# Patient Record
Sex: FEMALE | Race: WHITE | Employment: OTHER | URBAN - METROPOLITAN AREA
[De-identification: names, ages, dates, MRNs, and addresses within clinical notes are randomized per-mention and may not be internally consistent; named-entity substitution may affect disease eponyms.]

---

## 2017-01-03 ENCOUNTER — TELEPHONE (OUTPATIENT)
Dept: INTERNAL MEDICINE CLINIC | Facility: CLINIC | Age: 77
End: 2017-01-03

## 2017-01-03 NOTE — TELEPHONE ENCOUNTER
Dr Sofy Etienne, please advise. Pt says her body is cramping. She was calling to get an appt. She says she did not make an appt sooner because she wasn't up to it. Pt asking if she can be added to MMP schedule this week at [facility] only.   Explained to pt, n

## 2017-01-03 NOTE — TELEPHONE ENCOUNTER
Patient just released from the hospital on X mas. She needs a follow up apt. She said that last couple of days her legs were swollen and sore. She said that she does not have much energy. She only wants to see Doctor at the [facility] office.  ( takes 2 buse

## 2017-01-03 NOTE — TELEPHONE ENCOUNTER
Received back my out reach call to [place] at [facility] [place] where pt lives in their [place] [address].  She said right now they do not have a  and with the volume of residence now living in their facility, they can't provide transport to and from [facility]

## 2017-01-04 ENCOUNTER — TELEPHONE (OUTPATIENT)
Dept: INTERNAL MEDICINE CLINIC | Facility: CLINIC | Age: 77
End: 2017-01-04

## 2017-01-04 ENCOUNTER — HOSPITAL ENCOUNTER (INPATIENT)
Facility: HOSPITAL | Age: 77
LOS: 14 days | Discharge: NURSING, REHAB OR LONG-TERM CARE | DRG: 208 | End: 2017-01-18
Attending: EMERGENCY MEDICINE | Admitting: HOSPITALIST
Payer: MEDICARE

## 2017-01-04 ENCOUNTER — APPOINTMENT (OUTPATIENT)
Dept: GENERAL RADIOLOGY | Facility: HOSPITAL | Age: 77
DRG: 208 | End: 2017-01-04
Attending: EMERGENCY MEDICINE
Payer: MEDICARE

## 2017-01-04 ENCOUNTER — APPOINTMENT (OUTPATIENT)
Dept: ULTRASOUND IMAGING | Facility: HOSPITAL | Age: 77
DRG: 208 | End: 2017-01-04
Attending: EMERGENCY MEDICINE
Payer: MEDICARE

## 2017-01-04 DIAGNOSIS — L03.116 CELLULITIS OF LEFT LOWER EXTREMITY: Primary | ICD-10-CM

## 2017-01-04 DIAGNOSIS — J44.1 COPD EXACERBATION (HCC): ICD-10-CM

## 2017-01-04 LAB
ANION GAP SERPL CALC-SCNC: 13 MMOL/L (ref 0–18)
ANION GAP SERPL CALC-SCNC: 15 MMOL/L (ref 0–18)
APTT PPP: 23.6 SECONDS (ref 23.2–35.3)
APTT PPP: 25.3 SECONDS (ref 23.2–35.3)
BASOPHILS # BLD: 0 K/UL (ref 0–0.2)
BASOPHILS NFR BLD: 0 %
BUN SERPL-MCNC: 14 MG/DL (ref 8–20)
BUN SERPL-MCNC: 15 MG/DL (ref 8–20)
BUN/CREAT SERPL: 12.3 (ref 10–20)
BUN/CREAT SERPL: 13.9 (ref 10–20)
CALCIUM SERPL-MCNC: 8.4 MG/DL (ref 8.5–10.5)
CALCIUM SERPL-MCNC: 8.6 MG/DL (ref 8.5–10.5)
CHLORIDE SERPL-SCNC: 100 MMOL/L (ref 95–110)
CHLORIDE SERPL-SCNC: 98 MMOL/L (ref 95–110)
CO2 SERPL-SCNC: 26 MMOL/L (ref 22–32)
CO2 SERPL-SCNC: 28 MMOL/L (ref 22–32)
CREAT SERPL-MCNC: 1.08 MG/DL (ref 0.5–1.5)
CREAT SERPL-MCNC: 1.14 MG/DL (ref 0.5–1.5)
EOSINOPHIL # BLD: 0.3 K/UL (ref 0–0.7)
EOSINOPHIL NFR BLD: 6 %
ERYTHROCYTE [DISTWIDTH] IN BLOOD BY AUTOMATED COUNT: 13.7 % (ref 11–15)
ERYTHROCYTE [DISTWIDTH] IN BLOOD BY AUTOMATED COUNT: 14.2 % (ref 11–15)
GLUCOSE SERPL-MCNC: 209 MG/DL (ref 70–99)
GLUCOSE SERPL-MCNC: 324 MG/DL (ref 70–99)
HCT VFR BLD AUTO: 37.2 % (ref 35–48)
HCT VFR BLD AUTO: 39.2 % (ref 35–48)
HGB BLD-MCNC: 12.4 G/DL (ref 12–16)
HGB BLD-MCNC: 13 G/DL (ref 12–16)
INR BLD: 1 (ref 0.9–1.2)
LYMPHOCYTES # BLD: 1.4 K/UL (ref 1–4)
LYMPHOCYTES NFR BLD: 26 %
MCH RBC QN AUTO: 31.7 PG (ref 27–32)
MCH RBC QN AUTO: 31.8 PG (ref 27–32)
MCHC RBC AUTO-ENTMCNC: 33.3 G/DL (ref 32–37)
MCHC RBC AUTO-ENTMCNC: 33.3 G/DL (ref 32–37)
MCV RBC AUTO: 95.3 FL (ref 80–100)
MCV RBC AUTO: 95.6 FL (ref 80–100)
MONOCYTES # BLD: 0.4 K/UL (ref 0–1)
MONOCYTES NFR BLD: 7 %
MRSA DNA SPEC QL NAA+PROBE: NEGATIVE
NEUTROPHILS # BLD AUTO: 3.3 K/UL (ref 1.8–7.7)
NEUTROPHILS NFR BLD: 61 %
OSMOLALITY UR CALC.SUM OF ELEC: 299 MOSM/KG (ref 275–295)
OSMOLALITY UR CALC.SUM OF ELEC: 301 MOSM/KG (ref 275–295)
PLATELET # BLD AUTO: 81 K/UL (ref 140–400)
PLATELET # BLD AUTO: 99 K/UL (ref 140–400)
PMV BLD AUTO: 10 FL (ref 7.4–10.3)
PMV BLD AUTO: 9.2 FL (ref 7.4–10.3)
POTASSIUM SERPL-SCNC: 3.3 MMOL/L (ref 3.3–5.1)
POTASSIUM SERPL-SCNC: 3.7 MMOL/L (ref 3.3–5.1)
PROTHROMBIN TIME: 12.7 SECONDS (ref 11.8–14.5)
RBC # BLD AUTO: 3.9 M/UL (ref 3.7–5.4)
RBC # BLD AUTO: 4.1 M/UL (ref 3.7–5.4)
SODIUM SERPL-SCNC: 139 MMOL/L (ref 136–144)
SODIUM SERPL-SCNC: 141 MMOL/L (ref 136–144)
WBC # BLD AUTO: 5.4 K/UL (ref 4–11)
WBC # BLD AUTO: 5.5 K/UL (ref 4–11)

## 2017-01-04 PROCEDURE — 99223 1ST HOSP IP/OBS HIGH 75: CPT | Performed by: HOSPITALIST

## 2017-01-04 PROCEDURE — 93971 EXTREMITY STUDY: CPT

## 2017-01-04 PROCEDURE — 71010 XR CHEST AP PORTABLE  (CPT=71010): CPT

## 2017-01-04 RX ORDER — LOPERAMIDE HYDROCHLORIDE 2 MG/1
2 CAPSULE ORAL AS NEEDED
Status: DISCONTINUED | OUTPATIENT
Start: 2017-01-04 | End: 2017-01-18

## 2017-01-04 RX ORDER — AMLODIPINE BESYLATE 10 MG/1
10 TABLET ORAL DAILY
Status: DISCONTINUED | OUTPATIENT
Start: 2017-01-05 | End: 2017-01-18

## 2017-01-04 RX ORDER — HYDROCODONE BITARTRATE AND ACETAMINOPHEN 5; 325 MG/1; MG/1
2 TABLET ORAL EVERY 4 HOURS PRN
Status: DISCONTINUED | OUTPATIENT
Start: 2017-01-04 | End: 2017-01-13

## 2017-01-04 RX ORDER — IPRATROPIUM BROMIDE AND ALBUTEROL SULFATE 2.5; .5 MG/3ML; MG/3ML
3 SOLUTION RESPIRATORY (INHALATION) ONCE
Status: COMPLETED | OUTPATIENT
Start: 2017-01-04 | End: 2017-01-04

## 2017-01-04 RX ORDER — ACETAMINOPHEN 325 MG/1
650 TABLET ORAL EVERY 6 HOURS PRN
Status: DISCONTINUED | OUTPATIENT
Start: 2017-01-04 | End: 2017-01-11

## 2017-01-04 RX ORDER — HYDROCODONE BITARTRATE AND ACETAMINOPHEN 5; 325 MG/1; MG/1
1 TABLET ORAL EVERY 4 HOURS PRN
Status: DISCONTINUED | OUTPATIENT
Start: 2017-01-04 | End: 2017-01-13

## 2017-01-04 RX ORDER — HEPARIN SODIUM 1000 [USP'U]/ML
80 INJECTION, SOLUTION INTRAVENOUS; SUBCUTANEOUS ONCE
Status: COMPLETED | OUTPATIENT
Start: 2017-01-04 | End: 2017-01-04

## 2017-01-04 RX ORDER — ONDANSETRON 2 MG/ML
4 INJECTION INTRAMUSCULAR; INTRAVENOUS EVERY 6 HOURS PRN
Status: DISCONTINUED | OUTPATIENT
Start: 2017-01-04 | End: 2017-01-18

## 2017-01-04 RX ORDER — BISACODYL 10 MG
10 SUPPOSITORY, RECTAL RECTAL
Status: DISCONTINUED | OUTPATIENT
Start: 2017-01-04 | End: 2017-01-18

## 2017-01-04 RX ORDER — VENLAFAXINE HYDROCHLORIDE 150 MG/1
300 CAPSULE, EXTENDED RELEASE ORAL DAILY
Status: DISCONTINUED | OUTPATIENT
Start: 2017-01-05 | End: 2017-01-18

## 2017-01-04 RX ORDER — POLYETHYLENE GLYCOL 3350 17 G/17G
17 POWDER, FOR SOLUTION ORAL DAILY PRN
Status: DISCONTINUED | OUTPATIENT
Start: 2017-01-04 | End: 2017-01-18

## 2017-01-04 RX ORDER — IPRATROPIUM BROMIDE AND ALBUTEROL SULFATE 2.5; .5 MG/3ML; MG/3ML
3 SOLUTION RESPIRATORY (INHALATION) EVERY 4 HOURS PRN
Status: DISCONTINUED | OUTPATIENT
Start: 2017-01-04 | End: 2017-01-11

## 2017-01-04 RX ORDER — DOCUSATE SODIUM 100 MG/1
100 CAPSULE, LIQUID FILLED ORAL 2 TIMES DAILY
Status: DISCONTINUED | OUTPATIENT
Start: 2017-01-04 | End: 2017-01-18

## 2017-01-04 RX ORDER — HYDROCODONE BITARTRATE AND ACETAMINOPHEN 10; 325 MG/1; MG/1
1 TABLET ORAL EVERY 6 HOURS PRN
Status: DISCONTINUED | OUTPATIENT
Start: 2017-01-04 | End: 2017-01-18

## 2017-01-04 RX ORDER — HEPARIN SODIUM AND DEXTROSE 10000; 5 [USP'U]/100ML; G/100ML
18 INJECTION INTRAVENOUS ONCE
Status: COMPLETED | OUTPATIENT
Start: 2017-01-04 | End: 2017-01-04

## 2017-01-04 RX ORDER — ZOLPIDEM TARTRATE 5 MG/1
5 TABLET ORAL NIGHTLY PRN
Status: DISCONTINUED | OUTPATIENT
Start: 2017-01-04 | End: 2017-01-18

## 2017-01-04 RX ORDER — ACETAMINOPHEN 325 MG/1
650 TABLET ORAL EVERY 4 HOURS PRN
Status: DISCONTINUED | OUTPATIENT
Start: 2017-01-04 | End: 2017-01-13

## 2017-01-04 RX ORDER — LORAZEPAM 1 MG/1
1 TABLET ORAL 2 TIMES DAILY PRN
Status: DISCONTINUED | OUTPATIENT
Start: 2017-01-04 | End: 2017-01-18

## 2017-01-04 RX ORDER — SODIUM PHOSPHATE, DIBASIC AND SODIUM PHOSPHATE, MONOBASIC 7; 19 G/133ML; G/133ML
1 ENEMA RECTAL ONCE AS NEEDED
Status: ACTIVE | OUTPATIENT
Start: 2017-01-04 | End: 2017-01-04

## 2017-01-04 RX ORDER — METHYLPREDNISOLONE SODIUM SUCCINATE 40 MG/ML
40 INJECTION, POWDER, LYOPHILIZED, FOR SOLUTION INTRAMUSCULAR; INTRAVENOUS EVERY 8 HOURS
Status: DISCONTINUED | OUTPATIENT
Start: 2017-01-04 | End: 2017-01-11

## 2017-01-04 RX ORDER — IPRATROPIUM BROMIDE AND ALBUTEROL SULFATE 2.5; .5 MG/3ML; MG/3ML
3 SOLUTION RESPIRATORY (INHALATION)
Status: DISCONTINUED | OUTPATIENT
Start: 2017-01-05 | End: 2017-01-18

## 2017-01-04 RX ORDER — METHYLPREDNISOLONE SODIUM SUCCINATE 125 MG/2ML
125 INJECTION, POWDER, LYOPHILIZED, FOR SOLUTION INTRAMUSCULAR; INTRAVENOUS ONCE
Status: COMPLETED | OUTPATIENT
Start: 2017-01-04 | End: 2017-01-04

## 2017-01-04 RX ORDER — HEPARIN SODIUM 5000 [USP'U]/ML
5000 INJECTION, SOLUTION INTRAVENOUS; SUBCUTANEOUS EVERY 12 HOURS
Status: DISCONTINUED | OUTPATIENT
Start: 2017-01-04 | End: 2017-01-04 | Stop reason: ALTCHOICE

## 2017-01-04 RX ORDER — TEMAZEPAM 30 MG/1
30 CAPSULE ORAL NIGHTLY PRN
Status: DISCONTINUED | OUTPATIENT
Start: 2017-01-04 | End: 2017-01-18

## 2017-01-04 RX ORDER — HYDROCODONE POLISTIREX AND CHLORPHENIRAMINE POLISTIREX 10; 8 MG/5ML; MG/5ML
5 SUSPENSION, EXTENDED RELEASE ORAL 2 TIMES DAILY PRN
Status: DISCONTINUED | OUTPATIENT
Start: 2017-01-04 | End: 2017-01-18

## 2017-01-04 RX ORDER — LEVOTHYROXINE SODIUM 0.12 MG/1
250 TABLET ORAL
Status: DISCONTINUED | OUTPATIENT
Start: 2017-01-04 | End: 2017-01-18

## 2017-01-04 RX ORDER — MIRTAZAPINE 30 MG/1
30 TABLET, FILM COATED ORAL NIGHTLY
Status: DISCONTINUED | OUTPATIENT
Start: 2017-01-04 | End: 2017-01-18

## 2017-01-04 RX ORDER — LIDOCAINE 50 MG/G
1 PATCH TOPICAL EVERY 24 HOURS
Status: DISCONTINUED | OUTPATIENT
Start: 2017-01-04 | End: 2017-01-18

## 2017-01-04 RX ORDER — GUAIFENESIN 100 MG/5ML
200 SOLUTION ORAL EVERY 4 HOURS PRN
Status: DISCONTINUED | OUTPATIENT
Start: 2017-01-04 | End: 2017-01-18

## 2017-01-04 RX ORDER — PANTOPRAZOLE SODIUM 40 MG/1
40 TABLET, DELAYED RELEASE ORAL
Status: DISCONTINUED | OUTPATIENT
Start: 2017-01-05 | End: 2017-01-09

## 2017-01-04 RX ORDER — ACETAMINOPHEN 325 MG/1
325 TABLET ORAL EVERY 4 HOURS PRN
Status: DISCONTINUED | OUTPATIENT
Start: 2017-01-04 | End: 2017-01-18

## 2017-01-04 RX ORDER — HEPARIN SODIUM AND DEXTROSE 10000; 5 [USP'U]/100ML; G/100ML
INJECTION INTRAVENOUS CONTINUOUS
Status: DISCONTINUED | OUTPATIENT
Start: 2017-01-05 | End: 2017-01-05

## 2017-01-04 RX ORDER — SODIUM CHLORIDE 0.9 % (FLUSH) 0.9 %
SYRINGE (ML) INJECTION
Status: COMPLETED
Start: 2017-01-04 | End: 2017-01-04

## 2017-01-04 NOTE — TELEPHONE ENCOUNTER
Follow up on pateint if she is able to get transportation  If not and she is ill option of callin 911 to go to the ER if she is acutely ill

## 2017-01-04 NOTE — TELEPHONE ENCOUNTER
Malachi Negron from [facility] home care stts pt woke up with a bad cough. Malachi Negron asking if pt can be seen Friday with Dr. Luisa Casas ? (no open appts)   Malachi Negron asking to speak with a nurse. Please advise.

## 2017-01-04 NOTE — TELEPHONE ENCOUNTER
Hx of respiratory failure  Sounds like should be seen before Friday  reasses her condition and let me know

## 2017-01-04 NOTE — TELEPHONE ENCOUNTER
[facility]    Please transfer x [phone] until 4:45pm or [phone] anytime. Thank you. To follow up on MD message below.

## 2017-01-04 NOTE — ED INITIAL ASSESSMENT (HPI)
Pt brought in by EMS for SOB/COPD exacerbation. Pt d/c'd on 12-25-16 home from [address] [place] [address] for same symptoms. No fevers at home. No home oxygen.

## 2017-01-04 NOTE — ED NOTES
Pt presents to ER via EMS for difficulty breathing. Pt with a hx of COPD. Pt was discharged from [address] [place] [address] 12-25-16 for COPD exacerbation. Pt had 2 nebulizer treatments in route to hospital by EMS. Pt on room air 93-95%. Pt speaking small short sentences.  Bilate

## 2017-01-04 NOTE — H&P
Psychiatric    PATIENT'S NAME: Leonidsebastian Jacobsdevante   ATTENDING PHYSICIAN: Mitch Arana MD   PATIENT ACCOUNT#:   [id number]    LOCATION:  [place] [address]  MEDICAL RECORD #:   [id number]       YOB: 1940  ADMISSION DATE:       01/04/20 HISTORY:  Ex-tobacco user. No current tobacco, alcohol, or drug use. Lives with her family. She is usually independent for basic activities of daily living.     REVIEW OF SYSTEMS:  The patient said for the last few days she has been having progressive sh medications. Obtain pulmonary consult. IV Solu-Medrol and DuoNeb q.4 h. p.r.n. Further recommendations to follow.     Dictated By Ovi Nathan MD  d: 01/04/2017 16:18:31  t: 01/04/2017 16:33:39  [facility] [id number]  FB/

## 2017-01-04 NOTE — TELEPHONE ENCOUNTER
attemped to call patient to follow up, her message machine if full. Pt in ER now. Sent to Dr. Remigio Bedolla, thank you.

## 2017-01-04 NOTE — TELEPHONE ENCOUNTER
Dr Jarret Frias, please advise. Seng Penaloza RN from [facility] home care states pt has been having more coughing than usual.  Yesterday she had shortness of breath. Today RN hears wheezing in pt lungs.   She contacted pulmonologist and will try to restart nebulizer treatment

## 2017-01-04 NOTE — TELEPHONE ENCOUNTER
There is another acute encounter for this pt, per [facility] pt is going to ER today. Closing this encounter. Will f/u in other encounter.

## 2017-01-04 NOTE — ED PROVIDER NOTES
Patient Seen in: [facility] AND [facility] Emergency Department    History   Patient presents with:  Dyspnea BELINDA SOB (respiratory)    Stated Complaint: Cough    HPI    Patient presents to the emergency department with difficulty breathing.   She has history of CO CHOLECYSTECTOMY  1984    Comment Cholecystitis    LAMINECTOMY      OTHER SURGICAL HISTORY  1985,1995,2003,2009    Comment Cervical Discectomy     HYSTERECTOMY  1967    Comment Partial Hysterectomy    TONSILLECTOMY  1950    Comment Tonsillitis     EYE SURGE as needed   aspirin 325 MG Oral Tab,  Take 325 mg by mouth daily. acetaminophen (TYLENOL) 325 MG Oral Tab,  Take 325 mg by mouth every 4 (four) hours as needed.    NYSTOP 834742 UNIT/GM External Powder,  Use as needed       Family History   Problem Relati 142/78 mmHg  Pulse 93  Temp(Src) 98 °F (36.7 °C) (Oral)  Resp 20  Ht 172.7 cm (5' 8\")  Wt 117.935 kg  BMI 39.54 kg/m2  SpO2 93%        Physical Exam  Constitutional:  Alert, well nourished adult lying in bed in no distress. Vital signs noted.   However wh BASIC METABOLIC PANEL (8) - Abnormal; Notable for the following:     Glucose 324 (*)     Calculated Osmolality 301 (*)     GFR, Non- 46 (*)     GFR, -American 56 (*)     All other components within normal limits   CBC, PLATELET; NO Status                     ---------                               -----------         ------                     CBC W/ DIFFERENTIAL[[id number]]          Abnormal            Final result                 Please view results for these tests on the indiv (Principal)DVT (deep venous thrombosis) ([facility] I82.409 1/5/2017 Unknown    Cellulitis of left lower extremity L03. 116 1/4/2017 Unknown    COPD exacerbation ([facility] J44.1 12/9/2016 Unknown

## 2017-01-05 ENCOUNTER — APPOINTMENT (OUTPATIENT)
Dept: NUCLEAR MEDICINE | Facility: HOSPITAL | Age: 77
DRG: 208 | End: 2017-01-05
Attending: HOSPITALIST
Payer: MEDICARE

## 2017-01-05 PROBLEM — I82.409 DVT (DEEP VENOUS THROMBOSIS) (HCC): Status: ACTIVE | Noted: 2017-01-05

## 2017-01-05 LAB
ANION GAP SERPL CALC-SCNC: 12 MMOL/L (ref 0–18)
APTT PPP: 76.4 SECONDS (ref 23.2–35.3)
APTT PPP: >240 SECONDS (ref 23.2–35.3)
BASOPHILS # BLD: 0 K/UL (ref 0–0.2)
BASOPHILS NFR BLD: 0 %
BUN SERPL-MCNC: 12 MG/DL (ref 8–20)
BUN/CREAT SERPL: 11.9 (ref 10–20)
CALCIUM SERPL-MCNC: 8.6 MG/DL (ref 8.5–10.5)
CHLORIDE SERPL-SCNC: 101 MMOL/L (ref 95–110)
CO2 SERPL-SCNC: 26 MMOL/L (ref 22–32)
CREAT SERPL-MCNC: 1.01 MG/DL (ref 0.5–1.5)
EOSINOPHIL # BLD: 0.1 K/UL (ref 0–0.7)
EOSINOPHIL NFR BLD: 1 %
ERYTHROCYTE [DISTWIDTH] IN BLOOD BY AUTOMATED COUNT: 14 % (ref 11–15)
GLUCOSE SERPL-MCNC: 378 MG/DL (ref 70–99)
HCT VFR BLD AUTO: 37.1 % (ref 35–48)
HGB BLD-MCNC: 12.2 G/DL (ref 12–16)
LYMPHOCYTES # BLD: 0.5 K/UL (ref 1–4)
LYMPHOCYTES NFR BLD: 9 %
MCH RBC QN AUTO: 31.6 PG (ref 27–32)
MCHC RBC AUTO-ENTMCNC: 32.9 G/DL (ref 32–37)
MCV RBC AUTO: 96 FL (ref 80–100)
MONOCYTES # BLD: 0.1 K/UL (ref 0–1)
MONOCYTES NFR BLD: 1 %
NEUTROPHILS # BLD AUTO: 4.7 K/UL (ref 1.8–7.7)
NEUTROPHILS NFR BLD: 88 %
NEUTS BAND NFR BLD: 1 %
OSMOLALITY UR CALC.SUM OF ELEC: 303 MOSM/KG (ref 275–295)
PLATELET # BLD AUTO: 86 K/UL (ref 140–400)
PMV BLD AUTO: 9.2 FL (ref 7.4–10.3)
POTASSIUM SERPL-SCNC: 3.8 MMOL/L (ref 3.3–5.1)
POTASSIUM SERPL-SCNC: 3.8 MMOL/L (ref 3.3–5.1)
RBC # BLD AUTO: 3.87 M/UL (ref 3.7–5.4)
SODIUM SERPL-SCNC: 139 MMOL/L (ref 136–144)
WBC # BLD AUTO: 5.2 K/UL (ref 4–11)

## 2017-01-05 PROCEDURE — 99233 SBSQ HOSP IP/OBS HIGH 50: CPT | Performed by: HOSPITALIST

## 2017-01-05 PROCEDURE — 78582 LUNG VENTILAT&PERFUS IMAGING: CPT

## 2017-01-05 RX ORDER — SODIUM CHLORIDE 0.9 % (FLUSH) 0.9 %
SYRINGE (ML) INJECTION
Status: COMPLETED
Start: 2017-01-05 | End: 2017-01-05

## 2017-01-05 RX ORDER — ENOXAPARIN SODIUM 150 MG/ML
1 INJECTION SUBCUTANEOUS EVERY 12 HOURS SCHEDULED
Status: DISCONTINUED | OUTPATIENT
Start: 2017-01-05 | End: 2017-01-16

## 2017-01-05 RX ORDER — POTASSIUM CHLORIDE 20 MEQ/1
40 TABLET, EXTENDED RELEASE ORAL ONCE
Status: COMPLETED | OUTPATIENT
Start: 2017-01-05 | End: 2017-01-05

## 2017-01-05 RX ORDER — WARFARIN SODIUM 5 MG/1
5 TABLET ORAL NIGHTLY
Status: DISCONTINUED | OUTPATIENT
Start: 2017-01-05 | End: 2017-01-06

## 2017-01-05 NOTE — PROGRESS NOTES
[facility]    Progress Note    Ltanya Hum Patient Status:  Inpatient    12/3/1940 MRN [id number]   Location [facility] 5SW/SE Attending Anthony Crow MD   Hosp Day # 1 PCP Rey Jacinto MD       Subjective:   Jacy Ardon Nightly   • Pantoprazole Sodium  40 mg Oral BID AC   • QUEtiapine Fumarate  300 mg Oral Nightly   • Venlafaxine HCl ER  300 mg Oral Daily   • lidocaine  1 patch Transdermal Q24H   • ipratropium-albuterol  3 mL Nebulization QID       Current PRN Inpatient M 2. Extensive subcutaneous edema of the left lower extremity. Xr Chest Ap Portable  (cpt=71010)    1/4/2017  CONCLUSION:  1. Suboptimal inspiration. No active disease in chest. 2. Atherosclerotic calcification aorta.          Nm Lung Vq Vent / César Dagoberto evidence for PE on VQ scan  -currently on heparin drip   -lengthy conversation against pros/cons of pradaxa vs heparin/coumadin and Lovenox/coumadin - pt chose lovenox and coumadin. Will have pharmacy dose lovenox and start coumadin 5 mg tonight.   Check I

## 2017-01-05 NOTE — PROGRESS NOTES
[facility]    Progress Note    Yecenia Norris Patient Status:  Inpatient    12/3/1940 MRN [id number]   Location [facility] 5SW/SE Attending Ileana Fairchild MD   Hosp Day # 1 PCP Marcelo Allen MD       Subjective:   Manuel Torres ([address]  Anticoagulate,v/q-P      COPD exacerbation ([address]  Steroids nebs      Cellulitis of left lower extremity  Abx, consult to follow        Results:     Lab Results  Component Value Date   WBC 5.2 01/05/2017   HGB 12.2 01/05/2017   HCT 37.1 01/05/2017

## 2017-01-05 NOTE — PROGRESS NOTES
[facility] dosing service    Initial Pharmacokinetic Consult for Vancomycin Dosing     Ryan Miguel is a 64year old female who is being treated for cellulitis.   Pharmacy has been asked to dose Vancomycin by Dr. Carol Ann Corley    She is allergic to ciproflo

## 2017-01-05 NOTE — PLAN OF CARE
Patient/Family Goals    • Patient/Family Long Term Goal Progressing    • Patient/Family Short Term Goal Progressing        RESPIRATORY - ADULT    • Achieves optimal ventilation and oxygenation Progressing

## 2017-01-05 NOTE — RESPIRATORY THERAPY NOTE
Pt on SHANT  consult. Per pt she need to have her sleep study read by her pulmonary and will not want to use cpap/bipap while in house. Told pt to call if she changes her mind so we can get an order for cpap/bipap.

## 2017-01-05 NOTE — H&P
[facility]    PATIENT'S NAME: Shahla Mullen   ATTENDING PHYSICIAN: Amirah Shelby MD   PATIENT ACCOUNT#:   [id number]    LOCATION:  [address] [place] [address] RECORD #:   [id number]       YOB: 1940  ADMISSION DATE:       01/04/2017 coronary artery disease. SOCIAL HISTORY:  Ex-tobacco user. No current tobacco, alcohol, or drug use. Lives with her family. She is usually independent for basic activities of daily living.     REVIEW OF SYSTEMS:  The patient said for the last few days medical floor. IV vancomycin. Continue home medications. Obtain pulmonary consult. IV Solu-Medrol and DuoNeb q.4 h. p.r.n. Further recommendations to follow. ADDENDUM TO ASSESSMENT AND PLAN (Misa Carmen [id number]):     The patient had venous Doppler study res

## 2017-01-05 NOTE — CM/SW NOTE
+BPCI. Patient is a [address] readmission previously enrolled in the [address] program on 12/8/16  under  (sepsis) with 78 days left in the BPCI episode which will end on 3/24/17. Patient is current with [facility].   She has been to  and [facility]

## 2017-01-05 NOTE — ED NOTES
Report given to [facility]. RN aware that pt will need heparin drip for LLE DVT. Dr Jarod Cruz to enter orders.

## 2017-01-05 NOTE — PROGRESS NOTES
NYSTOP POWD 1 Application  is Non-Formulary Medication &  Auto-Substituted to miconazole powder Per P&[employer] PROTOCOL

## 2017-01-05 NOTE — PLAN OF CARE
Patient refusing lab draws and new IV start. Insisting on PICC line. Patient has Hx of DVT from PICC line. Dr. Childs Cleaves aware of situation. Orders to hold off on 1600 heparin draw until after MD speaks with patient.

## 2017-01-05 NOTE — CM/SW NOTE
+BPCI. Patient is a [address] readmission previously enrolled in the [address] program on 12/8/16  under  (sepsis) with 78 days left in the BPCI episode which will end on 3/24/17. Patient is current with [facility].   She has been to  and [facility]

## 2017-01-05 NOTE — DISCHARGE PLANNING
MDO received for home health services. Pt is current with [facility]. Update given to their intake. Pt has a hx rehab at [address] [place] [address] and [facility]. SW will assist as indicated by therapy and MD recommendations.     shayne sheth,JM [id number]

## 2017-01-06 ENCOUNTER — TELEPHONE (OUTPATIENT)
Dept: INTERNAL MEDICINE CLINIC | Facility: CLINIC | Age: 77
End: 2017-01-06

## 2017-01-06 LAB
ALBUMIN SERPL BCP-MCNC: 2.8 G/DL (ref 3.5–4.8)
ALBUMIN/GLOB SERPL: 1.2 {RATIO} (ref 1–2)
ALP SERPL-CCNC: 75 U/L (ref 32–100)
ALT SERPL-CCNC: 26 U/L (ref 14–54)
ANION GAP SERPL CALC-SCNC: 6 MMOL/L (ref 0–18)
AST SERPL-CCNC: 13 U/L (ref 15–41)
BASOPHILS # BLD: 0 K/UL (ref 0–0.2)
BASOPHILS NFR BLD: 0 %
BILIRUB SERPL-MCNC: 0.4 MG/DL (ref 0.3–1.2)
BUN SERPL-MCNC: 20 MG/DL (ref 8–20)
BUN/CREAT SERPL: 21.3 (ref 10–20)
CALCIUM SERPL-MCNC: 9.1 MG/DL (ref 8.5–10.5)
CHLORIDE SERPL-SCNC: 105 MMOL/L (ref 95–110)
CO2 SERPL-SCNC: 30 MMOL/L (ref 22–32)
CREAT SERPL-MCNC: 0.94 MG/DL (ref 0.5–1.5)
EOSINOPHIL # BLD: 0 K/UL (ref 0–0.7)
EOSINOPHIL NFR BLD: 0 %
ERYTHROCYTE [DISTWIDTH] IN BLOOD BY AUTOMATED COUNT: 13.7 % (ref 11–15)
GLOBULIN PLAS-MCNC: 2.4 G/DL (ref 2.5–3.7)
GLUCOSE SERPL-MCNC: 238 MG/DL (ref 70–99)
HCT VFR BLD AUTO: 36.1 % (ref 35–48)
HGB BLD-MCNC: 11.9 G/DL (ref 12–16)
INR BLD: 0.9 (ref 0.9–1.2)
LYMPHOCYTES # BLD: 1.4 K/UL (ref 1–4)
LYMPHOCYTES NFR BLD: 15 %
MCH RBC QN AUTO: 31.6 PG (ref 27–32)
MCHC RBC AUTO-ENTMCNC: 32.9 G/DL (ref 32–37)
MCV RBC AUTO: 95.8 FL (ref 80–100)
MONOCYTES # BLD: 0.4 K/UL (ref 0–1)
MONOCYTES NFR BLD: 4 %
NEUTROPHILS # BLD AUTO: 7.9 K/UL (ref 1.8–7.7)
NEUTROPHILS NFR BLD: 81 %
OSMOLALITY UR CALC.SUM OF ELEC: 302 MOSM/KG (ref 275–295)
PLATELET # BLD AUTO: 113 K/UL (ref 140–400)
PMV BLD AUTO: 9 FL (ref 7.4–10.3)
POTASSIUM SERPL-SCNC: 4.1 MMOL/L (ref 3.3–5.1)
PROT SERPL-MCNC: 5.2 G/DL (ref 5.9–8.4)
PROTHROMBIN TIME: 12.4 SECONDS (ref 11.8–14.5)
RBC # BLD AUTO: 3.76 M/UL (ref 3.7–5.4)
SODIUM SERPL-SCNC: 141 MMOL/L (ref 136–144)
WBC # BLD AUTO: 9.8 K/UL (ref 4–11)

## 2017-01-06 PROCEDURE — 99233 SBSQ HOSP IP/OBS HIGH 50: CPT | Performed by: HOSPITALIST

## 2017-01-06 NOTE — PLAN OF CARE
Patient/Family Goals    • Patient/Family Long Term Goal Not Progressing    • Patient/Family Short Term Goal Not Progressing        RESPIRATORY - ADULT    • Achieves optimal ventilation and oxygenation Not Progressing        inr 0.9. On lovenox.  Bronch sche

## 2017-01-06 NOTE — PROGRESS NOTES
Nicholas Newman is a 64year old female for whom pharmacy is dosing enoxaparin for treatment of DVT left lower extremity.      TBW: 117.9 kg    Vitals: /66 mmHg  Pulse 96  Temp(Src) 97.9 °F (36.6 °C) (Oral)  Resp 20  H

## 2017-01-06 NOTE — PROGRESS NOTES
[facility]    Progress Note    Jazmine Arnold Patient Status:  Inpatient    12/3/1940 MRN [id number]   Location [facility] 5SW/SE Attending Sowmya Pickens MD   Hosp Day # 2 PCP Kirstin Govea MD       Subjective:   Vicky Ruiz ([facility]  Steroids, nebs      Cellulitis of left lower extremity  Abx, bronchoscopy Monday        Results:     Lab Results  Component Value Date   WBC 9.8 01/06/2017   HGB 11.9* 01/06/2017   HCT 36.1 01/06/2017   * 01/06/2017   CREATSERUM 0.94 01/06/20 pulmonary embolus. 2. Suggestion of COPD. Dictated by (CST): Ronney Hodgkin MD on 1/05/2017 at 11:21     Approved by (CST): Ronney Hodgkin. MD on 1/05/2017 at 11:22     ADDENDUM:  Low probability study for pulmonary embolus.    Dictated

## 2017-01-07 LAB
ANION GAP SERPL CALC-SCNC: 5 MMOL/L (ref 0–18)
BASOPHILS # BLD: 0 K/UL (ref 0–0.2)
BASOPHILS NFR BLD: 0 %
BUN SERPL-MCNC: 22 MG/DL (ref 8–20)
BUN/CREAT SERPL: 19.5 (ref 10–20)
CALCIUM SERPL-MCNC: 9.5 MG/DL (ref 8.5–10.5)
CHLORIDE SERPL-SCNC: 102 MMOL/L (ref 95–110)
CO2 SERPL-SCNC: 31 MMOL/L (ref 22–32)
CREAT SERPL-MCNC: 1.13 MG/DL (ref 0.5–1.5)
EOSINOPHIL # BLD: 0 K/UL (ref 0–0.7)
EOSINOPHIL NFR BLD: 0 %
ERYTHROCYTE [DISTWIDTH] IN BLOOD BY AUTOMATED COUNT: 13.9 % (ref 11–15)
GLUCOSE SERPL-MCNC: 322 MG/DL (ref 70–99)
HCT VFR BLD AUTO: 36.9 % (ref 35–48)
HGB BLD-MCNC: 12.2 G/DL (ref 12–16)
INR BLD: 1 (ref 0.9–1.2)
LYMPHOCYTES # BLD: 0.5 K/UL (ref 1–4)
LYMPHOCYTES NFR BLD: 5 %
MCH RBC QN AUTO: 32 PG (ref 27–32)
MCHC RBC AUTO-ENTMCNC: 33 G/DL (ref 32–37)
MCV RBC AUTO: 96.8 FL (ref 80–100)
METAMYELOCYTES # BLD MANUAL: 0.19 K/UL
MONOCYTES # BLD: 0.3 K/UL (ref 0–1)
MONOCYTES NFR BLD: 3 %
MYELOCYTES NFR BLD: 2 %
NEUTROPHILS # BLD AUTO: 8.4 K/UL (ref 1.8–7.7)
NEUTROPHILS NFR BLD: 84 %
NEUTS BAND NFR BLD: 6 %
OSMOLALITY UR CALC.SUM OF ELEC: 302 MOSM/KG (ref 275–295)
PLATELET # BLD AUTO: 130 K/UL (ref 140–400)
PMV BLD AUTO: 9.1 FL (ref 7.4–10.3)
POTASSIUM SERPL-SCNC: 4.9 MMOL/L (ref 3.3–5.1)
PROTHROMBIN TIME: 13 SECONDS (ref 11.8–14.5)
RBC # BLD AUTO: 3.82 M/UL (ref 3.7–5.4)
SODIUM SERPL-SCNC: 138 MMOL/L (ref 136–144)
VANCOMYCIN TROUGH SERPL-MCNC: 10 MCG/ML (ref 10–20)
WBC # BLD AUTO: 9.3 K/UL (ref 4–11)

## 2017-01-07 PROCEDURE — 99232 SBSQ HOSP IP/OBS MODERATE 35: CPT | Performed by: HOSPITALIST

## 2017-01-07 NOTE — PROGRESS NOTES
[facility]    Progress Note    Sarah Brar Patient Status:  Inpatient    12/3/1940 MRN [id number]   Location [facility] 5SW/SE Attending Bakari Murdock MD   Hosp Day # 3 PCP Shaylee Carmona MD       Subjective:   Havery Edu mirtazapine  30 mg Oral Nightly   • Pantoprazole Sodium  40 mg Oral BID AC   • QUEtiapine Fumarate  300 mg Oral Nightly   • Venlafaxine HCl ER  300 mg Oral Daily   • lidocaine  1 patch Transdermal Q24H   • ipratropium-albuterol  3 mL Nebulization QID Tariq (cpt=93971)    1/4/2017  CONCLUSION:  1. Deep venous thrombosis involving the left common femoral vein and extending into the junctions with the deep femoral and greater saphenous veins. 2. Extensive subcutaneous edema of the left lower extremity. with ECG of 12/08/2016 23:23:37 No significant changes have occurred Electronically signed on 01/04/2017 at 17:10 by Rick Daniel and Plan:     DVT left lower extremity  H/O IVC filter placement  -no evidence for PE on VQ scan  -currently

## 2017-01-07 NOTE — PROGRESS NOTES
[facility]    Progress Note    Dany Madsen Patient Status:  Inpatient    12/3/1940 MRN [id number]   Location [facility] 5SW/SE Attending Gayle Pineda MD   Hosp Day # 2 PCP Samira Juarez MD       Subjective:   Jason Spangler Levothyroxine Sodium  250 mcg Oral Before breakfast   • mirtazapine  30 mg Oral Nightly   • Pantoprazole Sodium  40 mg Oral BID AC   • QUEtiapine Fumarate  300 mg Oral Nightly   • Venlafaxine HCl ER  300 mg Oral Daily   • lidocaine  1 patch Transdermal Q24 Imaging/EKG:   Us Venous Doppler Leg Left - Diag Img (cpt=93971)    1/4/2017  CONCLUSION:  1. Deep venous thrombosis involving the left common femoral vein and extending into the junctions with the deep femoral and greater saphenous veins.   2. Extensiv leads.  -Old inferior infarct.  ABNORMAL When compared with ECG of 12/08/2016 23:23:37 No significant changes have occurred Electronically signed on 01/04/2017 at 17:10 by Edouard Kowalski and Plan:     DVT left lower extremity  -no evidence f

## 2017-01-07 NOTE — PROGRESS NOTES
[facility]    Progress Note    Aleida Nolen Patient Status:  Inpatient    12/3/1940 MRN [id number]   Location [facility] 5SW/SE Attending Anum Schneider MD   Hosp Day # 3 PCP Dian Yoder MD       Subjective:   Roseline Aleman

## 2017-01-07 NOTE — OCCUPATIONAL THERAPY NOTE
OCCUPATIONAL THERAPY EVALUATION - INPATIENT     Room Number: 562/562-A  Evaluation Date: 1/7/2017  Type of Evaluation: Initial  Presenting Problem: COPD exacerbatyion; SBO, LLE cellulitis    Physician Order: IP Consult to Occupational Therapy  Reason for T of both knees      knee replacement    • Arthritis of right hip 2009   • Other and unspecified hyperlipidemia    • Dehydration 2012     Fluids, Medication adjustment    • Toe pain      Onychomycosis, Debridement , Hammertoe    • Onychomycosis      Debridem is: rest    OBJECTIVE     Fall Risk: Standard fall risk    PAIN ASSESSMENT  Ratin          ACTIVITY TOLERANCE  Rest: RA 92%,   Activity: RA 91%,     COGNITION  Overall Cognitive Status:  WFL - within functional limits  Orientation Level:  o session/findings; All patient questions and concerns addressed      OT Goals     Patient will complete functional transfer with SBA with RW. Comment:     Patient will complete toileting with SBA.    Comment:     Patient will tolerate standing for 5 minutes

## 2017-01-07 NOTE — CONSULTS
[facility]    PATIENT'S NAME: ZEENAT NEWTON   ATTENDING PHYSICIAN: Queenie Sanchez MD   CONSULTING PHYSICIAN: Robi Chicas MD   PATIENT ACCOUNT#:   [id number]    LOCATION:  [address] [place] [address] #:   [id number]       DATE OF BIRTH: GENERAL:  A well-developed, well-nourished lady in no distress at present. VITAL SIGNS:  Her blood pressure is 138/83, respiratory rate 20, heart rate 93, temperature 98, saturation 95% on ________ L of oxygen. HEENT:  Normal.  NECK:  Supple. No JVP.

## 2017-01-07 NOTE — PLAN OF CARE
Patient/Family Goals    • Patient/Family Long Term Goal Not Progressing    • Patient/Family Short Term Goal Not Progressing        RESPIRATORY - ADULT    • Achieves optimal ventilation and oxygenation Not Progressing

## 2017-01-08 LAB
ANION GAP SERPL CALC-SCNC: 8 MMOL/L (ref 0–18)
BASOPHILS # BLD: 0 K/UL (ref 0–0.2)
BASOPHILS NFR BLD: 0 %
BUN SERPL-MCNC: 25 MG/DL (ref 8–20)
BUN/CREAT SERPL: 22.5 (ref 10–20)
CALCIUM SERPL-MCNC: 9.6 MG/DL (ref 8.5–10.5)
CHLORIDE SERPL-SCNC: 98 MMOL/L (ref 95–110)
CO2 SERPL-SCNC: 30 MMOL/L (ref 22–32)
CREAT SERPL-MCNC: 1.11 MG/DL (ref 0.5–1.5)
EOSINOPHIL # BLD: 0 K/UL (ref 0–0.7)
EOSINOPHIL NFR BLD: 0 %
ERYTHROCYTE [DISTWIDTH] IN BLOOD BY AUTOMATED COUNT: 13.7 % (ref 11–15)
GLUCOSE SERPL-MCNC: 336 MG/DL (ref 70–99)
HCT VFR BLD AUTO: 37.4 % (ref 35–48)
HGB BLD-MCNC: 12.3 G/DL (ref 12–16)
LYMPHOCYTES # BLD: 1.7 K/UL (ref 1–4)
LYMPHOCYTES NFR BLD: 19 %
MCH RBC QN AUTO: 32 PG (ref 27–32)
MCHC RBC AUTO-ENTMCNC: 33 G/DL (ref 32–37)
MCV RBC AUTO: 97 FL (ref 80–100)
METAMYELOCYTES # BLD MANUAL: 0.09 K/UL
METAMYELOCYTES NFR BLD: 1 %
MONOCYTES # BLD: 0.1 K/UL (ref 0–1)
MONOCYTES NFR BLD: 1 %
NEUTROPHILS # BLD AUTO: 6.9 K/UL (ref 1.8–7.7)
NEUTROPHILS NFR BLD: 72 %
NEUTS BAND NFR BLD: 7 %
NRBC BLD-RTO: 4 % (ref ?–1)
OSMOLALITY UR CALC.SUM OF ELEC: 300 MOSM/KG (ref 275–295)
PLATELET # BLD AUTO: 162 K/UL (ref 140–400)
PMV BLD AUTO: 8.9 FL (ref 7.4–10.3)
POTASSIUM SERPL-SCNC: 5 MMOL/L (ref 3.3–5.1)
RBC # BLD AUTO: 3.86 M/UL (ref 3.7–5.4)
SODIUM SERPL-SCNC: 136 MMOL/L (ref 136–144)
WBC # BLD AUTO: 8.8 K/UL (ref 4–11)

## 2017-01-08 PROCEDURE — 99233 SBSQ HOSP IP/OBS HIGH 50: CPT | Performed by: HOSPITALIST

## 2017-01-08 RX ORDER — SODIUM CHLORIDE 0.9 % (FLUSH) 0.9 %
SYRINGE (ML) INJECTION
Status: COMPLETED
Start: 2017-01-08 | End: 2017-01-08

## 2017-01-08 NOTE — PHYSICAL THERAPY NOTE
PHYSICAL THERAPY EVALUATION - INPATIENT     Room Number: 562/562-A  Evaluation Date: 1/8/2017  Type of Evaluation: Initial  Physician Order: PT Eval and Treat    Presenting Problem: copd exacerbation,dvt  Reason for Therapy: Mobility Dysfunction and [facility] BRAIN SURGERY      Comment Brain Aneurysm, Stent placed     DEBRIDEMENT OF NAIL(S), 1-5      Comment Toe, Onychomycosis    FOOT SURGERY  02/25/2011    Comment dipak 5 left, pain, Debridement     HIP REPLACEMENT SURGERY Left 2013    COLONOSCOPY  2010 the bed?: None   How much help from another person does the patient currently need. ..   -   Moving to and from a bed to a chair (including a wheelchair)?: A Little   -   Need to walk in hospital room?: A Little   -   Climbing 3-5 steps with a railing?: A L independent     Goal #3 Patient is able to ambulate 50   feet with assist device: walker - rolling at assistance level: modified independent     Goal #4    Goal #5    Goal #6    Goal Comments: Goals established on 1/8/2017

## 2017-01-08 NOTE — PROGRESS NOTES
[facility] dosing service    Follow-up Pharmacokinetic Consult for Vancomycin Dosing     Shira Holguin is a 64year old female who is being treated for cellulitis. Patient is on day 3 of Vancomycin 1.5 gm IV Q 24 hours. Goal trough is 10-15 ug/mL. efficacy. Pharmacy will continue to follow her. We appreciate the opportunity to assist in her care.     Joslyn Bryant, PharmD  1/7/2017  7:44 PM

## 2017-01-08 NOTE — PROGRESS NOTES
[facility]    Progress Note    Carey De Los Santos Patient Status:  Inpatient    12/3/1940 MRN [id number]   Location [facility] 5SW/SE Attending Joann Bey MD   Hosp Day # 4 PCP Gisella Rajan MD     Subjective:  Difficult night.  Roxi Spangler Medications:  • enoxaparin  1 mg/kg Subcutaneous 2 times per day   • MethylPREDNISolone Sodium Succ  40 mg Intravenous Q8H   • docusate sodium  100 mg Oral BID   • vancomycin  1.5 g Intravenous Q24H   • AmLODIPine Besylate  10 mg Oral Daily Dearl MD Danish

## 2017-01-08 NOTE — PROGRESS NOTES
[facility]    Progress Note    Lata Tinajero Patient Status:  Inpatient    12/3/1940 MRN [id number]   Location [facility] 5SW/SE Attending Aryan Vance MD   Hosp Day # 4 PCP Rey Jacinto MD       Subjective:   Lata Tinajero i

## 2017-01-09 ENCOUNTER — ANESTHESIA EVENT (OUTPATIENT)
Dept: ENDOSCOPY | Facility: HOSPITAL | Age: 77
DRG: 208 | End: 2017-01-09
Payer: MEDICARE

## 2017-01-09 ENCOUNTER — APPOINTMENT (OUTPATIENT)
Dept: GENERAL RADIOLOGY | Facility: HOSPITAL | Age: 77
DRG: 208 | End: 2017-01-09
Attending: INTERNAL MEDICINE
Payer: MEDICARE

## 2017-01-09 ENCOUNTER — SURGERY (OUTPATIENT)
Age: 77
End: 2017-01-09

## 2017-01-09 ENCOUNTER — ANESTHESIA (OUTPATIENT)
Dept: ENDOSCOPY | Facility: HOSPITAL | Age: 77
DRG: 208 | End: 2017-01-09
Payer: MEDICARE

## 2017-01-09 ENCOUNTER — TELEPHONE (OUTPATIENT)
Dept: INTERNAL MEDICINE CLINIC | Facility: CLINIC | Age: 77
End: 2017-01-09

## 2017-01-09 LAB
ANION GAP SERPL CALC-SCNC: 11 MMOL/L (ref 0–18)
BASOPHILS # BLD: 0 K/UL (ref 0–0.2)
BASOPHILS NFR BLD: 0 %
BUN SERPL-MCNC: 25 MG/DL (ref 8–20)
BUN/CREAT SERPL: 23.4 (ref 10–20)
CALCIUM SERPL-MCNC: 9.6 MG/DL (ref 8.5–10.5)
CHLORIDE SERPL-SCNC: 98 MMOL/L (ref 95–110)
CO2 SERPL-SCNC: 26 MMOL/L (ref 22–32)
CREAT SERPL-MCNC: 1.07 MG/DL (ref 0.5–1.5)
EOSINOPHIL # BLD: 0 K/UL (ref 0–0.7)
EOSINOPHIL NFR BLD: 0 %
ERYTHROCYTE [DISTWIDTH] IN BLOOD BY AUTOMATED COUNT: 14.5 % (ref 11–15)
GLUCOSE BLDC GLUCOMTR-MCNC: 222 MG/DL (ref 70–99)
GLUCOSE SERPL-MCNC: 337 MG/DL (ref 70–99)
HCT VFR BLD AUTO: 37.3 % (ref 35–48)
HGB BLD-MCNC: 12 G/DL (ref 12–16)
LYMPHOCYTES # BLD: 0.9 K/UL (ref 1–4)
LYMPHOCYTES NFR BLD: 12 %
MCH RBC QN AUTO: 31.6 PG (ref 27–32)
MCHC RBC AUTO-ENTMCNC: 32.1 G/DL (ref 32–37)
MCV RBC AUTO: 98.7 FL (ref 80–100)
MONOCYTES # BLD: 0.4 K/UL (ref 0–1)
MONOCYTES NFR BLD: 5 %
NEUTROPHILS # BLD AUTO: 6.7 K/UL (ref 1.8–7.7)
NEUTROPHILS NFR BLD: 83 %
OSMOLALITY UR CALC.SUM OF ELEC: 298 MOSM/KG (ref 275–295)
PLATELET # BLD AUTO: 162 K/UL (ref 140–400)
PMV BLD AUTO: 8.6 FL (ref 7.4–10.3)
POTASSIUM SERPL-SCNC: 4.6 MMOL/L (ref 3.3–5.1)
RBC # BLD AUTO: 3.78 M/UL (ref 3.7–5.4)
SODIUM SERPL-SCNC: 135 MMOL/L (ref 136–144)
WBC # BLD AUTO: 8.1 K/UL (ref 4–11)

## 2017-01-09 PROCEDURE — 71010 XR CHEST AP PORTABLE  (CPT=71010): CPT

## 2017-01-09 PROCEDURE — 0BH17EZ INSERTION OF ENDOTRACHEAL AIRWAY INTO TRACHEA, VIA NATURAL OR ARTIFICIAL OPENING: ICD-10-PCS | Performed by: ANESTHESIOLOGY

## 2017-01-09 PROCEDURE — 5A1945Z RESPIRATORY VENTILATION, 24-96 CONSECUTIVE HOURS: ICD-10-PCS | Performed by: INTERNAL MEDICINE

## 2017-01-09 PROCEDURE — 0BC78ZZ EXTIRPATION OF MATTER FROM LEFT MAIN BRONCHUS, VIA NATURAL OR ARTIFICIAL OPENING ENDOSCOPIC: ICD-10-PCS | Performed by: INTERNAL MEDICINE

## 2017-01-09 PROCEDURE — 0BC38ZZ EXTIRPATION OF MATTER FROM RIGHT MAIN BRONCHUS, VIA NATURAL OR ARTIFICIAL OPENING ENDOSCOPIC: ICD-10-PCS | Performed by: INTERNAL MEDICINE

## 2017-01-09 PROCEDURE — 0BC28ZZ EXTIRPATION OF MATTER FROM CARINA, VIA NATURAL OR ARTIFICIAL OPENING ENDOSCOPIC: ICD-10-PCS | Performed by: INTERNAL MEDICINE

## 2017-01-09 PROCEDURE — 99233 SBSQ HOSP IP/OBS HIGH 50: CPT | Performed by: HOSPITALIST

## 2017-01-09 RX ORDER — SODIUM CHLORIDE 0.9 % (FLUSH) 0.9 %
SYRINGE (ML) INJECTION
Status: COMPLETED
Start: 2017-01-09 | End: 2017-01-09

## 2017-01-09 RX ORDER — MORPHINE SULFATE 4 MG/ML
5 INJECTION, SOLUTION INTRAMUSCULAR; INTRAVENOUS ONCE
Status: COMPLETED | OUTPATIENT
Start: 2017-01-09 | End: 2017-01-09

## 2017-01-09 RX ORDER — DEXTROSE MONOHYDRATE 25 G/50ML
50 INJECTION, SOLUTION INTRAVENOUS AS NEEDED
Status: DISCONTINUED | OUTPATIENT
Start: 2017-01-09 | End: 2017-01-18

## 2017-01-09 RX ORDER — SODIUM CHLORIDE 9 MG/ML
INJECTION, SOLUTION INTRAVENOUS CONTINUOUS
Status: DISCONTINUED | OUTPATIENT
Start: 2017-01-09 | End: 2017-01-13

## 2017-01-09 RX ORDER — IPRATROPIUM BROMIDE AND ALBUTEROL SULFATE 2.5; .5 MG/3ML; MG/3ML
3 SOLUTION RESPIRATORY (INHALATION) EVERY 6 HOURS PRN
Status: DISCONTINUED | OUTPATIENT
Start: 2017-01-09 | End: 2017-01-18

## 2017-01-09 RX ORDER — METHYLPREDNISOLONE SODIUM SUCCINATE 125 MG/2ML
125 INJECTION, POWDER, LYOPHILIZED, FOR SOLUTION INTRAMUSCULAR; INTRAVENOUS ONCE
Status: COMPLETED | OUTPATIENT
Start: 2017-01-09 | End: 2017-01-09

## 2017-01-09 RX ORDER — WARFARIN SODIUM 5 MG/1
5 TABLET ORAL NIGHTLY
Status: DISCONTINUED | OUTPATIENT
Start: 2017-01-09 | End: 2017-01-13

## 2017-01-09 NOTE — PROGRESS NOTES
[facility]    Progress Note    Carly Pizano Patient Status:  Inpatient    12/3/1940 MRN [id number]   Location [facility] ENDOSCOPY LAB SUITES Attending Prachi Roca MD   Hosp Day # 5 PCP MD Velia Goff ([facility]  Staroids, nebs,abx, bronchoscopy today      Cellulitis of left lower extremity  vanco        Results:     Lab Results  Component Value Date   WBC 8.1 01/09/2017   HGB 12.0 01/09/2017   HCT 37.3 01/09/2017    01/09/2017   CREATSERUM 1.07 01/09

## 2017-01-09 NOTE — ANESTHESIA POSTPROCEDURE EVALUATION
Patient: Pastora Oates    Procedure Summary     Date Anesthesia Start Anesthesia Stop Room / Location    01/09/17 1224 1257 300 [facility] ENDOSCOPY 05 / 300 [facility] ENDOSCOPY       Procedure Diagnosis Surgeon Responsible Provider    BRONCHOSCOPY (N/A ) (BL MUCOSE PLUG; TR

## 2017-01-09 NOTE — PROGRESS NOTES
[facility]    Progress Note    Miah Vera Patient Status:  Inpatient    12/3/1940 MRN [id number]   Location [facility] ENDOSCOPY LAB SUITES Attending Roosvelt Habermann, MD   Hosp Day # 5 PCP MD Maryann Richard Grossly normal    Assessment and Plan:     DVT (deep venous thrombosis) (HCC)        COPD exacerbation (HCC)  Solumedrol, vent sedation      Cellulitis of left lower extremity  vanco    respiraty failure- intubated, sedated- icu      Results:     Lab Resul

## 2017-01-09 NOTE — ANESTHESIA POSTPROCEDURE EVALUATION
Patient: Amaris Alt    Procedure Summary     Date Anesthesia Start Anesthesia Stop Room / Location    01/09/17 1224  300 [facility] ENDOSCOPY 05 / 300 [facility] ENDOSCOPY       Procedure Diagnosis Surgeon Responsible Provider    BRONCHOSCOPY (N/A ) (BL MUCOSE PLUG; Romie Gaytan

## 2017-01-09 NOTE — TELEPHONE ENCOUNTER
Spoke to [facility] at [address], would like  to know that pt has been hospitalized again (from 1/4/17). Would like to make certain that the  would like pt to be seen again upon discharge.  Pt was supposed to be DC'd today but it appears that she has had a b

## 2017-01-09 NOTE — PLAN OF CARE
HEMATOLOGIC - ADULT    • Maintains hematologic stability Progressing    • Free from bleeding injury Progressing        Impaired Activities of Daily Living    • Achieve highest/safest level of independence in self care Progressing        Impaired Functional

## 2017-01-09 NOTE — OPERATIVE REPORT
[facility]    PATIENT'S NAME: ZEENAT NEWTON   ATTENDING PHYSICIAN: Lee Mei MD   OPERATING PHYSICIAN: Robi Nuñez MD   PATIENT ACCOUNT#:   [id number]    LOCATION:  [facility] ROOM 8 [facility]  MEDICAL RECORD #:   [id number]

## 2017-01-09 NOTE — ANESTHESIA PREPROCEDURE EVALUATION
Anesthesia PreOp Note    HPI:     Tejas Gomez is a 64year old female who presents for preoperative consultation requested by: Wilner Bustillo MD    Date of Surgery: 1/4/2017 - 1/9/2017    Procedure(s):  BRONCHOSCOPY  Indication: Non Resolving Bronchitis hypertension    • Hip pain, left    • Anxiety state, unspecified    • Migraines    • Thyroid disease    • Sleep apnea    • Osteoarthrosis, unspecified whether generalized or localized, unspecified site    • Hypothyroidism    • Unspecified sleep apnea    • mouth nightly as needed for Sleep. Disp: 30 capsule Rfl: 0 1/3/2017 at 2100   LORazepam 1 MG Oral Tab Take 1 tablet (1 mg total) by mouth 2 (two) times daily as needed for Anxiety.  Disp: 60 tablet Rfl: 0 Past Month at Unknown time   mirtazapine 30 MG Oral MG/0.8ML injection 120 mg 1 mg/kg Subcutaneous 2 times per day Irish Haddad  mg at 01/08/17 2114    ipratropium-albuterol (DUONEB) nebulizer solution 3 mL 3 mL Nebulization Q4H PRN Antonio Rashid MD     MethylPREDNISolone Sodium Succ (Solu-ME inhaler 1 puff 1 puff Inhalation Daily Ovidio Santiago MD 1 puff at 01/09/17 0906    Cholecalciferol (VITAMIN D) 1000 UNITS tab 2,000 Units 2,000 Units Oral Daily Ovidio Santiago MD 2,000 Units at 01/08/17 5296    Hydrocod Polst-CPM Polst ER ([address]) Rash  Phentermine             Other (See Comments)    Comment:Mini stroke  Ultram [Tramadol]       Other (See Comments)    Comment:Extreme sleepiness    Family History   Problem Relation Age of Onset   • Heart Attack Mother    • Heart Disease Mother      C 8.6 01/09/2017       Lab Results  Component Value Date   * 01/09/2017   K 4.6 01/09/2017   CL 98 01/09/2017   CO2 26 01/09/2017   BUN 25* 01/09/2017   CREATSERUM 1.07 01/09/2017   * 01/09/2017   CA 9.6 01/09/2017       Lab Results  Component V

## 2017-01-09 NOTE — TELEPHONE ENCOUNTER
[address] calling to let Dr. Mercedes Washington know the patient will be released from the hospital today  She wants to also verify her last appt.  With Dr. Mercedes Washington and if they can go ahead and see her once she is released from hospital   Please call and

## 2017-01-09 NOTE — PLAN OF CARE
HEMATOLOGIC - ADULT    • Maintains hematologic stability Progressing    • Free from bleeding injury Progressing    PATIENT RECEIVES LOVENOX AS ORDERED BY THE DOCTOR. NO BLEEDING NOTED.      Impaired Activities of Daily Living    • Achieve highest/safest lev

## 2017-01-09 NOTE — ANESTHESIA POSTPROCEDURE EVALUATION
Patient: Teo Chappell    Procedure Summary     Date Anesthesia Start Anesthesia Stop Room / Location    01/09/17 1224  [facility] ENDOSCOPY 05 / [facility] ENDOSCOPY       Procedure Diagnosis Surgeon Responsible Provider    BRONCHOSCOPY (N/A ) (BL MUCOSE PLUG; Luz Elena Torres

## 2017-01-09 NOTE — PROGRESS NOTES
[facility]    Progress Note    Severa Sable Patient Status:  Inpatient    12/3/1940 MRN [id number]   Location [facility] ENDOSCOPY LAB SUITES Attending Margo Claros MD   Hosp Day # 5 PCP Zee Ogden MD       Vishal breakfast   • mirtazapine  30 mg Oral Nightly   • Pantoprazole Sodium  40 mg Oral BID AC   • QUEtiapine Fumarate  300 mg Oral Nightly   • Venlafaxine HCl ER  300 mg Oral Daily   • lidocaine  1 patch Transdermal Q24H   • ipratropium-albuterol  3 mL Nebuliza lower extremity  H/O IVC filter placement  -no evidence for PE on VQ scan  -currently on Lovenox  -hold coumadin for bronch today, resume tonight  -IVF filter has been in for years, likely fibrosed and not removable.       Cellulitis of the left lower leg

## 2017-01-10 ENCOUNTER — APPOINTMENT (OUTPATIENT)
Dept: GENERAL RADIOLOGY | Facility: HOSPITAL | Age: 77
DRG: 208 | End: 2017-01-10
Attending: INTERNAL MEDICINE
Payer: MEDICARE

## 2017-01-10 PROBLEM — J96.90 RESPIRATORY FAILURE (HCC): Status: ACTIVE | Noted: 2017-01-10

## 2017-01-10 PROBLEM — F41.9 ANXIETY: Chronic | Status: ACTIVE | Noted: 2017-01-10

## 2017-01-10 PROBLEM — J18.9 PNEUMONIA: Status: ACTIVE | Noted: 2017-01-10

## 2017-01-10 LAB
ANION GAP SERPL CALC-SCNC: 7 MMOL/L (ref 0–18)
BASOPHILS # BLD: 0 K/UL (ref 0–0.2)
BASOPHILS NFR BLD: 0 %
BUN SERPL-MCNC: 19 MG/DL (ref 8–20)
BUN/CREAT SERPL: 19.8 (ref 10–20)
CALCIUM SERPL-MCNC: 8.9 MG/DL (ref 8.5–10.5)
CHLORIDE SERPL-SCNC: 99 MMOL/L (ref 95–110)
CO2 SERPL-SCNC: 32 MMOL/L (ref 22–32)
CREAT SERPL-MCNC: 0.96 MG/DL (ref 0.5–1.5)
EOSINOPHIL # BLD: 0 K/UL (ref 0–0.7)
EOSINOPHIL NFR BLD: 0 %
ERYTHROCYTE [DISTWIDTH] IN BLOOD BY AUTOMATED COUNT: 13.5 % (ref 11–15)
GLUCOSE BLDC GLUCOMTR-MCNC: 138 MG/DL (ref 70–99)
GLUCOSE BLDC GLUCOMTR-MCNC: 140 MG/DL (ref 70–99)
GLUCOSE BLDC GLUCOMTR-MCNC: 147 MG/DL (ref 70–99)
GLUCOSE BLDC GLUCOMTR-MCNC: 160 MG/DL (ref 70–99)
GLUCOSE BLDC GLUCOMTR-MCNC: 165 MG/DL (ref 70–99)
GLUCOSE BLDC GLUCOMTR-MCNC: 189 MG/DL (ref 70–99)
GLUCOSE BLDC GLUCOMTR-MCNC: 256 MG/DL (ref 70–99)
GLUCOSE BLDC GLUCOMTR-MCNC: 258 MG/DL (ref 70–99)
GLUCOSE SERPL-MCNC: 260 MG/DL (ref 70–99)
HBA1C MFR BLD: 7.4 % (ref 4–6)
HCT VFR BLD AUTO: 37.4 % (ref 35–48)
HGB BLD-MCNC: 12.2 G/DL (ref 12–16)
INR BLD: 1 (ref 0.9–1.2)
LYMPHOCYTES # BLD: 1.1 K/UL (ref 1–4)
LYMPHOCYTES NFR BLD: 15 %
MCH RBC QN AUTO: 31.6 PG (ref 27–32)
MCHC RBC AUTO-ENTMCNC: 32.6 G/DL (ref 32–37)
MCV RBC AUTO: 97 FL (ref 80–100)
METAMYELOCYTES # BLD MANUAL: 0.23 K/UL
MONOCYTES # BLD: 0.4 K/UL (ref 0–1)
MONOCYTES NFR BLD: 5 %
MYELOCYTES NFR BLD: 3 %
NEUTROPHILS # BLD AUTO: 5.8 K/UL (ref 1.8–7.7)
NEUTROPHILS NFR BLD: 70 %
NEUTS BAND NFR BLD: 7 %
OSMOLALITY UR CALC.SUM OF ELEC: 297 MOSM/KG (ref 275–295)
PLATELET # BLD AUTO: 172 K/UL (ref 140–400)
PMV BLD AUTO: 8.9 FL (ref 7.4–10.3)
POTASSIUM SERPL-SCNC: 4.6 MMOL/L (ref 3.3–5.1)
PROTHROMBIN TIME: 12.8 SECONDS (ref 11.8–14.5)
RBC # BLD AUTO: 3.85 M/UL (ref 3.7–5.4)
SODIUM SERPL-SCNC: 138 MMOL/L (ref 136–144)
WBC # BLD AUTO: 7.6 K/UL (ref 4–11)

## 2017-01-10 PROCEDURE — 71010 XR CHEST AP PORTABLE  (CPT=71010): CPT

## 2017-01-10 RX ORDER — HYDRALAZINE HYDROCHLORIDE 20 MG/ML
10 INJECTION INTRAMUSCULAR; INTRAVENOUS EVERY 6 HOURS PRN
Status: DISCONTINUED | OUTPATIENT
Start: 2017-01-10 | End: 2017-01-13

## 2017-01-10 RX ORDER — LORAZEPAM 2 MG/ML
1 INJECTION INTRAMUSCULAR EVERY 6 HOURS PRN
Status: DISCONTINUED | OUTPATIENT
Start: 2017-01-10 | End: 2017-01-18

## 2017-01-10 RX ORDER — SODIUM CHLORIDE 0.9 % (FLUSH) 0.9 %
SYRINGE (ML) INJECTION
Status: COMPLETED
Start: 2017-01-10 | End: 2017-01-10

## 2017-01-10 RX ORDER — AMLODIPINE BESYLATE 10 MG/1
TABLET ORAL
Qty: 90 TABLET | Refills: 0 | Status: SHIPPED | OUTPATIENT
Start: 2017-01-10 | End: 2017-01-18

## 2017-01-10 RX ORDER — ENALAPRILAT 2.5 MG/2ML
1.25 INJECTION INTRAVENOUS EVERY 6 HOURS PRN
Status: DISCONTINUED | OUTPATIENT
Start: 2017-01-10 | End: 2017-01-18

## 2017-01-10 RX ORDER — AMLODIPINE BESYLATE 10 MG/1
TABLET ORAL
Qty: 90 TABLET | Refills: 0 | Status: SHIPPED | OUTPATIENT
Start: 2017-01-10 | End: 2017-04-08

## 2017-01-10 NOTE — PROGRESS NOTES
[facility]    Progress Note    Pastora Oates Patient Status:  Inpatient    12/3/1940 MRN [id number]   Location [facility] 2W/SW Attending Irish Haddad MD   Hosp Day # 6 PCP Halima Diaz MD       Subjective:   Rayray Barahona Normocephalic, without obvious abnormality   Neck: no JVD and supple, symmetrical, trachea midline  Pulmonary:  diminished breath sounds bilaterallywheeze  Cardiovascular: S1, S2 normal, regular rate and rhythm  Abdominal: soft, non-tender; bowel sounds no

## 2017-01-10 NOTE — OCCUPATIONAL THERAPY NOTE
Patient currently intubated and sedated. Will require new orders when medically appropriate. Thank you.

## 2017-01-10 NOTE — TELEPHONE ENCOUNTER
Hypertensive Medications  Protocol Criteria:  · Appointment scheduled in the past 6 months or in the next 3 months  · BMP or CMP in the past 12 months  · Creatinine result < 2    Future Appointments       Provider Department Appt Notes    In 1 month Ibis

## 2017-01-10 NOTE — PLAN OF CARE
Safety Risk - Non-Violent Restraints    • Patient will remain free from self-harm Not Progressing    Patient intubated on sedation s/p bronch. Patient thrashing in bed unable to redirect/reorient to surroundings. Sedation titrated to comfort.  Patient has b

## 2017-01-10 NOTE — PHYSICAL THERAPY NOTE
Pt intubated and sedated  Change in status   Will need a new PT order when pt is approp to resume    Will hold PT

## 2017-01-10 NOTE — PLAN OF CARE
Problem: COPING  Goal: Pt/Family able to verbalize concerns and demonstrate effective coping strategies  INTERVENTIONS:  - Assist patient/family to identify coping skills, available support systems and cultural and spiritual values  - Provide emotional sup

## 2017-01-10 NOTE — PLAN OF CARE
Spoke with pharmacist regarding effexor order. Ok to open capsuls and flush down NG. But do not crush.

## 2017-01-10 NOTE — DISCHARGE PLANNING
PT/OT to eval pt - pt currently intubated. SW to await for recommendations when appropriate.     Jaciel Garcia, [address]

## 2017-01-10 NOTE — PLAN OF CARE
Problem: Safety Risk - Non-Violent Restraints  Goal: Patient will remain free from self-harm  INTERVENTIONS:  - Apply the least restrictive restraint to prevent harm  - Notify patient and family of reasons restraints applied  - Assess for any contributing

## 2017-01-10 NOTE — PROGRESS NOTES
[facility] Pharmacy Note:  Obesity Adjustment for Maxipime (cefepime)    Yecenia Norris is a 64year old female who has been prescribed Maxipime (cefepime) 1000 mg every 12 hrs. CrCl is estimated creatinine clearance is 48.7 mL/min (based on Cr of 0.96).  and BM

## 2017-01-10 NOTE — RESPIRATORY THERAPY NOTE
RT mechanical ventilation progress note    Mechanical ventilation: Patient does not meet criteria for weaning on this shift. Weaning was not attempted.   Set Vt 500 ml = 8 ml/kg IBW  Current Ppeak: 31 cmH2O  Current shift Pplateau: 23 LJW0H  Current shift R

## 2017-01-10 NOTE — TELEPHONE ENCOUNTER
Hypertensive Medications  Protocol Criteria:  · Appointment scheduled in the past 6 months or in the next 3 months  · BMP or CMP in the past 12 months  · Creatinine result < 2    Future Appointments       Provider Department Appt Notes    Today EM XR PORT

## 2017-01-10 NOTE — PROGRESS NOTES
Notifed Dr Lupe Lopez while attempting to wake up and attempt weaning, the patient was on 30mcs of Propofol and was dangerously agitated with abnormal vital signs.   Patients heart rate became more bradycardic, , pt was thrashing about in the bed with lar

## 2017-01-11 LAB
BASOPHILS # BLD: 0 K/UL (ref 0–0.2)
BASOPHILS NFR BLD: 0 %
EOSINOPHIL # BLD: 0 K/UL (ref 0–0.7)
EOSINOPHIL NFR BLD: 0 %
ERYTHROCYTE [DISTWIDTH] IN BLOOD BY AUTOMATED COUNT: 14.2 % (ref 11–15)
GLUCOSE BLDC GLUCOMTR-MCNC: 103 MG/DL (ref 70–99)
GLUCOSE BLDC GLUCOMTR-MCNC: 106 MG/DL (ref 70–99)
GLUCOSE BLDC GLUCOMTR-MCNC: 114 MG/DL (ref 70–99)
GLUCOSE BLDC GLUCOMTR-MCNC: 124 MG/DL (ref 70–99)
GLUCOSE BLDC GLUCOMTR-MCNC: 126 MG/DL (ref 70–99)
GLUCOSE BLDC GLUCOMTR-MCNC: 128 MG/DL (ref 70–99)
GLUCOSE BLDC GLUCOMTR-MCNC: 128 MG/DL (ref 70–99)
GLUCOSE BLDC GLUCOMTR-MCNC: 155 MG/DL (ref 70–99)
GLUCOSE BLDC GLUCOMTR-MCNC: 158 MG/DL (ref 70–99)
GLUCOSE BLDC GLUCOMTR-MCNC: 164 MG/DL (ref 70–99)
GLUCOSE BLDC GLUCOMTR-MCNC: 165 MG/DL (ref 70–99)
GLUCOSE BLDC GLUCOMTR-MCNC: 167 MG/DL (ref 70–99)
GLUCOSE BLDC GLUCOMTR-MCNC: 197 MG/DL (ref 70–99)
GLUCOSE BLDC GLUCOMTR-MCNC: 197 MG/DL (ref 70–99)
GLUCOSE BLDC GLUCOMTR-MCNC: 229 MG/DL (ref 70–99)
GLUCOSE BLDC GLUCOMTR-MCNC: 309 MG/DL (ref 70–99)
HCT VFR BLD AUTO: 35 % (ref 35–48)
HGB BLD-MCNC: 11.6 G/DL (ref 12–16)
INR BLD: 1.2 (ref 0.9–1.2)
LYMPHOCYTES # BLD: 1.2 K/UL (ref 1–4)
LYMPHOCYTES NFR BLD: 15 %
MCH RBC QN AUTO: 31.7 PG (ref 27–32)
MCHC RBC AUTO-ENTMCNC: 33.2 G/DL (ref 32–37)
MCV RBC AUTO: 95.5 FL (ref 80–100)
METAMYELOCYTES # BLD MANUAL: 0.14 K/UL
METAMYELOCYTES # BLD MANUAL: 0.28 K/UL
METAMYELOCYTES NFR BLD: 4 %
MONOCYTES # BLD: 0.2 K/UL (ref 0–1)
MONOCYTES NFR BLD: 3 %
MYELOCYTES NFR BLD: 2 %
NEUTROPHILS # BLD AUTO: 5.2 K/UL (ref 1.8–7.7)
NEUTROPHILS NFR BLD: 66 %
NEUTS BAND NFR BLD: 9 %
NRBC BLD-RTO: 1 % (ref ?–1)
PLATELET # BLD AUTO: 193 K/UL (ref 140–400)
PMV BLD AUTO: 8.9 FL (ref 7.4–10.3)
PROTHROMBIN TIME: 14.5 SECONDS (ref 11.8–14.5)
RBC # BLD AUTO: 3.66 M/UL (ref 3.7–5.4)
TROPONIN I SERPL-MCNC: 0 NG/ML (ref ?–0.03)
VARIANT LYMPHS NFR BLD MANUAL: 1 %
WBC # BLD AUTO: 7 K/UL (ref 4–11)

## 2017-01-11 RX ORDER — SODIUM CHLORIDE 0.9 % (FLUSH) 0.9 %
SYRINGE (ML) INJECTION
Status: DISPENSED
Start: 2017-01-11 | End: 2017-01-11

## 2017-01-11 RX ORDER — METHYLPREDNISOLONE SODIUM SUCCINATE 125 MG/2ML
INJECTION, POWDER, LYOPHILIZED, FOR SOLUTION INTRAMUSCULAR; INTRAVENOUS
Status: COMPLETED
Start: 2017-01-11 | End: 2017-01-11

## 2017-01-11 RX ORDER — ALBUTEROL SULFATE 2.5 MG/3ML
10 SOLUTION RESPIRATORY (INHALATION) CONTINUOUS
Status: DISCONTINUED | OUTPATIENT
Start: 2017-01-11 | End: 2017-01-11

## 2017-01-11 RX ORDER — ALBUTEROL SULFATE 2.5 MG/3ML
20 SOLUTION RESPIRATORY (INHALATION) CONTINUOUS
Status: DISPENSED | OUTPATIENT
Start: 2017-01-11 | End: 2017-01-11

## 2017-01-11 RX ORDER — PROMETHAZINE HYDROCHLORIDE 25 MG/1
TABLET ORAL
Status: COMPLETED
Start: 2017-01-11 | End: 2017-01-11

## 2017-01-11 RX ORDER — IPRATROPIUM BROMIDE AND ALBUTEROL SULFATE 2.5; .5 MG/3ML; MG/3ML
3 SOLUTION RESPIRATORY (INHALATION) CONTINUOUS
Status: DISCONTINUED | OUTPATIENT
Start: 2017-01-11 | End: 2017-01-11

## 2017-01-11 RX ORDER — ALBUTEROL SULFATE 2.5 MG/3ML
SOLUTION RESPIRATORY (INHALATION)
Status: COMPLETED
Start: 2017-01-11 | End: 2017-01-11

## 2017-01-11 RX ORDER — MORPHINE SULFATE 2 MG/ML
1 INJECTION, SOLUTION INTRAMUSCULAR; INTRAVENOUS
Status: DISCONTINUED | OUTPATIENT
Start: 2017-01-11 | End: 2017-01-18

## 2017-01-11 RX ORDER — METHYLPREDNISOLONE SODIUM SUCCINATE 40 MG/ML
40 INJECTION, POWDER, LYOPHILIZED, FOR SOLUTION INTRAMUSCULAR; INTRAVENOUS EVERY 6 HOURS
Status: DISCONTINUED | OUTPATIENT
Start: 2017-01-11 | End: 2017-01-15

## 2017-01-11 NOTE — PROGRESS NOTES
01/10/17 2030   Provider Notification   Reason for Communication Other (comment); Change in status  ('s)   Provider Name Dr. Emmanuelle Chase   Method of Communication Face to face   Response See orders; Other (Comment)  (Hydralazine 10mg IV Q 6hours prn SBP

## 2017-01-11 NOTE — PLAN OF CARE
Pt was extubated per DR Santana post cpap trial 5 min. Pt is coughing and received orders for solumedrol 125 mg with continuous neb of albuterol.   Pt does not follow instructions well and attempts to pull off mask

## 2017-01-11 NOTE — CONSULTS
[facility]  Report of Consultation    Rexine Agent Patient Status:  Inpatient    12/3/1940 MRN [id number]   Location [facility] 2W/SW Attending Nicky Lee MD   Hosp Day # 7 PCP Isabela Herrera MD     Reason for Consultation:    Hiren Harris [id number]    Comment Cervical Discectomy     HYSTERECTOMY  1967    Comment Partial Hysterectomy    TONSILLECTOMY  1950    Comment Tonsillitis     EYE SURGERY      Comment x2    BRAIN SURGERY      Comment Brain Aneurysm, Stent placed     DEBRIDEME injection 40 mg, 40 mg, Intravenous, Q6H  •  morphINE sulfate (PF) 2 MG/ML injection 1 mg, 1 mg, Intravenous, Q3H PRN  •  albuterol Sulfate (VENTOLIN) (5 MG/ML) 0.5% nebulizer solution, , ,   •  insulin detemir (LEVEMIR) 100 UNIT/ML flextouch 30 Units, 30 Tartrate (AMBIEN) tab 5 mg, 5 mg, Oral, Nightly PRN  •  ondansetron HCl (ZOFRAN) injection 4 mg, 4 mg, Intravenous, Q6H PRN  •  docusate sodium (COLACE) cap 100 mg, 100 mg, Oral, BID  •  PEG 3350 (MIRALAX) powder packet 17 g, 17 g, Oral, Daily PRN  •  magn Appears ill  HEENT: Exam is unremarkable. Neck: Supple. Lungs: b/l rhonchi  Cardiac: Regular rate and rhythm. Abdomen: Bowel sounds present, normoactive. Nontender. Extremities:  Cellulitis of LLE  Skin: Normal texture and turgor.     Impression and P

## 2017-01-11 NOTE — PLAN OF CARE
CARDIOVASCULAR - ADULT    • Maintains optimal cardiac output and hemodynamic stability Progressing    • Absence of cardiac arrhythmias or at baseline Progressing      SBP on 180's. Dr Maureen Grewal (nocturnalist) made aware and notified.  Ordered hydralazine 10mg I

## 2017-01-11 NOTE — PROGRESS NOTES
[facility]    Progress Note    Ivanna Sutherland Patient Status:  Inpatient    12/3/1940 MRN [id number]   Location [facility] 2W/SW Attending Rose Corona MD   [facility] Day # 7 PCP Griffin Bentley MD       Subjective:   Ivanna Sutherland propofol Stopped (01/11/17 0817)   • sodium chloride 60 mL/hr at 01/11/17 0641       General appearance: alert, appears stated age, cooperative and no distress  Head: Normocephalic, without obvious abnormality   Neck: no JVD and supple, symmetrical, trache Ekg 12-lead    1/11/2017  ECG Report  Interpretation  --------------------------         Bill Reeves MD  1/11/2017

## 2017-01-12 LAB
GLUCOSE BLDC GLUCOMTR-MCNC: 123 MG/DL (ref 70–99)
GLUCOSE BLDC GLUCOMTR-MCNC: 141 MG/DL (ref 70–99)
GLUCOSE BLDC GLUCOMTR-MCNC: 170 MG/DL (ref 70–99)
GLUCOSE BLDC GLUCOMTR-MCNC: 175 MG/DL (ref 70–99)
GLUCOSE BLDC GLUCOMTR-MCNC: 177 MG/DL (ref 70–99)
VANCOMYCIN TROUGH SERPL-MCNC: 10 MCG/ML (ref 10–20)

## 2017-01-12 NOTE — PLAN OF CARE
HEMATOLOGIC - ADULT    • Free from bleeding injury Not Progressing        Impaired Activities of Daily Living    • Achieve highest/safest level of independence in self care Not Progressing        Impaired Functional Mobility    • Achieve highest/safest lev

## 2017-01-12 NOTE — PROGRESS NOTES
[facility] - [facility]    Progress Note    Rexine Agent Patient Status:  Inpatient    12/3/1940 MRN [id number]   Location [facility] 2W/SW Attending Nicky Lee MD   [address] Day # 8 PCP Isabela Herrera MD       Subjective:   Rexine Agent breath sounds bilaterally  Cardiovascular: S1, S2 normal, regular rate and rhythm  Abdominal: soft, non-tender; bowel sounds normal  Extremities: no edema, redness or tenderness in the calves or thighs  Neurologic: Grossly normal    Assessment and Plan:

## 2017-01-12 NOTE — PROGRESS NOTES
[facility]    Endocrine Progress Note  [facility]    Tejas Gomez Patient Status:  Inpatient    12/3/1940 MRN [id number]   Location [facility] 2W/SW Attending Wilner Bustillo MD   Hosp Day # 8 PCP Franklin Tracy MD injection 120 mg, 1 mg/kg, Subcutaneous, 2 times per day  •  guaiFENesin (ROBITUSSIN) 100 MG/5ML solution 200 mg, 200 mg, Oral, Q4H PRN  •  acetaminophen (TYLENOL) tab 650 mg, 650 mg, Oral, Q4H PRN **OR** HYDROcodone-acetaminophen (NORCO) 5-325 MG per tab Oral, Daily  •  lidocaine (LIDODERM) 5 % 1 patch, 1 patch, Transdermal, Q24H  •  ipratropium-albuterol (DUONEB) nebulizer solution 3 mL, 3 mL, Nebulization, QID    Objective:   Blood pressure 136/62, pulse 76, temperature 97.3 °F (36.3 °C), temperature jasmyn

## 2017-01-12 NOTE — PROGRESS NOTES
01/11/17 2105 01/11/17 2130   Provider Notification   Reason for Communication Critical value; Other (comment)  (Glucose: 309) Critical value; Other (comment)  (Glucose: 309)   Provider Name Dr. Matt Yan of Communication Page Call   Respon

## 2017-01-13 LAB
ANION GAP SERPL CALC-SCNC: 9 MMOL/L (ref 0–18)
BASOPHILS # BLD: 0 K/UL (ref 0–0.2)
BASOPHILS NFR BLD: 0 %
BUN SERPL-MCNC: 30 MG/DL (ref 8–20)
BUN/CREAT SERPL: 39 (ref 10–20)
CALCIUM SERPL-MCNC: 9 MG/DL (ref 8.5–10.5)
CHLORIDE SERPL-SCNC: 103 MMOL/L (ref 95–110)
CO2 SERPL-SCNC: 26 MMOL/L (ref 22–32)
CREAT SERPL-MCNC: 0.77 MG/DL (ref 0.5–1.5)
EOSINOPHIL # BLD: 0 K/UL (ref 0–0.7)
EOSINOPHIL NFR BLD: 0 %
ERYTHROCYTE [DISTWIDTH] IN BLOOD BY AUTOMATED COUNT: 14.8 % (ref 11–15)
GLUCOSE BLDC GLUCOMTR-MCNC: 109 MG/DL (ref 70–99)
GLUCOSE BLDC GLUCOMTR-MCNC: 199 MG/DL (ref 70–99)
GLUCOSE BLDC GLUCOMTR-MCNC: 199 MG/DL (ref 70–99)
GLUCOSE BLDC GLUCOMTR-MCNC: 208 MG/DL (ref 70–99)
GLUCOSE SERPL-MCNC: 231 MG/DL (ref 70–99)
HCT VFR BLD AUTO: 39.3 % (ref 35–48)
HGB BLD-MCNC: 12.8 G/DL (ref 12–16)
INR BLD: 1.3 (ref 0.9–1.2)
LYMPHOCYTES # BLD: 0.6 K/UL (ref 1–4)
LYMPHOCYTES NFR BLD: 7 %
MCH RBC QN AUTO: 31.6 PG (ref 27–32)
MCHC RBC AUTO-ENTMCNC: 32.5 G/DL (ref 32–37)
MCV RBC AUTO: 97 FL (ref 80–100)
MONOCYTES # BLD: 0.5 K/UL (ref 0–1)
MONOCYTES NFR BLD: 6 %
NEUTROPHILS # BLD AUTO: 7.1 K/UL (ref 1.8–7.7)
NEUTROPHILS NFR BLD: 82 %
NEUTS BAND NFR BLD: 5 %
OSMOLALITY UR CALC.SUM OF ELEC: 300 MOSM/KG (ref 275–295)
PLATELET # BLD AUTO: 168 K/UL (ref 140–400)
PMV BLD AUTO: 9.3 FL (ref 7.4–10.3)
POTASSIUM SERPL-SCNC: 4.5 MMOL/L (ref 3.3–5.1)
PROTHROMBIN TIME: 16.2 SECONDS (ref 11.8–14.5)
RBC # BLD AUTO: 4.05 M/UL (ref 3.7–5.4)
SODIUM SERPL-SCNC: 138 MMOL/L (ref 136–144)
WBC # BLD AUTO: 8.2 K/UL (ref 4–11)

## 2017-01-13 PROCEDURE — 2Y41X5Z PACKING OF NASAL REGION USING PACKING MATERIAL: ICD-10-PCS | Performed by: INTERNAL MEDICINE

## 2017-01-13 PROCEDURE — 99233 SBSQ HOSP IP/OBS HIGH 50: CPT | Performed by: HOSPITALIST

## 2017-01-13 RX ORDER — SODIUM CHLORIDE 0.9 % (FLUSH) 0.9 %
SYRINGE (ML) INJECTION
Status: COMPLETED
Start: 2017-01-13 | End: 2017-01-13

## 2017-01-13 RX ORDER — HYDROCODONE BITARTRATE AND ACETAMINOPHEN 5; 325 MG/1; MG/1
2 TABLET ORAL EVERY 4 HOURS PRN
Status: DISCONTINUED | OUTPATIENT
Start: 2017-01-13 | End: 2017-01-18

## 2017-01-13 RX ORDER — SODIUM CHLORIDE 9 MG/ML
INJECTION, SOLUTION INTRAVENOUS
Status: DISPENSED
Start: 2017-01-13 | End: 2017-01-14

## 2017-01-13 RX ORDER — MORPHINE SULFATE 2 MG/ML
2 INJECTION, SOLUTION INTRAMUSCULAR; INTRAVENOUS EVERY 2 HOUR PRN
Status: DISCONTINUED | OUTPATIENT
Start: 2017-01-13 | End: 2017-01-18

## 2017-01-13 RX ORDER — MORPHINE SULFATE 4 MG/ML
4 INJECTION, SOLUTION INTRAMUSCULAR; INTRAVENOUS EVERY 2 HOUR PRN
Status: DISCONTINUED | OUTPATIENT
Start: 2017-01-13 | End: 2017-01-18

## 2017-01-13 RX ORDER — WARFARIN SODIUM 10 MG/1
10 TABLET ORAL NIGHTLY
Status: DISCONTINUED | OUTPATIENT
Start: 2017-01-13 | End: 2017-01-15

## 2017-01-13 RX ORDER — HYDRALAZINE HYDROCHLORIDE 20 MG/ML
10 INJECTION INTRAMUSCULAR; INTRAVENOUS EVERY 6 HOURS PRN
Status: DISCONTINUED | OUTPATIENT
Start: 2017-01-13 | End: 2017-01-18

## 2017-01-13 NOTE — PROGRESS NOTES
[facility] dosing service    Follow-up Pharmacokinetic Consult for Vancomycin Dosing     Carey De Los Santos is a 64year old female who is being treated for cellulitis. Patient is on day 9 of Vancomycin 1500mg IVPB q24h   Goal trough is 10-15 ug/mL.       V and efficacy. Pharmacy will continue to follow her. We appreciate the opportunity to assist in her care.     Ajit Saenz, [facility]  1/12/2017  10:33 PM

## 2017-01-13 NOTE — PROGRESS NOTES
[facility]    Progress Note    Severa Sable Patient Status:  Inpatient    12/3/1940 MRN [id number]   Location [facility] 2W/SW Attending Ori Mccracken MD   Hosp Day # 9 PCP Zee Ogden MD       Subjective:   Alex Degree bilaterally  Cardiovascular: S1, S2 normal, regular rate and rhythm  Abdominal: soft, non-tender; bowel sounds normal  Extremities: no edema, redness or tenderness in the calves or thighs  Neurologic: Grossly normal    Assessment and Plan:     DVT (deep ve

## 2017-01-13 NOTE — PROGRESS NOTES
[facility]OSP - [facility]    Endocrine Progress Note  [facility]    Tisha Workman Patient Status:  Inpatient    12/3/1940 MRN [id number]   Location [facility] 2W/SW Attending Shiraz Chirinos MD   Hosp Day # 9 PCP [facility] MG per tab 1 tablet, 1 tablet, Oral, Q4H PRN **OR** HYDROcodone-acetaminophen (NORCO) 5-325 MG per tab 2 tablet, 2 tablet, Oral, Q4H PRN  •  Zolpidem Tartrate (AMBIEN) tab 5 mg, 5 mg, Oral, Nightly PRN  •  ondansetron HCl (ZOFRAN) injection 4 mg, 4 mg, Int temperature source Temporal, resp. rate 19, height 5' 8\" (1.727 m), weight 286 lb 3.2 oz (129.819 kg), SpO2 91 %.     Physical Exam:  General appearance: alert, appears stated age and cooperative  Cardiovascular: S1, S2 normal, no murmur, click, rub or gal

## 2017-01-13 NOTE — PLAN OF CARE
CARDIOVASCULAR - ADULT    • Maintains optimal cardiac output and hemodynamic stability Progressing    • Absence of cardiac arrhythmias or at baseline Progressing        COPING    • Pt/Family able to verbalize concerns and demonstrate effective coping strat

## 2017-01-13 NOTE — DISCHARGE PLANNING
SW contacted [facility] for DON screen. SW to await for PT/OT rec when appropriate.     Landy Fuentes, [address]

## 2017-01-13 NOTE — PLAN OF CARE
Impaired Activities of Daily Living    • Achieve highest/safest level of independence in self care Not Progressing    Pt needed two people to assist to rolling chair to make BM during day shift. Turns herself in bed.        CARDIOVASCULAR - ADULT    • Aline Porras

## 2017-01-13 NOTE — PROGRESS NOTES
[facility]    Progress Note    Anastasiia Conrad Patient Status:  Inpatient    12/3/1940 MRN [id number]   Location [facility] 5SW/SE Attending Raymon Lorenzo MD   Hosp Day # 9 PCP Sofia Jimenez MD     ICU TRANSFER ACCEPTANCE lower extremity  V/Q scan neg for pulmonary embolism   -Coumadin/with Lovenox bridge, increased dose of Coumadin  -Daily INR    Anxiety  On the multiple medications, continue    Dispo:  Increase mobility, PT eval      Results:     Lab Results  Component Azalea

## 2017-01-14 LAB
BASOPHILS # BLD: 0 K/UL (ref 0–0.2)
BASOPHILS NFR BLD: 0 %
EOSINOPHIL # BLD: 0.1 K/UL (ref 0–0.7)
EOSINOPHIL NFR BLD: 1 %
ERYTHROCYTE [DISTWIDTH] IN BLOOD BY AUTOMATED COUNT: 14.4 % (ref 11–15)
GLUCOSE BLDC GLUCOMTR-MCNC: 100 MG/DL (ref 70–99)
GLUCOSE BLDC GLUCOMTR-MCNC: 112 MG/DL (ref 70–99)
GLUCOSE BLDC GLUCOMTR-MCNC: 191 MG/DL (ref 70–99)
GLUCOSE BLDC GLUCOMTR-MCNC: 91 MG/DL (ref 70–99)
HCT VFR BLD AUTO: 38.5 % (ref 35–48)
HGB BLD-MCNC: 12.6 G/DL (ref 12–16)
INR BLD: 1.3 (ref 0.9–1.2)
LYMPHOCYTES # BLD: 0.7 K/UL (ref 1–4)
LYMPHOCYTES NFR BLD: 5 %
MCH RBC QN AUTO: 31.1 PG (ref 27–32)
MCHC RBC AUTO-ENTMCNC: 32.6 G/DL (ref 32–37)
MCV RBC AUTO: 95.4 FL (ref 80–100)
METAMYELOCYTES # BLD MANUAL: 0.27 K/UL
METAMYELOCYTES # BLD MANUAL: 0.68 K/UL
METAMYELOCYTES NFR BLD: 5 %
MONOCYTES # BLD: 0.5 K/UL (ref 0–1)
MONOCYTES NFR BLD: 4 %
MYELOCYTES NFR BLD: 2 %
NEUTROPHILS # BLD AUTO: 11.3 K/UL (ref 1.8–7.7)
NEUTROPHILS NFR BLD: 74 %
NEUTS BAND NFR BLD: 9 %
PLATELET # BLD AUTO: 188 K/UL (ref 140–400)
PMV BLD AUTO: 9 FL (ref 7.4–10.3)
PROTHROMBIN TIME: 15.8 SECONDS (ref 11.8–14.5)
RBC # BLD AUTO: 4.04 M/UL (ref 3.7–5.4)
WBC # BLD AUTO: 13.6 K/UL (ref 4–11)

## 2017-01-14 PROCEDURE — 99233 SBSQ HOSP IP/OBS HIGH 50: CPT | Performed by: HOSPITALIST

## 2017-01-14 PROCEDURE — 02HV33Z INSERTION OF INFUSION DEVICE INTO SUPERIOR VENA CAVA, PERCUTANEOUS APPROACH: ICD-10-PCS | Performed by: INTERNAL MEDICINE

## 2017-01-14 RX ORDER — ECHINACEA PURPUREA EXTRACT 125 MG
1 TABLET ORAL
Status: DISCONTINUED | OUTPATIENT
Start: 2017-01-14 | End: 2017-01-18

## 2017-01-14 RX ORDER — CASTOR OIL AND BALSAM, PERU 788; 87 MG/G; MG/G
OINTMENT TOPICAL 2 TIMES DAILY PRN
Status: DISCONTINUED | OUTPATIENT
Start: 2017-01-14 | End: 2017-01-18

## 2017-01-14 RX ORDER — ACETAMINOPHEN, ASPIRIN AND CAFFEINE 250; 250; 65 MG/1; MG/1; MG/1
1 TABLET, FILM COATED ORAL EVERY 4 HOURS PRN
Status: DISCONTINUED | OUTPATIENT
Start: 2017-01-14 | End: 2017-01-18

## 2017-01-14 RX ORDER — PANTOPRAZOLE SODIUM 40 MG/1
40 TABLET, DELAYED RELEASE ORAL
Status: DISCONTINUED | OUTPATIENT
Start: 2017-01-15 | End: 2017-01-18

## 2017-01-14 RX ORDER — SODIUM CHLORIDE 0.9 % (FLUSH) 0.9 %
10 SYRINGE (ML) INJECTION AS NEEDED
Status: DISCONTINUED | OUTPATIENT
Start: 2017-01-14 | End: 2017-01-18

## 2017-01-14 RX ORDER — LIDOCAINE HYDROCHLORIDE 10 MG/ML
0.5 INJECTION, SOLUTION INFILTRATION; PERINEURAL ONCE AS NEEDED
Status: ACTIVE | OUTPATIENT
Start: 2017-01-14 | End: 2017-01-14

## 2017-01-14 RX ORDER — SODIUM CHLORIDE 0.9 % (FLUSH) 0.9 %
SYRINGE (ML) INJECTION
Status: COMPLETED
Start: 2017-01-14 | End: 2017-01-14

## 2017-01-14 NOTE — PROGRESS NOTES
Night MD    Asked by nurse to see patient who was having nosebleed with coughing and gagging. Had stopped with pressure earlier, but recurred recently. Patient with DVT, no PE on CT chest.  Normal renal function.   On Lovenox 120 Q 12 (wt > 120 kg) and

## 2017-01-14 NOTE — PROGRESS NOTES
[facility] Pharmacy Note: Route Optimization for Pantoprazole (PROTONIX)    Patient is currently on Pantoprazole (PROTONIX) 40 mg IV every 24 hours.    The patient meets the criteria to convert to the oral equivalent as established by the IV to Oral conversion pro

## 2017-01-14 NOTE — PROGRESS NOTES
[facility] HOSP - [facility]    Endocrine Progress Note  [facility]    Karmen Sue Patient Status:  Inpatient    12/3/1940 MRN [id number]   Location [facility] 5SW/SE Attending Cherelle Ramos MD   Hosp Day # 10 PCP Anh Martinez solution 0.5 mL, 0.5 mL, Nebulization, PRN  •  LORazepam (ATIVAN) injection 1 mg, 1 mg, Intravenous, Q6H PRN  •  Cefepime HCl (MAXIPIME) 2 g in sodium chloride 0.9% 100 mL IVPB-MBP, 2 g, Intravenous, Q12H  •  enalaprilat (VASOTEC) injection 1.25 mg, 1.25 m Nightly  •  temazepam (RESTORIL) cap, 30 mg, Oral, Nightly PRN  •  Venlafaxine HCl ER (EFFEXOR-XR) 24 hr cap 300 mg, 300 mg, Oral, Daily  •  lidocaine (LIDODERM) 5 % 1 patch, 1 patch, Transdermal, Q24H  •  ipratropium-albuterol (DUONEB) nebulizer solution

## 2017-01-14 NOTE — PROGRESS NOTES
[facility]    Progress Note    Pierre Lance Patient Status:  Inpatient    12/3/1940 MRN [id number]   Location [facility] 5SW/SE Attending Ron Willis MD   Hosp Day # 10 PCP Sunny Carroll MD         Subjective:   Nose the steroids treatment   -Endocrinology is following   -Monitor carefully as dose of steroids taper off    DVT left lower extremity  V/Q scan neg for pulmonary embolism   -Coumadin/with Lovenox bridge  -Daily INR    Anxiety  On the multiple medications, co

## 2017-01-14 NOTE — PROGRESS NOTES
PICC Line Insertion Note    Procedure: Insertion of # 4 FR / Single Power Solo    Indications:  Poor Access    Procedure Details   Patient and/or significant others educated regarding insertion of PICC line, rationale for procedure, and after care.  Informe

## 2017-01-14 NOTE — PROGRESS NOTES
[facility]    Progress Note    Mario Galarza Patient Status:  Inpatient    12/3/1940 MRN [id number]   Location [facility] 5SW/SE Attending Donny Monet MD   Hosp Day # 10 PCP Merilynn Apley, MD       Subjective:   Geraldine Hyatt supple, symmetrical, trachea midline  Pulmonary:  diminished breath sounds bilaterally,wheeaeCardiovascular: S1, S2 normal, regular rate and rhythm  Abdominal: soft, non-tender; bowel sounds normal  Extremities: no edema, redness or tenderness in the calve

## 2017-01-14 NOTE — PLAN OF CARE
HEMATOLOGIC - ADULT    • Maintains hematologic stability Not Progressing    • Free from bleeding injury Not Progressing        Patient/Family Goals    • Patient/Family Short Term Goal Not Progressing        Right at shift change, patient started having a n Patient/Family Goals    • Patient/Family Long Term Goal Progressing        Patient was transferred up here from the CCU during the day shift.      RESPIRATORY - ADULT    • Achieves optimal ventilation and oxygenation Progressing        Patient came up t

## 2017-01-15 LAB
BASOPHILS # BLD: 0 K/UL (ref 0–0.2)
BASOPHILS NFR BLD: 0 %
EOSINOPHIL # BLD: 0 K/UL (ref 0–0.7)
EOSINOPHIL NFR BLD: 0 %
ERYTHROCYTE [DISTWIDTH] IN BLOOD BY AUTOMATED COUNT: 14.3 % (ref 11–15)
GLUCOSE BLDC GLUCOMTR-MCNC: 122 MG/DL (ref 70–99)
GLUCOSE BLDC GLUCOMTR-MCNC: 123 MG/DL (ref 70–99)
GLUCOSE BLDC GLUCOMTR-MCNC: 150 MG/DL (ref 70–99)
GLUCOSE BLDC GLUCOMTR-MCNC: 92 MG/DL (ref 70–99)
HCT VFR BLD AUTO: 35.2 % (ref 35–48)
HGB BLD-MCNC: 11.6 G/DL (ref 12–16)
INR BLD: 1.7 (ref 0.9–1.2)
LYMPHOCYTES # BLD: 0.8 K/UL (ref 1–4)
LYMPHOCYTES NFR BLD: 11 %
MCH RBC QN AUTO: 31.8 PG (ref 27–32)
MCHC RBC AUTO-ENTMCNC: 33 G/DL (ref 32–37)
MCV RBC AUTO: 96.4 FL (ref 80–100)
METAMYELOCYTES # BLD MANUAL: 0.07 K/UL
METAMYELOCYTES # BLD MANUAL: 0.23 K/UL
METAMYELOCYTES NFR BLD: 3 %
MONOCYTES # BLD: 0.2 K/UL (ref 0–1)
MONOCYTES NFR BLD: 2 %
MYELOCYTES NFR BLD: 1 %
NEUTROPHILS # BLD AUTO: 6.3 K/UL (ref 1.8–7.7)
NEUTROPHILS NFR BLD: 70 %
NEUTS BAND NFR BLD: 13 %
PLATELET # BLD AUTO: 134 K/UL (ref 140–400)
PMV BLD AUTO: 9.1 FL (ref 7.4–10.3)
PROTHROMBIN TIME: 19.8 SECONDS (ref 11.8–14.5)
RBC # BLD AUTO: 3.66 M/UL (ref 3.7–5.4)
WBC # BLD AUTO: 7.5 K/UL (ref 4–11)

## 2017-01-15 PROCEDURE — 99233 SBSQ HOSP IP/OBS HIGH 50: CPT | Performed by: HOSPITALIST

## 2017-01-15 RX ORDER — SODIUM CHLORIDE 0.9 % (FLUSH) 0.9 %
SYRINGE (ML) INJECTION
Status: COMPLETED
Start: 2017-01-15 | End: 2017-01-15

## 2017-01-15 RX ORDER — METHYLPREDNISOLONE SODIUM SUCCINATE 40 MG/ML
30 INJECTION, POWDER, LYOPHILIZED, FOR SOLUTION INTRAMUSCULAR; INTRAVENOUS EVERY 8 HOURS
Status: DISCONTINUED | OUTPATIENT
Start: 2017-01-15 | End: 2017-01-16

## 2017-01-15 RX ORDER — WARFARIN SODIUM 7.5 MG/1
7.5 TABLET ORAL NIGHTLY
Status: DISCONTINUED | OUTPATIENT
Start: 2017-01-15 | End: 2017-01-16

## 2017-01-15 NOTE — PROGRESS NOTES
[facility]    Progress Note    Janelle Saha Patient Status:  Inpatient    12/3/1940 MRN [id number]   Location [facility] 5SW/SE Attending Taj Aldana MD   Hosp Day # 11 PCP Nicholas Ahuja MD       Subjective:   Hayden Mohr rate and rhythm  Abdominal: soft, non-tender; bowel sounds normal  Extremities: no edema, redness or tenderness in the calves or thighs  Neurologic: Grossly normal    Assessment and Plan:     DVT (deep venous thrombosis) (HCC)  lovenox coumadin      COPD e

## 2017-01-15 NOTE — PROGRESS NOTES
[facility]    Endocrine Progress Note  [facility]    Miah Vera Patient Status:  Inpatient    12/3/1940 MRN [id number]   Location [facility] 5SW/SE Attending Lesly Jones MD   Hosp Day # 11 PCP Anh Martinez PRN  •  LORazepam (ATIVAN) injection 1 mg, 1 mg, Intravenous, Q6H PRN  •  Cefepime HCl (MAXIPIME) 2 g in sodium chloride 0.9% 100 mL IVPB-MBP, 2 g, Intravenous, Q12H  •  enalaprilat (VASOTEC) injection 1.25 mg, 1.25 mg, Intravenous, Q6H PRN  •  ipratropium Oral, Nightly PRN  •  Venlafaxine HCl ER (EFFEXOR-XR) 24 hr cap 300 mg, 300 mg, Oral, Daily  •  lidocaine (LIDODERM) 5 % 1 patch, 1 patch, Transdermal, Q24H  •  ipratropium-albuterol (DUONEB) nebulizer solution 3 mL, 3 mL, Nebulization, QID    Objective:

## 2017-01-15 NOTE — PHYSICAL THERAPY NOTE
PHYSICAL THERAPY EVALUATION - INPATIENT     Room Number: 543/543-A  Evaluation Date: 1/15/2017  Type of Evaluation:re-eval.   Physician Order: See Comment for Specific Order (Re-eval s/p intubation/ extubation post bronchoscopy)    Presenting Problem: Bilateral arthritis     CHOLECYSTECTOMY  1984    Comment Cholecystitis    LAMINECTOMY      OTHER SURGICAL HISTORY  1985,1995,2003,2009    Comment Cervical Discectomy     HYSTERECTOMY  1967    Comment Partial Hysterectomy    TONSILLECTOMY  1950    Comment T BALANCE  Static Sitting: Good  Dynamic Sitting: Good  Static Standing: Not tested  Dynamic Standing: Not tested    ADDITIONAL TESTS                                    NEUROLOGICAL FINDINGS                      ACTIVITY TOLERANCE  Pt on RA w/ O2 sat => 94 easily SOB requiring frequent breaks between tasks. Pt w/  O2 sat => 94 and HR 88-95 throughout the session; Pt w/ no h/o O2 supplement PTA.   In this PT evaluation, the patient presents with the following impairments: deconditioning, decreased activity jenny

## 2017-01-15 NOTE — PROGRESS NOTES
[facility]    Progress Note    Scottie Sutherland Patient Status:  Inpatient    12/3/1940 MRN [id number]   Location [facility] 5SW/SE Attending Efrain Srinivasan MD   Hosp Day # 11 PCP Cali Pérez MD         Subjective:     co as dose of steroids taper off    DVT left lower extremity  V/Q scan neg for pulmonary embolism   -Coumadin/with Lovenox bridge  -Daily INR    Anxiety  On the multiple medications, continue    Nosebleed, on 1/14, seems minor, no significant drop in hemoglob

## 2017-01-16 LAB
ERYTHROCYTE [DISTWIDTH] IN BLOOD BY AUTOMATED COUNT: 14.6 % (ref 11–15)
GLUCOSE BLDC GLUCOMTR-MCNC: 165 MG/DL (ref 70–99)
GLUCOSE BLDC GLUCOMTR-MCNC: 166 MG/DL (ref 70–99)
GLUCOSE BLDC GLUCOMTR-MCNC: 175 MG/DL (ref 70–99)
GLUCOSE BLDC GLUCOMTR-MCNC: 252 MG/DL (ref 70–99)
HCT VFR BLD AUTO: 34.8 % (ref 35–48)
HGB BLD-MCNC: 11.5 G/DL (ref 12–16)
INR BLD: 2.6 (ref 0.9–1.2)
MCH RBC QN AUTO: 31.8 PG (ref 27–32)
MCHC RBC AUTO-ENTMCNC: 33.1 G/DL (ref 32–37)
MCV RBC AUTO: 96 FL (ref 80–100)
PLATELET # BLD AUTO: 126 K/UL (ref 140–400)
PMV BLD AUTO: 9.6 FL (ref 7.4–10.3)
PROTHROMBIN TIME: 27.7 SECONDS (ref 11.8–14.5)
RBC # BLD AUTO: 3.63 M/UL (ref 3.7–5.4)
WBC # BLD AUTO: 8.5 K/UL (ref 4–11)

## 2017-01-16 PROCEDURE — 99233 SBSQ HOSP IP/OBS HIGH 50: CPT | Performed by: HOSPITALIST

## 2017-01-16 RX ORDER — METHYLPREDNISOLONE SODIUM SUCCINATE 40 MG/ML
20 INJECTION, POWDER, LYOPHILIZED, FOR SOLUTION INTRAMUSCULAR; INTRAVENOUS EVERY 8 HOURS
Status: DISCONTINUED | OUTPATIENT
Start: 2017-01-16 | End: 2017-01-18

## 2017-01-16 NOTE — DIABETES ED
[facility]   Diabetes Education Note      Yecenia Norris Patient Status Inpatient   12/3/1940  MRN [id number]  Location  [facility] 5SW/SE  Attending Rogelio Cerda MD  Hospital Days # 12  PCP  Marcelo Allen MD    Reason

## 2017-01-16 NOTE — CONSULTS
PHYSICAL MEDICINE AND REHABILITATION CONSULTATION     CC: Impaired mobility and ADL dysfunction secondary to COPD, weakness, bronchitis     HPI: This is a 64year old female with a PMH of COPD, SHANT, CKD 2, IBS, OA, DM, admitted to [facility] on 1/4/ HCl (APRESOLINE) injection 10 mg, 10 mg, Intravenous, Q6H PRN  •  morphINE sulfate (PF) 2 MG/ML injection 2 mg, 2 mg, Intravenous, Q2H PRN **OR** morphINE sulfate (PF) 4 MG/ML injection 4 mg, 4 mg, Intravenous, Q2H PRN  •  HYDROcodone-acetaminophen ([facility]) PRN  •  Levothyroxine Sodium (SYNTHROID, LEVOTHROID) tab 250 mcg, 250 mcg, Oral, Before breakfast  •  Loperamide HCl (IMODIUM) cap 2 mg, 2 mg, Oral, PRN  •  LORazepam (ATIVAN) tab 1 mg, 1 mg, Oral, BID PRN  •  mirtazapine (REMERON) tab 30 mg, 30 mg, Oral, Medication adjustment    • Toe pain      Onychomycosis, Debridement , Hammertoe    • Onychomycosis      Debridement    • Hammertoe 02/25/2011     hammertoe 5 left, pain   • Pneumonia 2012       Past Surgical History:      Past Surgical History    ANESTH,NE Glasses    Stairs in Home: elevator accessible  FUNCTIONAL STATUS:  morbid functional status-  Patient used a RW and a scooter   Current functional status-       Distance (ft): 10 (x 2)  Assistive Device: Rolling walker  Pattern: Shuffle  Stoop/Curb Assist on patient's current functional status, pt would benefit from  Acute Inpatient  Rehabilitation, working with PT/OT, provide family training, assess home equipment and assistive device needs.     24 hr rehab nursing also beneficial for medication management,

## 2017-01-16 NOTE — PROGRESS NOTES
[facility]    Progress Note    Joo Vides Patient Status:  Inpatient    12/3/1940 MRN [id number]   Location [facility] 5SW/SE Attending Sadie Kraft MD   Hosp Day # 12 PCP Filipe Aldrich MD         Subjective:     co taper off    DVT left lower extremity  V/Q scan neg for pulmonary embolism   -Coumadin/with Lovenox bridge--> stop Lovenox as INR therapeutic, no Coumadin tonight  -Daily INR    Anxiety  On the multiple medications, continue    Nosebleed, on 1/14, seems mi

## 2017-01-16 NOTE — DISCHARGE PLANNING
SW met w/ pt to discuss plan of care per MDO. PT/OT recommending SNF. Pt agreeable to referral to [facility] as she has a hx at that facility. Referral to [facility], DON screen previously requested.     Andrew Hamilton  [facility]  [id number]

## 2017-01-16 NOTE — PROGRESS NOTES
[facility]    Progress Note    Miah Vera Patient Status:  Inpatient    12/3/1940 MRN [id number]   Location [facility] 5SW/SE Attending Lesly Jones MD   Hosp Day # 12 PCP Angelina Lees MD       Subjective:   Stacy Bangura Grossly normal    Assessment and Plan:     DVT (deep venous thrombosis) (HCC)  inr therapeutic      COPD exacerbation (HCC)  Steroids, nebs      Cellulitis of left lower extremity  resolved      Respiratory failure (HCC)  better      Pneumonia  Abx, cxp a,

## 2017-01-17 ENCOUNTER — APPOINTMENT (OUTPATIENT)
Dept: GENERAL RADIOLOGY | Facility: HOSPITAL | Age: 77
DRG: 208 | End: 2017-01-17
Attending: INTERNAL MEDICINE
Payer: MEDICARE

## 2017-01-17 LAB
GLUCOSE BLDC GLUCOMTR-MCNC: 113 MG/DL (ref 70–99)
GLUCOSE BLDC GLUCOMTR-MCNC: 128 MG/DL (ref 70–99)
GLUCOSE BLDC GLUCOMTR-MCNC: 90 MG/DL (ref 70–99)
GLUCOSE BLDC GLUCOMTR-MCNC: 96 MG/DL (ref 70–99)
INR BLD: 2.3 (ref 0.9–1.2)
PROTHROMBIN TIME: 24.9 SECONDS (ref 11.8–14.5)

## 2017-01-17 PROCEDURE — 71010 XR CHEST AP PORTABLE  (CPT=71010): CPT

## 2017-01-17 PROCEDURE — 99233 SBSQ HOSP IP/OBS HIGH 50: CPT | Performed by: HOSPITALIST

## 2017-01-17 RX ORDER — SODIUM CHLORIDE 0.9 % (FLUSH) 0.9 %
SYRINGE (ML) INJECTION
Status: COMPLETED
Start: 2017-01-17 | End: 2017-01-17

## 2017-01-17 RX ORDER — WARFARIN SODIUM 6 MG/1
6 TABLET ORAL NIGHTLY
Status: DISCONTINUED | OUTPATIENT
Start: 2017-01-17 | End: 2017-01-18

## 2017-01-17 NOTE — DISCHARGE PLANNING
No beds available at [facility] for today. Textpage sent to MD with this update.     JM olvera [id number]

## 2017-01-17 NOTE — DISCHARGE PLANNING
SW spoke with MD who states the pt had a PMR consult. PMR had recommended acute rehab. Pt's preference is for [facility]. Referral sent to [facility], awaiting clinical review and bed availability.     JM olvera [id number]

## 2017-01-17 NOTE — PROGRESS NOTES
[facility]    [facility] Note    Carly Pizano Patient Status:  Inpatient    12/3/1940 MRN [id number]   Location [facility] 5SW/SE Attending Lino Strong MD   Hosp Day # 13 PCP Brooke Anne MD         Subjective:     co steroids taper off    DVT left lower extremity  V/Q scan neg for pulmonary embolism   -Coumadin/with Lovenox bridge--> stop Lovenox as INR therapeutic, Coumadin dose adjusted  -Daily INR    Anxiety  On the multiple medications, continue    Nosebleed, on 1/

## 2017-01-17 NOTE — PROGRESS NOTES
[facility]    Progress Note    Terri Berrios Patient Status:  Inpatient    12/3/1940 MRN [id number]   Location [facility] 5SW/SE Attending Juarez Castillo MD   Hosp Day # 15 PCP Dulce Verde MD       Subjective:   Mandie Morgan normal  Extremities: no edema, redness or tenderness in the calves or thighs  Neurologic: Grossly normal    Assessment and Plan:     DVT (deep venous thrombosis) (HCC)  inr therapeutic      COPD exacerbation (HCC)  Steroids nebs      Cellulitis of left low

## 2017-01-17 NOTE — PROGRESS NOTES
[facility] HOSP - [facility]    Endocrine Progress Note  [facility]    Afla Munoz Patient Status:  Inpatient    12/3/1940 MRN [id number]   Location [facility] 5SW/SE Attending Meryle Guarneri, MD   Hosp Day # 12 PCP Anh Martinez Subcutaneous, TID CC  •  racepinephrine HCl (S-2) nebulizer solution 0.5 mL, 0.5 mL, Nebulization, PRN  •  LORazepam (ATIVAN) injection 1 mg, 1 mg, Intravenous, Q6H PRN  •  Cefepime HCl (MAXIPIME) 2 g in sodium chloride 0.9% 100 mL IVPB-MBP, 2 g, Intraveno Oral, Nightly PRN  •  Venlafaxine HCl ER (EFFEXOR-XR) 24 hr cap 300 mg, 300 mg, Oral, Daily  •  lidocaine (LIDODERM) 5 % 1 patch, 1 patch, Transdermal, Q24H  •  ipratropium-albuterol (DUONEB) nebulizer solution 3 mL, 3 mL, Nebulization, QID    Objective:

## 2017-01-17 NOTE — PROGRESS NOTES
[facility]    Endocrine Progress Note  [facility]    Igor Fisher Patient Status:  Inpatient    12/3/1940 MRN [id number]   Location [facility] 5SW/SE Attending Ventura Greer MD   Hosp Day # 15 PCP Anh Martinez g in sodium chloride 0.9% 100 mL IVPB-MBP, 2 g, Intravenous, Q12H  •  enalaprilat (VASOTEC) injection 1.25 mg, 1.25 mg, Intravenous, Q6H PRN  •  ipratropium-albuterol (DUONEB) nebulizer solution 3 mL, 3 mL, Nebulization, Q6H PRN  •  dextrose injection 50 m solution 3 mL, 3 mL, Nebulization, QID    Objective:   Blood pressure 148/66, pulse 69, temperature 98.3 °F (36.8 °C), temperature source Oral, resp. rate 20, height 5' 8\" (1.727 m), weight 286 lb 3.2 oz (129.819 kg), SpO2 94 %.     Physical Exam:  General

## 2017-01-18 VITALS
RESPIRATION RATE: 20 BRPM | HEART RATE: 73 BPM | SYSTOLIC BLOOD PRESSURE: 135 MMHG | WEIGHT: 286.19 LBS | HEIGHT: 68 IN | TEMPERATURE: 98 F | BODY MASS INDEX: 43.37 KG/M2 | OXYGEN SATURATION: 94 % | DIASTOLIC BLOOD PRESSURE: 59 MMHG

## 2017-01-18 LAB
GLUCOSE BLDC GLUCOMTR-MCNC: 114 MG/DL (ref 70–99)
GLUCOSE BLDC GLUCOMTR-MCNC: 93 MG/DL (ref 70–99)
INR BLD: 1.5 (ref 0.9–1.2)
PROTHROMBIN TIME: 17.7 SECONDS (ref 11.8–14.5)

## 2017-01-18 PROCEDURE — 99239 HOSP IP/OBS DSCHRG MGMT >30: CPT | Performed by: HOSPITALIST

## 2017-01-18 RX ORDER — IPRATROPIUM BROMIDE AND ALBUTEROL SULFATE 2.5; .5 MG/3ML; MG/3ML
3 SOLUTION RESPIRATORY (INHALATION) EVERY 6 HOURS PRN
Qty: 360 ML | Refills: 0 | Status: ON HOLD | OUTPATIENT
Start: 2017-01-18 | End: 2017-02-28

## 2017-01-18 RX ORDER — HYDROCODONE BITARTRATE AND ACETAMINOPHEN 10; 325 MG/1; MG/1
1 TABLET ORAL EVERY 6 HOURS PRN
Qty: 30 TABLET | Refills: 0 | Status: ON HOLD | OUTPATIENT
Start: 2017-01-18 | End: 2017-02-14 | Stop reason: ALTCHOICE

## 2017-01-18 RX ORDER — SODIUM CHLORIDE 0.9 % (FLUSH) 0.9 %
SYRINGE (ML) INJECTION
Status: COMPLETED
Start: 2017-01-18 | End: 2017-01-18

## 2017-01-18 RX ORDER — WARFARIN SODIUM 6 MG/1
6 TABLET ORAL NIGHTLY
Qty: 30 TABLET | Refills: 0 | Status: ON HOLD | OUTPATIENT
Start: 2017-01-18 | End: 2017-04-03 | Stop reason: DRUGHIGH

## 2017-01-18 RX ORDER — LORAZEPAM 1 MG/1
1 TABLET ORAL 2 TIMES DAILY PRN
Qty: 60 TABLET | Refills: 0 | Status: ON HOLD | OUTPATIENT
Start: 2017-01-18 | End: 2017-04-02

## 2017-01-18 RX ORDER — AMOXICILLIN AND CLAVULANATE POTASSIUM 875; 125 MG/1; MG/1
1 TABLET, FILM COATED ORAL 2 TIMES DAILY
Qty: 10 TABLET | Refills: 0 | Status: SHIPPED | OUTPATIENT
Start: 2017-01-18 | End: 2017-02-14

## 2017-01-18 RX ORDER — HYDROCODONE POLISTIREX AND CHLORPHENIRAMINE POLISTIREX 10; 8 MG/5ML; MG/5ML
5 SUSPENSION, EXTENDED RELEASE ORAL 2 TIMES DAILY PRN
Qty: 140 ML | Refills: 0 | Status: SHIPPED | OUTPATIENT
Start: 2017-01-18 | End: 2017-02-01

## 2017-01-18 RX ORDER — ENOXAPARIN SODIUM 100 MG/ML
120 INJECTION SUBCUTANEOUS 2 TIMES DAILY
Qty: 1.2 ML | Refills: 0 | Status: SHIPPED | OUTPATIENT
Start: 2017-01-18 | End: 2017-02-14

## 2017-01-18 RX ORDER — ENOXAPARIN SODIUM 150 MG/ML
120 INJECTION SUBCUTANEOUS ONCE
Status: COMPLETED | OUTPATIENT
Start: 2017-01-18 | End: 2017-01-18

## 2017-01-18 RX ORDER — PREDNISONE 20 MG/1
TABLET ORAL
Qty: 11 TABLET | Refills: 0 | Status: SHIPPED | OUTPATIENT
Start: 2017-01-18 | End: 2017-02-14

## 2017-01-18 NOTE — DISCHARGE SUMMARY
[facility] HOSPITALIST  DISCHARGE SUMMARY     Miah Vera Patient Status:  Inpatient    12/3/1940 MRN [id number]   Location [facility] 5SW/SE Attending No att. providers found   [facility] Day # 14 PCP Angelina Lees MD     DATE OF ADMISSION: 20 On the day of discharge her INR was slightly low. She will restart the Lovenox at [facility] and discontinue when INR is 2 or greater.     PHYSICAL EXAM:    Vital signs:  Temp:  [97.8 °F (36.6 °C)-99 °F (37.2 °C)] 97.8 °F (36.6 °C)  Pulse:  [73-82] 73  Re 1/18/2017 11:01 AM   Commonly known as:  LEVEMIR        Inject 15 Units into the skin nightly.     Quantity:  1 pen   Refills:  0       ipratropium-albuterol 0.5-2.5 (3) MG/3ML Soln   Last time this was given:  3 mL on 1/18/2017  8:25 AM   Commonly known as Quantity:  60 tablet   Refills:  5       Loperamide HCl 2 MG Tabs   Commonly known as:  IMODIUM A-D        Take 1 tablet (2 mg total) by mouth as needed for Diarrhea.     Quantity:  1 Package   Refills:  0       LORazepam 1 MG Tabs   Last time this was give medications were sent to [place] [address] [address] [facility], [address] [place] [address], [phone], [phone]  [address] [address]    Hours:  24-hours Phone:  [phone]    - Enoxaparin Sodium 30 MG/0.3ML

## 2017-01-18 NOTE — DISCHARGE PLANNING
SW received call from [facility] who state they have a bed for today. MD is aware and confirms pt is medically stable for dc to acute rehab today. FLAKITA met with the pt who is aware and agreeable to the transfer.  She does not have family available for transpor

## 2017-01-18 NOTE — PLAN OF CARE
CARDIOVASCULAR - ADULT    • Maintains optimal cardiac output and hemodynamic stability Adequate for Discharge    • Absence of cardiac arrhythmias or at baseline Adequate for Discharge        COPING    • Pt/Family able to verbalize concerns and demonstrate

## 2017-01-18 NOTE — PROGRESS NOTES
[facility]    Progress Note    Carey De Los Santos Patient Status:  Inpatient    12/3/1940 MRN [id number]   Location [facility] 5SW/SE Attending Bre Machuca MD   Hosp Day # 15 PCP Gisella Rajan MD       Subjective:   Edwin Tamayo redness or tenderness in the calves or thighs  Neurologic: Grossly normal    Assessment and Plan:     DVT (deep venous thrombosis) (HCC)  anticoag      COPD exacerbation (HCC)  Prednisone 20      Cellulitis of left lower extremity  resolved      Respirator

## 2017-01-31 ENCOUNTER — TELEPHONE (OUTPATIENT)
Dept: INTERNAL MEDICINE CLINIC | Facility: CLINIC | Age: 77
End: 2017-01-31

## 2017-02-01 RX ORDER — MIRTAZAPINE 30 MG/1
30 TABLET, FILM COATED ORAL NIGHTLY
Qty: 90 TABLET | Refills: 0 | Status: SHIPPED | OUTPATIENT
Start: 2017-02-01 | End: 2017-05-01

## 2017-02-02 ENCOUNTER — TELEPHONE (OUTPATIENT)
Dept: FAMILY MEDICINE CLINIC | Facility: CLINIC | Age: 77
End: 2017-02-02

## 2017-02-02 NOTE — TELEPHONE ENCOUNTER
Hypertensive Medications: Medication refilled for 90 days per protocol.     Protocol Criteria:  · Appointment scheduled in the past 6 months or in the next 3 months  · BMP or CMP in the past 12 months  · Creatinine result < 2  Recent Visits       Provider D

## 2017-02-02 NOTE — TELEPHONE ENCOUNTER
Rosario Mays is calling from [facility] want to know if they can resume [facility] for pt tomorrow  Rosario Mays is requesting a call back

## 2017-02-03 ENCOUNTER — TELEPHONE (OUTPATIENT)
Dept: INTERNAL MEDICINE CLINIC | Facility: CLINIC | Age: 77
End: 2017-02-03

## 2017-02-03 DIAGNOSIS — I82.409 DEEP VEIN THROMBOSIS (DVT) OF LOWER EXTREMITY, UNSPECIFIED CHRONICITY, UNSPECIFIED LATERALITY, UNSPECIFIED VEIN (HCC): Primary | ICD-10-CM

## 2017-02-03 NOTE — TELEPHONE ENCOUNTER
Shauna from [facility] calling regarding needing order for Pt INR levels for tomorrow. .. Pt is taking 5MG of coumadin. .. please advise also prabhu barajas has a wound and nurse would like to have orders on how to treat the wound

## 2017-02-03 NOTE — TELEPHONE ENCOUNTER
Shauna from [facility] calling stating patient is on 5mg coumadin daily     1.) Asking for order to draw for PT/INR tomorrow 2/4    2.) Also states has a 1.5cm x 1 cm black scab on left shin that patient states is nothing new.  Shauna states wound is dry with no oo

## 2017-02-03 NOTE — TELEPHONE ENCOUNTER
Spoke to Aleks with [facility] and relayed doctor ok with [facility] for patient. States will be faxing orders over for MMP to sign. No further questions or concerns at this time.

## 2017-02-06 ENCOUNTER — TELEPHONE (OUTPATIENT)
Dept: CARDIOLOGY CLINIC | Facility: CLINIC | Age: 77
End: 2017-02-06

## 2017-02-06 NOTE — TELEPHONE ENCOUNTER
Dr. Luisa Casas, please see message below. PT/INR was not done on 2/4. Can the nurse draw tomorrow 2/7? Please advise.

## 2017-02-06 NOTE — TELEPHONE ENCOUNTER
Shauna from [facility] states no draw for PT/INR was done on 2/4  Shauna states she got call from nurse to late. Shauna is asking for another order to draw blood tomorrow 2/7.  Please advise,

## 2017-02-06 NOTE — TELEPHONE ENCOUNTER
Spoke to So Nye RN. Informed her that ok for PT/INR draw tomorrow. Per Shauna, pt has a bottle of Coumadin at home and told the RN that she is taking it.  HH RN does not feel that pt will be able to follow up in the coumadin clinic or get to the lab for bl

## 2017-02-06 NOTE — TELEPHONE ENCOUNTER
Pt ENROLLED IN COUMADIN CLINIC ORDERED 2/3/17  PLEASE REDIRECT  REQUEST TO COUMADIN CLINIC  I APPROVE PT/INR TOMORROW FOR NOW      [facility]         Mariam Suazo [[id number]]    Status: Active (2/3/2017)   ANTICOAGULATION MONITORIN

## 2017-02-06 NOTE — TELEPHONE ENCOUNTER
Hi Dr. Ricardo Chavez,    S/w pt- states she hasn't started back on warfarin since she d/c from hospital. She states she doesn't want to continue on warfarin and also unable to come to coumadin clinic or to the lab. Please advise.

## 2017-02-07 NOTE — TELEPHONE ENCOUNTER
Call patient    Ask her why she does not want to be on cuomadin  I believe she was recently diagnosed with DVT   I did not see her She was in the hospital without coumadin she can develop Pulmonary Embolus which can be fatal  Advised coumadin  Will send [facility]

## 2017-02-07 NOTE — TELEPHONE ENCOUNTER
Please transfer to [id number] until 8p or [id number] anytime. LMTCB. Please also see [facility] encounter 2/3/17. Per [facility] RN, pt has been taking Coumadin.  RN, Reyna Parker is aware of PT/INT to be drawn on 2/7/17.

## 2017-02-08 ENCOUNTER — TELEPHONE (OUTPATIENT)
Dept: FAMILY MEDICINE CLINIC | Facility: CLINIC | Age: 77
End: 2017-02-08

## 2017-02-08 ENCOUNTER — ANTI-COAG VISIT (OUTPATIENT)
Dept: INTERNAL MEDICINE CLINIC | Facility: CLINIC | Age: 77
End: 2017-02-08

## 2017-02-08 DIAGNOSIS — I82.409 DEEP VEIN THROMBOSIS (DVT) OF LOWER EXTREMITY, UNSPECIFIED CHRONICITY, UNSPECIFIED LATERALITY, UNSPECIFIED VEIN (HCC): Primary | ICD-10-CM

## 2017-02-08 LAB — INR: 1 (ref 2–3)

## 2017-02-08 NOTE — TELEPHONE ENCOUNTER
Elisha Fernandes is calling from [facility] state that the pt PT 12.5 INR 1.0 pt is currently on 5mg state that pt has not been taking the coumadin   pankaj want to know if the dosage need to be change  Elisha Fernandes is requesting a call back

## 2017-02-14 ENCOUNTER — OFFICE VISIT (OUTPATIENT)
Dept: INTERNAL MEDICINE CLINIC | Facility: CLINIC | Age: 77
End: 2017-02-14

## 2017-02-14 ENCOUNTER — TELEPHONE (OUTPATIENT)
Dept: INTERNAL MEDICINE CLINIC | Facility: CLINIC | Age: 77
End: 2017-02-14

## 2017-02-14 ENCOUNTER — HOSPITAL ENCOUNTER (INPATIENT)
Facility: HOSPITAL | Age: 77
LOS: 14 days | Discharge: NURSING, REHAB OR LONG-TERM CARE | DRG: 190 | End: 2017-02-28
Attending: EMERGENCY MEDICINE | Admitting: HOSPITALIST
Payer: MEDICARE

## 2017-02-14 ENCOUNTER — APPOINTMENT (OUTPATIENT)
Dept: GENERAL RADIOLOGY | Facility: HOSPITAL | Age: 77
DRG: 190 | End: 2017-02-14
Attending: EMERGENCY MEDICINE
Payer: MEDICARE

## 2017-02-14 VITALS
HEART RATE: 96 BPM | SYSTOLIC BLOOD PRESSURE: 135 MMHG | HEIGHT: 68 IN | TEMPERATURE: 98 F | OXYGEN SATURATION: 93 % | DIASTOLIC BLOOD PRESSURE: 88 MMHG

## 2017-02-14 DIAGNOSIS — L03.116 LEFT LEG CELLULITIS: ICD-10-CM

## 2017-02-14 DIAGNOSIS — J44.1 COPD EXACERBATION (HCC): Primary | ICD-10-CM

## 2017-02-14 DIAGNOSIS — T17.500A MUCUS PLUGGING OF BRONCHI: ICD-10-CM

## 2017-02-14 DIAGNOSIS — R06.89 RESPIRATORY INSUFFICIENCY: Primary | ICD-10-CM

## 2017-02-14 DIAGNOSIS — R53.1 WEAKNESS: ICD-10-CM

## 2017-02-14 DIAGNOSIS — J44.1 CHRONIC OBSTRUCTIVE PULMONARY DISEASE WITH ACUTE EXACERBATION (HCC): ICD-10-CM

## 2017-02-14 DIAGNOSIS — I82.402 ACUTE DEEP VEIN THROMBOSIS (DVT) OF LEFT LOWER EXTREMITY, UNSPECIFIED VEIN (HCC): ICD-10-CM

## 2017-02-14 LAB
ANION GAP SERPL CALC-SCNC: 7 MMOL/L (ref 0–18)
BASOPHILS # BLD: 0.1 K/UL (ref 0–0.2)
BASOPHILS NFR BLD: 1 %
BUN SERPL-MCNC: 12 MG/DL (ref 8–20)
BUN/CREAT SERPL: 13.8 (ref 10–20)
CALCIUM SERPL-MCNC: 8.8 MG/DL (ref 8.5–10.5)
CHLORIDE SERPL-SCNC: 105 MMOL/L (ref 95–110)
CO2 SERPL-SCNC: 32 MMOL/L (ref 22–32)
CREAT SERPL-MCNC: 0.87 MG/DL (ref 0.5–1.5)
EOSINOPHIL # BLD: 0.2 K/UL (ref 0–0.7)
EOSINOPHIL NFR BLD: 2 %
ERYTHROCYTE [DISTWIDTH] IN BLOOD BY AUTOMATED COUNT: 16.2 % (ref 11–15)
GLUCOSE SERPL-MCNC: 116 MG/DL (ref 70–99)
HCT VFR BLD AUTO: 34.3 % (ref 35–48)
HGB BLD-MCNC: 11 G/DL (ref 12–16)
INR BLD: 1.4 (ref 0.9–1.2)
LYMPHOCYTES # BLD: 3 K/UL (ref 1–4)
LYMPHOCYTES NFR BLD: 38 %
MCH RBC QN AUTO: 30.5 PG (ref 27–32)
MCHC RBC AUTO-ENTMCNC: 32.1 G/DL (ref 32–37)
MCV RBC AUTO: 95.1 FL (ref 80–100)
MONOCYTES # BLD: 0.9 K/UL (ref 0–1)
MONOCYTES NFR BLD: 11 %
NEUTROPHILS # BLD AUTO: 3.6 K/UL (ref 1.8–7.7)
NEUTROPHILS NFR BLD: 47 %
OSMOLALITY UR CALC.SUM OF ELEC: 299 MOSM/KG (ref 275–295)
PLATELET # BLD AUTO: 231 K/UL (ref 140–400)
PMV BLD AUTO: 8.6 FL (ref 7.4–10.3)
POTASSIUM SERPL-SCNC: 3.9 MMOL/L (ref 3.3–5.1)
PROTHROMBIN TIME: 17 SECONDS (ref 11.8–14.5)
RBC # BLD AUTO: 3.61 M/UL (ref 3.7–5.4)
SODIUM SERPL-SCNC: 144 MMOL/L (ref 136–144)
TROPONIN I SERPL-MCNC: 0 NG/ML (ref ?–0.03)
WBC # BLD AUTO: 7.7 K/UL (ref 4–11)

## 2017-02-14 PROCEDURE — G0463 HOSPITAL OUTPT CLINIC VISIT: HCPCS | Performed by: INTERNAL MEDICINE

## 2017-02-14 PROCEDURE — 99223 1ST HOSP IP/OBS HIGH 75: CPT | Performed by: HOSPITALIST

## 2017-02-14 PROCEDURE — 99214 OFFICE O/P EST MOD 30 MIN: CPT | Performed by: INTERNAL MEDICINE

## 2017-02-14 PROCEDURE — 71010 XR CHEST AP PORTABLE  (CPT=71010): CPT

## 2017-02-14 RX ORDER — WARFARIN SODIUM 3 MG/1
6 TABLET ORAL NIGHTLY
Status: DISCONTINUED | OUTPATIENT
Start: 2017-02-14 | End: 2017-02-15

## 2017-02-14 RX ORDER — ONDANSETRON 4 MG/1
4 TABLET, ORALLY DISINTEGRATING ORAL EVERY 8 HOURS PRN
Status: ON HOLD | COMMUNITY
End: 2017-04-08

## 2017-02-14 RX ORDER — IPRATROPIUM BROMIDE AND ALBUTEROL SULFATE 2.5; .5 MG/3ML; MG/3ML
3 SOLUTION RESPIRATORY (INHALATION) ONCE
Status: COMPLETED | OUTPATIENT
Start: 2017-02-14 | End: 2017-02-14

## 2017-02-14 RX ORDER — 0.9 % SODIUM CHLORIDE 0.9 %
VIAL (ML) INJECTION
Status: COMPLETED
Start: 2017-02-14 | End: 2017-02-14

## 2017-02-14 RX ORDER — SODIUM PHOSPHATE, DIBASIC AND SODIUM PHOSPHATE, MONOBASIC 7; 19 G/133ML; G/133ML
1 ENEMA RECTAL ONCE AS NEEDED
Status: ACTIVE | OUTPATIENT
Start: 2017-02-14 | End: 2017-02-14

## 2017-02-14 RX ORDER — DOCUSATE SODIUM 100 MG/1
100 CAPSULE, LIQUID FILLED ORAL 2 TIMES DAILY
Status: DISCONTINUED | OUTPATIENT
Start: 2017-02-14 | End: 2017-02-28

## 2017-02-14 RX ORDER — LORAZEPAM 1 MG/1
1 TABLET ORAL 2 TIMES DAILY PRN
Status: DISCONTINUED | OUTPATIENT
Start: 2017-02-14 | End: 2017-02-28

## 2017-02-14 RX ORDER — POLYETHYLENE GLYCOL 3350 17 G/17G
17 POWDER, FOR SOLUTION ORAL DAILY PRN
Status: DISCONTINUED | OUTPATIENT
Start: 2017-02-14 | End: 2017-02-19

## 2017-02-14 RX ORDER — TEMAZEPAM 30 MG/1
30 CAPSULE ORAL NIGHTLY PRN
Status: DISCONTINUED | OUTPATIENT
Start: 2017-02-14 | End: 2017-02-28

## 2017-02-14 RX ORDER — AMLODIPINE BESYLATE 10 MG/1
40 TABLET ORAL DAILY
Status: ON HOLD | COMMUNITY
End: 2018-09-13

## 2017-02-14 RX ORDER — IPRATROPIUM BROMIDE AND ALBUTEROL SULFATE 2.5; .5 MG/3ML; MG/3ML
3 SOLUTION RESPIRATORY (INHALATION) EVERY 4 HOURS PRN
Status: DISCONTINUED | OUTPATIENT
Start: 2017-02-14 | End: 2017-02-28

## 2017-02-14 RX ORDER — ONDANSETRON 2 MG/ML
4 INJECTION INTRAMUSCULAR; INTRAVENOUS EVERY 6 HOURS PRN
Status: DISCONTINUED | OUTPATIENT
Start: 2017-02-14 | End: 2017-02-28

## 2017-02-14 RX ORDER — PREDNISONE 20 MG/1
60 TABLET ORAL ONCE
Status: COMPLETED | OUTPATIENT
Start: 2017-02-14 | End: 2017-02-14

## 2017-02-14 RX ORDER — VENLAFAXINE HYDROCHLORIDE 150 MG/1
300 CAPSULE, EXTENDED RELEASE ORAL DAILY
Status: DISCONTINUED | OUTPATIENT
Start: 2017-02-15 | End: 2017-02-28

## 2017-02-14 RX ORDER — METHYLPREDNISOLONE SODIUM SUCCINATE 40 MG/ML
40 INJECTION, POWDER, LYOPHILIZED, FOR SOLUTION INTRAMUSCULAR; INTRAVENOUS EVERY 6 HOURS
Status: DISCONTINUED | OUTPATIENT
Start: 2017-02-14 | End: 2017-02-18

## 2017-02-14 RX ORDER — SODIUM CHLORIDE 9 MG/ML
INJECTION, SOLUTION INTRAVENOUS
Status: COMPLETED
Start: 2017-02-14 | End: 2017-02-14

## 2017-02-14 RX ORDER — AMLODIPINE BESYLATE 10 MG/1
10 TABLET ORAL DAILY
Status: DISCONTINUED | OUTPATIENT
Start: 2017-02-15 | End: 2017-02-28

## 2017-02-14 RX ORDER — FUROSEMIDE 10 MG/ML
40 INJECTION INTRAMUSCULAR; INTRAVENOUS
Status: DISCONTINUED | OUTPATIENT
Start: 2017-02-14 | End: 2017-02-15

## 2017-02-14 RX ORDER — GUAIFENESIN 100 MG/5ML
200 SOLUTION ORAL EVERY 4 HOURS PRN
Status: DISCONTINUED | OUTPATIENT
Start: 2017-02-14 | End: 2017-02-28

## 2017-02-14 RX ORDER — MIRTAZAPINE 30 MG/1
30 TABLET, FILM COATED ORAL NIGHTLY
Status: DISCONTINUED | OUTPATIENT
Start: 2017-02-14 | End: 2017-02-28

## 2017-02-14 RX ORDER — SODIUM CHLORIDE 9 MG/ML
INJECTION, SOLUTION INTRAVENOUS CONTINUOUS
Status: DISCONTINUED | OUTPATIENT
Start: 2017-02-14 | End: 2017-02-18

## 2017-02-14 RX ORDER — ASPIRIN 325 MG
325 TABLET ORAL DAILY
Status: DISCONTINUED | OUTPATIENT
Start: 2017-02-14 | End: 2017-02-28

## 2017-02-14 RX ORDER — PANTOPRAZOLE SODIUM 40 MG/1
40 TABLET, DELAYED RELEASE ORAL
Status: DISCONTINUED | OUTPATIENT
Start: 2017-02-15 | End: 2017-02-28

## 2017-02-14 RX ORDER — LOPERAMIDE HYDROCHLORIDE 2 MG/1
2 CAPSULE ORAL AS NEEDED
Status: DISCONTINUED | OUTPATIENT
Start: 2017-02-14 | End: 2017-02-28

## 2017-02-14 RX ORDER — ACETAMINOPHEN 325 MG/1
650 TABLET ORAL EVERY 6 HOURS PRN
Status: DISCONTINUED | OUTPATIENT
Start: 2017-02-14 | End: 2017-02-28

## 2017-02-14 RX ORDER — LEVOTHYROXINE SODIUM 0.12 MG/1
125 TABLET ORAL
Status: DISCONTINUED | OUTPATIENT
Start: 2017-02-15 | End: 2017-02-15

## 2017-02-14 RX ORDER — BISACODYL 10 MG
10 SUPPOSITORY, RECTAL RECTAL
Status: DISCONTINUED | OUTPATIENT
Start: 2017-02-14 | End: 2017-02-28

## 2017-02-14 RX ORDER — METHYLPREDNISOLONE SODIUM SUCCINATE 125 MG/2ML
125 INJECTION, POWDER, LYOPHILIZED, FOR SOLUTION INTRAMUSCULAR; INTRAVENOUS ONCE
Status: DISCONTINUED | OUTPATIENT
Start: 2017-02-14 | End: 2017-02-14

## 2017-02-14 RX ORDER — ONDANSETRON 4 MG/1
4 TABLET, ORALLY DISINTEGRATING ORAL EVERY 8 HOURS PRN
Status: DISCONTINUED | OUTPATIENT
Start: 2017-02-14 | End: 2017-02-28

## 2017-02-14 NOTE — TELEPHONE ENCOUNTER
Edyta from [address] [place] [address] stts they received script for a commode but the Dr forgot to sign the slip. Please sign and resend fax # [phone].  Please advise

## 2017-02-14 NOTE — ED PROVIDER NOTES
Patient Seen in: [facility] AND [facility] Emergency Department    History   Patient presents with:  Dyspnea BELINDA SOB (respiratory)    Stated Complaint: sob, decreased  02 sats with activity    HPI    Patient presents to the emergency department by ambulance.   Sh KNEE REPLACEMENT SURGERY Bilateral     Comment Bilateral arthritis     CHOLECYSTECTOMY  1984    Comment Cholecystitis    LAMINECTOMY      OTHER SURGICAL HISTORY  1985,1995,2003,2009    Comment Cervical Discectomy     HYSTERECTOMY  1967    Comment Partial H Budesonide-Formoterol Fumarate (SYMBICORT) 160-4.5 MCG/ACT Inhalation Aerosol,  Inhale 1 puff into the lungs 2 (two) times daily. Loperamide HCl (IMODIUM A-D) 2 MG Oral Tab,  Take 1 tablet (2 mg total) by mouth as needed for Diarrhea.    aspirin 325 MG Or 1623 73   Resp 02/14/17 1623 20   Temp 02/14/17 1623 98 °F (36.7 °C)   Temp src 02/14/17 1623 Oral   SpO2 02/14/17 1623 92 %   O2 Device 02/14/17 1623 None (Room air)       Current:/75 mmHg  Pulse 73  Temp(Src) 98 °F (36.7 °C) (Oral)  Resp 20  Ht 172 within normal limits   TROPONIN I, 0 HOUR - Normal   CBC WITH DIFFERENTIAL WITH PLATELET    Narrative: The following orders were created for panel order CBC WITH DIFFERENTIAL WITH PLATELET.   Procedure                               Abnormality         S

## 2017-02-15 LAB
ANION GAP SERPL CALC-SCNC: 7 MMOL/L (ref 0–18)
BASOPHILS # BLD: 0 K/UL (ref 0–0.2)
BASOPHILS NFR BLD: 1 %
BUN SERPL-MCNC: 9 MG/DL (ref 8–20)
BUN/CREAT SERPL: 10.7 (ref 10–20)
CALCIUM SERPL-MCNC: 8.4 MG/DL (ref 8.5–10.5)
CHLORIDE SERPL-SCNC: 106 MMOL/L (ref 95–110)
CO2 SERPL-SCNC: 29 MMOL/L (ref 22–32)
CREAT SERPL-MCNC: 0.84 MG/DL (ref 0.5–1.5)
EOSINOPHIL # BLD: 0 K/UL (ref 0–0.7)
EOSINOPHIL NFR BLD: 0 %
ERYTHROCYTE [DISTWIDTH] IN BLOOD BY AUTOMATED COUNT: 16 % (ref 11–15)
GLUCOSE SERPL-MCNC: 245 MG/DL (ref 70–99)
HCT VFR BLD AUTO: 35 % (ref 35–48)
HGB BLD-MCNC: 11.3 G/DL (ref 12–16)
INR BLD: 1.4 (ref 0.9–1.2)
LYMPHOCYTES # BLD: 1.3 K/UL (ref 1–4)
LYMPHOCYTES NFR BLD: 27 %
MCH RBC QN AUTO: 30.3 PG (ref 27–32)
MCHC RBC AUTO-ENTMCNC: 32.3 G/DL (ref 32–37)
MCV RBC AUTO: 93.6 FL (ref 80–100)
MONOCYTES # BLD: 0.1 K/UL (ref 0–1)
MONOCYTES NFR BLD: 1 %
NEUTROPHILS # BLD AUTO: 3.3 K/UL (ref 1.8–7.7)
NEUTROPHILS NFR BLD: 71 %
OSMOLALITY UR CALC.SUM OF ELEC: 301 MOSM/KG (ref 275–295)
PLATELET # BLD AUTO: 227 K/UL (ref 140–400)
PMV BLD AUTO: 8.5 FL (ref 7.4–10.3)
POTASSIUM SERPL-SCNC: 3.9 MMOL/L (ref 3.3–5.1)
PROTHROMBIN TIME: 16.5 SECONDS (ref 11.8–14.5)
RBC # BLD AUTO: 3.74 M/UL (ref 3.7–5.4)
SODIUM SERPL-SCNC: 142 MMOL/L (ref 136–144)
WBC # BLD AUTO: 4.7 K/UL (ref 4–11)

## 2017-02-15 PROCEDURE — 0T9B70Z DRAINAGE OF BLADDER WITH DRAINAGE DEVICE, VIA NATURAL OR ARTIFICIAL OPENING: ICD-10-PCS | Performed by: HOSPITALIST

## 2017-02-15 PROCEDURE — 99233 SBSQ HOSP IP/OBS HIGH 50: CPT | Performed by: HOSPITALIST

## 2017-02-15 RX ORDER — WARFARIN SODIUM 7.5 MG/1
7.5 TABLET ORAL NIGHTLY
Status: DISCONTINUED | OUTPATIENT
Start: 2017-02-15 | End: 2017-02-18

## 2017-02-15 RX ORDER — IPRATROPIUM BROMIDE AND ALBUTEROL SULFATE 2.5; .5 MG/3ML; MG/3ML
3 SOLUTION RESPIRATORY (INHALATION)
Status: DISCONTINUED | OUTPATIENT
Start: 2017-02-15 | End: 2017-02-16

## 2017-02-15 RX ORDER — FUROSEMIDE 40 MG/1
40 TABLET ORAL DAILY
Status: DISCONTINUED | OUTPATIENT
Start: 2017-02-16 | End: 2017-02-28

## 2017-02-15 RX ORDER — LEVOTHYROXINE SODIUM 0.12 MG/1
250 TABLET ORAL
Status: DISCONTINUED | OUTPATIENT
Start: 2017-02-15 | End: 2017-02-28

## 2017-02-15 RX ORDER — BUDESONIDE 0.5 MG/2ML
0.5 INHALANT ORAL
Status: DISCONTINUED | OUTPATIENT
Start: 2017-02-15 | End: 2017-02-28

## 2017-02-15 NOTE — PROGRESS NOTES
[facility] dosing service    Initial Pharmacokinetic Consult for Vancomycin Dosing     Mariam Suazo is a 64year old female who is being treated for cellulitis. Pharmacy has been asked to dose Vancomycin by Dr. Mar Hartman.       She is allergic to cipro

## 2017-02-15 NOTE — PROGRESS NOTES
HPI:    Patient ID: Jazmine Arnold is a 64year old female.     HPI    hosp with exacerb ation of COPD and DVT left leg  Hx of recurrent DVT left leg many years ago  Decline lifetime coumadin  IVC filter intact  Pt restarted on coumadin have not been thera SURGERY      Comment Brain Aneurysm, Stent placed     DEBRIDEMENT OF NAIL(S), 1-5      Comment Toe, Onychomycosis    FOOT SURGERY  02/25/2011    Comment dipak 5 left, pain, Debridement     HIP REPLACEMENT SURGERY Left 2013    COLONOSCOPY  2010    UPPER joint swelling. Skin: Positive for color change (left leg red tender). Negative for rash. Neurological: Positive for weakness. Negative for syncope, light-headedness and headaches. Hematological: Negative for adenopathy. Does not bruise/bleed easily. CHOLECYSTECTOMY  1984    Comment Cholecystitis    LAMINECTOMY      OTHER SURGICAL HISTORY  1985,1995,2003,2009    Comment Cervical Discectomy     HYSTERECTOMY  1967    Comment Partial Hysterectomy    TONSILLECTOMY  1950    Comment Tonsillitis     EYE GARCIA She exhibits edema (left leg tender reds  superficial wound). Skin: There is erythema (chronic statis dermatitis left leg).             ASSESSMENT/PLAN:   (R06.89) Respiratory insufficiency  (primary encounter diagnosis)  Plan: pt sent to ER via ambulnce

## 2017-02-15 NOTE — OCCUPATIONAL THERAPY NOTE
OCCUPATIONAL THERAPY EVALUATION - INPATIENT     Room Number: CA2/CA2-A  Evaluation Date: 2/15/2017  Type of Evaluation: Initial  Presenting Problem:  (excacerbation COPD)    Physician Order: IP Consult to Occupational Therapy  Reason for Therapy: ADL/IADL Brain Aneurysm, Stent placed    • Arthritis of both knees      knee replacement    • Arthritis of right hip 2009   • Other and unspecified hyperlipidemia    • Dehydration 2012     Fluids, Medication adjustment    • Toe pain      Onychomycosis, Debridement cough    OBJECTIVE  Precautions:  (O2, IV)  Fall Risk: High fall risk    PAIN ASSESSMENT  Rating:  (moderate pain in ribs from coughing and LT lower leg)          ACTIVITY TOLERANCE  Fair- limited by cough, fatigue and generalized weakness  O2 sats remaine Comment:     Comment:         Goals  on: 17  Frequency: 3x/week

## 2017-02-15 NOTE — PHYSICAL THERAPY NOTE
PHYSICAL THERAPY EVALUATION - INPATIENT     Room Number: CA2/CA2-A  Evaluation Date: 2/15/2017  Type of Evaluation: Initial  Physician Order: PT Eval and Treat    Presenting Problem: COPD exacerbation  Reason for Therapy: Mobility Dysfunction and Disch placed     DEBRIDEMENT OF NAIL(S), 1-5      Comment Toe, Onychomycosis    FOOT SURGERY  02/25/2011    Comment hammertoe 5 left, pain, Debridement     HIP REPLACEMENT SURGERY Left 2013    COLONOSCOPY  2010    UPPER GI ENDOSCOPY,BIOPSY  2006       HOME [address] How much help from another person does the patient currently need. ..   -   Moving to and from a bed to a chair (including a wheelchair)?: A Little   -   Need to walk in hospital room?: A Little   -   Climbing 3-5 steps with a railing?: A Lot       AM-PAC therapy in a supervised setting    PLAN  PT Treatment Plan: Bed mobility; Body mechanics; Endurance; Patient education;Gait training;Transfer training;Balance training;Strengthening  Rehab Potential : Good  Frequency (Obs): 5x/week         CURRENT GOALS  Goal

## 2017-02-15 NOTE — PROGRESS NOTES
[facility] Pharmacy Note:  Renal Adjustment for Maxipime (cefepime)    Janelle Saha is a 64year old female who has been prescribed Maxipime (cefepime) 1000 mg every 12 hrs. CrCl is estimated creatinine clearance is 55.7 mL/min (based on Cr of 0.84).  so the d

## 2017-02-15 NOTE — H&P
[facility]    PATIENT'S NAME: Tiff Claire   ATTENDING PHYSICIAN: Francisco Mai MD   PATIENT ACCOUNT#:   [id number]    LOCATION:  [place] [address]  MEDICAL RECORD #:   [id number]       YOB: 1940  ADMISSION DATE:       02/14/2 she quit a long time ago. No current tobacco, alcohol, or drug use. She lives with her family. She is usually independent for basic activities of daily living.     REVIEW OF SYSTEMS:  The patient describes wheezing, shortness of breath, dyspnea on exerti Place her on fall precautions. Monitor her respiratory status closely. Further recommendations to follow.     Dictated By Andrez Sylvester MD  d: 02/14/2017 17:31:42  t: 02/14/2017 18:32:39  Job [id number]  FB/

## 2017-02-15 NOTE — PROGRESS NOTES
[facility]    Progress Note    Sherman Tovar Patient Status:  Inpatient    12/3/1940 MRN [id number]   Location [facility] 1SW Attending Chelsie Zamudio MD   Hosp Day # 1 PCP Aleida Son MD       Subjective:   Isaiah Schulz vancomycin  1.75 g Intravenous Q24H   • furosemide  40 mg Intravenous BID (Diuretic)   • AmLODIPine Besylate  10 mg Oral Daily   • aspirin  325 mg Oral Daily   • Fluticasone Furoate-Vilanterol  1 puff Inhalation Daily   • Cholecalciferol  2,000 Units Oral Interpretation  -------------------------- Sinus Rhythm - Negative precordial T-waves.  v2 only When compared with ECG of 01/11/2017 09:02:43 inverted t wave v2 Electronically signed on 02/14/2017 at 17:17 by Orlando Myers and Plan:   Acute o

## 2017-02-15 NOTE — PROGRESS NOTES
[facility]    Progress Note    Mario Galarza Patient Status:  Inpatient    12/3/1940 MRN [id number]   Location [facility] 1SW Attending Manuel Crowley MD   Hosp Day # 1 PCP Merilynn Apley, MD       Subjective:   Aman Hoang steroid s abx pulmicort nebs  anticoag for dvt      Results:     Lab Results  Component Value Date   WBC 4.7 02/15/2017   HGB 11.3* 02/15/2017   HCT 35.0 02/15/2017    02/15/2017   CREATSERUM 0.84 02/15/2017   BUN 9 02/15/2017    02/15/2017

## 2017-02-16 ENCOUNTER — TELEPHONE (OUTPATIENT)
Dept: NEUROLOGY | Facility: CLINIC | Age: 77
End: 2017-02-16

## 2017-02-16 LAB
ANION GAP SERPL CALC-SCNC: 7 MMOL/L (ref 0–18)
BASOPHILS # BLD: 0 K/UL (ref 0–0.2)
BASOPHILS NFR BLD: 0 %
BNP SERPL-MCNC: 89 PG/ML (ref 0–100)
BUN SERPL-MCNC: 21 MG/DL (ref 8–20)
BUN/CREAT SERPL: 19.6 (ref 10–20)
CALCIUM SERPL-MCNC: 8.4 MG/DL (ref 8.5–10.5)
CHLORIDE SERPL-SCNC: 104 MMOL/L (ref 95–110)
CO2 SERPL-SCNC: 30 MMOL/L (ref 22–32)
CREAT SERPL-MCNC: 1.07 MG/DL (ref 0.5–1.5)
EOSINOPHIL # BLD: 0 K/UL (ref 0–0.7)
EOSINOPHIL NFR BLD: 0 %
ERYTHROCYTE [DISTWIDTH] IN BLOOD BY AUTOMATED COUNT: 15.8 % (ref 11–15)
GLUCOSE BLDC GLUCOMTR-MCNC: 217 MG/DL (ref 70–99)
GLUCOSE BLDC GLUCOMTR-MCNC: 260 MG/DL (ref 70–99)
GLUCOSE BLDC GLUCOMTR-MCNC: 272 MG/DL (ref 70–99)
GLUCOSE BLDC GLUCOMTR-MCNC: 299 MG/DL (ref 70–99)
GLUCOSE SERPL-MCNC: 276 MG/DL (ref 70–99)
HCT VFR BLD AUTO: 30.6 % (ref 35–48)
HGB BLD-MCNC: 9.8 G/DL (ref 12–16)
INR BLD: 1.7 (ref 0.9–1.2)
LYMPHOCYTES # BLD: 1 K/UL (ref 1–4)
LYMPHOCYTES NFR BLD: 7 %
MAGNESIUM SERPL-MCNC: 1.7 MG/DL (ref 1.8–2.5)
MCH RBC QN AUTO: 30.2 PG (ref 27–32)
MCHC RBC AUTO-ENTMCNC: 31.9 G/DL (ref 32–37)
MCV RBC AUTO: 94.7 FL (ref 80–100)
MONOCYTES # BLD: 0.3 K/UL (ref 0–1)
MONOCYTES NFR BLD: 3 %
NEUTROPHILS # BLD AUTO: 11.8 K/UL (ref 1.8–7.7)
NEUTROPHILS NFR BLD: 90 %
OSMOLALITY UR CALC.SUM OF ELEC: 305 MOSM/KG (ref 275–295)
PHOSPHATE SERPL-MCNC: 3.8 MG/DL (ref 2.4–4.7)
PLATELET # BLD AUTO: 244 K/UL (ref 140–400)
PMV BLD AUTO: 9.1 FL (ref 7.4–10.3)
POTASSIUM SERPL-SCNC: 4.5 MMOL/L (ref 3.3–5.1)
PROTHROMBIN TIME: 19.6 SECONDS (ref 11.8–14.5)
RBC # BLD AUTO: 3.23 M/UL (ref 3.7–5.4)
SODIUM SERPL-SCNC: 141 MMOL/L (ref 136–144)
WBC # BLD AUTO: 13.1 K/UL (ref 4–11)

## 2017-02-16 PROCEDURE — 99233 SBSQ HOSP IP/OBS HIGH 50: CPT | Performed by: HOSPITALIST

## 2017-02-16 RX ORDER — IPRATROPIUM BROMIDE AND ALBUTEROL SULFATE 2.5; .5 MG/3ML; MG/3ML
3 SOLUTION RESPIRATORY (INHALATION)
Status: DISCONTINUED | OUTPATIENT
Start: 2017-02-16 | End: 2017-02-25

## 2017-02-16 RX ORDER — DEXTROSE MONOHYDRATE 25 G/50ML
50 INJECTION, SOLUTION INTRAVENOUS AS NEEDED
Status: DISCONTINUED | OUTPATIENT
Start: 2017-02-16 | End: 2017-02-28

## 2017-02-16 RX ORDER — MAGNESIUM OXIDE 400 MG (241.3 MG MAGNESIUM) TABLET
400 TABLET ONCE
Status: COMPLETED | OUTPATIENT
Start: 2017-02-16 | End: 2017-02-16

## 2017-02-16 NOTE — CDS QUERY
Heart Failure  CLINICAL DOCUMENTATION CLARIFICATION FORM  Dear Doctor: Zhanna Parry   (Povided below) includes a diagnosis of Heart Failure.  For accurate ICD-10-CM code assignment to reflect severity of illness and risk of mortality,  PLEASE (X) ALL DIAGNOSES

## 2017-02-16 NOTE — PROGRESS NOTES
[facility]    Progress Note    Juan A Goldsmith Patient Status:  Inpatient    12/3/1940 MRN [id number]   Location [facility] 5SW/SE Attending Skeeter Jeans, MD   Hosp Day # 2 PCP Anastacia Arroyo MD       Subjective:   Moriah Left sounds normal  Extremities: no edema, redness or tenderness in the calves or thighs  Neurologic: Grossly normal    Assessment and Plan:     COPD exacerbation (HCC)  Steroids, nebs,abx      Cellulitis of left lower extremity  vanco,cefepime      DVT (deep v

## 2017-02-16 NOTE — CONSULTS
[facility]    PATIENT'S NAME: LAMAR ZEENAT CALDERON   ATTENDING PHYSICIAN: Laurel Valdez MD   CONSULTING PHYSICIAN: Robi Dorado MD   PATIENT ACCOUNT#:   [id number]    LOCATION:  [address] [place] [address] #:   [id number]       DATE OF BIRTH problems. Admits to wheezing, chest congestion, cough, shortness of breath. There is swelling and erythema on left lower extremity. The patient had a low temperature at home.       PHYSICAL EXAMINATION:    GENERAL:  Well-developed, well-nourished lady in

## 2017-02-16 NOTE — PLAN OF CARE
Patient/Family Goals    • Patient/Family Long Term Goal Progressing    • Patient/Family Short Term Goal Progressing        RESPIRATORY - ADULT    • Achieves optimal ventilation and oxygenation Progressing        SKIN/TISSUE INTEGRITY - ADULT    • Skin inte

## 2017-02-16 NOTE — DISCHARGE PLANNING
SW met w/ pt to discuss discharge planning. Pt reported that she lives at [facility]. Pt stated she is current w/ [facility]. Pt reported having a scooter and walker. Pt reported being independent w/ ADL's.  Pt stated she has been in the process of transiti

## 2017-02-16 NOTE — PROGRESS NOTES
[facility]    Progress Note    Teo Chappell Patient Status:  Inpatient    12/3/1940 MRN [id number]   Location [facility] 1SW Attending Vahid Benavidez MD   Hosp Day # 2 PCP Radha Patino MD       Subjective:   Martita Organ Succ  40 mg Intravenous Q6H   • docusate sodium  100 mg Oral BID   • vancomycin  1.75 g Intravenous Q24H   • AmLODIPine Besylate  10 mg Oral Daily   • aspirin  325 mg Oral Daily   • Fluticasone Furoate-Vilanterol  1 puff Inhalation Daily   • Cholecalcifero Chest Ap Portable  (cpt=71010)    2/14/2017  CONCLUSION:  1. Mild cardiomegaly. Tortuous aorta. 2. Prominent bibasilar markings. No acute pulmonary disease.          Ekg 12-lead    2/14/2017  ECG Report  Interpretation  -------------------------- Sinus Rhyt

## 2017-02-16 NOTE — PHYSICAL THERAPY NOTE
PHYSICAL THERAPY TREATMENT NOTE - INPATIENT    Room Number: 540/540-A       Presenting Problem: COPD exacerbation    Problem List  Principal Problem:    COPD exacerbation ([address]  Active Problems:    Cellulitis of left lower extremity    DVT (deep venous th cannula    AM-PAC '6-Clicks' INPATIENT SHORT FORM - BASIC MOBILITY  How much difficulty does the patient currently have. ..  -   Turning over in bed (including adjusting bedclothes, sheets and blankets)?: None   -   Sitting down on and standing up from a ch

## 2017-02-16 NOTE — PROGRESS NOTES
[facility] Pharmacy Note:  Renal Adjustment for Maxipime (cefepime)    Severa Sable is a 64year old female who has been prescribed Maxipime (cefepime) 2000 mg every 8 hrs. CrCl is estimated creatinine clearance is 43.7 mL/min (based on Cr of 1.07).  so the do

## 2017-02-17 LAB
ANION GAP SERPL CALC-SCNC: 7 MMOL/L (ref 0–18)
BASOPHILS # BLD: 0 K/UL (ref 0–0.2)
BASOPHILS NFR BLD: 0 %
BUN SERPL-MCNC: 28 MG/DL (ref 8–20)
BUN/CREAT SERPL: 25.7 (ref 10–20)
CALCIUM SERPL-MCNC: 8.6 MG/DL (ref 8.5–10.5)
CHLORIDE SERPL-SCNC: 106 MMOL/L (ref 95–110)
CO2 SERPL-SCNC: 29 MMOL/L (ref 22–32)
CREAT SERPL-MCNC: 1.09 MG/DL (ref 0.5–1.5)
EOSINOPHIL # BLD: 0 K/UL (ref 0–0.7)
EOSINOPHIL NFR BLD: 0 %
ERYTHROCYTE [DISTWIDTH] IN BLOOD BY AUTOMATED COUNT: 16 % (ref 11–15)
GLUCOSE BLDC GLUCOMTR-MCNC: 237 MG/DL (ref 70–99)
GLUCOSE BLDC GLUCOMTR-MCNC: 249 MG/DL (ref 70–99)
GLUCOSE BLDC GLUCOMTR-MCNC: 287 MG/DL (ref 70–99)
GLUCOSE BLDC GLUCOMTR-MCNC: 337 MG/DL (ref 70–99)
GLUCOSE SERPL-MCNC: 266 MG/DL (ref 70–99)
HCT VFR BLD AUTO: 32.2 % (ref 35–48)
HGB BLD-MCNC: 10.2 G/DL (ref 12–16)
INR BLD: 2.3 (ref 0.9–1.2)
LYMPHOCYTES # BLD: 1.2 K/UL (ref 1–4)
LYMPHOCYTES NFR BLD: 8 %
MAGNESIUM SERPL-MCNC: 1.7 MG/DL (ref 1.8–2.5)
MCH RBC QN AUTO: 30 PG (ref 27–32)
MCHC RBC AUTO-ENTMCNC: 31.6 G/DL (ref 32–37)
MCV RBC AUTO: 94.9 FL (ref 80–100)
MONOCYTES # BLD: 0.4 K/UL (ref 0–1)
MONOCYTES NFR BLD: 3 %
NEUTROPHILS # BLD AUTO: 13.7 K/UL (ref 1.8–7.7)
NEUTROPHILS NFR BLD: 89 %
OSMOLALITY UR CALC.SUM OF ELEC: 309 MOSM/KG (ref 275–295)
PLATELET # BLD AUTO: 241 K/UL (ref 140–400)
PMV BLD AUTO: 8.7 FL (ref 7.4–10.3)
POTASSIUM SERPL-SCNC: 4.3 MMOL/L (ref 3.3–5.1)
PROTHROMBIN TIME: 24.6 SECONDS (ref 11.8–14.5)
RBC # BLD AUTO: 3.39 M/UL (ref 3.7–5.4)
SODIUM SERPL-SCNC: 142 MMOL/L (ref 136–144)
VANCOMYCIN TROUGH SERPL-MCNC: 12.6 MCG/ML (ref 10–20)
WBC # BLD AUTO: 15.4 K/UL (ref 4–11)

## 2017-02-17 PROCEDURE — 99232 SBSQ HOSP IP/OBS MODERATE 35: CPT | Performed by: HOSPITALIST

## 2017-02-17 RX ORDER — BENZONATATE 100 MG/1
200 CAPSULE ORAL 3 TIMES DAILY
Status: COMPLETED | OUTPATIENT
Start: 2017-02-17 | End: 2017-02-19

## 2017-02-17 RX ORDER — 0.9 % SODIUM CHLORIDE 0.9 %
VIAL (ML) INJECTION
Status: COMPLETED
Start: 2017-02-17 | End: 2017-02-17

## 2017-02-17 RX ORDER — MAGNESIUM OXIDE 400 MG (241.3 MG MAGNESIUM) TABLET
400 TABLET ONCE
Status: COMPLETED | OUTPATIENT
Start: 2017-02-17 | End: 2017-02-17

## 2017-02-17 NOTE — PROGRESS NOTES
[facility]    Progress Note    Sarah Brar Patient Status:  Inpatient    12/3/1940 MRN [id number]   Location [facility] 5SW/SE Attending Bakari Murdock MD   Hosp Day # 3 PCP Shaylee Carmona MD       Subjective:   Havery Edu regular rate and rhythm  Abdominal: soft, non-tender; bowel sounds normal  Extremities: no edema, redness or tenderness in the calves or thighs  Neurologic: Grossly normal    Assessment and Plan:     COPD exacerbation (HCC)  Steroids,nebs, abx      Celluli

## 2017-02-17 NOTE — OCCUPATIONAL THERAPY NOTE
OCCUPATIONAL THERAPY TREATMENT NOTE - INPATIENT     Room Number: 540/540-A    Presenting Problem:  (excacerbation COPD)    Problem List  Principal Problem:    COPD exacerbation ([address]  Active Problems:    Cellulitis of left lower extremity    DVT (deep veno such as brushing teeth?: None  -   Eating meals?: None    AM-PAC Score:  Score: 17  Approx Degree of Impairment: 50.11%  Standardized Score (AM-PAC Scale): 37.26  CMS Modifier (G-Code): CK    FUNCTIONAL TRANSFER ASSESSMENT  Supine to Sit : Supervision  Sit

## 2017-02-17 NOTE — PROGRESS NOTES
[facility]    Progress Note    Ltanya Hum Patient Status:  Inpatient    12/3/1940 MRN [id number]   Location [facility] 1SW Attending Anthony Crow MD   Hosp Day # 3 PCP Rey Jacinto MD       Subjective:   Gregory Stevens MethylPREDNISolone Sodium Succ  40 mg Intravenous Q6H   • docusate sodium  100 mg Oral BID   • vancomycin  1.75 g Intravenous Q24H   • AmLODIPine Besylate  10 mg Oral Daily   • aspirin  325 mg Oral Daily   • Fluticasone Furoate-Vilanterol  1 puff Inhalatio 02/15/2017     Imaging/EKG:           Assessment and Plan:   Acute on chronic respiratory failure with chronic obstructive pulmonary disease exacerbation  -Pulm following  -pulmicort  -breo  -lasix, changed to PO  -nebs  -solu-medrol  -likely will not need

## 2017-02-17 NOTE — PROGRESS NOTES
[facility] dosing service    Follow-up Pharmacokinetic Consult for Vancomycin Dosing     Scottie Sutherland is a 64year old female who is being treated for cellulitis. Patient is on day 4 of Vancomycin 1.75 gm IV Q 24 hours. Goal trough is 10-15 ug/mL. ug/mL.    3.  Pharmacy will need BUN/Scr daily while on Vancomycin to assess renal function. 4.  Pharmacy will follow and monitor renal function changes, toxicity and efficacy. Pharmacy will continue to follow her.   We appreciate the opportunity to a

## 2017-02-17 NOTE — PHYSICAL THERAPY NOTE
PHYSICAL THERAPY TREATMENT NOTE - INPATIENT    Room Number: 540/540-A       Presenting Problem: COPD exacerbation    Problem List  Principal Problem:    COPD exacerbation ([address]  Active Problems:    Cellulitis of left lower extremity    DVT (deep venous th Static Sitting: Good  Dynamic Sitting: Good           Static Standing: Poor  Dynamic Standing: Poor -    ACTIVITY TOLERANCE  O2 Device: Nasal cannula  Liters of O2:  2    AM-PAC '6-Clicks' INPATIENT SHORT FORM - BASIC MOBILITY

## 2017-02-17 NOTE — DISCHARGE PLANNING
Therapy is recommending rehab at [place]. Please note, the pt had been recommended for acute rehab at last month's [facility] visit, but upon being transferred there, she refused to enter the [facility].  The pt only wanted to be at the subacute location, [facility]

## 2017-02-18 ENCOUNTER — APPOINTMENT (OUTPATIENT)
Dept: GENERAL RADIOLOGY | Facility: HOSPITAL | Age: 77
DRG: 190 | End: 2017-02-18
Attending: HOSPITALIST
Payer: MEDICARE

## 2017-02-18 LAB
ANION GAP SERPL CALC-SCNC: 8 MMOL/L (ref 0–18)
BASOPHILS # BLD: 0 K/UL (ref 0–0.2)
BASOPHILS NFR BLD: 0 %
BUN SERPL-MCNC: 34 MG/DL (ref 8–20)
BUN/CREAT SERPL: 32.7 (ref 10–20)
CALCIUM SERPL-MCNC: 8.9 MG/DL (ref 8.5–10.5)
CHLORIDE SERPL-SCNC: 105 MMOL/L (ref 95–110)
CO2 SERPL-SCNC: 29 MMOL/L (ref 22–32)
CREAT SERPL-MCNC: 1.04 MG/DL (ref 0.5–1.5)
EOSINOPHIL # BLD: 0 K/UL (ref 0–0.7)
EOSINOPHIL NFR BLD: 0 %
ERYTHROCYTE [DISTWIDTH] IN BLOOD BY AUTOMATED COUNT: 16 % (ref 11–15)
GLUCOSE BLDC GLUCOMTR-MCNC: 261 MG/DL (ref 70–99)
GLUCOSE BLDC GLUCOMTR-MCNC: 314 MG/DL (ref 70–99)
GLUCOSE BLDC GLUCOMTR-MCNC: 370 MG/DL (ref 70–99)
GLUCOSE BLDC GLUCOMTR-MCNC: 403 MG/DL (ref 70–99)
GLUCOSE SERPL-MCNC: 267 MG/DL (ref 70–99)
HCT VFR BLD AUTO: 32.3 % (ref 35–48)
HGB BLD-MCNC: 10.4 G/DL (ref 12–16)
INR BLD: 2.7 (ref 0.9–1.2)
LYMPHOCYTES # BLD: 1.2 K/UL (ref 1–4)
LYMPHOCYTES NFR BLD: 9 %
MAGNESIUM SERPL-MCNC: 1.7 MG/DL (ref 1.8–2.5)
MCH RBC QN AUTO: 30 PG (ref 27–32)
MCHC RBC AUTO-ENTMCNC: 32.1 G/DL (ref 32–37)
MCV RBC AUTO: 93.5 FL (ref 80–100)
MONOCYTES # BLD: 0.4 K/UL (ref 0–1)
MONOCYTES NFR BLD: 3 %
NEUTROPHILS # BLD AUTO: 11.8 K/UL (ref 1.8–7.7)
NEUTROPHILS NFR BLD: 89 %
OSMOLALITY UR CALC.SUM OF ELEC: 311 MOSM/KG (ref 275–295)
PLATELET # BLD AUTO: 253 K/UL (ref 140–400)
PMV BLD AUTO: 9 FL (ref 7.4–10.3)
POTASSIUM SERPL-SCNC: 4.4 MMOL/L (ref 3.3–5.1)
PROTHROMBIN TIME: 28 SECONDS (ref 11.8–14.5)
RBC # BLD AUTO: 3.46 M/UL (ref 3.7–5.4)
SODIUM SERPL-SCNC: 142 MMOL/L (ref 136–144)
WBC # BLD AUTO: 13.3 K/UL (ref 4–11)

## 2017-02-18 PROCEDURE — 99233 SBSQ HOSP IP/OBS HIGH 50: CPT | Performed by: HOSPITALIST

## 2017-02-18 PROCEDURE — 71020 XR CHEST PA + LAT CHEST (CPT=71020): CPT

## 2017-02-18 RX ORDER — ACETYLCYSTEINE 200 MG/ML
2 SOLUTION ORAL; RESPIRATORY (INHALATION) 2 TIMES DAILY
Status: DISCONTINUED | OUTPATIENT
Start: 2017-02-18 | End: 2017-02-18

## 2017-02-18 RX ORDER — METHYLPREDNISOLONE SODIUM SUCCINATE 40 MG/ML
40 INJECTION, POWDER, LYOPHILIZED, FOR SOLUTION INTRAMUSCULAR; INTRAVENOUS EVERY 12 HOURS
Status: DISCONTINUED | OUTPATIENT
Start: 2017-02-19 | End: 2017-02-28

## 2017-02-18 RX ORDER — ACETYLCYSTEINE 200 MG/ML
2 SOLUTION ORAL; RESPIRATORY (INHALATION) EVERY 4 HOURS
Status: DISCONTINUED | OUTPATIENT
Start: 2017-02-18 | End: 2017-02-26

## 2017-02-18 RX ORDER — MAGNESIUM OXIDE 400 MG (241.3 MG MAGNESIUM) TABLET
400 TABLET ONCE
Status: COMPLETED | OUTPATIENT
Start: 2017-02-18 | End: 2017-02-18

## 2017-02-18 RX ORDER — ACETYLCYSTEINE 200 MG/ML
SOLUTION ORAL; RESPIRATORY (INHALATION)
Status: DISCONTINUED
Start: 2017-02-18 | End: 2017-02-18

## 2017-02-18 NOTE — PROGRESS NOTES
[facility] - [place] campus    Progress Note    Carmen Covarrubias Patient Status:  Inpatient    12/3/1940 MRN [id number]   Location [facility] 5SW/SE Attending Jorgito Leal MD   Hosp Day # 4 PCP Shikha Olmos MD       Subjective:   Libertad Marshall rhythm  Abdominal: soft, non-tender; bowel sounds normal  Extremities: no edema, redness or tenderness in the calves or thighs  Neurologic: Grossly normal    Assessment and Plan:     COPD exacerbation (HCC)  staeroids nebs, abx      Cellulitis of left lowe

## 2017-02-18 NOTE — PROGRESS NOTES
[facility]    Progress Note    Carlyarmando Pizano Patient Status:  Inpatient    12/3/1940 MRN [id number]   Location [facility] 1SW Attending Prachi Roca MD   Hosp Day # 4 PCP Brooke Anne MD       Subjective:   Rosalind Potter furosemide  40 mg Oral Daily   • MethylPREDNISolone Sodium Succ  40 mg Intravenous Q6H   • docusate sodium  100 mg Oral BID   • vancomycin  1.75 g Intravenous Q24H   • AmLODIPine Besylate  10 mg Oral Daily   • aspirin  325 mg Oral Daily   • Fluticasone Fur 02/18/2017   INR 2.3* 02/17/2017   INR 1.7* 02/16/2017     Imaging/EKG:           Assessment and Plan:   Acute on chronic respiratory failure with chronic obstructive pulmonary disease exacerbation  -Pulm following  -pulmicort  -breo  -lasix, changed to PO

## 2017-02-18 NOTE — PLAN OF CARE
Patient/Family Goals    • Patient/Family Long Term Goal Progressing    • Patient/Family Short Term Goal Progressing          RESPIRATORY - ADULT    • Achieves optimal ventilation and oxygenation Progressing          SKIN/TISSUE INTEGRITY - ADULT    • Skin

## 2017-02-19 LAB
ANION GAP SERPL CALC-SCNC: 7 MMOL/L (ref 0–18)
BASOPHILS # BLD: 0 K/UL (ref 0–0.2)
BASOPHILS NFR BLD: 0 %
BUN SERPL-MCNC: 33 MG/DL (ref 8–20)
BUN/CREAT SERPL: 31.7 (ref 10–20)
CALCIUM SERPL-MCNC: 9.1 MG/DL (ref 8.5–10.5)
CHLORIDE SERPL-SCNC: 103 MMOL/L (ref 95–110)
CO2 SERPL-SCNC: 28 MMOL/L (ref 22–32)
CREAT SERPL-MCNC: 1.04 MG/DL (ref 0.5–1.5)
EOSINOPHIL # BLD: 0 K/UL (ref 0–0.7)
EOSINOPHIL NFR BLD: 0 %
ERYTHROCYTE [DISTWIDTH] IN BLOOD BY AUTOMATED COUNT: 15.9 % (ref 11–15)
GLUCOSE BLDC GLUCOMTR-MCNC: 163 MG/DL (ref 70–99)
GLUCOSE BLDC GLUCOMTR-MCNC: 271 MG/DL (ref 70–99)
GLUCOSE BLDC GLUCOMTR-MCNC: 276 MG/DL (ref 70–99)
GLUCOSE BLDC GLUCOMTR-MCNC: 279 MG/DL (ref 70–99)
GLUCOSE SERPL-MCNC: 236 MG/DL (ref 70–99)
HCT VFR BLD AUTO: 33.1 % (ref 35–48)
HGB BLD-MCNC: 10.6 G/DL (ref 12–16)
INR BLD: 3.6 (ref 0.9–1.2)
LYMPHOCYTES # BLD: 2 K/UL (ref 1–4)
LYMPHOCYTES NFR BLD: 16 %
MAGNESIUM SERPL-MCNC: 1.9 MG/DL (ref 1.8–2.5)
MCH RBC QN AUTO: 30 PG (ref 27–32)
MCHC RBC AUTO-ENTMCNC: 31.9 G/DL (ref 32–37)
MCV RBC AUTO: 94 FL (ref 80–100)
METAMYELOCYTES # BLD MANUAL: 0.12 K/UL
MONOCYTES # BLD: 0.7 K/UL (ref 0–1)
MONOCYTES NFR BLD: 6 %
MYELOCYTES NFR BLD: 1 %
NEUTROPHILS # BLD AUTO: 9 K/UL (ref 1.8–7.7)
NEUTROPHILS NFR BLD: 72 %
NEUTS BAND NFR BLD: 4 %
OSMOLALITY UR CALC.SUM OF ELEC: 301 MOSM/KG (ref 275–295)
PLATELET # BLD AUTO: 260 K/UL (ref 140–400)
PMV BLD AUTO: 9 FL (ref 7.4–10.3)
POTASSIUM SERPL-SCNC: 4 MMOL/L (ref 3.3–5.1)
PROTHROMBIN TIME: 35.5 SECONDS (ref 11.8–14.5)
RBC # BLD AUTO: 3.52 M/UL (ref 3.7–5.4)
SODIUM SERPL-SCNC: 138 MMOL/L (ref 136–144)
VARIANT LYMPHS NFR BLD MANUAL: 1 %
WBC # BLD AUTO: 11.8 K/UL (ref 4–11)

## 2017-02-19 PROCEDURE — 99233 SBSQ HOSP IP/OBS HIGH 50: CPT | Performed by: HOSPITALIST

## 2017-02-19 RX ORDER — 0.9 % SODIUM CHLORIDE 0.9 %
VIAL (ML) INJECTION
Status: COMPLETED
Start: 2017-02-19 | End: 2017-02-19

## 2017-02-19 NOTE — PLAN OF CARE
Patient/Family Goals    • Patient/Family Long Term Goal Progressing        RESPIRATORY - ADULT    • Achieves optimal ventilation and oxygenation Progressing        SKIN/TISSUE INTEGRITY - ADULT    • Skin integrity remains intact Progressing

## 2017-02-19 NOTE — PROGRESS NOTES
[facility]    Progress Note    Carlyarmando Pizano Patient Status:  Inpatient    12/3/1940 MRN [id number]   Location [facility] 1SW Attending Prachi Roca MD   Hosp Day # 5 PCP Brooke Anne MD       Subjective:   Rosalind Potter Nebulization 2 times daily   • furosemide  40 mg Oral Daily   • docusate sodium  100 mg Oral BID   • vancomycin  1.75 g Intravenous Q24H   • AmLODIPine Besylate  10 mg Oral Daily   • aspirin  325 mg Oral Daily   • Fluticasone Furoate-Vilanterol  1 puff Inh 02/17/2017     Imaging/EKG:   [facility] Chest Pa + Lat Chest (cpt=71020)    2/18/2017  CONCLUSION:   Pulmonary edema with trace pleural effusions. Patchy multifocal air space opacities could relate to superimposed pneumonia in the appropriate clinical setting.  Pacheco

## 2017-02-19 NOTE — PROGRESS NOTES
[facility]    Progress Note    Sherman Tovar Patient Status:  Inpatient    12/3/1940 MRN [id number]   Location [facility] 5SW/SE Attending Chelsie Zamudio MD   Hosp Day # 5 PCP Aleida Son MD       Subjective:   Marissa Covarrubias normal  Extremities: no edema, redness or tenderness in the calves or thighs  Neurologic: Grossly normal    Assessment and Plan:     COPD exacerbation (HCC)  Steroids,nebs      Cellulitis of left lower extremity  vanco      DVT (deep venous thrombosis) (HC

## 2017-02-20 LAB
ANION GAP SERPL CALC-SCNC: 4 MMOL/L (ref 0–18)
BASOPHILS # BLD: 0 K/UL (ref 0–0.2)
BASOPHILS NFR BLD: 0 %
BUN SERPL-MCNC: 33 MG/DL (ref 8–20)
BUN/CREAT SERPL: 33.7 (ref 10–20)
CALCIUM SERPL-MCNC: 9.2 MG/DL (ref 8.5–10.5)
CHLORIDE SERPL-SCNC: 100 MMOL/L (ref 95–110)
CO2 SERPL-SCNC: 35 MMOL/L (ref 22–32)
CREAT SERPL-MCNC: 0.98 MG/DL (ref 0.5–1.5)
EOSINOPHIL # BLD: 0 K/UL (ref 0–0.7)
EOSINOPHIL NFR BLD: 0 %
ERYTHROCYTE [DISTWIDTH] IN BLOOD BY AUTOMATED COUNT: 15.4 % (ref 11–15)
GLUCOSE BLDC GLUCOMTR-MCNC: 225 MG/DL (ref 70–99)
GLUCOSE BLDC GLUCOMTR-MCNC: 243 MG/DL (ref 70–99)
GLUCOSE BLDC GLUCOMTR-MCNC: 281 MG/DL (ref 70–99)
GLUCOSE BLDC GLUCOMTR-MCNC: 320 MG/DL (ref 70–99)
GLUCOSE SERPL-MCNC: 270 MG/DL (ref 70–99)
HCT VFR BLD AUTO: 35 % (ref 35–48)
HGB BLD-MCNC: 11.1 G/DL (ref 12–16)
INR BLD: 2.7 (ref 0.9–1.2)
INR BLD: 3.2 (ref 0.9–1.2)
LYMPHOCYTES # BLD: 1.2 K/UL (ref 1–4)
LYMPHOCYTES NFR BLD: 11 %
MCH RBC QN AUTO: 29.6 PG (ref 27–32)
MCHC RBC AUTO-ENTMCNC: 31.6 G/DL (ref 32–37)
MCV RBC AUTO: 93.7 FL (ref 80–100)
METAMYELOCYTES # BLD MANUAL: 0.11 K/UL
METAMYELOCYTES # BLD MANUAL: 0.22 K/UL
METAMYELOCYTES NFR BLD: 2 %
MONOCYTES # BLD: 0.3 K/UL (ref 0–1)
MONOCYTES NFR BLD: 3 %
MYELOCYTES NFR BLD: 1 %
NEUTROPHILS # BLD AUTO: 9.1 K/UL (ref 1.8–7.7)
NEUTROPHILS NFR BLD: 81 %
NEUTS BAND NFR BLD: 2 %
OSMOLALITY UR CALC.SUM OF ELEC: 305 MOSM/KG (ref 275–295)
PLATELET # BLD AUTO: 275 K/UL (ref 140–400)
PMV BLD AUTO: 8.8 FL (ref 7.4–10.3)
POTASSIUM SERPL-SCNC: 4.6 MMOL/L (ref 3.3–5.1)
PROTHROMBIN TIME: 28.1 SECONDS (ref 11.8–14.5)
PROTHROMBIN TIME: 32.4 SECONDS (ref 11.8–14.5)
RBC # BLD AUTO: 3.74 M/UL (ref 3.7–5.4)
SODIUM SERPL-SCNC: 139 MMOL/L (ref 136–144)
WBC # BLD AUTO: 11 K/UL (ref 4–11)

## 2017-02-20 PROCEDURE — 99232 SBSQ HOSP IP/OBS MODERATE 35: CPT | Performed by: HOSPITALIST

## 2017-02-20 RX ORDER — 0.9 % SODIUM CHLORIDE 0.9 %
VIAL (ML) INJECTION
Status: COMPLETED
Start: 2017-02-20 | End: 2017-02-20

## 2017-02-20 NOTE — DISCHARGE PLANNING
SW met w/ pt to discuss plan of care per MD order. Pt reported that she plans on returning to [facility] and resuming services w/ [facility]. SW discussed PT recommendations. Pt stated that she will not go back to a rehab facility.  Pt stated that she has family

## 2017-02-20 NOTE — PROGRESS NOTES
[facility]    Progress Note    Mariam Suazo Patient Status:  Inpatient    12/3/1940 MRN [id number]   [address] Bryson Maya MD   Hosp Day # 6 PCP Alcides Valencia MD       Subjective:   Kaiden Concepcion 0.5 mg Nebulization 2 times daily   • furosemide  40 mg Oral Daily   • docusate sodium  100 mg Oral BID   • vancomycin  1.75 g Intravenous Q24H   • AmLODIPine Besylate  10 mg Oral Daily   • aspirin  325 mg Oral Daily   • Fluticasone Furoate-Vilanterol  1 p 2.7* 02/18/2017     Imaging/EKG:           Assessment and Plan:   Acute on chronic respiratory failure with chronic obstructive pulmonary disease exacerbation  -Pulm following  -pulmicort  -breo  -lasix, changed to PO  -nebs  -solu-medrol  -likely will not

## 2017-02-20 NOTE — PHYSICAL THERAPY NOTE
PHYSICAL THERAPY TREATMENT NOTE - INPATIENT    Room Number: 540/540-A     Session:       Presenting Problem: COPD exacerbation    Problem List  Principal Problem:    COPD exacerbation ([address]  Active Problems:    Cellulitis of left lower extremity    DVT (d Debridement     HIP REPLACEMENT SURGERY Left 2013    COLONOSCOPY  2010    UPPER GI ENDOSCOPY,BIOPSY  2006       SUBJECTIVE  I waiting for breakfast.    Patient’s self-stated goal is going back to [place].     OBJECTIVE  Precautions:  (fox IV o2)    WEIGHT marching  Ankle pumps  Glut sets  Hip AB/AD  Heel raises  Heel slides  Quad sets  Toe raises     Upper Extremity none     Position Sitting and Supine     Repetitions   10   Sets   2     Patient End of Session: Up in chair    ASSESSMENT   Pt willing to work

## 2017-02-20 NOTE — PROGRESS NOTES
02/20/17 1542   Clinical Encounter Type   Visited With Patient   Routine Visit Follow-up   Continue Visiting Yes   Surgical Visit Pre-op   Patient Spiritual Encounters   Spiritual Assessment Completed 1   Spiritual Needs Empathic listening and support

## 2017-02-20 NOTE — OCCUPATIONAL THERAPY NOTE
Attempted OT treatment. Patient stated, \"I'm not getting up, I just got back to bed. \" Patient encouraged, however she continued to refuse. Patient encouraged to get up 3x daily with meals and to walk to the bathroom. Spoke with nursing.  Will reattempt as

## 2017-02-20 NOTE — PROGRESS NOTES
[facility]    Progress Note    Arna Alt Patient Status:  Inpatient    12/3/1940 MRN [id number]   Location [facility] 5SW/SE Attending Liss Calvin MD   Hosp Day # 6 PCP Evon Barahona MD       Subjective:   Deirdre Goldman bowel sounds normal  Extremities: no edema, redness or tenderness in the calves or thighs  Neurologic: Grossly normal    Assessment and Plan:     COPD exacerbation (HCC)  Steroids nebs      Cellulitis of left lower extremity  abx      DVT (deep venous thro

## 2017-02-21 ENCOUNTER — ANESTHESIA EVENT (OUTPATIENT)
Dept: ENDOSCOPY | Facility: HOSPITAL | Age: 77
DRG: 190 | End: 2017-02-21
Payer: MEDICARE

## 2017-02-21 ENCOUNTER — SURGERY (OUTPATIENT)
Age: 77
End: 2017-02-21

## 2017-02-21 ENCOUNTER — ANESTHESIA (OUTPATIENT)
Dept: ENDOSCOPY | Facility: HOSPITAL | Age: 77
DRG: 190 | End: 2017-02-21
Payer: MEDICARE

## 2017-02-21 LAB
GLUCOSE BLDC GLUCOMTR-MCNC: 146 MG/DL (ref 70–99)
GLUCOSE BLDC GLUCOMTR-MCNC: 194 MG/DL (ref 70–99)
GLUCOSE BLDC GLUCOMTR-MCNC: 207 MG/DL (ref 70–99)
GLUCOSE BLDC GLUCOMTR-MCNC: 269 MG/DL (ref 70–99)
GLUCOSE BLDC GLUCOMTR-MCNC: 276 MG/DL (ref 70–99)
INR BLD: 2.3 (ref 0.9–1.2)
PROTHROMBIN TIME: 25 SECONDS (ref 11.8–14.5)

## 2017-02-21 PROCEDURE — 99232 SBSQ HOSP IP/OBS MODERATE 35: CPT | Performed by: HOSPITALIST

## 2017-02-21 PROCEDURE — 0BCB8ZZ EXTIRPATION OF MATTER FROM LEFT LOWER LOBE BRONCHUS, VIA NATURAL OR ARTIFICIAL OPENING ENDOSCOPIC: ICD-10-PCS | Performed by: INTERNAL MEDICINE

## 2017-02-21 RX ORDER — MORPHINE SULFATE 10 MG/ML
6 INJECTION, SOLUTION INTRAMUSCULAR; INTRAVENOUS EVERY 10 MIN PRN
Status: DISCONTINUED | OUTPATIENT
Start: 2017-02-21 | End: 2017-02-21 | Stop reason: HOSPADM

## 2017-02-21 RX ORDER — NALOXONE HYDROCHLORIDE 0.4 MG/ML
80 INJECTION, SOLUTION INTRAMUSCULAR; INTRAVENOUS; SUBCUTANEOUS AS NEEDED
Status: DISCONTINUED | OUTPATIENT
Start: 2017-02-21 | End: 2017-02-21 | Stop reason: HOSPADM

## 2017-02-21 RX ORDER — MORPHINE SULFATE 4 MG/ML
4 INJECTION, SOLUTION INTRAMUSCULAR; INTRAVENOUS EVERY 10 MIN PRN
Status: DISCONTINUED | OUTPATIENT
Start: 2017-02-21 | End: 2017-02-21 | Stop reason: HOSPADM

## 2017-02-21 RX ORDER — SODIUM CHLORIDE 9 MG/ML
INJECTION, SOLUTION INTRAVENOUS
Status: COMPLETED
Start: 2017-02-21 | End: 2017-02-21

## 2017-02-21 RX ORDER — HALOPERIDOL 5 MG/ML
0.25 INJECTION INTRAMUSCULAR ONCE AS NEEDED
Status: DISCONTINUED | OUTPATIENT
Start: 2017-02-21 | End: 2017-02-21 | Stop reason: HOSPADM

## 2017-02-21 RX ORDER — 0.9 % SODIUM CHLORIDE 0.9 %
VIAL (ML) INJECTION
Status: COMPLETED
Start: 2017-02-21 | End: 2017-02-21

## 2017-02-21 RX ORDER — SODIUM CHLORIDE, SODIUM LACTATE, POTASSIUM CHLORIDE, CALCIUM CHLORIDE 600; 310; 30; 20 MG/100ML; MG/100ML; MG/100ML; MG/100ML
INJECTION, SOLUTION INTRAVENOUS CONTINUOUS
Status: DISCONTINUED | OUTPATIENT
Start: 2017-02-21 | End: 2017-02-25

## 2017-02-21 RX ORDER — HYDROMORPHONE HYDROCHLORIDE 1 MG/ML
0.4 INJECTION, SOLUTION INTRAMUSCULAR; INTRAVENOUS; SUBCUTANEOUS EVERY 5 MIN PRN
Status: DISCONTINUED | OUTPATIENT
Start: 2017-02-21 | End: 2017-02-21 | Stop reason: HOSPADM

## 2017-02-21 RX ORDER — HYDROMORPHONE HYDROCHLORIDE 1 MG/ML
0.6 INJECTION, SOLUTION INTRAMUSCULAR; INTRAVENOUS; SUBCUTANEOUS EVERY 5 MIN PRN
Status: DISCONTINUED | OUTPATIENT
Start: 2017-02-21 | End: 2017-02-21 | Stop reason: HOSPADM

## 2017-02-21 RX ORDER — HYDROCODONE BITARTRATE AND ACETAMINOPHEN 5; 325 MG/1; MG/1
1 TABLET ORAL AS NEEDED
Status: DISCONTINUED | OUTPATIENT
Start: 2017-02-21 | End: 2017-02-21 | Stop reason: HOSPADM

## 2017-02-21 RX ORDER — ONDANSETRON 2 MG/ML
4 INJECTION INTRAMUSCULAR; INTRAVENOUS ONCE AS NEEDED
Status: DISCONTINUED | OUTPATIENT
Start: 2017-02-21 | End: 2017-02-21 | Stop reason: HOSPADM

## 2017-02-21 RX ORDER — SUCCINYLCHOLINE CHLORIDE 20 MG/ML
INJECTION INTRAMUSCULAR; INTRAVENOUS AS NEEDED
Status: DISCONTINUED | OUTPATIENT
Start: 2017-02-21 | End: 2017-02-21 | Stop reason: SURG

## 2017-02-21 RX ORDER — MORPHINE SULFATE 2 MG/ML
2 INJECTION, SOLUTION INTRAMUSCULAR; INTRAVENOUS EVERY 10 MIN PRN
Status: DISCONTINUED | OUTPATIENT
Start: 2017-02-21 | End: 2017-02-21 | Stop reason: HOSPADM

## 2017-02-21 RX ORDER — HYDROMORPHONE HYDROCHLORIDE 1 MG/ML
0.2 INJECTION, SOLUTION INTRAMUSCULAR; INTRAVENOUS; SUBCUTANEOUS EVERY 5 MIN PRN
Status: DISCONTINUED | OUTPATIENT
Start: 2017-02-21 | End: 2017-02-21 | Stop reason: HOSPADM

## 2017-02-21 RX ORDER — HYDROCODONE BITARTRATE AND ACETAMINOPHEN 5; 325 MG/1; MG/1
2 TABLET ORAL AS NEEDED
Status: DISCONTINUED | OUTPATIENT
Start: 2017-02-21 | End: 2017-02-21 | Stop reason: HOSPADM

## 2017-02-21 RX ORDER — SODIUM CHLORIDE 9 MG/ML
INJECTION, SOLUTION INTRAVENOUS ONCE
Status: COMPLETED | OUTPATIENT
Start: 2017-02-21 | End: 2017-02-21

## 2017-02-21 RX ORDER — SODIUM CHLORIDE 9 MG/ML
INJECTION, SOLUTION INTRAVENOUS CONTINUOUS PRN
Status: DISCONTINUED | OUTPATIENT
Start: 2017-02-21 | End: 2017-02-21 | Stop reason: SURG

## 2017-02-21 RX ADMIN — SODIUM CHLORIDE: 9 INJECTION, SOLUTION INTRAVENOUS at 15:36:00

## 2017-02-21 RX ADMIN — SUCCINYLCHOLINE CHLORIDE 100 MG: 20 INJECTION INTRAMUSCULAR; INTRAVENOUS at 15:40:00

## 2017-02-21 NOTE — OCCUPATIONAL THERAPY NOTE
Pt not seen for OT at this time secondary to being off floor for procedure. Will attempt to see pt next date as schedule permits.

## 2017-02-21 NOTE — ANESTHESIA POSTPROCEDURE EVALUATION
Patient: Sarah Brar    Procedure Summary     Date Anesthesia Start Anesthesia Stop Room / Location    02/21/17 1536 1611 [facility] ENDOSCOPY 05 / [facility] ENDOSCOPY       Procedure Diagnosis Surgeon Responsible Provider    BRONCHOSCOPY (N/A ) (Mucous plugs) Piet

## 2017-02-21 NOTE — PROGRESS NOTES
[facility]    Progress Note    Jazmine Arnold Patient Status:  Inpatient    12/3/1940 MRN [id number]   Location [facility] 1SW Attending Sowmya Pickens MD   Hosp Day # 7 PCP Kirstin Govea MD       Subjective:   Marquise Rangel Oral Before breakfast   • budesonide  0.5 mg Nebulization 2 times daily   • furosemide  40 mg Oral Daily   • docusate sodium  100 mg Oral BID   • vancomycin  1.75 g Intravenous Q24H   • AmLODIPine Besylate  10 mg Oral Daily   • aspirin  325 mg Oral Daily chronic obstructive pulmonary disease exacerbation  -Pulm following  -pulmicort  -breo  -lasix, changed to PO  -nebs  -solu-medrol  -bronch today    Diastolic dysfunction  -compensated  -not acute   -cont lasix 40mg PO  -I's and O's  -weights    Left lower

## 2017-02-21 NOTE — ANESTHESIA PREPROCEDURE EVALUATION
Anesthesia PreOp Note    HPI:     Juan A Goldsmith is a 64year old female who presents for preoperative consultation requested by: Hobart Kussmaul, MD    Date of Surgery: 2/14/2017 - 2/21/2017    Procedure(s):  BRONCHOSCOPY  Indication: NON RESOLVING BRONCHIT Date Noted: 08/28/2014      bilateral L2 and S1 radiculopathies         Date Noted: 08/28/2014      s/p L5-S1 right laminectomy         Date Noted: 08/28/2014        Past Medical History   Diagnosis Date   • COPD (chronic obstructive pulmonary disease) ( 10 mg by mouth daily. Disp:  Rfl:  Taking   ondansetron 4 MG Oral Tablet Dispersible Take 4 mg by mouth every 8 (eight) hours as needed for Nausea. Disp:  Rfl:  Taking   mirtazapine 30 MG Oral Tab Take 1 tablet (30 mg total) by mouth nightly.  Disp: 90 tabl UNIT/ML flextouch 10 Units 10 Units Subcutaneous Daily Roman Phillip MD 10 Units at 02/20/17 0908    acetylcysteine (MUCOMYST) 20 % solution 2 mL 2 mL Nebulization Q4H Jonathan Morris MD 2 mL at 02/21/17 1115    MethylPREDNISolone Sodium Succ (Solu-MEDR 250 mL/hr at 02/21/17 0600 1.75 g at 02/21/17 0600   AmLODIPine Besylate (NORVASC) tab 10 mg 10 mg Oral Daily Kandis Mart MD 10 mg at 02/21/17 8970    aspirin tab 325 mg 325 mg Oral Daily Kandis Mart MD Stopped at 02/21/17 0930    Fluticasone Fur Attack Sister    • Heart Disease Brother    • Heart Attack Brother    • Cancer Brother 54     Liver    • Neurological Disorder Sister      ALzheimer's Disease    • Stroke Sister      Cerebrovascular accident    • Heart Disease Brother      Coronary Artery 42.69 kg/(m^2). height is 1.727 m (5' 8\") and weight is 127.325 kg (280 lb 11.2 oz). Her oral temperature is 98.5 °F (36.9 °C). Her blood pressure is 154/67 and her pulse is 87. Her respiration is 20 and oxygen saturation is 95%.     02/21/17 0912 02/21

## 2017-02-21 NOTE — PROGRESS NOTES
[facility]OSP - [facility]    Progress Note    Pastora Oates Patient Status:  Inpatient    12/3/1940 MRN [id number]   Location [address] UNIT Attending Irish Haddad MD   Hosp Day # 7 PCP MD Anastasia Oviedo non-tender; bowel sounds normal  Extremities: no edema, redness or tenderness in the calves or thighs  Neurologic: Grossly normal    Assessment and Plan:     COPD exacerbation (HCC)  Steroids, nebs      Cellulitis of left lower extremity  vanco      DVT (d

## 2017-02-22 LAB
ANION GAP SERPL CALC-SCNC: 7 MMOL/L (ref 0–18)
BASOPHILS # BLD: 0 K/UL (ref 0–0.2)
BASOPHILS NFR BLD: 0 %
BUN SERPL-MCNC: 29 MG/DL (ref 8–20)
BUN/CREAT SERPL: 34.9 (ref 10–20)
CALCIUM SERPL-MCNC: 9.3 MG/DL (ref 8.5–10.5)
CHLORIDE SERPL-SCNC: 96 MMOL/L (ref 95–110)
CO2 SERPL-SCNC: 38 MMOL/L (ref 22–32)
CREAT SERPL-MCNC: 0.83 MG/DL (ref 0.5–1.5)
EOSINOPHIL # BLD: 0 K/UL (ref 0–0.7)
EOSINOPHIL NFR BLD: 0 %
ERYTHROCYTE [DISTWIDTH] IN BLOOD BY AUTOMATED COUNT: 16.1 % (ref 11–15)
GLUCOSE BLDC GLUCOMTR-MCNC: 224 MG/DL (ref 70–99)
GLUCOSE BLDC GLUCOMTR-MCNC: 269 MG/DL (ref 70–99)
GLUCOSE BLDC GLUCOMTR-MCNC: 295 MG/DL (ref 70–99)
GLUCOSE BLDC GLUCOMTR-MCNC: 351 MG/DL (ref 70–99)
GLUCOSE SERPL-MCNC: 245 MG/DL (ref 70–99)
HCT VFR BLD AUTO: 35.4 % (ref 35–48)
HGB BLD-MCNC: 11.1 G/DL (ref 12–16)
INR BLD: 1.4 (ref 0.9–1.2)
LYMPHOCYTES # BLD: 0.5 K/UL (ref 1–4)
LYMPHOCYTES NFR BLD: 4 %
MCH RBC QN AUTO: 29.4 PG (ref 27–32)
MCHC RBC AUTO-ENTMCNC: 31.4 G/DL (ref 32–37)
MCV RBC AUTO: 93.5 FL (ref 80–100)
MONOCYTES # BLD: 0.2 K/UL (ref 0–1)
MONOCYTES NFR BLD: 2 %
NEUTROPHILS # BLD AUTO: 11.3 K/UL (ref 1.8–7.7)
NEUTROPHILS NFR BLD: 88 %
NEUTS BAND NFR BLD: 6 %
NRBC BLD-RTO: 1 % (ref ?–1)
OSMOLALITY UR CALC.SUM OF ELEC: 306 MOSM/KG (ref 275–295)
PLATELET # BLD AUTO: 260 K/UL (ref 140–400)
PMV BLD AUTO: 9.1 FL (ref 7.4–10.3)
POTASSIUM SERPL-SCNC: 4.4 MMOL/L (ref 3.3–5.1)
PROTHROMBIN TIME: 16.4 SECONDS (ref 11.8–14.5)
RBC # BLD AUTO: 3.79 M/UL (ref 3.7–5.4)
SODIUM SERPL-SCNC: 141 MMOL/L (ref 136–144)
WBC # BLD AUTO: 12 K/UL (ref 4–11)

## 2017-02-22 PROCEDURE — 99233 SBSQ HOSP IP/OBS HIGH 50: CPT | Performed by: HOSPITALIST

## 2017-02-22 RX ORDER — 0.9 % SODIUM CHLORIDE 0.9 %
VIAL (ML) INJECTION
Status: COMPLETED
Start: 2017-02-22 | End: 2017-02-22

## 2017-02-22 NOTE — OCCUPATIONAL THERAPY NOTE
OCCUPATIONAL THERAPY TREATMENT NOTE - INPATIENT     Room Number: 540/540-A          Presenting Problem:  (excacerbation COPD)    Problem List  Principal Problem:    COPD exacerbation ([address]  Active Problems:    Cellulitis of left lower extremity    DVT (lavell CK    FUNCTIONAL TRANSFER ASSESSMENT  Supine to Sit : Supervision  Sit to Stand: Minimum assistance (x2)    Toilet Transfer: min assist with chair follow when [employer] Transfer: Nt  Chair Transfer: min assist x 2 for sit to stand    .[place]

## 2017-02-22 NOTE — PROGRESS NOTES
[facility]    Progress Note    Shira Holguin Patient Status:  Inpatient    12/3/1940 MRN [id number]   Location [facility] 1SW Attending Billie Lundberg MD   Hosp Day # 8 PCP Kami Cedeno MD       Subjective:   Zhen Cifuentes Nebulization 2 times daily   • furosemide  40 mg Oral Daily   • docusate sodium  100 mg Oral BID   • vancomycin  1.75 g Intravenous Q24H   • AmLODIPine Besylate  10 mg Oral Daily   • aspirin  325 mg Oral Daily   • Fluticasone Furoate-Vilanterol  1 puff Inh exacerbation  RESOLVING  Pulm following  pulmicort  breo  lasix, changed to PO  nebs  solu-medrol  Pt underwent bronch yesterday, please refer to op note for more details  Op labs pending  Cough and breathing improved after yesterday    Diastolic dysfuncti

## 2017-02-22 NOTE — PLAN OF CARE
Patient/Family Goals    • Patient/Family Long Term Goal  LONG TERM GOAL THAT PATIENT WILL HAVE CLEAR LUNG SOUNDS AND BREATH ADEQUATELY.  Progressing    • Patient/Family Short Term Goal Progressing    SHORT TERM GOAL THAT PATIENT WILL BE DISCHARGE HOME MEDIC

## 2017-02-22 NOTE — OPERATIVE REPORT
[facility]    PATIENT'S NAME: LAMAR ZEENAT KVNG   ATTENDING PHYSICIAN: Jessie Marcos MD   OPERATING PHYSICIAN: Robi Yu MD   PATIENT ACCOUNT#:   [id number]    LOCATION:  [address] [place] [address] Josh Rodriguez 46 #:   [id number]       DATE OF BIRTH:

## 2017-02-22 NOTE — PROGRESS NOTES
[facility]    Progress Note    Tejas Gomez Patient Status:  Inpatient    12/3/1940 MRN [id number]   Location [facility] 5SW/SE Attending Margarita Turner MD   Hosp Day # 8 PCP Franklin Tracy MD       Subjective:   Bethanie Herring normal, regular rate and rhythm  Abdominal: soft, non-tender; bowel sounds normal  Extremities: no edema, redness or tenderness in the calves or thighs  Neurologic: Grossly normal    Assessment and Plan:     COPD exacerbation (HCC)  better      Cellulitis

## 2017-02-22 NOTE — RESPIRATORY THERAPY NOTE
Patient refused all Neb  tx's. Educated the patient on the purpose of and need for this treatments as well as potential negative outcomes associated with deferring treatment.  Despite education, patient maintains refusal.  Patient refused CPAP also

## 2017-02-23 LAB
ANION GAP SERPL CALC-SCNC: 8 MMOL/L (ref 0–18)
ASPERGILLUS GALACTOMANNAN AG, BAL: NEGATIVE
ASPERGILLUS GALACTOMANNAN INDX: 0.07
BASOPHILS # BLD: 0 K/UL (ref 0–0.2)
BASOPHILS NFR BLD: 0 %
BUN SERPL-MCNC: 31 MG/DL (ref 8–20)
BUN/CREAT SERPL: 34.1 (ref 10–20)
CALCIUM SERPL-MCNC: 9.4 MG/DL (ref 8.5–10.5)
CHLORIDE SERPL-SCNC: 96 MMOL/L (ref 95–110)
CO2 SERPL-SCNC: 35 MMOL/L (ref 22–32)
CREAT SERPL-MCNC: 0.91 MG/DL (ref 0.5–1.5)
EOSINOPHIL # BLD: 0 K/UL (ref 0–0.7)
EOSINOPHIL NFR BLD: 0 %
ERYTHROCYTE [DISTWIDTH] IN BLOOD BY AUTOMATED COUNT: 16.2 % (ref 11–15)
GLUCOSE BLDC GLUCOMTR-MCNC: 229 MG/DL (ref 70–99)
GLUCOSE BLDC GLUCOMTR-MCNC: 242 MG/DL (ref 70–99)
GLUCOSE BLDC GLUCOMTR-MCNC: 250 MG/DL (ref 70–99)
GLUCOSE BLDC GLUCOMTR-MCNC: 281 MG/DL (ref 70–99)
GLUCOSE SERPL-MCNC: 272 MG/DL (ref 70–99)
HCT VFR BLD AUTO: 35.7 % (ref 35–48)
HGB BLD-MCNC: 11.5 G/DL (ref 12–16)
INR BLD: 1.6 (ref 0.9–1.2)
LYMPHOCYTES # BLD: 1 K/UL (ref 1–4)
LYMPHOCYTES NFR BLD: 8 %
MAGNESIUM SERPL-MCNC: 1.9 MG/DL (ref 1.8–2.5)
MCH RBC QN AUTO: 30 PG (ref 27–32)
MCHC RBC AUTO-ENTMCNC: 32.2 G/DL (ref 32–37)
MCV RBC AUTO: 93.1 FL (ref 80–100)
MONOCYTES # BLD: 0.9 K/UL (ref 0–1)
MONOCYTES NFR BLD: 7 %
NEUTROPHILS # BLD AUTO: 10.7 K/UL (ref 1.8–7.7)
NEUTROPHILS NFR BLD: 84 %
NEUTS BAND NFR BLD: 1 %
OSMOLALITY UR CALC.SUM OF ELEC: 304 MOSM/KG (ref 275–295)
PLATELET # BLD AUTO: 246 K/UL (ref 140–400)
PMV BLD AUTO: 9.1 FL (ref 7.4–10.3)
POTASSIUM SERPL-SCNC: 4.6 MMOL/L (ref 3.3–5.1)
PROTHROMBIN TIME: 19 SECONDS (ref 11.8–14.5)
RBC # BLD AUTO: 3.84 M/UL (ref 3.7–5.4)
SODIUM SERPL-SCNC: 139 MMOL/L (ref 136–144)
WBC # BLD AUTO: 12.6 K/UL (ref 4–11)

## 2017-02-23 PROCEDURE — 99232 SBSQ HOSP IP/OBS MODERATE 35: CPT | Performed by: HOSPITALIST

## 2017-02-23 RX ORDER — WARFARIN SODIUM 7.5 MG/1
7.5 TABLET ORAL NIGHTLY
Status: DISCONTINUED | OUTPATIENT
Start: 2017-02-23 | End: 2017-02-24

## 2017-02-23 RX ORDER — 0.9 % SODIUM CHLORIDE 0.9 %
VIAL (ML) INJECTION
Status: COMPLETED
Start: 2017-02-23 | End: 2017-02-23

## 2017-02-23 NOTE — DISCHARGE PLANNING
SW spoke with the RN following the re-eval by therapy. They are now recommending home health at [place]. Pt had been current with [facility], and updates were sent to them. Will need orders to resume services at [place].     [facility] [phone]    JM olvera xt10

## 2017-02-23 NOTE — PROGRESS NOTES
[facility]    Progress Note    Sarah Brar Patient Status:  Inpatient    12/3/1940 MRN [id number]   Location [facility] 5SW/SE Attending Tj Crawford MD   Hosp Day # 9 PCP Shaylee Carmona MD       Subjective:   [facility] Edu rhythm  Abdominal: soft, non-tender; bowel sounds normal  Extremities: no edema, redness or tenderness in the calves or thighs  Neurologic: Grossly normal    Assessment and Plan:     COPD exacerbation (HCC)  steroids      Cellulitis of left lower extremity

## 2017-02-23 NOTE — PHYSICAL THERAPY NOTE
PHYSICAL THERAPY TREATMENT NOTE - INPATIENT    Room Number: 540/540-A       Presenting Problem: COPD exacerbation    Problem List  Principal Problem:    COPD exacerbation ([address]  Active Problems:    Cellulitis of left lower extremity    DVT (deep venous th have...  -   Turning over in bed (including adjusting bedclothes, sheets and blankets)?: None   -   Sitting down on and standing up from a chair with arms (e.g., wheelchair, bedside commode, etc.): None   -   Moving from lying on back to sitting on the lowell

## 2017-02-24 ENCOUNTER — TELEPHONE (OUTPATIENT)
Dept: INTERNAL MEDICINE CLINIC | Facility: CLINIC | Age: 77
End: 2017-02-24

## 2017-02-24 ENCOUNTER — ANTI-COAG VISIT (OUTPATIENT)
Dept: INTERNAL MEDICINE CLINIC | Facility: CLINIC | Age: 77
End: 2017-02-24

## 2017-02-24 DIAGNOSIS — I82.409 DEEP VEIN THROMBOSIS (DVT) OF LOWER EXTREMITY, UNSPECIFIED CHRONICITY, UNSPECIFIED LATERALITY, UNSPECIFIED VEIN (HCC): Primary | ICD-10-CM

## 2017-02-24 LAB
GLUCOSE BLDC GLUCOMTR-MCNC: 193 MG/DL (ref 70–99)
GLUCOSE BLDC GLUCOMTR-MCNC: 226 MG/DL (ref 70–99)
GLUCOSE BLDC GLUCOMTR-MCNC: 270 MG/DL (ref 70–99)
GLUCOSE BLDC GLUCOMTR-MCNC: 326 MG/DL (ref 70–99)
INR BLD: 2 (ref 0.9–1.2)
PROTHROMBIN TIME: 22.1 SECONDS (ref 11.8–14.5)

## 2017-02-24 PROCEDURE — 99233 SBSQ HOSP IP/OBS HIGH 50: CPT | Performed by: HOSPITALIST

## 2017-02-24 RX ORDER — 0.9 % SODIUM CHLORIDE 0.9 %
VIAL (ML) INJECTION
Status: COMPLETED
Start: 2017-02-24 | End: 2017-02-24

## 2017-02-24 RX ORDER — WARFARIN SODIUM 5 MG/1
5 TABLET ORAL NIGHTLY
Status: DISCONTINUED | OUTPATIENT
Start: 2017-02-24 | End: 2017-02-28

## 2017-02-24 RX ORDER — OXYBUTYNIN CHLORIDE 5 MG/1
5 TABLET, EXTENDED RELEASE ORAL DAILY
Status: DISCONTINUED | OUTPATIENT
Start: 2017-02-24 | End: 2017-02-28

## 2017-02-24 RX ORDER — CEPHALEXIN 250 MG/1
500 CAPSULE ORAL EVERY 6 HOURS
Status: DISCONTINUED | OUTPATIENT
Start: 2017-02-24 | End: 2017-02-28

## 2017-02-24 NOTE — PROGRESS NOTES
[facility]    Progress Note    Teo Chappell Patient Status:  Inpatient    12/3/1940 MRN [id number]   Location [facility] 1SW Attending Vahid Benavidez MD   Hosp Day # 10 PCP Radha Patino MD       Subjective:   Donny Pro ipratropium-albuterol  3 mL Nebulization 2 times daily   • Levothyroxine Sodium  250 mcg Oral Before breakfast   • budesonide  0.5 mg Nebulization 2 times daily   • furosemide  40 mg Oral Daily   • docusate sodium  100 mg Oral BID   • AmLODIPine Besylate Acute on chronic respiratory failure with chronic obstructive pulmonary disease exacerbation  RESOLVING  Pulm following  pulmicort  breo  Lasix PO  nebs  solu-medrol, likely switch to PO soon  Pt underwent bronch, please refer to op note for more details

## 2017-02-24 NOTE — PROGRESS NOTES
[facility] Note: Antimicrobial Weight Dose Adjustment for: Cephalexin    Dany Madsen is a 64year old female who has been prescribed Cephalexin 500 mg po bid. CrCl is estimated creatinine clearance is 51.4 mL/min (based on Cr of 0.91).  and pt has a

## 2017-02-24 NOTE — TELEPHONE ENCOUNTER
Per Cady/RN pt was released the the [address] [place] [address] for COPD and would like to know when is the next PT/INR?

## 2017-02-24 NOTE — PLAN OF CARE
Patient/Family Goals    • Patient/Family Long Term Goal Progressing    • Patient/Family Short Term Goal Progressing        RESPIRATORY - ADULT    • Achieves optimal ventilation and oxygenation Progressing        SAFETY ADULT - FALL    • Free from fall inju

## 2017-02-24 NOTE — PROGRESS NOTES
[facility]    Progress Note    Joo Vides Patient Status:  Inpatient    12/3/1940 MRN [id number]   Location [facility] 5SW/SE Attending Simón Martinez MD   Hosp Day # 10 PCP Filipe Aldrich MD       Subjective:   Reggie Franco rhythm  Abdominal: soft, non-tender; bowel sounds normal  Extremities: no edema, redness or tenderness in the calves or thighs  Neurologic: Grossly normal    Assessment and Plan:     COPD exacerbation (HCC)  Steroids, nebs      Cellulitis of left lower ext

## 2017-02-24 NOTE — PLAN OF CARE
Patient/Family Goals    • Patient/Family Long Term Goal  PATIENT WILL HAVE CLEAR LUNG SOUNDS AND BREATH EFFECTIVELY WITHOUT USING OXYGEN PER NASAL CANNULA.   Progressing    • Patient/Family Short Term Goal Progressing    PATIENT WILL DISCHARGE HOME ONCE MED

## 2017-02-24 NOTE — PROGRESS NOTES
[facility]    Progress Note    Mariam Suazo Patient Status:  Inpatient    12/3/1940 MRN [id number]   Location [facility] 1SW Attending Bryson Maya MD   Hosp Day # 9 PCP Alcides Valencia MD       Subjective:   Kaiden Concepcion Before breakfast   • budesonide  0.5 mg Nebulization 2 times daily   • furosemide  40 mg Oral Daily   • docusate sodium  100 mg Oral BID   • vancomycin  1.75 g Intravenous Q24H   • AmLODIPine Besylate  10 mg Oral Daily   • aspirin  325 mg Oral Daily   • Fl obstructive pulmonary disease exacerbation  RESOLVING  Pulm following  pulmicort  breo  Lasix PO  nebs  solu-medrol, likely switch to PO soon  Pt underwent bronch, please refer to op note for more details  Op labs pending  Cough and breathing improved

## 2017-02-25 LAB
ANION GAP SERPL CALC-SCNC: 8 MMOL/L (ref 0–18)
BASOPHILS # BLD: 0.1 K/UL (ref 0–0.2)
BASOPHILS NFR BLD: 1 %
BILIRUB UR QL: NEGATIVE
BUN SERPL-MCNC: 35 MG/DL (ref 8–20)
BUN/CREAT SERPL: 37.6 (ref 10–20)
CALCIUM SERPL-MCNC: 10 MG/DL (ref 8.5–10.5)
CHLORIDE SERPL-SCNC: 96 MMOL/L (ref 95–110)
CLARITY UR: CLEAR
CO2 SERPL-SCNC: 34 MMOL/L (ref 22–32)
COLOR UR: YELLOW
CREAT SERPL-MCNC: 0.93 MG/DL (ref 0.5–1.5)
EOSINOPHIL # BLD: 0 K/UL (ref 0–0.7)
EOSINOPHIL NFR BLD: 0 %
ERYTHROCYTE [DISTWIDTH] IN BLOOD BY AUTOMATED COUNT: 16.4 % (ref 11–15)
GLUCOSE BLDC GLUCOMTR-MCNC: 206 MG/DL (ref 70–99)
GLUCOSE BLDC GLUCOMTR-MCNC: 242 MG/DL (ref 70–99)
GLUCOSE BLDC GLUCOMTR-MCNC: 251 MG/DL (ref 70–99)
GLUCOSE BLDC GLUCOMTR-MCNC: 252 MG/DL (ref 70–99)
GLUCOSE SERPL-MCNC: 283 MG/DL (ref 70–99)
GLUCOSE UR-MCNC: >=500 MG/DL
HCT VFR BLD AUTO: 37.5 % (ref 35–48)
HGB BLD-MCNC: 12.2 G/DL (ref 12–16)
HGB UR QL STRIP.AUTO: NEGATIVE
HYALINE CASTS #/AREA URNS AUTO: 6 /LPF
INR BLD: 2.5 (ref 0.9–1.2)
KETONES UR-MCNC: NEGATIVE MG/DL
LEUKOCYTE ESTERASE UR QL STRIP.AUTO: NEGATIVE
LYMPHOCYTES # BLD: 1.7 K/UL (ref 1–4)
LYMPHOCYTES NFR BLD: 12 %
MCH RBC QN AUTO: 30 PG (ref 27–32)
MCHC RBC AUTO-ENTMCNC: 32.5 G/DL (ref 32–37)
MCV RBC AUTO: 92.2 FL (ref 80–100)
MONOCYTES # BLD: 0.6 K/UL (ref 0–1)
MONOCYTES NFR BLD: 4 %
NEUTROPHILS # BLD AUTO: 11.7 K/UL (ref 1.8–7.7)
NEUTROPHILS NFR BLD: 83 %
NITRITE UR QL STRIP.AUTO: NEGATIVE
OSMOLALITY UR CALC.SUM OF ELEC: 304 MOSM/KG (ref 275–295)
PH UR: 7 [PH] (ref 5–8)
PLATELET # BLD AUTO: 227 K/UL (ref 140–400)
PMV BLD AUTO: 9.6 FL (ref 7.4–10.3)
POTASSIUM SERPL-SCNC: 4.1 MMOL/L (ref 3.3–5.1)
PROT UR-MCNC: NEGATIVE MG/DL
PROTHROMBIN TIME: 26.8 SECONDS (ref 11.8–14.5)
RBC # BLD AUTO: 4.07 M/UL (ref 3.7–5.4)
RBC #/AREA URNS AUTO: 2 /HPF
SODIUM SERPL-SCNC: 138 MMOL/L (ref 136–144)
SP GR UR STRIP: 1.01 (ref 1–1.03)
UROBILINOGEN UR STRIP-ACNC: <2
VIT C UR-MCNC: NEGATIVE MG/DL
WBC # BLD AUTO: 14.1 K/UL (ref 4–11)
WBC #/AREA URNS AUTO: <1 /HPF

## 2017-02-25 PROCEDURE — 99232 SBSQ HOSP IP/OBS MODERATE 35: CPT | Performed by: HOSPITALIST

## 2017-02-25 RX ORDER — 0.9 % SODIUM CHLORIDE 0.9 %
VIAL (ML) INJECTION
Status: COMPLETED
Start: 2017-02-25 | End: 2017-02-25

## 2017-02-25 RX ORDER — PHENAZOPYRIDINE HYDROCHLORIDE 100 MG/1
100 TABLET, FILM COATED ORAL
Status: DISCONTINUED | OUTPATIENT
Start: 2017-02-25 | End: 2017-02-25

## 2017-02-25 RX ORDER — IPRATROPIUM BROMIDE AND ALBUTEROL SULFATE 2.5; .5 MG/3ML; MG/3ML
3 SOLUTION RESPIRATORY (INHALATION)
Status: DISCONTINUED | OUTPATIENT
Start: 2017-02-25 | End: 2017-02-28

## 2017-02-25 NOTE — PROGRESS NOTES
[facility]    Progress Note    Clifford Venegas Patient Status:  Inpatient    12/3/1940 MRN [id number]   Location [facility] 5SW/SE Attending Ron Horowitz MD   Hosp Day # 11 PCP Michael Wallace MD     Subjective:     Lynne Rinne

## 2017-02-26 LAB
GLUCOSE BLDC GLUCOMTR-MCNC: 102 MG/DL (ref 70–99)
GLUCOSE BLDC GLUCOMTR-MCNC: 227 MG/DL (ref 70–99)
GLUCOSE BLDC GLUCOMTR-MCNC: 228 MG/DL (ref 70–99)
GLUCOSE BLDC GLUCOMTR-MCNC: 304 MG/DL (ref 70–99)
INR BLD: 2.4 (ref 0.9–1.2)
PROTHROMBIN TIME: 25.8 SECONDS (ref 11.8–14.5)

## 2017-02-26 PROCEDURE — 99232 SBSQ HOSP IP/OBS MODERATE 35: CPT | Performed by: HOSPITALIST

## 2017-02-26 RX ORDER — 0.9 % SODIUM CHLORIDE 0.9 %
VIAL (ML) INJECTION
Status: COMPLETED
Start: 2017-02-26 | End: 2017-02-26

## 2017-02-26 RX ORDER — ACETYLCYSTEINE 200 MG/ML
2 SOLUTION ORAL; RESPIRATORY (INHALATION)
Status: DISCONTINUED | OUTPATIENT
Start: 2017-02-27 | End: 2017-02-28

## 2017-02-26 NOTE — PROGRESS NOTES
Patient has been bladder training all day with some success to incontinence. PVR is 566. Orders received to insert fox. Urology to see in tomorrow.

## 2017-02-26 NOTE — PROGRESS NOTES
[facility]    Progress Note    Yecenia Norris Patient Status:  Inpatient    12/3/1940 MRN [id number]   Location [facility] 5SW/SE Attending Mariela Rooney MD   Hosp Day # 12 PCP Marcelo Allen MD     Subjective:     Livier Linda

## 2017-02-26 NOTE — PLAN OF CARE
Patient refused bladder scan and bladder training, explained to patient the importance of complying with the treatment and patient refuses and stating to nurse and pct  to go out of the room and to leave her alone. Will notify Dr. Sameer Andrade.

## 2017-02-26 NOTE — PROGRESS NOTES
Patient continues to complain of spasms with incontinence. Patient urinating excessive amounts of urine. This RN started bladder training at 1600 q2hrs. Patient;s bladder scan at 1800 was 699.  Orders requested for fox vs. Straight cath with continued bogdan

## 2017-02-26 NOTE — PLAN OF CARE
METABOLIC/FLUID AND ELECTROLYTES - ADULT    • Glucose maintained within prescribed range Progressing    • Electrolytes maintained within normal limits Progressing    • Hemodynamic stability and optimal renal function maintained Progressing        Patient/F

## 2017-02-26 NOTE — PROGRESS NOTES
[facility]    Progress Note    Sarah Brar Patient Status:  Inpatient    12/3/1940 MRN [id number]   Location [facility] 1SW Attending Bakari Murdock MD   Hosp Day # 12 PCP Shaylee Carmona MD       Subjective:   Odalys Jolley acetylcysteine  2 mL Nebulization Q4H   • MethylPREDNISolone Sodium Succ  40 mg Intravenous Q12H   • insulin aspart  1-11 Units Subcutaneous TID CC and HS   • Levothyroxine Sodium  250 mcg Oral Before breakfast   • budesonide  0.5 mg Nebulization 2 times d with chronic obstructive pulmonary disease exacerbation  RESOLVING  Pulm following  pulmicort  breo  Lasix PO  nebs  solu-medrol, likely switch to PO tomorrow, management per pulm  Pt underwent bronch, please refer to op note for more details  Op cultures

## 2017-02-26 NOTE — PROGRESS NOTES
[facility]    Progress Note    Pierre Lance Patient Status:  Inpatient    12/3/1940 MRN [id number]   Location [facility] 1SW Attending Ifeoma Bar MD   Hosp Day # 11 PCP Sunny Carroll MD       Subjective:   Muna Chaney MethylPREDNISolone Sodium Succ  40 mg Intravenous Q12H   • insulin aspart  1-11 Units Subcutaneous TID CC and HS   • Levothyroxine Sodium  250 mcg Oral Before breakfast   • budesonide  0.5 mg Nebulization 2 times daily   • furosemide  40 mg Oral Daily   • 02/23/2017     Imaging/EKG:           Assessment and Plan:   Acute on chronic respiratory failure with chronic obstructive pulmonary disease exacerbation  RESOLVING  Pulm following  pulmicort  breo  Lasix PO  nebs  solu-medrol, likely switch to PO soon  Pt

## 2017-02-27 LAB
GLUCOSE BLDC GLUCOMTR-MCNC: 243 MG/DL (ref 70–99)
GLUCOSE BLDC GLUCOMTR-MCNC: 261 MG/DL (ref 70–99)
GLUCOSE BLDC GLUCOMTR-MCNC: 282 MG/DL (ref 70–99)
GLUCOSE BLDC GLUCOMTR-MCNC: 286 MG/DL (ref 70–99)
INR BLD: 2.6 (ref 0.9–1.2)
PROTHROMBIN TIME: 27.6 SECONDS (ref 11.8–14.5)

## 2017-02-27 PROCEDURE — 99232 SBSQ HOSP IP/OBS MODERATE 35: CPT | Performed by: HOSPITALIST

## 2017-02-27 RX ORDER — 0.9 % SODIUM CHLORIDE 0.9 %
VIAL (ML) INJECTION
Status: COMPLETED
Start: 2017-02-27 | End: 2017-02-27

## 2017-02-27 NOTE — PROGRESS NOTES
[facility]    Progress Note    Aleida Nolen Patient Status:  Inpatient    12/3/1940 MRN [id number]   Location [facility] 5SW/SE Attending May Rodríguez MD   Hosp Day # 15 PCP Dian Yoder MD       Subjective:   Tomas George bilaterally  Cardiovascular: S1, S2 normal, regular rate and rhythm  Abdominal: soft, non-tender; bowel sounds normal  Extremities: no edema, redness or tenderness in the calves or thighs  Neurologic: Grossly normal    Assessment and Plan:     COPD exacerb

## 2017-02-27 NOTE — PROGRESS NOTES
[facility]    Progress Note    Ryan Miguel Patient Status:  Inpatient    12/3/1940 MRN [id number]   Location [facility] 1SW Attending Carole Mcpherson MD   Hosp Day # 15 PCP Enrique Solomon MD       Subjective:   Jalyn Bryant Nightly   • MethylPREDNISolone Sodium Succ  40 mg Intravenous Q12H   • insulin aspart  1-11 Units Subcutaneous TID CC and HS   • Levothyroxine Sodium  250 mcg Oral Before breakfast   • budesonide  0.5 mg Nebulization 2 times daily   • furosemide  40 mg Ora pulmonary disease exacerbation  RESOLVING  Pulm following  pulmicort  breo  Lasix PO  nebs  solu-medrol, likely switch to PO soon, management per pulm  Pt underwent bronch, please refer to op note for more details  Op cultures neg  Cough and breathing impr

## 2017-02-28 ENCOUNTER — TELEPHONE (OUTPATIENT)
Dept: INTERNAL MEDICINE CLINIC | Facility: CLINIC | Age: 77
End: 2017-02-28

## 2017-02-28 VITALS
DIASTOLIC BLOOD PRESSURE: 67 MMHG | SYSTOLIC BLOOD PRESSURE: 151 MMHG | TEMPERATURE: 98 F | HEIGHT: 68 IN | WEIGHT: 254 LBS | HEART RATE: 77 BPM | OXYGEN SATURATION: 93 % | RESPIRATION RATE: 18 BRPM | BODY MASS INDEX: 38.49 KG/M2

## 2017-02-28 LAB
GLUCOSE BLDC GLUCOMTR-MCNC: 205 MG/DL (ref 70–99)
GLUCOSE BLDC GLUCOMTR-MCNC: 226 MG/DL (ref 70–99)
INR BLD: 2.9 (ref 0.9–1.2)
PROTHROMBIN TIME: 30 SECONDS (ref 11.8–14.5)

## 2017-02-28 PROCEDURE — 99239 HOSP IP/OBS DSCHRG MGMT >30: CPT | Performed by: HOSPITALIST

## 2017-02-28 RX ORDER — CEPHALEXIN 500 MG/1
500 CAPSULE ORAL EVERY 6 HOURS
Qty: 36 CAPSULE | Refills: 0 | Status: ON HOLD | OUTPATIENT
Start: 2017-02-28 | End: 2017-04-08

## 2017-02-28 RX ORDER — PSEUDOEPHEDRINE HCL 30 MG
100 TABLET ORAL 2 TIMES DAILY
Qty: 60 CAPSULE | Refills: 0 | Status: SHIPPED | OUTPATIENT
Start: 2017-02-28 | End: 2018-08-29

## 2017-02-28 RX ORDER — OXYBUTYNIN CHLORIDE 5 MG/1
5 TABLET, EXTENDED RELEASE ORAL DAILY
Qty: 30 TABLET | Refills: 0 | Status: SHIPPED | OUTPATIENT
Start: 2017-02-28 | End: 2017-05-04

## 2017-02-28 RX ORDER — 0.9 % SODIUM CHLORIDE 0.9 %
VIAL (ML) INJECTION
Status: COMPLETED
Start: 2017-02-28 | End: 2017-02-28

## 2017-02-28 RX ORDER — PREDNISONE 20 MG/1
10 TABLET ORAL DAILY
Qty: 30 TABLET | Refills: 0 | Status: ON HOLD | OUTPATIENT
Start: 2017-02-28 | End: 2017-04-03

## 2017-02-28 RX ORDER — IPRATROPIUM BROMIDE AND ALBUTEROL SULFATE 2.5; .5 MG/3ML; MG/3ML
3 SOLUTION RESPIRATORY (INHALATION) EVERY 4 HOURS PRN
Qty: 360 ML | Refills: 0 | Status: SHIPPED | OUTPATIENT
Start: 2017-02-28 | End: 2017-03-30

## 2017-02-28 RX ORDER — LORAZEPAM 1 MG/1
1 TABLET ORAL 2 TIMES DAILY PRN
Qty: 10 TABLET | Refills: 0 | Status: ON HOLD | OUTPATIENT
Start: 2017-02-28 | End: 2017-04-08

## 2017-02-28 RX ORDER — FUROSEMIDE 40 MG/1
40 TABLET ORAL DAILY
Qty: 30 TABLET | Refills: 0 | Status: ON HOLD | OUTPATIENT
Start: 2017-02-28 | End: 2017-04-08

## 2017-02-28 RX ORDER — IPRATROPIUM BROMIDE AND ALBUTEROL SULFATE 2.5; .5 MG/3ML; MG/3ML
3 SOLUTION RESPIRATORY (INHALATION)
Qty: 360 ML | Refills: 0 | Status: SHIPPED | OUTPATIENT
Start: 2017-02-28 | End: 2017-03-07

## 2017-02-28 RX ORDER — GUAIFENESIN 100 MG/5ML
200 SOLUTION ORAL EVERY 4 HOURS PRN
Qty: 840 ML | Refills: 0 | Status: SHIPPED | OUTPATIENT
Start: 2017-02-28 | End: 2017-03-14

## 2017-02-28 RX ORDER — TEMAZEPAM 30 MG/1
30 CAPSULE ORAL NIGHTLY PRN
Qty: 15 CAPSULE | Refills: 0 | Status: SHIPPED | OUTPATIENT
Start: 2017-02-28 | End: 2017-03-15

## 2017-02-28 NOTE — PHYSICAL THERAPY NOTE
PHYSICAL THERAPY TREATMENT NOTE - INPATIENT    Room Number: 540/540-A       Presenting Problem: COPD exacerbation    Problem List  Principal Problem:    COPD exacerbation ([address]  Active Problems:    Cellulitis of left lower extremity    DVT (deep venous th mechanics;Repositioning    BALANCE                                                                                                                     Static Sitting: Good  Dynamic Sitting: Good           Static Standing: Fair +  Dynamic Standing: Fair - assistance level: modified independent with rolling walker.            Goal #3      Patient is able to ambulate 10 feet with assist device: walker - rolling at assistance level: modified independent                          1. SBA  2. Min to mod A x 1  3.

## 2017-02-28 NOTE — PROGRESS NOTES
Patient report called to tricia CHO. Patient iv removed. Med car here and transferred patient to [facility]. Patient belongings sent with patient. Prescriptions sent with transfer papers.

## 2017-02-28 NOTE — TELEPHONE ENCOUNTER
Fabiana Raines [facility] [address] calling to request face to face and progress notes from last office visit with Dr. Joyce Reyna. .. please advise forms will be dropped off by claudia  to be filled out for face to face tomorrow.   Please fax back to [phone]

## 2017-02-28 NOTE — DISCHARGE PLANNING
Update 1:21pm. Pt has been accepted at [facility] and a bed is available for today.  Pt already has her scooter here in the room, so a Niall Pablo has been arranged for today 2/28 next available, eta 3pm.    Report [address]

## 2017-02-28 NOTE — DISCHARGE SUMMARY
[facility] AND [facility]  Discharge Summary    Mario Galarza Patient Status:  Inpatient    12/3/1940 MRN [id number]   Location [facility] 5SW/SE Attending Vero Cope MD   [facility] Day # 15 PCP Merilynn Apley, MD     Date of Admission: 2017    D Refills: 0    cephALEXin 500 MG Oral Cap  Take 1 capsule (500 mg total) by mouth every 6 (six) hours. Qty: 36 capsule Refills: 0    guaiFENesin 100 MG/5ML Oral Solution  Take 10 mL (200 mg total) by mouth every 4 (four) hours as needed.   Qty: 840 mL Refil MORNING  Qty: 60 tablet Refills: 5    !! temazepam (RESTORIL) 30 MG Oral Cap  Take 1 capsule (30 mg total) by mouth nightly as needed for Sleep.   Qty: 30 capsule Refills: 0    Pantoprazole Sodium 40 MG Oral Tab EC  Take 1 tablet (40 mg total) by mouth 2 (t was started on Solu-Medrol, vancomycin, nebulizer treatments, and she will be admitted to the hospital for further management and evaluation.       Hospital Course:   Acute on chronic respiratory failure with chronic obstructive pulmonary disease exacerbati    EXTREMITIES: There was no edema, LLE cellulitis improved    Disposition: Inpt Physical Rehab Facility or Physical Rehab Unit        Christopher Rush  2/28/2017  10:44 AM    Timespent> 30 mins

## 2017-02-28 NOTE — OCCUPATIONAL THERAPY NOTE
OCCUPATIONAL THERAPY TREATMENT NOTE - INPATIENT     Room Number: 540/540-A          Presenting Problem:  (COPD exacerbation)    Problem List  Principal Problem:    COPD exacerbation ([address]  Active Problems:    Cellulitis of left lower extremity    DVT (deep body clothing?: A Lot  -   Bathing (including washing, rinsing, drying)?: A Lot  -   Toileting, which includes using toilet, bedpan or urinal? : A Lot  -   Putting on and taking off regular upper body clothing?: A Little  -   Taking care of personal groomi

## 2017-03-02 ENCOUNTER — TELEPHONE (OUTPATIENT)
Dept: SURGERY | Facility: CLINIC | Age: 77
End: 2017-03-02

## 2017-03-02 NOTE — TELEPHONE ENCOUNTER
Pt is new to urology clinic. Yoel Ortiz is requesting appt for pt to have catheter changed that was placed at [address] [place] [address] ED. Yoel Ortiz unsure of date catheter was placed. SUE offered appt on 3/13 with HOLDEN but requesting sooner appt. Pls advise thank you.

## 2017-03-02 NOTE — TELEPHONE ENCOUNTER
Please call patient she is asking can she come march 6  It was a sick slot time, she states he just need it pulled  It is painful please call and advise

## 2017-03-03 NOTE — TELEPHONE ENCOUNTER
Spoke with pt and told her that [address] out of the office on Monday but that I could offer an appt for tues 3/ 7 at 11:10am bu I told her that I really need to speak with the nurse to arrange this with her also. I told pt that I would call the nurses station.

## 2017-03-07 ENCOUNTER — OFFICE VISIT (OUTPATIENT)
Dept: SURGERY | Facility: CLINIC | Age: 77
End: 2017-03-07

## 2017-03-07 VITALS
BODY MASS INDEX: 40.92 KG/M2 | SYSTOLIC BLOOD PRESSURE: 112 MMHG | DIASTOLIC BLOOD PRESSURE: 70 MMHG | HEIGHT: 68 IN | WEIGHT: 270 LBS | HEART RATE: 96 BPM | TEMPERATURE: 98 F

## 2017-03-07 DIAGNOSIS — N31.9 NEUROGENIC BLADDER: Primary | ICD-10-CM

## 2017-03-07 PROBLEM — R32 URINARY INCONTINENCE: Status: ACTIVE | Noted: 2017-03-07

## 2017-03-07 PROCEDURE — 51701 INSERT BLADDER CATHETER: CPT | Performed by: UROLOGY

## 2017-03-07 PROCEDURE — 99204 OFFICE O/P NEW MOD 45 MIN: CPT | Performed by: UROLOGY

## 2017-03-07 NOTE — PROGRESS NOTES
Selwyn Gallagher Patient Status:  Outpatient    12/3/1940 MRN [id number]   Location [address] Attending Bertha Filipe.  [address] Day #  PCP Angelina Lees MD       UROLOGICAL CONSULTATION dilatation. GYNE HISTORY:   2 pregnancies and 2 deliveries. No C-sections, miscarriages or abortions.   Her uterus, tubes and ovaries are all removed secondary to hysterectomy she had a partial hysterectomy 1967 and then completed her hysterectomy in 19 diagnosed all [place] [facility] [facility] medically controlled reflux, frequent constipation or exposure to hepatitis. 6.   No complaints of flank pain or kidney disease in the form of nephritis or glomerulonephritis.   7.   There is no history of orthopedic comp Previous smoker 2 packs a day for 25 years however she did quit all the way back in 1976 she denies alcohol or drug use she is retired from a   with 2 children    FAMILY HISTORY:   Positive for heart disease in mother and sister cancer incontinence then to keep the Husain catheter indwelling and therefore it is removed today.   We did perform Eleonore Flavors test again no cystocele or urethrocele we did place a small 14 Armenian catheter filled patient 240 cc at which point she said she was full re discussed the benefits, risks, complications, side effects. Reasons for, alternatives to   above workup. I answered all the patient’s questions.   I spent 45 minutes with her and well over half the time in face-to-face discussion of further evaluation and

## 2017-03-27 ENCOUNTER — TELEPHONE (OUTPATIENT)
Dept: INTERNAL MEDICINE CLINIC | Facility: CLINIC | Age: 77
End: 2017-03-27

## 2017-03-27 ENCOUNTER — TELEPHONE (OUTPATIENT)
Dept: SURGERY | Facility: CLINIC | Age: 77
End: 2017-03-27

## 2017-03-27 NOTE — TELEPHONE ENCOUNTER
Pt cx'd procedure for Wed 3/29. Pt is in the hospital. Pt's contact number in Epic is currently not working , please contact pt at :  [phone] to r/s .  Thank you

## 2017-03-27 NOTE — TELEPHONE ENCOUNTER
Patient called and informed. Patient stated understood. Will be in in patient rehab for 1 more week.

## 2017-03-27 NOTE — TELEPHONE ENCOUNTER
Pt is calling to request to have a call back   Pt stts she is currently in Rehab and has a bad case of cellulitis  Pt is requesting to be seen   Please advise

## 2017-03-27 NOTE — TELEPHONE ENCOUNTER
Pt is in inpatient rehab. On 2 different abx for what she thinks is cellulitis. Has been seen and treated by physician in rehab facility. States cellulitis is bilateral to lower extremities, afebrile, denies chest pain or SOB.   Wanted to be added to PCP

## 2017-03-27 NOTE — TELEPHONE ENCOUNTER
There is a rehab doctor taking care of her and she is on 2 antibiotic already  Inpatient rehab  Doctor in rehab should be notified by RN  Taking care of her today   While in rehab she has a different attending     Very difficult to transport ramsey

## 2017-04-02 ENCOUNTER — HOSPITAL ENCOUNTER (INPATIENT)
Facility: HOSPITAL | Age: 77
LOS: 6 days | Discharge: NURSING, REHAB OR LONG-TERM CARE | DRG: 603 | End: 2017-04-08
Attending: EMERGENCY MEDICINE | Admitting: HOSPITALIST
Payer: MEDICARE

## 2017-04-02 ENCOUNTER — APPOINTMENT (OUTPATIENT)
Dept: GENERAL RADIOLOGY | Facility: HOSPITAL | Age: 77
DRG: 603 | End: 2017-04-02
Attending: EMERGENCY MEDICINE
Payer: MEDICARE

## 2017-04-02 DIAGNOSIS — R91.8 LUNG INFILTRATE: ICD-10-CM

## 2017-04-02 DIAGNOSIS — L03.115 CELLULITIS OF BOTH LOWER EXTREMITIES: Primary | ICD-10-CM

## 2017-04-02 DIAGNOSIS — N17.9 ACUTE RENAL INJURY (HCC): ICD-10-CM

## 2017-04-02 DIAGNOSIS — L03.116 CELLULITIS OF BOTH LOWER EXTREMITIES: Primary | ICD-10-CM

## 2017-04-02 PROBLEM — R79.89 AZOTEMIA: Status: ACTIVE | Noted: 2017-04-02

## 2017-04-02 PROCEDURE — 71010 XR CHEST AP PORTABLE  (CPT=71010): CPT

## 2017-04-02 RX ORDER — ACETAMINOPHEN 325 MG/1
650 TABLET ORAL EVERY 6 HOURS PRN
COMMUNITY
End: 2019-04-09

## 2017-04-02 RX ORDER — HEPARIN SODIUM 1000 [USP'U]/ML
80 INJECTION, SOLUTION INTRAVENOUS; SUBCUTANEOUS ONCE
Status: COMPLETED | OUTPATIENT
Start: 2017-04-02 | End: 2017-04-03

## 2017-04-02 RX ORDER — LIDOCAINE HYDROCHLORIDE 40 MG/ML
SOLUTION TOPICAL AS NEEDED
Status: ON HOLD | COMMUNITY
End: 2017-04-02

## 2017-04-02 RX ORDER — OMEPRAZOLE 20 MG/1
20 CAPSULE, DELAYED RELEASE ORAL
Status: ON HOLD | COMMUNITY
End: 2017-04-03

## 2017-04-02 RX ORDER — DEXTROSE MONOHYDRATE 25 G/50ML
50 INJECTION, SOLUTION INTRAVENOUS AS NEEDED
Status: DISCONTINUED | OUTPATIENT
Start: 2017-04-02 | End: 2017-04-08

## 2017-04-02 RX ORDER — HEPARIN SODIUM AND DEXTROSE 10000; 5 [USP'U]/100ML; G/100ML
18 INJECTION INTRAVENOUS ONCE
Status: COMPLETED | OUTPATIENT
Start: 2017-04-02 | End: 2017-04-03

## 2017-04-02 RX ORDER — CLOTRIMAZOLE 1 %
1 CREAM (GRAM) TOPICAL 2 TIMES DAILY PRN
COMMUNITY
End: 2018-11-07 | Stop reason: ALTCHOICE

## 2017-04-02 RX ORDER — ONDANSETRON 2 MG/ML
4 INJECTION INTRAMUSCULAR; INTRAVENOUS EVERY 6 HOURS PRN
Status: DISCONTINUED | OUTPATIENT
Start: 2017-04-02 | End: 2017-04-08

## 2017-04-02 RX ORDER — SODIUM CHLORIDE 9 MG/ML
INJECTION, SOLUTION INTRAVENOUS CONTINUOUS
Status: DISCONTINUED | OUTPATIENT
Start: 2017-04-02 | End: 2017-04-03

## 2017-04-02 RX ORDER — HYDROCODONE BITARTRATE AND ACETAMINOPHEN 10; 325 MG/1; MG/1
1 TABLET ORAL EVERY 6 HOURS PRN
Status: ON HOLD | COMMUNITY
End: 2017-04-08

## 2017-04-02 RX ORDER — MULTIVIT,IRON,MINERALS/LUTEIN
1 TABLET ORAL DAILY
COMMUNITY
End: 2017-10-10

## 2017-04-02 RX ORDER — HEPARIN SODIUM AND DEXTROSE 10000; 5 [USP'U]/100ML; G/100ML
INJECTION INTRAVENOUS CONTINUOUS
Status: DISCONTINUED | OUTPATIENT
Start: 2017-04-03 | End: 2017-04-08

## 2017-04-02 RX ORDER — IPRATROPIUM BROMIDE AND ALBUTEROL SULFATE 2.5; .5 MG/3ML; MG/3ML
3 SOLUTION RESPIRATORY (INHALATION) EVERY 4 HOURS PRN
Status: ON HOLD | COMMUNITY
End: 2017-04-03

## 2017-04-03 ENCOUNTER — APPOINTMENT (OUTPATIENT)
Dept: ULTRASOUND IMAGING | Facility: HOSPITAL | Age: 77
DRG: 603 | End: 2017-04-03
Attending: HOSPITALIST
Payer: MEDICARE

## 2017-04-03 PROCEDURE — 93970 EXTREMITY STUDY: CPT

## 2017-04-03 PROCEDURE — 99223 1ST HOSP IP/OBS HIGH 75: CPT | Performed by: HOSPITALIST

## 2017-04-03 RX ORDER — FUROSEMIDE 10 MG/ML
20 INJECTION INTRAMUSCULAR; INTRAVENOUS ONCE
Status: COMPLETED | OUTPATIENT
Start: 2017-04-03 | End: 2017-04-03

## 2017-04-03 RX ORDER — TEMAZEPAM 30 MG/1
30 CAPSULE ORAL NIGHTLY PRN
Status: DISCONTINUED | OUTPATIENT
Start: 2017-04-03 | End: 2017-04-08

## 2017-04-03 RX ORDER — AMLODIPINE BESYLATE 10 MG/1
10 TABLET ORAL DAILY
Status: DISCONTINUED | OUTPATIENT
Start: 2017-04-03 | End: 2017-04-08

## 2017-04-03 RX ORDER — BUDESONIDE AND FORMOTEROL FUMARATE DIHYDRATE 160; 4.5 UG/1; UG/1
1 AEROSOL RESPIRATORY (INHALATION) DAILY
COMMUNITY
End: 2018-09-29

## 2017-04-03 RX ORDER — IPRATROPIUM BROMIDE AND ALBUTEROL SULFATE 2.5; .5 MG/3ML; MG/3ML
3 SOLUTION RESPIRATORY (INHALATION)
Status: DISCONTINUED | OUTPATIENT
Start: 2017-04-03 | End: 2017-04-04

## 2017-04-03 RX ORDER — CLOTRIMAZOLE 1 %
1 CREAM (GRAM) TOPICAL 2 TIMES DAILY
Status: DISCONTINUED | OUTPATIENT
Start: 2017-04-03 | End: 2017-04-08

## 2017-04-03 RX ORDER — DOCUSATE SODIUM 100 MG/1
100 CAPSULE, LIQUID FILLED ORAL DAILY
Status: DISCONTINUED | OUTPATIENT
Start: 2017-04-03 | End: 2017-04-08

## 2017-04-03 RX ORDER — GUAIFENESIN/DEXTROMETHORPHAN 100-10MG/5
10 SYRUP ORAL EVERY 12 HOURS PRN
Status: DISCONTINUED | OUTPATIENT
Start: 2017-04-03 | End: 2017-04-08

## 2017-04-03 RX ORDER — MULTIPLE VITAMINS W/ MINERALS TAB 9MG-400MCG
1 TAB ORAL DAILY
Status: DISCONTINUED | OUTPATIENT
Start: 2017-04-03 | End: 2017-04-08

## 2017-04-03 RX ORDER — WARFARIN SODIUM 3 MG/1
3 TABLET ORAL NIGHTLY
Status: ON HOLD | COMMUNITY
End: 2017-04-08

## 2017-04-03 RX ORDER — NYSTATIN 100000 [USP'U]/G
1 POWDER TOPICAL AS NEEDED
COMMUNITY
End: 2018-08-29

## 2017-04-03 RX ORDER — LORAZEPAM 1 MG/1
1 TABLET ORAL 2 TIMES DAILY PRN
Status: DISCONTINUED | OUTPATIENT
Start: 2017-04-03 | End: 2017-04-08

## 2017-04-03 RX ORDER — MIRTAZAPINE 30 MG/1
30 TABLET, FILM COATED ORAL NIGHTLY
Status: DISCONTINUED | OUTPATIENT
Start: 2017-04-03 | End: 2017-04-08

## 2017-04-03 RX ORDER — IPRATROPIUM BROMIDE AND ALBUTEROL SULFATE 2.5; .5 MG/3ML; MG/3ML
3 SOLUTION RESPIRATORY (INHALATION) AS NEEDED
Status: DISCONTINUED | OUTPATIENT
Start: 2017-04-03 | End: 2017-04-08

## 2017-04-03 RX ORDER — LEVOTHYROXINE SODIUM 0.12 MG/1
125 TABLET ORAL
Status: DISCONTINUED | OUTPATIENT
Start: 2017-04-03 | End: 2017-04-03

## 2017-04-03 RX ORDER — HYDROCODONE BITARTRATE AND ACETAMINOPHEN 10; 325 MG/1; MG/1
1 TABLET ORAL EVERY 6 HOURS PRN
Status: DISCONTINUED | OUTPATIENT
Start: 2017-04-03 | End: 2017-04-08

## 2017-04-03 RX ORDER — OXYBUTYNIN CHLORIDE 5 MG/1
5 TABLET, EXTENDED RELEASE ORAL DAILY
Status: DISCONTINUED | OUTPATIENT
Start: 2017-04-03 | End: 2017-04-08

## 2017-04-03 RX ORDER — ACETAMINOPHEN 325 MG/1
325 TABLET ORAL EVERY 6 HOURS PRN
Status: DISCONTINUED | OUTPATIENT
Start: 2017-04-03 | End: 2017-04-08

## 2017-04-03 RX ORDER — PANTOPRAZOLE SODIUM 40 MG/1
40 TABLET, DELAYED RELEASE ORAL
Status: DISCONTINUED | OUTPATIENT
Start: 2017-04-03 | End: 2017-04-08

## 2017-04-03 RX ORDER — INSULIN ASPART 100 [IU]/ML
3 INJECTION, SOLUTION INTRAVENOUS; SUBCUTANEOUS
Status: ON HOLD | COMMUNITY
End: 2017-04-08

## 2017-04-03 RX ORDER — WARFARIN SODIUM 4 MG/1
4 TABLET ORAL NIGHTLY
Status: DISCONTINUED | OUTPATIENT
Start: 2017-04-03 | End: 2017-04-04

## 2017-04-03 RX ORDER — 0.9 % SODIUM CHLORIDE 0.9 %
VIAL (ML) INJECTION
Status: COMPLETED
Start: 2017-04-03 | End: 2017-04-03

## 2017-04-03 RX ORDER — ASPIRIN 325 MG
325 TABLET ORAL DAILY
Status: DISCONTINUED | OUTPATIENT
Start: 2017-04-03 | End: 2017-04-08

## 2017-04-03 RX ORDER — VENLAFAXINE HYDROCHLORIDE 150 MG/1
300 CAPSULE, EXTENDED RELEASE ORAL DAILY
Status: DISCONTINUED | OUTPATIENT
Start: 2017-04-03 | End: 2017-04-08

## 2017-04-03 RX ORDER — OYSTER SHELL CALCIUM WITH VITAMIN D 500; 200 MG/1; [IU]/1
1 TABLET, FILM COATED ORAL DAILY
Status: DISCONTINUED | OUTPATIENT
Start: 2017-04-03 | End: 2017-04-08

## 2017-04-03 RX ORDER — POTASSIUM CHLORIDE 20 MEQ/1
20 TABLET, EXTENDED RELEASE ORAL DAILY
Status: ON HOLD | COMMUNITY
End: 2017-04-08

## 2017-04-03 RX ORDER — LEVOTHYROXINE SODIUM 112 UG/1
112 TABLET ORAL
Status: DISCONTINUED | OUTPATIENT
Start: 2017-04-04 | End: 2017-04-08

## 2017-04-03 RX ORDER — GUAIFENESIN/DEXTROMETHORPHAN 100-10MG/5
10 SYRUP ORAL EVERY 12 HOURS
Status: DISCONTINUED | OUTPATIENT
Start: 2017-04-03 | End: 2017-04-03

## 2017-04-03 NOTE — PROGRESS NOTES
[facility] Dosing Service  Antibiotic Dosing    Nereida Leyva is a 64year old female for whom pharmacy is dosing Cefepime for treatment of  cellulitis.  .  Other antibiotics (Not dosed by pharmacy): none    Allergies: is allergic to ciprofloxacin; depa

## 2017-04-03 NOTE — PLAN OF CARE
Problem: Patient/Family Goals  Goal: Patient/Family Long Term Goal  Patient’s Long Term Goal: return home    Interventions:  - take all prescribed medications as ordered  - follow treatment recommendation of MD  - See additional Care Plan goals for specifi wants to be kept up to date on POC  - Provide timely, complete, and accurate information to patient/family  - Incorporate patient and family knowledge, values, beliefs, and cultural backgrounds into the planning and delivery of care  - Encourage patient/fa

## 2017-04-03 NOTE — H&P
[facility]    PATIENT'S NAME: Srinath Landry   ATTENDING PHYSICIAN: Vitaliy Ozuna MD   PATIENT ACCOUNT#:   [id number]    LOCATION:  [address] [place] [address] #:   [id number]       YOB: 1940  ADMISSION DATE:       0 She has chronic COPD and obstructive sleep apnea. She has been apparently noncompliant with CPAP in the past per previous notes. She feels that her shortness of breath with exertion may be a bit more than it has been previously.     PAST MEDICAL HISTORY: throat or cough as needed; dextromethorphan guaifenesin 10 mL q.12 h.; Colace 100 mg twice a day; hydrocodone/acetaminophen 10/325, 1 tablet q.6 h. as needed for pain; levothyroxine 125 mcg 2 tablets in the morning, this is confirmed that the patient was t Twelve systems were reviewed. The patient has shortness of breath with exertion, is relatively comfortable at rest.  Has had no productive cough. Denies any fevers or chills.   There are otherwise no additional pertinent positives or negatives on a 12-po per Infectious Disease recommendations. 2.   Chronic obstructive pulmonary disease with dyspnea on exertion. Patient was not receiving nebulizers at the facility. We will order q.4 h. nebulizers.   Would like to avoid steroids if possible given the infec carefully. 9.   DVT prophylaxis. The patient is on a heparin drip. 10.   GI prophylaxis, Protonix. 11.   Depression. Continue home medications. The patient's current clinical status and proposed treatment plan were discussed with her.   All of her q

## 2017-04-03 NOTE — CONSULTS
INFECTIOUS DISEASE CONSULT NOTE    Aleida Nolen Patient Status:  Inpatient    12/3/1940 MRN [id number]   Location [facility] 5SW/SE Attending Nelsy Hogan MD   Hosp Day # 1 PCP Nazanin LATIF Discectomy    • Cholecystitis 1984     Cholecystectomy    • Tonsillitis 1950     Tonsillitis    • Brain aneurysm      Brain Aneurysm, Stent placed    • Arthritis of both knees      knee replacement    • Arthritis of right hip 2009   • Other and unspecified years ago. Her smoking use included Cigarettes. She has never used smokeless tobacco. She reports that she does not drink alcohol or use illicit drugs.     Allergies:    Ciprofloxacin           Rash  Depakote                Other (See Comments)    Comment:osiris tab 1 tablet, 1 tablet, Oral, Daily  •  Miconazole Nitrate 2 % powder, , Topical, BID PRN  •  Oxybutynin Chloride ER (DITROPAN-XL) 24 hr tab 5 mg, 5 mg, Oral, Daily  •  QUEtiapine Fumarate (SEROQUEL) tab 300 mg, 300 mg, Oral, Nightly  •  temazepam (RESTORI 04/02/17 2009 04/03/17   0948   GLU  145*  102*   BUN  22*  16   CREATSERUM  1.29  0.90   GFRAA  48*  >60   GFRNAA  40*  >60   CA  9.5  8.8   NA  141  139   K  4.1  3.3   CL  100  100   CO2  31  31       Microbiology    Reviewed in EMR    Radiology: Rev

## 2017-04-03 NOTE — ED INITIAL ASSESSMENT (HPI)
Patient has history of dvt on left leg and was hospitalized. Patient complain of pain and swelling on right leg.

## 2017-04-03 NOTE — RESPIRATORY THERAPY NOTE
Patient refused Auto Cpap and Neb tx therapy. [facility] has educated the patient on the purpose of and need for this therapy as well as potential negative outcomes associated with deferring treatment.  Despite education, patient maintains refusal.

## 2017-04-03 NOTE — ED PROVIDER NOTES
Patient Seen in: [facility] AND [facility] Emergency Department    History   Patient presents with:  Swelling Edema (cardiovascular, metabolic)    Stated Complaint: patient complain of right leg swelling.      HPI    The patient is a 20-year-old female who presen Comment x4    KNEE REPLACEMENT SURGERY Bilateral     Comment Bilateral arthritis     CHOLECYSTECTOMY  1984    Comment Cholecystitis    LAMINECTOMY      OTHER SURGICAL HISTORY  1985,1995,2003,2009    Comment Cervical Discectomy     HYSTERECTOMY  1967 total) by mouth 2 (two) times daily as needed for Anxiety. Warfarin Sodium 6 MG Oral Tab,  Take 1 tablet (6 mg total) by mouth nightly.    Levothyroxine Sodium 125 MCG Oral Tab,  TAKE 2 TABLETS BY MOUTH IN THE MORNING   temazepam (RESTORIL) 30 MG Oral Cap Comment: 1977    Alcohol Use: No                Review of Systems    Positive for stated complaint: patient complain of right leg swelling. Other systems are as noted in HPI. Constitutional and vital signs reviewed.       All other systems reviewed and n and affect. Judgment normal.   Nursing note and vitals reviewed.     Differential diagnosis includes cellulitis, chronic venous stasis, DVT      ED Course     Labs Reviewed   PROTHROMBIN TIME (PT) - Abnormal; Notable for the following:     PT 16.0 (*)     I were sent despite oral antibiotics and patient placed on broad-spectrum antibiotics to also cover for possible lung infiltrate although atelectasis more consistent with patient's symptoms.   Patient symptoms also may be more consistent with chronic venous s

## 2017-04-03 NOTE — PROGRESS NOTES
CENTRUM SILVER ULTRA WOMENS TABS 1 tablet  is Non-Formulary Medication &  Auto-Substituted to MVI with Minerals tablet  Per P&T PROTOCOL

## 2017-04-03 NOTE — DISCHARGE PLANNING
Pt lives at [facility]. Pt is current with [facility] who are aware of the pt's hospitalization. Updated clinicals will be sent. Pt admitted from [address] and intends to return there.  SW placed call to the snf admissions to clarify the

## 2017-04-03 NOTE — PROGRESS NOTES
[facility] dosing service    Initial Pharmacokinetic Consult for Vancomycin Dosing     Lata Tinajero is a 64year old female admitted on 4/2 who is being treated for cellulitis.   Pharmacy has been asked to dose Vancomycin by Dr. Louie Service    She is all 2g) x 1 dose, followed by 1.75 gm Q 24 hours  based upon, 88kg adjusted weight, renal function, and pharmacokinetics. 2.  Pharmacy ordered Vancomycin trough level(s) prior to 4rth dose. Goal trough level 10-15 ug/mL.     3.  Pharmacy will need BUN/Scr

## 2017-04-03 NOTE — PROGRESS NOTES
Calcium Carb-Cholecalciferol 500-200 MG-UNIT TABS 1 tablet  is Non-Formulary Medication &  Auto-Substituted to Calcium Carb with Vitamin D 500-200 Per P&T PROTOCOL

## 2017-04-03 NOTE — PROGRESS NOTES
[facility]    Progress Note    Carmen Covarrubias Patient Status:  Inpatient    12/3/1940 MRN [id number]   Location [facility] 5SW/SE Attending Justin Holm MD   Hosp Day # 1 PCP Shikha Olmos MD     Subjective:     Constitutio to cover pneumonia as well  - IS    Acute renal failure. Due to dehydration.  - s/p 1 dose of IV Lasix  - resolved    Diabetes mellitus type 2.  A1C 6.6.  Refusing insulins at nh.  - Accu-Cheks a.c., at bedtime.    - Cover with sliding scale insulin  - educ

## 2017-04-03 NOTE — PROGRESS NOTES
Dextromethorphan-Benzocaine 5-7.5 MG LOZG 1 tablet  is Non-Formulary Medication &  Auto-Substituted to Cepacol Lozenge Per P&T PROTOCOL

## 2017-04-04 PROCEDURE — 99232 SBSQ HOSP IP/OBS MODERATE 35: CPT | Performed by: HOSPITALIST

## 2017-04-04 RX ORDER — WARFARIN SODIUM 6 MG/1
6 TABLET ORAL NIGHTLY
Status: DISCONTINUED | OUTPATIENT
Start: 2017-04-04 | End: 2017-04-05

## 2017-04-04 RX ORDER — IPRATROPIUM BROMIDE AND ALBUTEROL SULFATE 2.5; .5 MG/3ML; MG/3ML
3 SOLUTION RESPIRATORY (INHALATION)
Status: DISCONTINUED | OUTPATIENT
Start: 2017-04-04 | End: 2017-04-07

## 2017-04-04 RX ORDER — POTASSIUM CHLORIDE 20 MEQ/1
40 TABLET, EXTENDED RELEASE ORAL EVERY 4 HOURS
Status: COMPLETED | OUTPATIENT
Start: 2017-04-04 | End: 2017-04-04

## 2017-04-04 RX ORDER — 0.9 % SODIUM CHLORIDE 0.9 %
VIAL (ML) INJECTION
Status: COMPLETED
Start: 2017-04-04 | End: 2017-04-04

## 2017-04-04 NOTE — CM/SW NOTE
Met with patient at bedside to explain the BPCI/Medicare program. Patient agreed with phone follow up for 3 months from [address] [place] [address] after discharge from [address] [place] [address]. Patient was enrolled under DRG  603    . BPCI/Medicare letter and brochure provided.

## 2017-04-04 NOTE — PLAN OF CARE
Problem: Patient/Family Goals  Goal: Patient/Family Long Term Goal  Patient’s Long Term Goal: return home    Interventions:  - take all prescribed medications as ordered  - follow treatment recommendation of MD  - See additional Care Plan goals for specifi symptoms of bleeding or hemorrhage  - Monitor labs and vital signs for trends  - Administer supportive blood products/factors, fluids and medications as ordered and appropriate  - Administer supportive blood products/factors as ordered and appropriate   [employer]

## 2017-04-04 NOTE — RESPIRATORY THERAPY NOTE
Patient refused CPAP  therapy. [facility] has educated the patient on the purpose of and need for this therapy as well as potential negative outcomes associated with deferring treatment.  Despite education, patient maintains refusal.

## 2017-04-04 NOTE — PROGRESS NOTES
[facility]  Hospitalist Progress  Note     Carly Pizano Patient Status:  Inpatient    153/1940  64year old [facility] [id number]   Location 539/539-A Attending Xiomara Ortega MD   Hosp Day # 2 PCP Brooke Anne MD     ASSESSMENT/PLAN    B mild erythema on the right anterior   Psych: Normal mood and affect.  Behavior and judgment normal.     Labs:  Recent Labs   Lab  04/03/17   0011  04/03/17   0948  04/04/17   0643   RBC  3.64*  3.62*  3.41*   HGB  10.7*  10.9*  10.3*   HCT  33.2*  33.6*  31

## 2017-04-04 NOTE — PLAN OF CARE
Problem: Patient/Family Goals  Goal: Patient/Family Long Term Goal  Patient’s Long Term Goal: return home    Interventions:  - take all prescribed medications as ordered  - follow treatment recommendation of MD  - See additional Care Plan goals for specifi Care  Goal: Patient preferences are identified and integrated in the patient’s plan of care  Interventions:  - What would you like us to know as we care for you?  Patient wants to be kept updated  - Provide timely, complete, and accurate information to shanika

## 2017-04-04 NOTE — PROGRESS NOTES
INFECTIOUS DISEASE PROGRESS NOTE    Karmen Sue Patient Status:  Inpatient    12/3/1940 MRN [id number]   Location [facility] 5SW/SE Attending Thierry Hardy MD   Hosp Day # 2 PCP Tai Goff MD     Subjective:  Afebrile.  Diarrhea improved respiratory tract infection, unspecified type     Diarrhea     Cellulitis of left lower extremity     DVT (deep venous thrombosis) (HCC)     Respiratory failure (HCC)     Pneumonia     Anxiety     Deep vein thrombosis (DVT) of lower extremity, unspecified [address] [place] [address] [facility]  [phone]

## 2017-04-05 PROCEDURE — 99233 SBSQ HOSP IP/OBS HIGH 50: CPT | Performed by: HOSPITALIST

## 2017-04-05 RX ORDER — 0.9 % SODIUM CHLORIDE 0.9 %
VIAL (ML) INJECTION
Status: COMPLETED
Start: 2017-04-05 | End: 2017-04-05

## 2017-04-05 RX ORDER — WARFARIN SODIUM 7.5 MG/1
7.5 TABLET ORAL NIGHTLY
Status: DISCONTINUED | OUTPATIENT
Start: 2017-04-05 | End: 2017-04-07

## 2017-04-05 RX ORDER — MAGNESIUM OXIDE 400 MG (241.3 MG MAGNESIUM) TABLET
400 TABLET ONCE
Status: COMPLETED | OUTPATIENT
Start: 2017-04-05 | End: 2017-04-05

## 2017-04-05 NOTE — PLAN OF CARE
Problem: Patient/Family Goals  Goal: Patient/Family Long Term Goal  Patient’s Long Term Goal: return home    Interventions:  - take all prescribed medications as ordered  - follow treatment recommendation of MD  - See additional Care Plan goals for specifi plan of care  Interventions:  - What would you like us to know as we care for you?   - Provide timely, complete, and accurate information to patient/family  - Incorporate patient and family knowledge, values, beliefs, and cultural backgrounds into the plan

## 2017-04-05 NOTE — PROGRESS NOTES
INFECTIOUS DISEASE PROGRESS NOTE    Rexine Agent Patient Status:  Inpatient    12/3/1940 MRN [id number]   Location [facility] 5SW/SE Attending Jory Peoples MD   Hosp Day # 3 PCP Isabela Herrera MD     Subjective:  Afebrile. No acute events. SHANT (obstructive sleep apnea)     Headache disorder     Neck pain, bilateral     COPD exacerbation ([facility]     Upper respiratory tract infection, unspecified type     Diarrhea     Cellulitis of left lower extremity     DVT (deep venous thrombosis) ([facility] inpatient; will likely transition to PO on discharge  - elevate legs  - follow up on BCx - ngtd  - obtain respiratory viral PCR - negative  - follow fever curve and WBC  - reviewed labs, micro, imaging report  - discussed with patient, RN      Johnathan James

## 2017-04-05 NOTE — PROGRESS NOTES
[facility]    Progress Note    Scottie Sutherland Patient Status:  Inpatient    12/3/1940 MRN [id number]   Location [facility] 5SW/SE Attending Mack Caballero MD   Hosp Day # 3 PCP Cali Pérez MD     Subjective:     Gabrielatio atelectasis and/or pneumonia. - antibiotics as above to cover pneumonia as well  - IS    Acute renal failure. Due to dehydration.  - s/p 1 dose of IV Lasix  - resolved    Diabetes mellitus type 2.  A1C 6.6.  Refusing insulins at nh.  - Accu-Cheks a.c., at

## 2017-04-05 NOTE — PROGRESS NOTES
[facility]    Progress Note    Aleida Nolen Patient Status:  Inpatient    12/3/1940 MRN [id number]   Location [facility] 5SW/SE Attending Nelsy Hogan MD   Hosp Day # 3 PCP Dian Yoder MD     Subjective:     Zahirao be atelectasis and/or pneumonia. - antibiotics as above to cover pneumonia as well  - IS    Acute renal failure. Due to dehydration.  - s/p 1 dose of IV Lasix  - resolved    Diabetes mellitus type 2.  A1C 6.6.  Refusing insulins at nh.  - Accu-Cheks a.c.,

## 2017-04-05 NOTE — PLAN OF CARE
Problem: Patient/Family Goals  Goal: Patient/Family Long Term Goal  Patient’s Long Term Goal: return home    Interventions:  - take all prescribed medications as ordered  - follow treatment recommendation of MD  - See additional Care Plan goals for specifi values, beliefs, and cultural backgrounds into the planning and delivery of care  - Encourage patient/family to participate in care and decision-making at the level they choose  - Honor patient and family perspectives and choices   Outcome: Progressing  Ca

## 2017-04-06 PROCEDURE — 99233 SBSQ HOSP IP/OBS HIGH 50: CPT | Performed by: HOSPITALIST

## 2017-04-06 RX ORDER — MAGNESIUM OXIDE 400 MG (241.3 MG MAGNESIUM) TABLET
400 TABLET ONCE
Status: COMPLETED | OUTPATIENT
Start: 2017-04-06 | End: 2017-04-06

## 2017-04-06 NOTE — PLAN OF CARE
Problem: Patient/Family Goals  Goal: Patient/Family Long Term Goal  Patient’s Long Term Goal: return home    Interventions:  - take all prescribed medications as ordered  - follow treatment recommendation of MD  - See additional Care Plan goals for specifi Patient preferences are identified and integrated in the patient’s plan of care  Interventions:  - What would you like us to know as we care for you?   - Provide timely, complete, and accurate information to patient/family  - Incorporate patient and family

## 2017-04-06 NOTE — PHYSICAL THERAPY NOTE
PHYSICAL THERAPY EVALUATION - INPATIENT     Room Number: 539/539-A  Evaluation Date: 4/6/2017  Type of Evaluation: Initial  Physician Order: PT Eval and Treat    Presenting Problem:  B LE cellulitis  Reason for Therapy: Mobility Dysfunction and Discharg increased risk for falls with poor conditioning and is below her baseline level for mobility; therefore she will continue to benefit from skilled therapy during her hospitalization.       DISCHARGE RECOMMENDATIONS  PT Discharge Recommendations: Sub-acute re SURGERY Bilateral     Comment Bilateral arthritis     CHOLECYSTECTOMY  1984    Comment Cholecystitis    LAMINECTOMY      OTHER SURGICAL HISTORY  1985,1995,2003,2009    Comment Cervical Discectomy     HYSTERECTOMY  1967    Comment Partial Hysterectomy    TO Good  Static Standing: Fair  Dynamic Standing: Fair -    ADDITIONAL TESTS  Additional Tests: None                                 NEUROLOGICAL FINDINGS  Neurological Findings: None                   ACTIVITY TOLERANCE  O2 Saturation: 89%  O2 Saturation wit within reach;RN aware of session/findings; All patient questions and concerns addressed; Alarm set    CURRENT GOALS    Goals to be met by: $13/17  Patient Goal Patient's self-stated goal is: to leave the hospital   Goal #1 Patient is able to demonstrate supi

## 2017-04-06 NOTE — RESPIRATORY THERAPY NOTE
RT CPAP PROGRESS NOTE:    Patient is compliant with nightly CPAP: NO  Patient refused: YES  AutoCPAP in use: NO    CPAP interface:NASAL PRONG  Patient tolerating: REFUSED    RCP recommends:  REFUSED THE KIRIT ,RN NOTIFIED, SATURATING GOOD ON RA

## 2017-04-06 NOTE — PROGRESS NOTES
[facility]    Progress Note    Carey De Los Santos Patient Status:  Inpatient    12/3/1940 MRN [id number]   Location [facility] 5SW/SE Attending Bre Machuca MD   Hosp Day # 4 PCP Gisella Rajan MD     Subjective:     Gabrielatio right basilar opacity, which could be atelectasis and/or pneumonia. - antibiotics as above to cover pneumonia as well  - IS    Acute renal failure. Due to dehydration.  - s/p 1 dose of IV Lasix  - resolved    Diabetes mellitus type 2.  A1C 6.6.  Refusing i

## 2017-04-06 NOTE — PROGRESS NOTES
[facility]    Progress Note    Jazmine Arnold Patient Status:  Inpatient    12/3/1940 MRN [id number]   Location [facility] 5SW/SE Attending Joann Landry MD   Hosp Day # 4 PCP Kirstin Govea MD     Subjective:     Gabrielatio evidence of Pneumonia. - nebs q4h  - monitor for signs of exacerbation  - o2 protocol  - Chest x-ray revealed the possibility of a small right basilar opacity, which could be atelectasis and/or pneumonia.   - antibiotics as above to cover pneumonia as well

## 2017-04-06 NOTE — PROGRESS NOTES
INFECTIOUS DISEASE PROGRESS NOTE    Dany Madsen Patient Status:  Inpatient    12/3/1940 MRN [id number]   Location [facility] 5SW/SE Attending David Stokes MD   Hosp Day # 4 PCP Samira Juarez MD     Subjective:  Afebrile. No acute events. unspecified type     Diarrhea     Cellulitis of left lower extremity     DVT (deep venous thrombosis) (HCC)     Respiratory failure (HCC)     Pneumonia     Anxiety     Deep vein thrombosis (DVT) of lower extremity, unspecified chronicity, unspecified later follow fever curve and WBC  - reviewed labs, micro, imaging report  - discussed with patient, RN      [address] [facility]  [phone]

## 2017-04-06 NOTE — PLAN OF CARE
Problem: Patient/Family Goals  Goal: Patient/Family Short Term Goal  Patient’s Short Term Goal: Decrease swelling and pain in Left LE    Interventions:   - Heparin for DVT  - pain medication  - See additional Care Plan goals for specific interventions   Ou attended to.     Problem: HEMATOLOGIC - ADULT  Goal: Maintains hematologic stability  INTERVENTIONS  - Assess for signs and symptoms of bleeding or hemorrhage  - Monitor labs and vital signs for trends  - Administer supportive blood products/factors, fluids

## 2017-04-07 PROCEDURE — 99232 SBSQ HOSP IP/OBS MODERATE 35: CPT | Performed by: HOSPITALIST

## 2017-04-07 RX ORDER — WARFARIN SODIUM 10 MG/1
10 TABLET ORAL NIGHTLY
Status: DISCONTINUED | OUTPATIENT
Start: 2017-04-07 | End: 2017-04-08

## 2017-04-07 RX ORDER — MAGNESIUM OXIDE 400 MG (241.3 MG MAGNESIUM) TABLET
400 TABLET ONCE
Status: COMPLETED | OUTPATIENT
Start: 2017-04-07 | End: 2017-04-07

## 2017-04-07 RX ORDER — 0.9 % SODIUM CHLORIDE 0.9 %
VIAL (ML) INJECTION
Status: COMPLETED
Start: 2017-04-07 | End: 2017-04-08

## 2017-04-07 RX ORDER — IPRATROPIUM BROMIDE AND ALBUTEROL SULFATE 2.5; .5 MG/3ML; MG/3ML
3 SOLUTION RESPIRATORY (INHALATION)
Status: DISCONTINUED | OUTPATIENT
Start: 2017-04-07 | End: 2017-04-08

## 2017-04-07 NOTE — PROGRESS NOTES
[facility] - [facility]    Progress Note    Ryan Miguel Patient Status:  Inpatient    12/3/1940 MRN [id number]   Location [facility] 5SW/SE Attending Felix Mahan MD   Hosp Day # 5 PCP Enrique Solomon MD     Subjective:     Gabrielatio atelectasis and/or pneumonia. - antibiotics as above to cover pneumonia as well  - IS    Acute renal failure. Due to dehydration. - resolved  -monitor    Diabetes mellitus type 2.  A1C 6.6.  Refusing insulins at nh.  - Accu-Cheks a.c., at bedtime.    - Co

## 2017-04-07 NOTE — PHYSICAL THERAPY NOTE
Attempted to see pt this AM but the pt refused stating fatigue. The pt was educated on importance of mobility for strength and endurance but pt again refused and asked therapist to come back later. Will attempt in PM as time permits.

## 2017-04-07 NOTE — PROGRESS NOTES
INFECTIOUS DISEASE PROGRESS NOTE    [facility] Patient Status:  Inpatient    12/3/1940 MRN [id number]   Location [facility] 5SW/SE Attending Joann Landry MD   Hosp Day # 5 PCP Kirstin Govea MD     Subjective:  Afebrile. No acute events. ([facility]     Respiratory failure ([facility]     Pneumonia     Anxiety     Deep vein thrombosis (DVT) of lower extremity, unspecified chronicity, unspecified laterality, unspecified vein (HCC)     Urinary incontinence     Neurogenic bladder     Azotemia     Celluli RN      [address] [facility]  [phone]

## 2017-04-07 NOTE — PROGRESS NOTES
[facility]    Progress Note    Jazmine Arnold Patient Status:  Inpatient    12/3/1940 MRN [id number]   Location [facility] 5SW/SE Attending Joann Landry MD   Hosp Day # 5 PCP Kirstin Govea MD     Subjective:     Gabrielatio be atelectasis and/or pneumonia. - antibiotics as above to cover pneumonia as well  - IS    Acute renal failure. Due to dehydration.  - s/p 1 dose of IV Lasix  - resolved    Diabetes mellitus type 2.  A1C 6.6.  Refusing insulins at nh.  - Accu-Cheks a.c.,

## 2017-04-07 NOTE — PLAN OF CARE
HEMATOLOGIC - ADULT    • Maintains hematologic stability Progressing        Patient Centered Care    • Patient preferences are identified and integrated in the patient's plan of care Progressing        Patient/Family Goals    • Patient/Family [facility]

## 2017-04-07 NOTE — PLAN OF CARE
Problem: Patient/Family Goals  Goal: Patient/Family Long Term Goal  Patient’s Long Term Goal: return home    Interventions:  - take all prescribed medications as ordered  - follow treatment recommendation of MD  - See additional Care Plan goals for specifi planning and delivery of care  - Encourage patient/family to participate in care and decision-making at the level they choose  - Honor patient and family perspectives and choices   Outcome: Progressing    Problem: HEMATOLOGIC - ADULT  Goal: Maintains hemat

## 2017-04-07 NOTE — DISCHARGE PLANNING
Updated clinicals including therapy from 4/6 sent to [facility]. Pt refused therapy today 4/7, but states that she and her daughter agree that the pt needs to return to rehab when stable.     shayne sheth,JM [id number]

## 2017-04-08 VITALS
BODY MASS INDEX: 34.57 KG/M2 | HEART RATE: 80 BPM | HEIGHT: 67.99 IN | OXYGEN SATURATION: 95 % | RESPIRATION RATE: 20 BRPM | TEMPERATURE: 98 F | DIASTOLIC BLOOD PRESSURE: 50 MMHG | WEIGHT: 228.06 LBS | SYSTOLIC BLOOD PRESSURE: 121 MMHG

## 2017-04-08 PROCEDURE — 99239 HOSP IP/OBS DSCHRG MGMT >30: CPT | Performed by: HOSPITALIST

## 2017-04-08 RX ORDER — IPRATROPIUM BROMIDE AND ALBUTEROL SULFATE 2.5; .5 MG/3ML; MG/3ML
3 SOLUTION RESPIRATORY (INHALATION) AS NEEDED
Qty: 100 VIAL | Refills: 0 | Status: ON HOLD
Start: 2017-04-08 | End: 2018-11-13

## 2017-04-08 RX ORDER — LORAZEPAM 1 MG/1
1 TABLET ORAL 2 TIMES DAILY PRN
Qty: 10 TABLET | Refills: 0 | Status: ON HOLD | OUTPATIENT
Start: 2017-04-08 | End: 2018-06-06

## 2017-04-08 RX ORDER — IPRATROPIUM BROMIDE AND ALBUTEROL SULFATE 2.5; .5 MG/3ML; MG/3ML
3 SOLUTION RESPIRATORY (INHALATION) 2 TIMES DAILY
Qty: 180 ML | Refills: 0 | Status: SHIPPED
Start: 2017-04-08 | End: 2017-05-08

## 2017-04-08 RX ORDER — SULFAMETHOXAZOLE AND TRIMETHOPRIM 800; 160 MG/1; MG/1
1 TABLET ORAL 2 TIMES DAILY
Qty: 14 TABLET | Refills: 0 | Status: SHIPPED
Start: 2017-04-08 | End: 2017-04-15

## 2017-04-08 RX ORDER — FUROSEMIDE 40 MG/1
40 TABLET ORAL
Qty: 30 TABLET | Refills: 0 | Status: SHIPPED
Start: 2017-04-10 | End: 2017-05-07

## 2017-04-08 RX ORDER — AMLODIPINE BESYLATE 10 MG/1
TABLET ORAL
Qty: 90 TABLET | Refills: 0 | Status: SHIPPED | OUTPATIENT
Start: 2017-04-08 | End: 2017-04-08

## 2017-04-08 RX ORDER — CEFADROXIL 1000 MG/1
1 TABLET ORAL 2 TIMES DAILY
Qty: 14 TABLET | Refills: 0 | Status: SHIPPED
Start: 2017-04-08 | End: 2017-04-15

## 2017-04-08 RX ORDER — WARFARIN SODIUM 6 MG/1
6 TABLET ORAL NIGHTLY
Qty: 30 TABLET | Refills: 0 | Status: SHIPPED
Start: 2017-04-08 | End: 2017-10-10 | Stop reason: DRUGHIGH

## 2017-04-08 RX ORDER — WARFARIN SODIUM 6 MG/1
6 TABLET ORAL NIGHTLY
Status: DISCONTINUED | OUTPATIENT
Start: 2017-04-08 | End: 2017-04-08

## 2017-04-08 RX ORDER — POTASSIUM CHLORIDE 20 MEQ/1
20 TABLET, EXTENDED RELEASE ORAL
Qty: 10 TABLET | Refills: 0 | Status: SHIPPED
Start: 2017-04-10 | End: 2017-10-30

## 2017-04-08 RX ORDER — LEVOTHYROXINE SODIUM 112 UG/1
112 TABLET ORAL
Qty: 30 TABLET | Refills: 0 | Status: SHIPPED
Start: 2017-04-08 | End: 2017-10-23

## 2017-04-08 RX ORDER — HYDROCODONE BITARTRATE AND ACETAMINOPHEN 10; 325 MG/1; MG/1
1 TABLET ORAL EVERY 6 HOURS PRN
Qty: 20 TABLET | Refills: 0 | Status: SHIPPED | OUTPATIENT
Start: 2017-04-08 | End: 2017-07-21

## 2017-04-08 RX ORDER — GUAIFENESIN/DEXTROMETHORPHAN 100-10MG/5
10 SYRUP ORAL EVERY 12 HOURS PRN
Qty: 1 BOTTLE | Refills: 0 | Status: SHIPPED | OUTPATIENT
Start: 2017-04-08 | End: 2017-10-10 | Stop reason: ALTCHOICE

## 2017-04-08 RX ORDER — PANTOPRAZOLE SODIUM 40 MG/1
40 TABLET, DELAYED RELEASE ORAL
Qty: 30 TABLET | Refills: 0 | Status: SHIPPED
Start: 2017-04-08 | End: 2020-04-08

## 2017-04-08 RX ORDER — TEMAZEPAM 30 MG/1
30 CAPSULE ORAL NIGHTLY PRN
Qty: 30 CAPSULE | Refills: 0 | Status: SHIPPED | OUTPATIENT
Start: 2017-04-08 | End: 2017-06-21

## 2017-04-08 NOTE — DISCHARGE PLANNING
SW informed that the patient is stable for d/c today. SW left a message for [facility] and is awaiting a return call. 4pm: [facility] will accept the patient. RN to call [phone] for report to TAMMIE Escamilla.   [facility] [phone]

## 2017-04-08 NOTE — DISCHARGE SUMMARY
[facility] HOSPITALIST  DISCHARGE SUMMARY     Ramos Win Patient Status:  Inpatient    12/3/1940 MRN [id number]   Location [facility] 5SW/SE Attending Mila Telles MD   Hosp Day # 6 PCP Riki Driver MD     Date of Admission:  Patient believes she had an ultrasound done at the nursing facility, but we are unable to locate the results of this at this point.  The emergency room physician spoke with Infectious Disease, and the patient was started on cefepime and vancomycin with fur dose  - recheck in 4 weeks     Significant degenerative joint disease of the lumbar spine.     - continue PT /OT  - back to rehab when stable    Depression  - continue home meds    DVT prophylaxis.  The patient is on a heparin drip, bridge to coumadin  Dis by mouth twice a week.     Quantity:  30 tablet   Refills:  0       Guaifenesin--10 MG/5ML Syrp   Commonly known as:  ROBITUSSIN DM   What changed:    - medication strength  - when to take this  - reasons to take this        Take 10 mL by mouth every Tabs   Last time this was given:  1 tablet on 4/8/2017  9:10 AM        Take 1 tablet by mouth daily.     Refills:  0       CEPACOL SORE THROAT & COUGH 5-7.5 MG Lozg   Generic drug:  Dextromethorphan-Benzocaine        Use as directed 1 tablet in the mouth or Refills:  0       temazepam 30 MG Caps   Last time this was given:  30 mg on 4/7/2017 10:40 PM   Commonly known as:  RESTORIL        Take 1 capsule (30 mg total) by mouth nightly as needed for Sleep.     Quantity:  30 capsule   Refills:  0       Venlafaxine [97.7 °F (36.5 °C)-98.4 °F (36.9 °C)] 98.4 °F (36.9 °C)  Pulse:  [80] 80  Resp:  [20-22] 20  BP: (120-143)/(31-53) 121/50 mmHg    Physical Exam:    General: No acute distress. Respiratory: Clear to auscultation bilaterally. No wheezes. No rhonchi.   Jody Cartwright

## 2017-04-08 NOTE — TELEPHONE ENCOUNTER
Hypertensive Medications  Protocol Criteria:  · Appointment scheduled in the past 6 months or in the next 3 months  · BMP or CMP in the past 12 months  · Creatinine result < 2    LAST ov 2.14.17      Lab Results  Component Value Date   GLU 92 04/08/2017

## 2017-04-08 NOTE — PLAN OF CARE
Problem: Patient/Family Goals  Goal: Patient/Family Long Term Goal  Patient’s Long Term Goal: return home    Interventions:  - take all prescribed medications as ordered  - follow treatment recommendation of MD  - See additional Care Plan goals for specifi know as we care for you?  - Provide timely, complete, and accurate information to patient/family  - Incorporate patient and family knowledge, values, beliefs, and cultural backgrounds into the planning and delivery of care  - Encourage patient/family to pa

## 2017-04-08 NOTE — OCCUPATIONAL THERAPY NOTE
OCCUPATIONAL THERAPY EVALUATION - INPATIENT     Room Number: 539/539-A  Evaluation Date: 4/8/2017  Type of Evaluation: Initial  Presenting Problem:  (Recurrent Cellulitis LLE)    Physician Order: IP Consult to Occupational Therapy  Reason for Therapy: ADL/ sleep apnea    • Cervical disc disease 1985,1995,2003,2009     Cervical Discectomy    • Cholecystitis 1984     Cholecystectomy    • Tonsillitis 1950     Tonsillitis    • Brain aneurysm      Brain Aneurysm, Stent placed    • Arthritis of both knees      jose eduardo was independent with all adls? When treating PT came in during this session, she then told PT she was fine walking everywhere, sometimes uses her motorized scooter. SUBJECTIVE  \"I don't know we have to do any of these things, I'm fine. \"    Patient latha assistance    Toilet Transfer: Mod - assessed through functional sit<>stand transfer from [facility] Transfer:  Mod - assessed through functional sit<>stand transfer from eob  Chair Transfer:  Mod - assessed through functional sit<>stand transfer from eob

## 2017-04-08 NOTE — PROGRESS NOTES
Report given to [facility] at [facility]. Daughter aware of transfer tonight.

## 2017-04-08 NOTE — PLAN OF CARE
Problem: Patient/Family Goals  Goal: Patient/Family Long Term Goal  Patient’s Long Term Goal: return home    Interventions:  - take all prescribed medications as ordered  - follow treatment recommendation of MD  - See additional Care Plan goals for specifi delivery of care  - Encourage patient/family to participate in care and decision-making at the level they choose  - Honor patient and family perspectives and choices   Outcome: Progressing    Problem: HEMATOLOGIC - ADULT  Goal: Maintains hematologic stabil

## 2017-04-08 NOTE — PHYSICAL THERAPY NOTE
PHYSICAL THERAPY TREATMENT NOTE - INPATIENT    Room Number: 539/539-A       Presenting Problem:  B LE cellulitis    Problem List  Principal Problem:    Cellulitis of both lower extremities  Active Problems:    Azotemia    Lung infiltrate    Acute renal inj rate  Location: B LE  Management Techniques:  Activity promotion;Repositioning    BALANCE                                                                                                                     Static Sitting: Normal  Dynamic Sitting: Good ambulate 25 feet with assist device: walker - rolling at assistance level: supervision    Goal #3    Current Status   NT   Goal #4  The pt will be independent with her B LE HEP to improve strength and mobility.    Goal #4    Current Status   IN PROGRESS

## 2017-04-19 ENCOUNTER — TELEPHONE (OUTPATIENT)
Dept: INTERNAL MEDICINE CLINIC | Facility: CLINIC | Age: 77
End: 2017-04-19

## 2017-04-19 NOTE — TELEPHONE ENCOUNTER
Need order for home health. Being discharged from rehab.  Saturday and they will start care on Sunday 4/29

## 2017-04-21 ENCOUNTER — TELEPHONE (OUTPATIENT)
Dept: INTERNAL MEDICINE CLINIC | Facility: CLINIC | Age: 77
End: 2017-04-21

## 2017-04-21 NOTE — TELEPHONE ENCOUNTER
[facility] calling to verify if Dr BISHOP will follow this pt with [facility] orders or last visit with

## 2017-04-24 ENCOUNTER — TELEPHONE (OUTPATIENT)
Dept: INTERNAL MEDICINE CLINIC | Facility: CLINIC | Age: 77
End: 2017-04-24

## 2017-04-24 NOTE — TELEPHONE ENCOUNTER
Pt was discharged from rehab on 4-22-17 and was told to make an appt with her pcp. Pt would like to see Dr. Tavon Mondragon only this week at the [facility] location only, there are no available appointments. Per pt she is not having any symptoms now.

## 2017-04-24 NOTE — TELEPHONE ENCOUNTER
Nadege/SOTERO stating pt is home from the hospital. Pt INR is low 1.3 and PT is 15.1. She was last checked at the rehab on Friday 04/21/17 and her INR was 1.7. Since then she took 4.5mg on Friday, Saturday and Sunday.   Also would like to know if pt A1C could be

## 2017-04-24 NOTE — TELEPHONE ENCOUNTER
Coumadin clinic closed for the day, last pt 3:45. NORBERTO, RN calling back for coumadin dose for tonight . Message sent to PCP and oncall for assistance in this.   Thank you

## 2017-04-25 ENCOUNTER — ANTI-COAG VISIT (OUTPATIENT)
Dept: INTERNAL MEDICINE CLINIC | Facility: CLINIC | Age: 77
End: 2017-04-25

## 2017-04-25 DIAGNOSIS — I82.409 DEEP VEIN THROMBOSIS (DVT) OF LOWER EXTREMITY, UNSPECIFIED CHRONICITY, UNSPECIFIED LATERALITY, UNSPECIFIED VEIN (HCC): Primary | ICD-10-CM

## 2017-04-25 NOTE — TELEPHONE ENCOUNTER
Patient states she just got out of the hosp.  And no one is calling her or the nurse that called yesterday, that she has to see a doctor attila  She states her coumadin has to be check, she was veryupset please call her, she was saying a lot of bad things

## 2017-04-26 NOTE — TELEPHONE ENCOUNTER
Elliot lester she would like to come in on Friday at the [address] [facility],  [address] location.  Please advise

## 2017-04-26 NOTE — TELEPHONE ENCOUNTER
I am absolutely booked  Tomorrow and Friday  Does she want to come in this Friday at [facility]    i will have to use my lunch our    Let me know  Ask her first then let me know

## 2017-04-28 ENCOUNTER — TELEPHONE (OUTPATIENT)
Dept: INTERNAL MEDICINE CLINIC | Facility: CLINIC | Age: 77
End: 2017-04-28

## 2017-04-28 ENCOUNTER — ANTI-COAG VISIT (OUTPATIENT)
Dept: INTERNAL MEDICINE CLINIC | Facility: CLINIC | Age: 77
End: 2017-04-28

## 2017-04-28 DIAGNOSIS — I82.409 DEEP VEIN THROMBOSIS (DVT) OF LOWER EXTREMITY, UNSPECIFIED CHRONICITY, UNSPECIFIED LATERALITY, UNSPECIFIED VEIN (HCC): Primary | ICD-10-CM

## 2017-04-28 NOTE — TELEPHONE ENCOUNTER
Virginia Christiansen from [facility] calling to give pt INR. Pt INR is 2.6 and PT 31.3,  Gayathri want to know her next dosage and she should draw it. .. please advise

## 2017-05-02 RX ORDER — MIRTAZAPINE 30 MG/1
TABLET, FILM COATED ORAL
Qty: 90 TABLET | Refills: 0 | Status: SHIPPED | OUTPATIENT
Start: 2017-05-02 | End: 2017-10-23

## 2017-05-03 ENCOUNTER — TELEPHONE (OUTPATIENT)
Dept: INTERNAL MEDICINE CLINIC | Facility: CLINIC | Age: 77
End: 2017-05-03

## 2017-05-03 NOTE — TELEPHONE ENCOUNTER
Alen Harman calling requesting refill. Current Outpatient Prescriptions:  Venlafaxine HCl  MG Oral Capsule SR 24 Hr Take 2 capsules (300 mg total) by mouth daily.  Disp: 180 capsule Rfl: 0`

## 2017-05-03 NOTE — TELEPHONE ENCOUNTER
Ruby Haddad states that she will be faxing orders for the doctor to sign. Please, sign and fax back to [phone].

## 2017-05-04 ENCOUNTER — OFFICE VISIT (OUTPATIENT)
Dept: INTERNAL MEDICINE CLINIC | Facility: CLINIC | Age: 77
End: 2017-05-04

## 2017-05-04 ENCOUNTER — TELEPHONE (OUTPATIENT)
Dept: INTERNAL MEDICINE CLINIC | Facility: CLINIC | Age: 77
End: 2017-05-04

## 2017-05-04 VITALS
SYSTOLIC BLOOD PRESSURE: 159 MMHG | RESPIRATION RATE: 20 BRPM | DIASTOLIC BLOOD PRESSURE: 97 MMHG | BODY MASS INDEX: 40 KG/M2 | WEIGHT: 265 LBS | OXYGEN SATURATION: 93 % | HEART RATE: 79 BPM | TEMPERATURE: 98 F

## 2017-05-04 DIAGNOSIS — I82.412 ACUTE DEEP VEIN THROMBOSIS (DVT) OF FEMORAL VEIN OF LEFT LOWER EXTREMITY (HCC): ICD-10-CM

## 2017-05-04 DIAGNOSIS — N39.46 MIXED STRESS AND URGE URINARY INCONTINENCE: Primary | ICD-10-CM

## 2017-05-04 DIAGNOSIS — F33.40 RECURRENT MAJOR DEPRESSIVE DISORDER, IN REMISSION (HCC): ICD-10-CM

## 2017-05-04 DIAGNOSIS — E66.01 MORBID OBESITY DUE TO EXCESS CALORIES (HCC): ICD-10-CM

## 2017-05-04 DIAGNOSIS — L03.119 CELLULITIS OF LOWER EXTREMITY, UNSPECIFIED LATERALITY: ICD-10-CM

## 2017-05-04 DIAGNOSIS — J42 CHRONIC BRONCHITIS, UNSPECIFIED CHRONIC BRONCHITIS TYPE (HCC): ICD-10-CM

## 2017-05-04 DIAGNOSIS — F41.9 ANXIETY: ICD-10-CM

## 2017-05-04 DIAGNOSIS — G47.33 OSA (OBSTRUCTIVE SLEEP APNEA): ICD-10-CM

## 2017-05-04 PROCEDURE — G0463 HOSPITAL OUTPT CLINIC VISIT: HCPCS | Performed by: INTERNAL MEDICINE

## 2017-05-04 PROCEDURE — 99214 OFFICE O/P EST MOD 30 MIN: CPT | Performed by: INTERNAL MEDICINE

## 2017-05-04 RX ORDER — SULFAMETHOXAZOLE AND TRIMETHOPRIM 800; 160 MG/1; MG/1
1 TABLET ORAL 2 TIMES DAILY
Qty: 28 TABLET | Refills: 1 | Status: SHIPPED | OUTPATIENT
Start: 2017-05-04 | End: 2017-10-10

## 2017-05-04 RX ORDER — WARFARIN SODIUM 5 MG/1
5 TABLET ORAL ONCE
COMMUNITY
Start: 2017-04-25 | End: 2017-06-21

## 2017-05-04 RX ORDER — OXYBUTYNIN CHLORIDE 5 MG/1
5 TABLET, EXTENDED RELEASE ORAL DAILY
Qty: 90 TABLET | Refills: 1 | Status: SHIPPED | OUTPATIENT
Start: 2017-05-04 | End: 2017-10-14

## 2017-05-04 RX ORDER — SACCHAROMYCES BOULARDII 250 MG
250 CAPSULE ORAL 2 TIMES DAILY
Qty: 60 CAPSULE | Refills: 0 | Status: SHIPPED | OUTPATIENT
Start: 2017-05-04 | End: 2017-10-10

## 2017-05-04 RX ORDER — WARFARIN SODIUM 2 MG/1
2 TABLET ORAL ONCE
COMMUNITY
Start: 2017-04-25 | End: 2017-06-21

## 2017-05-04 NOTE — TELEPHONE ENCOUNTER
Yulia Galicia from [place] [address] is checking status on refaxed documents and wanting to know if Dr. Umu Osorio has completed the documents. Please advise.

## 2017-05-04 NOTE — TELEPHONE ENCOUNTER
Pt stating that the rx listed below that was prescribed today is too high on cost $64.00. Pt cannot afford that and would like to know if there is another option?     Current Outpatient Prescriptions:  saccharomyces boulardii (FLORASTOR) 250 MG Oral Cap [place]

## 2017-05-05 NOTE — PROGRESS NOTES
HPI:    Patient ID: Igor Fisher is a 64year old female.     HPI    Follow up post hospitalizaiton  She is feeling better  Left lower leg red warm and tender       Presents with electric powered wheelchair awake alert in [facility] Butt Dehydration 2012     Fluids, Medication adjustment    • Toe pain      Onychomycosis, Debridement , Hammertoe    • Onychomycosis      Debridement    • Hammertoe 02/25/2011     hammertoe 5 left, pain   • Pneumonia 2012          Past Surgical History    ANEST 228 lb 1 oz (103.448 kg)  03/07/17 : 270 lb (122.471 kg)  02/27/17 : 254 lb (115.214 kg)  01/11/17 : 286 lb 3.2 oz (129.819 kg)  12/25/16 : 262 lb 14.4 oz (119.251 kg)    Body mass index is 40.3 kg/(m^2). Rodolfo Martinez     Review of Systems   Constitutional: Negative BY MOUTH EVERY NIGHT Disp: 90 tablet Rfl: 0   HYDROcodone-acetaminophen  MG Oral Tab Take 1 tablet by mouth every 6 (six) hours as needed for Pain.  Disp: 20 tablet Rfl: 0   LORazepam 1 MG Oral Tab Take 1 tablet (1 mg total) by mouth 2 (two) times johnson Disp:  Rfl:    acetaminophen 325 MG Oral Tab Take 325 mg by mouth every 6 (six) hours as needed for Pain.  Disp:  Rfl:    Dextromethorphan-Benzocaine (CEPACOL SORE THROAT & COUGH) 5-7.5 MG Mouth/Throat Lozenge Use as directed 1 tablet in the mouth or throat Cervical Discectomy    • Cholecystitis 1984     Cholecystectomy    • Tonsillitis 1950     Tonsillitis    • Brain aneurysm      Brain Aneurysm, Stent placed    • Arthritis of both knees      knee replacement    • Arthritis of right hip 2009   • Other and un Status: Former Smoker                   Packs/Day: 0.00  Years:           Types: Cigarettes      Quit date: 01/01/1977    Smokeless Status: Never Used                        Comment: 1977    Alcohol Use: No                 PHYSICAL EXAM:    Physical Exam Dietary and lifestyle change discussed. Modification of risk for CAD discussed.   Patient voiced understanding and agrees with current plan and management.      (F33.40) Recurrent major depressive disorder, in remission ([facility]  Plan:  Refill med  Pt to foll

## 2017-05-08 ENCOUNTER — TELEPHONE (OUTPATIENT)
Dept: INTERNAL MEDICINE CLINIC | Facility: CLINIC | Age: 77
End: 2017-05-08

## 2017-05-08 ENCOUNTER — ANTI-COAG VISIT (OUTPATIENT)
Dept: INTERNAL MEDICINE CLINIC | Facility: CLINIC | Age: 77
End: 2017-05-08

## 2017-05-08 DIAGNOSIS — I82.409 DEEP VEIN THROMBOSIS (DVT) OF LOWER EXTREMITY, UNSPECIFIED CHRONICITY, UNSPECIFIED LATERALITY, UNSPECIFIED VEIN (HCC): Primary | ICD-10-CM

## 2017-05-08 RX ORDER — FUROSEMIDE 40 MG/1
TABLET ORAL
Qty: 30 TABLET | Refills: 11 | Status: SHIPPED | OUTPATIENT
Start: 2017-05-08 | End: 2018-05-25

## 2017-05-08 NOTE — TELEPHONE ENCOUNTER
Per Rn calling to report PT 17.0 and INR 1.4 - Martin Crespo pt was not home Friday, and so was not monitored Friday, and Martin Crespo pt decided to take 5 mg coumadin  Friday Saturday and Sunday, and she is taking Rx Bactrum antibiotic.

## 2017-05-09 ENCOUNTER — TELEPHONE (OUTPATIENT)
Dept: INTERNAL MEDICINE CLINIC | Facility: CLINIC | Age: 77
End: 2017-05-09

## 2017-05-09 NOTE — TELEPHONE ENCOUNTER
Hold of on antibiotics for next 48 hours. Call back with status of rash. If she begins to have fever, rash, difficulty breathing then she is to go to the ED immediately.

## 2017-05-09 NOTE — TELEPHONE ENCOUNTER
Reason for call changed to acute. Ike Quezada RN states that pt has mostly flat red rash all over her body. Some patches on her thighs are raised, like welts but they are not itchy. Pt did stop the bactrim (no doses today).   The apartment was checked for bed bu dots (no fever and sounds well to triager)  * Joint pain or swelling  * Bloody crusts on lips or in mouth  * Large or small blisters on skin (i.e., fluid filled bubbles or sacs)    Care Advice Discussed:   * Stopping the Medication

## 2017-05-11 RX ORDER — VENLAFAXINE HYDROCHLORIDE 150 MG/1
300 CAPSULE, EXTENDED RELEASE ORAL DAILY
Qty: 180 CAPSULE | Refills: 0 | Status: SHIPPED | OUTPATIENT
Start: 2017-05-11 | End: 2017-09-02

## 2017-05-12 ENCOUNTER — TELEPHONE (OUTPATIENT)
Dept: INTERNAL MEDICINE CLINIC | Facility: CLINIC | Age: 77
End: 2017-05-12

## 2017-05-12 RX ORDER — CEFUROXIME AXETIL 250 MG/1
250 TABLET ORAL 2 TIMES DAILY
Qty: 28 TABLET | Refills: 0 | Status: SHIPPED | OUTPATIENT
Start: 2017-05-12 | End: 2017-05-17

## 2017-05-12 NOTE — TELEPHONE ENCOUNTER
Per Juwan Chaudhry the on call doctor took pt off of bactrim due to a reaction she had to it.  Pt is complaining that the cellulitis is back and would like another antibiotic

## 2017-05-12 NOTE — TELEPHONE ENCOUNTER
i sent ceftin to her pharmacy   Follow up is not improivng within the next week    Add bactrim to her allergy list  What was her reaction?

## 2017-05-17 ENCOUNTER — ANTI-COAG VISIT (OUTPATIENT)
Dept: INTERNAL MEDICINE CLINIC | Facility: CLINIC | Age: 77
End: 2017-05-17

## 2017-05-17 ENCOUNTER — TELEPHONE (OUTPATIENT)
Dept: INTERNAL MEDICINE CLINIC | Facility: CLINIC | Age: 77
End: 2017-05-17

## 2017-05-17 DIAGNOSIS — I82.409 DEEP VEIN THROMBOSIS (DVT) OF LOWER EXTREMITY, UNSPECIFIED CHRONICITY, UNSPECIFIED LATERALITY, UNSPECIFIED VEIN (HCC): Primary | ICD-10-CM

## 2017-05-17 RX ORDER — CEFUROXIME AXETIL 250 MG/1
250 TABLET ORAL 2 TIMES DAILY
Qty: 28 TABLET | Refills: 0 | Status: SHIPPED | OUTPATIENT
Start: 2017-05-17 | End: 2017-10-10 | Stop reason: ALTCHOICE

## 2017-05-17 NOTE — TELEPHONE ENCOUNTER
HH stated she called the pharmacy who stated they did not receive a script as yet. Ceptin was sent on 5/12/17. I resent to the pharmacy. HH will contact the patient and inform.

## 2017-05-17 NOTE — TELEPHONE ENCOUNTER
Pt waiting on antibiotic to still be sent to the pharmacy. Pt was suppose to be on them since Saturday.

## 2017-05-18 ENCOUNTER — TELEPHONE (OUTPATIENT)
Dept: INTERNAL MEDICINE CLINIC | Facility: CLINIC | Age: 77
End: 2017-05-18

## 2017-05-18 NOTE — TELEPHONE ENCOUNTER
Actions Requested: appointment  Situation/Background   Problem: rash on arms and legs   Onset: 1 week   Associated Symptoms: Pt stts rash on arms and legs getting worse. Denies fever,itching ,pain or  trouble breathing. Not taking antibiotic currently.    H Patient sounds very sick or weak to the triager  * Fever  * Face becomes swollen  * Headache  * Purple or blood-colored spots or dots (no fever and sounds well to triager)  * Joint pain or swelling  * Sores in mouth  * Rash looks like large or small bliste

## 2017-05-19 ENCOUNTER — OFFICE VISIT (OUTPATIENT)
Dept: INTERNAL MEDICINE CLINIC | Facility: CLINIC | Age: 77
End: 2017-05-19

## 2017-05-19 VITALS
SYSTOLIC BLOOD PRESSURE: 160 MMHG | HEART RATE: 89 BPM | DIASTOLIC BLOOD PRESSURE: 70 MMHG | HEIGHT: 68 IN | TEMPERATURE: 99 F | RESPIRATION RATE: 18 BRPM

## 2017-05-19 DIAGNOSIS — R06.89 RESPIRATORY INSUFFICIENCY: ICD-10-CM

## 2017-05-19 DIAGNOSIS — I10 ESSENTIAL HYPERTENSION: ICD-10-CM

## 2017-05-19 DIAGNOSIS — I82.412 ACUTE DEEP VEIN THROMBOSIS (DVT) OF FEMORAL VEIN OF LEFT LOWER EXTREMITY (HCC): ICD-10-CM

## 2017-05-19 DIAGNOSIS — L03.116 CELLULITIS OF LEFT LOWER EXTREMITY: ICD-10-CM

## 2017-05-19 DIAGNOSIS — L50.9 HIVES OF UNKNOWN ORIGIN: Primary | ICD-10-CM

## 2017-05-19 DIAGNOSIS — F33.40 RECURRENT MAJOR DEPRESSIVE DISORDER, IN REMISSION (HCC): ICD-10-CM

## 2017-05-19 DIAGNOSIS — F33.9 RECURRENT MAJOR DEPRESSIVE DISORDER, REMISSION STATUS UNSPECIFIED (HCC): ICD-10-CM

## 2017-05-19 PROCEDURE — G0463 HOSPITAL OUTPT CLINIC VISIT: HCPCS | Performed by: INTERNAL MEDICINE

## 2017-05-19 PROCEDURE — 99214 OFFICE O/P EST MOD 30 MIN: CPT | Performed by: INTERNAL MEDICINE

## 2017-05-19 RX ORDER — METHYLPREDNISOLONE 4 MG/1
TABLET ORAL
Qty: 1 TABLET | Refills: 0 | Status: SHIPPED | OUTPATIENT
Start: 2017-05-19 | End: 2017-10-10 | Stop reason: ALTCHOICE

## 2017-05-19 RX ORDER — LEVOCETIRIZINE DIHYDROCHLORIDE 5 MG/1
5 TABLET, FILM COATED ORAL EVERY EVENING
Qty: 90 TABLET | Refills: 0 | Status: SHIPPED | OUTPATIENT
Start: 2017-05-19 | End: 2017-10-10

## 2017-05-23 ENCOUNTER — TELEPHONE (OUTPATIENT)
Dept: INTERNAL MEDICINE CLINIC | Facility: CLINIC | Age: 77
End: 2017-05-23

## 2017-05-24 ENCOUNTER — ANTI-COAG VISIT (OUTPATIENT)
Dept: INTERNAL MEDICINE CLINIC | Facility: CLINIC | Age: 77
End: 2017-05-24

## 2017-05-24 ENCOUNTER — TELEPHONE (OUTPATIENT)
Dept: INTERNAL MEDICINE CLINIC | Facility: CLINIC | Age: 77
End: 2017-05-24

## 2017-05-24 DIAGNOSIS — I82.409 DEEP VEIN THROMBOSIS (DVT) OF LOWER EXTREMITY, UNSPECIFIED CHRONICITY, UNSPECIFIED LATERALITY, UNSPECIFIED VEIN (HCC): Primary | ICD-10-CM

## 2017-05-24 NOTE — TELEPHONE ENCOUNTER
Patient needs an apt in order to get her Power scooter from [employer] renewed. Patient wants apt on June 2 nd  Prefer morning early afternoon.

## 2017-05-26 NOTE — TELEPHONE ENCOUNTER
PA for Venlafaxine HCL  mg cap completed with Zenaida via CMM response time 3-5 business days, KEY [id number].

## 2017-05-26 NOTE — TELEPHONE ENCOUNTER
Patient is calling in stating whether or not she can just drop of the forms to fill out the forms needed? Or does she needed an appointment for Dr. Zoë Hagen to do so - because that is why she is specifically asking for an demarco.  On June 2 - to have Dr. Alisia Ramos

## 2017-05-26 NOTE — TELEPHONE ENCOUNTER
[facility] calling and in order to proceed with the PA, they need some clinical information, such as the pts dx and if the pt can take a generic.   Please advise

## 2017-05-29 ENCOUNTER — PATIENT MESSAGE (OUTPATIENT)
Dept: INTERNAL MEDICINE CLINIC | Facility: CLINIC | Age: 77
End: 2017-05-29

## 2017-05-29 NOTE — TELEPHONE ENCOUNTER
From: Ramos Win  To: Sven Vogel MD  Sent: 5/29/2017 3:25 PM CDT  Subject: Other    Dr. Brandon Massey I need a face to face appointment. I have the paperwork for my qualification for a new Scooter. Medicare is not making it easy.  It can only be done w

## 2017-05-29 NOTE — TELEPHONE ENCOUNTER
Contacted [employer] spoke rep Jasmin Corea and medication has been approved effective 5/26/2017-5/26/2018 REF# [id number]. Patient notified via [address]

## 2017-06-01 ENCOUNTER — OFFICE VISIT (OUTPATIENT)
Dept: INTERNAL MEDICINE CLINIC | Facility: CLINIC | Age: 77
End: 2017-06-01

## 2017-06-01 VITALS
SYSTOLIC BLOOD PRESSURE: 165 MMHG | HEART RATE: 76 BPM | DIASTOLIC BLOOD PRESSURE: 85 MMHG | TEMPERATURE: 98 F | RESPIRATION RATE: 18 BRPM

## 2017-06-01 DIAGNOSIS — M43.16 SPONDYLOLISTHESIS OF LUMBAR REGION: ICD-10-CM

## 2017-06-01 DIAGNOSIS — J44.1 CHRONIC OBSTRUCTIVE PULMONARY DISEASE WITH ACUTE EXACERBATION (HCC): ICD-10-CM

## 2017-06-01 DIAGNOSIS — M96.1 LUMBAR POST-LAMINECTOMY SYNDROME: ICD-10-CM

## 2017-06-01 DIAGNOSIS — J96.01 ACUTE RESPIRATORY FAILURE WITH HYPOXIA (HCC): ICD-10-CM

## 2017-06-01 DIAGNOSIS — M54.42 CHRONIC LEFT-SIDED LOW BACK PAIN WITH LEFT-SIDED SCIATICA: ICD-10-CM

## 2017-06-01 DIAGNOSIS — R10.32 BILATERAL GROIN PAIN: ICD-10-CM

## 2017-06-01 DIAGNOSIS — L03.119 CELLULITIS OF LOWER EXTREMITY, UNSPECIFIED LATERALITY: ICD-10-CM

## 2017-06-01 DIAGNOSIS — M48.061 LUMBAR STENOSIS: ICD-10-CM

## 2017-06-01 DIAGNOSIS — M51.36 LUMBAR DEGENERATIVE DISC DISEASE: ICD-10-CM

## 2017-06-01 DIAGNOSIS — G89.29 CHRONIC LEFT-SIDED LOW BACK PAIN WITH LEFT-SIDED SCIATICA: ICD-10-CM

## 2017-06-01 DIAGNOSIS — N39.46 MIXED STRESS AND URGE URINARY INCONTINENCE: ICD-10-CM

## 2017-06-01 DIAGNOSIS — I82.409 DEEP VEIN THROMBOSIS (DVT) OF LOWER EXTREMITY, UNSPECIFIED CHRONICITY, UNSPECIFIED LATERALITY, UNSPECIFIED VEIN (HCC): ICD-10-CM

## 2017-06-01 DIAGNOSIS — M54.16 LUMBAR RADICULOPATHY: ICD-10-CM

## 2017-06-01 DIAGNOSIS — R06.89 RESPIRATORY INSUFFICIENCY: ICD-10-CM

## 2017-06-01 DIAGNOSIS — R10.31 BILATERAL GROIN PAIN: ICD-10-CM

## 2017-06-01 DIAGNOSIS — M43.10 RETROLISTHESIS OF VERTEBRAE: ICD-10-CM

## 2017-06-01 DIAGNOSIS — Z86.718 HISTORY OF DVT (DEEP VEIN THROMBOSIS): ICD-10-CM

## 2017-06-01 DIAGNOSIS — R26.9 GAIT ABNORMALITY: ICD-10-CM

## 2017-06-01 DIAGNOSIS — R60.0 LOCALIZED EDEMA: ICD-10-CM

## 2017-06-01 DIAGNOSIS — M51.26 LUMBAR HERNIATED DISC: Primary | ICD-10-CM

## 2017-06-01 DIAGNOSIS — J42 CHRONIC BRONCHITIS, UNSPECIFIED CHRONIC BRONCHITIS TYPE (HCC): ICD-10-CM

## 2017-06-01 DIAGNOSIS — E66.01 MORBID OBESITY DUE TO EXCESS CALORIES (HCC): ICD-10-CM

## 2017-06-01 PROBLEM — J18.9 PNEUMONIA: Status: RESOLVED | Noted: 2017-01-10 | Resolved: 2017-06-01

## 2017-06-01 PROCEDURE — G0463 HOSPITAL OUTPT CLINIC VISIT: HCPCS | Performed by: INTERNAL MEDICINE

## 2017-06-01 PROCEDURE — 99215 OFFICE O/P EST HI 40 MIN: CPT | Performed by: INTERNAL MEDICINE

## 2017-06-01 NOTE — PROGRESS NOTES
HPI:    Patient ID: Igor Fisher is a 64year old female. HPI      Face to face Exam  FOR MOTORIZED WHEELCHAIR      Review of Systems   Constitutional: Negative for fever, chills, activity change and fatigue.    HENT: Negative for ear discharge, noseb Sulfamethoxazole-TMP DS (BACTRIM DS) 800-160 MG Oral Tab per tablet Take 1 tablet by mouth 2 (two) times daily.  OK to give with warfarin  Will monitor INR level Disp: 28 tablet Rfl: 1   saccharomyces boulardii (FLORASTOR) 250 MG Oral Cap Take 1 capsule ( 2 (two) times daily. Disp:  Rfl:    Calcium Carb-Cholecalciferol (OS-JESSICA CALCIUM + D3) 500-200 MG-UNIT Oral Tab Take 1 tablet by mouth daily. Disp:  Rfl:    Multiple Vitamins-Minerals (CENTRUM SILVER ULTRA WOMENS) Oral Tab Take 1 tablet by mouth daily.  Dis unspecified whether generalized or localized, unspecified site    • Hypothyroidism    • Unspecified sleep apnea    • Cervical disc disease 1985,1995,2003,2009     Cervical Discectomy    • Cholecystitis 1984     Cholecystectomy    • Tonsillitis 1950     Ton Jesus Santana Maternal Grandfather    • Other[other] [OTHER] Paternal Grandmother    • Other[other] [OTHER] Paternal Grandfather       Social History:   Smoking Status: Former Smoker                   Packs/Day: 0.00  Years:           Types: Cigaret physician had a face-to-face encounter with this patient during which a medical condition was addressed which is the primary reason for MOTORIZED WHEELCHAIR (.jUNE 1 , 2017 of face-to-face encounter).     1.  Please provide a brief narrative describing the using a nebulizer has ahd multiple hospitalizeiton for exACERBATION OF copd  AND RECENTLY DIAGNOSED WITH DVT NOW ON COUMADIN FOR ANTICOAGULATION AND AT HIGH RISK FOR FALLING    (I82.409) Deep vein thrombosis (DVT) of lower extremity, unspecified chronicity FO 5 YEARS NOW   AND SHE IS REQUESTING TO BE ABLE TO CONTINUE USING        Date: 6/1/17  Physician:  David Linares MD    Pt brought in form  And instrucitons   For approval for motorized wheelchair  Form is extensive   Pt with multiple medical problems  I

## 2017-06-02 ENCOUNTER — TELEPHONE (OUTPATIENT)
Dept: INTERNAL MEDICINE CLINIC | Facility: CLINIC | Age: 77
End: 2017-06-02

## 2017-06-02 DIAGNOSIS — Z12.31 SCREENING MAMMOGRAM, ENCOUNTER FOR: Primary | ICD-10-CM

## 2017-06-02 NOTE — TELEPHONE ENCOUNTER
Pt requesting a mammogram and pneumonia vaccine. Her last order for mammogram has . Please review pended order and sign if appropriate. Her last pneumonia vaccine was the pneumovax 23 on 2015. I think she is due for prevnar 13.  I also pended th

## 2017-06-02 NOTE — TELEPHONE ENCOUNTER
Pt sent message via my chart requesting orders for mammogram and pneumonia vaccination. Please call or send my chart message when approved.     Patient Demographics      Address Phone E-mail Address     Ike Ramos [phone]

## 2017-06-02 NOTE — PROGRESS NOTES
HPI:    Patient ID: Teo Chappell is a 64year old female. HPI    RASH  Arms legs  trunkDenies fever,itching ,pain or trouble breathing. Not taking antibiotic currently.   Sudden onset stared yesterday  /70 mmHg  Pulse 89  Temp(Src) 98.9 °F (37.2 (RESTORIL) 30 MG Oral Cap Take 1 capsule (30 mg total) by mouth nightly as needed for Sleep. Disp: 30 capsule Rfl: 0   ipratropium-albuterol 0.5-2.5 (3) MG/3ML Inhalation Solution Take 3 mL by nebulization as needed.  Disp: 100 vial Rfl: 0   Potassium Chlor DAY IN THE MORNING Disp: 30 tablet Rfl: 11   Sulfamethoxazole-TMP DS (BACTRIM DS) 800-160 MG Oral Tab per tablet Take 1 tablet by mouth 2 (two) times daily.  OK to give with warfarin  Will monitor INR level Disp: 28 tablet Rfl: 1   saccharomyces boulardii ( Tonsillitis    • Brain aneurysm      Brain Aneurysm, Stent placed    • Arthritis of both knees      knee replacement    • Arthritis of right hip 2009   • Other and unspecified hyperlipidemia    • Dehydration 2012     Fluids, Medication adjustment    • Toe Quit date: 01/01/1977    Smokeless Status: Never Used                        Comment: 1977    Alcohol Use: No                 PHYSICAL EXAM:    Physical Exam   Constitutional: She is oriented to person, place, and time.  She appears well-developed and we This Visit:  Signed Prescriptions Disp Refills    methylPREDNISolone (MEDROL) 4 MG Oral Tablet Therapy Pack 1 tablet 0      Sig: Taken as directed      Levocetirizine Dihydrochloride (XYZAL) 5 MG Oral Tab 90 tablet 0      Sig: Take 1 tablet (5 mg total) by

## 2017-06-08 ENCOUNTER — ANTI-COAG VISIT (OUTPATIENT)
Dept: INTERNAL MEDICINE CLINIC | Facility: CLINIC | Age: 77
End: 2017-06-08

## 2017-06-08 ENCOUNTER — TELEPHONE (OUTPATIENT)
Dept: INTERNAL MEDICINE CLINIC | Facility: CLINIC | Age: 77
End: 2017-06-08

## 2017-06-08 DIAGNOSIS — I82.409 DEEP VEIN THROMBOSIS (DVT) OF LOWER EXTREMITY, UNSPECIFIED CHRONICITY, UNSPECIFIED LATERALITY, UNSPECIFIED VEIN (HCC): Primary | ICD-10-CM

## 2017-06-08 NOTE — TELEPHONE ENCOUNTER
Chantelle Chun from [address] [place] [address] 41 pt not answering phone or door so there was no blood draw. Chantelle Chun from [facility] wanted to know if she able to get ahold of pt then how much INR should she give?   Please advise

## 2017-06-08 NOTE — TELEPHONE ENCOUNTER
Message left on Gayathri's voice mail: if INR is >3.5 to have pt hold warfarin and call tomorrow for orders. If we do not talk today, call tomorrow with labs and coumadin dosing.  jerod

## 2017-06-08 NOTE — TELEPHONE ENCOUNTER
Per He Bennett she went to patient's house this morning and there was no answer. Per He Bennett this is the 2nd day in a row that the patient does not answer the phone or the door. He Bennett will try to get the blood work done tomorrow.

## 2017-06-20 NOTE — TELEPHONE ENCOUNTER
Alen Harman calling requesting refill, after various failed faxes. Pt is out of meds please rush. Current Outpatient Prescriptions:  Warfarin Sodium 5 MG Oral Tab Take 5 mg by mouth one time.  Disp:  Rfl: `   Warfarin Sodium 2 MG Oral Tab Take 2

## 2017-06-22 RX ORDER — TEMAZEPAM 30 MG/1
30 CAPSULE ORAL NIGHTLY PRN
Qty: 30 CAPSULE | Refills: 0 | OUTPATIENT
Start: 2017-06-22 | End: 2017-07-21

## 2017-06-22 RX ORDER — WARFARIN SODIUM 5 MG/1
5 TABLET ORAL ONCE
Qty: 1 TABLET | Refills: 0 | Status: SHIPPED | OUTPATIENT
Start: 2017-06-22 | End: 2017-06-23

## 2017-06-22 RX ORDER — WARFARIN SODIUM 2 MG/1
2 TABLET ORAL ONCE
Qty: 30 TABLET | Refills: 3 | Status: SHIPPED | OUTPATIENT
Start: 2017-06-22 | End: 2017-06-22

## 2017-06-22 NOTE — TELEPHONE ENCOUNTER
Pt stts pharmacy Rx temazepam 30 MG requires a verbal to be released.  Please advise           Current outpatient prescriptions:     •  temazepam (RESTORIL) 30 MG Oral Cap, Take 1 capsule (30 mg total) by mouth nightly as needed for Sleep., Disp: 30 capsule

## 2017-06-23 ENCOUNTER — ANTI-COAG VISIT (OUTPATIENT)
Dept: INTERNAL MEDICINE CLINIC | Facility: CLINIC | Age: 77
End: 2017-06-23

## 2017-06-23 ENCOUNTER — TELEPHONE (OUTPATIENT)
Dept: INTERNAL MEDICINE CLINIC | Facility: CLINIC | Age: 77
End: 2017-06-23

## 2017-06-23 DIAGNOSIS — I82.409 DEEP VEIN THROMBOSIS (DVT) OF LOWER EXTREMITY, UNSPECIFIED CHRONICITY, UNSPECIFIED LATERALITY, UNSPECIFIED VEIN (HCC): Primary | ICD-10-CM

## 2017-06-23 RX ORDER — WARFARIN SODIUM 5 MG/1
TABLET ORAL
Qty: 1 TABLET | Refills: 0 | Status: SHIPPED | OUTPATIENT
Start: 2017-06-23 | End: 2017-10-10

## 2017-06-23 NOTE — TELEPHONE ENCOUNTER
Johnathon Null RN from [facility] called in with pt's PT & INR results. PT: 27.5 and INR: 2.3  Johnathon Null states pt took 5mg of coumadin last night.

## 2017-06-25 RX ORDER — WARFARIN SODIUM 5 MG/1
5 TABLET ORAL NIGHTLY
Qty: 30 TABLET | Refills: 5 | Status: SHIPPED | OUTPATIENT
Start: 2017-06-25 | End: 2018-11-07

## 2017-06-26 ENCOUNTER — TELEPHONE (OUTPATIENT)
Dept: INTERNAL MEDICINE CLINIC | Facility: CLINIC | Age: 77
End: 2017-06-26

## 2017-06-26 RX ORDER — WARFARIN SODIUM 5 MG/1
5 TABLET ORAL NIGHTLY
Qty: 30 TABLET | Refills: 5 | Status: CANCELLED | OUTPATIENT
Start: 2017-06-26

## 2017-06-26 NOTE — TELEPHONE ENCOUNTER
Patient wants to know why she needs to make apt. She is receiving phone calls. Patient was in the beginning of June. Asking why she is getting call and needs apt.  Wants to know why?

## 2017-06-27 NOTE — TELEPHONE ENCOUNTER
See 6/2/17 telephone encounter. Patient advised and given number to scheduling to schedule mammogram and to call back to schedule pneumonia shot when knows date of mammogram appointment. Also advised patient of Dr. Aneta Elam note.  Patient verbalized unders

## 2017-07-16 RX ORDER — QUETIAPINE 300 MG/1
300 TABLET, FILM COATED ORAL NIGHTLY
Qty: 90 TABLET | Refills: 0 | Status: SHIPPED
Start: 2017-07-16 | End: 2017-10-23 | Stop reason: DRUGHIGH

## 2017-07-17 NOTE — TELEPHONE ENCOUNTER
From: Aleida Nolen  Sent: 7/14/2017 12:38 PM CDT  Subject: Medication Renewal Request    Aleida Nolen would like a refill of the following medications:  QUEtiapine Fumarate 300 MG Oral Tab Dian Yoder MD]    Preferred pharmacy: [facility]

## 2017-07-18 ENCOUNTER — TELEPHONE (OUTPATIENT)
Dept: INTERNAL MEDICINE CLINIC | Facility: CLINIC | Age: 77
End: 2017-07-18

## 2017-07-18 RX ORDER — QUETIAPINE 200 MG/1
TABLET, FILM COATED ORAL
Qty: 60 TABLET | Refills: 0 | OUTPATIENT
Start: 2017-07-18

## 2017-07-18 NOTE — TELEPHONE ENCOUNTER
Dr Gina Curry , Pt is requesting a pneumovax vaccine. After reviewing the chart it seems like Pt has one in 2015 . Does Pt need another pneumovax vaccine ? If so please enter the order .

## 2017-07-18 NOTE — TELEPHONE ENCOUNTER
See other 7/14/17 encounter. Rx was sent for 300mg.    Refill Protocol Appointment Criteria  · Appointment scheduled in the past 6 months or in the next 3 months  Recent Outpatient Visits            1 month ago L5-S1 left mild-mod HNP going into the foramen

## 2017-07-21 NOTE — TELEPHONE ENCOUNTER
Patient is calling to request medication refill listed below. Please advise. Claims she put in the request through my chart - nothing was received. temazepam (RESTORIL) 30 MG Oral Cap Take 1 capsule (30 mg total) by mouth nightly as needed for Sleep.  Dis

## 2017-07-21 NOTE — TELEPHONE ENCOUNTER
Immunizations     Current Immunizations   Reviewed on 2/14/2017   Name Date   Pneumovax 23 1/16/2015     I ORDERED PREVNAR 13  She may come in for PREVNAR 13

## 2017-07-21 NOTE — TELEPHONE ENCOUNTER
Last refilled Restoril on 6/22/17 #30 0RF, rx needs to be phoned in if approved. Last refilled Hydrocodone 4/8/17 #20 0RF Printed script if approved.     ·   Recent Outpatient Visits            1 month ago L5-S1 left mild-mod HNP going into the foramen wit

## 2017-07-25 NOTE — TELEPHONE ENCOUNTER
Pt is calling on the status of this refill request. When pt was informed that she would have to p/u the hydrocodone script from the office if/when it was approved, she was very upset.  She would like to speak with a nurse please

## 2017-07-26 ENCOUNTER — ANTI-COAG VISIT (OUTPATIENT)
Dept: INTERNAL MEDICINE CLINIC | Facility: CLINIC | Age: 77
End: 2017-07-26

## 2017-07-26 ENCOUNTER — TELEPHONE (OUTPATIENT)
Dept: INTERNAL MEDICINE CLINIC | Facility: CLINIC | Age: 77
End: 2017-07-26

## 2017-07-26 DIAGNOSIS — I82.409 DEEP VEIN THROMBOSIS (DVT) OF LOWER EXTREMITY, UNSPECIFIED CHRONICITY, UNSPECIFIED LATERALITY, UNSPECIFIED VEIN (HCC): ICD-10-CM

## 2017-07-26 LAB
INR: 2.7 (ref 2–3)
INR: 2.7 (ref 2–3)

## 2017-07-26 RX ORDER — HYDROCODONE BITARTRATE AND ACETAMINOPHEN 10; 325 MG/1; MG/1
1 TABLET ORAL EVERY 6 HOURS PRN
Qty: 20 TABLET | Refills: 0 | Status: SHIPPED | OUTPATIENT
Start: 2017-07-26 | End: 2017-07-27

## 2017-07-26 RX ORDER — TEMAZEPAM 30 MG/1
30 CAPSULE ORAL NIGHTLY PRN
Qty: 30 CAPSULE | Refills: 0 | OUTPATIENT
Start: 2017-07-26 | End: 2017-09-19

## 2017-07-26 NOTE — TELEPHONE ENCOUNTER
Pt requesting to speak to Dr Vernon Moon nurse , spoke to Pt who is very upset states she needs an rx for Norco and also stating her medication for Temazepam has not been called in .  Informed Pt the rx for Temazepam was called in on 06/22/17 and Pt denied pick

## 2017-07-27 ENCOUNTER — ANTI-COAG VISIT (OUTPATIENT)
Dept: INTERNAL MEDICINE CLINIC | Facility: CLINIC | Age: 77
End: 2017-07-27

## 2017-07-27 ENCOUNTER — TELEPHONE (OUTPATIENT)
Dept: NEUROLOGY | Facility: CLINIC | Age: 77
End: 2017-07-27

## 2017-07-27 ENCOUNTER — TELEPHONE (OUTPATIENT)
Dept: FAMILY MEDICINE CLINIC | Facility: CLINIC | Age: 77
End: 2017-07-27

## 2017-07-27 ENCOUNTER — OFFICE VISIT (OUTPATIENT)
Dept: NEUROLOGY | Facility: CLINIC | Age: 77
End: 2017-07-27

## 2017-07-27 VITALS
HEART RATE: 69 BPM | SYSTOLIC BLOOD PRESSURE: 124 MMHG | BODY MASS INDEX: 40.16 KG/M2 | WEIGHT: 265 LBS | OXYGEN SATURATION: 93 % | DIASTOLIC BLOOD PRESSURE: 64 MMHG | HEIGHT: 68 IN

## 2017-07-27 DIAGNOSIS — M48.061 LUMBAR STENOSIS: ICD-10-CM

## 2017-07-27 DIAGNOSIS — M51.26 LUMBAR HERNIATED DISC: ICD-10-CM

## 2017-07-27 DIAGNOSIS — M43.16 SPONDYLOLISTHESIS OF LUMBAR REGION: ICD-10-CM

## 2017-07-27 DIAGNOSIS — I82.409 DEEP VEIN THROMBOSIS (DVT) OF LOWER EXTREMITY, UNSPECIFIED CHRONICITY, UNSPECIFIED LATERALITY, UNSPECIFIED VEIN (HCC): ICD-10-CM

## 2017-07-27 DIAGNOSIS — M54.50 CHRONIC LEFT-SIDED LOW BACK PAIN WITHOUT SCIATICA: ICD-10-CM

## 2017-07-27 DIAGNOSIS — I82.412 ACUTE DEEP VEIN THROMBOSIS (DVT) OF FEMORAL VEIN OF LEFT LOWER EXTREMITY (HCC): ICD-10-CM

## 2017-07-27 DIAGNOSIS — M51.36 LUMBAR DEGENERATIVE DISC DISEASE: ICD-10-CM

## 2017-07-27 DIAGNOSIS — G89.29 CHRONIC LEFT-SIDED LOW BACK PAIN WITHOUT SCIATICA: ICD-10-CM

## 2017-07-27 DIAGNOSIS — M96.1 LUMBAR POST-LAMINECTOMY SYNDROME: ICD-10-CM

## 2017-07-27 DIAGNOSIS — M54.16 LUMBAR RADICULOPATHY: Primary | ICD-10-CM

## 2017-07-27 DIAGNOSIS — M43.10 RETROLISTHESIS OF VERTEBRAE: ICD-10-CM

## 2017-07-27 PROCEDURE — 99214 OFFICE O/P EST MOD 30 MIN: CPT | Performed by: PHYSICAL MEDICINE & REHABILITATION

## 2017-07-27 RX ORDER — HYDROCODONE BITARTRATE AND ACETAMINOPHEN 10; 325 MG/1; MG/1
1 TABLET ORAL EVERY 6 HOURS PRN
Qty: 20 TABLET | Refills: 0 | Status: SHIPPED | OUTPATIENT
Start: 2017-07-27 | End: 2018-01-15

## 2017-07-27 RX ORDER — WHITE PETROLATUM 41 % TOPICAL OINTMENT
Refills: 0 | COMMUNITY
Start: 2017-05-10 | End: 2018-08-29

## 2017-07-27 NOTE — PROGRESS NOTES
Low Back Pain H & P    Chief Complaint: Patient presents with:  Low Back Pain: LOV 5/15/16 injection 9/16/16, pt states her back is painful, states she has pain when standing for to long, says she has trouble walking, pain is localized, is taking Norco for Fluids, Medication adjustment    • Depression    • Tysonertoe 02/25/2011    hammertoe 5 left, pain   • Hip pain, left    • Hypothyroidism    • Low back pain    • Migraines    • Onychomycosis     Debridement    • Osteoarthrosis, unspecified whether generaliz Stroke Sister      Cerebrovascular accident    • Heart Disease Brother      Coronary Artery Disease        Social History     Social History  Social History   Marital status:   Spouse name: N/A    Years of education: N/A  Number of children: N/A 4+/5   RIGHT plantar reflexes: downward response   LEFT plantar reflexes: downward response   Reflexes: 2+ in bilateral lower extremities     Assessment  1. bilateral S1 radiculopathies    2.  Chronic left-sided low back pain without sciatica    3. s/p L5-S

## 2017-07-27 NOTE — TELEPHONE ENCOUNTER
Ibrahima is calling from [facility] with PT 33.0 INR 2.7 pt is current taking 5mg daily  Ibrahima is requesting a call back

## 2017-07-27 NOTE — TELEPHONE ENCOUNTER
Per Dr. Andre Boy will need to discuss with Dr. Natasha Santos if cleared to perform bilateral L5 TFESIs due to blood clot.     Lacy Jim will contact Dr. Jerod Yan nurse

## 2017-07-27 NOTE — TELEPHONE ENCOUNTER
Medic[employer] for insurance coverage of bilateral L5 TFESIs cpt codes [id number], [id number]. Insurance was verified and procedure is a covered benefit and does not require authorization. Will inform Nursing.

## 2017-07-27 NOTE — PATIENT INSTRUCTIONS
Plan  I will perform bilateral L5 TFESI(s) under MAC once she is cleared by Dr. Josie Yan. The patient will continue with PT. She will use the Norco as needed for the pain since this has helped her for years.     The patient will follow up in 3 months

## 2017-07-28 NOTE — TELEPHONE ENCOUNTER
Temazepam phoned in. Dr. Taj Ozuna script printed the [address] but she is holding back from providing pt with the script as according to the chart she is allergic to [address]. Once this is clarified she will determine wether or not to give Norco to pt.

## 2017-07-28 NOTE — TELEPHONE ENCOUNTER
Pt called in to follow up on request.  Pt states she is not allergic to norco, states she has been taking it for years. Tried to call over to [facility] to confirm this with RN, but clinic is on lunch. Please contact pt when her script is ready.

## 2017-07-28 NOTE — TELEPHONE ENCOUNTER
Dr Ricardo Chavez office called spoke with Coty Brandt . Pt is requesting that Dr Dedrick Odom perform lumbar epidural steroid injection for back issue but the pt is currently being treated for a left proximal femoral vein DVT with coumadin.  Pt questions to Dr Ricardo Chavez  would

## 2017-08-04 ENCOUNTER — TELEPHONE (OUTPATIENT)
Dept: INTERNAL MEDICINE CLINIC | Facility: CLINIC | Age: 77
End: 2017-08-04

## 2017-08-04 NOTE — TELEPHONE ENCOUNTER
Actions Requested: Patient to go to IC/ER today. Please note also , Dr Luisa Casas. Problem: diarrhea  Onset and Timing: yesterday  Associated Symptoms: noticed blood in stool, has mild stomach cramping  Aggravating by:    Alleviated by:    Triage Note: Joshua

## 2017-08-07 ENCOUNTER — TELEPHONE (OUTPATIENT)
Dept: INTERNAL MEDICINE CLINIC | Facility: CLINIC | Age: 77
End: 2017-08-07

## 2017-08-07 NOTE — TELEPHONE ENCOUNTER
Pt sent my chart message requesting orders for mammogram and pneumonia vaccination. Pt has appt for L8 SmartLight px on 08/15. Please call when orders approved.      Patient Demographics     Address  [address] [place] [address] [address]  [facility] [address] Phone  [phone]

## 2017-08-07 NOTE — TELEPHONE ENCOUNTER
Patient states she didn't go to IC. She took imodium and stopped diarrhea. Patient no longer has diarrhea, no noticeable blood in stool. Has no abd pain. Patient feels better. Declined to schedule f/u appt at this time.  [facility]

## 2017-08-08 ENCOUNTER — TELEPHONE (OUTPATIENT)
Dept: FAMILY MEDICINE CLINIC | Facility: CLINIC | Age: 77
End: 2017-08-08

## 2017-08-08 NOTE — TELEPHONE ENCOUNTER
LM to Pt- Pt already has mammo scheduled and has an order. Pt can get pneumo vaccine at upcoming appt.

## 2017-08-08 NOTE — TELEPHONE ENCOUNTER
Pt is calling state that her resident [facility] has fax over a form that need to be completed by Dr DIXON  Pt want to know if form was received    Pt is not sure where the office fax the form  willcall back to confirm correct fax number for Dr Virginia Delatorre

## 2017-08-08 NOTE — TELEPHONE ENCOUNTER
Pt calling back said form was faxed to   in July and again in August  Will have MARIA LUZ Bartholomew 98 resend now  Pt requesting callback to confirm receipt- upset said delaying her medications

## 2017-08-09 NOTE — TELEPHONE ENCOUNTER
I left a detailed message on pt phone #. [address] [facility] form faxed to [phone].

## 2017-08-10 LAB — INR: 2.5 (ref 2–3)

## 2017-08-11 ENCOUNTER — TELEPHONE (OUTPATIENT)
Dept: FAMILY MEDICINE CLINIC | Facility: CLINIC | Age: 77
End: 2017-08-11

## 2017-08-11 NOTE — TELEPHONE ENCOUNTER
Her condition is chronic  I advised  First available NOT MD slot  Patient should see psychiatrist   For insomnia

## 2017-08-11 NOTE — TELEPHONE ENCOUNTER
Actions Requested: appointment next week--can we use MD approval slot 8/15/17 at 4:20 or 4:40 p.m? She states she cancelled 0920 appt 8/15/17 as it was too early for [facility] transport.   Problem: insomnia  Onset and Timing: one week  Associated Symptoms: sl Excessive daytime sleepiness is an ongoing problem  (> 2 weeks)    Care Advice Discussed:  * Reasons To Call Back      - Insomnia symptoms persist over 2 weeks      - You become worse      Patient provided with clinic contact information, hours of operatio

## 2017-08-14 ENCOUNTER — ANTI-COAG VISIT (OUTPATIENT)
Dept: INTERNAL MEDICINE CLINIC | Facility: CLINIC | Age: 77
End: 2017-08-14

## 2017-08-14 ENCOUNTER — TELEPHONE (OUTPATIENT)
Dept: CARDIOLOGY CLINIC | Facility: CLINIC | Age: 77
End: 2017-08-14

## 2017-08-14 DIAGNOSIS — I82.409 DEEP VEIN THROMBOSIS (DVT) OF LOWER EXTREMITY, UNSPECIFIED CHRONICITY, UNSPECIFIED LATERALITY, UNSPECIFIED VEIN (HCC): ICD-10-CM

## 2017-08-14 NOTE — TELEPHONE ENCOUNTER
Received fax from [address] pt's INR on 8/10 was 2.5. LMTCB for pt. Also tried to call dtr- was not at home per .

## 2017-08-16 ENCOUNTER — TELEPHONE (OUTPATIENT)
Dept: INTERNAL MEDICINE CLINIC | Facility: CLINIC | Age: 77
End: 2017-08-16

## 2017-08-16 NOTE — TELEPHONE ENCOUNTER
Nurse Wendy from [facility] contacted and informed of the information noted in the Anti-coagulation clinic visit 8/14/17 and that the pt is noted as taking 5 mg of Coumadin daily. Wendy verbalized understanding.     Anticoagulation Summary   As of 8/14/2017

## 2017-08-21 ENCOUNTER — TELEPHONE (OUTPATIENT)
Dept: INTERNAL MEDICINE CLINIC | Facility: CLINIC | Age: 77
End: 2017-08-21

## 2017-08-21 ENCOUNTER — TELEPHONE (OUTPATIENT)
Dept: NEUROLOGY | Facility: CLINIC | Age: 77
End: 2017-08-21

## 2017-08-21 NOTE — TELEPHONE ENCOUNTER
Need to be cleared for surgery      Dr Edge Nurse office need to know how many days she needs to be off coumdin and does she need to be bridged?

## 2017-08-21 NOTE — TELEPHONE ENCOUNTER
Spoke to pt does not understand what she needs to do via coumadin clinic in order to have injection. Coumadin Clinic [facility] called and information given so that RYAN Amin RN can obtain obtain orders from RICKY Pedersen so that pt can be scheduled.  Message take

## 2017-08-21 NOTE — TELEPHONE ENCOUNTER
Cristiana/Dr. Elvia Hussein office [phone] would like to speak with the coumadin RN so that they know if pt can be off the coumadin for the procedure.

## 2017-08-21 NOTE — TELEPHONE ENCOUNTER
Patient is calling to checking the status of her injection, stated that we were waiting for Dr Judi Toussaint office to c/b, patient stated that she would call the Office and see what needs to be done

## 2017-08-21 NOTE — TELEPHONE ENCOUNTER
Per pt, she needs to be out of Coumadin per Dr Catalino Schrader due to Dr Trell Ramos is going to give pt an injection to her spine. Pt would like to know when she can be off Coumadin? Pls advise.

## 2017-08-23 ENCOUNTER — TELEPHONE (OUTPATIENT)
Dept: NEUROLOGY | Facility: CLINIC | Age: 77
End: 2017-08-23

## 2017-08-23 NOTE — TELEPHONE ENCOUNTER
Per Dr Ruslan Rodriguez pt is to get instruction for weaning for procedure from coumadin Clinic. Message was left for RYAN Amin RN, waiting response.

## 2017-08-25 ENCOUNTER — TELEPHONE (OUTPATIENT)
Dept: INTERNAL MEDICINE CLINIC | Facility: CLINIC | Age: 77
End: 2017-08-25

## 2017-08-25 NOTE — TELEPHONE ENCOUNTER
This message needs to go to Dr. Cortney Mays office.  [facility]

## 2017-08-28 ENCOUNTER — NURSE TRIAGE (OUTPATIENT)
Dept: OTHER | Age: 77
End: 2017-08-28

## 2017-08-28 NOTE — TELEPHONE ENCOUNTER
Action Requested: Summary for Provider     []  Critical Lab, Recommendations Needed  [] Need Additional Advice  []   FYI    []   Need Orders  [x] Need Medications Sent to Pharmacy  []  Other     SUMMARY: Patient asking for abx be sent to pharmacy due to \"

## 2017-08-29 PROBLEM — R13.12 OROPHARYNGEAL DYSPHAGIA: Status: ACTIVE | Noted: 2017-08-29

## 2017-08-29 PROBLEM — K52.9 CHRONIC DIARRHEA: Status: ACTIVE | Noted: 2017-08-29

## 2017-08-29 NOTE — TELEPHONE ENCOUNTER
See Dr Josie Yan note 8/21/17 may hold coumadin 5-6 days, see note 8/25/17 may be off coumadin 6 days. Attempted to call patient to schedule for bilateral L5-S1 TFESI. Left message on voice mail to call office back.     7/27/17 phone encounter for Trent Denny

## 2017-08-29 NOTE — TELEPHONE ENCOUNTER
This encounter closed. See  LOV 7/27/17 with Dr Maci Rascon, see phone encounter 8/21/17 off coumadin 5-6 days  and 8/25/17 Dr Sandra Anna off coumadin 6 days for injection .

## 2017-08-30 RX ORDER — CEFUROXIME AXETIL 250 MG/1
250 TABLET ORAL 2 TIMES DAILY
Qty: 20 TABLET | Refills: 0 | Status: SHIPPED | OUTPATIENT
Start: 2017-08-30 | End: 2017-09-09

## 2017-08-30 NOTE — TELEPHONE ENCOUNTER
ceftin 250 mg po bid x 10 days  If not improivng within the  Next week or if worsenign she should be  Seen  Available MD or ER

## 2017-09-03 RX ORDER — VENLAFAXINE HYDROCHLORIDE 150 MG/1
CAPSULE, EXTENDED RELEASE ORAL
Qty: 180 CAPSULE | Refills: 0 | Status: ON HOLD | OUTPATIENT
Start: 2017-09-03 | End: 2018-06-06

## 2017-09-04 NOTE — TELEPHONE ENCOUNTER
Refill Protocol Appointment Criteria: Refilled per protocol    · Appointment scheduled in the past 6 months or in the next 3 months  Recent Outpatient Visits            5 days ago Oropharyngeal dysphagia    [address] [place] [address]

## 2017-09-06 NOTE — TELEPHONE ENCOUNTER
Let message voice mail to return call.  This encounter closed, see phone encounter 8/21/17  for further information

## 2017-09-06 NOTE — TELEPHONE ENCOUNTER
See phone encounter 8/23/17. Patient called office earlier today. Left message on voice mail to return call.

## 2017-09-11 ENCOUNTER — TELEPHONE (OUTPATIENT)
Dept: INTERNAL MEDICINE CLINIC | Facility: CLINIC | Age: 77
End: 2017-09-11

## 2017-09-11 LAB — INR: 2.2 (ref 2–3)

## 2017-09-11 NOTE — TELEPHONE ENCOUNTER
Pharmacy would like to confirm that  Is ok with patient taking this medication. Current Outpatient Prescriptions:  temazepam (RESTORIL) 30 MG Oral Cap Take 1 capsule (30 mg total) by mouth nightly as needed for Sleep.  Disp: 30 capsule Rfl: 0

## 2017-09-14 ENCOUNTER — TELEPHONE (OUTPATIENT)
Dept: INTERNAL MEDICINE CLINIC | Facility: CLINIC | Age: 77
End: 2017-09-14

## 2017-09-14 ENCOUNTER — ANTI-COAG VISIT (OUTPATIENT)
Dept: INTERNAL MEDICINE CLINIC | Facility: CLINIC | Age: 77
End: 2017-09-14

## 2017-09-14 DIAGNOSIS — I82.409 DEEP VEIN THROMBOSIS (DVT) OF LOWER EXTREMITY, UNSPECIFIED CHRONICITY, UNSPECIFIED LATERALITY, UNSPECIFIED VEIN (HCC): ICD-10-CM

## 2017-09-14 NOTE — TELEPHONE ENCOUNTER
[address] calling with results of PT 26.2 and INR 2.2 done on 09/11. Please call back with orders.

## 2017-09-14 NOTE — TELEPHONE ENCOUNTER
Pharmacist [facility] stated [employer] had not been filled for a while(July) but was still on Pt's profile  Asking if RX should remain on List at Pharmacy as Active

## 2017-09-15 NOTE — TELEPHONE ENCOUNTER
Ingrid Merida from [facility] is calling to request a refill for the medication listed below. Order given states TWO capsule - moved from independent to assisted. Patient is completely out. Please advise. temazepam (RESTORIL) 30 MG Oral Cap Take 1 capsule (30 mg total) by mouth nightly as needed for Sleep.  Disp: 30 capsule Rfl: 0     Going to     [facility]   Address: [address] [place] [address]  [phone]

## 2017-09-18 ENCOUNTER — TELEPHONE (OUTPATIENT)
Dept: NEUROLOGY | Facility: CLINIC | Age: 77
End: 2017-09-18

## 2017-09-18 NOTE — TELEPHONE ENCOUNTER
She was told she cannot have the injection with an unpaid balance for her [facility] account. Patient is looking for a  to set up a payment plan. She disconnected the call before I could get a name for her.     I called Jacy Ardon back and left a margo uy

## 2017-09-18 NOTE — TELEPHONE ENCOUNTER
Pt called stated that she is unable to pay balance at [facility] and she is unable to schedule at [facility]. Pt stated that she could not make partial payment. Pt will be scheduled at [facility] per Dr Aries Elmore.  Patient has been scheduled for a bilateral L5 TFESI on 10/13/17 at

## 2017-09-20 RX ORDER — TEMAZEPAM 30 MG/1
30 CAPSULE ORAL NIGHTLY PRN
Qty: 30 CAPSULE | Refills: 0 | OUTPATIENT
Start: 2017-09-20 | End: 2018-01-11

## 2017-09-22 ENCOUNTER — ANTI-COAG VISIT (OUTPATIENT)
Dept: INTERNAL MEDICINE CLINIC | Facility: CLINIC | Age: 77
End: 2017-09-22

## 2017-09-22 DIAGNOSIS — I82.409 DEEP VEIN THROMBOSIS (DVT) OF LOWER EXTREMITY, UNSPECIFIED CHRONICITY, UNSPECIFIED LATERALITY, UNSPECIFIED VEIN (HCC): ICD-10-CM

## 2017-10-09 ENCOUNTER — TELEPHONE (OUTPATIENT)
Dept: NEUROLOGY | Facility: CLINIC | Age: 77
End: 2017-10-09

## 2017-10-13 ENCOUNTER — ANESTHESIA EVENT (OUTPATIENT)
Dept: SURGERY | Facility: HOSPITAL | Age: 77
End: 2017-10-13
Payer: MEDICARE

## 2017-10-13 ENCOUNTER — ANESTHESIA (OUTPATIENT)
Dept: SURGERY | Facility: HOSPITAL | Age: 77
End: 2017-10-13
Payer: MEDICARE

## 2017-10-13 ENCOUNTER — HOSPITAL ENCOUNTER (OUTPATIENT)
Facility: HOSPITAL | Age: 77
Setting detail: HOSPITAL OUTPATIENT SURGERY
Discharge: NURSING, REHAB OR LONG-TERM CARE | End: 2017-10-13
Attending: PHYSICAL MEDICINE & REHABILITATION | Admitting: PHYSICAL MEDICINE & REHABILITATION
Payer: MEDICARE

## 2017-10-13 ENCOUNTER — SURGERY (OUTPATIENT)
Age: 77
End: 2017-10-13

## 2017-10-13 ENCOUNTER — APPOINTMENT (OUTPATIENT)
Dept: GENERAL RADIOLOGY | Facility: HOSPITAL | Age: 77
End: 2017-10-13
Attending: PHYSICAL MEDICINE & REHABILITATION
Payer: MEDICARE

## 2017-10-13 VITALS
HEIGHT: 68 IN | WEIGHT: 264 LBS | SYSTOLIC BLOOD PRESSURE: 169 MMHG | TEMPERATURE: 98 F | HEART RATE: 69 BPM | DIASTOLIC BLOOD PRESSURE: 68 MMHG | BODY MASS INDEX: 40.01 KG/M2 | OXYGEN SATURATION: 93 % | RESPIRATION RATE: 16 BRPM

## 2017-10-13 DIAGNOSIS — M54.16 LUMBAR RADICULOPATHY: Primary | ICD-10-CM

## 2017-10-13 PROCEDURE — 3E0R3BZ INTRODUCTION OF ANESTHETIC AGENT INTO SPINAL CANAL, PERCUTANEOUS APPROACH: ICD-10-PCS | Performed by: PHYSICAL MEDICINE & REHABILITATION

## 2017-10-13 PROCEDURE — 64483 NJX AA&/STRD TFRM EPI L/S 1: CPT | Performed by: PHYSICAL MEDICINE & REHABILITATION

## 2017-10-13 PROCEDURE — 3E0R33Z INTRODUCTION OF ANTI-INFLAMMATORY INTO SPINAL CANAL, PERCUTANEOUS APPROACH: ICD-10-PCS | Performed by: PHYSICAL MEDICINE & REHABILITATION

## 2017-10-13 PROCEDURE — 76001 XR C-ARM FLUORO >1 HOUR  (CPT=76001): CPT | Performed by: PHYSICAL MEDICINE & REHABILITATION

## 2017-10-13 RX ORDER — MORPHINE SULFATE 2 MG/ML
2 INJECTION, SOLUTION INTRAMUSCULAR; INTRAVENOUS EVERY 10 MIN PRN
Status: DISCONTINUED | OUTPATIENT
Start: 2017-10-13 | End: 2017-10-13

## 2017-10-13 RX ORDER — HYDROCODONE BITARTRATE AND ACETAMINOPHEN 5; 325 MG/1; MG/1
1 TABLET ORAL AS NEEDED
Status: DISCONTINUED | OUTPATIENT
Start: 2017-10-13 | End: 2017-10-13

## 2017-10-13 RX ORDER — HYDROCODONE BITARTRATE AND ACETAMINOPHEN 5; 325 MG/1; MG/1
2 TABLET ORAL AS NEEDED
Status: DISCONTINUED | OUTPATIENT
Start: 2017-10-13 | End: 2017-10-13

## 2017-10-13 RX ORDER — SODIUM CHLORIDE, SODIUM LACTATE, POTASSIUM CHLORIDE, CALCIUM CHLORIDE 600; 310; 30; 20 MG/100ML; MG/100ML; MG/100ML; MG/100ML
INJECTION, SOLUTION INTRAVENOUS CONTINUOUS
Status: DISCONTINUED | OUTPATIENT
Start: 2017-10-13 | End: 2017-10-13

## 2017-10-13 RX ORDER — QUETIAPINE 300 MG/1
TABLET, FILM COATED ORAL
Qty: 90 TABLET | Refills: 0 | OUTPATIENT
Start: 2017-10-13

## 2017-10-13 RX ORDER — ONDANSETRON 2 MG/ML
4 INJECTION INTRAMUSCULAR; INTRAVENOUS ONCE AS NEEDED
Status: DISCONTINUED | OUTPATIENT
Start: 2017-10-13 | End: 2017-10-13

## 2017-10-13 RX ORDER — HYDROMORPHONE HYDROCHLORIDE 1 MG/ML
0.6 INJECTION, SOLUTION INTRAMUSCULAR; INTRAVENOUS; SUBCUTANEOUS EVERY 5 MIN PRN
Status: DISCONTINUED | OUTPATIENT
Start: 2017-10-13 | End: 2017-10-13

## 2017-10-13 RX ORDER — MORPHINE SULFATE 4 MG/ML
4 INJECTION, SOLUTION INTRAMUSCULAR; INTRAVENOUS EVERY 10 MIN PRN
Status: DISCONTINUED | OUTPATIENT
Start: 2017-10-13 | End: 2017-10-13

## 2017-10-13 RX ORDER — METOCLOPRAMIDE 10 MG/1
10 TABLET ORAL ONCE
Status: DISCONTINUED | OUTPATIENT
Start: 2017-10-13 | End: 2017-10-13 | Stop reason: HOSPADM

## 2017-10-13 RX ORDER — LIDOCAINE HYDROCHLORIDE 10 MG/ML
INJECTION, SOLUTION EPIDURAL; INFILTRATION; INTRACAUDAL; PERINEURAL AS NEEDED
Status: DISCONTINUED | OUTPATIENT
Start: 2017-10-13 | End: 2017-10-13 | Stop reason: HOSPADM

## 2017-10-13 RX ORDER — TRIAMCINOLONE ACETONIDE 40 MG/ML
INJECTION, SUSPENSION INTRA-ARTICULAR; INTRAMUSCULAR AS NEEDED
Status: DISCONTINUED | OUTPATIENT
Start: 2017-10-13 | End: 2017-10-13 | Stop reason: HOSPADM

## 2017-10-13 RX ORDER — NALOXONE HYDROCHLORIDE 0.4 MG/ML
80 INJECTION, SOLUTION INTRAMUSCULAR; INTRAVENOUS; SUBCUTANEOUS AS NEEDED
Status: DISCONTINUED | OUTPATIENT
Start: 2017-10-13 | End: 2017-10-13

## 2017-10-13 RX ORDER — FAMOTIDINE 20 MG/1
20 TABLET ORAL ONCE
Status: DISCONTINUED | OUTPATIENT
Start: 2017-10-13 | End: 2017-10-13 | Stop reason: HOSPADM

## 2017-10-13 RX ORDER — HYDROMORPHONE HYDROCHLORIDE 1 MG/ML
0.2 INJECTION, SOLUTION INTRAMUSCULAR; INTRAVENOUS; SUBCUTANEOUS EVERY 5 MIN PRN
Status: DISCONTINUED | OUTPATIENT
Start: 2017-10-13 | End: 2017-10-13

## 2017-10-13 RX ORDER — ACETAMINOPHEN 325 MG/1
650 TABLET ORAL ONCE
Status: COMPLETED | OUTPATIENT
Start: 2017-10-13 | End: 2017-10-13

## 2017-10-13 RX ORDER — MORPHINE SULFATE 10 MG/ML
6 INJECTION, SOLUTION INTRAMUSCULAR; INTRAVENOUS EVERY 10 MIN PRN
Status: DISCONTINUED | OUTPATIENT
Start: 2017-10-13 | End: 2017-10-13

## 2017-10-13 RX ORDER — HYDROMORPHONE HYDROCHLORIDE 1 MG/ML
0.4 INJECTION, SOLUTION INTRAMUSCULAR; INTRAVENOUS; SUBCUTANEOUS EVERY 5 MIN PRN
Status: DISCONTINUED | OUTPATIENT
Start: 2017-10-13 | End: 2017-10-13

## 2017-10-13 RX ADMIN — SODIUM CHLORIDE, SODIUM LACTATE, POTASSIUM CHLORIDE, CALCIUM CHLORIDE: 600; 310; 30; 20 INJECTION, SOLUTION INTRAVENOUS at 16:50:00

## 2017-10-13 RX ADMIN — SODIUM CHLORIDE, SODIUM LACTATE, POTASSIUM CHLORIDE, CALCIUM CHLORIDE: 600; 310; 30; 20 INJECTION, SOLUTION INTRAVENOUS at 17:24:00

## 2017-10-13 NOTE — ANESTHESIA POSTPROCEDURE EVALUATION
Patient: Ivanna Sutherland    Procedure Summary     Date:  10/13/17 Room / Location:  [address] [place] [address] MAIN OR 01 / [address] [place] [address] MAIN OR    Anesthesia Start:  1910 Anesthesia Stop:  8386    Procedure:  TRANSFORAMINAL LUMBAR EPIDURAL STEROID INJECTION SINGLE LEVEL (Bilateral ) Denisse

## 2017-10-13 NOTE — H&P
[facility] Patient Status:  Hospital Outpatient Surgery    12/3/1940 MRN [id number]   Location [facility] Attending Lulú Ocasio MD   Hosp Day # 0 PCP Arnold Navarro Comment: x2 FOR \"CROSSED EYES\"  No date: HIP REPLACEMENT SURGERY Bilateral  1967: HYSTERECTOMY      Comment: Partial Hysterectomy  No date: JAW SURGERY  No date: KNEE REPLACEMENT SURGERY Bilateral      Comment: Bilateral arthritis   No date: LAMINECTO (30 mg total) by mouth nightly as needed for Sleep.  Disp: 30 capsule Rfl: 0 Past Week at Unknown time   VENLAFAXINE HCL  MG Oral Capsule SR 24 Hr TAKE 2 CAPSULES BY MOUTH DAILY Disp: 180 capsule Rfl: 0 10/13/2017 at 0800   Emollient (EUCERIN SKIN JESSICA Application topically as needed. Disp:  Rfl:  Past Week at Unknown time   Budesonide-Formoterol Fumarate (SYMBICORT) 160-4.5 MCG/ACT Inhalation Aerosol Inhale 1 puff into the lungs 2 (two) times daily.  Disp:  Rfl:  10/13/2017 at 0800   acetaminophen 325 bulging disc     L5-S1 left mild-mod HNP going into the foramen with new small extrusion, L1-2 right mod paracentral HNP with moderate caudal extrusion, T12-L1 left small paracentral HNP      I will do bilateral L5 TFESI's. Lianne Baumann  10/13/2017  4

## 2017-10-13 NOTE — OPERATIVE REPORT
[facility]    BILATERAL LUMBAR TRANSFORAMINAL   NAME:  Sarah Brar    MR #:    [id number] :  12/3/1940     PHYSICIAN:  Akin Baumann        Operative Report    DATE OF PROCEDURE: 10/13/2017   PREOPERATIVE DIAGNOSES: Problem   L5-S1 l epinephrine. After this, the needle was removed. Then  fluoroscopy was right anterior obliqued opening up the left L5-S1 intervertebral foramen.   At this point in time, the patient's skin was anesthetized with 2 to 3 cc of 50:50 mixture of sodium bicarbo

## 2017-10-13 NOTE — DISCHARGE SUMMARY
[facility]    Discharge Summary    Mario Galarza Patient Status:  Hospital Outpatient Surgery    12/3/1940 MRN [id number]   Location [facility] Attending Bandar Pino MD   Hosp Day # 0 PCP Merilynn Apley, MD HYDROcodone-acetaminophen  MG Tabs  Commonly known as:  NORCO      Take 1 tablet by mouth every 6 (six) hours as needed for Pain.    Quantity:  20 tablet  Refills:  0     ipratropium-albuterol 0.5-2.5 (3) MG/3ML Soln  Commonly known as:  Curt Oren 2 CAPSULES BY MOUTH DAILY   Quantity:  180 capsule  Refills:  0     Vitamin D 2000 units Tabs      Take 2,000 Units by mouth daily. Refills:  0     Warfarin Sodium 5 MG Tabs  Commonly known as:  COUMADIN      Take 1 tablet (5 mg total) by mouth nightly.

## 2017-10-13 NOTE — ANESTHESIA PREPROCEDURE EVALUATION
Anesthesia PreOp Note    HPI:     Mamadou Arreguin is a 64year old female who presents for preoperative consultation requested by: Barrie Ramirez MD    Date of Surgery: 10/13/2017    Procedure(s):  TRANSFORAMINAL LUMBAR EPIDURAL STEROID INJECTION SINGLE L extrusion, L1-2 right mod paracentral HNP with moderate caudal extrusion, T12-L1 left small paracentral HNP         Date Noted: 10/31/2014      Left leg cellulitis         Date Noted: 08/28/2014      Bilateral low back pain         Date Noted: 08/28/2014 \"CROSSED EYES\"  No date: HIP REPLACEMENT SURGERY Bilateral  1967: HYSTERECTOMY      Comment: Partial Hysterectomy  No date: JAW SURGERY  No date: KNEE REPLACEMENT SURGERY Bilateral      Comment: Bilateral arthritis   No date: LAMINECTOMY      Comment: [place] Disp: 10 tablet Rfl: 0 Past Month at Unknown time   ipratropium-albuterol 0.5-2.5 (3) MG/3ML Inhalation Solution Take 3 mL by nebulization as needed.  Disp: 100 vial Rfl: 0 Past Week at Unknown time   Levothyroxine Sodium 112 MCG Oral Tab Take 1 tablet (112 mg Oral Once Rolando Funez MD      No current [facility]-ordered outpatient prescriptions on file.       Bactrim [Sulfametho*    Rash  Ciprofloxacin           Rash  Depakote                Other (See Comments)    Comment:pancreatitis  Dilaudid  [Hydromor*    Ra device(s):  rolling walker. scooter    The patient does not live in a home with stairs. The patient lives in a long term home. [address]       Available pre-op labs reviewed. Vital Signs: Body mass index is 40.14 kg/m². height is 1.

## 2017-10-17 ENCOUNTER — TELEPHONE (OUTPATIENT)
Dept: INTERNAL MEDICINE CLINIC | Facility: CLINIC | Age: 77
End: 2017-10-17

## 2017-10-17 RX ORDER — OXYBUTYNIN CHLORIDE 5 MG/1
5 TABLET, EXTENDED RELEASE ORAL DAILY
Qty: 90 TABLET | Refills: 0 | Status: SHIPPED | OUTPATIENT
Start: 2017-10-17 | End: 2017-10-23 | Stop reason: DRUGHIGH

## 2017-10-17 NOTE — TELEPHONE ENCOUNTER
From: Pierre Lance  Sent: 10/14/2017 3:57 PM CDT  Subject: Medication Renewal Request    Pierre Lance would like a refill of the following medications:     Oxybutynin Chloride ER 5 MG Oral Tablet 24 Hr Juma Olguin MD]   Patient Comment: Please

## 2017-10-17 NOTE — TELEPHONE ENCOUNTER
Pharmacy in [facility] received script, asking if that was in error ? Oxybutynin Chloride ER 5 MG Oral Tablet 24 Hr 90 tablet 0 10/17/2017     Sig - Route:  Take 1 tablet (5 mg total) by mouth daily. - Oral    E-Prescribing Status: Receipt confirmed by pharmac

## 2017-10-17 NOTE — TELEPHONE ENCOUNTER
Medication refilled for 90 days per protocol.   Refill Protocol Appointment Criteria  · Appointment scheduled in the past 6 months or in the next 3 months  Recent Outpatient Visits            1 month ago Oropharyngeal dysphagia    [facility]

## 2017-10-18 NOTE — TELEPHONE ENCOUNTER
LMTCB, please transfer to [id number].   rx request on mychart 10/14/17 to be sent to [facility] ask if this is correct-Pharmacy now calling to Clarify

## 2017-10-21 ENCOUNTER — APPOINTMENT (OUTPATIENT)
Dept: GENERAL RADIOLOGY | Facility: HOSPITAL | Age: 77
End: 2017-10-21
Attending: PHYSICIAN ASSISTANT
Payer: MEDICARE

## 2017-10-21 ENCOUNTER — HOSPITAL ENCOUNTER (EMERGENCY)
Facility: HOSPITAL | Age: 77
Discharge: HOME | End: 2017-10-21
Attending: PHYSICIAN ASSISTANT
Payer: MEDICARE

## 2017-10-21 VITALS
RESPIRATION RATE: 18 BRPM | TEMPERATURE: 98 F | WEIGHT: 260 LBS | BODY MASS INDEX: 39.4 KG/M2 | SYSTOLIC BLOOD PRESSURE: 159 MMHG | OXYGEN SATURATION: 96 % | HEART RATE: 70 BPM | HEIGHT: 68 IN | DIASTOLIC BLOOD PRESSURE: 71 MMHG

## 2017-10-21 DIAGNOSIS — S62.619A CLOSED AVULSION FRACTURE OF PROXIMAL PHALANX OF FINGER, INITIAL ENCOUNTER: Primary | ICD-10-CM

## 2017-10-21 DIAGNOSIS — R03.0 ELEVATED BLOOD PRESSURE READING: ICD-10-CM

## 2017-10-21 PROCEDURE — 73140 X-RAY EXAM OF FINGER(S): CPT | Performed by: PHYSICIAN ASSISTANT

## 2017-10-21 PROCEDURE — 99285 EMERGENCY DEPT VISIT HI MDM: CPT

## 2017-10-21 PROCEDURE — 26720 TREAT FINGER FRACTURE EACH: CPT

## 2017-10-21 RX ORDER — HYDROCODONE BITARTRATE AND ACETAMINOPHEN 5; 325 MG/1; MG/1
1 TABLET ORAL ONCE
Status: COMPLETED | OUTPATIENT
Start: 2017-10-21 | End: 2017-10-21

## 2017-10-21 NOTE — ED NOTES
[facility] contacted regarding status of medicar- per service they will be an additional 45 min - 1 hour from now due to short staff.  Patient updated- she is requesting something for pain while waiting; PA ordered Norco.

## 2017-10-21 NOTE — ED PROVIDER NOTES
Patient Seen in: [facility] AND [facility] Emergency Department    History   Patient presents with:  Finger Injury    Stated Complaint: right thrid finger injury    HPI    HPI: Igor Fisher is a 64year old female who presents with chief complaint of right mi History:  No date: ANESTH,NECK VESSEL SURGERY NOS      Comment: x4  ~2009: BRAIN SURGERY      Comment: Brain Aneurysm, Stent placed   No date: CAROTID ENDARTERECTOMY Right  1984: CHOLECYSTECTOMY      Comment: Cholecystitis  2010: COLONOSCOPY  No date: DEBR by nebulization as needed. Potassium Chloride ER 20 MEQ Oral Tab CR,  Take 1 tablet (20 mEq total) by mouth twice a week.    Levothyroxine Sodium 112 MCG Oral Tab,  Take 1 tablet (112 mcg total) by mouth before breakfast.   Pantoprazole Sodium 40 MG Oral date: 1/1/1977  Smokeless tobacco: Never Used                      Comment: 1977  Alcohol use: No                Review of Systems    Positive for stated complaint: right thrid finger injury  Other systems are as noted in HPI.   Constitutional and vital sig Distal pulses intact. Capillary refill normal.  Motor intact distally. Sensory intact distally. No ecchymosis. No erythema. No open wounds. No compartment syndrome. Remainder of musculoskeletal system is grossly intact.   There is no obvious deformit [id number]  [phone]    Schedule an appointment as soon as possible for a visit in 2 days  For follow-up    [place], [address] [address] [address]  [phone]    Schedule an appointment as soon as possible for a visit in 2 days

## 2017-10-21 NOTE — ED NOTES
Pt dc via medicar w/ belongings and DC paperwork. Splint in place. STEPHANIE explained. Patient understanding of info given. Dae Meredith here to take patient home. Patient left via wheelchair w/ transport service in good, stable condition.

## 2017-10-21 NOTE — ED INITIAL ASSESSMENT (HPI)
Right third finger pain and swelling after attempting to \"open a cabinet\". CMS intact. No open wounds noted.

## 2017-10-23 ENCOUNTER — OFFICE VISIT (OUTPATIENT)
Dept: SURGERY | Facility: CLINIC | Age: 77
End: 2017-10-23

## 2017-10-23 ENCOUNTER — MED REC SCAN ONLY (OUTPATIENT)
Dept: INTERNAL MEDICINE CLINIC | Facility: CLINIC | Age: 77
End: 2017-10-23

## 2017-10-23 DIAGNOSIS — S63.632A SPRAIN OF INTERPHALANGEAL JOINT OF RIGHT MIDDLE FINGER, INITIAL ENCOUNTER: Primary | ICD-10-CM

## 2017-10-23 PROCEDURE — 99203 OFFICE O/P NEW LOW 30 MIN: CPT | Performed by: PLASTIC SURGERY

## 2017-10-23 PROCEDURE — G0463 HOSPITAL OUTPT CLINIC VISIT: HCPCS | Performed by: PLASTIC SURGERY

## 2017-10-23 RX ORDER — LEVOTHYROXINE SODIUM 0.12 MG/1
250 TABLET ORAL
COMMUNITY
Start: 2017-09-09 | End: 2019-03-07

## 2017-10-23 RX ORDER — QUETIAPINE 400 MG/1
100 TABLET, FILM COATED ORAL NIGHTLY
COMMUNITY
Start: 2017-08-24 | End: 2019-03-06

## 2017-10-23 RX ORDER — MIRTAZAPINE 45 MG/1
TABLET, FILM COATED ORAL
Status: ON HOLD | COMMUNITY
Start: 2017-09-08 | End: 2018-05-02

## 2017-10-23 RX ORDER — OXYBUTYNIN CHLORIDE 5 MG/1
TABLET ORAL
Status: ON HOLD | COMMUNITY
Start: 2017-08-24 | End: 2018-05-10

## 2017-10-23 NOTE — TELEPHONE ENCOUNTER
From: Mario Galarza  Sent: 10/22/2017 2:18 PM CDT  Subject: Medication Renewal Request    Mario Galarza would like a refill of the following medications:     Oxybutynin Chloride ER 5 MG Oral Tablet 24 Hr Merilynn Apley, MD]   Patient Comment: Has [facility]

## 2017-10-23 NOTE — H&P
Injury 1: RMF  - Date: 10/20/17  - Days Since: 3    Carly Pizano is a 64year old female that presents with Patient presents with: Injury: RMF  .     REFERRED BY: Brooke Anne MD      Pacemaker: No  Latex Allergy: no  Coumadin: Yes  Plavix: No  Other (RESTORIL) 30 MG Oral Cap Take 1 capsule (30 mg total) by mouth nightly as needed for Sleep.  Disp: 30 capsule Rfl: 0   VENLAFAXINE HCL  MG Oral Capsule SR 24 Hr TAKE 2 CAPSULES BY MOUTH DAILY Disp: 180 capsule Rfl: 0   Emollient (EUCERIN SKIN CALMING Cholecalciferol (VITAMIN D) 2000 UNITS Oral Tab Take 2,000 Units by mouth daily. Disp:  Rfl:    aspirin 325 MG Oral Tab Take 325 mg by mouth daily.  Disp:  Rfl:        Allergies:     Bactrim [Sulfametho*    Rash  Ciprofloxacin           Rash  Depakote Comment: x4  ~2009: BRAIN SURGERY      Comment: Brain Aneurysm, Stent placed   No date: CAROTID ENDARTERECTOMY Right  ~2008: CARPAL TUNNEL RELEASE Right  1984: CHOLECYSTECTOMY      Comment: Cholecystitis  2010: COLONOSCOPY  No date: DEBRIDEMENT OF NAIL(S), Age of Onset   • Heart Attack Mother    • Heart Disease Mother      Coronary Artery Disease, Premature    • Fibromyalgia Sister    • Heart Disease Sister    • Heart Attack Sister    • Heart Disease Brother    • Heart Attack Brother    • Melvi Jack

## 2017-10-23 NOTE — TELEPHONE ENCOUNTER
Please advise on refill request.    Refill Protocol Appointment Criteria  · Appointment scheduled in the past 6 months or in the next 3 months  Recent Outpatient Visits            1 month ago Oropharyngeal dysphagia    [address]

## 2017-10-24 RX ORDER — OXYBUTYNIN CHLORIDE 5 MG/1
5 TABLET, EXTENDED RELEASE ORAL DAILY
Qty: 90 TABLET | Refills: 0 | Status: SHIPPED | OUTPATIENT
Start: 2017-10-24 | End: 2018-05-25

## 2017-10-30 ENCOUNTER — OFFICE VISIT (OUTPATIENT)
Dept: SURGERY | Facility: CLINIC | Age: 77
End: 2017-10-30

## 2017-10-30 DIAGNOSIS — S63.632A SPRAIN OF INTERPHALANGEAL JOINT OF RIGHT MIDDLE FINGER, INITIAL ENCOUNTER: Primary | ICD-10-CM

## 2017-10-30 PROCEDURE — 99212 OFFICE O/P EST SF 10 MIN: CPT | Performed by: PLASTIC SURGERY

## 2017-10-30 PROCEDURE — G0463 HOSPITAL OUTPT CLINIC VISIT: HCPCS | Performed by: PLASTIC SURGERY

## 2017-10-30 NOTE — PROGRESS NOTES
Injury 1: Pain onset, probable contusion   - Date: 10/20/17  - Days Since: 10    Pt here for f/u of RMF pain. Pt presents w/custom sled splint w/coban to RMF. Pt states that the throbbing pain is gone.   Pt states she has constant \"achy\" pain 4/10 for l

## 2017-11-02 ENCOUNTER — OFFICE VISIT (OUTPATIENT)
Dept: NEUROLOGY | Facility: CLINIC | Age: 77
End: 2017-11-02

## 2017-11-02 VITALS
HEIGHT: 68 IN | BODY MASS INDEX: 39.4 KG/M2 | HEART RATE: 64 BPM | WEIGHT: 260 LBS | RESPIRATION RATE: 16 BRPM | DIASTOLIC BLOOD PRESSURE: 66 MMHG | SYSTOLIC BLOOD PRESSURE: 120 MMHG

## 2017-11-02 DIAGNOSIS — M51.26 LUMBAR HERNIATED DISC: ICD-10-CM

## 2017-11-02 DIAGNOSIS — Z98.1 S/P CERVICAL SPINAL FUSION: ICD-10-CM

## 2017-11-02 DIAGNOSIS — G44.59 OTHER COMPLICATED HEADACHE SYNDROME: ICD-10-CM

## 2017-11-02 DIAGNOSIS — G89.29 CHRONIC LEFT-SIDED LOW BACK PAIN WITHOUT SCIATICA: Primary | ICD-10-CM

## 2017-11-02 DIAGNOSIS — M54.50 CHRONIC LEFT-SIDED LOW BACK PAIN WITHOUT SCIATICA: Primary | ICD-10-CM

## 2017-11-02 DIAGNOSIS — M47.896 OTHER SPONDYLOSIS, LUMBAR REGION: ICD-10-CM

## 2017-11-02 DIAGNOSIS — M96.1 LUMBAR POST-LAMINECTOMY SYNDROME: ICD-10-CM

## 2017-11-02 DIAGNOSIS — M48.061 SPINAL STENOSIS OF LUMBAR REGION WITHOUT NEUROGENIC CLAUDICATION: ICD-10-CM

## 2017-11-02 DIAGNOSIS — M96.1 CERVICAL POST-LAMINECTOMY SYNDROME: ICD-10-CM

## 2017-11-02 DIAGNOSIS — M43.16 SPONDYLOLISTHESIS OF LUMBAR REGION: ICD-10-CM

## 2017-11-02 DIAGNOSIS — M51.36 LUMBAR DEGENERATIVE DISC DISEASE: ICD-10-CM

## 2017-11-02 DIAGNOSIS — M43.10 RETROLISTHESIS OF VERTEBRAE: ICD-10-CM

## 2017-11-02 PROCEDURE — 99214 OFFICE O/P EST MOD 30 MIN: CPT | Performed by: PHYSICAL MEDICINE & REHABILITATION

## 2017-11-02 NOTE — PROGRESS NOTES
Low Back Pain H & P    Chief Complaint: Patient presents with:  Low Back Pain: Patient is here for a post-injection f/u pt had bilateral L5 TFESI's done on 10/13/17. Pt states she did not get much relief from injection.  Pt received a 25% relief from inject disease 1985,1995,2003,2009    Cervical Discectomy    • Cholecystitis 1984    Cholecystectomy    • COPD (chronic obstructive pulmonary disease) ([facility]    • Dehydration 2012    Fluids, Medication adjustment    • Depression    • Disorder of thyroid    • Esoph Attack Mother    • Heart Disease Mother      Coronary Artery Disease, Premature    • Fibromyalgia Sister    • Heart Disease Sister    • Heart Attack Sister    • Heart Disease Brother    • Heart Attack Brother    • Kaden Allen Father    • Eufemia Murray 3.  The patient has a normal affect and mood. Respiratory:  No acute respiratory distress. Patient does not have a cough. HEENT:  Extraocular muscles are intact. There is no kern icterus. Pupils are equal, round, and reactive to light.  No redness o Assessment  1. Chronic left-sided low back pain without sciatica    2. s/p L5-S1 right laminectomy    3. L5-S1 left mod-severe/right mod foraminal, L1-2 mild-mod central stenosis    4.  L5-S1 left mild-mod HNP going into the foramen with new small extru

## 2017-11-02 NOTE — PATIENT INSTRUCTIONS
As of October 6th 2014, the Drug Enforcement Agency [facility]) is reclassifying all hydrocodone combination medications from Schedule III to Schedule II. This includes medications such as Norco, Vicodin, Lortab, Zohydro, and Vicoprofen.     What this means for y will do her home exercise program 2 times a day to see if this will help her more. The patient does not need any injections at this time.     If the home exercise program twice day is not helping her, then I will do bilateral L4-5 and L5-S1 z-joint injec

## 2017-11-13 ENCOUNTER — TELEPHONE (OUTPATIENT)
Dept: OTHER | Age: 77
End: 2017-11-13

## 2017-11-13 ENCOUNTER — OFFICE VISIT (OUTPATIENT)
Dept: SURGERY | Facility: CLINIC | Age: 77
End: 2017-11-13

## 2017-11-13 DIAGNOSIS — S63.632A SPRAIN OF INTERPHALANGEAL JOINT OF RIGHT MIDDLE FINGER, INITIAL ENCOUNTER: Primary | ICD-10-CM

## 2017-11-13 DIAGNOSIS — S60.031A CONTUSION OF RIGHT MIDDLE FINGER WITHOUT DAMAGE TO NAIL, INITIAL ENCOUNTER: ICD-10-CM

## 2017-11-13 PROCEDURE — G0463 HOSPITAL OUTPT CLINIC VISIT: HCPCS | Performed by: PLASTIC SURGERY

## 2017-11-13 PROCEDURE — 99212 OFFICE O/P EST SF 10 MIN: CPT | Performed by: PLASTIC SURGERY

## 2017-11-13 RX ORDER — AMMONIUM LACTATE 12 G/100G
LOTION TOPICAL
COMMUNITY
Start: 2017-11-08 | End: 2018-08-29

## 2017-11-13 NOTE — TELEPHONE ENCOUNTER
[facility]oke with [facility] would like to get an order for colace prn for pt.   pls advise, thanks in advance.

## 2017-11-13 NOTE — PROGRESS NOTES
Injury 1: Pain onset, probable contusion   - Date: 10/20/17  - Days Since: 24    Pt here for f/u of RMF contusion. Pt wearing custom sled splint w/coban. Pt taking if off to wash hand daily and to ice the RMF.   \"Achy\" pain 4/10 and pain if bumps RMF 6/

## 2017-11-20 ENCOUNTER — OFFICE VISIT (OUTPATIENT)
Dept: SURGERY | Facility: CLINIC | Age: 77
End: 2017-11-20

## 2017-11-20 DIAGNOSIS — S60.031A CONTUSION OF RIGHT MIDDLE FINGER WITHOUT DAMAGE TO NAIL, INITIAL ENCOUNTER: Primary | ICD-10-CM

## 2017-11-20 PROCEDURE — G0463 HOSPITAL OUTPT CLINIC VISIT: HCPCS | Performed by: PLASTIC SURGERY

## 2017-11-20 PROCEDURE — 99213 OFFICE O/P EST LOW 20 MIN: CPT | Performed by: PLASTIC SURGERY

## 2017-11-20 NOTE — PROGRESS NOTES
Injury 1: Pain onset, probable contusion   - Date: 10/20/17  - Days Since: 32    States RMF Proximal phalangeal bone still throbs and is sore most of the time  Wearing splint almost all the time as it is painful when she bumps it.   States is frustrated and

## 2017-11-24 ENCOUNTER — ANTI-COAG VISIT (OUTPATIENT)
Dept: INTERNAL MEDICINE CLINIC | Facility: CLINIC | Age: 77
End: 2017-11-24

## 2017-11-24 DIAGNOSIS — I82.409 DEEP VEIN THROMBOSIS (DVT) OF LOWER EXTREMITY, UNSPECIFIED CHRONICITY, UNSPECIFIED LATERALITY, UNSPECIFIED VEIN (HCC): ICD-10-CM

## 2017-12-04 ENCOUNTER — NURSE TRIAGE (OUTPATIENT)
Dept: INTERNAL MEDICINE CLINIC | Facility: CLINIC | Age: 77
End: 2017-12-04

## 2017-12-04 NOTE — TELEPHONE ENCOUNTER
Action Requested: Summary for Provider     []  Critical Lab, Recommendations Needed  [] Need Additional Advice  []   FYI    []   Need Orders  [] Need Medications Sent to Pharmacy  []  Other     SUMMARY: OV CREATED, dry cough, headache, sore throat.     Pt s

## 2017-12-05 ENCOUNTER — OFFICE VISIT (OUTPATIENT)
Dept: INTERNAL MEDICINE CLINIC | Facility: CLINIC | Age: 77
End: 2017-12-05

## 2017-12-05 VITALS
HEART RATE: 76 BPM | HEIGHT: 68 IN | DIASTOLIC BLOOD PRESSURE: 75 MMHG | BODY MASS INDEX: 39.86 KG/M2 | WEIGHT: 263 LBS | SYSTOLIC BLOOD PRESSURE: 143 MMHG | TEMPERATURE: 98 F

## 2017-12-05 DIAGNOSIS — J42 CHRONIC BRONCHITIS, UNSPECIFIED CHRONIC BRONCHITIS TYPE (HCC): Primary | ICD-10-CM

## 2017-12-05 DIAGNOSIS — F33.40 RECURRENT MAJOR DEPRESSIVE DISORDER, IN REMISSION (HCC): ICD-10-CM

## 2017-12-05 DIAGNOSIS — R26.9 GAIT ABNORMALITY: ICD-10-CM

## 2017-12-05 DIAGNOSIS — E66.01 MORBID OBESITY DUE TO EXCESS CALORIES (HCC): ICD-10-CM

## 2017-12-05 DIAGNOSIS — Z86.718 HISTORY OF DVT (DEEP VEIN THROMBOSIS): ICD-10-CM

## 2017-12-05 DIAGNOSIS — G47.33 OSA (OBSTRUCTIVE SLEEP APNEA): ICD-10-CM

## 2017-12-05 DIAGNOSIS — I10 ESSENTIAL HYPERTENSION: ICD-10-CM

## 2017-12-05 PROCEDURE — 99214 OFFICE O/P EST MOD 30 MIN: CPT | Performed by: INTERNAL MEDICINE

## 2017-12-05 PROCEDURE — 90670 PCV13 VACCINE IM: CPT | Performed by: INTERNAL MEDICINE

## 2017-12-05 PROCEDURE — G0463 HOSPITAL OUTPT CLINIC VISIT: HCPCS | Performed by: INTERNAL MEDICINE

## 2017-12-05 PROCEDURE — G0009 ADMIN PNEUMOCOCCAL VACCINE: HCPCS | Performed by: INTERNAL MEDICINE

## 2017-12-05 RX ORDER — CEFUROXIME AXETIL 250 MG/1
250 TABLET ORAL 2 TIMES DAILY
Qty: 28 TABLET | Refills: 0 | Status: SHIPPED | OUTPATIENT
Start: 2017-12-05 | End: 2017-12-06

## 2017-12-06 ENCOUNTER — TELEPHONE (OUTPATIENT)
Dept: INTERNAL MEDICINE CLINIC | Facility: CLINIC | Age: 77
End: 2017-12-06

## 2017-12-06 RX ORDER — CEFUROXIME AXETIL 250 MG/1
250 TABLET ORAL 2 TIMES DAILY
Qty: 28 TABLET | Refills: 0 | Status: SHIPPED | OUTPATIENT
Start: 2017-12-06 | End: 2017-12-16

## 2017-12-06 NOTE — TELEPHONE ENCOUNTER
Per assisted living nurse, pt has not received medications. Meds were sent to wrong pharmacy. Updated default pharmacy and resent script.

## 2017-12-13 NOTE — PROGRESS NOTES
HPI:    Patient ID: Carly Pizano is a 76year old female.     HPI   Followu p    wheelchiar bound   Feeling better in generall  Leg swelling  And redness much better  No cellulitis per pt   Has a cough feeling congested  No fever   No wheeizing  /7 Cream MADDIE TOPICALLY TO BILATERAL LEFT BID Disp:  Rfl: 0   HYDROcodone-acetaminophen  MG Oral Tab Take 1 tablet by mouth every 6 (six) hours as needed for Pain.  Disp: 20 tablet Rfl: 0   Warfarin Sodium 5 MG Oral Tab Take 1 tablet (5 mg total) by mouth (See Comments)    Comment:pancreatitis  Dilaudid  [Hydromor*    Rash  Gabitril [Tiagabine]    Other (See Comments)    Comment:Stroke like symptoms             Unable to move  Metronidazole           Rash  Phentermine             Other (See Comments)    Com Comment: Toe, Onychomycosis  No date: EYE SURGERY      Comment: x2 FOR \"CROSSED EYES\"  No date: HIP REPLACEMENT SURGERY Bilateral  1967: HYSTERECTOMY      Comment: Partial Hysterectomy  No date: JAW SURGERY  No date: KNEE REPLACEMENT SURGERY Bilateral Abdominal: Bowel sounds are normal. She exhibits no distension. There is no tenderness. Musculoskeletal: She exhibits edema. Lymphadenopathy:     She has no cervical adenopathy. Skin: No erythema.             ASSESSMENT/PLAN:   (H24) Chronic bronchi

## 2017-12-28 ENCOUNTER — ANTI-COAG VISIT (OUTPATIENT)
Dept: INTERNAL MEDICINE CLINIC | Facility: CLINIC | Age: 77
End: 2017-12-28

## 2017-12-28 DIAGNOSIS — I82.409 DEEP VEIN THROMBOSIS (DVT) OF LOWER EXTREMITY, UNSPECIFIED CHRONICITY, UNSPECIFIED LATERALITY, UNSPECIFIED VEIN (HCC): ICD-10-CM

## 2018-01-01 NOTE — LETTER
21        To Whom It May Concern:      Jazmine Arnold  :  12/3/1940    []  Patient has been cleared to hold Eliquis 2 days prior to Right L4-5 and L5-S1 z-joint injections.   Holding the medication(s) may put the patient at an increased risk for s The mother chose not to exclusively breastfeed upon admission.

## 2018-01-11 ENCOUNTER — TELEPHONE (OUTPATIENT)
Dept: INTERNAL MEDICINE CLINIC | Facility: CLINIC | Age: 78
End: 2018-01-11

## 2018-01-13 RX ORDER — TEMAZEPAM 30 MG/1
CAPSULE ORAL
Qty: 30 CAPSULE | Refills: 0 | OUTPATIENT
Start: 2018-01-13 | End: 2018-02-21

## 2018-01-17 RX ORDER — TEMAZEPAM 30 MG/1
CAPSULE ORAL
Qty: 30 CAPSULE | Refills: 5 | OUTPATIENT
Start: 2018-01-17

## 2018-01-17 NOTE — TELEPHONE ENCOUNTER
Dr. Zhou Yeung, please advise on refill above.     Please respond to pool: EM [facility] LPN/CMA    [address] [phone], [phone]   Medication Detail    Disp Refills Start End    TEMAZEPAM 30 MG Oral Cap 30 capsule

## 2018-01-19 RX ORDER — HYDROCODONE BITARTRATE AND ACETAMINOPHEN 10; 325 MG/1; MG/1
TABLET ORAL
Qty: 20 TABLET | Refills: 0 | Status: ON HOLD | OUTPATIENT
Start: 2018-01-19 | End: 2018-06-06

## 2018-01-29 ENCOUNTER — NURSE TRIAGE (OUTPATIENT)
Dept: OTHER | Age: 78
End: 2018-01-29

## 2018-01-29 RX ORDER — CEPHALEXIN 500 MG/1
500 CAPSULE ORAL 2 TIMES DAILY
Qty: 10 CAPSULE | Refills: 0 | Status: SHIPPED | OUTPATIENT
Start: 2018-01-29 | End: 2018-02-01

## 2018-01-30 NOTE — TELEPHONE ENCOUNTER
Spoke to patient, she states that she had in the past Keflex to treat bladder infection will send prescription, advised him to see a doctor where she is now she is feeling worse, she is [facility] [place] [facility]

## 2018-01-30 NOTE — TELEPHONE ENCOUNTER
Action Requested: Summary for Provider     []  Critical Lab, Recommendations Needed  [] Need Additional Advice  []   FYI    []   Need Orders  [x] Need Medications Sent to Pharmacy  []  Other     SUMMARY: Urinary frequency/urgency, pain/burning on urination

## 2018-02-01 ENCOUNTER — TELEPHONE (OUTPATIENT)
Dept: INTERNAL MEDICINE CLINIC | Facility: CLINIC | Age: 78
End: 2018-02-01

## 2018-02-01 ENCOUNTER — TELEPHONE (OUTPATIENT)
Dept: OTHER | Age: 78
End: 2018-02-01

## 2018-02-01 RX ORDER — CEPHALEXIN 500 MG/1
500 CAPSULE ORAL 2 TIMES DAILY
Qty: 10 CAPSULE | Refills: 0 | Status: ON HOLD | OUTPATIENT
Start: 2018-02-01 | End: 2018-05-10

## 2018-02-01 NOTE — TELEPHONE ENCOUNTER
Current Outpatient Prescriptions Sent to wrong pharmacy :  cephALEXin 500 MG Oral Cap Take 1 capsule (500 mg total) by mouth 2 (two) times daily.  Disp: 10 capsule Rfl: 0   [facility] - [address], [phone]

## 2018-02-03 ENCOUNTER — HOSPITAL ENCOUNTER (EMERGENCY)
Facility: HOSPITAL | Age: 78
Discharge: NURSING, REHAB OR LONG-TERM CARE | End: 2018-02-03
Attending: EMERGENCY MEDICINE
Payer: MEDICARE

## 2018-02-03 ENCOUNTER — APPOINTMENT (OUTPATIENT)
Dept: CT IMAGING | Facility: HOSPITAL | Age: 78
End: 2018-02-03
Attending: EMERGENCY MEDICINE
Payer: MEDICARE

## 2018-02-03 VITALS
DIASTOLIC BLOOD PRESSURE: 76 MMHG | RESPIRATION RATE: 18 BRPM | OXYGEN SATURATION: 98 % | HEART RATE: 90 BPM | SYSTOLIC BLOOD PRESSURE: 131 MMHG | HEIGHT: 68 IN | WEIGHT: 270 LBS | TEMPERATURE: 98 F | BODY MASS INDEX: 40.92 KG/M2

## 2018-02-03 DIAGNOSIS — S09.90XA INJURY OF HEAD, INITIAL ENCOUNTER: ICD-10-CM
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
## 2018-02-03 DIAGNOSIS — S10.93XA CONTUSION OF NECK, INITIAL ENCOUNTER: ICD-10-CM

## 2018-02-03 PROCEDURE — 72125 CT NECK SPINE W/O DYE: CPT | Performed by: EMERGENCY MEDICINE

## 2018-02-03 PROCEDURE — 99284 EMERGENCY DEPT VISIT MOD MDM: CPT

## 2018-02-03 PROCEDURE — 70450 CT HEAD/BRAIN W/O DYE: CPT | Performed by: EMERGENCY MEDICINE

## 2018-02-03 RX ORDER — PREDNISONE 20 MG/1
20 TABLET ORAL DAILY
Qty: 5 TABLET | Refills: 0 | Status: SHIPPED | OUTPATIENT
Start: 2018-02-03 | End: 2018-02-08

## 2018-02-03 RX ORDER — ACETAMINOPHEN 500 MG
1000 TABLET ORAL ONCE
Status: COMPLETED | OUTPATIENT
Start: 2018-02-03 | End: 2018-02-03

## 2018-02-04 NOTE — ED PROVIDER NOTES
Patient Seen in: [facility] AND [facility] Emergency Department    History   Patient presents with:  Fall  Neck Pain      HPI    The patient presents from her assisted care facility after standing up today losing her balance and falling over backwards hitting he CAROTID ENDARTERECTOMY Right  ~2008: CARPAL TUNNEL RELEASE Right  1984: CHOLECYSTECTOMY      Comment: Cholecystitis  2010: COLONOSCOPY  No date: DEBRIDEMENT OF NAIL(S), 1-5      Comment: Toe, Onychomycosis  No date: EYE SURGERY      Comment: x2 FOR \"CROSS Other Topics            Concern  Caffeine Concern        No    Comment:crystal light  Exercise                Yes    Comment:home P/T 2 times per week  History of tanning      No  Pt has a pacemaker      No  Pt has a defibrillator  No  Reaction to loca display      Imaging Results Available and Reviewed while in ED: Ct Brain Or Head ([id number])    Result Date: 2/3/2018  CONCLUSION:  1. No intracranial hemorrhage, focal infarct or mass.  Of note, there is extensive artifact from embolization material in the ri right mod paracentral HNP with moderate caudal extrusion, T12-L1 left small paracentral HNP; Right calf pain; Hip pain; Bilateral groin pain; SHANT (obstructive sleep apnea);  Headache disorder; Neck pain, bilateral; Cellulitis of left lower extremity; DVT (d Refills: 0

## 2018-02-04 NOTE — ED INITIAL ASSESSMENT (HPI)
Pt fell from a standing position, lost balance. Hi the back of her head and neck on a table. No loc. On coumadin for hx of dvts. From [facility]. Aox4 on arrival, pre-hospital c-collar intact. Wheezing per EMS, hx of copd. Has a chronic cough.

## 2018-02-19 ENCOUNTER — HOSPITAL ENCOUNTER (EMERGENCY)
Facility: HOSPITAL | Age: 78
Discharge: HOME | End: 2018-02-19
Attending: EMERGENCY MEDICINE
Payer: MEDICARE

## 2018-02-19 ENCOUNTER — APPOINTMENT (OUTPATIENT)
Dept: CT IMAGING | Facility: HOSPITAL | Age: 78
End: 2018-02-19
Attending: EMERGENCY MEDICINE
Payer: MEDICARE

## 2018-02-19 VITALS
RESPIRATION RATE: 18 BRPM | OXYGEN SATURATION: 94 % | HEIGHT: 67 IN | BODY MASS INDEX: 42.38 KG/M2 | DIASTOLIC BLOOD PRESSURE: 67 MMHG | TEMPERATURE: 98 F | SYSTOLIC BLOOD PRESSURE: 170 MMHG | HEART RATE: 78 BPM | WEIGHT: 270 LBS

## 2018-02-19 DIAGNOSIS — S09.90XA INJURY OF HEAD, INITIAL ENCOUNTER: ICD-10-CM

## 2018-02-19 DIAGNOSIS — N39.0 URINARY TRACT INFECTION WITHOUT HEMATURIA, SITE UNSPECIFIED: Primary | ICD-10-CM

## 2018-02-19 DIAGNOSIS — R79.1 SUBTHERAPEUTIC INTERNATIONAL NORMALIZED RATIO (INR): ICD-10-CM

## 2018-02-19 LAB
ANION GAP SERPL CALC-SCNC: 6 MMOL/L (ref 0–18)
BASOPHILS # BLD: 0.1 K/UL (ref 0–0.2)
BASOPHILS NFR BLD: 1 %
BILIRUB UR QL: NEGATIVE
BUN SERPL-MCNC: 16 MG/DL (ref 8–20)
BUN/CREAT SERPL: 16.3 (ref 10–20)
CALCIUM SERPL-MCNC: 9.5 MG/DL (ref 8.5–10.5)
CHLORIDE SERPL-SCNC: 105 MMOL/L (ref 95–110)
CO2 SERPL-SCNC: 30 MMOL/L (ref 22–32)
COLOR UR: YELLOW
CREAT SERPL-MCNC: 0.98 MG/DL (ref 0.5–1.5)
EOSINOPHIL # BLD: 0.3 K/UL (ref 0–0.7)
EOSINOPHIL NFR BLD: 4 %
ERYTHROCYTE [DISTWIDTH] IN BLOOD BY AUTOMATED COUNT: 15 % (ref 11–15)
GLUCOSE SERPL-MCNC: 116 MG/DL (ref 70–99)
GLUCOSE UR-MCNC: NEGATIVE MG/DL
HCT VFR BLD AUTO: 40.1 % (ref 35–48)
HGB BLD-MCNC: 13.3 G/DL (ref 12–16)
HGB UR QL STRIP.AUTO: NEGATIVE
INR BLD: 1 (ref 0.9–1.2)
KETONES UR-MCNC: NEGATIVE MG/DL
LYMPHOCYTES # BLD: 1.9 K/UL (ref 1–4)
LYMPHOCYTES NFR BLD: 27 %
MCH RBC QN AUTO: 31.8 PG (ref 27–32)
MCHC RBC AUTO-ENTMCNC: 33.3 G/DL (ref 32–37)
MCV RBC AUTO: 95.6 FL (ref 80–100)
MONOCYTES # BLD: 0.6 K/UL (ref 0–1)
MONOCYTES NFR BLD: 9 %
NEUTROPHILS # BLD AUTO: 4.2 K/UL (ref 1.8–7.7)
NEUTROPHILS NFR BLD: 59 %
NITRITE UR QL STRIP.AUTO: NEGATIVE
OSMOLALITY UR CALC.SUM OF ELEC: 294 MOSM/KG (ref 275–295)
PH UR: 5 [PH] (ref 5–8)
PLATELET # BLD AUTO: 174 K/UL (ref 140–400)
PMV BLD AUTO: 8.9 FL (ref 7.4–10.3)
POTASSIUM SERPL-SCNC: 4.2 MMOL/L (ref 3.3–5.1)
PROT UR-MCNC: 30 MG/DL
PROTHROMBIN TIME: 12.7 SECONDS (ref 11.8–14.5)
RBC # BLD AUTO: 4.19 M/UL (ref 3.7–5.4)
RBC #/AREA URNS AUTO: 3 /HPF
SODIUM SERPL-SCNC: 141 MMOL/L (ref 136–144)
SP GR UR STRIP: 1.03 (ref 1–1.03)
UROBILINOGEN UR STRIP-ACNC: 2
VIT C UR-MCNC: NEGATIVE MG/DL
WBC # BLD AUTO: 7 K/UL (ref 4–11)
WBC #/AREA URNS AUTO: 395 /HPF

## 2018-02-19 PROCEDURE — 99284 EMERGENCY DEPT VISIT MOD MDM: CPT

## 2018-02-19 PROCEDURE — 85610 PROTHROMBIN TIME: CPT | Performed by: EMERGENCY MEDICINE

## 2018-02-19 PROCEDURE — 81001 URINALYSIS AUTO W/SCOPE: CPT | Performed by: EMERGENCY MEDICINE

## 2018-02-19 PROCEDURE — 80048 BASIC METABOLIC PNL TOTAL CA: CPT | Performed by: EMERGENCY MEDICINE

## 2018-02-19 PROCEDURE — 85025 COMPLETE CBC W/AUTO DIFF WBC: CPT | Performed by: EMERGENCY MEDICINE

## 2018-02-19 PROCEDURE — 70450 CT HEAD/BRAIN W/O DYE: CPT | Performed by: EMERGENCY MEDICINE

## 2018-02-19 PROCEDURE — 36415 COLL VENOUS BLD VENIPUNCTURE: CPT

## 2018-02-19 PROCEDURE — 87086 URINE CULTURE/COLONY COUNT: CPT | Performed by: EMERGENCY MEDICINE

## 2018-02-19 PROCEDURE — 70486 CT MAXILLOFACIAL W/O DYE: CPT | Performed by: EMERGENCY MEDICINE

## 2018-02-19 RX ORDER — NITROFURANTOIN 25; 75 MG/1; MG/1
100 CAPSULE ORAL 2 TIMES DAILY
Qty: 14 CAPSULE | Refills: 0 | Status: SHIPPED | OUTPATIENT
Start: 2018-02-19 | End: 2018-02-26

## 2018-02-19 NOTE — ED PROVIDER NOTES
Patient Seen in: [facility] AND [facility] Emergency Department    History   Patient presents with:  Head Neck Injury (neurologic, musculoskeletal)    Stated Complaint:     HPI    61-year-old female with history of COPD on home oxygen, prior DVTs for which she t Other and unspecified hyperlipidemia    • Pneumonia 2012   • Shortness of breath     O2 AT NIGHT   • Sleep apnea    • stent placement     cerebral   • Thyroid disease    • Toe pain     Onychomycosis, Debridement , Ximena    • Tonsillitis 1950    Tonsill Resp 16   Ht 170.2 cm (5' 7\")   Wt 122.5 kg   SpO2 97%   BMI 42.29 kg/m²         Physical Exam    General Appearance: alert, no distress  Eyes: pupils equal and round no injection  Respiratory: chest is non tender to palpation, breath sounds are equal  Ca order CBC WITH DIFFERENTIAL WITH PLATELET.   Procedure                               Abnormality         Status                     ---------                               -----------         ------                     CBC W/ DIFFERENTIAL[[id number]]

## 2018-02-19 NOTE — ED INITIAL ASSESSMENT (HPI)
Pt to ED via EMS from [facility] c/o head injury s/p trip and fall 2 days ago. Pt denies LOC but c/o nausea, headache and decreased appetite since.  +Coumadin

## 2018-02-20 ENCOUNTER — TELEPHONE (OUTPATIENT)
Dept: NEUROLOGY | Facility: CLINIC | Age: 78
End: 2018-02-20

## 2018-02-20 DIAGNOSIS — M47.896 OTHER SPONDYLOSIS, LUMBAR REGION: Primary | ICD-10-CM

## 2018-02-20 DIAGNOSIS — M96.1 LUMBAR POST-LAMINECTOMY SYNDROME: ICD-10-CM

## 2018-02-20 DIAGNOSIS — M51.26 LUMBAR HERNIATED DISC: ICD-10-CM

## 2018-02-20 DIAGNOSIS — M43.10 RETROLISTHESIS OF VERTEBRAE: ICD-10-CM

## 2018-02-20 DIAGNOSIS — M54.16 LUMBAR RADICULOPATHY: ICD-10-CM

## 2018-02-20 DIAGNOSIS — M51.36 LUMBAR DEGENERATIVE DISC DISEASE: ICD-10-CM

## 2018-02-20 DIAGNOSIS — M43.16 SPONDYLOLISTHESIS OF LUMBAR REGION: ICD-10-CM

## 2018-02-21 ENCOUNTER — TELEPHONE (OUTPATIENT)
Dept: INTERNAL MEDICINE CLINIC | Facility: CLINIC | Age: 78
End: 2018-02-21

## 2018-02-21 NOTE — TELEPHONE ENCOUNTER
Pharmacy calling to request refill on controlled substance.  Please advise      Current Outpatient Prescriptions:  TEMAZEPAM 30 MG Oral Cap ONE CAPSULE BY MOUTH AT BEDTIME AS NEEDED FOR SLEEP ***VIAL* Disp: 30 capsule Rfl: 0

## 2018-02-21 NOTE — TELEPHONE ENCOUNTER
Patient states she is having lower left side back pain that is the same as before. Walking/standing increases pain. Pain score of 5-6/10 at worse. Sitting relieves the pain. Taking otc Tylenol prn for pain.  Patient is asking is she can proceed with recomm

## 2018-02-22 ENCOUNTER — NURSE TRIAGE (OUTPATIENT)
Dept: OTHER | Age: 78
End: 2018-02-22

## 2018-02-22 ENCOUNTER — TELEPHONE (OUTPATIENT)
Dept: NEUROLOGY | Facility: CLINIC | Age: 78
End: 2018-02-22

## 2018-02-22 RX ORDER — TEMAZEPAM 30 MG/1
CAPSULE ORAL
Qty: 30 CAPSULE | Refills: 0 | Status: ON HOLD | OUTPATIENT
Start: 2018-02-22 | End: 2018-05-02

## 2018-02-22 NOTE — TELEPHONE ENCOUNTER
Action Requested: Summary for Provider     []  Critical Lab, Recommendations Needed  [x] Need Additional Advice  []   FYI    []   Need Orders  [x] Need Medications Sent to Pharmacy  []  Other     SUMMARY: Pt stts has been unable to sleep for the last 2 nig

## 2018-02-22 NOTE — TELEPHONE ENCOUNTER
Medicare Online for insurance coverage of bilateral L4-5 & L5-S1 Z-joint injections cpt codes 83387, 94245. Insurance was verified and procedure is a covered benefit and does not require authorization. Will inform Nursing.

## 2018-02-23 NOTE — TELEPHONE ENCOUNTER
She is on temazepam   30 mg  And other sedating meds  Psych and norco  She shoud follow up with psych this is a chronic recurrent problem

## 2018-02-23 NOTE — TELEPHONE ENCOUNTER
Patient informed Dr. Andre Boy placed order for bilateral L4-5 and L5-S1 z-joint injections under [facility]. patiet would like to proceed with scheduling    Patient has been scheduled for a bilateral L4-5 and L5-S1 z-joint injections under [facility] on 3/9/18 at the [facility].

## 2018-03-05 ENCOUNTER — OFFICE VISIT (OUTPATIENT)
Dept: SURGERY | Facility: CLINIC | Age: 78
End: 2018-03-05

## 2018-03-05 ENCOUNTER — HOSPITAL ENCOUNTER (OUTPATIENT)
Dept: GENERAL RADIOLOGY | Facility: HOSPITAL | Age: 78
Discharge: HOME | End: 2018-03-05
Attending: PLASTIC SURGERY | Admitting: PLASTIC SURGERY
Payer: MEDICARE

## 2018-03-05 DIAGNOSIS — S60.031A CONTUSION OF RIGHT MIDDLE FINGER WITHOUT DAMAGE TO NAIL, INITIAL ENCOUNTER: Primary | ICD-10-CM

## 2018-03-05 DIAGNOSIS — S63.632A SPRAIN OF INTERPHALANGEAL JOINT OF RIGHT MIDDLE FINGER, INITIAL ENCOUNTER: ICD-10-CM

## 2018-03-05 DIAGNOSIS — S60.031A CONTUSION OF RIGHT MIDDLE FINGER WITHOUT DAMAGE TO NAIL, INITIAL ENCOUNTER: ICD-10-CM

## 2018-03-05 PROCEDURE — G0463 HOSPITAL OUTPT CLINIC VISIT: HCPCS | Performed by: PLASTIC SURGERY

## 2018-03-05 PROCEDURE — 99214 OFFICE O/P EST MOD 30 MIN: CPT | Performed by: PLASTIC SURGERY

## 2018-03-05 PROCEDURE — 73140 X-RAY EXAM OF FINGER(S): CPT | Performed by: PLASTIC SURGERY

## 2018-03-05 RX ORDER — LOPERAMIDE HYDROCHLORIDE 2 MG/1
2 CAPSULE ORAL 4 TIMES DAILY PRN
COMMUNITY
End: 2018-05-25

## 2018-03-05 NOTE — H&P
Injury 1: RMF Pain onset, probable contusion   - Date: 10/20/17  - Days Since: 136    Injury 2: RMF Pain onset  - Date: 02/13/18  - Days Since: 20    Sherman Tovar is a 76year old female that presents with Patient presents with:  Pain: RMF  .     Brandy Roca MOUTH EVERY 6 HOURS AS NEEDED *DO NOT EXCEED 4GM/APAP IN 24 HOURS FROM ALL SOURCES* Disp: 20 tablet Rfl: 0   ammonium lactate 12 % External Lotion  Disp:  Rfl:    Oxybutynin Chloride ER 5 MG Oral Tablet 24 Hr Take 1 tablet (5 mg total) by mouth daily.  Disp 100 mg by mouth daily.  ) Disp: 60 capsule Rfl: 0   AmLODIPine Besylate 10 MG Oral Tab Take 10 mg by mouth daily. Disp:  Rfl:    Cholecalciferol (VITAMIN D) 2000 UNITS Oral Tab Take 2,000 Units by mouth daily.  Disp:  Rfl:    aspirin 325 MG Oral Tab Take 32 Thyroid disease    • Toe pain     Onychomycosis, Debridement , Hammertoe    • Tonsillitis 1950    Tonsillitis    • Unspecified essential hypertension    • Unspecified sleep apnea    • Visual impairment     EYE GLASSES     Past Surgical History:  No date: A Hx of Radiation Treatments No     Social History Narrative    The patient uses the following assistive device(s):  rolling walker. scooter    The patient does not live in a home with stairs. The patient lives in a [facility] home.  [facility] and the patient wishes to proceed with treatment. SPLINT - This can be treated with a splint. If this does not heal satisfactorily, surgery may be required. We discussed splint care and instructions, as well as restrictions.  Even with satisfactory hea

## 2018-03-05 NOTE — PROGRESS NOTES
Per verbal order from Dr Roberto Galeano pt fitted for frog splint. Pt states comfortable. Pt to wean off splint herself at home. Verbalized understanding.

## 2018-03-15 LAB — INR: 2.5 (ref 2–3)

## 2018-03-16 ENCOUNTER — NURSE TRIAGE (OUTPATIENT)
Dept: OTHER | Age: 78
End: 2018-03-16

## 2018-03-16 NOTE — TELEPHONE ENCOUNTER
She has too many allergies and too many medical problems  I dont feel comfortable treating her over the phone  Allergic to bactim   cipro and  interaciton of  depakote and keflex  Should be seen [facility] documented abnormal    High risk for side efect    [facility] a

## 2018-03-16 NOTE — TELEPHONE ENCOUNTER
Action Requested: Summary for Provider     []  Critical Lab, Recommendations Needed  [] Need Additional Advice  []   FYI    []   Need Orders  [] Need Medications Sent to Pharmacy  []  Other     SUMMARY: Dr. Valerie Batres, please advise.  Patient has no transporta

## 2018-03-19 ENCOUNTER — TELEPHONE (OUTPATIENT)
Dept: INTERNAL MEDICINE CLINIC | Facility: CLINIC | Age: 78
End: 2018-03-19

## 2018-03-19 DIAGNOSIS — I82.409 DEEP VEIN THROMBOSIS (DVT) OF LOWER EXTREMITY, UNSPECIFIED CHRONICITY, UNSPECIFIED LATERALITY, UNSPECIFIED VEIN (HCC): ICD-10-CM

## 2018-03-19 NOTE — TELEPHONE ENCOUNTER
Dr. Valerie Batres,    Patient's current anticoagulation monitoring order is  2/3/18. Last anticoag management with [facility] was on 17. Please sign pending order for renewal.     Thank you.

## 2018-03-20 NOTE — TELEPHONE ENCOUNTER
Reviewed notes–okay to continue to go to the Coumadin clinic as she is wheelchair-bound and has history of blood clots

## 2018-03-20 NOTE — TELEPHONE ENCOUNTER
Spoke with patient and relayed MMP message below-she states she will \"look in to going to\" [facility] in [place] today for UTI symptoms as it is close to her house. She states transportation is an on-going problem for her.  She takes two buses and has to

## 2018-03-21 ENCOUNTER — ANTI-COAG VISIT (OUTPATIENT)
Dept: INTERNAL MEDICINE CLINIC | Facility: CLINIC | Age: 78
End: 2018-03-21

## 2018-03-21 DIAGNOSIS — I82.409 DEEP VEIN THROMBOSIS (DVT) OF LOWER EXTREMITY, UNSPECIFIED CHRONICITY, UNSPECIFIED LATERALITY, UNSPECIFIED VEIN (HCC): ICD-10-CM

## 2018-03-21 NOTE — TELEPHONE ENCOUNTER
Pt states she didn't go to [facility] as she had no transportation. States she is no longer having pain on urination.  Pt states she is going to be at the hospital tomorrow to visit her daughter, states if doctor wants to order a urine test for her, she would be a

## 2018-03-21 NOTE — TELEPHONE ENCOUNTER
Called patient at home twice, answered and hung up immediately. Called daughter (on HIPAA), informed her of Dr Gamal Saldaña recommendation. Daughter is in the hospital, just had surgery, but will tell the patient.  Expressed our wishes for a good recovery to the

## 2018-04-03 ENCOUNTER — TELEPHONE (OUTPATIENT)
Dept: OTHER | Age: 78
End: 2018-04-03

## 2018-04-03 NOTE — TELEPHONE ENCOUNTER
appt made for Friday pt stated on/off issues with urine frequency,copd flare up,sciatica of left leg some tingling at times  Call back or go straight to ER if s/sx worsen, questions or concerns. Patient verbalized understanding and agrees with plan.

## 2018-04-19 NOTE — TELEPHONE ENCOUNTER
Christina states pharmacy has not yet received Temazepam prescription, informed her prescription will be called into pharmacy as doctor has approved refill.     Prescription information called into [facility] pharmacist.
Christina the nurse at [facility] following up on refill request. Cynthia Hinojosa state that pharmacy has not received request as of yet. .. please advise
Yes

## 2018-05-02 ENCOUNTER — APPOINTMENT (OUTPATIENT)
Dept: CT IMAGING | Facility: HOSPITAL | Age: 78
DRG: 177 | End: 2018-05-02
Attending: EMERGENCY MEDICINE
Payer: MEDICARE

## 2018-05-02 ENCOUNTER — APPOINTMENT (OUTPATIENT)
Dept: GENERAL RADIOLOGY | Facility: HOSPITAL | Age: 78
DRG: 177 | End: 2018-05-02
Attending: EMERGENCY MEDICINE
Payer: MEDICARE

## 2018-05-02 ENCOUNTER — HOSPITAL ENCOUNTER (INPATIENT)
Facility: HOSPITAL | Age: 78
LOS: 8 days | Discharge: HOME HEALTH | DRG: 177 | End: 2018-05-10
Attending: EMERGENCY MEDICINE | Admitting: HOSPITALIST
Payer: MEDICARE

## 2018-05-02 DIAGNOSIS — R53.83 LETHARGY: ICD-10-CM

## 2018-05-02 DIAGNOSIS — J18.9 COMMUNITY ACQUIRED PNEUMONIA, UNSPECIFIED LATERALITY: Primary | ICD-10-CM

## 2018-05-02 PROCEDURE — 99223 1ST HOSP IP/OBS HIGH 75: CPT | Performed by: HOSPITALIST

## 2018-05-02 PROCEDURE — 71045 X-RAY EXAM CHEST 1 VIEW: CPT | Performed by: EMERGENCY MEDICINE

## 2018-05-02 PROCEDURE — 71260 CT THORAX DX C+: CPT | Performed by: EMERGENCY MEDICINE

## 2018-05-02 PROCEDURE — 70450 CT HEAD/BRAIN W/O DYE: CPT | Performed by: EMERGENCY MEDICINE

## 2018-05-02 RX ORDER — LEVOTHYROXINE SODIUM 0.12 MG/1
250 TABLET ORAL
Status: DISCONTINUED | OUTPATIENT
Start: 2018-05-03 | End: 2018-05-06

## 2018-05-02 RX ORDER — ACETAMINOPHEN 325 MG/1
650 TABLET ORAL EVERY 6 HOURS PRN
Status: DISCONTINUED | OUTPATIENT
Start: 2018-05-02 | End: 2018-05-10

## 2018-05-02 RX ORDER — LORAZEPAM 1 MG/1
1 TABLET ORAL 2 TIMES DAILY PRN
Status: DISCONTINUED | OUTPATIENT
Start: 2018-05-02 | End: 2018-05-10

## 2018-05-02 RX ORDER — QUETIAPINE 200 MG/1
400 TABLET, FILM COATED ORAL NIGHTLY
Status: DISCONTINUED | OUTPATIENT
Start: 2018-05-02 | End: 2018-05-10

## 2018-05-02 RX ORDER — IPRATROPIUM BROMIDE AND ALBUTEROL SULFATE 2.5; .5 MG/3ML; MG/3ML
3 SOLUTION RESPIRATORY (INHALATION) EVERY 6 HOURS PRN
Status: DISCONTINUED | OUTPATIENT
Start: 2018-05-02 | End: 2018-05-10

## 2018-05-02 RX ORDER — SODIUM CHLORIDE 0.9 % (FLUSH) 0.9 %
3 SYRINGE (ML) INJECTION AS NEEDED
Status: DISCONTINUED | OUTPATIENT
Start: 2018-05-02 | End: 2018-05-10

## 2018-05-02 RX ORDER — AMLODIPINE BESYLATE 10 MG/1
10 TABLET ORAL DAILY
Status: DISCONTINUED | OUTPATIENT
Start: 2018-05-03 | End: 2018-05-10

## 2018-05-02 RX ORDER — ONDANSETRON 2 MG/ML
4 INJECTION INTRAMUSCULAR; INTRAVENOUS EVERY 6 HOURS PRN
Status: DISCONTINUED | OUTPATIENT
Start: 2018-05-02 | End: 2018-05-10

## 2018-05-02 RX ORDER — IPRATROPIUM BROMIDE AND ALBUTEROL SULFATE 2.5; .5 MG/3ML; MG/3ML
3 SOLUTION RESPIRATORY (INHALATION) EVERY 4 HOURS PRN
Status: DISCONTINUED | OUTPATIENT
Start: 2018-05-02 | End: 2018-05-10

## 2018-05-02 RX ORDER — DEXTROSE MONOHYDRATE 25 G/50ML
50 INJECTION, SOLUTION INTRAVENOUS AS NEEDED
Status: CANCELLED | OUTPATIENT
Start: 2018-05-02

## 2018-05-02 RX ORDER — ACETAMINOPHEN 325 MG/1
325 TABLET ORAL EVERY 6 HOURS PRN
Status: DISCONTINUED | OUTPATIENT
Start: 2018-05-02 | End: 2018-05-10

## 2018-05-02 RX ORDER — ASPIRIN 325 MG
325 TABLET ORAL DAILY
Status: DISCONTINUED | OUTPATIENT
Start: 2018-05-03 | End: 2018-05-10

## 2018-05-02 RX ORDER — DOCUSATE SODIUM 100 MG/1
100 CAPSULE, LIQUID FILLED ORAL DAILY
Status: DISCONTINUED | OUTPATIENT
Start: 2018-05-03 | End: 2018-05-10

## 2018-05-02 RX ORDER — PANTOPRAZOLE SODIUM 40 MG/1
40 TABLET, DELAYED RELEASE ORAL
Status: DISCONTINUED | OUTPATIENT
Start: 2018-05-03 | End: 2018-05-10

## 2018-05-02 RX ORDER — HYDROCODONE BITARTRATE AND ACETAMINOPHEN 10; 325 MG/1; MG/1
1 TABLET ORAL EVERY 6 HOURS PRN
Status: DISCONTINUED | OUTPATIENT
Start: 2018-05-02 | End: 2018-05-10

## 2018-05-02 RX ORDER — WARFARIN SODIUM 5 MG/1
5 TABLET ORAL NIGHTLY
Status: DISCONTINUED | OUTPATIENT
Start: 2018-05-02 | End: 2018-05-10

## 2018-05-02 RX ORDER — VENLAFAXINE HYDROCHLORIDE 150 MG/1
150 CAPSULE, EXTENDED RELEASE ORAL
Status: DISCONTINUED | OUTPATIENT
Start: 2018-05-03 | End: 2018-05-10

## 2018-05-02 NOTE — ED INITIAL ASSESSMENT (HPI)
PATIENT VIA MEDICS FROM [facility]. WAS FOUND LETHARGIC AND SLEEPY WHILE PLAYING CARDS WITH HER FRIENDS. PATIENT AWAKE UPON ARRIVAL, SLOWLY FOLLOWING COMMANDS, BUT APPEARS TIRED, PALE    LAST SEEN NORMAL LAST NIGHT.  DR. Maggy Mcgregor

## 2018-05-02 NOTE — ED PROVIDER NOTES
Patient Seen in: [facility] AND [facility] Emergency Department    History   Patient presents with:  Fatigue (constitutional, neurologic)    Stated Complaint: LETHARGIC    HPI    Patient is a 27-year-old female from assisted living facility who arrives by EMS fo History:  No date: ANESTH,NECK VESSEL SURGERY NOS      Comment: x4  ~2009: BRAIN SURGERY      Comment: Brain Aneurysm, Stent placed   No date: CAROTID ENDARTERECTOMY Right  ~2008: CARPAL TUNNEL RELEASE Right  1984: CHOLECYSTECTOMY      Comment: Cholecystit nontender, no distension  Extremities: no edema. FROM of extremities  Neuro: CN intact, slurred speech, no pronator drift. Normal finger to nose exam. 3/5 motor strength in b/l lower extremities. 5/5 motor strength in b/l upper extremities.  Normal sensatio individual orders.    TRIIODOTHYRONINE (T3) TOTAL   RAINBOW DRAW BLUE   RAINBOW DRAW LAVENDER   RAINBOW DRAW DARK GREEN   RAINBOW DRAW LIGHT GREEN   RAINBOW DRAW GOLD   RAINBOW DRAW LAVENDER TALL (BNP)   BLOOD CULTURE   BLOOD CULTURE   ED/MRSA SCREEN BY PCR and lateral 2 further assess. Pulmonary nodule/mass must be excluded. Minimal bibasilar scarring/atelectasis. Blunting of the left costophrenic angle.      Dictated by (CST): Maria Del Carmen Young MD on 5/02/2018 at 12:00     Approved by (CST): Maria Del Carmen Young MD o

## 2018-05-02 NOTE — H&P
[facility]    PATIENT'S NAME: Aby Albarran   ATTENDING PHYSICIAN: Andrez Sylvester MD   PATIENT ACCOUNT#:   [id number]    LOCATION:  [place] [address]  MEDICAL RECORD #:   [id number]       YOB: 1940  ADMISSION DATE:       05/02/ metronidazole, phentermine, Ultram, mainly side effects. FAMILY HISTORY:  Mother had coronary artery disease. Sister had coronary artery disease. SOCIAL HISTORY:  Ex-tobacco user, quit tobacco a long time ago.   No current tobacco, alcohol, or drug u dose.  Further recommendations to follow.     Dictated By Anastacia Regalado MD  d: 05/02/2018 14:59:05  t: 05/02/2018 15:16:26  Job [id number]  FB/

## 2018-05-03 PROCEDURE — 99233 SBSQ HOSP IP/OBS HIGH 50: CPT | Performed by: HOSPITALIST

## 2018-05-03 NOTE — CM/SW NOTE
SW self-referred to meet w/ pt due to case finding and diagnosis. SW met w/ pt to discuss eventual discharge needs. Pt is from [facility] in [place]. Pt reports to have a daughter that lives in [place] who is able to assist as needed.  Pt reports to be i

## 2018-05-03 NOTE — CONSULTS
[facility] HOSP - [facility]    Report of Consultation    Dian Phan Patient Status:  Inpatient    12/3/1940 MRN [id number]   Location [facility] Attending Ginger Martin MD   Hosp Day # 1 PCP Jae Cleveland MD     Date of Admission:   Onychomycosis, Debridement , Ximena    • Tonsillitis 1950    Tonsillitis    • Unspecified essential hypertension    • Unspecified sleep apnea    • Visual impairment     EYE GLASSES       Past Surgical History  Past Surgical History:  No date: ANESTH,NEC Quit date: 1/1/1977  Smokeless tobacco: Never Used                      Comment: 1977  Alcohol use:  No                   Current Medications:    Current Facility-Administered Medications:  vancomycin (FIRST-VANCOMYCIN 50) 50 MG/ML oral solution 125 mg 125 differently: TAKE 1 CAPSULES BY MOUTH DAILY)   Emollient (EUCERIN SKIN CALMING) External Cream MADDIE TOPICALLY TO BILATERAL LEFT BID   Warfarin Sodium 5 MG Oral Tab Take 1 tablet (5 mg total) by mouth nightly.    LORazepam 1 MG Oral Tab Take 1 tablet (1 mg to Comment:Unknown  Fluocinolone            OTHER (SEE COMMENTS)    Comment:Unknown  Gabitril [Tiagabine]    OTHER (SEE COMMENTS)    Comment:Stroke like symptoms             Unable to move  Metronidazole           RASH  Phentermine             OTHER (SEE COMM  05/03/2018   CREATSERUM 1.06 05/03/2018   BUN 10 05/03/2018    05/03/2018   K 4.1 05/03/2018    05/03/2018   CO2 33 (H) 05/03/2018   GLU 94 05/03/2018   CA 8.9 05/03/2018   ALB 3.4 (L) 05/02/2018   ALKPHO 90 05/02/2018   TP 6.3 05/02/ costophrenic angle.      Dictated by (CST): Kiara Luque MD on 5/02/2018 at 12:00     Approved by (CST): Kiara Luque MD on 5/02/2018 at 12:03               Impression:     Community acquired pneumonia, unspecified laterality  Continue zosyn      Commun

## 2018-05-03 NOTE — OCCUPATIONAL THERAPY NOTE
OCCUPATIONAL THERAPY EVALUATION - INPATIENT     Room Number: 524/524-A  Evaluation Date: 5/3/2018  Type of Evaluation: Initial  Presenting Problem: slurred speech, community acquired PNA    Physician Order: IP Consult to Occupational Therapy  Reason for Th pneumonia, unspecified laterality  Active Problems:    Community acquired pneumonia    Lethargy      Past Medical History  Past Medical History:   Diagnosis Date   • Anxiety state, unspecified    • Arthritis    • Arthritis of both knees     knee replacemen KNEE REPLACEMENT SURGERY Bilateral      Comment: Bilateral arthritis   No date: LAMINECTOMY      Comment: WITH FUSION PER PATIENT  1985,1995,2003,2009: OTHER SURGICAL HISTORY      Comment: Cervical Discectomy AND FUSION  1950: TONSILLECTOMY      Comment: T ADDITIONAL TESTS                                    NEUROLOGICAL FINDINGS  Neurological Findings: None                ACTIVITIES OF DAILY LIVING ASSESSMENT  AM-PAC ‘6-Clicks’ Inpatient Daily Activity Short Form  How much help from another person does t Comment:          Goals  on: 18  Frequency: 3-5x/week

## 2018-05-03 NOTE — CM/SW NOTE
Explanation of BPCI program provided, Patient was enrolled under   Patient / family agreed to follow up phone call for 3 months after discharge from [address] [place] [address]. BPCI Medicare letter and brochure provided.

## 2018-05-03 NOTE — PLAN OF CARE
Problem: Patient Centered Care  Goal: Patient preferences are identified and integrated in the patient's plan of care  Interventions:  - What would you like us to know as we care for you?   - Provide timely, complete, and accurate information to patient/fa Encourage pt to monitor pain and request assistance  - Assess pain using appropriate pain scale  - Administer analgesics based on type and severity of pain and evaluate response  - Implement non-pharmacological measures as appropriate and evaluate response

## 2018-05-03 NOTE — PROGRESS NOTES
[facility]    Progress Note    Mario Galarza Patient Status:  Inpatient    12/3/1940 MRN [id number]   Location [facility]5W Attending Janes Leach MD   Hosp Day # 1 PCP Merilynn Apley, MD       SUBJECTIVE:  No CP, SOB, or favored to be reactive and can be reassessed at followup. Trace right pleural effusion. Cardiomegaly with coronary artery calcifications.   Dictated by (CST): Brittany May MD on 5/02/2018 at 13:20     Approved by (CST): Brittany May MD on 5/02/2 MCG/INH inhaler 1 puff 1 puff Inhalation Daily   docusate sodium (COLACE) cap 100 mg 100 mg Oral Daily   HYDROcodone-acetaminophen (NORCO)  MG per tab 1 tablet 1 tablet Oral Q6H PRN   ipratropium-albuterol (DUONEB) nebulizer solution 3 mL 3 mL Nebuli fusion: C3-6 and C7-T1     Community acquired pneumonia     Community acquired pneumonia, unspecified laterality     Lethargy      Plan:     Acute metabolic encephalopathy  - 2/2 polypharmacy  - now improved    Aspiration pneumonia  - aspiration precaution

## 2018-05-03 NOTE — PROGRESS NOTES
[facility] Pharmacy Note: Antimicrobial Weight Dose Adjustment for: piperacillin/tazobactam (Solitario Daly)    Rayshawn Ireland is a 76year old female who has been prescribed piperacillin/tazobactam (ZOSYN) 3.375 gm every 8 hours.   CrCl is estimated creatinine clearance

## 2018-05-03 NOTE — PHYSICAL THERAPY NOTE
Chart reviewed, attempted to see patient. Per RN, patient recently received her lunch and is requesting therapy to return. PT to re-attempt at a later time, schedule permitting.

## 2018-05-03 NOTE — PROGRESS NOTES
[facility]    CDI Prediction Tool Protocol (Vancomycin Initiated)    OVP (oral vancomycin prophylaxis) 125 mg PO Daily is being started in this patient based on a score of 17.1.       Score Breakdown:  History of CDI > 1 year ago AND high risk an

## 2018-05-03 NOTE — PLAN OF CARE
Problem: Patient Centered Care  Goal: Patient preferences are identified and integrated in the patient's plan of care  Interventions:  - What would you like us to know as we care for you? Lives at [facility], uses motorized wheelchair to get to dining [place].  U ADULT  Goal: Verbalizes/displays adequate comfort level or patient's stated pain goal  INTERVENTIONS:  - Encourage pt to monitor pain and request assistance  - Assess pain using appropriate pain scale  - Administer analgesics based on type and severity of

## 2018-05-03 NOTE — PHYSICAL THERAPY NOTE
PHYSICAL THERAPY EVALUATION - INPATIENT     Room Number: 524/524-A  Evaluation Date: 5/3/2018  Type of Evaluation: Initial           Reason for Therapy: Mobility Dysfunction and Discharge Planning    PHYSICAL THERAPY ASSESSMENT     Patient is a 76 year ol Problems:    Community acquired pneumonia    Lethargy    Past Medical History  Past Medical History:   Diagnosis Date   • Anxiety state, unspecified    • Arthritis    • Arthritis of both knees     knee replacement    • Arthritis of right hip 2009   • Back Bilateral arthritis   No date: LAMINECTOMY      Comment: WITH FUSION PER PATIENT  [id number]: OTHER SURGICAL HISTORY      Comment: Cervical Discectomy AND FUSION  1950: TONSILLECTOMY      Comment: Tonsillitis   2006: UPPER GI ENDOSCOPY,BIOPSY    H break and cues for slow, controlled breathing with pursed lips    Post-2nd bout ambulation (10'), seated:  O2 saturation 94% on 2L O2 NC  SOB with fatigue, improved with 3-4 min rest break and cues for slow, controlled breathes with pursed lips    AM-PAC ' modified independent     Goal #1   Current Status    Goal #2 Patient is able to demonstrate transfers Sit to/from Stand at assistance level: modified independent with walker - rolling     Goal #2  Current Status    Goal #3 Patient is able to ambulate 20 fe

## 2018-05-04 ENCOUNTER — MED REC SCAN ONLY (OUTPATIENT)
Dept: CARDIOLOGY CLINIC | Facility: CLINIC | Age: 78
End: 2018-05-04

## 2018-05-04 PROCEDURE — 99233 SBSQ HOSP IP/OBS HIGH 50: CPT | Performed by: HOSPITALIST

## 2018-05-04 NOTE — OCCUPATIONAL THERAPY NOTE
Attempted to see patient this PM at 15:00 for OT session, per RN chris. Patient received in elevated supine position in bed. Patient states that she is very tired after washing up in the bathroom and does not want to participate in therapy.  Therapist franchesca

## 2018-05-04 NOTE — PHYSICAL THERAPY NOTE
Chart reviewed, attempted to see patient. Patient reporting she was up to the bathroom with nursing 3x today and was able to bath seated with assistance, refusing PT at this time d/t fatigue.  Educated patient to continue ambulating in room with RN to incre

## 2018-05-04 NOTE — CM/SW NOTE
[facility] orders obtained and provided to [address] via [employer]. Will need to alert the agency of dc date for services to be prepared.     [facility] [phone]    JM olvera [id number]

## 2018-05-04 NOTE — FACE TO FACE NOTE
[facility]  Face to Face Encounter Note    Mario Galarza Patient Status:  Inpatient    12/3/1940 MRN [id number]   Location [facility]5W Attending Janes Leach MD   Hosp Day # 2 PCP Merilynn Apley, MD       I, or a non-physician practit The findings from this face-to-face encounter have been communicated with the patient's community-based physician who will be assuming this patient's home health plan of care.     Date:  5/4/18  Physician:  Dr Kate Hameed

## 2018-05-04 NOTE — PLAN OF CARE
Problem: Patient Centered Care  Goal: Patient preferences are identified and integrated in the patient's plan of care  Interventions:  - What would you like us to know as we care for you? Lives at [facility], uses motorized wheelchair to get to dining [place].  U ADULT  Goal: Verbalizes/displays adequate comfort level or patient's stated pain goal  INTERVENTIONS:  - Encourage pt to monitor pain and request assistance  - Assess pain using appropriate pain scale  - Administer analgesics based on type and severity of

## 2018-05-04 NOTE — PROGRESS NOTES
[facility]    Progress Note    Dian Phan Patient Status:  Inpatient    12/3/1940 MRN [id number]   Location [facility] Attending Ginger Martin MD   Hosp Day # 2 PCP Jae Cleveland MD       Subjective:   Dian Phan antibiotics      Community acquired pneumonia  Continue antibiotics      Lethargy  Significant improvement today        Results:     Lab Results  Component Value Date   WBC 5.4 05/04/2018   HGB 12.3 05/04/2018   HCT 37.4 05/04/2018    05/04/2018   C in the right upper chest versus artifact. See above discussion. Recommend chest PA and lateral 2 further assess. Pulmonary nodule/mass must be excluded. Minimal bibasilar scarring/atelectasis. Blunting of the left costophrenic angle.      Dictated by (CST):

## 2018-05-05 PROCEDURE — 99233 SBSQ HOSP IP/OBS HIGH 50: CPT | Performed by: HOSPITALIST

## 2018-05-05 NOTE — PROGRESS NOTES
[facility]    Progress Note    Clifford Venegas Patient Status:  Inpatient    12/3/1940 MRN [id number]   Location [facility]5W Attending Liliane Jeff MD   Hosp Day # 2 PCP Michael Wallace MD       SUBJECTIVE:    No CP, SOB, o reactive and can be reassessed at followup. Trace right pleural effusion. Cardiomegaly with coronary artery calcifications.   Dictated by (CST): Jameson Puente MD on 5/02/2018 at 13:20     Approved by (CST): Jameson Puente MD on 5/02/2018 at 13:30 puff 1 puff Inhalation Daily   docusate sodium (COLACE) cap 100 mg 100 mg Oral Daily   HYDROcodone-acetaminophen (NORCO)  MG per tab 1 tablet 1 tablet Oral Q6H PRN   ipratropium-albuterol (DUONEB) nebulizer solution 3 mL 3 mL Nebulization Q4H PRN   L C7-T1     Community acquired pneumonia     Community acquired pneumonia, unspecified laterality     Lethargy      Plan:     Acute metabolic encephalopathy  - 2/2 polypharmacy  - now improved    Aspiration pneumonia  - aspiration precautions  - cont zosyn

## 2018-05-05 NOTE — PROGRESS NOTES
[facility]    Progress Note    Joo Vides Patient Status:  Inpatient    12/3/1940 MRN [id number]   Location [facility] Attending Edy Mandel MD   Hosp Day # 3 PCP Filipe Aldrich MD       Subjective:   Joo Vides pneumonia, unspecified laterality  Continue current antibiotics      Community acquired pneumonia  Continue antibiotics      Lethargy  Resolved        Results:     Lab Results  Component Value Date   WBC 5.5 05/05/2018   HGB 12.1 05/05/2018   HCT 36.2 05/0

## 2018-05-05 NOTE — OCCUPATIONAL THERAPY NOTE
OCCUPATIONAL THERAPY TREATMENT NOTE - INPATIENT        Room Number: 524/524-A           Presenting Problem: Slurred speech, community acquired pneumonia    Problem List  Principal Problem:    Community acquired pneumonia, unspecified laterality  Active Pro Putting on and taking off regular lower body clothing?: A Little  -   Bathing (including washing, rinsing, drying)?: A Little  -   Toileting, which includes using toilet, bedpan or urinal? : A Little  -   Putting on and taking off regular upper body cloth

## 2018-05-05 NOTE — PROGRESS NOTES
[facility]    Progress Note    Amaris Alt Patient Status:  Inpatient    12/3/1940 MRN [id number]   Location [facility]5W Attending Minesh Rivera MD   Hosp Day # 3 PCP Evon Barahona MD       SUBJECTIVE:    No CP, SOB, o in size since prior study from 2016. These are favored to be reactive and can be reassessed at followup. Trace right pleural effusion. Cardiomegaly with coronary artery calcifications.   Dictated by (CST): Tiera Roach MD on 5/02/2018 at 13:20     Ap mg 1 mg Oral BID PRN   Pantoprazole Sodium (PROTONIX) EC tab 40 mg 40 mg Oral QAM AC   QUEtiapine Fumarate (SEROQUEL) tab 400 mg 400 mg Oral Nightly   Venlafaxine HCl ER (EFFEXOR-XR) 24 hr cap 150 mg 150 mg Oral Daily with breakfast   Warfarin Sodium (COUM soto protocol with bipap    Morbid obesity  - bmi 39    COPD  - nebs  - pulm following    Hypothyroidism  - cont home meds    DVT  - cont coumadin, monitor INR       Greater than 35 minutes spent, >50% spent counseling re: treatment plan and workup      Janay Caceres

## 2018-05-05 NOTE — PLAN OF CARE
Problem: Patient Centered Care  Goal: Patient preferences are identified and integrated in the patient's plan of care  Interventions:  - What would you like us to know as we care for you? Lives at [facility], uses motorized wheelchair to get to dining [place].  U ADULT  Goal: Verbalizes/displays adequate comfort level or patient's stated pain goal  INTERVENTIONS:  - Encourage pt to monitor pain and request assistance  - Assess pain using appropriate pain scale  - Administer analgesics based on type and severity of

## 2018-05-05 NOTE — PROGRESS NOTES
[facility] dosing service    Initial Pharmacokinetic Consult for Vancomycin Dosing     Tejas Gomez is a 76year old female  who is being treated for cellulitis.   Pharmacy has been asked to dose Vancomycin by Dr. Dimas Estrada     She is allergic to bactrim Encounter on 05/02/18  1. BLOOD CULTURE Status: None (Preliminary result)   Collection Time: 05/02/18 1:51 PM   Result Value Ref Range   Blood Culture Result No Growth 2 Days N/A       Radiology:    Based on the above:    1.  This patient will receive a gwendolyn

## 2018-05-05 NOTE — PLAN OF CARE
Problem: Patient Centered Care  Goal: Patient preferences are identified and integrated in the patient's plan of care  Interventions:  - What would you like us to know as we care for you? Lives at [facility], uses motorized wheelchair to get to dining [place]cgill.  U ADULT  Goal: Verbalizes/displays adequate comfort level or patient's stated pain goal  INTERVENTIONS:  - Encourage pt to monitor pain and request assistance  - Assess pain using appropriate pain scale  - Administer analgesics based on type and severity of

## 2018-05-05 NOTE — PHYSICAL THERAPY NOTE
PHYSICAL THERAPY TREATMENT NOTE - INPATIENT     Room Number: 524/524-A            Problem List  Principal Problem:    Community acquired pneumonia, unspecified laterality  Active Problems:    Community acquired pneumonia    Lethargy      ASSESSMENT   Spoke Fair -    ACTIVITY TOLERANCE  O2 Saturation: 87%  Room air  No shortness of breath   O2 Saturation: second reading after second walk: 55%    AM-PAC '6-Clicks' INPATIENT SHORT FORM - BASIC MOBILITY  How much difficulty does the patient currently have. ..  - - 5/5/18. Goal #4 Patient to demonstrate independence with home activity/exercise instructions including Log Roll  provided to patient in preparation for discharge. Goal #4   Current Status Not tested today - 5/5/18.

## 2018-05-06 PROCEDURE — 99233 SBSQ HOSP IP/OBS HIGH 50: CPT | Performed by: HOSPITALIST

## 2018-05-06 RX ORDER — LEVOTHYROXINE SODIUM 0.12 MG/1
250 TABLET ORAL
Status: DISCONTINUED | OUTPATIENT
Start: 2018-05-07 | End: 2018-05-10

## 2018-05-06 RX ORDER — 0.9 % SODIUM CHLORIDE 0.9 %
VIAL (ML) INJECTION
Status: DISPENSED
Start: 2018-05-06 | End: 2018-05-07

## 2018-05-06 NOTE — PROGRESS NOTES
[facility]    Progress Note    Sarah Brar Patient Status:  Inpatient    12/3/1940 MRN [id number]   Location [facility] Attending Starla Gerard Day # 4 PCP Shaylee Carmona MD       Subjective:   Odalys Jolley current antibiotics chest x-ray tomorrow morning if clear home      Community acquired pneumonia  Chest x-ray tomorrow morning      Lethargy  Resolved        Results:     Lab Results  Component Value Date   WBC 5.9 05/06/2018   HGB 12.5 05/06/2018   HCT 37

## 2018-05-06 NOTE — PROGRESS NOTES
[place] [facility]    Progress Note    Mariam Suazo Patient Status:  Inpatient    12/3/1940 MRN [id number]   Location [facility] Attending Antelmo Gerardissa Day # 4 PCP Alcides Valencia MD     Subjective:     Constitu which 20 min spent coordinating care with dr Azucena Adams and counseling pt about dc  cxr images reviewed       Results:     Lab Results  Component Value Date   WBC 5.9 05/06/2018   HGB 12.5 05/06/2018   HCT 37.7 05/06/2018    05/06/2018   CREATSERUM 1.03

## 2018-05-06 NOTE — PLAN OF CARE
Problem: Patient Centered Care  Goal: Patient preferences are identified and integrated in the patient's plan of care  Interventions:  - What would you like us to know as we care for you? Lives at [facility], uses motorized wheelchair to get to dining mcgill.  U ADULT  Goal: Verbalizes/displays adequate comfort level or patient's stated pain goal  INTERVENTIONS:  - Encourage pt to monitor pain and request assistance  - Assess pain using appropriate pain scale  - Administer analgesics based on type and severity of

## 2018-05-06 NOTE — PLAN OF CARE
Problem: Patient Centered Care  Goal: Patient preferences are identified and integrated in the patient's plan of care  Interventions:  - What would you like us to know as we care for you? Lives at [facility], uses motorized wheelchair to get to dining [place]cgill.  U night, stable O2 saturation, no respiratory distress noted at this time    Problem: PAIN - ADULT  Goal: Verbalizes/displays adequate comfort level or patient's stated pain goal  INTERVENTIONS:  - Encourage pt to monitor pain and request assistance  - Asses

## 2018-05-07 ENCOUNTER — APPOINTMENT (OUTPATIENT)
Dept: GENERAL RADIOLOGY | Facility: HOSPITAL | Age: 78
DRG: 177 | End: 2018-05-07
Attending: INTERNAL MEDICINE
Payer: MEDICARE

## 2018-05-07 PROCEDURE — 99233 SBSQ HOSP IP/OBS HIGH 50: CPT | Performed by: HOSPITALIST

## 2018-05-07 PROCEDURE — 71046 X-RAY EXAM CHEST 2 VIEWS: CPT | Performed by: INTERNAL MEDICINE

## 2018-05-07 RX ORDER — MAGNESIUM OXIDE 400 MG (241.3 MG MAGNESIUM) TABLET
400 TABLET ONCE
Status: COMPLETED | OUTPATIENT
Start: 2018-05-07 | End: 2018-05-07

## 2018-05-07 NOTE — PROGRESS NOTES
[facility]    [facility] Note    Anastasiia Conrad Patient Status:  Inpatient    12/3/1940 MRN [id number]   Location [address] [facility] Attending Antelmo Gerard Day # 5 PCP Sofia Jimenez MD       Subjective:   Nubia Crowley antibiotics      Community acquired pneumonia  Chest x-ray in the morning if persistent right upper lobe infiltration is present we will go ahead with bronchoscopy on Wednesday      Lethargy  Resolved        Results:     Lab Results  Component Value Date

## 2018-05-07 NOTE — PHYSICAL THERAPY NOTE
Attempted treatment pt declined therapy session. Pt reported she just finished AMB with the nursing staff. Will follow up tomorrow.

## 2018-05-07 NOTE — PROGRESS NOTES
[place] [facility]    Progress Note    Miah Vera Patient Status:  Inpatient    12/3/1940 MRN [id number]   Location [facility] Attending Antelmo Gerard Day # 5 PCP Angelina Lees MD     Subjective:     Constitu about bronch  cxr images reviewed       Results:     Lab Results  Component Value Date   WBC 6.5 05/07/2018   HGB 12.5 05/07/2018   HCT 37.1 05/07/2018    05/07/2018   CREATSERUM 1.07 05/07/2018   BUN 14 05/07/2018    05/07/2018   K 4.6 05/07/

## 2018-05-07 NOTE — OCCUPATIONAL THERAPY NOTE
OCCUPATIONAL THERAPY TREATMENT NOTE - INPATIENT        Room Number: 524/524-A           Presenting Problem: Slurred speech, community acquired pneumonia    Problem List  Principal Problem:    Community acquired pneumonia, unspecified laterality  Active Pro WEIGHT BEARING RESTRICTION  Weight Bearing Restriction: None                PAIN ASSESSMENT  Ratin  Location: acute on chronic low back pain, headache  Management Techniques: Activity promotion; Body mechanics     ACTIVITY TOLERANCE  Please refer to Mod I  Comment: N/t             Goals  on: 18  Frequency: 3-5x/week    Christina Gabriel OTR/L

## 2018-05-07 NOTE — PLAN OF CARE
Problem: Patient/Family Goals  Goal: Patient/Family Long Term Goal  Patient's Long Term Goal: to return home    Interventions:  - monitor labs and vitals  - administer medications as ordered  - See additional Care Plan goals for specific interventions    O patient reports new pain  - Anticipate increased pain with activity and pre-medicate as appropriate   Outcome: Progressing  Denies any pain    Problem: SAFETY ADULT - FALL  Goal: Free from fall injury  INTERVENTIONS:  - Assess pt frequently for physical ne

## 2018-05-08 ENCOUNTER — APPOINTMENT (OUTPATIENT)
Dept: GENERAL RADIOLOGY | Facility: HOSPITAL | Age: 78
DRG: 177 | End: 2018-05-08
Attending: HOSPITALIST
Payer: MEDICARE

## 2018-05-08 PROCEDURE — 99233 SBSQ HOSP IP/OBS HIGH 50: CPT | Performed by: HOSPITALIST

## 2018-05-08 PROCEDURE — 71046 X-RAY EXAM CHEST 2 VIEWS: CPT | Performed by: HOSPITALIST

## 2018-05-08 NOTE — PROGRESS NOTES
Ambulated pt on hallway 50 ft,highest hr-100,lowest saturation 91%,pt c/o back pain and sob at the end of ambulation ,had to stop in between

## 2018-05-08 NOTE — PLAN OF CARE
Problem: Patient Centered Care  Goal: Patient preferences are identified and integrated in the patient's plan of care  Interventions:  - What would you like us to know as we care for you? Lives at [facility], uses motorized wheelchair to get to dining [place]cg[place].  U room air,but gets sob with ambulation,plan for bronchoscopy tomorrow,will continue to monitor    Problem: PAIN - ADULT  Goal: Verbalizes/displays adequate comfort level or patient's stated pain goal  INTERVENTIONS:  - Encourage pt to monitor pain and reque

## 2018-05-08 NOTE — PHYSICAL THERAPY NOTE
PHYSICAL THERAPY TREATMENT NOTE - INPATIENT     Room Number: 524/524-A            Problem List  Principal Problem:    Community acquired pneumonia, unspecified laterality  Active Problems:    Community acquired pneumonia    Lethargy      ASSESSMENT   Mollye Static Sitting: Good  Dynamic Sitting: Fair +           Static Standing: Fair  Dynamic Standing: Fair -    ACTIVITY TOLERANCE  Room air  Shortness of breath    AM-PAC ' #2  Current Status SBA   Goal #3 Patient is able to ambulate 20 feet with assist device: walker - rolling at assistance level: modified independent   Goal #3   Current Status 120 ft with RW with CGA   Goal #4 Patient to demonstrate independence with home a

## 2018-05-08 NOTE — PROGRESS NOTES
[facility]    [facility] Note    Pastoralatasha Oates Patient Status:  Inpatient    12/3/1940 MRN [id number]   Location [facility] Attending [facility] Day # 6 PCP Halima Diaz MD       Subjective:   Rayray Barahona Community acquired pneumonia, unspecified laterality  Continue current treatment      Community acquired pneumonia  Bronchoscopy scheduled for tomorrow morning' for pulmonary toilet      Lethargy  Resolved        Results:     Lab Results  Component Value D

## 2018-05-08 NOTE — PROGRESS NOTES
[place] [facility] - [facility]    Progress Note    Pierre Lance Patient Status:  Inpatient    12/3/1940 MRN [id number]   Location [facility] Attending Antelmo Gerard Day # 6 PCP Sunny Carroll MD     Subjective:     Constitu meds     DVT and prophy  - cont coumadin, monitor INR           cxr images reviewed  41 min spent on pt of which 30 min spent coordinating care with nurse and counseling pt about bronch/cxr/abx/sats  Home when amb sats ok perhaps post bronch; able to walk

## 2018-05-09 ENCOUNTER — ANESTHESIA EVENT (OUTPATIENT)
Dept: ENDOSCOPY | Facility: HOSPITAL | Age: 78
DRG: 177 | End: 2018-05-09
Payer: MEDICARE

## 2018-05-09 ENCOUNTER — ANESTHESIA (OUTPATIENT)
Dept: ENDOSCOPY | Facility: HOSPITAL | Age: 78
DRG: 177 | End: 2018-05-09
Payer: MEDICARE

## 2018-05-09 PROCEDURE — 87186 SC STD MICRODIL/AGAR DIL: CPT | Performed by: ANESTHESIOLOGY

## 2018-05-09 PROCEDURE — 0BC68ZZ EXTIRPATION OF MATTER FROM RIGHT LOWER LOBE BRONCHUS, VIA NATURAL OR ARTIFICIAL OPENING ENDOSCOPIC: ICD-10-PCS | Performed by: INTERNAL MEDICINE

## 2018-05-09 PROCEDURE — 0BCB8ZZ EXTIRPATION OF MATTER FROM LEFT LOWER LOBE BRONCHUS, VIA NATURAL OR ARTIFICIAL OPENING ENDOSCOPIC: ICD-10-PCS | Performed by: INTERNAL MEDICINE

## 2018-05-09 PROCEDURE — 99233 SBSQ HOSP IP/OBS HIGH 50: CPT | Performed by: HOSPITALIST

## 2018-05-09 PROCEDURE — 87158 CULTURE TYPING ADDED METHOD: CPT | Performed by: ANESTHESIOLOGY

## 2018-05-09 RX ORDER — SODIUM CHLORIDE, SODIUM LACTATE, POTASSIUM CHLORIDE, CALCIUM CHLORIDE 600; 310; 30; 20 MG/100ML; MG/100ML; MG/100ML; MG/100ML
INJECTION, SOLUTION INTRAVENOUS CONTINUOUS PRN
Status: DISCONTINUED | OUTPATIENT
Start: 2018-05-09 | End: 2018-05-09 | Stop reason: SURG

## 2018-05-09 RX ORDER — NALOXONE HYDROCHLORIDE 0.4 MG/ML
80 INJECTION, SOLUTION INTRAMUSCULAR; INTRAVENOUS; SUBCUTANEOUS AS NEEDED
Status: DISCONTINUED | OUTPATIENT
Start: 2018-05-09 | End: 2018-05-09 | Stop reason: HOSPADM

## 2018-05-09 RX ORDER — SODIUM CHLORIDE 9 MG/ML
INJECTION, SOLUTION INTRAVENOUS
Status: DISPENSED
Start: 2018-05-09 | End: 2018-05-09

## 2018-05-09 RX ORDER — MAGNESIUM SULFATE HEPTAHYDRATE 40 MG/ML
2 INJECTION, SOLUTION INTRAVENOUS ONCE
Status: COMPLETED | OUTPATIENT
Start: 2018-05-09 | End: 2018-05-09

## 2018-05-09 RX ORDER — SODIUM CHLORIDE, SODIUM LACTATE, POTASSIUM CHLORIDE, CALCIUM CHLORIDE 600; 310; 30; 20 MG/100ML; MG/100ML; MG/100ML; MG/100ML
INJECTION, SOLUTION INTRAVENOUS CONTINUOUS
Status: DISCONTINUED | OUTPATIENT
Start: 2018-05-09 | End: 2018-05-09

## 2018-05-09 RX ORDER — LIDOCAINE HYDROCHLORIDE 10 MG/ML
INJECTION, SOLUTION EPIDURAL; INFILTRATION; INTRACAUDAL; PERINEURAL AS NEEDED
Status: DISCONTINUED | OUTPATIENT
Start: 2018-05-09 | End: 2018-05-09 | Stop reason: SURG

## 2018-05-09 RX ADMIN — SODIUM CHLORIDE, SODIUM LACTATE, POTASSIUM CHLORIDE, CALCIUM CHLORIDE: 600; 310; 30; 20 INJECTION, SOLUTION INTRAVENOUS at 10:34:00

## 2018-05-09 RX ADMIN — SODIUM CHLORIDE, SODIUM LACTATE, POTASSIUM CHLORIDE, CALCIUM CHLORIDE: 600; 310; 30; 20 INJECTION, SOLUTION INTRAVENOUS at 10:16:00

## 2018-05-09 RX ADMIN — LIDOCAINE HYDROCHLORIDE 50 MG: 10 INJECTION, SOLUTION EPIDURAL; INFILTRATION; INTRACAUDAL; PERINEURAL at 10:16:00

## 2018-05-09 NOTE — ANESTHESIA PREPROCEDURE EVALUATION
Anesthesia PreOp Note    HPI:     Mariam Suazo is a 76year old female who presents for preoperative consultation requested by: Lucero Kate MD    Date of Surgery: 5/2/2018 - 5/9/2018    Procedure(s):  BRONCHOSCOPY  Indication: PNUEMONIA      History R Date Noted: 06/22/2015      Bilateral groin pain         Date Noted: 06/22/2015      Right calf pain         Date Noted: 04/23/2015      L4-5 slight grade 1 unstable spondylolisthesis         Date Noted: 10/31/2014      L3-4 stable slight grade 1 retrol • Toe pain     Onychomycosis, Debridement , Reginae    • Tonsillitis 1950    Tonsillitis    • Unspecified essential hypertension    • Unspecified sleep apnea    • Visual impairment     EYE GLASSES       Past Surgical History:  No date: ANESTH,NECK VESS Disp: 10 tablet Rfl: 0 Taking   ipratropium-albuterol 0.5-2.5 (3) MG/3ML Inhalation Solution Take 3 mL by nebulization as needed.  Disp: 100 vial Rfl: 0 Taking   Pantoprazole Sodium 40 MG Oral Tab EC Take 1 tablet (40 mg total) by mouth every morning before Levothyroxine Sodium (SYNTHROID, LEVOTHROID) tab 250 mcg 250 mcg Oral Before breakfast Nadia Reyes  mcg at 05/08/18 0616    Vancomycin HCl (VANCOCIN) 1,500 mg in sodium chloride 0.9 % 500 mL IVPB 15 mg/kg (Adjusted) Intravenous Q24H Kiana Peter mg at 05/08/18 0616    QUEtiapine Fumarate (SEROQUEL) tab 400 mg 400 mg Oral Nightly Jacky Barbosa  mg at 05/08/18 2033    Venlafaxine HCl ER (EFFEXOR-XR) 24 hr cap 150 mg 150 mg Oral Daily with breakfast Jacky Barbosa  mg at 05/08/18 082 Cigarettes    Quit date: 1/1/1977    Smokeless tobacco: Never Used    Comment: 1977    Alcohol use No    Drug use: No    Sexual activity: Not on file     Other Topics Concern    Caffeine Concern No    Comment: crystal light    Exercise Yes    Comment: home Weight:       Height:            Anesthesia ROS/Med Hx and Physical Exam     Patient summary reviewed and Nursing notes reviewed    Airway   Mallampati: III  TM distance: >3 FB  Neck ROM: limited  Dental    (+) upper dentures and lower dentures    Pulmon

## 2018-05-09 NOTE — PLAN OF CARE
Problem: Patient Centered Care  Goal: Patient preferences are identified and integrated in the patient's plan of care  Interventions:  - What would you like us to know as we care for you? Lives at [facility], uses motorized wheelchair to get to dining mcgill.  U ADULT  Goal: Verbalizes/displays adequate comfort level or patient's stated pain goal  INTERVENTIONS:  - Encourage pt to monitor pain and request assistance  - Assess pain using appropriate pain scale  - Administer analgesics based on type and severity of

## 2018-05-09 NOTE — PROGRESS NOTES
[place] [facility] - [facility]    Progress Note    Severa Sable Patient Status:  Inpatient    12/3/1940 MRN [id number]   Location [facility] Attending Antelmo Gerardissa Day # 7 PCP Zee Ogdne MD     Subjective:     Constitu coumadin, monitor INR             Home when amb sats ok perhaps post bronch; able to walk to bathroom likely home with hhc/pt/ot  - hopefully tomorrow          Results:       Lab Results  Component Value Date   WBC 6.6 05/09/2018   HGB 12.6 05/09/2018   HC

## 2018-05-09 NOTE — OCCUPATIONAL THERAPY NOTE
OCCUPATIONAL THERAPY TREATMENT NOTE - INPATIENT        Room Number: 524/524-A           Presenting Problem: Slurred speech, community acquired pneumonia    Problem List  Principal Problem:    Community acquired pneumonia, unspecified laterality  Active Pro years.\"    OBJECTIVE  Precautions: Bed/chair alarm    WEIGHT BEARING RESTRICTION  Weight Bearing Restriction: None                PAIN ASSESSMENT  Rating: Other (Comment)  Location: low back pain when standing (chronic per pt)  Management Techniques: Repo  Comment: 1 minute to 1 minute 45 second standing intervals w/ RW and SBA; limited d/t low back pain when standing     Patient will complete lower body dressing with Mod I  Comment: achieved 5/9/18; mod I for LE dressing w/ use of reacher and sock aid

## 2018-05-09 NOTE — PROGRESS NOTES
[facility]    Progress Note    Aleida Nolen Patient Status:  Inpatient    12/3/1940 MRN [id number]   Location [facility] ENDOSCOPY LAB SUITES Attending Anum Schneider MD   Hosp Day # 7 PCP MD Trenton Arana Grossly normal    Assessment and Plan:     Community acquired pneumonia, unspecified laterality  This post bronchoscopy with evacuation of mucous plugs      Community acquired pneumonia  Continue antibiotics      Lethargy  Resolved most likely home tomorro

## 2018-05-09 NOTE — PHYSICAL THERAPY NOTE
Patient off the floor for bronchoscopy in am and had OT pm complains tired.  Will try to see patient tomorrow

## 2018-05-09 NOTE — ANESTHESIA POSTPROCEDURE EVALUATION
Patient: Aleida Nolen    Procedure Summary     Date:  05/09/18 Room / Location:  [address] [place] [address] ENDOSCOPY 05 / [address] [place] [address] ENDOSCOPY    Anesthesia Start:  7240 Anesthesia Stop:      Procedure:  BRONCHOSCOPY (N/A ) Diagnosis:  (Mucus plugs)    Surgeon:  Aneta Richard MD An

## 2018-05-10 VITALS
OXYGEN SATURATION: 97 % | DIASTOLIC BLOOD PRESSURE: 66 MMHG | BODY MASS INDEX: 39.62 KG/M2 | RESPIRATION RATE: 16 BRPM | WEIGHT: 273.63 LBS | HEIGHT: 69.6 IN | SYSTOLIC BLOOD PRESSURE: 138 MMHG | TEMPERATURE: 98 F | HEART RATE: 74 BPM

## 2018-05-10 PROCEDURE — 99238 HOSP IP/OBS DSCHRG MGMT 30/<: CPT | Performed by: HOSPITALIST

## 2018-05-10 RX ORDER — AMOXICILLIN AND CLAVULANATE POTASSIUM 875; 125 MG/1; MG/1
1 TABLET, FILM COATED ORAL 2 TIMES DAILY
Qty: 10 TABLET | Refills: 0 | Status: SHIPPED | OUTPATIENT
Start: 2018-05-10 | End: 2018-05-15

## 2018-05-10 NOTE — CM/SW NOTE
Addend 250p: RN/Anastacia informed SW that pt will need medicar transport.  SW spoke w/ Jackelin from [facility] and arranged medicar to pt's [facility] [facility] [phone]; set up for 430pm.     CTS form completed and faxed to [employer] [phone]

## 2018-05-10 NOTE — PROGRESS NOTES
[facility]    Progress Note    Ramos Win Patient Status:  Inpatient    12/3/1940 MRN [id number]   Location [facility]5W Attending Sonya Arreola MD   Hosp Day # 8 PCP Riki Driver MD       Subjective:   Ramos Win Community acquired pneumonia, unspecified laterality  Patient stable for discharge on Augmentin 875 p.o. twice daily for 5 days      Community acquired pneumonia  Status post bronchoscopy      Lethargy          Results:     Lab Results  Component Value Da

## 2018-05-10 NOTE — PLAN OF CARE
Problem: Patient Centered Care  Goal: Patient preferences are identified and integrated in the patient's plan of care  Interventions:  - What would you like us to know as we care for you? Lives at [facility], uses motorized wheelchair to get to dining [place].  U night only    Problem: PAIN - ADULT  Goal: Verbalizes/displays adequate comfort level or patient's stated pain goal  INTERVENTIONS:  - Encourage pt to monitor pain and request assistance  - Assess pain using appropriate pain scale  - Administer analgesics

## 2018-05-11 ENCOUNTER — ANTI-COAG VISIT (OUTPATIENT)
Dept: INTERNAL MEDICINE CLINIC | Facility: CLINIC | Age: 78
End: 2018-05-11

## 2018-05-11 ENCOUNTER — TELEPHONE (OUTPATIENT)
Dept: MEDSURG UNIT | Facility: HOSPITAL | Age: 78
End: 2018-05-11

## 2018-05-11 ENCOUNTER — PATIENT OUTREACH (OUTPATIENT)
Dept: CASE MANAGEMENT | Age: 78
End: 2018-05-11

## 2018-05-11 ENCOUNTER — TELEPHONE (OUTPATIENT)
Dept: INTERNAL MEDICINE UNIT | Facility: HOSPITAL | Age: 78
End: 2018-05-11

## 2018-05-11 DIAGNOSIS — J18.9 PNA (PNEUMONIA): Primary | ICD-10-CM

## 2018-05-11 DIAGNOSIS — I82.409 DEEP VEIN THROMBOSIS (DVT) OF LOWER EXTREMITY, UNSPECIFIED CHRONICITY, UNSPECIFIED LATERALITY, UNSPECIFIED VEIN (HCC): ICD-10-CM

## 2018-05-11 NOTE — CM/SW NOTE
TONY/Bhavya informed SW that new [facility] order placed for PT/INR to be drawn on 5/12. FLAKITA sent INR order to [facility] via [employer].      [address], [facility] [address]

## 2018-05-11 NOTE — DISCHARGE SUMMARY
[facility]    Discharge Summary    Dian Phan Patient Status:  Inpatient    12/3/1940 MRN [id number]   Location [address] Attending No att. providers found   [address] Day # 8 PCP Jae [place] MD     Date of Admission:  1:51 PM   Result Value Ref Range   Blood Culture Result No Growth 5 Days N/A       IMAGING STUDIES: SOME MAY NEED FOLLOW UP WITH PCP   Xr Chest Pa + Lat Chest (cpt=71046)    Result Date: 5/8/2018  CONCLUSION:  1. Mild cardiomegaly.  Tortuous atherosclerotic Xr Chest Ap Portable  (cpt=71045)    Result Date: 5/2/2018  CONCLUSION:  1. Mild cardiomegaly and normal pulmonary vascularity. Questionable 0.5 cm size density in the right upper chest versus artifact. See above discussion.  Recommend chest PA and daily. Refills:  0     clotrimazole 1 % Crea  Commonly known as:  LOTRIMIN      Apply 1 Application topically 2 (two) times daily as needed.    Refills:  0     EUCERIN SKIN CALMING Crea      MADDIE TOPICALLY TO BILATERAL LEFT BID   Refills:  0     HYDROcodon tablet  Refills:  5        STOP taking these medications    cephALEXin 500 MG Caps  Commonly known as:  Cathy Gilman              Where to Get Your Medications      Please  your prescriptions at the location directed by your doctor or nurse    Bring a pap

## 2018-05-11 NOTE — TELEPHONE ENCOUNTER
HOSPITALIST NURSE:    TELEPHONE NOTE    Discussed w Dr Sameer Glaser, per MD pt to have [facility] draw INR on 5/12/18. Pt follows [facility] for Coumadin management.

## 2018-05-11 NOTE — PROGRESS NOTES
TCM OUTREACH    Call made to attempt to reach patient for TCM follow up. No answer, Voicemail left requesting call back at [phone].     Book by date: 5/24/18    (Triage Team if pt returns call please transfer to ext [phone])

## 2018-05-14 NOTE — PROGRESS NOTES
Initial Post Discharge Follow Up   Discharge Date: 5/10/18  Contact Date: 5/11/2018    Consent Verification:  Assessment Completed With: Patient  HIPAA Verified?   Yes    Discharge Dx:   PNA    General:   • How have you been since your discharge from the BILATERAL LEFT BID Disp:  Rfl: 0   Warfarin Sodium 5 MG Oral Tab Take 1 tablet (5 mg total) by mouth nightly.  Disp: 30 tablet Rfl: 5   FUROSEMIDE 40 MG Oral Tab TAKE 1 TABLET BY MOUTH EVERY DAY IN THE MORNING (Patient taking differently: TAKE 1 TABLET BY M your medication as prescribed? Pt states Furosemide was dc Months ago by her PCP. Are you having any concerns with constipation? No    Referrals/orders at D/C:  Home Health ordered at D/C? Yes   Has HH been set up?   Yes   If Yes: With Whom: BLANCA Hubbard Pt does states she is running low on PRN Loperamide and will need a refill at her next apt. Reviewed w pt s/s of PNA she should monitor for and report to MD. Pt denied any acute needs at this time.      BOOK BY DATE: 5/24/18    [x]  Discharge Summary, Disch

## 2018-05-15 ENCOUNTER — TELEPHONE (OUTPATIENT)
Dept: INTERNAL MEDICINE CLINIC | Facility: CLINIC | Age: 78
End: 2018-05-15

## 2018-05-15 NOTE — TELEPHONE ENCOUNTER
Lupe/ [facility] said [facility] [place] [facility] NAM advised pt's ins will not cover VIT D 2,000 units per day    Asking if can change to 50,000 units to take once weekly if appropriate

## 2018-05-16 NOTE — TELEPHONE ENCOUNTER
I dont see a vit D level   In long time  Vit d 2000 per day is Over the conter and is graham does   50,000 is not appropriate  Pt may buy D3 2000 u per day OTC

## 2018-05-17 ENCOUNTER — TELEPHONE (OUTPATIENT)
Dept: INTERNAL MEDICINE CLINIC | Facility: CLINIC | Age: 78
End: 2018-05-17

## 2018-05-17 NOTE — TELEPHONE ENCOUNTER
Pt. is being seen since Mon. 5/14/18, for home health will  sign off on orders for Nurse PT and OT. RN states that they will follow meds from [address] [place] [address].

## 2018-05-17 NOTE — TELEPHONE ENCOUNTER
Advised patient on Dr. Reece Tate information and recommendations. Patient verbalized understanding. She is willing to pay for the OTC Vitamin D 2000 u daily. Advised to call back if needed.

## 2018-05-25 ENCOUNTER — OFFICE VISIT (OUTPATIENT)
Dept: INTERNAL MEDICINE CLINIC | Facility: CLINIC | Age: 78
End: 2018-05-25

## 2018-05-25 ENCOUNTER — LAB ENCOUNTER (OUTPATIENT)
Dept: LAB | Age: 78
End: 2018-05-25
Attending: INTERNAL MEDICINE
Payer: MEDICARE

## 2018-05-25 VITALS
HEIGHT: 68 IN | TEMPERATURE: 98 F | SYSTOLIC BLOOD PRESSURE: 110 MMHG | BODY MASS INDEX: 41.37 KG/M2 | DIASTOLIC BLOOD PRESSURE: 61 MMHG | WEIGHT: 273 LBS | HEART RATE: 112 BPM

## 2018-05-25 DIAGNOSIS — G47.33 OSA (OBSTRUCTIVE SLEEP APNEA): ICD-10-CM

## 2018-05-25 DIAGNOSIS — F33.40 RECURRENT MAJOR DEPRESSIVE DISORDER, IN REMISSION (HCC): ICD-10-CM

## 2018-05-25 DIAGNOSIS — I82.409 DEEP VEIN THROMBOSIS (DVT) OF LOWER EXTREMITY, UNSPECIFIED CHRONICITY, UNSPECIFIED LATERALITY, UNSPECIFIED VEIN (HCC): ICD-10-CM

## 2018-05-25 DIAGNOSIS — Z86.718 HISTORY OF DVT (DEEP VEIN THROMBOSIS): ICD-10-CM

## 2018-05-25 DIAGNOSIS — J44.1 CHRONIC OBSTRUCTIVE PULMONARY DISEASE WITH ACUTE EXACERBATION (HCC): Primary | ICD-10-CM

## 2018-05-25 DIAGNOSIS — J42 CHRONIC BRONCHITIS, UNSPECIFIED CHRONIC BRONCHITIS TYPE (HCC): ICD-10-CM

## 2018-05-25 DIAGNOSIS — I10 ESSENTIAL HYPERTENSION: ICD-10-CM

## 2018-05-25 DIAGNOSIS — R26.9 GAIT ABNORMALITY: ICD-10-CM

## 2018-05-25 DIAGNOSIS — E66.01 MORBID OBESITY DUE TO EXCESS CALORIES (HCC): ICD-10-CM

## 2018-05-25 PROCEDURE — 1111F DSCHRG MED/CURRENT MED MERGE: CPT | Performed by: INTERNAL MEDICINE

## 2018-05-25 PROCEDURE — 85610 PROTHROMBIN TIME: CPT

## 2018-05-25 PROCEDURE — G0463 HOSPITAL OUTPT CLINIC VISIT: HCPCS | Performed by: INTERNAL MEDICINE

## 2018-05-25 PROCEDURE — 36415 COLL VENOUS BLD VENIPUNCTURE: CPT

## 2018-05-25 PROCEDURE — 99214 OFFICE O/P EST MOD 30 MIN: CPT | Performed by: INTERNAL MEDICINE

## 2018-05-25 NOTE — PROGRESS NOTES
HPI:    Patient ID: Pierre Lance is a 76year old female.     HPI    Dr Kaley Gardner psych  Increased her lorazepam able to sleep at night  But very tired sleep during the lory  SHANT  20 years ago untreated    hosp with pneumonia s/p fall  F Warfarin Sodium 5 MG Oral Tab Take 1 tablet (5 mg total) by mouth nightly. Disp: 30 tablet Rfl: 5   LORazepam 1 MG Oral Tab Take 1 tablet (1 mg total) by mouth 2 (two) times daily as needed for Anxiety.  (Patient taking differently: Take 2 mg by mouth johnson History:   Diagnosis Date   • Anxiety state, unspecified    • Arthritis    • Arthritis of both knees     knee replacement    • Arthritis of right hip 2009   • Back problem    • Brain aneurysm     Brain Aneurysm, Stent placed    • Cervical disc disease 1985 LAMINECTOMY      Comment: WITH FUSION PER PATIENT  [id number]: OTHER SURGICAL HISTORY      Comment: Cervical Discectomy AND FUSION  1950: TONSILLECTOMY      Comment: Tonsillitis   2006: UPPER GI ENDOSCOPY,BIOPSY   Family History   Problem Relation Effort normal and breath sounds normal. No respiratory distress. She has no wheezes. She has no rales. She exhibits no tenderness. Abdominal: Soft. Bowel sounds are normal. She exhibits no distension (obese). Musculoskeletal: She exhibits edema.  She ex

## 2018-06-01 ENCOUNTER — ANTI-COAG VISIT (OUTPATIENT)
Dept: INTERNAL MEDICINE CLINIC | Facility: CLINIC | Age: 78
End: 2018-06-01

## 2018-06-01 DIAGNOSIS — I82.409 DEEP VEIN THROMBOSIS (DVT) OF LOWER EXTREMITY, UNSPECIFIED CHRONICITY, UNSPECIFIED LATERALITY, UNSPECIFIED VEIN (HCC): ICD-10-CM

## 2018-06-02 ENCOUNTER — APPOINTMENT (OUTPATIENT)
Dept: GENERAL RADIOLOGY | Facility: HOSPITAL | Age: 78
DRG: 872 | End: 2018-06-02
Attending: EMERGENCY MEDICINE
Payer: MEDICARE

## 2018-06-02 ENCOUNTER — HOSPITAL ENCOUNTER (INPATIENT)
Facility: HOSPITAL | Age: 78
LOS: 2 days | Discharge: HOME HEALTH | DRG: 872 | End: 2018-06-06
Attending: EMERGENCY MEDICINE | Admitting: HOSPITALIST
Payer: MEDICARE

## 2018-06-02 DIAGNOSIS — J96.01 ACUTE RESPIRATORY FAILURE WITH HYPOXIA (HCC): ICD-10-CM

## 2018-06-02 DIAGNOSIS — R50.9 FEBRILE ILLNESS, ACUTE: Primary | ICD-10-CM

## 2018-06-02 PROCEDURE — 71045 X-RAY EXAM CHEST 1 VIEW: CPT | Performed by: EMERGENCY MEDICINE

## 2018-06-02 PROCEDURE — 99220 INITIAL OBSERVATION CARE,LEVL III: CPT | Performed by: HOSPITALIST

## 2018-06-02 RX ORDER — IPRATROPIUM BROMIDE AND ALBUTEROL SULFATE 2.5; .5 MG/3ML; MG/3ML
3 SOLUTION RESPIRATORY (INHALATION) ONCE
Status: COMPLETED | OUTPATIENT
Start: 2018-06-02 | End: 2018-06-02

## 2018-06-02 RX ORDER — VENLAFAXINE HYDROCHLORIDE 150 MG/1
150 CAPSULE, EXTENDED RELEASE ORAL
Status: DISCONTINUED | OUTPATIENT
Start: 2018-06-03 | End: 2018-06-06

## 2018-06-02 RX ORDER — METHYLPREDNISOLONE SODIUM SUCCINATE 40 MG/ML
40 INJECTION, POWDER, LYOPHILIZED, FOR SOLUTION INTRAMUSCULAR; INTRAVENOUS EVERY 12 HOURS
Status: DISCONTINUED | OUTPATIENT
Start: 2018-06-03 | End: 2018-06-06

## 2018-06-02 RX ORDER — TEMAZEPAM 15 MG/1
30 CAPSULE ORAL NIGHTLY PRN
Status: DISCONTINUED | OUTPATIENT
Start: 2018-06-02 | End: 2018-06-06

## 2018-06-02 RX ORDER — ASPIRIN 325 MG
325 TABLET ORAL DAILY
Status: DISCONTINUED | OUTPATIENT
Start: 2018-06-03 | End: 2018-06-06

## 2018-06-02 RX ORDER — CLOTRIMAZOLE 1 %
1 CREAM (GRAM) TOPICAL 2 TIMES DAILY
Status: DISCONTINUED | OUTPATIENT
Start: 2018-06-03 | End: 2018-06-06

## 2018-06-02 RX ORDER — PANTOPRAZOLE SODIUM 40 MG/1
40 TABLET, DELAYED RELEASE ORAL
Status: DISCONTINUED | OUTPATIENT
Start: 2018-06-03 | End: 2018-06-03

## 2018-06-02 RX ORDER — IPRATROPIUM BROMIDE AND ALBUTEROL SULFATE 2.5; .5 MG/3ML; MG/3ML
3 SOLUTION RESPIRATORY (INHALATION)
Status: DISCONTINUED | OUTPATIENT
Start: 2018-06-03 | End: 2018-06-04

## 2018-06-02 RX ORDER — ONDANSETRON 2 MG/ML
4 INJECTION INTRAMUSCULAR; INTRAVENOUS EVERY 6 HOURS PRN
Status: DISCONTINUED | OUTPATIENT
Start: 2018-06-02 | End: 2018-06-06

## 2018-06-02 RX ORDER — IBUPROFEN 600 MG/1
600 TABLET ORAL ONCE
Status: DISCONTINUED | OUTPATIENT
Start: 2018-06-02 | End: 2018-06-02

## 2018-06-02 RX ORDER — LORAZEPAM 1 MG/1
1 TABLET ORAL 2 TIMES DAILY PRN
Status: DISCONTINUED | OUTPATIENT
Start: 2018-06-02 | End: 2018-06-06

## 2018-06-02 RX ORDER — ONDANSETRON 2 MG/ML
4 INJECTION INTRAMUSCULAR; INTRAVENOUS EVERY 4 HOURS PRN
Status: DISCONTINUED | OUTPATIENT
Start: 2018-06-02 | End: 2018-06-06

## 2018-06-02 RX ORDER — HYDROCODONE BITARTRATE AND ACETAMINOPHEN 10; 325 MG/1; MG/1
1 TABLET ORAL EVERY 6 HOURS PRN
Status: DISCONTINUED | OUTPATIENT
Start: 2018-06-02 | End: 2018-06-06

## 2018-06-02 RX ORDER — ACETAMINOPHEN 325 MG/1
650 TABLET ORAL EVERY 6 HOURS PRN
Status: DISCONTINUED | OUTPATIENT
Start: 2018-06-02 | End: 2018-06-06

## 2018-06-02 RX ORDER — AMLODIPINE BESYLATE 10 MG/1
10 TABLET ORAL DAILY
Status: DISCONTINUED | OUTPATIENT
Start: 2018-06-03 | End: 2018-06-06

## 2018-06-02 RX ORDER — ACETAMINOPHEN 325 MG/1
650 TABLET ORAL ONCE
Status: COMPLETED | OUTPATIENT
Start: 2018-06-02 | End: 2018-06-02

## 2018-06-02 RX ORDER — SODIUM CHLORIDE AND POTASSIUM CHLORIDE .9; .15 G/100ML; G/100ML
SOLUTION INTRAVENOUS CONTINUOUS
Status: DISCONTINUED | OUTPATIENT
Start: 2018-06-03 | End: 2018-06-04

## 2018-06-02 RX ORDER — SODIUM CHLORIDE 9 MG/ML
INJECTION, SOLUTION INTRAVENOUS CONTINUOUS
Status: ACTIVE | OUTPATIENT
Start: 2018-06-02 | End: 2018-06-03

## 2018-06-02 RX ORDER — LEVOTHYROXINE SODIUM 0.12 MG/1
250 TABLET ORAL
Status: DISCONTINUED | OUTPATIENT
Start: 2018-06-03 | End: 2018-06-06

## 2018-06-02 RX ORDER — DOCUSATE SODIUM 100 MG/1
100 CAPSULE, LIQUID FILLED ORAL 2 TIMES DAILY PRN
Status: DISCONTINUED | OUTPATIENT
Start: 2018-06-02 | End: 2018-06-06

## 2018-06-02 RX ORDER — TRAMADOL HYDROCHLORIDE 50 MG/1
50 TABLET ORAL ONCE
Status: COMPLETED | OUTPATIENT
Start: 2018-06-02 | End: 2018-06-02

## 2018-06-02 RX ORDER — QUETIAPINE 400 MG/1
400 TABLET, FILM COATED ORAL NIGHTLY
Status: DISCONTINUED | OUTPATIENT
Start: 2018-06-03 | End: 2018-06-06

## 2018-06-02 RX ORDER — TEMAZEPAM 30 MG/1
30 CAPSULE ORAL NIGHTLY PRN
Status: ON HOLD | COMMUNITY
End: 2018-06-06

## 2018-06-03 PROCEDURE — 99217 OBSERVATION CARE DISCHARGE: CPT | Performed by: HOSPITALIST

## 2018-06-03 RX ORDER — 0.9 % SODIUM CHLORIDE 0.9 %
VIAL (ML) INJECTION
Status: COMPLETED
Start: 2018-06-03 | End: 2018-06-03

## 2018-06-03 RX ORDER — PANTOPRAZOLE SODIUM 40 MG/1
40 TABLET, DELAYED RELEASE ORAL 2 TIMES DAILY
Status: DISCONTINUED | OUTPATIENT
Start: 2018-06-03 | End: 2018-06-06

## 2018-06-03 RX ORDER — 0.9 % SODIUM CHLORIDE 0.9 %
3 VIAL (ML) INJECTION AS NEEDED
Status: DISCONTINUED | OUTPATIENT
Start: 2018-06-03 | End: 2018-06-06

## 2018-06-03 NOTE — PLAN OF CARE
Problem: Patient/Family Goals  Goal: Patient/Family Long Term Goal  Patient's Long Term Goal: return to [facility]    Interventions:  - Follow MD orders  GI stool Panel  Monitor labs  - See additional Care Plan goals for specific interventions   Outcome: Prog appropriate  Outcome: Progressing      Problem: SAFETY ADULT - FALL  Goal: Free from fall injury  INTERVENTIONS:  - Assess pt frequently for physical needs  - Identify cognitive and physical deficits and behaviors that affect risk of falls.   - [place] fa

## 2018-06-03 NOTE — PLAN OF CARE
Problem: Patient/Family Goals  Goal: Patient/Family Long Term Goal  Patient's Long Term Goal: return to [facility]    Interventions:  - Follow MD orders  GI stool Panel  Monitor labs  - See additional Care Plan goals for specific interventions    Outcome: Pro effects  - Notify MD/LIP if interventions unsuccessful or patient reports new pain  - Anticipate increased pain with activity and pre-medicate as appropriate   Outcome: Progressing  No complaints of pain or discomfort noted throughout shift    Problem: SAF

## 2018-06-03 NOTE — PROGRESS NOTES
[facility]    Progress Note    Shirajocelyn Akersmonica Patient Status:  Observation    12/3/1940 MRN [id number]   Location [facility] 5SW/SE Attending Teofilo Owusu MD   Hosp Day # 0 PCP Katlyn Ramsey MD       Subjective:     Feeling stool panel if more diarrhea  - azithromycin for bronchitis  - CXR ok    AECOPD. Acute bronchitis with bronchospasm. - cont solu medrol  - nebs  - pulm to consult  - cont o2    Lactic acidosis. Resolved. Reduce IVF.     Hx of HTN  - cont norvasc     Hyp

## 2018-06-03 NOTE — ED INITIAL ASSESSMENT (HPI)
ACCORDING TO EMS PT WAS DRINKING SODA AND BEGAN TO COUGH. THE NURSING HOME STAFF THOUGHT PT WAS HAVING AN ASTHMA ATTACK . PT DENIES. PT DOES ADMIT TO FEELING SLIGHTLY SOB. PT HAS HX OF COPD.

## 2018-06-03 NOTE — H&P
[facility] Patient Status:  Emergency    12/3/1940 MRN [id number]   Location [address] Attending Precious Ortiz MD   Hosp Day # 0 PCP Griffin Bentley MD     Date: Migraines    • Muscle weakness    • Onychomycosis     Debridement    • Osteoarthrosis, unspecified whether generalized or localized, unspecified site    • Other and unspecified hyperlipidemia    • Pneumonia 2012   • Shortness of breath     O2 AT NIGHT   • ALzheimer's Disease    • Stroke Sister      Cerebrovascular accident    • Heart Disease Brother      Coronary Artery Disease       reports that she quit smoking about 41 years ago. Her smoking use included Cigarettes.  She has a 37.50 pack-year smoking hist Apply 1 Application topically as needed.      Pantoprazole Sodium 40 MG Oral Tab EC Taking  No Yes   Sig: Take 1 tablet (40 mg total) by mouth every morning before breakfast.   QUEtiapine Fumarate 400 MG Oral Tab Taking  Yes Yes   Sig: Take 400 mg by mouth Normocephalic, atraumatic. Neck:  Supple, non-tender, no carotid bruit, no jugular venous distention, no lymphadenopathy, no thyromegaly.   Respiratory: Expiratory phase, mild wheezing, respirations are non-labored, breath sounds are equal, symmetrical ch care physician  Yue Macedo MD    Disposition  Clinical course will dictate outcome      Kimberly Gupta MD  6/2/2018  10:35 PM

## 2018-06-03 NOTE — ED PROVIDER NOTES
Patient Seen in: [facility] AND [facility] Emergency Department    History   Patient presents with:  Dyspnea BELINDA SOB (respiratory)  Cough/URI      HPI    Patient presents from her nursing care facility after choking while drinking soda.  patient became short of b essential hypertension    • Unspecified sleep apnea    • Visual impairment     EYE GLASSES       History reviewed.  Past Surgical History:  No date: ANESTH,NECK VESSEL SURGERY NOS      Comment: x4  ~2009: BRAIN SURGERY      Comment: Brain Aneurysm, Stent pl Packs/day: 1.50      Years: 25.00          Types: Cigarettes       Quit date: 1/1/1977    Smokeless tobacco: Never Used                        Comment: 1977    Alcohol use:  No              Drug distress. She has no wheezes. Bibasilar Rales, increased respiratory effort. Abdominal: Soft. She exhibits no distension. There is no tenderness. Musculoskeletal: She exhibits no tenderness or deformity.    Neurological: She is alert and oriented to p Report.   Rate: Normal  Rhythm: Sinus Rhythm  Reading: Nonspecific T-wave abnormality, poor R progression, abnormal EKG             Imaging Results Available and Reviewed while in ED: Xr Chest Ap Portable  (cpt=71045)    Result Date: 6/2/2018  CONCLUSION: N Factors:  The patient already has has Left leg cellulitis; Bilateral low back pain; bilateral S1 radiculopathies; s/p L5-S1 right laminectomy; L4-5 slight grade 1 unstable spondylolisthesis; L3-4 stable slight grade 1 retrolisthesis; L4-5 mild-mod diffuse b expected results. No leukocytosis, no significant electrolyte abnormalities, normal kidney function, trace lactic acid elevation. Chest x-ray without obvious abnormalities.   Patient feeling better on reexamination, tachycardia resolved, shortness of debbie

## 2018-06-04 PROCEDURE — 99233 SBSQ HOSP IP/OBS HIGH 50: CPT | Performed by: HOSPITALIST

## 2018-06-04 RX ORDER — AZITHROMYCIN 250 MG/1
500 TABLET, FILM COATED ORAL EVERY 24 HOURS
Status: DISCONTINUED | OUTPATIENT
Start: 2018-06-04 | End: 2018-06-06

## 2018-06-04 RX ORDER — IPRATROPIUM BROMIDE AND ALBUTEROL SULFATE 2.5; .5 MG/3ML; MG/3ML
3 SOLUTION RESPIRATORY (INHALATION)
Status: DISCONTINUED | OUTPATIENT
Start: 2018-06-04 | End: 2018-06-06

## 2018-06-04 RX ORDER — SODIUM POLYSTYRENE SULFONATE 15 G/60ML
15 SUSPENSION ORAL; RECTAL ONCE
Status: COMPLETED | OUTPATIENT
Start: 2018-06-04 | End: 2018-06-04

## 2018-06-04 RX ORDER — IPRATROPIUM BROMIDE AND ALBUTEROL SULFATE 2.5; .5 MG/3ML; MG/3ML
3 SOLUTION RESPIRATORY (INHALATION) EVERY 6 HOURS PRN
Status: DISCONTINUED | OUTPATIENT
Start: 2018-06-04 | End: 2018-06-06

## 2018-06-04 RX ORDER — SODIUM CHLORIDE 9 MG/ML
INJECTION, SOLUTION INTRAVENOUS CONTINUOUS
Status: DISCONTINUED | OUTPATIENT
Start: 2018-06-04 | End: 2018-06-06

## 2018-06-04 RX ORDER — DEXTROSE MONOHYDRATE 25 G/50ML
50 INJECTION, SOLUTION INTRAVENOUS AS NEEDED
Status: DISCONTINUED | OUTPATIENT
Start: 2018-06-04 | End: 2018-06-04

## 2018-06-04 RX ORDER — DEXTROSE MONOHYDRATE 25 G/50ML
50 INJECTION, SOLUTION INTRAVENOUS AS NEEDED
Status: DISCONTINUED | OUTPATIENT
Start: 2018-06-04 | End: 2018-06-06

## 2018-06-04 NOTE — PLAN OF CARE
Problem: Patient/Family Goals  Goal: Patient/Family Long Term Goal  Patient's Long Term Goal: return to [facility]    Interventions:  - Follow MD orders  GI stool Panel  Monitor labs  - See additional Care Plan goals for specific interventions    Outcome: Pro appropriate   Outcome: Progressing      Problem: SAFETY ADULT - FALL  Goal: Free from fall injury  INTERVENTIONS:  - Assess pt frequently for physical needs  - Identify cognitive and physical deficits and behaviors that affect risk of falls.   - [place] f

## 2018-06-04 NOTE — CONSULTS
[facility]    Report of Consultation    Anastasiia Conrad Patient Status:  Observation    12/3/1940 MRN [id number]   Location [facility] 5SW/SE Attending Abigail Solis MD   Hosp Day # 0 PCP Sofia Jimenez MD     Date of Admiss • stent placement     cerebral   • Thyroid disease    • Toe pain     Onychomycosis, Debridement , Hammertoe    • Tonsillitis 1950    Tonsillitis    • Unspecified essential hypertension    • Unspecified sleep apnea    • Visual impairment     EYE GLASSES Packs/day: 1.50      Years: 25.00        Types: Cigarettes     Quit date: 1/1/1977  Smokeless tobacco: Never Used                      Comment: 1977  Alcohol use:  No                   Current Medications:    Current Facility-Administered Medications: Admission:  temazepam 30 MG Oral Cap Take 30 mg by mouth nightly as needed for Sleep.    HYDROCODONE-ACETAMINOPHEN  MG Oral Tab ONE TABLET BY MOUTH EVERY 6 HOURS AS NEEDED *DO NOT EXCEED 4GM/APAP IN 24 HOURS FROM ALL SOURCES*   ammonium lactate 12 % E % External Cream Apply 1 Application topically 2 (two) times daily as needed.          Allergies    Bactrim [Sulfametho*    RASH  Ciprofloxacin           RASH  Depakote                OTHER (SEE COMMENTS)    Comment:pancreatitis  Dilaudid  [Hydromor*    CHIDI thighs  Neurologic: Grossly normal    Results:     Laboratory Data:    Lab Results  Component Value Date   WBC 13.8 (H) 06/04/2018   HGB 11.4 (L) 06/04/2018   HCT 34.6 (L) 06/04/2018    06/04/2018   CREATSERUM 1.04 06/04/2018   BUN 18 06/04/2018   N

## 2018-06-04 NOTE — CM/SW NOTE
SW met w/ pt to discuss discharge planning. Pt lives at [facility]. Pt uses a scooter and Home O2 (at night). Pt was recently dc'd w/ [facility] on [facility] per [facility] intake. Pt stated she would like to be set up w/ [facility] again, as PT recommendations are for [facility].

## 2018-06-04 NOTE — PROGRESS NOTES
[facility] - [facility]    Progress Note    Jazmine Arnold Patient Status:  Observation    12/3/1940 MRN [id number]   Location [facility] 5SW/SE Attending Nancy Watson MD   Hosp Day # 0 PCP Kirstin Govea MD     Subjective:   Subject patient re: treatment and plan.       Results:     Lab Results  Component Value Date   WBC 13.8 (H) 06/04/2018   HGB 11.4 (L) 06/04/2018   HCT 34.6 (L) 06/04/2018    06/04/2018   CREATSERUM 1.04 06/04/2018   BUN 18 06/04/2018    06/04/2018   K

## 2018-06-04 NOTE — PHYSICAL THERAPY NOTE
PHYSICAL THERAPY EVALUATION - INPATIENT     Room Number: 550/550-A  Evaluation Date: 6/4/2018  Type of Evaluation: Initial   Physician Order: PT Eval and Treat    Presenting Problem: Chocking with Tevin Hernandez  Reason for Therapy: Mobility Dysfunction and Dischar 76year old female, past medical history significant for COPD, hypertension, hypothyroidism, chronic DVTs and depression presents to the ER after she experienced an episode where she felt extremely weak, associated with chills with difficulty even getting Thyroid disease    • Toe pain     Onychomycosis, Debridement , Ximena    • Tonsillitis 1950    Tonsillitis    • Unspecified essential hypertension    • Unspecified sleep apnea    • Visual impairment     EYE GLASSES       Past Surgical History  Past Surg Lower extremity ROM is within functional limits     Lower extremity strength is within functional limits     BALANCE  Static Sitting: Good  Dynamic Sitting: Good  Static Standing: Fair  Dynamic Standing: Fair -             ACTIVITY TOLERANCE  O2 Satura independent with walker - rolling     Goal #2  Current Status    Goal #3 Patient is able to ambulate 50 feet with assist device: walker - rolling at assistance level: modified independent   Goal #3   Current Status    Goal #4    Goal #4   Current Status

## 2018-06-04 NOTE — PROGRESS NOTES
[facility] Pharmacy Note: Route Optimization for Azithromycin [facility])    Patient is currently on Azithromycin (ZITHROMAX) 500 mg IV every 24 hours.    The patient meets the criteria to convert to the oral equivalent as established by the IV to Oral conversion

## 2018-06-05 PROCEDURE — 99233 SBSQ HOSP IP/OBS HIGH 50: CPT | Performed by: HOSPITALIST

## 2018-06-05 RX ORDER — DEXTROSE MONOHYDRATE 25 G/50ML
50 INJECTION, SOLUTION INTRAVENOUS AS NEEDED
Status: DISCONTINUED | OUTPATIENT
Start: 2018-06-05 | End: 2018-06-06

## 2018-06-05 RX ORDER — SODIUM POLYSTYRENE SULFONATE 15 G/60ML
30 SUSPENSION ORAL; RECTAL ONCE
Status: COMPLETED | OUTPATIENT
Start: 2018-06-05 | End: 2018-06-05

## 2018-06-05 RX ORDER — WARFARIN SODIUM 5 MG/1
5 TABLET ORAL NIGHTLY
Status: DISCONTINUED | OUTPATIENT
Start: 2018-06-05 | End: 2018-06-06

## 2018-06-05 RX ORDER — MAGNESIUM OXIDE 400 MG (241.3 MG MAGNESIUM) TABLET
400 TABLET ONCE
Status: COMPLETED | OUTPATIENT
Start: 2018-06-05 | End: 2018-06-05

## 2018-06-05 RX ORDER — DEXTROSE MONOHYDRATE 25 G/50ML
50 INJECTION, SOLUTION INTRAVENOUS ONCE
Status: COMPLETED | OUTPATIENT
Start: 2018-06-05 | End: 2018-06-05

## 2018-06-05 RX ORDER — SODIUM POLYSTYRENE SULFONATE 15 G/60ML
15 SUSPENSION ORAL; RECTAL ONCE
Status: DISCONTINUED | OUTPATIENT
Start: 2018-06-05 | End: 2018-06-05

## 2018-06-05 NOTE — PROGRESS NOTES
[facility]    Progress Note    Tisha Workman Patient Status:  Observation    12/3/1940 MRN [id number]   Location [facility] 5SW/SE Attending Zak Mckinney MD   Hosp Day # 1 PCP Jesus Fischer MD     Subjective:   Subject than 30 min spent with >50% time spent counseling patient re: treatment and plan.       Results:       Lab Results  Component Value Date   WBC 13.6 (H) 06/05/2018   HGB 11.9 (L) 06/05/2018   HCT 36.7 06/05/2018    06/05/2018   CREATSERUM 0.93 06/05/2

## 2018-06-05 NOTE — RESPIRATORY THERAPY NOTE
Patient refused CPAP therapy. [facility] has educated the patient on the purpose of and need for this therapy as well as potential negative outcomes associated with deferring treatment.  Despite education, patient maintains refusal. RN aware

## 2018-06-05 NOTE — PLAN OF CARE
Problem: Patient/Family Goals  Goal: Patient/Family Long Term Goal  Patient's Long Term Goal[facility]    Interventions:  - Follow MD orders  GI stool Panel  Monitor labs  - See additional Care Plan goals for specific interventions    Outcome: Pro appropriate   Outcome: Progressing      Problem: SAFETY ADULT - FALL  Goal: Free from fall injury  INTERVENTIONS:  - Assess pt frequently for physical needs  - Identify cognitive and physical deficits and behaviors that affect risk of falls.   - [place] f

## 2018-06-05 NOTE — PLAN OF CARE
Problem: Patient/Family Goals  Goal: Patient/Family Long Term Goal  Patient's Long Term Goal: return to [facility]    Interventions:  - Follow MD orders  GI stool Panel  Monitor labs  - See additional Care Plan goals for specific interventions    Outcome: Pro and request assistance  - Assess pain using appropriate pain scale  - Administer analgesics based on type and severity of pain and evaluate response  - Implement non-pharmacological measures as appropriate and evaluate response  - Consider cultural and soc administered anti-anxiety medications as needed per doctor orders.     Problem: Patient Centered Care  Goal: Patient preferences are identified and integrated in the patient's plan of care  Interventions:  - What would you like us to know as we care for you

## 2018-06-05 NOTE — PLAN OF CARE
Problem: Patient/Family Goals  Goal: Patient/Family Long Term Goal  Patient's Long Term Goal: return to [facility]    Interventions:  - Follow MD orders  GI stool Panel  Monitor labs  - See additional Care Plan goals for specific interventions    Outcome: Pro and/or relaxation techniques  - Monitor for opioid side effects  - Notify MD/LIP if interventions unsuccessful or patient reports new pain  - Anticipate increased pain with activity and pre-medicate as appropriate   Outcome: Progressing  Patient c/o no rommel

## 2018-06-05 NOTE — RESTORATIVE THERAPY
RESTORATIVE CARE TREATMENT NOTE    Presenting Problem  Presenting Problem: Chocking with Soda          Precautions          Weight Bearing Restriction                      SUNDAY MONDAY TUESDAY WEDNESDAY THURSDAY FRIDAY SATURDAY   TRANSFERS          Bed <-

## 2018-06-06 VITALS
DIASTOLIC BLOOD PRESSURE: 60 MMHG | BODY MASS INDEX: 41.19 KG/M2 | SYSTOLIC BLOOD PRESSURE: 146 MMHG | TEMPERATURE: 98 F | HEART RATE: 69 BPM | OXYGEN SATURATION: 92 % | RESPIRATION RATE: 18 BRPM | WEIGHT: 271.81 LBS | HEIGHT: 67.99 IN

## 2018-06-06 PROCEDURE — 99239 HOSP IP/OBS DSCHRG MGMT >30: CPT | Performed by: HOSPITALIST

## 2018-06-06 RX ORDER — VENLAFAXINE HYDROCHLORIDE 150 MG/1
150 CAPSULE, EXTENDED RELEASE ORAL
Qty: 5 CAPSULE | Refills: 0 | Status: ON HOLD | OUTPATIENT
Start: 2018-06-07 | End: 2019-02-09

## 2018-06-06 RX ORDER — HYDROCODONE BITARTRATE AND ACETAMINOPHEN 10; 325 MG/1; MG/1
1 TABLET ORAL EVERY 6 HOURS PRN
Qty: 20 TABLET | Refills: 0 | Status: SHIPPED | OUTPATIENT
Start: 2018-06-06 | End: 2018-06-11

## 2018-06-06 RX ORDER — AZITHROMYCIN 250 MG/1
250 TABLET, FILM COATED ORAL DAILY
Qty: 5 TABLET | Refills: 0 | Status: SHIPPED | OUTPATIENT
Start: 2018-06-06 | End: 2018-06-11

## 2018-06-06 RX ORDER — PREDNISONE 20 MG/1
20 TABLET ORAL DAILY
Qty: 12 TABLET | Refills: 0 | Status: SHIPPED | OUTPATIENT
Start: 2018-06-06 | End: 2018-06-11

## 2018-06-06 RX ORDER — LORAZEPAM 1 MG/1
1 TABLET ORAL 2 TIMES DAILY PRN
Qty: 10 TABLET | Refills: 0 | Status: ON HOLD | OUTPATIENT
Start: 2018-06-06 | End: 2018-09-13

## 2018-06-06 RX ORDER — TEMAZEPAM 30 MG/1
30 CAPSULE ORAL NIGHTLY PRN
Qty: 4 CAPSULE | Refills: 0 | Status: SHIPPED | OUTPATIENT
Start: 2018-06-06 | End: 2018-08-29

## 2018-06-06 NOTE — PLAN OF CARE
Problem: Patient/Family Goals  Goal: Patient/Family Long Term Goal  Patient's Long Term Goal: return to [facility]    Interventions:  - Follow MD orders  GI stool Panel  Monitor labs  - See additional Care Plan goals for specific interventions    Outcome: Pro patient reports new pain  - Anticipate increased pain with activity and pre-medicate as appropriate   Outcome: Progressing  Tylenol administered for headache    Problem: SAFETY ADULT - FALL  Goal: Free from fall injury  INTERVENTIONS:  - Assess pt frequent care  - Consider collaborating with pharmacy to review patient's medication profile  - Implement strategies to promote bladder emptying  Outcome: Not Progressing  Patient c/o new onset urinary urgency, frequency, and incontinence.  Patient stated, \"I can't

## 2018-06-06 NOTE — DISCHARGE PLANNING
Discharge instructions and prescriptions given to patient. Patient was instructed to follow up with doctor for appointment. Patient verbalized understanding of discharge instructions. Saline lock removed.  Patient picked up by JAMI Adame and

## 2018-06-06 NOTE — CM/SW NOTE
SW was notified that pt is able to d/c today. SW notified [facility] - HHC order and F2F was sent. FLAKITA contacted [employer] for 6p - Rn aware of time.     Georg Habermann, [address]

## 2018-06-06 NOTE — FACE TO FACE NOTE
[facility]  Face to Face Encounter Note    Severa Sable Patient Status:  Inpatient    12/3/1940 MRN [id number]   Location [facility] 5SW/SE Attending Juju Villarreal MD   Hosp Day # 2 PCP Zee Ogden MD       I, or a non-physician this face-to-face encounter have been communicated with the patient's community-based physician who will be assuming this patient's home health plan of care.     Date:  6/6/18  Physician:  Dr Jessica Gonzalez

## 2018-06-06 NOTE — DISCHARGE SUMMARY
[facility]    PATIENT'S NAME: Wale Lowery   ATTENDING PHYSICIAN: Carito Orona MD   PATIENT ACCOUNT#:   [id number]    LOCATION:  [address] [place] [address] RECORD #:   [id number]       YOB: 1940  ADMISSION DATE:       06/02/ She will follow up with her primary care physician and Pulmonology as outpatient. All the information was given. Discharged in stable condition. Discharged back to the nursing home.   She will follow up with primary care physician, as well as Pulmonology

## 2018-06-06 NOTE — PLAN OF CARE
Problem: Patient/Family Goals  Goal: Patient/Family Long Term Goal  Patient's Long Term Goal: return to [facility]    Interventions:  - Follow MD orders  GI stool Panel  Monitor labs  - See additional Care Plan goals for specific interventions    Outcome: Pro Assess pain using appropriate pain scale  - Administer analgesics based on type and severity of pain and evaluate response  - Implement non-pharmacological measures as appropriate and evaluate response  - Consider cultural and social influences on pain and present time. Administer medication as ordered. Provide emotional support with 1:1 interaction with staff.     Problem: Patient Centered Care  Goal: Patient preferences are identified and integrated in the patient's plan of care  Interventions:  - What woul

## 2018-06-06 NOTE — CM/SW NOTE
Patient is currently a [address] readmission. Pt active 5/15/18 Thru 8/12/18 Under . Pt with 67   Days left.     Neida Blum RN CTL

## 2018-06-06 NOTE — PROGRESS NOTES
[facility]    Progress Note    Igor Fisher Patient Status:  Inpatient    12/3/1940 MRN [id number]   Location [facility] 5SW/SE Attending Evans Bonilla MD   Hosp Day # 2 PCP Yue Macedo MD       Subjective:   Azucena Taylor Grossly normal    Assessment and Plan:     Febrile illness, acute  Resolved      Acute respiratory failure with hypoxia (HCC)  Prednisone 20 mg p.o. daily for 5 days and 10 mg p.o. daily for 5 days Brio once a day  Patient changed her mind and she would li

## 2018-06-07 ENCOUNTER — PATIENT OUTREACH (OUTPATIENT)
Dept: CASE MANAGEMENT | Age: 78
End: 2018-06-07

## 2018-06-07 NOTE — PROGRESS NOTES
S/w patient, HIPAA verified. Patient was very short on the call stating that she was fine and she just saw PCP and that it was not necessary to see her again.  She stated that she already told this to the hospitalist. I attempted to explain the importance o

## 2018-06-08 ENCOUNTER — TELEPHONE (OUTPATIENT)
Dept: CARDIOLOGY UNIT | Facility: HOSPITAL | Age: 78
End: 2018-06-08

## 2018-06-12 ENCOUNTER — TELEPHONE (OUTPATIENT)
Dept: CARDIOLOGY UNIT | Facility: HOSPITAL | Age: 78
End: 2018-06-12

## 2018-06-13 ENCOUNTER — TELEPHONE (OUTPATIENT)
Dept: CARDIOLOGY UNIT | Facility: HOSPITAL | Age: 78
End: 2018-06-13

## 2018-06-15 ENCOUNTER — ANTI-COAG VISIT (OUTPATIENT)
Dept: INTERNAL MEDICINE CLINIC | Facility: CLINIC | Age: 78
End: 2018-06-15

## 2018-06-15 DIAGNOSIS — I82.409 DEEP VEIN THROMBOSIS (DVT) OF LOWER EXTREMITY, UNSPECIFIED CHRONICITY, UNSPECIFIED LATERALITY, UNSPECIFIED VEIN (HCC): ICD-10-CM

## 2018-07-06 ENCOUNTER — OFFICE VISIT (OUTPATIENT)
Dept: DERMATOLOGY CLINIC | Facility: CLINIC | Age: 78
End: 2018-07-06

## 2018-07-06 DIAGNOSIS — D23.60 BENIGN NEOPLASM OF SKIN OF UPPER LIMB, INCLUDING SHOULDER, UNSPECIFIED LATERALITY: ICD-10-CM

## 2018-07-06 DIAGNOSIS — D23.70 BENIGN NEOPLASM OF SKIN OF LOWER LIMB, INCLUDING HIP, UNSPECIFIED LATERALITY: ICD-10-CM

## 2018-07-06 DIAGNOSIS — D23.5 BENIGN NEOPLASM OF SKIN OF TRUNK, EXCEPT SCROTUM: ICD-10-CM

## 2018-07-06 DIAGNOSIS — D23.4 BENIGN NEOPLASM OF SCALP AND SKIN OF NECK: ICD-10-CM

## 2018-07-06 DIAGNOSIS — L81.4 LENTIGO: ICD-10-CM

## 2018-07-06 DIAGNOSIS — L82.0 INFLAMED SEBORRHEIC KERATOSIS: ICD-10-CM

## 2018-07-06 DIAGNOSIS — L82.1 SEBORRHEIC KERATOSES: Primary | ICD-10-CM

## 2018-07-06 DIAGNOSIS — D23.30 BENIGN NEOPLASM OF SKIN OF FACE: ICD-10-CM

## 2018-07-06 PROCEDURE — G0463 HOSPITAL OUTPT CLINIC VISIT: HCPCS | Performed by: DERMATOLOGY

## 2018-07-06 PROCEDURE — 99213 OFFICE O/P EST LOW 20 MIN: CPT | Performed by: DERMATOLOGY

## 2018-07-16 NOTE — PROGRESS NOTES
Anastasiia Conrad is a 76year old female.   HPI:     CC:  Patient presents with:  Lesion: LOV 10/16/15 pt was seen for SK's to the face, neck and lower back that were treated with cryo pt here to have some of her SK's removed on her neck, shoulders and some date:  CAROTID ENDARTERECTOMY Right  ~2008: CARPAL TUNNEL RELEASE Right  1984: CHOLECYSTECTOMY      Comment: Cholecystitis  2010: COLONOSCOPY  No date: DEBRIDEMENT OF NAIL(S), 1-5      Comment: Toe, Onychomycosis  No date: EYE SURGERY      Comment: x2 FOR \ Venlafaxine HCl  MG Oral Capsule SR 24 Hr Take 1 capsule (150 mg total) by mouth daily with breakfast. Disp: 5 capsule Rfl: 0   ammonium lactate 12 % External Lotion Apply 1-2 applications on the skin once daily for 30 days  Disp:  Rfl:    Levothyr COMMENTS)    Comment:pancreatitis  Dilaudid  [Hydromor*    RASH, OTHER (SEE COMMENTS)    Comment:Unknown  Fluocinolone            OTHER (SEE COMMENTS)    Comment:Unknown  Gabitril [Tiagabine]    OTHER (SEE COMMENTS)    Comment:Stroke like symptoms CHOLECYSTECTOMY      Comment: Cholecystitis  2010: COLONOSCOPY  No date: DEBRIDEMENT OF NAIL(S), 1-5      Comment: Toe, Onychomycosis  No date: EYE SURGERY      Comment: x2 FOR \"CROSSED EYES\"  No date: HIP REPLACEMENT SURGERY Bilateral  1967: HYSTERECTOM Heart Attack Sister    • Heart Disease Brother    • Heart Attack Brother    • Other[other] Philip Look Father    • [place] Maternal Grandmother    • Other[other] [OTHER] Maternal Grandfather    • Other[other] [OTHER] Paternal Grandmother    • Othe remarkable for lesions as noted on map. Assessment / plan:    No orders of the defined types were placed in this encounter.       Meds & Refills for this Visit:  No prescriptions requested or ordered in this encounter      Seborrheic keratoses  (primar

## 2018-07-30 ENCOUNTER — OFFICE VISIT (OUTPATIENT)
Dept: OTOLARYNGOLOGY | Facility: CLINIC | Age: 78
End: 2018-07-30
Payer: MEDICARE

## 2018-07-30 VITALS
HEIGHT: 68 IN | WEIGHT: 260 LBS | DIASTOLIC BLOOD PRESSURE: 76 MMHG | SYSTOLIC BLOOD PRESSURE: 131 MMHG | BODY MASS INDEX: 39.4 KG/M2 | TEMPERATURE: 98 F

## 2018-07-30 DIAGNOSIS — H61.23 BILATERAL IMPACTED CERUMEN: Primary | ICD-10-CM

## 2018-07-30 PROCEDURE — 69210 REMOVE IMPACTED EAR WAX UNI: CPT | Performed by: OTOLARYNGOLOGY

## 2018-07-30 NOTE — PROGRESS NOTES
Dian Phan is a 76year old female. Patient presents with:  Ear Wax: both ears    HPI:   She has been experiencing problems with decreased hearing.   She has worn hearing aids in the past but feels like she is having more difficulty than she did before Apply 1 Application topically 2 (two) times daily as needed. Disp:  Rfl:    docusate sodium 100 MG Oral Cap Take 100 mg by mouth 2 (two) times daily.  (Patient taking differently: Take 100 mg by mouth 2 (two) times daily as needed.  ) Disp: 60 capsule Rfl Packs/day: 1.50      Years: 25.00        Types: Cigarettes     Quit date: 1/1/1977  Smokeless tobacco: Never Used                      Comment: 1977  Alcohol use:  No                 REVIEW OF SYSTEMS:   GENERAL HEALTH: feels well otherwise  GENERAL : yari

## 2018-08-02 ENCOUNTER — TELEPHONE (OUTPATIENT)
Dept: INTERNAL MEDICINE CLINIC | Facility: CLINIC | Age: 78
End: 2018-08-02

## 2018-08-02 NOTE — TELEPHONE ENCOUNTER
Ashlee Push called in requesting the last OV notes to attach with their face-to-face notes. Showing LOV as 5/25/18 and she is requesting those faxed to #[phone] ATTN: Bobby Perez. Please advise.

## 2018-08-03 NOTE — TELEPHONE ENCOUNTER
[facility] is requesting the signed orders placed in your office Dr Adin Garcia will fax P.N once signed .

## 2018-08-28 ENCOUNTER — TELEPHONE (OUTPATIENT)
Dept: NEUROLOGY | Facility: CLINIC | Age: 78
End: 2018-08-28

## 2018-08-29 ENCOUNTER — OFFICE VISIT (OUTPATIENT)
Dept: NEUROLOGY | Facility: CLINIC | Age: 78
End: 2018-08-29
Payer: MEDICARE

## 2018-08-29 ENCOUNTER — TELEPHONE (OUTPATIENT)
Dept: NEUROLOGY | Facility: CLINIC | Age: 78
End: 2018-08-29

## 2018-08-29 VITALS
WEIGHT: 265 LBS | RESPIRATION RATE: 16 BRPM | HEART RATE: 80 BPM | BODY MASS INDEX: 40.16 KG/M2 | DIASTOLIC BLOOD PRESSURE: 84 MMHG | SYSTOLIC BLOOD PRESSURE: 130 MMHG | HEIGHT: 68 IN

## 2018-08-29 DIAGNOSIS — M43.16 SPONDYLOLISTHESIS OF LUMBAR REGION: ICD-10-CM

## 2018-08-29 DIAGNOSIS — M51.26 LUMBAR HERNIATED DISC: ICD-10-CM

## 2018-08-29 DIAGNOSIS — M43.10 RETROLISTHESIS OF VERTEBRAE: ICD-10-CM

## 2018-08-29 DIAGNOSIS — M51.36 LUMBAR DEGENERATIVE DISC DISEASE: ICD-10-CM

## 2018-08-29 DIAGNOSIS — M48.061 SPINAL STENOSIS OF LUMBAR REGION WITHOUT NEUROGENIC CLAUDICATION: ICD-10-CM

## 2018-08-29 DIAGNOSIS — M96.1 LUMBAR POST-LAMINECTOMY SYNDROME: ICD-10-CM

## 2018-08-29 DIAGNOSIS — M54.16 LUMBAR RADICULOPATHY: Primary | ICD-10-CM

## 2018-08-29 PROCEDURE — 99214 OFFICE O/P EST MOD 30 MIN: CPT | Performed by: PHYSICAL MEDICINE & REHABILITATION

## 2018-08-29 RX ORDER — HYDROCODONE BITARTRATE AND ACETAMINOPHEN 10; 325 MG/1; MG/1
1 TABLET ORAL EVERY 6 HOURS PRN
Status: ON HOLD | COMMUNITY
End: 2018-09-08

## 2018-08-29 NOTE — TELEPHONE ENCOUNTER
Medicare Online for insurance coverage of injection procedure Lt L5 TFESI with CPT code 76750 / 49530.  Insurance was verified and procedure  is a covered benefit and does not require authorization.     Please call patient to inform of the APPROVED and to s

## 2018-08-29 NOTE — PATIENT INSTRUCTIONS
As of October 6th 2014, the Drug Enforcement Agency [facility]) is reclassifying all hydrocodone combination medications from Schedule III to Schedule II. This includes medications such as Norco, Vicodin, Lortab, Zohydro, and Vicoprofen.     What this means for y will perform left L5 TFESI(s) under MAC. The patient will continue with her home exercise program and seeing the therapist in her building. She will continue with the Tylenol for the pain.     If the injection does not help her, then she will need to

## 2018-08-29 NOTE — PROGRESS NOTES
Low Back Pain H & P    Chief Complaint: Patient presents with:  Low Back Pain: Patient presents for follow up on left side low back pain, LOV: 11/2/17. Pt states she is still having low back pain and aches, pain increases when walking.  Pt would like to dis unspecified whether generalized or localized, unspecified site    • Other and unspecified hyperlipidemia    • Pneumonia 2012   • Shortness of breath     O2 AT NIGHT   • Sleep apnea    • stent placement     cerebral   • Thyroid disease    • Toe pain     Angelica Cerebrovascular accident    • Heart Disease Brother      Coronary Artery Disease        Social History     Social History  Social History   Marital status:   Spouse name: N/A    Years of education: N/A  Number of children: N/A     Occupational Hist Spine Palpation:    Spinous Processes: Non-tender for all Spinous Processes   Z-joints: Non-tender for all Z-joints   SIJ: Tender at left SIJ   Piriformis Muscle: Tender at left Piriformis muscle(s)   Greater Trochanteric Bursa: Non-tender for bilateral Gr

## 2018-08-30 NOTE — TELEPHONE ENCOUNTER
Patient has been scheduled for a left L5 TFESI  on 09/18/18 at the [address] [place] [address]. Medications and allergies reviewed. Patient informed to hold aspirins, nsaids, blood thinners, vitamins and fish oils 5-7 days prior to procedure.  Patient informed we will need verbal

## 2018-09-05 ENCOUNTER — APPOINTMENT (OUTPATIENT)
Dept: GENERAL RADIOLOGY | Facility: HOSPITAL | Age: 78
End: 2018-09-05
Attending: EMERGENCY MEDICINE
Payer: MEDICARE

## 2018-09-05 ENCOUNTER — HOSPITAL ENCOUNTER (EMERGENCY)
Facility: HOSPITAL | Age: 78
Discharge: HOME | End: 2018-09-05
Attending: EMERGENCY MEDICINE
Payer: MEDICARE

## 2018-09-05 ENCOUNTER — APPOINTMENT (OUTPATIENT)
Dept: CT IMAGING | Facility: HOSPITAL | Age: 78
End: 2018-09-05
Attending: EMERGENCY MEDICINE
Payer: MEDICARE

## 2018-09-05 VITALS
DIASTOLIC BLOOD PRESSURE: 61 MMHG | WEIGHT: 260 LBS | HEIGHT: 68 IN | HEART RATE: 67 BPM | TEMPERATURE: 98 F | OXYGEN SATURATION: 91 % | BODY MASS INDEX: 39.4 KG/M2 | SYSTOLIC BLOOD PRESSURE: 127 MMHG | RESPIRATION RATE: 18 BRPM

## 2018-09-05 DIAGNOSIS — S09.8XXA BLUNT HEAD INJURY, INITIAL ENCOUNTER: Primary | ICD-10-CM

## 2018-09-05 DIAGNOSIS — S76.012A HIP STRAIN, LEFT, INITIAL ENCOUNTER: ICD-10-CM

## 2018-09-05 LAB
ANION GAP SERPL CALC-SCNC: 4 MMOL/L (ref 0–18)
BASOPHILS # BLD: 0.1 K/UL (ref 0–0.2)
BASOPHILS NFR BLD: 1 %
BUN SERPL-MCNC: 16 MG/DL (ref 8–20)
BUN/CREAT SERPL: 13.7 (ref 10–20)
CALCIUM SERPL-MCNC: 9 MG/DL (ref 8.5–10.5)
CHLORIDE SERPL-SCNC: 101 MMOL/L (ref 95–110)
CO2 SERPL-SCNC: 31 MMOL/L (ref 22–32)
CREAT SERPL-MCNC: 1.17 MG/DL (ref 0.5–1.5)
EOSINOPHIL # BLD: 0.4 K/UL (ref 0–0.7)
EOSINOPHIL NFR BLD: 4 %
ERYTHROCYTE [DISTWIDTH] IN BLOOD BY AUTOMATED COUNT: 15.4 % (ref 11–15)
GLUCOSE SERPL-MCNC: 77 MG/DL (ref 70–99)
HCT VFR BLD AUTO: 34.3 % (ref 35–48)
HGB BLD-MCNC: 11.3 G/DL (ref 12–16)
INR BLD: 2.8 (ref 0.9–1.2)
LYMPHOCYTES # BLD: 2.5 K/UL (ref 1–4)
LYMPHOCYTES NFR BLD: 29 %
MCH RBC QN AUTO: 30.2 PG (ref 27–32)
MCHC RBC AUTO-ENTMCNC: 33.1 G/DL (ref 32–37)
MCV RBC AUTO: 91.3 FL (ref 80–100)
MONOCYTES # BLD: 0.7 K/UL (ref 0–1)
MONOCYTES NFR BLD: 8 %
NEUTROPHILS # BLD AUTO: 5 K/UL (ref 1.8–7.7)
NEUTROPHILS NFR BLD: 58 %
OSMOLALITY UR CALC.SUM OF ELEC: 282 MOSM/KG (ref 275–295)
PLATELET # BLD AUTO: 182 K/UL (ref 140–400)
PMV BLD AUTO: 9.4 FL (ref 7.4–10.3)
POTASSIUM SERPL-SCNC: 4.5 MMOL/L (ref 3.3–5.1)
PROTHROMBIN TIME: 28.4 SECONDS (ref 11.8–14.5)
RBC # BLD AUTO: 3.75 M/UL (ref 3.7–5.4)
SODIUM SERPL-SCNC: 136 MMOL/L (ref 136–144)
WBC # BLD AUTO: 8.6 K/UL (ref 4–11)

## 2018-09-05 PROCEDURE — 36415 COLL VENOUS BLD VENIPUNCTURE: CPT

## 2018-09-05 PROCEDURE — 85610 PROTHROMBIN TIME: CPT | Performed by: EMERGENCY MEDICINE

## 2018-09-05 PROCEDURE — 94640 AIRWAY INHALATION TREATMENT: CPT

## 2018-09-05 PROCEDURE — 85025 COMPLETE CBC W/AUTO DIFF WBC: CPT | Performed by: EMERGENCY MEDICINE

## 2018-09-05 PROCEDURE — 70450 CT HEAD/BRAIN W/O DYE: CPT | Performed by: EMERGENCY MEDICINE

## 2018-09-05 PROCEDURE — 71046 X-RAY EXAM CHEST 2 VIEWS: CPT | Performed by: EMERGENCY MEDICINE

## 2018-09-05 PROCEDURE — 80048 BASIC METABOLIC PNL TOTAL CA: CPT | Performed by: EMERGENCY MEDICINE

## 2018-09-05 PROCEDURE — 99285 EMERGENCY DEPT VISIT HI MDM: CPT

## 2018-09-05 PROCEDURE — 73502 X-RAY EXAM HIP UNI 2-3 VIEWS: CPT | Performed by: EMERGENCY MEDICINE

## 2018-09-05 RX ORDER — ALBUTEROL SULFATE 2.5 MG/3ML
2.5 SOLUTION RESPIRATORY (INHALATION) ONCE
Status: COMPLETED | OUTPATIENT
Start: 2018-09-05 | End: 2018-09-05

## 2018-09-05 RX ORDER — HYDROCODONE BITARTRATE AND ACETAMINOPHEN 5; 325 MG/1; MG/1
2 TABLET ORAL ONCE
Status: COMPLETED | OUTPATIENT
Start: 2018-09-05 | End: 2018-09-05

## 2018-09-05 NOTE — ED INITIAL ASSESSMENT (HPI)
Racine Plymouth around 4am heading to restroom and hit head and back side. C/o pain to left buttock and radiates down leg. Hit head hard. No LOC. No n/v/ dizziness.  Hypoxia at 85's per EMS on arrival. Placed on 2L per NC.

## 2018-09-05 NOTE — ED NOTES
[place] around 4am heading to restroom and hit head and back side. C/o pain to left buttock and radiates down leg. Hit head hard. No LOC. No n/v/ dizziness.  Hypoxia at 85's per EMS on arrival. Placed on 2L per NC.

## 2018-09-06 NOTE — ED NOTES
Pt requested Maria Teresa Clarity states she has not had a norco since 9am. Dr. Leesa Gomez made aware. Chase Madrigal, RN updated as well. No further concerns. RT to room giving neb treatment as pt was in imaging earlier.

## 2018-09-06 NOTE — ED NOTES
CALLED [facility], SPOKE WITH JEANNE , SHE STATED THAT SHE WILL INFORM THE NURSE TOMORROW.

## 2018-09-06 NOTE — ED NOTES
Pt arrived per EMS with c/o fall and BELINDA on arrival. Pt with a hx of COPD and states she is normally SOB. Clear on initial assessment however after getting pants down and repositioning in bed pt now with audible wheeze and labored breathing.  Placed into a

## 2018-09-06 NOTE — ED PROVIDER NOTES
Patient Seen in: [facility] AND [facility] Emergency Department    History   Patient presents with:  Fall (musculoskeletal, neurologic)  Dyspnea BELINDA SOB (respiratory)    Stated Complaint: Fall and hypoxia    HPI     76year old female with multiple medical probl • High blood pressure    • Hip pain, left    • History of blood clots     Leg   • Hypothyroidism    • Incontinence    • Low back pain    • Migraines    • Muscle weakness    • Onychomycosis     Debridement    • Osteoarthrosis, unspecified whether generalize Constitutional and vital signs reviewed. All other systems reviewed and negative except as noted above.     Physical Exam   ED Triage Vitals [09/05/18 1807]  BP: 144/50  Pulse: 84  Resp: 18  Temp: 98.1 °F (36.7 °C)  Temp src: Oral  SpO2: 95 %  O2 Devic Nursing note and vitals reviewed.       ED Course     Labs Reviewed   PROTHROMBIN TIME (PT) - Abnormal; Notable for the following:        Result Value    PT 28.4 (*)     INR 2.8 (*)     All other components within normal limits   BASIC METABOLIC PANEL (8) - CONCLUSION:  1. No acute intracranial finding. 2. 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 No change. 3. Moderate changes of chronic small vessel disease in cerebral white matter.  4. Right frontal scalp soft tissue Patient had a fall this morning and was only complaining of left hip pain. Because the patient is also on Coumadin and admitted to hitting her head, CT of the head was also performed and was negative for any acute intracranial injury.   The patient did hav

## 2018-09-07 ENCOUNTER — APPOINTMENT (OUTPATIENT)
Dept: CT IMAGING | Facility: HOSPITAL | Age: 78
DRG: 812 | End: 2018-09-07
Attending: EMERGENCY MEDICINE
Payer: MEDICARE

## 2018-09-07 ENCOUNTER — HOSPITAL ENCOUNTER (INPATIENT)
Facility: HOSPITAL | Age: 78
LOS: 3 days | Discharge: NURSING, REHAB OR LONG-TERM CARE | DRG: 812 | End: 2018-09-13
Attending: EMERGENCY MEDICINE | Admitting: HOSPITALIST
Payer: MEDICARE

## 2018-09-07 ENCOUNTER — TELEPHONE (OUTPATIENT)
Dept: INTERNAL MEDICINE CLINIC | Facility: CLINIC | Age: 78
End: 2018-09-07

## 2018-09-07 DIAGNOSIS — R55 SYNCOPE, NEAR: Primary | ICD-10-CM

## 2018-09-07 DIAGNOSIS — T14.8XXA HEMATOMA: ICD-10-CM

## 2018-09-07 LAB
ANION GAP SERPL CALC-SCNC: 8 MMOL/L (ref 0–18)
BASOPHILS # BLD: 0.1 K/UL (ref 0–0.2)
BASOPHILS NFR BLD: 1 %
BUN SERPL-MCNC: 15 MG/DL (ref 8–20)
BUN/CREAT SERPL: 12.7 (ref 10–20)
CALCIUM SERPL-MCNC: 9.1 MG/DL (ref 8.5–10.5)
CHLORIDE SERPL-SCNC: 99 MMOL/L (ref 95–110)
CO2 SERPL-SCNC: 30 MMOL/L (ref 22–32)
CREAT SERPL-MCNC: 1.18 MG/DL (ref 0.5–1.5)
EOSINOPHIL # BLD: 0.3 K/UL (ref 0–0.7)
EOSINOPHIL NFR BLD: 3 %
ERYTHROCYTE [DISTWIDTH] IN BLOOD BY AUTOMATED COUNT: 15.7 % (ref 11–15)
GLUCOSE SERPL-MCNC: 112 MG/DL (ref 70–99)
HCT VFR BLD AUTO: 33.3 % (ref 35–48)
HGB BLD-MCNC: 10.6 G/DL (ref 12–16)
INR BLD: 2.5 (ref 0.9–1.2)
LYMPHOCYTES # BLD: 1.3 K/UL (ref 1–4)
LYMPHOCYTES NFR BLD: 13 %
MCH RBC QN AUTO: 29.8 PG (ref 27–32)
MCHC RBC AUTO-ENTMCNC: 31.9 G/DL (ref 32–37)
MCV RBC AUTO: 93.3 FL (ref 80–100)
MONOCYTES # BLD: 0.5 K/UL (ref 0–1)
MONOCYTES NFR BLD: 5 %
NEUTROPHILS # BLD AUTO: 8 K/UL (ref 1.8–7.7)
NEUTROPHILS NFR BLD: 77 %
NEUTS BAND NFR BLD: 1 %
OSMOLALITY UR CALC.SUM OF ELEC: 286 MOSM/KG (ref 275–295)
PLATELET # BLD AUTO: 192 K/UL (ref 140–400)
PMV BLD AUTO: 9.7 FL (ref 7.4–10.3)
POTASSIUM SERPL-SCNC: 4.2 MMOL/L (ref 3.3–5.1)
PROTHROMBIN TIME: 26.3 SECONDS (ref 11.8–14.5)
RBC # BLD AUTO: 3.57 M/UL (ref 3.7–5.4)
SODIUM SERPL-SCNC: 137 MMOL/L (ref 136–144)
WBC # BLD AUTO: 10.3 K/UL (ref 4–11)

## 2018-09-07 PROCEDURE — 99223 1ST HOSP IP/OBS HIGH 75: CPT | Performed by: HOSPITALIST

## 2018-09-07 PROCEDURE — 73700 CT LOWER EXTREMITY W/O DYE: CPT | Performed by: EMERGENCY MEDICINE

## 2018-09-07 RX ORDER — ONDANSETRON 2 MG/ML
4 INJECTION INTRAMUSCULAR; INTRAVENOUS EVERY 6 HOURS PRN
Status: DISCONTINUED | OUTPATIENT
Start: 2018-09-07 | End: 2018-09-13

## 2018-09-07 RX ORDER — TRAMADOL HYDROCHLORIDE 50 MG/1
50 TABLET ORAL ONCE
Status: COMPLETED | OUTPATIENT
Start: 2018-09-07 | End: 2018-09-07

## 2018-09-07 RX ORDER — ACETAMINOPHEN 325 MG/1
650 TABLET ORAL EVERY 6 HOURS PRN
Status: DISCONTINUED | OUTPATIENT
Start: 2018-09-07 | End: 2018-09-13

## 2018-09-07 RX ORDER — LORAZEPAM 1 MG/1
1 TABLET ORAL 2 TIMES DAILY PRN
Status: DISCONTINUED | OUTPATIENT
Start: 2018-09-07 | End: 2018-09-13

## 2018-09-07 RX ORDER — IPRATROPIUM BROMIDE AND ALBUTEROL SULFATE 2.5; .5 MG/3ML; MG/3ML
3 SOLUTION RESPIRATORY (INHALATION) EVERY 6 HOURS PRN
Status: DISCONTINUED | OUTPATIENT
Start: 2018-09-07 | End: 2018-09-13

## 2018-09-07 RX ORDER — PANTOPRAZOLE SODIUM 40 MG/1
40 TABLET, DELAYED RELEASE ORAL
Status: DISCONTINUED | OUTPATIENT
Start: 2018-09-08 | End: 2018-09-08

## 2018-09-07 RX ORDER — QUETIAPINE 100 MG/1
400 TABLET, FILM COATED ORAL NIGHTLY
Status: DISCONTINUED | OUTPATIENT
Start: 2018-09-08 | End: 2018-09-13

## 2018-09-07 RX ORDER — SODIUM CHLORIDE 9 MG/ML
INJECTION, SOLUTION INTRAVENOUS CONTINUOUS
Status: DISCONTINUED | OUTPATIENT
Start: 2018-09-08 | End: 2018-09-08

## 2018-09-07 RX ORDER — LEVOTHYROXINE SODIUM 0.12 MG/1
250 TABLET ORAL DAILY
Status: DISCONTINUED | OUTPATIENT
Start: 2018-09-08 | End: 2018-09-13

## 2018-09-07 RX ORDER — WARFARIN SODIUM 5 MG/1
5 TABLET ORAL NIGHTLY
Status: DISCONTINUED | OUTPATIENT
Start: 2018-09-08 | End: 2018-09-09

## 2018-09-07 RX ORDER — ASPIRIN 325 MG
325 TABLET ORAL DAILY
Status: DISCONTINUED | OUTPATIENT
Start: 2018-09-08 | End: 2018-09-13

## 2018-09-07 RX ORDER — HYDROCODONE BITARTRATE AND ACETAMINOPHEN 10; 325 MG/1; MG/1
1 TABLET ORAL EVERY 6 HOURS PRN
Status: DISCONTINUED | OUTPATIENT
Start: 2018-09-07 | End: 2018-09-08

## 2018-09-07 RX ORDER — AMLODIPINE BESYLATE 10 MG/1
10 TABLET ORAL DAILY
Status: DISCONTINUED | OUTPATIENT
Start: 2018-09-08 | End: 2018-09-10

## 2018-09-07 RX ORDER — VENLAFAXINE HYDROCHLORIDE 150 MG/1
150 CAPSULE, EXTENDED RELEASE ORAL
Status: DISCONTINUED | OUTPATIENT
Start: 2018-09-08 | End: 2018-09-08

## 2018-09-07 NOTE — TELEPHONE ENCOUNTER
Pt  fell Wednesday morning and was seen at [address] [place] [address] ER; had hip xray; CT of head. States all diagnostics were normal but is having pain on left hip still .   was given Hudson at ER and states she had Hudson at home so they told her to keep taking that

## 2018-09-07 NOTE — TELEPHONE ENCOUNTER
Please call in tramadol 50 mg bid prn 30 tablets to pharmacy. Please inform that medicine can make drowsy and sleepy.  Caution about fall risk

## 2018-09-07 NOTE — ED INITIAL ASSESSMENT (HPI)
Pt arrive in ER per [facility] ambulance from [facility] with c/o left hip+left leg pain r/t fall yesterday morning, pt was seen in this ER last night and was sent home with rx of Norco, pt sts that she would not take norco because it made her it

## 2018-09-08 LAB
ANION GAP SERPL CALC-SCNC: 7 MMOL/L (ref 0–18)
APTT PPP: 50 SECONDS (ref 23.2–35.3)
BASOPHILS # BLD: 0.1 K/UL (ref 0–0.2)
BASOPHILS NFR BLD: 1 %
BILIRUB UR QL: NEGATIVE
BUN SERPL-MCNC: 12 MG/DL (ref 8–20)
BUN/CREAT SERPL: 10.4 (ref 10–20)
CALCIUM SERPL-MCNC: 8.3 MG/DL (ref 8.5–10.5)
CHLORIDE SERPL-SCNC: 103 MMOL/L (ref 95–110)
CO2 SERPL-SCNC: 27 MMOL/L (ref 22–32)
COLOR UR: YELLOW
CREAT SERPL-MCNC: 1.15 MG/DL (ref 0.5–1.5)
EOSINOPHIL # BLD: 0.3 K/UL (ref 0–0.7)
EOSINOPHIL NFR BLD: 5 %
ERYTHROCYTE [DISTWIDTH] IN BLOOD BY AUTOMATED COUNT: 15.6 % (ref 11–15)
GLUCOSE SERPL-MCNC: 85 MG/DL (ref 70–99)
GLUCOSE UR-MCNC: NEGATIVE MG/DL
HCT VFR BLD AUTO: 27.2 % (ref 35–48)
HGB BLD-MCNC: 8.9 G/DL (ref 12–16)
HGB UR QL STRIP.AUTO: NEGATIVE
KETONES UR-MCNC: 20 MG/DL
LYMPHOCYTES # BLD: 2.2 K/UL (ref 1–4)
LYMPHOCYTES NFR BLD: 30 %
MCH RBC QN AUTO: 30.4 PG (ref 27–32)
MCHC RBC AUTO-ENTMCNC: 32.8 G/DL (ref 32–37)
MCV RBC AUTO: 92.6 FL (ref 80–100)
MONOCYTES # BLD: 0.7 K/UL (ref 0–1)
MONOCYTES NFR BLD: 10 %
NEUTROPHILS # BLD AUTO: 4 K/UL (ref 1.8–7.7)
NEUTROPHILS NFR BLD: 55 %
NITRITE UR QL STRIP.AUTO: POSITIVE
OSMOLALITY UR CALC.SUM OF ELEC: 283 MOSM/KG (ref 275–295)
PH UR: 7 [PH] (ref 5–8)
PLATELET # BLD AUTO: 156 K/UL (ref 140–400)
PMV BLD AUTO: 9.2 FL (ref 7.4–10.3)
POTASSIUM SERPL-SCNC: 3.7 MMOL/L (ref 3.3–5.1)
PROT UR-MCNC: 30 MG/DL
RBC # BLD AUTO: 2.93 M/UL (ref 3.7–5.4)
RBC #/AREA URNS AUTO: 4 /HPF
SODIUM SERPL-SCNC: 137 MMOL/L (ref 136–144)
SP GR UR STRIP: 1.02 (ref 1–1.03)
UROBILINOGEN UR STRIP-ACNC: <2
VIT C UR-MCNC: NEGATIVE MG/DL
WBC # BLD AUTO: 7.3 K/UL (ref 4–11)
WBC #/AREA URNS AUTO: 1021 /HPF

## 2018-09-08 PROCEDURE — 99233 SBSQ HOSP IP/OBS HIGH 50: CPT | Performed by: HOSPITALIST

## 2018-09-08 RX ORDER — TRAMADOL HYDROCHLORIDE 50 MG/1
50 TABLET ORAL EVERY 6 HOURS PRN
Status: DISCONTINUED | OUTPATIENT
Start: 2018-09-08 | End: 2018-09-08

## 2018-09-08 RX ORDER — VENLAFAXINE HYDROCHLORIDE 150 MG/1
300 CAPSULE, EXTENDED RELEASE ORAL
Status: DISCONTINUED | OUTPATIENT
Start: 2018-09-08 | End: 2018-09-13

## 2018-09-08 RX ORDER — TRAMADOL HYDROCHLORIDE 50 MG/1
100 TABLET ORAL EVERY 6 HOURS PRN
Status: DISCONTINUED | OUTPATIENT
Start: 2018-09-08 | End: 2018-09-13

## 2018-09-08 RX ORDER — WARFARIN SODIUM 5 MG/1
TABLET ORAL
Status: COMPLETED
Start: 2018-09-08 | End: 2018-09-08

## 2018-09-08 RX ORDER — POTASSIUM CHLORIDE 20 MEQ/1
40 TABLET, EXTENDED RELEASE ORAL ONCE
Status: COMPLETED | OUTPATIENT
Start: 2018-09-08 | End: 2018-09-08

## 2018-09-08 RX ORDER — PANTOPRAZOLE SODIUM 40 MG/1
40 TABLET, DELAYED RELEASE ORAL
Status: DISCONTINUED | OUTPATIENT
Start: 2018-09-08 | End: 2018-09-13

## 2018-09-08 RX ORDER — QUETIAPINE 100 MG/1
TABLET, FILM COATED ORAL
Status: COMPLETED
Start: 2018-09-08 | End: 2018-09-08

## 2018-09-08 RX ORDER — TRAMADOL HYDROCHLORIDE 50 MG/1
TABLET ORAL
Status: COMPLETED
Start: 2018-09-08 | End: 2018-09-08

## 2018-09-08 NOTE — PROGRESS NOTES
[facility]    Progress Note    Dany Madsen Patient Status:  Observation    12/3/1940 MRN [id number]   Location [facility]5W Attending Felix Yoon MD   Hosp Day # 0 PCP Samira Juarez MD       SUBJECTIVE:  No CP, SOB, o (80283)    Result Date: 9/5/2018  CONCLUSION:  1. No acute intracranial finding. 2. 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 No change. 3. Moderate changes of chronic small vessel disease in cerebral white matter. LEVOTHROID) tab 250 mcg 250 mcg Oral Daily   LORazepam (ATIVAN) tab 1 mg 1 mg Oral BID PRN   QUEtiapine Fumarate (SEROQUEL) tab 400 mg 400 mg Oral Nightly   Warfarin Sodium (COUMADIN) tab 5 mg 5 mg Oral Nightly   ondansetron HCl (ZOFRAN) injection 4 mg 4 m anemia  Drop in hemoglobin likely secondary to large hematoma, will continue to monitor CBC, transfuse for hemoglobin below 7.     Near syncope  Likely multifactorial, will check orthostatic vitals PT to evaluate.     Acute UTI  Patient started on Rocephin

## 2018-09-08 NOTE — RESPIRATORY THERAPY NOTE
Patient refused MDI therapy. [facility] has educated the patient on the purpose of and need for this therapy as well as potential negative outcomes associated with deferring treatment.  Despite education, patient maintains refusal.

## 2018-09-08 NOTE — PHYSICAL THERAPY NOTE
PHYSICAL THERAPY EVALUATION - INPATIENT     Room Number: 644/868-F  Evaluation Date: 9/8/2018  Type of Evaluation: Initial   Physician Order: PT Eval and Treat    Presenting Problem: syncope,fall  Reason for Therapy: Mobility Dysfunction and Discharge Anthony conservation;Patient education;Strengthening;Transfer training;Balance training  Rehab Potential : Good  Frequency (Obs): 5x/week       PHYSICAL THERAPY MEDICAL/SOCIAL HISTORY     History related to current admission:   L buttocks  Hematoma in the posterio ENDARTERECTOMY; Right  ~2008: CARPAL TUNNEL RELEASE; Right  1984: CHOLECYSTECTOMY      Comment:  Cholecystitis  2010: COLONOSCOPY  No date: DEBRIDEMENT OF NAIL(S), 1-5      Comment:  Toe, Onychomycosis  No date: EYE SURGERY      Comment:  x2 FOR \"CROSSED '6-Clicks' INPATIENT SHORT FORM - BASIC MOBILITY  How much difficulty does the patient currently have. ..  -   Turning over in bed (including adjusting bedclothes, sheets and blankets)?: None   -   Sitting down on and standing up from a chair with arms (e.g activity/exercise instructions provided to patient in preparation for discharge.    Goal #5   Current Status    Goal #6    Goal #6  Current Status

## 2018-09-08 NOTE — H&P
[facility] Patient Status:  Emergency    12/3/1940 MRN [id number]   Location [facility] Attending Jeny Zamudio MD   Hosp Day # 0 PCP Kami Cedeno MD     Date: Low back pain    • Migraines    • Muscle weakness    • Onychomycosis     Debridement    • Osteoarthrosis, unspecified whether generalized or localized, unspecified site    • Other and unspecified hyperlipidemia    • Pneumonia 2012   • Shortness of breath Sister      ALzheimer's Disease    • Stroke Sister      Cerebrovascular accident    • Heart Disease Brother      Coronary Artery Disease       reports that she quit smoking about 41 years ago. Her smoking use included Cigarettes.  She has a 37.50 pack-year Venlafaxine HCl  MG Oral Capsule SR 24 Hr   No No   Sig: Take 1 capsule (150 mg total) by mouth daily with breakfast.   Warfarin Sodium 5 MG Oral Tab   No No   Sig: Take 1 tablet (5 mg total) by mouth nightly.    acetaminophen 325 MG Oral Tab   Yes Normal range of motion. normal strength. Feet:  Normal pulses. Neurologic:  Alert, oriented, no focal deficits, cranial nerves II-XII are grossly intact. Cognition and Speech:  Oriented, speech clear and coherent.   Psychiatric:  Cooperative, appropriat

## 2018-09-08 NOTE — CM/SW NOTE
Pt admitted from [facility] where she had a fall. Pt uses a scooter throughout the building, also has home O2-uncertain of provider. Pt is current with [facility] for RN and PT. Clinicals sent via allscripts, will need orders to resume services at MD.

## 2018-09-08 NOTE — ED PROVIDER NOTES
Patient Seen in: [facility] AND [facility] Emergency Department     History   Patient presents with:  Pain (neurologic)    Stated Complaint: c/o left leg and left hip r/t fall yesterday morning    HPI    76year old female  with multiple medical problems including High blood pressure    • Hip pain, left    • History of blood clots     Leg   • Hypothyroidism    • Incontinence    • Low back pain    • Migraines    • Muscle weakness    • Onychomycosis     Debridement    • Osteoarthrosis, unspecified whether generalized QUEtiapine Fumarate 400 MG Oral Tab,  Take 400 mg by mouth nightly. Warfarin Sodium 5 MG Oral Tab,  Take 1 tablet (5 mg total) by mouth nightly. ipratropium-albuterol 0.5-2.5 (3) MG/3ML Inhalation Solution,  Take 3 mL by nebulization as needed.    Pan Other[other] [OTHER] Paternal Grandfather    • Cancer Brother 54     Liver    • Neurological Disorder Sister      ALzheimer's Disease    • Stroke Sister      Cerebrovascular accident    • Heart Disease Brother      Coronary Artery Disease        Smoking st sounds normal. No accessory muscle usage or stridor. No apnea and no tachypnea. No respiratory distress. She exhibits no retraction. Abdominal: Soft. There is no tenderness. There is no rebound. Musculoskeletal: Normal range of motion.  She exhibits no 18:47.         INDICATIONS: Left hip pain, post fall. TECHNIQUE: Multi-planar CT images of the left hip were obtained without     intravenous contrast material.  Automated exposure control for dose     reduction was used.  Adjustment of the mA and/o radiculopathies; s/p L5-S1 right laminectomy; L4-5 slight grade 1 unstable spondylolisthesis; L3-4 stable slight grade 1 retrolisthesis; L4-5 mild-mod diffuse bulging disc; L5-S1 left mild-mod HNP going into the foramen with new small extrusion, L1-2 right air, normal    Consults:           Consult to Hospitalist Once    MD Discussions or Sign-Outs:   chantal    Re-Evaluation   11p: Patient given tramadol and observed in the ER for hours, still in pain, and patient reports that when she stands up she feels ligh up care with a primary care provider as soon as possible to obtain basic health screening including reassessment of blood pressure.      Condition upon leaving the department: Stable

## 2018-09-08 NOTE — PLAN OF CARE
Problem: Patient/Family Goals  Goal: Patient/Family Long Term Goal  Patient's Long Term Goal: To return to [facility]    Interventions:  - See additional Care Plan goals for specific interventions   Outcome: Progressing    Goal: Patient/Family

## 2018-09-09 LAB
BASOPHILS # BLD: 0.1 K/UL (ref 0–0.2)
BASOPHILS NFR BLD: 1 %
EOSINOPHIL # BLD: 0.4 K/UL (ref 0–0.7)
EOSINOPHIL NFR BLD: 6 %
ERYTHROCYTE [DISTWIDTH] IN BLOOD BY AUTOMATED COUNT: 16 % (ref 11–15)
HCT VFR BLD AUTO: 29.3 % (ref 35–48)
HGB BLD-MCNC: 9.6 G/DL (ref 12–16)
INR BLD: 3.4 (ref 0.9–1.2)
LYMPHOCYTES # BLD: 1.7 K/UL (ref 1–4)
LYMPHOCYTES NFR BLD: 26 %
MCH RBC QN AUTO: 30.2 PG (ref 27–32)
MCHC RBC AUTO-ENTMCNC: 32.6 G/DL (ref 32–37)
MCV RBC AUTO: 92.7 FL (ref 80–100)
MONOCYTES # BLD: 0.6 K/UL (ref 0–1)
MONOCYTES NFR BLD: 10 %
NEUTROPHILS # BLD AUTO: 3.8 K/UL (ref 1.8–7.7)
NEUTROPHILS NFR BLD: 58 %
PLATELET # BLD AUTO: 175 K/UL (ref 140–400)
PMV BLD AUTO: 9 FL (ref 7.4–10.3)
POTASSIUM SERPL-SCNC: 4.2 MMOL/L (ref 3.3–5.1)
PROTHROMBIN TIME: 33.3 SECONDS (ref 11.8–14.5)
RBC # BLD AUTO: 3.16 M/UL (ref 3.7–5.4)
WBC # BLD AUTO: 6.5 K/UL (ref 4–11)

## 2018-09-09 PROCEDURE — 99233 SBSQ HOSP IP/OBS HIGH 50: CPT | Performed by: HOSPITALIST

## 2018-09-09 RX ORDER — 0.9 % SODIUM CHLORIDE 0.9 %
VIAL (ML) INJECTION
Status: COMPLETED
Start: 2018-09-09 | End: 2018-09-09

## 2018-09-09 NOTE — PLAN OF CARE
PAIN - ADULT    • Verbalizes/displays adequate comfort level or patient's stated pain goal Not Progressing          Integumentary status not within defined limits    • Pt's integumentary status will be adequate for discharge Progressing        Patient Cent

## 2018-09-09 NOTE — PROGRESS NOTES
[facility]    Progress Note    Karmen Sue Patient Status:  Observation    12/3/1940 MRN [id number]   Location [facility]5W Attending Bert Bowen MD   Hosp Day # 0 PCP Tai Goff MD       SUBJECTIVE:    No CP, SOB, Daily with breakfast   Pantoprazole Sodium (PROTONIX) EC tab 40 mg 40 mg Oral BID AC   CefTRIAXone Sodium (ROCEPHIN) 2 g in sodium chloride 0.9 % 100 mL MBP/ADD-vantage 2 g Intravenous Q24H   TraMADol HCl (ULTRAM) tab 100 mg 100 mg Oral Q6H PRN   acetamino diarrhea     Oropharyngeal dysphagia     L5-S1 left mod-severe/right mod foraminal, L1-2 mild-mod central stenosis     Other spondylosis, lumbar region     Other complicated headache syndrome     Cervical post-laminectomy syndrome: C3-7     S/P cervical sp

## 2018-09-10 LAB
ANION GAP SERPL CALC-SCNC: 6 MMOL/L (ref 0–18)
BASOPHILS # BLD: 0.1 K/UL (ref 0–0.2)
BASOPHILS NFR BLD: 1 %
BUN SERPL-MCNC: 15 MG/DL (ref 8–20)
BUN/CREAT SERPL: 15 (ref 10–20)
CALCIUM SERPL-MCNC: 9 MG/DL (ref 8.5–10.5)
CHLORIDE SERPL-SCNC: 102 MMOL/L (ref 95–110)
CO2 SERPL-SCNC: 29 MMOL/L (ref 22–32)
CREAT SERPL-MCNC: 1 MG/DL (ref 0.5–1.5)
EOSINOPHIL # BLD: 0.4 K/UL (ref 0–0.7)
EOSINOPHIL NFR BLD: 6 %
ERYTHROCYTE [DISTWIDTH] IN BLOOD BY AUTOMATED COUNT: 15.9 % (ref 11–15)
GLUCOSE SERPL-MCNC: 100 MG/DL (ref 70–99)
HCT VFR BLD AUTO: 29 % (ref 35–48)
HGB BLD-MCNC: 9.6 G/DL (ref 12–16)
INR BLD: 3.2 (ref 0.9–1.2)
LYMPHOCYTES # BLD: 2 K/UL (ref 1–4)
LYMPHOCYTES NFR BLD: 29 %
MCH RBC QN AUTO: 30.2 PG (ref 27–32)
MCHC RBC AUTO-ENTMCNC: 33 G/DL (ref 32–37)
MCV RBC AUTO: 91.7 FL (ref 80–100)
MONOCYTES # BLD: 0.7 K/UL (ref 0–1)
MONOCYTES NFR BLD: 10 %
NEUTROPHILS # BLD AUTO: 3.9 K/UL (ref 1.8–7.7)
NEUTROPHILS NFR BLD: 55 %
OSMOLALITY UR CALC.SUM OF ELEC: 285 MOSM/KG (ref 275–295)
PLATELET # BLD AUTO: 206 K/UL (ref 140–400)
PMV BLD AUTO: 9 FL (ref 7.4–10.3)
POTASSIUM SERPL-SCNC: 4.2 MMOL/L (ref 3.3–5.1)
PROTHROMBIN TIME: 31.6 SECONDS (ref 11.8–14.5)
RBC # BLD AUTO: 3.17 M/UL (ref 3.7–5.4)
SODIUM SERPL-SCNC: 137 MMOL/L (ref 136–144)
WBC # BLD AUTO: 7.1 K/UL (ref 4–11)

## 2018-09-10 PROCEDURE — 99233 SBSQ HOSP IP/OBS HIGH 50: CPT | Performed by: HOSPITALIST

## 2018-09-10 NOTE — PHYSICAL THERAPY NOTE
PHYSICAL THERAPY TREATMENT NOTE - INPATIENT     Room Number: 545/328-S       Presenting Problem: syncope,fall    Problem List  Principal Problem:    Syncope, near  Active Problems:    Hematoma      ASSESSMENT   C/o pain on the L hip affecting her functiona (e.g., wheelchair, bedside commode, etc.): A Little   -   Moving from lying on back to sitting on the side of the bed?: A Little   How much help from another person does the patient currently need. ..   -   Moving to and from a bed to a chair (including a w

## 2018-09-10 NOTE — PROGRESS NOTES
[facility]    Progress Note    Ryan Miguel Patient Status:  Observation    12/3/1940 MRN [id number]   Location [facility]5W Attending Allison Sandoval MD   Hosp Day # 0 PCP Enrique Solomon MD       SUBJECTIVE:    No CP, SOB, 9/07/2018 at 20:47     Approved by (CST):  Ethel Beatty MD on 9/07/2018 at 20:52            Meds:     Current Facility-Administered Medications:  Venlafaxine HCl ER (EFFEXOR-XR) 24 hr cap 300 mg 300 mg Oral Daily with breakfast   Pantoprazole Sodium (P incontinence     Neurogenic bladder     Azotemia     Cellulitis of both lower extremities     Lung infiltrate     Acute renal injury (HCC)     Chronic diarrhea     Oropharyngeal dysphagia     L5-S1 left mod-severe/right mod foraminal, L1-2 mild-mod central

## 2018-09-10 NOTE — CM/SW NOTE
SW received MD order for discharge planning; needs rehab. SW met w/ pt to discuss eventual discharge needs. Pt lives at [facility]. Pt owns a motorized scooter which is used outside of her apartment and uses a walker in her apartment.  Pt also owns home O2

## 2018-09-11 LAB
BASOPHILS # BLD: 0.1 K/UL (ref 0–0.2)
BASOPHILS NFR BLD: 1 %
EOSINOPHIL # BLD: 0.4 K/UL (ref 0–0.7)
EOSINOPHIL NFR BLD: 5 %
ERYTHROCYTE [DISTWIDTH] IN BLOOD BY AUTOMATED COUNT: 15.7 % (ref 11–15)
HCT VFR BLD AUTO: 27.9 % (ref 35–48)
HGB BLD-MCNC: 9.2 G/DL (ref 12–16)
INR BLD: 2.2 (ref 0.9–1.2)
LYMPHOCYTES # BLD: 2.2 K/UL (ref 1–4)
LYMPHOCYTES NFR BLD: 31 %
MCH RBC QN AUTO: 30.4 PG (ref 27–32)
MCHC RBC AUTO-ENTMCNC: 33 G/DL (ref 32–37)
MCV RBC AUTO: 92 FL (ref 80–100)
MONOCYTES # BLD: 0.7 K/UL (ref 0–1)
MONOCYTES NFR BLD: 10 %
NEUTROPHILS # BLD AUTO: 3.7 K/UL (ref 1.8–7.7)
NEUTROPHILS NFR BLD: 52 %
PLATELET # BLD AUTO: 207 K/UL (ref 140–400)
PMV BLD AUTO: 8.8 FL (ref 7.4–10.3)
PROTHROMBIN TIME: 24.2 SECONDS (ref 11.8–14.5)
RBC # BLD AUTO: 3.03 M/UL (ref 3.7–5.4)
WBC # BLD AUTO: 7 K/UL (ref 4–11)

## 2018-09-11 PROCEDURE — 99233 SBSQ HOSP IP/OBS HIGH 50: CPT | Performed by: HOSPITALIST

## 2018-09-11 RX ORDER — WARFARIN SODIUM 5 MG/1
5 TABLET ORAL NIGHTLY
Status: DISCONTINUED | OUTPATIENT
Start: 2018-09-11 | End: 2018-09-13

## 2018-09-11 RX ORDER — 0.9 % SODIUM CHLORIDE 0.9 %
VIAL (ML) INJECTION
Status: COMPLETED
Start: 2018-09-11 | End: 2018-09-11

## 2018-09-11 RX ORDER — TRAMADOL HYDROCHLORIDE 50 MG/1
100 TABLET ORAL EVERY 6 HOURS PRN
Qty: 30 TABLET | Refills: 0 | Status: SHIPPED | OUTPATIENT
Start: 2018-09-11 | End: 2018-11-07

## 2018-09-11 NOTE — PLAN OF CARE
Problem: Patient Centered Care  Goal: Patient preferences are identified and integrated in the patient's plan of care  Interventions:  - What would you like us to know as we care for you? \"I need the bruising and swelling in my butt to get better. \"  - Pr Integumentary status not within defined limits  Goal: Pt's integumentary status will be adequate for discharge  Outcome: Progressing

## 2018-09-11 NOTE — PROGRESS NOTES
[facility]    Progress Note    Tisha Workman Patient Status:  Observation    12/3/1940 MRN [id number]   Location [facility]5W Attending Josias Hernandez MD   Hosp Day # 1 PCP Patric Rodriguez MD       SUBJECTIVE:    No CP, SOB, tab 325 mg 325 mg Oral Daily   Fluticasone Furoate-Vilanterol (BREO ELLIPTA) 200-25 MCG/INH inhaler 1 puff 1 puff Inhalation Daily   Cholecalciferol (VITAMIN D) 1000 units tab 2,000 Units 2,000 Units Oral Daily   ipratropium-albuterol (DUONEB) nebulizer so Lethargy     Febrile illness, acute     Acute respiratory failure with hypoxia (HCC)     Syncope, near     Hematoma      Plan:     Left hip hematoma  Ice affected area, will use Norco as needed for pain, hold NSAID's.   PT/OT to evaluate and treat.     Acut

## 2018-09-11 NOTE — CM/SW NOTE
SW met w/ pt to follow up on SNF choice. Pt requested referra to 1)[facility] and 2)[facility]. SW asked OLAF/Annetta to send referral to [facility] via [employer].      Ike Payton

## 2018-09-12 LAB
ANION GAP SERPL CALC-SCNC: 2 MMOL/L (ref 0–18)
BASOPHILS # BLD: 0.1 K/UL (ref 0–0.2)
BASOPHILS NFR BLD: 1 %
BUN SERPL-MCNC: 17 MG/DL (ref 8–20)
BUN/CREAT SERPL: 16.5 (ref 10–20)
CALCIUM SERPL-MCNC: 8.9 MG/DL (ref 8.5–10.5)
CHLORIDE SERPL-SCNC: 104 MMOL/L (ref 95–110)
CO2 SERPL-SCNC: 32 MMOL/L (ref 22–32)
CREAT SERPL-MCNC: 1.03 MG/DL (ref 0.5–1.5)
EOSINOPHIL # BLD: 0.4 K/UL (ref 0–0.7)
EOSINOPHIL NFR BLD: 6 %
ERYTHROCYTE [DISTWIDTH] IN BLOOD BY AUTOMATED COUNT: 15.7 % (ref 11–15)
GLUCOSE SERPL-MCNC: 92 MG/DL (ref 70–99)
HCT VFR BLD AUTO: 28.7 % (ref 35–48)
HGB BLD-MCNC: 9.3 G/DL (ref 12–16)
INR BLD: 1.7 (ref 0.9–1.2)
LYMPHOCYTES # BLD: 1.9 K/UL (ref 1–4)
LYMPHOCYTES NFR BLD: 30 %
MCH RBC QN AUTO: 29.8 PG (ref 27–32)
MCHC RBC AUTO-ENTMCNC: 32.4 G/DL (ref 32–37)
MCV RBC AUTO: 92 FL (ref 80–100)
MONOCYTES # BLD: 0.7 K/UL (ref 0–1)
MONOCYTES NFR BLD: 10 %
NEUTROPHILS # BLD AUTO: 3.5 K/UL (ref 1.8–7.7)
NEUTROPHILS NFR BLD: 54 %
OSMOLALITY UR CALC.SUM OF ELEC: 287 MOSM/KG (ref 275–295)
PLATELET # BLD AUTO: 207 K/UL (ref 140–400)
PMV BLD AUTO: 8.3 FL (ref 7.4–10.3)
POTASSIUM SERPL-SCNC: 4.2 MMOL/L (ref 3.3–5.1)
PROTHROMBIN TIME: 19.6 SECONDS (ref 11.8–14.5)
RBC # BLD AUTO: 3.12 M/UL (ref 3.7–5.4)
SODIUM SERPL-SCNC: 138 MMOL/L (ref 136–144)
WBC # BLD AUTO: 6.5 K/UL (ref 4–11)

## 2018-09-12 PROCEDURE — 99233 SBSQ HOSP IP/OBS HIGH 50: CPT | Performed by: HOSPITALIST

## 2018-09-12 RX ORDER — 0.9 % SODIUM CHLORIDE 0.9 %
VIAL (ML) INJECTION
Status: COMPLETED
Start: 2018-09-12 | End: 2018-09-12

## 2018-09-12 NOTE — PLAN OF CARE
Problem: Patient Centered Care  Goal: Patient preferences are identified and integrated in the patient's plan of care  Interventions:  - What would you like us to know as we care for you? \"I need the bruising and swelling in my butt to get better. \"  - Pr adequate for discharge  Outcome: Progressing

## 2018-09-12 NOTE — PROGRESS NOTES
[facility] [place] [facility]    Progress Note    Ryan Miguel Patient Status:  Observation    12/3/1940 MRN [id number]   Location [facility]5W Attending Allison Sandoval MD   Hosp Day # 2 PCP Enrique Solomon MD       SUBJECTIVE:    No CP, SOB, (ROCEPHIN) 2 g in sodium chloride 0.9 % 100 mL MBP/ADD-vantage 2 g Intravenous Q24H   TraMADol HCl (ULTRAM) tab 100 mg 100 mg Oral Q6H PRN   acetaminophen (TYLENOL) tab 650 mg 650 mg Oral Q6H PRN   aspirin tab 325 mg 325 mg Oral Daily   Fluticasone Furoate complicated headache syndrome     Cervical post-laminectomy syndrome: C3-7     S/P cervical spinal fusion: C3-6 and C7-T1     Community acquired pneumonia     Community acquired pneumonia, unspecified laterality     Lethargy     Febrile illness, acute

## 2018-09-12 NOTE — PROGRESS NOTES
There is a bed available for patient at [facility] tomorrow per Dereje Pantoja. Patient is aware that she will most likely be discharged to rehab tomorrow.

## 2018-09-12 NOTE — CM/SW NOTE
SW spoke w/ Perry Wilson from [place] who stated they are able to accept pt into their [facility]. Yaa Salinas from [place] stated they can accept. Plan for discharge to /[facility], due to being pt's first choice.     Ike Flannery

## 2018-09-12 NOTE — PHYSICAL THERAPY NOTE
PHYSICAL THERAPY TREATMENT NOTE - INPATIENT     Room Number: 432/241-O       Presenting Problem: syncope,fall    Problem List  Principal Problem:    Syncope, near  Active Problems:    Hematoma      ASSESSMENT   Plan for KYLE placement.  No significant c/o pa (e.g., wheelchair, bedside commode, etc.): A Little   -   Moving from lying on back to sitting on the side of the bed?: None   How much help from another person does the patient currently need. ..   -   Moving to and from a bed to a chair (including a wheel

## 2018-09-13 VITALS
BODY MASS INDEX: 41.91 KG/M2 | SYSTOLIC BLOOD PRESSURE: 137 MMHG | HEIGHT: 68 IN | OXYGEN SATURATION: 94 % | WEIGHT: 276.5 LBS | TEMPERATURE: 98 F | RESPIRATION RATE: 18 BRPM | DIASTOLIC BLOOD PRESSURE: 58 MMHG | HEART RATE: 72 BPM

## 2018-09-13 LAB
ANION GAP SERPL CALC-SCNC: 5 MMOL/L (ref 0–18)
BASOPHILS # BLD: 0.1 K/UL (ref 0–0.2)
BASOPHILS NFR BLD: 1 %
BUN SERPL-MCNC: 13 MG/DL (ref 8–20)
BUN/CREAT SERPL: 13.8 (ref 10–20)
CALCIUM SERPL-MCNC: 9.4 MG/DL (ref 8.5–10.5)
CHLORIDE SERPL-SCNC: 102 MMOL/L (ref 95–110)
CO2 SERPL-SCNC: 32 MMOL/L (ref 22–32)
CREAT SERPL-MCNC: 0.94 MG/DL (ref 0.5–1.5)
EOSINOPHIL # BLD: 0.4 K/UL (ref 0–0.7)
EOSINOPHIL NFR BLD: 6 %
ERYTHROCYTE [DISTWIDTH] IN BLOOD BY AUTOMATED COUNT: 15.9 % (ref 11–15)
GLUCOSE SERPL-MCNC: 91 MG/DL (ref 70–99)
HCT VFR BLD AUTO: 29.5 % (ref 35–48)
HGB BLD-MCNC: 9.7 G/DL (ref 12–16)
INR BLD: 1.9 (ref 0.9–1.2)
LYMPHOCYTES # BLD: 2.2 K/UL (ref 1–4)
LYMPHOCYTES NFR BLD: 31 %
MAGNESIUM SERPL-MCNC: 1.9 MG/DL (ref 1.8–2.5)
MCH RBC QN AUTO: 30.3 PG (ref 27–32)
MCHC RBC AUTO-ENTMCNC: 33.1 G/DL (ref 32–37)
MCV RBC AUTO: 91.5 FL (ref 80–100)
MONOCYTES # BLD: 0.7 K/UL (ref 0–1)
MONOCYTES NFR BLD: 9 %
NEUTROPHILS # BLD AUTO: 3.7 K/UL (ref 1.8–7.7)
NEUTROPHILS NFR BLD: 52 %
OSMOLALITY UR CALC.SUM OF ELEC: 288 MOSM/KG (ref 275–295)
PLATELET # BLD AUTO: 249 K/UL (ref 140–400)
PMV BLD AUTO: 9.1 FL (ref 7.4–10.3)
POTASSIUM SERPL-SCNC: 4.2 MMOL/L (ref 3.3–5.1)
PROTHROMBIN TIME: 21.4 SECONDS (ref 11.8–14.5)
RBC # BLD AUTO: 3.22 M/UL (ref 3.7–5.4)
SODIUM SERPL-SCNC: 139 MMOL/L (ref 136–144)
WBC # BLD AUTO: 7.2 K/UL (ref 4–11)

## 2018-09-13 PROCEDURE — 99239 HOSP IP/OBS DSCHRG MGMT >30: CPT | Performed by: HOSPITALIST

## 2018-09-13 RX ORDER — CEFADROXIL 500 MG/1
500 CAPSULE ORAL 2 TIMES DAILY
Qty: 8 CAPSULE | Refills: 0 | Status: SHIPPED | OUTPATIENT
Start: 2018-09-13 | End: 2018-09-17

## 2018-09-13 RX ORDER — 0.9 % SODIUM CHLORIDE 0.9 %
VIAL (ML) INJECTION
Status: COMPLETED
Start: 2018-09-13 | End: 2018-09-13

## 2018-09-13 RX ORDER — LORAZEPAM 1 MG/1
1 TABLET ORAL 2 TIMES DAILY PRN
Qty: 10 TABLET | Refills: 0 | Status: ON HOLD | OUTPATIENT
Start: 2018-09-13 | End: 2019-02-09

## 2018-09-13 NOTE — CM/SW NOTE
RN informed SW that pt is medically stable to discharge and can transport via [employer].  SW spoke w/ Tia from [facility] and arranged medicar transport (wide wheelchair) at [place].     SW confirmed w/ pt that she wants to go to [facility], instead

## 2018-09-13 NOTE — TELEPHONE ENCOUNTER
Received a call from [facility] that patient has a balance with [facility]. They have tried to reach out to patient without any call back. They will await call back from patient. Injection scheduled for 9/17.

## 2018-09-13 NOTE — DISCHARGE SUMMARY
[facility]    Discharge Summary    Anastasiia Conrad Patient Status:  Inpatient    12/3/1940 MRN [id number]   Location [address] [facility] Attending Pascale Yuan MD   Hosp Day # 3 PCP Alexandr Whitney MD     Date of Admission:  acquired pneumonia, unspecified laterality     Lethargy     Febrile illness, acute     Acute respiratory failure with hypoxia (HCC)     Syncope, near     Hematoma      Reason for Admission:   Patient Active Problem List:     Left leg cellulitis     Bilater sounds normal; no masses,  no organomegaly  Extremities: extremities,, +bruise over lf. buttock  Psychiatric: calm        History of Present Illness:   Per Dr. Swati Mccord is a(n) 76year old female, with a past medical history significant for hy details   Cefadroxil 500 MG Caps  Commonly known as:  DURICEF      Take 1 capsule (500 mg total) by mouth 2 (two) times daily for 4 days.    Stop taking on:  9/17/2018  Quantity:  8 capsule  Refills:  0     TraMADol HCl 50 MG Tabs  Commonly known as:  ULTRA Fumarate 400 MG Tabs  Commonly known as:  SEROQUEL      Take 400 mg by mouth nightly. Refills:  0     SYMBICORT 160-4.5 MCG/ACT Aero  Generic drug:  Budesonide-Formoterol Fumarate      Inhale 1 puff into the lungs daily.    Refills:  0     Vitamin D 2000

## 2018-09-17 NOTE — IP AVS SNAPSHOT
[facility]            (For Outpatient Use Only) Initial Admit Date: 12/11/2019   Inpt/Obs Admit Date: Inpt: 12/11/19 / Obs: 12/12/19   Discharge Date:    Marily Bynum:  [de-identified]   MRN: [de-identified]   CSN: [id number]   CEID: [id number] Subscriber Name:  Saravanan Botello :    Subscriber ID:  Pt Rel to Subscriber:    Hospital Account Financial Class: Medicare    2019 DISCHARGE

## 2018-09-21 ENCOUNTER — TELEPHONE (OUTPATIENT)
Dept: INTERNAL MEDICINE CLINIC | Facility: CLINIC | Age: 78
End: 2018-09-21

## 2018-09-21 NOTE — TELEPHONE ENCOUNTER
Rory Mae from [address] [place] [address] is calling would like to request pt last office notes to  Be faxed to her at [phone].

## 2018-09-25 ENCOUNTER — TELEPHONE (OUTPATIENT)
Dept: INTERNAL MEDICINE CLINIC | Facility: CLINIC | Age: 78
End: 2018-09-25

## 2018-09-25 NOTE — TELEPHONE ENCOUNTER
RX DRUG: Symvicort 160-4.5 MCG Inhaler - Not Covered by Insurance    ALTERNATIVE DRUG: ADVAIR OR BREO ELLIPTA

## 2018-09-27 ENCOUNTER — TELEPHONE (OUTPATIENT)
Dept: NEUROLOGY | Facility: CLINIC | Age: 78
End: 2018-09-27

## 2018-09-27 RX ORDER — FLUTICASONE PROPIONATE AND SALMETEROL 250; 50 UG/1; UG/1
1 POWDER RESPIRATORY (INHALATION) EVERY 6 HOURS PRN
Qty: 1 EACH | Refills: 6 | Status: SHIPPED | OUTPATIENT
Start: 2018-09-27 | End: 2018-09-29

## 2018-09-27 NOTE — TELEPHONE ENCOUNTER
Blessing Spangler MD   You 11 minutes ago (1:24 PM)      Give advair 250/50 1 puff bid #1 6 refills (Routing comment)      Rx sent as authorized

## 2018-09-27 NOTE — TELEPHONE ENCOUNTER
Pt calling to make appointment to see Dr Elio Motley after fall 09/13/18 for which pt hospitalized [address] [place] [address]. She fell on left hip was hospitalized for one week and spent one week in rehab.  Pt is still having the same pain in same area for which Dr Elio Motley was to prefor

## 2018-09-28 ENCOUNTER — TELEPHONE (OUTPATIENT)
Dept: INTERNAL MEDICINE CLINIC | Facility: CLINIC | Age: 78
End: 2018-09-28

## 2018-09-28 NOTE — TELEPHONE ENCOUNTER
[facility] to clarify directions for meds below. •  fluticasone-salmeterol (ADVAIR DISKUS) 250-50 MCG/DOSE Inhalation Aerosol Powder, Breath Activated, Inhale 1 puff into the lungs every 6 (six) hours as needed. , Disp: 1 each, Rfl:

## 2018-09-28 NOTE — TELEPHONE ENCOUNTER
Per pharmacist Advair Diskus  is usually not prescribed PRN, and its usually every 12 Hrs not every 6 hours. They are wanting to clarify the Sig. Per pharmacist they also send a rx for a Rescue inhaler to use PRN when prescribing Advair Diskus.  Routed to

## 2018-09-29 ENCOUNTER — HOSPITAL ENCOUNTER (EMERGENCY)
Facility: HOSPITAL | Age: 78
Discharge: HOME | End: 2018-09-29
Attending: EMERGENCY MEDICINE
Payer: MEDICARE

## 2018-09-29 ENCOUNTER — NURSE TRIAGE (OUTPATIENT)
Dept: OTHER | Age: 78
End: 2018-09-29

## 2018-09-29 VITALS
HEIGHT: 67 IN | DIASTOLIC BLOOD PRESSURE: 72 MMHG | OXYGEN SATURATION: 95 % | RESPIRATION RATE: 18 BRPM | BODY MASS INDEX: 42.38 KG/M2 | TEMPERATURE: 98 F | WEIGHT: 270 LBS | SYSTOLIC BLOOD PRESSURE: 161 MMHG | HEART RATE: 77 BPM

## 2018-09-29 DIAGNOSIS — M79.89 LEFT LEG SWELLING: Primary | ICD-10-CM

## 2018-09-29 PROCEDURE — 99283 EMERGENCY DEPT VISIT LOW MDM: CPT

## 2018-09-29 PROCEDURE — 85610 PROTHROMBIN TIME: CPT | Performed by: EMERGENCY MEDICINE

## 2018-09-29 PROCEDURE — 36415 COLL VENOUS BLD VENIPUNCTURE: CPT

## 2018-09-29 RX ORDER — WARFARIN SODIUM 4 MG/1
4 TABLET ORAL DAILY
Qty: 6 TABLET | Refills: 0 | Status: SHIPPED | OUTPATIENT
Start: 2018-09-29 | End: 2018-11-07

## 2018-09-29 NOTE — TELEPHONE ENCOUNTER
Called patient, message left. Called daughter Rainer Session (on HIPAA). Edu Briones on Dr. Krystina Bashir information and recommendations. Daughter verbalized understanding. Quincy Session will contact the patient and her brother, stated will get the patient to the ER.

## 2018-09-29 NOTE — TELEPHONE ENCOUNTER
Dr Natasha Santos, did you want patient to have a rescue inhaler for use prn as well? See message from pharmacist below.     LMTCB transfer to triage--to update patient on the Advair sig

## 2018-09-29 NOTE — TELEPHONE ENCOUNTER
Spoke with patient and relayed MA message below--patient states, \"I'm not gonna do it, so thank you. \"--and patient hung up.

## 2018-09-29 NOTE — TELEPHONE ENCOUNTER
Would need pt inr checked as doseage may have been chaged with subtheraputic inr one week ago   Agree with advise for eval --ER --pain and erythema worrisome

## 2018-09-29 NOTE — ED NOTES
Pt here for left lower leg swelling. Pt states she woke up this morning with left leg swelling and redness. Pt states she takes coumadin and history of multiple blood clots in that leg.  Pt states she has no complaints, only came in because family wanted he

## 2018-09-29 NOTE — ED PROVIDER NOTES
Patient Seen in: [facility] AND [facility] Emergency Department    History   Patient presents with:  Swelling Edema (cardiovascular, metabolic)    Stated Complaint: left leg swelling    HPI    68-year-old presents for evaluation of left leg swelling.   Patient wi Onychomycosis, Debridement , Ximena    • Tonsillitis 1952    Tonsillitis    • Unspecified essential hypertension    • Unspecified sleep apnea    • Visual impairment     EYE GLASSES       Past Surgical History:  No date: ANESTH,NECK VESSEL SURGERY N[employer]    Positive for stated complaint: left leg swelling  Other systems are as noted in HPI. Constitutional and vital signs reviewed. All other systems reviewed and negative except as noted above.     Physical Exam     ED Triage Vitals [09/29/18 12 then returned to 4 mg. She will follow-up in the Coumadin clinic. I discussed this with the patient, she verbalizes understanding of and agreement with plan.     Disposition and Plan     Clinical Impression:  Left leg swelling  (primary encounter diagnosi

## 2018-09-29 NOTE — TELEPHONE ENCOUNTER
Action Requested: Summary for Provider     []  Critical Lab, Recommendations Needed  [] Need Additional Advice  []   FYI    []   Need Orders  [] Need Medications Sent to Pharmacy  []  Other     SUMMARY:    Per the patient she has no transportation to the E

## 2018-09-29 NOTE — ED INITIAL ASSESSMENT (HPI)
Pt presents via EMS for left leg swelling and redness since yesterday. Denies pain or discomfort. Hx of multiple DVT's, on coumadin.

## 2018-10-01 NOTE — TELEPHONE ENCOUNTER
I have not seen her   She has no rescue inhaler   In record  She is on duoneb  Daily  This should be OK

## 2018-10-01 NOTE — TELEPHONE ENCOUNTER
Claudio Nevarez Rn Triage 5 minutes ago (3:44 PM)     Pt was contacted to schedule ER f/u appt. Pt denied appt and states she will call back if she feels like she needs to be seen.   (Routing comment)

## 2018-10-01 NOTE — TELEPHONE ENCOUNTER
Spoke with Dutch Aguilar at [address] [place] [address],[facility] [phone] and relayed MMP message below--verbalizes understanding and agreement. No further questions/concerns at this time.

## 2018-10-01 NOTE — TELEPHONE ENCOUNTER
CSS--please call patient and schedule ER f/u with MMP    Disposition and Plan      Clinical Impression:  Left leg swelling  (primary encounter diagnosis)     Disposition:  Discharge  9/29/2018  2:23 pm     Follow-up:  MD Prince Barr

## 2018-10-03 ENCOUNTER — ANTI-COAG VISIT (OUTPATIENT)
Dept: CARDIOLOGY CLINIC | Facility: CLINIC | Age: 78
End: 2018-10-03

## 2018-10-03 ENCOUNTER — TELEPHONE (OUTPATIENT)
Dept: INTERNAL MEDICINE CLINIC | Facility: CLINIC | Age: 78
End: 2018-10-03

## 2018-10-03 DIAGNOSIS — I82.409 DEEP VEIN THROMBOSIS (DVT) OF LOWER EXTREMITY, UNSPECIFIED CHRONICITY, UNSPECIFIED LATERALITY, UNSPECIFIED VEIN (HCC): ICD-10-CM

## 2018-10-03 NOTE — TELEPHONE ENCOUNTER
Christina from [facility] AND [facility] called to report PT/INR. PT: 24.7  INR: 2.5 '    Currently on 4 mg at bedtime.

## 2018-10-10 ENCOUNTER — OFFICE VISIT (OUTPATIENT)
Dept: NEUROLOGY | Facility: CLINIC | Age: 78
End: 2018-10-10
Payer: MEDICARE

## 2018-10-10 ENCOUNTER — TELEPHONE (OUTPATIENT)
Dept: NEUROLOGY | Facility: CLINIC | Age: 78
End: 2018-10-10

## 2018-10-10 VITALS
BODY MASS INDEX: 39.4 KG/M2 | RESPIRATION RATE: 16 BRPM | SYSTOLIC BLOOD PRESSURE: 124 MMHG | WEIGHT: 260 LBS | DIASTOLIC BLOOD PRESSURE: 76 MMHG | HEIGHT: 68 IN | HEART RATE: 66 BPM

## 2018-10-10 DIAGNOSIS — M96.1 LUMBAR POST-LAMINECTOMY SYNDROME: ICD-10-CM

## 2018-10-10 DIAGNOSIS — M43.10 RETROLISTHESIS OF VERTEBRAE: ICD-10-CM

## 2018-10-10 DIAGNOSIS — M51.26 LUMBAR HERNIATED DISC: ICD-10-CM

## 2018-10-10 DIAGNOSIS — M51.36 LUMBAR DEGENERATIVE DISC DISEASE: ICD-10-CM

## 2018-10-10 DIAGNOSIS — M54.16 LUMBAR RADICULOPATHY: Primary | ICD-10-CM

## 2018-10-10 DIAGNOSIS — M43.16 SPONDYLOLISTHESIS OF LUMBAR REGION: ICD-10-CM

## 2018-10-10 DIAGNOSIS — M48.061 SPINAL STENOSIS OF LUMBAR REGION WITHOUT NEUROGENIC CLAUDICATION: ICD-10-CM

## 2018-10-10 PROCEDURE — 99214 OFFICE O/P EST MOD 30 MIN: CPT | Performed by: PHYSICAL MEDICINE & REHABILITATION

## 2018-10-10 NOTE — PATIENT INSTRUCTIONS
Plan  I will perform left L5 TFESI(s) under MAC. The patient will continue with PT. The patient will continue with her home exercise program.    The patient does not need any pain medications at this time. She is allergic to hydrocodone.     The pa

## 2018-10-10 NOTE — PROGRESS NOTES
Low Back Pain H & P    Chief Complaint: Patient presents with:  Low Back Pain: Patient presents for follow up on low back pain, LOV: 8/29/18. States she had a fall 9/6/18 and was in the hosptial, had to cancel her injection.  Has pain in left side low back, Disorder of thyroid    • Esophageal reflux    • Hammertoe 02/25/2011    hammertoe 5 left, pain   • Hearing impairment     Bilateral hearing aids   • High blood pressure    • Hip pain, left    • History of blood clots     Leg   • Hypothyroidism    • Inconti LEVEL Bilateral 10/13/2017    Performed by Malik Demarco MD at [address] [facility] MAIN OR   • UPPER GI ENDOSCOPY,BIOPSY  2006       Family History   Family History   Problem Relation Age of Onset   • Heart Attack Mother    • Heart Disease Mother         Coronary Artery Not Asked        Hobby Hazards: Not Asked        Sleep Concern: Not Asked        Stress Concern: Not Asked        Weight Concern: Not Asked        Special Diet: Not Asked        Back Care: Not Asked        Exercise: Yes          home P/T 2 times per week LE strength measurements 5/5 except:  Gluteus medius LEFT:   4+/5  Hamstring LEFT:   4+/5   RIGHT plantar reflexes: downward response   LEFT plantar reflexes: downward response   Reflexes: absent in bilateral lower extremities     Assessment  1. left L5-S1

## 2018-10-10 NOTE — TELEPHONE ENCOUNTER
Medicare Online for insurance coverage of left L5 TFESI cpt codes 5493, [id number]. Insurance was verified and procedure is a covered benefit and does not require authorization.   Will inform Nursing

## 2018-10-18 NOTE — TELEPHONE ENCOUNTER
Patient has been scheduled for a left L5 TFESI under MAC on 10/26/18 at the [facility]. Medications and allergies reviewed. Patient informed to hold aspirins, nsaids, blood thinners, vitamins and fish oils 3-7 days prior to procedure.  Patient informed we will ne

## 2018-10-24 NOTE — TELEPHONE ENCOUNTER
Called patient and left detailed message that we will cancel her scheduled injection on the 10/26/2018 related to patient stating she did not hold her Coumadin as she thought the injection was scheduled for next week.     Also, left message for patient to c

## 2018-10-24 NOTE — TELEPHONE ENCOUNTER
Pt needs to reschedule injectio because she did not withhold her blood thinner because she thought injection was next week, please advise

## 2018-10-26 ENCOUNTER — TELEPHONE (OUTPATIENT)
Dept: INTERNAL MEDICINE CLINIC | Facility: CLINIC | Age: 78
End: 2018-10-26

## 2018-10-26 NOTE — TELEPHONE ENCOUNTER
Per pt she needs to know how many days prior does she need to stop warfarin before she can get an injection on her spine. pls advise.  Thank you

## 2018-10-29 NOTE — TELEPHONE ENCOUNTER
Patient has been scheduled for aLeft L5 transforaminal epidural steroid injections on 11/9/2018 at the [address] . Medications and allergies reviewed.  Patient informed to hold aspirins, nsaids, blood thinners, multivitamins, vitamin E and fish oils 3-7 days prio

## 2018-10-29 NOTE — TELEPHONE ENCOUNTER
Dr Jarret Frias, please advise. Patient is scheduled 11/9/18 for the spine injection. She called to ask when to stop her aspirin and her warfarin before the injection.

## 2018-10-30 NOTE — TELEPHONE ENCOUNTER
Advise her to have a doctor see her at [facility]  They have a visiting  MD at [facility] and then they can make arrangement for her care   I can only recommend for her to come in for PT INR as directed if she is under my care

## 2018-10-30 NOTE — TELEPHONE ENCOUNTER
Pt understands to stop meds 6 days prior to injection. Her follow up question is what to do about getting her INR checked in th future.  She is a resident at [facility] and while they have checked her INR a couple of times, they no longer will do this test th

## 2018-10-31 NOTE — TELEPHONE ENCOUNTER
Per pt the doctors at [facility] will not see her because she is not their pt. Pt is requesting an alternative blood thinner that doesn't requires blood work. please advise.

## 2018-11-01 NOTE — TELEPHONE ENCOUNTER
Advised patient of Dr. Amy Hollingsworth note. Patient verbalized understanding and will contact her insurance and call us back.

## 2018-11-06 NOTE — TELEPHONE ENCOUNTER
Proceed with holding warfarin prior to her spinal injeciton  Call right after the injection    Make sure it is OK with pain clinic doctor to start xaretlo  Right after the procedure    It is difficult for me to manage patient without seeing her regularly

## 2018-11-07 ENCOUNTER — APPOINTMENT (OUTPATIENT)
Dept: GENERAL RADIOLOGY | Facility: HOSPITAL | Age: 78
DRG: 192 | End: 2018-11-07
Payer: MEDICARE

## 2018-11-07 ENCOUNTER — APPOINTMENT (OUTPATIENT)
Dept: CT IMAGING | Facility: HOSPITAL | Age: 78
DRG: 192 | End: 2018-11-07
Attending: EMERGENCY MEDICINE
Payer: MEDICARE

## 2018-11-07 ENCOUNTER — HOSPITAL ENCOUNTER (INPATIENT)
Facility: HOSPITAL | Age: 78
LOS: 6 days | Discharge: HOME HEALTH | DRG: 192 | End: 2018-11-13
Attending: EMERGENCY MEDICINE | Admitting: HOSPITALIST
Payer: MEDICARE

## 2018-11-07 DIAGNOSIS — R11.10 NON-INTRACTABLE VOMITING, PRESENCE OF NAUSEA NOT SPECIFIED, UNSPECIFIED VOMITING TYPE: ICD-10-CM

## 2018-11-07 DIAGNOSIS — R50.9 FEBRILE ILLNESS, ACUTE: Primary | ICD-10-CM

## 2018-11-07 DIAGNOSIS — J44.1 COPD EXACERBATION (HCC): ICD-10-CM

## 2018-11-07 PROCEDURE — 70450 CT HEAD/BRAIN W/O DYE: CPT | Performed by: EMERGENCY MEDICINE

## 2018-11-07 PROCEDURE — 99223 1ST HOSP IP/OBS HIGH 75: CPT | Performed by: HOSPITALIST

## 2018-11-07 PROCEDURE — 74177 CT ABD & PELVIS W/CONTRAST: CPT | Performed by: EMERGENCY MEDICINE

## 2018-11-07 PROCEDURE — 71045 X-RAY EXAM CHEST 1 VIEW: CPT | Performed by: EMERGENCY MEDICINE

## 2018-11-07 RX ORDER — IPRATROPIUM BROMIDE AND ALBUTEROL SULFATE 2.5; .5 MG/3ML; MG/3ML
3 SOLUTION RESPIRATORY (INHALATION) ONCE
Status: DISCONTINUED | OUTPATIENT
Start: 2018-11-07 | End: 2018-11-11 | Stop reason: ALTCHOICE

## 2018-11-07 RX ORDER — METHYLPREDNISOLONE SODIUM SUCCINATE 40 MG/ML
40 INJECTION, POWDER, LYOPHILIZED, FOR SOLUTION INTRAMUSCULAR; INTRAVENOUS EVERY 8 HOURS
Status: DISCONTINUED | OUTPATIENT
Start: 2018-11-07 | End: 2018-11-07

## 2018-11-07 RX ORDER — ONDANSETRON 2 MG/ML
INJECTION INTRAMUSCULAR; INTRAVENOUS
Status: DISPENSED
Start: 2018-11-07 | End: 2018-11-08

## 2018-11-07 RX ORDER — BUDESONIDE 0.5 MG/2ML
0.5 INHALANT ORAL
Status: DISCONTINUED | OUTPATIENT
Start: 2018-11-07 | End: 2018-11-13

## 2018-11-07 RX ORDER — METHYLPREDNISOLONE SODIUM SUCCINATE 40 MG/ML
20 INJECTION, POWDER, LYOPHILIZED, FOR SOLUTION INTRAMUSCULAR; INTRAVENOUS EVERY 12 HOURS
Status: DISCONTINUED | OUTPATIENT
Start: 2018-11-07 | End: 2018-11-13

## 2018-11-07 RX ORDER — SODIUM CHLORIDE 0.9 % (FLUSH) 0.9 %
3 SYRINGE (ML) INJECTION AS NEEDED
Status: DISCONTINUED | OUTPATIENT
Start: 2018-11-07 | End: 2018-11-13

## 2018-11-07 RX ORDER — ONDANSETRON 2 MG/ML
4 INJECTION INTRAMUSCULAR; INTRAVENOUS EVERY 6 HOURS PRN
Status: DISCONTINUED | OUTPATIENT
Start: 2018-11-07 | End: 2018-11-13

## 2018-11-07 RX ORDER — ACETAMINOPHEN 325 MG/1
650 TABLET ORAL EVERY 6 HOURS PRN
Status: DISCONTINUED | OUTPATIENT
Start: 2018-11-07 | End: 2018-11-07

## 2018-11-07 RX ORDER — ASPIRIN 325 MG
325 TABLET ORAL DAILY
Status: DISCONTINUED | OUTPATIENT
Start: 2018-11-07 | End: 2018-11-13

## 2018-11-07 RX ORDER — PANTOPRAZOLE SODIUM 40 MG/1
40 TABLET, DELAYED RELEASE ORAL 2 TIMES DAILY
Status: DISCONTINUED | OUTPATIENT
Start: 2018-11-07 | End: 2018-11-13

## 2018-11-07 RX ORDER — ACETAMINOPHEN 500 MG
1000 TABLET ORAL ONCE
Status: COMPLETED | OUTPATIENT
Start: 2018-11-07 | End: 2018-11-07

## 2018-11-07 RX ORDER — ALBUTEROL SULFATE 2.5 MG/3ML
2.5 SOLUTION RESPIRATORY (INHALATION)
Status: DISCONTINUED | OUTPATIENT
Start: 2018-11-07 | End: 2018-11-07

## 2018-11-07 RX ORDER — VENLAFAXINE HYDROCHLORIDE 150 MG/1
300 CAPSULE, EXTENDED RELEASE ORAL
Status: DISCONTINUED | OUTPATIENT
Start: 2018-11-08 | End: 2018-11-13

## 2018-11-07 RX ORDER — LEVOTHYROXINE SODIUM 0.12 MG/1
250 TABLET ORAL
Status: DISCONTINUED | OUTPATIENT
Start: 2018-11-07 | End: 2018-11-13

## 2018-11-07 RX ORDER — ONDANSETRON 2 MG/ML
4 INJECTION INTRAMUSCULAR; INTRAVENOUS ONCE
Status: COMPLETED | OUTPATIENT
Start: 2018-11-07 | End: 2018-11-07

## 2018-11-07 RX ORDER — GUAIFENESIN/DEXTROMETHORPHAN 100-10MG/5
100 SYRUP ORAL EVERY 4 HOURS PRN
Status: DISCONTINUED | OUTPATIENT
Start: 2018-11-07 | End: 2018-11-13

## 2018-11-07 RX ORDER — SODIUM CHLORIDE 9 MG/ML
INJECTION, SOLUTION INTRAVENOUS ONCE
Status: COMPLETED | OUTPATIENT
Start: 2018-11-07 | End: 2018-11-08

## 2018-11-07 RX ORDER — ASPIRIN 325 MG
325 TABLET ORAL DAILY
COMMUNITY
End: 2018-11-19

## 2018-11-07 RX ORDER — SODIUM CHLORIDE 9 MG/ML
INJECTION, SOLUTION INTRAVENOUS CONTINUOUS
Status: DISCONTINUED | OUTPATIENT
Start: 2018-11-07 | End: 2018-11-07

## 2018-11-07 RX ORDER — ACETAMINOPHEN 325 MG/1
650 TABLET ORAL EVERY 6 HOURS PRN
Status: DISCONTINUED | OUTPATIENT
Start: 2018-11-07 | End: 2018-11-13

## 2018-11-07 RX ORDER — SODIUM CHLORIDE 9 MG/ML
INJECTION, SOLUTION INTRAVENOUS CONTINUOUS
Status: DISCONTINUED | OUTPATIENT
Start: 2018-11-07 | End: 2018-11-08

## 2018-11-07 RX ORDER — ALBUTEROL SULFATE 2.5 MG/3ML
2.5 SOLUTION RESPIRATORY (INHALATION)
Status: DISCONTINUED | OUTPATIENT
Start: 2018-11-08 | End: 2018-11-13

## 2018-11-07 RX ORDER — LORAZEPAM 1 MG/1
1 TABLET ORAL 2 TIMES DAILY PRN
Status: DISCONTINUED | OUTPATIENT
Start: 2018-11-07 | End: 2018-11-13

## 2018-11-07 RX ORDER — SODIUM CHLORIDE 9 MG/ML
INJECTION, SOLUTION INTRAVENOUS
Status: DISPENSED
Start: 2018-11-07 | End: 2018-11-08

## 2018-11-07 NOTE — ED INITIAL ASSESSMENT (HPI)
Pt arrives via ems from [facility] for fever, headache and vomiting since last night. Pt a/o x 4 now.  Oral temp 102.1

## 2018-11-07 NOTE — ED NOTES
.Orders for admission, patient is aware of plan and ready to go upstairs.  Any questions, please call ED RN Aman Rush  at extension [phone]

## 2018-11-07 NOTE — ED PROVIDER NOTES
Patient Seen in: [facility] AND [facility] Emergency Department    History   Patient presents with:  Fever (infectious)    Stated Complaint: headache fever    HPI    The patient is a 27-year-old female who presents with multiple episodes of vomiting since last n localized, unspecified site    • Other and unspecified hyperlipidemia    • Pneumonia 2012   • Shortness of breath     O2 AT NIGHT   • Sleep apnea    • stent placement     cerebral   • Thyroid disease    • Toe pain     Onychomycosis, Debridement , Ximena Alcohol/week: 0.0 oz    Drug use: No      Review of Systems    Positive for stated complaint: headache fever  Other systems are as noted in HPI. Constitutional and vital signs reviewed. All other systems reviewed and negative except as noted above. pneumonia, UTI, viral syndrome        ED Course     Labs Reviewed   URINALYSIS WITH CULTURE REFLEX - Abnormal; Notable for the following components:       Result Value    Clarity Urine Hazy (*)     Blood Urine Small (*)     Leukocyte Esterase Urine Trace ( tenderness mild wheezing. Patient will be admitted she continues to complain of generalized weakness and just not feeling well.   IV antibiotics for COPD and expanded flu panel pending discussed with Dr. Giovanna Mendez and Dr. Steve Chicas patient will be admitted  O2 schedule follow up care with a primary care provider within the next three months to obtain basic health screening including reassessment of your blood pressure.     Medications Prescribed:  Current Discharge Medication List        Present on Admission  Elder

## 2018-11-07 NOTE — TELEPHONE ENCOUNTER
Advised patient of Dr. Emeli Montaño note and risks of xarelto. Patient verbalized understanding and agreeable with starting the xarelto but will also double check with pain clinic doctor before starting. Patient would like to still continue seeing Dr Vivien Ojeda.

## 2018-11-07 NOTE — PROGRESS NOTES
The patient was seen in the emergency room  Brought to hospital because of increased shortness of breath cough chills fever abdominal pain and nausea  The basic workup is negative now with the exception of elevated white count  The patient seen and examine

## 2018-11-07 NOTE — H&P
[facility]    PATIENT'S NAME: Betito ney   ATTENDING PHYSICIAN: Ovi Nathan MD   PATIENT ACCOUNT#:   [id number]    LOCATION:  [place] [address]  MEDICAL RECORD #:   [id number]       YOB: 1940  ADMISSION DATE:       11/07/2 hysterectomy. MEDICATIONS:  Please see medication reconciliation list.  She is noncompliant with home oxygen. ALLERGIES:  Multiple including Cipro, Depakote, Dilaudid, metronidazole, phentermine, Ultram; mainly side effects.     FAMILY HISTORY:  Tyrone Acute chronic obstructive pulmonary disease exacerbation with bronchitis. Possible community-acquired pneumonia. There is also small possibility of aspiration considering she had an episode of nausea and vomiting yesterday.   3.   Mild renal insufficien

## 2018-11-07 NOTE — PROGRESS NOTES
[facility] Pharmacy Note:  Antibiotic Dose Adjustment    Ceftriaxone (Rocephin) 1g IV once was ordered for Tamannaelena Sea. Body mass index is 39.53 kg/m².   Wt Readings from Last 6 Encounters:  11/07/18 : 117.9 kg (260 lb)  10/10/18 : 117.9 kg (260 lb)  09/29/

## 2018-11-08 PROCEDURE — 99233 SBSQ HOSP IP/OBS HIGH 50: CPT | Performed by: HOSPITALIST

## 2018-11-08 RX ORDER — QUETIAPINE 400 MG/1
400 TABLET, FILM COATED ORAL NIGHTLY
Status: DISCONTINUED | OUTPATIENT
Start: 2018-11-08 | End: 2018-11-13

## 2018-11-08 RX ORDER — WARFARIN SODIUM 5 MG/1
5 TABLET ORAL NIGHTLY
Status: DISCONTINUED | OUTPATIENT
Start: 2018-11-08 | End: 2018-11-11

## 2018-11-08 RX ORDER — HEPARIN SODIUM 5000 [USP'U]/ML
5000 INJECTION, SOLUTION INTRAVENOUS; SUBCUTANEOUS EVERY 12 HOURS SCHEDULED
Status: DISCONTINUED | OUTPATIENT
Start: 2018-11-08 | End: 2018-11-13

## 2018-11-08 NOTE — PROGRESS NOTES
[facility]  Progress Note     Pierre Lance  : 12/3/1940    Status: Inpatient  Day #: 1    Attending: Margarita Estrada MD  PCP: Sunny Carroll MD      Assessment and Plan     Acute on chronic respiratory failure  Acute COPD exacerbation seco 3.2*   --    NA  136  137   K  4.5  4.4   CL  97  103   CO2  30  28   GLU  141*  146*   BILT  0.5   --    AST  22   --    ALT  13*   --    ALKPHO  97   --    TP  6.1   --    INR   --   1.1       Ct Brain Or Head (02634)    Result Date: 11/7/2018  CONCLUSIO MD on 11/07/2018 at 13:57     Approved by (CST): Adria Fabry, MD on 11/07/2018 at 14:07          Ekg 12-lead    Result Date: 11/7/2018  ECG Report  Interpretation  -------------------------- Sinus Tachycardia - occasional PAC - old inferior infarct Poor

## 2018-11-08 NOTE — TELEPHONE ENCOUNTER
Pts daughter calling to cancel injection for 11/9/18, pt is in the hospital and will call back to reschedule when she is able

## 2018-11-08 NOTE — PROGRESS NOTES
[facility]    Progress Note    Clifford Venegas Patient Status:  Inpatient    12/3/1940 MRN [id number]   Location [facility] 5SW/SE Attending Scout Mackenzie MD   Hosp Day # 1 PCP Michael Wallace MD       Subjective:   Kathleen Salmeron unspecified vomiting type  Resolved      COPD exacerbation (HCC)  Solu-Medrol 20 mg IV twice daily nebulizers with duo nebs every 4 hours and Brio        Results:     Lab Results   Component Value Date    WBC 15.1 (H) 11/08/2018    HGB 12.1 11/08/2018    H acute CT abnormality to account for patient's symptoms. No evidence of bowel obstruction. 2.  Post cholecystectomy. No biliary ductal dilatation. 3.  Post IVC filter placement. 4.  Post bilateral hip arthroplasties.   The arthroplasties cause beam hardeni

## 2018-11-08 NOTE — PLAN OF CARE
Problem: Patient/Family Goals  Goal: Patient/Family Long Term Goal  Patient's Long Term Goal: To be discharged  Interventions:  - Administer meds as prescribed  - See additional Care Plan goals for specific interventions   Outcome: Progressing    Goal: Radha Dooley appropriate  Outcome: Progressing      Problem: RISK FOR INFECTION - ADULT  Goal: Absence of fever/infection during anticipated neutropenic period  INTERVENTIONS  - Monitor WBC  - Administer growth factors as ordered  - Implement neutropenic guidelines  Ou

## 2018-11-08 NOTE — CONSULTS
[facility]    Report of Consultation    Jazmine Arnold Patient Status:  Inpatient    12/3/1940 MRN [id number]   Location [facility] 5SW/SE Attending Larry Cook MD   Hosp Day # 1 PCP Kirstin Govea MD     Date of Admission: Tonsillitis 1952    Tonsillitis    • Unspecified essential hypertension    • Unspecified sleep apnea    • Visual impairment     EYE GLASSES       Past Surgical History  Past Surgical History:   Procedure Laterality Date   • ANESTH,NECK VESSEL SURGERY NOS Neurological Disorder Sister         ALzheimer's Disease    • Stroke Sister         Cerebrovascular accident    • Heart Disease Brother         Coronary Artery Disease        Social History  Social History    Tobacco Use      Smoking status: Former Smoker 325 MG Oral Tab Take 325 mg by mouth daily. fluticasone-salmeterol (ADVAIR DISKUS) 250-50 MCG/DOSE Inhalation Aerosol Powder, Breath Activated Inhale 1 puff into the lungs every 12 (twelve) hours.    LORazepam 1 MG Oral Tab Take 1 tablet (1 mg total) by m sputum, stridor and wheezing  Cardiovascular: negative for chest pressure/discomfort, dyspnea, fatigue, irregular heart beat, orthopnea, palpitations, syncope and tachypnea  Gastrointestinal: negative for constipation and diarrhea  Musculoskeletal:negative portion of the right ICA. Clival remodeling is redemonstrated. 2. Hyperdense appearance of the intracranial vasculature. This is nonspecific and may reflect hemoconcentration/dehydration. 3. No acute intracranial process by noncontrast CT technique.   4. ([address]  Nebulizers start low dose of Solu-Medrol and continue Brio    Recommendations:  As above the patient is refusing CPAP treatment for obstructive sleep apnea    Thank you for allowing me to participate in the care of your patient.     Lashon Kennedy  11/8

## 2018-11-08 NOTE — CM/SW NOTE
SW received MD order to assess for HHC. Pt has been screened per chart review and during nursing rounds. Pt lives at [facility]. Pt has home O2 and a motorized scooter which is used outside of her apartment and uses a walker in her apartment.  Pt has a hx

## 2018-11-08 NOTE — PHYSICAL THERAPY NOTE
PHYSICAL THERAPY EVALUATION - INPATIENT     Room Number: 559/559-A  Evaluation Date: 11/8/2018  Type of Evaluation: Initial   Physician Order: PT Eval and Treat    Presenting Problem: p/w fever, HA, vomiting, COPD exacerbation, possible viral PNA  Reason activity tolerance, balance deficits, generalized weakness, which are below the patient's pre-admission status. Pt demonstrates significant decline in function and is currently NOT SAFE for return home alone.  Pt refusing rehab stay which is primary PT teresa weakness    • Onychomycosis     Debridement    • Osteoarthrosis, unspecified whether generalized or localized, unspecified site    • Other and unspecified hyperlipidemia    • Pneumonia 2012   • Shortness of breath     O2 AT NIGHT   • Sleep apnea    • stent Rolling walker; Other (Comment)(rollator, scooter)  Patient Regularly Uses: Reading glasses    Prior Level of [place]: Pt lives alone in [address] [place] [address]. She was independent with ADLs with limited ambulation distance with rolling walker to ~5-10 feet.  She has a sh (including a wheelchair)?: A Lot   -   Need to walk in hospital room?: A Lot   -   Climbing 3-5 steps with a railing?: Total     AM-PAC Score:  Raw Score: 12   PT Approx Degree of Impairment Score: 68.66%   Standardized Score (AM-PAC Scale): 35.33   CMS Mo instructions provided to patient in preparation for discharge.    Goal #4   Current Status    Goal #5    Goal #5   Current Status    Goal #6    Goal #6  Current Status

## 2018-11-08 NOTE — OCCUPATIONAL THERAPY NOTE
OCCUPATIONAL THERAPY EVALUATION - INPATIENT     Room Number: 559/559-A  Evaluation Date: 11/8/2018  Type of Evaluation: Initial  Presenting Problem: (febrile illness)    Physician Order: IP Consult to Occupational Therapy  Reason for Therapy: ADL/IADL Dysf that if needed, she will hire caregiver. Pt will continue to benefit from skilled IP OT services while at [facility] in order to address deficits and prepare for safe d/c home.      DISCHARGE RECOMMENDATIONS  OT Discharge Recommendations: 24 hour care/supervision; Unspecified essential hypertension    • Unspecified sleep apnea    • Visual impairment     EYE GLASSES       Past Surgical History  Past Surgical History:   Procedure Laterality Date   • ANESTH,NECK VESSEL SURGERY NOS      x4   • BRAIN SURGERY  ~2009    Br motorized scooter and RW. Reports need for scooter in home vs only out of house. Reports being able to complete SPT from scooter to surfaces with indep prior to this hospitalization.     SUBJECTIVE  \"I know I will be able to walk by the time I leave here\" fair+  Dynamic Sitting: fair+  Static Standing: fair-  Dynamic Standing: unable to assess at this time    FUNCTIONAL ADL ASSESSMENT  Grooming: s/u seated   Feeding: Indep   Bathing: mod A   Toileting: max A   Upper Extremity Dressing: s/u  Lower Extremity

## 2018-11-08 NOTE — RESPIRATORY THERAPY NOTE
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 Pt does not routinely use a CPAP device at home.      CPAP INITIATION:    Pt to be placed on CPAP: no  Pt refused: yes

## 2018-11-09 PROCEDURE — 99233 SBSQ HOSP IP/OBS HIGH 50: CPT | Performed by: HOSPITALIST

## 2018-11-09 RX ORDER — AZITHROMYCIN 250 MG/1
500 TABLET, FILM COATED ORAL EVERY 24 HOURS
Status: COMPLETED | OUTPATIENT
Start: 2018-11-09 | End: 2018-11-10

## 2018-11-09 NOTE — PLAN OF CARE
Problem: Patient/Family Goals  Goal: Patient/Family Long Term Goal  Patient's Long Term Goal: To be discharged  Interventions:  - Administer meds as prescribed  - See additional Care Plan goals for specific interventions   Outcome: Progressing    Goal: P INFECTION - ADULT  Goal: Absence of fever/infection during anticipated neutropenic period  INTERVENTIONS  - Monitor WBC  - Administer growth factors as ordered  - Implement neutropenic guidelines  Outcome: Progressing      Problem: [address]

## 2018-11-09 NOTE — PLAN OF CARE
Problem: Patient Centered Care  Goal: Patient preferences are identified and integrated in the patient's plan of care  Interventions:  - What would you like us to know as we care for you?   - Provide timely, complete, and accurate information to patient/fa appropriate pain scale  - Administer analgesics based on type and severity of pain and evaluate response  - Implement non-pharmacological measures as appropriate and evaluate response  - Consider cultural and social influences on pain and pain management

## 2018-11-09 NOTE — PROGRESS NOTES
[facility]  Progress Note     Aleida Nolen  : 12/3/1940    Status: Inpatient  Day #: 2    Attending: Virginia Flower MD  PCP: Dian Yoder MD      Assessment and Plan     Acute on chronic respiratory failure  Acute COPD exacerbation seco BUN  16  16  21*   CREATSERUM  1.16  0.99  0.97   GFRAA  52*  >60  >60   GFRNAA  45*  54*  56*   CA  9.7  9.0  9.5   ALB  3.2*   --    --    NA  136  137  138   K  4.5  4.4  4.7   CL  97  103  105   CO2  30  28  29   GLU  141*  146*  147*   BILT  0.5   - 4- and 5-mm diameter pulmonary nodule in the middle lobe. Both nodules are stable since 8/9/16 CT. The stability over 2 years favors a benign etiology.    Dictated by (CST): Александр Feldman MD on 11/07/2018 at 13:57     Approved by (CST): Александр Feldman MD on

## 2018-11-09 NOTE — PROGRESS NOTES
[facility]    CDI Prediction Tool Protocol (Vancomycin Initiated)    OVP (oral vancomycin prophylaxis) 125 mg PO Daily is being started in this patient based on a score of 18.1.       Score Breakdown:  History of CDI > 1 year ago AND high risk an

## 2018-11-09 NOTE — PROGRESS NOTES
[facility] Pharmacy Note: Route Optimization for Azithromycin [facility])    Patient is currently on Azithromycin (ZITHROMAX) 500 mg IV every 24 hours.    The patient meets the criteria to convert to the oral equivalent as established by the IV to Oral conversion

## 2018-11-09 NOTE — PLAN OF CARE
Problem: Patient/Family Goals  Goal: Patient/Family Long Term Goal  Patient's Long Term Goal: To be discharged  Interventions:  - Administer meds as prescribed  - See additional Care Plan goals for specific interventions   Outcome: Progressing    Goal: Haley Spangler Problem: RISK FOR INFECTION - ADULT  Goal: Absence of fever/infection during anticipated neutropenic period  INTERVENTIONS  - Monitor WBC  - Administer growth factors as ordered  - Implement neutropenic guidelines  Outcome: Progressing  Afebrile this s

## 2018-11-09 NOTE — CM/SW NOTE
PT/OT recommending 24hr/HHPT/KYLE. SW met w/ pt to follow up w/ discharge needs. Pt stated that she plans on returning to [facility] and declined rehab. Pt stated she was recently at [facility] and stated she is able to do the same therapy at home.  Pt s

## 2018-11-09 NOTE — PROGRESS NOTES
[facility]    Progress Note    Janelle Saha Patient Status:  Inpatient    12/3/1940 MRN [id number]   Location [facility] 5SW/SE Attending Jose Alberto Cox MD   Hosp Day # 2 PCP Nicholas Ahuja MD       Subjective:   Janelle Saha i in the calves or thighs  Neurologic: Grossly normal    Assessment and Plan:     Febrile illness, acute  Continue Rocephin and Zithromax      Non-intractable vomiting, presence of nausea not specified, unspecified vomiting type  Resolved      COPD exacerbat 11/07/2018 at 13:00     Approved by (CST): Vinod Vela MD on 11/07/2018 at 13:01          Ct Abdomen Pelvis Iv Contrast, No Oral (er)    Result Date: 11/7/2018  CONCLUSION:  1. No acute CT abnormality to account for patient's symptoms.   No evidence of

## 2018-11-10 PROCEDURE — 99233 SBSQ HOSP IP/OBS HIGH 50: CPT | Performed by: HOSPITALIST

## 2018-11-10 NOTE — CM/SW NOTE
Explanation of of BPCI/Medicare program provided. Patient was enrolled under  Patient/family agreed to phone f/u for 3 months from [facility] after discharge from [address] [place] [address].  BPCI/Medicare letter and brochure provided      / t

## 2018-11-10 NOTE — PROGRESS NOTES
[facility]    Progress Note    Igor Fisher Patient Status:  Inpatient    12/3/1940 MRN [id number]   Location [facility] 5SW/SE Attending Meka Santacruz MD   Hosp Day # 3 PCP Yue Macedo MD       Subjective:   Igor Fisher i the calves or thighs  Neurologic: Grossly normal    Assessment and Plan:     Febrile illness, acute  10 years Zithromax and Rocephin      Non-intractable vomiting, presence of nausea not specified, unspecified vomiting type  Resolved      COPD exacerbation

## 2018-11-10 NOTE — PROGRESS NOTES
[facility]  Progress Note     Terri Berrios  : 12/3/1940    Status: Inpatient  Day #: 3    Attending: Arpita Wolfe MD  PCP: Dulce Verde MD      Assessment and Plan     Acute on chronic respiratory failure  Acute COPD exacerbation seco 11/07/18   1227  11/08/18   0607  11/09/18   0708  11/10/18   0642   BUN  16  16  21*   --    CREATSERUM  1.16  0.99  0.97   --    GFRAA  52*  >60  >60   --    GFRNAA  45*  54*  56*   --    CA  9.7  9.0  9.5   --    ALB  3.2*   --    --    --    NA  136  1

## 2018-11-10 NOTE — PLAN OF CARE
Problem: Patient Centered Care  Goal: Patient preferences are identified and integrated in the patient's plan of care  Interventions:  - What would you like us to know as we care for you?  \"i'm from [place]\"   - Provide timely, complete, and accurate info pain using appropriate pain scale  - Administer analgesics based on type and severity of pain and evaluate response  - Implement non-pharmacological measures as appropriate and evaluate response  - Consider cultural and social influences on pain and pain m

## 2018-11-11 PROCEDURE — 99233 SBSQ HOSP IP/OBS HIGH 50: CPT | Performed by: HOSPITALIST

## 2018-11-11 NOTE — PROGRESS NOTES
[facility]  Progress Note     Amaris Alt  : 12/3/1940    Status: Inpatient  Day #: 4    Attending: Juice Riley MD  PCP: Evon Barahona MD      Assessment and Plan     Acute on chronic respiratory failure  Acute COPD exacerbation seco 28.6   MCHC  32.4  32.2  32.4   RDW  15.4*  15.3*  15.7*     Recent Labs   Lab  11/07/18   1227   11/08/18   0607  11/09/18   0708  11/10/18   0642  11/11/18   0620   BUN  16   --   16  21*   --    --    CREATSERUM  1.16   --   0.99  0.97   --    --    GFR

## 2018-11-11 NOTE — PROGRESS NOTES
[facility]    Progress Note    Tisha Workman Patient Status:  Inpatient    12/3/1940 MRN [id number]   Location [facility] 5SW/SE Attending Chris Kebede MD   Hosp Day # 4 PCP Jesus Fischer MD       Subjective:   Tisha Workman i illness, acute  Continue current antibiotics steroids and nebulizers      Non-intractable vomiting, presence of nausea not specified, unspecified vomiting type  resolved      COPD exacerbation ([facility] [address]  Continue steroids nebulizers        Results:     Lab Res

## 2018-11-11 NOTE — PLAN OF CARE
Problem: Patient Centered Care  Goal: Patient preferences are identified and integrated in the patient's plan of care  Interventions:  - What would you like us to know as we care for you?  \"i'm from [place]\"   - Provide timely, complete, and accurate info comfort level or patient's stated pain goal  INTERVENTIONS:  - Encourage pt to monitor pain and request assistance  - Assess pain using appropriate pain scale  - Administer analgesics based on type and severity of pain and evaluate response  - Implement no

## 2018-11-11 NOTE — PLAN OF CARE
Problem: Patient Centered Care  Goal: Patient preferences are identified and integrated in the patient's plan of care  Interventions:  - What would you like us to know as we care for you?  \"i'm from [place]\"   - Provide timely, complete, and accurate info goal  INTERVENTIONS:  - Encourage pt to monitor pain and request assistance  - Assess pain using appropriate pain scale  - Administer analgesics based on type and severity of pain and evaluate response  - Implement non-pharmacological measures as appropria

## 2018-11-11 NOTE — PHYSICAL THERAPY NOTE
Attempted to see pt for PT treatment session, pt refusing, stating she doesn't need therapy anymore, she able to get out of bed and walk to the bathroom.  Attempted to educate pt on role of PT, goals and POC, pt becoming very upset and yelling at this thera

## 2018-11-12 PROCEDURE — 99233 SBSQ HOSP IP/OBS HIGH 50: CPT | Performed by: HOSPITALIST

## 2018-11-12 RX ORDER — WARFARIN SODIUM 5 MG/1
5 TABLET ORAL NIGHTLY
Status: DISCONTINUED | OUTPATIENT
Start: 2018-11-12 | End: 2018-11-13

## 2018-11-12 NOTE — PROGRESS NOTES
[facility]  Progress Note     Alfa Munoz  : 12/3/1940    Status: Inpatient  Day #: 5    Attending: Jai Dia MD  PCP: Ishaan Iqbal MD      Assessment and Plan     Acute on chronic respiratory failure  Acute COPD exacerbation bridger 88.9   MCH  28.7  28.6  28.8   MCHC  32.2  32.4  32.4   RDW  15.3*  15.7*  15.4*     Recent Labs   Lab  11/07/18   1227   11/08/18   0607  11/09/18   0708  11/10/18   0642  11/11/18   0620  11/12/18   0614   BUN  16   --   16  21*   --    --   22*   BERONICA

## 2018-11-12 NOTE — PROGRESS NOTES
[facility]    Progress Note    Juan A Goldsmith Patient Status:  Inpatient    12/3/1940 MRN [id number]   Location [facility] 5SW/SE Attending Gwendolyn Salmon MD   Hosp Day # 5 PCP Anastacia Arroyo MD       Subjective:   Juan A Goldsmith i illness, acute  Read resolved      Non-intractable vomiting, presence of nausea not specified, unspecified vomiting type  Resolved      COPD exacerbation (HCC)  Continue steroids and antibiotics discharge tomorrow discussed with Dr. Rachel Barahona        Results:

## 2018-11-12 NOTE — OCCUPATIONAL THERAPY NOTE
OCCUPATIONAL THERAPY TREATMENT NOTE - INPATIENT        Room Number: 984/967-X           Presenting Problem: (febrile illness)    Problem List  Principal Problem:    Febrile illness, acute  Active Problems:    Non-intractable vomiting, presence of nausea no SPO2 Ambulation on Room Air: 95          ACTIVITIES OF DAILY LIVING ASSESSMENT  AM-PAC ‘6-Clicks’ Inpatient Daily Activity Short Form  How much help from another person does the patient currently need…  -   Putting on and taking off regular lower body cl

## 2018-11-12 NOTE — PHYSICAL THERAPY NOTE
PHYSICAL THERAPY TREATMENT NOTE - INPATIENT     Room Number: 559/559-A       Presenting Problem: p/w fever, HA, vomiting, COPD exacerbation, possible viral PNA    Problem List  Principal Problem:    Febrile illness, acute  Active Problems:    Non-intractab Need to walk in hospital room?: A Little   -   Climbing 3-5 steps with a railing?: A Lot     AM-PAC Score:  Raw Score: 17   PT Approx Degree of Impairment Score: 50.57%   Standardized Score (AM-PAC Scale): 42.13   CMS Modifier (G-Code): CK    FUNCTIONAL

## 2018-11-13 ENCOUNTER — TELEPHONE (OUTPATIENT)
Dept: INTERNAL MEDICINE CLINIC | Facility: CLINIC | Age: 78
End: 2018-11-13

## 2018-11-13 VITALS
OXYGEN SATURATION: 93 % | DIASTOLIC BLOOD PRESSURE: 64 MMHG | WEIGHT: 260 LBS | BODY MASS INDEX: 39.4 KG/M2 | SYSTOLIC BLOOD PRESSURE: 150 MMHG | HEART RATE: 64 BPM | RESPIRATION RATE: 18 BRPM | TEMPERATURE: 98 F | HEIGHT: 68 IN

## 2018-11-13 PROCEDURE — 99239 HOSP IP/OBS DSCHRG MGMT >30: CPT | Performed by: HOSPITALIST

## 2018-11-13 RX ORDER — AZITHROMYCIN 250 MG/1
TABLET, FILM COATED ORAL
Qty: 1 PACKAGE | Refills: 0 | Status: SHIPPED | OUTPATIENT
Start: 2018-11-13 | End: 2018-11-13

## 2018-11-13 RX ORDER — WARFARIN SODIUM 5 MG/1
5 TABLET ORAL NIGHTLY
Qty: 30 TABLET | Refills: 0 | Status: SHIPPED | OUTPATIENT
Start: 2018-11-13 | End: 2018-11-13

## 2018-11-13 RX ORDER — WARFARIN SODIUM 5 MG/1
5 TABLET ORAL NIGHTLY
Refills: 0 | Status: SHIPPED | COMMUNITY
Start: 2018-11-13 | End: 2018-11-13

## 2018-11-13 RX ORDER — AZITHROMYCIN 250 MG/1
TABLET, FILM COATED ORAL
Qty: 1 PACKAGE | Refills: 0 | Status: SHIPPED | OUTPATIENT
Start: 2018-11-13 | End: 2018-12-19 | Stop reason: ALTCHOICE

## 2018-11-13 RX ORDER — GUAIFENESIN/DEXTROMETHORPHAN 100-10MG/5
5 SYRUP ORAL EVERY 4 HOURS PRN
Qty: 1 BOTTLE | Refills: 0 | Status: SHIPPED | OUTPATIENT
Start: 2018-11-13 | End: 2019-07-22 | Stop reason: ALTCHOICE

## 2018-11-13 RX ORDER — GUAIFENESIN/DEXTROMETHORPHAN 100-10MG/5
5 SYRUP ORAL EVERY 4 HOURS PRN
Qty: 1 BOTTLE | Refills: 0 | Status: SHIPPED
Start: 2018-11-13 | End: 2018-11-13

## 2018-11-13 RX ORDER — AZITHROMYCIN 250 MG/1
TABLET, FILM COATED ORAL
Qty: 1 PACKAGE | Refills: 0 | Status: SHIPPED
Start: 2018-11-13 | End: 2018-11-13

## 2018-11-13 RX ORDER — WARFARIN SODIUM 5 MG/1
5 TABLET ORAL NIGHTLY
Qty: 30 TABLET | Refills: 0 | Status: SHIPPED | OUTPATIENT
Start: 2018-11-13 | End: 2018-11-19

## 2018-11-13 RX ORDER — GUAIFENESIN/DEXTROMETHORPHAN 100-10MG/5
5 SYRUP ORAL EVERY 4 HOURS PRN
Qty: 1 BOTTLE | Refills: 0 | Status: SHIPPED | OUTPATIENT
Start: 2018-11-13 | End: 2018-11-13

## 2018-11-13 RX ORDER — PREDNISONE 10 MG/1
TABLET ORAL
Qty: 15 TABLET | Refills: 0 | Status: SHIPPED
Start: 2018-11-13 | End: 2018-12-19 | Stop reason: ALTCHOICE

## 2018-11-13 RX ORDER — 0.9 % SODIUM CHLORIDE 0.9 %
VIAL (ML) INJECTION
Status: COMPLETED
Start: 2018-11-13 | End: 2018-11-13

## 2018-11-13 NOTE — TELEPHONE ENCOUNTER
Darrius Xiao called in stating that she has faxed over requests for signatures on [facility] orders. She was sending faxes to the [facility] fax, so updated the information and she will refax today, 11/13. Please be aware.

## 2018-11-13 NOTE — CM/SW NOTE
[facility] notified of patients discharge today and orders sent to afua Ojeda at [facility] notified  INR on the 11/15    [facility] ordered for 1pm  CTS form filled out and faxed to [phone]    Discharge 1pm today RN aware    Cinthia Leonardo 6

## 2018-11-13 NOTE — PROGRESS NOTES
Pt discharged to [facility]. Discharge Instructions given to patient, all questions answered. Iv was removed  [facility] picked pt up.

## 2018-11-13 NOTE — DISCHARGE SUMMARY
[place] [facility]  Discharge Summary     Ryan Miguel  : 12/3/1940    Status: Inpatient  Day #: 6    Attending: Caroline Berry MD  PCP: Enrique Solomon MD     Date of Admission: 2018  Date of Discharge: 2018     Hospital Discharge Diag joint swelling  Extremities:  No edema, no cyanosis, no clubbing  Neurologic:  nonfocal  Psychiatric:  Normal affect, calm and appropriate  Skin:  No rash, no lesion         Discharge Medications      START taking these medications      Instructions Prescr Tabs  Commonly known as:  SYNTHROID, LEVOTHROID      Take 250 mcg by mouth once daily. Refills:  0     LORazepam 1 MG Tabs  Commonly known as:  ATIVAN      Take 1 tablet (1 mg total) by mouth 2 (two) times daily as needed for Anxiety.    Quantity:  10 tab

## 2018-11-13 NOTE — TELEPHONE ENCOUNTER
[address] states patient returned from Hospital on [address] and they had patient on Asprin 80 so wanted to confirm what dose should be on now. Coumadin dose was also increased from 4 mg to 5 mg.        Please advise

## 2018-11-13 NOTE — PROGRESS NOTES
[facility]    Progress Note    Ryan Miguel Patient Status:  Inpatient    12/3/1940 MRN [id number]   Location [facility] 5SW/SE Attending Deirdre Ferrer MD   Hosp Day # 6 PCP Enrique Solomon MD       Subjective:   Ryan Miguel i illness, acute  Resolved      Non-intractable vomiting, presence of nausea not specified, unspecified vomiting type  Resolved with      COPD exacerbation ([facility] [address]  Okay to discharge today Z-Escobar prednisone 20 for 5 and 10 for 5 days  Continue home inhalers  He

## 2018-11-14 ENCOUNTER — ANTI-COAG VISIT (OUTPATIENT)
Dept: INTERNAL MEDICINE CLINIC | Facility: CLINIC | Age: 78
End: 2018-11-14

## 2018-11-14 ENCOUNTER — PATIENT OUTREACH (OUTPATIENT)
Dept: CASE MANAGEMENT | Age: 78
End: 2018-11-14

## 2018-11-14 DIAGNOSIS — Z02.9 ENCOUNTERS FOR ADMINISTRATIVE PURPOSES: ICD-10-CM

## 2018-11-14 DIAGNOSIS — J44.1 COPD EXACERBATION (HCC): ICD-10-CM

## 2018-11-14 DIAGNOSIS — I82.409 DEEP VEIN THROMBOSIS (DVT) OF LOWER EXTREMITY, UNSPECIFIED CHRONICITY, UNSPECIFIED LATERALITY, UNSPECIFIED VEIN (HCC): ICD-10-CM

## 2018-11-14 PROCEDURE — 1111F DSCHRG MED/CURRENT MED MERGE: CPT

## 2018-11-14 NOTE — PROGRESS NOTES
Attempted to contact the patient for TCM. Phone rang for a few seconds and went to busy signal. Will try again at another time.

## 2018-11-15 ENCOUNTER — TELEPHONE (OUTPATIENT)
Dept: INTERNAL MEDICINE CLINIC | Facility: CLINIC | Age: 78
End: 2018-11-15

## 2018-11-15 NOTE — TELEPHONE ENCOUNTER
Mckayla from [address] [place] [address] is calling to let the dr know that a nurse will not be checking the INR due to the recall on the strips. A lab company will be going to check them instead of the nurse. Thank you.

## 2018-11-15 NOTE — TELEPHONE ENCOUNTER
Patient discharged from [place] 11/13, COPD exacerbation. Patient needs a TCM appt. She can not come on 11/20. Can only go to the [place] location. Please advise.

## 2018-11-15 NOTE — TELEPHONE ENCOUNTER
Patient dc'd from [address] [place] [address] 11/13, COPD. She is currently on Coumadin 5mg. She is asking if she can be taken off of Coumadin and switched to Xarelto. States that PCP recommended this before. She would like to not have any more blood draws if she doesn't have to.

## 2018-11-15 NOTE — TELEPHONE ENCOUNTER
pls advise if switch of rx appropriate? appt first to discuss? Pt aware needs TCM appt (per other encounter from today) and prefers [address] [facility],  [address] location.

## 2018-11-15 NOTE — PROGRESS NOTES
Initial Post Discharge Follow Up   Discharge Date: 11/13/18  Contact Date: 11/14/2018    Consent Verification:  Assessment Completed With: Patient  HIPAA Verified?   Yes    Discharge Dx:   Acute on chronic respiratory failure  Acute COPD exacerbation bridger  MG Oral Capsule SR 24 Hr Take 1 capsule (150 mg total) by mouth daily with breakfast. (Patient taking differently: Take 300 mg by mouth daily with breakfast.  ) Disp: 5 capsule Rfl: 0   Levothyroxine Sodium 125 MCG Oral Tab Take 250 mcg by mouth onc no  Indoor air pollution from heating and cooking with poor ventilation   no    Mold      no    Pets        no    Extreme heat no           Needs post D/C:   Now that you are home, are there any needs or concerns you need addressed before your next visi

## 2018-11-16 ENCOUNTER — TELEPHONE (OUTPATIENT)
Dept: INTERNAL MEDICINE CLINIC | Facility: CLINIC | Age: 78
End: 2018-11-16

## 2018-11-16 ENCOUNTER — ANTI-COAG VISIT (OUTPATIENT)
Dept: INTERNAL MEDICINE CLINIC | Facility: CLINIC | Age: 78
End: 2018-11-16

## 2018-11-16 DIAGNOSIS — I82.409 DEEP VEIN THROMBOSIS (DVT) OF LOWER EXTREMITY, UNSPECIFIED CHRONICITY, UNSPECIFIED LATERALITY, UNSPECIFIED VEIN (HCC): ICD-10-CM

## 2018-11-16 NOTE — TELEPHONE ENCOUNTER
Give  The result to coumadin clinic  Tell [facility] to call Coumadin clinic with result   I have not seen the fax

## 2018-11-16 NOTE — TELEPHONE ENCOUNTER
[address] called in stating that she faxed INR results to [address] office (fax # confirmed) and is asking to confirm we received the fax. Please advise.

## 2018-11-16 NOTE — TELEPHONE ENCOUNTER
Patient called back would like a script for xarelto to [facility]. Does not want to be on coumadin. Patient would like to only speak to Dr May Sanchez. Please advise.

## 2018-11-16 NOTE — TELEPHONE ENCOUNTER
I switch her when I see her   I dont feel comfortable switching pt to areolation  Without seeing her  Will have to review chart indication contraindication  Will give her script if appropriate when I see her   \in the meantime  Cont coumadin

## 2018-11-16 NOTE — TELEPHONE ENCOUNTER
Advised patient of Dr. Natalya Clemons note. Patient verbalized understanding and already has an appointment scheduled for 11/19/18 at 1:20pm with Dr May Sanchez in [address] [place] [address].

## 2018-11-19 ENCOUNTER — OFFICE VISIT (OUTPATIENT)
Dept: INTERNAL MEDICINE CLINIC | Facility: CLINIC | Age: 78
End: 2018-11-19
Payer: MEDICARE

## 2018-11-19 VITALS
TEMPERATURE: 98 F | HEIGHT: 68 IN | BODY MASS INDEX: 38.95 KG/M2 | SYSTOLIC BLOOD PRESSURE: 163 MMHG | WEIGHT: 257 LBS | HEART RATE: 69 BPM | DIASTOLIC BLOOD PRESSURE: 83 MMHG

## 2018-11-19 DIAGNOSIS — M51.26 LUMBAR HERNIATED DISC: ICD-10-CM

## 2018-11-19 DIAGNOSIS — F33.40 RECURRENT MAJOR DEPRESSIVE DISORDER, IN REMISSION (HCC): ICD-10-CM

## 2018-11-19 DIAGNOSIS — R26.9 GAIT ABNORMALITY: ICD-10-CM

## 2018-11-19 DIAGNOSIS — E66.01 MORBID OBESITY DUE TO EXCESS CALORIES (HCC): ICD-10-CM

## 2018-11-19 DIAGNOSIS — I10 ESSENTIAL HYPERTENSION: ICD-10-CM

## 2018-11-19 DIAGNOSIS — G47.33 OSA (OBSTRUCTIVE SLEEP APNEA): ICD-10-CM

## 2018-11-19 DIAGNOSIS — J44.1 CHRONIC OBSTRUCTIVE PULMONARY DISEASE WITH ACUTE EXACERBATION (HCC): Primary | ICD-10-CM

## 2018-11-19 PROCEDURE — 99214 OFFICE O/P EST MOD 30 MIN: CPT | Performed by: INTERNAL MEDICINE

## 2018-11-19 PROCEDURE — G0463 HOSPITAL OUTPT CLINIC VISIT: HCPCS | Performed by: INTERNAL MEDICINE

## 2018-11-19 RX ORDER — AMLODIPINE BESYLATE 5 MG/1
5 TABLET ORAL DAILY
Qty: 90 TABLET | Refills: 3 | Status: ON HOLD | OUTPATIENT
Start: 2018-11-19 | End: 2019-08-23

## 2018-11-19 NOTE — PATIENT INSTRUCTIONS
Component      Latest Ref Rng & Units 11/15/2018 11/13/2018 11/12/2018 11/11/2018   WBC      4.0 - 11.0 K/UL   8.9    RBC      3.70 - 5.40 M/UL   4.58    Hemoglobin      12.0 - 16.0 g/dL   13.2    Hematocrit      35.0 - 48.0 %   40.7    MCV      80.0 - 100 g/dl  32.4   RDW      11.0 - 15.0 %  15.7 (H)   Platelet Count      509 - 400 K/UL  176   MEAN PLATELET VOLUME      7.4 - 10.3 fL  8.9   Neutrophils %      %  89   Lymphocytes %      %  8   Monocytes %      %  3   Eosinophils %      %  0   Basophils %

## 2018-11-19 NOTE — TELEPHONE ENCOUNTER
Spoke to [facility] informed of Dr Elina Coker message above and provided fax and phone number to coumadin clinic.

## 2018-11-20 NOTE — PROGRESS NOTES
HPI:    Patient ID: Ramos Win is a 76year old female.     HPI    Follow up    Feeling stable in general  Hx of recurrent DVT  Chronically on anticoagulation  Pt unable to come in for PT INR  Would like to try xarelto instead  Her pulmonologist Gladys for chest pain, palpitations and leg swelling. Gastrointestinal: Negative for abdominal pain, blood in stool, constipation, diarrhea, nausea and vomiting. Genitourinary: Negative for difficulty urinating, dysuria, frequency, hematuria and urgency.    Mu (40 mg total) by mouth every morning before breakfast. (Patient taking differently: Take 40 mg by mouth 2 (two) times daily.  ) Disp: 30 tablet Rfl: 0   acetaminophen 325 MG Oral Tab Take 650 mg by mouth every 6 (six) hours as needed for Pain.    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

## 2018-11-21 ENCOUNTER — TELEPHONE (OUTPATIENT)
Dept: INTERNAL MEDICINE CLINIC | Facility: CLINIC | Age: 78
End: 2018-11-21

## 2018-11-21 NOTE — TELEPHONE ENCOUNTER
Sanjuanita Bowman of Dr Judi Toussaint note. Mckayla verbalized understanding. Mckayla did also confirm that she contacted [facility] and confirmed with them that coumadin was stopped and patient started on xarelto.

## 2018-11-21 NOTE — TELEPHONE ENCOUNTER
Mckayla would like to know if the doctor would like the patient to be on coumadin along with the xarelto medication.

## 2018-12-19 ENCOUNTER — OFFICE VISIT (OUTPATIENT)
Dept: INTERNAL MEDICINE CLINIC | Facility: CLINIC | Age: 78
End: 2018-12-19
Payer: MEDICARE

## 2018-12-19 VITALS
DIASTOLIC BLOOD PRESSURE: 80 MMHG | HEART RATE: 88 BPM | BODY MASS INDEX: 39 KG/M2 | SYSTOLIC BLOOD PRESSURE: 125 MMHG | HEIGHT: 68 IN | TEMPERATURE: 98 F

## 2018-12-19 DIAGNOSIS — G47.9 SLEEP DISORDER: Primary | ICD-10-CM

## 2018-12-19 DIAGNOSIS — R19.7 DIARRHEA, UNSPECIFIED TYPE: ICD-10-CM

## 2018-12-19 DIAGNOSIS — G47.33 OSA (OBSTRUCTIVE SLEEP APNEA): ICD-10-CM

## 2018-12-19 DIAGNOSIS — F33.40 RECURRENT MAJOR DEPRESSIVE DISORDER, IN REMISSION (HCC): ICD-10-CM

## 2018-12-19 PROCEDURE — 99214 OFFICE O/P EST MOD 30 MIN: CPT | Performed by: INTERNAL MEDICINE

## 2018-12-19 PROCEDURE — G0463 HOSPITAL OUTPT CLINIC VISIT: HCPCS | Performed by: INTERNAL MEDICINE

## 2018-12-19 NOTE — PROGRESS NOTES
HPI:    Patient ID: Dany Madsen is a [de-identified]year old female.     HPI    Diarrhea comes and goes off and on sometikmes explosive  diarrhea today  Cannot  sleep   Denies depression  Insomnia as a teenager  Woke up twice with temp of 102  Appetite is off  Does 2 (two) times daily as needed for Anxiety.  Disp: 10 tablet Rfl: 0   Venlafaxine HCl  MG Oral Capsule SR 24 Hr Take 1 capsule (150 mg total) by mouth daily with breakfast. (Patient taking differently: Take 300 mg by mouth daily with breakfast.  ) Disp Fluids, Medication adjustment    • Depression    • Disorder of thyroid    • Esophageal reflux    • Hammertoe 02/25/2011    hammertoe 5 left, pain   • Hearing impairment     Bilateral hearing aids   • High blood pressure    • Hip pain, left    • History of STEROID INJECTION SINGLE LEVEL Bilateral 10/13/2017    Performed by Kendrick Jonas MD at [facility] MAIN OR   • UPPER GI ENDOSCOPY,BIOPSY  2006      Family History   Problem Relation Age of Onset   • Heart Attack Mother    • Heart Disease Mother         Coronary plan      (R19.7) Diarrhea, unspecified type  Plan: chronic   Follow up with GI      Meds This Visit:  Requested Prescriptions      No prescriptions requested or ordered in this encounter       Imaging & Referrals:  None        MARK#[id number]

## 2018-12-20 ENCOUNTER — TELEPHONE (OUTPATIENT)
Dept: NEUROLOGY | Facility: CLINIC | Age: 78
End: 2018-12-20

## 2018-12-20 NOTE — TELEPHONE ENCOUNTER
Pt wanted Dr Annmarie West to know that she has constant chronic back pain and she feels it is time she sees a neurosurgeon as recommended. Pt was given Dr Edouard Stewart information cause she would like to see someone out of [facility]. Dr Annmarie West updated.

## 2018-12-26 ENCOUNTER — APPOINTMENT (OUTPATIENT)
Dept: CT IMAGING | Facility: HOSPITAL | Age: 78
End: 2018-12-26
Attending: EMERGENCY MEDICINE
Payer: MEDICARE

## 2018-12-26 ENCOUNTER — HOSPITAL ENCOUNTER (EMERGENCY)
Facility: HOSPITAL | Age: 78
Discharge: HOME | End: 2018-12-26
Attending: EMERGENCY MEDICINE
Payer: MEDICARE

## 2018-12-26 VITALS
WEIGHT: 250 LBS | OXYGEN SATURATION: 96 % | DIASTOLIC BLOOD PRESSURE: 65 MMHG | SYSTOLIC BLOOD PRESSURE: 152 MMHG | RESPIRATION RATE: 18 BRPM | BODY MASS INDEX: 37.89 KG/M2 | HEART RATE: 77 BPM | HEIGHT: 68 IN | TEMPERATURE: 98 F

## 2018-12-26 DIAGNOSIS — S09.90XA INJURY OF HEAD, INITIAL ENCOUNTER: ICD-10-CM
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
## 2018-12-26 PROCEDURE — 70450 CT HEAD/BRAIN W/O DYE: CPT | Performed by: EMERGENCY MEDICINE

## 2018-12-26 PROCEDURE — 99284 EMERGENCY DEPT VISIT MOD MDM: CPT

## 2018-12-26 NOTE — ED PROVIDER NOTES
Patient Seen in: [facility] Emergency Department    History   Patient presents with:  Fall (musculoskeletal, neurologic)    Stated Complaint:     HPI    History is provided by EMS and patient.     56-year-old female with history of hypertension, bl O2 AT NIGHT   • Sleep apnea    • stent placement     cerebral   • Thyroid disease    • Toe pain     Onychomycosis, Debridement , Hammertoe    • Tonsillitis 1950    Tonsillitis    • Unspecified essential hypertension    • Unspecified sleep apnea    • Visual HENT: Negative for congestion, ear pain and sore throat. Eyes: Negative for pain, discharge and redness. Respiratory: Negative for cough, shortness of breath and wheezing. Cardiovascular: Negative for chest pain.    Gastrointestinal: Negative for finger to nose b/l,no facial asymmetry, normal speech     Skin: Skin is warm and dry. No rash noted. She is not diaphoretic. Psychiatric: She has a normal mood and affect. Nursing note and vitals reviewed.         ED Course     Pulse Oximeter:  Pulse ox They were informed of the dangers of undiagnosed and untreated hypertension. Education regarding lifestyle modifications and the need for appropriate follow-up with their PCP to have their blood pressure re-checked within 1 week was provided.      Medical problem list. to contribute to the complexity of his ED evaluation.     - pt  comfortable with d/c at this time, will d/c pt home now, pt to f/u with Dr. Jennifer Stoddard in 2 days or return to ED sooner if symptoms worsen including fevers, chills, vomiting, pt expr

## 2018-12-26 NOTE — ED INITIAL ASSESSMENT (HPI)
C/o right sided head and jaw pain following fall PTA. No lacs or bumps observed. Pt states she stood up quickly and fell onto table. Denies LOC.   Pt is alert and verbal.

## 2018-12-31 PROBLEM — N17.9 ACUTE RENAL INJURY: Status: RESOLVED | Noted: 2017-04-02 | Resolved: 2018-12-31

## 2018-12-31 PROBLEM — R91.8 LUNG INFILTRATE: Status: RESOLVED | Noted: 2017-04-02 | Resolved: 2018-12-31

## 2018-12-31 PROBLEM — N17.9 ACUTE RENAL INJURY (HCC): Status: RESOLVED | Noted: 2017-04-02 | Resolved: 2018-12-31

## 2018-12-31 PROBLEM — J18.9 COMMUNITY ACQUIRED PNEUMONIA: Status: RESOLVED | Noted: 2018-05-02 | Resolved: 2018-12-31

## 2018-12-31 PROBLEM — J18.9 COMMUNITY ACQUIRED PNEUMONIA, UNSPECIFIED LATERALITY: Status: RESOLVED | Noted: 2018-05-02 | Resolved: 2018-12-31

## 2018-12-31 PROBLEM — L03.116 CELLULITIS OF BOTH LOWER EXTREMITIES: Status: RESOLVED | Noted: 2017-04-02 | Resolved: 2018-12-31

## 2018-12-31 PROBLEM — R53.83 LETHARGY: Status: RESOLVED | Noted: 2018-05-02 | Resolved: 2018-12-31

## 2018-12-31 PROBLEM — R55 SYNCOPE, NEAR: Status: RESOLVED | Noted: 2018-09-07 | Resolved: 2018-12-31

## 2018-12-31 PROBLEM — T14.8XXA HEMATOMA: Status: RESOLVED | Noted: 2018-09-07 | Resolved: 2018-12-31

## 2018-12-31 PROBLEM — L03.115 CELLULITIS OF BOTH LOWER EXTREMITIES: Status: RESOLVED | Noted: 2017-04-02 | Resolved: 2018-12-31

## 2018-12-31 PROBLEM — J96.90 RESPIRATORY FAILURE (HCC): Status: RESOLVED | Noted: 2017-01-10 | Resolved: 2018-12-31

## 2018-12-31 PROBLEM — R50.9 FEBRILE ILLNESS, ACUTE: Status: RESOLVED | Noted: 2018-06-02 | Resolved: 2018-12-31

## 2018-12-31 PROBLEM — R11.10 NON-INTRACTABLE VOMITING, PRESENCE OF NAUSEA NOT SPECIFIED, UNSPECIFIED VOMITING TYPE: Status: RESOLVED | Noted: 2018-11-07 | Resolved: 2018-12-31

## 2018-12-31 PROBLEM — J96.01 ACUTE RESPIRATORY FAILURE WITH HYPOXIA (HCC): Status: RESOLVED | Noted: 2018-06-02 | Resolved: 2018-12-31

## 2018-12-31 PROBLEM — J44.1 COPD EXACERBATION (HCC): Status: RESOLVED | Noted: 2018-11-07 | Resolved: 2018-12-31

## 2018-12-31 PROBLEM — R79.89 AZOTEMIA: Status: RESOLVED | Noted: 2017-04-02 | Resolved: 2018-12-31

## 2019-01-13 ENCOUNTER — HOSPITAL ENCOUNTER (EMERGENCY)
Facility: HOSPITAL | Age: 79
Discharge: HOME | End: 2019-01-14
Attending: EMERGENCY MEDICINE
Payer: MEDICARE

## 2019-01-13 VITALS
HEART RATE: 88 BPM | WEIGHT: 250 LBS | DIASTOLIC BLOOD PRESSURE: 71 MMHG | SYSTOLIC BLOOD PRESSURE: 160 MMHG | BODY MASS INDEX: 37.89 KG/M2 | HEIGHT: 68 IN | OXYGEN SATURATION: 93 % | RESPIRATION RATE: 18 BRPM | TEMPERATURE: 98 F

## 2019-01-13 DIAGNOSIS — R19.7 DIARRHEA OF PRESUMED INFECTIOUS ORIGIN: Primary | ICD-10-CM

## 2019-01-13 LAB
ANION GAP SERPL CALC-SCNC: 9 MMOL/L (ref 0–18)
BASOPHILS # BLD: 0.1 K/UL (ref 0–0.2)
BASOPHILS NFR BLD: 1 %
BUN SERPL-MCNC: 17 MG/DL (ref 8–20)
BUN/CREAT SERPL: 15.5 (ref 10–20)
CALCIUM SERPL-MCNC: 9.7 MG/DL (ref 8.5–10.5)
CHLORIDE SERPL-SCNC: 103 MMOL/L (ref 95–110)
CO2 SERPL-SCNC: 29 MMOL/L (ref 22–32)
CREAT SERPL-MCNC: 1.1 MG/DL (ref 0.5–1.5)
EOSINOPHIL # BLD: 0.4 K/UL (ref 0–0.7)
EOSINOPHIL NFR BLD: 6 %
ERYTHROCYTE [DISTWIDTH] IN BLOOD BY AUTOMATED COUNT: 16.6 % (ref 11–15)
GLUCOSE SERPL-MCNC: 137 MG/DL (ref 70–99)
HCT VFR BLD AUTO: 42 % (ref 35–48)
HGB BLD-MCNC: 13.7 G/DL (ref 12–16)
LYMPHOCYTES # BLD: 1.9 K/UL (ref 1–4)
LYMPHOCYTES NFR BLD: 28 %
MCH RBC QN AUTO: 29.2 PG (ref 27–32)
MCHC RBC AUTO-ENTMCNC: 32.6 G/DL (ref 32–37)
MCV RBC AUTO: 89.6 FL (ref 80–100)
MONOCYTES # BLD: 0.5 K/UL (ref 0–1)
MONOCYTES NFR BLD: 7 %
NEUTROPHILS # BLD AUTO: 3.9 K/UL (ref 1.8–7.7)
NEUTROPHILS NFR BLD: 58 %
OSMOLALITY UR CALC.SUM OF ELEC: 296 MOSM/KG (ref 275–295)
PLATELET # BLD AUTO: 183 K/UL (ref 140–400)
PMV BLD AUTO: 8.8 FL (ref 7.4–10.3)
POTASSIUM SERPL-SCNC: 4.6 MMOL/L (ref 3.3–5.1)
RBC # BLD AUTO: 4.68 M/UL (ref 3.7–5.4)
SODIUM SERPL-SCNC: 141 MMOL/L (ref 136–144)
WBC # BLD AUTO: 6.6 K/UL (ref 4–11)

## 2019-01-13 PROCEDURE — 99284 EMERGENCY DEPT VISIT MOD MDM: CPT

## 2019-01-13 PROCEDURE — 82272 OCCULT BLD FECES 1-3 TESTS: CPT

## 2019-01-13 PROCEDURE — 87507 IADNA-DNA/RNA PROBE TQ 12-25: CPT | Performed by: EMERGENCY MEDICINE

## 2019-01-13 PROCEDURE — 85025 COMPLETE CBC W/AUTO DIFF WBC: CPT | Performed by: EMERGENCY MEDICINE

## 2019-01-13 PROCEDURE — 80048 BASIC METABOLIC PNL TOTAL CA: CPT | Performed by: EMERGENCY MEDICINE

## 2019-01-13 PROCEDURE — 96360 HYDRATION IV INFUSION INIT: CPT

## 2019-01-13 RX ORDER — LOPERAMIDE HYDROCHLORIDE 2 MG/1
4 CAPSULE ORAL ONCE
Status: COMPLETED | OUTPATIENT
Start: 2019-01-13 | End: 2019-01-13

## 2019-01-13 RX ORDER — LOPERAMIDE HYDROCHLORIDE 2 MG/1
2 TABLET ORAL 4 TIMES DAILY PRN
Qty: 12 TABLET | Refills: 0 | Status: SHIPPED | OUTPATIENT
Start: 2019-01-13 | End: 2019-04-09 | Stop reason: ALTCHOICE

## 2019-01-13 RX ORDER — DICYCLOMINE HCL 20 MG
20 TABLET ORAL 4 TIMES DAILY PRN
Qty: 30 TABLET | Refills: 0 | Status: SHIPPED | OUTPATIENT
Start: 2019-01-13 | End: 2019-03-06

## 2019-01-13 RX ORDER — DICYCLOMINE HCL 20 MG
20 TABLET ORAL ONCE
Status: COMPLETED | OUTPATIENT
Start: 2019-01-13 | End: 2019-01-13

## 2019-01-14 LAB
ADENOVIRUS F 40/41 PCR: NEGATIVE
ASTROVIRUS PCR: NEGATIVE
C CAYETANENSIS DNA SPEC QL NAA+PROBE: NEGATIVE
C. DIFFICILE TOXIN A/B PCR: NEGATIVE
CAMPY SP DNA.DIARRHEA STL QL NAA+PROBE: NEGATIVE
CRYPTOSP DNA SPEC QL NAA+PROBE: NEGATIVE
EAEC PAA PLAS AGGR+AATA ST NAA+NON-PRB: NEGATIVE
EC STX1+STX2 + H7 FLIC SPEC NAA+PROBE: NEGATIVE
ENTAMOEBA HISTOLYTICA PCR: NEGATIVE
EPEC EAE GENE STL QL NAA+NON-PROBE: NEGATIVE
ETEC LTA+ST1A+ST1B TOX ST NAA+NON-PROBE: NEGATIVE
GIARDIA LAMBLIA PCR: NEGATIVE
NOROVIRUS GI/GII PCR: NEGATIVE
P SHIGELLOIDES DNA STL QL NAA+PROBE: NEGATIVE
ROTAVIRUS A PCR: NEGATIVE
SALMONELLA DNA SPEC QL NAA+PROBE: NEGATIVE
SAPOVIRUS PCR: NEGATIVE
SHIGELLA SP+EIEC IPAH ST NAA+NON-PROBE: NEGATIVE
V CHOLERAE DNA SPEC QL NAA+PROBE: NEGATIVE
VIBRIO DNA SPEC NAA+PROBE: NEGATIVE
YERSINIA DNA SPEC NAA+PROBE: NEGATIVE

## 2019-01-14 NOTE — ED NOTES
Patient's daughter called, transferred to [facility] phone and gave to patient to talk to daughter.

## 2019-01-14 NOTE — ED PROVIDER NOTES
Patient Seen in: [facility] AND [facility] Emergency Department    History   Patient presents with:  Nausea/Vomiting/Diarrhea (gastrointestinal)      HPI    Patient presents to the ED from [facility] for complaints of diarrhea for the past 2 weeks.   Much wor 11/2/2017   • s/p L5-S1 right laminectomy 8/28/2014   • Shortness of breath     O2 AT NIGHT   • Sleep apnea    • stent placement     cerebral   • Thyroid disease    • Toe pain     Onychomycosis, Debridement , Hammertoe    • Tonsillitis 1950    Tonsillitis (Other[other]) Father    • Other (Other[other]) Maternal Grandmother    • Other (Other[other]) Maternal Grandfather    • Other (Other[other]) Paternal Grandmother    • Other (Other[other]) Paternal Grandfather    • Cancer Brother 54        Liver    • Neuro Social History and Family History elements reviewed from today, pertinent positives to the presenting problem noted.     Physical Exam     ED Triage Vitals [01/13/19 2156]   /65   Pulse 90   Resp 18   Temp 98.4 °F (36.9 °C)   Temp src Oral   SpO2 DIFFERENTIAL[[id number]]          Abnormal            Final result                 Please view results for these tests on the individual orders.    RAINBOW DRAW BLUE   RAINBOW DRAW LAVENDER   RAINBOW DRAW DARK GREEN   RAINBOW DRAW LIGHT GREEN   RAINBOW DRAW extremity, unspecified chronicity, unspecified laterality, unspecified vein ([facility] [address] Urinary incontinence; Neurogenic bladder; Chronic diarrhea; Oropharyngeal dysphagia; L5-S1 left mod-severe/right mod foraminal, L1-2 mild-mod central stenosis;  Other spondyl

## 2019-01-14 NOTE — ED INITIAL ASSESSMENT (HPI)
Patient arrives via EMS from [facility] with complaints of diarrhea fo the past weeks, stating she had diarrhea 12 times today. Patient also had a fall when EMS arrived. Patient assisted to floor with EMS.

## 2019-01-14 NOTE — ED NOTES
Patient provided discharge instructions. Patient verbalizes understanding. All questions answered. Patient is interacting appropriately. Patient taken out by wheelchair by [facility].

## 2019-01-14 NOTE — ED NOTES
Attempted to collect stool sample, patient states \"it just comes out\" Patient depends changed, no collectible sample seen.

## 2019-01-15 ENCOUNTER — ANTI-COAG VISIT (OUTPATIENT)
Dept: INTERNAL MEDICINE CLINIC | Facility: CLINIC | Age: 79
End: 2019-01-15

## 2019-01-15 DIAGNOSIS — I82.409 DEEP VEIN THROMBOSIS (DVT) OF LOWER EXTREMITY, UNSPECIFIED CHRONICITY, UNSPECIFIED LATERALITY, UNSPECIFIED VEIN (HCC): ICD-10-CM

## 2019-01-16 ENCOUNTER — APPOINTMENT (OUTPATIENT)
Dept: CT IMAGING | Facility: HOSPITAL | Age: 79
End: 2019-01-16
Attending: EMERGENCY MEDICINE
Payer: MEDICARE

## 2019-01-16 ENCOUNTER — TELEPHONE (OUTPATIENT)
Dept: INTERNAL MEDICINE CLINIC | Facility: CLINIC | Age: 79
End: 2019-01-16

## 2019-01-16 ENCOUNTER — APPOINTMENT (OUTPATIENT)
Dept: GENERAL RADIOLOGY | Facility: HOSPITAL | Age: 79
End: 2019-01-16
Attending: EMERGENCY MEDICINE
Payer: MEDICARE

## 2019-01-16 ENCOUNTER — HOSPITAL ENCOUNTER (EMERGENCY)
Facility: HOSPITAL | Age: 79
Discharge: HOME | End: 2019-01-16
Attending: EMERGENCY MEDICINE
Payer: MEDICARE

## 2019-01-16 VITALS
WEIGHT: 249.13 LBS | SYSTOLIC BLOOD PRESSURE: 151 MMHG | OXYGEN SATURATION: 96 % | HEIGHT: 65 IN | BODY MASS INDEX: 41.51 KG/M2 | HEART RATE: 82 BPM | DIASTOLIC BLOOD PRESSURE: 74 MMHG | TEMPERATURE: 99 F | RESPIRATION RATE: 18 BRPM
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
## 2019-01-16 DIAGNOSIS — S50.00XA CONTUSION OF ELBOW, UNSPECIFIED LATERALITY, INITIAL ENCOUNTER: Primary | ICD-10-CM

## 2019-01-16 LAB
ANION GAP SERPL CALC-SCNC: 13 MMOL/L (ref 0–18)
BASOPHILS # BLD: 0.1 K/UL (ref 0–0.2)
BASOPHILS NFR BLD: 1 %
BUN SERPL-MCNC: 13 MG/DL (ref 8–20)
BUN/CREAT SERPL: 13.3 (ref 10–20)
CALCIUM SERPL-MCNC: 9.6 MG/DL (ref 8.5–10.5)
CHLORIDE SERPL-SCNC: 102 MMOL/L (ref 95–110)
CO2 SERPL-SCNC: 25 MMOL/L (ref 22–32)
CREAT SERPL-MCNC: 0.98 MG/DL (ref 0.5–1.5)
EOSINOPHIL # BLD: 0.3 K/UL (ref 0–0.7)
EOSINOPHIL NFR BLD: 4 %
ERYTHROCYTE [DISTWIDTH] IN BLOOD BY AUTOMATED COUNT: 16.6 % (ref 11–15)
GLUCOSE SERPL-MCNC: 125 MG/DL (ref 70–99)
HCT VFR BLD AUTO: 41.2 % (ref 35–48)
HGB BLD-MCNC: 13.4 G/DL (ref 12–16)
LYMPHOCYTES # BLD: 1.5 K/UL (ref 1–4)
LYMPHOCYTES NFR BLD: 22 %
MCH RBC QN AUTO: 29.1 PG (ref 27–32)
MCHC RBC AUTO-ENTMCNC: 32.6 G/DL (ref 32–37)
MCV RBC AUTO: 89.1 FL (ref 80–100)
MONOCYTES # BLD: 0.5 K/UL (ref 0–1)
MONOCYTES NFR BLD: 8 %
NEUTROPHILS # BLD AUTO: 4.5 K/UL (ref 1.8–7.7)
NEUTROPHILS NFR BLD: 65 %
OSMOLALITY UR CALC.SUM OF ELEC: 292 MOSM/KG (ref 275–295)
PLATELET # BLD AUTO: 173 K/UL (ref 140–400)
PMV BLD AUTO: 8.8 FL (ref 7.4–10.3)
POTASSIUM SERPL-SCNC: 4.6 MMOL/L (ref 3.3–5.1)
RBC # BLD AUTO: 4.62 M/UL (ref 3.7–5.4)
SODIUM SERPL-SCNC: 140 MMOL/L (ref 136–144)
WBC # BLD AUTO: 6.9 K/UL (ref 4–11)

## 2019-01-16 PROCEDURE — 99285 EMERGENCY DEPT VISIT HI MDM: CPT

## 2019-01-16 PROCEDURE — 80048 BASIC METABOLIC PNL TOTAL CA: CPT | Performed by: EMERGENCY MEDICINE

## 2019-01-16 PROCEDURE — 93010 ELECTROCARDIOGRAM REPORT: CPT | Performed by: EMERGENCY MEDICINE

## 2019-01-16 PROCEDURE — 96360 HYDRATION IV INFUSION INIT: CPT

## 2019-01-16 PROCEDURE — 72125 CT NECK SPINE W/O DYE: CPT | Performed by: EMERGENCY MEDICINE

## 2019-01-16 PROCEDURE — 85025 COMPLETE CBC W/AUTO DIFF WBC: CPT | Performed by: EMERGENCY MEDICINE

## 2019-01-16 PROCEDURE — 90471 IMMUNIZATION ADMIN: CPT

## 2019-01-16 PROCEDURE — 73080 X-RAY EXAM OF ELBOW: CPT | Performed by: EMERGENCY MEDICINE

## 2019-01-16 PROCEDURE — 93005 ELECTROCARDIOGRAM TRACING: CPT

## 2019-01-16 PROCEDURE — 96361 HYDRATE IV INFUSION ADD-ON: CPT

## 2019-01-16 PROCEDURE — 70450 CT HEAD/BRAIN W/O DYE: CPT | Performed by: EMERGENCY MEDICINE

## 2019-01-16 NOTE — ED NOTES
[facility] scheduled for patient return to [facility] in [facility], [facility] 90 mins.

## 2019-01-16 NOTE — ED PROVIDER NOTES
Patient Seen in: [facility] AND [facility] Emergency Department    History   Patient presents with:  Fall (musculoskeletal, neurologic)    Stated Complaint: falls    HPI    Patient is an 64-year-old female who arrives from her home for fall that occurred immedia hyperlipidemia    • Other complicated headache syndrome 11/2/2017   • Other spondylosis, lumbar region 11/2/2017   • Pneumonia 2012   • S/P cervical spinal fusion: C3-6 and C7-T1 11/2/2017   • s/p L5-S1 right laminectomy 8/28/2014   • Shortness of breath date: 1977        Years since quittin.0      Smokeless tobacco: Never Used      Tobacco comment:     Alcohol use: No      Alcohol/week: 0.0 oz    Drug use: No      Review of Systems    Positive for stated complaint: falls  Other systems are as Abnormal            Final result                 Please view results for these tests on the individual orders.    RAINBOW DRAW BLUE   RAINBOW DRAW LAVENDER   RAINBOW DRAW DARK GREEN   RAINBOW DRAW LIGHT GREEN   RAINBOW DRAW GOLD   RAINBOW DRAW LAVE Yogesh Huang MD on 1/16/2019 at 16:46          PHYSICIAN NOTE:  Elbow irrigated, cleaned and dressed with bacitracin and dressing. Tetanus updated. Discussed with patient her results.   Notified her that her stool culture from a few days ago was negative for

## 2019-01-16 NOTE — ED INITIAL ASSESSMENT (HPI)
Azucena Brandon is here for eval of left elbow pain s/p fall. Old abrasion noted to left arm with new skin tear. States she hit left side of face and c/o neck pain. No c-spine tenderness.

## 2019-01-16 NOTE — TELEPHONE ENCOUNTER
Carri Hodges from [facility] AND [facility] said she is faxing over order for DME supplies today    Requesting that PCP signed and faxed back so she can send to [facility]

## 2019-01-17 NOTE — TELEPHONE ENCOUNTER
Form received placed in Dr Beata Harris office for review and signature . Dr Vernon Moon please sign when you return on 01/18/19 .

## 2019-02-06 ENCOUNTER — APPOINTMENT (OUTPATIENT)
Dept: CT IMAGING | Facility: HOSPITAL | Age: 79
End: 2019-02-06
Attending: EMERGENCY MEDICINE
Payer: MEDICARE

## 2019-02-06 ENCOUNTER — HOSPITAL ENCOUNTER (OUTPATIENT)
Facility: HOSPITAL | Age: 79
Setting detail: OBSERVATION
Discharge: NURSING, REHAB OR LONG-TERM CARE | End: 2019-02-11
Attending: EMERGENCY MEDICINE | Admitting: HOSPITALIST
Payer: MEDICARE

## 2019-02-06 DIAGNOSIS — R26.2 UNABLE TO AMBULATE: Primary | ICD-10-CM

## 2019-02-06 LAB
ANION GAP SERPL CALC-SCNC: 9 MMOL/L (ref 0–18)
BASOPHILS # BLD AUTO: 0.05 X10(3) UL (ref 0–0.2)
BASOPHILS NFR BLD AUTO: 0.7 %
BILIRUB UR QL: NEGATIVE
BUN SERPL-MCNC: 12 MG/DL (ref 8–20)
BUN/CREAT SERPL: 11.5 (ref 10–20)
CALCIUM SERPL-MCNC: 9.2 MG/DL (ref 8.5–10.5)
CHLORIDE SERPL-SCNC: 103 MMOL/L (ref 95–110)
CLARITY UR: CLEAR
CO2 SERPL-SCNC: 27 MMOL/L (ref 22–32)
COLOR UR: YELLOW
CREAT SERPL-MCNC: 1.04 MG/DL (ref 0.5–1.5)
DEPRECATED RDW RBC AUTO: 51.8 FL (ref 35.1–46.3)
EOSINOPHIL # BLD AUTO: 0.39 X10(3) UL (ref 0–0.7)
EOSINOPHIL NFR BLD AUTO: 5.7 %
ERYTHROCYTE [DISTWIDTH] IN BLOOD BY AUTOMATED COUNT: 15.4 % (ref 11–15)
GLUCOSE SERPL-MCNC: 106 MG/DL (ref 70–99)
GLUCOSE UR-MCNC: NEGATIVE MG/DL
HCT VFR BLD AUTO: 39.4 % (ref 35–48)
HGB BLD-MCNC: 12.8 G/DL (ref 12–16)
HGB UR QL STRIP.AUTO: NEGATIVE
IMM GRANULOCYTES # BLD AUTO: 0.03 X10(3) UL (ref 0–1)
IMM GRANULOCYTES NFR BLD: 0.4 %
KETONES UR-MCNC: NEGATIVE MG/DL
LEUKOCYTE ESTERASE UR QL STRIP.AUTO: NEGATIVE
LYMPHOCYTES # BLD AUTO: 2.36 X10(3) UL (ref 1–4)
LYMPHOCYTES NFR BLD AUTO: 34.7 %
MCH RBC QN AUTO: 29.9 PG (ref 26–34)
MCHC RBC AUTO-ENTMCNC: 32.5 G/DL (ref 31–37)
MCV RBC AUTO: 92.1 FL (ref 80–100)
MONOCYTES # BLD AUTO: 0.53 X10(3) UL (ref 0.1–1)
MONOCYTES NFR BLD AUTO: 7.8 %
NEUTROPHILS # BLD AUTO: 3.44 X10 (3) UL (ref 1.5–7.7)
NEUTROPHILS # BLD AUTO: 3.44 X10(3) UL (ref 1.5–7.7)
NEUTROPHILS NFR BLD AUTO: 50.7 %
NITRITE UR QL STRIP.AUTO: NEGATIVE
OSMOLALITY UR CALC.SUM OF ELEC: 288 MOSM/KG (ref 275–295)
PH UR: 5 [PH] (ref 5–8)
PLATELET # BLD AUTO: 181 10(3)UL (ref 150–450)
POTASSIUM SERPL-SCNC: 4.3 MMOL/L (ref 3.3–5.1)
PROT UR-MCNC: NEGATIVE MG/DL
RBC # BLD AUTO: 4.28 X10(6)UL (ref 3.8–5.3)
SODIUM SERPL-SCNC: 139 MMOL/L (ref 136–144)
SP GR UR STRIP: 1.02 (ref 1–1.03)
UROBILINOGEN UR STRIP-ACNC: <2
VIT C UR-MCNC: NEGATIVE MG/DL
WBC # BLD AUTO: 6.8 X10(3) UL (ref 4–11)

## 2019-02-06 PROCEDURE — 72131 CT LUMBAR SPINE W/O DYE: CPT | Performed by: EMERGENCY MEDICINE

## 2019-02-06 PROCEDURE — 99220 INITIAL OBSERVATION CARE,LEVL III: CPT | Performed by: HOSPITALIST

## 2019-02-06 PROCEDURE — 70450 CT HEAD/BRAIN W/O DYE: CPT | Performed by: EMERGENCY MEDICINE

## 2019-02-06 RX ORDER — DOCUSATE SODIUM 100 MG/1
100 CAPSULE, LIQUID FILLED ORAL DAILY PRN
Status: DISCONTINUED | OUTPATIENT
Start: 2019-02-06 | End: 2019-02-11

## 2019-02-06 RX ORDER — DICYCLOMINE HCL 20 MG
20 TABLET ORAL 4 TIMES DAILY PRN
Status: DISCONTINUED | OUTPATIENT
Start: 2019-02-06 | End: 2019-02-11

## 2019-02-06 RX ORDER — IPRATROPIUM BROMIDE AND ALBUTEROL SULFATE 2.5; .5 MG/3ML; MG/3ML
3 SOLUTION RESPIRATORY (INHALATION) EVERY 6 HOURS PRN
Status: DISCONTINUED | OUTPATIENT
Start: 2019-02-06 | End: 2019-02-11

## 2019-02-06 RX ORDER — LEVOTHYROXINE SODIUM 0.12 MG/1
250 TABLET ORAL
Status: DISCONTINUED | OUTPATIENT
Start: 2019-02-07 | End: 2019-02-11

## 2019-02-06 RX ORDER — ONDANSETRON 2 MG/ML
4 INJECTION INTRAMUSCULAR; INTRAVENOUS EVERY 6 HOURS PRN
Status: DISCONTINUED | OUTPATIENT
Start: 2019-02-06 | End: 2019-02-11

## 2019-02-06 RX ORDER — IPRATROPIUM BROMIDE AND ALBUTEROL SULFATE 2.5; .5 MG/3ML; MG/3ML
3 SOLUTION RESPIRATORY (INHALATION) EVERY 4 HOURS PRN
Status: ON HOLD | COMMUNITY
End: 2019-07-29

## 2019-02-06 RX ORDER — QUETIAPINE 100 MG/1
100 TABLET, FILM COATED ORAL NIGHTLY
Status: DISCONTINUED | OUTPATIENT
Start: 2019-02-06 | End: 2019-02-11

## 2019-02-06 RX ORDER — ACETAMINOPHEN 325 MG/1
650 TABLET ORAL EVERY 6 HOURS PRN
Status: DISCONTINUED | OUTPATIENT
Start: 2019-02-06 | End: 2019-02-11

## 2019-02-06 RX ORDER — ASPIRIN 325 MG
325 TABLET ORAL DAILY
Status: ON HOLD | COMMUNITY
End: 2019-07-29

## 2019-02-06 RX ORDER — HEPARIN SODIUM 5000 [USP'U]/ML
5000 INJECTION, SOLUTION INTRAVENOUS; SUBCUTANEOUS EVERY 12 HOURS
Status: DISCONTINUED | OUTPATIENT
Start: 2019-02-06 | End: 2019-02-07

## 2019-02-06 RX ORDER — DOCUSATE SODIUM 100 MG/1
100 CAPSULE, LIQUID FILLED ORAL DAILY PRN
COMMUNITY
End: 2019-03-06

## 2019-02-06 RX ORDER — LORAZEPAM 1 MG/1
1 TABLET ORAL 2 TIMES DAILY PRN
Status: DISCONTINUED | OUTPATIENT
Start: 2019-02-06 | End: 2019-02-11

## 2019-02-06 RX ORDER — DESVENLAFAXINE 50 MG/1
100 TABLET, EXTENDED RELEASE ORAL DAILY
Status: DISCONTINUED | OUTPATIENT
Start: 2019-02-07 | End: 2019-02-11

## 2019-02-06 RX ORDER — ACETAMINOPHEN 160 MG
2000 TABLET,DISINTEGRATING ORAL DAILY
COMMUNITY
End: 2019-04-09 | Stop reason: ALTCHOICE

## 2019-02-06 RX ORDER — BUDESONIDE AND FORMOTEROL FUMARATE DIHYDRATE 160; 4.5 UG/1; UG/1
2 AEROSOL RESPIRATORY (INHALATION) DAILY
Status: ON HOLD | COMMUNITY
End: 2019-07-29

## 2019-02-06 RX ORDER — HYDRALAZINE HYDROCHLORIDE 20 MG/ML
10 INJECTION INTRAMUSCULAR; INTRAVENOUS EVERY 4 HOURS PRN
Status: DISCONTINUED | OUTPATIENT
Start: 2019-02-06 | End: 2019-02-11

## 2019-02-06 RX ORDER — DESVENLAFAXINE 100 MG/1
100 TABLET, EXTENDED RELEASE ORAL DAILY
Status: ON HOLD | COMMUNITY
End: 2019-02-11

## 2019-02-06 RX ORDER — ASPIRIN 325 MG
325 TABLET ORAL DAILY
Status: DISCONTINUED | OUTPATIENT
Start: 2019-02-07 | End: 2019-02-11

## 2019-02-06 RX ORDER — SODIUM CHLORIDE 9 MG/ML
INJECTION, SOLUTION INTRAVENOUS CONTINUOUS
Status: DISCONTINUED | OUTPATIENT
Start: 2019-02-06 | End: 2019-02-07

## 2019-02-06 RX ORDER — PANTOPRAZOLE SODIUM 40 MG/1
40 TABLET, DELAYED RELEASE ORAL
Status: DISCONTINUED | OUTPATIENT
Start: 2019-02-07 | End: 2019-02-11

## 2019-02-06 RX ORDER — AMLODIPINE BESYLATE 5 MG/1
5 TABLET ORAL DAILY
Status: DISCONTINUED | OUTPATIENT
Start: 2019-02-07 | End: 2019-02-11

## 2019-02-06 RX ORDER — LORAZEPAM 1 MG/1
1 TABLET ORAL 2 TIMES DAILY PRN
Status: ON HOLD | COMMUNITY
End: 2019-02-09

## 2019-02-06 NOTE — ED INITIAL ASSESSMENT (HPI)
C/o frequent falls over the past two weeks, denies falling today, denies feeling dizziness nor chest pain prior to falls, denies complaints at this time except for headache, states she started having a headache this antemeridian, denies musculoskeletal com

## 2019-02-07 LAB
ANION GAP SERPL CALC-SCNC: 7 MMOL/L (ref 0–18)
BASOPHILS # BLD AUTO: 0.04 X10(3) UL (ref 0–0.2)
BASOPHILS NFR BLD AUTO: 0.7 %
BUN SERPL-MCNC: 12 MG/DL (ref 8–20)
BUN/CREAT SERPL: 13.8 (ref 10–20)
CALCIUM SERPL-MCNC: 8.8 MG/DL (ref 8.5–10.5)
CHLORIDE SERPL-SCNC: 107 MMOL/L (ref 95–110)
CO2 SERPL-SCNC: 28 MMOL/L (ref 22–32)
CREAT SERPL-MCNC: 0.87 MG/DL (ref 0.5–1.5)
DEPRECATED RDW RBC AUTO: 52.5 FL (ref 35.1–46.3)
EOSINOPHIL # BLD AUTO: 0.41 X10(3) UL (ref 0–0.7)
EOSINOPHIL NFR BLD AUTO: 7.4 %
ERYTHROCYTE [DISTWIDTH] IN BLOOD BY AUTOMATED COUNT: 15.5 % (ref 11–15)
GLUCOSE SERPL-MCNC: 99 MG/DL (ref 70–99)
HCT VFR BLD AUTO: 36.9 % (ref 35–48)
HGB BLD-MCNC: 11.9 G/DL (ref 12–16)
IMM GRANULOCYTES # BLD AUTO: 0.03 X10(3) UL (ref 0–1)
IMM GRANULOCYTES NFR BLD: 0.5 %
LYMPHOCYTES # BLD AUTO: 2.25 X10(3) UL (ref 1–4)
LYMPHOCYTES NFR BLD AUTO: 40.4 %
MCH RBC QN AUTO: 29.9 PG (ref 26–34)
MCHC RBC AUTO-ENTMCNC: 32.2 G/DL (ref 31–37)
MCV RBC AUTO: 92.7 FL (ref 80–100)
MONOCYTES # BLD AUTO: 0.45 X10(3) UL (ref 0.1–1)
MONOCYTES NFR BLD AUTO: 8.1 %
NEUTROPHILS # BLD AUTO: 2.39 X10 (3) UL (ref 1.5–7.7)
NEUTROPHILS # BLD AUTO: 2.39 X10(3) UL (ref 1.5–7.7)
NEUTROPHILS NFR BLD AUTO: 42.9 %
OSMOLALITY UR CALC.SUM OF ELEC: 294 MOSM/KG (ref 275–295)
PLATELET # BLD AUTO: 157 10(3)UL (ref 150–450)
POTASSIUM SERPL-SCNC: 4.1 MMOL/L (ref 3.3–5.1)
RBC # BLD AUTO: 3.98 X10(6)UL (ref 3.8–5.3)
SODIUM SERPL-SCNC: 142 MMOL/L (ref 136–144)
WBC # BLD AUTO: 5.6 X10(3) UL (ref 4–11)

## 2019-02-07 PROCEDURE — 99226 SUBSEQUENT OBSERVATION CARE: CPT | Performed by: HOSPITALIST

## 2019-02-07 RX ORDER — LORAZEPAM 1 MG/1
2 TABLET ORAL NIGHTLY
Status: DISCONTINUED | OUTPATIENT
Start: 2019-02-07 | End: 2019-02-11

## 2019-02-07 NOTE — H&P
[facility] Patient Status:  Emergency    12/3/1940 MRN [id number]   Location [address] Attending Saeid Burgos MD   Hosp Day # 0 PCP Shikha Olmos MD     Date:   dysphagia 8/29/2017   • Osteoarthrosis, unspecified whether generalized or localized, unspecified site    • Other and unspecified hyperlipidemia    • Other complicated headache syndrome 11/2/2017   • Other spondylosis, lumbar region 11/2/2017   • Pneumonia • Heart Attack Mother    • Heart Disease Mother         Coronary Artery Disease, Premature    • Fibromyalgia Sister    • Heart Disease Sister    • Heart Attack Sister    • Heart Disease Brother    • Heart Attack Brother    • Other (Other[other]) Father Oral Tab   No No   Sig: Take 1 tablet (1 mg total) by mouth 2 (two) times daily as needed for Anxiety. Levothyroxine Sodium 125 MCG Oral Tab   Yes No   Sig: Take 250 mcg by mouth once daily.    Loperamide HCl 2 MG Oral Tab   No No   Sig: Take 1 tablet (2 conjunctiva. HENT:  Normocephalic, oral mucosa is moist.  Head:  Normocephalic, atraumatic. Neck:  Supple, non-tender, no carotid bruit, no jugular venous distention, no lymphadenopathy, no thyromegaly.   Respiratory:  Lungs are clear to auscultation, res

## 2019-02-07 NOTE — OCCUPATIONAL THERAPY NOTE
OCCUPATIONAL THERAPY EVALUATION - INPATIENT     Room Number: 526/526-A  Evaluation Date: 2/7/2019  Type of Evaluation: Initial  Presenting Problem: (inablity to ambulate)    Physician Order: IP Consult to Occupational Therapy  Reason for Therapy: ADL/IADL Education Provided: Educated pt on POC, recommendation of KYLE 2/2 many falls in the past month (reported 5-6 falls in home). Pt is resistive to recommendation stating she will continue [facility] PT.      The patient's Approx Degree of Impairment: 53.32% has be L4-5 slight grade 1 unstable spondylolisthesis 10/31/2014   • Low back pain    • Migraines    • Muscle weakness    • Onychomycosis     Debridement    • Oropharyngeal dysphagia 8/29/2017   • Osteoarthrosis, unspecified whether generalized or localized, unsp STEROID INJECTION SINGLE LEVEL Bilateral 10/13/2017    Performed by Marja Blizzard, MD at [facility] MAIN OR   • UPPER GI ENDOSCOPY,BIOPSY  2006       HOME SITUATION  Type of Home: Assisted living facility([facility])  Home Layout: One level  Lives With: Alone agreeable     RANGE OF MOTION   Upper extremity ROM is within functional limits     STRENGTH ASSESSMENT  Upper extremity strength is within functional limits     COORDINATION  Gross Motor: impaired   Fine Motor: WFL     ACTIVITIES OF DAILY LIVING ASSESSMEN independence   Comment:     Patient will tolerate standing for 1-2 minutes in prep for adls with spv   Comment:    Patient will complete LB dressing at mod I level with AD   Comment:          Goals  on:  19  Frequency: 3-5x week    Emerita Yu

## 2019-02-07 NOTE — PHYSICAL THERAPY NOTE
PHYSICAL THERAPY EVALUATION - INPATIENT     Room Number: 526/526-A  Evaluation Date: 2/7/2019  Type of Evaluation: Initial   Physician Order: PT Eval and Treat    Presenting Problem: Frequent Falls  Reason for Therapy: Mobility Dysfunction and Discharge P HHPT. Due to frequent falls and that with initial stand pt was requiring assist of 2 with bilateral knees buckling do not feel returning directly home is safe-RN informed.      DISCHARGE RECOMMENDATIONS  PT Discharge Recommendations: Sub-acute rehabilitatio region 11/2/2017   • Pneumonia 2012   • S/P cervical spinal fusion: C3-6 and C7-T1 11/2/2017   • s/p L5-S1 right laminectomy 8/28/2014   • Shortness of breath     O2 AT NIGHT   • Sleep apnea    • stent placement     cerebral   • Thyroid disease    • Toe pa Uses: None    Prior Level of Arma: independently transfers from bed <->motorized scooter <->toilet without an AD; does not ambulate; goes down to dining [facility] for meals    SUBJECTIVE  \"I don't know if I can get up. \"     PHYSICAL THERAPY EXAMINATIO bilaterally)  Stoop/Curb Assistance: Not tested       Bed Mobility: SBA     Transfers: Initially min/mod A of 2; improved to CGA/Min A of 1    Exercise/Education Provided:  Bed mobility  Functional activity tolerated  Gait training  Strengthening  Transfer

## 2019-02-07 NOTE — ED NOTES
Orders for admission, patient is aware of plan and ready to go upstairs.  Any questions, please call ED RN ronen  at extension [phone]

## 2019-02-07 NOTE — CM/SW NOTE
Spoke to pt at length about her living situation. Pt in Assisted Living at [facility]. States she has been there for approx 6 years. Pt states she has a a Rolator walker and a motorized wheelchair.  Pt states she feels like her L leg \"gives out\" without warn

## 2019-02-07 NOTE — CM/SW NOTE
02/07/19 1100   CM/SW Referral Data   Referral Source Nurse;   Patient Info   Patient's Mental Status Alert;Oriented   Patient's [address] Name Trav Big   Patient lives with Alone   Patient Status P

## 2019-02-07 NOTE — ED PROVIDER NOTES
Patient Seen in: [facility] AND [facility] Emergency Department    History   Patient presents with:  Fall (musculoskeletal, neurologic)    Stated Complaint: frequent falls    HPI    [de-identified]year old female with multiple medical problems including dvt on xarelto, arth Deep vein thrombosis ([facility] [address]    • Dehydration 2012    Fluids, Medication adjustment    • Depression    • Disorder of thyroid    • Esophageal reflux    • Hammertoe 02/25/2011    hammertoe 5 left, pain   • Hearing impairment     Bilateral hearing aids   • High OF NAIL(S), 1-5      Toe, Onychomycosis   • EYE SURGERY      x2 FOR \"CROSSED EYES\"   • HIP REPLACEMENT SURGERY Bilateral    • HYSTERECTOMY  1967    Partial Hysterectomy   • JAW SURGERY     • KNEE REPLACEMENT SURGERY Bilateral     Bilateral arthritis    • Normal range of motion present. Cardiovascular: Normal rate, regular rhythm, normal heart sounds and intact distal pulses. No murmur heard. Pulmonary/Chest: Effort normal and breath sounds normal. No respiratory distress. She has no wheezes.    Abdomin Please view results for these tests on the individual orders.    URINALYSIS WITH CULTURE REFLEX   CBC WITH DIFFERENTIAL WITH PLATELET   BASIC METABOLIC PANEL (8)   RAINBOW DRAW BLUE   RAINBOW DRAW LAVENDER   RAINBOW DRAW DARK GREEN   RAINBOW D [facility]) injection 4 mg (not administered)   hydrALAzine HCl (APRESOLINE) injection 10 mg (not administered)   0.9%  NaCl infusion ( Intravenous New Bag 2/7/19 0040)   LORazepam (ATIVAN) tab 2 mg (2 mg Oral Given 2/7/19 0039)   Rivaroxaban (Leora Reyes) tab 20

## 2019-02-08 PROBLEM — M47.816 LUMBAR FACET ARTHROPATHY: Status: ACTIVE | Noted: 2019-02-08

## 2019-02-08 PROBLEM — R26.9 NEUROLOGIC GAIT DISORDER: Status: ACTIVE | Noted: 2019-02-08

## 2019-02-08 LAB
ANION GAP SERPL CALC-SCNC: 6 MMOL/L (ref 0–18)
BASOPHILS # BLD AUTO: 0.06 X10(3) UL (ref 0–0.2)
BASOPHILS NFR BLD AUTO: 0.9 %
BUN SERPL-MCNC: 9 MG/DL (ref 8–20)
BUN/CREAT SERPL: 11.4 (ref 10–20)
CALCIUM SERPL-MCNC: 9.1 MG/DL (ref 8.5–10.5)
CHLORIDE SERPL-SCNC: 104 MMOL/L (ref 95–110)
CO2 SERPL-SCNC: 29 MMOL/L (ref 22–32)
CREAT SERPL-MCNC: 0.79 MG/DL (ref 0.5–1.5)
DEPRECATED RDW RBC AUTO: 53.1 FL (ref 35.1–46.3)
EOSINOPHIL # BLD AUTO: 0.4 X10(3) UL (ref 0–0.7)
EOSINOPHIL NFR BLD AUTO: 6.1 %
ERYTHROCYTE [DISTWIDTH] IN BLOOD BY AUTOMATED COUNT: 15.4 % (ref 11–15)
GLUCOSE SERPL-MCNC: 103 MG/DL (ref 70–99)
HCT VFR BLD AUTO: 38.6 % (ref 35–48)
HGB BLD-MCNC: 12.3 G/DL (ref 12–16)
IMM GRANULOCYTES # BLD AUTO: 0.03 X10(3) UL (ref 0–1)
IMM GRANULOCYTES NFR BLD: 0.5 %
LYMPHOCYTES # BLD AUTO: 2.24 X10(3) UL (ref 1–4)
LYMPHOCYTES NFR BLD AUTO: 34.1 %
MCH RBC QN AUTO: 29.8 PG (ref 26–34)
MCHC RBC AUTO-ENTMCNC: 31.9 G/DL (ref 31–37)
MCV RBC AUTO: 93.5 FL (ref 80–100)
MONOCYTES # BLD AUTO: 0.48 X10(3) UL (ref 0.1–1)
MONOCYTES NFR BLD AUTO: 7.3 %
NEUTROPHILS # BLD AUTO: 3.36 X10 (3) UL (ref 1.5–7.7)
NEUTROPHILS # BLD AUTO: 3.36 X10(3) UL (ref 1.5–7.7)
NEUTROPHILS NFR BLD AUTO: 51.1 %
OSMOLALITY UR CALC.SUM OF ELEC: 287 MOSM/KG (ref 275–295)
PLATELET # BLD AUTO: 169 10(3)UL (ref 150–450)
POTASSIUM SERPL-SCNC: 4.4 MMOL/L (ref 3.3–5.1)
RBC # BLD AUTO: 4.13 X10(6)UL (ref 3.8–5.3)
SODIUM SERPL-SCNC: 139 MMOL/L (ref 136–144)
WBC # BLD AUTO: 6.6 X10(3) UL (ref 4–11)

## 2019-02-08 PROCEDURE — 99226 SUBSEQUENT OBSERVATION CARE: CPT | Performed by: HOSPITALIST

## 2019-02-08 PROCEDURE — 99214 OFFICE O/P EST MOD 30 MIN: CPT | Performed by: PHYSICAL MEDICINE & REHABILITATION

## 2019-02-08 NOTE — OCCUPATIONAL THERAPY NOTE
OCCUPATIONAL THERAPY TREATMENT NOTE - INPATIENT        Room Number: 526/526-A           Presenting Problem: (inablity to ambulate)    Problem List  Principal Problem:    Unable to ambulate      OCCUPATIONAL THERAPY ASSESSMENT     Chart review complete, RN Toileting, which includes using toilet, bedpan or urinal? : A Lot  -   Putting on and taking off regular upper body clothing?: A Little  -   Taking care of personal grooming such as brushing teeth?: A Little  -   Eating meals?: None    AM-PAC Score:  Score

## 2019-02-08 NOTE — PROGRESS NOTES
[facility]    Progress Note    Anastasiia Conrad Patient Status:  Observation    12/3/1940 MRN [id number]   Location [facility] 5SW/SE Attending Hector Joseph MD   Hosp Day # 0 PCP Sofia Jimenez MD       Subjective:     Pt c/o Interpretation  -------------------------- Sinus Rhythm Low voltage in precordial leads.  -Old inferior infarct.  ABNORMAL When compared with ECG of 01/16/2019 14:58:35 No significant changes have occurred Electronically signed on 02/07/2019 at 10:10 by Antwan

## 2019-02-08 NOTE — PLAN OF CARE
Problem: Patient Centered Care  Goal: Patient preferences are identified and integrated in the patient's plan of care  Interventions:  - What would you like us to know as we care for you?  I live at [place] and I have been falling more in the past two weeks

## 2019-02-08 NOTE — PROGRESS NOTES
[facility]    Progress Note    Carey De Los Santos Patient Status:  Observation    12/3/1940 MRN [id number]   Location [facility] 5SW/SE Attending Marcelle Arguello MD   Hosp Day # 0 PCP Gisella Rajan MD       Subjective:     No new infarct. ABNORMAL When compared with ECG of 01/16/2019 14:58:35 No significant changes have occurred Electronically signed on 02/07/2019 at 10:10 by Leeanne Mendez MD      Assessment and Plan:     Frequent falls  Bilat leg weakness.   Likely multifactorial due

## 2019-02-08 NOTE — CONSULTS
[place] [address] Patient Status:  Observation    12/3/1940 MRN [id number]   Location [facility] 5SW/SE Attending Diana Joe MD   Hosp Day # 0 PCP Yue Macedo MD     Patient Identification  Igor Fisher left, pain   • Hearing impairment     Bilateral hearing aids   • High blood pressure    • Hip pain, left    • History of blood clots     Leg   • Hypothyroidism    • Incontinence    • L3-4 stable slight grade 1 retrolisthesis 10/31/2014   • L4-5 mild-mod di • KNEE REPLACEMENT SURGERY Bilateral     Bilateral arthritis    • LAMINECTOMY      WITH FUSION PER PATIENT   • OTHER SURGICAL HISTORY  1985,1995,2003,2009    Cervical Discectomy AND FUSION   • TONSILLECTOMY  1950    Tonsillitis    • TRANSFORAMINAL LUMBAR Alcohol/week: 0.0 oz      Drug use: No      Sexual activity: Not on file    Other Topics      Concerns:         Service: Not Asked        Blood Transfusions: Not Asked        Caffeine Concern: No          crystal light        Occupational Exposure: No intake/output data recorded.     /56 (BP Location: Right arm)   Pulse 67   Temp 98.3 °F (36.8 °C) (Oral)   Resp 18   Ht 63\"   Wt 269 lb   SpO2 94%   BMI 47.65 kg/m²     General Appearance:    Alert, cooperative, no distress, appears stated age stenosis    Lumbar facet arthropathy: L5-S1 bilateral large    Neurologic gait disorder      Plan:     She will need to continue with PT and OT. She would like to be able to do more than 2 hours of therapy a day.   She knows that she needs to do more than

## 2019-02-09 PROCEDURE — 99225 SUBSEQUENT OBSERVATION CARE: CPT | Performed by: HOSPITALIST

## 2019-02-09 RX ORDER — LORAZEPAM 1 MG/1
2 TABLET ORAL NIGHTLY PRN
Qty: 10 TABLET | Refills: 0 | Status: ON HOLD | OUTPATIENT
Start: 2019-02-09 | End: 2019-08-14 | Stop reason: ALTCHOICE

## 2019-02-09 RX ORDER — LORAZEPAM 1 MG/1
1 TABLET ORAL 2 TIMES DAILY PRN
Qty: 20 TABLET | Refills: 0 | Status: SHIPPED | OUTPATIENT
Start: 2019-02-09 | End: 2019-03-06

## 2019-02-09 NOTE — PROGRESS NOTES
[facility]    Progress Note    Mario Galarza Patient Status:  Observation    12/3/1940 MRN [id number]   Location [facility] 5SW/SE Attending Loreta Carr MD   Hosp Day # 0 PCP Merilynn Apley, MD       Subjective:     No new Isiah Jarrell MD  2/9/2019

## 2019-02-09 NOTE — PLAN OF CARE
Problem: Patient Centered Care  Goal: Patient preferences are identified and integrated in the patient's plan of care  Interventions:  - What would you like us to know as we care for you?  I live at [place] and I have been falling more in the past two weeks

## 2019-02-09 NOTE — PLAN OF CARE
Problem: Patient Centered Care  Goal: Patient preferences are identified and integrated in the patient's plan of care  Interventions:  - What would you like us to know as we care for you?  I live at [place] and I have been falling more in the past two weeks Assess and document risk factors for pressure ulcer development  - Assess and document skin integrity  - Assess and document dressing/incision, wound bed, drain sites and surrounding tissue  - Implement wound care per orders  - Initiate isolation precautio

## 2019-02-09 NOTE — CM/SW NOTE
MDO received indicating pt is stable for dc to acute rehab. Preference for [facility] as she has been there before. Referral sent by [facility] via [facility].     OLEKSANDR Abrams, [address] [place] [address]

## 2019-02-09 NOTE — PLAN OF CARE
Problem: Patient Centered Care  Goal: Patient preferences are identified and integrated in the patient's plan of care  Interventions:  - What would you like us to know as we care for you?  I live at [place] and I have been falling more in the past two weeks drain sites and surrounding tissue  - Implement wound care per orders  - Initiate isolation precautions as appropriate  - Initiate Pressure Ulcer prevention bundle as indicated  Outcome: Progressing      Problem: SAFETY ADULT - FALL  Goal: Free from fall i

## 2019-02-10 LAB
BILIRUB UR QL: NEGATIVE
CLARITY UR: CLEAR
COLOR UR: YELLOW
GLUCOSE UR-MCNC: NEGATIVE MG/DL
KETONES UR-MCNC: NEGATIVE MG/DL
NITRITE UR QL STRIP.AUTO: NEGATIVE
PH UR: 7 [PH] (ref 5–8)
PROT UR-MCNC: NEGATIVE MG/DL
RBC #/AREA URNS AUTO: <1 /HPF
SP GR UR STRIP: 1.01 (ref 1–1.03)
UROBILINOGEN UR STRIP-ACNC: <2
VIT C UR-MCNC: NEGATIVE MG/DL
WBC #/AREA URNS AUTO: 21 /HPF

## 2019-02-10 PROCEDURE — 99225 SUBSEQUENT OBSERVATION CARE: CPT | Performed by: HOSPITALIST

## 2019-02-10 NOTE — CM/SW NOTE
Call placed to [facility] admissions [phone] who stated they have no beds available and recommended to check back tomorrow 2/11.     OLEKSANDR Hernandez, [address] [place] [address]

## 2019-02-10 NOTE — PLAN OF CARE
Problem: Patient Centered Care  Goal: Patient preferences are identified and integrated in the patient's plan of care  Interventions:  - What would you like us to know as we care for you?  I live at [place] and I have been falling more in the past two weeks drain sites and surrounding tissue  - Implement wound care per orders  - Initiate isolation precautions as appropriate  - Initiate Pressure Ulcer prevention bundle as indicated  Outcome: Progressing      Problem: SAFETY ADULT - FALL  Goal: Free from fall i respiratory effort  - Oxygen supplementation based on oxygen saturation or ABGs  - Provide Smoking Cessation handout, if applicable  - Encourage broncho-pulmonary hygiene including cough, deep breathe, Incentive Spirometry  - Assess the need for suctioning

## 2019-02-10 NOTE — PLAN OF CARE
Problem: Patient Centered Care  Goal: Patient preferences are identified and integrated in the patient's plan of care  Interventions:  - What would you like us to know as we care for you?  I live at [place] and I have been falling more in the past two weeks care as needed  Outcome: Progressing    Goal: Incision(s), wounds(s) or drain site(s) healing without S/S of infection  INTERVENTIONS:  - Assess and document risk factors for pressure ulcer development  - Assess and document skin integrity  - Assess and do cognitive ability or social support system  Outcome: Thang Mathur when bed available          Problem: RESPIRATORY - ADULT  Goal: Achieves optimal ventilation and oxygenation  INTERVENTIONS:  - Assess for changes in respiratory status  - Assess f

## 2019-02-10 NOTE — PROGRESS NOTES
[facility]    Progress Note    Mamadou Arreguin Patient Status:  Observation    12/3/1940 MRN [id number]   Location [facility] 5SW/SE Attending David Sanford MD   Hosp Day # 0 PCP Melba Markham MD       Subjective:     Pt c/o MD  2/10/2019

## 2019-02-10 NOTE — PHYSICAL THERAPY NOTE
PHYSICAL THERAPY TREATMENT NOTE - INPATIENT     Room Number: 526/526-A       Presenting Problem: Frequent Falls    Problem List  Principal Problem:    Unable to ambulate  Active Problems:    bilateral L5-S1 radiculopathy    s/p L5-S1 right laminectomy    L rehabilitation     PLAN  PT Treatment Plan: Bed mobility; Endurance; Patient education;Gait training;Strengthening;Stair training;Transfer training    SUBJECTIVE  She expressed apprehension about walking, indicating that her legs are weak and that she has fa session/findings; All patient questions and concerns addressed; Alarm set    CURRENT GOALS   Goals to be met by: 2/14/19  Patient Goal Patient's self-stated goal is: to go home   Goal #1 Patient is able to demonstrate supine - sit EOB @ level: modified indep

## 2019-02-11 VITALS
WEIGHT: 269 LBS | RESPIRATION RATE: 18 BRPM | HEIGHT: 63 IN | TEMPERATURE: 98 F | OXYGEN SATURATION: 92 % | BODY MASS INDEX: 47.66 KG/M2 | SYSTOLIC BLOOD PRESSURE: 126 MMHG | HEART RATE: 67 BPM | DIASTOLIC BLOOD PRESSURE: 46 MMHG

## 2019-02-11 PROCEDURE — 99217 OBSERVATION CARE DISCHARGE: CPT | Performed by: HOSPITALIST

## 2019-02-11 RX ORDER — DESVENLAFAXINE 100 MG/1
100 TABLET, EXTENDED RELEASE ORAL DAILY
Qty: 30 TABLET | Refills: 0 | Status: SHIPPED | OUTPATIENT
Start: 2019-02-11 | End: 2019-03-06

## 2019-02-11 RX ORDER — CEFADROXIL 500 MG/1
500 CAPSULE ORAL 2 TIMES DAILY
Qty: 14 CAPSULE | Refills: 0 | Status: SHIPPED | OUTPATIENT
Start: 2019-02-11 | End: 2019-02-18

## 2019-02-11 RX ORDER — VENLAFAXINE HYDROCHLORIDE 150 MG/1
150 CAPSULE, EXTENDED RELEASE ORAL DAILY
Qty: 30 CAPSULE | Refills: 0 | Status: SHIPPED | OUTPATIENT
Start: 2019-02-11 | End: 2019-02-11

## 2019-02-11 RX ORDER — 0.9 % SODIUM CHLORIDE 0.9 %
VIAL (ML) INJECTION
Status: COMPLETED
Start: 2019-02-11 | End: 2019-02-11

## 2019-02-11 NOTE — PLAN OF CARE
Problem: Patient Centered Care  Goal: Patient preferences are identified and integrated in the patient's plan of care  Interventions:  - What would you like us to know as we care for you?  I live at [place] and I have been falling more in the past two weeks drain sites and surrounding tissue  - Implement wound care per orders  - Initiate isolation precautions as appropriate  - Initiate Pressure Ulcer prevention bundle as indicated  Outcome: Progressing      Problem: SAFETY ADULT - FALL  Goal: Free from fall i respiratory effort  - Oxygen supplementation based on oxygen saturation or ABGs  - Provide Smoking Cessation handout, if applicable  - Encourage broncho-pulmonary hygiene including cough, deep breathe, Incentive Spirometry  - Assess the need for suctioning

## 2019-02-11 NOTE — CM/SW NOTE
Semi private bed open at [facility] for patient at 2pm    Patient agreeable only if put on list for private room.  CTL stated private room may not be available until Friday or Sat- patient agreeable    CTL spoke with Raheem Wetzel at [facility] [phone]    Per

## 2019-02-11 NOTE — CM/SW NOTE
Message left with Marylu Canavan regarding bed availability  Patient is medically stable for [address] [facility], [address] [facility] [address]  Phone # [phone]

## 2019-02-11 NOTE — PROGRESS NOTES
[facility]    Progress Note    Ramos Win Patient Status:  Observation    12/3/1940 MRN [id number]   Location [facility] 5SW/SE Attending Florin Daniels MD   Hosp Day # 0 PCP Riki Driver MD       Subjective:     Pt c/o this.     COPD  No signs of acute exacerbation, cont home inhalers.     DVT  Continue Xarelto.     Depression  Continue Effexor and Seroquel.     Benign hypertension  Controlled at this time, resume home medication.     Morbid obesity  BMI 48.  Patient cou

## 2019-02-11 NOTE — PROGRESS NOTES
Called [facility] attempting to give report.  took callback number and name for return call. Report given to RN at [facility]. Patient belongings and paperwork with patient. [facility] transport picked up patient.      Report given to [facility], she

## 2019-02-11 NOTE — DISCHARGE SUMMARY
[facility] - [facility]    Discharge Summary    Rayshawn Ireland Patient Status:  Observation    12/3/1940 MRN [id number]   Location [facility] 5SW/SE Attending No att. providers found   [facility] Day # 0 PCP Thierry Kingston MD     Date of Admissi RRR.  No m/g/r  Pulm:   CTA bilat  Abd:   +bs, soft, NT, ND  LE:  No c/c/e  Neuro:  nonfocal      Reason for Admission:     Falls    History of Present Illness:     Mariam Suazo is a(n) [de-identified]year old female, with a past medical history significant for  Cefadroxil 500 MG Caps  Commonly known as:  DURICEF      Take 1 capsule (500 mg total) by mouth 2 (two) times daily for 7 days.    Stop taking on:  2/18/2019  Quantity:  14 capsule  Refills:  0     Miconazole Nitrate 2 % Powd      Apply 1 Application topi Refills:  0     Dicyclomine HCl 20 MG Tabs  Commonly known as:  BENTYL      Take 1 tablet (20 mg total) by mouth 4 (four) times daily as needed (Abdominal pain).    Quantity:  30 tablet  Refills:  0     docusate sodium 100 MG Caps  Commonly known as:  Megan Guillen nurse    Bring a paper prescription for each of these medications  · Cefadroxil 500 MG Caps  · LORazepam 1 MG Tabs  · LORazepam 1 MG Tabs         Follow up Visits:     Follow-up Information     Shannon Villeda MD.    Specialties:  Physical Medicine, Rehabil

## 2019-02-11 NOTE — PROGRESS NOTES
[facility] Pharmacy Note: Antimicrobial Weight Dose Adjustment for: ceftriaxone (ROCEPHIN)    Teo Chappell is a [de-identified]year old female who has been prescribed ceftriaxone (ROCEPHIN) 1 gm every 24 hours.   CrCl is estimated creatinine clearance is 47 mL/min (based o

## 2019-02-19 ENCOUNTER — OFFICE VISIT (OUTPATIENT)
Dept: OTOLARYNGOLOGY | Facility: CLINIC | Age: 79
End: 2019-02-19
Payer: MEDICARE

## 2019-02-19 VITALS
BODY MASS INDEX: 41 KG/M2 | TEMPERATURE: 98 F | DIASTOLIC BLOOD PRESSURE: 76 MMHG | SYSTOLIC BLOOD PRESSURE: 140 MMHG | HEIGHT: 68 IN

## 2019-02-19 DIAGNOSIS — H61.23 BILATERAL IMPACTED CERUMEN: Primary | ICD-10-CM

## 2019-02-19 PROCEDURE — 69210 REMOVE IMPACTED EAR WAX UNI: CPT | Performed by: OTOLARYNGOLOGY

## 2019-02-20 NOTE — PROGRESS NOTES
Teo Chappell is a [de-identified]year old female. Patient presents with:  Cerumen Impaction: Bilateral ear cleaning     HPI:   Her ears are blocked with wax and she cannot hear well.   She has been using Brox drops without benefit    Current Outpatient Medications: Disp: 1 Bottle Rfl: 0   Levothyroxine Sodium 125 MCG Oral Tab Take 250 mcg by mouth once daily. Disp:  Rfl:    QUEtiapine Fumarate 400 MG Oral Tab Take 100 mg by mouth nightly.    Disp:  Rfl:    Pantoprazole Sodium 40 MG Oral Tab EC Take 1 tablet (40 mg tot NIGHT   • Sleep apnea    • stent placement     cerebral   • Thyroid disease    • Toe pain     Onychomycosis, Debridement , Hammertoe    • Tonsillitis 1950    Tonsillitis    • Unspecified essential hypertension    • Unspecified sleep apnea    • Visual impai PLAN:   1. Bilateral impacted cerumen  Cerumen cleared bilaterally. Return as needed. - REMOVAL IMPACTED CERUMEN REQUIRING INSTRUMENTATION, UNILATERAL      The patient indicates understanding of these issues and agrees to the plan. Jass Lua

## 2019-02-21 ENCOUNTER — TELEPHONE (OUTPATIENT)
Dept: INTERNAL MEDICINE CLINIC | Facility: CLINIC | Age: 79
End: 2019-02-21

## 2019-02-21 NOTE — TELEPHONE ENCOUNTER
Pharmacy called in stating that medication below is not covered by [employer], because it is over the counter. In order to get coverage Pharmacy states that the script would have to be written for 50,000 units.      Pharmacy asking if Dr. Deepak Hinds would do s

## 2019-02-22 ENCOUNTER — MED REC SCAN ONLY (OUTPATIENT)
Dept: INTERNAL MEDICINE CLINIC | Facility: CLINIC | Age: 79
End: 2019-02-22

## 2019-02-22 NOTE — TELEPHONE ENCOUNTER
Yes I meant for her to buy over the counter supplement  Vit D3 2000 U per day  Buy over the counter  For maintenance  Not covered by insurance

## 2019-02-22 NOTE — TELEPHONE ENCOUNTER
Spoke with Florinda at [facility] and relayed MMP message below--she verbalizes understanding and agreement    LMTCB on patient's home and cell #    Called dtr Grey Duron (DEBRA verified) and relayed MMP message below--she verbalizes understanding and agreeme

## 2019-03-06 ENCOUNTER — OFFICE VISIT (OUTPATIENT)
Dept: NEUROLOGY | Facility: CLINIC | Age: 79
End: 2019-03-06
Payer: MEDICARE

## 2019-03-06 VITALS
HEART RATE: 88 BPM | HEIGHT: 68 IN | RESPIRATION RATE: 16 BRPM | BODY MASS INDEX: 38.65 KG/M2 | SYSTOLIC BLOOD PRESSURE: 128 MMHG | DIASTOLIC BLOOD PRESSURE: 70 MMHG | WEIGHT: 255 LBS

## 2019-03-06 DIAGNOSIS — M54.16 LUMBAR RADICULOPATHY: Primary | ICD-10-CM

## 2019-03-06 DIAGNOSIS — M51.26 LUMBAR HERNIATED DISC: ICD-10-CM

## 2019-03-06 DIAGNOSIS — M54.50 CHRONIC LEFT-SIDED LOW BACK PAIN WITHOUT SCIATICA: ICD-10-CM

## 2019-03-06 DIAGNOSIS — M48.061 SPINAL STENOSIS OF LUMBAR REGION WITHOUT NEUROGENIC CLAUDICATION: ICD-10-CM

## 2019-03-06 DIAGNOSIS — M47.816 LUMBAR FACET ARTHROPATHY: ICD-10-CM

## 2019-03-06 DIAGNOSIS — M51.36 LUMBAR DEGENERATIVE DISC DISEASE: ICD-10-CM

## 2019-03-06 DIAGNOSIS — M96.1 LUMBAR POST-LAMINECTOMY SYNDROME: ICD-10-CM

## 2019-03-06 DIAGNOSIS — R26.9 NEUROLOGIC GAIT DISORDER: ICD-10-CM

## 2019-03-06 DIAGNOSIS — M43.16 SPONDYLOLISTHESIS OF LUMBAR REGION: ICD-10-CM

## 2019-03-06 DIAGNOSIS — G89.29 CHRONIC LEFT-SIDED LOW BACK PAIN WITHOUT SCIATICA: ICD-10-CM

## 2019-03-06 DIAGNOSIS — M43.10 RETROLISTHESIS OF VERTEBRAE: ICD-10-CM

## 2019-03-06 PROCEDURE — 99214 OFFICE O/P EST MOD 30 MIN: CPT | Performed by: PHYSICAL MEDICINE & REHABILITATION

## 2019-03-06 RX ORDER — QUETIAPINE 100 MG/1
100 TABLET, FILM COATED ORAL NIGHTLY
Status: ON HOLD | COMMUNITY
End: 2020-02-19

## 2019-03-06 RX ORDER — VENLAFAXINE HYDROCHLORIDE 150 MG/1
300 CAPSULE, EXTENDED RELEASE ORAL DAILY
COMMUNITY
End: 2019-04-09 | Stop reason: ALTCHOICE

## 2019-03-06 NOTE — PROGRESS NOTES
Low Back Pain H & P    Chief Complaint: Patient presents with:  Low Back Pain: Patient presents for follow up on low back pain, LOV: 10/10/18. Patient was in the hospital 2/6/19, patient had falls and left leg weakness.  States she still has the pain, pain 02/25/2011    dipak 5 left, pain   • Hearing impairment     Bilateral hearing aids   • High blood pressure    • Hip pain, left    • History of blood clots     Leg   • Hypothyroidism    • Incontinence    • L3-4 stable slight grade 1 retrolisthesis 10/31 HYSTERECTOMY  1967    Partial Hysterectomy   • JAW SURGERY     • KNEE REPLACEMENT SURGERY Bilateral     Bilateral arthritis    • LAMINECTOMY      WITH FUSION PER PATIENT   • OTHER SURGICAL HISTORY  1985,1995,2003,2009    Cervical Discectomy AND FUSION   • comment: 1977    Substance and Sexual Activity      Alcohol use: No        Alcohol/week: 0.0 oz      Drug use: No      Sexual activity: Not on file    Lifestyle      Physical activity:        Days per week: Not on file        Minutes per session: Not on fi History Narrative      The patient uses the following assistive device(s):  rolling walker. scooter      The patient does not live in a home with stairs. The patient lives in a correction home. [facility]      Review of Systems      PE:   The patie L2-3 mild-mod diffuse, L1-2 mod diffuse, T12-L1 left mild-mod, T11-12 mild-mod diffuse bulging discs    5. L4-5 slight grade 1 unstable spondylolisthesis    6.  L5-S1 bilateral severe foraminal, L2-3 mild-mod central, L1-2 mod central, T12-L1 mod central, T

## 2019-03-07 ENCOUNTER — MED REC SCAN ONLY (OUTPATIENT)
Dept: INTERNAL MEDICINE CLINIC | Facility: CLINIC | Age: 79
End: 2019-03-07

## 2019-03-08 RX ORDER — LEVOTHYROXINE SODIUM 0.12 MG/1
TABLET ORAL
Qty: 14 TABLET | Refills: 11 | Status: SHIPPED | OUTPATIENT
Start: 2019-03-08 | End: 2019-06-13

## 2019-03-09 NOTE — TELEPHONE ENCOUNTER
Please review; protocol failed.     Hypothyroid Medications  Protocol Criteria:  Appointment scheduled in the past 12 months or the next 3 months  TSH resulted in the past 12 months that is normal  Recent Outpatient Visits            2 days ago bilateral L5

## 2019-03-29 ENCOUNTER — APPOINTMENT (OUTPATIENT)
Dept: GENERAL RADIOLOGY | Facility: HOSPITAL | Age: 79
End: 2019-03-29
Attending: EMERGENCY MEDICINE
Payer: MEDICARE

## 2019-03-29 ENCOUNTER — APPOINTMENT (OUTPATIENT)
Dept: CT IMAGING | Facility: HOSPITAL | Age: 79
End: 2019-03-29
Attending: EMERGENCY MEDICINE
Payer: MEDICARE

## 2019-03-29 ENCOUNTER — HOSPITAL ENCOUNTER (EMERGENCY)
Facility: HOSPITAL | Age: 79
Discharge: HOME | End: 2019-03-29
Attending: EMERGENCY MEDICINE
Payer: MEDICARE

## 2019-03-29 ENCOUNTER — NURSE TRIAGE (OUTPATIENT)
Dept: OTHER | Age: 79
End: 2019-03-29

## 2019-03-29 VITALS
WEIGHT: 265 LBS | HEART RATE: 99 BPM | DIASTOLIC BLOOD PRESSURE: 140 MMHG | TEMPERATURE: 98 F | HEIGHT: 68 IN | SYSTOLIC BLOOD PRESSURE: 154 MMHG | BODY MASS INDEX: 40.16 KG/M2 | OXYGEN SATURATION: 94 % | RESPIRATION RATE: 22 BRPM

## 2019-03-29 DIAGNOSIS — R10.9 ABDOMINAL PAIN, UNSPECIFIED ABDOMINAL LOCATION: Primary | ICD-10-CM

## 2019-03-29 PROCEDURE — 85025 COMPLETE CBC W/AUTO DIFF WBC: CPT | Performed by: EMERGENCY MEDICINE

## 2019-03-29 PROCEDURE — 83690 ASSAY OF LIPASE: CPT | Performed by: EMERGENCY MEDICINE

## 2019-03-29 PROCEDURE — 71260 CT THORAX DX C+: CPT | Performed by: EMERGENCY MEDICINE

## 2019-03-29 PROCEDURE — 99285 EMERGENCY DEPT VISIT HI MDM: CPT

## 2019-03-29 PROCEDURE — 74177 CT ABD & PELVIS W/CONTRAST: CPT | Performed by: EMERGENCY MEDICINE

## 2019-03-29 PROCEDURE — 84484 ASSAY OF TROPONIN QUANT: CPT | Performed by: EMERGENCY MEDICINE

## 2019-03-29 PROCEDURE — 71045 X-RAY EXAM CHEST 1 VIEW: CPT | Performed by: EMERGENCY MEDICINE

## 2019-03-29 PROCEDURE — 80048 BASIC METABOLIC PNL TOTAL CA: CPT | Performed by: EMERGENCY MEDICINE

## 2019-03-29 PROCEDURE — 87088 URINE BACTERIA CULTURE: CPT | Performed by: EMERGENCY MEDICINE

## 2019-03-29 PROCEDURE — 36415 COLL VENOUS BLD VENIPUNCTURE: CPT

## 2019-03-29 PROCEDURE — 80076 HEPATIC FUNCTION PANEL: CPT | Performed by: EMERGENCY MEDICINE

## 2019-03-29 PROCEDURE — 93005 ELECTROCARDIOGRAM TRACING: CPT

## 2019-03-29 PROCEDURE — 93010 ELECTROCARDIOGRAM REPORT: CPT | Performed by: EMERGENCY MEDICINE

## 2019-03-29 PROCEDURE — 81001 URINALYSIS AUTO W/SCOPE: CPT | Performed by: EMERGENCY MEDICINE

## 2019-03-29 PROCEDURE — 87086 URINE CULTURE/COLONY COUNT: CPT | Performed by: EMERGENCY MEDICINE

## 2019-03-29 PROCEDURE — 87186 SC STD MICRODIL/AGAR DIL: CPT | Performed by: EMERGENCY MEDICINE

## 2019-03-29 RX ORDER — MAGNESIUM HYDROXIDE/ALUMINUM HYDROXICE/SIMETHICONE 120; 1200; 1200 MG/30ML; MG/30ML; MG/30ML
30 SUSPENSION ORAL ONCE
Status: DISCONTINUED | OUTPATIENT
Start: 2019-03-29 | End: 2019-03-29

## 2019-03-29 NOTE — ED INITIAL ASSESSMENT (HPI)
Pt Presents via EMS from [facility] AND [facility] for eval of heart burn. Pt states \" Since Monday, I have been having really bad heart burn to the point I'm not able to sleep. I've been nauseated since Wednesday. I've been taking Protonix, but It's not helping.  \"

## 2019-03-29 NOTE — TELEPHONE ENCOUNTER
Action Requested: Summary for Provider     []  Critical Lab, Recommendations Needed  [] Need Additional Advice  []   FYI    []   Need Orders  [] Need Medications Sent to Pharmacy  []  Other     SUMMARY: sent to ER for immediate eval and Tx no access w/ PCP

## 2019-03-29 NOTE — ED PROVIDER NOTES
Patient Seen in: [facility] AND [facility] Emergency Department    History   Patient presents with:  Abdomen/Flank Pain (GI/)    Stated Complaint: GERD    HPI    57-year-old female with history of DVT for which she takes Xarelto, COPD, hypertension, sleep apne unspecified site    • Other and unspecified hyperlipidemia    • Other complicated headache syndrome 11/2/2017   • Other spondylosis, lumbar region 11/2/2017   • Pneumonia 2012   • S/P cervical spinal fusion: C3-6 and C7-T1 11/2/2017   • s/p L5-S1 right khalil 37. 5        Types: Cigarettes        Quit date: 1977        Years since quittin.2      Smokeless tobacco: Never Used      Tobacco comment:     Alcohol use: No      Alcohol/week: 0.0 oz    Drug use: No      Review of Systems    Positive for sta within normal limits   CBC W/ DIFFERENTIAL - Abnormal; Notable for the following components:    RDW-SD 47.6 (*)     All other components within normal limits   TROPONIN I - Normal   HEPATIC FUNCTION PANEL (7) - Normal   LIPASE - Normal   CBC WITH DIFFERENT

## 2019-04-06 NOTE — ED NOTES
Pt called to inquire about certified letter, informed of results, pt lives at [place], spoke to [employer] and she will call in medication for pt

## 2019-04-09 PROBLEM — J84.10 LUNG GRANULOMA (HCC): Status: ACTIVE | Noted: 2019-04-09

## 2019-04-10 ENCOUNTER — TELEPHONE (OUTPATIENT)
Dept: NEUROLOGY | Facility: CLINIC | Age: 79
End: 2019-04-10

## 2019-04-10 DIAGNOSIS — M51.26 LUMBAR HERNIATED DISC: ICD-10-CM

## 2019-04-10 DIAGNOSIS — M96.1 LUMBAR POST-LAMINECTOMY SYNDROME: ICD-10-CM

## 2019-04-10 DIAGNOSIS — M54.16 LUMBAR RADICULOPATHY: Primary | ICD-10-CM

## 2019-04-10 DIAGNOSIS — M51.36 LUMBAR DEGENERATIVE DISC DISEASE: ICD-10-CM

## 2019-04-10 NOTE — TELEPHONE ENCOUNTER
Patient calling stating that localized low back pain described as constant ache. 7/10. She is having increasing pain when she walks and from sitting to standing is more difficult.     Patient would like to schedule an injection which she stated Kyleigh Hamper

## 2019-04-12 NOTE — TELEPHONE ENCOUNTER
Pt was called told that procedure was auth but we need permission from Dr Alexandru Vega to hold both Rula Real and ASA. Dr Alexandru Vega office [phone] called spoke with Fernie Fatima who will speak with Dr Alexandru Vega and will then return call.  Pt will be called to angieu

## 2019-04-15 NOTE — TELEPHONE ENCOUNTER
Pt called told that Dr Regan Lefort has given permission to hold anticoagulates for procedure as long as pt aware of risk for DVT while off medication. She will need to be off ASA 7 days and Xarelto for 48hrs per guidelines provided, pt asked to call back so pr

## 2019-04-15 NOTE — TELEPHONE ENCOUNTER
LMTCB-to schedule patient for bilateral L5 TFESIs under MAC sedation    Please see the below message from Dr. Holland Falling,     Your office reached out to me today regarding holding anticoagulation for this patient so she can get an injection.  She's

## 2019-04-16 PROBLEM — J44.9 COPD (CHRONIC OBSTRUCTIVE PULMONARY DISEASE) (HCC): Status: ACTIVE | Noted: 2018-11-07

## 2019-04-16 NOTE — TELEPHONE ENCOUNTER
VM left. Pt told that permission for holding anticoagulates has been give by DR Mitzy Benedict and she can call to schedule procedure and get instructions. # left.

## 2019-04-17 NOTE — TELEPHONE ENCOUNTER
Patient has been scheduled for a Bilateral L5 TFESI under MAC on 4/26/19 at the [facility]. Medications and allergies reviewed. Patient informed to hold aspirins, nsaids, blood thinners, vitamins and fish oils 3-7 days prior to procedure.  Patient informed we radha

## 2019-04-23 ENCOUNTER — MED REC SCAN ONLY (OUTPATIENT)
Dept: INTERNAL MEDICINE CLINIC | Facility: CLINIC | Age: 79
End: 2019-04-23

## 2019-04-26 ENCOUNTER — TELEPHONE (OUTPATIENT)
Dept: NEUROLOGY | Facility: CLINIC | Age: 79
End: 2019-04-26

## 2019-04-26 ENCOUNTER — OFFICE VISIT (OUTPATIENT)
Dept: SURGERY | Facility: CLINIC | Age: 79
End: 2019-04-26
Payer: MEDICARE

## 2019-04-26 DIAGNOSIS — M54.16 LUMBAR RADICULOPATHY: Primary | ICD-10-CM

## 2019-04-26 DIAGNOSIS — M51.36 LUMBAR DEGENERATIVE DISC DISEASE: ICD-10-CM

## 2019-04-26 DIAGNOSIS — M48.061 SPINAL STENOSIS OF LUMBAR REGION WITHOUT NEUROGENIC CLAUDICATION: ICD-10-CM

## 2019-04-26 DIAGNOSIS — M96.1 LUMBAR POST-LAMINECTOMY SYNDROME: ICD-10-CM

## 2019-05-01 NOTE — TELEPHONE ENCOUNTER
Patient has been scheduled for a bilateral  L5-S1 transforaminal epidural steroid injections    on 05/10/2019 at the [facility]. Medications and allergies reviewed.  Patient informed to hold aspirins, nsaids, blood thinners, multivitamins, vitamin E and fish oils

## 2019-05-10 ENCOUNTER — OFFICE VISIT (OUTPATIENT)
Dept: SURGERY | Facility: CLINIC | Age: 79
End: 2019-05-10
Payer: MEDICARE

## 2019-05-10 DIAGNOSIS — M48.061 SPINAL STENOSIS OF LUMBAR REGION WITHOUT NEUROGENIC CLAUDICATION: ICD-10-CM

## 2019-05-10 DIAGNOSIS — M54.16 LUMBAR RADICULOPATHY: Primary | ICD-10-CM

## 2019-05-10 DIAGNOSIS — M51.36 LUMBAR DEGENERATIVE DISC DISEASE: ICD-10-CM

## 2019-05-10 DIAGNOSIS — M96.1 LUMBAR POST-LAMINECTOMY SYNDROME: ICD-10-CM

## 2019-05-10 PROCEDURE — 64483 NJX AA&/STRD TFRM EPI L/S 1: CPT | Performed by: PHYSICAL MEDICINE & REHABILITATION

## 2019-05-10 NOTE — PROCEDURES
[address]    BILATERAL LUMBAR TRANSFORAMINAL   NAME:  Ramos Win    MR #:    [id number] :  12/3/1940     PHYSICIAN:  Gracie Baumann        Operative Report    DATE OF PROCEDURE: 5/10/2019   PREOPERATIVE DIAGNOSES: 1. bilate the needle to the 6 o'clock position on the right L5 pedicle. At this point in time, Omnipaque-240 contrast was used to obtain a good epidurogram indicating correct needle placement. Then, aspiration was performed. No blood, fluid, or air was aspirated.

## 2019-05-13 ENCOUNTER — MED REC SCAN ONLY (OUTPATIENT)
Dept: INTERNAL MEDICINE CLINIC | Facility: CLINIC | Age: 79
End: 2019-05-13

## 2019-05-17 ENCOUNTER — HOSPITAL ENCOUNTER (OUTPATIENT)
Facility: HOSPITAL | Age: 79
Setting detail: HOSPITAL OUTPATIENT SURGERY
Discharge: HOME | End: 2019-05-17
Attending: INTERNAL MEDICINE | Admitting: INTERNAL MEDICINE
Payer: MEDICARE

## 2019-05-17 DIAGNOSIS — R10.13 EPIGASTRIC PAIN: ICD-10-CM

## 2019-05-17 DIAGNOSIS — R10.13 DYSPEPSIA: ICD-10-CM

## 2019-05-17 PROCEDURE — 88312 SPECIAL STAINS GROUP 1: CPT | Performed by: INTERNAL MEDICINE

## 2019-05-17 PROCEDURE — 99152 MOD SED SAME PHYS/QHP 5/>YRS: CPT | Performed by: INTERNAL MEDICINE

## 2019-05-17 PROCEDURE — 0DB68ZX EXCISION OF STOMACH, VIA NATURAL OR ARTIFICIAL OPENING ENDOSCOPIC, DIAGNOSTIC: ICD-10-PCS | Performed by: INTERNAL MEDICINE

## 2019-05-17 PROCEDURE — 88305 TISSUE EXAM BY PATHOLOGIST: CPT | Performed by: INTERNAL MEDICINE

## 2019-05-17 RX ORDER — SODIUM CHLORIDE, SODIUM LACTATE, POTASSIUM CHLORIDE, CALCIUM CHLORIDE 600; 310; 30; 20 MG/100ML; MG/100ML; MG/100ML; MG/100ML
INJECTION, SOLUTION INTRAVENOUS CONTINUOUS
Status: DISCONTINUED | OUTPATIENT
Start: 2019-05-17 | End: 2019-05-17

## 2019-05-17 RX ORDER — MIDAZOLAM HYDROCHLORIDE 1 MG/ML
1 INJECTION INTRAMUSCULAR; INTRAVENOUS EVERY 5 MIN PRN
Status: DISCONTINUED | OUTPATIENT
Start: 2019-05-17 | End: 2019-05-17

## 2019-05-17 RX ORDER — MIDAZOLAM HYDROCHLORIDE 1 MG/ML
INJECTION INTRAMUSCULAR; INTRAVENOUS
Status: DISCONTINUED | OUTPATIENT
Start: 2019-05-17 | End: 2019-05-17

## 2019-05-17 RX ORDER — SODIUM CHLORIDE 0.9 % (FLUSH) 0.9 %
10 SYRINGE (ML) INJECTION AS NEEDED
Status: DISCONTINUED | OUTPATIENT
Start: 2019-05-17 | End: 2019-05-17

## 2019-05-17 NOTE — OPERATIVE REPORT
ESOPHAGOGASTRODUODENOSCOPY REPORT    Patient Name:  Jennie Marino Record #: [id number]  YOB: 1940  Date of Procedure: 5/17/2019    Referring physician: Aurora Cohen. Kings Guzman MD    Surgeon:  Jose C Mcelroy.  Sherri Mota MD    Pre-op diagnosis:

## 2019-05-18 VITALS
HEART RATE: 64 BPM | OXYGEN SATURATION: 95 % | RESPIRATION RATE: 15 BRPM | WEIGHT: 250 LBS | HEIGHT: 67 IN | BODY MASS INDEX: 39.24 KG/M2 | SYSTOLIC BLOOD PRESSURE: 146 MMHG | DIASTOLIC BLOOD PRESSURE: 78 MMHG

## 2019-05-18 NOTE — H&P
PRE-PROCEDURE UPDATE    HPI: Carey De Los Santos is a [de-identified]year old female. 12/3/1940. Patient presents for an Esophagogastroduodenoscopy.     ALLERGIES:   Bactrim [Sulfametho*    RASH  Ciprofloxacin           RASH  Depakote                OTHER (SEE COMMENTS) Take 5 mL by mouth every 4 (four) hours as needed.  Disp: 1 Bottle Rfl: 0   Pantoprazole Sodium 40 MG Oral Tab EC Take 1 tablet (40 mg total) by mouth every morning before breakfast. (Patient taking differently: Take 40 mg by mouth daily.  ) Disp: 30 tablet sleep apnea    • Visual impairment     EYE GLASSES     Past Surgical History:   Procedure Laterality Date   • ANESTH,NECK VESSEL SURGERY NOS      x4   • BRAIN SURGERY  ~2009    Brain Aneurysm, Stent placed    • BRONCHOSCOPY N/A 5/9/2018    Performed by [facility] [employer] Britt Kwan MD

## 2019-05-21 NOTE — PROGRESS NOTES
5/21/2019    Re:  Joo Vides   12/3/1940   [id number]       Dear Petra Draper,    I had the pleasure of seeing Joo Vides for abdominal pain. CT scans and labs were unremarkable and she did not respond to empiric PPI or Bentyl.   She underwent upper e

## 2019-06-12 NOTE — ED AVS SNAPSHOT
Ms. Igor Fisher   MRN: [id number]    Department:  [facility] Emergency Department   Date of Visit:  10/26/2019           Disclosure     Insurance plans vary and the physician(s) referred by the ER may not be covered by your plan.  Please con within the next three months to obtain basic health screening including reassessment of your blood pressure.     IF THERE IS ANY CHANGE OR WORSENING OF YOUR CONDITION, CALL YOUR PRIMARY CARE PHYSICIAN AT ONCE OR RETURN IMMEDIATELY TO THE EMERGENCY DEPARTMEN never

## 2019-06-23 ENCOUNTER — APPOINTMENT (OUTPATIENT)
Dept: GENERAL RADIOLOGY | Facility: HOSPITAL | Age: 79
End: 2019-06-23
Payer: MEDICARE

## 2019-06-23 ENCOUNTER — APPOINTMENT (OUTPATIENT)
Dept: CT IMAGING | Facility: HOSPITAL | Age: 79
End: 2019-06-23
Payer: MEDICARE

## 2019-06-23 ENCOUNTER — HOSPITAL ENCOUNTER (EMERGENCY)
Facility: HOSPITAL | Age: 79
Discharge: HOME | End: 2019-06-23
Attending: EMERGENCY MEDICINE
Payer: MEDICARE

## 2019-06-23 VITALS
WEIGHT: 250 LBS | RESPIRATION RATE: 18 BRPM | OXYGEN SATURATION: 94 % | HEART RATE: 61 BPM | DIASTOLIC BLOOD PRESSURE: 67 MMHG | BODY MASS INDEX: 37.89 KG/M2 | HEIGHT: 68 IN | TEMPERATURE: 97 F | SYSTOLIC BLOOD PRESSURE: 152 MMHG

## 2019-06-23 DIAGNOSIS — T07.XXXA MULTIPLE CONTUSIONS: ICD-10-CM
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
## 2019-06-23 PROCEDURE — 72125 CT NECK SPINE W/O DYE: CPT | Performed by: EMERGENCY MEDICINE

## 2019-06-23 PROCEDURE — 70450 CT HEAD/BRAIN W/O DYE: CPT | Performed by: EMERGENCY MEDICINE

## 2019-06-23 PROCEDURE — 99284 EMERGENCY DEPT VISIT MOD MDM: CPT

## 2019-06-23 PROCEDURE — 71045 X-RAY EXAM CHEST 1 VIEW: CPT | Performed by: EMERGENCY MEDICINE

## 2019-06-23 NOTE — ED INITIAL ASSESSMENT (HPI)
Flex Chaidez arrives via EMS from [place] [address] living s/p fall in the bathroom. Hit head.      C/o headache, neck pain, back pain, shoulder pain and hip pain    c collared by medics

## 2019-06-23 NOTE — ED PROVIDER NOTES
Patient Seen in: [facility] Emergency Department    History   Patient presents with:  Fall    Stated Complaint: fall    HPI    Patient is an 25-year-old female who apparently fell in the bathroom earlier today.   She complains of pain to the right • s/p L5-S1 right laminectomy 8/28/2014   • Shortness of breath     O2 AT NIGHT   • Sleep apnea    • stent placement     cerebral   • Thyroid disease    • Toe pain     Onychomycosis, Debridement , Hammertoe    • Tonsillitis 1950    Tonsillitis    • Unspeci Smokeless tobacco: Never Used      Tobacco comment: 1977    Alcohol use: No      Alcohol/week: 0.0 oz    Drug use: No      Review of Systems    Positive for stated complaint: fall  Other systems are as noted in HPI.   Constitutional and vital signs revi Follow-up:  Nellie Kim MD  [address] [facility]  [address]  [phone]      As needed    We recommend that you schedule follow up care with a primary care provider within the next three months to obtain basic health screening

## 2019-06-28 ENCOUNTER — OFFICE VISIT (OUTPATIENT)
Dept: DERMATOLOGY CLINIC | Facility: CLINIC | Age: 79
End: 2019-06-28
Payer: MEDICARE

## 2019-06-28 DIAGNOSIS — D23.60 BENIGN NEOPLASM OF SKIN OF UPPER LIMB, INCLUDING SHOULDER, UNSPECIFIED LATERALITY: ICD-10-CM

## 2019-06-28 DIAGNOSIS — D23.30 BENIGN NEOPLASM OF SKIN OF FACE: ICD-10-CM

## 2019-06-28 DIAGNOSIS — D23.4 BENIGN NEOPLASM OF SCALP AND SKIN OF NECK: ICD-10-CM

## 2019-06-28 DIAGNOSIS — L57.0 AK (ACTINIC KERATOSIS): ICD-10-CM

## 2019-06-28 DIAGNOSIS — D23.5 BENIGN NEOPLASM OF SKIN OF TRUNK, EXCEPT SCROTUM: ICD-10-CM

## 2019-06-28 DIAGNOSIS — L82.0 INFLAMED SEBORRHEIC KERATOSIS: ICD-10-CM

## 2019-06-28 DIAGNOSIS — L82.1 SEBORRHEIC KERATOSES: Primary | ICD-10-CM

## 2019-06-28 PROCEDURE — G0463 HOSPITAL OUTPT CLINIC VISIT: HCPCS | Performed by: DERMATOLOGY

## 2019-06-28 PROCEDURE — 99213 OFFICE O/P EST LOW 20 MIN: CPT | Performed by: DERMATOLOGY

## 2019-06-28 RX ORDER — LORAZEPAM 2 MG/1
TABLET ORAL
Refills: 0 | COMMUNITY
Start: 2019-06-18 | End: 2019-07-22 | Stop reason: DRUGHIGH

## 2019-07-03 ENCOUNTER — MED REC SCAN ONLY (OUTPATIENT)
Dept: INTERNAL MEDICINE CLINIC | Facility: CLINIC | Age: 79
End: 2019-07-03

## 2019-07-08 NOTE — PROGRESS NOTES
Dany Madsen is a [de-identified]year old female. HPI:     CC:  Patient presents with:  Lesion: LOV 7/6/18. pt presenting today with lesions to chest and back. pt states lesions on back itch and has changed in size, pt has HX of SK's.         Allergies:  Bactrim [S placement     cerebral   • Thyroid disease    • Toe pain     Onychomycosis, Debridement , Hammertoe    • Tonsillitis 1950    Tonsillitis    • Unspecified essential hypertension    • Unspecified sleep apnea    • Visual impairment     EYE GLASSES      Past S • Other (Other[other]) Maternal Grandfather    • Other (Other[other]) Paternal Grandmother    • Other (Other[other]) Paternal Grandfather    • Cancer Brother 54        Liver    • Neurological Disorder Sister         ALzheimer's Disease    • Stroke Sister 20 MG Oral Tab Take 1 tablet (20 mg total) by mouth daily with food. Disp: 90 tablet Rfl: 3   AmLODIPine Besylate 5 MG Oral Tab Take 1 tablet (5 mg total) by mouth daily.  Disp: 90 tablet Rfl: 3   Guaifenesin--10 MG/5ML Oral Syrup Take 5 mL by mouth e mild-mod diffuse bulging disc 10/31/2014   • L4-5 slight grade 1 unstable spondylolisthesis 10/31/2014   • Low back pain    • Migraines    • Muscle weakness    • Onychomycosis     Debridement    • Oropharyngeal dysphagia 8/29/2017   • Osteoarthrosis, unspe WITH FUSION PER PATIENT   • OTHER SURGICAL HISTORY  1985,1995,2003,2009    Cervical Discectomy AND FUSION   • TONSILLECTOMY  1950    Tonsillitis    • TRANSFORAMINAL LUMBAR EPIDURAL STEROID INJECTION SINGLE LEVEL Bilateral 10/13/2017    Performed by Peter Puente, Concern: No          crystal light        Occupational Exposure: Not Asked        Hobby Hazards: Not Asked        Sleep Concern: Not Asked        Stress Concern: Not Asked        Weight Concern: Not Asked        Special Diet: Not Asked        Back Care: No There were no vitals filed for this visit. HPI:  Patient presents with:  Lesion: LOV 7/6/18. pt presenting today with lesions to chest and back. pt states lesions on back itch and has changed in size, pt has HX of SK's.       Concerned with irritat (primary encounter diagnosis)  Inflamed seborrheic keratosis  Ak (actinic keratosis)  Benign neoplasm of scalp and skin of neck  Benign neoplasm of skin of face  Benign neoplasm of skin of trunk, except scrotum  Benign neoplasm of skin of upper limb, inclu

## 2019-07-22 ENCOUNTER — TELEPHONE (OUTPATIENT)
Dept: NEUROLOGY | Facility: CLINIC | Age: 79
End: 2019-07-22

## 2019-07-22 ENCOUNTER — OFFICE VISIT (OUTPATIENT)
Dept: NEUROLOGY | Facility: CLINIC | Age: 79
End: 2019-07-22
Payer: MEDICARE

## 2019-07-22 VITALS — SYSTOLIC BLOOD PRESSURE: 128 MMHG | DIASTOLIC BLOOD PRESSURE: 76 MMHG | HEART RATE: 76 BPM

## 2019-07-22 DIAGNOSIS — M48.061 SPINAL STENOSIS OF LUMBAR REGION WITHOUT NEUROGENIC CLAUDICATION: ICD-10-CM

## 2019-07-22 DIAGNOSIS — M96.1 CERVICAL POST-LAMINECTOMY SYNDROME: ICD-10-CM

## 2019-07-22 DIAGNOSIS — M96.1 LUMBAR POST-LAMINECTOMY SYNDROME: Primary | ICD-10-CM

## 2019-07-22 DIAGNOSIS — M54.2 NECK PAIN ON RIGHT SIDE: ICD-10-CM

## 2019-07-22 DIAGNOSIS — M47.812 ARTHROPATHY OF CERVICAL FACET JOINT: ICD-10-CM

## 2019-07-22 DIAGNOSIS — M43.16 SPONDYLOLISTHESIS OF LUMBAR REGION: ICD-10-CM

## 2019-07-22 DIAGNOSIS — Z98.1 S/P CERVICAL SPINAL FUSION: ICD-10-CM

## 2019-07-22 DIAGNOSIS — M43.10 RETROLISTHESIS OF VERTEBRAE: ICD-10-CM

## 2019-07-22 DIAGNOSIS — M51.26 LUMBAR HERNIATED DISC: ICD-10-CM

## 2019-07-22 DIAGNOSIS — M51.36 LUMBAR DEGENERATIVE DISC DISEASE: ICD-10-CM

## 2019-07-22 DIAGNOSIS — G89.29 CHRONIC LEFT-SIDED LOW BACK PAIN WITHOUT SCIATICA: ICD-10-CM

## 2019-07-22 DIAGNOSIS — M54.50 CHRONIC LEFT-SIDED LOW BACK PAIN WITHOUT SCIATICA: ICD-10-CM

## 2019-07-22 DIAGNOSIS — M47.816 LUMBAR FACET ARTHROPATHY: ICD-10-CM

## 2019-07-22 PROCEDURE — 99214 OFFICE O/P EST MOD 30 MIN: CPT | Performed by: PHYSICAL MEDICINE & REHABILITATION

## 2019-07-22 RX ORDER — ACETAMINOPHEN 500 MG
500 TABLET ORAL EVERY 6 HOURS PRN
Status: ON HOLD | COMMUNITY
End: 2019-08-14 | Stop reason: ALTCHOICE

## 2019-07-22 NOTE — PROGRESS NOTES
Low Back Pain H & P    Chief Complaint: Patient presents with:  Neck Pain: pt here for follow up after Bilateral L5 transforaminal epidural steroid injections 5/10/19 with 15% relief in symptoms.  pt c/o new right side neck pain that is almost constant with both knees     knee replacement    • Arthritis of right hip 2009   • Back problem    • Brain aneurysm     Brain Aneurysm, Stent placed    • Cervical disc disease 1985,1995,2003,2009    Cervical Discectomy    • Cholecystitis 1984    Cholecystectomy    •  MD at [address] [place] [address] ENDOSCOPY   • BRONCHOSCOPY N/A 1/9/2017    Performed by Lee Massey MD at [address] [place] [address] ENDOSCOPY   • BRONCHOSCOPY N/A 12/20/2016    Performed by Lee Massey MD at [address] [place] [address] ENDOSCOPY   • CAROTID ENDARTERECTOMY Right    • CARPAL TUNNEL RELEASE Right ~2008 education: Not on file      Highest education level: Not on file    Occupational History      Not on file    Social Needs      Financial resource strain: Not on file      Food insecurity:        Worry: Not on file        Inability: Not on file      Transpo farm: Not Asked        History of tanning: No        Outdoor occupation: Not Asked        Pt has a pacemaker: No        Pt has a defibrillator: No        Breast feeding: Not Asked        Reaction to local anesthetic: No        Left Handed: Yes        Right Bilateral upper extremities. Pin Prick: Not tested. UE Muscle Strength: All Upper Extremity strength measurements 5/5 except:  ABP Right: 4+/5   Reflexes: 2+ In the bilateral upper extremities.    Bryan's sign Right: Negative   Bryan's sigh Left: Ne having the injection to let me know how the injection worked. The patient understands and agrees with the stated plan. Wiley Nova MD  7/22/2019

## 2019-07-22 NOTE — TELEPHONE ENCOUNTER
Medicare online for insurance coverage of Bilateral L4-5 and L5-S1 z-joint injections cpt codes [id number], [id number] . Insurance was verified and procedure is a cover benefit and does not require authorization. Will inform Nursing.

## 2019-07-22 NOTE — PROGRESS NOTES
Patient has been scheduled for a  Bilateral L4-5 and L5-S1 z-joint injections under MAC  on 8/2/2019 at the [facility]. Medications and allergies reviewed.  Patient informed to hold aspirins, nsaids, blood thinners, multivitamins, vitamin E and fish oils 3-7 days

## 2019-07-22 NOTE — PATIENT INSTRUCTIONS
As of October 6th 2014, the Drug Enforcement Agency [facility]) is reclassifying all hydrocodone combination medications from Schedule III to Schedule II. This includes medications such as Norco, Vicodin, Lortab, Zohydro, and Vicoprofen.     What this means for y will perform bilateral L4-5 and L5-S1 z-joint injections. She will need to have right C1-2 and C2-3 z-joint injections in the future once her low back is doing better. The patient does not need any pain medications at this time.     She would like to

## 2019-07-29 ENCOUNTER — APPOINTMENT (OUTPATIENT)
Dept: CT IMAGING | Facility: HOSPITAL | Age: 79
End: 2019-07-29
Attending: INTERNAL MEDICINE
Payer: MEDICARE

## 2019-07-29 ENCOUNTER — HOSPITAL ENCOUNTER (OUTPATIENT)
Facility: HOSPITAL | Age: 79
Setting detail: OBSERVATION
Discharge: HOME | End: 2019-07-31
Attending: HOSPITALIST | Admitting: HOSPITALIST
Payer: MEDICARE

## 2019-07-29 PROBLEM — R51.9 HEADACHE, ACUTE: Status: ACTIVE | Noted: 2019-07-29

## 2019-07-29 PROBLEM — I16.1 HYPERTENSIVE EMERGENCY: Status: ACTIVE | Noted: 2019-07-29

## 2019-07-29 LAB
ALBUMIN SERPL-MCNC: 3.3 G/DL (ref 3.4–5)
ALBUMIN/GLOB SERPL: 1.1 {RATIO} (ref 1–2)
ALP LIVER SERPL-CCNC: 89 U/L (ref 55–142)
ALT SERPL-CCNC: 18 U/L (ref 13–56)
ANION GAP SERPL CALC-SCNC: 5 MMOL/L (ref 0–18)
AST SERPL-CCNC: 12 U/L (ref 15–37)
BILIRUB SERPL-MCNC: 0.4 MG/DL (ref 0.1–2)
BUN BLD-MCNC: 16 MG/DL (ref 7–18)
BUN/CREAT SERPL: 18.4 (ref 10–20)
CALCIUM BLD-MCNC: 9.8 MG/DL (ref 8.5–10.1)
CHLORIDE SERPL-SCNC: 111 MMOL/L (ref 98–112)
CO2 SERPL-SCNC: 29 MMOL/L (ref 21–32)
CREAT BLD-MCNC: 0.87 MG/DL (ref 0.55–1.02)
ERYTHROCYTE [SEDIMENTATION RATE] IN BLOOD: 13 MM/HR (ref 0–30)
GLOBULIN PLAS-MCNC: 3.1 G/DL (ref 2.8–4.4)
GLUCOSE BLD-MCNC: 108 MG/DL (ref 70–99)
HAV IGM SER QL: 1.9 MG/DL (ref 1.6–2.6)
M PROTEIN MFR SERPL ELPH: 6.4 G/DL (ref 6.4–8.2)
MRSA DNA SPEC QL NAA+PROBE: NEGATIVE
OSMOLALITY SERPL CALC.SUM OF ELEC: 302 MOSM/KG (ref 275–295)
PATIENT FASTING: YES
POTASSIUM SERPL-SCNC: 4.1 MMOL/L (ref 3.5–5.1)
SODIUM SERPL-SCNC: 145 MMOL/L (ref 136–145)

## 2019-07-29 PROCEDURE — 80053 COMPREHEN METABOLIC PANEL: CPT | Performed by: HOSPITALIST

## 2019-07-29 PROCEDURE — 93010 ELECTROCARDIOGRAM REPORT: CPT | Performed by: INTERNAL MEDICINE

## 2019-07-29 PROCEDURE — 70496 CT ANGIOGRAPHY HEAD: CPT | Performed by: INTERNAL MEDICINE

## 2019-07-29 PROCEDURE — 85652 RBC SED RATE AUTOMATED: CPT | Performed by: HOSPITALIST

## 2019-07-29 PROCEDURE — 96361 HYDRATE IV INFUSION ADD-ON: CPT

## 2019-07-29 PROCEDURE — 93005 ELECTROCARDIOGRAM TRACING: CPT

## 2019-07-29 PROCEDURE — 87641 MR-STAPH DNA AMP PROBE: CPT | Performed by: HOSPITALIST

## 2019-07-29 PROCEDURE — 83735 ASSAY OF MAGNESIUM: CPT | Performed by: HOSPITALIST

## 2019-07-29 PROCEDURE — 96376 TX/PRO/DX INJ SAME DRUG ADON: CPT

## 2019-07-29 PROCEDURE — 96374 THER/PROPH/DIAG INJ IV PUSH: CPT

## 2019-07-29 PROCEDURE — 96375 TX/PRO/DX INJ NEW DRUG ADDON: CPT

## 2019-07-29 RX ORDER — AMLODIPINE BESYLATE 5 MG/1
5 TABLET ORAL DAILY
Status: DISCONTINUED | OUTPATIENT
Start: 2019-07-29 | End: 2019-07-31

## 2019-07-29 RX ORDER — MORPHINE SULFATE 2 MG/ML
2 INJECTION, SOLUTION INTRAMUSCULAR; INTRAVENOUS EVERY 4 HOURS PRN
Status: DISCONTINUED | OUTPATIENT
Start: 2019-07-29 | End: 2019-07-30

## 2019-07-29 RX ORDER — ACETAMINOPHEN 325 MG/1
650 TABLET ORAL EVERY 6 HOURS PRN
Status: DISCONTINUED | OUTPATIENT
Start: 2019-07-29 | End: 2019-07-31

## 2019-07-29 RX ORDER — METOCLOPRAMIDE HYDROCHLORIDE 5 MG/ML
10 INJECTION INTRAMUSCULAR; INTRAVENOUS EVERY 8 HOURS PRN
Status: DISCONTINUED | OUTPATIENT
Start: 2019-07-29 | End: 2019-07-31

## 2019-07-29 RX ORDER — SODIUM CHLORIDE 0.9 % (FLUSH) 0.9 %
3 SYRINGE (ML) INJECTION AS NEEDED
Status: CANCELLED | OUTPATIENT
Start: 2019-07-29

## 2019-07-29 RX ORDER — METOCLOPRAMIDE HYDROCHLORIDE 5 MG/ML
10 INJECTION INTRAMUSCULAR; INTRAVENOUS 3 TIMES DAILY
Status: COMPLETED | OUTPATIENT
Start: 2019-07-29 | End: 2019-07-30

## 2019-07-29 RX ORDER — SODIUM CHLORIDE 9 MG/ML
INJECTION, SOLUTION INTRAVENOUS CONTINUOUS
Status: DISCONTINUED | OUTPATIENT
Start: 2019-07-29 | End: 2019-07-29

## 2019-07-29 RX ORDER — NAPROXEN 500 MG/1
500 TABLET ORAL 2 TIMES DAILY WITH MEALS
Status: DISCONTINUED | OUTPATIENT
Start: 2019-07-29 | End: 2019-07-29

## 2019-07-29 RX ORDER — LORAZEPAM 1 MG/1
2 TABLET ORAL NIGHTLY PRN
Status: DISCONTINUED | OUTPATIENT
Start: 2019-07-29 | End: 2019-07-31

## 2019-07-29 RX ORDER — ONDANSETRON 2 MG/ML
4 INJECTION INTRAMUSCULAR; INTRAVENOUS EVERY 6 HOURS PRN
Status: CANCELLED | OUTPATIENT
Start: 2019-07-29

## 2019-07-29 RX ORDER — LORAZEPAM 1 MG/1
1 TABLET ORAL 2 TIMES DAILY PRN
Status: DISCONTINUED | OUTPATIENT
Start: 2019-07-29 | End: 2019-07-31

## 2019-07-29 RX ORDER — ONDANSETRON 2 MG/ML
4 INJECTION INTRAMUSCULAR; INTRAVENOUS EVERY 6 HOURS PRN
Status: DISCONTINUED | OUTPATIENT
Start: 2019-07-29 | End: 2019-07-31

## 2019-07-29 RX ORDER — PANTOPRAZOLE SODIUM 40 MG/1
40 TABLET, DELAYED RELEASE ORAL
Status: DISCONTINUED | OUTPATIENT
Start: 2019-07-29 | End: 2019-07-31

## 2019-07-29 RX ORDER — LORAZEPAM 2 MG/1
2 TABLET ORAL ONCE
Status: COMPLETED | OUTPATIENT
Start: 2019-07-29 | End: 2019-07-29

## 2019-07-29 RX ORDER — NAPROXEN 500 MG/1
500 TABLET ORAL 2 TIMES DAILY WITH MEALS
Status: COMPLETED | OUTPATIENT
Start: 2019-07-29 | End: 2019-07-30

## 2019-07-29 RX ORDER — SODIUM CHLORIDE 0.9 % (FLUSH) 0.9 %
3 SYRINGE (ML) INJECTION AS NEEDED
Status: DISCONTINUED | OUTPATIENT
Start: 2019-07-29 | End: 2019-07-31

## 2019-07-29 RX ORDER — QUETIAPINE 100 MG/1
100 TABLET, FILM COATED ORAL NIGHTLY
Status: DISCONTINUED | OUTPATIENT
Start: 2019-07-29 | End: 2019-07-31

## 2019-07-29 NOTE — CM/SW NOTE
Per notes, pt is from [address] [place] 1940. Pt has hx of MJ for acute rehab and ALC for [address]. MDO received for POLST - SW placed on chart. SW to remain available if needs arise.     Arben Munguia, [address]

## 2019-07-29 NOTE — PROGRESS NOTES
Met patient upon her arrival to PCU. I explained that I was the hospitalist that works with Dr. Julio César Neal and the patient said \" Oh she's not my doctor anymore.   I just used her name so that I could be sure to get transferred to [facility].  My new Dr. Dr. Meena Ahuja

## 2019-07-29 NOTE — H&P
[facility] Hospitalist H&P     CC: No chief complaint on file. PCP: Karen Trujillo.  Marlene Witt MD    Admission Date: 7/28/2019    ASSESSMENT / PLAN:   Ms. Chip Syed is a [de-identified]year old female with PMH of COPD, depression, DJD, recurrent falls now wheelchair bound, brai as transfer from [facility] for HA x 3 days PTA. Patient states she has not had a migraine for several years and current HA is unlike usual headaches. Described as posterior, throbbing, and constant. +photosensitivity.  Denies new weakness or numbness or a spinal fusion: C3-6 and C7-T1 11/2/2017   • s/p L5-S1 right laminectomy 8/28/2014   • Shortness of breath     O2 AT NIGHT   • Sleep apnea    • stent placement     cerebral   • Thyroid disease    • Toe pain     Onychomycosis, Debridement , Hammertoe    • To Comment:Unknown  Fluocinolone            OTHER (SEE COMMENTS)    Comment:Unknown  Gabitril [Tiagabine]    OTHER (SEE COMMENTS)    Comment:Stroke like symptoms             Unable to move  Metronidazole           RASH  Phentermine             OTHER (SEE COMM Disease, Premature    • Fibromyalgia Sister    • Heart Disease Sister    • Heart Attack Sister    • Heart Disease Brother    • Heart Attack Brother    • Other (Other[other]) Father    • Other (Other[other]) Maternal Grandmother    • Other (Radha Mathur) Doug Mane 168 hours.     Radiology:

## 2019-07-29 NOTE — PLAN OF CARE
Problem: Patient Centered Care  Goal: Patient preferences are identified and integrated in the patient's plan of care  Description  Interventions:  - What would you like us to know as we care for you?  Prefers [facility] AND [facility] for care  - Provide timely, status changed to DNR today by MD, CTA done this afternoon, ativan po given prn for anxiety, on 1L of oxygen via [place]-weaning as tolerated, on remote tele, purewick changed and suctioning well, continues on IV fluids, bed kept low and locked, call light left

## 2019-07-29 NOTE — PLAN OF CARE
Pt calm this morning, sleeping but easily arousable. Still complains of headache but states it is greatly improved and declines tylenol at this time. BP improved and receiving amlodipine. No acute complaints.  Bed in low locked position, side rails up x3, c ADULT  Goal: Verbalizes/displays adequate comfort level or patient's stated pain goal  Description  INTERVENTIONS:  - Encourage pt to monitor pain and request assistance  - Assess pain using appropriate pain scale  - Administer analgesics based on type and

## 2019-07-29 NOTE — CM/SW NOTE
Dr. Raul Tovar notified this RN of the following conversation she had with patient upon patient's arrival: Per Dr. Shine Counter note:   Met patient upon her arrival to PCU.   I explained that I was the hospitalist that works with Dr. Melodie Nichole and the patient s

## 2019-07-29 NOTE — RESPIRATORY THERAPY NOTE
Patient does have a CPAP unit at home however is non compliant due to discomfort from the mask. RN is aware that patient will not be using CPAP therapy while inpatient.

## 2019-07-29 NOTE — PROGRESS NOTES
Pt was a direct admit from [facility] ([facility] for HTN emergency/headache (all work-up there negative). Arrived to  212 at 0248. Pt initially hypertensive d/t anxiety from ambulance ride.  Pt arrived wearing an eye mask over her eyes cupping both her hands

## 2019-07-30 LAB
ANION GAP SERPL CALC-SCNC: 7 MMOL/L (ref 0–18)
BUN BLD-MCNC: 13 MG/DL (ref 7–18)
BUN/CREAT SERPL: 17.1 (ref 10–20)
CALCIUM BLD-MCNC: 9.2 MG/DL (ref 8.5–10.1)
CHLORIDE SERPL-SCNC: 110 MMOL/L (ref 98–112)
CO2 SERPL-SCNC: 27 MMOL/L (ref 21–32)
CREAT BLD-MCNC: 0.76 MG/DL (ref 0.55–1.02)
DEPRECATED RDW RBC AUTO: 48.8 FL (ref 35.1–46.3)
ERYTHROCYTE [DISTWIDTH] IN BLOOD BY AUTOMATED COUNT: 13.8 % (ref 11–15)
GLUCOSE BLD-MCNC: 79 MG/DL (ref 70–99)
HCT VFR BLD AUTO: 40.7 % (ref 35–48)
HGB BLD-MCNC: 13.4 G/DL (ref 12–16)
MCH RBC QN AUTO: 31.5 PG (ref 26–34)
MCHC RBC AUTO-ENTMCNC: 32.9 G/DL (ref 31–37)
MCV RBC AUTO: 95.8 FL (ref 80–100)
OSMOLALITY SERPL CALC.SUM OF ELEC: 297 MOSM/KG (ref 275–295)
PATIENT FASTING: YES
PLATELET # BLD AUTO: 134 10(3)UL (ref 150–450)
POTASSIUM SERPL-SCNC: 4.1 MMOL/L (ref 3.5–5.1)
RBC # BLD AUTO: 4.25 X10(6)UL (ref 3.8–5.3)
SODIUM SERPL-SCNC: 144 MMOL/L (ref 136–145)
WBC # BLD AUTO: 5.5 X10(3) UL (ref 4–11)

## 2019-07-30 PROCEDURE — 97116 GAIT TRAINING THERAPY: CPT

## 2019-07-30 PROCEDURE — 97166 OT EVAL MOD COMPLEX 45 MIN: CPT

## 2019-07-30 PROCEDURE — 97162 PT EVAL MOD COMPLEX 30 MIN: CPT

## 2019-07-30 PROCEDURE — 97530 THERAPEUTIC ACTIVITIES: CPT

## 2019-07-30 PROCEDURE — 96376 TX/PRO/DX INJ SAME DRUG ADON: CPT

## 2019-07-30 PROCEDURE — 80048 BASIC METABOLIC PNL TOTAL CA: CPT | Performed by: INTERNAL MEDICINE

## 2019-07-30 PROCEDURE — 85027 COMPLETE CBC AUTOMATED: CPT | Performed by: INTERNAL MEDICINE

## 2019-07-30 RX ORDER — MORPHINE SULFATE 2 MG/ML
2 INJECTION, SOLUTION INTRAMUSCULAR; INTRAVENOUS
Status: DISCONTINUED | OUTPATIENT
Start: 2019-07-30 | End: 2019-07-31

## 2019-07-30 RX ORDER — BUTALBITAL, ACETAMINOPHEN AND CAFFEINE 50; 325; 40 MG/1; MG/1; MG/1
1 TABLET ORAL EVERY 4 HOURS PRN
Status: DISCONTINUED | OUTPATIENT
Start: 2019-07-30 | End: 2019-07-31

## 2019-07-30 NOTE — PHYSICAL THERAPY NOTE
PHYSICAL THERAPY EVALUATION - INPATIENT     Room Number: 522/103-F  Evaluation Date: 7/30/2019  Type of Evaluation: Initial   Physician Order: PT Eval and Treat    Presenting Problem: p/w HTN emergency/HA, likely migraine  Reason for Therapy: Mobility Dys Research supports that patients with this level of impairment may benefit from rehab stay. Pt was independent with mobility prior to admission, now needs 1-2 MIN A for mobility for safety.  Pt is HIGH FALL RISK with multiple losses of balance and knee avalos Debridement    • Oropharyngeal dysphagia 8/29/2017   • Osteoarthrosis, unspecified whether generalized or localized, unspecified site    • Other and unspecified hyperlipidemia    • Other complicated headache syndrome 11/2/2017   • Other spondylosis, lum TRANSFORAMINAL LUMBAR EPIDURAL STEROID INJECTION SINGLE LEVEL Bilateral 10/13/2017    Performed by Yolis Yoon MD at [address] [place] [address]  Type of Home: Assisted living facility([facility])   Home Layout: One level    Lives With: Staff 24 hour blankets)?: A Little   -   Sitting down on and standing up from a chair with arms (e.g., wheelchair, bedside commode, etc.): A Little   -   Moving from lying on back to sitting on the side of the bed?: A Little   How much help from another person does the modified independent   Goal #3   Current Status    Goal #4    Goal #4   Current Status    Goal #5 Patient to demonstrate independence with home activity/exercise instructions provided to patient in preparation for discharge.    Goal #5   Current Status    G

## 2019-07-30 NOTE — PLAN OF CARE
Problem: Patient Centered Care  Goal: Patient preferences are identified and integrated in the patient's plan of care  Description  Interventions:  - What would you like us to know as we care for you?  Prefers [facility] AND [facility] for care  - Provide timely, Problem: SAFETY ADULT - FALL  Goal: Free from fall injury  Description  INTERVENTIONS:  - Assess pt frequently for physical needs  - Identify cognitive and physical deficits and behaviors that affect risk of falls.   - [place] fall precautions as indica

## 2019-07-30 NOTE — TELEPHONE ENCOUNTER
S/w pt's dtg, Margaret Fernandez (HIPAA verified) who explained that she is unsure of patient's condition or when she will be released. Dtg suggested calling patient - patient currently admitted to [facility] w/ intractable HA for observation d/t PMH of brain aneurysm.      Yesenia Brambila

## 2019-07-30 NOTE — OCCUPATIONAL THERAPY NOTE
OCCUPATIONAL THERAPY EVALUATION - INPATIENT     Room Number: 572/407-Z  Evaluation Date: 7/30/2019  Type of Evaluation: Initial  Presenting Problem: (headache)    Physician Order: IP Consult to Occupational Therapy  Reason for Therapy: ADL/IADL Dysfunction mobility prior to returning to [address] [place] [address]  OT Discharge Recommendations: Sub-acute rehabilitation  OT Device Recommendations: TBD    PLAN  OT Treatment Plan: Patient/Family training;Equipment eval/education; Endurance training;Functio placement     cerebral   • Thyroid disease    • Toe pain     Onychomycosis, Debridement , Hammertoe    • Tonsillitis 1950    Tonsillitis    • Unspecified essential hypertension    • Unspecified sleep apnea    • Visual impairment     EYE GLASSES     Past [place] scooter    Prior Level of [place]: Pta pt was living alone in an assisted living apartment at [facility]. Pt reports being independent w/ adls and ambulation. Pt reports using electric scooter for distance; sc or no ad inside her apartment.  Pt reports fr a    FUNCTIONAL ADL ASSESSMENT  Grooming: set up  Feeding: independent  Bathing: mod a le  Toileting: max a  Upper Extremity Dressing: min a  Lower Extremity Dressing: min a    Patient End of Session: Up in chair;Needs met;Call light within reach;RN aware

## 2019-07-30 NOTE — PLAN OF CARE
Problem: Patient Centered Care  Goal: Patient preferences are identified and integrated in the patient's plan of care  Description  Interventions:  - What would you like us to know as we care for you?  Prefers [facility] AND [facility] for care  - Provide timely,

## 2019-07-30 NOTE — PROGRESS NOTES
[facility] Hospitalist Progress Note     Reason for Admission: migraine   PCP: Aurora Cohen. Kings Guzman MD     Assessment/Plan:     Active Problems:    Headache, acute    Hypertensive emergency    Ms. Anastasiya Bridges is a [de-identified]year old female with PMH of COPD, depression, DJD, re 2205  Gross per 24 hour   Intake 923 ml   Output 650 ml   Net 273 ml       Last 3 Weights  07/29/19 0300 : 261 lb 6.4 oz (118.6 kg)  06/23/19 0648 : 250 lb (113.4 kg)  05/21/19 1107 : 256 lb (116.1 kg)      Exam   GEN: NAD  HEENT: EOMIJEANA  Neck: Suppl with food   • naproxen  500 mg Oral BID with meals   • Metoclopramide HCl  10 mg Intravenous TID       morphINE sulfate (PF), Butalbital-APAP-Caffeine, Normal Saline Flush, acetaminophen, ondansetron HCl, Metoclopramide HCl, LORazepam, LORazepam

## 2019-07-30 NOTE — TELEPHONE ENCOUNTER
Alondra calling from [facility], pt is currently hospitalized and will need to cancel injection, also asking for cancellation form to be faxed to them, pt will call to reschedule when she is ready

## 2019-07-31 VITALS
HEIGHT: 68 IN | TEMPERATURE: 98 F | HEART RATE: 74 BPM | SYSTOLIC BLOOD PRESSURE: 116 MMHG | BODY MASS INDEX: 39.61 KG/M2 | OXYGEN SATURATION: 97 % | DIASTOLIC BLOOD PRESSURE: 85 MMHG | RESPIRATION RATE: 20 BRPM | WEIGHT: 261.38 LBS

## 2019-07-31 PROCEDURE — 97530 THERAPEUTIC ACTIVITIES: CPT

## 2019-07-31 PROCEDURE — 97535 SELF CARE MNGMENT TRAINING: CPT

## 2019-07-31 PROCEDURE — 97116 GAIT TRAINING THERAPY: CPT

## 2019-07-31 RX ORDER — BUTALBITAL, ASPIRIN, AND CAFFEINE 50; 325; 40 MG/1; MG/1; MG/1
1 CAPSULE ORAL EVERY 4 HOURS PRN
Qty: 10 CAPSULE | Refills: 0 | Status: SHIPPED | OUTPATIENT
Start: 2019-07-31 | End: 2019-10-03

## 2019-07-31 NOTE — PLAN OF CARE
Problem: Patient Centered Care  Goal: Patient preferences are identified and integrated in the patient's plan of care  Description  Interventions:  - What would you like us to know as we care for you?  Prefers [facility] AND [facility] for care  - Provide timely, injury  Description  INTERVENTIONS:  - Assess pt frequently for physical needs  - Identify cognitive and physical deficits and behaviors that affect risk of falls.   - [place] fall precautions as indicated by assessment.  - Educate pt/family on patient sa

## 2019-07-31 NOTE — OCCUPATIONAL THERAPY NOTE
OCCUPATIONAL THERAPY TREATMENT NOTE - INPATIENT        Room Number: 770/410-R           Presenting Problem: (headache)    Problem List  Active Problems:    Headache, acute    Hypertensive emergency      OCCUPATIONAL THERAPY ASSESSMENT     Pt seen up in bed rate  Location: (headache, nursing aware)  Management Techniques: Relaxation     ACTIVITY TOLERANCE                         O2 SATURATIONS                ACTIVITIES OF DAILY LIVING ASSESSMENT  AM-PAC ‘6-Clicks’ Inpatient Daily Activity Short Form  How much Comment: CGA    Patient will tolerate standing for 3 minutes w/ supervision to complete self care task   Comment:goal met    Patient will complete le dressing w/ supervision  Comment: CGA            Goals  on: 19  Frequency: 3x/week

## 2019-07-31 NOTE — PROGRESS NOTES
Patient is discharged home. Asked for medicar transport. RN called [employer] to set up medicar. Patient understands of fee of $45.00 agreeable. PCS form in the chart. Radameslay Loreta will be here at [phone].     Any changes call [facility]

## 2019-07-31 NOTE — DISCHARGE SUMMARY
[facility] Hospitalist Discharge Summary   Patient ID:  Mario Galarza [id number]  [de-identified]year old 12/3/1940    Admit date: 7/29/2019  Discharge date: 7/31/2019  Risk of Readmission Lace+ Score: 79  59-90 High Risk  29-58 Medium Risk  0-28   Low Risk    Primary Care osseous remodeling adjacent to the coil mass. Extensive associated streak artifacts limit assessment. Within this limitation, no evidence of significant residual/recurrent aneurysm. 2. No evidence of intracranial flow limiting stenosis.  3. Noncontrast he Status: DNR    Important follow up: Follow-up Information     Janet Arguello MD In 1 week.     Specialty:  Internal Medicine  Contact information:  [address] [place] [address]  [phone]                   Disposition: home  D

## 2019-07-31 NOTE — CM/SW NOTE
7/31: FLAKITA received MDO for home healthcare assessment. Per chart review, PT/OT recommending KYLE; however, patient is here under observation status and would not qualify at this time unless meets inpatient criteria.     FLAKITA discussed with patient she arrives f

## 2019-07-31 NOTE — PHYSICAL THERAPY NOTE
PHYSICAL THERAPY TREATMENT NOTE - INPATIENT     Room Number: 584/640-U       Presenting Problem: p/w HTN emergency/HA, likely migraine    Problem List  Active Problems:    Headache, acute    Hypertensive emergency      PHYSICAL THERAPY ASSESSMENT     Chart PLAN  PT Treatment Plan: Bed mobility; Body mechanics; Endurance; Energy conservation;Patient education;Gait training;Balance training;Transfer training    SUBJECTIVE  \"I don't have 10 cents to my name. I can pay for a caregiver. \"    Orlando Cornell stand transfer with rolling walker at [facility]    Patient End of Session: In bed;Needs met;Call light within reach;RN aware of session/findings; All patient questions and concerns addressed; Alarm set(upright in bed, OT present at exit)    CURRENT GOALS      Goal

## 2019-07-31 NOTE — PLAN OF CARE
Problem: Patient Centered Care  Goal: Patient preferences are identified and integrated in the patient's plan of care  Description  Interventions:  - What would you like us to know as we care for you?  Prefers [facility] AND [facility] for care  - Provide timely, SAFETY ADULT - FALL  Goal: Free from fall injury  Description  INTERVENTIONS:  - Assess pt frequently for physical needs  - Identify cognitive and physical deficits and behaviors that affect risk of falls.   - [place] fall precautions as indicated by asse

## 2019-08-01 ENCOUNTER — APPOINTMENT (OUTPATIENT)
Dept: GENERAL RADIOLOGY | Facility: HOSPITAL | Age: 79
End: 2019-08-01
Attending: EMERGENCY MEDICINE
Payer: MEDICARE

## 2019-08-01 ENCOUNTER — HOSPITAL ENCOUNTER (EMERGENCY)
Facility: HOSPITAL | Age: 79
Discharge: HOME | End: 2019-08-01
Attending: EMERGENCY MEDICINE
Payer: MEDICARE

## 2019-08-01 VITALS
DIASTOLIC BLOOD PRESSURE: 93 MMHG | SYSTOLIC BLOOD PRESSURE: 124 MMHG | TEMPERATURE: 97 F | OXYGEN SATURATION: 93 % | RESPIRATION RATE: 18 BRPM | HEART RATE: 86 BPM

## 2019-08-01 DIAGNOSIS — S81.812A LACERATION OF LEFT LOWER LEG, INITIAL ENCOUNTER: Primary | ICD-10-CM

## 2019-08-01 PROCEDURE — 73590 X-RAY EXAM OF LOWER LEG: CPT | Performed by: EMERGENCY MEDICINE

## 2019-08-01 PROCEDURE — 99283 EMERGENCY DEPT VISIT LOW MDM: CPT

## 2019-08-01 PROCEDURE — 12005 RPR S/N/A/GEN/TRK12.6-20.0CM: CPT

## 2019-08-01 RX ORDER — CEFADROXIL 500 MG/1
500 CAPSULE ORAL 2 TIMES DAILY
Qty: 10 CAPSULE | Refills: 0 | Status: SHIPPED | OUTPATIENT
Start: 2019-08-01 | End: 2019-08-06

## 2019-08-01 RX ORDER — LIDOCAINE HYDROCHLORIDE AND EPINEPHRINE 20; 5 MG/ML; UG/ML
INJECTION, SOLUTION EPIDURAL; INFILTRATION; INTRACAUDAL; PERINEURAL
Status: COMPLETED
Start: 2019-08-01 | End: 2019-08-01

## 2019-08-01 RX ORDER — LIDOCAINE HYDROCHLORIDE AND EPINEPHRINE 20; 5 MG/ML; UG/ML
20 INJECTION, SOLUTION EPIDURAL; INFILTRATION; INTRACAUDAL; PERINEURAL ONCE
Status: COMPLETED | OUTPATIENT
Start: 2019-08-01 | End: 2019-08-01

## 2019-08-01 NOTE — ED NOTES
Pt stating she wants to call daughter prior to discharge  No answer    Pt verbalized understanding of discharge and follow up instructions, along with prescriptions.  Denies additional questioning  Stable upon discharge    [facility] called for medicare, eta

## 2019-08-01 NOTE — ED PROVIDER NOTES
Patient Seen in: [facility] AND [facility] Emergency Department    History   Patient presents with:  Laceration Abrasion (integumentary)    Stated Complaint: leg lac    HPI    She presents the emergency department after sustaining laceration to her left lower le spondylosis, lumbar region 11/2/2017   • Pneumonia 2012   • S/P cervical spinal fusion: C3-6 and C7-T1 11/2/2017   • s/p L5-S1 right laminectomy 8/28/2014   • Shortness of breath     O2 AT NIGHT   • Sleep apnea    • stent placement     cerebral   • Thyroid years: 37.5        Types: Cigarettes        Quit date: 1977        Years since quittin.6      Smokeless tobacco: Never Used      Tobacco comment:     Alcohol use: No      Alcohol/week: 0.0 standard drinks    Drug use: No      Review of Systems radiodense foreign body. No acute osseous abnormality. Left knee arthroplasty is  partially included in the field of imaging.    Dictated by (CST): Jamey Bradley MD on 8/01/2019 at 14:42     Approved by (CST): Jamey Bradley MD on 8/01/2019 at 14:43 pressure. Medications Prescribed:  Current Discharge Medication List    START taking these medications    Cefadroxil 500 MG Oral Cap  Take 1 capsule (500 mg total) by mouth 2 (two) times daily for 5 days.   Qty: 10 capsule Refills: 0

## 2019-08-01 NOTE — ED INITIAL ASSESSMENT (HPI)
L lower leg lac from scrape on bus wheelchair lift. Taking blood thinners.  Bleeding controlled by ems

## 2019-08-07 ENCOUNTER — TELEPHONE (OUTPATIENT)
Dept: SURGERY | Facility: CLINIC | Age: 79
End: 2019-08-07

## 2019-08-07 NOTE — TELEPHONE ENCOUNTER
Spoke w/ pt. Pt stated she wants to cancel MD appointment on 8/8/19 w/ Dr. Sowmya Tavarez, pt states,\"I'm doing fine and I will let you know if I need anything. \"  Pt instructed to call the office for questions and / or concerns.    Pt verbalized understandi

## 2019-08-12 NOTE — TELEPHONE ENCOUNTER
Pt called wanting to schedule injection. Upon questioning pt stated she had deep laceration to leg requiring 35 sutures, they are still in. Pt is not on any antibiotic.  Pt told that she needs to have sutures out before Dr Katie Cook would preform procedure/ Ask

## 2019-08-14 ENCOUNTER — HOSPITAL ENCOUNTER (INPATIENT)
Facility: HOSPITAL | Age: 79
LOS: 3 days | Discharge: NURSING, REHAB OR LONG-TERM CARE | DRG: 264 | End: 2019-08-23
Attending: EMERGENCY MEDICINE | Admitting: HOSPITALIST
Payer: MEDICARE

## 2019-08-14 DIAGNOSIS — L08.9 WOUND INFECTION: ICD-10-CM

## 2019-08-14 DIAGNOSIS — L03.116 CELLULITIS OF LEFT LOWER LEG: Primary | ICD-10-CM

## 2019-08-14 DIAGNOSIS — T14.8XXA WOUND INFECTION: ICD-10-CM

## 2019-08-14 DIAGNOSIS — L03.116 CELLULITIS OF LEFT LOWER EXTREMITY: ICD-10-CM

## 2019-08-14 LAB
ANION GAP SERPL CALC-SCNC: 6 MMOL/L (ref 0–18)
BASOPHILS # BLD AUTO: 0.05 X10(3) UL (ref 0–0.2)
BASOPHILS NFR BLD AUTO: 0.6 %
BUN BLD-MCNC: 18 MG/DL (ref 7–18)
BUN/CREAT SERPL: 18.9 (ref 10–20)
CALCIUM BLD-MCNC: 9.4 MG/DL (ref 8.5–10.1)
CHLORIDE SERPL-SCNC: 105 MMOL/L (ref 98–112)
CO2 SERPL-SCNC: 30 MMOL/L (ref 21–32)
CREAT BLD-MCNC: 0.95 MG/DL (ref 0.55–1.02)
DEPRECATED RDW RBC AUTO: 46.7 FL (ref 35.1–46.3)
EOSINOPHIL # BLD AUTO: 0.26 X10(3) UL (ref 0–0.7)
EOSINOPHIL NFR BLD AUTO: 3.3 %
ERYTHROCYTE [DISTWIDTH] IN BLOOD BY AUTOMATED COUNT: 13.2 % (ref 11–15)
GLUCOSE BLD-MCNC: 94 MG/DL (ref 70–99)
HCT VFR BLD AUTO: 41.2 % (ref 35–48)
HGB BLD-MCNC: 13.2 G/DL (ref 12–16)
IMM GRANULOCYTES # BLD AUTO: 0.04 X10(3) UL (ref 0–1)
IMM GRANULOCYTES NFR BLD: 0.5 %
LYMPHOCYTES # BLD AUTO: 1.79 X10(3) UL (ref 1–4)
LYMPHOCYTES NFR BLD AUTO: 22.7 %
MCH RBC QN AUTO: 31.1 PG (ref 26–34)
MCHC RBC AUTO-ENTMCNC: 32 G/DL (ref 31–37)
MCV RBC AUTO: 96.9 FL (ref 80–100)
MONOCYTES # BLD AUTO: 0.6 X10(3) UL (ref 0.1–1)
MONOCYTES NFR BLD AUTO: 7.6 %
NEUTROPHILS # BLD AUTO: 5.14 X10 (3) UL (ref 1.5–7.7)
NEUTROPHILS # BLD AUTO: 5.14 X10(3) UL (ref 1.5–7.7)
NEUTROPHILS NFR BLD AUTO: 65.3 %
OSMOLALITY SERPL CALC.SUM OF ELEC: 294 MOSM/KG (ref 275–295)
PLATELET # BLD AUTO: 207 10(3)UL (ref 150–450)
POTASSIUM SERPL-SCNC: 4.4 MMOL/L (ref 3.5–5.1)
RBC # BLD AUTO: 4.25 X10(6)UL (ref 3.8–5.3)
SODIUM SERPL-SCNC: 141 MMOL/L (ref 136–145)
WBC # BLD AUTO: 7.9 X10(3) UL (ref 4–11)

## 2019-08-14 RX ORDER — BUDESONIDE AND FORMOTEROL FUMARATE DIHYDRATE 160; 4.5 UG/1; UG/1
1 AEROSOL RESPIRATORY (INHALATION) DAILY
COMMUNITY
End: 2019-11-09 | Stop reason: ALTCHOICE

## 2019-08-14 RX ORDER — BUTALBITAL, ASPIRIN, AND CAFFEINE 50; 325; 40 MG/1; MG/1; MG/1
1 CAPSULE ORAL EVERY 4 HOURS PRN
Status: DISCONTINUED | OUTPATIENT
Start: 2019-08-14 | End: 2019-08-23

## 2019-08-14 RX ORDER — MORPHINE SULFATE 4 MG/ML
4 INJECTION, SOLUTION INTRAMUSCULAR; INTRAVENOUS ONCE
Status: COMPLETED | OUTPATIENT
Start: 2019-08-14 | End: 2019-08-14

## 2019-08-14 RX ORDER — CLOTRIMAZOLE 1 %
1 CREAM (GRAM) TOPICAL 2 TIMES DAILY PRN
COMMUNITY
End: 2019-10-03

## 2019-08-14 RX ORDER — QUETIAPINE 100 MG/1
100 TABLET, FILM COATED ORAL NIGHTLY
Status: DISCONTINUED | OUTPATIENT
Start: 2019-08-14 | End: 2019-08-23

## 2019-08-14 RX ORDER — ACETAMINOPHEN 325 MG/1
650 TABLET ORAL EVERY 6 HOURS PRN
Status: DISCONTINUED | OUTPATIENT
Start: 2019-08-14 | End: 2019-08-23

## 2019-08-14 RX ORDER — ALBUTEROL SULFATE 1.5 MG/3ML
1 SOLUTION RESPIRATORY (INHALATION) EVERY 4 HOURS PRN
COMMUNITY
End: 2019-10-03

## 2019-08-14 RX ORDER — PANTOPRAZOLE SODIUM 40 MG/1
40 TABLET, DELAYED RELEASE ORAL
Status: DISCONTINUED | OUTPATIENT
Start: 2019-08-15 | End: 2019-08-23

## 2019-08-14 RX ORDER — AMLODIPINE BESYLATE 5 MG/1
5 TABLET ORAL DAILY
Status: DISCONTINUED | OUTPATIENT
Start: 2019-08-14 | End: 2019-08-19

## 2019-08-14 RX ORDER — MORPHINE SULFATE 4 MG/ML
INJECTION, SOLUTION INTRAMUSCULAR; INTRAVENOUS
Status: COMPLETED
Start: 2019-08-14 | End: 2019-08-14

## 2019-08-14 RX ORDER — IPRATROPIUM BROMIDE AND ALBUTEROL SULFATE 2.5; .5 MG/3ML; MG/3ML
3 SOLUTION RESPIRATORY (INHALATION) EVERY 6 HOURS PRN
Status: DISCONTINUED | OUTPATIENT
Start: 2019-08-14 | End: 2019-08-17

## 2019-08-14 RX ORDER — ONDANSETRON 2 MG/ML
4 INJECTION INTRAMUSCULAR; INTRAVENOUS EVERY 6 HOURS PRN
Status: DISCONTINUED | OUTPATIENT
Start: 2019-08-14 | End: 2019-08-23

## 2019-08-14 RX ORDER — ASPIRIN 325 MG
325 TABLET ORAL DAILY
Status: DISCONTINUED | OUTPATIENT
Start: 2019-08-14 | End: 2019-08-23

## 2019-08-14 RX ORDER — SODIUM CHLORIDE 0.9 % (FLUSH) 0.9 %
3 SYRINGE (ML) INJECTION AS NEEDED
Status: DISCONTINUED | OUTPATIENT
Start: 2019-08-14 | End: 2019-08-23

## 2019-08-14 RX ORDER — LORAZEPAM 2 MG/1
4 TABLET ORAL NIGHTLY
Status: ON HOLD | COMMUNITY
End: 2019-08-23

## 2019-08-14 RX ORDER — NAPROXEN 500 MG/1
500 TABLET ORAL 2 TIMES DAILY PRN
COMMUNITY
End: 2019-10-03

## 2019-08-14 RX ORDER — DICYCLOMINE HCL 20 MG
20 TABLET ORAL EVERY 6 HOURS PRN
Status: DISCONTINUED | OUTPATIENT
Start: 2019-08-14 | End: 2019-08-23

## 2019-08-14 RX ORDER — GUAIFENESIN AND DEXTROMETHORPHAN HYDROBROMIDE 100; 10 MG/5ML; MG/5ML
5 SOLUTION ORAL EVERY 4 HOURS PRN
COMMUNITY
End: 2019-10-03

## 2019-08-14 RX ORDER — DOCUSATE SODIUM 100 MG/1
100 CAPSULE, LIQUID FILLED ORAL
Status: DISCONTINUED | OUTPATIENT
Start: 2019-08-14 | End: 2019-08-23

## 2019-08-14 RX ORDER — AMMONIUM LACTATE 12 G/100G
1 LOTION TOPICAL
COMMUNITY
End: 2019-10-03

## 2019-08-14 RX ORDER — DOCUSATE SODIUM 100 MG/1
100 CAPSULE, LIQUID FILLED ORAL
COMMUNITY
End: 2019-10-07

## 2019-08-14 RX ORDER — FUROSEMIDE 20 MG/1
20 TABLET ORAL DAILY
COMMUNITY
End: 2019-10-03

## 2019-08-14 RX ORDER — METOCLOPRAMIDE HYDROCHLORIDE 5 MG/ML
10 INJECTION INTRAMUSCULAR; INTRAVENOUS EVERY 8 HOURS PRN
Status: DISCONTINUED | OUTPATIENT
Start: 2019-08-14 | End: 2019-08-23

## 2019-08-14 RX ORDER — LOPERAMIDE HYDROCHLORIDE 2 MG/1
2 CAPSULE ORAL 4 TIMES DAILY PRN
COMMUNITY
End: 2019-10-03

## 2019-08-14 RX ORDER — LORAZEPAM 1 MG/1
4 TABLET ORAL NIGHTLY
Status: DISCONTINUED | OUTPATIENT
Start: 2019-08-14 | End: 2019-08-23

## 2019-08-14 RX ORDER — ACETAMINOPHEN 325 MG/1
650 TABLET ORAL EVERY 6 HOURS PRN
COMMUNITY
End: 2019-11-09

## 2019-08-14 RX ORDER — POLYETHYLENE GLYCOL 3350 17 G/17G
17 POWDER, FOR SOLUTION ORAL DAILY PRN
Status: DISCONTINUED | OUTPATIENT
Start: 2019-08-14 | End: 2019-08-23

## 2019-08-14 RX ORDER — OXYBUTYNIN CHLORIDE 5 MG/1
5 TABLET, EXTENDED RELEASE ORAL DAILY
Status: DISCONTINUED | OUTPATIENT
Start: 2019-08-14 | End: 2019-08-23

## 2019-08-14 RX ORDER — FLUTICASONE PROPIONATE AND SALMETEROL 250; 50 UG/1; UG/1
1 POWDER RESPIRATORY (INHALATION) EVERY 12 HOURS SCHEDULED
COMMUNITY
End: 2019-10-03

## 2019-08-14 RX ORDER — DICYCLOMINE HCL 20 MG
20 TABLET ORAL EVERY 6 HOURS PRN
COMMUNITY
End: 2019-10-03

## 2019-08-14 RX ORDER — ACETAMINOPHEN 160 MG
2000 TABLET,DISINTEGRATING ORAL DAILY
COMMUNITY
End: 2019-11-09

## 2019-08-14 RX ORDER — FUROSEMIDE 20 MG/1
20 TABLET ORAL DAILY
Status: DISCONTINUED | OUTPATIENT
Start: 2019-08-14 | End: 2019-08-23

## 2019-08-14 RX ORDER — BISACODYL 10 MG
10 SUPPOSITORY, RECTAL RECTAL
Status: DISCONTINUED | OUTPATIENT
Start: 2019-08-14 | End: 2019-08-23

## 2019-08-14 RX ORDER — DESVENLAFAXINE 50 MG/1
100 TABLET, EXTENDED RELEASE ORAL DAILY
Status: DISCONTINUED | OUTPATIENT
Start: 2019-08-14 | End: 2019-08-23

## 2019-08-14 RX ORDER — ASPIRIN 325 MG
325 TABLET ORAL DAILY
COMMUNITY
End: 2019-11-09

## 2019-08-14 NOTE — PROGRESS NOTES
[facility] Dosing Service    Initial Pharmacokinetic Consult for Vancomycin Dosing     Juan A Goldsmith is a [de-identified]year old female who is being treated for cellulitis. Pharmacy has been asked to dose Vancomycin by Dr. Brayden Campa.     She is allergic to bactrim [s

## 2019-08-14 NOTE — ED NOTES
Orders for admission, patient is aware of plan and ready to go upstairs. Any questions, please call ED RN Amy Retana  at extension [phone].

## 2019-08-14 NOTE — ED NOTES
Patient arrives with complaints of L leg swelling and pain. Patient states she cut her L leg on a piece of metal this past Thursday, requiring stiches. Patient states she developed more swelling and pain since. Denies numbness or tingling.  Patient was able

## 2019-08-14 NOTE — ED PROVIDER NOTES
Patient Seen in: [facility] AND [facility] Emergency Department    History   Patient presents with:  Swelling Edema (cardiovascular, metabolic)    Stated Complaint: L LEG PAIN WITH SUTURES    HPI    47-year-old female with past medical history significant for an Debridement    • Oropharyngeal dysphagia 8/29/2017   • Osteoarthrosis, unspecified whether generalized or localized, unspecified site    • Other and unspecified hyperlipidemia    • Other complicated headache syndrome 11/2/2017   • Other spondylosis, lumbar EPIDURAL STEROID INJECTION SINGLE LEVEL Bilateral 10/13/2017    Performed by Dolores Chamberlain MD at [facility] MAIN OR                    Social History    Tobacco Use      Smoking status: Former Smoker        Packs/day: 1.50        Years: 25.00        Pack years: area  Lymphadenopathy: No sig cervical LAD   Neurological: Awake, alert. Normal reflexes. No cranial nerve deficit. Skin: Skin is warm and dry. No rash noted. No erythema.    Psychiatric:    ED Course     Labs Reviewed   BASIC METABOLIC PANEL (8) - Abnor diagnosis)  Wound infection    Disposition:  Admit  8/14/2019  4:19 pm    Follow-up:  No follow-up provider specified.   We recommend that you schedule follow up care with a primary care provider within the next three months to obtain basic health screening

## 2019-08-14 NOTE — H&P
[facility] Hospitalist H&P       CC: Patient presents with:  Swelling Edema (cardiovascular, metabolic)       PCP: Becky Berry.  Devante Doyle MD    ASSESSMENT / PLAN:   Patient is a [de-identified]year old female with PMH sig for COPD, GERD, OA, depression/anxiety, recurrent falls hip 2009   • Back problem    • Brain aneurysm     Brain Aneurysm, Stent placed    • Cervical disc disease 1985,1995,2003,2009    Cervical Discectomy    • Cholecystitis 1984    Cholecystectomy    • COPD (chronic obstructive pulmonary disease) ([facility] [place] [address]    • [facility] Janet Sorensen MD at [facility] ENDOSCOPY   • BRONCHOSCOPY N/A 12/20/2016    Performed by Carolynn Draper MD at [facility] ENDOSCOPY   • CAROTID ENDARTERECTOMY Right    • CARPAL TUNNEL RELEASE Right ~2008   • [address]    Cholecystitis   • COLONOSCOPY  2010   • Carolyn Parr Never Used      Tobacco comment: 1977    Alcohol use: No      Alcohol/week: 0.0 standard drinks       Fam Hx  Family History   Problem Relation Age of Onset   • Heart Attack Mother    • Heart Disease Mother         Coronary Artery Disease, Premature    • F deficit appreciated     Diagnostic Data:    CBC/Chem    Recent Labs   Lab 08/14/19  1500   RBC 4.25   HGB 13.2   HCT 41.2   MCV 96.9   MCH 31.1   MCHC 32.0   RDW 13.2   NEPRELIM 5.14   WBC 7.9   .0         Recent Labs   Lab 08/14/19  1500   GLU 94

## 2019-08-15 LAB
ANION GAP SERPL CALC-SCNC: 6 MMOL/L (ref 0–18)
BASOPHILS # BLD AUTO: 0.04 X10(3) UL (ref 0–0.2)
BASOPHILS NFR BLD AUTO: 0.6 %
BUN BLD-MCNC: 19 MG/DL (ref 7–18)
BUN/CREAT SERPL: 21.3 (ref 10–20)
CALCIUM BLD-MCNC: 9 MG/DL (ref 8.5–10.1)
CHLORIDE SERPL-SCNC: 106 MMOL/L (ref 98–112)
CO2 SERPL-SCNC: 30 MMOL/L (ref 21–32)
CREAT BLD-MCNC: 0.89 MG/DL (ref 0.55–1.02)
DEPRECATED RDW RBC AUTO: 46.5 FL (ref 35.1–46.3)
EOSINOPHIL # BLD AUTO: 0.32 X10(3) UL (ref 0–0.7)
EOSINOPHIL NFR BLD AUTO: 4.6 %
ERYTHROCYTE [DISTWIDTH] IN BLOOD BY AUTOMATED COUNT: 13.2 % (ref 11–15)
GLUCOSE BLD-MCNC: 96 MG/DL (ref 70–99)
HAV IGM SER QL: 2.1 MG/DL (ref 1.6–2.6)
HCT VFR BLD AUTO: 35.9 % (ref 35–48)
HGB BLD-MCNC: 11.5 G/DL (ref 12–16)
IMM GRANULOCYTES # BLD AUTO: 0.03 X10(3) UL (ref 0–1)
IMM GRANULOCYTES NFR BLD: 0.4 %
LYMPHOCYTES # BLD AUTO: 1.84 X10(3) UL (ref 1–4)
LYMPHOCYTES NFR BLD AUTO: 26.6 %
MCH RBC QN AUTO: 30.8 PG (ref 26–34)
MCHC RBC AUTO-ENTMCNC: 32 G/DL (ref 31–37)
MCV RBC AUTO: 96.2 FL (ref 80–100)
MONOCYTES # BLD AUTO: 0.54 X10(3) UL (ref 0.1–1)
MONOCYTES NFR BLD AUTO: 7.8 %
NEUTROPHILS # BLD AUTO: 4.15 X10 (3) UL (ref 1.5–7.7)
NEUTROPHILS # BLD AUTO: 4.15 X10(3) UL (ref 1.5–7.7)
NEUTROPHILS NFR BLD AUTO: 60 %
OSMOLALITY SERPL CALC.SUM OF ELEC: 296 MOSM/KG (ref 275–295)
PATIENT FASTING: YES
PLATELET # BLD AUTO: 182 10(3)UL (ref 150–450)
POTASSIUM SERPL-SCNC: 4.2 MMOL/L (ref 3.5–5.1)
RBC # BLD AUTO: 3.73 X10(6)UL (ref 3.8–5.3)
SODIUM SERPL-SCNC: 142 MMOL/L (ref 136–145)
WBC # BLD AUTO: 6.9 X10(3) UL (ref 4–11)

## 2019-08-15 NOTE — OCCUPATIONAL THERAPY NOTE
OCCUPATIONAL THERAPY EVALUATION - INPATIENT     Room Number: 521/521-A  Evaluation Date: 8/15/2019  Type of Evaluation: Initial  Presenting Problem: (lle cellulitis)    Physician Order: IP Consult to Occupational Therapy  Reason for Therapy: ADL/IADL Dys Occupational Therapy services. Please re-order if a new functional limitation presents during this admission.     OCCUPATIONAL THERAPY MEDICAL/SOCIAL HISTORY     Problem List  Principal Problem:    Cellulitis of left lower leg  Active Problems:    Wound inf • Unspecified sleep apnea    • Visual impairment     EYE GLASSES     Past Surgical History  Past Surgical History:   Procedure Laterality Date   • ANESTH,NECK VESSEL SURGERY NOS      x4   • BRAIN SURGERY  ~2009    Brain Aneurysm, Stent placed    • BRONCH 3 [facility]. Pt reports being independent w/ adls and mobility using ad. Pt had been receiving  PT/OT due to recent hospitalization    SUBJECTIVE  \"My leg is feeling better.  I'm just mad it happened\"    OCCUPATIONAL THERAPY EXAMINATION      OBJECTIVE  Fall min a    Patient End of Session: Up in chair;Needs met;Call light within reach;RN aware of session/findings; All patient questions and concerns addressed

## 2019-08-15 NOTE — PROGRESS NOTES
[facility] Hospitalist Progress Note     CC: Hospital Follow up    PCP: Olivier Melgar.  Gurpreet Pedroza MD       Assessment/Plan:   Patient is a [de-identified]year old female with PMH sig for COPD, GERD, OA, depression/anxiety, recurrent falls wheelchair bound, brain aneurysm s/p coil Soft, non-tender, non-distended, no HSM  MSK: strength grossly intact in all extremities  Neuro: Grossly normal, CN intact, sensory intact  Psych: Affect- normal, AAOx3  SKIN: erythema as below, somewhat improved today  EXT:LLE erythema and wound present a

## 2019-08-15 NOTE — WOUND PROGRESS NOTE
WOUND CARE NOTE      PLAN   Recommendations:  Elevate legs in the bed and in the chair  Dietary consult for recommendations for nutrition to optimize wound healing  Turn schedules  Heels elevated using pillows, heel wedge or heel boots to offload heels 9.0 08/15/2019    ALB 3.3 (L) 07/29/2019    ALKPHO 89 07/29/2019    BILT 0.4 07/29/2019    TP 6.4 07/29/2019    AST 12 (L) 07/29/2019    ALT 18 07/29/2019     03/29/2019    MG 2.1 08/15/2019    PHOS 3.8 06/06/2018

## 2019-08-15 NOTE — PLAN OF CARE
Problem: Patient Centered Care  Goal: Patient preferences are identified and integrated in the patient's plan of care  Description  Interventions:  - What would you like us to know as we care for you?   - Provide timely, complete, and accurate informatio Progressing  8/14/2019 2004 by Christine Zaidi, RN  Outcome: Progressing   Pt admitted from ER today, wound traced and covered, wound care on consult, on vanco, PT/OT ordered, bed kept low and locked, call light left within reach.

## 2019-08-15 NOTE — PHYSICAL THERAPY NOTE
PHYSICAL THERAPY EVALUATION - INPATIENT     Room Number: 521/521-A  Evaluation Date: 8/15/2019  Type of Evaluation: Initial   Physician Order: PT Eval and Treat    Presenting Problem: LLE cellulitis, wound infection  Reason for Therapy: Mobility Dysfuncti stitches. Sent home on abx, however pt back with increased swelling and pain from the region x1 day. Took abx she was sammy oglesby on.  +chills, no fevers. No increased sob states her copd in good control recently.  Also + rene has hx migraines better than usual • Thyroid disease    • Toe pain     Onychomycosis, Debridement , Ximena    • Tonsillitis 1950    Tonsillitis    • Unspecified essential hypertension    • Unspecified sleep apnea    • Visual impairment     EYE GLASSES       Past Surgical History  Past motorized scooter to the dining [employer] and in the community. long-term staff do laundry, cleaning.  She is current with home health PT.     SUBJECTIVE  Pt is working w/ home health OT to better organize her home so that things are within reach for her for functiona addressed; Alarm set    CURRENT GOALS    Goals to be met by: 8/29/19  Patient Goal Patient's self-stated goal is: to return home & resume HHPT   Goal #1 Patient is able to demonstrate supine - sit EOB @ level: modified independent     Goal #1   Current Stat

## 2019-08-15 NOTE — PLAN OF CARE
Patient complained of discomfort in LLE, declined PRN pain medications   Wound RN treat and eval, dressing change provided as ordered   IV abx as ordered       Problem: Patient Centered Care  Goal: Patient preferences are identified and integrated in the p

## 2019-08-16 LAB
ANION GAP SERPL CALC-SCNC: 5 MMOL/L (ref 0–18)
BASOPHILS # BLD AUTO: 0.03 X10(3) UL (ref 0–0.2)
BASOPHILS NFR BLD AUTO: 0.5 %
BUN BLD-MCNC: 20 MG/DL (ref 7–18)
BUN/CREAT SERPL: 22.7 (ref 10–20)
CALCIUM BLD-MCNC: 9.2 MG/DL (ref 8.5–10.1)
CHLORIDE SERPL-SCNC: 108 MMOL/L (ref 98–112)
CO2 SERPL-SCNC: 32 MMOL/L (ref 21–32)
CREAT BLD-MCNC: 0.88 MG/DL (ref 0.55–1.02)
DEPRECATED RDW RBC AUTO: 46.7 FL (ref 35.1–46.3)
EOSINOPHIL # BLD AUTO: 0.3 X10(3) UL (ref 0–0.7)
EOSINOPHIL NFR BLD AUTO: 5 %
ERYTHROCYTE [DISTWIDTH] IN BLOOD BY AUTOMATED COUNT: 13.2 % (ref 11–15)
GLUCOSE BLD-MCNC: 106 MG/DL (ref 70–99)
HCT VFR BLD AUTO: 36.4 % (ref 35–48)
HGB BLD-MCNC: 11.9 G/DL (ref 12–16)
IMM GRANULOCYTES # BLD AUTO: 0.03 X10(3) UL (ref 0–1)
IMM GRANULOCYTES NFR BLD: 0.5 %
LYMPHOCYTES # BLD AUTO: 1.96 X10(3) UL (ref 1–4)
LYMPHOCYTES NFR BLD AUTO: 32.8 %
MCH RBC QN AUTO: 31.8 PG (ref 26–34)
MCHC RBC AUTO-ENTMCNC: 32.7 G/DL (ref 31–37)
MCV RBC AUTO: 97.3 FL (ref 80–100)
MONOCYTES # BLD AUTO: 0.53 X10(3) UL (ref 0.1–1)
MONOCYTES NFR BLD AUTO: 8.9 %
NEUTROPHILS # BLD AUTO: 3.13 X10 (3) UL (ref 1.5–7.7)
NEUTROPHILS # BLD AUTO: 3.13 X10(3) UL (ref 1.5–7.7)
NEUTROPHILS NFR BLD AUTO: 52.3 %
OSMOLALITY SERPL CALC.SUM OF ELEC: 303 MOSM/KG (ref 275–295)
PATIENT FASTING: YES
PLATELET # BLD AUTO: 180 10(3)UL (ref 150–450)
POTASSIUM SERPL-SCNC: 4.2 MMOL/L (ref 3.5–5.1)
RBC # BLD AUTO: 3.74 X10(6)UL (ref 3.8–5.3)
SODIUM SERPL-SCNC: 145 MMOL/L (ref 136–145)
WBC # BLD AUTO: 6 X10(3) UL (ref 4–11)

## 2019-08-16 NOTE — PHYSICAL THERAPY NOTE
PHYSICAL THERAPY TREATMENT NOTE - INPATIENT     Room Number: 521/521-A       Presenting Problem: LLE cellulitis, wound infection    Problem List  Principal Problem:    Cellulitis of left lower leg  Active Problems:    Wound infection      PHYSICAL THERAPY -        AM-PAC '6-Clicks' INPATIENT SHORT FORM - BASIC MOBILITY  How much difficulty does the patient currently have. ..  -   Turning over in bed (including adjusting bedclothes, sheets and blankets)?: A Little   -   Sitting down on and standing up from a home activity/exercise instructions provided to patient in preparation for discharge.    Goal #4   Current Status In progress   Goal #5    Goal #5   Current Status    Goal #6    Goal #6  Current Status

## 2019-08-16 NOTE — PROGRESS NOTES
[facility] Hospitalist Progress Note     CC: Hospital Follow up    PCP: Becky Berry.  Devante Doyle MD       Assessment/Plan:   Patient is a [de-identified]year old female with PMH sig for COPD, GERD, OA, depression/anxiety, recurrent falls wheelchair bound, brain aneurysm s/p coil respiratory effort  CV: RRR, no murmurs, peripheral perfusion intact  ABD: Soft, non-tender, non-distended, no HSM  MSK: strength grossly intact in all extremities  Neuro: Grossly normal, CN intact, sensory intact  Psych: Affect- normal, AAOx3  SKIN: eryth sodium

## 2019-08-16 NOTE — CM/SW NOTE
SW attempted to address POLST form order multiple  Times, SW will follow up. Patient is current with [address] ([phone]), resume C orders needed.        Lauren Gutierrez, [address]

## 2019-08-17 ENCOUNTER — APPOINTMENT (OUTPATIENT)
Dept: GENERAL RADIOLOGY | Facility: HOSPITAL | Age: 79
DRG: 264 | End: 2019-08-17
Attending: INTERNAL MEDICINE
Payer: MEDICARE

## 2019-08-17 LAB
ANION GAP SERPL CALC-SCNC: 6 MMOL/L (ref 0–18)
BASOPHILS # BLD AUTO: 0.05 X10(3) UL (ref 0–0.2)
BASOPHILS NFR BLD AUTO: 0.8 %
BUN BLD-MCNC: 20 MG/DL (ref 7–18)
BUN/CREAT SERPL: 23.3 (ref 10–20)
CALCIUM BLD-MCNC: 9.1 MG/DL (ref 8.5–10.1)
CHLORIDE SERPL-SCNC: 108 MMOL/L (ref 98–112)
CO2 SERPL-SCNC: 31 MMOL/L (ref 21–32)
CREAT BLD-MCNC: 0.86 MG/DL (ref 0.55–1.02)
DEPRECATED RDW RBC AUTO: 47.5 FL (ref 35.1–46.3)
EOSINOPHIL # BLD AUTO: 0.26 X10(3) UL (ref 0–0.7)
EOSINOPHIL NFR BLD AUTO: 4.1 %
ERYTHROCYTE [DISTWIDTH] IN BLOOD BY AUTOMATED COUNT: 13.3 % (ref 11–15)
GLUCOSE BLD-MCNC: 88 MG/DL (ref 70–99)
HCT VFR BLD AUTO: 36.8 % (ref 35–48)
HGB BLD-MCNC: 11.7 G/DL (ref 12–16)
IMM GRANULOCYTES # BLD AUTO: 0.03 X10(3) UL (ref 0–1)
IMM GRANULOCYTES NFR BLD: 0.5 %
LYMPHOCYTES # BLD AUTO: 1.94 X10(3) UL (ref 1–4)
LYMPHOCYTES NFR BLD AUTO: 30.6 %
MCH RBC QN AUTO: 31.2 PG (ref 26–34)
MCHC RBC AUTO-ENTMCNC: 31.8 G/DL (ref 31–37)
MCV RBC AUTO: 98.1 FL (ref 80–100)
MONOCYTES # BLD AUTO: 0.62 X10(3) UL (ref 0.1–1)
MONOCYTES NFR BLD AUTO: 9.8 %
NEUTROPHILS # BLD AUTO: 3.43 X10 (3) UL (ref 1.5–7.7)
NEUTROPHILS # BLD AUTO: 3.43 X10(3) UL (ref 1.5–7.7)
NEUTROPHILS NFR BLD AUTO: 54.2 %
OSMOLALITY SERPL CALC.SUM OF ELEC: 302 MOSM/KG (ref 275–295)
PATIENT FASTING: NO
PLATELET # BLD AUTO: 177 10(3)UL (ref 150–450)
POTASSIUM SERPL-SCNC: 4.2 MMOL/L (ref 3.5–5.1)
RBC # BLD AUTO: 3.75 X10(6)UL (ref 3.8–5.3)
SODIUM SERPL-SCNC: 145 MMOL/L (ref 136–145)
VANCOMYCIN TROUGH SERPL-MCNC: 8.9 UG/ML (ref 10–20)
WBC # BLD AUTO: 6.3 X10(3) UL (ref 4–11)

## 2019-08-17 PROCEDURE — 73590 X-RAY EXAM OF LOWER LEG: CPT | Performed by: INTERNAL MEDICINE

## 2019-08-17 RX ORDER — IPRATROPIUM BROMIDE AND ALBUTEROL SULFATE 2.5; .5 MG/3ML; MG/3ML
3 SOLUTION RESPIRATORY (INHALATION)
Status: DISCONTINUED | OUTPATIENT
Start: 2019-08-17 | End: 2019-08-19

## 2019-08-17 RX ORDER — IPRATROPIUM BROMIDE AND ALBUTEROL SULFATE 2.5; .5 MG/3ML; MG/3ML
SOLUTION RESPIRATORY (INHALATION)
Status: DISPENSED
Start: 2019-08-17 | End: 2019-08-18

## 2019-08-17 NOTE — CONSULTS
[facility] AND CLINICS  [facility] Infectious Disease Consult    Rayshawn Ireland Patient Status:  Observation    12/3/1940 MRN [id number]   Location [facility]5W Attending Lm Diaz DO   Hosp Day # 0 PCP Dulce Weaver.  Rosangela Tello MD     Reason for Consultation L3-4 stable slight grade 1 retrolisthesis 10/31/2014   • L4-5 mild-mod diffuse bulging disc 10/31/2014   • L4-5 slight grade 1 unstable spondylolisthesis 10/31/2014   • Low back pain    • Migraines    • Muscle weakness    • Onychomycosis     Debridement SURGERY Bilateral     Bilateral arthritis    • LAMINECTOMY      WITH FUSION PER PATIENT   • OTHER SURGICAL HISTORY  1985,1995,2003,2009    Cervical Discectomy AND FUSION   • TONSILLECTOMY  1950    Tonsillitis    • TRANSFORAMINAL LUMBAR EPIDURAL STEROID INJ ipratropium-albuterol (DUONEB) 0.5-2.5 (3) MG/3ML nebulizer solution, , ,   •  ipratropium-albuterol (DUONEB) nebulizer solution 3 mL, 3 mL, Nebulization, [email or web address]  •  collagenase (SANTYL) 250 UNIT/GM ointment, , Topical, Daily  •  Normal Saline Flush Constitutional: Negative for anorexia, chills, fatigue, fevers, malaise, night sweats and weight loss. Eyes: Negative for visual disturbance, irritation and redness.   Ears, nose, mouth, throat, and face: Negative for hearing loss, tinnitus, nasal conges Soft, supple neck; trachea midline, no adenopathy, no thyromegaly. Lungs: CTAB, normal and equal bilateral chest rise   Chest wall: No tenderness or deformity. Heart: Regular rate and rhythm, normal S1S2, no murmur.    Abdomen: soft, non-tender, non-dis gait disorder     Lung granuloma (HCC)     Arthropathy of cervical facet joint: C1-2 right mod, C2-3 right severe     Headache, acute     Hypertensive emergency     Cellulitis of left lower leg     Wound infection    1.   LLE Cellulitis: with a necrotic wou

## 2019-08-17 NOTE — PROGRESS NOTES
[facility] dosing service    Follow-up Pharmacokinetic Consult for Vancomycin Dosing     Tisha Workman is a [de-identified]year old female who is being treated for cellulitis. Patient is on day 4 of Vancomycin and is currently receiving 1.25 gm IV Q 24 hours.   G Extension: [phone]

## 2019-08-17 NOTE — PROGRESS NOTES
[facility] Hospitalist Progress Note     CC: Hospital Follow up    PCP: Darya Roe.  Regan Lefort, MD       Assessment/Plan:   Patient is a [de-identified]year old female with PMH sig for COPD, GERD, OA, depression/anxiety, recurrent falls wheelchair bound, brain aneurysm s/p coil kg)      Exam   GEN: NAD  HEENT: EOMI  Neck: no JVD  Pulm: CTAB, normal respiratory effort  CV: RRR, no murmurs, peripheral perfusion intact  ABD: Soft, non-tender, non-distended, no HSM  MSK: strength grossly intact in all extremities  Neuro: Grossly norm bisacodyl, acetaminophen, acetaminophen, aspirin-butalbital-caffeine, Dicyclomine HCl, docusate sodium

## 2019-08-17 NOTE — PLAN OF CARE
Wound care provided as ordered/as needed   PRN pain medications given x1 for HA   IV abx as ordered       Problem: Patient Centered Care  Goal: Patient preferences are identified and integrated in the patient's plan of care  Description  Interventions:  -

## 2019-08-18 LAB
ANION GAP SERPL CALC-SCNC: 6 MMOL/L (ref 0–18)
BASOPHILS # BLD AUTO: 0.03 X10(3) UL (ref 0–0.2)
BASOPHILS NFR BLD AUTO: 0.4 %
BUN BLD-MCNC: 18 MG/DL (ref 7–18)
BUN/CREAT SERPL: 20.7 (ref 10–20)
CALCIUM BLD-MCNC: 9.1 MG/DL (ref 8.5–10.1)
CHLORIDE SERPL-SCNC: 107 MMOL/L (ref 98–112)
CO2 SERPL-SCNC: 32 MMOL/L (ref 21–32)
CREAT BLD-MCNC: 0.87 MG/DL (ref 0.55–1.02)
DEPRECATED RDW RBC AUTO: 47.2 FL (ref 35.1–46.3)
EOSINOPHIL # BLD AUTO: 0.32 X10(3) UL (ref 0–0.7)
EOSINOPHIL NFR BLD AUTO: 4.7 %
ERYTHROCYTE [DISTWIDTH] IN BLOOD BY AUTOMATED COUNT: 13.3 % (ref 11–15)
GLUCOSE BLD-MCNC: 93 MG/DL (ref 70–99)
HCT VFR BLD AUTO: 37.3 % (ref 35–48)
HGB BLD-MCNC: 12 G/DL (ref 12–16)
IMM GRANULOCYTES # BLD AUTO: 0.02 X10(3) UL (ref 0–1)
IMM GRANULOCYTES NFR BLD: 0.3 %
LYMPHOCYTES # BLD AUTO: 2.36 X10(3) UL (ref 1–4)
LYMPHOCYTES NFR BLD AUTO: 34.9 %
MCH RBC QN AUTO: 31.7 PG (ref 26–34)
MCHC RBC AUTO-ENTMCNC: 32.2 G/DL (ref 31–37)
MCV RBC AUTO: 98.4 FL (ref 80–100)
MONOCYTES # BLD AUTO: 0.64 X10(3) UL (ref 0.1–1)
MONOCYTES NFR BLD AUTO: 9.5 %
NEUTROPHILS # BLD AUTO: 3.4 X10 (3) UL (ref 1.5–7.7)
NEUTROPHILS # BLD AUTO: 3.4 X10(3) UL (ref 1.5–7.7)
NEUTROPHILS NFR BLD AUTO: 50.2 %
OSMOLALITY SERPL CALC.SUM OF ELEC: 302 MOSM/KG (ref 275–295)
PATIENT FASTING: NO
PLATELET # BLD AUTO: 180 10(3)UL (ref 150–450)
POTASSIUM SERPL-SCNC: 4.3 MMOL/L (ref 3.5–5.1)
RBC # BLD AUTO: 3.79 X10(6)UL (ref 3.8–5.3)
SODIUM SERPL-SCNC: 145 MMOL/L (ref 136–145)
WBC # BLD AUTO: 6.8 X10(3) UL (ref 4–11)

## 2019-08-18 NOTE — PLAN OF CARE
Problem: PAIN - ADULT  Goal: Verbalizes/displays adequate comfort level or patient's stated pain goal  Description  INTERVENTIONS:  - Encourage pt to monitor pain and request assistance  - Assess pain using appropriate pain scale  - Administer analgesics wound culture done.  On cefepime and vanco.

## 2019-08-18 NOTE — PROGRESS NOTES
[facility] AND [facility] Infectious Disease Progress Note    Rexine Agent Patient Status:  Observation    12/3/1940 MRN [id number]   Location [facility]5W Attending Dionne Alegria MD   Hosp Day # 0 PCP Shelley Echeverria.  Debby Yap MD     Subjective: acetaminophen (TYLENOL) tab 650 mg, 650 mg, Oral, Q6H PRN  •  acetaminophen (TYLENOL) tab 650 mg, 650 mg, Oral, Q6H PRN  •  amLODIPine Besylate (NORVASC) tab 5 mg, 5 mg, Oral, Daily  •  aspirin tab 325 mg, 325 mg, Oral, Daily  •  aspirin-butalbital-caffein ascites. Liver is within normal limits. Spleen is not palpable. Extremities:  No lower extremity edema noted. Without clubbing or cyanosis. Skin: Normal texture and turgor. Neurologic: Cranial nerves are grossly intact.   Motor strength and sensor

## 2019-08-18 NOTE — PROGRESS NOTES
[facility]    CDI Prediction Tool Protocol (Vancomycin Initiated)    OVP (oral vancomycin prophylaxis) 125 mg PO Daily is being started in this patient based on a score of 18.1.       Score Breakdown:  History of CDI > 1 year ago AND high risk an

## 2019-08-18 NOTE — PROGRESS NOTES
[facility] Hospitalist Progress Note     CC: Hospital Follow up    PCP: Radha Worrell.  Mitzy Benedict MD       Assessment/Plan:   Patient is an [de-identified]year old female with PMH sig for COPD, GERD, OA, depression/anxiety, recurrent falls wheelchair bound, brain aneurysm s/p coi oriented  HEENT: sclera anicteric, oral mucosa moist  Pulm: CTAB, normal respiratory effort  CV: RRR, no murmurs, peripheral perfusion intact  ABD: Soft, non-tender, non-distended, no HSM  Neuro: Grossly normal, CN intact, sensory intact  Psych: Affect- no Oral Daily   • furosemide  20 mg Oral Daily   • Levothyroxine Sodium  250 mcg Oral Before breakfast   • LORazepam  4 mg Oral Nightly   • Oxybutynin Chloride ER  5 mg Oral Daily   • Pantoprazole Sodium  40 mg Oral QAM AC   • QUEtiapine Fumarate  100 mg Oral

## 2019-08-19 LAB
ANION GAP SERPL CALC-SCNC: 5 MMOL/L (ref 0–18)
BASOPHILS # BLD AUTO: 0.03 X10(3) UL (ref 0–0.2)
BASOPHILS NFR BLD AUTO: 0.5 %
BUN BLD-MCNC: 22 MG/DL (ref 7–18)
BUN/CREAT SERPL: 23.9 (ref 10–20)
CALCIUM BLD-MCNC: 9.1 MG/DL (ref 8.5–10.1)
CHLORIDE SERPL-SCNC: 108 MMOL/L (ref 98–112)
CO2 SERPL-SCNC: 32 MMOL/L (ref 21–32)
CREAT BLD-MCNC: 0.92 MG/DL (ref 0.55–1.02)
DEPRECATED RDW RBC AUTO: 46.6 FL (ref 35.1–46.3)
EOSINOPHIL # BLD AUTO: 0.29 X10(3) UL (ref 0–0.7)
EOSINOPHIL NFR BLD AUTO: 4.5 %
ERYTHROCYTE [DISTWIDTH] IN BLOOD BY AUTOMATED COUNT: 13.2 % (ref 11–15)
GLUCOSE BLD-MCNC: 95 MG/DL (ref 70–99)
HCT VFR BLD AUTO: 37.3 % (ref 35–48)
HGB BLD-MCNC: 11.9 G/DL (ref 12–16)
IMM GRANULOCYTES # BLD AUTO: 0.02 X10(3) UL (ref 0–1)
IMM GRANULOCYTES NFR BLD: 0.3 %
LYMPHOCYTES # BLD AUTO: 1.95 X10(3) UL (ref 1–4)
LYMPHOCYTES NFR BLD AUTO: 30.4 %
MCH RBC QN AUTO: 31 PG (ref 26–34)
MCHC RBC AUTO-ENTMCNC: 31.9 G/DL (ref 31–37)
MCV RBC AUTO: 97.1 FL (ref 80–100)
MONOCYTES # BLD AUTO: 0.56 X10(3) UL (ref 0.1–1)
MONOCYTES NFR BLD AUTO: 8.7 %
NEUTROPHILS # BLD AUTO: 3.56 X10 (3) UL (ref 1.5–7.7)
NEUTROPHILS # BLD AUTO: 3.56 X10(3) UL (ref 1.5–7.7)
NEUTROPHILS NFR BLD AUTO: 55.6 %
OSMOLALITY SERPL CALC.SUM OF ELEC: 303 MOSM/KG (ref 275–295)
PATIENT FASTING: YES
PLATELET # BLD AUTO: 188 10(3)UL (ref 150–450)
POTASSIUM SERPL-SCNC: 4.4 MMOL/L (ref 3.5–5.1)
RBC # BLD AUTO: 3.84 X10(6)UL (ref 3.8–5.3)
SODIUM SERPL-SCNC: 145 MMOL/L (ref 136–145)
WBC # BLD AUTO: 6.4 X10(3) UL (ref 4–11)

## 2019-08-19 RX ORDER — AMLODIPINE BESYLATE 10 MG/1
10 TABLET ORAL DAILY
Status: DISCONTINUED | OUTPATIENT
Start: 2019-08-20 | End: 2019-08-23

## 2019-08-19 RX ORDER — HYDRALAZINE HYDROCHLORIDE 20 MG/ML
10 INJECTION INTRAMUSCULAR; INTRAVENOUS EVERY 6 HOURS PRN
Status: DISCONTINUED | OUTPATIENT
Start: 2019-08-19 | End: 2019-08-23

## 2019-08-19 RX ORDER — IPRATROPIUM BROMIDE AND ALBUTEROL SULFATE 2.5; .5 MG/3ML; MG/3ML
3 SOLUTION RESPIRATORY (INHALATION) EVERY 4 HOURS PRN
Status: DISCONTINUED | OUTPATIENT
Start: 2019-08-19 | End: 2019-08-23

## 2019-08-19 RX ORDER — LORAZEPAM 1 MG/1
2 TABLET ORAL 2 TIMES DAILY PRN
Status: DISCONTINUED | OUTPATIENT
Start: 2019-08-19 | End: 2019-08-23

## 2019-08-19 NOTE — PROGRESS NOTES
[facility] Hospitalist Progress Note     CC: Hospital Follow up    PCP: Curly Leigh.  Dawn Lott MD       Assessment/Plan:   Patient is an [de-identified]year old female with PMH sig for COPD, GERD, OA, depression/anxiety, recurrent falls wheelchair bound, brain aneurysm s/p coi at 8/19/2019 1352  Last data filed at 8/19/2019 0916  Gross per 24 hour   Intake 1660 ml   Output 1225 ml   Net 435 ml       Last 3 Weights  08/14/19 1345 : 260 lb (117.9 kg)  07/29/19 0300 : 261 lb 6.4 oz (118.6 kg)  06/23/19 0648 : 250 lb (113.4 kg)    E • amLODIPine Besylate  5 mg Oral Daily   • aspirin  325 mg Oral Daily   • Fluticasone Furoate-Vilanterol  1 puff Inhalation Daily   • Desvenlafaxine Succinate ER  100 mg Oral Daily   • furosemide  20 mg Oral Daily   • Levothyroxine Sodium  250 mcg Oral B

## 2019-08-19 NOTE — PROGRESS NOTES
[facility] AND [facility] Infectious Disease Progress Note    Miah Vera Patient Status:  Observation    12/3/1940 MRN [id number]   Location [facility]5W Attending Jose Francisco Morris MD   Hosp Day # 0 PCP Radha Cerda.  Unknown MD Esteban     Subjective: acetaminophen (TYLENOL) tab 650 mg, 650 mg, Oral, Q6H PRN  •  acetaminophen (TYLENOL) tab 650 mg, 650 mg, Oral, Q6H PRN  •  amLODIPine Besylate (NORVASC) tab 5 mg, 5 mg, Oral, Daily  •  aspirin tab 325 mg, 325 mg, Oral, Daily  •  aspirin-butalbital-caffein No ascites. Liver is within normal limits. Spleen is not palpable. Extremities:  No lower extremity edema noted. Without clubbing or cyanosis. Skin: Normal texture and turgor. Neurologic: Cranial nerves are grossly intact.   Motor strength and sen

## 2019-08-19 NOTE — PHYSICAL THERAPY NOTE
Pt was to be seen for PT treatment session. Consulted w/ RN prior to seeing pt. Pt was received supine in bed. Pt refused participating with therapy despite education and encouragement. Per pt: \"I'm not getting out of bed now. I'm tired\".  Will re-attempt

## 2019-08-20 ENCOUNTER — APPOINTMENT (OUTPATIENT)
Dept: NUCLEAR MEDICINE | Facility: HOSPITAL | Age: 79
DRG: 264 | End: 2019-08-20
Attending: INTERNAL MEDICINE
Payer: MEDICARE

## 2019-08-20 ENCOUNTER — APPOINTMENT (OUTPATIENT)
Dept: PICC SERVICES | Facility: HOSPITAL | Age: 79
DRG: 264 | End: 2019-08-20
Attending: INTERNAL MEDICINE
Payer: MEDICARE

## 2019-08-20 DIAGNOSIS — S81.802A UNSPECIFIED OPEN WOUND, LEFT LOWER LEG, INITIAL ENCOUNTER: Primary | ICD-10-CM

## 2019-08-20 LAB
ANION GAP SERPL CALC-SCNC: 5 MMOL/L (ref 0–18)
BASOPHILS # BLD AUTO: 0.04 X10(3) UL (ref 0–0.2)
BASOPHILS NFR BLD AUTO: 0.6 %
BUN BLD-MCNC: 19 MG/DL (ref 7–18)
BUN/CREAT SERPL: 22.1 (ref 10–20)
CALCIUM BLD-MCNC: 9.5 MG/DL (ref 8.5–10.1)
CHLORIDE SERPL-SCNC: 108 MMOL/L (ref 98–112)
CO2 SERPL-SCNC: 30 MMOL/L (ref 21–32)
CREAT BLD-MCNC: 0.86 MG/DL (ref 0.55–1.02)
DEPRECATED RDW RBC AUTO: 46.9 FL (ref 35.1–46.3)
EOSINOPHIL # BLD AUTO: 0.32 X10(3) UL (ref 0–0.7)
EOSINOPHIL NFR BLD AUTO: 4.9 %
ERYTHROCYTE [DISTWIDTH] IN BLOOD BY AUTOMATED COUNT: 13.2 % (ref 11–15)
GLUCOSE BLD-MCNC: 89 MG/DL (ref 70–99)
HCT VFR BLD AUTO: 36.7 % (ref 35–48)
HGB BLD-MCNC: 11.8 G/DL (ref 12–16)
IMM GRANULOCYTES # BLD AUTO: 0.02 X10(3) UL (ref 0–1)
IMM GRANULOCYTES NFR BLD: 0.3 %
LYMPHOCYTES # BLD AUTO: 2.09 X10(3) UL (ref 1–4)
LYMPHOCYTES NFR BLD AUTO: 32.2 %
MCH RBC QN AUTO: 31.3 PG (ref 26–34)
MCHC RBC AUTO-ENTMCNC: 32.2 G/DL (ref 31–37)
MCV RBC AUTO: 97.3 FL (ref 80–100)
MONOCYTES # BLD AUTO: 0.55 X10(3) UL (ref 0.1–1)
MONOCYTES NFR BLD AUTO: 8.5 %
NEUTROPHILS # BLD AUTO: 3.48 X10 (3) UL (ref 1.5–7.7)
NEUTROPHILS # BLD AUTO: 3.48 X10(3) UL (ref 1.5–7.7)
NEUTROPHILS NFR BLD AUTO: 53.5 %
OSMOLALITY SERPL CALC.SUM OF ELEC: 298 MOSM/KG (ref 275–295)
PATIENT FASTING: YES
PLATELET # BLD AUTO: 187 10(3)UL (ref 150–450)
POTASSIUM SERPL-SCNC: 4.3 MMOL/L (ref 3.5–5.1)
RBC # BLD AUTO: 3.77 X10(6)UL (ref 3.8–5.3)
SODIUM SERPL-SCNC: 143 MMOL/L (ref 136–145)
WBC # BLD AUTO: 6.5 X10(3) UL (ref 4–11)

## 2019-08-20 PROCEDURE — 02HV33Z INSERTION OF INFUSION DEVICE INTO SUPERIOR VENA CAVA, PERCUTANEOUS APPROACH: ICD-10-PCS | Performed by: INTERNAL MEDICINE

## 2019-08-20 PROCEDURE — 78315 BONE IMAGING 3 PHASE: CPT | Performed by: INTERNAL MEDICINE

## 2019-08-20 PROCEDURE — 99221 1ST HOSP IP/OBS SF/LOW 40: CPT | Performed by: PLASTIC SURGERY

## 2019-08-20 RX ORDER — SODIUM CHLORIDE 0.9 % (FLUSH) 0.9 %
10 SYRINGE (ML) INJECTION AS NEEDED
Status: DISCONTINUED | OUTPATIENT
Start: 2019-08-20 | End: 2019-08-23

## 2019-08-20 RX ORDER — LIDOCAINE HYDROCHLORIDE 10 MG/ML
0.5 INJECTION, SOLUTION INFILTRATION; PERINEURAL ONCE AS NEEDED
Status: ACTIVE | OUTPATIENT
Start: 2019-08-20 | End: 2019-08-20

## 2019-08-20 NOTE — PROGRESS NOTES
[facility] Hospitalist Progress Note     CC: Hospital Follow up    PCP: Rufino Magana.  Lily Howell MD       Assessment/Plan:   Patient is an [de-identified]year old female with PMH sig for COPD, GERD, OA, depression/anxiety, recurrent falls wheelchair bound, brain aneurysm s/p coi 8/20/2019 1603  Last data filed at 8/20/2019 1020  Gross per 24 hour   Intake 1090 ml   Output 700 ml   Net 390 ml       Last 3 Weights  08/14/19 1345 : 260 lb (117.9 kg)  07/29/19 0300 : 261 lb 6.4 oz (118.6 kg)  06/23/19 0648 : 250 lb (113.4 kg)    Exam soft tissues may reflect soft tissue inflammation/cellulitis. 3. Bilateral knee prostheses are noted. 4. Degenerative-pattern activity as detailed above. 5. Lesser incidental findings as above.     Dictated by (CST): Jaxon Malik MD on 8/20/2019 at 11

## 2019-08-20 NOTE — PROGRESS NOTES
[facility] AND [facility]  [address] Infectious Disease Progress Note    Ltanya Hum Patient Status:  Observation    12/3/1940 MRN [id number]   Location [facility]5W Attending Lucero Mccord MD   Hosp Day # 0 PCP Shaquille Vega MD     Subjective: mL, Oral, Daily PRN  •  bisacodyl (DULCOLAX) rectal suppository 10 mg, 10 mg, Rectal, Daily PRN  •  acetaminophen (TYLENOL) tab 650 mg, 650 mg, Oral, Q6H PRN  •  acetaminophen (TYLENOL) tab 650 mg, 650 mg, Oral, Q6H PRN  •  aspirin tab 325 mg, 325 mg, Oral cyanosis. Skin: LLE with necrotic wound, surrounding erythema  Neurologic: Cranial nerves are grossly intact. Motor strength and sensory examination is grossly normal.  No focal neurologic deficit. Labs:  Lab Results   Component Value Date    WBC 6.

## 2019-08-20 NOTE — CONSULTS
[facility]    PATIENT'S NAME: Cody Limon   ATTENDING PHYSICIAN: Jazmin Cotto MD   CONSULTING PHYSICIAN: Yamil Hameed MD   PATIENT ACCOUNT#:   [de-identified]    LOCATION:  [place] [address] RECORD #:   [id number]       DATE OF LESIA a long discussion with her describing the wound, wound management, and expected outcome. I answered her questions. She wishes to proceed. This is a full-thickness wound in a problematic area, pretibial.  This will not heal with local care.   This has n

## 2019-08-20 NOTE — PLAN OF CARE
Problem: Patient Centered Care  Goal: Patient preferences are identified and integrated in the patient's plan of care  Description  Interventions:  - What would you like us to know as we care for you?   - Provide timely, complete, and accurate informatio response  - Implement non-pharmacological measures as appropriate and evaluate response  - Consider cultural and social influences on pain and pain management  - Manage/alleviate anxiety  - Utilize distraction and/or relaxation techniques  - Monitor for op

## 2019-08-20 NOTE — CONSULTS
PLASTIC SURGERY - FULL CONSULTATION DICTATED      IMPRESSION:  Left leg full thickness soft tissue necrosis with cellulitis    DISPOSITION / PLAN: She is 20 days post injury. This will not heal with local care.   This needs full-thickness debridement with

## 2019-08-20 NOTE — PHYSICAL THERAPY NOTE
Pt was to be seen for PT treatment session. She is currently off the floor for nuclear medicine bone scan. Will re-attempt time-permitting when appropriate to see pt.

## 2019-08-20 NOTE — CM/SW NOTE
FLAKITA informed of need for iv abx at dc. A picc line was placed today. FLAKITA placed call to the pt's hhc agency, [facility] to inquire if they may be able to administer this for the pt at her apartment, awaiting response from the liaison Ky Allie [phone]).  Ot

## 2019-08-20 NOTE — H&P (VIEW-ONLY)
[facility]    PATIENT'S NAME: Jason Mcdaniel   ATTENDING PHYSICIAN: Roberto Perez MD   CONSULTING PHYSICIAN: Betito Rhodes MD   PATIENT ACCOUNT#:   [de-identified]    LOCATION:  [address] [place] [address] RECORD #:   [id number]       DATE OF LESIA a long discussion with her describing the wound, wound management, and expected outcome. I answered her questions. She wishes to proceed. This is a full-thickness wound in a problematic area, pretibial.  This will not heal with local care.   This has n

## 2019-08-21 ENCOUNTER — ANESTHESIA EVENT (OUTPATIENT)
Dept: SURGERY | Facility: HOSPITAL | Age: 79
DRG: 264 | End: 2019-08-21
Payer: MEDICARE

## 2019-08-21 ENCOUNTER — ANESTHESIA (OUTPATIENT)
Dept: SURGERY | Facility: HOSPITAL | Age: 79
DRG: 264 | End: 2019-08-21
Payer: MEDICARE

## 2019-08-21 ENCOUNTER — HOSPITAL DOCUMENTATION (OUTPATIENT)
Dept: SURGERY | Facility: CLINIC | Age: 79
End: 2019-08-21

## 2019-08-21 DIAGNOSIS — S81.802A OPEN WOUND OF LEFT LOWER LEG, INITIAL ENCOUNTER: Primary | ICD-10-CM

## 2019-08-21 LAB
ANION GAP SERPL CALC-SCNC: 5 MMOL/L (ref 0–18)
BASOPHILS # BLD AUTO: 0.04 X10(3) UL (ref 0–0.2)
BASOPHILS NFR BLD AUTO: 0.6 %
BUN BLD-MCNC: 24 MG/DL (ref 7–18)
BUN/CREAT SERPL: 27.9 (ref 10–20)
CALCIUM BLD-MCNC: 9 MG/DL (ref 8.5–10.1)
CHLORIDE SERPL-SCNC: 109 MMOL/L (ref 98–112)
CO2 SERPL-SCNC: 30 MMOL/L (ref 21–32)
CREAT BLD-MCNC: 0.86 MG/DL (ref 0.55–1.02)
DEPRECATED RDW RBC AUTO: 46.5 FL (ref 35.1–46.3)
EOSINOPHIL # BLD AUTO: 0.25 X10(3) UL (ref 0–0.7)
EOSINOPHIL NFR BLD AUTO: 4 %
ERYTHROCYTE [DISTWIDTH] IN BLOOD BY AUTOMATED COUNT: 13.1 % (ref 11–15)
GLUCOSE BLD-MCNC: 107 MG/DL (ref 70–99)
HCT VFR BLD AUTO: 36.1 % (ref 35–48)
HGB BLD-MCNC: 11.5 G/DL (ref 12–16)
IMM GRANULOCYTES # BLD AUTO: 0.03 X10(3) UL (ref 0–1)
IMM GRANULOCYTES NFR BLD: 0.5 %
LYMPHOCYTES # BLD AUTO: 2.04 X10(3) UL (ref 1–4)
LYMPHOCYTES NFR BLD AUTO: 32.3 %
MCH RBC QN AUTO: 30.7 PG (ref 26–34)
MCHC RBC AUTO-ENTMCNC: 31.9 G/DL (ref 31–37)
MCV RBC AUTO: 96.3 FL (ref 80–100)
MONOCYTES # BLD AUTO: 0.53 X10(3) UL (ref 0.1–1)
MONOCYTES NFR BLD AUTO: 8.4 %
NEUTROPHILS # BLD AUTO: 3.42 X10 (3) UL (ref 1.5–7.7)
NEUTROPHILS # BLD AUTO: 3.42 X10(3) UL (ref 1.5–7.7)
NEUTROPHILS NFR BLD AUTO: 54.2 %
OSMOLALITY SERPL CALC.SUM OF ELEC: 303 MOSM/KG (ref 275–295)
PLATELET # BLD AUTO: 162 10(3)UL (ref 150–450)
POTASSIUM SERPL-SCNC: 4.3 MMOL/L (ref 3.5–5.1)
RBC # BLD AUTO: 3.75 X10(6)UL (ref 3.8–5.3)
SODIUM SERPL-SCNC: 144 MMOL/L (ref 136–145)
TROPONIN I SERPL-MCNC: <0.045 NG/ML (ref ?–0.04)
VANCOMYCIN TROUGH SERPL-MCNC: 11.9 UG/ML (ref 10–20)
WBC # BLD AUTO: 6.3 X10(3) UL (ref 4–11)

## 2019-08-21 PROCEDURE — 0JBP0ZZ EXCISION OF LEFT LOWER LEG SUBCUTANEOUS TISSUE AND FASCIA, OPEN APPROACH: ICD-10-PCS | Performed by: PLASTIC SURGERY

## 2019-08-21 RX ORDER — EPHEDRINE SULFATE 50 MG/ML
INJECTION, SOLUTION INTRAVENOUS AS NEEDED
Status: DISCONTINUED | OUTPATIENT
Start: 2019-08-21 | End: 2019-08-21 | Stop reason: SURG

## 2019-08-21 RX ORDER — SODIUM CHLORIDE, SODIUM LACTATE, POTASSIUM CHLORIDE, CALCIUM CHLORIDE 600; 310; 30; 20 MG/100ML; MG/100ML; MG/100ML; MG/100ML
INJECTION, SOLUTION INTRAVENOUS CONTINUOUS PRN
Status: DISCONTINUED | OUTPATIENT
Start: 2019-08-21 | End: 2019-08-21 | Stop reason: SURG

## 2019-08-21 RX ORDER — MORPHINE SULFATE 2 MG/ML
2 INJECTION, SOLUTION INTRAMUSCULAR; INTRAVENOUS EVERY 10 MIN PRN
Status: DISCONTINUED | OUTPATIENT
Start: 2019-08-21 | End: 2019-08-22 | Stop reason: HOSPADM

## 2019-08-21 RX ORDER — HALOPERIDOL 5 MG/ML
0.25 INJECTION INTRAMUSCULAR ONCE AS NEEDED
Status: ACTIVE | OUTPATIENT
Start: 2019-08-21 | End: 2019-08-21

## 2019-08-21 RX ORDER — GLYCOPYRROLATE 0.2 MG/ML
INJECTION INTRAMUSCULAR; INTRAVENOUS AS NEEDED
Status: DISCONTINUED | OUTPATIENT
Start: 2019-08-21 | End: 2019-08-21 | Stop reason: SURG

## 2019-08-21 RX ORDER — DIPHENHYDRAMINE HYDROCHLORIDE 50 MG/ML
25 INJECTION INTRAMUSCULAR; INTRAVENOUS ONCE
Status: COMPLETED | OUTPATIENT
Start: 2019-08-21 | End: 2019-08-21

## 2019-08-21 RX ORDER — MORPHINE SULFATE 4 MG/ML
4 INJECTION, SOLUTION INTRAMUSCULAR; INTRAVENOUS EVERY 10 MIN PRN
Status: DISCONTINUED | OUTPATIENT
Start: 2019-08-21 | End: 2019-08-22 | Stop reason: HOSPADM

## 2019-08-21 RX ORDER — LIDOCAINE HYDROCHLORIDE 10 MG/ML
INJECTION, SOLUTION EPIDURAL; INFILTRATION; INTRACAUDAL; PERINEURAL AS NEEDED
Status: DISCONTINUED | OUTPATIENT
Start: 2019-08-21 | End: 2019-08-21 | Stop reason: SURG

## 2019-08-21 RX ORDER — DEXAMETHASONE SODIUM PHOSPHATE 4 MG/ML
4 VIAL (ML) INJECTION EVERY 6 HOURS
Status: DISCONTINUED | OUTPATIENT
Start: 2019-08-21 | End: 2019-08-22

## 2019-08-21 RX ORDER — SODIUM CHLORIDE, SODIUM LACTATE, POTASSIUM CHLORIDE, CALCIUM CHLORIDE 600; 310; 30; 20 MG/100ML; MG/100ML; MG/100ML; MG/100ML
INJECTION, SOLUTION INTRAVENOUS CONTINUOUS
Status: DISCONTINUED | OUTPATIENT
Start: 2019-08-21 | End: 2019-08-22 | Stop reason: HOSPADM

## 2019-08-21 RX ORDER — PROCHLORPERAZINE EDISYLATE 5 MG/ML
5 INJECTION INTRAMUSCULAR; INTRAVENOUS ONCE AS NEEDED
Status: ACTIVE | OUTPATIENT
Start: 2019-08-21 | End: 2019-08-21

## 2019-08-21 RX ORDER — MORPHINE SULFATE 10 MG/ML
6 INJECTION, SOLUTION INTRAMUSCULAR; INTRAVENOUS EVERY 10 MIN PRN
Status: DISCONTINUED | OUTPATIENT
Start: 2019-08-21 | End: 2019-08-22 | Stop reason: HOSPADM

## 2019-08-21 RX ORDER — PHENYLEPHRINE HCL 10 MG/ML
VIAL (ML) INJECTION AS NEEDED
Status: DISCONTINUED | OUTPATIENT
Start: 2019-08-21 | End: 2019-08-21 | Stop reason: SURG

## 2019-08-21 RX ORDER — ONDANSETRON 2 MG/ML
4 INJECTION INTRAMUSCULAR; INTRAVENOUS ONCE AS NEEDED
Status: ACTIVE | OUTPATIENT
Start: 2019-08-21 | End: 2019-08-21

## 2019-08-21 RX ORDER — NALOXONE HYDROCHLORIDE 0.4 MG/ML
80 INJECTION, SOLUTION INTRAMUSCULAR; INTRAVENOUS; SUBCUTANEOUS AS NEEDED
Status: DISCONTINUED | OUTPATIENT
Start: 2019-08-21 | End: 2019-08-22 | Stop reason: HOSPADM

## 2019-08-21 RX ADMIN — PHENYLEPHRINE HCL 50 MCG: 10 MG/ML VIAL (ML) INJECTION at 20:28:00

## 2019-08-21 RX ADMIN — EPHEDRINE SULFATE 10 MG: 50 INJECTION, SOLUTION INTRAVENOUS at 20:26:00

## 2019-08-21 RX ADMIN — SODIUM CHLORIDE, SODIUM LACTATE, POTASSIUM CHLORIDE, CALCIUM CHLORIDE: 600; 310; 30; 20 INJECTION, SOLUTION INTRAVENOUS at 20:42:00

## 2019-08-21 RX ADMIN — LIDOCAINE HYDROCHLORIDE 50 MG: 10 INJECTION, SOLUTION EPIDURAL; INFILTRATION; INTRACAUDAL; PERINEURAL at 19:53:00

## 2019-08-21 RX ADMIN — GLYCOPYRROLATE 0.2 MG: 0.2 INJECTION INTRAMUSCULAR; INTRAVENOUS at 20:31:00

## 2019-08-21 RX ADMIN — EPHEDRINE SULFATE 10 MG: 50 INJECTION, SOLUTION INTRAVENOUS at 20:12:00

## 2019-08-21 RX ADMIN — SODIUM CHLORIDE, SODIUM LACTATE, POTASSIUM CHLORIDE, CALCIUM CHLORIDE: 600; 310; 30; 20 INJECTION, SOLUTION INTRAVENOUS at 19:49:00

## 2019-08-21 NOTE — CM/SW NOTE
FLAKITA confirmed with [facility] that they cannot assist with the daily abx. Pt is not able to do this herself. FLAKITA spoke with the pt's daughter Michael Alvarez regarding the [employer].  Daughter is aware and agreeable for snf placement due to the need for iv abx and dressin

## 2019-08-21 NOTE — PHYSICAL THERAPY NOTE
Pt was to be seen for PT treatment session. Consulted w/ RN prior to seeing pt. Visited pt in room. Pt ADAMANTLY refused participating with therapy despite MAXIMAL education & encouragement.      Pt reports being angry because she wants to go home, but was

## 2019-08-21 NOTE — PHYSICAL THERAPY NOTE
PHYSICAL THERAPY TREATMENT NOTE - INPATIENT     Room Number: 521/521-A       Presenting Problem: LLE cellulitis, wound infection    Problem List  Principal Problem:    Cellulitis of left lower leg  Active Problems:    Wound infection    Unspecified open wo Static Sitting: Good  Dynamic Sitting: Good           Static Standing: Fair -  Dynamic Standing: Fair -    ACTIVITY TOLERANCE                         O2 WALK                  AM-PAC '6-Clicks' INPATIENT supervision   Goal #4 Patient to demonstrate independence with home activity/exercise instructions provided to patient in preparation for discharge.    Goal #4   Current Status IN PROGRESS   Goal #5     Goal #5   Current Status     Goal #6     Goal #6  Curr

## 2019-08-21 NOTE — PROGRESS NOTES
[facility] AND [facility] Infectious Disease  Progress Note    Severa Sable Patient Status:  Inpatient    12/3/1940 MRN [id number]   Location [facility]5W Attending Baldo Aponte MD   Hosp Day # 1 PCP Aurora Cohen.  Kings Guzman MD     Subjective:  Gamal Sandoval vancomycin and cefepime ongoing   - Plans for OR debridement with VAC placement, likely future grafting as well    2. Full LLE soft tissue necrosis due to the above trauma  - OR anticipated    3. Disposition - inpatient.   Anticipate OR with debridement a

## 2019-08-21 NOTE — PROGRESS NOTES
[facility] Hospitalist Progress Note     CC: Hospital Follow up    PCP: Zander White.  Christofer Moore MD       Assessment/Plan:   Patient is an [de-identified]year old female with PMH sig for COPD, GERD, OA, depression/anxiety, recurrent falls wheelchair bound, brain aneurysm s/p coi data filed at 8/20/2019 2108  Gross per 24 hour   Intake 700 ml   Output 400 ml   Net 300 ml       Last 3 Weights  08/14/19 1345 : 260 lb (117.9 kg)  07/29/19 0300 : 261 lb 6.4 oz (118.6 kg)  06/23/19 0648 : 250 lb (113.4 kg)    Exam   GEN: NAD, alert and vancomycin  1,500 mg Intravenous Q24H   • cefepime  1 g Intravenous Q8H   • collagenase   Topical Daily   • aspirin  325 mg Oral Daily   • Fluticasone Furoate-Vilanterol  1 puff Inhalation Daily   • Desvenlafaxine Succinate ER  100 mg Oral Daily   • furose

## 2019-08-21 NOTE — PLAN OF CARE
Problem: Patient Centered Care  Goal: Patient preferences are identified and integrated in the patient's plan of care  Description  Interventions:  - What would you like us to know as we care for you?  I'm from [place] [facility].  - Provide timel non-pharmacological measures as appropriate and evaluate response  - Consider cultural and social influences on pain and pain management  - Manage/alleviate anxiety  - Utilize distraction and/or relaxation techniques  - Monitor for opioid side effects  - N

## 2019-08-22 LAB
ANION GAP SERPL CALC-SCNC: 7 MMOL/L (ref 0–18)
BASOPHILS # BLD AUTO: 0.02 X10(3) UL (ref 0–0.2)
BASOPHILS NFR BLD AUTO: 0.3 %
BUN BLD-MCNC: 22 MG/DL (ref 7–18)
BUN/CREAT SERPL: 22.7 (ref 10–20)
CALCIUM BLD-MCNC: 9.2 MG/DL (ref 8.5–10.1)
CHLORIDE SERPL-SCNC: 107 MMOL/L (ref 98–112)
CO2 SERPL-SCNC: 27 MMOL/L (ref 21–32)
CREAT BLD-MCNC: 0.97 MG/DL (ref 0.55–1.02)
DEPRECATED RDW RBC AUTO: 44.5 FL (ref 35.1–46.3)
EOSINOPHIL # BLD AUTO: 0 X10(3) UL (ref 0–0.7)
EOSINOPHIL NFR BLD AUTO: 0 %
ERYTHROCYTE [DISTWIDTH] IN BLOOD BY AUTOMATED COUNT: 12.8 % (ref 11–15)
GLUCOSE BLD-MCNC: 160 MG/DL (ref 70–99)
HCT VFR BLD AUTO: 36.4 % (ref 35–48)
HGB BLD-MCNC: 12 G/DL (ref 12–16)
IMM GRANULOCYTES # BLD AUTO: 0.01 X10(3) UL (ref 0–1)
IMM GRANULOCYTES NFR BLD: 0.2 %
LYMPHOCYTES # BLD AUTO: 0.98 X10(3) UL (ref 1–4)
LYMPHOCYTES NFR BLD AUTO: 16.6 %
MCH RBC QN AUTO: 31.3 PG (ref 26–34)
MCHC RBC AUTO-ENTMCNC: 33 G/DL (ref 31–37)
MCV RBC AUTO: 95 FL (ref 80–100)
MONOCYTES # BLD AUTO: 0.13 X10(3) UL (ref 0.1–1)
MONOCYTES NFR BLD AUTO: 2.2 %
NEUTROPHILS # BLD AUTO: 4.76 X10 (3) UL (ref 1.5–7.7)
NEUTROPHILS # BLD AUTO: 4.76 X10(3) UL (ref 1.5–7.7)
NEUTROPHILS NFR BLD AUTO: 80.7 %
OSMOLALITY SERPL CALC.SUM OF ELEC: 299 MOSM/KG (ref 275–295)
PLATELET # BLD AUTO: 184 10(3)UL (ref 150–450)
POTASSIUM SERPL-SCNC: 4.7 MMOL/L (ref 3.5–5.1)
RBC # BLD AUTO: 3.83 X10(6)UL (ref 3.8–5.3)
SODIUM SERPL-SCNC: 141 MMOL/L (ref 136–145)
TROPONIN I SERPL-MCNC: <0.045 NG/ML (ref ?–0.04)
TROPONIN I SERPL-MCNC: <0.045 NG/ML (ref ?–0.04)
WBC # BLD AUTO: 5.9 X10(3) UL (ref 4–11)

## 2019-08-22 RX ORDER — KETOROLAC TROMETHAMINE 30 MG/ML
30 INJECTION, SOLUTION INTRAMUSCULAR; INTRAVENOUS EVERY 6 HOURS PRN
Status: DISCONTINUED | OUTPATIENT
Start: 2019-08-22 | End: 2019-08-22

## 2019-08-22 RX ORDER — DIPHENHYDRAMINE HCL 25 MG
25 CAPSULE ORAL EVERY 6 HOURS PRN
Status: DISCONTINUED | OUTPATIENT
Start: 2019-08-22 | End: 2019-08-23

## 2019-08-22 RX ORDER — DIPHENHYDRAMINE HCL 25 MG
25 CAPSULE ORAL EVERY 6 HOURS PRN
Status: DISCONTINUED | OUTPATIENT
Start: 2019-08-22 | End: 2019-08-22

## 2019-08-22 RX ORDER — KETOROLAC TROMETHAMINE 30 MG/ML
30 INJECTION, SOLUTION INTRAMUSCULAR; INTRAVENOUS EVERY 6 HOURS PRN
Status: DISCONTINUED | OUTPATIENT
Start: 2019-08-22 | End: 2019-08-23

## 2019-08-22 RX ORDER — DEXAMETHASONE 4 MG/1
4 TABLET ORAL EVERY 6 HOURS SCHEDULED
Status: COMPLETED | OUTPATIENT
Start: 2019-08-22 | End: 2019-08-22

## 2019-08-22 NOTE — PROGRESS NOTES
[facility] Hospitalist Progress Note     CC: Hospital Follow up    PCP: Radha Worrell.  Mitzy Benedict MD       Assessment/Plan:   Patient is an [de-identified]year old female with PMH sig for COPD, GERD, OA, depression/anxiety, recurrent falls wheelchair bound, brain aneurysm s/p coi 16  BP: (108-160)/(49-89) 129/52      Intake/Output:    Intake/Output Summary (Last 24 hours) at 8/22/2019 1701  Last data filed at 8/22/2019 0550  Gross per 24 hour   Intake 550 ml   Output 300 ml   Net 250 ml       Last 3 Weights  08/14/19 1345 : 260 lb dexamethasone  4 mg Oral 4 times per day   • amLODIPine Besylate  10 mg Oral Daily   • vancomycin HCl  125 mg Oral Daily   • vancomycin  1,500 mg Intravenous Q24H   • cefepime  1 g Intravenous Q8H   • aspirin  325 mg Oral Daily   • Fluticasone Furoate-Lauren

## 2019-08-22 NOTE — ANESTHESIA PROCEDURE NOTES
Airway  Urgency: elective      General Information and Staff    Patient location during procedure: OR  Anesthesiologist: Marbella Ford MD  Performed: anesthesiologist     Indications and Patient Condition  Indications for airway management: anesthesia  Se

## 2019-08-22 NOTE — BRIEF OP NOTE
Pre-Operative Diagnosis: Cellulitis of left lower extremity [L03.116]     Post-Operative Diagnosis: Cellulitis of left lower extremity [L03.116]      Procedure Performed:   Procedure(s):  Debridement left leg, vac placement    Surgeon(s) and Role:     * Ja

## 2019-08-22 NOTE — PLAN OF CARE
Problem: Patient Centered Care  Goal: Patient preferences are identified and integrated in the patient's plan of care  Description  Interventions:  - What would you like us to know as we care for you?  I'm from [place] [facility].  - Provide timel non-pharmacological measures as appropriate and evaluate response  - Consider cultural and social influences on pain and pain management  - Manage/alleviate anxiety  - Utilize distraction and/or relaxation techniques  - Monitor for opioid side effects  - N planning  - Arrange for needed discharge resources and transportation as appropriate  - Identify discharge learning needs (meds, wound care, etc)  - Arrange for interpreters to assist at discharge as needed  - Consider post-discharge preferences of patient

## 2019-08-22 NOTE — INTERVAL H&P NOTE
Pre-op Diagnosis: Cellulitis of left lower extremity [L03.116]    The above referenced H&P was reviewed by Yaneli Ruvalcaba MD on 8/21/2019, the patient was examined and no significant changes have occurred in the patient's condition since the H&P wa

## 2019-08-22 NOTE — WOUND PROGRESS NOTE
WOUND CARE NOTE      PLAN   Recommendations:  Dr. Flakita Robertson and ID are following the pt.     Elevate left leg in the bed and in the chair  VAC standing orders  VAC troubleshooting instructions on the machine  Staff to monitor VAC seal and repair seal if 07/29/2019    TP 6.4 07/29/2019    AST 12 (L) 07/29/2019    ALT 18 07/29/2019     03/29/2019    MG 2.1 08/15/2019    PHOS 3.8 06/06/2018

## 2019-08-22 NOTE — OPERATIVE REPORT
[facility]    PATIENT'S NAME: Aby Albarran   ATTENDING PHYSICIAN: Silvestre David MD   OPERATING PHYSICIAN: Jorge Beasley MD   PATIENT ACCOUNT#:   [de-identified]    LOCATION:  [address] [place] [address] RECORD #:   [id number]       [facility] Xarelto. A VAC sponge was placed, connected to the VAC suction apparatus. There were no leaks. The patient tolerated the procedure well and left the operating suite in satisfactory condition.      Dictated By Viji Rollins MD  d: 08/21/2

## 2019-08-22 NOTE — PROGRESS NOTES
[facility] AND [facility] Infectious Disease  Progress Note    Rayshawn Ireland Patient Status:  Inpatient    12/3/1940 MRN [id number]   Location [facility]5W Attending Erich Jacinto MD   Hosp Day # 2 PCP Dulce Weaver.  Rosangela Tello MD     Subjective:  César Dumont grafting as well  - Patient anxious about VAC change tomorrow - asking if MSO4 can be ordered     2. Full LLE soft tissue necrosis due to the above trauma  - OR anticipated     3. Disposition - inpatient.   Now POD #1 s/p OR debridement with VAC placement

## 2019-08-22 NOTE — PLAN OF CARE
Problem: Patient Centered Care  Goal: Patient preferences are identified and integrated in the patient's plan of care  Description  Interventions:  - What would you like us to know as we care for you?  I'm from [place] [facility].  - Provide timel injury  Description  INTERVENTIONS:  - Assess pt frequently for physical needs  - Identify cognitive and physical deficits and behaviors that affect risk of falls.   - Parker fall precautions as indicated by assessment.  - Educate pt/family on patient sa

## 2019-08-22 NOTE — ANESTHESIA PREPROCEDURE EVALUATION
Anesthesia PreOp Note    HPI:     Mario Galarza is a [de-identified]year old female who presents for preoperative consultation requested by: Alfredo Montenegro MD    Date of Surgery: 8/14/2019 - 8/21/2019    Procedure(s):  EXTREMITY LOWER IRRIGATION & Dorethea Primmer Neurogenic bladder         Date Noted: 03/07/2017      Anxiety         Date Noted: 01/10/2017      DVT (deep venous thrombosis) ([facility] [address]         Date Noted: 01/05/2017      Cellulitis of left lower extremity         Date Noted: 01/04/2017      Neck pain on ri spondylolisthesis 10/31/2014   • Low back pain    • Migraines    • Muscle weakness    • Onychomycosis     Debridement    • Oropharyngeal dysphagia 8/29/2017   • Osteoarthrosis, unspecified whether generalized or localized, unspecified site    • Other and u Cervical Discectomy AND FUSION   • TONSILLECTOMY  1950    Tonsillitis    • TRANSFORAMINAL LUMBAR EPIDURAL STEROID INJECTION SINGLE LEVEL Bilateral 10/13/2017    Performed by Kendrick Jonas MD at [address] [place] [address]         Medications Prior to Admission:  mariana -40 MG Oral Cap Take 1 capsule by mouth every 4 (four) hours as needed for Headaches. Disp: 30 capsule Rfl: 0    butalbital-aspirin-caffeine -40 MG Oral Cap Take 1 capsule by mouth every 4 (four) hours as needed for Migraine.  Disp: 10 capsule R Intravenous Q8H Hugo Velásquez MD Last Rate: 25 mL/hr at 08/21/19 1321 1 g at 08/21/19 1321   [MAR Hold] collagenase (SANTYL) 250 UNIT/GM ointment  Topical Daily Amaris Sevilla DO     [MAR Hold] Normal Saline Flush 0.9 % injection 3 mL 3 mL Intravenous PRN [MAR Hold] LORazepam (ATIVAN) tab 4 mg 4 mg Oral Nightly Chloé Leyva MD 4 mg at 08/20/19 2101    [facility] Hold] Oxybutynin Chloride ER (DITROPAN-XL) 24 hr tab 5 mg 5 mg Oral Daily Chloé Leyva MD 5 mg at 08/15/19 1113    [MAR Hold] Pantoprazole History    Socioeconomic History      Marital status:       Spouse name: Not on file      Number of children: Not on file      Years of education: Not on file      Highest education level: Not on file    Occupational History      Not on file    Soc home P/T 2 times per week        Bike Helmet: Not Asked        Seat Belt: Not Asked        Self-Exams: Not Asked        Grew up on a farm: Not Asked        History of tanning: No        Outdoor occupation: Not Asked        Pt has a pacemaker: No TempSrc: Oral Oral Oral Oral   SpO2: 92% 90% 92% 94%   Weight:       Height:            Anesthesia Evaluation     Patient summary reviewed    Airway   Mallampati: II  TM distance: >3 FB  Neck ROM: full  Dental    (+) upper dentures and lower dentures

## 2019-08-22 NOTE — ANESTHESIA POSTPROCEDURE EVALUATION
Patient: Tisha Workman    Procedure Summary     Date:  08/21/19 Room / Location:  [address] [place] [address] MAIN OR 01 / 300 [facility] MAIN OR    Anesthesia Start:  1951 Anesthesia Stop:  2056    Procedure:  EXTREMITY LOWER IRRIGATION & DEBRIDEMENT (Left ) Diagnosis:       Cellulitis o

## 2019-08-22 NOTE — ANESTHESIA POSTPROCEDURE EVALUATION
Patient: Carmen Covarrubias    Procedure Summary     Date:  08/21/19 Room / Location:  [address] [place] [address] MAIN OR 01 / [address] [facility] MAIN OR    Anesthesia Start:  1951 Anesthesia Stop:      Procedure:  EXTREMITY LOWER IRRIGATION & DEBRIDEMENT (Left ) Diagnosis:       Cellulitis of le

## 2019-08-22 NOTE — WOUND PROGRESS NOTE
Wound Care Services  Called by the pt's nurse as the pt's left leg vac machine, all of a sudden would not keep a seal and the vac is not alarming, the left leg vac dressing is not suctioned down, attempted to fix the vac machine and the vac machine is stil

## 2019-08-23 VITALS
TEMPERATURE: 97 F | OXYGEN SATURATION: 92 % | HEART RATE: 72 BPM | HEIGHT: 68 IN | WEIGHT: 260 LBS | BODY MASS INDEX: 39.4 KG/M2 | SYSTOLIC BLOOD PRESSURE: 151 MMHG | DIASTOLIC BLOOD PRESSURE: 72 MMHG | RESPIRATION RATE: 18 BRPM

## 2019-08-23 LAB
ANION GAP SERPL CALC-SCNC: 5 MMOL/L (ref 0–18)
BASOPHILS # BLD AUTO: 0.01 X10(3) UL (ref 0–0.2)
BASOPHILS NFR BLD AUTO: 0.1 %
BUN BLD-MCNC: 31 MG/DL (ref 7–18)
BUN/CREAT SERPL: 32.6 (ref 10–20)
CALCIUM BLD-MCNC: 9.1 MG/DL (ref 8.5–10.1)
CHLORIDE SERPL-SCNC: 110 MMOL/L (ref 98–112)
CO2 SERPL-SCNC: 27 MMOL/L (ref 21–32)
CREAT BLD-MCNC: 0.95 MG/DL (ref 0.55–1.02)
DEPRECATED RDW RBC AUTO: 45.6 FL (ref 35.1–46.3)
EOSINOPHIL # BLD AUTO: 0 X10(3) UL (ref 0–0.7)
EOSINOPHIL NFR BLD AUTO: 0 %
ERYTHROCYTE [DISTWIDTH] IN BLOOD BY AUTOMATED COUNT: 12.8 % (ref 11–15)
GLUCOSE BLD-MCNC: 186 MG/DL (ref 70–99)
HCT VFR BLD AUTO: 34.7 % (ref 35–48)
HGB BLD-MCNC: 11.2 G/DL (ref 12–16)
IMM GRANULOCYTES # BLD AUTO: 0.08 X10(3) UL (ref 0–1)
IMM GRANULOCYTES NFR BLD: 0.6 %
LYMPHOCYTES # BLD AUTO: 1.24 X10(3) UL (ref 1–4)
LYMPHOCYTES NFR BLD AUTO: 9.1 %
MCH RBC QN AUTO: 31.2 PG (ref 26–34)
MCHC RBC AUTO-ENTMCNC: 32.3 G/DL (ref 31–37)
MCV RBC AUTO: 96.7 FL (ref 80–100)
MONOCYTES # BLD AUTO: 0.31 X10(3) UL (ref 0.1–1)
MONOCYTES NFR BLD AUTO: 2.3 %
NEUTROPHILS # BLD AUTO: 11.96 X10 (3) UL (ref 1.5–7.7)
NEUTROPHILS # BLD AUTO: 11.96 X10(3) UL (ref 1.5–7.7)
NEUTROPHILS NFR BLD AUTO: 87.9 %
OSMOLALITY SERPL CALC.SUM OF ELEC: 305 MOSM/KG (ref 275–295)
PLATELET # BLD AUTO: 173 10(3)UL (ref 150–450)
POTASSIUM SERPL-SCNC: 4.8 MMOL/L (ref 3.5–5.1)
RBC # BLD AUTO: 3.59 X10(6)UL (ref 3.8–5.3)
SODIUM SERPL-SCNC: 142 MMOL/L (ref 136–145)
WBC # BLD AUTO: 13.6 X10(3) UL (ref 4–11)

## 2019-08-23 PROCEDURE — 99232 SBSQ HOSP IP/OBS MODERATE 35: CPT | Performed by: PLASTIC SURGERY

## 2019-08-23 RX ORDER — HYDROCODONE BITARTRATE AND ACETAMINOPHEN 10; 325 MG/1; MG/1
1 TABLET ORAL EVERY 4 HOURS PRN
Status: DISCONTINUED | OUTPATIENT
Start: 2019-08-23 | End: 2019-08-23

## 2019-08-23 RX ORDER — MORPHINE SULFATE 2 MG/ML
3 INJECTION, SOLUTION INTRAMUSCULAR; INTRAVENOUS ONCE
Status: COMPLETED | OUTPATIENT
Start: 2019-08-23 | End: 2019-08-23

## 2019-08-23 RX ORDER — HYDROCODONE BITARTRATE AND ACETAMINOPHEN 10; 325 MG/1; MG/1
1 TABLET ORAL EVERY 4 HOURS PRN
Qty: 30 TABLET | Refills: 0 | Status: SHIPPED | OUTPATIENT
Start: 2019-08-23 | End: 2019-10-28

## 2019-08-23 RX ORDER — LORAZEPAM 2 MG/1
4 TABLET ORAL NIGHTLY PRN
Qty: 14 TABLET | Refills: 0 | Status: ON HOLD | OUTPATIENT
Start: 2019-08-23 | End: 2020-02-19

## 2019-08-23 RX ORDER — AMLODIPINE BESYLATE 10 MG/1
10 TABLET ORAL DAILY
Qty: 90 TABLET | Refills: 3 | Status: SHIPPED | OUTPATIENT
Start: 2019-08-23 | End: 2020-08-17

## 2019-08-23 NOTE — PLAN OF CARE
Problem: Patient Centered Care  Goal: Patient preferences are identified and integrated in the patient's plan of care  Description  Interventions:  - What would you like us to know as we care for you?  I'm from [facility].  - Provide timel of pain and evaluate response  - Implement non-pharmacological measures as appropriate and evaluate response  - Consider cultural and social influences on pain and pain management  - Manage/alleviate anxiety  - Utilize distraction and/or relaxation techniq to discharge w/pt and caregiver  - Include patient/family/discharge partner in discharge planning  - Arrange for needed discharge resources and transportation as appropriate  - Identify discharge learning needs (meds, wound care, etc)  - Arrange for interp

## 2019-08-23 NOTE — DISCHARGE SUMMARY
[facility] Hospitalist Discharge Summary   Patient ID:  Severa Sable [id number]  [de-identified]year old 12/3/1940    Admit date: 8/14/2019  Discharge date: 8/23/2019  Risk of Readmission Lace+ Score: 83  59-90 High Risk  29-58 Medium Risk  0-28   Low Risk    Primary Care -Vanco IV and cefepime added 8/17 plan to continue for 3 week duration EOT 9/11  -plastic surgery c/s s/p OR debridement on 8/21, follow up OP in 2 weeks  -discharged with wound vac     HTN  -continue amlodipine 10 mg daily     Anxiety/Mood Disorder  -ativ Budesonide-Formoterol Fumarate 160-4.5 MCG/ACT Aero  Commonly known as:  SYMBICORT     * butalbital-aspirin-caffeine -40 MG Caps  Commonly known as:  FIORINAL  Take 1 capsule by mouth every 4 (four) hours as needed for Migraine.      * aspirin-butalbi These [facility] [address] [facility] [facility] [place] [address]    Phone:  [phone]   · amLODIPine Besylate 10 MG Tabs     You can get these medications from any p
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
## 2019-08-23 NOTE — CM/SW NOTE
350pm:  Dereje Pantoja from [place]/ stated they will be ready for pt at [address] spoke w/ [facility] and arranged medicar (wide w/c). SW left a voicemail for pt's dtr, Mariaa Overall regarding discharge time.      RN to call report to [place]    ---

## 2019-08-23 NOTE — PLAN OF CARE
Problem: Patient Centered Care  Goal: Patient preferences are identified and integrated in the patient's plan of care  Description  Interventions:  - What would you like us to know as we care for you?  I'm from [facility].  - Provide timel non-pharmacological measures as appropriate and evaluate response  - Consider cultural and social influences on pain and pain management  - Manage/alleviate anxiety  - Utilize distraction and/or relaxation techniques  - Monitor for opioid side effects  - N appropriate  - Assess patient's ability to be responsible for managing their own health  - Refer to Case Management Department for coordinating discharge planning if the patient needs post-hospital services based on physician/LIP order or complex needs rel

## 2019-08-23 NOTE — PROGRESS NOTES
PLASTIC SURGERY - PO 2      Afebrile    Wound clean.   VAC change today      PLAN:    · VAC dressings  · Home today  · Office in 3 weeks  · STSG at that time if wound granulated

## 2019-08-23 NOTE — WOUND PROGRESS NOTE
Wound Care Services  Routine vac dressing change to the pt's left lower leg wound, she is s/p debridement and vac dressing application in the OR by Dr. Cheryl White 8/21/19, Vac dressing was removed, the pt. was premedicated for pain, the Left leg wound was

## 2019-08-24 NOTE — PLAN OF CARE
Patient was discharge to [facility] in [facility] today at 18:45 via [address] [facility] called so they were running late and they moved time of  from 1700 to 1845.  Report was given to [facility], discharge instructions were sent with

## 2019-09-12 ENCOUNTER — OFFICE VISIT (OUTPATIENT)
Dept: SURGERY | Facility: CLINIC | Age: 79
End: 2019-09-12
Payer: MEDICARE

## 2019-09-12 DIAGNOSIS — S81.802A OPEN WOUND OF LEFT LOWER LEG, INITIAL ENCOUNTER: Primary | ICD-10-CM

## 2019-09-12 PROCEDURE — G0463 HOSPITAL OUTPT CLINIC VISIT: HCPCS | Performed by: PLASTIC SURGERY

## 2019-09-12 PROCEDURE — 99212 OFFICE O/P EST SF 10 MIN: CPT | Performed by: PLASTIC SURGERY

## 2019-09-12 RX ORDER — DESVENLAFAXINE 100 MG/1
TABLET, EXTENDED RELEASE ORAL
Refills: 0 | COMMUNITY
Start: 2019-09-07 | End: 2019-10-03

## 2019-09-12 NOTE — PROGRESS NOTES
Per verbal orders   Cleaned Left leg wound w sterile water, applied wet to dry dressing, gauze and spandage. Pt to continue wound vac and follow-up in 3 weeks, pt verbalized understanding.

## 2019-09-12 NOTE — PROGRESS NOTES
Surgery 1: L leg debridement / VAC  - Date: 08/21/19  - Days Since: 22    Injury 1: RMF Pain onset, probable contusion   - Date: 10/20/17  - Days Since: 692    Injury 2: RMF PIP contusion and sprain -stretched hand  - Date: 02/13/18  - Days Since: 186    I

## 2019-09-13 ENCOUNTER — TELEPHONE (OUTPATIENT)
Dept: SURGERY | Facility: CLINIC | Age: 79
End: 2019-09-13

## 2019-09-13 NOTE — TELEPHONE ENCOUNTER
Pt had sx on 08/21/19 and her PO visit was yesterday 09/12. Per pt's daughter Pieter, pt is to keep wound vac for 3 more weeks. Pt was to be discharged from current facility on 09/16.   Pieter states pt will not be accepted at [facility] with the wound vac, and sh

## 2019-09-13 NOTE — TELEPHONE ENCOUNTER
Spoke to Pieter regarding her mother's wound Vac care. Pieter stated her mother does not want to stay at [facility] for wound [facility] and wants to be transferred to a different company that does not provide wound vac care.

## 2019-09-18 ENCOUNTER — MED REC SCAN ONLY (OUTPATIENT)
Dept: INTERNAL MEDICINE CLINIC | Facility: CLINIC | Age: 79
End: 2019-09-18

## 2019-09-23 ENCOUNTER — TELEPHONE (OUTPATIENT)
Dept: SURGERY | Facility: CLINIC | Age: 79
End: 2019-09-23

## 2019-09-23 NOTE — TELEPHONE ENCOUNTER
Spoke to [employer] representative for pt's Wound vac. [employer] representative stated that was mistakenly sent, due to the confusion of pt having two orders, one for a home order for wound vac and one for a facility wound vac order.    The fax that was sent over was

## 2019-09-23 NOTE — TELEPHONE ENCOUNTER
Spoke to pt's RN, Zahraa at [facility]. Dylan Orellana stated pt is still receiving wound vac care three times a week at [facility]. Zahraa reminded that pt has an MD appointment on 10/3/19 with Dr. Edinson Herring.

## 2019-10-03 ENCOUNTER — OFFICE VISIT (OUTPATIENT)
Dept: SURGERY | Facility: CLINIC | Age: 79
End: 2019-10-03
Payer: MEDICARE

## 2019-10-03 DIAGNOSIS — S81.802A OPEN WOUND OF LEFT LOWER LEG, INITIAL ENCOUNTER: Primary | ICD-10-CM

## 2019-10-03 PROCEDURE — 99214 OFFICE O/P EST MOD 30 MIN: CPT | Performed by: PLASTIC SURGERY

## 2019-10-03 PROCEDURE — G0463 HOSPITAL OUTPT CLINIC VISIT: HCPCS | Performed by: PLASTIC SURGERY

## 2019-10-03 RX ORDER — TRAMADOL HYDROCHLORIDE 50 MG/1
50 TABLET ORAL
Qty: 25 TABLET | Refills: 0 | Status: ON HOLD | OUTPATIENT
Start: 2019-10-03 | End: 2019-10-09

## 2019-10-03 NOTE — PROGRESS NOTES
Injury 1: RMF Pain onset, probable contusion   - Date: 10/20/17  - Days Since: 56    Injury 2: RMF PIP contusion and sprain -stretched hand  - Date: 02/13/18  - Days Since: 65    Injury 3: L leg laceration with full thickness soft tissue necrosis  - Date

## 2019-10-03 NOTE — PROGRESS NOTES
W-D dressing applied to L leg and secured with tape. Pt states spandage doesn't hold on her leg. Pt instructed she needs to have VAC reapplied till surgery. Verbalized understanding. Progress note completed by Dr Franco Blood for [place].   Pt request

## 2019-10-03 NOTE — H&P
Pacemaker: No  Latex Allergy: no  Coumadin: no  Plavix: No  Other anticoagulants: YES Xarelto 20mg daily  Cardiac stents: YES    HAND DOMINANCE: Left  Profession: retired    RECONSTRUCTIVE HISTORY    SUN EXPOSURE  Current no  Past no  Sunburns no  Tannin 4 to 6 hours as needed for Pain. Disp: 25 tablet Rfl: 0   HYDROcodone-acetaminophen  MG Oral Tab Take 1 tablet by mouth every 4 (four) hours as needed.  Disp: 30 tablet Rfl: 0   amLODIPine Besylate 10 MG Oral Tab Take 1 tablet (10 mg total) by mouth d move  Metronidazole           RASH  Phentermine             OTHER (SEE COMMENTS)    Comment:Mini stroke  Tramadol                OTHER (SEE COMMENTS)    Comment:Extreme sleepiness  Norco [Hydrocodone-*    ITCHING    Past Medical History:   Diagnosis Date impairment     EYE GLASSES     Past Surgical History:   Procedure Laterality Date   • ANESTH,NECK VESSEL SURGERY NOS      x4   • BRAIN SURGERY  ~2009    Brain Aneurysm, Stent placed    • BRONCHOSCOPY N/A 5/9/2018    Performed by Michelle Erickson MD at [address] [place] [address] file        Inability: Not on file      Transportation needs:        Medical: Not on file        Non-medical: Not on file    Tobacco Use      Smoking status: Former Smoker        Packs/day: 1.50        Years: 25.00        Pack years: 37.5        Types: Cig anesthetic: No        Left Handed: Yes        Right Handed: No        Currently spends a great deal of time in the sun: No        Past Sunlamp Treatments for Acne: Not Asked        Hx of Spending [place] of Time in [place]: No        Bad sunburns in the pas diagnosis)          10/3/2019  Catalina Lora MD

## 2019-10-03 NOTE — H&P (VIEW-ONLY)
Pacemaker: No  Latex Allergy: no  Coumadin: no  Plavix: No  Other anticoagulants: YES Xarelto 20mg daily  Cardiac stents: YES    HAND DOMINANCE: Left  Profession: retired    RECONSTRUCTIVE HISTORY    SUN EXPOSURE  Current no  Past no  Sunburns no  Tannin 4 to 6 hours as needed for Pain. Disp: 25 tablet Rfl: 0   HYDROcodone-acetaminophen  MG Oral Tab Take 1 tablet by mouth every 4 (four) hours as needed.  Disp: 30 tablet Rfl: 0   amLODIPine Besylate 10 MG Oral Tab Take 1 tablet (10 mg total) by mouth d move  Metronidazole           RASH  Phentermine             OTHER (SEE COMMENTS)    Comment:Mini stroke  Tramadol                OTHER (SEE COMMENTS)    Comment:Extreme sleepiness  Norco [Hydrocodone-*    ITCHING    Past Medical History:   Diagnosis Date impairment     EYE GLASSES     Past Surgical History:   Procedure Laterality Date   • ANESTH,NECK VESSEL SURGERY NOS      x4   • BRAIN SURGERY  ~2009    Brain Aneurysm, Stent placed    • BRONCHOSCOPY N/A 5/9/2018    Performed by Ann Rodriguez MD at [facility] file        Inability: Not on file      Transportation needs:        Medical: Not on file        Non-medical: Not on file    Tobacco Use      Smoking status: Former Smoker        Packs/day: 1.50        Years: 25.00        Pack years: 37.5        Types: Cig anesthetic: No        Left Handed: Yes        Right Handed: No        Currently spends a great deal of time in the sun: No        Past Sunlamp Treatments for Acne: Not Asked        Hx of Spending [place] of Time in [place]: No        Bad sunburns in the pas diagnosis)          10/3/2019  Daria Galeana MD

## 2019-10-07 ENCOUNTER — TELEPHONE (OUTPATIENT)
Dept: SURGERY | Facility: CLINIC | Age: 79
End: 2019-10-07

## 2019-10-07 DIAGNOSIS — S81.802A OPEN WOUND OF LEFT LOWER LEG, INITIAL ENCOUNTER: Primary | ICD-10-CM

## 2019-10-08 ENCOUNTER — HOSPITAL ENCOUNTER (OUTPATIENT)
Facility: HOSPITAL | Age: 79
Setting detail: OBSERVATION
Discharge: HOME | End: 2019-10-09
Attending: PLASTIC SURGERY | Admitting: PLASTIC SURGERY
Payer: MEDICARE

## 2019-10-08 ENCOUNTER — ANESTHESIA (OUTPATIENT)
Dept: SURGERY | Facility: HOSPITAL | Age: 79
End: 2019-10-08
Payer: MEDICARE

## 2019-10-08 ENCOUNTER — HOSPITAL DOCUMENTATION (OUTPATIENT)
Dept: SURGERY | Facility: CLINIC | Age: 79
End: 2019-10-08

## 2019-10-08 ENCOUNTER — ANESTHESIA EVENT (OUTPATIENT)
Dept: SURGERY | Facility: HOSPITAL | Age: 79
End: 2019-10-08
Payer: MEDICARE

## 2019-10-08 DIAGNOSIS — S81.802A OPEN WOUND OF LEFT LOWER LEG, INITIAL ENCOUNTER: Primary | ICD-10-CM

## 2019-10-08 PROBLEM — T14.8XXD WOUND HEALING, DELAYED: Status: ACTIVE | Noted: 2019-10-08

## 2019-10-08 PROCEDURE — 88304 TISSUE EXAM BY PATHOLOGIST: CPT | Performed by: PLASTIC SURGERY

## 2019-10-08 PROCEDURE — 0HRLX74 REPLACEMENT OF LEFT LOWER LEG SKIN WITH AUTOLOGOUS TISSUE SUBSTITUTE, PARTIAL THICKNESS, EXTERNAL APPROACH: ICD-10-PCS | Performed by: PLASTIC SURGERY

## 2019-10-08 PROCEDURE — 82962 GLUCOSE BLOOD TEST: CPT

## 2019-10-08 RX ORDER — EPHEDRINE SULFATE 50 MG/ML
INJECTION, SOLUTION INTRAVENOUS AS NEEDED
Status: DISCONTINUED | OUTPATIENT
Start: 2019-10-08 | End: 2019-10-08 | Stop reason: SURG

## 2019-10-08 RX ORDER — PHENYLEPHRINE HCL 10 MG/ML
VIAL (ML) INJECTION AS NEEDED
Status: DISCONTINUED | OUTPATIENT
Start: 2019-10-08 | End: 2019-10-08 | Stop reason: SURG

## 2019-10-08 RX ORDER — HYDROMORPHONE HYDROCHLORIDE 1 MG/ML
0.6 INJECTION, SOLUTION INTRAMUSCULAR; INTRAVENOUS; SUBCUTANEOUS EVERY 5 MIN PRN
Status: DISCONTINUED | OUTPATIENT
Start: 2019-10-08 | End: 2019-10-08

## 2019-10-08 RX ORDER — CEFAZOLIN SODIUM/WATER 2 G/20 ML
2 SYRINGE (ML) INTRAVENOUS ONCE
Status: DISCONTINUED | OUTPATIENT
Start: 2019-10-08 | End: 2019-10-08 | Stop reason: HOSPADM

## 2019-10-08 RX ORDER — SODIUM CHLORIDE, SODIUM LACTATE, POTASSIUM CHLORIDE, CALCIUM CHLORIDE 600; 310; 30; 20 MG/100ML; MG/100ML; MG/100ML; MG/100ML
INJECTION, SOLUTION INTRAVENOUS CONTINUOUS
Status: DISCONTINUED | OUTPATIENT
Start: 2019-10-08 | End: 2019-10-08 | Stop reason: HOSPADM

## 2019-10-08 RX ORDER — HYDROCODONE BITARTRATE AND ACETAMINOPHEN 5; 325 MG/1; MG/1
2 TABLET ORAL AS NEEDED
Status: DISCONTINUED | OUTPATIENT
Start: 2019-10-08 | End: 2019-10-08 | Stop reason: HOSPADM

## 2019-10-08 RX ORDER — NEOSTIGMINE METHYLSULFATE 0.5 MG/ML
INJECTION INTRAVENOUS AS NEEDED
Status: DISCONTINUED | OUTPATIENT
Start: 2019-10-08 | End: 2019-10-08 | Stop reason: SURG

## 2019-10-08 RX ORDER — POLYETHYLENE GLYCOL 3350 17 G/17G
17 POWDER, FOR SOLUTION ORAL DAILY PRN
Status: DISCONTINUED | OUTPATIENT
Start: 2019-10-08 | End: 2019-10-09

## 2019-10-08 RX ORDER — HYDROMORPHONE HYDROCHLORIDE 1 MG/ML
0.2 INJECTION, SOLUTION INTRAMUSCULAR; INTRAVENOUS; SUBCUTANEOUS EVERY 5 MIN PRN
Status: DISCONTINUED | OUTPATIENT
Start: 2019-10-08 | End: 2019-10-08

## 2019-10-08 RX ORDER — METOCLOPRAMIDE 10 MG/1
10 TABLET ORAL ONCE
Status: COMPLETED | OUTPATIENT
Start: 2019-10-08 | End: 2019-10-08

## 2019-10-08 RX ORDER — LEVOTHYROXINE SODIUM 0.12 MG/1
250 TABLET ORAL
Status: DISCONTINUED | OUTPATIENT
Start: 2019-10-09 | End: 2019-10-09

## 2019-10-08 RX ORDER — METOCLOPRAMIDE HYDROCHLORIDE 5 MG/ML
10 INJECTION INTRAMUSCULAR; INTRAVENOUS EVERY 8 HOURS PRN
Status: DISCONTINUED | OUTPATIENT
Start: 2019-10-08 | End: 2019-10-09

## 2019-10-08 RX ORDER — PROCHLORPERAZINE EDISYLATE 5 MG/ML
5 INJECTION INTRAMUSCULAR; INTRAVENOUS ONCE AS NEEDED
Status: DISCONTINUED | OUTPATIENT
Start: 2019-10-08 | End: 2019-10-08 | Stop reason: HOSPADM

## 2019-10-08 RX ORDER — SODIUM CHLORIDE, SODIUM LACTATE, POTASSIUM CHLORIDE, CALCIUM CHLORIDE 600; 310; 30; 20 MG/100ML; MG/100ML; MG/100ML; MG/100ML
INJECTION, SOLUTION INTRAVENOUS CONTINUOUS
Status: DISCONTINUED | OUTPATIENT
Start: 2019-10-08 | End: 2019-10-09

## 2019-10-08 RX ORDER — MORPHINE SULFATE 10 MG/ML
6 INJECTION, SOLUTION INTRAMUSCULAR; INTRAVENOUS EVERY 10 MIN PRN
Status: DISCONTINUED | OUTPATIENT
Start: 2019-10-08 | End: 2019-10-08 | Stop reason: HOSPADM

## 2019-10-08 RX ORDER — HYDROMORPHONE HYDROCHLORIDE 1 MG/ML
0.4 INJECTION, SOLUTION INTRAMUSCULAR; INTRAVENOUS; SUBCUTANEOUS EVERY 5 MIN PRN
Status: DISCONTINUED | OUTPATIENT
Start: 2019-10-08 | End: 2019-10-08

## 2019-10-08 RX ORDER — MORPHINE SULFATE 4 MG/ML
4 INJECTION, SOLUTION INTRAMUSCULAR; INTRAVENOUS EVERY 10 MIN PRN
Status: DISCONTINUED | OUTPATIENT
Start: 2019-10-08 | End: 2019-10-08 | Stop reason: HOSPADM

## 2019-10-08 RX ORDER — DEXAMETHASONE SODIUM PHOSPHATE 4 MG/ML
VIAL (ML) INJECTION AS NEEDED
Status: DISCONTINUED | OUTPATIENT
Start: 2019-10-08 | End: 2019-10-08 | Stop reason: SURG

## 2019-10-08 RX ORDER — GLYCOPYRROLATE 0.2 MG/ML
INJECTION, SOLUTION INTRAMUSCULAR; INTRAVENOUS AS NEEDED
Status: DISCONTINUED | OUTPATIENT
Start: 2019-10-08 | End: 2019-10-08 | Stop reason: SURG

## 2019-10-08 RX ORDER — ACETAMINOPHEN 500 MG
1000 TABLET ORAL ONCE
Status: COMPLETED | OUTPATIENT
Start: 2019-10-08 | End: 2019-10-08

## 2019-10-08 RX ORDER — ROCURONIUM BROMIDE 10 MG/ML
INJECTION, SOLUTION INTRAVENOUS AS NEEDED
Status: DISCONTINUED | OUTPATIENT
Start: 2019-10-08 | End: 2019-10-08 | Stop reason: SURG

## 2019-10-08 RX ORDER — ONDANSETRON 2 MG/ML
4 INJECTION INTRAMUSCULAR; INTRAVENOUS ONCE AS NEEDED
Status: DISCONTINUED | OUTPATIENT
Start: 2019-10-08 | End: 2019-10-08 | Stop reason: HOSPADM

## 2019-10-08 RX ORDER — FAMOTIDINE 20 MG/1
20 TABLET ORAL ONCE
Status: DISCONTINUED | OUTPATIENT
Start: 2019-10-08 | End: 2019-10-08 | Stop reason: HOSPADM

## 2019-10-08 RX ORDER — ONDANSETRON 2 MG/ML
4 INJECTION INTRAMUSCULAR; INTRAVENOUS EVERY 6 HOURS PRN
Status: DISCONTINUED | OUTPATIENT
Start: 2019-10-08 | End: 2019-10-09

## 2019-10-08 RX ORDER — SODIUM CHLORIDE 0.9 % (FLUSH) 0.9 %
3 SYRINGE (ML) INJECTION AS NEEDED
Status: DISCONTINUED | OUTPATIENT
Start: 2019-10-08 | End: 2019-10-09

## 2019-10-08 RX ORDER — MORPHINE SULFATE 2 MG/ML
2 INJECTION, SOLUTION INTRAMUSCULAR; INTRAVENOUS EVERY 10 MIN PRN
Status: DISCONTINUED | OUTPATIENT
Start: 2019-10-08 | End: 2019-10-08 | Stop reason: HOSPADM

## 2019-10-08 RX ORDER — LIDOCAINE HYDROCHLORIDE 40 MG/ML
SOLUTION TOPICAL AS NEEDED
Status: DISCONTINUED | OUTPATIENT
Start: 2019-10-08 | End: 2019-10-08 | Stop reason: SURG

## 2019-10-08 RX ORDER — ACETAMINOPHEN 325 MG/1
650 TABLET ORAL EVERY 6 HOURS PRN
Status: DISCONTINUED | OUTPATIENT
Start: 2019-10-08 | End: 2019-10-09

## 2019-10-08 RX ORDER — NALOXONE HYDROCHLORIDE 0.4 MG/ML
80 INJECTION, SOLUTION INTRAMUSCULAR; INTRAVENOUS; SUBCUTANEOUS AS NEEDED
Status: DISCONTINUED | OUTPATIENT
Start: 2019-10-08 | End: 2019-10-08 | Stop reason: HOSPADM

## 2019-10-08 RX ORDER — DESVENLAFAXINE 50 MG/1
100 TABLET, EXTENDED RELEASE ORAL DAILY
Status: DISCONTINUED | OUTPATIENT
Start: 2019-10-09 | End: 2019-10-09

## 2019-10-08 RX ORDER — TRAMADOL HYDROCHLORIDE 50 MG/1
50 TABLET ORAL EVERY 6 HOURS PRN
Status: DISCONTINUED | OUTPATIENT
Start: 2019-10-08 | End: 2019-10-09

## 2019-10-08 RX ORDER — QUETIAPINE 100 MG/1
100 TABLET, FILM COATED ORAL NIGHTLY
Status: DISCONTINUED | OUTPATIENT
Start: 2019-10-08 | End: 2019-10-09

## 2019-10-08 RX ORDER — ONDANSETRON 2 MG/ML
INJECTION INTRAMUSCULAR; INTRAVENOUS AS NEEDED
Status: DISCONTINUED | OUTPATIENT
Start: 2019-10-08 | End: 2019-10-08 | Stop reason: SURG

## 2019-10-08 RX ORDER — BISACODYL 10 MG
10 SUPPOSITORY, RECTAL RECTAL
Status: DISCONTINUED | OUTPATIENT
Start: 2019-10-08 | End: 2019-10-09

## 2019-10-08 RX ORDER — PANTOPRAZOLE SODIUM 40 MG/1
40 TABLET, DELAYED RELEASE ORAL 2 TIMES DAILY
Status: DISCONTINUED | OUTPATIENT
Start: 2019-10-08 | End: 2019-10-09

## 2019-10-08 RX ORDER — LORAZEPAM 1 MG/1
4 TABLET ORAL NIGHTLY PRN
Status: DISCONTINUED | OUTPATIENT
Start: 2019-10-08 | End: 2019-10-09

## 2019-10-08 RX ORDER — HALOPERIDOL 5 MG/ML
0.25 INJECTION INTRAMUSCULAR ONCE AS NEEDED
Status: DISCONTINUED | OUTPATIENT
Start: 2019-10-08 | End: 2019-10-08 | Stop reason: HOSPADM

## 2019-10-08 RX ORDER — AMLODIPINE BESYLATE 10 MG/1
10 TABLET ORAL DAILY
Status: DISCONTINUED | OUTPATIENT
Start: 2019-10-09 | End: 2019-10-09

## 2019-10-08 RX ORDER — HYDROCODONE BITARTRATE AND ACETAMINOPHEN 5; 325 MG/1; MG/1
1 TABLET ORAL AS NEEDED
Status: DISCONTINUED | OUTPATIENT
Start: 2019-10-08 | End: 2019-10-08 | Stop reason: HOSPADM

## 2019-10-08 RX ADMIN — NEOSTIGMINE METHYLSULFATE 3 MG: 0.5 INJECTION INTRAVENOUS at 15:07:00

## 2019-10-08 RX ADMIN — GLYCOPYRROLATE 0.6 MG: 0.2 INJECTION, SOLUTION INTRAMUSCULAR; INTRAVENOUS at 15:07:00

## 2019-10-08 RX ADMIN — LIDOCAINE HYDROCHLORIDE 4 ML: 40 SOLUTION TOPICAL at 13:44:00

## 2019-10-08 RX ADMIN — DEXAMETHASONE SODIUM PHOSPHATE 4 MG: 4 MG/ML VIAL (ML) INJECTION at 13:44:00

## 2019-10-08 RX ADMIN — SODIUM CHLORIDE, SODIUM LACTATE, POTASSIUM CHLORIDE, CALCIUM CHLORIDE: 600; 310; 30; 20 INJECTION, SOLUTION INTRAVENOUS at 13:44:00

## 2019-10-08 RX ADMIN — PHENYLEPHRINE HCL 100 MCG: 10 MG/ML VIAL (ML) INJECTION at 14:14:00

## 2019-10-08 RX ADMIN — ROCURONIUM BROMIDE 30 MG: 10 INJECTION, SOLUTION INTRAVENOUS at 14:34:00

## 2019-10-08 RX ADMIN — ONDANSETRON 4 MG: 2 INJECTION INTRAMUSCULAR; INTRAVENOUS at 13:44:00

## 2019-10-08 RX ADMIN — EPHEDRINE SULFATE 10 MG: 50 INJECTION, SOLUTION INTRAVENOUS at 14:02:00

## 2019-10-08 NOTE — ANESTHESIA POSTPROCEDURE EVALUATION
Patient: Pastora Oates    Procedure Summary     Date:  10/08/19 Room / Location:  [facility] OR 01 / [facility] OR    Anesthesia Start:  8756 Anesthesia Stop:  7038    Procedures:       Alen (Left )      SKIN GRAFT SPLIT T

## 2019-10-08 NOTE — PLAN OF CARE
Patient has remained free from falls throughout stay. Hourly rounding maintained. Pt's bed in lowest position w/ side rails up. Patient has been educated and is compliant w/ call light system. Patient has not been OOB yet.  Patient has been educated on ha and social influences on pain and pain management  - Manage/alleviate anxiety  - Utilize distraction and/or relaxation techniques  - Monitor for opioid side effects  - Notify MD/LIP if interventions unsuccessful or patient reports new pain  - Anticipate in

## 2019-10-08 NOTE — PROGRESS NOTES
Patient's ride home has become ill and cannot take her home after surgery. Will place patient in observation overnight.

## 2019-10-08 NOTE — INTERVAL H&P NOTE
Pre-op Diagnosis: wound    The above referenced H&P was reviewed by Gm Vee MD on 10/8/2019, the patient was examined and no significant changes have occurred in the patient's condition since the H&P was performed.   I discussed with the shanika

## 2019-10-08 NOTE — ANESTHESIA PREPROCEDURE EVALUATION
Anesthesia PreOp Note    HPI:     Teo Chappell is a [de-identified]year old female who presents for preoperative consultation requested by: Del Quinonez MD    Date of Surgery: 10/8/2019    Procedure(s):  EXTREMITY LOWER IRRIGATION & DEBRIDEMENT  SKIN NADEEM 03/07/2017      Neurogenic bladder         Date Noted: 03/07/2017      Anxiety         Date Noted: 01/10/2017      DVT (deep venous thrombosis) ([facility] [address]         Date Noted: 01/05/2017      Cellulitis of left lower extremity         Date Noted: 01/04/2017 unstable spondylolisthesis 10/31/2014   • Leg wound, left 08/21/2019   • Low back pain    • Migraines    • Muscle weakness    • Onychomycosis     Debridement    • Oropharyngeal dysphagia 8/29/2017   • Osteoarthrosis, unspecified whether generalized or loca • KNEE REPLACEMENT SURGERY Bilateral     Bilateral arthritis    • LAMINECTOMY      WITH FUSION PER PATIENT   • OTHER SURGICAL HISTORY  1985,1995,2003,2009    Cervical Discectomy AND FUSION   • PREP SITE T/A/L 1ST 100 SQ CM/1PCT Left 08/21/2019    Left le month at Unknown time   aspirin 325 MG Oral Tab Take 325 mg by mouth daily. Disp:  Rfl:  More than a month at Unknown time   Budesonide-Formoterol Fumarate 160-4.5 MCG/ACT Inhalation Aerosol Inhale 1 puff into the lungs daily.  Disp:  Rfl:  More than a sarah beth Liver    • Neurological Disorder Sister         ALzheimer's Disease    • Stroke Sister         Cerebrovascular accident    • Heart Disease Brother         Coronary Artery Disease      Social History    Socioeconomic History      Marital status: Miranda Hazards: Not Asked        Sleep Concern: Not Asked        Stress Concern: Not Asked        Weight Concern: Not Asked        Special Diet: Not Asked        Back Care: Not Asked        Exercise: Yes          home P/T 2 times per week        Bike Helmet: Not 10/07/19  1446 10/08/19  1123   BP:  144/70   Pulse:  88   Resp:  21   Temp:  98.3 °F (36.8 °C)   TempSrc:  Oral   SpO2:  92%   Weight: 113.4 kg (250 lb) 113.4 kg (250 lb)   Height: 1.727 m (5' 8\") 1.727 m (5' 8\")        Anesthesia Evaluation     Patient

## 2019-10-08 NOTE — OPERATIVE REPORT
[facility]    PATIENT'S NAME: Srinath Landry   ATTENDING PHYSICIAN: Meliton Lang MD   OPERATING PHYSICIAN: Meliton Lang MD   PATIENT ACCOUNT#:   [id number]    LOCATION:  [facility] [address] PACU 8 [facility] 10  MEDICAL RECORD #:   [id number] the donor site open. We infiltrated the area with 0.5% lidocaine with 1:200,000 epinephrine. The area was excised, leaving a defect of 9 x 5 cm.   To effect closure, we incised proximally and laterally and elevated a 16 x 10 cm distal lateral-based rotati

## 2019-10-09 VITALS
WEIGHT: 250 LBS | HEIGHT: 68 IN | HEART RATE: 62 BPM | TEMPERATURE: 99 F | DIASTOLIC BLOOD PRESSURE: 43 MMHG | RESPIRATION RATE: 18 BRPM | SYSTOLIC BLOOD PRESSURE: 112 MMHG | BODY MASS INDEX: 37.89 KG/M2 | OXYGEN SATURATION: 93 %

## 2019-10-09 PROCEDURE — 80048 BASIC METABOLIC PNL TOTAL CA: CPT | Performed by: HOSPITALIST

## 2019-10-09 PROCEDURE — 85025 COMPLETE CBC W/AUTO DIFF WBC: CPT | Performed by: HOSPITALIST

## 2019-10-09 RX ORDER — TRAMADOL HYDROCHLORIDE 50 MG/1
50 TABLET ORAL
Qty: 30 TABLET | Refills: 0 | Status: SHIPPED | OUTPATIENT
Start: 2019-10-09 | End: 2019-11-09 | Stop reason: ALTCHOICE

## 2019-10-09 NOTE — RESPIRATORY THERAPY NOTE
Patient refused cpap therapy. [facility] has educated the patient on the purpose of and need for this therapy as well as potential negative outcomes associated with deferring treatment.  Despite education, patient maintains refusal.

## 2019-10-09 NOTE — PROGRESS NOTES
[facility] Hospitalist Progress Note     PCP: Greyson Quezada MD    Chief Complaint: follow-up    Overnight/Interim Events:      SUBJECTIVE:  Pt feels LH at times, tired. Using tylenol for pain.      OBJECTIVE:  Temp:  [97.8 °F (36.6 °C)-98.9 °F (37.2 °C)] 9 Saline Flush, acetaminophen, PEG 3350, magnesium hydroxide, bisacodyl, ondansetron HCl, Metoclopramide HCl, LORazepam       Assessment/Plan:     [de-identified]year old female with PMH including but not limited to anxiety, COPD, HT, SHANT, htn, who p/t EH for scheduled

## 2019-10-09 NOTE — RESPIRATORY THERAPY NOTE
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 Pt does not routinely use a CPAP device at home. This pt is suspected to be at high risk for SHANT and sleep lab packet was not provided to patient for outpatient follow-up.     CPAP INITIATION:

## 2019-10-09 NOTE — CONSULTS
General Medicine Consult      Consulted by: Alfredo Montenegro MD    PCP: Don Deleon MD    Reason for Consultation: Medical Management    History of Present Illness: Patient is a [de-identified]year old female with PMH including but not limited to anxiety 11/2/2017   • s/p L5-S1 right laminectomy 8/28/2014   • Shortness of breath     O2 AT NIGHT   • Sleep apnea    • stent placement     cerebral   • Thyroid disease    • Toe pain     Onychomycosis, Debridement , Hammertoe    • Tonsillitis 1950    Tonsillitis MEDS    [START ON 10/9/2019] amLODIPine Besylate 10 mg Oral Daily   [START ON 10/9/2019] Desvenlafaxine Succinate  mg Oral Daily   [START ON 10/9/2019] Levothyroxine Sodium 250 mcg Oral Before breakfast   Pantoprazole Sodium 40 mg Oral BID   QUEtiapi Cerebrovascular accident    • Heart Disease Brother         Coronary Artery Disease        Review of Systems  Comprehensive ROS reviewed and negative except for what's stated above. Including negative for chest pain, shortness of breath, syncope.        O Depression/anxiety/psychiatry  -SSRI, seroquel    # GERD  -PPI, wean as o/p if appropriate    # HTN  -CCB    # DVT proph  -Xarelto      Dispo: cont post-op care; lives by self at home, has scooter    Outpatient records records reviewed including Becky Berry

## 2019-10-09 NOTE — PLAN OF CARE
Please refer to discharge record.        Problem: Patient Centered Care  Goal: Patient preferences are identified and integrated in the patient's plan of care  Description  Interventions:  - What would you like us to know as we care for you?   - Provide kelly RISK FOR INFECTION - ADULT  Goal: Absence of fever/infection during anticipated neutropenic period  Description  INTERVENTIONS  - Monitor WBC  - Administer growth factors as ordered  - Implement neutropenic guidelines  Outcome: Adequate for Discharge     P patient/family  Outcome: Adequate for Discharge  Goal: Maintain proper alignment of affected body part  Description  INTERVENTIONS:  - Support and protect limb and body alignment per provider's orders  - Instruct and reinforce with patient and family use o

## 2019-10-09 NOTE — CM/SW NOTE
FLAKITA received call from Marilyn at [facility] who states that pt has [facility] through them and was asking at discharge plans. FLAKITA informed Marilyn that pt is discharging today at [address] [place] [address] and a medicar was ordered for the pt.      FLAKITA ordered transportation of a [address] th

## 2019-10-09 NOTE — PLAN OF CARE
Problem: Patient Centered Care  Goal: Patient preferences are identified and integrated in the patient's plan of care  Description  Interventions:  - What would you like us to know as we care for you?   - Provide timely, complete, and accurate informatio neutropenic period  Description  INTERVENTIONS  - Monitor WBC  - Administer growth factors as ordered  - Implement neutropenic guidelines  Outcome: Progressing     Problem: SAFETY ADULT - FALL  Goal: Free from fall injury  Description  INTERVENTIONS:  - As Support and protect limb and body alignment per provider's orders  - Instruct and reinforce with patient and family use of appropriate assistive device and precautions (e.g. spinal or hip dislocation precautions)  Outcome: Xi GASTELUM IS A/O X4,

## 2019-10-15 ENCOUNTER — NURSE ONLY (OUTPATIENT)
Dept: SURGERY | Facility: CLINIC | Age: 79
End: 2019-10-15
Payer: MEDICARE

## 2019-10-15 DIAGNOSIS — Z48.01 ENCOUNTER FOR CHANGE OR REMOVAL OF SURGICAL WOUND DRESSING: Primary | ICD-10-CM

## 2019-10-15 DIAGNOSIS — S81.802A OPEN WOUND OF LEFT LOWER LEG, INITIAL ENCOUNTER: ICD-10-CM

## 2019-10-15 NOTE — PROGRESS NOTES
Injury 1: RMF Pain onset, probable contusion   - Date: 10/20/17  - Days Since: 725    Injury 2: RMF PIP contusion and sprain -stretched hand  - Date: 02/13/18  - Days Since: 609    Injury 3: L leg laceration with full thickness soft tissue necrosis  - Date

## 2019-10-16 ENCOUNTER — TELEPHONE (OUTPATIENT)
Dept: SURGERY | Facility: CLINIC | Age: 79
End: 2019-10-16

## 2019-10-16 NOTE — TELEPHONE ENCOUNTER
Ari Clayton from [facility] calling to request pt's LOV notes please be faxed to her at [phone]. Notes faxed per her request. Confirmation received. Ari Clayton also requesting pt's most current wound care orders? Please call back and advise.   [phone]

## 2019-10-16 NOTE — TELEPHONE ENCOUNTER
Spoke to Yan strickland from [facility]. Yan strickland wanted orders to be clarified with dressings changes to pt's Left leg incisional site.    Per Dr. Katherine Wolfe' standing orders, there are no dressings changes to be done for pt or home health nurse and to

## 2019-10-21 ENCOUNTER — TELEPHONE (OUTPATIENT)
Dept: NEUROLOGY | Facility: CLINIC | Age: 79
End: 2019-10-21

## 2019-10-21 NOTE — TELEPHONE ENCOUNTER
Patient has been hospitalized multiple times since last office visit and will need to be re-evaluated prior to having injection as discussed with Dr. Yu Alert    Attempt to contact patient and notify her of the above-no answer.  Will try again

## 2019-10-22 ENCOUNTER — NURSE ONLY (OUTPATIENT)
Dept: SURGERY | Facility: CLINIC | Age: 79
End: 2019-10-22
Payer: MEDICARE

## 2019-10-22 DIAGNOSIS — Z48.02 ENCOUNTER FOR STAPLE REMOVAL: Primary | ICD-10-CM

## 2019-10-22 DIAGNOSIS — S81.802A OPEN WOUND OF LEFT LOWER LEG, INITIAL ENCOUNTER: ICD-10-CM

## 2019-10-22 RX ORDER — CEFADROXIL 500 MG/1
500 CAPSULE ORAL 2 TIMES DAILY
Qty: 14 CAPSULE | Refills: 0 | Status: SHIPPED | OUTPATIENT
Start: 2019-10-22 | End: 2019-10-29

## 2019-10-22 NOTE — TELEPHONE ENCOUNTER
Second attempt to reach patient. Requested return call to schedule pt for evaluation prior to proceeding w/ back injection.

## 2019-10-22 NOTE — PROGRESS NOTES
Injury 1: RMF Pain onset, probable contusion   - Date: 10/20/17  - Days Since: 732    Injury 2: RMF PIP contusion and sprain -stretched hand  - Date: 02/13/18  - Days Since: 616    Injury 3: L leg laceration with full thickness soft tissue necrosis  - Da call with increase s/s of infection, questions and/or concerns. Dr Alexia Cabezas notified.

## 2019-10-23 NOTE — TELEPHONE ENCOUNTER
Pt called requesting sooner appointment than December as she is unable to sit comfortably. Currently there is an opening 11/4/19 at 4:00pm to offer patient. Requested return call to confirm.

## 2019-10-26 ENCOUNTER — APPOINTMENT (OUTPATIENT)
Dept: CT IMAGING | Facility: HOSPITAL | Age: 79
End: 2019-10-26
Attending: EMERGENCY MEDICINE
Payer: MEDICARE

## 2019-10-26 ENCOUNTER — APPOINTMENT (OUTPATIENT)
Dept: CT IMAGING | Facility: HOSPITAL | Age: 79
End: 2019-10-26
Payer: MEDICARE

## 2019-10-26 ENCOUNTER — HOSPITAL ENCOUNTER (EMERGENCY)
Facility: HOSPITAL | Age: 79
Discharge: HOME | End: 2019-10-26
Attending: EMERGENCY MEDICINE
Payer: MEDICARE

## 2019-10-26 VITALS
BODY MASS INDEX: 38.65 KG/M2 | RESPIRATION RATE: 18 BRPM | SYSTOLIC BLOOD PRESSURE: 141 MMHG | TEMPERATURE: 98 F | HEART RATE: 73 BPM | WEIGHT: 255 LBS | DIASTOLIC BLOOD PRESSURE: 51 MMHG | OXYGEN SATURATION: 92 % | HEIGHT: 68 IN

## 2019-10-26 DIAGNOSIS — S09.90XA INJURY OF HEAD, INITIAL ENCOUNTER: Primary | ICD-10-CM

## 2019-10-26 DIAGNOSIS — S50.02XA CONTUSION OF LEFT ELBOW, INITIAL ENCOUNTER: ICD-10-CM

## 2019-10-26 PROCEDURE — 99284 EMERGENCY DEPT VISIT MOD MDM: CPT

## 2019-10-26 PROCEDURE — 72125 CT NECK SPINE W/O DYE: CPT | Performed by: EMERGENCY MEDICINE

## 2019-10-26 PROCEDURE — 70450 CT HEAD/BRAIN W/O DYE: CPT | Performed by: EMERGENCY MEDICINE

## 2019-10-26 NOTE — ED PROVIDER NOTES
Patient Seen in: [facility] Emergency Department      History   Patient presents with:  Head Neck Injury (neurologic, musculoskeletal)    Stated Complaint: head injury s/p fall    HPI    47-year-old female presents for complaint of mechanical fall Right Ear: External ear normal.      Left Ear: External ear normal.      Nose: Nose normal. No nasal deformity. Mouth/Throat:      Pharynx: Uvula midline.    Eyes:      General: Lids are normal.      Conjunctiva/sclera: Conjunctivae normal.      Pup GCS: GCS eye subscore is 4. GCS verbal subscore is 5. GCS motor subscore is 6. Cranial Nerves: No cranial nerve deficit. Sensory: No sensory deficit.       Coordination: Coordination normal.      Gait: Gait normal.   Psychiatric:         Speech PROCEDURE: CT BRAIN OR HEAD (CPT=70450)  COMPARISON: [address] [place] [address] (EMS=[id number]), 7/29/2019, 12:50. INDICATIONS: Head injury s/p fall. Head contusion. Headache.   Prior history of intracranial aneurysm status post aneurysm coiling p CONCLUSION:  1. Cerebral cortical atrophy and mild chronic white matter microvascular ischemia.  2. Postprocedural changes related aneurysm coil embolization right cavernous carotid region resulting in extensive streak artifact somewhat limiting evaluation PROCEDURE: CT SPINE CERVICAL (CPT=72125) COMPARISON: [facility], CT SPINE CERVICAL (CPT=72125), 6/23/2019, 7:23. INDICATIONS: Head injury s/p fall. Evaluate for neck injury.   TECHNIQUE:   Multi-planar CT images were obtained without intrav CONCLUSION:  1. Multilevel cervical spondylosis. No acute fracture or malalignment. 2. Cervical interbody fusion with anterior cervical fusion plate remaining at J5-3. Prior cervical interbody fusion procedure at C4-5, C5-6 .   Chronic ankylosis of the po

## 2019-10-26 NOTE — ED INITIAL ASSESSMENT (HPI)
Patient reports falling while reaching for a chair. No LOC. No nausea/vomiting. No dizziness. +headache.

## 2019-10-26 NOTE — ED NOTES
Patient's daughter called and updated on patient's status. Verbalized understanding. Discharge instructions given. Left via ems with pcs. Patient a/ox4.  No distress

## 2019-10-26 NOTE — ED NOTES
[facility] called for transportation back to [place], eta to [address] [place] [address] is 30 minutes

## 2019-10-28 ENCOUNTER — OFFICE VISIT (OUTPATIENT)
Dept: SURGERY | Facility: CLINIC | Age: 79
End: 2019-10-28
Payer: MEDICARE

## 2019-10-28 ENCOUNTER — HOSPITAL ENCOUNTER (EMERGENCY)
Facility: HOSPITAL | Age: 79
Discharge: HOME | End: 2019-10-28
Attending: EMERGENCY MEDICINE
Payer: MEDICARE

## 2019-10-28 ENCOUNTER — APPOINTMENT (OUTPATIENT)
Dept: CT IMAGING | Facility: HOSPITAL | Age: 79
End: 2019-10-28
Attending: EMERGENCY MEDICINE
Payer: MEDICARE

## 2019-10-28 VITALS
OXYGEN SATURATION: 96 % | BODY MASS INDEX: 38.65 KG/M2 | HEIGHT: 68 IN | WEIGHT: 255 LBS | RESPIRATION RATE: 18 BRPM | SYSTOLIC BLOOD PRESSURE: 152 MMHG | TEMPERATURE: 98 F | DIASTOLIC BLOOD PRESSURE: 72 MMHG | HEART RATE: 99 BPM

## 2019-10-28 DIAGNOSIS — S81.802A OPEN WOUND OF LEFT LOWER LEG, INITIAL ENCOUNTER: Primary | ICD-10-CM

## 2019-10-28 DIAGNOSIS — S06.0X9A CONCUSSION WITH LOSS OF CONSCIOUSNESS, INITIAL ENCOUNTER: Primary | ICD-10-CM

## 2019-10-28 PROCEDURE — 96375 TX/PRO/DX INJ NEW DRUG ADDON: CPT

## 2019-10-28 PROCEDURE — 99284 EMERGENCY DEPT VISIT MOD MDM: CPT

## 2019-10-28 PROCEDURE — 99024 POSTOP FOLLOW-UP VISIT: CPT | Performed by: PLASTIC SURGERY

## 2019-10-28 PROCEDURE — 85025 COMPLETE CBC W/AUTO DIFF WBC: CPT | Performed by: EMERGENCY MEDICINE

## 2019-10-28 PROCEDURE — G0463 HOSPITAL OUTPT CLINIC VISIT: HCPCS | Performed by: PLASTIC SURGERY

## 2019-10-28 PROCEDURE — 80048 BASIC METABOLIC PNL TOTAL CA: CPT | Performed by: EMERGENCY MEDICINE

## 2019-10-28 PROCEDURE — 81003 URINALYSIS AUTO W/O SCOPE: CPT | Performed by: EMERGENCY MEDICINE

## 2019-10-28 PROCEDURE — 70450 CT HEAD/BRAIN W/O DYE: CPT | Performed by: EMERGENCY MEDICINE

## 2019-10-28 PROCEDURE — 96374 THER/PROPH/DIAG INJ IV PUSH: CPT

## 2019-10-28 RX ORDER — MECLIZINE HYDROCHLORIDE 25 MG/1
25 TABLET ORAL 3 TIMES DAILY PRN
Qty: 20 TABLET | Refills: 0 | Status: ON HOLD | OUTPATIENT
Start: 2019-10-28 | End: 2020-02-19

## 2019-10-28 RX ORDER — METOCLOPRAMIDE HYDROCHLORIDE 5 MG/ML
10 INJECTION INTRAMUSCULAR; INTRAVENOUS ONCE
Status: COMPLETED | OUTPATIENT
Start: 2019-10-28 | End: 2019-10-28

## 2019-10-28 RX ORDER — MORPHINE SULFATE 4 MG/ML
2 INJECTION, SOLUTION INTRAMUSCULAR; INTRAVENOUS ONCE
Status: COMPLETED | OUTPATIENT
Start: 2019-10-28 | End: 2019-10-28

## 2019-10-28 RX ORDER — HYDROCODONE BITARTRATE AND ACETAMINOPHEN 5; 325 MG/1; MG/1
1-2 TABLET ORAL EVERY 6 HOURS PRN
Qty: 10 TABLET | Refills: 0 | Status: SHIPPED | OUTPATIENT
Start: 2019-10-28 | End: 2019-11-04

## 2019-10-28 NOTE — ED NOTES
Patient tolerated straight cath, completed by this RN and the assistance of TAMMIE Boogie and [employer].

## 2019-10-28 NOTE — CM/SW NOTE
Tomy Braxton arranged and informed of her scooter that needs to go with her back to [facility]    ETA 60-90 Mins (approx [phone])    PCS form completed and placed in medical records tray for scanning.     RN notified

## 2019-10-28 NOTE — PROGRESS NOTES
Surgery 1: L leg debridement / VAC  - Date: 08/21/19  - Days Since: 68    Surgery 2: L leg STSG  - Date: 10/08/19  - Days Since: 20         Injury 1: RMF Pain onset, probable contusion   - Date: 10/20/17  - Days Since: 738    Injury 2: RMF PIP contusion an

## 2019-10-28 NOTE — ED NOTES
Patient arrives with complaints of continued dizziness and light sensitivity s/p fall two days ago. Patient was seen at [facility] and cleared with imaging. Patient states her symptoms persisted and pain in head worsened. Patient is alert, no distress noted.  Precious Post

## 2019-10-28 NOTE — ED INITIAL ASSESSMENT (HPI)
Pt to the ed with dizziness, nausea since Saturday after pt fell at home. Pt was seen already in [address] [place] [address] for her fall but pt states that she feels worse since her fall pt also reports diarrhea x3 that started today.  Pt was sent by her pmd for further e

## 2019-10-29 NOTE — ED PROVIDER NOTES
Patient Seen in: [facility] [place] AND [facility] Emergency Department      History   Patient presents with:  Nausea/vomiting    Stated Complaint: fall/HA    HPI    33-year-old female presents for evaluation of headache.   Patient had mechanical fall from wheelchair, pr laminectomy 8/28/2014   • Shortness of breath     O2 AT NIGHT   • Sleep apnea    • stent placement     cerebral   • Thyroid disease    • Toe pain     Onychomycosis, Debridement , Hammertoe    • Tonsillitis 1950    Tonsillitis    • Unspecified essential hyp GRAFT SPLIT THICKNESS Left 10/8/2019    Performed by Alfredo Montenegro MD at [address] [facility] MAIN OR   • SPLIT GRFT 100 [facility] <100SQCM Left 10/08/2019    Left leg debridement and Split Thickness Skin Graft to left thigh   • TONSILLECTOMY  1950    Tonsillitis sounds. Abdominal:      General: Bowel sounds are normal. There is no distension. Palpations: Abdomen is soft. Tenderness: There is no tenderness. There is no guarding or rebound. Musculoskeletal: Normal range of motion.    Skin:     General: days ago. Frontal head trauma. Frontal     head contusion and bruising. TECHNIQUE: CT images were obtained without contrast material.  Automated     exposure control for dose reduction was used.   Dose information is     transmitted to the ACR Hector Lindquist cavernous     internal carotid artery aneurysm coiling with similar configuration of     adjacent osseous remodeling. Atherosclerotic vascular calcifications are     perceived in the carotid siphons and     distal vertebral arteries. procedures and reviewed those reports. Complicating Factors: The patient already has does not have any pertinent problems on file. to contribute to the complexity of this ED evaluation.     Oxygen Saturation: 96% on room air, Normal    Consults: No orde medications    Meclizine HCl 25 MG Oral Tab  Take 1 tablet (25 mg total) by mouth 3 (three) times daily as needed for Dizziness., Normal, Disp-20 tablet, R-0    HYDROcodone-acetaminophen 5-325 MG Oral Tab  Take 1-2 tablets by mouth every 6 (six) hours as n

## 2019-11-04 ENCOUNTER — OFFICE VISIT (OUTPATIENT)
Dept: NEUROLOGY | Facility: CLINIC | Age: 79
End: 2019-11-04
Payer: MEDICARE

## 2019-11-04 VITALS — SYSTOLIC BLOOD PRESSURE: 140 MMHG | HEART RATE: 78 BPM | RESPIRATION RATE: 19 BRPM | DIASTOLIC BLOOD PRESSURE: 72 MMHG

## 2019-11-04 DIAGNOSIS — M51.36 LUMBAR DEGENERATIVE DISC DISEASE: ICD-10-CM

## 2019-11-04 DIAGNOSIS — M51.26 LUMBAR HERNIATED DISC: ICD-10-CM

## 2019-11-04 DIAGNOSIS — L03.116 CELLULITIS OF LEFT LOWER EXTREMITY: ICD-10-CM

## 2019-11-04 DIAGNOSIS — R26.9 NEUROLOGIC GAIT DISORDER: ICD-10-CM

## 2019-11-04 DIAGNOSIS — M43.10 RETROLISTHESIS OF VERTEBRAE: ICD-10-CM

## 2019-11-04 DIAGNOSIS — M43.16 SPONDYLOLISTHESIS OF LUMBAR REGION: ICD-10-CM

## 2019-11-04 DIAGNOSIS — M54.16 LUMBAR RADICULOPATHY: Primary | ICD-10-CM

## 2019-11-04 DIAGNOSIS — M47.816 LUMBAR FACET ARTHROPATHY: ICD-10-CM

## 2019-11-04 DIAGNOSIS — M48.061 SPINAL STENOSIS OF LUMBAR REGION WITHOUT NEUROGENIC CLAUDICATION: ICD-10-CM

## 2019-11-04 DIAGNOSIS — M96.1 LUMBAR POST-LAMINECTOMY SYNDROME: ICD-10-CM

## 2019-11-04 PROCEDURE — 99214 OFFICE O/P EST MOD 30 MIN: CPT | Performed by: PHYSICAL MEDICINE & REHABILITATION

## 2019-11-04 NOTE — PATIENT INSTRUCTIONS
As of October 6th 2014, the Drug Enforcement Agency [facility]) is reclassifying all hydrocodone combination medications from Schedule III to Schedule II. This includes medications such as Norco, Vicodin, Lortab, Zohydro, and Vicoprofen.     What this means for y will perform bilateral L5 TFESI(s) under MAC if this is ok with Dr. Fausto Weiner. She will continue with the home health PT and OT for her gait and balance and safety. She will continue with the Norco for the pain as needed.     The patient will follow

## 2019-11-04 NOTE — PROGRESS NOTES
Low Back Pain H & P    Chief Complaint: Patient presents with:  Low Back Pain: pt here for follow up with c/o severe low back pain that is localized. patient was hospitalized with 2 surgeries and had severe fall 10/26/19 and new fall 11/1/19.      Nursing n blood pressure    • Hip pain, left    • History of blood clots     Leg   • Hypothyroidism    • Incontinence    • L3-4 stable slight grade 1 retrolisthesis 10/31/2014   • L4-5 mild-mod diffuse bulging disc 10/31/2014   • L4-5 slight grade 1 unstable spondyl ESOPHAGOGASTRODUODENOSCOPY (EGD) N/A 5/17/2019    Performed by Alba Vogel MD at [address] [place] [address] Left 10/8/2019    Performed by Katie Casillas MD at [place] [address] History      Marital status:       Spouse name: Not on file      Number of children: Not on file      Years of education: Not on file      Highest education level: Not on file    Occupational History      Not on file    Social Needs      Financial per week        Bike Helmet: Not Asked        Seat Belt: Not Asked        Self-Exams: Not Asked        Grew up on a farm: Not Asked        History of tanning: No        Outdoor occupation: Not Asked        Pt has a pacemaker: No        Pt has a defibrillat response   Reflexes: 1+ in bilateral lower extremities except:  Right Achilles tendon which are absent  Left Achilles tendon which are absent     Gait:  She is in her motorized chair and has decreased trunk balance when trying to put her shoes on.     Rony

## 2019-11-06 ENCOUNTER — TELEPHONE (OUTPATIENT)
Dept: NEUROLOGY | Facility: CLINIC | Age: 79
End: 2019-11-06

## 2019-11-06 DIAGNOSIS — M54.16 LUMBAR RADICULOPATHY: Primary | ICD-10-CM

## 2019-11-06 DIAGNOSIS — M48.061 SPINAL STENOSIS OF LUMBAR REGION WITHOUT NEUROGENIC CLAUDICATION: ICD-10-CM

## 2019-11-08 ENCOUNTER — TELEPHONE (OUTPATIENT)
Dept: NEUROLOGY | Facility: CLINIC | Age: 79
End: 2019-11-08

## 2019-11-08 NOTE — TELEPHONE ENCOUNTER
Medic[employer] Online for insurance coverage of Bilateral L5 TFESIs cpt codes [id number], [id number]. Insurance was verified and procedure is a covered benefit and does not require authorization. Will inform Nursing.

## 2019-11-09 ENCOUNTER — HOSPITAL ENCOUNTER (INPATIENT)
Facility: HOSPITAL | Age: 79
LOS: 4 days | Discharge: HOME | DRG: 603 | End: 2019-11-13
Attending: EMERGENCY MEDICINE | Admitting: INTERNAL MEDICINE
Payer: MEDICARE

## 2019-11-09 ENCOUNTER — APPOINTMENT (OUTPATIENT)
Dept: ULTRASOUND IMAGING | Facility: HOSPITAL | Age: 79
DRG: 603 | End: 2019-11-09
Attending: EMERGENCY MEDICINE
Payer: MEDICARE

## 2019-11-09 DIAGNOSIS — L03.116 CELLULITIS OF LEFT LOWER LEG: Primary | ICD-10-CM

## 2019-11-09 PROCEDURE — 99285 EMERGENCY DEPT VISIT HI MDM: CPT

## 2019-11-09 PROCEDURE — 93971 EXTREMITY STUDY: CPT | Performed by: EMERGENCY MEDICINE

## 2019-11-09 PROCEDURE — 85025 COMPLETE CBC W/AUTO DIFF WBC: CPT | Performed by: EMERGENCY MEDICINE

## 2019-11-09 PROCEDURE — 80048 BASIC METABOLIC PNL TOTAL CA: CPT | Performed by: EMERGENCY MEDICINE

## 2019-11-09 PROCEDURE — 96365 THER/PROPH/DIAG IV INF INIT: CPT

## 2019-11-09 PROCEDURE — 84439 ASSAY OF FREE THYROXINE: CPT | Performed by: INTERNAL MEDICINE

## 2019-11-09 PROCEDURE — 84443 ASSAY THYROID STIM HORMONE: CPT | Performed by: INTERNAL MEDICINE

## 2019-11-09 RX ORDER — AMLODIPINE BESYLATE 10 MG/1
10 TABLET ORAL DAILY
Status: DISCONTINUED | OUTPATIENT
Start: 2019-11-09 | End: 2019-11-13

## 2019-11-09 RX ORDER — PANTOPRAZOLE SODIUM 40 MG/1
40 TABLET, DELAYED RELEASE ORAL 2 TIMES DAILY
Status: DISCONTINUED | OUTPATIENT
Start: 2019-11-09 | End: 2019-11-13

## 2019-11-09 RX ORDER — DOCUSATE SODIUM 100 MG/1
100 CAPSULE, LIQUID FILLED ORAL 2 TIMES DAILY
Status: DISCONTINUED | OUTPATIENT
Start: 2019-11-09 | End: 2019-11-13

## 2019-11-09 RX ORDER — ACETAMINOPHEN 325 MG/1
650 TABLET ORAL EVERY 6 HOURS PRN
Status: DISCONTINUED | OUTPATIENT
Start: 2019-11-09 | End: 2019-11-13

## 2019-11-09 RX ORDER — ONDANSETRON 2 MG/ML
4 INJECTION INTRAMUSCULAR; INTRAVENOUS EVERY 6 HOURS PRN
Status: DISCONTINUED | OUTPATIENT
Start: 2019-11-09 | End: 2019-11-13

## 2019-11-09 RX ORDER — SODIUM PHOSPHATE, DIBASIC AND SODIUM PHOSPHATE, MONOBASIC 7; 19 G/133ML; G/133ML
1 ENEMA RECTAL ONCE AS NEEDED
Status: DISCONTINUED | OUTPATIENT
Start: 2019-11-09 | End: 2019-11-13

## 2019-11-09 RX ORDER — DESVENLAFAXINE 50 MG/1
100 TABLET, EXTENDED RELEASE ORAL DAILY
Status: DISCONTINUED | OUTPATIENT
Start: 2019-11-09 | End: 2019-11-13

## 2019-11-09 RX ORDER — BISACODYL 10 MG
10 SUPPOSITORY, RECTAL RECTAL
Status: DISCONTINUED | OUTPATIENT
Start: 2019-11-09 | End: 2019-11-13

## 2019-11-09 RX ORDER — QUETIAPINE 100 MG/1
100 TABLET, FILM COATED ORAL NIGHTLY
Status: DISCONTINUED | OUTPATIENT
Start: 2019-11-09 | End: 2019-11-13

## 2019-11-09 RX ORDER — SODIUM CHLORIDE 0.9 % (FLUSH) 0.9 %
3 SYRINGE (ML) INJECTION AS NEEDED
Status: DISCONTINUED | OUTPATIENT
Start: 2019-11-09 | End: 2019-11-13

## 2019-11-09 RX ORDER — LORAZEPAM 1 MG/1
4 TABLET ORAL NIGHTLY PRN
Status: DISCONTINUED | OUTPATIENT
Start: 2019-11-09 | End: 2019-11-13

## 2019-11-09 RX ORDER — METOCLOPRAMIDE HYDROCHLORIDE 5 MG/ML
10 INJECTION INTRAMUSCULAR; INTRAVENOUS EVERY 8 HOURS PRN
Status: DISCONTINUED | OUTPATIENT
Start: 2019-11-09 | End: 2019-11-13

## 2019-11-09 RX ORDER — LEVOTHYROXINE SODIUM 0.12 MG/1
250 TABLET ORAL
Status: DISCONTINUED | OUTPATIENT
Start: 2019-11-10 | End: 2019-11-09

## 2019-11-09 RX ORDER — POLYETHYLENE GLYCOL 3350 17 G/17G
17 POWDER, FOR SOLUTION ORAL DAILY PRN
Status: DISCONTINUED | OUTPATIENT
Start: 2019-11-09 | End: 2019-11-13

## 2019-11-09 RX ORDER — MECLIZINE HYDROCHLORIDE 25 MG/1
25 TABLET ORAL 3 TIMES DAILY PRN
Status: DISCONTINUED | OUTPATIENT
Start: 2019-11-09 | End: 2019-11-13

## 2019-11-09 NOTE — ED NOTES
Orders for admission, patient is aware of plan and ready to go upstairs.  Any questions, please call ED TAMMIE TONY  at extension [phone]

## 2019-11-09 NOTE — H&P
[facility] Hospitalist H&P     CC: Patient presents with:  Cellulitis (integumentary, infectious)       PCP: Garfield Michelle MD    Admission Date: 11/9/2019    ASSESSMENT / PLAN:   Ms. López Jimenes is a [de-identified]year old female with PMH of  COPD, GERD, OA, depre opportunity to ask questions and note understanding and agreeing with therapeutic plan as outlined    David Hernandez MD  [facility] Hospitalist  Answering Service number: [phone]    HPI       History of Present Illness:   Ms. Sana Xiong is a [de-identified]year old fema • Incontinence    • L3-4 stable slight grade 1 retrolisthesis 10/31/2014   • L4-5 mild-mod diffuse bulging disc 10/31/2014   • L4-5 slight grade 1 unstable spondylolisthesis 10/31/2014   • Leg wound, left 08/21/2019   • Low back pain    • Migraines    • EXTREMITY LOWER IRRIGATION & DEBRIDEMENT Left 10/8/2019    Performed by Megan Dailey MD at [facility] OR   • Alyssabury Left 8/21/2019    Performed by Megan Dailey MD at [facility] OR   • EYE SURGERY      x2 FO nightly as needed for Anxiety. , Disp: 14 tablet, Rfl: 0  LEVOTHYROXINE SODIUM 125 MCG Oral Tab, TWO TABLETS (250 MCG) BY MOUTH EVERY MORNING, Disp: 60 tablet, Rfl: 10  Desvenlafaxine Succinate ER 50 MG Oral Tablet 24 Hr, Take 100 mg by mouth daily.   , Disp Ht 172.7 cm (5' 8\")   Wt 255 lb (115.7 kg)   SpO2 93%   BMI 38.77 kg/m²     GEN: NAD, obese, large frontal hematoma  HEENT: EOMI, PERRLA  Neck: Supple, no JVD  Pulm: CTAB, no crackles or wheezes  CV: RRR, no murmurs, 2+ peripheral pulses  ABD: Soft, non-t

## 2019-11-09 NOTE — PROGRESS NOTES
[facility] Pharmacy Note:  Renal Adjustment for cefepime (MAXIPIME)    Dian Phan is a [de-identified]year old female who has been prescribed cefepime (MAXIPIME) 1000 mg every 8 hrs. CrCl is estimated creatinine clearance is 44.4 mL/min (based on SCr of 1.02 mg/dL).  so

## 2019-11-09 NOTE — PROGRESS NOTES
[facility] Pharmacy Note: Antimicrobial Weight Based Dose Adjustment for: ceftriaxone (ROCEPHIN)    Ramos Win is a [de-identified]year old female who has been prescribed ceftriaxone (ROCEPHIN) 1000 mg once.     CrCl cannot be calculated (Patient's most recent lab result

## 2019-11-09 NOTE — ED INITIAL ASSESSMENT (HPI)
Pt came in for infection to LLE for over a week. Took course of antibiotics for it. Came in today for redness spreading up her leg. Afebrile, RR even and nonlabored, speaking in full sentences.

## 2019-11-09 NOTE — ED PROVIDER NOTES
Patient Seen in: [facility] Emergency Department      History   Patient presents with:  Cellulitis (integumentary, infectious)    Stated Complaint:     HPI    64-year-old female with history of hypertension, thyroid disorder, prior DVT presently t • Pneumonia 2012   • S/P cervical spinal fusion: C3-6 and C7-T1 11/2/2017   • s/p L5-S1 right laminectomy 8/28/2014   • Shortness of breath     O2 AT NIGHT   • Sleep apnea    • stent placement     cerebral   • Thyroid disease    • Toe pain     Onychomyco PREP SITE T/A/L 1ST 100 SQ CM/1PCT Left 08/21/2019    Left leg debridement, VAC placed   • SKIN GRAFT SPLIT THICKNESS Left 10/8/2019    Performed by Christ Curran MD at [facility] OR   • SPLIT GRFT [address] [facility] [address] <100SQCM Left 10/08/2019    Left leg henry tender        Left leg slightly increased as compared to the right. There is an open wound to the anterior aspect of the leg. No drainage noted. Minimal tenderness to palpation. Bilateral dorsalis pedis pulses intact and symmetric.   Capillary refill le

## 2019-11-10 PROCEDURE — 97162 PT EVAL MOD COMPLEX 30 MIN: CPT

## 2019-11-10 PROCEDURE — 97530 THERAPEUTIC ACTIVITIES: CPT

## 2019-11-10 RX ORDER — BACITRACIN ZINC AND POLYMYXIN B SULFATE 500; 10000 [USP'U]/G; [USP'U]/G
OINTMENT TOPICAL 2 TIMES DAILY
Status: DISCONTINUED | OUTPATIENT
Start: 2019-11-10 | End: 2019-11-13

## 2019-11-10 NOTE — PLAN OF CARE
Problem: Patient Centered Care  Goal: Patient preferences are identified and integrated in the patient's plan of care  Description  Interventions:  - What would you like us to know as we care for you?   - Provide timely, complete, and accurate informatio and pre-medicate as appropriate  Outcome: Progressing     Problem: RISK FOR INFECTION - ADULT  Goal: Absence of fever/infection during anticipated neutropenic period  Description  INTERVENTIONS  - Monitor WBC  - Administer growth factors as ordered  - Impl

## 2019-11-10 NOTE — PLAN OF CARE
Problem: Patient Centered Care  Goal: Patient preferences are identified and integrated in the patient's plan of care  Description  Interventions:  - What would you like us to know as we care for you?  \"I fell last week and hit my forehead\"  - Provide t Anticipate increased pain with activity and pre-medicate as appropriate  Outcome: Progressing     Problem: RISK FOR INFECTION - ADULT  Goal: Absence of fever/infection during anticipated neutropenic period  Description  INTERVENTIONS  - Monitor WBC  - Admi fall precautions, bed alarm active/audible; Dr. Josias Bush at bedside.

## 2019-11-10 NOTE — PHYSICAL THERAPY NOTE
PHYSICAL THERAPY EVALUATION - INPATIENT     Room Number: 528/528-A  Evaluation Date: 11/10/2019  Type of Evaluation: Initial   Physician Order: PT Eval and Treat    Presenting Problem: (Cellulits LLE, S/P fall)  Reason for Therapy: Mobility Dysfunction an Research supports that patients with this level of impairment may benefit from skilled PT upon DC at [facility]. Patient will benefit from continued IP PT services to address these deficits in preparation for discharge.   Anticipate pt return back to [facility] when m List  Principal Problem:    Cellulitis of left lower leg      Past Medical History  Past Medical History:   Diagnosis Date   • Anxiety state, unspecified    • Arthritis    • Arthritis of both knees     knee replacement    • Arthritis of right hip 2009   • Past Surgical History  Past Surgical History:   Procedure Laterality Date   • ANESTH,NECK VESSEL SURGERY NOS      x4   • BRAIN SURGERY  ~2009    Brain Aneurysm, Stent placed    • BRONCHOSCOPY N/A 5/9/2018    Performed by Shubham Haney MD at [address] Home: Assisted living facility([facility])   Home Layout: One level                Lives With: Staff 24 hours  Drives: No  Patient Owned Equipment: (RW and powered scooter)  Patient Regularly Uses: (MI with amb with RW in her room,uses scooter to (including a wheelchair)?: A Little   -   Need to walk in hospital room?: A Little   -   Climbing 3-5 steps with a railing?: A Little     AM-PAC Score:  Raw Score: 20   Approx Degree of Impairment: 35.83%   Standardized Score (AM-PAC Scale): 47.67   CMS Mo

## 2019-11-10 NOTE — PROGRESS NOTES
[facility]    CDI Prediction Tool Protocol (Vancomycin Initiated)    OVP (oral vancomycin prophylaxis) 125 mg PO Daily is being started in this patient based on a score of 19.1.       Score Breakdown:  History of CDI > 1 year ago AND high risk an

## 2019-11-10 NOTE — PROGRESS NOTES
[facility] Hospitalist Progress Note     Reason for Admission:   PCP: Nancy Villagomez MD     Assessment/Plan:     Principal Problem:    Cellulitis of left lower leg    Ms. Monica Hunter is a [de-identified]year old female with PMH of  COPD, GERD, OA, depression/anxiety, °F (36.8 °C), temperature source Oral, resp. rate 17, height 172.7 cm (5' 8\"), weight 255 lb (115.7 kg), SpO2 93 %, not currently breastfeeding.     Temp:  [98 °F (36.7 °C)-98.3 °F (36.8 °C)] 98.3 °F (36.8 °C)  Pulse:  [68-96] 71  Resp:  [16-18] 17  BP: (1 mg Oral Daily   • amLODIPine Besylate  10 mg Oral Daily   • Desvenlafaxine Succinate ER  100 mg Oral Daily   • Pantoprazole Sodium  40 mg Oral BID   • QUEtiapine Fumarate  100 mg Oral Nightly   • Rivaroxaban  20 mg Oral Daily with food   • cefepime  1 g In

## 2019-11-11 PROCEDURE — 97165 OT EVAL LOW COMPLEX 30 MIN: CPT

## 2019-11-11 PROCEDURE — 97535 SELF CARE MNGMENT TRAINING: CPT

## 2019-11-11 NOTE — PLAN OF CARE
Problem: Patient Centered Care  Goal: Patient preferences are identified and integrated in the patient's plan of care  Description  Interventions:  - What would you like us to know as we care for you?   - Provide timely, complete, and accurate informatio and pre-medicate as appropriate  Outcome: Progressing     Problem: RISK FOR INFECTION - ADULT  Goal: Absence of fever/infection during anticipated neutropenic period  Description  INTERVENTIONS  - Monitor WBC  - Administer growth factors as ordered  - Impl Slick notified of patient admitted, orders obtained.

## 2019-11-11 NOTE — PLAN OF CARE
Problem: Patient Centered Care  Goal: Patient preferences are identified and integrated in the patient's plan of care  Description  Interventions:  - What would you like us to know as we care for you?   - Provide timely, complete, and accurate informatio and pre-medicate as appropriate  Outcome: Progressing     Problem: RISK FOR INFECTION - ADULT  Goal: Absence of fever/infection during anticipated neutropenic period  Description  INTERVENTIONS  - Monitor WBC  - Administer growth factors as ordered  - Impl ativan this evening. Call light within reach. Frequent rounding done. Ambulates SBA with walker.

## 2019-11-11 NOTE — PROGRESS NOTES
PLASTIC SURGERY - PO 34      Afebrile    Admitted with cellulitis. Improving    No pain    Graft looks excellent. No drainage or infection.   Donor site healing well    WBC 6.7    PLAN:    • Wash daily  • Polysporin twice daily and as needed to keep graft

## 2019-11-11 NOTE — OCCUPATIONAL THERAPY NOTE
OCCUPATIONAL THERAPY EVALUATION - INPATIENT     Room Number: 528/528-A  Evaluation Date: 11/11/2019  Type of Evaluation: Initial  Presenting Problem: (LLE cellulitis )    Physician Order: IP Consult to Occupational Therapy  Reason for Therapy: ADL/IADL Dys MEDICAL/SOCIAL HISTORY     Problem List  Principal Problem:    Cellulitis of left lower leg      Past Medical History  Past Medical History:   Diagnosis Date   • Anxiety state, unspecified    • Arthritis    • Arthritis of both knees     knee replacement Thickness Skin Graft to left thigh       Past Surgical History  Past Surgical History:   Procedure Laterality Date   • ANESTH,NECK VESSEL SURGERY NOS      x4   • BRAIN SURGERY  ~2009    Brain Aneurysm, Stent placed    • BRONCHOSCOPY N/A 5/9/2018    Perform MAIN OR       HOME SITUATION  Type of Home: Assisted living facility([place] )  Home Layout: One level  Lives With: Staff 24 hours     Toilet and Equipment: Grab bar;Comfort height toilet  Shower/Tub and Equipment: Tub-shower combo;Grab bar; Shower chair Putting on and taking off regular upper body clothing?: A Little  -   Taking care of personal grooming such as brushing teeth?: None  -   Eating meals?: None    AM-PAC Score:  Score: 19  Approx Degree of Impairment: 42.8%  Standardized Score (AM-PAC Scale)

## 2019-11-12 PROCEDURE — 97110 THERAPEUTIC EXERCISES: CPT

## 2019-11-12 PROCEDURE — 97116 GAIT TRAINING THERAPY: CPT

## 2019-11-12 PROCEDURE — 97530 THERAPEUTIC ACTIVITIES: CPT

## 2019-11-12 NOTE — PROGRESS NOTES
[facility] Hospitalist Progress Note     Reason for Admission:   PCP: Arminda Chua MD     Assessment/Plan:     Principal Problem:    Cellulitis of left lower leg    Ms. Umberto Hogue is a [de-identified]year old female with PMH of  COPD, GERD, OA, depression/anxiety, temperature 98.5 °F (36.9 °C), temperature source Oral, resp. rate 18, height 172.7 cm (5' 8\"), weight 255 lb (115.7 kg), SpO2 94 %, not currently breastfeeding.     Temp:  [97.6 °F (36.4 °C)-98.5 °F (36.9 °C)] 98.5 °F (36.9 °C)  Pulse:  [70-82] 79  Resp: vancomycin HCl  125 mg Oral Daily   • DOUBLE ANTIBIOTIC   Topical BID   • amLODIPine Besylate  10 mg Oral Daily   • Desvenlafaxine Succinate ER  100 mg Oral Daily   • Pantoprazole Sodium  40 mg Oral BID   • QUEtiapine Fumarate  100 mg Oral Nightly   • Mary David

## 2019-11-12 NOTE — PROGRESS NOTES
[facility] Hospitalist Progress Note     Reason for Admission:   PCP: Don Deleon MD     Assessment/Plan:     Principal Problem:    Cellulitis of left lower leg    Ms. Mark Austin is a [de-identified]year old female with PMH of  COPD, GERD, OA, depression/anxiety, in [facility]. OBJECTIVE:    Blood pressure 118/34, pulse 68, temperature 98.3 °F (36.8 °C), temperature source Oral, resp. rate 16, height 172.7 cm (5' 8\"), weight 255 lb (115.7 kg), SpO2 91 %, not currently breastfeeding.     Temp:  [98 °F (36.7 °C)-98.5 °F Oral Daily with food   • cefepime  1 g Intravenous Q12H   • docusate sodium  100 mg Oral BID   • Levothyroxine Sodium  175 mcg Oral Before breakfast       LORazepam, Normal Saline Flush, acetaminophen, PEG 3350, magnesium hydroxide, bisacodyl, FLEET ENEMA,

## 2019-11-12 NOTE — PLAN OF CARE
Problem: Patient/Family Goals  Goal: Patient/Family Short Term Goal  Description  Patient's Short Term Goal: \"I want the redness to go away\"    Interventions:   - Monitor vitals  - Administer IV Abx as ordered  - Dressing changes to LLE  - See addition Progressing     Problem: DISCHARGE PLANNING  Goal: Discharge to home or other facility with appropriate resources  Description  INTERVENTIONS:  - Identify barriers to discharge w/pt and caregiver  - Include patient/family/discharge partner in discharge rowdy

## 2019-11-12 NOTE — PHYSICAL THERAPY NOTE
PHYSICAL THERAPY TREATMENT NOTE - INPATIENT     Room Number: 528/528-A       Presenting Problem: (Cellulits LLE, S/P fall)    Problem List  Principal Problem:    Cellulitis of left lower leg      PHYSICAL THERAPY ASSESSMENT     Patient in bd agree for ther distances. Per pt, nursing staff will assist as needed since she is at [facility].     DISCHARGE RECOMMENDATIONS  PT Discharge Recommendations: 24 hour care/supervision;Home with home health PT(in [facility] at [facility])     PLAN  PT Treatment Plan: Balance training THERAPEUTIC EXERCISES  Lower Extremity Alternating marching  Ankle pumps  Heel slides  Knee extension     Position Sitting and Supine       Patient End of Session: In bed; All patient questions and concerns addressed;RN aware of session/findings;Call li

## 2019-11-12 NOTE — PLAN OF CARE
Pt is A/O x 4. Vitals WDL. Pt up standby with a walker. Tylenol given for headache. Lorazepam given to sleep.     Problem: Patient Centered Care  Goal: Patient preferences are identified and integrated in the patient's plan of care  Description  Interventio Notify MD/LIP if interventions unsuccessful or patient reports new pain  - Anticipate increased pain with activity and pre-medicate as appropriate  Outcome: Progressing     Problem: RISK FOR INFECTION - ADULT  Goal: Absence of fever/infection during antici

## 2019-11-13 VITALS
RESPIRATION RATE: 16 BRPM | HEART RATE: 85 BPM | SYSTOLIC BLOOD PRESSURE: 148 MMHG | TEMPERATURE: 98 F | BODY MASS INDEX: 38.65 KG/M2 | OXYGEN SATURATION: 91 % | DIASTOLIC BLOOD PRESSURE: 50 MMHG | HEIGHT: 68 IN | WEIGHT: 255 LBS

## 2019-11-13 RX ORDER — CLINDAMYCIN HYDROCHLORIDE 300 MG/1
600 CAPSULE ORAL 3 TIMES DAILY
Qty: 24 CAPSULE | Refills: 0 | Status: SHIPPED | OUTPATIENT
Start: 2019-11-13 | End: 2019-11-17

## 2019-11-13 RX ORDER — BACITRACIN ZINC AND POLYMYXIN B SULFATE 500; 10000 [USP'U]/G; [USP'U]/G
OINTMENT TOPICAL
Qty: 1 TUBE | Refills: 0 | Status: ON HOLD | OUTPATIENT
Start: 2019-11-13 | End: 2019-11-20 | Stop reason: ALTCHOICE

## 2019-11-13 NOTE — PLAN OF CARE
Problem: Patient Centered Care  Goal: Patient preferences are identified and integrated in the patient's plan of care  Description  Interventions:  - What would you like us to know as we care for you?   - Provide timely, complete, and accurate informatio and pre-medicate as appropriate  Outcome: Progressing     Problem: RISK FOR INFECTION - ADULT  Goal: Absence of fever/infection during anticipated neutropenic period  Description  INTERVENTIONS  - Monitor WBC  - Administer growth factors as ordered  - Impl light in reach

## 2019-11-13 NOTE — PROGRESS NOTES
Removed patients IV, patient dressed with PCT's. PCP came to see patient before leaving with [address] .   All questions answered and all belongings sent with patient

## 2019-11-13 NOTE — PROGRESS NOTES
Patient dc home. IV to be removed by TAMMIE Mendoza. Understands to follow up with PCP in 1 week. Patient understands to get scripts from pharmacy- has a set up with AL. All needs met. Medicar set up per pt request. PCS form in the chart.  Per superior will be a

## 2019-11-13 NOTE — DISCHARGE SUMMARY
General Medicine Discharge Summary     Patient ID:  Carly Pizano  [de-identified]year old  12/3/1940    Admit date: 11/9/2019    Discharge date and time: 11/13/19    Attending Physician: Sheron Becerra MD     Consults: IP CONSULT TO HOSPITALIST  IP CONSULT TO [facility] Levothyroxine Sodium 125 MCG Tabs  TWO TABLETS (250 MCG) BY MOUTH EVERY MORNING     LORazepam 2 MG Tabs  Commonly known as:  ATIVAN  Take 2 tablets (4 mg total) by mouth nightly as needed for Anxiety.      Meclizine HCl 25 MG Tabs  Commonly known as:  ANT

## 2019-11-13 NOTE — TELEPHONE ENCOUNTER
S/w patient who wants to be scheduled for bilateral L5 TFESI. Patient being discharged from the hospital today on antibiotics. Informed patient she will need to be off antibiotics for one week prior to us scheduling her injection.  Patient verbalized unders

## 2019-11-18 ENCOUNTER — TELEPHONE (OUTPATIENT)
Dept: SURGERY | Facility: CLINIC | Age: 79
End: 2019-11-18

## 2019-11-18 ENCOUNTER — TELEPHONE (OUTPATIENT)
Dept: NEUROLOGY | Facility: CLINIC | Age: 79
End: 2019-11-18

## 2019-11-18 NOTE — TELEPHONE ENCOUNTER
Pt called the office asking if she needed another follow up appointment with Dr Marychuy Wick. Pt told per Dr Marychuy Wick no follow up appointment needed unless she has concerns or problems and to continue doing Eucerin massage. Verbalized understanding.

## 2019-11-19 PROBLEM — I10 ESSENTIAL HYPERTENSION: Status: ACTIVE | Noted: 2019-11-19

## 2019-11-20 ENCOUNTER — HOSPITAL ENCOUNTER (OUTPATIENT)
Facility: HOSPITAL | Age: 79
Setting detail: OBSERVATION
Discharge: NURSING, REHAB OR LONG-TERM CARE | End: 2019-11-23
Attending: EMERGENCY MEDICINE | Admitting: INTERNAL MEDICINE
Payer: MEDICARE

## 2019-11-20 ENCOUNTER — APPOINTMENT (OUTPATIENT)
Dept: CT IMAGING | Facility: HOSPITAL | Age: 79
End: 2019-11-20
Attending: EMERGENCY MEDICINE
Payer: MEDICARE

## 2019-11-20 DIAGNOSIS — R11.2 NAUSEA VOMITING AND DIARRHEA: ICD-10-CM

## 2019-11-20 DIAGNOSIS — R11.2 INTRACTABLE NAUSEA AND VOMITING: Primary | ICD-10-CM

## 2019-11-20 DIAGNOSIS — R19.7 NAUSEA VOMITING AND DIARRHEA: ICD-10-CM

## 2019-11-20 PROCEDURE — 80076 HEPATIC FUNCTION PANEL: CPT | Performed by: EMERGENCY MEDICINE

## 2019-11-20 PROCEDURE — 74177 CT ABD & PELVIS W/CONTRAST: CPT | Performed by: EMERGENCY MEDICINE

## 2019-11-20 PROCEDURE — 96374 THER/PROPH/DIAG INJ IV PUSH: CPT

## 2019-11-20 PROCEDURE — 99285 EMERGENCY DEPT VISIT HI MDM: CPT

## 2019-11-20 PROCEDURE — 96376 TX/PRO/DX INJ SAME DRUG ADON: CPT

## 2019-11-20 PROCEDURE — 80048 BASIC METABOLIC PNL TOTAL CA: CPT | Performed by: EMERGENCY MEDICINE

## 2019-11-20 PROCEDURE — 87493 C DIFF AMPLIFIED PROBE: CPT | Performed by: EMERGENCY MEDICINE

## 2019-11-20 PROCEDURE — 87507 IADNA-DNA/RNA PROBE TQ 12-25: CPT | Performed by: INTERNAL MEDICINE

## 2019-11-20 PROCEDURE — 85025 COMPLETE CBC W/AUTO DIFF WBC: CPT | Performed by: EMERGENCY MEDICINE

## 2019-11-20 PROCEDURE — 96372 THER/PROPH/DIAG INJ SC/IM: CPT

## 2019-11-20 PROCEDURE — 96361 HYDRATE IV INFUSION ADD-ON: CPT

## 2019-11-20 PROCEDURE — 96375 TX/PRO/DX INJ NEW DRUG ADDON: CPT

## 2019-11-20 RX ORDER — DICYCLOMINE HYDROCHLORIDE 10 MG/ML
20 INJECTION INTRAMUSCULAR ONCE
Status: COMPLETED | OUTPATIENT
Start: 2019-11-20 | End: 2019-11-20

## 2019-11-20 RX ORDER — LEVOTHYROXINE SODIUM 175 UG/1
175 TABLET ORAL
Status: DISCONTINUED | OUTPATIENT
Start: 2019-11-21 | End: 2019-11-23

## 2019-11-20 RX ORDER — HYDROCODONE BITARTRATE AND ACETAMINOPHEN 5; 325 MG/1; MG/1
2 TABLET ORAL EVERY 6 HOURS PRN
Status: ON HOLD | COMMUNITY
End: 2019-11-23

## 2019-11-20 RX ORDER — DIPHENHYDRAMINE HYDROCHLORIDE 50 MG/ML
25 INJECTION INTRAMUSCULAR; INTRAVENOUS ONCE
Status: COMPLETED | OUTPATIENT
Start: 2019-11-20 | End: 2019-11-20

## 2019-11-20 RX ORDER — LORAZEPAM 1 MG/1
2 TABLET ORAL NIGHTLY PRN
Status: DISCONTINUED | OUTPATIENT
Start: 2019-11-20 | End: 2019-11-21

## 2019-11-20 RX ORDER — MAGNESIUM HYDROXIDE/ALUMINUM HYDROXICE/SIMETHICONE 120; 1200; 1200 MG/30ML; MG/30ML; MG/30ML
15 SUSPENSION ORAL EVERY 6 HOURS PRN
COMMUNITY
End: 2020-10-12

## 2019-11-20 RX ORDER — HYDROCODONE BITARTRATE AND ACETAMINOPHEN 5; 325 MG/1; MG/1
1 TABLET ORAL EVERY 6 HOURS PRN
COMMUNITY
End: 2020-01-15 | Stop reason: ALTCHOICE

## 2019-11-20 RX ORDER — POLYETHYLENE GLYCOL 3350 17 G/17G
17 POWDER, FOR SOLUTION ORAL DAILY PRN
Status: DISCONTINUED | OUTPATIENT
Start: 2019-11-20 | End: 2019-11-23

## 2019-11-20 RX ORDER — METOCLOPRAMIDE HYDROCHLORIDE 5 MG/ML
10 INJECTION INTRAMUSCULAR; INTRAVENOUS ONCE
Status: COMPLETED | OUTPATIENT
Start: 2019-11-20 | End: 2019-11-20

## 2019-11-20 RX ORDER — TRAMADOL HYDROCHLORIDE 50 MG/1
50 TABLET ORAL
Status: ON HOLD | COMMUNITY
End: 2019-12-11

## 2019-11-20 RX ORDER — PANTOPRAZOLE SODIUM 40 MG/1
40 TABLET, DELAYED RELEASE ORAL
Status: DISCONTINUED | OUTPATIENT
Start: 2019-11-21 | End: 2019-11-23

## 2019-11-20 RX ORDER — ONDANSETRON 2 MG/ML
4 INJECTION INTRAMUSCULAR; INTRAVENOUS EVERY 4 HOURS PRN
Status: DISCONTINUED | OUTPATIENT
Start: 2019-11-20 | End: 2019-11-20

## 2019-11-20 RX ORDER — SODIUM CHLORIDE 0.9 % (FLUSH) 0.9 %
3 SYRINGE (ML) INJECTION AS NEEDED
Status: DISCONTINUED | OUTPATIENT
Start: 2019-11-20 | End: 2019-11-23

## 2019-11-20 RX ORDER — TRAMADOL HYDROCHLORIDE 50 MG/1
50 TABLET ORAL EVERY 4 HOURS PRN
Status: DISCONTINUED | OUTPATIENT
Start: 2019-11-20 | End: 2019-11-23

## 2019-11-20 RX ORDER — ACETAMINOPHEN 325 MG/1
650 TABLET ORAL EVERY 6 HOURS PRN
Status: DISCONTINUED | OUTPATIENT
Start: 2019-11-20 | End: 2019-11-23

## 2019-11-20 RX ORDER — DICYCLOMINE HCL 20 MG
20 TABLET ORAL 4 TIMES DAILY PRN
Status: ON HOLD | COMMUNITY
End: 2019-11-23

## 2019-11-20 RX ORDER — HYDROCODONE BITARTRATE AND ACETAMINOPHEN 5; 325 MG/1; MG/1
2 TABLET ORAL EVERY 6 HOURS PRN
Status: DISCONTINUED | OUTPATIENT
Start: 2019-11-20 | End: 2019-11-23

## 2019-11-20 RX ORDER — DOCUSATE SODIUM 100 MG/1
100 CAPSULE, LIQUID FILLED ORAL DAILY PRN
Status: ON HOLD | COMMUNITY
End: 2019-12-11

## 2019-11-20 RX ORDER — ACETAMINOPHEN 325 MG/1
650 TABLET ORAL
Status: ON HOLD | COMMUNITY
End: 2020-02-19

## 2019-11-20 RX ORDER — HYDROCODONE BITARTRATE AND ACETAMINOPHEN 5; 325 MG/1; MG/1
1 TABLET ORAL EVERY 6 HOURS PRN
Status: DISCONTINUED | OUTPATIENT
Start: 2019-11-20 | End: 2019-11-23

## 2019-11-20 RX ORDER — LOPERAMIDE HYDROCHLORIDE 2 MG/1
2 CAPSULE ORAL 4 TIMES DAILY PRN
Status: ON HOLD | COMMUNITY
End: 2019-11-23

## 2019-11-20 RX ORDER — SODIUM PHOSPHATE, DIBASIC AND SODIUM PHOSPHATE, MONOBASIC 7; 19 G/133ML; G/133ML
1 ENEMA RECTAL ONCE AS NEEDED
Status: DISCONTINUED | OUTPATIENT
Start: 2019-11-20 | End: 2019-11-23

## 2019-11-20 RX ORDER — BISACODYL 10 MG
10 SUPPOSITORY, RECTAL RECTAL
Status: DISCONTINUED | OUTPATIENT
Start: 2019-11-20 | End: 2019-11-23

## 2019-11-20 RX ORDER — DESVENLAFAXINE 50 MG/1
100 TABLET, EXTENDED RELEASE ORAL DAILY
Status: DISCONTINUED | OUTPATIENT
Start: 2019-11-21 | End: 2019-11-23

## 2019-11-20 RX ORDER — QUETIAPINE 100 MG/1
100 TABLET, FILM COATED ORAL NIGHTLY
Status: DISCONTINUED | OUTPATIENT
Start: 2019-11-20 | End: 2019-11-23

## 2019-11-20 RX ORDER — DIPHENHYDRAMINE HCL 25 MG
25 TABLET ORAL EVERY 6 HOURS PRN
Status: ON HOLD | COMMUNITY
End: 2019-11-23

## 2019-11-20 RX ORDER — BUTALBITAL, ACETAMINOPHEN AND CAFFEINE 50; 325; 40 MG/1; MG/1; MG/1
1 TABLET ORAL EVERY 4 HOURS PRN
Status: ON HOLD | COMMUNITY
End: 2020-10-30

## 2019-11-20 RX ORDER — METOCLOPRAMIDE HYDROCHLORIDE 5 MG/ML
10 INJECTION INTRAMUSCULAR; INTRAVENOUS EVERY 8 HOURS PRN
Status: DISCONTINUED | OUTPATIENT
Start: 2019-11-20 | End: 2019-11-23

## 2019-11-20 RX ORDER — AMLODIPINE BESYLATE 10 MG/1
10 TABLET ORAL DAILY
Status: DISCONTINUED | OUTPATIENT
Start: 2019-11-21 | End: 2019-11-23

## 2019-11-20 RX ORDER — ASPIRIN 325 MG
325 TABLET ORAL DAILY
Status: ON HOLD | COMMUNITY
End: 2019-12-11

## 2019-11-20 RX ORDER — ONDANSETRON 2 MG/ML
4 INJECTION INTRAMUSCULAR; INTRAVENOUS ONCE
Status: COMPLETED | OUTPATIENT
Start: 2019-11-20 | End: 2019-11-20

## 2019-11-20 RX ORDER — NAPROXEN 500 MG/1
500 TABLET ORAL 2 TIMES DAILY PRN
Status: ON HOLD | COMMUNITY
End: 2019-11-23

## 2019-11-20 RX ORDER — DIAPER,BRIEF,INFANT-TODD,DISP
1 EACH MISCELLANEOUS 4 TIMES DAILY PRN
COMMUNITY
End: 2020-01-15 | Stop reason: ALTCHOICE

## 2019-11-20 RX ORDER — DOCUSATE SODIUM 100 MG/1
100 CAPSULE, LIQUID FILLED ORAL 2 TIMES DAILY
Status: DISCONTINUED | OUTPATIENT
Start: 2019-11-20 | End: 2019-11-21

## 2019-11-20 RX ORDER — BUDESONIDE AND FORMOTEROL FUMARATE DIHYDRATE 160; 4.5 UG/1; UG/1
1 AEROSOL RESPIRATORY (INHALATION) DAILY
COMMUNITY
End: 2020-10-12

## 2019-11-20 RX ORDER — METOCLOPRAMIDE HYDROCHLORIDE 5 MG/ML
5 INJECTION INTRAMUSCULAR; INTRAVENOUS ONCE
Status: DISCONTINUED | OUTPATIENT
Start: 2019-11-20 | End: 2019-11-20

## 2019-11-20 RX ORDER — LOPERAMIDE HYDROCHLORIDE 2 MG/1
2 CAPSULE ORAL EVERY 6 HOURS PRN
Status: DISCONTINUED | OUTPATIENT
Start: 2019-11-20 | End: 2019-11-23

## 2019-11-20 RX ORDER — ONDANSETRON 2 MG/ML
4 INJECTION INTRAMUSCULAR; INTRAVENOUS EVERY 6 HOURS PRN
Status: DISCONTINUED | OUTPATIENT
Start: 2019-11-20 | End: 2019-11-23

## 2019-11-20 RX ORDER — SODIUM CHLORIDE 9 MG/ML
INJECTION, SOLUTION INTRAVENOUS CONTINUOUS
Status: ACTIVE | OUTPATIENT
Start: 2019-11-20 | End: 2019-11-20

## 2019-11-20 RX ORDER — ALBUTEROL SULFATE 2.5 MG/3ML
1.25 SOLUTION RESPIRATORY (INHALATION) EVERY 4 HOURS PRN
COMMUNITY
End: 2020-10-12

## 2019-11-20 NOTE — ED PROVIDER NOTES
Patient Seen in: [facility] Emergency Department    History   Patient presents with:  Nausea/Vomiting/Diarrhea (gastrointestinal)    Stated Complaint: N/V/D     HPI    57-year-old female with past medical history of COPD, hypertension, DVT on Xarelt 8/28/2014   • Shortness of breath     O2 AT NIGHT   • Sleep apnea    • stent placement     cerebral   • Thyroid disease    • Toe pain     Onychomycosis, Debridement , Hammertoe    • Tonsillitis 1950    Tonsillitis    • Unspecified essential hypertension THICKNESS Left 10/8/2019    Performed by Kendal Matias MD at [facility] OR   • SPLIT GRFT [address] <100SQCM Left 10/08/2019    Left leg debridement and Split Thickness Skin Graft to left thigh   • TONSILLECTOMY  1950    Tonsillitis    • TRANSFORAM Brother         Coronary Artery Disease        Social History    Tobacco Use      Smoking status: Former Smoker        Packs/day: 2.00        Years: 25.00        Pack years: 50        Types: Cigarettes        Start date: 1/1/1952        Quit date: 1/1/1977 -----------         ------                     CBC W/ DIFFERENTIAL[[id number]]                                                           Please view results for these tests on the individual orders.    BASIC METABOLIC PANEL (8)   HEPATIC attenuation likely reflect sequela of chronic small vessel ischemic disease. No edema, hemorrhage, mass, or acute infarction is seen. There is commensurate atrophy.   CALVARIUM: There is no apparent depressed fracture, mass, or other significant visible les of Radiology) NRDR ([address]) which includes the Dose Index Registry. FINDINGS:  CSF SPACES: No hydrocephalus, subarachnoid hemorrhage, or mass. No midline shift. CEREBRUM: Mild cerebral cortical atrophy.  WHITE MATTER: Mild chron reduction was used. Adjustment of the mA and/or kV was done based on the patient's size. Use of iterative reconstruction technique for dose reduction was used.   Dose information is transmitted to the  [facility] of Radiology) [facility] [address] [place], [place] [address] ([id number] 4/03/2017, 7:07. INDICATIONS: Left calf erythema, swelling  TECHNIQUE: Color duplex Doppler venous ultrasound of the left lower extremity was performed in the usual manner.   FINDINGS: Th dose reduction was employed. Dose information was transmitted to the  [facility] of Radiology) [address] ([address]), which includes the Dose Index Registry. Oral contrast was not ingested.   FINDINGS: COMMENT: Overall examinatio URINARY BLADDER: Assessment is substantially degraded by metallic streak artifact. Incompletely distended without visible calculus. Mild circumferential bladder wall thickening may relate to underdistention.  PELVIC NODES: Artifactual degradation compromise hepatobiliary dilatation. 5. An IVC filter is re-identified. 6. Hepatomegaly with suspected underlying hepatic steatosis. 7. Bilateral hip arthroplasties are demonstrated, with resultant artifactual degradation of the pelvis.   8. Status post hysterectom

## 2019-11-20 NOTE — ED NOTES
Pt BIBEMS from [facility] d/t x4 episodes on nonbloody diarrhea beginning this afternoon. Endorses nausea, dizziness, 6/10 abd pain/cramping. Denies cp, sob, urinary symptoms, fevers/chills.   PMH COPD, depression, HTN, migraines, hypothryroid, HL, stent  AOx3

## 2019-11-20 NOTE — TELEPHONE ENCOUNTER
Patient has been scheduled for a Bilateral L5 TFESIs on 12/13/19 at the [facility]. Medications and allergies reviewed. Patient informed to hold aspirins, nsaids, blood thinners, multivitamins, vitamin E and fish oils 3-7 days prior to procedure.  Patient informe

## 2019-11-20 NOTE — ED INITIAL ASSESSMENT (HPI)
VIA EMS FROM [place]-SUDDEN ONSET OF NAUSEA AND DIARRHEA THIS AFTERNOON.  APPARENTLY A \"GI BUG\" IS GOING AROUND

## 2019-11-21 PROCEDURE — 85027 COMPLETE CBC AUTOMATED: CPT | Performed by: INTERNAL MEDICINE

## 2019-11-21 PROCEDURE — 97165 OT EVAL LOW COMPLEX 30 MIN: CPT

## 2019-11-21 PROCEDURE — 83735 ASSAY OF MAGNESIUM: CPT | Performed by: INTERNAL MEDICINE

## 2019-11-21 PROCEDURE — 96372 THER/PROPH/DIAG INJ SC/IM: CPT

## 2019-11-21 PROCEDURE — 80048 BASIC METABOLIC PNL TOTAL CA: CPT | Performed by: INTERNAL MEDICINE

## 2019-11-21 RX ORDER — SODIUM CHLORIDE 450 MG/100ML
INJECTION, SOLUTION INTRAVENOUS CONTINUOUS
Status: DISCONTINUED | OUTPATIENT
Start: 2019-11-21 | End: 2019-11-22

## 2019-11-21 RX ORDER — LORAZEPAM 1 MG/1
2 TABLET ORAL ONCE
Status: COMPLETED | OUTPATIENT
Start: 2019-11-21 | End: 2019-11-21

## 2019-11-21 RX ORDER — POTASSIUM CHLORIDE 20 MEQ/1
40 TABLET, EXTENDED RELEASE ORAL ONCE
Status: COMPLETED | OUTPATIENT
Start: 2019-11-21 | End: 2019-11-21

## 2019-11-21 RX ORDER — MECLIZINE HYDROCHLORIDE 25 MG/1
12.5 TABLET ORAL 3 TIMES DAILY PRN
Status: DISCONTINUED | OUTPATIENT
Start: 2019-11-21 | End: 2019-11-23

## 2019-11-21 RX ORDER — MAGNESIUM OXIDE 400 MG (241.3 MG MAGNESIUM) TABLET
400 TABLET ONCE
Status: COMPLETED | OUTPATIENT
Start: 2019-11-21 | End: 2019-11-21

## 2019-11-21 RX ORDER — LORAZEPAM 1 MG/1
4 TABLET ORAL NIGHTLY PRN
Status: DISCONTINUED | OUTPATIENT
Start: 2019-11-22 | End: 2019-11-23

## 2019-11-21 NOTE — PLAN OF CARE
Problem: Patient/Family Goals  Goal: Patient/Family Long Term Goal  Description  Patient's Short Term Goal: \" Feel better and go back to my usual health\".     Interventions:  - monitor vs, labs  -give meds  -d/c planning  -inform/update pt about her poc Progressing     Problem: Impaired Functional Mobility  Goal: Achieve highest/safest level of mobility/gait  Description  Interventions:  - Assess patient's functional ability and stability  - Promote increasing activity/tolerance for mobility and gait  - E physical deficits and behaviors that affect risk of falls.   - [place] fall precautions as indicated by assessment.  - Educate pt/family on patient safety including physical limitations  - Instruct pt to call for assistance with activity based on assessme

## 2019-11-21 NOTE — H&P
[facility] Hospitalist H&P     CC: Patient presents with:  Nausea/Vomiting/Diarrhea (gastrointestinal)       PCP: Dolores Leavitt MD    Admission Date: 11/21/2019    ASSESSMENT / PLAN:   Ms. Raulito Canales is a [de-identified]year old female with PMH of  COPD, GERD, OA, hospital records reviewed. Further recommendations pending patient's clinical course.   [facility] hospitalist to continue to follow patient while in house    Patient and/or patient's family given opportunity to ask questions and note understanding and agreein left 08/21/2019   • Low back pain    • Migraines    • Muscle weakness    • Onychomycosis     Debridement    • Oropharyngeal dysphagia 8/29/2017   • Osteoarthrosis, unspecified whether generalized or localized, unspecified site    • Other and unspecified hy Rosalio Dominguez MD at [facility] OR   • EYE SURGERY      x2 FOR \"CROSSED EYES\"   • HIP REPLACEMENT SURGERY Bilateral    • HYSTERECTOMY  1967    Partial Hysterectomy   • JAW SURGERY     • KNEE REPLACEMENT SURGERY Bilateral     Bilateral arthritis (six) hours as needed for Itching., Disp: , Rfl:   docusate sodium 100 MG Oral Cap, Take 100 mg by mouth daily as needed for constipation. , Disp: , Rfl:   HYDROcodone-acetaminophen 5-325 MG Oral Tab, Take 1 tablet by mouth every 6 (six) hours as needed for 14 tablet, Rfl: 0  LEVOTHYROXINE SODIUM 125 MCG Oral Tab, TWO TABLETS (250 MCG) BY MOUTH EVERY MORNING (Patient taking differently: Take 175 mcg by mouth before breakfast.  ), Disp: 60 tablet, Rfl: 10  Desvenlafaxine Succinate ER 50 MG Oral Tablet 24 Hr, T (113.4 kg)   SpO2 93%   BMI 38.01 kg/m²     GEN: NAD, obese  HEENT: EOMI, PERRLA  Neck: Supple, no JVD  Pulm: CTAB, no crackles or wheezes  CV: RRR, no murmurs, 2+ peripheral pulses  ABD: Soft, non-tender, non-distended, +BS  MSK: strength 5/5 in all extre re-identified. 6. Hepatomegaly with suspected underlying hepatic steatosis. 7. Bilateral hip arthroplasties are demonstrated, with resultant artifactual degradation of the pelvis. 8. Status post hysterectomy. 9. Lesser incidental findings as above.

## 2019-11-21 NOTE — ED NOTES
Assumed care to this pt , received report from [facility]. Per report given pt came in from [facility] for complaints of nausea, and diarrhea that started today.   Pt has history of COPD,HTN,DVT on Xarelto, Hypothyroidism, and S/P antibiotics for Cellul

## 2019-11-21 NOTE — PLAN OF CARE
Problem: Patient/Family Goals  Goal: Patient/Family Long Term Goal  Description  Patient's Short Term Goal: \" Feel better and go back to my usual health\".     Interventions:  - monitor vs, labs  -give meds  -d/c planning  -inform/update pt about her poc home; allow for food preferences  - Enhance eating environment  Outcome: Progressing     Problem: SKIN/TISSUE INTEGRITY - ADULT  Goal: Skin integrity remains intact  Description  INTERVENTIONS  - Assess and document risk factors for pressure ulcer developm Manage/alleviate anxiety  - Utilize distraction and/or relaxation techniques  - Monitor for opioid side effects  - Notify MD/LIP if interventions unsuccessful or patient reports new pain  - Anticipate increased pain with activity and pre-medicate as approp

## 2019-11-21 NOTE — ED NOTES
Pt put on bedpan. Able to have BM - Liquid stool noted. Endorsing nausea. Joyce Vicente MD notified.

## 2019-11-21 NOTE — ED NOTES
Orders for admission, patient is aware of plan and ready to go upstairs.  Any questions, please call ED RN Mata Acevedo   at extension [phone]

## 2019-11-21 NOTE — CM/SW NOTE
SW received MDO for POLST. No current DNR in Epic, POLST form placed in chart - will need all signatures. Pt has been screened per chart review and during nursing rounds. Pt is from [facility].  SW confirmed w/ Efraín Cheung from [place] [address] that pt is current w/ th

## 2019-11-21 NOTE — OCCUPATIONAL THERAPY NOTE
OCCUPATIONAL THERAPY EVALUATION - INPATIENT     Room Number: 522/522-A  Evaluation Date: 11/21/2019  Type of Evaluation: Initial  Presenting Problem: (Vomitting, diarrhea)    Physician Order: IP Consult to Occupational Therapy  Reason for Therapy: ADL/IADL Recommendations: Sub-acute rehabilitation  OT Device Recommendations: TBD    PLAN  OT Treatment Plan: Balance activities; Energy conservation/work simplification techniques;ADL training; Endurance training;Continued evaluation       OCCUPATIONAL THERAPY MEDI stent placement     cerebral   • Thyroid disease    • Toe pain     Onychomycosis, Debridement , Hammertoe    • Tonsillitis 1950    Tonsillitis    • Unspecified essential hypertension    • Unspecified sleep apnea    • Visual impairment     EYE GLASSES   • W Maritza Castano MD at [facility] MAIN OR   • SPLIT GRFT [address] [facility] [address] <100SQCM Left 10/08/2019    Left leg debridement and Split Thickness Skin Graft to left thigh   • TONSILLECTOMY  1950    Tonsillitis    • TRANSFORAMINAL LUMBAR EPIDURAL STEROID INJECTION SINGLE LEVEL Tom Toileting, which includes using toilet, bedpan or urinal? : A Lot  -   Putting on and taking off regular upper body clothing?: A Little  -   Taking care of personal grooming such as brushing teeth?: A Lot  -   Eating meals?: A Little    AM-PAC Score:  Scor

## 2019-11-22 PROCEDURE — 97530 THERAPEUTIC ACTIVITIES: CPT

## 2019-11-22 PROCEDURE — 80048 BASIC METABOLIC PNL TOTAL CA: CPT | Performed by: INTERNAL MEDICINE

## 2019-11-22 PROCEDURE — 83735 ASSAY OF MAGNESIUM: CPT | Performed by: INTERNAL MEDICINE

## 2019-11-22 PROCEDURE — 97162 PT EVAL MOD COMPLEX 30 MIN: CPT

## 2019-11-22 PROCEDURE — 97116 GAIT TRAINING THERAPY: CPT

## 2019-11-22 RX ORDER — MAGNESIUM OXIDE 400 MG (241.3 MG MAGNESIUM) TABLET
400 TABLET ONCE
Status: COMPLETED | OUTPATIENT
Start: 2019-11-22 | End: 2019-11-22

## 2019-11-22 NOTE — PHYSICAL THERAPY NOTE
PHYSICAL THERAPY EVALUATION - INPATIENT     Room Number: 522/522-A  Evaluation Date: 11/22/2019  Type of Evaluation: Initial   Physician Order: PT Eval and Treat    Presenting Problem: Intractable nausea and vomiting and diarrhea  Reason for Therapy: [place] admission:   HPI     18-year-old female with past medical history of COPD, hypertension, DVT on Xarelto, hypothyroid presenting for evaluation of one day of nausea/diarrhea. S/p abx for cellulitis; no other recent abx.  No recent travel, new food ingestions L5-S1 right laminectomy 8/28/2014   • Shortness of breath    • Sleep apnea    • stent placement     cerebral   • Thyroid disease    • Toe pain     Onychomycosis, Debridement , Hammertoe    • Tonsillitis 1950    Tonsillitis    • Unspecified essential hypert placed   • SKIN GRAFT SPLIT THICKNESS Left 10/8/2019    Performed by Jovon Osorio MD at [facility] OR   • SPLIT GRFT [address] <100SQCM Left 10/08/2019    Left leg debridement and Split Thickness Skin Graft to left thigh   • TONSILLECTOMY  1950 (including a wheelchair)?: A Little   -   Need to walk in hospital room?: A Little   -   Climbing 3-5 steps with a railing?: A Little     AM-PAC Score:  Raw Score: 21   Approx Degree of Impairment: 28.97%   Standardized Score (AM-PAC Scale): 50.25   CMS Mo

## 2019-11-22 NOTE — PROGRESS NOTES
[facility] Hospitalist Progress Note     CC: Hospital Follow up    PCP: Deana Gomez MD       Assessment/Plan:     Principal Problem:    Intractable nausea and vomiting  Active Problems:    Nausea vomiting and diarrhea    Ms. Anibal Canales is a [de-identified]year old course  Questions/concerns were discussed with patient and/or family by bedside.       Thank Hilary Victor MD    [facility] Hospitalist     Subjective:     Feeling better this morning, no diarrhea, but appetite is poor,     OBJECTIVE:    Blood pressure 132/43, p large bowel, which may reflect underlying enterocolitis in the appropriate clinical setting. 2. Otherwise, no acute intra-abdominal process is identified. No additional etiology of the patient's symptoms is appreciated from this study.   3. Uncomplicated d

## 2019-11-23 VITALS
HEART RATE: 63 BPM | WEIGHT: 250 LBS | DIASTOLIC BLOOD PRESSURE: 61 MMHG | TEMPERATURE: 99 F | OXYGEN SATURATION: 91 % | HEIGHT: 68 IN | BODY MASS INDEX: 37.89 KG/M2 | RESPIRATION RATE: 18 BRPM | SYSTOLIC BLOOD PRESSURE: 137 MMHG

## 2019-11-23 PROCEDURE — 83735 ASSAY OF MAGNESIUM: CPT | Performed by: INTERNAL MEDICINE

## 2019-11-23 NOTE — DISCHARGE SUMMARY
General Medicine Discharge Summary     Patient ID:  Ivanna Sutherland  [de-identified]year old  12/3/1940    Admit date: 11/20/2019    Discharge date and time:11/23/19    Attending Physician: Dominik Reich MD     Consults: IP CONSULT TO SOCIAL WORK  IP CONSULT TO SOCIAL WO weeks     #Norovirus:  -CT abdomen showing enterocolitis, c diff neg  -improved diarrhea resolved, oral intake improved     #LLE Cellulitis (resolved)  -s/p OR debridement on 8/21 then returned to [facility] 10/8 for skin graft from left shin to left thigh  - Status post hysterectomy. 9. Lesser incidental findings as above.     Dictated by (CST): Lai Montelongo MD on 11/20/2019 at 18:15     Approved by (CST): Lai Montelongo MD on 11/20/2019 at 18:22              Disposition: home    Activity: activity as tole HCl 25 MG Tabs  Commonly known as:  ANTIVERT  Take 1 tablet (25 mg total) by mouth 3 (three) times daily as needed for Dizziness.      QUEtiapine Fumarate 100 MG Tabs  Commonly known as:  SEROQUEL     traMADol HCl 50 MG Tabs  Commonly known as:  Candy Escobedo

## 2019-11-23 NOTE — CM/SW NOTE
CM notified by Roger Duron RN that pt is ready for dc. Marino with [facility] notified of dc.     PCS form completed for medicar - copies placed on pt's chart for [facility] and medical records.      [address] [facility] [phone]  on

## 2019-11-23 NOTE — PROGRESS NOTES
IV removed. [facility] here to  patient. POLST form was not signed, this RN spoke with patient about being FULL code during transportation to facility and patient agreeable. Discharge paperwork provided to transport.

## 2019-12-03 ENCOUNTER — TELEPHONE (OUTPATIENT)
Dept: INTERNAL MEDICINE CLINIC | Facility: CLINIC | Age: 79
End: 2019-12-03

## 2019-12-04 NOTE — TELEPHONE ENCOUNTER
PCP has changed to Dr. Andry Mendez. Left message for patient to confirm this. No call back for [facility] entered.

## 2019-12-05 NOTE — TELEPHONE ENCOUNTER
Patient returned call, states she has switched providers to Dr. Adolfo Rosa. Patient has already contact [facility] with new PCP info.

## 2019-12-11 ENCOUNTER — APPOINTMENT (OUTPATIENT)
Dept: GENERAL RADIOLOGY | Facility: HOSPITAL | Age: 79
DRG: 190 | End: 2019-12-11
Attending: EMERGENCY MEDICINE
Payer: MEDICARE

## 2019-12-11 ENCOUNTER — HOSPITAL ENCOUNTER (INPATIENT)
Facility: HOSPITAL | Age: 79
LOS: 7 days | Discharge: HOME HEALTH | DRG: 190 | End: 2019-12-20
Attending: EMERGENCY MEDICINE | Admitting: INTERNAL MEDICINE
Payer: MEDICARE

## 2019-12-11 DIAGNOSIS — J40 BRONCHITIS: Primary | ICD-10-CM

## 2019-12-11 DIAGNOSIS — R09.02 HYPOXIA: ICD-10-CM

## 2019-12-11 DIAGNOSIS — R50.9 ACUTE FEBRILE ILLNESS: ICD-10-CM

## 2019-12-11 PROCEDURE — 83880 ASSAY OF NATRIURETIC PEPTIDE: CPT | Performed by: EMERGENCY MEDICINE

## 2019-12-11 PROCEDURE — 94644 CONT INHLJ TX 1ST HOUR: CPT

## 2019-12-11 PROCEDURE — 81001 URINALYSIS AUTO W/SCOPE: CPT | Performed by: EMERGENCY MEDICINE

## 2019-12-11 PROCEDURE — 87040 BLOOD CULTURE FOR BACTERIA: CPT | Performed by: EMERGENCY MEDICINE

## 2019-12-11 PROCEDURE — 83605 ASSAY OF LACTIC ACID: CPT | Performed by: EMERGENCY MEDICINE

## 2019-12-11 PROCEDURE — 87631 RESP VIRUS 3-5 TARGETS: CPT | Performed by: EMERGENCY MEDICINE

## 2019-12-11 PROCEDURE — 71045 X-RAY EXAM CHEST 1 VIEW: CPT | Performed by: EMERGENCY MEDICINE

## 2019-12-11 PROCEDURE — 94640 AIRWAY INHALATION TREATMENT: CPT

## 2019-12-11 PROCEDURE — 99285 EMERGENCY DEPT VISIT HI MDM: CPT

## 2019-12-11 PROCEDURE — 96374 THER/PROPH/DIAG INJ IV PUSH: CPT

## 2019-12-11 PROCEDURE — 80048 BASIC METABOLIC PNL TOTAL CA: CPT | Performed by: EMERGENCY MEDICINE

## 2019-12-11 PROCEDURE — 84145 PROCALCITONIN (PCT): CPT | Performed by: EMERGENCY MEDICINE

## 2019-12-11 PROCEDURE — 85025 COMPLETE CBC W/AUTO DIFF WBC: CPT | Performed by: EMERGENCY MEDICINE

## 2019-12-11 PROCEDURE — 93005 ELECTROCARDIOGRAM TRACING: CPT

## 2019-12-11 PROCEDURE — 87641 MR-STAPH DNA AMP PROBE: CPT | Performed by: EMERGENCY MEDICINE

## 2019-12-11 PROCEDURE — 93010 ELECTROCARDIOGRAM REPORT: CPT | Performed by: EMERGENCY MEDICINE

## 2019-12-11 RX ORDER — PREDNISONE 20 MG/1
40 TABLET ORAL
Status: DISCONTINUED | OUTPATIENT
Start: 2019-12-12 | End: 2019-12-12

## 2019-12-11 RX ORDER — DESVENLAFAXINE 50 MG/1
100 TABLET, EXTENDED RELEASE ORAL DAILY
Status: DISCONTINUED | OUTPATIENT
Start: 2019-12-12 | End: 2019-12-20

## 2019-12-11 RX ORDER — MECLIZINE HYDROCHLORIDE 25 MG/1
25 TABLET ORAL 3 TIMES DAILY PRN
Status: DISCONTINUED | OUTPATIENT
Start: 2019-12-11 | End: 2019-12-20

## 2019-12-11 RX ORDER — QUETIAPINE 100 MG/1
100 TABLET, FILM COATED ORAL NIGHTLY
Status: DISCONTINUED | OUTPATIENT
Start: 2019-12-12 | End: 2019-12-20

## 2019-12-11 RX ORDER — METHYLPREDNISOLONE SODIUM SUCCINATE 40 MG/ML
40 INJECTION, POWDER, LYOPHILIZED, FOR SOLUTION INTRAMUSCULAR; INTRAVENOUS ONCE
Status: COMPLETED | OUTPATIENT
Start: 2019-12-11 | End: 2019-12-11

## 2019-12-11 RX ORDER — LORAZEPAM 1 MG/1
4 TABLET ORAL NIGHTLY PRN
Status: DISCONTINUED | OUTPATIENT
Start: 2019-12-11 | End: 2019-12-20

## 2019-12-11 RX ORDER — IBUPROFEN 600 MG/1
600 TABLET ORAL ONCE
Status: COMPLETED | OUTPATIENT
Start: 2019-12-11 | End: 2019-12-11

## 2019-12-11 RX ORDER — AMLODIPINE BESYLATE 10 MG/1
10 TABLET ORAL DAILY
Status: DISCONTINUED | OUTPATIENT
Start: 2019-12-12 | End: 2019-12-20

## 2019-12-11 RX ORDER — ALBUTEROL SULFATE 2.5 MG/3ML
5 SOLUTION RESPIRATORY (INHALATION) ONCE
Status: COMPLETED | OUTPATIENT
Start: 2019-12-11 | End: 2019-12-11

## 2019-12-11 RX ORDER — IPRATROPIUM BROMIDE AND ALBUTEROL SULFATE 2.5; .5 MG/3ML; MG/3ML
3 SOLUTION RESPIRATORY (INHALATION) ONCE
Status: COMPLETED | OUTPATIENT
Start: 2019-12-11 | End: 2019-12-11

## 2019-12-11 RX ORDER — IPRATROPIUM BROMIDE AND ALBUTEROL SULFATE 2.5; .5 MG/3ML; MG/3ML
3 SOLUTION RESPIRATORY (INHALATION)
Status: DISCONTINUED | OUTPATIENT
Start: 2019-12-12 | End: 2019-12-12

## 2019-12-11 RX ORDER — ACETAMINOPHEN 325 MG/1
650 TABLET ORAL EVERY 6 HOURS PRN
Status: DISCONTINUED | OUTPATIENT
Start: 2019-12-11 | End: 2019-12-12

## 2019-12-11 RX ORDER — PANTOPRAZOLE SODIUM 40 MG/1
40 TABLET, DELAYED RELEASE ORAL
Status: DISCONTINUED | OUTPATIENT
Start: 2019-12-12 | End: 2019-12-20

## 2019-12-11 RX ORDER — ONDANSETRON 2 MG/ML
4 INJECTION INTRAMUSCULAR; INTRAVENOUS EVERY 6 HOURS PRN
Status: DISCONTINUED | OUTPATIENT
Start: 2019-12-11 | End: 2019-12-12

## 2019-12-11 RX ORDER — LEVOTHYROXINE SODIUM 175 UG/1
175 TABLET ORAL
Status: DISCONTINUED | OUTPATIENT
Start: 2019-12-12 | End: 2019-12-20

## 2019-12-12 PROCEDURE — 94640 AIRWAY INHALATION TREATMENT: CPT

## 2019-12-12 PROCEDURE — 87798 DETECT AGENT NOS DNA AMP: CPT | Performed by: INTERNAL MEDICINE

## 2019-12-12 PROCEDURE — 87486 CHLMYD PNEUM DNA AMP PROBE: CPT | Performed by: INTERNAL MEDICINE

## 2019-12-12 PROCEDURE — 87633 RESP VIRUS 12-25 TARGETS: CPT | Performed by: INTERNAL MEDICINE

## 2019-12-12 PROCEDURE — 87581 M.PNEUMON DNA AMP PROBE: CPT | Performed by: INTERNAL MEDICINE

## 2019-12-12 PROCEDURE — 80048 BASIC METABOLIC PNL TOTAL CA: CPT | Performed by: INTERNAL MEDICINE

## 2019-12-12 PROCEDURE — 85025 COMPLETE CBC W/AUTO DIFF WBC: CPT | Performed by: INTERNAL MEDICINE

## 2019-12-12 RX ORDER — ONDANSETRON 2 MG/ML
4 INJECTION INTRAMUSCULAR; INTRAVENOUS EVERY 6 HOURS PRN
Status: DISCONTINUED | OUTPATIENT
Start: 2019-12-12 | End: 2019-12-20

## 2019-12-12 RX ORDER — METHYLPREDNISOLONE SODIUM SUCCINATE 40 MG/ML
40 INJECTION, POWDER, LYOPHILIZED, FOR SOLUTION INTRAMUSCULAR; INTRAVENOUS EVERY 8 HOURS
Status: DISCONTINUED | OUTPATIENT
Start: 2019-12-12 | End: 2019-12-18

## 2019-12-12 RX ORDER — SODIUM CHLORIDE 0.9 % (FLUSH) 0.9 %
3 SYRINGE (ML) INJECTION AS NEEDED
Status: DISCONTINUED | OUTPATIENT
Start: 2019-12-12 | End: 2019-12-20

## 2019-12-12 RX ORDER — IPRATROPIUM BROMIDE AND ALBUTEROL SULFATE 2.5; .5 MG/3ML; MG/3ML
3 SOLUTION RESPIRATORY (INHALATION) EVERY 6 HOURS PRN
Status: DISCONTINUED | OUTPATIENT
Start: 2019-12-12 | End: 2019-12-20

## 2019-12-12 RX ORDER — POLYETHYLENE GLYCOL 3350 17 G/17G
17 POWDER, FOR SOLUTION ORAL DAILY PRN
Status: DISCONTINUED | OUTPATIENT
Start: 2019-12-12 | End: 2019-12-20

## 2019-12-12 RX ORDER — IPRATROPIUM BROMIDE AND ALBUTEROL SULFATE 2.5; .5 MG/3ML; MG/3ML
3 SOLUTION RESPIRATORY (INHALATION)
Status: DISCONTINUED | OUTPATIENT
Start: 2019-12-12 | End: 2019-12-12

## 2019-12-12 RX ORDER — BISACODYL 10 MG
10 SUPPOSITORY, RECTAL RECTAL
Status: DISCONTINUED | OUTPATIENT
Start: 2019-12-12 | End: 2019-12-20

## 2019-12-12 RX ORDER — IPRATROPIUM BROMIDE AND ALBUTEROL SULFATE 2.5; .5 MG/3ML; MG/3ML
3 SOLUTION RESPIRATORY (INHALATION)
Status: DISCONTINUED | OUTPATIENT
Start: 2019-12-13 | End: 2019-12-20

## 2019-12-12 RX ORDER — SODIUM CHLORIDE 9 MG/ML
INJECTION, SOLUTION INTRAVENOUS CONTINUOUS
Status: DISCONTINUED | OUTPATIENT
Start: 2019-12-12 | End: 2019-12-17

## 2019-12-12 RX ORDER — HEPARIN SODIUM 5000 [USP'U]/ML
5000 INJECTION, SOLUTION INTRAVENOUS; SUBCUTANEOUS EVERY 8 HOURS SCHEDULED
Status: DISCONTINUED | OUTPATIENT
Start: 2019-12-12 | End: 2019-12-12

## 2019-12-12 RX ORDER — ACETAMINOPHEN 325 MG/1
650 TABLET ORAL EVERY 6 HOURS PRN
Status: DISCONTINUED | OUTPATIENT
Start: 2019-12-12 | End: 2019-12-20

## 2019-12-12 NOTE — ED PROVIDER NOTES
Patient Seen in: [facility] AND [facility] Emergency Department    History   Patient presents with:  Cough/URI    Stated Complaint: cough/wheezing     HPI    64-year-old female with past medical history of DVT on Xarelto, COPD, HTN, hypothyroid, chronic LLE cellul Debridement    • Oropharyngeal dysphagia 8/29/2017   • Osteoarthrosis, unspecified whether generalized or localized, unspecified site    • Other and unspecified hyperlipidemia    • Other complicated headache syndrome 11/2/2017   • Other spondylosis, lumbar REPLACEMENT SURGERY Bilateral    • HYSTERECTOMY  1967    Partial Hysterectomy   • JAW SURGERY     • KNEE REPLACEMENT SURGERY Bilateral     Bilateral arthritis    • LAMINECTOMY      WITH FUSION PER PATIENT   • OTHER SURGICAL HISTORY  5611,[id number] Hydroxide-Simeth 013-328-28 MG/5ML Oral Suspension,  Take 15 mL by mouth every 6 (six) hours as needed for Indigestion (nausea).    XARELTO 20 MG Oral Tab,  ONE TABLET BY MOUTH ONCE DAILY WITH FOOD   Meclizine HCl 25 MG Oral Tab,  Take 1 tablet (25 mg total Smokeless tobacco: Never Used      Tobacco comment: max 2.5 pk/dy, aver 2 pk/dy    Alcohol use: No      Alcohol/week: 0.0 standard drinks    Drug use: No      Review of Systems :  Constitutional: As per HPI  Respiratory: (+) cough; negative for shortness components within normal limits    Narrative:     A positive result does not necessarily indicate the presence of viable organisms. For confirmation, epidemiological typing and susceptibility testing, appropriate culture is needed.     PLEASE REFER TO MRSA parenchymal abnormalities. No effusion or pleural thickening. BONES: No fracture or visible bony lesion. OTHER: Negative. CONCLUSION: Normal examination.      Dictated by (CST): Judith Jones MD on 12/11/2019 at 20:08     Approved by (CS Lipomatous hypertrophy of the interatrial septum is suggested. There is dependent subsegmental atelectasis bilaterally. Additional scattered reticular opacities are seen and may be atelectatic in nature. LIVER: The liver is enlarged, measuring 18.7 cm.  Non are observed. There is atherosclerotic involvement of the celiac axis and superior mesenteric artery origins. No aneurysm is detected. A Cassie type IVC filter is present in the infrarenal IVC. RETROPERITONEUM: No mass or lymphadenopathy is apparent. includes but is not limited to PNA, bronchitis, CHF, UTI. Pulse ox: 99%: Abnormal and Improved after treatment on NC, as interpreted by myself    Cardiac Monitor Interpretation: Pulse Readings from Last 1 Encounters:  12/11/19 : 96  , sinus,      Evaluat

## 2019-12-12 NOTE — PROGRESS NOTES
Patient purwick was dry from this am. Patient is A/ox4, and refused to walk to bathroom. Diaper was clean, dry, and intact. Bladder scan performed and noted only 80 cc. 0.9ns at 42 cc/hr per saline lock.  Per patient \" I don't care what you do, just get ou

## 2019-12-12 NOTE — CM/SW NOTE
MDO for  marisela.    Pt has been screened per chart review and during nursing rounds. Pt is from [facility]. CM confirmed with  [facility] that pt is current w/ their services.  Updated clinicals sent and resume [facility] orders entered for RN/PT/OT.      PCS form c

## 2019-12-12 NOTE — ED NOTES
Orders for admission, patient is aware of plan and ready to go upstairs.  Any questions, please call ED RN Eagle Francois  at extension [phone]

## 2019-12-12 NOTE — PLAN OF CARE
Problem: Patient Centered Care  Goal: Patient preferences are identified and integrated in the patient's plan of care  Description  Interventions:  - What would you like us to know as we care for you?  \"I have been here a lot recently\"  - Provide timely Assess pt frequently for physical needs  - Identify cognitive and physical deficits and behaviors that affect risk of falls.   - [place] fall precautions as indicated by assessment.  - Educate pt/family on patient safety including physical limitations  - perfusion - ex.  Angina  - Evaluate fluid balance, assess for edema, trend weights  Outcome: Progressing  Goal: Absence of cardiac arrhythmias or at baseline  Description  INTERVENTIONS:  - Continuous cardiac monitoring, monitor vital signs, obtain 12 lead of electrolyte imbalances  - Administer electrolyte replacement as ordered  - Monitor response to electrolyte replacements, including rhythm and repeat lab results as appropriate  - Fluid restriction as ordered  - Instruct patient on fluid and nutrition re visually impaired, hearing impaired and aphasic patients to use assistive/communication devices  Outcome: Progressing     Problem: Impaired Functional Mobility  Goal: Achieve highest/safest level of mobility/gait  Description  Interventions:  - Assess shanika

## 2019-12-12 NOTE — TELEPHONE ENCOUNTER
Contacted patient and informed her lumbar epidural steroid injection on 12/13/19 must be cancelled due to hospitalization. Patient expressed understanding. Speedy recovery offered to patient. Phuc Borja at [facility] notified of cancellation.   Dr. Jami Pena updated

## 2019-12-12 NOTE — PLAN OF CARE
Problem: Patient Centered Care  Goal: Patient preferences are identified and integrated in the patient's plan of care  Description  Interventions:  - What would you like us to know as we care for you?   - Provide timely, complete, and accurate informatio falls.  - [place] fall precautions as indicated by assessment.  - Educate pt/family on patient safety including physical limitations  - Instruct pt to call for assistance with activity based on assessment  - Modify environment to reduce risk of injury  - of cardiac arrhythmias or at baseline  Description  INTERVENTIONS:  - Continuous cardiac monitoring, monitor vital signs, obtain 12 lead EKG if indicated  - Evaluate effectiveness of antiarrhythmic and heart rate control medications as ordered  - Initiate replacements, including rhythm and repeat lab results as appropriate  - Fluid restriction as ordered  - Instruct patient on fluid and nutrition restrictions as appropriate  Outcome: Progressing  Goal: Hemodynamic stability and optimal renal function mainta Progressing     Problem: Impaired Functional Mobility  Goal: Achieve highest/safest level of mobility/gait  Description  Interventions:  - Assess patient's functional ability and stability  - Promote increasing activity/tolerance for mobility and gait  - E

## 2019-12-12 NOTE — CM/SW NOTE
COND 44: Patient failed Inpatient criteria. Second level of review completed and supports Observation. UR committee in agreement. Discussed with Dr Bailey Ibrahima approves.

## 2019-12-12 NOTE — H&P
[facility] Hospitalist H&P       CC: Patient presents with:  Cough/URI       PCP: Cecil Ford MD    History of Present Illness: Ms. Yariel Canales is a [de-identified]year old female with PMH of  COPD, GERD, OA, depression/anxiety, recurrent falls wheelchair bound, 11/2/2017   • Other spondylosis, lumbar region 11/2/2017   • Pneumonia 2012   • Pneumonia due to organism    • S/P cervical spinal fusion: C3-6 and C7-T1 11/2/2017   • s/p L5-S1 right laminectomy 8/28/2014   • Shortness of breath    • Sleep apnea    • sten SURGICAL HISTORY  1985,1995,2003,2009    Cervical Discectomy AND FUSION   • PREP SITE T/A/L 1ST 100 SQ CM/1PCT Left 08/21/2019    Left leg debridement, VAC placed   • SKIN GRAFT SPLIT THICKNESS Left 10/8/2019    Performed by Marianela Rico MD at  differently: Take 175 mcg by mouth before breakfast.  ), Disp: 60 tablet, Rfl: 10  Desvenlafaxine Succinate ER 50 MG Oral Tablet 24 Hr, Take 100 mg by mouth daily.   , Disp: , Rfl:   QUEtiapine Fumarate 100 MG Oral Tab, Take 100 mg by mouth nightly., Disp: abnormality, atraumatic. Eyes:  Sclera anicteric, No conjunctival pallor   Nose: Nares normal. Septum midline. Mucosa normal. No drainage.    Throat: Lips, mucosa, and tongue normal. Teeth and gums normal.   Neck: Supple,   Lungs:   Wheezing, rales and rh fever  - not tachycrdic, does take xarelto (compliant)  - continue steroids, nebs,   - pulm consulted  - minimal O2 req (1L) wean off  - seen by Dr. Faye Murguia as outpatient     # hs of LLE Cellulitis   -s/p OR debridement on 8/21 then returned to [facility]

## 2019-12-13 PROCEDURE — 94640 AIRWAY INHALATION TREATMENT: CPT

## 2019-12-13 PROCEDURE — 82570 ASSAY OF URINE CREATININE: CPT | Performed by: INTERNAL MEDICINE

## 2019-12-13 PROCEDURE — 83735 ASSAY OF MAGNESIUM: CPT | Performed by: HOSPITALIST

## 2019-12-13 PROCEDURE — 84540 ASSAY OF URINE/UREA-N: CPT | Performed by: INTERNAL MEDICINE

## 2019-12-13 PROCEDURE — 80053 COMPREHEN METABOLIC PANEL: CPT | Performed by: HOSPITALIST

## 2019-12-13 PROCEDURE — 84300 ASSAY OF URINE SODIUM: CPT | Performed by: INTERNAL MEDICINE

## 2019-12-13 PROCEDURE — 85025 COMPLETE CBC W/AUTO DIFF WBC: CPT | Performed by: HOSPITALIST

## 2019-12-13 RX ORDER — BENZONATATE 100 MG/1
200 CAPSULE ORAL EVERY 6 HOURS PRN
Status: DISCONTINUED | OUTPATIENT
Start: 2019-12-13 | End: 2019-12-20

## 2019-12-13 RX ORDER — CODEINE PHOSPHATE AND GUAIFENESIN 10; 100 MG/5ML; MG/5ML
5 SOLUTION ORAL EVERY 4 HOURS PRN
Status: DISCONTINUED | OUTPATIENT
Start: 2019-12-13 | End: 2019-12-20

## 2019-12-13 NOTE — PROGRESS NOTES
[address] [facility]    CDI Prediction Tool Protocol (Vancomycin Initiated)    OVP (oral vancomycin prophylaxis) 125 mg PO Daily is being started in this patient based on a score of 20.1.       Score Breakdown:  History of CDI > 1 year ago AND high risk an

## 2019-12-13 NOTE — CONSULTS
[facility]    Report of Consultation    Pastora Oates Patient Status:  Observation    12/3/1940 MRN [id number]   Location [facility] 5SW/SE Attending Brock Kimball MD   Hosp Day # 1 PCP Arminda Chua MD     Date of Admission: generalized or localized, unspecified site    • Other and unspecified hyperlipidemia    • Other complicated headache syndrome 11/2/2017   • Other spondylosis, lumbar region 11/2/2017   • Pneumonia 2012   • Pneumonia due to organism    • S/P cervical spinal • JAW SURGERY     • KNEE REPLACEMENT SURGERY Bilateral     Bilateral arthritis    • LAMINECTOMY      WITH FUSION PER PATIENT   • OTHER SURGICAL HISTORY  1985,1995,2003,2009    Cervical Discectomy AND FUSION   • PREP SITE T/A/L 1ST 100 SQ CM/1PCT Left 08/ Application, Nasal, B64T  ipratropium-albuterol (DUONEB) nebulizer solution 3 mL, 3 mL, Nebulization, Q4H WA (4 times daily)  Normal Saline Flush 0.9 % injection 3 mL, 3 mL, Intravenous, PRN  acetaminophen (TYLENOL) tab 650 mg, 650 mg, Oral, Q6H PRN  ondan MOUTH EVERY MORNING (Patient taking differently: Take 175 mcg by mouth before breakfast.  )  Desvenlafaxine Succinate ER 50 MG Oral Tablet 24 Hr, Take 100 mg by mouth daily. QUEtiapine Fumarate 100 MG Oral Tab, Take 100 mg by mouth nightly.   Pantoprazol mouth, throat, and face: negative for hearing loss, hoarseness, snoring and tinnitus  Respiratory: negative for cough, dyspnea on exertion, hemoptysis, pleurisy/chest pain, sputum, stridor and wheezing  Cardiovascular: negative for chest pressure/discomfor Imaging:  Xr Chest Ap Portable  (cpt=71045)    Result Date: 12/11/2019  CONCLUSION: Normal examination.      Dictated by (CST): Cassi Hogue MD on 12/11/2019 at 20:08     Approved by (CST): Cassi Hogue MD on 12/11/2019 at 20:09

## 2019-12-13 NOTE — PROGRESS NOTES
[facility] Hospitalist Progress Note     CC: Hospital Follow up    PCP: Nancy Villagomez MD       Assessment/Plan:     Principal Problem:    Bronchitis  Active Problems:    Hypoxia    Acute febrile illness    Ms. Lavonne Canales is a [de-identified]year old female with PMH pressure 114/41, pulse 88, temperature 97.8 °F (36.6 °C), temperature source Oral, resp. rate 18, height 5' 8\" (1.727 m), weight 249 lb 9 oz (113.2 kg), SpO2 94 %, not currently breastfeeding.     Temp:  [97.4 °F (36.3 °C)-98.2 °F (36.8 °C)] 97.8 °F (36.6 Meds:     • vancomycin HCl  125 mg Oral Daily   • azithromycin  500 mg Intravenous Q24H   • mupirocin  1 Application Nasal L43H   • MethylPREDNISolone Sodium Succ  40 mg Intravenous Q8H   • cefTRIAXone  1 g Intravenous Q24H   • ipratropium-albutero

## 2019-12-13 NOTE — CONSULTS
NEPHROLOGY CONSULT NOTE     DATE: 12/13/2019    Requesting Physician:  Dr. Ammon Everett     Reason for Consult: INDERJIT     HISTORY OF PRESENT ILLNESS: Tisha Workman is an 78year old female wit history of HTN, COPD, hypothyroidism.   Presented to the hospital with c S/P cervical spinal fusion: C3-6 and C7-T1 11/2/2017   • s/p L5-S1 right laminectomy 8/28/2014   • Shortness of breath    • Sleep apnea    • stent placement     cerebral   • Thyroid disease    • Toe pain     Onychomycosis, Debridement , Hammertoe    • Tons CM/1PCT Left 08/21/2019    Left leg debridement, VAC placed   • SKIN GRAFT SPLIT THICKNESS Left 10/8/2019    Performed by Marianela Rico MD at [facility] OR   • SPLIT GRFT 100 [facility] <100SQCM Left 10/08/2019    Left leg debridement and Split Thicknes Used      Tobacco comment: max 2.5 pk/dy, aver 2 pk/dy    Substance and Sexual Activity      Alcohol use: No        Alcohol/week: 0.0 standard drinks      Drug use: No      Sexual activity: Not on file    Lifestyle      Physical activity:        Days per w Regular use of sun block: Not Asked    Social History Narrative      The patient uses the following assistive device(s):  rolling walker. scooter      The patient does not live in a home with stairs. The [place] in [place] home.  Cherelle Oral, Daily      HYDROcodone-acetaminophen 5-325 MG Oral Tab, Take 1 tablet by mouth every 6 (six) hours as needed for Pain (moderate pain). acetaminophen 325 MG Oral Tab, Take 650 mg by mouth every 4 to 6 hours as needed for Pain.   XARELTO 20 MG Oral Tab RASH  Depakote                OTHER (SEE COMMENTS)    Comment:pancreatitis  Fluocinolone            OTHER (SEE COMMENTS)    Comment:Unknown  Gabitril [Tiagabine]    OTHER (SEE COMMENTS)    Comment:Stroke like symptoms             Unable to move  Dickey Quant (cpt=71045)    Result Date: 12/11/2019  CONCLUSION: Normal examination.      Dictated by (CST): Dariel Cash MD on 12/11/2019 at 20:08     Approved by (CST): Dariel Cash MD on 12/11/2019 at 20:09               ASSESSMENT AND PLAN:   T

## 2019-12-13 NOTE — PROGRESS NOTES
[facility]    Progress Note    Shira Holguin Patient Status:  Observation    12/3/1940 MRN [id number]   Location [facility] 5SW/SE Attending Karen Chaudhry MD   Hosp Day # 1 PCP Brittany Syed MD       Subjective:   Les Shingles normal    Assessment and Plan:     Bronchitis  Continue Solu-Medrol 40 every 8  Duo nebs q. 8  Zithromax and Rocephin  If not better-bronchoscopy      Hypoxia  Supplemental O2      Acute febrile illness  Viral panel negative  Continue antibiotics        Re

## 2019-12-13 NOTE — PLAN OF CARE
Problem: Patient Centered Care  Goal: Patient preferences are identified and integrated in the patient's plan of care  Description  Interventions:  - What would you like us to know as we care for you?   - Provide timely, complete, and accurate informatio Progressing     Problem: CARDIOVASCULAR - ADULT  Goal: Maintains optimal cardiac output and hemodynamic stability  Description  INTERVENTIONS:  - Monitor vital signs, rhythm, and trends  - Monitor for bleeding, hypotension and signs of decreased cardiac ou rhythm and assess patient for signs and symptoms of electrolyte imbalances  - Administer electrolyte replacement as ordered  - Monitor response to electrolyte replacements, including rhythm and repeat lab results as appropriate  - Fluid restriction as orde

## 2019-12-14 PROCEDURE — 85025 COMPLETE CBC W/AUTO DIFF WBC: CPT | Performed by: HOSPITALIST

## 2019-12-14 PROCEDURE — 96376 TX/PRO/DX INJ SAME DRUG ADON: CPT

## 2019-12-14 PROCEDURE — 94640 AIRWAY INHALATION TREATMENT: CPT

## 2019-12-14 PROCEDURE — 85730 THROMBOPLASTIN TIME PARTIAL: CPT | Performed by: INTERNAL MEDICINE

## 2019-12-14 PROCEDURE — 85610 PROTHROMBIN TIME: CPT | Performed by: INTERNAL MEDICINE

## 2019-12-14 PROCEDURE — 80048 BASIC METABOLIC PNL TOTAL CA: CPT | Performed by: HOSPITALIST

## 2019-12-14 PROCEDURE — 83735 ASSAY OF MAGNESIUM: CPT | Performed by: HOSPITALIST

## 2019-12-14 RX ORDER — ACETYLCYSTEINE 200 MG/ML
2 SOLUTION ORAL; RESPIRATORY (INHALATION) 2 TIMES DAILY
Status: DISCONTINUED | OUTPATIENT
Start: 2019-12-14 | End: 2019-12-20

## 2019-12-14 NOTE — PLAN OF CARE
Problem: Patient Centered Care  Goal: Patient preferences are identified and integrated in the patient's plan of care  Description  Interventions:  - What would you like us to know as we care for you?  I'll need help ordering my meals  - Provide timely, c appropriate  Outcome: Progressing     Problem: SAFETY ADULT - FALL  Goal: Free from fall injury  Description  INTERVENTIONS:  - Assess pt frequently for physical needs  - Identify cognitive and physical deficits and behaviors that affect risk of falls.   - bleeding and/or hematoma  - Assess quality of pulses, skin color and temperature  - Assess for signs of decreased coronary artery perfusion - ex.  Angina  - Evaluate fluid balance, assess for edema, trend weights  Outcome: Progressing  Goal: Absence of card maintained within normal limits  Description  INTERVENTIONS:  - Monitor labs and rhythm and assess patient for signs and symptoms of electrolyte imbalances  - Administer electrolyte replacement as ordered  - Monitor response to electrolyte replacements, in changes in neurological status  - Encourage and assist patient to increase activity and self care with guidance from PT/OT  - Encourage visually impaired, hearing impaired and aphasic patients to use assistive/communication devices  Outcome: Progressing

## 2019-12-14 NOTE — PROGRESS NOTES
[facility] Hospitalist Progress Note     CC: Hospital Follow up    PCP: Garfield Michelle MD       Assessment/Plan:     Principal Problem:    Bronchitis  Active Problems:    Hypoxia    Acute febrile illness    Ms. Che Canales is a [de-identified]year old female with PMH and coughing, no fevers or chills    OBJECTIVE:    Blood pressure 138/61, pulse 79, temperature 98.2 °F (36.8 °C), temperature source Oral, resp. rate 18, height 5' 8\" (1.727 m), weight 249 lb 9 oz (113.2 kg), SpO2 96 %, not currently breastfeeding.     Te Dasha Jones MD on 12/11/2019 at 20:09              Meds:     • acetylcysteine  2 mL Nebulization BID   • vancomycin HCl  125 mg Oral Daily   • azithromycin  500 mg Intravenous Q24H   • mupirocin  1 Application Nasal S44E   • MethylPREDNISolone

## 2019-12-14 NOTE — PROGRESS NOTES
[facility]OSP - [facility]    Progress Note    Ivanna Sutherland Patient Status:  Observation    12/3/1940 MRN [id number]   Location [facility] 5SW/SE Attending Dominik Reich MD   Hosp Day # 1 PCP Nancy Villagomez MD       Subjective:   Gilda Bravo Bronchitis  Continue Rocephin and Zithromax  Continue DuoNeb's  Add Mucomyst nebulizer twice a day  Bronchoscopy 730 Monday      Hypoxia  Persistent      Acute febrile illness  Continue antibiotics        Results:     Lab Results   Component Value Date

## 2019-12-14 NOTE — PROGRESS NOTES
NEPHROLOGY DAILY PROGRESS NOTE       Follow up Reason: INDERJIT       SUBJECTIVE:  Having urinary retention, fox placed   Denies SOB      OBJECTIVE:    Total Intake/Output:    Intake/Output Summary (Last 24 hours) at 12/14/2019 5198  Last data filed at 12/14/ (DUONEB) nebulizer solution 3 mL, 3 mL, Nebulization, Q6H PRN  Rivaroxaban (XARELTO) tab 20 mg, 20 mg, Oral, Daily with food  QUEtiapine Fumarate (SEROQUEL) tab 100 mg, 100 mg, Oral, Nightly  Pantoprazole Sodium (PROTONIX) EC tab 40 mg, 40 mg, Oral, BID AC mouth every 4 (four) hours as needed (cough). Budesonide-Formoterol Fumarate 160-4.5 MCG/ACT Inhalation Aerosol, Inhale 1 puff into the lungs daily. cholecalciferol 1000 UNITS Oral Cap, Take 2,000 Units by mouth daily.   albuterol sulfate (2.5 MG/3ML) 0.0

## 2019-12-14 NOTE — PLAN OF CARE
Problem: Patient Centered Care  Goal: Patient preferences are identified and integrated in the patient's plan of care  Description  Interventions:  - What would you like us to know as we care for you? I live at [facility] and my daughter Teja Olmstead is my POA. strengthening/mobility  - Encourage toileting schedule  Outcome: Progressing     Problem: CARDIOVASCULAR - ADULT  Goal: Maintains optimal cardiac output and hemodynamic stability  Description  INTERVENTIONS:  - Monitor vital signs, rhythm, and trends  - Mo normal limits  Description  INTERVENTIONS:  - Monitor labs and rhythm and assess patient for signs and symptoms of electrolyte imbalances  - Administer electrolyte replacement as ordered  - Monitor response to electrolyte replacements, including rhythm and activity/tolerance for mobility and gait  - Educate and engage patient/family in tolerated activity level and precautions  Outcome: Progressing

## 2019-12-15 PROCEDURE — 94640 AIRWAY INHALATION TREATMENT: CPT

## 2019-12-15 PROCEDURE — 97163 PT EVAL HIGH COMPLEX 45 MIN: CPT

## 2019-12-15 PROCEDURE — 97166 OT EVAL MOD COMPLEX 45 MIN: CPT

## 2019-12-15 PROCEDURE — 83735 ASSAY OF MAGNESIUM: CPT | Performed by: HOSPITALIST

## 2019-12-15 PROCEDURE — 85025 COMPLETE CBC W/AUTO DIFF WBC: CPT | Performed by: HOSPITALIST

## 2019-12-15 PROCEDURE — 80048 BASIC METABOLIC PNL TOTAL CA: CPT | Performed by: HOSPITALIST

## 2019-12-15 PROCEDURE — 97116 GAIT TRAINING THERAPY: CPT

## 2019-12-15 NOTE — PLAN OF CARE
Problem: Patient Centered Care  Goal: Patient preferences are identified and integrated in the patient's plan of care  Description  Interventions:  - What would you like us to know as we care for you?  Lives at [place], has a scooter, won't go to rehab  - assessment.  - Educate pt/family on patient safety including physical limitations  - Instruct pt to call for assistance with activity based on assessment  - Modify environment to reduce risk of injury  - Provide assistive devices as appropriate  - Consider baseline  Description  INTERVENTIONS:  - Continuous cardiac monitoring, monitor vital signs, obtain 12 lead EKG if indicated  - Evaluate effectiveness of antiarrhythmic and heart rate control medications as ordered  - Initiate emergency measures for life t repeat lab results as appropriate  - Fluid restriction as ordered  - Instruct patient on fluid and nutrition restrictions as appropriate  Outcome: Progressing  Goal: Hemodynamic stability and optimal renal function maintained  Description  INTERVENTIONS: Functional Mobility  Goal: Achieve highest/safest level of mobility/gait  Description  Interventions:  - Assess patient's functional ability and stability  - Promote increasing activity/tolerance for mobility and gait  - Educate and engage patient/family i

## 2019-12-15 NOTE — OCCUPATIONAL THERAPY NOTE
OCCUPATIONAL THERAPY EVALUATION - INPATIENT     Room Number: 554/554-A  Evaluation Date: 12/15/2019  Type of Evaluation: Initial  Presenting Problem: bronchitis    Physician Order: IP Consult to Occupational Therapy  Reason for Therapy: ADL/IADL Dysfunctio in conjuction with PT    The patient's Approx Degree of Impairment: 50.11% has been calculated based on documentation in the [facility] '6 clicks' Inpatient Daily Activity Short Form.   Research supports that patients with this level of impairment may benefit fr due to organism    • S/P cervical spinal fusion: C3-6 and C7-T1 11/2/2017   • s/p L5-S1 right laminectomy 8/28/2014   • Shortness of breath    • Sleep apnea    • stent placement     cerebral   • Thyroid disease    • Toe pain     Onychomycosis, Debridement PREP SITE T/A/L 1ST 100 SQ CM/1PCT Left 08/21/2019    Left leg debridement, VAC placed   • SKIN GRAFT SPLIT THICKNESS Left 10/8/2019    Performed by Maile House MD at [facility] OR   • SPLIT GRFT 100 [facility] [address] <100SQCM Left 10/08/2019    Left leg henry clothing?: A Lot  -   Bathing (including washing, rinsing, drying)?: A Lot  -   Toileting, which includes using toilet, bedpan or urinal? : A Little  -   Putting on and taking off regular upper body clothing?: A Little  -   Taking care of personal grooming

## 2019-12-15 NOTE — PROGRESS NOTES
[facility] Pharmacy Note: Antimicrobial Weight Based Dose Adjustment for: ceftriaxone (ROCEPHIN)    Scottie Sutherland is a 78year old female who has been prescribed ceftriaxone (ROCEPHIN) 1g every 24 hours.     Estimated Creatinine Clearance: 35.6 mL/min (A) (based

## 2019-12-15 NOTE — PROGRESS NOTES
[facility] - [facility]    Progress Note    Miah Vera Patient Status:  Inpatient    12/3/1940 MRN [id number]   Location [facility] 5SW/SE Attending Chayo Henson MD   Hosp Day # 2 PCP Ruiz Bergman MD       Subjective:   Max Dotson Bronchitis  Continue antibiotics  Bronchoscopy 7:30 AM  Continue steroids and antibiotics      Hypoxia  Supplemental O2      Acute febrile illness  Resolved        Results:     Lab Results   Component Value Date    WBC 11.5 (H) 12/15/2019    HGB 11.3 (L) 1

## 2019-12-15 NOTE — PLAN OF CARE
Problem: Patient Centered Care  Goal: Patient preferences are identified and integrated in the patient's plan of care  Description  Interventions:  - What would you like us to know as we care for you?  Lives at [place], has a scooter, won't go to rehab  - pre-medicate as appropriate  Outcome: Progressing     Problem: SAFETY ADULT - FALL  Goal: Free from fall injury  Description  INTERVENTIONS:  - Assess pt frequently for physical needs  - Identify cognitive and physical deficits and behaviors that affect ri puncture sites for bleeding and/or hematoma  - Assess quality of pulses, skin color and temperature  - Assess for signs of decreased coronary artery perfusion - ex.  Angina  - Evaluate fluid balance, assess for edema, trend weights  Outcome: Progressing  Go indicated or ordered  - Monitor response to interventions for patient's volume status, including labs, urine output, blood pressure (other measures as available)  - Encourage oral intake as appropriate  - Instruct patient on fluid and nutrition restriction level of independence in self care  Description  Interventions:  - Assess ability and encourage patient to participate in ADLs to maximize function  - Promote sitting position while performing ADLs such as feeding, grooming, and bathing  - Educate and enco

## 2019-12-15 NOTE — PROGRESS NOTES
[facility] Hospitalist Progress Note     CC: Hospital Follow up    PCP: Rafi Ludwig MD       Assessment/Plan:     Principal Problem:    Bronchitis  Active Problems:    Hypoxia    Acute febrile illness    Ms. Ana Canales is a [de-identified]year old female with PMH MD [facility] Hospitalist     Subjective:     No CP, still with wheezing, and coughing, no fevers or chills    OBJECTIVE:    Blood pressure 139/55, pulse 88, temperature 97.6 °F (36.4 °C), temperature source Oral, resp.  rate 18, height 5' 8\" (1.727 m), weight • mupirocin  1 Application Nasal R97Z   • MethylPREDNISolone Sodium Succ  40 mg Intravenous Q8H   • ipratropium-albuterol  3 mL Nebulization Q4H WA (4 times daily)   • Rivaroxaban  20 mg Oral Daily with food   • QUEtiapine Fumarate  100 mg Oral Nightly

## 2019-12-15 NOTE — PLAN OF CARE
Problem: Patient Centered Care  Goal: Patient preferences are identified and integrated in the patient's plan of care  Description  Interventions:  - What would you like us to know as we care for you?  Lives at [place], has a scooter, won't go to rehab  - Consider cultural and social influences on pain and pain management  - Manage/alleviate anxiety  - Utilize distraction and/or relaxation techniques  - Monitor for opioid side effects  - Notify MD/LIP if interventions unsuccessful or patient reports new rommel based on physician/LIP order or complex needs related to functional status, cognitive ability or social support system  12/15/2019 0358 by Gilda Lemos RN  Outcome: Progressing  12/15/2019 0358 by Gilda Lemos RN  Outcome: Progressing     Problem Incentive Spirometry  - Assess the need for suctioning and perform as needed  - Assess and instruct to report SOB or any respiratory difficulty  - Respiratory Therapy support as indicated  - Manage/alleviate anxiety  - Monitor for signs/symptoms of CO2 ret patient's volume status, including labs, urine output, blood pressure (other measures as available)  - Encourage oral intake as appropriate  - Instruct patient on fluid and nutrition restrictions as appropriate  12/15/2019 0358 by Zaheer Dixon RN  Out mobility/gait  Description  Interventions:  - Assess patient's functional ability and stability  - Promote increasing activity/tolerance for mobility and gait  - Educate and engage patient/family in tolerated activity level and precautions    12/15/2019 03

## 2019-12-15 NOTE — PHYSICAL THERAPY NOTE
PHYSICAL THERAPY EVALUATION - INPATIENT     Room Number: 554/554-A  Evaluation Date: 12/15/2019  Type of Evaluation: initial  Physician Order: PT Eval and Treat    Presenting Problem: Pt admitted w/ worsening cough, congestion x3 days  Reason for Therapy: safely using RW pending to her progress during IP stay, rec continued skilled therapy to upgrade balance and prevent future falls. Communicated above w/ RN, will cont to follow w/ goals established to increase independence.     DISCHARGE RECOMMENDATIONS unspecified hyperlipidemia    • Other complicated headache syndrome 11/2/2017   • Other spondylosis, lumbar region 11/2/2017   • Pneumonia 2012   • Pneumonia due to organism    • S/P cervical spinal fusion: C3-6 and C7-T1 11/2/2017   • s/p L5-S1 right lami Bilateral arthritis    • LAMINECTOMY      WITH FUSION PER PATIENT   • OTHER SURGICAL HISTORY  1985,1995,2003,2009    Cervical Discectomy AND FUSION   • PREP SITE T/A/L 1ST 100 SQ CM/1PCT Left 08/21/2019    Left leg debridement, VAC placed   • SKIN GRAFT Moving to and from a bed to a chair (including a wheelchair)?: A Little   -   Need to walk in hospital room?: A Little   -   Climbing 3-5 steps with a railing?: A Lot     AM-PAC Score:  Raw Score: 17   Approx Degree of Impairment: 50.57%   Standardized Sco

## 2019-12-16 ENCOUNTER — ANESTHESIA EVENT (OUTPATIENT)
Dept: ENDOSCOPY | Facility: HOSPITAL | Age: 79
DRG: 190 | End: 2019-12-16
Payer: MEDICARE

## 2019-12-16 ENCOUNTER — ANESTHESIA (OUTPATIENT)
Dept: ENDOSCOPY | Facility: HOSPITAL | Age: 79
DRG: 190 | End: 2019-12-16
Payer: MEDICARE

## 2019-12-16 PROCEDURE — 85007 BL SMEAR W/DIFF WBC COUNT: CPT | Performed by: HOSPITALIST

## 2019-12-16 PROCEDURE — 87077 CULTURE AEROBIC IDENTIFY: CPT | Performed by: INTERNAL MEDICINE

## 2019-12-16 PROCEDURE — 87071 CULTURE AEROBIC QUANT OTHER: CPT | Performed by: INTERNAL MEDICINE

## 2019-12-16 PROCEDURE — 87106 FUNGI IDENTIFICATION YEAST: CPT | Performed by: INTERNAL MEDICINE

## 2019-12-16 PROCEDURE — 85025 COMPLETE CBC W/AUTO DIFF WBC: CPT | Performed by: HOSPITALIST

## 2019-12-16 PROCEDURE — 0BC78ZZ EXTIRPATION OF MATTER FROM LEFT MAIN BRONCHUS, VIA NATURAL OR ARTIFICIAL OPENING ENDOSCOPIC: ICD-10-PCS | Performed by: INTERNAL MEDICINE

## 2019-12-16 PROCEDURE — 94640 AIRWAY INHALATION TREATMENT: CPT

## 2019-12-16 PROCEDURE — 87205 SMEAR GRAM STAIN: CPT | Performed by: INTERNAL MEDICINE

## 2019-12-16 PROCEDURE — 87206 SMEAR FLUORESCENT/ACID STAI: CPT | Performed by: INTERNAL MEDICINE

## 2019-12-16 PROCEDURE — 0BC38ZZ EXTIRPATION OF MATTER FROM RIGHT MAIN BRONCHUS, VIA NATURAL OR ARTIFICIAL OPENING ENDOSCOPIC: ICD-10-PCS | Performed by: INTERNAL MEDICINE

## 2019-12-16 PROCEDURE — 80048 BASIC METABOLIC PNL TOTAL CA: CPT | Performed by: HOSPITALIST

## 2019-12-16 PROCEDURE — 87116 MYCOBACTERIA CULTURE: CPT | Performed by: INTERNAL MEDICINE

## 2019-12-16 PROCEDURE — 87102 FUNGUS ISOLATION CULTURE: CPT | Performed by: INTERNAL MEDICINE

## 2019-12-16 PROCEDURE — 88112 CYTOPATH CELL ENHANCE TECH: CPT | Performed by: INTERNAL MEDICINE

## 2019-12-16 PROCEDURE — 85027 COMPLETE CBC AUTOMATED: CPT | Performed by: HOSPITALIST

## 2019-12-16 RX ORDER — ALBUTEROL SULFATE 2.5 MG/3ML
2.5 SOLUTION RESPIRATORY (INHALATION) EVERY 4 HOURS PRN
Status: DISCONTINUED | OUTPATIENT
Start: 2019-12-16 | End: 2019-12-20

## 2019-12-16 RX ORDER — FLUCONAZOLE 100 MG/1
200 TABLET ORAL DAILY
Status: DISCONTINUED | OUTPATIENT
Start: 2019-12-16 | End: 2019-12-20

## 2019-12-16 RX ORDER — NALOXONE HYDROCHLORIDE 0.4 MG/ML
80 INJECTION, SOLUTION INTRAMUSCULAR; INTRAVENOUS; SUBCUTANEOUS AS NEEDED
Status: DISCONTINUED | OUTPATIENT
Start: 2019-12-16 | End: 2019-12-16 | Stop reason: HOSPADM

## 2019-12-16 RX ORDER — DEXTROSE MONOHYDRATE 25 G/50ML
50 INJECTION, SOLUTION INTRAVENOUS
Status: DISCONTINUED | OUTPATIENT
Start: 2019-12-16 | End: 2019-12-16 | Stop reason: HOSPADM

## 2019-12-16 RX ORDER — LIDOCAINE HYDROCHLORIDE 10 MG/ML
INJECTION, SOLUTION EPIDURAL; INFILTRATION; INTRACAUDAL; PERINEURAL AS NEEDED
Status: DISCONTINUED | OUTPATIENT
Start: 2019-12-16 | End: 2019-12-16 | Stop reason: SURG

## 2019-12-16 RX ORDER — SODIUM CHLORIDE, SODIUM LACTATE, POTASSIUM CHLORIDE, CALCIUM CHLORIDE 600; 310; 30; 20 MG/100ML; MG/100ML; MG/100ML; MG/100ML
INJECTION, SOLUTION INTRAVENOUS CONTINUOUS
Status: DISCONTINUED | OUTPATIENT
Start: 2019-12-16 | End: 2019-12-16

## 2019-12-16 RX ADMIN — SODIUM CHLORIDE: 9 INJECTION, SOLUTION INTRAVENOUS at 08:16:00

## 2019-12-16 RX ADMIN — LIDOCAINE HYDROCHLORIDE 50 MG: 10 INJECTION, SOLUTION EPIDURAL; INFILTRATION; INTRACAUDAL; PERINEURAL at 08:05:00

## 2019-12-16 RX ADMIN — SODIUM CHLORIDE: 9 INJECTION, SOLUTION INTRAVENOUS at 08:03:00

## 2019-12-16 NOTE — SPIRITUAL CARE NOTE
Pt was in bed, alone at the time of visit. Pt has children but none of them appears to visit her. However, her duran in God helped her to cope her medical condition.  provided emotional and spiritual support and a prayer.  ML

## 2019-12-16 NOTE — ANESTHESIA POSTPROCEDURE EVALUATION
Patient: Rexine Agent    Procedure Summary     Date:  12/16/19 Room / Location:  [facility] ENDOSCOPY 05 / [facility] ENDOSCOPY    Anesthesia Start:  3151 Anesthesia Stop:  1803    Procedure:  BRONCHOSCOPY (N/A ) Diagnosis:       Bronchitis      (bilateral  mucous pl

## 2019-12-16 NOTE — PROGRESS NOTES
NEPHROLOGY DAILY PROGRESS NOTE       Follow up Reason: INDERJIT       SUBJECTIVE:    Sleeping  comfortably    OBJECTIVE:    Total Intake/Output:    Intake/Output Summary (Last 24 hours) at 12/16/2019 1040  Last data filed at 12/16/2019 0816  Gross per 24 hour Q4H WA (4 times daily)  ipratropium-albuterol (DUONEB) nebulizer solution 3 mL, 3 mL, Nebulization, Q6H PRN  Rivaroxaban (XARELTO) tab 20 mg, 20 mg, Oral, Daily with food  QUEtiapine Fumarate (SEROQUEL) tab 100 mg, 100 mg, Oral, Nightly  Pantoprazole Sodiu (itchiness). dextromethorphan-guaiFENesin  MG/5ML Oral Liquid, Take 5 mL by mouth every 4 (four) hours as needed (cough). Budesonide-Formoterol Fumarate 160-4.5 MCG/ACT Inhalation Aerosol, Inhale 1 puff into the lungs daily.   cholecalciferol 1000 U

## 2019-12-16 NOTE — PROGRESS NOTES
NEPHROLOGY DAILY PROGRESS NOTE       Follow up Reason: INDERJIT       SUBJECTIVE:  Resting comfortably no complaints     OBJECTIVE:    Total Intake/Output:    Intake/Output Summary (Last 24 hours) at 12/15/2019 1805  Last data filed at 12/15/2019 1500  Gross pe food  QUEtiapine Fumarate (SEROQUEL) tab 100 mg, 100 mg, Oral, Nightly  Pantoprazole Sodium (PROTONIX) EC tab 40 mg, 40 mg, Oral, BID AC  Meclizine HCl (ANTIVERT) tab 25 mg, 25 mg, Oral, TID PRN  LORazepam (ATIVAN) tab 4 mg, 4 mg, Oral, Nightly PRN  levoth 1 puff into the lungs daily. cholecalciferol 1000 UNITS Oral Cap, Take 2,000 Units by mouth daily. albuterol sulfate (2.5 MG/3ML) 0.083% Inhalation Nebu Soln, Take 1.25 mg by nebulization every 4 (four) hours as needed for Wheezing.   Alum & Mag Hydroxide

## 2019-12-16 NOTE — PROGRESS NOTES
[facility] Hospitalist Progress Note     CC: Hospital Follow up    PCP: Sandy Bedoya MD       Assessment/Plan:     Principal Problem:    Bronchitis  Active Problems:    Hypoxia    Acute febrile illness    Ms. Ne Canales is a [de-identified]year old female with PMH discussed with patient and/or family by bedside. Helen Dorado MD    [facility] Hospitalist  Answering Service number: [phone]       Subjective:     Seen after bronch, on RA, no complaints.      OBJECTIVE:    Blood pressure (!) 135/100, pulse 76, temperature mg Oral Daily   • cefTRIAXone  2 g Intravenous Q24H   • acetylcysteine  2 mL Nebulization BID   • vancomycin HCl  125 mg Oral Daily   • mupirocin  1 Application Nasal O22N   • MethylPREDNISolone Sodium Succ  40 mg Intravenous Q8H   • ipratropium-albuterol

## 2019-12-16 NOTE — PROGRESS NOTES
[facility]    Progress Note    Mamadou Arreguin Patient Status:  Inpatient    12/3/1940 MRN [id number]   Location [facility] ENDOSCOPY LAB SUITES Attending Ellis Linda MD   Hosp Day # 3 PCP Sumanth Montilla MD       Subjective: calves or thighs  Neurologic: Grossly normal    Assessment and Plan:     Bronchitis  Status post bronchoscopy earlier today  Continue antibiotics steroids and nebulizers      Hypoxia  Supplemental O2      Acute febrile illness  Resolved        Results:

## 2019-12-16 NOTE — PLAN OF CARE
Problem: Patient Centered Care  Goal: Patient preferences are identified and integrated in the patient's plan of care  Description  Interventions:  - What would you like us to know as we care for you?  Lives at [place], has a scooter, won't go to rehab  - pre-medicate as appropriate  Outcome: Progressing     Problem: SAFETY ADULT - FALL  Goal: Free from fall injury  Description  INTERVENTIONS:  - Assess pt frequently for physical needs  - Identify cognitive and physical deficits and behaviors that affect ri puncture sites for bleeding and/or hematoma  - Assess quality of pulses, skin color and temperature  - Assess for signs of decreased coronary artery perfusion - ex.  Angina  - Evaluate fluid balance, assess for edema, trend weights  Outcome: Progressing  Go ADULT  Goal: Electrolytes maintained within normal limits  Description  INTERVENTIONS:  - Monitor labs and rhythm and assess patient for signs and symptoms of electrolyte imbalances  - Administer electrolyte replacement as ordered  - Monitor response to el patient fatigue and changes in neurological status  - Encourage and assist patient to increase activity and self care with guidance from PT/OT  - Encourage visually impaired, hearing impaired and aphasic patients to use assistive/communication devices  Out

## 2019-12-16 NOTE — ANESTHESIA PREPROCEDURE EVALUATION
Anesthesia PreOp Note    HPI:     Aleida Nolen is a 78year old female who presents for preoperative consultation requested by: Aneta Richard MD    Date of Surgery: 12/11/2019 - 12/16/2019    Procedure(s):  BRONCHOSCOPY  Indication: non resolving bronch headache syndrome         Date Noted: 11/02/2017      Cervical post-laminectomy syndrome: C3-7         Date Noted: 11/02/2017      S/P cervical spinal fusion: C3-6 and C7-T1         Date Noted: 11/02/2017      Chronic diarrhea         Date Noted: 08/29/201 reflux    • Hammertoe 02/25/2011    hammjunee 5 left, pain   • Hearing impairment     Bilateral hearing aids   • High blood pressure    • Hip pain, left    • History of blood clots     Leg   • Hypothyroidism    • Incontinence    • L3-4 stable slight grade DEBRIDEMENT OF NAIL(S), 1-5      Toe, Onychomycosis   • EGD  05/2019    Gastric polyps only   • EGD  2006   • ESOPHAGOGASTRODUODENOSCOPY (EGD) N/A 5/17/2019    Performed by Chantelle Briones MD at [facility] Disp: 90 tablet, Rfl: 3, Past Week at Unknown time  LORazepam 2 MG Oral Tab, Take 2 tablets (4 mg total) by mouth nightly as needed for Anxiety. , Disp: 14 tablet, Rfl: 0, 12/10/2019 at Unknown time  LEVOTHYROXINE SODIUM 125 MCG Oral Tab, TWO TABLETS (250 M Intravenous, PRN, Blanca Shirley CRNA, 50 mg at 12/16/19 0805  propofol (DIPRIVAN) infusion, , Intravenous, Continuous PRN, Blanca Shirley CRNA, Stopped at 12/16/19 0811  glucose (DEX4) oral liquid 15 g, 15 g, Oral, Q15 Min PRN, Blanca Shirley CRNA    Or  Glu MD Sameer, 650 mg at 12/12/19 5592  ondansetron HCl (ZOFRAN) injection 4 mg, 4 mg, Intravenous, Q6H PRN, Juliana Blizzard, Tad, MD  PEG 3350 (MIRALAX) powder packet 17 g, 17 g, Oral, Daily PRN, Rickey Rivas MD  bisacodyl (DULCOLAX) rectal suppository 10 mg, 10 mg, Rectal, Unable to move  Metronidazole           RASH  Phentermine             OTHER (SEE COMMENTS)    Comment:Mini stroke  Tramadol                OTHER (SEE COMMENTS)    Comment:Extreme sleepiness  Dilaudid [[employer]*    RASH, OTHER (SEE COMMENTS)    Comment:Unk Days per week: Not on file        Minutes per session: Not on file      Stress: Not on file    Relationships      Social connections:        Talks on phone: Not on file        Gets together: Not on file        Attends [employer]ae[employer] service: Not on file [facility] home. [address]      Available pre-op labs reviewed.   Lab Results   Component Value Date    WBC 11.5 (H) 12/15/2019    RBC 3.96 12/15/2019    HGB 11.3 (L) 12/15/2019    HCT 36.1 12/15/2019    MCV 91.2 12/15/2019    MCH 28.5 12/15/2019    MCH Patient  Discussed plan with:  CRNA and surgeon      I have informed Teo Chappell and/or legal guardian or family member of the nature of the anesthetic plan, benefits, risks including possible dental damage if relevant, major complications, and any alt

## 2019-12-17 PROCEDURE — 94640 AIRWAY INHALATION TREATMENT: CPT

## 2019-12-17 PROCEDURE — 83735 ASSAY OF MAGNESIUM: CPT | Performed by: INTERNAL MEDICINE

## 2019-12-17 PROCEDURE — 84145 PROCALCITONIN (PCT): CPT | Performed by: INTERNAL MEDICINE

## 2019-12-17 PROCEDURE — 80069 RENAL FUNCTION PANEL: CPT | Performed by: INTERNAL MEDICINE

## 2019-12-17 NOTE — PLAN OF CARE
Problem: Patient Centered Care  Goal: Patient preferences are identified and integrated in the patient's plan of care  Description  Interventions:  - What would you like us to know as we care for you?  Lives at [place], has a scooter, won't go to rehab  - pre-medicate as appropriate  Outcome: Progressing     Problem: SAFETY ADULT - FALL  Goal: Free from fall injury  Description  INTERVENTIONS:  - Assess pt frequently for physical needs  - Identify cognitive and physical deficits and behaviors that affect ri puncture sites for bleeding and/or hematoma  - Assess quality of pulses, skin color and temperature  - Assess for signs of decreased coronary artery perfusion - ex.  Angina  - Evaluate fluid balance, assess for edema, trend weights  Outcome: Progressing  Go ADULT  Goal: Electrolytes maintained within normal limits  Description  INTERVENTIONS:  - Monitor labs and rhythm and assess patient for signs and symptoms of electrolyte imbalances  - Administer electrolyte replacement as ordered  - Monitor response to el patient fatigue and changes in neurological status  - Encourage and assist patient to increase activity and self care with guidance from PT/OT  - Encourage visually impaired, hearing impaired and aphasic patients to use assistive/communication devices  Out

## 2019-12-17 NOTE — PROGRESS NOTES
[facility]    Progress Note    Scottie Sutherland Patient Status:  Inpatient    12/3/1940 MRN [id number]   Location [facility] 5SW/SE Attending Brandi Barrett MD   Hosp Day # 4 PCP Winifred Haley MD       Subjective:   Amarjit Sullivan antibiotics steroids and nebulizers  Await bronchoscopy results      Hypoxia  O2 protocol      Acute febrile illness  Resolved        Results:     Lab Results   Component Value Date    WBC 10.4 12/16/2019    HGB 10.9 (L) 12/16/2019    HCT 34.5 (L) 12/16/20

## 2019-12-17 NOTE — PROGRESS NOTES
[facility] Hospitalist Progress Note     CC: Hospital Follow up    PCP: Feng Barrientos MD       Assessment/Plan:     Principal Problem:    Bronchitis  Active Problems:    Hypoxia    Acute febrile illness    Ms. Johnson Degree Parker is a [de-identified]year old female with PMH tomorrow    Questions/concerns were discussed with patient and/or family by bedside. Yon Pike MD    [facility] Hospitalist  Answering Service number: [phone]       Subjective:     Non productive cough, overall breathing better.      OBJECTIVE:    Blood Imaging:          Meds:     • fluconazole  200 mg Oral Daily   • acetylcysteine  2 mL Nebulization BID   • vancomycin HCl  125 mg Oral Daily   • MethylPREDNISolone Sodium Succ  40 mg Intravenous Q8H   • ipratropium-albuterol  3 mL Nebulization Q4H

## 2019-12-17 NOTE — OPERATIVE REPORT
[facility]    PATIENT'S NAME: Rio Ynes   ATTENDING PHYSICIAN: Hank Barriga MD   OPERATING PHYSICIAN: Robi Al MD   PATIENT ACCOUNT#:   [id number]    LOCATION:  [address] [place] [address] RECORD #:   [id number]       DATE OF BIRTH:  [birth year]

## 2019-12-17 NOTE — PLAN OF CARE
Pt is A/O x 4. Vitals WDL. Room air. Resting throughout shift. Tele. Voiding in bathroom. Up with 1 and a walker.   Problem: Patient Centered Care  Goal: Patient preferences are identified and integrated in the patient's plan of care  Description  Bree Odonnell interventions unsuccessful or patient reports new pain  - Anticipate increased pain with activity and pre-medicate as appropriate  Outcome: Progressing     Problem: SAFETY ADULT - FALL  Goal: Free from fall injury  Description  INTERVENTIONS:  - Assess pt effectiveness of vasoactive medications to optimize hemodynamic stability  - Monitor arterial and/or venous puncture sites for bleeding and/or hematoma  - Assess quality of pulses, skin color and temperature  - Assess for signs of decreased coronary artery review patient's medication profile  Outcome: Progressing     Problem: METABOLIC/FLUID AND ELECTROLYTES - ADULT  Goal: Electrolytes maintained within normal limits  Description  INTERVENTIONS:  - Monitor labs and rhythm and assess patient for signs and sym maximal functionality and self care  Description  INTERVENTIONS  - Monitor swallowing and airway patency with patient fatigue and changes in neurological status  - Encourage and assist patient to increase activity and self care with guidance from PT/OT  -

## 2019-12-17 NOTE — PROGRESS NOTES
NEPHROLOGY DAILY PROGRESS NOTE       Follow up Reason: INDERJIT       SUBJECTIVE:    Laying comfortably in bed not in distress at this time.     OBJECTIVE:    Total Intake/Output:    Intake/Output Summary (Last 24 hours) at 12/17/2019 1415  Last data filed at 15 mg, 100 mg, Oral, Nightly  Pantoprazole Sodium (PROTONIX) EC tab 40 mg, 40 mg, Oral, BID AC  Meclizine HCl (ANTIVERT) tab 25 mg, 25 mg, Oral, TID PRN  LORazepam (ATIVAN) tab 4 mg, 4 mg, Oral, Nightly PRN  levothyroxine (SYNTHROID, LEVOTHROID) tab, 175 mcg, daily.  cholecalciferol 1000 UNITS Oral Cap, Take 2,000 Units by mouth daily. albuterol sulfate (2.5 MG/3ML) 0.083% Inhalation Nebu Soln, Take 1.25 mg by nebulization every 4 (four) hours as needed for Wheezing.   Alum & Mag Hydroxide-Simeth 998-920-21 MG/

## 2019-12-18 PROCEDURE — 94640 AIRWAY INHALATION TREATMENT: CPT

## 2019-12-18 PROCEDURE — 80069 RENAL FUNCTION PANEL: CPT | Performed by: INTERNAL MEDICINE

## 2019-12-18 PROCEDURE — 83735 ASSAY OF MAGNESIUM: CPT | Performed by: INTERNAL MEDICINE

## 2019-12-18 RX ORDER — METHYLPREDNISOLONE SODIUM SUCCINATE 40 MG/ML
40 INJECTION, POWDER, LYOPHILIZED, FOR SOLUTION INTRAMUSCULAR; INTRAVENOUS EVERY 12 HOURS
Status: DISCONTINUED | OUTPATIENT
Start: 2019-12-19 | End: 2019-12-20

## 2019-12-18 NOTE — PROGRESS NOTES
[facility] Pharmacy Note: Antimicrobial Weight Based Dose Adjustment for: ceftriaxone (ROCEPHIN)    Pierre Lance is a 78year old female who has been prescribed ceftriaxone (ROCEPHIN) 1000 mg every 24 hours.     Estimated Creatinine Clearance: 36.2 mL/min (A) (

## 2019-12-18 NOTE — PLAN OF CARE
Problem: Patient Centered Care  Goal: Patient preferences are identified and integrated in the patient's plan of care  Description  Interventions:  - What would you like us to know as we care for you?  Lives at [place], has a scooter, won't go to rehab  - pre-medicate as appropriate  Outcome: Progressing     Problem: SAFETY ADULT - FALL  Goal: Free from fall injury  Description  INTERVENTIONS:  - Assess pt frequently for physical needs  - Identify cognitive and physical deficits and behaviors that affect ri output  - Evaluate effectiveness of vasoactive medications to optimize hemodynamic stability  - Monitor arterial and/or venous puncture sites for bleeding and/or hematoma  - Assess quality of pulses, skin color and temperature  - Assess for signs of decrea

## 2019-12-18 NOTE — PROGRESS NOTES
NEPHROLOGY DAILY PROGRESS NOTE       Follow up Reason: INDERJIT       SUBJECTIVE:    Laying comfortably in bed not in distress at this time.     OBJECTIVE:    Total Intake/Output:    Intake/Output Summary (Last 24 hours) at 12/18/2019 0831  Last data filed at 15 mg, 100 mg, Oral, Nightly  Pantoprazole Sodium (PROTONIX) EC tab 40 mg, 40 mg, Oral, BID AC  Meclizine HCl (ANTIVERT) tab 25 mg, 25 mg, Oral, TID PRN  LORazepam (ATIVAN) tab 4 mg, 4 mg, Oral, Nightly PRN  levothyroxine (SYNTHROID, LEVOTHROID) tab, 175 mcg, daily.  cholecalciferol 1000 UNITS Oral Cap, Take 2,000 Units by mouth daily. albuterol sulfate (2.5 MG/3ML) 0.083% Inhalation Nebu Soln, Take 1.25 mg by nebulization every 4 (four) hours as needed for Wheezing.   Alum & Mag Hydroxide-Simeth 282-738-22 MG/

## 2019-12-18 NOTE — PROGRESS NOTES
[facility] Hospitalist Progress Note     CC: Hospital Follow up    PCP: Nancy Villagomez MD       Assessment/Plan:     Principal Problem:    Bronchitis  Active Problems:    Hypoxia    Acute febrile illness    Ms. Lavonne Canales is a [de-identified]year old female with PMH PT/OT     Dispo: possibly home tomorrow    Questions/concerns were discussed with patient and/or family by bedside.     Kermit Price MD    [facility] Hospitalist  Answering Service number: [phone]       Subjective:     Still coughing, but overall feeling impro 12/17/19  0644 12/18/19  0631   ALT 11*  --   --    AST 9*  --   --    ALB 2.7* 2.6* 2.8*         Imaging:          Meds:     • [START ON 12/19/2019] MethylPREDNISolone Sodium Succ  40 mg Intravenous Q12H   • fluconazole  200 mg Oral Daily   • acetylcystei

## 2019-12-18 NOTE — PROGRESS NOTES
[facility]    Progress Note    Pastoralatasha Oates Patient Status:  Inpatient    12/3/1940 MRN [id number]   Location [facility] 5SW/SE Attending Jp Howell MD   Hosp Day # 5 PCP Arminda Chua MD       Subjective:   Rayray Barahona Solu-Medrol and Tenzin  Consider second bronchoscopy if not better within 1 or 2 days      Hypoxia  Resolving      Acute febrile illness  Resolved        Results:     Lab Results   Component Value Date    WBC 10.4 12/16/2019    HGB 10.9 (L) 12/16/2019

## 2019-12-19 PROCEDURE — 80069 RENAL FUNCTION PANEL: CPT | Performed by: INTERNAL MEDICINE

## 2019-12-19 PROCEDURE — 94640 AIRWAY INHALATION TREATMENT: CPT

## 2019-12-19 PROCEDURE — 83735 ASSAY OF MAGNESIUM: CPT | Performed by: INTERNAL MEDICINE

## 2019-12-19 NOTE — PROGRESS NOTES
NEPHROLOGY DAILY PROGRESS NOTE       Follow up Reason: INDERJIT       SUBJECTIVE:    Laying comfortably in bed not in distress at this time.     OBJECTIVE:    Total Intake/Output:    Intake/Output Summary (Last 24 hours) at 12/19/2019 1434  Last data filed at 12 PRN  Rivaroxaban (XARELTO) tab 20 mg, 20 mg, Oral, Daily with food  QUEtiapine Fumarate (SEROQUEL) tab 100 mg, 100 mg, Oral, Nightly  Pantoprazole Sodium (PROTONIX) EC tab 40 mg, 40 mg, Oral, BID AC  Meclizine HCl (ANTIVERT) tab 25 mg, 25 mg, Oral, TID PRN (cough). Budesonide-Formoterol Fumarate 160-4.5 MCG/ACT Inhalation Aerosol, Inhale 1 puff into the lungs daily. cholecalciferol 1000 UNITS Oral Cap, Take 2,000 Units by mouth daily.   albuterol sulfate (2.5 MG/3ML) 0.083% Inhalation Nebu Soln, Take 1.25 m

## 2019-12-19 NOTE — PLAN OF CARE
Problem: Patient Centered Care  Goal: Patient preferences are identified and integrated in the patient's plan of care  Description  Interventions:  - What would you like us to know as we care for you?  Lives at [place], has a scooter, won't go to rehab  - pre-medicate as appropriate  Outcome: Progressing     Problem: SAFETY ADULT - FALL  Goal: Free from fall injury  Description  INTERVENTIONS:  - Assess pt frequently for physical needs  - Identify cognitive and physical deficits and behaviors that affect ri medications to optimize hemodynamic stability  - Monitor arterial and/or venous puncture sites for bleeding and/or hematoma  - Assess quality of pulses, skin color and temperature  - Assess for signs of decreased coronary artery perfusion - ex.  Angina  - E pharmacy to review patient's medication profile  Outcome: Progressing     Problem: METABOLIC/FLUID AND ELECTROLYTES - ADULT  Goal: Electrolytes maintained within normal limits  Description  INTERVENTIONS:  - Monitor labs and rhythm and assess patient for s Achieves maximal functionality and self care  Description  INTERVENTIONS  - Monitor swallowing and airway patency with patient fatigue and changes in neurological status  - Encourage and assist patient to increase activity and self care with guidance from

## 2019-12-19 NOTE — PROGRESS NOTES
[facility] Hospitalist Progress Note     CC: Hospital Follow up    PCP: Thelma Salas MD       Assessment/Plan:     Principal Problem:    Bronchitis  Active Problems:    Hypoxia    Acute febrile illness    Ms. Marissa Canales is a [de-identified]year old female with PMH today    Questions/concerns were discussed with patient and/or family by bedside. Quniton Pandya MD    [facility] Hospitalist  Answering Service number: [phone]       Subjective:     Still coughing, but overall feeling improved.      OBJECTIVE:    Blood pressur 12/17/19  0644 12/18/19  0631 12/19/19  0643   ALT 11*  --   --   --    AST 9*  --   --   --    ALB 2.7* 2.6* 2.8* 2.7*         Imaging:          Meds:     • Fluticasone Furoate-Vilanterol  1 puff Inhalation Daily   • MethylPREDNISolone Sodium Succ  40 mg

## 2019-12-19 NOTE — PLAN OF CARE
Pulmonary Rehab information sheet left at patient's bedside. Pt was asleep, unable to discuss program with her at this time.   Odalys White RN

## 2019-12-20 VITALS
SYSTOLIC BLOOD PRESSURE: 128 MMHG | HEIGHT: 68 IN | OXYGEN SATURATION: 95 % | RESPIRATION RATE: 22 BRPM | WEIGHT: 272.19 LBS | BODY MASS INDEX: 41.25 KG/M2 | TEMPERATURE: 98 F | DIASTOLIC BLOOD PRESSURE: 57 MMHG | HEART RATE: 73 BPM

## 2019-12-20 PROCEDURE — 83735 ASSAY OF MAGNESIUM: CPT | Performed by: INTERNAL MEDICINE

## 2019-12-20 PROCEDURE — 97116 GAIT TRAINING THERAPY: CPT

## 2019-12-20 PROCEDURE — 80069 RENAL FUNCTION PANEL: CPT | Performed by: INTERNAL MEDICINE

## 2019-12-20 PROCEDURE — 97530 THERAPEUTIC ACTIVITIES: CPT

## 2019-12-20 PROCEDURE — 94640 AIRWAY INHALATION TREATMENT: CPT

## 2019-12-20 RX ORDER — IPRATROPIUM BROMIDE AND ALBUTEROL SULFATE 2.5; .5 MG/3ML; MG/3ML
3 SOLUTION RESPIRATORY (INHALATION)
Qty: 3 ML | Refills: 0 | Status: SHIPPED | OUTPATIENT
Start: 2019-12-20 | End: 2020-10-12

## 2019-12-20 RX ORDER — PREDNISONE 20 MG/1
TABLET ORAL
Qty: 15 TABLET | Refills: 0 | Status: SHIPPED | OUTPATIENT
Start: 2019-12-20 | End: 2019-12-30

## 2019-12-20 RX ORDER — FUROSEMIDE 20 MG/1
20 TABLET ORAL DAILY
Qty: 3 TABLET | Refills: 0 | Status: SHIPPED | OUTPATIENT
Start: 2019-12-20 | End: 2019-12-23

## 2019-12-20 RX ORDER — FLUCONAZOLE 200 MG/1
100 TABLET ORAL DAILY
Qty: 3 TABLET | Refills: 0 | Status: SHIPPED | OUTPATIENT
Start: 2019-12-21 | End: 2019-12-26

## 2019-12-20 NOTE — PROGRESS NOTES
NEPHROLOGY DAILY PROGRESS NOTE       Follow up Reason: INDERJIT       SUBJECTIVE:    Sitting comfortably in chair not in acute distress.     OBJECTIVE:    Total Intake/Output:    Intake/Output Summary (Last 24 hours) at 12/20/2019 1056  Last data filed at 12/20/ Nebulization, Q6H PRN  Rivaroxaban (XARELTO) tab 20 mg, 20 mg, Oral, Daily with food  QUEtiapine Fumarate (SEROQUEL) tab 100 mg, 100 mg, Oral, Nightly  Pantoprazole Sodium (PROTONIX) EC tab 40 mg, 40 mg, Oral, BID AC  Meclizine HCl (ANTIVERT) tab 25 mg, 25 (cough). Budesonide-Formoterol Fumarate 160-4.5 MCG/ACT Inhalation Aerosol, Inhale 1 puff into the lungs daily. cholecalciferol 1000 UNITS Oral Cap, Take 2,000 Units by mouth daily.   albuterol sulfate (2.5 MG/3ML) 0.083% Inhalation Nebu Soln, Take 1.25 m

## 2019-12-20 NOTE — PHYSICAL THERAPY NOTE
PHYSICAL THERAPY TREATMENT NOTE - INPATIENT     Room Number: 554/554-A       Presenting Problem: Pt admitted w/ worsening cough, congestion x3 days    Problem List  Principal Problem:    Bronchitis  Active Problems:    Hypoxia    Acute febrile illness flow (liters per minute): 0      AM-PAC '6-Clicks' INPATIENT SHORT FORM - BASIC MOBILITY  How much difficulty does the patient currently have. ..  -   Turning over in bed (including adjusting bedclothes, sheets and blankets)?: None   -   Sitting down on and

## 2019-12-20 NOTE — CM/SW NOTE
FLAKITA was notified pt is cleared for discharge. Per chart review, [facility] is able to resume services at discharge. FLAKITA called [facility] and left message to confirm pt is discharged to home today.      FLAKITA/NEHEMIAH to remain available for support and/or discharge plannin

## 2019-12-20 NOTE — DISCHARGE SUMMARY
General Medicine Discharge Summary     Patient ID:  Rayshawn Ireland  78year old  12/3/1940    Admit date: 12/11/2019    Discharge date and time: 12/20/19    Attending Physician: Corrie Felder MD     Consults: IP CONSULT TO HOSPITALIST  IP CONSULT TO SOCIAL W lifelong anticoag, hypothyroidism, SHANT, LLE cellulitis s/p debridement and recent skin graft placement who presented with progressive worsening cough, congestion for 3 days, likely COPD exacerbation due to viral bronchitis, Pulm (dr. Trent Walton) consulted, star tablets (100 mg total) by mouth daily for 5 days. Start taking on:  December 21, 2019     Fluticasone Furoate-Vilanterol 100-25 MCG/INH Aepb  Commonly known as:  BREO ELLIPTA  Inhale 1 puff into the lungs daily.   Start taking on:  December 21, 2019     fu mg total) by mouth nightly as needed for Anxiety. Meclizine HCl 25 MG Tabs  Commonly known as:  ANTIVERT  Take 1 tablet (25 mg total) by mouth 3 (three) times daily as needed for Dizziness.      QUEtiapine Fumarate 100 MG Tabs  Commonly known as:  IWZPY

## 2019-12-20 NOTE — PLAN OF CARE
Problem: Patient Centered Care  Goal: Patient preferences are identified and integrated in the patient's plan of care  Description  Interventions:  - What would you like us to know as we care for you?  Lives at [place], has a scooter, won't go to rehab  - pre-medicate as appropriate  Outcome: Progressing     Problem: SAFETY ADULT - FALL  Goal: Free from fall injury  Description  INTERVENTIONS:  - Assess pt frequently for physical needs  - Identify cognitive and physical deficits and behaviors that affect ri vasoactive medications to optimize hemodynamic stability  - Monitor arterial and/or venous puncture sites for bleeding and/or hematoma  - Assess quality of pulses, skin color and temperature  - Assess for signs of decreased coronary artery perfusion - ex. collaborating with pharmacy to review patient's medication profile  Outcome: Progressing     Problem: METABOLIC/FLUID AND ELECTROLYTES - ADULT  Goal: Electrolytes maintained within normal limits  Description  INTERVENTIONS:  - Monitor labs and rhythm and a NEUROLOGICAL - ADULT  Goal: Achieves maximal functionality and self care  Description  INTERVENTIONS  - Monitor swallowing and airway patency with patient fatigue and changes in neurological status  - Encourage and assist patient to increase activity and s

## 2019-12-20 NOTE — PROGRESS NOTES
Patient dc home. IV removed. Understands to follow up with PCP in 1 week. Patient understands to fill scripts. Patient set up with [facility] per request. PCS form in the chart. All needs met. FLAKITA gabriel aware as well of dc.

## 2019-12-20 NOTE — PLAN OF CARE
Pt okay to be discharged per MD order, all due meds given this morning as ordered, pt [facility] by [facility], stable at time of discharge.

## 2019-12-28 ENCOUNTER — HOSPITAL ENCOUNTER (EMERGENCY)
Facility: HOSPITAL | Age: 79
Discharge: NURSING, REHAB OR LONG-TERM CARE | End: 2019-12-28
Attending: EMERGENCY MEDICINE
Payer: MEDICARE

## 2019-12-28 VITALS
WEIGHT: 260 LBS | DIASTOLIC BLOOD PRESSURE: 63 MMHG | SYSTOLIC BLOOD PRESSURE: 162 MMHG | BODY MASS INDEX: 39.4 KG/M2 | OXYGEN SATURATION: 94 % | HEIGHT: 68 IN | HEART RATE: 74 BPM | TEMPERATURE: 98 F | RESPIRATION RATE: 20 BRPM

## 2019-12-28 DIAGNOSIS — F32.A DEPRESSION, UNSPECIFIED DEPRESSION TYPE: Primary | ICD-10-CM

## 2019-12-28 LAB
AMPHET UR QL SCN: NEGATIVE
ANION GAP SERPL CALC-SCNC: 3 MMOL/L (ref 0–18)
APAP SERPL-MCNC: 10.1 UG/ML (ref 10–30)
BARBITURATES UR QL SCN: NEGATIVE
BASOPHILS # BLD AUTO: 0.01 X10(3) UL (ref 0–0.2)
BASOPHILS NFR BLD AUTO: 0.1 %
BENZODIAZ UR QL SCN: NEGATIVE
BILIRUB UR QL: NEGATIVE
BUN BLD-MCNC: 24 MG/DL (ref 7–18)
BUN/CREAT SERPL: 20.9 (ref 10–20)
CALCIUM BLD-MCNC: 9.6 MG/DL (ref 8.5–10.1)
CANNABINOIDS UR QL SCN: NEGATIVE
CHLORIDE SERPL-SCNC: 104 MMOL/L (ref 98–112)
CLARITY UR: CLEAR
CO2 SERPL-SCNC: 31 MMOL/L (ref 21–32)
COCAINE UR QL: NEGATIVE
COLOR UR: YELLOW
CREAT BLD-MCNC: 1.15 MG/DL (ref 0.55–1.02)
DEPRECATED RDW RBC AUTO: 48.2 FL (ref 35.1–46.3)
EOSINOPHIL # BLD AUTO: 0.23 X10(3) UL (ref 0–0.7)
EOSINOPHIL NFR BLD AUTO: 2.2 %
ERYTHROCYTE [DISTWIDTH] IN BLOOD BY AUTOMATED COUNT: 14.6 % (ref 11–15)
ETHANOL SERPL-MCNC: <3 MG/DL (ref ?–3)
GLUCOSE BLD-MCNC: 106 MG/DL (ref 70–99)
GLUCOSE UR-MCNC: NEGATIVE MG/DL
HCT VFR BLD AUTO: 42.9 % (ref 35–48)
HGB BLD-MCNC: 13.9 G/DL (ref 12–16)
HGB UR QL STRIP.AUTO: NEGATIVE
HYALINE CASTS #/AREA URNS AUTO: 1 /LPF
IMM GRANULOCYTES # BLD AUTO: 0.11 X10(3) UL (ref 0–1)
IMM GRANULOCYTES NFR BLD: 1.1 %
KETONES UR-MCNC: NEGATIVE MG/DL
LYMPHOCYTES # BLD AUTO: 2.1 X10(3) UL (ref 1–4)
LYMPHOCYTES NFR BLD AUTO: 20.4 %
MCH RBC QN AUTO: 29.3 PG (ref 26–34)
MCHC RBC AUTO-ENTMCNC: 32.4 G/DL (ref 31–37)
MCV RBC AUTO: 90.5 FL (ref 80–100)
MDMA UR QL SCN: NEGATIVE
METHADONE UR QL SCN: NEGATIVE
MONOCYTES # BLD AUTO: 0.73 X10(3) UL (ref 0.1–1)
MONOCYTES NFR BLD AUTO: 7.1 %
NEUTROPHILS # BLD AUTO: 7.13 X10 (3) UL (ref 1.5–7.7)
NEUTROPHILS # BLD AUTO: 7.13 X10(3) UL (ref 1.5–7.7)
NEUTROPHILS NFR BLD AUTO: 69.1 %
NITRITE UR QL STRIP.AUTO: NEGATIVE
OPIATES UR QL SCN: NEGATIVE
OSMOLALITY SERPL CALC.SUM OF ELEC: 290 MOSM/KG (ref 275–295)
OXYCODONE UR QL SCN: NEGATIVE
PCP UR QL SCN: NEGATIVE
PH UR: 7 [PH] (ref 5–8)
PLATELET # BLD AUTO: 144 10(3)UL (ref 150–450)
POTASSIUM SERPL-SCNC: 3.8 MMOL/L (ref 3.5–5.1)
PROT UR-MCNC: NEGATIVE MG/DL
RBC # BLD AUTO: 4.74 X10(6)UL (ref 3.8–5.3)
RBC #/AREA URNS AUTO: 1 /HPF
SALICYLATES SERPL-MCNC: <1.7 MG/DL (ref 2.8–20)
SODIUM SERPL-SCNC: 138 MMOL/L (ref 136–145)
SP GR UR STRIP: 1.01 (ref 1–1.03)
TSI SER-ACNC: 1.65 MIU/ML (ref 0.36–3.74)
UROBILINOGEN UR STRIP-ACNC: <2
WBC # BLD AUTO: 10.3 X10(3) UL (ref 4–11)
WBC #/AREA URNS AUTO: 4 /HPF

## 2019-12-28 PROCEDURE — 84443 ASSAY THYROID STIM HORMONE: CPT | Performed by: EMERGENCY MEDICINE

## 2019-12-28 PROCEDURE — 80320 DRUG SCREEN QUANTALCOHOLS: CPT | Performed by: EMERGENCY MEDICINE

## 2019-12-28 PROCEDURE — 36415 COLL VENOUS BLD VENIPUNCTURE: CPT

## 2019-12-28 PROCEDURE — 99285 EMERGENCY DEPT VISIT HI MDM: CPT

## 2019-12-28 PROCEDURE — 80048 BASIC METABOLIC PNL TOTAL CA: CPT | Performed by: EMERGENCY MEDICINE

## 2019-12-28 PROCEDURE — 93010 ELECTROCARDIOGRAM REPORT: CPT | Performed by: EMERGENCY MEDICINE

## 2019-12-28 PROCEDURE — 80329 ANALGESICS NON-OPIOID 1 OR 2: CPT | Performed by: EMERGENCY MEDICINE

## 2019-12-28 PROCEDURE — 93005 ELECTROCARDIOGRAM TRACING: CPT

## 2019-12-28 PROCEDURE — 80307 DRUG TEST PRSMV CHEM ANLYZR: CPT | Performed by: EMERGENCY MEDICINE

## 2019-12-28 PROCEDURE — 81001 URINALYSIS AUTO W/SCOPE: CPT | Performed by: EMERGENCY MEDICINE

## 2019-12-28 PROCEDURE — 85025 COMPLETE CBC W/AUTO DIFF WBC: CPT | Performed by: EMERGENCY MEDICINE

## 2019-12-28 RX ORDER — ACETAMINOPHEN 325 MG/1
650 TABLET ORAL ONCE
Status: COMPLETED | OUTPATIENT
Start: 2019-12-28 | End: 2019-12-28

## 2019-12-28 RX ORDER — LORAZEPAM 1 MG/1
1 TABLET ORAL ONCE
Status: COMPLETED | OUTPATIENT
Start: 2019-12-28 | End: 2019-12-28

## 2019-12-28 NOTE — BH LEVEL OF CARE ASSESSMENT
Level of Care Assessment Note    General Questions  Why are you here?: \"For two nights in a row, I can't sleep. \" Reports falling asleep briefly. Insomnia since a child. Chronic depression. Pt reports not sleeping for more than an hour a day for 2 days.  Josiah Betancourt anymore and no one should live this long; passing thoughts of overdose  Is your experience of thoughts of dying by suicide: Frightening;A Solution to a Problem  Protective Factors: doesn't want to hurt family the way it did when her grandson killed himself Difficulty falling asleep; Disturbed/interrupted sleep;Early awakening; Insomnia  Number of Sleep Hours: 1 Hours  Use of Sleep Aids: 4 mgs of lorazapam and seroquel; pristique  Appetite Symptoms: Decreased(nausea)  Unplanned Weight Loss: No  Unplanned Weight Independent  Level of independence in personal grooming tasks (e.g., brushing teeth, brushing hair, applying hearing aids, shaving, etc.): Fully Independent  Toileting  Patient Incontinence: Bladder  Special Considerations  Patient has pressures sores, isis Behaviors  Are you/others concerned about any of the following behaviors over the past 30 days?: Denies                                              Functional Impairment  Currently Attending School: No  Employment Status: Retired  Job Issues: (n/a)  Suki (comment)(preoccupation with what her children think of her)  Level of Consciousness: Alert  Level of Consciousness: Alert  Behavior  Exhibited behavior: Appropriate to situation;Participated    Assessment Summary  Assessment Summary: Pt is an 78 y/oF pres Illness  Protective Factors: doesn't want to hurt family the way it did when her grandson killed himself    Motivational Stage of Change  Motivational Stage of Change: Preparation    Level of Care Recommendations  Consulted with: Dr. Marcus Sharif [facility]

## 2019-12-28 NOTE — ED INITIAL ASSESSMENT (HPI)
Patient arrives via EMS for c/o insomnia x2 days and worsening depression. Patient currently denies SI/HI but per medics patient stated she \"didn't want to live anymore or be a burden to the family\" on the ride here.

## 2019-12-28 NOTE — ED NOTES
Pt stated she needed to have a BM. After checking with RN Nayely Razo I got a wheelchair and escorted pt to closest bathroom. She ambulated with steady gait and little assistance from myself to wheelchair.

## 2019-12-28 NOTE — ED NOTES
Approved for transfer out. Possible beds through [address]. Faxed packet. ADAMA leyva.

## 2019-12-28 NOTE — ED NOTES
We returned to room without incident. Pt remains in direct 1:1 observation in NAD calm and using her cell phone. TAMMIE Amaro aware.

## 2019-12-28 NOTE — ED PROVIDER NOTES
Patient Seen in: [facility] AND [facility] Emergency Department      History   Patient presents with:  Eval-P    Stated Complaint: insomnia    HPI    47-year-old female with COPD, GERD, osteoarthritis, depression, anxiety, presenting for evaluation of insomnia. unspecified whether generalized or localized, unspecified site    • Other and unspecified hyperlipidemia    • Other complicated headache syndrome 11/2/2017   • Other spondylosis, lumbar region 11/2/2017   • Pneumonia 2012   • Pneumonia due to organism    • • HIP REPLACEMENT SURGERY Bilateral    • HYSTERECTOMY  1967    Partial Hysterectomy   • JAW SURGERY     • KNEE REPLACEMENT SURGERY Bilateral     Bilateral arthritis    • LAMINECTOMY      WITH FUSION PER PATIENT   • OTHER SURGICAL HISTORY  7219,[id number],2135, Normal range of motion. Cardiovascular:      Rate and Rhythm: Normal rate and regular rhythm. Heart sounds: Normal heart sounds. Pulmonary:      Effort: Pulmonary effort is normal.      Breath sounds: Normal breath sounds.    Abdominal:      Genera decompensation in her functional ability and she is not able to care for herself. Crisis evaluation completed and patient is agreeable to inpatient psychiatric admission. She is medically cleared.           Disposition and Plan     Clinical Impression:  D

## 2019-12-29 NOTE — ED NOTES
Pt has eaten 0% of her dinner stating, \"I don't want to eat anymore. \" Pt is drinking water/juice at this time. Pt is calm and redirectable. Will continue to monitor.

## 2019-12-29 NOTE — ED NOTES
Pt ambulated to and from bathroom without incident. Escorted back to cart. Resting comfortably and in NAD. TAMMIE Pyle notified.

## 2019-12-29 NOTE — ED NOTES
Direct 1:1 observation taken over by this writer. Pt is quiet and calm, resting on the cart. The pt does not appear to be in any distress. Dinner at bedside, pt is refusing to eat. Fluids are within reach. Will continue to monitor.

## 2019-12-29 NOTE — ED NOTES
Received a call from [address] [place] [address] stating that the pt can be transferred to [facility] now. Pt will be going to the [address] [place] [address] unit in room 5102. ED RN notified.

## 2019-12-29 NOTE — ED NOTES
Pt states she is incontinent and has soiled herself. Patient cleaned, assisted into a clean bed, linen and gown changed, and a depends placed on the patient. Pt voices no other concerns or needs. Patient updated on POC and is agreeable and cooperative.  Massimo

## 2019-12-29 NOTE — ED NOTES
Accepted at [facility] w/ Dr. Genna Bowen. Nurse will call for nurse to nurse and scheduling time for transport.

## 2020-01-13 ENCOUNTER — TELEPHONE (OUTPATIENT)
Dept: NEUROLOGY | Facility: CLINIC | Age: 80
End: 2020-01-13

## 2020-01-13 NOTE — TELEPHONE ENCOUNTER
Received call from Dr. Rosangela Tello approving patient to hold Xarelto for 3 days prior to procedure. Pt informed via  - requested return call to confirm/will call back to confirm receipt of instructions.

## 2020-01-13 NOTE — TELEPHONE ENCOUNTER
Pt called stating she will finish antibiotics for bronchitis on 1/20/20 and would like to schedule injection as she is having increased back pain. Pt recently inpatient at [facility] for depression - has been released.      Patient has been scheduled for a

## 2020-01-14 NOTE — TELEPHONE ENCOUNTER
Patient notified per Dr. Marlene Witt ok to hold Xarelto 3 days prior to procedure on 1/28/20. Patient verbalized understanding without further questions.

## 2020-01-15 ENCOUNTER — OFFICE VISIT (OUTPATIENT)
Dept: INTERNAL MEDICINE CLINIC | Facility: CLINIC | Age: 80
End: 2020-01-15
Payer: MEDICARE

## 2020-01-15 VITALS
DIASTOLIC BLOOD PRESSURE: 81 MMHG | SYSTOLIC BLOOD PRESSURE: 149 MMHG | BODY MASS INDEX: 38.59 KG/M2 | WEIGHT: 254.63 LBS | HEART RATE: 83 BPM | TEMPERATURE: 98 F | HEIGHT: 68 IN

## 2020-01-15 DIAGNOSIS — J06.9 ACUTE URI: Primary | ICD-10-CM

## 2020-01-15 PROCEDURE — G0463 HOSPITAL OUTPT CLINIC VISIT: HCPCS | Performed by: INTERNAL MEDICINE

## 2020-01-15 PROCEDURE — 99212 OFFICE O/P EST SF 10 MIN: CPT | Performed by: INTERNAL MEDICINE

## 2020-01-15 RX ORDER — MIRTAZAPINE 30 MG/1
30 TABLET, FILM COATED ORAL NIGHTLY
COMMUNITY
Start: 2020-01-11 | End: 2020-04-02

## 2020-01-15 RX ORDER — AZITHROMYCIN 250 MG/1
TABLET, FILM COATED ORAL
Qty: 6 TABLET | Refills: 0 | Status: ON HOLD | OUTPATIENT
Start: 2020-01-15 | End: 2020-02-19

## 2020-01-15 RX ORDER — DOXYCYCLINE 100 MG/1
100 CAPSULE ORAL 2 TIMES DAILY
Status: ON HOLD | COMMUNITY
Start: 2020-01-11 | End: 2020-02-19

## 2020-01-15 RX ORDER — QUETIAPINE 50 MG/1
50 TABLET, FILM COATED ORAL NIGHTLY
Status: ON HOLD | COMMUNITY
Start: 2020-01-11 | End: 2020-02-19

## 2020-01-15 RX ORDER — CLONAZEPAM 0.5 MG/1
0.5 TABLET ORAL 2 TIMES DAILY PRN
Status: ON HOLD | COMMUNITY
Start: 2020-01-11 | End: 2020-02-19

## 2020-01-15 RX ORDER — LISINOPRIL 10 MG/1
TABLET ORAL
Status: ON HOLD | COMMUNITY
Start: 2019-10-08 | End: 2020-02-19

## 2020-01-15 RX ORDER — GABAPENTIN 100 MG/1
100 CAPSULE ORAL 3 TIMES DAILY
Status: ON HOLD | COMMUNITY
Start: 2020-01-11 | End: 2020-02-19

## 2020-01-15 RX ORDER — DESVENLAFAXINE 100 MG/1
150 TABLET, EXTENDED RELEASE ORAL DAILY
Status: ON HOLD | COMMUNITY
Start: 2019-12-09 | End: 2020-02-19

## 2020-01-15 NOTE — PROGRESS NOTES
Anastasiia Conrad is a 78year old female. Patient presents with:  Cough    HPI:   She was recently hospitalized at [facility] for depression.   While there, about 2 weeks ago, she developed symptoms of nasal congestion with clear drainage, chest abhinav needed for Indigestion (nausea). • XARELTO 20 MG Oral Tab ONE TABLET BY MOUTH ONCE DAILY WITH FOOD 90 tablet 3   • amLODIPine Besylate 10 MG Oral Tab Take 1 tablet (10 mg total) by mouth daily.  90 tablet 3   • LORazepam 2 MG Oral Tab Take 2 tablets (4 Cervical Discectomy    • Cholecystitis 1984    Cholecystectomy    • COPD (chronic obstructive pulmonary disease) (HCC)     PFTs 2-15 with FEV1 1.11 L and FEV1/FVC ratio 63%   • Deep vein thrombosis (HCC)    • Dehydration 2012    Fluids, Medication adjustme Tobacco comment: max 2.5 pk/dy, aver 2 pk/dy    Alcohol use: No      Alcohol/week: 0.0 standard drinks    Drug use: No       EXAM:   GENERAL: Pleasant female appearing well and breathing comfortably in no distress  /81 (BP Location: Right arm, Patien

## 2020-01-24 NOTE — TELEPHONE ENCOUNTER
Patient states she is trying to find transportation to pick her up from the procedure and asked if she could take a cab.     Patient notified per [facility] policy she will need to have someone with her and is NOT able to take a cab since she is getting MAC sedat

## 2020-01-24 NOTE — TELEPHONE ENCOUNTER
Received voicemail from patient stating she is having difficulty finding transportation to and from injection appt 1/28/2020. Returned patients call.  LMTCB-to discuss further

## 2020-01-27 NOTE — TELEPHONE ENCOUNTER
Patient states she is unable to get transportation and will need to cancel the bilateral L5 TFESI under MAC sedation on tomorrow 1/28/20. Patient will call back to schedule once transportation is confirmed. Procedure cancelled. Yunior Cui at [facility] notified.

## 2020-01-28 NOTE — TELEPHONE ENCOUNTER
Patient called stating she has transportation on 2/7/20 and would like to re-schedule injection   Injection rescheduled. Patient aware of injection instructions.   Form re-faxed to [facility]

## 2020-02-07 ENCOUNTER — HOSPITAL ENCOUNTER (EMERGENCY)
Facility: HOSPITAL | Age: 80
Discharge: HOME | End: 2020-02-07
Attending: EMERGENCY MEDICINE
Payer: MEDICARE

## 2020-02-07 ENCOUNTER — APPOINTMENT (OUTPATIENT)
Dept: CT IMAGING | Facility: HOSPITAL | Age: 80
End: 2020-02-07
Attending: EMERGENCY MEDICINE
Payer: MEDICARE

## 2020-02-07 VITALS
HEART RATE: 67 BPM | OXYGEN SATURATION: 94 % | DIASTOLIC BLOOD PRESSURE: 58 MMHG | HEIGHT: 68 IN | RESPIRATION RATE: 28 BRPM | TEMPERATURE: 100 F | SYSTOLIC BLOOD PRESSURE: 150 MMHG | BODY MASS INDEX: 37.89 KG/M2 | WEIGHT: 250 LBS

## 2020-02-07 DIAGNOSIS — J01.00 ACUTE MAXILLARY SINUSITIS, RECURRENCE NOT SPECIFIED: ICD-10-CM

## 2020-02-07 DIAGNOSIS — G44.209 TENSION HEADACHE: Primary | ICD-10-CM

## 2020-02-07 LAB
ANION GAP SERPL CALC-SCNC: 8 MMOL/L (ref 0–18)
APTT PPP: 26 SECONDS (ref 23.2–35.3)
BASOPHILS # BLD AUTO: 0.05 X10(3) UL (ref 0–0.2)
BASOPHILS NFR BLD AUTO: 0.8 %
BUN BLD-MCNC: 10 MG/DL (ref 7–18)
BUN/CREAT SERPL: 10.9 (ref 10–20)
CALCIUM BLD-MCNC: 9.4 MG/DL (ref 8.5–10.1)
CHLORIDE SERPL-SCNC: 107 MMOL/L (ref 98–112)
CO2 SERPL-SCNC: 24 MMOL/L (ref 21–32)
CREAT BLD-MCNC: 0.92 MG/DL (ref 0.55–1.02)
DEPRECATED RDW RBC AUTO: 47.8 FL (ref 35.1–46.3)
EOSINOPHIL # BLD AUTO: 0.36 X10(3) UL (ref 0–0.7)
EOSINOPHIL NFR BLD AUTO: 5.5 %
ERYTHROCYTE [DISTWIDTH] IN BLOOD BY AUTOMATED COUNT: 14.7 % (ref 11–15)
FLUAV + FLUBV RNA SPEC NAA+PROBE: NEGATIVE
GLUCOSE BLD-MCNC: 107 MG/DL (ref 70–99)
HCT VFR BLD AUTO: 37.4 % (ref 35–48)
HGB BLD-MCNC: 12.5 G/DL (ref 12–16)
IMM GRANULOCYTES # BLD AUTO: 0.02 X10(3) UL (ref 0–1)
IMM GRANULOCYTES NFR BLD: 0.3 %
INR BLD: 1.04 (ref 0.9–1.2)
LYMPHOCYTES # BLD AUTO: 1.48 X10(3) UL (ref 1–4)
LYMPHOCYTES NFR BLD AUTO: 22.8 %
MCH RBC QN AUTO: 29.9 PG (ref 26–34)
MCHC RBC AUTO-ENTMCNC: 33.4 G/DL (ref 31–37)
MCV RBC AUTO: 89.5 FL (ref 80–100)
MONOCYTES # BLD AUTO: 0.74 X10(3) UL (ref 0.1–1)
MONOCYTES NFR BLD AUTO: 11.4 %
NEUTROPHILS # BLD AUTO: 3.84 X10 (3) UL (ref 1.5–7.7)
NEUTROPHILS # BLD AUTO: 3.84 X10(3) UL (ref 1.5–7.7)
NEUTROPHILS NFR BLD AUTO: 59.2 %
OSMOLALITY SERPL CALC.SUM OF ELEC: 288 MOSM/KG (ref 275–295)
PLATELET # BLD AUTO: 181 10(3)UL (ref 150–450)
POTASSIUM SERPL-SCNC: 4.1 MMOL/L (ref 3.5–5.1)
PROTHROMBIN TIME: 13.4 SECONDS (ref 11.8–14.5)
RBC # BLD AUTO: 4.18 X10(6)UL (ref 3.8–5.3)
SODIUM SERPL-SCNC: 139 MMOL/L (ref 136–145)
WBC # BLD AUTO: 6.5 X10(3) UL (ref 4–11)

## 2020-02-07 PROCEDURE — 80048 BASIC METABOLIC PNL TOTAL CA: CPT | Performed by: EMERGENCY MEDICINE

## 2020-02-07 PROCEDURE — 99284 EMERGENCY DEPT VISIT MOD MDM: CPT

## 2020-02-07 PROCEDURE — 85025 COMPLETE CBC W/AUTO DIFF WBC: CPT

## 2020-02-07 PROCEDURE — 85025 COMPLETE CBC W/AUTO DIFF WBC: CPT | Performed by: EMERGENCY MEDICINE

## 2020-02-07 PROCEDURE — 70450 CT HEAD/BRAIN W/O DYE: CPT | Performed by: EMERGENCY MEDICINE

## 2020-02-07 PROCEDURE — 85730 THROMBOPLASTIN TIME PARTIAL: CPT | Performed by: EMERGENCY MEDICINE

## 2020-02-07 PROCEDURE — 96375 TX/PRO/DX INJ NEW DRUG ADDON: CPT

## 2020-02-07 PROCEDURE — 94640 AIRWAY INHALATION TREATMENT: CPT

## 2020-02-07 PROCEDURE — 85610 PROTHROMBIN TIME: CPT | Performed by: EMERGENCY MEDICINE

## 2020-02-07 PROCEDURE — 85730 THROMBOPLASTIN TIME PARTIAL: CPT

## 2020-02-07 PROCEDURE — 87631 RESP VIRUS 3-5 TARGETS: CPT | Performed by: EMERGENCY MEDICINE

## 2020-02-07 PROCEDURE — 80048 BASIC METABOLIC PNL TOTAL CA: CPT

## 2020-02-07 PROCEDURE — 96374 THER/PROPH/DIAG INJ IV PUSH: CPT

## 2020-02-07 PROCEDURE — 85610 PROTHROMBIN TIME: CPT

## 2020-02-07 RX ORDER — GUAIFENESIN 100 MG/5ML
100 SOLUTION ORAL EVERY 4 HOURS PRN
COMMUNITY
End: 2020-03-16

## 2020-02-07 RX ORDER — ACETAMINOPHEN 500 MG
1000 TABLET ORAL ONCE
Status: COMPLETED | OUTPATIENT
Start: 2020-02-07 | End: 2020-02-07

## 2020-02-07 RX ORDER — ONDANSETRON 2 MG/ML
INJECTION INTRAMUSCULAR; INTRAVENOUS
Status: COMPLETED
Start: 2020-02-07 | End: 2020-02-07

## 2020-02-07 RX ORDER — DIPHENHYDRAMINE HYDROCHLORIDE 50 MG/ML
25 INJECTION INTRAMUSCULAR; INTRAVENOUS ONCE
Status: COMPLETED | OUTPATIENT
Start: 2020-02-07 | End: 2020-02-07

## 2020-02-07 RX ORDER — METHYLPREDNISOLONE 4 MG/1
TABLET ORAL
Qty: 1 PACKAGE | Refills: 0 | Status: ON HOLD | OUTPATIENT
Start: 2020-02-07 | End: 2020-02-19

## 2020-02-07 RX ORDER — DIAPER,BRIEF,INFANT-TODD,DISP
1 EACH MISCELLANEOUS 4 TIMES DAILY PRN
Status: ON HOLD | COMMUNITY
End: 2020-02-19

## 2020-02-07 RX ORDER — MORPHINE SULFATE 4 MG/ML
4 INJECTION, SOLUTION INTRAMUSCULAR; INTRAVENOUS ONCE
Status: COMPLETED | OUTPATIENT
Start: 2020-02-07 | End: 2020-02-07

## 2020-02-07 RX ORDER — ONDANSETRON 2 MG/ML
4 INJECTION INTRAMUSCULAR; INTRAVENOUS ONCE
Status: COMPLETED | OUTPATIENT
Start: 2020-02-07 | End: 2020-02-07

## 2020-02-07 RX ORDER — ACETAMINOPHEN 160 MG/5ML
650 SUSPENSION ORAL EVERY 6 HOURS PRN
Status: ON HOLD | COMMUNITY
End: 2020-11-25

## 2020-02-07 RX ORDER — DEXAMETHASONE SODIUM PHOSPHATE 10 MG/ML
10 INJECTION, SOLUTION INTRAMUSCULAR; INTRAVENOUS ONCE
Status: COMPLETED | OUTPATIENT
Start: 2020-02-07 | End: 2020-02-07

## 2020-02-07 RX ORDER — IPRATROPIUM BROMIDE AND ALBUTEROL SULFATE 2.5; .5 MG/3ML; MG/3ML
3 SOLUTION RESPIRATORY (INHALATION) ONCE
Status: COMPLETED | OUTPATIENT
Start: 2020-02-07 | End: 2020-02-07

## 2020-02-07 RX ORDER — METOCLOPRAMIDE HYDROCHLORIDE 5 MG/ML
10 INJECTION INTRAMUSCULAR; INTRAVENOUS ONCE
Status: COMPLETED | OUTPATIENT
Start: 2020-02-07 | End: 2020-02-07

## 2020-02-07 NOTE — ED NOTES
[facility]. O2 sat intermittently drops to mid 60s with good waveform. O2 sat returns to 92-93% when patient talks and takes deep breaths. Patient denies dyspnea or other new complaints.    Dr. Lanelle Saxon called to alireza

## 2020-02-07 NOTE — ED INITIAL ASSESSMENT (HPI)
Ne Roe awoke at approx 1:30 this morning with severe headache, nausea, and light sensitivity. Patient denies head injury. On xarelto. Awake/alert and speaking with clear speech. No facial droop. Moving all extremities with equal strength.

## 2020-02-07 NOTE — ED PROVIDER NOTES
Patient Seen in: [facility] AND [facility] Emergency Department      History   Patient presents with:  Headache    Stated Complaint:     HPI    Patient is an 68-year-old female who presents with throbbing diffuse headache that started at 1:00 in the morning.   Deanna site    • Other and unspecified hyperlipidemia    • Other complicated headache syndrome 11/2/2017   • Other spondylosis, lumbar region 11/2/2017   • Pneumonia 2012   • Pneumonia due to organism    • S/P cervical spinal fusion: C3-6 and C7-T1 11/2/2017   • 1967    Partial Hysterectomy   • JAW SURGERY     • KNEE REPLACEMENT SURGERY Bilateral     Bilateral arthritis    • LAMINECTOMY      WITH FUSION PER PATIENT   • OTHER SURGICAL HISTORY  1985,1995,2003,2009    Cervical Discectomy AND FUSION   • PREP SITE T/A/ FROM of all extremities  Neuro: CN intact, normal speech, 5/5 motor strength in all extremities, no focal deficits  SKIN: warm, dry, no rashes        ED Course     Labs Reviewed   BASIC METABOLIC PANEL (8) - Abnormal; Notable for the following components: chronic migraine. She does not want tramadol as she states it does not work. Dr. Jabari Ravi office notified that pt will miss appointment today. Pt appears in less distress on reexam. Resting comfortably.  Will treat with oral steroids for home but feel she

## 2020-02-09 ENCOUNTER — APPOINTMENT (OUTPATIENT)
Dept: GENERAL RADIOLOGY | Facility: HOSPITAL | Age: 80
DRG: 871 | End: 2020-02-09
Attending: EMERGENCY MEDICINE
Payer: MEDICARE

## 2020-02-09 ENCOUNTER — HOSPITAL ENCOUNTER (INPATIENT)
Facility: HOSPITAL | Age: 80
LOS: 10 days | Discharge: HOME HEALTH | DRG: 871 | End: 2020-02-19
Attending: EMERGENCY MEDICINE | Admitting: HOSPITALIST
Payer: MEDICARE

## 2020-02-09 DIAGNOSIS — J96.01 ACUTE RESPIRATORY FAILURE WITH HYPOXIA (HCC): ICD-10-CM

## 2020-02-09 DIAGNOSIS — J11.1 INFLUENZA: Primary | ICD-10-CM

## 2020-02-09 LAB
ANION GAP SERPL CALC-SCNC: 6 MMOL/L (ref 0–18)
BASOPHILS # BLD AUTO: 0.03 X10(3) UL (ref 0–0.2)
BASOPHILS NFR BLD AUTO: 0.4 %
BILIRUB UR QL: NEGATIVE
BUN BLD-MCNC: 18 MG/DL (ref 7–18)
BUN/CREAT SERPL: 15.4 (ref 10–20)
CALCIUM BLD-MCNC: 8.9 MG/DL (ref 8.5–10.1)
CHLORIDE SERPL-SCNC: 109 MMOL/L (ref 98–112)
CLARITY UR: CLEAR
CO2 SERPL-SCNC: 28 MMOL/L (ref 21–32)
COLOR UR: YELLOW
CREAT BLD-MCNC: 1.17 MG/DL (ref 0.55–1.02)
DEPRECATED RDW RBC AUTO: 51.7 FL (ref 35.1–46.3)
EOSINOPHIL # BLD AUTO: 0.16 X10(3) UL (ref 0–0.7)
EOSINOPHIL NFR BLD AUTO: 2.3 %
ERYTHROCYTE [DISTWIDTH] IN BLOOD BY AUTOMATED COUNT: 15.2 % (ref 11–15)
FLUAV + FLUBV RNA SPEC NAA+PROBE: NEGATIVE
FLUAV + FLUBV RNA SPEC NAA+PROBE: NEGATIVE
FLUAV + FLUBV RNA SPEC NAA+PROBE: POSITIVE
GLUCOSE BLD-MCNC: 89 MG/DL (ref 70–99)
GLUCOSE UR-MCNC: NEGATIVE MG/DL
HCT VFR BLD AUTO: 44.2 % (ref 35–48)
HGB BLD-MCNC: 14.1 G/DL (ref 12–16)
HGB UR QL STRIP.AUTO: NEGATIVE
IMM GRANULOCYTES # BLD AUTO: 0.02 X10(3) UL (ref 0–1)
IMM GRANULOCYTES NFR BLD: 0.3 %
KETONES UR-MCNC: NEGATIVE MG/DL
LACTATE SERPL-SCNC: 1.3 MMOL/L (ref 0.4–2)
LACTATE SERPL-SCNC: 2.3 MMOL/L (ref 0.4–2)
LEUKOCYTE ESTERASE UR QL STRIP.AUTO: NEGATIVE
LYMPHOCYTES # BLD AUTO: 1.81 X10(3) UL (ref 1–4)
LYMPHOCYTES NFR BLD AUTO: 26.4 %
MCH RBC QN AUTO: 29.3 PG (ref 26–34)
MCHC RBC AUTO-ENTMCNC: 31.9 G/DL (ref 31–37)
MCV RBC AUTO: 91.9 FL (ref 80–100)
MONOCYTES # BLD AUTO: 0.33 X10(3) UL (ref 0.1–1)
MONOCYTES NFR BLD AUTO: 4.8 %
NEUTROPHILS # BLD AUTO: 4.5 X10 (3) UL (ref 1.5–7.7)
NEUTROPHILS # BLD AUTO: 4.5 X10(3) UL (ref 1.5–7.7)
NEUTROPHILS NFR BLD AUTO: 65.8 %
NITRITE UR QL STRIP.AUTO: NEGATIVE
OSMOLALITY SERPL CALC.SUM OF ELEC: 297 MOSM/KG (ref 275–295)
PH UR: 7 [PH] (ref 5–8)
PLATELET # BLD AUTO: 196 10(3)UL (ref 150–450)
POTASSIUM SERPL-SCNC: 4.1 MMOL/L (ref 3.5–5.1)
PROT UR-MCNC: NEGATIVE MG/DL
RBC # BLD AUTO: 4.81 X10(6)UL (ref 3.8–5.3)
SODIUM SERPL-SCNC: 143 MMOL/L (ref 136–145)
SP GR UR STRIP: 1.01 (ref 1–1.03)
UROBILINOGEN UR STRIP-ACNC: <2
WBC # BLD AUTO: 6.9 X10(3) UL (ref 4–11)

## 2020-02-09 PROCEDURE — 5A09357 ASSISTANCE WITH RESPIRATORY VENTILATION, LESS THAN 24 CONSECUTIVE HOURS, CONTINUOUS POSITIVE AIRWAY PRESSURE: ICD-10-PCS | Performed by: INTERNAL MEDICINE

## 2020-02-09 PROCEDURE — 71045 X-RAY EXAM CHEST 1 VIEW: CPT | Performed by: EMERGENCY MEDICINE

## 2020-02-09 RX ORDER — IPRATROPIUM BROMIDE AND ALBUTEROL SULFATE 2.5; .5 MG/3ML; MG/3ML
SOLUTION RESPIRATORY (INHALATION)
Status: COMPLETED
Start: 2020-02-09 | End: 2020-02-09

## 2020-02-09 RX ORDER — SODIUM CHLORIDE 9 MG/ML
INJECTION, SOLUTION INTRAVENOUS CONTINUOUS
Status: DISCONTINUED | OUTPATIENT
Start: 2020-02-09 | End: 2020-02-11

## 2020-02-09 RX ORDER — QUETIAPINE 100 MG/1
100 TABLET, FILM COATED ORAL NIGHTLY
Status: DISCONTINUED | OUTPATIENT
Start: 2020-02-09 | End: 2020-02-11

## 2020-02-09 RX ORDER — ACETAMINOPHEN 650 MG/1
975 SUPPOSITORY RECTAL ONCE
Status: COMPLETED | OUTPATIENT
Start: 2020-02-09 | End: 2020-02-09

## 2020-02-09 RX ORDER — GUAIFENESIN 100 MG/5ML
100 SOLUTION ORAL EVERY 4 HOURS PRN
Status: DISCONTINUED | OUTPATIENT
Start: 2020-02-09 | End: 2020-02-19

## 2020-02-09 RX ORDER — ONDANSETRON 2 MG/ML
4 INJECTION INTRAMUSCULAR; INTRAVENOUS EVERY 6 HOURS PRN
Status: DISCONTINUED | OUTPATIENT
Start: 2020-02-09 | End: 2020-02-19

## 2020-02-09 RX ORDER — SODIUM PHOSPHATE, DIBASIC AND SODIUM PHOSPHATE, MONOBASIC 7; 19 G/133ML; G/133ML
1 ENEMA RECTAL ONCE AS NEEDED
Status: DISCONTINUED | OUTPATIENT
Start: 2020-02-09 | End: 2020-02-19

## 2020-02-09 RX ORDER — LEVOTHYROXINE SODIUM 175 UG/1
175 TABLET ORAL
Status: DISCONTINUED | OUTPATIENT
Start: 2020-02-09 | End: 2020-02-19

## 2020-02-09 RX ORDER — ONDANSETRON 2 MG/ML
4 INJECTION INTRAMUSCULAR; INTRAVENOUS EVERY 4 HOURS PRN
Status: DISCONTINUED | OUTPATIENT
Start: 2020-02-09 | End: 2020-02-09

## 2020-02-09 RX ORDER — SODIUM CHLORIDE 0.9 % (FLUSH) 0.9 %
3 SYRINGE (ML) INJECTION AS NEEDED
Status: DISCONTINUED | OUTPATIENT
Start: 2020-02-09 | End: 2020-02-19

## 2020-02-09 RX ORDER — ACETAMINOPHEN 650 MG/1
SUPPOSITORY RECTAL
Status: COMPLETED
Start: 2020-02-09 | End: 2020-02-09

## 2020-02-09 RX ORDER — QUETIAPINE 25 MG/1
50 TABLET, FILM COATED ORAL NIGHTLY
Status: DISCONTINUED | OUTPATIENT
Start: 2020-02-09 | End: 2020-02-11

## 2020-02-09 RX ORDER — POLYETHYLENE GLYCOL 3350 17 G/17G
17 POWDER, FOR SOLUTION ORAL DAILY PRN
Status: DISCONTINUED | OUTPATIENT
Start: 2020-02-09 | End: 2020-02-19

## 2020-02-09 RX ORDER — IBUPROFEN 600 MG/1
600 TABLET ORAL ONCE
Status: COMPLETED | OUTPATIENT
Start: 2020-02-09 | End: 2020-02-09

## 2020-02-09 RX ORDER — BISACODYL 10 MG
10 SUPPOSITORY, RECTAL RECTAL
Status: DISCONTINUED | OUTPATIENT
Start: 2020-02-09 | End: 2020-02-19

## 2020-02-09 RX ORDER — GABAPENTIN 100 MG/1
100 CAPSULE ORAL 3 TIMES DAILY
Status: DISCONTINUED | OUTPATIENT
Start: 2020-02-09 | End: 2020-02-11

## 2020-02-09 RX ORDER — MIRTAZAPINE 30 MG/1
30 TABLET, FILM COATED ORAL NIGHTLY
Status: DISCONTINUED | OUTPATIENT
Start: 2020-02-09 | End: 2020-02-11

## 2020-02-09 RX ORDER — LISINOPRIL 10 MG/1
10 TABLET ORAL DAILY
Status: DISCONTINUED | OUTPATIENT
Start: 2020-02-09 | End: 2020-02-10

## 2020-02-09 RX ORDER — ACETAMINOPHEN 325 MG/1
650 TABLET ORAL EVERY 6 HOURS PRN
Status: DISCONTINUED | OUTPATIENT
Start: 2020-02-09 | End: 2020-02-19

## 2020-02-09 RX ORDER — OSELTAMIVIR PHOSPHATE 30 MG/1
30 CAPSULE ORAL ONCE
Status: COMPLETED | OUTPATIENT
Start: 2020-02-09 | End: 2020-02-09

## 2020-02-09 RX ORDER — AMLODIPINE BESYLATE 10 MG/1
10 TABLET ORAL DAILY
Status: DISCONTINUED | OUTPATIENT
Start: 2020-02-09 | End: 2020-02-19

## 2020-02-09 RX ORDER — PANTOPRAZOLE SODIUM 40 MG/1
40 TABLET, DELAYED RELEASE ORAL
Status: DISCONTINUED | OUTPATIENT
Start: 2020-02-10 | End: 2020-02-19

## 2020-02-09 RX ORDER — METOCLOPRAMIDE HYDROCHLORIDE 5 MG/ML
5 INJECTION INTRAMUSCULAR; INTRAVENOUS EVERY 8 HOURS PRN
Status: DISCONTINUED | OUTPATIENT
Start: 2020-02-09 | End: 2020-02-19

## 2020-02-09 RX ORDER — IPRATROPIUM BROMIDE AND ALBUTEROL SULFATE 2.5; .5 MG/3ML; MG/3ML
3 SOLUTION RESPIRATORY (INHALATION) ONCE
Status: COMPLETED | OUTPATIENT
Start: 2020-02-09 | End: 2020-02-09

## 2020-02-09 RX ORDER — ALBUTEROL SULFATE 2.5 MG/3ML
1.25 SOLUTION RESPIRATORY (INHALATION) EVERY 4 HOURS PRN
Status: DISCONTINUED | OUTPATIENT
Start: 2020-02-09 | End: 2020-02-19

## 2020-02-09 NOTE — ED NOTES
Late entry d/t care provided:    Pt brought via ems from [facility] for fever, ams and hypoxia. unknown time of onset, per pt some time this morning  Upon arrival pt presents with tachypnea and a/o to person only on 4L NC per EMS.    Placed on all contin

## 2020-02-09 NOTE — ED INITIAL ASSESSMENT (HPI)
From [facility] for fever, ams and hypoxia. Sating 80's on RA, not on oxygen.  Wet cough +wheezing +tachypnic

## 2020-02-10 LAB
ANION GAP SERPL CALC-SCNC: 7 MMOL/L (ref 0–18)
BASOPHILS # BLD AUTO: 0.03 X10(3) UL (ref 0–0.2)
BASOPHILS NFR BLD AUTO: 0.5 %
BUN BLD-MCNC: 22 MG/DL (ref 7–18)
BUN/CREAT SERPL: 19.5 (ref 10–20)
CALCIUM BLD-MCNC: 8.2 MG/DL (ref 8.5–10.1)
CHLORIDE SERPL-SCNC: 113 MMOL/L (ref 98–112)
CO2 SERPL-SCNC: 24 MMOL/L (ref 21–32)
CREAT BLD-MCNC: 1.13 MG/DL (ref 0.55–1.02)
DEPRECATED RDW RBC AUTO: 53.2 FL (ref 35.1–46.3)
EOSINOPHIL # BLD AUTO: 0.17 X10(3) UL (ref 0–0.7)
EOSINOPHIL NFR BLD AUTO: 3.1 %
ERYTHROCYTE [DISTWIDTH] IN BLOOD BY AUTOMATED COUNT: 15.7 % (ref 11–15)
GLUCOSE BLD-MCNC: 79 MG/DL (ref 70–99)
HCT VFR BLD AUTO: 38.6 % (ref 35–48)
HGB BLD-MCNC: 12.1 G/DL (ref 12–16)
IMM GRANULOCYTES # BLD AUTO: 0.02 X10(3) UL (ref 0–1)
IMM GRANULOCYTES NFR BLD: 0.4 %
LYMPHOCYTES # BLD AUTO: 2.47 X10(3) UL (ref 1–4)
LYMPHOCYTES NFR BLD AUTO: 44.7 %
MCH RBC QN AUTO: 29.2 PG (ref 26–34)
MCHC RBC AUTO-ENTMCNC: 31.3 G/DL (ref 31–37)
MCV RBC AUTO: 93.2 FL (ref 80–100)
MONOCYTES # BLD AUTO: 0.39 X10(3) UL (ref 0.1–1)
MONOCYTES NFR BLD AUTO: 7.1 %
NEUTROPHILS # BLD AUTO: 2.44 X10 (3) UL (ref 1.5–7.7)
NEUTROPHILS # BLD AUTO: 2.44 X10(3) UL (ref 1.5–7.7)
NEUTROPHILS NFR BLD AUTO: 44.2 %
OSMOLALITY SERPL CALC.SUM OF ELEC: 300 MOSM/KG (ref 275–295)
PLATELET # BLD AUTO: 162 10(3)UL (ref 150–450)
POTASSIUM SERPL-SCNC: 3.8 MMOL/L (ref 3.5–5.1)
RBC # BLD AUTO: 4.14 X10(6)UL (ref 3.8–5.3)
SODIUM SERPL-SCNC: 144 MMOL/L (ref 136–145)
WBC # BLD AUTO: 5.5 X10(3) UL (ref 4–11)

## 2020-02-10 RX ORDER — IPRATROPIUM BROMIDE AND ALBUTEROL SULFATE 2.5; .5 MG/3ML; MG/3ML
3 SOLUTION RESPIRATORY (INHALATION)
Status: DISCONTINUED | OUTPATIENT
Start: 2020-02-10 | End: 2020-02-11

## 2020-02-10 RX ORDER — METHYLPREDNISOLONE SODIUM SUCCINATE 40 MG/ML
40 INJECTION, POWDER, LYOPHILIZED, FOR SOLUTION INTRAMUSCULAR; INTRAVENOUS EVERY 12 HOURS
Status: DISCONTINUED | OUTPATIENT
Start: 2020-02-10 | End: 2020-02-10

## 2020-02-10 RX ORDER — POTASSIUM CHLORIDE 20 MEQ/1
40 TABLET, EXTENDED RELEASE ORAL ONCE
Status: COMPLETED | OUTPATIENT
Start: 2020-02-10 | End: 2020-02-10

## 2020-02-10 RX ORDER — METHYLPREDNISOLONE SODIUM SUCCINATE 40 MG/ML
40 INJECTION, POWDER, LYOPHILIZED, FOR SOLUTION INTRAMUSCULAR; INTRAVENOUS EVERY 6 HOURS
Status: DISCONTINUED | OUTPATIENT
Start: 2020-02-10 | End: 2020-02-11

## 2020-02-10 RX ORDER — OSELTAMIVIR PHOSPHATE 30 MG/1
30 CAPSULE ORAL EVERY 12 HOURS SCHEDULED
Status: COMPLETED | OUTPATIENT
Start: 2020-02-10 | End: 2020-02-14

## 2020-02-10 NOTE — PLAN OF CARE
Problem: Patient Centered Care  Goal: Patient preferences are identified and integrated in the patient's plan of care  Description  Interventions:  - What would you like us to know as we care for you?  Patient here for flu A, low sats in ER, fever on admi assessment  - Modify environment to reduce risk of injury  - Provide assistive devices as appropriate  - Consider OT/PT consult to assist with strengthening/mobility  - Encourage toileting schedule  Outcome: Progressing     Problem: [address] Yam[address] Harris antiarrhythmic and heart rate control medications as ordered  - Initiate emergency measures for life threatening arrhythmias  - Monitor electrolytes and administer replacement therapy as ordered  Outcome: Progressing     Problem: RESPIRATORY - ADULT  Goal:

## 2020-02-10 NOTE — ED NOTES
Pt more stable and now complaining about bipap mask  md notified and dc'd bipap while at bs  Pt labored breathing improved but remains tachypnic in 20's and sating 88% on RA.  Placed on 4L NC  Remains febrile, will give ordered ibuprofen  Will continue to m

## 2020-02-10 NOTE — CONSULTS
[facility]    Report of Consultation    Carly Pizano Patient Status:  Inpatient    12/3/1940 MRN [id number]   Location [facility] 5SW/SE Attending Hellen Titus MD   Hosp Day # 1 PCP Garfield Michelle MD     Date of Admissi Osteoarthrosis, unspecified whether generalized or localized, unspecified site    • Other and unspecified hyperlipidemia    • Other complicated headache syndrome 11/2/2017   • Other spondylosis, lumbar region 11/2/2017   • Pneumonia 2012   • Pneumonia due SURGERY      x2 FOR \"CROSSED EYES\"   • HIP REPLACEMENT SURGERY Bilateral    • HYSTERECTOMY  1967    Partial Hysterectomy   • JAW SURGERY     • KNEE REPLACEMENT SURGERY Bilateral     Bilateral arthritis    • LAMINECTOMY      WITH FUSION PER PATIENT   • OT drinks    Drug use: No         Current Medications:  Oseltamivir Phosphate (TAMIFLU) cap 30 mg, 30 mg, Oral, Q12H  methylPREDNISolone Sodium Succ (Solu-MEDROL) injection 40 mg, 40 mg, Intravenous, Q6H  azithromycin (ZITHROMAX) 500 mg in sodium chloride 0.9 0.5 MG Oral Tab, Take 0.5 mg by mouth 2 (two) times daily as needed for Anxiety. gabapentin 100 MG Oral Cap, Take 100 mg by mouth 3 (three) times daily. Desvenlafaxine Succinate  MG Oral Tablet 24 Hr, Take 150 mg by mouth daily.     Budesonide-F )  ipratropium-albuterol 0.5-2.5 (3) MG/3ML Inhalation Solution, Take 3 mL by nebulization every 6 (six) hours.  (Patient not taking: Reported on 2/7/2020 )  Butalbital-APAP-Caffeine -40 MG Oral Tab, Take 1 tablet by mouth every 4 (four) hours as need palpitations, syncope and tachypnea  Gastrointestinal: negative for constipation and diarrhea  Musculoskeletal:negative for muscle weakness and myalgias  Neurological: No focal neurological deficits  Allergic/Immunologic: negative for angioedema, hay fever Approved by (CST): Elfego Wynn MD on 2/09/2020 at 17:59               Impression:     Influenza  Continue Tamiflu      Acute respiratory failure with hypoxia (HCC)  Decrease Solu-Medrol to 40 every 6 hours  Duo nebs every 4 hours while awake and as need

## 2020-02-10 NOTE — CM/SW NOTE
RUI Proposal: Home Health Agency    [place]    Ambulatory Status: Assistive device needed    Activities of Daily Living: Assistance needed for one or more ADLs    Cognitive Status: Oriented    Capable Caregiver Availability: No capable caregiver av

## 2020-02-10 NOTE — ED PROVIDER NOTES
Patient Seen in: [facility] Emergency Department    History   Patient presents with:  Altered Mental Status  Fever      HPI     Patient presents to the ED from [address] [place] [address] for fever, altered mental status and low O2 saturations starting today.   Joshua laminectomy 8/28/2014   • Shortness of breath    • Sleep apnea    • stent placement     cerebral   • Thyroid disease    • Toe pain     Onychomycosis, Debridement , Hammertoe    • Tonsillitis 1950    Tonsillitis    • Unspecified essential hypertension    • 1ST 100 SQ CM/1PCT Left 08/21/2019    Left leg debridement, VAC placed   • SKIN GRAFT SPLIT THICKNESS Left 10/8/2019    Performed by Kevin Stokes MD at [facility] OR   • SPLIT GRFT [address] <100SQCM Left 10/08/2019    Left leg debridement and Spl tablet daily. (Patient not taking: Reported on 2/7/2020 ), Disp: 6 tablet, Rfl: 0, Not Taking  ipratropium-albuterol 0.5-2.5 (3) MG/3ML Inhalation Solution, Take 3 mL by nebulization every 6 (six) hours.  (Patient not taking: Reported on 2/7/2020 ), Disp: 3 breakfast. (Patient taking differently: Take 40 mg by mouth 2 (two) times daily.  ), Disp: 30 tablet, Rfl: 0, Taking         Family History   Problem Relation Age of Onset   • Heart Attack Mother    • Heart Disease Mother         Coronary Artery Disease, P past: No        Tanning Salons in the Past: No        Hx of Radiation Treatments: No    Social History Narrative      The patient uses the following assistive device(s):  rolling walker. scooter      The patient does not live in a home with stairs.       [place] INFLUENZA A/B(FLU) AND RSV PCR - Abnormal; Notable for the following components:    Influenza A by PCR Positive (*)     All other components within normal limits   CBC W/ DIFFERENTIAL - Abnormal; Notable for the following components:    RDW-SD 51.7 (*) ELLIPTA) 100-25 MCG/INH inhaler 1 puff (has no administration in time range)   gabapentin (NEURONTIN) cap 100 mg (has no administration in time range)   guaiFENesin (ROBITUSSIN) 100 MG/5ML solution 100 mg (has no administration in time range)   levothyroxi Phosphate (TAMIFLU) cap 30 mg (30 mg Oral Given 2/9/20 1858)         MDM      02/09/20  1845 02/09/20 1904 02/09/20 1934 02/09/20 1958   BP: 143/59  121/58 110/49   BP Location:    Left arm   Pulse: 79  80 75   Resp: (!) 32  26 24   Temp:  (!) 101.4 °F diagnosis)  Acute respiratory failure with hypoxia (HCC)    Disposition:  Admit    Follow-up:  No follow-up provider specified.     Medications Prescribed:  Current Discharge Medication List        Present on Admission  Date Reviewed: 1/15/2020          ICD

## 2020-02-10 NOTE — H&P
[facility] HOSPITALIST HISTORY AND PHYSICAL     CC: Patient presents with:  Altered Mental Status  Fever       PCP: Jaime Muhammad MD    History of Present Illness:   Patient is a 78year old female with PMH sig for COPD, SHANT intolerant of CPAP, falls/ wheel headache syndrome 11/2/2017   • Other spondylosis, lumbar region 11/2/2017   • Pneumonia 2012   • Pneumonia due to organism    • S/P cervical spinal fusion: C3-6 and C7-T1 11/2/2017   • s/p L5-S1 right laminectomy 8/28/2014   • Shortness of breath    • Sle Bilateral     Bilateral arthritis    • LAMINECTOMY      WITH FUSION PER PATIENT   • OTHER SURGICAL HISTORY  1985,1995,2003,2009    Cervical Discectomy AND FUSION   • PREP SITE T/A/L 1ST 100 SQ CM/1PCT Left 08/21/2019    Left leg debridement, VAC placed   • Maternal Grandmother    • Other (Other[other]) Maternal Grandfather    • Other (Other[other]) Paternal Grandmother    • Other (Other[other]) Paternal Grandfather    • Cancer Brother 54        Liver    • Neurological Disorder Sister         ALzheimer's Dise PARENCHYMA:  Diffuse low attenuation is seen within the bilateral periventricular white matter compatible with chronic microvascular ischemic disease. No acute hemorrhage or intracranial mass or mass effect. No midline shift.  Streak artifact again is seen CONCLUSION:  1. No evidence of pneumonia. 2. Minimal linear atelectasis or scarring right lung base. 3. Atherosclerosis aorta.     Dictated by (CST): Daniel Long MD on 2/09/2020 at 17:58     Approved by (CST): Daniel Long MD on 2/09/2020 at 17:59

## 2020-02-10 NOTE — PROGRESS NOTES
[facility] Hospitalist Progress Note     PCP: Rafi Ludwig MD    Chief Complaint: follow-up    SUBJECTIVE:  Pt remains on 3L denies SOB reports improved N/V ongoing myalgias     OBJECTIVE:  Temp:  [97.5 °F (36.4 °C)-101.9 °F (38.8 °C)] 98.2 °F (36.8 °C) Furoate-Vilanterol  1 puff Inhalation Daily   • gabapentin  100 mg Oral TID   • Levothyroxine Sodium  175 mcg Oral Before breakfast   • mirtazapine  30 mg Oral Nightly   • Pantoprazole Sodium  40 mg Oral QAM AC   • QUEtiapine Fumarate  100 mg Oral Nightly

## 2020-02-10 NOTE — RESPIRATORY THERAPY NOTE
1. Pt. Does not use CPAP/BIPAP at home, and did not request to use one during her stay.     -RN notified

## 2020-02-11 PROBLEM — F31.4 SEVERE DEPRESSED BIPOLAR I DISORDER WITHOUT PSYCHOTIC FEATURES (HCC): Status: ACTIVE | Noted: 2020-02-11

## 2020-02-11 LAB
ANION GAP SERPL CALC-SCNC: 5 MMOL/L (ref 0–18)
BASOPHILS # BLD AUTO: 0.01 X10(3) UL (ref 0–0.2)
BASOPHILS NFR BLD AUTO: 0.2 %
BUN BLD-MCNC: 20 MG/DL (ref 7–18)
BUN/CREAT SERPL: 18.3 (ref 10–20)
CALCIUM BLD-MCNC: 9.2 MG/DL (ref 8.5–10.1)
CHLORIDE SERPL-SCNC: 111 MMOL/L (ref 98–112)
CO2 SERPL-SCNC: 26 MMOL/L (ref 21–32)
CREAT BLD-MCNC: 1.09 MG/DL (ref 0.55–1.02)
DEPRECATED RDW RBC AUTO: 48.5 FL (ref 35.1–46.3)
EOSINOPHIL # BLD AUTO: 0 X10(3) UL (ref 0–0.7)
EOSINOPHIL NFR BLD AUTO: 0 %
ERYTHROCYTE [DISTWIDTH] IN BLOOD BY AUTOMATED COUNT: 14.5 % (ref 11–15)
GLUCOSE BLD-MCNC: 206 MG/DL (ref 70–99)
HCT VFR BLD AUTO: 39.3 % (ref 35–48)
HGB BLD-MCNC: 12.8 G/DL (ref 12–16)
IMM GRANULOCYTES # BLD AUTO: 0.02 X10(3) UL (ref 0–1)
IMM GRANULOCYTES NFR BLD: 0.3 %
LYMPHOCYTES # BLD AUTO: 1.04 X10(3) UL (ref 1–4)
LYMPHOCYTES NFR BLD AUTO: 16.7 %
MCH RBC QN AUTO: 29.5 PG (ref 26–34)
MCHC RBC AUTO-ENTMCNC: 32.6 G/DL (ref 31–37)
MCV RBC AUTO: 90.6 FL (ref 80–100)
MONOCYTES # BLD AUTO: 0.1 X10(3) UL (ref 0.1–1)
MONOCYTES NFR BLD AUTO: 1.6 %
NEUTROPHILS # BLD AUTO: 5.04 X10 (3) UL (ref 1.5–7.7)
NEUTROPHILS # BLD AUTO: 5.04 X10(3) UL (ref 1.5–7.7)
NEUTROPHILS NFR BLD AUTO: 81.2 %
OSMOLALITY SERPL CALC.SUM OF ELEC: 303 MOSM/KG (ref 275–295)
PLATELET # BLD AUTO: 152 10(3)UL (ref 150–450)
POTASSIUM SERPL-SCNC: 4.8 MMOL/L (ref 3.5–5.1)
RBC # BLD AUTO: 4.34 X10(6)UL (ref 3.8–5.3)
SODIUM SERPL-SCNC: 142 MMOL/L (ref 136–145)
WBC # BLD AUTO: 6.2 X10(3) UL (ref 4–11)

## 2020-02-11 PROCEDURE — 90792 PSYCH DIAG EVAL W/MED SRVCS: CPT | Performed by: OTHER

## 2020-02-11 RX ORDER — DESVENLAFAXINE 50 MG/1
50 TABLET, EXTENDED RELEASE ORAL DAILY
Status: DISCONTINUED | OUTPATIENT
Start: 2020-02-12 | End: 2020-02-19

## 2020-02-11 RX ORDER — METHYLPREDNISOLONE SODIUM SUCCINATE 40 MG/ML
40 INJECTION, POWDER, LYOPHILIZED, FOR SOLUTION INTRAMUSCULAR; INTRAVENOUS EVERY 8 HOURS
Status: DISCONTINUED | OUTPATIENT
Start: 2020-02-11 | End: 2020-02-12

## 2020-02-11 RX ORDER — IPRATROPIUM BROMIDE AND ALBUTEROL SULFATE 2.5; .5 MG/3ML; MG/3ML
3 SOLUTION RESPIRATORY (INHALATION)
Status: DISCONTINUED | OUTPATIENT
Start: 2020-02-11 | End: 2020-02-19

## 2020-02-11 RX ORDER — AZITHROMYCIN 250 MG/1
500 TABLET, FILM COATED ORAL
Status: DISCONTINUED | OUTPATIENT
Start: 2020-02-12 | End: 2020-02-11

## 2020-02-11 RX ORDER — LAMOTRIGINE 25 MG/1
25 TABLET ORAL 2 TIMES DAILY
Status: DISCONTINUED | OUTPATIENT
Start: 2020-02-11 | End: 2020-02-19

## 2020-02-11 RX ORDER — MIRTAZAPINE 15 MG/1
15 TABLET, FILM COATED ORAL NIGHTLY
Status: DISCONTINUED | OUTPATIENT
Start: 2020-02-11 | End: 2020-02-16

## 2020-02-11 RX ORDER — QUETIAPINE 200 MG/1
200 TABLET, FILM COATED ORAL NIGHTLY
Status: DISCONTINUED | OUTPATIENT
Start: 2020-02-11 | End: 2020-02-12

## 2020-02-11 RX ORDER — SENNOSIDES 8.6 MG
8.6 TABLET ORAL 2 TIMES DAILY
Status: DISCONTINUED | OUTPATIENT
Start: 2020-02-11 | End: 2020-02-19

## 2020-02-11 RX ORDER — AZITHROMYCIN 250 MG/1
500 TABLET, FILM COATED ORAL
Status: COMPLETED | OUTPATIENT
Start: 2020-02-11 | End: 2020-02-12

## 2020-02-11 NOTE — BH PROGRESS NOTE
Behavioral Health Note:  CHIEF COMPLAINT:   AMS, flu    REASON FOR ADMISSION:   AMS, flu - labs showed elevated BUN upon admit     SOCIAL HISTORY:  Bobbi Tejeda has lived at [place] [address] for 7.5 years. She is  with two children.  She has been retired sin her friends at [employer] as well, reporting that she doesn't like keeping things from them but feels that it's not their business.  Cecy Sauceda reports that she's been trying to get in to see a counselor but because she's struggling to find a good connection wit

## 2020-02-11 NOTE — RESPIRATORY THERAPY NOTE
Patient seen for COPD education. Very frustrated at the time of education. Encouraged use of incentive spirometer and following a COPD action plan.

## 2020-02-11 NOTE — PROGRESS NOTES
[facility] Pharm[facility] Note: Route Optimization for Azithromycin [facility])    Patient is currently on Azithromycin (ZITHROMAX) 500 mg IV every 24 hours.    The patient meets the criteria to convert to the oral equivalent as established by the IV to Oral conversion

## 2020-02-11 NOTE — PROGRESS NOTES
[facility]    Progress Note    Sarah Brar Patient Status:  Inpatient    12/3/1940 MRN [id number]   Location [facility] 5SW/SE Attending Kvng Mcgill MD   Hosp Day # 2 PCP Thelma Salas MD       Subjective:   Havery Edu calves or thighs  Neurologic: Grossly normal    Assessment and Plan:     Influenza  Continue Tamiflu      Acute respiratory failure with hypoxia (HCC)  Continue high dose of steroids nebulizers and Zithromax        Results:     Lab Results   Component Valu

## 2020-02-11 NOTE — PROGRESS NOTES
[facility] Hospitalist Progress Note     PCP: Thelma Salas MD    Chief Complaint: follow-up    SUBJECTIVE:  Ongoing unchanged O2 requirement on 3L, she has been very belligerent toward all  Denies N/V/D, myalgias    OBJECTIVE:  Temp:  [97.5 °F (36.4 °C)- 500 mg Oral Daily   • Oseltamivir Phosphate  30 mg Oral Q12H   • amLODIPine Besylate  10 mg Oral Daily   • Fluticasone Furoate-Vilanterol  1 puff Inhalation Daily   • gabapentin  100 mg Oral TID   • Levothyroxine Sodium  175 mcg Oral Before breakfast   • m home med     DVTs  - cont AC     Full code confirmed w pt previously DNR     Dispo dc back to SNF pending clinical improvement per d/w pulm at least a couple more days     MD Selwyn Blanco  [phone]  2/10/2020  1:05 PM

## 2020-02-12 LAB
ANION GAP SERPL CALC-SCNC: 6 MMOL/L (ref 0–18)
BASOPHILS # BLD AUTO: 0.01 X10(3) UL (ref 0–0.2)
BASOPHILS NFR BLD AUTO: 0.1 %
BUN BLD-MCNC: 27 MG/DL (ref 7–18)
BUN/CREAT SERPL: 26.7 (ref 10–20)
CALCIUM BLD-MCNC: 9.4 MG/DL (ref 8.5–10.1)
CHLORIDE SERPL-SCNC: 109 MMOL/L (ref 98–112)
CO2 SERPL-SCNC: 26 MMOL/L (ref 21–32)
CREAT BLD-MCNC: 1.01 MG/DL (ref 0.55–1.02)
DEPRECATED RDW RBC AUTO: 48.6 FL (ref 35.1–46.3)
EOSINOPHIL # BLD AUTO: 0 X10(3) UL (ref 0–0.7)
EOSINOPHIL NFR BLD AUTO: 0 %
ERYTHROCYTE [DISTWIDTH] IN BLOOD BY AUTOMATED COUNT: 14.7 % (ref 11–15)
GLUCOSE BLD-MCNC: 231 MG/DL (ref 70–99)
HCT VFR BLD AUTO: 36.9 % (ref 35–48)
HGB BLD-MCNC: 12 G/DL (ref 12–16)
IMM GRANULOCYTES # BLD AUTO: 0.07 X10(3) UL (ref 0–1)
IMM GRANULOCYTES NFR BLD: 0.5 %
LYMPHOCYTES # BLD AUTO: 1.35 X10(3) UL (ref 1–4)
LYMPHOCYTES NFR BLD AUTO: 9.3 %
MCH RBC QN AUTO: 29.4 PG (ref 26–34)
MCHC RBC AUTO-ENTMCNC: 32.5 G/DL (ref 31–37)
MCV RBC AUTO: 90.4 FL (ref 80–100)
MONOCYTES # BLD AUTO: 0.42 X10(3) UL (ref 0.1–1)
MONOCYTES NFR BLD AUTO: 2.9 %
NEUTROPHILS # BLD AUTO: 12.68 X10 (3) UL (ref 1.5–7.7)
NEUTROPHILS # BLD AUTO: 12.68 X10(3) UL (ref 1.5–7.7)
NEUTROPHILS NFR BLD AUTO: 87.2 %
OSMOLALITY SERPL CALC.SUM OF ELEC: 304 MOSM/KG (ref 275–295)
PLATELET # BLD AUTO: 167 10(3)UL (ref 150–450)
POTASSIUM SERPL-SCNC: 4.9 MMOL/L (ref 3.5–5.1)
RBC # BLD AUTO: 4.08 X10(6)UL (ref 3.8–5.3)
SODIUM SERPL-SCNC: 141 MMOL/L (ref 136–145)
WBC # BLD AUTO: 14.5 X10(3) UL (ref 4–11)

## 2020-02-12 PROCEDURE — 99233 SBSQ HOSP IP/OBS HIGH 50: CPT | Performed by: OTHER

## 2020-02-12 RX ORDER — QUETIAPINE FUMARATE 50 MG/1
50 TABLET, EXTENDED RELEASE ORAL EVERY EVENING
Status: DISCONTINUED | OUTPATIENT
Start: 2020-02-12 | End: 2020-02-17

## 2020-02-12 NOTE — PROGRESS NOTES
[facility] Hospitalist Progress Note     PCP: Keisha Santos MD    Chief Complaint: follow-up    SUBJECTIVE:  - feeling better  - cough and wheezing is improving     OBJECTIVE:  Temp:  [97.8 °F (36.6 °C)-99.1 °F (37.3 °C)] 97.9 °F (36.6 °C)  Pulse:  [07-40 • Senna  8.6 mg Oral BID   • QUEtiapine Fumarate  200 mg Oral Nightly   • lamoTRIgine  25 mg Oral BID   • Desvenlafaxine Succinate ER  50 mg Oral Daily   • mirtazapine  15 mg Oral Nightly   • Oseltamivir Phosphate  30 mg Oral Q12H   • amLODIPine Besylate days if able to taper to po steroids      Kyree Aguilar DO  [facility] Hospitalist  Pager: [phone]  Answering Service: [phone]

## 2020-02-12 NOTE — PROGRESS NOTES
Patient is A/O x4, patient is able to move all extremity but refused to reposition herself in the bed or sit in the chair for all meals. Patient teaching was provided at bedside. Will continue to monitor at this time.

## 2020-02-12 NOTE — PLAN OF CARE
Problem: Patient Centered Care  Goal: Patient preferences are identified and integrated in the patient's plan of care  Description  Interventions:  - What would you like us to know as we care for you?  Patient here for flu A, low sats in ER, fever on admi injury  - Provide assistive devices as appropriate  - Consider OT/PT consult to assist with strengthening/mobility  - Encourage toileting schedule  Outcome: Progressing     Problem: DISCHARGE PLANNING  Goal: Discharge to home or other facility with appropr Initiate emergency measures for life threatening arrhythmias  - Monitor electrolytes and administer replacement therapy as ordered  Outcome: Progressing     Problem: RESPIRATORY - ADULT  Goal: Achieves optimal ventilation and oxygenation  Description  INTE as appropriate  - Advance diet as tolerated, if ordered  - Obtain nutritional consult as needed  - Evaluate fluid balance  Outcome: Progressing  Goal: Maintains or returns to baseline bowel function  Description  INTERVENTIONS:  - Assess bowel function  - measures as available)  - Encourage oral intake as appropriate  - Instruct patient on fluid and nutrition restrictions as appropriate  Outcome: Progressing     Problem: SKIN/TISSUE INTEGRITY - ADULT  Goal: Skin integrity remains intact  Description  INTERV activity level and precautions  Outcome: Progressing     Problem: Impaired Activities of Daily Living  Goal: Achieve highest/safest level of independence in self care  Description  Interventions:  - Assess ability and encourage patient to participate in AD

## 2020-02-12 NOTE — PLAN OF CARE

## 2020-02-12 NOTE — PROGRESS NOTES
[facility]    Progress Note    Clifford Venegas Patient Status:  Inpatient    12/3/1940 MRN [id number]   Location [facility] 5SW/SE Attending Anusha Massey, [address] [facility] Day # 3 PCP Cait Johnson MD       Subjective:   Georgia Lorenzo respiratory failure with hypoxia ([facility] [address]  Continue current treatment bronchoscopy for pulmonary toilet on Friday 10 am      Severe depressed bipolar I disorder without psychotic features [facility])  Psychiatry is involved        Results:     Lab Results   Compone

## 2020-02-12 NOTE — CONSULTS
[facility]    Report of Consultation    Pierre Lance Patient Status:  Inpatient    12/3/1940 MRN [id number]   Location [facility] 5SW/SE Attending Kristi Franklin MD   Hosp Day # 2 PCP Xiang Meyer MD     Date of Admis any auditory or visual hallucination. The patient exhibited today some impairment in her cognition function since last time we met about 5 years ago. The patient currently taking Remeron and Seroquel.   At one point patient was taking 600 mg of Seroquel • Incontinence    • L3-4 stable slight grade 1 retrolisthesis 10/31/2014   • L4-5 mild-mod diffuse bulging disc 10/31/2014   • L4-5 slight grade 1 unstable spondylolisthesis 10/31/2014   • Leg wound, left 08/21/2019   • Low back pain    • Migraines    • 2006   • ESOPHAGOGASTRODUODENOSCOPY (EGD) N/A 5/17/2019    Performed by Khadra Lane MD at [facility] [address] Left 10/8/2019    Performed by Ramiro Castañeda MD at [facility] OR   • [address] Smoking status: Former Smoker        Packs/day: 2.00        Years: 25.00        Pack years: 50        Types: Cigarettes        Start date: 1952        Quit date: 1977        Years since quittin.1      Smokeless tobacco: Never Used      "[employer] mg, 4 mg, Intravenous, Q6H PRN  Metoclopramide HCl (REGLAN) injection 5 mg, 5 mg, Intravenous, Q8H PRN      Fluticasone Furoate-Vilanterol 100-25 MCG/INH Inhalation Aerosol Powder, Breath Activated, Inhale 1 puff into the lungs daily.   clonazePAM 0.5 MG Or tablet of seroquel to equal 150mg   mirtazapine 30 MG Oral Tab, Take 30 mg by mouth nightly. azithromycin (ZITHROMAX Z-SAIRA) 250 MG Oral Tab, Take two tablets by mouth today, then one tablet daily.  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 The patient with history of bipolar disorder and multiple previous admission in the past and multiple treatment regiment. Patient presented today with increased irritability, restlessness and difficulty sleeping.   Dis

## 2020-02-12 NOTE — DOWNTIME EVENT NOTE
The EMR was down for approximately 2 hours on 2/12/2020. This writer was responsible for completing the paper charting during this time period.      The following information was re-entered into the system by Henrik LATIF: n/a    The following information w

## 2020-02-13 LAB
ANION GAP SERPL CALC-SCNC: 4 MMOL/L (ref 0–18)
BASOPHILS # BLD AUTO: 0.02 X10(3) UL (ref 0–0.2)
BASOPHILS NFR BLD AUTO: 0.2 %
BUN BLD-MCNC: 32 MG/DL (ref 7–18)
BUN/CREAT SERPL: 31.4 (ref 10–20)
CALCIUM BLD-MCNC: 9.3 MG/DL (ref 8.5–10.1)
CHLORIDE SERPL-SCNC: 110 MMOL/L (ref 98–112)
CO2 SERPL-SCNC: 27 MMOL/L (ref 21–32)
CREAT BLD-MCNC: 1.02 MG/DL (ref 0.55–1.02)
DEPRECATED RDW RBC AUTO: 49.3 FL (ref 35.1–46.3)
EOSINOPHIL # BLD AUTO: 0 X10(3) UL (ref 0–0.7)
EOSINOPHIL NFR BLD AUTO: 0 %
ERYTHROCYTE [DISTWIDTH] IN BLOOD BY AUTOMATED COUNT: 14.6 % (ref 11–15)
GLUCOSE BLD-MCNC: 185 MG/DL (ref 70–99)
HCT VFR BLD AUTO: 37.4 % (ref 35–48)
HGB BLD-MCNC: 11.8 G/DL (ref 12–16)
IMM GRANULOCYTES # BLD AUTO: 0.08 X10(3) UL (ref 0–1)
IMM GRANULOCYTES NFR BLD: 0.6 %
LYMPHOCYTES # BLD AUTO: 1.78 X10(3) UL (ref 1–4)
LYMPHOCYTES NFR BLD AUTO: 13.5 %
MCH RBC QN AUTO: 28.7 PG (ref 26–34)
MCHC RBC AUTO-ENTMCNC: 31.6 G/DL (ref 31–37)
MCV RBC AUTO: 91 FL (ref 80–100)
MONOCYTES # BLD AUTO: 0.54 X10(3) UL (ref 0.1–1)
MONOCYTES NFR BLD AUTO: 4.1 %
NEUTROPHILS # BLD AUTO: 10.76 X10 (3) UL (ref 1.5–7.7)
NEUTROPHILS # BLD AUTO: 10.76 X10(3) UL (ref 1.5–7.7)
NEUTROPHILS NFR BLD AUTO: 81.6 %
OSMOLALITY SERPL CALC.SUM OF ELEC: 304 MOSM/KG (ref 275–295)
PLATELET # BLD AUTO: 161 10(3)UL (ref 150–450)
POTASSIUM SERPL-SCNC: 4.8 MMOL/L (ref 3.5–5.1)
RBC # BLD AUTO: 4.11 X10(6)UL (ref 3.8–5.3)
SODIUM SERPL-SCNC: 141 MMOL/L (ref 136–145)
WBC # BLD AUTO: 13.2 X10(3) UL (ref 4–11)

## 2020-02-13 PROCEDURE — 99233 SBSQ HOSP IP/OBS HIGH 50: CPT | Performed by: OTHER

## 2020-02-13 RX ORDER — FUROSEMIDE 10 MG/ML
40 INJECTION INTRAMUSCULAR; INTRAVENOUS ONCE
Status: COMPLETED | OUTPATIENT
Start: 2020-02-13 | End: 2020-02-13

## 2020-02-13 RX ORDER — METHYLPREDNISOLONE SODIUM SUCCINATE 40 MG/ML
40 INJECTION, POWDER, LYOPHILIZED, FOR SOLUTION INTRAMUSCULAR; INTRAVENOUS EVERY 12 HOURS
Status: DISCONTINUED | OUTPATIENT
Start: 2020-02-13 | End: 2020-02-17

## 2020-02-13 NOTE — PROGRESS NOTES
[facility] Hospitalist Progress Note     PCP: Jaime Muhammad MD    Chief Complaint: follow-up    SUBJECTIVE:  - feeling better  - cough and wheezing is improving, hasn't been out of bed at all    OBJECTIVE:  Temp:  [97.7 °F (36.5 °C)-99.2 °F (37.3 °C)] 97. Nightly   • QUEtiapine Fumarate ER  50 mg Oral QPM   • ipratropium-albuterol  3 mL Nebulization Q4H WA (4 times daily)   • Senna  8.6 mg Oral BID   • lamoTRIgine  25 mg Oral BID   • Desvenlafaxine Succinate ER  50 mg Oral Daily   • mirtazapine  15 mg Oral synthroid     HTN  - home med     DVTs  - cont full AC      Dispo dc back to SNF when stable from a pulmonary standpoint     Pamela Cedeno DO  [facility] Hospitalist  Pager: [phone]  Answering Service: [phone]

## 2020-02-13 NOTE — CM/SW NOTE
CM spoke with pt re dc planning needs. Pt lives at [address] [place] 1940 and has [facility]. She does not report using 02 pta. Pt is adamantly not interested in K[facility] even if recommended. NEHEMIAH  Confirmed with Nereida Nazario with [address] that pt is current.  Un

## 2020-02-13 NOTE — PLAN OF CARE

## 2020-02-13 NOTE — PROGRESS NOTES
[facility]    Progress Note    Alfa Munoz Patient Status:  Inpatient    12/3/1940 MRN [id number]   Location [facility] 5SW/SE Attending Hanna Reed, [address] [facility] Day # 4 PCP Bhavana Gilliam MD       Subjective:   Susan Haque treatment      Acute respiratory failure with hypoxia (HCC)  Slow improving with steroids and nebulizers  Bronchoscopy tomorrow 10:00      Severe depressed bipolar I disorder without psychotic features [facility])  Psychiatry involved        Results:     Lab Res

## 2020-02-13 NOTE — PHYSICAL THERAPY NOTE
PHYSICAL THERAPY EVALUATION - INPATIENT     Room Number: 544/544-A  Evaluation Date: 2/13/2020  Type of Evaluation: Initial   Physician Order: PT Eval and Treat    Presenting Problem: infuenza  Reason for Therapy: Mobility Dysfunction and Discharge Planni Problem:    Influenza  Active Problems:    Acute respiratory failure with hypoxia (HCC)    Severe depressed bipolar I disorder without psychotic features [facility])      Past Medical History  Past Medical History:   Diagnosis Date   • Anxiety state, unspecified Tonsillitis    • Unspecified essential hypertension    • Unspecified sleep apnea    • Visual impairment     EYE GLASSES   • Wound of left leg 10/03/2019    Left leg debridement and Split Thickness Skin Graft to left thigh       Past Surgical History  Past TRUNK <100SQCM Left 10/08/2019    Left leg debridement and Split Thickness Skin Graft to left thigh   • TONSILLECTOMY  1950    Tonsillitis    • TRANSFORAMINAL LUMBAR EPIDURAL STEROID INJECTION SINGLE LEVEL Bilateral 10/13/2017    Performed by Nickolas Champion MOBILITY  How much difficulty does the patient currently have. ..  -   Turning over in bed (including adjusting bedclothes, sheets and blankets)?: None   -   Sitting down on and standing up from a chair with arms (e.g., wheelchair, bedside commode, etc.): N activity/exercise instructions provided to patient in preparation for discharge.    Goal #5   Current Status    Goal #6    Goal #6  Current Status

## 2020-02-13 NOTE — PROGRESS NOTES
Carey De Los Santos is a 78year old   female lives in [address] [place] [address] with history of COPD, hypothyroidism, HTN, depression and borderline personality who presented to the hospital with shortness of breath and found with influenza.   The patient seen Esophageal reflux    • Hammertoe 02/25/2011    mikaelae 5 left, pain   • Hearing impairment     Bilateral hearing aids   • High blood pressure    • Hip pain, left    • History of blood clots     Leg   • Hypothyroidism    • Incontinence    • L3-4 stable sl TUNNEL RELEASE Right ~2008   • CHOLECYSTECTOMY  1984    Cholecystitis   • COLONOSCOPY  2010   • DEBRIDEMENT OF NAIL(S), 1-5      Toe, Onychomycosis   • EGD  05/2019    Gastric polyps only   • EGD  2006   • ESOPHAGOGASTRODUODENOSCOPY (EGD) N/A 5/17/2019 ALzheimer's Disease    • Stroke Sister         Cerebrovascular accident    • Heart Disease Brother         Coronary Artery Disease       Social History: Social History    Tobacco Use      Smoking status: Former Smoker        Packs/day: 2.00        Years: 2 mL, Oral, Daily PRN  bisacodyl (DULCOLAX) rectal suppository 10 mg, 10 mg, Rectal, Daily PRN  Fleet Enema (FLEET) 7-19 GM/118ML enema 133 mL, 1 enema, Rectal, Once PRN  ondansetron HCl (ZOFRAN) injection 4 mg, 4 mg, Intravenous, Q6H PRN  Metoclopramide HCl what we talk about. Patient has been seen by multiple psychiatrists in the past through all her life. The patient exhibited appropriate alertness and orientation. The patient admitted that she has been feeling depressed, overwhelmed and stressed.   The p this encounter         2/12/2020  Brooks Berrios MD

## 2020-02-14 ENCOUNTER — ANESTHESIA (OUTPATIENT)
Dept: ENDOSCOPY | Facility: HOSPITAL | Age: 80
DRG: 871 | End: 2020-02-14
Payer: MEDICARE

## 2020-02-14 ENCOUNTER — ANESTHESIA EVENT (OUTPATIENT)
Dept: ENDOSCOPY | Facility: HOSPITAL | Age: 80
DRG: 871 | End: 2020-02-14
Payer: MEDICARE

## 2020-02-14 LAB
ANION GAP SERPL CALC-SCNC: 5 MMOL/L (ref 0–18)
APTT PPP: 23.5 SECONDS (ref 23.2–35.3)
BUN BLD-MCNC: 43 MG/DL (ref 7–18)
BUN/CREAT SERPL: 35.8 (ref 10–20)
CALCIUM BLD-MCNC: 9.5 MG/DL (ref 8.5–10.1)
CHLORIDE SERPL-SCNC: 106 MMOL/L (ref 98–112)
CO2 SERPL-SCNC: 29 MMOL/L (ref 21–32)
CREAT BLD-MCNC: 1.2 MG/DL (ref 0.55–1.02)
GLUCOSE BLD-MCNC: 230 MG/DL (ref 70–99)
HAV IGM SER QL: 2.2 MG/DL (ref 1.6–2.6)
INR BLD: 1.14 (ref 0.9–1.2)
OSMOLALITY SERPL CALC.SUM OF ELEC: 308 MOSM/KG (ref 275–295)
POTASSIUM SERPL-SCNC: 5 MMOL/L (ref 3.5–5.1)
PROTHROMBIN TIME: 14.5 SECONDS (ref 11.8–14.5)
SODIUM SERPL-SCNC: 140 MMOL/L (ref 136–145)

## 2020-02-14 PROCEDURE — 0B9J8ZX DRAINAGE OF LEFT LOWER LUNG LOBE, VIA NATURAL OR ARTIFICIAL OPENING ENDOSCOPIC, DIAGNOSTIC: ICD-10-PCS | Performed by: INTERNAL MEDICINE

## 2020-02-14 PROCEDURE — 0B9F8ZX DRAINAGE OF RIGHT LOWER LUNG LOBE, VIA NATURAL OR ARTIFICIAL OPENING ENDOSCOPIC, DIAGNOSTIC: ICD-10-PCS | Performed by: INTERNAL MEDICINE

## 2020-02-14 PROCEDURE — 0BCB8ZZ EXTIRPATION OF MATTER FROM LEFT LOWER LOBE BRONCHUS, VIA NATURAL OR ARTIFICIAL OPENING ENDOSCOPIC: ICD-10-PCS | Performed by: INTERNAL MEDICINE

## 2020-02-14 PROCEDURE — 0BC68ZZ EXTIRPATION OF MATTER FROM RIGHT LOWER LOBE BRONCHUS, VIA NATURAL OR ARTIFICIAL OPENING ENDOSCOPIC: ICD-10-PCS | Performed by: INTERNAL MEDICINE

## 2020-02-14 PROCEDURE — 0BC38ZZ EXTIRPATION OF MATTER FROM RIGHT MAIN BRONCHUS, VIA NATURAL OR ARTIFICIAL OPENING ENDOSCOPIC: ICD-10-PCS | Performed by: INTERNAL MEDICINE

## 2020-02-14 PROCEDURE — 0BC78ZZ EXTIRPATION OF MATTER FROM LEFT MAIN BRONCHUS, VIA NATURAL OR ARTIFICIAL OPENING ENDOSCOPIC: ICD-10-PCS | Performed by: INTERNAL MEDICINE

## 2020-02-14 PROCEDURE — 99233 SBSQ HOSP IP/OBS HIGH 50: CPT | Performed by: OTHER

## 2020-02-14 PROCEDURE — 0BC18ZZ EXTIRPATION OF MATTER FROM TRACHEA, VIA NATURAL OR ARTIFICIAL OPENING ENDOSCOPIC: ICD-10-PCS | Performed by: INTERNAL MEDICINE

## 2020-02-14 RX ORDER — SODIUM CHLORIDE, SODIUM LACTATE, POTASSIUM CHLORIDE, CALCIUM CHLORIDE 600; 310; 30; 20 MG/100ML; MG/100ML; MG/100ML; MG/100ML
INJECTION, SOLUTION INTRAVENOUS CONTINUOUS
Status: DISCONTINUED | OUTPATIENT
Start: 2020-02-14 | End: 2020-02-15

## 2020-02-14 RX ORDER — NALOXONE HYDROCHLORIDE 0.4 MG/ML
80 INJECTION, SOLUTION INTRAMUSCULAR; INTRAVENOUS; SUBCUTANEOUS AS NEEDED
Status: DISCONTINUED | OUTPATIENT
Start: 2020-02-14 | End: 2020-02-14 | Stop reason: HOSPADM

## 2020-02-14 RX ORDER — LIDOCAINE HYDROCHLORIDE 10 MG/ML
INJECTION, SOLUTION EPIDURAL; INFILTRATION; INTRACAUDAL; PERINEURAL AS NEEDED
Status: DISCONTINUED | OUTPATIENT
Start: 2020-02-14 | End: 2020-02-14 | Stop reason: SURG

## 2020-02-14 RX ORDER — SODIUM CHLORIDE, SODIUM LACTATE, POTASSIUM CHLORIDE, CALCIUM CHLORIDE 600; 310; 30; 20 MG/100ML; MG/100ML; MG/100ML; MG/100ML
INJECTION, SOLUTION INTRAVENOUS CONTINUOUS PRN
Status: DISCONTINUED | OUTPATIENT
Start: 2020-02-14 | End: 2020-02-14 | Stop reason: SURG

## 2020-02-14 RX ADMIN — LIDOCAINE HYDROCHLORIDE 50 MG: 10 INJECTION, SOLUTION EPIDURAL; INFILTRATION; INTRACAUDAL; PERINEURAL at 10:36:00

## 2020-02-14 RX ADMIN — SODIUM CHLORIDE, SODIUM LACTATE, POTASSIUM CHLORIDE, CALCIUM CHLORIDE: 600; 310; 30; 20 INJECTION, SOLUTION INTRAVENOUS at 10:33:00

## 2020-02-14 RX ADMIN — SODIUM CHLORIDE, SODIUM LACTATE, POTASSIUM CHLORIDE, CALCIUM CHLORIDE: 600; 310; 30; 20 INJECTION, SOLUTION INTRAVENOUS at 10:46:00

## 2020-02-14 NOTE — OPERATIVE REPORT
[facility]    PATIENT'S NAME: Telly CALDERON   ATTENDING PHYSICIAN: Josh Hyman DO   OPERATING PHYSICIAN: Robi Cabezas MD   PATIENT ACCOUNT#:   [id number]    LOCATION:  [facility] ROOM 5 [facility] 10  MEDICAL RECORD #:   [id number]

## 2020-02-14 NOTE — ANESTHESIA PREPROCEDURE EVALUATION
Anesthesia PreOp Note    HPI:     Carey De Los Santos is a 78year old female who presents for preoperative consultation requested by: Greyson Soto MD    Date of Surgery: 2/9/2020 - 2/14/2020    Procedure(s):  BRONCHOSCOPY  Indication: Unresolving bronchitis central, T12-L1 mod central, T11-12 mild-mod central stenosis         Date Noted: 11/02/2017      Other spondylosis, lumbar region         Date Noted: 13/89/2409      Other complicated headache syndrome         Date Noted: 11/02/2017      Cervical post-khalil Cholecystitis 1984    Cholecystectomy    • COPD (chronic obstructive pulmonary disease) (HCC)     PFTs 2-15 with FEV1 1.11 L and FEV1/FVC ratio 63%   • Deep vein thrombosis ([facility] [address]    • Dehydration 2012    Fluids, Medication adjustment    • Depression    • D BRONCHOSCOPY N/A 2/21/2017    Performed by Arpita Obando MD at [facility] ENDOSCOPY   • BRONCHOSCOPY N/A 1/9/2017    Performed by Arpita Obando MD at [facility] ENDOSCOPY   • BRONCHOSCOPY N/A 12/20/2016    Performed by Arpita Obando MD at [facility] ENDOSCOPY   • CAROTID Lowell Mac 1000  gabapentin 100 MG Oral Cap, Take 100 mg by mouth 3 (three) times daily. , Disp: , Rfl: , 2/8/2020 at 0900  Desvenlafaxine Succinate  MG Oral Tablet 24 Hr, Take 150 mg by mouth daily.   , Disp: , Rfl: , 2/8/2020 at 0900  Budesonide-Formoterol Fu Taking  Doxycycline Monohydrate 100 MG Oral Cap, Take 100 mg by mouth 2 (two) times daily. NOT TAKING , Disp: , Rfl: , Not Taking  QUEtiapine Fumarate 50 MG Oral Tab, Take 50 mg by mouth nightly.  Take with 100mg tablet of seroquel to equal 150mg , Disp: , Fumarate ER (SEROQUEL XR) 24-hr tab 50 mg, 50 mg, Oral, QPM, Cindi Gerard MD, 50 mg at 02/13/20 1758  ipratropium-albuterol (DUONEB) nebulizer solution 3 mL, 3 mL, Nebulization, Q4H WA (4 times daily), Harshal Park MD, 3 mL at 02/14/20 8371  Senna (S (MILK OF MAGNESIA) 400 MG/5ML suspension 30 mL, 30 mL, Oral, Daily PRN, Huber Novak MD  bisacodyl (DULCOLAX) rectal suppository 10 mg, 10 mg, Rectal, Daily PRN, Huber Novak MD  Fleet Enema (FLEET) 7-19 GM/118ML enema 133 mL, 1 enema, Rectal, Once P file      Highest education level: Not on file    Occupational History      Not on file    Social Needs      Financial resource strain: Not on file      Food insecurity:        Worry: Not on file        Inability: Not on file      Transportation needs: Asked        Grew up on a farm: Not Asked        History of tanning: No        Outdoor occupation: Not Asked        Pt has a pacemaker: No        Pt has a defibrillator: No        Breast feeding: Not Asked        Reaction to local anesthetic: No        Lef reviewed and Nursing notes reviewed    Airway   Dental      Pulmonary    (+) pneumonia, COPD, shortness of breath, sleep apnea,     ROS comment: Recent respiratory failure due to influenza A, former smoker  Cardiovascular   Exercise tolerance: poor  (+) hy

## 2020-02-14 NOTE — OCCUPATIONAL THERAPY NOTE
OCCUPATIONAL THERAPY EVALUATION - INPATIENT     Room Number: 544/544-A  Evaluation Date: 2/14/2020  Type of Evaluation: Initial  Presenting Problem: (Influenza )    Physician Order: IP Consult to Occupational Therapy  Reason for Therapy: ADL/IADL Dysfuncti education;Patient/Family training;Equipment eval/education; Compensatory technique education       OCCUPATIONAL THERAPY MEDICAL/SOCIAL HISTORY     Problem List  Principal Problem:    Influenza  Active Problems:    Acute respiratory failure with hypoxia ([facility] [address] Shortness of breath    • Sleep apnea    • stent placement     cerebral   • Thyroid disease    • Toe pain     Onychomycosis, Debridement , Hammertoe    • Tonsillitis 1950    Tonsillitis    • Unspecified essential hypertension    • Unspecified sleep apnea 08/21/2019    Left leg debridement, VAC placed   • SKIN GRAFT SPLIT THICKNESS Left 10/8/2019    Performed by Rosalio Dominguez MD at [facility] OR   • SPLIT GRFT [address] <100SQCM Left 10/08/2019    Left leg debridement and Split Thickness Skin Graft Motor: WFL   Fine Motor: WFL     ACTIVITIES OF DAILY LIVING ASSESSMENT  AM-PAC ‘6-Clicks’ Inpatient Daily Activity Short Form  How much help from another person does the patient currently need…  -   Putting on and taking off regular lower body clothing?: A

## 2020-02-14 NOTE — PROGRESS NOTES
[facility]    Progress Note    Severa Sable Patient Status:  Inpatient    12/3/1940 MRN [id number]   [facility] END[facility] LAB SUITES Attending Jaquelin Rojas, [address] [facility] Day # 5 PCP MD Mumtaz Jon edema, redness or tenderness in the calves or thighs  Neurologic: Grossly normal    Assessment and Plan:     Influenza  Continue current management      Acute respiratory failure with hypoxia [facility])  Patient underwent bronchoscopy today with evacuation of c

## 2020-02-14 NOTE — PLAN OF CARE
Pt has been hourly monitored and has been sleeping throughout the night without interruptions. Pt states \"I had a very good night. I slept well. \"     During the night, Pt has not been voiding. Bladder scan reads 709cc.  This RN explained to the Pt that sh oxygenation  Description  INTERVENTIONS:  - Assess for changes in respiratory status  - Assess for changes in mentation and behavior  - Position to facilitate oxygenation and minimize respiratory effort  - Oxygen supplementation based on oxygen saturation emptying  Outcome: Progressing     Problem: METABOLIC/FLUID AND ELECTROLYTES - ADULT  Goal: Electrolytes maintained within normal limits  Description  INTERVENTIONS:  - Monitor labs and rhythm and assess patient for signs and symptoms of electrolyte imbala Progressing     Problem: NEUROLOGICAL - ADULT  Goal: Achieves maximal functionality and self care  Description  INTERVENTIONS  - Monitor swallowing and airway patency with patient fatigue and changes in neurological status  - Encourage and assist patient t

## 2020-02-14 NOTE — PLAN OF CARE
Problem: Patient Centered Care  Goal: Patient preferences are identified and integrated in the patient's plan of care  Description  Interventions:  - What would you like us to know as we care for you?  Patient here for flu A, low sats in ER, fever on admi assessment  - Modify environment to reduce risk of injury  - Provide assistive devices as appropriate  - Consider OT/PT consult to assist with strengthening/mobility  - Encourage toileting schedule  Outcome: Progressing     Problem: 98 Yamila Harris antiarrhythmic and heart rate control medications as ordered  - Initiate emergency measures for life threatening arrhythmias  - Monitor electrolytes and administer replacement therapy as ordered  Outcome: Progressing     Problem: RESPIRATORY - ADULT  Goal: antiemetic medications  - Provide nonpharmacologic comfort measures as appropriate  - Advance diet as tolerated, if ordered  - Obtain nutritional consult as needed  - Evaluate fluid balance  Outcome: Progressing  Goal: Maintains or returns to baseline lusi armando volume status, including labs, urine output, blood pressure (other measures as available)  - Encourage oral intake as appropriate  - Instruct patient on fluid and nutrition restrictions as appropriate  Outcome: Progressing     Problem: SKIN/TISSUE INTEGRIT mobility and gait  - Educate and engage patient/family in tolerated activity level and precautions  - Recommend use of  RW for transfers and ambulation  Outcome: Progressing     Problem: Impaired Activities of Daily Living  Goal: Achieve highest/safest lev

## 2020-02-14 NOTE — PROGRESS NOTES
S: Patient expressed that she is grateful to have procedure tomorrow. She then looks forward to returning home. O: Patient was glad for a visit and happy to talk about her life and accomplishments.    A:  provided active listening and spiritual c

## 2020-02-14 NOTE — PROGRESS NOTES
Clifford Venegas is a 78year old   female lives in [address] [place] [address] with history of COPD, hypothyroidism, HTN, depression and borderline personality who presented to the hospital with shortness of breath and found with influenza.   The patient seen • L4-5 mild-mod diffuse bulging disc 10/31/2014   • L4-5 slight grade 1 unstable spondylolisthesis 10/31/2014   • Leg wound, left 08/21/2019   • Low back pain    • Migraines    • Muscle weakness    • Onychomycosis     Debridement    • Oropharyngeal dysph ENDOSCOPY   • EXTREMITY LOWER IRRIGATION & DEBRIDEMENT Left 10/8/2019    Performed by Fiorella No MD at [address] [place] [address] OR   • Alyssabury Left 8/21/2019    Performed by Fiorella No MD at [address] [place] [address] OR   • EYE SURG Cigarettes        Start date: 1952        Quit date: 1977        Years since quittin.1      Smokeless tobacco: Never Used      Tobacco comment: max 2.5 pk/dy, aver 2 pk/dy    Alcohol use: No      Alcohol/week: 0.0 standard drinks    Drug use: mg, 4 mg, Intravenous, Q6H PRN  Metoclopramide HCl (REGLAN) injection 5 mg, 5 mg, Intravenous, Q8H PRN        Allergies:    Bactrim [Sulfametho*    RASH  Ciprofloxacin           RASH  Depakote                OTHER (SEE COMMENTS)    Comment:pancreatitis  Fl feeling depressed, overwhelmed and stressed. The patient denied any auditory or visual hallucination. The patient denied any homicidal or suicidal ideation. Patient exhibited dysphoric affect congruent with the mood. Cognition is impaired.   Judgment an

## 2020-02-14 NOTE — PLAN OF CARE
O2 saturation 94% on room air at rest after bronchoscopy. When ambulated to bathroom lowest o2 sat was 92% and went up quickly to 98% when returned to bed. Heart rate increased to 126 with bathroom but also recovered quickly to 96.   Pt uses a scooter at

## 2020-02-14 NOTE — ANESTHESIA POSTPROCEDURE EVALUATION
Patient: Carly Pizano    Procedure Summary     Date:  02/14/20 Room / Location:  [address] [place] [address] ENDOSCOPY 05 / [address] [place] [address] ENDOSCOPY    Anesthesia Start:  0662 Anesthesia Stop:  8889    Procedure:  BRONCHOSCOPY (N/A ) Diagnosis:  (BRONCHITIS, MUCOSE PLUG)    Surgeon:  Sadie Garcia

## 2020-02-15 LAB
ANION GAP SERPL CALC-SCNC: 4 MMOL/L (ref 0–18)
BASOPHILS # BLD AUTO: 0.04 X10(3) UL (ref 0–0.2)
BASOPHILS NFR BLD AUTO: 0.4 %
BUN BLD-MCNC: 40 MG/DL (ref 7–18)
BUN/CREAT SERPL: 35.4 (ref 10–20)
CALCIUM BLD-MCNC: 9.6 MG/DL (ref 8.5–10.1)
CHLORIDE SERPL-SCNC: 107 MMOL/L (ref 98–112)
CO2 SERPL-SCNC: 29 MMOL/L (ref 21–32)
CREAT BLD-MCNC: 1.13 MG/DL (ref 0.55–1.02)
DEPRECATED RDW RBC AUTO: 48.1 FL (ref 35.1–46.3)
EOSINOPHIL # BLD AUTO: 0 X10(3) UL (ref 0–0.7)
EOSINOPHIL NFR BLD AUTO: 0 %
ERYTHROCYTE [DISTWIDTH] IN BLOOD BY AUTOMATED COUNT: 14.4 % (ref 11–15)
GLUCOSE BLD-MCNC: 247 MG/DL (ref 70–99)
HCT VFR BLD AUTO: 37.6 % (ref 35–48)
HGB BLD-MCNC: 12.1 G/DL (ref 12–16)
IMM GRANULOCYTES # BLD AUTO: 0.37 X10(3) UL (ref 0–1)
IMM GRANULOCYTES NFR BLD: 4 %
LYMPHOCYTES # BLD AUTO: 0.92 X10(3) UL (ref 1–4)
LYMPHOCYTES NFR BLD AUTO: 10 %
MCH RBC QN AUTO: 29.2 PG (ref 26–34)
MCHC RBC AUTO-ENTMCNC: 32.2 G/DL (ref 31–37)
MCV RBC AUTO: 90.8 FL (ref 80–100)
MONOCYTES # BLD AUTO: 0.27 X10(3) UL (ref 0.1–1)
MONOCYTES NFR BLD AUTO: 2.9 %
NEUTROPHILS # BLD AUTO: 7.61 X10 (3) UL (ref 1.5–7.7)
NEUTROPHILS # BLD AUTO: 7.61 X10(3) UL (ref 1.5–7.7)
NEUTROPHILS NFR BLD AUTO: 82.7 %
OSMOLALITY SERPL CALC.SUM OF ELEC: 308 MOSM/KG (ref 275–295)
PLATELET # BLD AUTO: 160 10(3)UL (ref 150–450)
POTASSIUM SERPL-SCNC: 4.9 MMOL/L (ref 3.5–5.1)
PROCALCITONIN SERPL-MCNC: 0.1 NG/ML
RBC # BLD AUTO: 4.14 X10(6)UL (ref 3.8–5.3)
SODIUM SERPL-SCNC: 140 MMOL/L (ref 136–145)
WBC # BLD AUTO: 9.2 X10(3) UL (ref 4–11)

## 2020-02-15 RX ORDER — VANCOMYCIN 2 GRAM/500 ML IN 0.9 % SODIUM CHLORIDE INTRAVENOUS
25 ONCE
Status: COMPLETED | OUTPATIENT
Start: 2020-02-15 | End: 2020-02-15

## 2020-02-15 RX ORDER — VANCOMYCIN HYDROCHLORIDE
15 EVERY 24 HOURS
Status: DISCONTINUED | OUTPATIENT
Start: 2020-02-16 | End: 2020-02-19

## 2020-02-15 NOTE — PLAN OF CARE
Pt states she has not received her nightly Seroquel. When explained to Pt that she has already received it, pt states \"I did not and I always look at my medications before taking them. \"     Pt fell asleep at midnight and slept throughout the night.    Anna Freeman respiratory status  - Assess for changes in mentation and behavior  - Position to facilitate oxygenation and minimize respiratory effort  - Oxygen supplementation based on oxygen saturation or ABGs  - Encourage broncho-pulmonary hygiene including cough, de appropriate  Outcome: Progressing  Goal: Hemodynamic stability and optimal renal function maintained  Description  INTERVENTIONS:  - Monitor labs and assess for signs and symptoms of volume excess or deficit  - Monitor intake, output and patient weight  - self care  Description  Interventions:  - Assess ability and encourage patient to participate in ADLs to maximize function  - Promote sitting position while performing ADLs such as feeding, grooming, and bathing  - Educate and encourage patient/family in t

## 2020-02-15 NOTE — PLAN OF CARE
Problem: Patient Centered Care  Goal: Patient preferences are identified and integrated in the patient's plan of care  Description  Interventions:  - What would you like us to know as we care for you?  Patient here for flu A, low sats in ER, fever on admi assessment  - Modify environment to reduce risk of injury  - Provide assistive devices as appropriate  - Consider OT/PT consult to assist with strengthening/mobility  - Encourage toileting schedule  Outcome: Progressing     Problem: 98 Yamila Harris antiarrhythmic and heart rate control medications as ordered  - Initiate emergency measures for life threatening arrhythmias  - Monitor electrolytes and administer replacement therapy as ordered  Outcome: Progressing     Problem: RESPIRATORY - ADULT  Goal: intermittent suction as ordered  - Evaluate effectiveness of ordered antiemetic medications  - Provide nonpharmacologic comfort measures as appropriate  - Advance diet as tolerated, if ordered  - Obtain nutritional consult as needed  - Evaluate fluid jonas indicated or ordered  - Monitor response to interventions for patient's volume status, including labs, urine output, blood pressure (other measures as available)  - Encourage oral intake as appropriate  - Instruct patient on fluid and nutrition restriction ability and stability  - Promote increasing activity/tolerance for mobility and gait  - Educate and engage patient/family in tolerated activity level and precautions  Ambulating to bathroom with walker and standby assist.  Outcome: Progressing     Problem:

## 2020-02-15 NOTE — PROGRESS NOTES
[place] [facility]    Progress Note    Joo Vides Patient Status:  Inpatient    12/3/1940 MRN [id number]   Location [facility] 5SW/SE Attending Alvaro Mendoza, [address] [facility] Day # 6 PCP Keisha Santos MD       Subjective:   Annalise Jones edema, redness or tenderness in the calves or thighs  Neurologic: Grossly normal    Assessment and Plan:     Influenza  Complete Tamiflu      Acute respiratory failure with hypoxia (HCC)  Discontinue Rocephin and start vancomycin due to new identification

## 2020-02-15 NOTE — PROGRESS NOTES
[facility] Dosing Service    Initial Pharmacokinetic Consult for Vancomycin Dosing     Rayshawn Ireland is a 78year old female who is being treated for pneumonia.   Pharmacy has been asked to dose Vancomycin by Dr. Treadwell Six    She is allergic to bactrim [sul

## 2020-02-15 NOTE — PROGRESS NOTES
Karmen Sue is a 78year old   female lives in [address] [place] [address] with history of COPD, hypothyroidism, HTN, depression and borderline personality who presented to the hospital with shortness of breath and found with influenza.   The patient seen Hearing impairment     Bilateral hearing aids   • High blood pressure    • Hip pain, left    • History of blood clots     Leg   • Hypothyroidism    • Incontinence    • L3-4 stable slight grade 1 retrolisthesis 10/31/2014   • L4-5 mild-mod diffuse bulging d COLONOSCOPY  2010   • DEBRIDEMENT OF NAIL(S), 1-5      Toe, Onychomycosis   • EGD  05/2019    Gastric polyps only   • EGD  2006   • ESOPHAGOGASTRODUODENOSCOPY (EGD) N/A 5/17/2019    Performed by Dorlene Gilford, MD at [place] [address] Heart Disease Brother         Coronary Artery Disease       Social History: Social History    Tobacco Use      Smoking status: Former Smoker        Packs/day: 2.00        Years: 25.00        Pack years: 50        Types: Cigarettes        Start date: 1/1/19 30 mL, Oral, Daily PRN  bisacodyl (DULCOLAX) rectal suppository 10 mg, 10 mg, Rectal, Daily PRN  Fleet Enema (FLEET) 7-19 GM/118ML enema 133 mL, 1 enema, Rectal, Once PRN  ondansetron HCl (ZOFRAN) injection 4 mg, 4 mg, Intravenous, Q6H PRN  Metoclopramide cooperative. Patient exhibited appropriate cognition and orientation. The patient admitted that she has been feeling depressed, overwhelmed and stressed. The patient denied any auditory or visual hallucination.   The patient denied any homicidal or suici Quantitativ      AFB CULTURE AND SMEAR      FUNGUS CULTURE AND SMEAR      AFB CULTURE(P) Once      AFB Smear Once      FUNGUS CULTURE(P) Once      Fungus Stain Once      Meds This Visit:  Requested Prescriptions      No prescriptions requested or ordered i

## 2020-02-16 LAB
ANION GAP SERPL CALC-SCNC: 6 MMOL/L (ref 0–18)
BASOPHILS # BLD: 0 X10(3) UL (ref 0–0.2)
BASOPHILS NFR BLD: 0 %
BUN BLD-MCNC: 37 MG/DL (ref 7–18)
BUN/CREAT SERPL: 37 (ref 10–20)
CALCIUM BLD-MCNC: 9.4 MG/DL (ref 8.5–10.1)
CHLORIDE SERPL-SCNC: 108 MMOL/L (ref 98–112)
CO2 SERPL-SCNC: 26 MMOL/L (ref 21–32)
CREAT BLD-MCNC: 1 MG/DL (ref 0.55–1.02)
DEPRECATED RDW RBC AUTO: 48.2 FL (ref 35.1–46.3)
EOSINOPHIL # BLD: 0 X10(3) UL (ref 0–0.7)
EOSINOPHIL NFR BLD: 0 %
ERYTHROCYTE [DISTWIDTH] IN BLOOD BY AUTOMATED COUNT: 14.5 % (ref 11–15)
GLUCOSE BLD-MCNC: 231 MG/DL (ref 70–99)
HCT VFR BLD AUTO: 38 % (ref 35–48)
HGB BLD-MCNC: 12.2 G/DL (ref 12–16)
LYMPHOCYTES NFR BLD: 1.13 X10(3) UL (ref 1–4)
LYMPHOCYTES NFR BLD: 13 %
MCH RBC QN AUTO: 29.1 PG (ref 26–34)
MCHC RBC AUTO-ENTMCNC: 32.1 G/DL (ref 31–37)
MCV RBC AUTO: 90.7 FL (ref 80–100)
MONOCYTES # BLD: 0.35 X10(3) UL (ref 0.1–1)
MONOCYTES NFR BLD: 4 %
MORPHOLOGY: NORMAL
NEUTROPHILS # BLD AUTO: 6.96 X10 (3) UL (ref 1.5–7.7)
NEUTROPHILS NFR BLD: 78 %
NEUTS BAND NFR BLD: 5 %
NEUTS HYPERSEG # BLD: 7.22 X10(3) UL (ref 1.5–7.7)
NRBC BLD MANUAL-RTO: 1 %
OSMOLALITY SERPL CALC.SUM OF ELEC: 306 MOSM/KG (ref 275–295)
PLATELET # BLD AUTO: 159 10(3)UL (ref 150–450)
PLATELET MORPHOLOGY: NORMAL
POTASSIUM SERPL-SCNC: 5 MMOL/L (ref 3.5–5.1)
POTASSIUM SERPL-SCNC: 5.2 MMOL/L (ref 3.5–5.1)
RBC # BLD AUTO: 4.19 X10(6)UL (ref 3.8–5.3)
SODIUM SERPL-SCNC: 140 MMOL/L (ref 136–145)
TOTAL CELLS COUNTED: 100
WBC # BLD AUTO: 8.7 X10(3) UL (ref 4–11)

## 2020-02-16 RX ORDER — LINEZOLID 600 MG/1
600 TABLET, FILM COATED ORAL 2 TIMES DAILY
Qty: 20 TABLET | Refills: 0 | Status: SHIPPED | OUTPATIENT
Start: 2020-02-17 | End: 2020-10-12

## 2020-02-16 NOTE — CONSULTS
Sherman Tovar is a 78year old female. Patient presents with:  Altered Mental Status  Fever      HPI:    Never gets flu shots  Quit cigs ~20 yrs ago    REVIEW OF SYSTEMS:   A comprehensive 11 point review of systems was completed.   Pertinent positives Incontinence    • L3-4 stable slight grade 1 retrolisthesis 10/31/2014   • L4-5 mild-mod diffuse bulging disc 10/31/2014   • L4-5 slight grade 1 unstable spondylolisthesis 10/31/2014   • Leg wound, left 08/21/2019   • Low back pain    • Migraines    • Musc ESOPHAGOGASTRODUODENOSCOPY (EGD) N/A 5/17/2019    Performed by Corinne Shire, MD at [address] [place] [address] Left 10/8/2019    Performed by Fiorella No MD at [place] [address] deficits. DERM:  Warm, dry, no rashes. IV Site: ok    IMPRESSION/PLAN:     1. Suspect influenza then secondary mrsa pneumonitis rx vanc here  2. Not needing 02 a positive sign so will check on zyvox po x 10-14 days  3. Hold remeron when zyvox started  4. Urobilinogen Urine <2.0 <2.0    Nitrite Urine Negative Negative    Leukocyte Esterase Urine Negative Negative    Microscopic Microscopic not indicated    LACTIC ACID, PLASMA   Result Value Ref Range    Lactic Acid 2.3 (H) 0.4 - 2.0 mmol/L   LACTIC ACID 3 H - 99 mg/dL    Sodium 141 136 - 145 mmol/L    Potassium 4.8 3.5 - 5.1 mmol/L    Chloride 110 98 - 112 mmol/L    CO2 27.0 21.0 - 32.0 mmol/L    Anion Gap 4 0 - 18 mmol/L    BUN 32 (H) 7 - 18 mg/dL    Creatinine 1.02 0.55 - 1.02 mg/dL    BUN/CREA Ratio 31.4 ( Chloride 108 98 - 112 mmol/L    CO2 26.0 21.0 - 32.0 mmol/L    Anion Gap 6 0 - 18 mmol/L    BUN 37 (H) 7 - 18 mg/dL    Creatinine 1.00 0.55 - 1.02 mg/dL    BUN/CREA Ratio 37.0 (H) 10.0 - 20.0    Calcium, Total 9.4 8.5 - 10.1 mg/dL    Calculated Osmolality This is a cytocentrifuged smear.         Susceptibility    Staphylococcus aureus, MRSA -  (no method available)     Cefazolin  Resistant      Clindamycin >=8 Resistant      Erythromycin >=8 Resistant      Gentamicin <=0.5 Sensitive      Levofloxacin >=8 Res x10(3) uL    Monocyte Absolute 0.39 0.10 - 1.00 x10(3) uL    Eosinophil Absolute 0.17 0.00 - 0.70 x10(3) uL    Basophil Absolute 0.03 0.00 - 0.20 x10(3) uL    Immature Granulocyte Absolute 0.02 0.00 - 1.00 x10(3) uL    Neutrophil % 44.2 %    Lymphocyte % 4 87.2 %    Lymphocyte % 9.3 %    Monocyte % 2.9 %    Eosinophil % 0.0 %    Basophil % 0.1 %    Immature Granulocyte % 0.5 %   CBC W/ DIFFERENTIAL   Result Value Ref Range    WBC 13.2 (H) 4.0 - 11.0 x10(3) uL    RBC 4.11 3.80 - 5.30 x10(6)uL    HGB 11.8 (L) 5.30 x10(6)uL    HGB 12.2 12.0 - 16.0 g/dL    HCT 38.0 35.0 - 48.0 %    MCV 90.7 80.0 - 100.0 fL    MCH 29.1 26.0 - 34.0 pg    MCHC 32.1 31.0 - 37.0 g/dL    RDW-SD 48.2 (H) 35.1 - 46.3 fL    RDW 14.5 11.0 - 15.0 %    .0 150.0 - 450.0 10(3)uL    Neut

## 2020-02-16 NOTE — PROGRESS NOTES
[facility] [facility]    Progress Note    Ltanya Hum Patient Status:  Inpatient    12/3/1940 MRN [id number]   Location [facility] 5SW/SE Attending Radha Spaulding, [address] [facility] Day # 7 PCP Greyson Quezada MD       Subjective:   Anastacia Kee normal    Assessment and Plan:     Influenza  Continue current treatment      Acute respiratory failure with hypoxia ([facility] [address]  She was found to have MRSA now on vancomycin IV  Infectious diseases to master antibiotics  Continue steroids and nebulizers      Se

## 2020-02-16 NOTE — PLAN OF CARE

## 2020-02-16 NOTE — PLAN OF CARE

## 2020-02-17 LAB
ANION GAP SERPL CALC-SCNC: 5 MMOL/L (ref 0–18)
BASOPHILS # BLD: 0 X10(3) UL (ref 0–0.2)
BASOPHILS NFR BLD: 0 %
BUN BLD-MCNC: 36 MG/DL (ref 7–18)
BUN/CREAT SERPL: 35 (ref 10–20)
CALCIUM BLD-MCNC: 9.2 MG/DL (ref 8.5–10.1)
CHLORIDE SERPL-SCNC: 106 MMOL/L (ref 98–112)
CO2 SERPL-SCNC: 29 MMOL/L (ref 21–32)
CREAT BLD-MCNC: 1.03 MG/DL (ref 0.55–1.02)
DEPRECATED RDW RBC AUTO: 46.8 FL (ref 35.1–46.3)
EOSINOPHIL # BLD: 0 X10(3) UL (ref 0–0.7)
EOSINOPHIL NFR BLD: 0 %
ERYTHROCYTE [DISTWIDTH] IN BLOOD BY AUTOMATED COUNT: 14.5 % (ref 11–15)
GLUCOSE BLD-MCNC: 253 MG/DL (ref 70–99)
HCT VFR BLD AUTO: 38.2 % (ref 35–48)
HGB BLD-MCNC: 12.2 G/DL (ref 12–16)
LYMPHOCYTES NFR BLD: 1.08 X10(3) UL (ref 1–4)
LYMPHOCYTES NFR BLD: 10 %
MCH RBC QN AUTO: 28.6 PG (ref 26–34)
MCHC RBC AUTO-ENTMCNC: 31.9 G/DL (ref 31–37)
MCV RBC AUTO: 89.7 FL (ref 80–100)
MONOCYTES # BLD: 0.43 X10(3) UL (ref 0.1–1)
MONOCYTES NFR BLD: 4 %
MORPHOLOGY: NORMAL
NEUTROPHILS # BLD AUTO: 8.38 X10 (3) UL (ref 1.5–7.7)
NEUTROPHILS NFR BLD: 86 %
NEUTS HYPERSEG # BLD: 9.29 X10(3) UL (ref 1.5–7.7)
OSMOLALITY SERPL CALC.SUM OF ELEC: 307 MOSM/KG (ref 275–295)
PLATELET # BLD AUTO: 186 10(3)UL (ref 150–450)
PLATELET MORPHOLOGY: NORMAL
POTASSIUM SERPL-SCNC: 5 MMOL/L (ref 3.5–5.1)
PROCALCITONIN SERPL-MCNC: 0.09 NG/ML
RBC # BLD AUTO: 4.26 X10(6)UL (ref 3.8–5.3)
SODIUM SERPL-SCNC: 140 MMOL/L (ref 136–145)
TOTAL CELLS COUNTED: 100
WBC # BLD AUTO: 10.8 X10(3) UL (ref 4–11)

## 2020-02-17 PROCEDURE — 99233 SBSQ HOSP IP/OBS HIGH 50: CPT | Performed by: OTHER

## 2020-02-17 RX ORDER — METHYLPREDNISOLONE SODIUM SUCCINATE 40 MG/ML
30 INJECTION, POWDER, LYOPHILIZED, FOR SOLUTION INTRAMUSCULAR; INTRAVENOUS EVERY 12 HOURS
Status: DISCONTINUED | OUTPATIENT
Start: 2020-02-17 | End: 2020-02-19

## 2020-02-17 NOTE — PROGRESS NOTES
[facility]    Progress Note    Elina Perdue Patient Status:  Inpatient    12/3/1940 MRN [id number]   Location [facility] 5SW/SE Attending Nabeel Manriquez, [address] [facility] Day # 8 PCP Michael Ardon MD       Subjective:   Florence Fung Influenza  Continue current treatment      Acute respiratory failure with hypoxia (HCC)  Supplemental O2      Severe depressed bipolar I disorder without psychotic features [facility])  Per psychiatry  Pneumonia/bronchitis-continue vancomycin    COPD-reduce Solu

## 2020-02-17 NOTE — PROGRESS NOTES
[facility] AND CLINICS  [facility] Infectious Disease  Progress Note    Shira Akersmonica Patient Status:  Inpatient    12/3/1940 MRN [id number]   Location [facility] 5SW/SE Attending Jose Sender, [address] [facility] Day # 8 PCP Brittany Syed MD     [facility] exacerbation  - At 10K today and normalized    4. Disposition - inpatient. Continue IV vancomycin while here and will try for linezolid 600mg p.o. BID x 10 days at discharge. Remeron will need to be held x 10 days.   Will follow with further recommendati

## 2020-02-17 NOTE — PROGRESS NOTES
[facility] Hospitalist Progress Note     PCP: Maryanne Newton MD    Chief Complaint: follow-up    SUBJECTIVE:  - on iv vanco  - pct remains negative  - cont to cough, very immobile, only walking to the bathroom    OBJECTIVE:  Temp:  [97.9 °F (36.6 °C)-98.6 DBIL, TPROT    No results for input(s): PGLU in the last 168 hours. No results for input(s): TROP in the last 168 hours.       Meds:     • MethylPREDNISolone Sodium Succ  32 mg Intravenous Q12H   • vancomycin  15 mg/kg (Adjusted) Intravenous Q24H   • QUE care    Sepsis - resolved    Leukocytosis  - likely 2/2 RSV/PNA/Steroids - normalized       MDD/ anxiety  - per PCP notes, recent inpatient psych admit- f/u Pauline Plane  - pt has been belligerent towards staff earlier in the admission  - psych consulted, s

## 2020-02-17 NOTE — OCCUPATIONAL THERAPY NOTE
OCCUPATIONAL THERAPY TREATMENT NOTE - INPATIENT        Room Number: 102/114-W           Presenting Problem: (Influenza )    Problem List  Principal Problem:    Influenza  Active Problems:    SHANT (obstructive sleep apnea)    COPD (chronic obstructive pulmon taking off regular lower body clothing?: A Little  -   Bathing (including washing, rinsing, drying)?: A Little  -   Toileting, which includes using toilet, bedpan or urinal? : A Little  -   Putting on and taking off regular upper body clothing?: A Little

## 2020-02-17 NOTE — PROGRESS NOTES
Dany Madsen is a 78year old   female lives in [address] [place] [address] with history of COPD, hypothyroidism, HTN, depression and borderline personality who presented to the hospital with shortness of breath and found with influenza.   The patient seen • Low back pain    • Migraines    • Muscle weakness    • Onychomycosis     Debridement    • Oropharyngeal dysphagia 8/29/2017   • Osteoarthrosis, unspecified whether generalized or localized, unspecified site    • Other and unspecified hyperlipidemia 10/8/2019    Performed by Rosalio Dominguez MD at [facility] OR   • Alen Left 8/21/2019    Performed by Rosalio Dominguez MD at [facility] OR   • EYE SURGERY      x2 FOR \"CROSSED EYES\"   • HIP REPLACEMENT SURGERY 1977        Years since quittin.1      Smokeless tobacco: Never Used      Tobacco comment: max 2.5 pk/dy, aver 2 pk/dy    Alcohol use: No      Alcohol/week: 0.0 standard drinks    Drug use: No       Medications (Active prior to today's visit):  me RASH  Ciprofloxacin           RASH  Depakote                OTHER (SEE COMMENTS)    Comment:pancreatitis  Fluocinolone            OTHER (SEE COMMENTS)    Comment:Unknown  Gabitril [Tiagabine]    OTHER (SEE COMMENTS)    Comment:Stroke like symptoms insight are impaired. ASSESSMENT/PLAN:     Bipolar disorder type I recent episode depression, severe without psychotic features. Generalized anxiety disorder.   Borderline personality.     The patient with history of bipolar disorder and multiple pre Visit:  Requested Prescriptions     Signed Prescriptions Disp Refills   • linezolid 600 MG Oral Tab 20 tablet 0     Sig: Take 1 tablet (600 mg total) by mouth 2 (two) times daily.          2/17/2020  Kindra Fernandes MD

## 2020-02-17 NOTE — CONSULTS
[facility]    PATIENT'S NAME: Azeb CALDERON   ATTENDING PHYSICIAN: Lyle Odonnell DO   CONSULTING PHYSICIAN: Erich Barriga.  Jake Mercado MD   PATIENT ACCOUNT#:   [id number]    LOCATION:  [address] [place] [address] #:   [id number]       DATE OF LESIA Diminished breath sounds. A  few bibasilar rhonchi. HEART:  A 1 to 2 over 6 systolic ejection murmur. ABDOMEN:  Overweight, nontender. No masses, rebound, or organomegaly. EXTREMITIES:  Lower extremity edema. SKIN:  No rash is noted.      LABORATORY

## 2020-02-17 NOTE — CM/SW NOTE
Updates sent to [facility] via ISR/ [employer].  F2F in.    OLEKSANDR Fish, [facility]  Social Work   YEO:#[id number]

## 2020-02-18 PROCEDURE — 99233 SBSQ HOSP IP/OBS HIGH 50: CPT | Performed by: OTHER

## 2020-02-18 RX ORDER — FLUCONAZOLE 200 MG/1
100 TABLET ORAL DAILY
Status: DISCONTINUED | OUTPATIENT
Start: 2020-02-18 | End: 2020-02-18

## 2020-02-18 NOTE — PROGRESS NOTES
[facility] Hospitalist Progress Note     PCP: Vitor Beaulieu MD    Chief Complaint: follow-up    SUBJECTIVE:  - feels better, biggest complaint is insomnia which is chronic  - she isn't coughing as much  - no wheezing    OBJECTIVE:  Temp:  [97.6 °F (36.4 ° DBIL, TPROT    No results for input(s): PGLU in the last 168 hours. No results for input(s): TROP in the last 168 hours.       Meds:     • fluconazole  100 mg Oral Daily   • MethylPREDNISolone Sodium Succ  32 mg Intravenous Q12H   • vancomycin  15 mg/kg resolved    Leukocytosis 2/2 RSV/PNA/Steroids - normalized       MDD/ anxiety  - per PCP notes, recent inpatient psych admit- f/u Mahad Clark  - pt has been belligerent towards staff earlier in the admission  - psych consulted, she has improved w/ medicati

## 2020-02-18 NOTE — PLAN OF CARE
Plan of care reviewed with Neisha Nugent. Patient states she is feeling better today. She is up to the chair with each meal. IV steroids decreased per order. IV vanco continued. Patient states cough has improved. Isolation precautions maintained.  Safety measure for physical needs  - Identify cognitive and physical deficits and behaviors that affect risk of falls.   - [place] fall precautions as indicated by assessment.  - Educate pt/family on patient safety including physical limitations  - Instruct pt to call f Angina  - Evaluate fluid balance, assess for edema, trend weights  Outcome: Progressing  Goal: Absence of cardiac arrhythmias or at baseline  Description  INTERVENTIONS:  - Continuous cardiac monitoring, monitor vital signs, obtain 12 lead EKG if indicated Progressing     Problem: GASTROINTESTINAL - ADULT  Goal: Minimal or absence of nausea and vomiting  Description  INTERVENTIONS:  - Maintain adequate hydration with IV or PO as ordered and tolerated  - Nasogastric tube to low intermittent suction as ordered maintained  Description  INTERVENTIONS:  - Monitor labs and assess for signs and symptoms of volume excess or deficit  - Monitor intake, output and patient weight  - Monitor urine specific gravity, serum osmolarity and serum sodium as indicated or ordered aphasic patients to use assistive/communication devices  Outcome: Progressing     Problem: Impaired Functional Mobility  Goal: Achieve highest/safest level of mobility/gait  Description  Interventions:  - Assess patient's functional ability and stability

## 2020-02-18 NOTE — PROGRESS NOTES
[facility]    Progress Note    Jocelineadeola Fisher Patient Status:  Inpatient    12/3/1940 MRN [id number]   Location [facility] 5SW/SE Attending [facility], [address] [facility] [address] Day # 9 PCP René Robbins MD       Subjective:   Duane Presley (obstructive sleep apnea)  Refused CPAP      COPD (chronic obstructive pulmonary disease) ([facility])  Solu-Medrol 30 twice daily and nebulizers      Bronchitis  MRSA bronchitis-on vancomycin  Candida +3 present-we will start Diflucan due to not resolving respir

## 2020-02-18 NOTE — PLAN OF CARE
Problem: Patient Centered Care  Goal: Patient preferences are identified and integrated in the patient's plan of care  Description  Interventions:  - What would you like us to know as we care for you?  Patient here for flu A, low sats in ER, fever on admi Problem: SAFETY ADULT - FALL  Goal: Free from fall injury  Description  INTERVENTIONS:  - Assess pt frequently for physical needs  - Identify cognitive and physical deficits and behaviors that affect risk of falls.   - [place] fall precautions as indica pain scale  - Administer analgesics based on type and severity of pain and evaluate response  - Implement non-pharmacological measures as appropriate and evaluate response  - Consider cultural and social influences on pain and pain management  - Manage/all oxygenation  Description  INTERVENTIONS:  - Assess for changes in respiratory status  - Assess for changes in mentation and behavior  - Position to facilitate oxygenation and minimize respiratory effort  - Oxygen supplementation based on oxygen saturation retention  Description  INTERVENTIONS:  - Assess patient’s ability to void and empty bladder  - Monitor intake/output and perform bladder scan as needed  - Follow urinary retention protocol/standard of care  - Consider collaborating with pharmacy to review document skin integrity  - Monitor for areas of redness and/or skin breakdown  - Initiate interventions, skin care algorithm/standards of care as needed  2/18/2020 1458 by Shahbaz Glez RN  Outcome: Progressing  2/18/2020 1458 by Shahbaz Glez, RN and gait  - Educate and engage patient/family in tolerated activity level and precautions  - Recommend use of chair position in bed 3 times per day  2/18/2020 1458 by Paris Cole RN  Outcome: Progressing  2/18/2020 1458 by Estefania Goldstein

## 2020-02-18 NOTE — PLAN OF CARE
Problem: Patient Centered Care  Goal: Patient preferences are identified and integrated in the patient's plan of care  Description  Interventions:  - What would you like us to know as we care for you?  Patient here for flu A, low sats in ER, fever on admi assessment  - Modify environment to reduce risk of injury  - Provide assistive devices as appropriate  - Consider OT/PT consult to assist with strengthening/mobility  - Encourage toileting schedule  Outcome: Progressing     Problem: [address] antiarrhythmic and heart rate control medications as ordered  - Initiate emergency measures for life threatening arrhythmias  - Monitor electrolytes and administer replacement therapy as ordered  Outcome: Progressing     Problem: RESPIRATORY - ADULT  Goal: antiemetic medications  - Provide nonpharmacologic comfort measures as appropriate  - Advance diet as tolerated, if ordered  - Obtain nutritional consult as needed  - Evaluate fluid balance  Outcome: Progressing  Goal: Maintains or returns to baseline luis armando volume status, including labs, urine output, blood pressure (other measures as available)  - Encourage oral intake as appropriate  - Instruct patient on fluid and nutrition restrictions as appropriate  Outcome: Progressing     Problem: SKIN/TISSUE INTEGRIT mobility and gait  - Educate and engage patient/family in tolerated activity level and precautions}  Outcome: Progressing     Problem: Impaired Activities of Daily Living  Goal: Achieve highest/safest level of independence in self care  Description  Yas Aguillon

## 2020-02-18 NOTE — PHYSICAL THERAPY NOTE
PHYSICAL THERAPY TREATMENT NOTE - INPATIENT     Room Number: 936/625-W       Presenting Problem: infuenza    Problem List  Principal Problem:    Influenza  Active Problems:    SHANT (obstructive sleep apnea)    COPD (chronic obstructive pulmonary disease) ([facility]        O2 WALK; RA        AM-PAC '6-Clicks' INPATIENT SHORT FORM - BASIC MOBILITY  How much difficulty does the patient currently have. ..  -   Turning over in bed (including adjusting bedclothes, sheets and blankets)?: None   -   Sitting down on and s provided to patient in preparation for discharge.    Goal #5   Current Status  in progress   Goal #6     Goal #6  Current Status

## 2020-02-18 NOTE — PROGRESS NOTES
[facility] AND [facility] Infectious Disease  Progress Note    Tejas Gomez Patient Status:  Inpatient    12/3/1940 MRN [id number]   Location [facility] 5SW/SE Attending Pato Reynolds, [address] [facility] Day # 9 PCP MD Alexandra Porter vancomycin while here and will try for linezolid 600mg p.o. BID x 10 days at discharge. Patient tells me remeron \"isn't working\" and this can be d/c by my view as it interacts with linezolid. OK for d/c home tomorrow with this plan.     Misa Jacobsen

## 2020-02-19 VITALS
BODY MASS INDEX: 37.89 KG/M2 | DIASTOLIC BLOOD PRESSURE: 75 MMHG | WEIGHT: 250 LBS | HEART RATE: 83 BPM | TEMPERATURE: 99 F | SYSTOLIC BLOOD PRESSURE: 149 MMHG | OXYGEN SATURATION: 96 % | HEIGHT: 68 IN | RESPIRATION RATE: 20 BRPM

## 2020-02-19 RX ORDER — PREDNISONE 20 MG/1
20 TABLET ORAL DAILY
Qty: 5 TABLET | Refills: 0 | Status: SHIPPED | OUTPATIENT
Start: 2020-02-25 | End: 2020-03-01

## 2020-02-19 RX ORDER — QUETIAPINE 300 MG/1
300 TABLET, FILM COATED ORAL NIGHTLY
Qty: 30 TABLET | Refills: 0 | Status: SHIPPED | OUTPATIENT
Start: 2020-02-19 | End: 2020-10-06

## 2020-02-19 RX ORDER — DESVENLAFAXINE 50 MG/1
50 TABLET, EXTENDED RELEASE ORAL DAILY
Qty: 30 TABLET | Refills: 0 | Status: SHIPPED | OUTPATIENT
Start: 2020-02-20 | End: 2020-10-12

## 2020-02-19 RX ORDER — PREDNISONE 20 MG/1
40 TABLET ORAL
Qty: 8 TABLET | Refills: 0 | Status: SHIPPED | OUTPATIENT
Start: 2020-02-20 | End: 2020-02-24

## 2020-02-19 RX ORDER — LAMOTRIGINE 25 MG/1
25 TABLET ORAL 2 TIMES DAILY
Qty: 60 TABLET | Refills: 0 | Status: SHIPPED | OUTPATIENT
Start: 2020-02-19 | End: 2020-10-07

## 2020-02-19 RX ORDER — PREDNISONE 20 MG/1
40 TABLET ORAL
Status: DISCONTINUED | OUTPATIENT
Start: 2020-02-19 | End: 2020-02-19

## 2020-02-19 NOTE — PROGRESS NOTES
Miah Vera is a 78year old   female lives in [address] [place] [address] with history of COPD, hypothyroidism, HTN, depression and borderline personality who presented to the hospital with shortness of breath and found with influenza.   The patient seen blood clots     Leg   • Hypothyroidism    • Incontinence    • L3-4 stable slight grade 1 retrolisthesis 10/31/2014   • L4-5 mild-mod diffuse bulging disc 10/31/2014   • L4-5 slight grade 1 unstable spondylolisthesis 10/31/2014   • Leg wound, left 08/21/201 DEBRIDEMENT OF NAIL(S), 1-5      Toe, Onychomycosis   • EGD  05/2019    Gastric polyps only   • EGD  2006   • ESOPHAGOGASTRODUODENOSCOPY (EGD) N/A 5/17/2019    Performed by Eloise Crocker MD at [address] [place] [address] Coronary Artery Disease       Social History: Social History    Tobacco Use      Smoking status: Former Smoker        Packs/day: 2.00        Years: 25.00        Pack years: 50        Types: Cigarettes        Start date: 1/1/1952        Quit date: 1/1/1 7-19 GM/118ML enema 133 mL, 1 enema, Rectal, Once PRN  ondansetron HCl (ZOFRAN) injection 4 mg, 4 mg, Intravenous, Q6H PRN  Metoclopramide HCl (REGLAN) injection 5 mg, 5 mg, Intravenous, Q8H PRN        Allergies:    Bactrim [Sulfametho*    RASH  Ciprofloxa auditory or visual hallucination. The patient denied any homicidal or suicidal ideation. Patient exhibited dysphoric affect congruent with the mood.   Cognition is improving  Judgment and insight are appropriate       ASSESSMENT/PLAN:     Bipolar disorder Visit:  Requested Prescriptions     Signed Prescriptions Disp Refills   • linezolid 600 MG Oral Tab 20 tablet 0     Sig: Take 1 tablet (600 mg total) by mouth 2 (two) times daily.          2/18/2020  Saeid Coker MD

## 2020-02-19 NOTE — CM/SW NOTE
02/19/20 1400   Discharge disposition   Expected discharge disposition Home or Self   Name of Facillity/Home Care/Hospice [address]   Discharge transportation [address] [facility] notified of dc

## 2020-02-19 NOTE — PROGRESS NOTES
Patient dc home. IV removed. Understands to follow up with PCP in 1 week. Patient script for linezolid [facility]- hard script given to pt. All needs met.

## 2020-02-19 NOTE — PROGRESS NOTES
[facility]    Progress Note    Yecenia Norris Patient Status:  Inpatient    12/3/1940 MRN [id number]   Location [facility] 5SW/SE Attending Arabella Rayo, DO   Hosp Day # 10 PCP Sarai Estrada MD       Subjective:   Mar (chronic obstructive pulmonary disease) (HCC)  Prednisone 40 mg p.o. daily for 5 days followed by 20 mg p.o. daily for 5 days      Bronchitis  Antibiotics per infectious disease      Acute respiratory failure with hypoxia ([address]  Stable for discharge      S

## 2020-02-19 NOTE — CM/SW NOTE
NEHEMIAH confirmed with [facility] office, [phone]  that pt is accepted for services at [place].  They are no usin allscripts for this pt so they request a call at [place] to office # or Liaison Ej Gong [phone]  CM notified Ej Gong, [address] that p

## 2020-02-19 NOTE — DISCHARGE SUMMARY
General Medicine Discharge Summary     Patient ID:  Anastasiia Conrad  78year old  12/3/1940    Admit date: 2/9/2020    Discharge date and time:  2/19/2020    Attending Physician: Zoey El DO need to hold remeron while on linezolid      Falls/ chronic weakness/ wheelchair bound  - here from [facility] living - will return at d/c      Hypothyroidism  - synthroid     HTN, stable, resume home meds     Hx of DVTs, cont full AC    Stable for D MCG Oral Tab  TWO TABLETS (250 MCG) BY MOUTH EVERY MORNING    Pantoprazole Sodium 40 MG Oral Tab EC  Take 1 tablet (40 mg total) by mouth every morning before breakfast.    guaiFENesin 100 MG/5ML Oral Solution  Take 100 mg by mouth every 4 (four) hours as

## 2020-02-19 NOTE — PROGRESS NOTES
[facility] AND [facility] Infectious Disease  Progress Note    Jazmine Arnold Patient Status:  Inpatient    12/3/1940 MRN [id number]   Location [facility] 5SW/SE Attending Jamil Rashid DO   Hosp Day # 10 PCP MD Gisselle Acosta  Continue IV vancomycin while here and will give linezolid 600mg p.o. BID x 10 days at discharge. Rebecca Brennan tells me remeron \"isn't working\" and this can be d/c by my view as it interacts with linezolid.   She can f/u with our ID APPs in 1-2 weeks, sooner

## 2020-02-19 NOTE — PROGRESS NOTES
Patient requesting medicar for discharge.      [phone]    [address] ON WILL CALL    PCS form in the chart  Randy Flores [phone]

## 2020-02-20 NOTE — PROGRESS NOTES
I spoke with pharmacist from [facility] pharmacy regarding multiple drug interactions with linezolid. The desvenlafaxine, venlafaxine, and mirtazepine all have interactions with linizolid which will be stopped until she is finished with linezolid therapy.

## 2020-02-27 ENCOUNTER — HOSPITAL ENCOUNTER (EMERGENCY)
Facility: HOSPITAL | Age: 80
Discharge: NURSING, REHAB OR LONG-TERM CARE | End: 2020-02-27
Attending: EMERGENCY MEDICINE
Payer: MEDICARE

## 2020-02-27 ENCOUNTER — APPOINTMENT (OUTPATIENT)
Dept: GENERAL RADIOLOGY | Facility: HOSPITAL | Age: 80
End: 2020-02-27
Attending: EMERGENCY MEDICINE
Payer: MEDICARE

## 2020-02-27 VITALS
HEART RATE: 46 BPM | TEMPERATURE: 98 F | DIASTOLIC BLOOD PRESSURE: 84 MMHG | WEIGHT: 250 LBS | BODY MASS INDEX: 37.89 KG/M2 | OXYGEN SATURATION: 95 % | SYSTOLIC BLOOD PRESSURE: 155 MMHG | RESPIRATION RATE: 16 BRPM | HEIGHT: 68 IN

## 2020-02-27 DIAGNOSIS — R53.83 FATIGUE, UNSPECIFIED TYPE: ICD-10-CM

## 2020-02-27 DIAGNOSIS — S92.512A CLOSED DISPLACED FRACTURE OF PROXIMAL PHALANX OF LESSER TOE OF LEFT FOOT, INITIAL ENCOUNTER: Primary | ICD-10-CM

## 2020-02-27 DIAGNOSIS — R29.6 MULTIPLE FALLS: ICD-10-CM

## 2020-02-27 DIAGNOSIS — S50.02XA CONTUSION OF LEFT ELBOW, INITIAL ENCOUNTER: ICD-10-CM

## 2020-02-27 LAB
ANION GAP SERPL CALC-SCNC: 4 MMOL/L (ref 0–18)
BASOPHILS # BLD AUTO: 0.03 X10(3) UL (ref 0–0.2)
BASOPHILS NFR BLD AUTO: 0.4 %
BUN BLD-MCNC: 21 MG/DL (ref 7–18)
BUN/CREAT SERPL: 22.1 (ref 10–20)
CALCIUM BLD-MCNC: 8.9 MG/DL (ref 8.5–10.1)
CHLORIDE SERPL-SCNC: 110 MMOL/L (ref 98–112)
CO2 SERPL-SCNC: 30 MMOL/L (ref 21–32)
CREAT BLD-MCNC: 0.95 MG/DL (ref 0.55–1.02)
DEPRECATED RDW RBC AUTO: 53 FL (ref 35.1–46.3)
EOSINOPHIL # BLD AUTO: 0.24 X10(3) UL (ref 0–0.7)
EOSINOPHIL NFR BLD AUTO: 2.8 %
ERYTHROCYTE [DISTWIDTH] IN BLOOD BY AUTOMATED COUNT: 15.5 % (ref 11–15)
GLUCOSE BLD-MCNC: 80 MG/DL (ref 70–99)
HCT VFR BLD AUTO: 37.5 % (ref 35–48)
HGB BLD-MCNC: 11.8 G/DL (ref 12–16)
IMM GRANULOCYTES # BLD AUTO: 0.21 X10(3) UL (ref 0–1)
IMM GRANULOCYTES NFR BLD: 2.5 %
LYMPHOCYTES # BLD AUTO: 2.1 X10(3) UL (ref 1–4)
LYMPHOCYTES NFR BLD AUTO: 24.5 %
MCH RBC QN AUTO: 29.4 PG (ref 26–34)
MCHC RBC AUTO-ENTMCNC: 31.5 G/DL (ref 31–37)
MCV RBC AUTO: 93.3 FL (ref 80–100)
MONOCYTES # BLD AUTO: 0.6 X10(3) UL (ref 0.1–1)
MONOCYTES NFR BLD AUTO: 7 %
NEUTROPHILS # BLD AUTO: 5.39 X10 (3) UL (ref 1.5–7.7)
NEUTROPHILS # BLD AUTO: 5.39 X10(3) UL (ref 1.5–7.7)
NEUTROPHILS NFR BLD AUTO: 62.8 %
OSMOLALITY SERPL CALC.SUM OF ELEC: 300 MOSM/KG (ref 275–295)
PLATELET # BLD AUTO: 128 10(3)UL (ref 150–450)
POTASSIUM SERPL-SCNC: 3.7 MMOL/L (ref 3.5–5.1)
RBC # BLD AUTO: 4.02 X10(6)UL (ref 3.8–5.3)
SODIUM SERPL-SCNC: 144 MMOL/L (ref 136–145)
WBC # BLD AUTO: 8.6 X10(3) UL (ref 4–11)

## 2020-02-27 PROCEDURE — 73090 X-RAY EXAM OF FOREARM: CPT | Performed by: EMERGENCY MEDICINE

## 2020-02-27 PROCEDURE — 85025 COMPLETE CBC W/AUTO DIFF WBC: CPT | Performed by: EMERGENCY MEDICINE

## 2020-02-27 PROCEDURE — 73630 X-RAY EXAM OF FOOT: CPT | Performed by: EMERGENCY MEDICINE

## 2020-02-27 PROCEDURE — 80048 BASIC METABOLIC PNL TOTAL CA: CPT | Performed by: EMERGENCY MEDICINE

## 2020-02-27 PROCEDURE — 99285 EMERGENCY DEPT VISIT HI MDM: CPT

## 2020-02-27 PROCEDURE — 96374 THER/PROPH/DIAG INJ IV PUSH: CPT

## 2020-02-27 RX ORDER — KETOROLAC TROMETHAMINE 30 MG/ML
15 INJECTION, SOLUTION INTRAMUSCULAR; INTRAVENOUS ONCE
Status: COMPLETED | OUTPATIENT
Start: 2020-02-27 | End: 2020-02-27

## 2020-02-27 NOTE — ED NOTES
Report given to receiving RN at rehab facility in which PT is being transferred. All questions answered. Number called is listed  note. PT left per cart, awake, alert, appropriate, no issues to note.

## 2020-02-27 NOTE — CM/SW NOTE
[facility] can accept however they need a DON screening. Azalea from [facility] called for screening- VM left.

## 2020-02-27 NOTE — ED NOTES
This RN gerardo taped PT's left foot pinky toe; PT tolerated well. Irrigated wound prior to gerardo taping. CMS intact post gerardo tape.

## 2020-02-27 NOTE — CM/SW NOTE
Ammon Win with [facility] Is completed PASS screen and ready for discharge.      [facility] accepts patient will be going to room:  29    RN to call report to: [address] [facility] Address: [address] [place] [address] [facility] [address] [place] [address]

## 2020-02-27 NOTE — ED INITIAL ASSESSMENT (HPI)
Pt arrived per [facility] from [facility]. Frequent falls with refusal to hospital. Today pt with another fall and pain to left pinky toe. No head injury. Multiple bruises noted to body from multiple falls. Alert and oriented X4 to room.

## 2020-02-27 NOTE — CM/SW NOTE
Referral sent to SNF's, [facility], [facility] and [facility]. Patient has qualifying stay 2/9-2/19. Patient agreeable to SNF placement. Patient does not wish for her kids to be called at this time.      Pt. Has been to [facility] in t

## 2020-02-27 NOTE — CM/SW NOTE
[facility] [phone] PASS screen called.

## 2020-02-27 NOTE — CM/SW NOTE
Radha Gomez from [facility] does have a bed- he is coming to the ER now to see patient.

## 2020-02-27 NOTE — ED PROVIDER NOTES
Patient Seen in: [facility] AND [facility] Emergency Department    History   Patient presents with:  Fall    Stated Complaint: Frequent falls. Left pinky toe pain after fall today. No head injury.      HPI    Patient is here with complaint of injury to the left fi pressure    • Hip pain, left    • History of blood clots     Leg   • Hypothyroidism    • Incontinence    • L3-4 stable slight grade 1 retrolisthesis 10/31/2014   • L4-5 mild-mod diffuse bulging disc 10/31/2014   • L4-5 slight grade 1 unstable spondylolisth Cholecystitis   • COLONOSCOPY  2010   • DEBRIDEMENT OF NAIL(S), 1-5      Toe, Onychomycosis   • EGD  05/2019    Gastric polyps only   • EGD  2006   • ESOPHAGOGASTRODUODENOSCOPY (EGD) N/A 5/17/2019    Performed by Antonieta Alvarez MD at [facility] ENDOSCOPY   • E Take 1 tablet (25 mg total) by mouth 2 (two) times daily. Desvenlafaxine Succinate ER 50 MG Oral Tablet 24 Hr,  Take 1 tablet (50 mg total) by mouth daily. linezolid 600 MG Oral Tab,  Take 1 tablet (600 mg total) by mouth 2 (two) times daily.    Geri Deshpande congestion  Cardiovascular: no chest pain  Respiratory: no shortness of breath  Gastrointestinal: no abdominal pain, no nausea, no vomiting  Genitourinary: no dysuria  Musculoskeletal: no back pain she denies neck pain or head injury she states she has rommel Interacting well. Patient with possible left fifth toe fracture we will do x-ray of the foot as well as x-ray of the left forearm though I feel this is less likely to be fractured. We will check basic blood work.   I will have involved  shanika unspecified type  Multiple falls    Disposition:  Transfer to another facility    Follow-up:  No follow-up provider specified.     Medications Prescribed:  Current Discharge Medication List

## 2020-03-03 NOTE — CDS QUERY
CDI UPDATE: Received a fax back from Dr. Jorden Jacobs office with a statement saying \"see dictation\". .. fax is emailed to coding team for their own review.            How to Answer this Query    1.) Click \"Edit Button\" on the toolbar  2.) Type an \"X\" in th

## 2020-03-16 ENCOUNTER — OFFICE VISIT (OUTPATIENT)
Dept: NEUROLOGY | Facility: CLINIC | Age: 80
End: 2020-03-16
Payer: MEDICARE

## 2020-03-16 ENCOUNTER — TELEPHONE (OUTPATIENT)
Dept: NEUROLOGY | Facility: CLINIC | Age: 80
End: 2020-03-16

## 2020-03-16 VITALS
SYSTOLIC BLOOD PRESSURE: 140 MMHG | BODY MASS INDEX: 38.49 KG/M2 | HEIGHT: 68 IN | HEART RATE: 100 BPM | RESPIRATION RATE: 20 BRPM | WEIGHT: 254 LBS | DIASTOLIC BLOOD PRESSURE: 70 MMHG

## 2020-03-16 DIAGNOSIS — M96.1 LUMBAR POST-LAMINECTOMY SYNDROME: ICD-10-CM

## 2020-03-16 DIAGNOSIS — M43.16 SPONDYLOLISTHESIS OF LUMBAR REGION: ICD-10-CM

## 2020-03-16 DIAGNOSIS — M54.16 LUMBAR RADICULOPATHY: Primary | ICD-10-CM

## 2020-03-16 DIAGNOSIS — M47.816 LUMBAR FACET ARTHROPATHY: ICD-10-CM

## 2020-03-16 DIAGNOSIS — M48.061 SPINAL STENOSIS OF LUMBAR REGION WITHOUT NEUROGENIC CLAUDICATION: ICD-10-CM

## 2020-03-16 DIAGNOSIS — M51.26 LUMBAR HERNIATED DISC: ICD-10-CM

## 2020-03-16 DIAGNOSIS — M51.36 LUMBAR DEGENERATIVE DISC DISEASE: ICD-10-CM

## 2020-03-16 DIAGNOSIS — M43.10 RETROLISTHESIS OF VERTEBRAE: ICD-10-CM

## 2020-03-16 PROCEDURE — 99214 OFFICE O/P EST MOD 30 MIN: CPT | Performed by: PHYSICAL MEDICINE & REHABILITATION

## 2020-03-16 PROCEDURE — 1111F DSCHRG MED/CURRENT MED MERGE: CPT | Performed by: PHYSICAL MEDICINE & REHABILITATION

## 2020-03-16 NOTE — PATIENT INSTRUCTIONS
Plan  I will perform bilateral L5 TFESI(s) under MAC.     She will continue with the Tylenol.     She will continue with her current activities.     The patient will continue with her home exercise program.     The patient will follow up in 3 months, bu

## 2020-03-16 NOTE — PROGRESS NOTES
Low Back Pain H & P    Chief Complaint: Patient presents with:  Low Back Pain: Patient presents for follow up on low back pain, LOV:11/4/2019.  Was recently in rehab for fracturing her toe, was discharged 3/12/20, had to cancel L5 TFESI, here to be re-evalu diffuse bulging disc 10/31/2014   • L4-5 slight grade 1 unstable spondylolisthesis 10/31/2014   • Leg wound, left 08/21/2019   • Low back pain    • Migraines    • Muscle weakness    • Onychomycosis     Debridement    • Oropharyngeal dysphagia 8/29/2017   • ESOPHAGOGASTRODUODENOSCOPY (EGD) N/A 5/17/2019    Performed by Harleen Rankin MD at [address] [place] [address] Ne Left 10/8/2019    Performed by Ramon Trotter MD at [place] [address] History      Marital status:       Spouse name: Not on file      Number of children: Not on file      Years of education: Not on file      Highest education level: Not on file    Occupational History      Not on file    Social Needs      Financial Asked        Exercise: Yes          home P/T 2 times per week        Bike Helmet: Not Asked        Seat Belt: Not Asked        Self-Exams: Not Asked        Grew up on a farm: Not Asked        History of tanning: No        Outdoor occupation: Not Asked Muscle Strength:  All LE strength measurements 5/5   RIGHT plantar reflexes: downward response   LEFT plantar reflexes: upward (Babinski) response   Reflexes: 2+ in bilateral lower extremities except:  Right Achilles tendon which are absent  Left Achilles t

## 2020-03-16 NOTE — TELEPHONE ENCOUNTER
Medicare online for insurance coverage of BILATERAL L5 (L5-S1) LUMBAR TRANSFORAMINAL EPIDURAL INJECTION CPT CODE [id number], [id number], [id number], [id number] . Insurance was verified and procedure is a cover benefit and does not require authorization.  Will inform N

## 2020-03-17 NOTE — TELEPHONE ENCOUNTER
Unable to schedule pt d/t Covid-19. Reaching out to PCP for anticoagulation clearance in the meantime.

## 2020-03-17 NOTE — TELEPHONE ENCOUNTER
Received call directly from Dr. Myla Wilson giving approval for patient to hold Xarelto 3 days prior to procedure.

## 2020-04-01 ENCOUNTER — APPOINTMENT (OUTPATIENT)
Dept: GENERAL RADIOLOGY | Facility: HOSPITAL | Age: 80
End: 2020-04-01
Payer: MEDICARE

## 2020-04-01 ENCOUNTER — HOSPITAL ENCOUNTER (EMERGENCY)
Facility: HOSPITAL | Age: 80
Discharge: HOME | End: 2020-04-02
Attending: EMERGENCY MEDICINE
Payer: MEDICARE

## 2020-04-01 ENCOUNTER — APPOINTMENT (OUTPATIENT)
Dept: CT IMAGING | Facility: HOSPITAL | Age: 80
End: 2020-04-01
Attending: EMERGENCY MEDICINE
Payer: MEDICARE

## 2020-04-01 DIAGNOSIS — R53.83 FATIGUE, UNSPECIFIED TYPE: Primary | ICD-10-CM

## 2020-04-01 PROCEDURE — 70450 CT HEAD/BRAIN W/O DYE: CPT | Performed by: EMERGENCY MEDICINE

## 2020-04-01 PROCEDURE — 93005 ELECTROCARDIOGRAM TRACING: CPT

## 2020-04-01 PROCEDURE — 36415 COLL VENOUS BLD VENIPUNCTURE: CPT

## 2020-04-01 PROCEDURE — 93010 ELECTROCARDIOGRAM REPORT: CPT | Performed by: EMERGENCY MEDICINE

## 2020-04-01 PROCEDURE — 85025 COMPLETE CBC W/AUTO DIFF WBC: CPT | Performed by: EMERGENCY MEDICINE

## 2020-04-01 PROCEDURE — 81001 URINALYSIS AUTO W/SCOPE: CPT | Performed by: EMERGENCY MEDICINE

## 2020-04-01 PROCEDURE — 71045 X-RAY EXAM CHEST 1 VIEW: CPT | Performed by: EMERGENCY MEDICINE

## 2020-04-01 PROCEDURE — 99285 EMERGENCY DEPT VISIT HI MDM: CPT

## 2020-04-01 PROCEDURE — 80048 BASIC METABOLIC PNL TOTAL CA: CPT | Performed by: EMERGENCY MEDICINE

## 2020-04-02 VITALS
HEART RATE: 79 BPM | BODY MASS INDEX: 39.4 KG/M2 | RESPIRATION RATE: 22 BRPM | TEMPERATURE: 98 F | OXYGEN SATURATION: 94 % | HEIGHT: 68 IN | SYSTOLIC BLOOD PRESSURE: 156 MMHG | WEIGHT: 260 LBS | DIASTOLIC BLOOD PRESSURE: 87 MMHG

## 2020-04-02 NOTE — ED NOTES
This RN spoke with Aurora Gibson from [facility] to give report and update on patient returning to facility. All questions addressed,     Ashleigh contacted with aid in transportation back to [facility].

## 2020-04-02 NOTE — CM/SW NOTE
ER-T called and stated patient ambulated well with walker on her own and required only minimal assistance getting from sitting position to standing position.   Informed Terrance Colindres to please call [facility] and inform them to please check on patient more o

## 2020-04-02 NOTE — ED PROVIDER NOTES
Patient Seen in: [facility] AND [facility] Emergency Department      History   Patient presents with:  Fatigue    Stated Complaint: lethargy    HPI    22-year-old female presents the ER for complaint of fatigue and weakness today at the nursing home.   Patient ap 11/2/2017   • Other spondylosis, lumbar region 11/2/2017   • Pneumonia 2012   • Pneumonia due to organism    • S/P cervical spinal fusion: C3-6 and C7-T1 11/2/2017   • s/p L5-S1 right laminectomy 8/28/2014   • Shortness of breath    • Sleep apnea    • sten Partial Hysterectomy   • JAW SURGERY     • KNEE REPLACEMENT SURGERY Bilateral     Bilateral arthritis    • LAMINECTOMY      WITH FUSION PER PATIENT   • OTHER SURGICAL HISTORY  1985,1995,2003,2009    Cervical Discectomy AND FUSION   • PREP SITE T/A/L 1ST 10 intact. Conjunctiva/sclera: Conjunctivae normal.      Pupils: Pupils are equal, round, and reactive to light. Neck:      Musculoskeletal: Normal range of motion and neck supple. Cardiovascular:      Rate and Rhythm: Normal rate and regular rhythm. ------                     CBC W/ DIFFERENTIAL[[id number]]          Abnormal            Final result                 Please view results for these tests on the individual orders.    RAINBOW DRAW BLUE   RAINBOW DRAW LAVENDER   RAINBOW DRAW LIGHT GREEN

## 2020-04-02 NOTE — ED NOTES
This RN spoke with staff from [facility] to update on transportation time of 2 hours and to monitor patient overnight due to overall weakness and with ambulation. Staff member confirmed.

## 2020-04-02 NOTE — CM/SW NOTE
Called to arrange discharge transportation for patient back to [facility] - asked patient if she is able to ambulate.   Patient stated \"I wasn't before I came in here and usually I am.\" To ensure a safe discharge for patient will ensure she is able to [facility]

## 2020-04-08 ENCOUNTER — HOSPITAL ENCOUNTER (EMERGENCY)
Facility: HOSPITAL | Age: 80
Discharge: HOME | End: 2020-04-08
Attending: EMERGENCY MEDICINE
Payer: MEDICARE

## 2020-04-08 VITALS
DIASTOLIC BLOOD PRESSURE: 99 MMHG | TEMPERATURE: 99 F | RESPIRATION RATE: 18 BRPM | HEART RATE: 70 BPM | OXYGEN SATURATION: 98 % | SYSTOLIC BLOOD PRESSURE: 119 MMHG

## 2020-04-08 DIAGNOSIS — N30.00 ACUTE CYSTITIS WITHOUT HEMATURIA: Primary | ICD-10-CM

## 2020-04-08 PROCEDURE — 80076 HEPATIC FUNCTION PANEL: CPT | Performed by: EMERGENCY MEDICINE

## 2020-04-08 PROCEDURE — 80048 BASIC METABOLIC PNL TOTAL CA: CPT | Performed by: EMERGENCY MEDICINE

## 2020-04-08 PROCEDURE — 99284 EMERGENCY DEPT VISIT MOD MDM: CPT

## 2020-04-08 PROCEDURE — 81001 URINALYSIS AUTO W/SCOPE: CPT | Performed by: EMERGENCY MEDICINE

## 2020-04-08 PROCEDURE — 96365 THER/PROPH/DIAG IV INF INIT: CPT

## 2020-04-08 PROCEDURE — 87077 CULTURE AEROBIC IDENTIFY: CPT | Performed by: EMERGENCY MEDICINE

## 2020-04-08 PROCEDURE — 87186 SC STD MICRODIL/AGAR DIL: CPT | Performed by: EMERGENCY MEDICINE

## 2020-04-08 PROCEDURE — 87086 URINE CULTURE/COLONY COUNT: CPT | Performed by: EMERGENCY MEDICINE

## 2020-04-08 PROCEDURE — 85025 COMPLETE CBC W/AUTO DIFF WBC: CPT | Performed by: EMERGENCY MEDICINE

## 2020-04-08 RX ORDER — CEPHALEXIN 500 MG/1
500 CAPSULE ORAL 3 TIMES DAILY
Qty: 15 CAPSULE | Refills: 0 | Status: SHIPPED | OUTPATIENT
Start: 2020-04-08 | End: 2020-04-13

## 2020-04-08 NOTE — ED NOTES
Rounding completed    Denies pain at this time  Elimination assistance offered  Awaiting er md reeval  Will continue to monitor  Call light within reach

## 2020-04-08 NOTE — ED INITIAL ASSESSMENT (HPI)
Patient aox3 to ed via private ems patient co of dysuria and urinary inconvenience x 4 days. Denies fever at this time.

## 2020-04-08 NOTE — ED INITIAL ASSESSMENT (HPI)
Pt arrived via medics to rm 21 for complaint of urinary retention states she last urinated 4 days ago also complains of back pain Yes

## 2020-04-08 NOTE — ED NOTES
MEDICARE AT BEDSIDE, REPORT GIVEN. DISCHARGE INSTRUCTIONS PROVIDED    PATIENT APPROVED FOR DISCHARGE PER ER PROVIDER. PATIENT GIVEN VERBAL AND WRITTEN DISCHARGE INSTRUCTIONS. GIVEN INSTRUCTIONS ON RX X 1, FOLLOW UP WITH PCP.  VERBALIZES UNDERSTANDING AND SI

## 2020-04-08 NOTE — ED NOTES
Patient approved for discharged.  Hilary Sheldon requested with lift assist.  Patient verbalizes understanding on POC  ETA 60mins-90min

## 2020-04-08 NOTE — ED PROVIDER NOTES
Patient Seen in: [facility] AND [facility] Emergency Department      History   Patient presents with:  Urinary Symptoms    Stated Complaint: urinary symptoms    HPI    29-year-old female presenting for evaluation of urinary symptoms.   Over the past 4 days she st generalized or localized, unspecified site    • Other and unspecified hyperlipidemia    • Other complicated headache syndrome 11/2/2017   • Other spondylosis, lumbar region 11/2/2017   • Pneumonia 2012   • Pneumonia due to organism    • S/P cervical spinal MD at [facility] MAIN OR   • EYE SURGERY      x2 FOR \"CROSSED EYES\"   • HIP REPLACEMENT SURGERY Bilateral    • HYSTERECTOMY  1967    Partial Hysterectomy   • JAW SURGERY     • KNEE REPLACEMENT SURGERY Bilateral     Bilateral arthritis    • LAMINECTOMY      WITH Musculoskeletal: Normal range of motion. Cardiovascular:      Rate and Rhythm: Normal rate and regular rhythm. Heart sounds: Normal heart sounds. Pulmonary:      Effort: Pulmonary effort is normal.      Breath sounds: Normal breath sounds.    Abdom CBC W/ DIFFERENTIAL[[id number]]          Abnormal            Final result                 Please view results for these tests on the individual orders.    RAINBOW DRAW GOLD   URINE CULTURE, ROUTINE                  MDM   Well-appearing 27-year-old female p

## 2020-04-08 NOTE — ED NOTES
Patient presents with:  Urinary Symptoms    /65   Pulse 73   Temp 98.5 °F (36.9 °C) (Oral)   Resp 18   SpO2 94%     PATIENT AOX3 TO ED VIA PRIVATE EMS FROM [facility] TO ED ROOM #21. PATIENT CO OF X DYSURIA WITH URINARY INCONTINENCE X 4 DAYS.  RAVI

## 2020-05-03 ENCOUNTER — APPOINTMENT (OUTPATIENT)
Dept: GENERAL RADIOLOGY | Facility: HOSPITAL | Age: 80
End: 2020-05-03
Attending: EMERGENCY MEDICINE
Payer: MEDICARE

## 2020-05-03 ENCOUNTER — APPOINTMENT (OUTPATIENT)
Dept: CT IMAGING | Facility: HOSPITAL | Age: 80
End: 2020-05-03
Attending: EMERGENCY MEDICINE
Payer: MEDICARE

## 2020-05-03 ENCOUNTER — HOSPITAL ENCOUNTER (EMERGENCY)
Facility: HOSPITAL | Age: 80
Discharge: HOME | End: 2020-05-03
Attending: EMERGENCY MEDICINE
Payer: MEDICARE

## 2020-05-03 VITALS
OXYGEN SATURATION: 88 % | HEIGHT: 68 IN | SYSTOLIC BLOOD PRESSURE: 135 MMHG | RESPIRATION RATE: 16 BRPM | HEART RATE: 77 BPM | DIASTOLIC BLOOD PRESSURE: 70 MMHG | TEMPERATURE: 98 F | WEIGHT: 260 LBS | BODY MASS INDEX: 39.4 KG/M2

## 2020-05-03 DIAGNOSIS — R19.7 DIARRHEA, UNSPECIFIED TYPE: Primary | ICD-10-CM

## 2020-05-03 DIAGNOSIS — R11.0 NAUSEA: ICD-10-CM

## 2020-05-03 PROCEDURE — 84484 ASSAY OF TROPONIN QUANT: CPT | Performed by: EMERGENCY MEDICINE

## 2020-05-03 PROCEDURE — 81003 URINALYSIS AUTO W/O SCOPE: CPT | Performed by: EMERGENCY MEDICINE

## 2020-05-03 PROCEDURE — 85730 THROMBOPLASTIN TIME PARTIAL: CPT | Performed by: EMERGENCY MEDICINE

## 2020-05-03 PROCEDURE — 85610 PROTHROMBIN TIME: CPT | Performed by: EMERGENCY MEDICINE

## 2020-05-03 PROCEDURE — 99285 EMERGENCY DEPT VISIT HI MDM: CPT

## 2020-05-03 PROCEDURE — 83735 ASSAY OF MAGNESIUM: CPT | Performed by: EMERGENCY MEDICINE

## 2020-05-03 PROCEDURE — 96374 THER/PROPH/DIAG INJ IV PUSH: CPT

## 2020-05-03 PROCEDURE — 85025 COMPLETE CBC W/AUTO DIFF WBC: CPT | Performed by: EMERGENCY MEDICINE

## 2020-05-03 PROCEDURE — 93005 ELECTROCARDIOGRAM TRACING: CPT

## 2020-05-03 PROCEDURE — 80053 COMPREHEN METABOLIC PANEL: CPT | Performed by: EMERGENCY MEDICINE

## 2020-05-03 PROCEDURE — 74177 CT ABD & PELVIS W/CONTRAST: CPT | Performed by: EMERGENCY MEDICINE

## 2020-05-03 PROCEDURE — 96361 HYDRATE IV INFUSION ADD-ON: CPT

## 2020-05-03 PROCEDURE — 83690 ASSAY OF LIPASE: CPT | Performed by: EMERGENCY MEDICINE

## 2020-05-03 PROCEDURE — 93010 ELECTROCARDIOGRAM REPORT: CPT | Performed by: EMERGENCY MEDICINE

## 2020-05-03 PROCEDURE — 71045 X-RAY EXAM CHEST 1 VIEW: CPT | Performed by: EMERGENCY MEDICINE

## 2020-05-03 RX ORDER — ONDANSETRON 4 MG/1
4 TABLET, ORALLY DISINTEGRATING ORAL EVERY 4 HOURS PRN
Qty: 10 TABLET | Refills: 0 | Status: SHIPPED | OUTPATIENT
Start: 2020-05-03 | End: 2020-05-10

## 2020-05-03 RX ORDER — ONDANSETRON 2 MG/ML
4 INJECTION INTRAMUSCULAR; INTRAVENOUS ONCE
Status: COMPLETED | OUTPATIENT
Start: 2020-05-03 | End: 2020-05-03

## 2020-05-03 NOTE — ED PROVIDER NOTES
[facility] Emergency Department      History   Patient presents with:  Cough/URI    Stated Complaint: multiple complaints, hypoxic, r/o covid    HPI    78-year-old female presents for complaint of diarrhea and nausea.   Diarrhea be mild-mod diffuse bulging disc 10/31/2014   • L4-5 slight grade 1 unstable spondylolisthesis 10/31/2014   • Leg wound, left 08/21/2019   • Low back pain    • Migraines    • Muscle weakness    • Onychomycosis     Debridement    • Oropharyngeal dysphagia 8/29 ESOPHAGOGASTRODUODENOSCOPY (EGD) N/A 5/17/2019    Performed by Madeleine Coles MD at [address] [place] [address] Left 10/8/2019    Performed by Maile House MD at [place] [address] complaint: multiple complaints, hypoxic, r/o covid  Other systems are as noted in HPI. Constitutional and vital signs reviewed. All other systems reviewed and negative except as noted above.     Physical Exam     ED Triage Vitals [05/03/20 1743]   BP Labs Reviewed   COMP METABOLIC PANEL (14) - Abnormal; Notable for the following components:       Result Value    Glucose 117 (*)     CO2 33.0 (*)     Creatinine 1.20 (*)     GFR, Non- 42 (*)     GFR, -American 49 (*)     All oth viral syndrome. Rx for zofran. She feels comfortably going home. Return precautions given. Imaging:   Xr Chest Ap Portable  (cpt=71045)    Result Date: 5/3/2020  PROCEDURE: XR CHEST AP PORTABLE  (CPT=71045) TIME: 18:26.    COMPARISON: [facility] reduction was employed. Dose information was transmitted to the [facility]) [address] [facility] [address] which includes the Dose Index Registry. FINDINGS: LUNG BASES: The heart is normal in size.  There is no visible pu arthroplasties are again noted. There are moderate spondylotic changes throughout the visualized thoracolumbar spine. ABDOMINAL WALL: Injection granulomas are again seen throughout the gluteal regions bilaterally.   Diffuse atrophy/thinning of the ventral Medication List    START taking these medications    ondansetron 4 MG Oral Tablet Dispersible  Take 1 tablet (4 mg total) by mouth every 4 (four) hours as needed for Nausea.   Qty: 10 tablet Refills: 0

## 2020-05-03 NOTE — ED NOTES
Received pt a/ox3, clear speech, nad, no resp distress, ambulatory with steady gait  Here with c/o HPI report. Pt also reports \"tiredness\"  Iv attempt x2, unsuccessful. 20 G PIV established to L FA. Flushes well, no s/s of infiltration noted.  Labs sent f

## 2020-05-03 NOTE — ED INITIAL ASSESSMENT (HPI)
Dry heaving and diarrhea onset yesterday. Now with chills, cough, nausea and weakness.   No sob, no fever

## 2020-05-04 ENCOUNTER — HOSPITAL ENCOUNTER (EMERGENCY)
Facility: HOSPITAL | Age: 80
Discharge: HOME | End: 2020-05-04
Attending: EMERGENCY MEDICINE
Payer: MEDICARE

## 2020-05-04 ENCOUNTER — APPOINTMENT (OUTPATIENT)
Dept: CT IMAGING | Facility: HOSPITAL | Age: 80
End: 2020-05-04
Attending: EMERGENCY MEDICINE
Payer: MEDICARE

## 2020-05-04 VITALS
BODY MASS INDEX: 39.4 KG/M2 | RESPIRATION RATE: 18 BRPM | HEART RATE: 91 BPM | TEMPERATURE: 100 F | DIASTOLIC BLOOD PRESSURE: 91 MMHG | WEIGHT: 260 LBS | HEIGHT: 68 IN | OXYGEN SATURATION: 94 % | SYSTOLIC BLOOD PRESSURE: 126 MMHG

## 2020-05-04 DIAGNOSIS — S09.90XA INJURY OF HEAD, INITIAL ENCOUNTER: Primary | ICD-10-CM
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
## 2020-05-04 PROCEDURE — 82962 GLUCOSE BLOOD TEST: CPT

## 2020-05-04 PROCEDURE — 93005 ELECTROCARDIOGRAM TRACING: CPT

## 2020-05-04 PROCEDURE — 93010 ELECTROCARDIOGRAM REPORT: CPT | Performed by: EMERGENCY MEDICINE

## 2020-05-04 PROCEDURE — 36415 COLL VENOUS BLD VENIPUNCTURE: CPT

## 2020-05-04 PROCEDURE — 99284 EMERGENCY DEPT VISIT MOD MDM: CPT

## 2020-05-04 PROCEDURE — 85025 COMPLETE CBC W/AUTO DIFF WBC: CPT | Performed by: EMERGENCY MEDICINE

## 2020-05-04 PROCEDURE — 70450 CT HEAD/BRAIN W/O DYE: CPT | Performed by: EMERGENCY MEDICINE

## 2020-05-04 PROCEDURE — 80048 BASIC METABOLIC PNL TOTAL CA: CPT | Performed by: EMERGENCY MEDICINE

## 2020-05-04 NOTE — ED NOTES
PT safe to DC home per MD. Pt requesting pain medication, MD aware. Pt declined tylenol, given ice pack for forehead. One assist to get dressed. DC teaching done, pt verbalizes understanding. Wheeled to exit on stretcher with [facility].

## 2020-05-04 NOTE — ED NOTES
Pt provided and explained d/c instructions, at-home care, follow-up, and rx. Pt in nad at this time. Iv access d/c. Vss. [place]. Ambulatory. A&ox3. Belongings with pt. All questions and concerns addressed.

## 2020-05-04 NOTE — CM/SW NOTE
Received call from Dr. Maria Eugenia Vazquez to arrange return back to [facility]. Per RN/Adrianne PORRAS ambulance for pt return. COVID neg. [facility]  ETA 30 mins. PCS form completed in [facility].     CM to remain available for support and/or discharge plann

## 2020-05-04 NOTE — ED PROVIDER NOTES
Patient Seen in: [facility] AND [facility] Emergency Department      History   Patient presents with:  Fall    Stated Complaint:     HPI    The patient is an 49-year-old female who was getting out of bed this morning and reached for her walker and lost her jonas • Oropharyngeal dysphagia 8/29/2017   • Osteoarthrosis, unspecified whether generalized or localized, unspecified site    • Other and unspecified hyperlipidemia    • Other complicated headache syndrome 11/2/2017   • Other spondylosis, lumbar region 11/2/20 • EXTREMITY LOWER IRRIGATION & DEBRIDEMENT Left 8/21/2019    Performed by Dana Cruz MD at [facility] OR   • EYE SURGERY      x2 FOR \"CROSSED EYES\"   • HIP REPLACEMENT SURGERY Bilateral    • HYSTERECTOMY  1967    Partial Hysterectomy   • JAW GARCIA Vitals signs and nursing note reviewed. Constitutional:       General: She is not in acute distress. Appearance: She is well-developed. She is obese. She is not ill-appearing. HENT:      Head: Normocephalic. Contusion present.  No raccoon eyes or Ba CO2 33.0 (*)     Calculated Osmolality 296 (*)     GFR, Non- 54 (*)     All other components within normal limits   CBC W/ DIFFERENTIAL - Abnormal; Notable for the following components:    RDW-SD 49.0 (*)     All other components within no Result Date: 5/3/2020  CONCLUSION:  1. Development of mild alveolar opacity at the right lung base, which may relate to atelectasis or early pneumonia. 2. Borderline cardiomegaly.     Dictated by (CST): Timothy Barrios MD on 5/03/2020 at [address] [place] [address]

## 2020-05-04 NOTE — ED INITIAL ASSESSMENT (HPI)
Pt came in for unwitnessed fall this morning. Reports her walker slipped and she fell forward, hitting her head, denies LOC. Does take a blood thinner, large bump to forehead. Had negative COVID test yesterday per EMS.  RR even and nonlabored, speaking in f

## 2020-05-04 NOTE — ED NOTES
Spoke with gladis at [facility], for [employer] request. medicar eta 30 minutes ([phone]). Pcs completed and on chart.

## 2020-05-19 ENCOUNTER — TELEPHONE (OUTPATIENT)
Dept: NEUROLOGY | Facility: CLINIC | Age: 80
End: 2020-05-19

## 2020-05-19 NOTE — TELEPHONE ENCOUNTER
Patient called today and spoke to [facility] asking to be scheduled for injection. Left message for patient to return call to office. Patient is at [facility], will determine isolation/ sheltering in place policy prior to scheduling injection.

## 2020-05-26 PROBLEM — E03.9 HYPOTHYROIDISM, UNSPECIFIED TYPE: Status: ACTIVE | Noted: 2020-05-26

## 2020-06-01 NOTE — TELEPHONE ENCOUNTER
Spoke to patient, states she would like to be scheduled for injection. Patient is living at [facility]. Advised patient [facility] may not be sscheduling injections for patients in assisted living due to Covid 19 pandemic.      Spoke to [address] [place] [address]

## 2020-06-15 ENCOUNTER — APPOINTMENT (OUTPATIENT)
Dept: GENERAL RADIOLOGY | Facility: HOSPITAL | Age: 80
End: 2020-06-15
Attending: EMERGENCY MEDICINE
Payer: MEDICARE

## 2020-06-15 ENCOUNTER — HOSPITAL ENCOUNTER (EMERGENCY)
Facility: HOSPITAL | Age: 80
Discharge: HOME | End: 2020-06-15
Attending: EMERGENCY MEDICINE
Payer: MEDICARE

## 2020-06-15 ENCOUNTER — APPOINTMENT (OUTPATIENT)
Dept: CT IMAGING | Facility: HOSPITAL | Age: 80
End: 2020-06-15
Attending: EMERGENCY MEDICINE
Payer: MEDICARE

## 2020-06-15 VITALS
RESPIRATION RATE: 18 BRPM | TEMPERATURE: 97 F | SYSTOLIC BLOOD PRESSURE: 131 MMHG | DIASTOLIC BLOOD PRESSURE: 64 MMHG | HEART RATE: 85 BPM | OXYGEN SATURATION: 95 %

## 2020-06-15 DIAGNOSIS — R53.83 FATIGUE, UNSPECIFIED TYPE: Primary | ICD-10-CM

## 2020-06-15 PROCEDURE — 85025 COMPLETE CBC W/AUTO DIFF WBC: CPT | Performed by: EMERGENCY MEDICINE

## 2020-06-15 PROCEDURE — 80048 BASIC METABOLIC PNL TOTAL CA: CPT | Performed by: EMERGENCY MEDICINE

## 2020-06-15 PROCEDURE — 96360 HYDRATION IV INFUSION INIT: CPT

## 2020-06-15 PROCEDURE — 99284 EMERGENCY DEPT VISIT MOD MDM: CPT

## 2020-06-15 PROCEDURE — 70450 CT HEAD/BRAIN W/O DYE: CPT | Performed by: EMERGENCY MEDICINE

## 2020-06-15 PROCEDURE — 71045 X-RAY EXAM CHEST 1 VIEW: CPT | Performed by: EMERGENCY MEDICINE

## 2020-06-15 PROCEDURE — 81003 URINALYSIS AUTO W/O SCOPE: CPT | Performed by: EMERGENCY MEDICINE

## 2020-06-15 PROCEDURE — 84484 ASSAY OF TROPONIN QUANT: CPT | Performed by: EMERGENCY MEDICINE

## 2020-06-15 PROCEDURE — 93010 ELECTROCARDIOGRAM REPORT: CPT | Performed by: EMERGENCY MEDICINE

## 2020-06-15 PROCEDURE — 96361 HYDRATE IV INFUSION ADD-ON: CPT

## 2020-06-15 PROCEDURE — 93005 ELECTROCARDIOGRAM TRACING: CPT

## 2020-06-15 NOTE — ED PROVIDER NOTES
Patient Seen in: [facility] AND [facility] Emergency Department      History   Patient presents with:  Altered Mental Status    Stated Complaint: lethargy    HPI  History is provided by EMS and patient.     17-year-old female with history of anxiety, DVT, on [place] unspecified whether generalized or localized, unspecified site    • Other and unspecified hyperlipidemia    • Other complicated headache syndrome 11/2/2017   • Other spondylosis, lumbar region 11/2/2017   • Pneumonia 2012   • Pneumonia due to organism    • Gely Partida MD at [facility] OR   • EYE SURGERY      x2 FOR \"CROSSED EYES\"   • HIP REPLACEMENT SURGERY Bilateral    • HYSTERECTOMY  1967    Partial Hysterectomy   • JAW SURGERY     • KNEE REPLACEMENT SURGERY Bilateral     Bilateral arthritis HPI.  Constitutional and vital signs reviewed. All other systems reviewed and negative except as noted above.     Physical Exam     ED Triage Vitals [06/15/20 1846]   /69   Pulse 90   Resp 22   Temp 97.3 °F (36.3 °C)   Temp src Oral   SpO2 93 % 06/15/20  7:03 PM   Result Value Ref Range    Glucose 94 70 - 99 mg/dL    Sodium 144 136 - 145 mmol/L    Potassium 4.1 3.5 - 5.1 mmol/L    Chloride 109 98 - 112 mmol/L    CO2 32.0 21.0 - 32.0 mmol/L    Anion Gap 3 0 - 18 mmol/L    BUN 17 7 - 18 mg/dL    Cr Blood Urine Negative Negative    pH Urine 6.0 5.0 - 8.0    Protein Urine Negative Negative mg/dL    Urobilinogen Urine <2.0 <2.0    Nitrite Urine Negative Negative    Leukocyte Esterase Urine Negative Negative    Microscopic Microscopic not indicated assistance to the bathroom  - I did offer admission for fatigue - pt declining - she would like to trial going back to [facility]      The patient was informed of their elevated blood pressure reading in the Emergency Department.   They were informed of the da

## 2020-06-23 ENCOUNTER — OFFICE VISIT (OUTPATIENT)
Dept: OTOLARYNGOLOGY | Facility: CLINIC | Age: 80
End: 2020-06-23
Payer: MEDICARE

## 2020-06-23 VITALS — HEIGHT: 68 IN | WEIGHT: 250 LBS | BODY MASS INDEX: 37.89 KG/M2

## 2020-06-23 DIAGNOSIS — H61.23 BILATERAL IMPACTED CERUMEN: Primary | ICD-10-CM

## 2020-06-23 PROCEDURE — 69210 REMOVE IMPACTED EAR WAX UNI: CPT | Performed by: OTOLARYNGOLOGY

## 2020-06-23 NOTE — PROGRESS NOTES
Carey De Los Santos is a 78year old female.  Patient presents with:  Ear Wax    HPI:   Her ears have been very blocked and her hearing is a bit diminished  Current Outpatient Medications   Medication Sig Dispense Refill   • Lidocaine, Anorectal, 5 % External C Suspension Take 15 mL by mouth every 6 (six) hours as needed for Indigestion (nausea). • XARELTO 20 MG Oral Tab ONE TABLET BY MOUTH ONCE DAILY WITH FOOD 90 tablet 3   • amLODIPine Besylate 10 MG Oral Tab Take 1 tablet (10 mg total) by mouth daily.  90 t fusion: C3-6 and C7-T1 11/2/2017   • s/p L5-S1 right laminectomy 8/28/2014   • Shortness of breath    • Sleep apnea    • stent placement     cerebral   • Thyroid disease    • Toe pain     Onychomycosis, Debridement , Hammertoe    • Tonsillitis 1950    Tons was removed from bilateral ears using suction. Tympanic membranes were noted to be normal. Patient tolerated the procedure well. All questions were answered. ASSESSMENT AND PLAN:   1.  Bilateral impacted cerumen  Cerumen cleared bilaterally with immediate

## 2020-07-02 ENCOUNTER — TELEPHONE (OUTPATIENT)
Dept: NEUROLOGY | Facility: CLINIC | Age: 80
End: 2020-07-02

## 2020-07-02 DIAGNOSIS — M96.1 LUMBAR POST-LAMINECTOMY SYNDROME: ICD-10-CM

## 2020-07-02 DIAGNOSIS — M54.50 CHRONIC LEFT-SIDED LOW BACK PAIN WITHOUT SCIATICA: ICD-10-CM

## 2020-07-02 DIAGNOSIS — M47.816 LUMBAR FACET ARTHROPATHY: ICD-10-CM

## 2020-07-02 DIAGNOSIS — M48.061 SPINAL STENOSIS OF LUMBAR REGION WITHOUT NEUROGENIC CLAUDICATION: ICD-10-CM

## 2020-07-02 DIAGNOSIS — M47.812 ARTHROPATHY OF CERVICAL FACET JOINT: ICD-10-CM

## 2020-07-02 DIAGNOSIS — Z98.1 S/P CERVICAL SPINAL FUSION: ICD-10-CM

## 2020-07-02 DIAGNOSIS — M54.16 LUMBAR RADICULOPATHY: Primary | ICD-10-CM

## 2020-07-02 DIAGNOSIS — M43.16 SPONDYLOLISTHESIS OF LUMBAR REGION: ICD-10-CM

## 2020-07-02 DIAGNOSIS — G89.29 CHRONIC LEFT-SIDED LOW BACK PAIN WITHOUT SCIATICA: ICD-10-CM

## 2020-07-02 DIAGNOSIS — M51.26 LUMBAR HERNIATED DISC: ICD-10-CM

## 2020-07-02 DIAGNOSIS — M96.1 CERVICAL POST-LAMINECTOMY SYNDROME: ICD-10-CM

## 2020-07-02 DIAGNOSIS — M51.36 LUMBAR DEGENERATIVE DISC DISEASE: ICD-10-CM

## 2020-07-02 DIAGNOSIS — M43.10 RETROLISTHESIS OF VERTEBRAE: ICD-10-CM

## 2020-07-02 NOTE — TELEPHONE ENCOUNTER
Pt called stating she needs an order for a hospital bed, pt is unable to sleep in her bed, has to sleep in her chair. [facility] to [facility] provide [facility]. Order pended.      Pt also requests if [facility] is scheduling pts for assisted living

## 2020-07-06 NOTE — TELEPHONE ENCOUNTER
Left detailed message informing patient order was placed and to provide fax number to fax order.  Verified in [place]

## 2020-07-06 NOTE — TELEPHONE ENCOUNTER
Patient returned call stating she will be getting her hospital bed from [place] [address] \"something\". She has to find the paperwork but will return call with fax number once it is located.      Per [facility], they do not have a date for which they will be sche

## 2020-07-07 NOTE — TELEPHONE ENCOUNTER
Patient left message with answering service providing fax number  [phone] for hospital bed.  Any questions please call Win Chappell

## 2020-07-13 NOTE — TELEPHONE ENCOUNTER
[facility] request demographic information along with order.   Demographics and order Re-faxed to [phone]

## 2020-07-13 NOTE — TELEPHONE ENCOUNTER
[facility] calling stating they are missing information for the referral/order of the hospital bed.  Phone # [phone] Fax # [phone]

## 2020-07-21 ENCOUNTER — TELEPHONE (OUTPATIENT)
Dept: NEUROLOGY | Facility: CLINIC | Age: 80
End: 2020-07-21

## 2020-07-27 ENCOUNTER — TELEPHONE (OUTPATIENT)
Dept: NEUROLOGY | Facility: CLINIC | Age: 80
End: 2020-07-27

## 2020-07-27 NOTE — TELEPHONE ENCOUNTER
Received order for certificate of medical necessity for hospital bed from [address] [place] [address]    Once completed will fax back to [phone]

## 2020-07-30 PROBLEM — M75.41 IMPINGEMENT SYNDROME OF RIGHT SHOULDER REGION: Status: ACTIVE | Noted: 2020-07-30

## 2020-07-30 PROBLEM — M19.011 ARTHRITIS OF SHOULDER REGION, RIGHT: Status: ACTIVE | Noted: 2020-07-30

## 2020-07-30 PROBLEM — M25.511 ACUTE PAIN OF RIGHT SHOULDER: Status: ACTIVE | Noted: 2020-07-30

## 2020-08-03 ENCOUNTER — TELEPHONE (OUTPATIENT)
Dept: NEUROLOGY | Facility: CLINIC | Age: 80
End: 2020-08-03

## 2020-08-03 NOTE — TELEPHONE ENCOUNTER
Spoke to patient who lives in [facility] and is unable to receive lumbar khurram's due to [facility] protocol during pandemic. Patient states she is experiencing the same lower back pain that is localized. Pain is 7-10/10. Taking otc tylenol with no relief.   Patient w

## 2020-08-04 NOTE — TELEPHONE ENCOUNTER
Patient called again stating she is in severe pain and says nothing can be done about the lower back pain so she is living with that. Patient states pain is located in the right shoulder radiating down the right arm with tingling in the right hand.  [employer]

## 2020-08-06 ENCOUNTER — HOSPITAL ENCOUNTER (OUTPATIENT)
Dept: GENERAL RADIOLOGY | Age: 80
Discharge: HOME | End: 2020-08-06
Attending: PHYSICAL MEDICINE & REHABILITATION
Payer: MEDICARE

## 2020-08-06 ENCOUNTER — HOSPITAL ENCOUNTER (OUTPATIENT)
Dept: GENERAL RADIOLOGY | Facility: HOSPITAL | Age: 80
End: 2020-08-06
Attending: ORTHOPAEDIC SURGERY
Payer: MEDICARE

## 2020-08-06 ENCOUNTER — HOSPITAL ENCOUNTER (OUTPATIENT)
Dept: CT IMAGING | Facility: HOSPITAL | Age: 80
End: 2020-08-06
Attending: ORTHOPAEDIC SURGERY
Payer: MEDICARE

## 2020-08-06 ENCOUNTER — OFFICE VISIT (OUTPATIENT)
Dept: NEUROLOGY | Facility: CLINIC | Age: 80
End: 2020-08-06
Payer: MEDICARE

## 2020-08-06 ENCOUNTER — TELEPHONE (OUTPATIENT)
Dept: NEUROLOGY | Facility: CLINIC | Age: 80
End: 2020-08-06

## 2020-08-06 VITALS
DIASTOLIC BLOOD PRESSURE: 74 MMHG | SYSTOLIC BLOOD PRESSURE: 136 MMHG | WEIGHT: 260 LBS | BODY MASS INDEX: 39.4 KG/M2 | HEIGHT: 68 IN

## 2020-08-06 DIAGNOSIS — M48.061 SPINAL STENOSIS OF LUMBAR REGION WITHOUT NEUROGENIC CLAUDICATION: ICD-10-CM

## 2020-08-06 DIAGNOSIS — M96.1 CERVICAL POST-LAMINECTOMY SYNDROME: ICD-10-CM

## 2020-08-06 DIAGNOSIS — M25.511 ACUTE PAIN OF RIGHT SHOULDER: ICD-10-CM

## 2020-08-06 DIAGNOSIS — M43.10 RETROLISTHESIS OF VERTEBRAE: ICD-10-CM

## 2020-08-06 DIAGNOSIS — M51.26 LUMBAR HERNIATED DISC: ICD-10-CM

## 2020-08-06 DIAGNOSIS — M67.911 DYSFUNCTION OF RIGHT ROTATOR CUFF: ICD-10-CM

## 2020-08-06 DIAGNOSIS — M19.011 PRIMARY OSTEOARTHRITIS OF RIGHT SHOULDER: ICD-10-CM

## 2020-08-06 DIAGNOSIS — M50.90 CERVICAL DISC DISEASE: ICD-10-CM

## 2020-08-06 DIAGNOSIS — M43.16 SPONDYLOLISTHESIS OF LUMBAR REGION: ICD-10-CM

## 2020-08-06 DIAGNOSIS — M54.12 CERVICAL RADICULITIS: ICD-10-CM

## 2020-08-06 DIAGNOSIS — M25.511 ACUTE PAIN OF RIGHT SHOULDER: Primary | ICD-10-CM

## 2020-08-06 DIAGNOSIS — M51.36 LUMBAR DEGENERATIVE DISC DISEASE: ICD-10-CM

## 2020-08-06 DIAGNOSIS — M54.16 LUMBAR RADICULOPATHY: ICD-10-CM

## 2020-08-06 DIAGNOSIS — M47.812 ARTHROPATHY OF CERVICAL FACET JOINT: ICD-10-CM

## 2020-08-06 DIAGNOSIS — M96.1 LUMBAR POST-LAMINECTOMY SYNDROME: ICD-10-CM

## 2020-08-06 PROCEDURE — 72050 X-RAY EXAM NECK SPINE 4/5VWS: CPT | Performed by: PHYSICAL MEDICINE & REHABILITATION

## 2020-08-06 PROCEDURE — 99214 OFFICE O/P EST MOD 30 MIN: CPT | Performed by: PHYSICAL MEDICINE & REHABILITATION

## 2020-08-06 PROCEDURE — 20611 DRAIN/INJ JOINT/BURSA W/US: CPT | Performed by: PHYSICAL MEDICINE & REHABILITATION

## 2020-08-06 PROCEDURE — 73030 X-RAY EXAM OF SHOULDER: CPT | Performed by: PHYSICAL MEDICINE & REHABILITATION

## 2020-08-06 RX ORDER — TRIAMCINOLONE ACETONIDE 40 MG/ML
60 INJECTION, SUSPENSION INTRA-ARTICULAR; INTRAMUSCULAR ONCE
Status: COMPLETED | OUTPATIENT
Start: 2020-08-06 | End: 2020-08-06

## 2020-08-06 RX ORDER — LIDOCAINE HYDROCHLORIDE 10 MG/ML
2.5 INJECTION, SOLUTION INFILTRATION; PERINEURAL ONCE
Status: COMPLETED | OUTPATIENT
Start: 2020-08-06 | End: 2020-08-06

## 2020-08-06 RX ORDER — LIDOCAINE HYDROCHLORIDE 10 MG/ML
2.5 INJECTION, SOLUTION INFILTRATION; PERINEURAL ONCE
Status: DISCONTINUED | OUTPATIENT
Start: 2020-08-06 | End: 2020-11-16

## 2020-08-06 RX ORDER — GABAPENTIN 100 MG/1
100 CAPSULE ORAL 3 TIMES DAILY
COMMUNITY
End: 2020-10-06

## 2020-08-06 NOTE — PATIENT INSTRUCTIONS
Plan  I had her get cervical spine and right shoulder x-rays today as stated above. I did a right shoulder injection under ultrasound guidance in the office today.   (see procedure note)    She will call in 2 weeks to let me know how her shoulder is do

## 2020-08-06 NOTE — TELEPHONE ENCOUNTER
Medicare Online for insurance coverage of ultrasound of the right shoulder cpt code 08352. Insurance was verified and procedure is a covered benefit. Authorization is not required. [facility] RN informed.

## 2020-08-06 NOTE — PROCEDURES
The patient is here for a right shoulder injection done under ultrasound guidance. Under ultrasound guidance the right posterior glenohumeral joint was identified and a shaylee was placed on the patient's skin. The skin was cleaned with alcohol swabs x 3 and

## 2020-08-06 NOTE — PROGRESS NOTES
Cervical Pain H & P    Chief Complaint:  Patient presents with:  Pain: Pt. presents with right shoulder pain approx. 2 weeks ago. No injury. Pain is constant and stabbing with movement. Pt states limited movement due to pain.   Testing to be done this af • Arthritis    • Arthritis of both knees     knee replacement    • Arthritis of right hip 2009   • Back problem    • Brain aneurysm     Brain Aneurysm, Stent placed    • Cervical disc disease 1985,1995,2003,2009    Cervical Discectomy    • Cholecystitis ANESTH,NECK VESSEL SURGERY NOS      x4   • BRAIN SURGERY  ~2009    Brain Aneurysm, Stent placed    • BRONCHOSCOPY N/A 2/14/2020    Performed by Jonas Sue MD at [facility] ENDOSCOPY   • BRONCHOSCOPY N/A 12/16/2019    Performed by Jonas Sue MD at [address] Performed by Kati Ramirez MD at [facility] MAIN OR       Family History   Family History   Problem Relation Age of Onset   • Heart Attack Mother    • Heart Disease Mother         Coronary Artery Disease, Premature    • Fibromyalgia Sister    • Heart Disease file        Gets together: Not on file        Attends L[employer]-day service: Not on file        Active member of club or organization: Not on file        Attends meetings of clubs or organizations: Not on file        Relationship status: Not on file      Intim is well groomed. Psychiatric:  The patient is alert and oriented x 3. The patient has a normal affect and mood. Respiratory:  No acute respiratory distress. Patient does not have a cough. HEENT:  Extraocular muscles are intact.  There is no kern extremities. Pin Prick: Not tested. UE Muscle Strength: All Upper Extremity strength measurements 5/5 except:  ABP Right: 4/5   Reflexes: 2+ In the bilateral upper extremities.    Bryan's sign Right: Negative   Bryan's sigh Left: Negative     C-Spin understands and agrees with the stated plan. Lianne Sandoval MD  8/6/2020

## 2020-08-06 NOTE — TELEPHONE ENCOUNTER
Medicare Online for insurance coverage of Right shoulder injection under ultrasound guidance cpt codes 23823, [id number], . .  Insurance was verified and procedure  is a covered benefit. Authorization is not required. Mary Vincent RN informed.

## 2020-08-07 ENCOUNTER — TELEPHONE (OUTPATIENT)
Dept: NEUROLOGY | Facility: CLINIC | Age: 80
End: 2020-08-07

## 2020-08-13 ENCOUNTER — APPOINTMENT (OUTPATIENT)
Dept: GENERAL RADIOLOGY | Facility: HOSPITAL | Age: 80
End: 2020-08-13
Attending: EMERGENCY MEDICINE
Payer: MEDICARE

## 2020-08-13 ENCOUNTER — APPOINTMENT (OUTPATIENT)
Dept: ULTRASOUND IMAGING | Facility: HOSPITAL | Age: 80
End: 2020-08-13
Attending: EMERGENCY MEDICINE
Payer: MEDICARE

## 2020-08-13 ENCOUNTER — HOSPITAL ENCOUNTER (EMERGENCY)
Facility: HOSPITAL | Age: 80
Discharge: HOME | End: 2020-08-13
Attending: EMERGENCY MEDICINE
Payer: MEDICARE

## 2020-08-13 VITALS
BODY MASS INDEX: 41.45 KG/M2 | DIASTOLIC BLOOD PRESSURE: 68 MMHG | RESPIRATION RATE: 18 BRPM | SYSTOLIC BLOOD PRESSURE: 153 MMHG | OXYGEN SATURATION: 94 % | WEIGHT: 257.94 LBS | HEART RATE: 67 BPM | TEMPERATURE: 98 F | HEIGHT: 66 IN

## 2020-08-13 DIAGNOSIS — M79.661 RIGHT CALF PAIN: ICD-10-CM

## 2020-08-13 DIAGNOSIS — M79.89 LEG SWELLING: Primary | ICD-10-CM

## 2020-08-13 LAB
ANION GAP SERPL CALC-SCNC: 4 MMOL/L (ref 0–18)
BASOPHILS # BLD AUTO: 0.05 X10(3) UL (ref 0–0.2)
BASOPHILS NFR BLD AUTO: 0.7 %
BUN BLD-MCNC: 27 MG/DL (ref 7–18)
BUN/CREAT SERPL: 24.3 (ref 10–20)
CALCIUM BLD-MCNC: 9.3 MG/DL (ref 8.5–10.1)
CHLORIDE SERPL-SCNC: 110 MMOL/L (ref 98–112)
CO2 SERPL-SCNC: 29 MMOL/L (ref 21–32)
CREAT BLD-MCNC: 1.11 MG/DL (ref 0.55–1.02)
DEPRECATED RDW RBC AUTO: 49.3 FL (ref 35.1–46.3)
EOSINOPHIL # BLD AUTO: 0.24 X10(3) UL (ref 0–0.7)
EOSINOPHIL NFR BLD AUTO: 3.5 %
ERYTHROCYTE [DISTWIDTH] IN BLOOD BY AUTOMATED COUNT: 14.9 % (ref 11–15)
GLUCOSE BLD-MCNC: 149 MG/DL (ref 70–99)
HAV IGM SER QL: 2.2 MG/DL (ref 1.6–2.6)
HCT VFR BLD AUTO: 41.1 % (ref 35–48)
HGB BLD-MCNC: 13 G/DL (ref 12–16)
IMM GRANULOCYTES # BLD AUTO: 0.03 X10(3) UL (ref 0–1)
IMM GRANULOCYTES NFR BLD: 0.4 %
LYMPHOCYTES # BLD AUTO: 2.54 X10(3) UL (ref 1–4)
LYMPHOCYTES NFR BLD AUTO: 37 %
MCH RBC QN AUTO: 28.8 PG (ref 26–34)
MCHC RBC AUTO-ENTMCNC: 31.6 G/DL (ref 31–37)
MCV RBC AUTO: 91.1 FL (ref 80–100)
MONOCYTES # BLD AUTO: 0.59 X10(3) UL (ref 0.1–1)
MONOCYTES NFR BLD AUTO: 8.6 %
NEUTROPHILS # BLD AUTO: 3.42 X10 (3) UL (ref 1.5–7.7)
NEUTROPHILS # BLD AUTO: 3.42 X10(3) UL (ref 1.5–7.7)
NEUTROPHILS NFR BLD AUTO: 49.8 %
NT-PROBNP SERPL-MCNC: 99 PG/ML (ref ?–450)
OSMOLALITY SERPL CALC.SUM OF ELEC: 304 MOSM/KG (ref 275–295)
PLATELET # BLD AUTO: 185 10(3)UL (ref 150–450)
POTASSIUM SERPL-SCNC: 4.4 MMOL/L (ref 3.5–5.1)
RBC # BLD AUTO: 4.51 X10(6)UL (ref 3.8–5.3)
SODIUM SERPL-SCNC: 143 MMOL/L (ref 136–145)
WBC # BLD AUTO: 6.9 X10(3) UL (ref 4–11)

## 2020-08-13 PROCEDURE — 83880 ASSAY OF NATRIURETIC PEPTIDE: CPT | Performed by: EMERGENCY MEDICINE

## 2020-08-13 PROCEDURE — 36415 COLL VENOUS BLD VENIPUNCTURE: CPT

## 2020-08-13 PROCEDURE — 80048 BASIC METABOLIC PNL TOTAL CA: CPT | Performed by: EMERGENCY MEDICINE

## 2020-08-13 PROCEDURE — 71045 X-RAY EXAM CHEST 1 VIEW: CPT | Performed by: EMERGENCY MEDICINE

## 2020-08-13 PROCEDURE — 83735 ASSAY OF MAGNESIUM: CPT | Performed by: EMERGENCY MEDICINE

## 2020-08-13 PROCEDURE — 99285 EMERGENCY DEPT VISIT HI MDM: CPT

## 2020-08-13 PROCEDURE — 93010 ELECTROCARDIOGRAM REPORT: CPT | Performed by: EMERGENCY MEDICINE

## 2020-08-13 PROCEDURE — 93005 ELECTROCARDIOGRAM TRACING: CPT

## 2020-08-13 PROCEDURE — 93971 EXTREMITY STUDY: CPT | Performed by: EMERGENCY MEDICINE

## 2020-08-13 PROCEDURE — 85025 COMPLETE CBC W/AUTO DIFF WBC: CPT | Performed by: EMERGENCY MEDICINE

## 2020-08-13 NOTE — ED NOTES
Rounding completed    No complaints at this time  Er md at bedside for dispo  Elimination assistance offered  No additional needs at this time  Call light within reach, will update patient with more information  Will continue to monitor

## 2020-08-13 NOTE — ED NOTES
Patient presents with:  Pain    /63   Pulse 64   Temp 98.3 °F (36.8 °C)   Resp 20   Ht 167.6 cm (5' 6\")   Wt 117 kg   SpO2 94%   BMI 41.63 kg/m²   Patient aox3 to ed via ems per patient reported right leg cramping x 0700 today, denies trauma hxo f d

## 2020-08-13 NOTE — ED INITIAL ASSESSMENT (HPI)
RIGHT LEG CRAMPING AT 7AM, CALF PAIN AND C/O PAIN INTERMITTENT, RUBBING HELPS PER PATIENT, + PEDAL PULSE, + BILATERAL EDEMA 3+, COOL SKIN AND HX OD DVT IN THE LEFT

## 2020-08-13 NOTE — ED NOTES
Attempted to contact [facility], unable to get a hold of nurse x2 voicemail left with kerrie  notified patient will be discharged back to [facility]

## 2020-08-17 NOTE — ED PROVIDER NOTES
Patient Seen in: [facility] Emergency Department      History   Patient presents with:  Pain    Stated Complaint: RIGHT LEG CRAMPING    HPI    78year old female who presents with R calf pain that started this a.m.  Pain described as cramping and i lumbar region 11/2/2017   • Pneumonia 2012   • Pneumonia due to organism    • S/P cervical spinal fusion: C3-6 and C7-T1 11/2/2017   • s/p L5-S1 right laminectomy 8/28/2014   • Shortness of breath    • Sleep apnea    • stent placement     cerebral   • Thyr SURGERY     • KNEE REPLACEMENT SURGERY Bilateral     Bilateral arthritis    • LAMINECTOMY      WITH FUSION PER PATIENT   • OTHER SURGICAL HISTORY  1985,1995,2003,2009    Cervical Discectomy AND FUSION   • PREP SITE T/A/L 1ST 100 SQ CM/1PCT Left 08/21/2019 Normocephalic and atraumatic. Eyes:      Conjunctiva/sclera: Conjunctivae normal.      Pupils: Pupils are equal, round, and reactive to light.    Neck:      Musculoskeletal: Full passive range of motion without pain, normal range of motion and neck supple Status                     ---------                               -----------         ------                     CBC W/ DIFFERENTIAL[[id number]]          Abnormal            Final result                 Please view results for these tests on the individua care provider within the next three months to obtain basic health screening including reassessment of your blood pressure.       Medications Prescribed:  Discharge Medication List as of 8/13/2020 10:43 AM    START taking these medications    lidocaine-menth

## 2020-08-22 ENCOUNTER — APPOINTMENT (OUTPATIENT)
Dept: ULTRASOUND IMAGING | Facility: HOSPITAL | Age: 80
End: 2020-08-22
Attending: EMERGENCY MEDICINE
Payer: MEDICARE

## 2020-08-22 ENCOUNTER — HOSPITAL ENCOUNTER (EMERGENCY)
Facility: HOSPITAL | Age: 80
Discharge: HOME | End: 2020-08-22
Attending: EMERGENCY MEDICINE
Payer: MEDICARE

## 2020-08-22 VITALS
HEART RATE: 72 BPM | RESPIRATION RATE: 16 BRPM | WEIGHT: 260 LBS | BODY MASS INDEX: 39.4 KG/M2 | TEMPERATURE: 98 F | OXYGEN SATURATION: 92 % | SYSTOLIC BLOOD PRESSURE: 164 MMHG | DIASTOLIC BLOOD PRESSURE: 80 MMHG | HEIGHT: 68 IN

## 2020-08-22 DIAGNOSIS — R25.2 LEG CRAMPS: Primary | ICD-10-CM

## 2020-08-22 LAB
ANION GAP SERPL CALC-SCNC: 3 MMOL/L (ref 0–18)
BASOPHILS # BLD AUTO: 0.04 X10(3) UL (ref 0–0.2)
BASOPHILS NFR BLD AUTO: 0.6 %
BUN BLD-MCNC: 25 MG/DL (ref 7–18)
BUN/CREAT SERPL: 23.6 (ref 10–20)
CALCIUM BLD-MCNC: 9.4 MG/DL (ref 8.5–10.1)
CHLORIDE SERPL-SCNC: 109 MMOL/L (ref 98–112)
CO2 SERPL-SCNC: 32 MMOL/L (ref 21–32)
CREAT BLD-MCNC: 1.06 MG/DL (ref 0.55–1.02)
DEPRECATED RDW RBC AUTO: 51.9 FL (ref 35.1–46.3)
EOSINOPHIL # BLD AUTO: 0.26 X10(3) UL (ref 0–0.7)
EOSINOPHIL NFR BLD AUTO: 4.2 %
ERYTHROCYTE [DISTWIDTH] IN BLOOD BY AUTOMATED COUNT: 15.3 % (ref 11–15)
GLUCOSE BLD-MCNC: 90 MG/DL (ref 70–99)
HCT VFR BLD AUTO: 39.9 % (ref 35–48)
HGB BLD-MCNC: 12.6 G/DL (ref 12–16)
IMM GRANULOCYTES # BLD AUTO: 0.02 X10(3) UL (ref 0–1)
IMM GRANULOCYTES NFR BLD: 0.3 %
LYMPHOCYTES # BLD AUTO: 2.32 X10(3) UL (ref 1–4)
LYMPHOCYTES NFR BLD AUTO: 37.2 %
MCH RBC QN AUTO: 29.4 PG (ref 26–34)
MCHC RBC AUTO-ENTMCNC: 31.6 G/DL (ref 31–37)
MCV RBC AUTO: 93 FL (ref 80–100)
MONOCYTES # BLD AUTO: 0.55 X10(3) UL (ref 0.1–1)
MONOCYTES NFR BLD AUTO: 8.8 %
NEUTROPHILS # BLD AUTO: 3.04 X10 (3) UL (ref 1.5–7.7)
NEUTROPHILS # BLD AUTO: 3.04 X10(3) UL (ref 1.5–7.7)
NEUTROPHILS NFR BLD AUTO: 48.9 %
OSMOLALITY SERPL CALC.SUM OF ELEC: 302 MOSM/KG (ref 275–295)
PLATELET # BLD AUTO: 149 10(3)UL (ref 150–450)
POTASSIUM SERPL-SCNC: 4.3 MMOL/L (ref 3.5–5.1)
RBC # BLD AUTO: 4.29 X10(6)UL (ref 3.8–5.3)
SODIUM SERPL-SCNC: 144 MMOL/L (ref 136–145)
WBC # BLD AUTO: 6.2 X10(3) UL (ref 4–11)

## 2020-08-22 PROCEDURE — 36415 COLL VENOUS BLD VENIPUNCTURE: CPT

## 2020-08-22 PROCEDURE — 85025 COMPLETE CBC W/AUTO DIFF WBC: CPT | Performed by: EMERGENCY MEDICINE

## 2020-08-22 PROCEDURE — 96372 THER/PROPH/DIAG INJ SC/IM: CPT

## 2020-08-22 PROCEDURE — 99284 EMERGENCY DEPT VISIT MOD MDM: CPT

## 2020-08-22 PROCEDURE — 93970 EXTREMITY STUDY: CPT | Performed by: EMERGENCY MEDICINE

## 2020-08-22 PROCEDURE — 80048 BASIC METABOLIC PNL TOTAL CA: CPT | Performed by: EMERGENCY MEDICINE

## 2020-08-22 RX ORDER — LORAZEPAM 2 MG/ML
1 INJECTION INTRAMUSCULAR ONCE
Status: COMPLETED | OUTPATIENT
Start: 2020-08-22 | End: 2020-08-22

## 2020-08-22 NOTE — ED PROVIDER NOTES
Patient Seen in: [facility] AND [facility] Emergency Department      History   Patient presents with:  Musculoskeletal Problem    Stated Complaint:     HPI    78-year-old female with past medical history significant for anxiety, CAD, arthritis, brain aneurysm st Osteoarthrosis, unspecified whether generalized or localized, unspecified site    • Other and unspecified hyperlipidemia    • Other complicated headache syndrome 11/2/2017   • Other spondylosis, lumbar region 11/2/2017   • Pneumonia 2012   • Pneumonia due calf with mild tenderness and overlying ecchymosis. Left calf is firm to the touch with mild tenderness to palpation. Normal range of motion. No deformity. Lymphadenopathy: No sig cervical LAD   Neurological: Awake, alert. Normal reflexes.  No cranial n next three months to obtain basic health screening including reassessment of your blood pressure.       Medications Prescribed:  Current Discharge Medication List

## 2020-08-22 NOTE — ED PROVIDER NOTES
Pt endorsed to me pending u/s results. Us Venous Doppler Leg Bilat - Diag Img (cpt=93970)    Result Date: 8/22/2020  CONCLUSION:   No DVT in either lower extremity.      Dictated by (CST): Yolanda Munguia MD on 8/22/2020 at 2:36 PM     Finalized by (CST): MELECIO

## 2020-08-27 NOTE — ED NOTES
Pt states she \"just doesn't want to live anymore\". States she does not have a plan, but does not trust herself alone.

## 2020-08-27 NOTE — ED PROVIDER NOTES
Patient Seen in: [facility] AND [facility] Emergency Department    History   Patient presents with:  Eval-P    Stated Complaint: depressed    HPI    Patient complains of major deression. Long hx depression. Worse past week.   Seen psychiatrist last week rec inpa fusion: C3-6 and C7-T1 11/2/2017   • s/p L5-S1 right laminectomy 8/28/2014   • Shortness of breath    • Sleep apnea    • stent placement     cerebral   • Thyroid disease    • Toe pain     Onychomycosis, Debridement , Hammertoe    • Tonsillitis 1950    Tons PER PATIENT   • OTHER SURGICAL HISTORY  1985,1995,2003,2009    Cervical Discectomy AND FUSION   • PREP SITE T/A/L 1ST 100 SQ CM/1PCT Left 08/21/2019    Left leg debridement, VAC placed   • SKIN GRAFT SPLIT THICKNESS Left 10/8/2019    Performed by Lily Marshall 650 mg by mouth every 6 (six) hours as needed for Fever or Pain. cholecalciferol 1000 UNITS Oral Cap,  Take 2,000 Units by mouth daily.    Alum & Mag Hydroxide-Simeth 985-208-49 MG/5ML Oral Suspension,  Take 15 mL by mouth every 6 (six) hours as needed fo Coronary Artery Disease        Social History    Tobacco Use      Smoking status: Former Smoker        Packs/day: 2.00        Years: 25.00        Pack years: 50        Types: Cigarettes        Start date: 1/1/1952        Quit date: 1/1/1977        Y METABOLIC PANEL (8) - Abnormal; Notable for the following components:    BUN 23 (*)     BUN/CREA Ratio 22.8 (*)     Calculated Osmolality 297 (*)     GFR, Non- 52 (*)     All other components within normal limits   DRUG ABUSE PANEL 10 ROMANE Prescribed:  Current Discharge Medication List    START taking these medications    Nitrofurantoin Monohyd Macro 100 MG Oral Cap  Take 1 capsule (100 mg total) by mouth 2 (two) times daily for 5 days.   Qty: 10 capsule Refills: 0                        Disp

## 2020-08-27 NOTE — BH LEVEL OF CARE ASSESSMENT
Level of Care Assessment Note    General Questions  Why are you here?: \"I have had depression since 1985. I was in and out of the hospitals over 30x in 10 years last hospitalization was 2003.  I was in the hospital last March briefly but I hate this, that Collateral: none  Reason Patient is Here Today: n/a  Family's Biggest Areas of Concern: n/a    Referral Source  Referral Source: Friend/Relative  Referral Source Info: self    Suicide Risk  Source of information for CSSR: Patient  In what setting is the sc for any access to means/firearms/weapons: assisted living arrangement    Self Injury  History of Self-Injurious Behaviors More than 30 Days Ago: No  Present Self-Injurious Behaviors Within the Last 30 Days: No    Mental Health Symptoms  Hallucination Type: Devices  Current/recent injuries or surgeries that affect mobility?: No  Physical Limitations Present: None  Independent in ambulation?: Yes  Transfer Assist: Standby Assist  Assistive Device Used[de-identified] Walker  History of falls?: No  Grooming  Level of indepen Denies  Neglect: Denies  Does anyone say or do something to you that makes you feel unsafe?: No  Have You Ever Been Harmed by a Partner/Caregiver?: No  Health Concerns r/t Abuse: No  Possible Abuse Reportable to[de-identified] Not appropriate for reporting to authoriti mental illness. Patient states she was in and out of the hospitals frequently when they were children. Patient stated she felt hopelss and helpless but denies any active plan of suicide.  Patient reports she acted irritable and swore at several staff on the

## 2020-08-27 NOTE — ED INITIAL ASSESSMENT (HPI)
Via EMS from [facility] reports she's depressed. She was unable to get ahold of her PCP Dr. Irineo Massey today so she called 911. She saw her psychiatrist Dr. Anibal Taylor on 8/20 who advised patient to be admitted psychiatrically.      Patient

## 2020-08-28 NOTE — ED NOTES
Report given to Frank Coates RN. Per Frank Coates pt is accepted to [facility] and will be going to room 7576. Per Bill we have to wait until [facility] calls back to see what time to send the pt.

## 2020-08-28 NOTE — ED NOTES
Pt is accepted at [facility] by Dr. Sue Larose. Will call this ED RN for nurse to nurse.

## 2020-08-28 NOTE — ED NOTES
Received a call from [address] [place] [address] stating April Williamson is reviewing. States they will call back with any information.

## 2020-08-28 NOTE — ED NOTES
Care assumed from Kirk jones [facility]. Pt currently sleeping. No distress noted at this time. ZAHRA Truong at pt bedside for direct observation of the pt. Will continue to monitor.

## 2020-08-28 NOTE — ED NOTES
Discussed with pt her level of care recommendation of inpatient. Pt completely on board with disposition. Pt signed in Voluntarily and rights were gone over with the pt.

## 2020-09-09 ENCOUNTER — TELEPHONE (OUTPATIENT)
Dept: NEUROLOGY | Facility: CLINIC | Age: 80
End: 2020-09-09

## 2020-09-09 DIAGNOSIS — M54.50 CHRONIC LEFT-SIDED LOW BACK PAIN WITHOUT SCIATICA: ICD-10-CM

## 2020-09-09 DIAGNOSIS — G89.29 CHRONIC LEFT-SIDED LOW BACK PAIN WITHOUT SCIATICA: ICD-10-CM

## 2020-09-09 DIAGNOSIS — M43.16 SPONDYLOLISTHESIS OF LUMBAR REGION: ICD-10-CM

## 2020-09-09 DIAGNOSIS — M54.16 LUMBAR RADICULOPATHY: Primary | ICD-10-CM

## 2020-09-09 DIAGNOSIS — M96.1 LUMBAR POST-LAMINECTOMY SYNDROME: ICD-10-CM

## 2020-09-09 DIAGNOSIS — M43.10 RETROLISTHESIS OF VERTEBRAE: ICD-10-CM

## 2020-09-09 NOTE — TELEPHONE ENCOUNTER
Bilaretal L5(L5-S1) TFESI CPT Code [id number], 33632 APPROVED  Medicare Online for authorization of procedure above. Procedure is a covered benefit and does not require authorization. Will inform DARRYL Approved Referrals.

## 2020-09-09 NOTE — TELEPHONE ENCOUNTER
Patient has been scheduled for a bilateral L5 TFESIs under MAC on 9/18/20 at the [facility]. Medications and allergies reviewed. Patient informed to hold aspirins, nsaids, blood thinners, multivitamins, vitamin E and fish oils 3-7 days prior to procedure.  Josse

## 2020-09-09 NOTE — TELEPHONE ENCOUNTER
Patient states she would like to proceed with bilateral L5 TFESIs if able to proceed at [facility] due to living in Assisted Living. Had right shoulder injection 8/6/20 with 60% relief in symptoms.  ROM has improved however there is still pain when sleeping o

## 2020-09-10 NOTE — TELEPHONE ENCOUNTER
MD Al Tovar, ALISON             She can be instructed to hold the Xarelto, that's fine with me.      Zayra Vargas MD     Patient aware of the above

## 2020-09-26 ENCOUNTER — LAB REQUISITION (OUTPATIENT)
Dept: LAB | Facility: HOSPITAL | Age: 80
End: 2020-09-26
Payer: MEDICARE

## 2020-09-26 DIAGNOSIS — Z01.818 ENCOUNTER FOR OTHER PREPROCEDURAL EXAMINATION: ICD-10-CM

## 2020-09-26 LAB — SARS-COV-2 RNA RESP QL NAA+PROBE: NOT DETECTED

## 2020-09-29 ENCOUNTER — LAB REQUISITION (OUTPATIENT)
Dept: LAB | Facility: HOSPITAL | Age: 80
End: 2020-09-29
Payer: MEDICARE

## 2020-09-29 DIAGNOSIS — Z01.818 ENCOUNTER FOR OTHER PREPROCEDURAL EXAMINATION: ICD-10-CM

## 2020-10-02 ENCOUNTER — OFFICE VISIT (OUTPATIENT)
Dept: SURGERY | Facility: CLINIC | Age: 80
End: 2020-10-02

## 2020-10-02 DIAGNOSIS — M48.061 SPINAL STENOSIS OF LUMBAR REGION WITHOUT NEUROGENIC CLAUDICATION: ICD-10-CM

## 2020-10-02 DIAGNOSIS — M96.1 LUMBAR POST-LAMINECTOMY SYNDROME: ICD-10-CM

## 2020-10-02 DIAGNOSIS — M54.16 LUMBAR RADICULOPATHY: Primary | ICD-10-CM

## 2020-10-02 DIAGNOSIS — M51.36 LUMBAR DEGENERATIVE DISC DISEASE: ICD-10-CM

## 2020-10-02 PROCEDURE — 64483 NJX AA&/STRD TFRM EPI L/S 1: CPT | Performed by: PHYSICAL MEDICINE & REHABILITATION

## 2020-10-02 RX ORDER — HYDROCODONE BITARTRATE AND ACETAMINOPHEN 10; 325 MG/1; MG/1
1 TABLET ORAL EVERY 6 HOURS PRN
Qty: 60 TABLET | Refills: 0 | Status: ON HOLD | OUTPATIENT
Start: 2020-10-02 | End: 2020-12-01

## 2020-10-02 NOTE — PROCEDURES
Don [facility] [address].    BILATERAL LUMBAR TRANSFORAMINAL   NAME:  Rayshawn Ireland    MR #:    [id number] :  12/3/1940     PHYSICIAN:  Victoria Baumann        Operative Report    DATE OF PROCEDURE: 10/2/2020   PREOPERATIVE DIAGNOSES: 1. bilate the needle to the 6 o'clock position on the right L5 pedicle. At this point in time, Omnipaque-240 contrast was used to obtain a good epidurogram indicating correct needle placement. Then, aspiration was performed. No blood, fluid, or air was aspirated. no

## 2020-10-13 ENCOUNTER — TELEPHONE (OUTPATIENT)
Dept: NEUROLOGY | Facility: CLINIC | Age: 80
End: 2020-10-13

## 2020-10-13 DIAGNOSIS — M96.1 CERVICAL POST-LAMINECTOMY SYNDROME: ICD-10-CM

## 2020-10-13 DIAGNOSIS — M50.90 CERVICAL DISC DISEASE: Primary | ICD-10-CM

## 2020-10-13 DIAGNOSIS — L08.9 WOUND INFECTION: ICD-10-CM

## 2020-10-13 DIAGNOSIS — T14.8XXA WOUND INFECTION: ICD-10-CM

## 2020-10-13 DIAGNOSIS — L03.116 CELLULITIS OF LEFT LOWER EXTREMITY: ICD-10-CM

## 2020-10-19 ENCOUNTER — OFFICE VISIT (OUTPATIENT)
Dept: DERMATOLOGY CLINIC | Facility: CLINIC | Age: 80
End: 2020-10-19
Payer: MEDICARE

## 2020-10-19 DIAGNOSIS — D23.5 BENIGN NEOPLASM OF SKIN OF TRUNK, EXCEPT SCROTUM: ICD-10-CM

## 2020-10-19 DIAGNOSIS — D23.4 BENIGN NEOPLASM OF SCALP AND SKIN OF NECK: ICD-10-CM

## 2020-10-19 DIAGNOSIS — L82.0 INFLAMED SEBORRHEIC KERATOSIS: Primary | ICD-10-CM

## 2020-10-19 DIAGNOSIS — D23.60 BENIGN NEOPLASM OF SKIN OF UPPER LIMB, INCLUDING SHOULDER, UNSPECIFIED LATERALITY: ICD-10-CM

## 2020-10-19 DIAGNOSIS — L82.1 SEBORRHEIC KERATOSES: ICD-10-CM

## 2020-10-19 DIAGNOSIS — D23.30 BENIGN NEOPLASM OF SKIN OF FACE: ICD-10-CM

## 2020-10-19 DIAGNOSIS — L57.0 AK (ACTINIC KERATOSIS): ICD-10-CM

## 2020-10-19 PROCEDURE — G0463 HOSPITAL OUTPT CLINIC VISIT: HCPCS | Performed by: DERMATOLOGY

## 2020-10-19 PROCEDURE — 99213 OFFICE O/P EST LOW 20 MIN: CPT | Performed by: DERMATOLOGY

## 2020-10-26 NOTE — PROGRESS NOTES
Jazmine Arnold is a 78year old female. HPI:     CC:  Patient presents with:  Derm Problem: LOV 6/2019. New issue. Pt presents with pimples to chest and back x 8 weeks. No treatments tried. Pt has not changed detergent or topicals. Allergies:   Ba fusion: C3-6 and C7-T1 11/2/2017   • s/p L5-S1 right laminectomy 8/28/2014   • Shortness of breath    • Sleep apnea    • stent placement     cerebral   • Thyroid disease    • Toe pain     Onychomycosis, Debridement , Hammertoe    • Tonsillitis 1950    Tons PER PATIENT   • OTHER SURGICAL HISTORY  1985,1995,2003,2009    Cervical Discectomy AND FUSION   • PREP SITE T/A/L 1ST 100 SQ CM/1PCT Left 08/21/2019    Left leg debridement, VAC placed   • SKIN GRAFT SPLIT THICKNESS Left 10/8/2019    Performed by Nicole Mcknight MIRTAZAPINE 45 MG Oral Tab ONE TABLET BY MOUTH AT BEDTIME 14 tablet PRN   • amLODIPine Besylate 10 MG Oral Tab Take 1 tablet (10 mg total) by mouth daily.  90 tablet 0   • cholecalciferol 1000 UNITS Oral Cap Take 2 capsules (2,000 Units total) by mouth tre Unable to move  Metronidazole           RASH  Phentermine             OTHER (SEE COMMENTS)    Comment:Mini stroke  Tramadol                OTHER (SEE COMMENTS)    Comment:Extreme sleepiness  Dilaudid [[employer]*    RASH, OTHER (SEE COMMENTS)    Comment: Toe pain     Onychomycosis, Debridement , Ximena    • Tonsillitis 1950    Tonsillitis    • Unspecified essential hypertension    • Unspecified sleep apnea    • Visual impairment     EYE GLASSES   • Wound of left leg 10/03/2019    Left leg debridement an placed   • SKIN GRAFT SPLIT THICKNESS Left 10/8/2019    Performed by Eugene Pearson MD at [facility] OR   • SPLIT GRFT [address] <100SQCM Left 10/08/2019    Left leg debridement and Split Thickness Skin Graft to left thigh   • TONSILLECTOMY  1950 Not on file        Forced sexual activity: Not on file    Other Topics      Concerns:         Service: Not Asked        Blood Transfusions: Not Asked        Caffeine Concern: No          crystal light        Occupational Exposure: Not Asked Disorder Sister         ALzheimer's Disease    • Stroke Sister         Cerebrovascular accident    • Heart Disease Brother         Coronary Artery Disease        There were no vitals filed for this visit.     HPI:  Patient presents with:  Derm Problem: LOV Visit:  Requested Prescriptions      No prescriptions requested or ordered in this encounter         Inflamed seborrheic keratosis  (primary encounter diagnosis)  Seborrheic keratoses  Ak (actinic keratosis)  Benign neoplasm of scalp and skin of neck  [facility] follow-up/ above. This note was generated using Dragon voice recognition software. Please contact me regarding any confusion resulting from errors in recognition.

## 2020-10-29 ENCOUNTER — HOSPITAL ENCOUNTER (EMERGENCY)
Facility: HOSPITAL | Age: 80
Discharge: NURSING, REHAB OR LONG-TERM CARE | End: 2020-10-30
Attending: EMERGENCY MEDICINE
Payer: MEDICARE

## 2020-10-29 ENCOUNTER — APPOINTMENT (OUTPATIENT)
Dept: GENERAL RADIOLOGY | Facility: HOSPITAL | Age: 80
End: 2020-10-29
Attending: EMERGENCY MEDICINE
Payer: MEDICARE

## 2020-10-29 DIAGNOSIS — N28.9 RENAL INSUFFICIENCY: ICD-10-CM

## 2020-10-29 DIAGNOSIS — F32.A DEPRESSION, UNSPECIFIED DEPRESSION TYPE: Primary | ICD-10-CM

## 2020-10-29 PROCEDURE — 71045 X-RAY EXAM CHEST 1 VIEW: CPT | Performed by: EMERGENCY MEDICINE

## 2020-10-29 PROCEDURE — 87077 CULTURE AEROBIC IDENTIFY: CPT | Performed by: EMERGENCY MEDICINE

## 2020-10-29 PROCEDURE — 87186 SC STD MICRODIL/AGAR DIL: CPT | Performed by: EMERGENCY MEDICINE

## 2020-10-29 PROCEDURE — 99285 EMERGENCY DEPT VISIT HI MDM: CPT

## 2020-10-29 PROCEDURE — 83880 ASSAY OF NATRIURETIC PEPTIDE: CPT | Performed by: EMERGENCY MEDICINE

## 2020-10-29 PROCEDURE — 87086 URINE CULTURE/COLONY COUNT: CPT | Performed by: EMERGENCY MEDICINE

## 2020-10-29 PROCEDURE — 80320 DRUG SCREEN QUANTALCOHOLS: CPT | Performed by: EMERGENCY MEDICINE

## 2020-10-29 PROCEDURE — 81001 URINALYSIS AUTO W/SCOPE: CPT | Performed by: EMERGENCY MEDICINE

## 2020-10-29 PROCEDURE — 36415 COLL VENOUS BLD VENIPUNCTURE: CPT

## 2020-10-29 PROCEDURE — 80048 BASIC METABOLIC PNL TOTAL CA: CPT | Performed by: EMERGENCY MEDICINE

## 2020-10-29 PROCEDURE — 80307 DRUG TEST PRSMV CHEM ANLYZR: CPT | Performed by: EMERGENCY MEDICINE

## 2020-10-29 PROCEDURE — 85025 COMPLETE CBC W/AUTO DIFF WBC: CPT | Performed by: EMERGENCY MEDICINE

## 2020-10-29 NOTE — ED PROVIDER NOTES
Patient Seen in: [facility] AND [facility] Emergency Department      History   Patient presents with:  Fatigue  Nausea/Vomiting/Diarrhea    Stated Complaint: weakness, diarrhea.     HPI    Patient sent from 78-year-old female from [address] home sent for we • Other complicated headache syndrome 11/2/2017   • Other spondylosis, lumbar region 11/2/2017   • Pneumonia 2012   • Pneumonia due to organism    • S/P cervical spinal fusion: C3-6 and C7-T1 11/2/2017   • s/p L5-S1 right laminectomy 8/28/2014   • Shortn SURGERY Bilateral    • HYSTERECTOMY  1967    Partial Hysterectomy   • JAW SURGERY     • KNEE REPLACEMENT SURGERY Bilateral     Bilateral arthritis    • LAMINECTOMY      WITH FUSION PER PATIENT   • OTHER SURGICAL HISTORY  1985,1995,2003,2009    Cervical Dis normal.   Eyes:      Extraocular Movements: Extraocular movements intact. Conjunctiva/sclera: Conjunctivae normal.      Pupils: Pupils are equal, round, and reactive to light. Neck:      Musculoskeletal: Normal range of motion and neck supple.    Car -----------         ------                     CBC W/ DIFFERENTIAL[[id number]]          Abnormal            Final result                 Please view results for these tests on the individual orders.    URINALYSIS WITH CULTURE REFLEX   ETHYL ALCOHOL disposition time on file for this visit. Follow-up:  No follow-up provider specified.   We recommend that you schedule follow up care with a primary care provider within the next three months to obtain basic health screening including reassessment of you

## 2020-10-29 NOTE — ED INITIAL ASSESSMENT (HPI)
Pt arrived per EMS from [facility]. States has had decrease energy, weakness, decreased appetite, diarrhea, some shortness of breath on exertion and depression for the past few days. States able to tolerate oral intake. States some abdominal discomfort.

## 2020-10-30 VITALS
HEART RATE: 80 BPM | SYSTOLIC BLOOD PRESSURE: 153 MMHG | HEIGHT: 68 IN | RESPIRATION RATE: 18 BRPM | TEMPERATURE: 98 F | WEIGHT: 255 LBS | BODY MASS INDEX: 38.65 KG/M2 | DIASTOLIC BLOOD PRESSURE: 71 MMHG | OXYGEN SATURATION: 92 %

## 2020-10-30 RX ORDER — MIRTAZAPINE 45 MG/1
45 TABLET, FILM COATED ORAL NIGHTLY
Status: DISCONTINUED | OUTPATIENT
Start: 2020-10-30 | End: 2020-10-30

## 2020-10-30 RX ORDER — LAMOTRIGINE 100 MG/1
100 TABLET ORAL 2 TIMES DAILY
Status: DISCONTINUED | OUTPATIENT
Start: 2020-10-30 | End: 2020-10-30

## 2020-10-30 RX ORDER — LORAZEPAM 1 MG/1
2 TABLET ORAL NIGHTLY PRN
Status: DISCONTINUED | OUTPATIENT
Start: 2020-10-30 | End: 2020-10-30

## 2020-10-30 NOTE — ED NOTES
Discussed with pt her level of care recommendation of inpatient. Pt completely in agreement and signed in Voluntarily. Rights were gone over with the pt.

## 2020-10-30 NOTE — ED NOTES
Call from [facility] with [facility].  She advised that they received the face sheet, The Petition and certificate. (38 pages were faxed). Went up to the fax machine by the demar [facility] [address]. office on the fifth floor to fax the packet.   Requested that Br

## 2020-10-30 NOTE — ED NOTES
Call from Maya Pyle with [facility].  The are unable to accommodate citing concerns with CPAP and IF she became sucidal would require a sitter.

## 2020-10-30 NOTE — ED PROVIDER NOTES
Patient endorsed to me by Dr. Hank Coleman for continuation of care. Patient was evaluated by crisis andIs in need of inpatient psychiatric transfer. Patient is medically cleared and has been calm throughout my shift.

## 2020-10-30 NOTE — ED NOTES
Pt woke up and pushed her call light and was upset about being here for so long. Explained that we are still looking for placement. Relayed that pt had told psych liaison she did not want to go to [facility] which had a bed available.  Pt then stated \"I'll g

## 2020-10-30 NOTE — ED NOTES
Patient is resting comfortably, calm and cooperative, given apple juice upon request. Tolerating PO. Island Pedicle Flap-Requiring Vessel Identification Text: The defect edges were debeveled with a #15 scalpel blade.  Given the location of the defect, shape of the defect and the proximity to free margins an island pedicle advancement flap was deemed most appropriate.  Using a sterile surgical marker, an appropriate advancement flap was drawn, based on the axial vessel mentioned above, incorporating the defect, outlining the appropriate donor tissue and placing the expected incisions within the relaxed skin tension lines where possible.    The area thus outlined was incised deep to adipose tissue with a #15 scalpel blade.  The skin margins were undermined to an appropriate distance in all directions around the primary defect and laterally outward around the island pedicle utilizing iris scissors.  There was minimal undermining beneath the pedicle flap.

## 2020-10-30 NOTE — ED NOTES
[facility] has not returned phone calls. Spoke to [employer] charge at [facility] regarding bed availability  She spoke to [facility] [place] and we are to work on transfer out. [address] to [place]. Referral packet faxed.

## 2020-10-30 NOTE — PROGRESS NOTES
10/29/20 1959   COVID Exposure Risk Screening   Have you been practicing social distancing? Yes   Have you been wearing a mask when in the community? Yes   Are the people you live with following social distancing and wearing a mask?  Yes   While you are

## 2020-10-30 NOTE — BH LEVEL OF CARE ASSESSMENT
Level of Care Assessment Note    General Questions  Why are you here?: \"I'm severly depressed. I think I need to go to a psych hosptial.\"  Precipitating Events: Pt states that she has a history of severe depression and knows the signs.  Pt states she is n suicide: A Solution to a Problem  Protective Factors: Active in treatment, requesting help  Past Suicidal Ideation: Ideation;Method/Plan;Intention;Rehersal/Research; Attempt  Describe: Pt reports two atttempts to kill herself by overdose.  Pt states the most Generalized;Panic attack  Panic Attacks: Pt reports increased anxiety. Pt states \"I cannot shut my mind off. \" Pt reports racing thoughts. Pt reports a history of panic attacks with the most recent a few weeks ago. Pt reports being claustrophobic as well. assistance however has decreased energy over the past few days)  Assistive Device Used[de-identified] Walker  History of falls?: Yes  When was the most recent fall?: A few days ago  How often has the patient fallen in the last month?: Once.  Pt states a few days ago she Recovery  Is your living environment a supportive place for recovery?: Yes  Describe: Pt lives in assisted livign     Withdrawal Symptoms  History of Withdrawal Symptoms: Denies past symptoms  Last Withdrawal Episode: N/A  Current Withdrawal Symptoms: No intact  Orientation Level: Oriented X4  Insight: Fair  Fair/poor insight as evidenced by: Pt possesses some insight into her mental health and behavior  Judgment: Fair  Fair/poor judgment as evidenced by: Pt requesting treatment and active with current [facility] Inpatient Acute Care  Unit: [facility]  Reason for Unit Assigned: Age / Symptoms  Inpatient Criteria: 24 hr behavior monitoring; Failure at lowest level of care; Inability to care for self;Severely decreased function  Behavioral Precautions: Close Obse

## 2020-10-30 NOTE — ED NOTES
Advised by Orlando Davalos RN that she spoke to daughter who inquired about a  Bed at [facility]. Called [facility]. Spoke to H[place]. [facility] has no catrachita bed and it is anticipated that they will not have any beds before sometime next week.

## 2020-10-30 NOTE — ED NOTES
[facility]   Gabriella - gave clinical, faxed packet, awaiting response

## 2020-10-30 NOTE — ED NOTES
Met with Marissa Gao. She expressed frustration with the length of time being in the ER and not being updated in a timely fashion. Marissa Gao now states should would be agreeable to go to [facility] if a bed is available.   Marissa Gao opened up about her psych hi

## 2020-10-30 NOTE — ED NOTES
Pt is specifically requesting not to go back to [facility]. Gabriella - the only facility they had available was [facility].

## 2020-10-30 NOTE — ED NOTES
TRANSFER SUMMARY:    [facility] Is re-assessing their bed availability. They had no appropriate bed earlier today. Olga Lidia Kaur, unable to accommodate  Patient declining [facility]. Kathryn Catherine only has a bed at [facility]. Daughter requested [facility].   Called back OFE

## 2020-10-30 NOTE — ED NOTES
Called [facility]. Left a message for Intake requesting an update on referral packet sent on 10/29/30 @ 11:53 p.m.

## 2020-10-31 PROBLEM — Z79.01 ON CONTINUOUS ORAL ANTICOAGULATION: Status: ACTIVE | Noted: 2020-10-31

## 2020-10-31 PROBLEM — N39.0 UTI (URINARY TRACT INFECTION): Status: ACTIVE | Noted: 2020-10-31

## 2020-10-31 PROBLEM — F32.9 MAJOR DEPRESSIVE DISORDER: Status: ACTIVE | Noted: 2020-10-31

## 2020-10-31 NOTE — ED NOTES
[facility] called to transport pt to [facility]. ETA 45 minutes. Pt updated on plan of care, pt agreeable to be transferred to [facility].

## 2020-10-31 NOTE — ED NOTES
Pt accepted at [facility].  MARYANN will call back with accepting information and transport time

## 2020-10-31 NOTE — CONSULTS
[facility]    PATIENT'S NAME: Lisa Horvath   ATTENDING PHYSICIAN: Nils Pappas MD   CONSULTING PHYSICIAN: Verónica Smith MD   PATIENT ACCOUNT#:   [id number]    LOCATION:  [place] [address]  MEDICAL RECORD #:   [id number]       DATE OF BI with insomnia her whole life. At best, it takes a couple of hours to fall asleep. She will fall asleep from 2 to 3 a.m. and then awake at 6 a.m. She is not tired at all during the day and does not take naps. This has always been the pattern for her.   C normal rate and volume and poor eye contact. Affect is depressed. Mood is constricted. She denies auditory or visual hallucinations and denies suicidal or homicidal ideation. Insight and judgment are fair to good.   Thought content and process are gross 22:08:38  [facility] [id number]  EULA/

## 2020-11-16 PROBLEM — R60.0 BILATERAL LOWER EXTREMITY EDEMA: Status: ACTIVE | Noted: 2020-11-16

## 2020-11-17 ENCOUNTER — HOSPITAL ENCOUNTER (EMERGENCY)
Facility: HOSPITAL | Age: 80
Discharge: HOME | End: 2020-11-17
Attending: EMERGENCY MEDICINE
Payer: MEDICARE

## 2020-11-17 VITALS
WEIGHT: 270 LBS | HEIGHT: 68 IN | TEMPERATURE: 99 F | RESPIRATION RATE: 16 BRPM | OXYGEN SATURATION: 99 % | BODY MASS INDEX: 40.92 KG/M2 | DIASTOLIC BLOOD PRESSURE: 69 MMHG | HEART RATE: 70 BPM | SYSTOLIC BLOOD PRESSURE: 134 MMHG

## 2020-11-17 DIAGNOSIS — L03.116 CELLULITIS OF LEFT LOWER EXTREMITY: Primary | ICD-10-CM

## 2020-11-17 PROCEDURE — 99283 EMERGENCY DEPT VISIT LOW MDM: CPT

## 2020-11-17 PROCEDURE — 36415 COLL VENOUS BLD VENIPUNCTURE: CPT

## 2020-11-17 PROCEDURE — 80048 BASIC METABOLIC PNL TOTAL CA: CPT | Performed by: EMERGENCY MEDICINE

## 2020-11-17 PROCEDURE — 85025 COMPLETE CBC W/AUTO DIFF WBC: CPT | Performed by: EMERGENCY MEDICINE

## 2020-11-17 RX ORDER — CEPHALEXIN 500 MG/1
500 CAPSULE ORAL ONCE
Status: COMPLETED | OUTPATIENT
Start: 2020-11-17 | End: 2020-11-17

## 2020-11-17 RX ORDER — CEFAZOLIN SODIUM/WATER 2 G/20 ML
2 SYRINGE (ML) INTRAVENOUS ONCE
Status: DISCONTINUED | OUTPATIENT
Start: 2020-11-17 | End: 2020-11-17

## 2020-11-17 RX ORDER — ACETAMINOPHEN 500 MG
1000 TABLET ORAL ONCE
Status: COMPLETED | OUTPATIENT
Start: 2020-11-17 | End: 2020-11-17

## 2020-11-17 RX ORDER — CEPHALEXIN 500 MG/1
500 CAPSULE ORAL 4 TIMES DAILY
Qty: 40 CAPSULE | Refills: 0 | Status: ON HOLD | OUTPATIENT
Start: 2020-11-17 | End: 2020-12-01

## 2020-11-17 RX ORDER — ACETAMINOPHEN AND CODEINE PHOSPHATE 300; 30 MG/1; MG/1
1-2 TABLET ORAL EVERY 6 HOURS PRN
Qty: 10 TABLET | Refills: 0 | Status: SHIPPED | OUTPATIENT
Start: 2020-11-17 | End: 2020-11-24

## 2020-11-17 NOTE — ED NOTES
Pt received from [facility] with c/o LLE cellulitis that started today. Redness is above the ankle extending to just below the knee. Pt is mobile with a scooter, is awake and alert, respirations nonlabored, is speaking in full sentences.

## 2020-11-17 NOTE — ED INITIAL ASSESSMENT (HPI)
Pt received via EMS with from [facility] with c/o cellulitis of LLE.

## 2020-11-17 NOTE — CM/SW NOTE
[facility] is not able to accept the pt based on her insurance. I called [facility] which the pt is current with and notified to re-start services post ED discharge.

## 2020-11-17 NOTE — ED NOTES
Left in stable condition with medicar to be transported to [facility]. This was confirmed with the pt prior to discharge. Given copy of dc instructions by juan hoskins at approx 0700.

## 2020-11-17 NOTE — CM/SW NOTE
Pt requesting not to return to [facility]. AT [facility]. Pt states she would rather go home to [facility]. Pt states she has a rollator walker, a scooter and \"a walker I can sit on\".  Pt states she has been having trouble walking, and spent much

## 2020-11-17 NOTE — ED PROVIDER NOTES
Patient Seen in: [facility] AND [facility] Emergency Department      History   No chief complaint on file. Stated Complaint: LLE cellulitis    HPI    47-year-old female with history of DVT, cellulitis, here with complaints of left leg cellulitis for 1 day.   P site    • Other and unspecified hyperlipidemia    • Other complicated headache syndrome 11/2/2017   • Other spondylosis, lumbar region 11/2/2017   • Pneumonia 2012   • Pneumonia due to organism    • S/P cervical spinal fusion: C3-6 and C7-T1 11/2/2017   • x2 FOR \"CROSSED EYES\"   • HIP REPLACEMENT SURGERY Bilateral    • HYSTERECTOMY  1967    Partial Hysterectomy   • JAW SURGERY     • KNEE REPLACEMENT SURGERY Bilateral     Bilateral arthritis    • LAMINECTOMY      WITH FUSION PER PATIENT   • OTHER SURGIC reviewed and negative except as noted above.     Physical Exam     ED Triage Vitals [11/17/20 0426]   BP (!) 153/147   Pulse 87   Resp 18   Temp 98.7 °F (37.1 °C)   Temp src Oral   SpO2 96 %   O2 Device None (Room air)       Current:BP (!) 153/147   Pulse 8 -American 55 (L) >=60   CBC W/ DIFFERENTIAL    Collection Time: 11/17/20  4:30 AM   Result Value Ref Range    WBC 8.8 4.0 - 11.0 x10(3) uL    RBC 4.58 3.80 - 5.30 x10(6)uL    HGB 13.9 12.0 - 16.0 g/dL    HCT 43.3 35.0 - 48.0 %    MCV 94.5 80.0 - 100 Review: I personally reviewed available prior medical records for any recent pertinent discharge summaries, testing, and procedures, and reviewed those reports. Complicating Factors: The patient already has does not have any pertinent problems on file.

## 2020-11-17 NOTE — CM/SW NOTE
Spoke with Oralia Gomez at [address] [place] [address] to verify Pt has been a patient of theirs. Oralia Gomez states that she has been a patient of theirs but needs a new order. Order for PT/OT eval and treat in the home for strength training and safety placed.

## 2020-11-19 ENCOUNTER — TELEMEDICINE (OUTPATIENT)
Dept: NEUROLOGY | Facility: CLINIC | Age: 80
End: 2020-11-19
Payer: MEDICARE

## 2020-11-19 DIAGNOSIS — M47.816 LUMBAR FACET ARTHROPATHY: ICD-10-CM

## 2020-11-19 DIAGNOSIS — M54.16 LUMBAR RADICULOPATHY: Primary | ICD-10-CM

## 2020-11-19 DIAGNOSIS — R26.9 NEUROLOGIC GAIT DISORDER: ICD-10-CM

## 2020-11-19 DIAGNOSIS — M51.36 LUMBAR DEGENERATIVE DISC DISEASE: ICD-10-CM

## 2020-11-19 DIAGNOSIS — M48.061 SPINAL STENOSIS OF LUMBAR REGION WITHOUT NEUROGENIC CLAUDICATION: ICD-10-CM

## 2020-11-19 DIAGNOSIS — M96.1 LUMBAR POST-LAMINECTOMY SYNDROME: ICD-10-CM

## 2020-11-19 DIAGNOSIS — M43.16 SPONDYLOLISTHESIS OF LUMBAR REGION: ICD-10-CM

## 2020-11-19 DIAGNOSIS — F33.2 SEVERE EPISODE OF RECURRENT MAJOR DEPRESSIVE DISORDER, WITHOUT PSYCHOTIC FEATURES (HCC): ICD-10-CM

## 2020-11-19 DIAGNOSIS — M43.10 RETROLISTHESIS OF VERTEBRAE: ICD-10-CM

## 2020-11-19 DIAGNOSIS — L03.116 CELLULITIS OF LEFT LOWER EXTREMITY: ICD-10-CM

## 2020-11-19 DIAGNOSIS — M51.26 LUMBAR HERNIATED DISC: ICD-10-CM

## 2020-11-19 PROCEDURE — 99214 OFFICE O/P EST MOD 30 MIN: CPT | Performed by: PHYSICAL MEDICINE & REHABILITATION

## 2020-11-19 NOTE — PROGRESS NOTES
[facility]  [address]    Telemedicine Visit - Evaluation    Scottie Sutherland verbally consents to a Telemedicine Visit on 11/19/20. This visit is conducted using Telemedicine with live, interactive audio and video.     P Stent placed    • Cervical disc disease 1985,1995,2003,2009    Cervical Discectomy    • Cholecystitis 1984    Cholecystectomy    • COPD (chronic obstructive pulmonary disease) (HCC)     PFTs 2-15 with FEV1 1.11 L and FEV1/FVC ratio 63%   • Deep vein thromb Maureen Sherwood MD at [facility]   • BRONCHOSCOPY N/A 12/16/2019    Performed by Ann Rodriguez MD at [facility]   • BRONCHOSCOPY N/A 5/9/2018    Performed by Ann Rodriguez MD at [facility]   • BRONCHOSCOPY N/A 2/21/2017    Performed by Ann Rodriguez MD at cephALEXin 500 MG Oral Cap Take 1 capsule (500 mg total) by mouth 4 (four) times daily for 10 days. 40 capsule 0   • Acetaminophen-Codeine #3 300-30 MG Oral Tab Take 1-2 tablets by mouth every 6 (six) hours as needed for Pain.  10 tablet 0   • gabapentin 10 Oral Tab EC Take 1 tablet (40 mg total) by mouth every morning before breakfast. 90 tablet 3   • acetaminophen 160 MG/5ML Oral Liquid Take 650 mg by mouth every 6 (six) hours as needed for Fever or Pain.            ALLERGIES:     Bactrim [Sulfametho*    CHIDI SYSTEMS:   As per above HPI    PHYSICAL EXAM:   General: No immediate distress  Head: Normocephalic/ Atraumatic  Eyes: Extra-occular movements intact  Ears/Nose/Throat:  External appearance identifies normal appearance without obvious deformity  Cardiovasc diffuse, L1-2 mod diffuse, T12-L1 left mild-mod, T11-12 mild-mod diffuse bulging discs    6. L5-S1 mild-mod central HNP    7.  L5-S1 bilateral severe foraminal, L2-3 mild-mod central, L1-2 mod central, T12-L1 mod central, T11-12 mild-mod central stenosis adequate time to complete the visit. The patient was made aware of the limitations of the telehealth visit, including treatment limitations as no physical exam could be performed.   The patient was advised to call 911 or to go to the ER in case there was a

## 2020-11-25 ENCOUNTER — APPOINTMENT (OUTPATIENT)
Dept: GENERAL RADIOLOGY | Facility: HOSPITAL | Age: 80
DRG: 602 | End: 2020-11-25
Attending: EMERGENCY MEDICINE
Payer: MEDICARE

## 2020-11-25 ENCOUNTER — HOSPITAL ENCOUNTER (INPATIENT)
Facility: HOSPITAL | Age: 80
LOS: 6 days | Discharge: NURSING, REHAB OR LONG-TERM CARE | DRG: 602 | End: 2020-12-01
Attending: EMERGENCY MEDICINE | Admitting: STUDENT IN AN ORGANIZED HEALTH CARE EDUCATION/TRAINING PROGRAM
Payer: MEDICARE

## 2020-11-25 ENCOUNTER — APPOINTMENT (OUTPATIENT)
Dept: CT IMAGING | Facility: HOSPITAL | Age: 80
DRG: 602 | End: 2020-11-25
Payer: MEDICARE

## 2020-11-25 DIAGNOSIS — R41.82 ALTERED MENTAL STATUS, UNSPECIFIED ALTERED MENTAL STATUS TYPE: ICD-10-CM

## 2020-11-25 DIAGNOSIS — L03.116 CELLULITIS OF LEFT LOWER EXTREMITY: Primary | ICD-10-CM

## 2020-11-25 PROCEDURE — 70450 CT HEAD/BRAIN W/O DYE: CPT | Performed by: EMERGENCY MEDICINE

## 2020-11-25 PROCEDURE — 71045 X-RAY EXAM CHEST 1 VIEW: CPT | Performed by: EMERGENCY MEDICINE

## 2020-11-25 RX ORDER — SODIUM CHLORIDE 0.9 % (FLUSH) 0.9 %
3 SYRINGE (ML) INJECTION AS NEEDED
Status: DISCONTINUED | OUTPATIENT
Start: 2020-11-25 | End: 2020-12-01

## 2020-11-25 RX ORDER — VANCOMYCIN 2 GRAM/500 ML IN 0.9 % SODIUM CHLORIDE INTRAVENOUS
25 ONCE
Status: COMPLETED | OUTPATIENT
Start: 2020-11-25 | End: 2020-11-25

## 2020-11-25 RX ORDER — ONDANSETRON 2 MG/ML
4 INJECTION INTRAMUSCULAR; INTRAVENOUS EVERY 6 HOURS PRN
Status: DISCONTINUED | OUTPATIENT
Start: 2020-11-25 | End: 2020-12-01

## 2020-11-25 RX ORDER — VANCOMYCIN HYDROCHLORIDE
15 EVERY 24 HOURS
Status: DISCONTINUED | OUTPATIENT
Start: 2020-11-26 | End: 2020-11-28

## 2020-11-25 RX ORDER — PANTOPRAZOLE SODIUM 40 MG/1
40 TABLET, DELAYED RELEASE ORAL
Status: DISCONTINUED | OUTPATIENT
Start: 2020-11-26 | End: 2020-12-01

## 2020-11-25 RX ORDER — LAMOTRIGINE 25 MG/1
50 TABLET ORAL 2 TIMES DAILY
Status: DISCONTINUED | OUTPATIENT
Start: 2020-11-25 | End: 2020-11-26

## 2020-11-25 RX ORDER — METOCLOPRAMIDE HYDROCHLORIDE 5 MG/ML
10 INJECTION INTRAMUSCULAR; INTRAVENOUS EVERY 8 HOURS PRN
Status: DISCONTINUED | OUTPATIENT
Start: 2020-11-25 | End: 2020-12-01

## 2020-11-25 RX ORDER — LEVOTHYROXINE SODIUM 0.1 MG/1
100 TABLET ORAL
Status: DISCONTINUED | OUTPATIENT
Start: 2020-11-26 | End: 2020-12-01

## 2020-11-25 RX ORDER — ACETAMINOPHEN 325 MG/1
650 TABLET ORAL EVERY 6 HOURS PRN
Status: DISCONTINUED | OUTPATIENT
Start: 2020-11-25 | End: 2020-12-01

## 2020-11-25 NOTE — ED PROVIDER NOTES
Patient Seen in: [facility] AND [facility] Emergency Department      History   Patient presents with:  Stroke    Stated Complaint:     HPI  Patient is an 20-year-old female history of cranial aneurysm status post coiling, COPD, HTN, DVT on xarelto, thyroid disor diffuse bulging disc 10/31/2014   • L4-5 slight grade 1 unstable spondylolisthesis 10/31/2014   • Leg wound, left 08/21/2019   • Low back pain    • Migraines    • Muscle weakness    • Onychomycosis     Debridement    • Oropharyngeal dysphagia 8/29/2017   • ESOPHAGOGASTRODUODENOSCOPY (EGD) N/A 5/17/2019    Performed by Alissa Herrera MD at [address] [place] [address] Ne Left 10/8/2019    Performed by Zhou Suarez MD at [place] [address] °C)   Temp src 11/25/20 1515 Temporal   SpO2 11/25/20 1503 96 %   O2 Device 11/25/20 1503 None (Room air)       Current:BP (!) 146/117   Pulse 84   Temp 97.7 °F (36.5 °C) (Temporal)   Resp 20   Ht 175.3 cm (5' 9\")   Wt 124.9 kg   SpO2 96%   BMI 40.66 kg/m weakness. No ataxia.     Psychiatric:         Mood and Affect: Mood normal.           ED Course     Labs Reviewed   BASIC METABOLIC PANEL (8) - Abnormal; Notable for the following components:       Result Value    Glucose 121 (*)     CO2 34.0 (*)     BUN 3 thoracic aorta. 2. Bibasilar atelectasis and/or developing parenchymal abnormality. Small bilateral effusions.  3. Follow-up to exclude developing pneumonitis    Dictated by (CST): Loriane Castro MD on 11/25/2020 at 3:44 PM     Finalized by (CST): Sam x-ray with mild edema. Patient given dose of IV vancomycin for possible persistent left lower extremity cellulitis despite p.o. antibiotics. Unclear etiology of slurred speech.  Low suspicion for CVA given non focal exam.   Slurred speech possibly medic

## 2020-11-25 NOTE — ED INITIAL ASSESSMENT (HPI)
Pt brought in by EMS for weakness. Per EMS pt has slurred speech, right sided facial droop. From independent living, LKN unknown. RR even and nonlabored, slurred speech and facial droop noted. Taken to CT scan.

## 2020-11-25 NOTE — ED NOTES
Orders for admission, patient is aware of plan and ready to go upstairs. Any questions, please call ED RN Brian Gaytan  at extension [phone].    Type of COVID test sent:Rapid = negative    COVID Suspicion level: Low    Drug(s) infusing:  Rate: Vancomycin 2 g in 500

## 2020-11-26 ENCOUNTER — APPOINTMENT (OUTPATIENT)
Dept: ULTRASOUND IMAGING | Facility: HOSPITAL | Age: 80
DRG: 602 | End: 2020-11-26
Attending: STUDENT IN AN ORGANIZED HEALTH CARE EDUCATION/TRAINING PROGRAM
Payer: MEDICARE

## 2020-11-26 PROBLEM — F31.32 BIPOLAR AFFECTIVE DISORDER, CURRENTLY DEPRESSED, MODERATE (HCC): Status: ACTIVE | Noted: 2020-11-26

## 2020-11-26 PROCEDURE — 90792 PSYCH DIAG EVAL W/MED SRVCS: CPT | Performed by: OTHER

## 2020-11-26 PROCEDURE — 93970 EXTREMITY STUDY: CPT | Performed by: STUDENT IN AN ORGANIZED HEALTH CARE EDUCATION/TRAINING PROGRAM

## 2020-11-26 PROCEDURE — 99223 1ST HOSP IP/OBS HIGH 75: CPT | Performed by: OTHER

## 2020-11-26 RX ORDER — ZIPRASIDONE HYDROCHLORIDE 20 MG/1
80 CAPSULE ORAL
Status: DISCONTINUED | OUTPATIENT
Start: 2020-11-26 | End: 2020-12-01

## 2020-11-26 RX ORDER — LORAZEPAM 2 MG/ML
2 INJECTION INTRAMUSCULAR ONCE
Status: COMPLETED | OUTPATIENT
Start: 2020-11-26 | End: 2020-11-27

## 2020-11-26 RX ORDER — BISACODYL 10 MG
10 SUPPOSITORY, RECTAL RECTAL
Status: DISCONTINUED | OUTPATIENT
Start: 2020-11-26 | End: 2020-12-01

## 2020-11-26 RX ORDER — SENNA AND DOCUSATE SODIUM 50; 8.6 MG/1; MG/1
2 TABLET, FILM COATED ORAL DAILY
Status: DISCONTINUED | OUTPATIENT
Start: 2020-11-26 | End: 2020-12-01

## 2020-11-26 RX ORDER — LAMOTRIGINE 25 MG/1
100 TABLET ORAL 2 TIMES DAILY
Status: DISCONTINUED | OUTPATIENT
Start: 2020-11-26 | End: 2020-12-01

## 2020-11-26 RX ORDER — LORAZEPAM 1 MG/1
1 TABLET ORAL EVERY 4 HOURS PRN
Status: DISCONTINUED | OUTPATIENT
Start: 2020-11-26 | End: 2020-12-01

## 2020-11-26 RX ORDER — POLYETHYLENE GLYCOL 3350 17 G/17G
17 POWDER, FOR SOLUTION ORAL DAILY PRN
Status: DISCONTINUED | OUTPATIENT
Start: 2020-11-26 | End: 2020-12-01

## 2020-11-26 RX ORDER — MIRTAZAPINE 15 MG/1
30 TABLET, FILM COATED ORAL NIGHTLY
Status: DISCONTINUED | OUTPATIENT
Start: 2020-11-26 | End: 2020-12-01

## 2020-11-26 NOTE — CONSULTS
[facility]    Report of Consultation    Elina Perdue Patient Status:  Inpatient    12/3/1940 MRN [id number]   Location [facility] 3W/SW Attending Crystal Willson, [address] [facility] Day # 1 PCP Michael Ardon MD     Date of Admissi [facility])    • Dehydration 2012    Fluids, Medication adjustment    • Depression    • Disorder of thyroid    • Esophageal reflux    • Hammertoe 02/25/2011    hammertoe 5 left, pain   • Hearing impairment     Bilateral hearing aids   • High blood pressure    • • BRONCHOSCOPY N/A 1/9/2017    Performed by Jonas Sue MD at [facility] ENDOSCOPY   • BRONCHOSCOPY N/A 12/20/2016    Performed by Jonas Sue MD at [facility]   • CAROTID ENDARTERECTOMY Right    • CARPAL TUNNEL RELEASE Right ~2008   • CHOLECYSTECTOMY  1 • Other (Other[other]) Maternal Grandfather    • Other (Other[other]) Paternal Grandmother    • Other (Other[other]) Paternal Grandfather    • Cancer Brother 54        Liver    • Neurological Disorder Sister         ALzheimer's Disease    • Stroke Sister days.    •  gabapentin 100 MG Oral Cap, Take 1 capsule (100 mg total) by mouth 3 (three) times daily. •  lamoTRIgine 25 MG Oral Tab, Take 2 tablets (50 mg total) by mouth 2 (two) times daily.     •  Levothyroxine Sodium 100 MCG Oral Tab, Take 1 tablet (1 [[employer]*    RASH, OTHER (SEE COMMENTS)    Comment:Unknown    Review of Systems:   As in HPI, the rest of the 14 system review was done and was negative    Physical Exam:      11/25/20  1822 11/25/20  2046 11/26/20  0558 11/26/20  0637   BP: 151/72 150/7 symmetric    No clonus  No Babinski sign    Coordination:  Finger to nose slow and clumsy  Rapid alternating movements slow and clumsy    Gait:  Normal posture  Laying in bed    Results:     Laboratory Data:  Lab Results   Component Value Date    WBC 7.1 1 PM          Ekg 12-lead    Result Date: 11/25/2020  ECG Report  Interpretation  -------------------------- Sinus Rhythm -Old inferior infarct.  ABNORMAL When compared with ECG of 08/13/2020 08:09:47 Inferior infract now present Electronically signed on 11/2

## 2020-11-26 NOTE — SLP NOTE
ADULT SWALLOWING EVALUATION    ASSESSMENT    ASSESSMENT/OVERALL IMPRESSION:  PPE REQUIRED. THIS SLP WORE GOGGLES, GLOVES, AND DROPLET MASK. HANDS SANITIZED/WASHED UPON ENTRANCE/EXIT. Pt presents with mild oropharyngeal dysphagia.   Oral phase was functi 8808,6013,0480,0234    Cervical Discectomy    • Cholecystitis 1984    Cholecystectomy    • COPD (chronic obstructive pulmonary disease) (HCC)     PFTs 2-15 with FEV1 1.11 L and FEV1/FVC ratio 63%   • Deep vein thrombosis ([facility] [address]    • Dehydration 2012    Flui Results: 11/25/20 CXR : 1. Borderline cardiac enlargement. Tortuous thoracic aorta. 2. Bibasilar atelectasis and/or developing parenchymal abnormality. Small bilateral effusions.   3. Follow-up to exclude developing pneumonitis    SUBJECTIVE   Alert,  Established Goals: 2  Follow Up Needed: Yes  SLP Follow-up Date: 11/27/20    Thank you for your referral.   If you have any questions, please contact Hudson Elaine MA/[facility]-SLP  Speech Language Pathologist  [phone]

## 2020-11-26 NOTE — PROGRESS NOTES
Pt here from [address] 1940. With slurred speech, weakness, right sided facial droop. Stroke alert called by EMS on the way to the ED.  Facial droop resolved - still has slurred speech - NPO- speech to eval. Pt was recently here for LLE cellulitis- on IV vanco.

## 2020-11-26 NOTE — PROGRESS NOTES
[facility] Hospitalist Progress Note     CC: Hospital Follow up    PCP: Keisha Santos MD       Assessment/Plan:     Principal Problem:    Cellulitis of left lower extremity  Active Problems:    Altered mental status, unspecified altered mental status type DO  [facility] Hospitalist  Answering Service number: [phone]     Subjective:     No CP, SOB, or N/V. Tearful and anxious. OBJECTIVE:    Blood pressure 104/85, pulse 76, temperature 98.6 °F (37 °C), temperature source Oral, resp.  rate 18, height 5' 8\" ( Venous Doppler Leg Bilat - Diag Img (cpt=93970)    Result Date: 11/26/2020  CONCLUSION:   Left peroneal veins not seen. Otherwise, no DVT in either lower extremity.      Dictated by (CST): Fermín Carrillo MD on 11/26/2020 at 10:25 AM     Finalized by (CST): MELECIO

## 2020-11-26 NOTE — PROGRESS NOTES
[facility] Dosing Service    Initial Pharmacokinetic Consult for Vancomycin Dosing     Igor Fisher is a 78year old patient who is being treated for cellulitis.   Pharmacy has been asked to dose Vancomycin by Dr. Markie Teague    Allergies:  Bactrim Ion Covarrubias

## 2020-11-26 NOTE — H&P
[facility] Hospitalist H&P       CC: Patient presents with:  Stroke       PCP: Adrianna Gonzalez MD    Date of Admission: 11/25/2020  3:02 PM    ASSESSMENT / PLAN:     Ms. Torrie Arellano is a 79 yo F with PMH of cranial aneurysm s/p coiling, COPD, HTN, DVT on xarelto, from chart review. Unknown last known well time. She was recently seen in the ED on 11/17 for LLE cellulitis and given PO antibiotics at that time.  She does see a psychiatrist and is on multiple sedating medications including Norco, flexeril, Ativan, geodo breath    • Sleep apnea    • stent placement     cerebral   • Thyroid disease    • Toe pain     Onychomycosis, Debridement , Hammertoe    • Tonsillitis 1950    Tonsillitis    • Unspecified essential hypertension    • Unspecified sleep apnea    • Visual imp FUSION   • PREP SITE T/A/L 1ST 100 SQ CM/1PCT Left 08/21/2019    Left leg debridement, VAC placed   • SKIN GRAFT SPLIT THICKNESS Left 10/8/2019    Performed by Kamron Hernandez MD at [facility] OR   • SPLIT GRFT 100 [facility] <100SQCM Left 10/08/2019    L • Other (Other[other]) Maternal Grandfather    • Other (Other[other]) Paternal Grandmother    • Other (Other[other]) Paternal Grandfather    • Cancer Brother 54        Liver    • Neurological Disorder Sister         ALzheimer's Disease    • Stroke Sister Chest Ap Portable  (cpt=71045)    Result Date: 11/25/2020  CONCLUSION:  1. Borderline cardiac enlargement. Tortuous thoracic aorta. 2. Bibasilar atelectasis and/or developing parenchymal abnormality. Small bilateral effusions.  3. Follow-up to exclude dev

## 2020-11-26 NOTE — CONSULTS
[facility]    Report of Consultation    Tejas Gomez Patient Status:  Inpatient    12/3/1940 MRN [id number]   Location [facility] 3W/SW Attending Joss Paul, [address] [facility] Day # 1 PCP Vitor Beaulieu MD     Date of Admis with.    Patient reported that she discharged from [facility] 2 weeks ago and she admitted that she had good with treatment with significant change in her medication.   Patient reported that although her mood has been reasonably fair her sleep and her raci inpt \"multple times\" between [id number] and again on 12/28/19 at [facility], additional to 3 admission in 2020 last 1 was to [place] in November 2020.   Rocio Barrientos has no hx php/iop  Rocio Barrientos most recently saw a psychiatrist 2 months ago but stopped d/t p spondylosis, lumbar region 11/2/2017   • Pneumonia 2012   • Pneumonia due to organism    • S/P cervical spinal fusion: C3-6 and C7-T1 11/2/2017   • s/p L5-S1 right laminectomy 8/28/2014   • Shortness of breath    • Sleep apnea    • stent placement     cere Partial Hysterectomy   • JAW SURGERY     • KNEE REPLACEMENT SURGERY Bilateral     Bilateral arthritis    • LAMINECTOMY      WITH FUSION PER PATIENT   • OTHER SURGICAL HISTORY  1985,1995,2003,2009    Cervical Discectomy AND FUSION   • PREP SITE T/A/L 1ST 10 Intravenous, Once    •  Senna-Docusate Sodium (SENOKOT S) 8.6-50 MG tab 2 tablet, 2 tablet, Oral, Daily    •  PEG 3350 (MIRALAX) powder packet 17 g, 17 g, Oral, Daily PRN    •  bisacodyl (DULCOLAX) rectal suppository 10 mg, 10 mg, Rectal, Daily PRN    •  N breakfast.    •  mirtazapine 45 MG Oral Tab, Take 1 tablet (45 mg total) by mouth nightly. •  LORazepam 2 MG Oral Tab, Take 1 tablet (2 mg total) by mouth nightly.  (Patient taking differently: Take 2 mg by mouth as needed.  )    •  cyclobenzaprine 10 MG 11/26/2020  CONCLUSION:   Left peroneal veins not seen. Otherwise, no DVT in either lower extremity.      Dictated by (CST): Lyric Gómez MD on 11/26/2020 at 10:25 AM     Finalized by (CST): Lyric Gómez MD on 11/26/2020 at 10:26 AM          Xr Chest Ap Po patient today deny any auditory or visual hallucination. The patient denied any homicidal or suicidal ideation but  Cognition seem reasonably intact. Language is intact. Intellect is average.   Judgment and insight presented appropriate otherwise concern Trough, Serum      Rapid SARS-CoV-2 by PCR STAT      Emergency MRSA Screen by PCR Once      Meds This Visit:  Requested Prescriptions      No prescriptions requested or ordered in this encounter           Cheyanne Orellana MD  11/26/2020

## 2020-11-27 ENCOUNTER — APPOINTMENT (OUTPATIENT)
Dept: MRI IMAGING | Facility: HOSPITAL | Age: 80
DRG: 602 | End: 2020-11-27
Attending: Other
Payer: MEDICARE

## 2020-11-27 PROCEDURE — 99232 SBSQ HOSP IP/OBS MODERATE 35: CPT | Performed by: OTHER

## 2020-11-27 PROCEDURE — 70551 MRI BRAIN STEM W/O DYE: CPT | Performed by: OTHER

## 2020-11-27 RX ORDER — HYDROCODONE BITARTRATE AND ACETAMINOPHEN 5; 325 MG/1; MG/1
1 TABLET ORAL ONCE
Status: COMPLETED | OUTPATIENT
Start: 2020-11-27 | End: 2020-11-27

## 2020-11-27 NOTE — SLP NOTE
SPEECH DAILY NOTE - INPATIENT    ASSESSMENT & PLAN   ASSESSMENT      PPE REQUIRED. THIS SLP WORE GLOVES, GOWN AND DROPLET MASK. HANDS SANITIZED/WASHED UPON ENTRANCE/EXIT. Pt alert, afebrile and on IL/min 02 for monitoring diet tolerance.  Pt declined f sips;No straw  Medication Administration Recommendations: Whole in puree    Patient Experiencing Pain: Yes  Pain Rating: (did not rate)  Pain Location: back, upon movement     Nursing Aware of Pain?: Yes    Discharge Recommendations  Discharge Recommendati

## 2020-11-27 NOTE — PROGRESS NOTES
[facility] AND [facility]  Neurology Progress Note    Elina Perdue Patient Status:  Inpatient    12/3/1940 MRN [id number]   Location [facility] 3W/SW Attending Crystal Willson, [address] [facility] Day # 2 PCP Michael Ardon MD     Subjective:  Dillan Espinoza 151/61  (!) 186/88 (!) 169/77   Pulse: 82  87 81   Resp: 20  20 18   Temp: 98.5 °F (36.9 °C)  98.5 °F (36.9 °C)    TempSrc: Oral  Oral    SpO2:   95%    Weight:  262 lb 4.8 oz (119 kg)     Height:           General: No apparent distress, well nourished, we 11/26/2020  CONCLUSION:   Left peroneal veins not seen. Otherwise, no DVT in either lower extremity.      Dictated by (CST): Argentina Garcia MD on 11/26/2020 at 10:25 AM     Finalized by (CST): Argentina Garcia MD on 11/26/2020 at 10:26 AM          Xr Chest Ap Po mild-mod central HNP     Bilateral groin pain     SHANT (obstructive sleep apnea)     Neck pain on right side     Cellulitis of left lower extremity     DVT (deep venous thrombosis) (HCC)     Anxiety     Urinary incontinence     Neurogenic bladder     Chroni altered mental status type     AMS (altered mental status)     Paraplegia (HCC)     Bipolar affective disorder, currently depressed, moderate ([facility] [address]     Delirium due to another medical condition    Patient with possible episodes of slurring of speech.   MRI

## 2020-11-27 NOTE — PROGRESS NOTES
Pt around 645am c/o of slurred speech, pt had just received norco and ativan for chronic pain and anxiety. Pt was drowsy and anxious. When focus talking to the nurse, no slurred noted. No other neurological deficit noted. Generalized weakness.  Notified pt

## 2020-11-27 NOTE — PROGRESS NOTES
[facility] Hospitalist Progress Note     CC: Hospital Follow up    PCP: Gale Menon MD       Assessment/Plan:     Principal Problem:    Cellulitis of left lower extremity  Active Problems:    Altered mental status, unspecified altered mental status type understanding and agreeing with therapeutic plan as outlined     Janette Bonilla DO  [facility] Hospitalist  Answering Service number: [phone]     Subjective:     No CP, SOB, or N/V.  Drowsy in the am. Improved during my exam    OBJECTIVE:    Blood pressure 1 145*   BUN 30* 22* 18   CREATSERUM 1.06* 0.85 1.07*   GFRAA 57* 74 56*   GFRNAA 49* 64 49*   CA 9.9 9.1 9.6    145 141   K 4.3 4.3 4.7    109 106   CO2 34.0* 35.0* 32.0       Recent Labs   Lab 11/25/20  1537   ALT 31   AST 14*   ALB 3.9 11/25/2020 at 3:22 PM              Meds:     • lidocaine-menthol  1 patch Transdermal Daily   • Senna-Docusate Sodium  2 tablet Oral Daily   • lamoTRIgine  100 mg Oral BID   • Ziprasidone HCl  80 mg Oral Daily with dinner   • mirtazapine  30 mg Oral Nightl

## 2020-11-27 NOTE — PLAN OF CARE
Pt received awake and alert. Doppler of BLE done. MRI of brain and spine to be done tomorrow. Need to find out make/model of coil in brain. Ativan ordered for claustrophobia. Seen by psych. Using purewick. C/o constipation. Senna and miralax given.  Educate

## 2020-11-27 NOTE — CM/SW NOTE
11/27/20 1600   CM/SW Referral Data   Referral Source Physician   Reason for Referral Discharge planning   Informant   (Daughter - Margaret Fernandez)   Patient Info   Patient's Mental Status Alert;Oriented   Patient's [facility]

## 2020-11-27 NOTE — PHYSICAL THERAPY NOTE
PHYSICAL THERAPY EVALUATION - INPATIENT     Room Number: 346/346-A  Evaluation Date: 11/27/2020  Type of Evaluation: Initial   Physician Order: PT Eval and Treat    Presenting Problem: Cellulitis LLE (back pain)  Reason for Therapy: Mobility Dysfunction a Problem List  Principal Problem:    Cellulitis of left lower extremity  Active Problems:    Altered mental status, unspecified altered mental status type    AMS (altered mental status)    Paraplegia (HCC)    Bipolar affective disorder, currently depres breath    • Sleep apnea    • stent placement     cerebral   • Thyroid disease    • Toe pain     Onychomycosis, Debridement , Hammertoe    • Tonsillitis 1950    Tonsillitis    • Unspecified essential hypertension    • Unspecified sleep apnea    • Visual imp Cervical Discectomy AND FUSION   • PREP SITE T/A/L 1ST 100 SQ CM/1PCT Left 08/21/2019    Left leg debridement, VAC placed   • SKIN GRAFT SPLIT THICKNESS Left 10/8/2019    Performed by Irina Brandt MD at [facility] OR   • SPLIT GRFT 100 [facility] <100S Sitting: Poor +  Static Standing: Not tested  Dynamic Standing: Not tested    ADDITIONAL TESTS     AM-PAC '6-Clicks' INPATIENT SHORT FORM - BASIC MOBILITY  How much difficulty does the patient currently have. ..  -   Turning over in bed (including adjusting - rolling at assistance level: moderate assistance   Goal #3   Current Status    Goal #4 Mod indep driving electric scooter 100'   Goal #4   Current Status    Goal #5 Patient to demonstrate independence with home activity/exercise instructions provided to

## 2020-11-28 ENCOUNTER — APPOINTMENT (OUTPATIENT)
Dept: GENERAL RADIOLOGY | Facility: HOSPITAL | Age: 80
DRG: 602 | End: 2020-11-28
Attending: STUDENT IN AN ORGANIZED HEALTH CARE EDUCATION/TRAINING PROGRAM
Payer: MEDICARE

## 2020-11-28 PROCEDURE — 74019 RADEX ABDOMEN 2 VIEWS: CPT | Performed by: STUDENT IN AN ORGANIZED HEALTH CARE EDUCATION/TRAINING PROGRAM

## 2020-11-28 RX ORDER — AMLODIPINE BESYLATE 10 MG/1
10 TABLET ORAL DAILY
Status: DISCONTINUED | OUTPATIENT
Start: 2020-11-28 | End: 2020-12-01

## 2020-11-28 RX ORDER — VANCOMYCIN/0.9 % SOD CHLORIDE 1.75 G/5
1750 PLASTIC BAG, INJECTION (ML) INTRAVENOUS EVERY 24 HOURS
Status: DISCONTINUED | OUTPATIENT
Start: 2020-11-29 | End: 2020-12-01

## 2020-11-28 RX ORDER — SODIUM PHOSPHATE, DIBASIC AND SODIUM PHOSPHATE, MONOBASIC 7; 19 G/133ML; G/133ML
1 ENEMA RECTAL ONCE AS NEEDED
Status: DISCONTINUED | OUTPATIENT
Start: 2020-11-28 | End: 2020-12-01

## 2020-11-28 RX ORDER — HYDROCODONE BITARTRATE AND ACETAMINOPHEN 5; 325 MG/1; MG/1
1 TABLET ORAL ONCE
Status: COMPLETED | OUTPATIENT
Start: 2020-11-28 | End: 2020-11-28

## 2020-11-28 RX ORDER — LORAZEPAM 2 MG/ML
2 INJECTION INTRAMUSCULAR ONCE
Status: COMPLETED | OUTPATIENT
Start: 2020-11-28 | End: 2020-11-29

## 2020-11-28 RX ORDER — HALOPERIDOL 5 MG/ML
1 INJECTION INTRAMUSCULAR EVERY 4 HOURS PRN
Status: DISCONTINUED | OUTPATIENT
Start: 2020-11-28 | End: 2020-12-01

## 2020-11-28 NOTE — CM/SW NOTE
Reviewed [facility] referral and no accepting providers. Added more referrals and extended deadline. Uploaded psych note per request from [facility].      Plan:  KYLE pending accepting provider    / to remain available for

## 2020-11-28 NOTE — PLAN OF CARE
Patient alert and oriented but very anxious and easily agitated. IV ativan and PO ativan given per patient request prior to MRI. MRI brain completed and nothing acute shown. Plan is for discharge to sub acute rehab.      Problem: Patient Centered Care  Goal

## 2020-11-28 NOTE — PROGRESS NOTES
11/28/20 1610   Clinical Encounter Type   Visited With Patient   Routine Visit Introduction   Continue Visiting Yes   Referral From Nurse   Patient Spiritual Encounters   Spiritual Assessment Completed Yes   Spiritual Needs Pt talked about: her back rommel

## 2020-11-28 NOTE — PROGRESS NOTES
Scottie Sutherland is a 78year old   female resides in [address] [place] [address] with history of COPD, hypothyroidism, HTN, spinal stenosis and back problem and history of chronic depression with multiple admission who presented to the hospital for weakness pulmonary disease) ([facility]     PFTs 2-15 with FEV1 1.11 L and FEV1/FVC ratio 63%   • Deep vein thrombosis ([facility]    • Dehydration 2012    Fluids, Medication adjustment    • Depression    • Disorder of thyroid    • Esophageal reflux    • Tysonertoe 02/25/2011 • BRONCHOSCOPY N/A 2/21/2017    Performed by Milo Schmidt MD at [address] [place] [address] ENDOSCOPY   • BRONCHOSCOPY N/A 1/9/2017    Performed by Milo Schmidt MD at [address] [place] [address] ENDOSCOPY   • BRONCHOSCOPY N/A 12/20/2016    Performed by Milo Schmidt MD at [address] [place] [address] ENDOSCOPY   • CAROTID E (Other[other]) Father    • Other (Other[other]) Maternal Grandmother    • Other (Other[other]) Maternal Grandfather    • Other (Other[other]) Paternal Grandmother    • Other (Other[other]) Paternal Grandfather    • Cancer Brother 54        Liver    • Neuro food    •  vancomycin IVPB premix 1.25g in 0.9% NaCl 250 mL, 15 mg/kg (Adjusted), Intravenous, Q24H        Allergies:    Bactrim [Sulfametho*    RASH  Ciprofloxacin           RASH  Depakote [Valproic *    OTHER (SEE COMMENTS)    Comment:pancreatitis  Fluoc Dictated by (CST): Rachel Mancini MD on 11/26/2020 at 10:25 AM     Finalized by (CST): Rachel Mancini MD on 11/26/2020 at 10:26 AM          Xr Chest Ap Portable  (cpt=71045)    Result Date: 11/25/2020  CONCLUSION:  1. Borderline cardiac enlargement.   Tortuous appropriate language. Memory is being reasonable. Intellect has been average. Judgment insight demonstrate appropriate function       ASSESSMENT/PLAN:       Delirium due to other medical condition.     Bipolar disorder type I recent episode depression, m This Visit:  Requested Prescriptions      No prescriptions requested or ordered in this encounter         11/27/2020  Federico Schuler MD

## 2020-11-28 NOTE — PLAN OF CARE
Problem: Patient Centered Care  Goal: Patient preferences are identified and integrated in the patient's plan of care  Description: Interventions:  - What would you like us to know as we care for you?  I live at [address]  - Provide timely,

## 2020-11-28 NOTE — PROGRESS NOTES
[facility] Hospitalist Progress Note     CC: Hospital Follow up    PCP: Vernon Avendano MD       Assessment/Plan:     Principal Problem:    Cellulitis of left lower extremity  Active Problems:    Altered mental status, unspecified altered mental status type hospital records reviewed.      Further recommendations pending patient's clinical course.   [facility] hospitalist to continue to follow patient while in house     Patient and/or patient's family given opportunity to ask questions and note understanding and agree 11/26/20  0750 11/27/20  0729   RBC 5.07 4.28 4.86   HGB 15.1 13.0 14.7   HCT 48.8* 40.6 45.9   MCV 96.3 94.9 94.4   MCH 29.8 30.4 30.2   MCHC 30.9* 32.0 32.0   RDW 15.6* 15.2* 14.8   NEPRELIM 3.96 3.69 5.59   WBC 7.1 6.5 7.9   .0 157.0 179.0 mild microvascular white matter ischemic changes, likely related to long-standing hypertension and/or diabetes. There is atherosclerosis in the posterior circulation. Exam discussed with Dr. Ailyn Lentz on 11/25/20 at 3:21 p.m.   Dictated by (CST): Ulysses Malay

## 2020-11-29 ENCOUNTER — APPOINTMENT (OUTPATIENT)
Dept: MRI IMAGING | Facility: HOSPITAL | Age: 80
DRG: 602 | End: 2020-11-29
Attending: Other
Payer: MEDICARE

## 2020-11-29 PROCEDURE — 72146 MRI CHEST SPINE W/O DYE: CPT | Performed by: OTHER

## 2020-11-29 PROCEDURE — 72148 MRI LUMBAR SPINE W/O DYE: CPT | Performed by: OTHER

## 2020-11-29 NOTE — OCCUPATIONAL THERAPY NOTE
Attempted to see pt for OT eval. Pt received haldol and ativan for MRI earlier this AM and is sleeping. RN requests therapist to re-attempt later today. Will re-attempt.

## 2020-11-29 NOTE — PLAN OF CARE
Patient alert and oriented but very tearful today. Pain medication given as needed for back pain. MRI of spine planned for tomorrow and IV ativan to be given before MRI.  IV haldol given for agitation and tearful episode over not being able to work with [employer] INTERVENTIONS:  - Assess pt frequently for physical needs  - Identify cognitive and physical deficits and behaviors that affect risk of falls.   - [place] fall precautions as indicated by assessment.  - Educate pt/family on patient safety including physic

## 2020-11-29 NOTE — PLAN OF CARE
Patient given IV ativan prior to MRI. MRI called and patient was panicking and agitated. PRN haldol was also given and patient was able to complete MRI.  She was drowsy the rest of the shift but was able to work with physical therapy and get to the chair wi strengthening/mobility  - Encourage toileting schedule  Outcome: Progressing

## 2020-11-29 NOTE — PLAN OF CARE
Problem: Patient Centered Care  Goal: Patient preferences are identified and integrated in the patient's plan of care  Description: Interventions:  - What would you like us to know as we care for you?  I live at [address]  - Provide timely, monitor patient.

## 2020-11-29 NOTE — PROGRESS NOTES
[facility] Hospitalist Progress Note     CC: Hospital Follow up    PCP: Estefania Stauffer MD       Assessment/Plan:     Principal Problem:    Cellulitis of left lower extremity  Active Problems:    Altered mental status, unspecified altered mental status type planning initiated, awaiting rehab acceptance     Outpatient records or previous hospital records reviewed.      Further recommendations pending patient's clinical course.   [facility] hospitalist to continue to follow patient while in house     Patient and/or pat sensory deficits  Psyc: anxious,       Data Review:       Labs:     Recent Labs   Lab 11/25/20  1532 11/26/20  0750 11/27/20  0729   RBC 5.07 4.28 4.86   HGB 15.1 13.0 14.7   HCT 48.8* 40.6 45.9   MCV 96.3 94.9 94.4   MCH 29.8 30.4 30.2   MCHC 30.9* 32.0 3 superimposed focal left paramedian disc protrusion. There is worsening resulting effacement of the left lateral recess with unchanged neural foraminal narrowing bilaterally.  2. Otherwise mild-to-moderate multilevel spondylosis with interval worsening at t

## 2020-11-29 NOTE — CM/SW NOTE
1514: SW confirmed no accepting SNF facilities at this time, additional referral's sent in [address] [place] [address]. SW will continue to follow for d/c planning.

## 2020-11-29 NOTE — PROGRESS NOTES
[facility] dosing service    Follow-up Pharmacokinetic Consult for Vancomycin Dosing     Sherman Tovar is a 78year old patient who is being treated for cellulitis. Patient is on day 4 of Vancomycin and is currently receiving 1.25 gm IV Q 24 hours.

## 2020-11-29 NOTE — OCCUPATIONAL THERAPY NOTE
OCCUPATIONAL THERAPY EVALUATION - INPATIENT     Room Number: 346/346-A  Evaluation Date: 11/29/2020  Type of Evaluation: Initial  Presenting Problem: cellulitis LLE, weakness, slurred speech    Physician Order: IP Consult to Occupational Therapy  Reason fo Daily Activity Short Form. Research supports that patients with this level of impairment may benefit from KYLE.      DISCHARGE RECOMMENDATIONS  OT Discharge Recommendations: Sub-acute rehabilitation  OT Device Recommendations: TBD    PLAN  OT Treatment Plan Debridement    • Oropharyngeal dysphagia 8/29/2017   • Osteoarthrosis, unspecified whether generalized or localized, unspecified site    • Other and unspecified hyperlipidemia    • Other complicated headache syndrome 11/2/2017   • Other spondylosis, lum [facility]   • EXTREMITY LOWER IRRIGATION & DEBRIDEMENT Left 8/21/2019    Performed by Alberta Hurd MD at [facility] OR   • EYE SURGERY      x2 FOR \"CROSSED EYES\"   • HIP REPLACEMENT SURGERY Bilateral    • HYSTERECTOMY  1967    Partial Hysterect Monitor     BP: 135/67  BP Location: Right arm  BP Method: Automatic  Patient Position: Sitting    O2 SATURATIONS  SPO2 on Room Air at Rest: 95             COGNITION  Following Commands:  follows one step commands without difficulty      PERCEPTION  Overal role of OT  Patient End of Session: Up in chair;Call light within reach; Needs met;RN aware of session/findings; Alarm set; All patient questions and concerns addressed    OT Goals  Patients self stated goal is: to get stronger     Patient will complete funct

## 2020-11-30 PROCEDURE — 99232 SBSQ HOSP IP/OBS MODERATE 35: CPT | Performed by: OTHER

## 2020-11-30 NOTE — CM/SW NOTE
FLAKITA met with the pt. And spoke with her dtr. Neno Alaniz about rehab options. They both would prefer [facility] out of the options of accepting SARs  [facility] has been reserved in [address].       FLAKITA also received a call from [facility]

## 2020-11-30 NOTE — PHYSICAL THERAPY NOTE
PHYSICAL THERAPY TREATMENT NOTE - INPATIENT     Room Number: 346/346-A       Presenting Problem: Cellulitis LLE (back pain)    Problem List  Principal Problem:    Cellulitis of left lower extremity  Active Problems:    Altered mental status, unspecified al '6-Clicks' INPATIENT SHORT FORM - BASIC MOBILITY  How much difficulty does the patient currently have. ..  -   Turning over in bed (including adjusting bedclothes, sheets and blankets)?: A Lot   -   Sitting down on and standing up from a chair with arms (e. in preparation for discharge.    Goal #5   Current Status In progress   Goal #6    Goal #6  Current Status

## 2020-11-30 NOTE — PLAN OF CARE
Pt vss overnight, PRN tylenol for moderate back pain. Had BM x1, x2 stand and pivot to R/c. TL. No straws. IV vanco for LLE cellulitis- trough today @ 1600. Redness/ irritation to left inner thigh - mepilex applied and irritiation has been minimal per pt.

## 2020-11-30 NOTE — SLP NOTE
SPEECH DAILY NOTE - INPATIENT    ASSESSMENT & PLAN   ASSESSMENT    PPE REQUIRED. THIS THERAPIST WORE GOGGLES, GLOVES, AND DROPLET MASK FOR DURATION OF TX SESSION. HANDS WASHED UPON ENTRANCE/EXIT.      SLP f/u for ongoing meal assessment per recommendations Thin    Compensatory Strategies Recommended: No straws;Small bites and sips;Multiple swallows; Alternate consistencies  Aspiration Precautions: Upright position; Slow rate;Small bites and sips; No straw  Medication Administration Recommendations: Whole in pur

## 2020-11-30 NOTE — PROGRESS NOTES
[facility] Hospitalist Progress Note     CC: Hospital Follow up    PCP: Sumanth Montilla MD       Assessment/Plan:     Principal Problem:    Cellulitis of left lower extremity  Active Problems:    Altered mental status, unspecified altered mental status type xarelto  Lines: PIV     Dispo: d/c planning initiated, awaiting rehab acceptance     Outpatient records or previous hospital records reviewed.      Further recommendations pending patient's clinical course.   [facility] hospitalist to continue to follow patient wh lesions, LLE cellulitis  Neuro: AO*3, motor intact, no sensory deficits  Psyc: calm today, mental status back to baseline,       Data Review:       Labs:     Recent Labs   Lab 11/26/20  0750 11/27/20  0729 11/30/20  0838   RBC 4.28 4.86 4.48   HGB 13.0 14. mild central canal stenosis with worsening neural foraminal stenosis, left greater than right. Additional significant levels detailed below: 3. L1-L2:  Stable moderate central canal stenosis and bilateral neural foraminal narrowing. 4.  L3-L4:  Unchanged n

## 2020-12-01 VITALS
HEIGHT: 68 IN | HEART RATE: 84 BPM | OXYGEN SATURATION: 95 % | WEIGHT: 260.38 LBS | SYSTOLIC BLOOD PRESSURE: 155 MMHG | TEMPERATURE: 99 F | BODY MASS INDEX: 39.46 KG/M2 | DIASTOLIC BLOOD PRESSURE: 62 MMHG | RESPIRATION RATE: 18 BRPM

## 2020-12-01 RX ORDER — ZIPRASIDONE HYDROCHLORIDE 80 MG/1
80 CAPSULE ORAL NIGHTLY
Refills: 0 | Status: ON HOLD | COMMUNITY
Start: 2020-12-01 | End: 2021-02-09

## 2020-12-01 RX ORDER — BISACODYL 10 MG
10 SUPPOSITORY, RECTAL RECTAL
Refills: 0 | Status: SHIPPED | COMMUNITY
Start: 2020-12-01

## 2020-12-01 RX ORDER — MIRTAZAPINE 30 MG/1
30 TABLET, FILM COATED ORAL NIGHTLY
Qty: 30 TABLET | Refills: 0 | Status: ON HOLD | COMMUNITY
Start: 2020-12-01 | End: 2021-02-09

## 2020-12-01 RX ORDER — POLYETHYLENE GLYCOL 3350 17 G/17G
17 POWDER, FOR SOLUTION ORAL DAILY PRN
Refills: 0 | Status: SHIPPED | COMMUNITY
Start: 2020-12-01

## 2020-12-01 RX ORDER — LAMOTRIGINE 100 MG/1
100 TABLET ORAL 2 TIMES DAILY
Qty: 60 TABLET | Refills: 0 | Status: SHIPPED | COMMUNITY
Start: 2020-12-01

## 2020-12-01 RX ORDER — CLINDAMYCIN HYDROCHLORIDE 300 MG/1
300 CAPSULE ORAL 4 TIMES DAILY
Qty: 8 CAPSULE | Refills: 0 | Status: ON HOLD | COMMUNITY
Start: 2020-12-01 | End: 2020-12-23

## 2020-12-01 RX ORDER — LORAZEPAM 1 MG/1
1 TABLET ORAL EVERY 4 HOURS PRN
Qty: 15 TABLET | Refills: 0 | Status: ON HOLD | OUTPATIENT
Start: 2020-12-01 | End: 2020-12-23

## 2020-12-01 RX ORDER — SENNA AND DOCUSATE SODIUM 50; 8.6 MG/1; MG/1
2 TABLET, FILM COATED ORAL DAILY
Refills: 0 | Status: ON HOLD | COMMUNITY
Start: 2020-12-02 | End: 2020-12-23

## 2020-12-01 NOTE — PLAN OF CARE
Problem: Patient Centered Care  Goal: Patient preferences are identified and integrated in the patient's plan of care  Description: Interventions:  - What would you like us to know as we care for you?  I live at [address]  - Provide timely,

## 2020-12-01 NOTE — PLAN OF CARE
Gave report to [facility] from [facility] [place] [place]. Script and DNR was sent in the packet with all the patient's information.      Problem: Patient Centered Care  Goal: Patient preferences are identified and integrated in the patient's plan of care  Description: Gabo Singh Provide assistive devices as appropriate  - Consider OT/PT consult to assist with strengthening/mobility  - Encourage toileting schedule  12/1/2020 1744 by Alice Watkins RN  Outcome: Completed  12/1/2020 1743 by Alice Watkins RN  Outcome: Pro

## 2020-12-01 NOTE — CM/SW NOTE
12/01/20 1700   Discharge disposition   Expected discharge disposition Skilled Nurs   Name of Facillity/Home Care/Hospice   [facility] [address])   Discharge transportation [address] MDO received for discharge, Patient has been ac

## 2020-12-01 NOTE — PHYSICAL THERAPY NOTE
PHYSICAL THERAPY TREATMENT NOTE - INPATIENT     Room Number: 346/346-A       Presenting Problem: Cellulitis LLE (back pain)    Problem List  Principal Problem:    Cellulitis of left lower extremity  Active Problems:    Altered mental status, unspecified al alert - left    WEIGHT BEARING RESTRICTION  Weight Bearing Restriction: None                PAIN ASSESSMENT   Rating: Other (Comment)(diffuse)  Location: low back with ambulation  Management Techniques: Activity promotion; Body mechanics;Breathing technique 12/1/20     Patient Goal Patient's self-stated goal is: Return home   Goal #1 Patient is able to demonstrate supine - sit EOB @ level: moderate assistance      Goal #1   Current Status min ax1   Goal #2 Patient is able to demonstrate transfers Sit to/from

## 2020-12-01 NOTE — PLAN OF CARE
Problem: Patient Centered Care  Goal: Patient preferences are identified and integrated in the patient's plan of care  Description: Interventions:  - What would you like us to know as we care for you?  I live at [address]  - Provide timely,

## 2020-12-01 NOTE — PROGRESS NOTES
Lata Tinajero is a 78year old   female resides in [address] [place] [address] with history of COPD, hypothyroidism, HTN, spinal stenosis and back problem and history of chronic depression with multiple admission who presented to the hospital for weakness disc disease 1985,1995,2003,2009    Cervical Discectomy    • Cholecystitis 1984    Cholecystectomy    • COPD (chronic obstructive pulmonary disease) (HCC)     PFTs 2-15 with FEV1 1.11 L and FEV1/FVC ratio 63%   • Deep vein thrombosis (HCC)    • Dehydration Performed by Elly Wilson MD at [facility] ENDOSCOPY   • BRONCHOSCOPY N/A 5/9/2018    Performed by Elly Wilson MD at [facility] ENDOSCOPY   • BRONCHOSCOPY N/A 2/21/2017    Performed by Elly Wilson MD at [facility]   • BRONCHOSCOPY N/A 1/9/2017    Performed by Fibromyalgia Sister    • Heart Disease Sister    • Heart Attack Sister    • Heart Disease Brother    • Heart Attack Brother    • Other (Other[other]) Father    • Other (Other[other]) Maternal Grandmother    • Other (Other[other]) Maternal Grandfather    • LORazepam (ATIVAN) tab 1 mg, 1 mg, Oral, Q4H PRN    •  Normal Saline Flush 0.9 % injection 3 mL, 3 mL, Intravenous, PRN    •  acetaminophen (TYLENOL) tab 650 mg, 650 mg, Oral, Q6H PRN    •  ondansetron HCl (ZOFRAN) injection 4 mg, 4 mg, Intravenous, Q6H KS multilevel spondylosis throughout the thoracic spine with greatest involvement of the T11-T12 level resulting in mild central vertebral canal stenosis. No associated cord compression. 2. Otherwise no evidence of neural compromise at other levels.     Dict on 11/28/2020 at 9:25 PM            Vitals   11/30/20  1630   BP: 150/63   Pulse: 84   Resp: 22   Temp: 98.2 °F (36.8 °C)       PHYSICAL EXAM:     The patient is alert and oriented x3.   The patient is a stated age female overweight in [facility] admission.     Orders This Visit:  Orders Placed This Encounter      CBC With Differential With Platelet      Basic Metabolic Panel (8)      Hepatic Function Panel (7)      Troponin I      Prothrombin Time (PT)      Urinalysis with Culture Reflex STAT

## 2020-12-02 NOTE — DISCHARGE SUMMARY
General Medicine Discharge Summary     Patient ID:  Clifford Venegas  78year old  12/3/1940    Admit date: 11/25/2020    Discharge date and time: 12/1/2020  8:43 PM     Attending Physician: No att. providers found     Consults: IP CONSULT TO NEUROLOGY  IP continue vancomcyin day 5 given MRSA+, will switch to clinda on d/c     RLE swelling - resolved  Hx of DVT  - venous dopplers negative for clots  - on xarelto      Bipolar, psych issues  - pysch following, meds adjusted: lamictal 100 mg BID and ziprasidone right. Additional significant levels detailed below: 3. L1-L2:  Stable moderate central canal stenosis and bilateral neural foraminal narrowing. 4. L3-L4:  Unchanged neural foraminal narrowing bilaterally. 5.  L4-5:  Unchanged neural foraminal narrowing bi Levothyroxine Sodium 100 MCG Tabs  Commonly known as: SYNTHROID     magnesium oxide 400 MG Tabs  Commonly known as: MAG-OX     muscle rub 10-15 % Crea     Pantoprazole Sodium 40 MG Tbec  Commonly known as: PROTONIX  Take 1 tablet (40 mg total) by mouth e

## 2020-12-07 ENCOUNTER — TELEPHONE (OUTPATIENT)
Dept: NEUROLOGY | Facility: CLINIC | Age: 80
End: 2020-12-07

## 2020-12-07 NOTE — TELEPHONE ENCOUNTER
Patient calling 2 times and leaving message on 's  requesting appointment for Nidhi Armstrong 47 states that the patient is in [address] [place] [address] until Friday. The call back number is incorrect. Number provided [phone].     Unable to contact

## 2020-12-08 NOTE — TELEPHONE ENCOUNTER
Patient lm on 's line with [phone] as a phone number to call back. .    No answer. Called Yevgeniy Prajapati to inform of the above, left message on his vm.

## 2020-12-14 ENCOUNTER — TELEPHONE (OUTPATIENT)
Dept: NEUROLOGY | Facility: CLINIC | Age: 80
End: 2020-12-14

## 2020-12-14 DIAGNOSIS — M96.1 LUMBAR POST-LAMINECTOMY SYNDROME: ICD-10-CM

## 2020-12-14 DIAGNOSIS — M54.16 LUMBAR RADICULOPATHY: Primary | ICD-10-CM

## 2020-12-14 DIAGNOSIS — M43.16 SPONDYLOLISTHESIS OF LUMBAR REGION: ICD-10-CM

## 2020-12-14 DIAGNOSIS — M43.10 RETROLISTHESIS OF VERTEBRAE: ICD-10-CM

## 2020-12-14 NOTE — TELEPHONE ENCOUNTER
Patient calling stating that she was in the hospital for 1 week for cellulitis. Completed her ABT 2 days ago. States her low back pain constant ache with sharp shooting to right buttock. Patient states she can't stand related to pain. 8/10.     Patient d

## 2020-12-15 ENCOUNTER — TELEPHONE (OUTPATIENT)
Dept: NEUROLOGY | Facility: CLINIC | Age: 80
End: 2020-12-15

## 2020-12-15 NOTE — TELEPHONE ENCOUNTER
Called patient. Patient on Xarelto. Will fax Katie Bamberger for approval to hold 3 days prior. Fax sent via epic.

## 2020-12-15 NOTE — TELEPHONE ENCOUNTER
Per Medicare Guidelines -no authorization is required for lumbar khurram's.      Status: Approved-no authorization required per health plan    Clinical Staff can proceed with scheduling bilateral L5 TFESIs CPT 52337-49 dx:M54.16 at [facility]

## 2020-12-15 NOTE — TELEPHONE ENCOUNTER
MD Sharath Goldman LPN             It's fine to hold it. CRS      Patient has been scheduled for a bilateral L5 transforaminal epidural steroid injections    on 1/8/2021 at the [facility]. Medications and allergies reviewed.  Patient inf

## 2020-12-19 ENCOUNTER — HOSPITAL ENCOUNTER (INPATIENT)
Facility: HOSPITAL | Age: 80
LOS: 3 days | Discharge: HOME HEALTH | DRG: 177 | End: 2020-12-24
Attending: EMERGENCY MEDICINE | Admitting: INTERNAL MEDICINE
Payer: MEDICARE

## 2020-12-19 ENCOUNTER — APPOINTMENT (OUTPATIENT)
Dept: GENERAL RADIOLOGY | Facility: HOSPITAL | Age: 80
DRG: 177 | End: 2020-12-19
Attending: EMERGENCY MEDICINE
Payer: MEDICARE

## 2020-12-19 DIAGNOSIS — U07.1 PNEUMONIA DUE TO COVID-19 VIRUS: ICD-10-CM

## 2020-12-19 DIAGNOSIS — J12.82 PNEUMONIA DUE TO COVID-19 VIRUS: ICD-10-CM

## 2020-12-19 DIAGNOSIS — J96.01 ACUTE RESPIRATORY FAILURE WITH HYPOXIA (HCC): Primary | ICD-10-CM

## 2020-12-19 PROCEDURE — 71045 X-RAY EXAM CHEST 1 VIEW: CPT | Performed by: EMERGENCY MEDICINE

## 2020-12-19 PROCEDURE — 96374 THER/PROPH/DIAG INJ IV PUSH: CPT

## 2020-12-19 PROCEDURE — 83880 ASSAY OF NATRIURETIC PEPTIDE: CPT | Performed by: EMERGENCY MEDICINE

## 2020-12-19 PROCEDURE — 80048 BASIC METABOLIC PNL TOTAL CA: CPT | Performed by: EMERGENCY MEDICINE

## 2020-12-19 PROCEDURE — 82728 ASSAY OF FERRITIN: CPT | Performed by: EMERGENCY MEDICINE

## 2020-12-19 PROCEDURE — 93010 ELECTROCARDIOGRAM REPORT: CPT | Performed by: EMERGENCY MEDICINE

## 2020-12-19 PROCEDURE — 82550 ASSAY OF CK (CPK): CPT | Performed by: EMERGENCY MEDICINE

## 2020-12-19 PROCEDURE — 86900 BLOOD TYPING SEROLOGIC ABO: CPT | Performed by: EMERGENCY MEDICINE

## 2020-12-19 PROCEDURE — 80076 HEPATIC FUNCTION PANEL: CPT | Performed by: EMERGENCY MEDICINE

## 2020-12-19 PROCEDURE — 83615 LACTATE (LD) (LDH) ENZYME: CPT | Performed by: EMERGENCY MEDICINE

## 2020-12-19 PROCEDURE — 86901 BLOOD TYPING SEROLOGIC RH(D): CPT | Performed by: EMERGENCY MEDICINE

## 2020-12-19 PROCEDURE — 86140 C-REACTIVE PROTEIN: CPT | Performed by: EMERGENCY MEDICINE

## 2020-12-19 PROCEDURE — 84145 PROCALCITONIN (PCT): CPT | Performed by: EMERGENCY MEDICINE

## 2020-12-19 PROCEDURE — 99285 EMERGENCY DEPT VISIT HI MDM: CPT

## 2020-12-19 PROCEDURE — 85025 COMPLETE CBC W/AUTO DIFF WBC: CPT | Performed by: EMERGENCY MEDICINE

## 2020-12-19 PROCEDURE — 93005 ELECTROCARDIOGRAM TRACING: CPT

## 2020-12-19 PROCEDURE — 84484 ASSAY OF TROPONIN QUANT: CPT | Performed by: EMERGENCY MEDICINE

## 2020-12-19 PROCEDURE — 86850 RBC ANTIBODY SCREEN: CPT | Performed by: EMERGENCY MEDICINE

## 2020-12-19 PROCEDURE — 87040 BLOOD CULTURE FOR BACTERIA: CPT | Performed by: EMERGENCY MEDICINE

## 2020-12-19 RX ORDER — HYDROCODONE BITARTRATE AND ACETAMINOPHEN 5; 325 MG/1; MG/1
1 TABLET ORAL EVERY 4 HOURS PRN
Status: CANCELLED | OUTPATIENT
Start: 2020-12-19

## 2020-12-19 RX ORDER — DEXAMETHASONE SODIUM PHOSPHATE 10 MG/ML
6 INJECTION, SOLUTION INTRAMUSCULAR; INTRAVENOUS ONCE
Status: COMPLETED | OUTPATIENT
Start: 2020-12-19 | End: 2020-12-20

## 2020-12-19 RX ORDER — HYDROCODONE BITARTRATE AND ACETAMINOPHEN 5; 325 MG/1; MG/1
2 TABLET ORAL EVERY 4 HOURS PRN
Status: CANCELLED | OUTPATIENT
Start: 2020-12-19

## 2020-12-19 RX ORDER — ACETAMINOPHEN 325 MG/1
650 TABLET ORAL EVERY 4 HOURS PRN
Status: CANCELLED | OUTPATIENT
Start: 2020-12-19

## 2020-12-19 RX ORDER — ENOXAPARIN SODIUM 100 MG/ML
40 INJECTION SUBCUTANEOUS DAILY
Status: CANCELLED | OUTPATIENT
Start: 2020-12-19

## 2020-12-20 PROBLEM — U07.1 PNEUMONIA DUE TO COVID-19 VIRUS: Status: ACTIVE | Noted: 2020-12-20

## 2020-12-20 PROBLEM — J12.82 PNEUMONIA DUE TO COVID-19 VIRUS: Status: ACTIVE | Noted: 2020-12-20

## 2020-12-20 PROCEDURE — XW033E5 INTRODUCTION OF REMDESIVIR ANTI-INFECTIVE INTO PERIPHERAL VEIN, PERCUTANEOUS APPROACH, NEW TECHNOLOGY GROUP 5: ICD-10-PCS | Performed by: HOSPITALIST

## 2020-12-20 PROCEDURE — 3E0333Z INTRODUCTION OF ANTI-INFLAMMATORY INTO PERIPHERAL VEIN, PERCUTANEOUS APPROACH: ICD-10-PCS | Performed by: HOSPITALIST

## 2020-12-20 PROCEDURE — 82728 ASSAY OF FERRITIN: CPT | Performed by: INTERNAL MEDICINE

## 2020-12-20 PROCEDURE — 85025 COMPLETE CBC W/AUTO DIFF WBC: CPT | Performed by: INTERNAL MEDICINE

## 2020-12-20 PROCEDURE — 83615 LACTATE (LD) (LDH) ENZYME: CPT | Performed by: INTERNAL MEDICINE

## 2020-12-20 PROCEDURE — 86140 C-REACTIVE PROTEIN: CPT | Performed by: INTERNAL MEDICINE

## 2020-12-20 PROCEDURE — 83735 ASSAY OF MAGNESIUM: CPT | Performed by: INTERNAL MEDICINE

## 2020-12-20 PROCEDURE — 81003 URINALYSIS AUTO W/O SCOPE: CPT | Performed by: EMERGENCY MEDICINE

## 2020-12-20 PROCEDURE — 80053 COMPREHEN METABOLIC PANEL: CPT | Performed by: INTERNAL MEDICINE

## 2020-12-20 PROCEDURE — 84145 PROCALCITONIN (PCT): CPT | Performed by: INTERNAL MEDICINE

## 2020-12-20 RX ORDER — ACETAMINOPHEN 325 MG/1
650 TABLET ORAL EVERY 6 HOURS PRN
Status: DISCONTINUED | OUTPATIENT
Start: 2020-12-20 | End: 2020-12-24

## 2020-12-20 RX ORDER — PANTOPRAZOLE SODIUM 40 MG/1
40 TABLET, DELAYED RELEASE ORAL
Status: DISCONTINUED | OUTPATIENT
Start: 2020-12-20 | End: 2020-12-24

## 2020-12-20 RX ORDER — MAGNESIUM OXIDE 400 MG (241.3 MG MAGNESIUM) TABLET
400 TABLET 2 TIMES DAILY WITH MEALS
Status: DISCONTINUED | OUTPATIENT
Start: 2020-12-20 | End: 2020-12-24

## 2020-12-20 RX ORDER — ZIPRASIDONE HYDROCHLORIDE 40 MG/1
80 CAPSULE ORAL
Status: DISCONTINUED | OUTPATIENT
Start: 2020-12-20 | End: 2020-12-24

## 2020-12-20 RX ORDER — DOCUSATE SODIUM 100 MG/1
100 CAPSULE, LIQUID FILLED ORAL 2 TIMES DAILY PRN
Status: DISCONTINUED | OUTPATIENT
Start: 2020-12-20 | End: 2020-12-24

## 2020-12-20 RX ORDER — MIRTAZAPINE 30 MG/1
30 TABLET, FILM COATED ORAL NIGHTLY
Status: DISCONTINUED | OUTPATIENT
Start: 2020-12-20 | End: 2020-12-24

## 2020-12-20 RX ORDER — MUSCLE RUB CREAM 100; 150 MG/G; MG/G
CREAM TOPICAL 3 TIMES DAILY PRN
Status: DISCONTINUED | OUTPATIENT
Start: 2020-12-20 | End: 2020-12-24

## 2020-12-20 RX ORDER — METOCLOPRAMIDE HYDROCHLORIDE 5 MG/ML
10 INJECTION INTRAMUSCULAR; INTRAVENOUS EVERY 8 HOURS PRN
Status: DISCONTINUED | OUTPATIENT
Start: 2020-12-20 | End: 2020-12-24

## 2020-12-20 RX ORDER — DEXAMETHASONE SODIUM PHOSPHATE 4 MG/ML
6 VIAL (ML) INJECTION NIGHTLY
Status: DISCONTINUED | OUTPATIENT
Start: 2020-12-20 | End: 2020-12-20

## 2020-12-20 RX ORDER — ONDANSETRON 2 MG/ML
4 INJECTION INTRAMUSCULAR; INTRAVENOUS EVERY 6 HOURS PRN
Status: DISCONTINUED | OUTPATIENT
Start: 2020-12-20 | End: 2020-12-24

## 2020-12-20 RX ORDER — LEVOTHYROXINE SODIUM 0.1 MG/1
100 TABLET ORAL
Status: DISCONTINUED | OUTPATIENT
Start: 2020-12-20 | End: 2020-12-24

## 2020-12-20 RX ORDER — LAMOTRIGINE 25 MG/1
100 TABLET ORAL 2 TIMES DAILY
Status: DISCONTINUED | OUTPATIENT
Start: 2020-12-20 | End: 2020-12-24

## 2020-12-20 RX ORDER — AMLODIPINE BESYLATE 5 MG/1
5 TABLET ORAL DAILY
Status: DISCONTINUED | OUTPATIENT
Start: 2020-12-21 | End: 2020-12-21

## 2020-12-20 RX ORDER — POLYETHYLENE GLYCOL 3350 17 G/17G
17 POWDER, FOR SOLUTION ORAL DAILY PRN
Status: DISCONTINUED | OUTPATIENT
Start: 2020-12-20 | End: 2020-12-24

## 2020-12-20 RX ORDER — LORAZEPAM 0.5 MG/1
0.5 TABLET ORAL EVERY 6 HOURS PRN
Status: DISCONTINUED | OUTPATIENT
Start: 2020-12-20 | End: 2020-12-21

## 2020-12-20 RX ORDER — BISACODYL 10 MG
10 SUPPOSITORY, RECTAL RECTAL
Status: DISCONTINUED | OUTPATIENT
Start: 2020-12-20 | End: 2020-12-24

## 2020-12-20 RX ORDER — DEXAMETHASONE 6 MG/1
6 TABLET ORAL DAILY
Status: DISCONTINUED | OUTPATIENT
Start: 2020-12-20 | End: 2020-12-24

## 2020-12-20 NOTE — PROGRESS NOTES
[facility] Note: Route Optimization for Dexamethasone (Decadron)    Patient is currently on Dexamethasone (Decadron) 6 mg IV every 24 hours x 9 doses.    The patient meets the criteria to convert to the oral equivalent as established by the IV to Oral con

## 2020-12-20 NOTE — RESPIRATORY THERAPY NOTE
SHANT ASSESSMENT:    Pt does not have a previous diagnosis of SHANT. Pt does not routinely use a CPAP device at home. Pt to be placed on CPAP: no  Pt refused: yes

## 2020-12-20 NOTE — PROGRESS NOTES
Sx onset- unclear per review of records  Covid positive- notified on 12/17 of positive result(obtained from phone encounter with IM)  Admitted- 12/19     CXR- 12/19 interstitial changes bronchitis/asthma     DVT prophylaxis- Xarelto     02- 2L, increased f

## 2020-12-20 NOTE — H&P
[facility] Hospitalist H&P     CC: Patient presents with:  Covid       PCP: Thelma Salas MD    Admitted 12/19/20    Assessment and Plan     Sarah Brar is a 78year old female with PMH sig for cranial aneurysm s/p coiling, COPD, HTN, DVT on xarelto, Xarelto    Dispo: [facility], confirmed with son, same as last admission    Outpatient records reviewed confirming patient's medical history and medications. Further recommendations pending patient's clinical course.   [facility] hospitalist to continue to fo Hypothyroidism    • Incontinence    • L3-4 stable slight grade 1 retrolisthesis 10/31/2014   • L4-5 mild-mod diffuse bulging disc 10/31/2014   • L4-5 slight grade 1 unstable spondylolisthesis 10/31/2014   • Leg wound, left 08/21/2019   • Low back pain    • 1-5      Toe, Onychomycosis   • EGD  05/2019    Gastric polyps only   • EGD  2006   • ESOPHAGOGASTRODUODENOSCOPY (EGD) N/A 5/17/2019    Performed by Eloise Crocker MD at [address] [place] [address] Ne Left 10/8/2019    Perfor Hx  Social History    Tobacco Use      Smoking status: Former Smoker        Packs/day: 2.00        Years: 25.00        Pack years: 50        Types: Cigarettes        Start date: 1952        Quit date: 1977        Years since quittin.9      Smo 135*   CA 9.1 9.2   MG  --  2.1       Recent Labs   Lab 12/19/20  2240 12/20/20  0501   ALT 18 16   AST 16 12*   ALB 3.0* 2.9*    228       Recent Labs   Lab 12/19/20  2240   TROP <0.045       Additional Diagnostics: ECG:  NSR, no acute ST changes

## 2020-12-20 NOTE — ED PROVIDER NOTES
Patient Seen in: [facility] Emergency Department      History   Patient presents with:  Covid    Stated Complaint: covid    HPI    Patient presents the emergency department with shortness of breath, weakness, body aches.   She states that 2 days ag C3-6 and C7-T1 11/2/2017   • s/p L5-S1 right laminectomy 8/28/2014   • Shortness of breath    • Sleep apnea    • stent placement     cerebral   • Thyroid disease    • Toe pain     Onychomycosis, Debridement , Hammertoe    • Tonsillitis 1950    Tonsillitis PER PATIENT   • OTHER SURGICAL HISTORY  1985,1995,2003,2009    Cervical Discectomy AND FUSION   • PREP SITE T/A/L 1ST 100 SQ CM/1PCT Left 08/21/2019    Left leg debridement, VAC placed   • SKIN GRAFT SPLIT THICKNESS Left 10/8/2019    Performed by Stuart Wells Conjunctivae normal.      Pupils: Pupils are equal, round, and reactive to light. Neck:      Musculoskeletal: Normal range of motion and neck supple. Cardiovascular:      Rate and Rhythm: Normal rate and regular rhythm. Heart sounds: No murmur. limits   C-REACTIVE PROTEIN - Abnormal; Notable for the following components:    C-Reactive Protein 0.88 (*)     All other components within normal limits   BASIC METABOLIC PANEL (8) - Abnormal; Notable for the following components:    Glucose 132 (*) Status                     ---------                               -----------         ------                     CBC W/ DIFFERENTIAL[[id number]]          Abnormal            Final result                 Please view results for these tests on the individ PM           D/W Hospitalist and Dr. Thaddeus Varela.                    Disposition and Plan     Clinical Impression:  Acute respiratory failure with hypoxia (HCC)  (primary encounter diagnosis)  Pneumonia due to COVID-19 virus    Disposition:  Admit  12/19/2020

## 2020-12-20 NOTE — ED NOTES
Patient resting in bed comfortably stable vital signs on 2L NC no urine output from [place] noted at this time will cotinue to monitor

## 2020-12-20 NOTE — CONSULTS
[facility] AND [facility] Infectious Disease  Report of Consultation    Teo Chappell Patient Status:  Inpatient    12/3/1940 MRN [id number]   Location [facility] Attending Woo Dueñas MD   Hosp Day # 0 PCP Cecil Ford MD     Elder 8/29/2017   • Osteoarthrosis, unspecified whether generalized or localized, unspecified site    • Other and unspecified hyperlipidemia    • Other complicated headache syndrome 11/2/2017   • Other spondylosis, lumbar region 11/2/2017   • Pneumonia 2012   • 8/21/2019    Performed by Suri Aleman MD at [facility] OR   • EYE SURGERY      x2 FOR \"CROSSED EYES\"   • HIP REPLACEMENT SURGERY Bilateral    • HYSTERECTOMY  1967    Partial Hysterectomy   • JAW SURGERY     • KNEE REPLACEMENT SURGERY Bilateral RASH  Depakote [Valproic *    OTHER (SEE COMMENTS)    Comment:pancreatitis  Fluocinolone            OTHER (SEE COMMENTS)    Comment:Unknown  Gabitril [Tiagabine]    OTHER (SEE COMMENTS)    Comment:Stroke like symptoms             Unable to move  Metronidaz visual changes, oral ulcers, sore throat, difficulty swallowing. Respiratory: Negative for cough, sputum, hemoptysis, chest pain, wheezing, dyspnea on exertion, or stridor. Cardiovascular: Negative for chest pain, palpitations, irregular heart beats. wheezes. Chest wall: No tenderness or deformity. Heart: Regular rate and rhythm, normal S1S2, no murmurs. Abdomen: Soft, NT/ND. Bowel sounds present. No organomegaly. Extremity: No edema. Skin: No rashes or lesions.    Neurological: No focal kavitha Gianna Campos  [facility] Infectious Disease  [phone]    12/20/2020  3:20 PM

## 2020-12-20 NOTE — ED NOTES
Orders for admission, patient is aware of plan and ready to go upstairs. Any questions, please call ED RN Gurjit Casas  at extension [phone].    COVID POSITIVE    LOC at time of transport: ax4

## 2020-12-20 NOTE — PLAN OF CARE
Problem: Patient Centered Care  Goal: Patient preferences are identified and integrated in the patient's plan of care  Description: Interventions:  - Provide timely, complete, and accurate information to patient/family  - Incorporate patient and family k response  - Consider cultural and social influences on pain and pain management  - Manage/alleviate anxiety  - Utilize distraction and/or relaxation techniques  - Monitor for opioid side effects  - Notify MD/LIP if interventions unsuccessful or patient rep discharge planning if the patient needs post-hospital services based on physician/LIP order or complex needs related to functional status, cognitive ability or social support system  Outcome: Progressing     Problem: METABOLIC/FLUID AND ELECTROLYTES - ADUL

## 2020-12-20 NOTE — PLAN OF CARE
Afebrile. O2 at 2 l n/c, not in any respiratory distress. Iv steroids continues.     Problem: Patient Centered Care  Goal: Patient preferences are identified and integrated in the patient's plan of care  Description: Interventions:  - What would you like u pain scale  - Administer analgesics based on type and severity of pain and evaluate response  - Implement non-pharmacological measures as appropriate and evaluate response  - Consider cultural and social influences on pain and pain management  - Manage/all POLST form as appropriate  - Assess patient's ability to be responsible for managing their own health  - Refer to Case Management Department for coordinating discharge planning if the patient needs post-hospital services based on physician/LIP order or com

## 2020-12-21 PROCEDURE — 83615 LACTATE (LD) (LDH) ENZYME: CPT | Performed by: NURSE PRACTITIONER

## 2020-12-21 PROCEDURE — 80076 HEPATIC FUNCTION PANEL: CPT | Performed by: HOSPITALIST

## 2020-12-21 PROCEDURE — 85025 COMPLETE CBC W/AUTO DIFF WBC: CPT | Performed by: HOSPITALIST

## 2020-12-21 PROCEDURE — 85379 FIBRIN DEGRADATION QUANT: CPT | Performed by: NURSE PRACTITIONER

## 2020-12-21 PROCEDURE — 82728 ASSAY OF FERRITIN: CPT | Performed by: NURSE PRACTITIONER

## 2020-12-21 PROCEDURE — 80048 BASIC METABOLIC PNL TOTAL CA: CPT | Performed by: HOSPITALIST

## 2020-12-21 PROCEDURE — 86140 C-REACTIVE PROTEIN: CPT | Performed by: NURSE PRACTITIONER

## 2020-12-21 RX ORDER — AMLODIPINE BESYLATE 5 MG/1
5 TABLET ORAL ONCE
Status: COMPLETED | OUTPATIENT
Start: 2020-12-21 | End: 2020-12-21

## 2020-12-21 RX ORDER — HYDROXYZINE HYDROCHLORIDE 25 MG/1
25 TABLET, FILM COATED ORAL EVERY 6 HOURS PRN
Status: DISCONTINUED | OUTPATIENT
Start: 2020-12-21 | End: 2020-12-24

## 2020-12-21 RX ORDER — LORAZEPAM 1 MG/1
1 TABLET ORAL EVERY 6 HOURS PRN
Status: DISCONTINUED | OUTPATIENT
Start: 2020-12-21 | End: 2020-12-22

## 2020-12-21 RX ORDER — AMLODIPINE BESYLATE 10 MG/1
10 TABLET ORAL DAILY
Status: DISCONTINUED | OUTPATIENT
Start: 2020-12-22 | End: 2020-12-24

## 2020-12-21 NOTE — PLAN OF CARE
Problem: Patient Centered Care  Goal: Patient preferences are identified and integrated in the patient's plan of care  Description: Interventions:  - What would you like us to know as we care for you?   - Provide timely, complete, and accurate informatio non-pharmacological measures as appropriate and evaluate response  - Consider cultural and social influences on pain and pain management  - Manage/alleviate anxiety  - Utilize distraction and/or relaxation techniques  - Monitor for opioid side effects  - N Refer to Case Management Department for coordinating discharge planning if the patient needs post-hospital services based on physician/LIP order or complex needs related to functional status, cognitive ability or social support system  Outcome: Progressing

## 2020-12-21 NOTE — PROGRESS NOTES
Pt covid+ 12/17. Symptoms started: Asymptomatic, tested positive on weekly screen at facility. Pt from [address] 1940. Currently on 2L NC SpO2 94%, afebrile. Inflammatory markers trending down.     CCP On hold  Actemra On hold  RDV day 2/5  Continue Xarelto

## 2020-12-21 NOTE — PLAN OF CARE
ALERT ORIENTED, VSS, FEELS BETTER TODAY, 1 L NC, WILL CONTINUE TO MONITOR  Problem: Patient Centered Care  Goal: Patient preferences are identified and integrated in the patient's plan of care  Description: Interventions:  - What would you like us to know scale  - Administer analgesics based on type and severity of pain and evaluate response  - Implement non-pharmacological measures as appropriate and evaluate response  - Consider cultural and social influences on pain and pain management  - Manage/alleviat Assess and instruct to report SOB or any respiratory difficulty  - Respiratory Therapy support as indicated  - Manage/alleviate anxiety  - Monitor for signs/symptoms of CO2 retention  Outcome: Progressing     Problem: METABOLIC/FLUID AND ELECTROLYTES - ANGELIA

## 2020-12-21 NOTE — PROGRESS NOTES
[facility] Hospitalist Progress Note     PCP: Michael Ardon MD    CC: Follow up       Assessment/Plan:          Elina Perdue is a 78year old female with PMH sig for cranial aneurysm s/p coiling, COPD, HTN, DVT on xarelto, bipolar and recent admission confirmed with son, same as last admission    Questions/concerns were discussed with patient and/or family by bedside.     Total Time spent with patient and coordinating care:  >35 minutes    Updated son 12/20 and dtr 12/21/20 via phone    Krystyna Wadsworth dinner   • Pantoprazole Sodium  40 mg Oral QAM AC   • amLODIPine Besylate  5 mg Oral Daily   • cholecalciferol  1,000 Units Oral Daily   • lidocaine-menthol  1 patch Transdermal Daily   • magnesium oxide  400 mg Oral BID with meals   • dexamethasone  6 mg

## 2020-12-21 NOTE — PROGRESS NOTES
[facility] AND [facility]  [address] Infectious Disease  Progress Note    Ryan Miguel Patient Status:  Observation    12/3/1940 MRN [id number]   Location [facility] Attending Valerio Burgess MD   Hosp Day # 0 PCP Jaime Muhammad MD     Subjective: course     4. Underlying psych issues  - Supportive care ongoing     5. Disposition - inpatient. Feeling well currently, just a slight cough and chest tightness. Patient stable on 2L O2 via NC. Continue steroids and remdesivir as Rx.   If any worsening

## 2020-12-22 PROCEDURE — 97162 PT EVAL MOD COMPLEX 30 MIN: CPT

## 2020-12-22 PROCEDURE — 97530 THERAPEUTIC ACTIVITIES: CPT

## 2020-12-22 PROCEDURE — 97166 OT EVAL MOD COMPLEX 45 MIN: CPT

## 2020-12-22 PROCEDURE — 82728 ASSAY OF FERRITIN: CPT | Performed by: NURSE PRACTITIONER

## 2020-12-22 PROCEDURE — 97116 GAIT TRAINING THERAPY: CPT

## 2020-12-22 PROCEDURE — 86140 C-REACTIVE PROTEIN: CPT | Performed by: NURSE PRACTITIONER

## 2020-12-22 PROCEDURE — 80053 COMPREHEN METABOLIC PANEL: CPT | Performed by: HOSPITALIST

## 2020-12-22 PROCEDURE — 85379 FIBRIN DEGRADATION QUANT: CPT | Performed by: NURSE PRACTITIONER

## 2020-12-22 PROCEDURE — 85025 COMPLETE CBC W/AUTO DIFF WBC: CPT | Performed by: HOSPITALIST

## 2020-12-22 PROCEDURE — 83615 LACTATE (LD) (LDH) ENZYME: CPT | Performed by: NURSE PRACTITIONER

## 2020-12-22 RX ORDER — LORAZEPAM 2 MG/1
2 TABLET ORAL NIGHTLY PRN
Status: DISCONTINUED | OUTPATIENT
Start: 2020-12-22 | End: 2020-12-24

## 2020-12-22 RX ORDER — LORAZEPAM 0.5 MG/1
0.5 TABLET ORAL 3 TIMES DAILY PRN
Status: DISCONTINUED | OUTPATIENT
Start: 2020-12-22 | End: 2020-12-24

## 2020-12-22 RX ORDER — MAGNESIUM OXIDE 400 MG (241.3 MG MAGNESIUM) TABLET
3 TABLET NIGHTLY
Status: DISCONTINUED | OUTPATIENT
Start: 2020-12-22 | End: 2020-12-24

## 2020-12-22 NOTE — PLAN OF CARE
Problem: Patient Centered Care  Goal: Patient preferences are identified and integrated in the patient's plan of care  Description: Interventions:  - What would you like us to know as we care for you?   - Provide timely, complete, and accurate informatio non-pharmacological measures as appropriate and evaluate response  - Consider cultural and social influences on pain and pain management  - Manage/alleviate anxiety  - Utilize distraction and/or relaxation techniques  - Monitor for opioid side effects  - N Refer to Case Management Department for coordinating discharge planning if the patient needs post-hospital services based on physician/LIP order or complex needs related to functional status, cognitive ability or social support system  Outcome: Progressing relaxation techniques, as appropriate  - Assess for spiritual and psychosocial needs and initiate Spiritual Care or Behavioral Health consult as needed  Outcome: Not Progressing    Patient was very anxious to panicky all night.  Needs a lot of encouragement

## 2020-12-22 NOTE — CM/SW NOTE
FLAKITA was notified by TAMMIE/Lenore that pt may be ready to dc on 12/24 back to [facility]. Per PT evaluation, pt is appropriate to return to [facility] AND [facility] CENTERS [place] with [facility] AT [facility]. FLAKITA placed call to Christina/Rosaura Montoya to notify of possible dc on 12/24.  Per [address], [facility] [id number]

## 2020-12-22 NOTE — PROGRESS NOTES
Pt covid+ 12/17. Symptoms started: Asymptomatic, tested positive on weekly screen at facility. Pt from [address] 1940. Currently on 1L NC SpO2 95%, afebrile. Inflammatory markers trending down.     CCP On hold  Actemra On hold  RDV day 3/5  Continue Xarelto

## 2020-12-22 NOTE — PLAN OF CARE
Patient sat up in chair this afternoon. Alert & orientedx4. On 2L O2 per nasal cannula. Mildly anxious today- Ativan given. Continuing remdesivir. Fall precautions in place. Call light in reach.    Problem: Patient Centered Care  Goal: Patient preferences a INTERVENTIONS:  - Encourage pt to monitor pain and request assistance  - Assess pain using appropriate pain scale  - Administer analgesics based on type and severity of pain and evaluate response  - Implement non-pharmacological measures as appropriate and discharge as needed  - Consider post-discharge preferences of patient/family/discharge partner  - Complete POLST form as appropriate  - Assess patient's ability to be responsible for managing their own health  - Refer to Case Management Department for coor and spiritual values  - Provide emotional support, including active listening and acknowledgement of concerns of patient and caregivers  - Reduce environmental stimuli, as able  - Instruct patient/family in relaxation techniques, as appropriate  - Assess f

## 2020-12-22 NOTE — OCCUPATIONAL THERAPY NOTE
OCCUPATIONAL THERAPY EVALUATION - INPATIENT     Room Number: 409/959-E  Evaluation Date: 12/22/2020  Type of Evaluation: Initial       Physician Order: IP Consult to Occupational Therapy  Reason for Therapy: ADL/IADL Dysfunction and Discharge Planning though no O2 de-saturation noted.   Pt was assisted to the shower chair where PCTs were present to assist pt with shower.      The patient's Approx Degree of Impairment: 50.11% has been calculated based on documentation in the [facility] '6 clicks' Inpatient Chloé Riggins Incontinence    • L3-4 stable slight grade 1 retrolisthesis 10/31/2014   • L4-5 mild-mod diffuse bulging disc 10/31/2014   • L4-5 slight grade 1 unstable spondylolisthesis 10/31/2014   • Leg wound, left 08/21/2019   • Low back pain    • Migraines    • Musc Toe, Onychomycosis   • EGD  05/2019    Gastric polyps only   • EGD  2006   • ESOPHAGOGASTRODUODENOSCOPY (EGD) N/A 5/17/2019    Performed by Shantanu Chacko MD at [address] [place] [address] Ne Left 10/8/2019    Performed b limits    RANGE OF MOTION   Upper extremity ROM is within functional limits     STRENGTH ASSESSMENT  Upper extremity strength is within functional limits     ACTIVITIES OF DAILY LIVING ASSESSMENT  AM-PAC ‘6-Clicks’ Inpatient Daily Activity Short Form  How

## 2020-12-22 NOTE — PROGRESS NOTES
[facility] AND [facility] Infectious Disease  Progress Note    Nereida Leyva Patient Status:  Inpatient    12/3/1940 MRN [id number]   Location [facility]5W Attending Skyler Patel MD   Hosp Day # 1 PCP Estefania Stauffer MD     Subjective:  Jerod Ngo 1.16->1.25->0.88->0.31  - D.dimer 0.59->0.27  - Will trend     3.  Recent LLE cellulitis  - Resolved currently  - Completed a clindamycin course     4.  Underlying psych issues  - Supportive care ongoing     5.  Disposition - inpatient.  Feeling well solo

## 2020-12-22 NOTE — PROGRESS NOTES
[facility] Hospitalist Progress Note     PCP: Rafi Ludwig MD    CC: Follow up       Assessment/Plan:          Mariam Suazo is a 78year old female with PMH sig for cranial aneurysm s/p coiling, COPD, HTN, DVT on xarelto, bipolar and recent admission confirmed with son, same as last admission    Questions/concerns were discussed with patient and/or family by bedside.     Total Time spent with patient and coordinating care:  >35 minutes    Updated son 12/20 and dtr 12/21/20 via phone    Thank Medardo Vick Rivaroxaban  20 mg Oral Daily with food   • Ziprasidone HCl  80 mg Oral Daily with dinner   • Pantoprazole Sodium  40 mg Oral QAM AC   • cholecalciferol  1,000 Units Oral Daily   • lidocaine-menthol  1 patch Transdermal Daily   • magnesium oxide  400 mg Or

## 2020-12-22 NOTE — PHYSICAL THERAPY NOTE
PHYSICAL THERAPY EVALUATION - INPATIENT   Room Number: 122/737-L  Evaluation Date: 12/22/2020  Type of Evaluation: Initial   Physician Order: PT Eval and Treat    Presenting Problem: +COVID (12/19)  Reason for Therapy: Mobility Dysfunction and Discharge P preparation for discharge. DISCHARGE RECOMMENDATIONS  PT Discharge Recommendations: Home with home health PT; Intermittent Supervision    PLAN  PT Treatment Plan: Bed mobility; Body mechanics; Endurance; Energy conservation;Patient education; Family educatio complicated headache syndrome 11/2/2017   • Other spondylosis, lumbar region 11/2/2017   • Pneumonia 2012   • Pneumonia due to organism    • S/P cervical spinal fusion: C3-6 and C7-T1 11/2/2017   • s/p L5-S1 right laminectomy 8/28/2014   • Shortness of markus REPLACEMENT SURGERY Bilateral    • HYSTERECTOMY  1967    Partial Hysterectomy   • JAW SURGERY     • KNEE REPLACEMENT SURGERY Bilateral     Bilateral arthritis    • LAMINECTOMY      WITH FUSION PER PATIENT   • OTHER SURGICAL HISTORY  [id number] NEUROLOGICAL FINDINGS                      ACTIVITY TOLERANCE  Pulse: 71  Heart Rate Source: Monitor                   O2 WALK        SPO2 Ambulation on Oxygen: 96(96% at rest; 97% with activity)  Ambulation oxygen flow (liters per minute): 2      AM-P is able to ambulate 100 feet with assist device: walker - rolling at assistance level: supervision   Goal #3   Current Status    Goal #4 Patient to demonstrate independence with home activity/exercise instructions provided to patient in preparation for dis

## 2020-12-23 PROCEDURE — 80053 COMPREHEN METABOLIC PANEL: CPT | Performed by: HOSPITALIST

## 2020-12-23 PROCEDURE — 97530 THERAPEUTIC ACTIVITIES: CPT

## 2020-12-23 PROCEDURE — 86140 C-REACTIVE PROTEIN: CPT | Performed by: NURSE PRACTITIONER

## 2020-12-23 PROCEDURE — 83615 LACTATE (LD) (LDH) ENZYME: CPT | Performed by: NURSE PRACTITIONER

## 2020-12-23 PROCEDURE — 85379 FIBRIN DEGRADATION QUANT: CPT | Performed by: NURSE PRACTITIONER

## 2020-12-23 PROCEDURE — 82728 ASSAY OF FERRITIN: CPT | Performed by: NURSE PRACTITIONER

## 2020-12-23 PROCEDURE — 97116 GAIT TRAINING THERAPY: CPT

## 2020-12-23 RX ORDER — LORAZEPAM 1 MG/1
1 TABLET ORAL EVERY 4 HOURS PRN
Qty: 15 TABLET | Refills: 0 | Status: ON HOLD | OUTPATIENT
Start: 2020-12-23 | End: 2021-02-09

## 2020-12-23 NOTE — OCCUPATIONAL THERAPY NOTE
OCCUPATIONAL THERAPY TREATMENT NOTE - INPATIENT        Room Number: 838/899-K    Problem List  Principal Problem:    Acute respiratory failure with hypoxia (HCC)  Active Problems:    Pneumonia due to COVID-19 virus      OCCUPATIONAL THERAPY ASSESSMENT LIVING ASSESSMENT  AM-PAC ‘6-Clicks’ Inpatient Daily Activity Short Form  How much help from another person does the patient currently need…  -   Putting on and taking off regular lower body clothing?: A Lot  -   Bathing (including washing, rinsing, drying

## 2020-12-23 NOTE — PHYSICAL THERAPY NOTE
PHYSICAL THERAPY TREATMENT NOTE - INPATIENT     Room Number: 823/690-Y       Presenting Problem: +COVID (12/19)    Problem List  Principal Problem:    Acute respiratory failure with hypoxia (HCC)  Active Problems:    Pneumonia due to COVID-19 virus    PHYS training    SUBJECTIVE  \"My back is starting to hurt. I need one of those patches. \"  \"I think I'll get help with bathing for a while. \"    OBJECTIVE  Precautions: Bed/chair alarm    WEIGHT BEARING RESTRICTION  Weight Bearing Restriction: None    PAIN AS    Goals to be met by: 1/5/2021  Patient Goal Patient's self-stated goal is: return to PLOF   Goal #1 Patient is able to demonstrate supine - sit EOB @ level: independent      Goal #1   Current Status Not tested this session    Goal #2 Patient is able to

## 2020-12-23 NOTE — CM/SW NOTE
12/23 204pm: The pt. Has been accepted by [facility]. They have been notified of discharge plan for tomorrow. The pt has been weaned to room air. Plan is for discharge tomorrow 12/24 if pt.  Remains medically stable.   -----------------------  12/23

## 2020-12-23 NOTE — PROGRESS NOTES
[facility] Hospitalist Progress Note     PCP: Thelma Salas MD    CC: Follow up       Assessment/Plan:     Sarah Brar is a 78year old female with PMH sig for cranial aneurysm s/p coiling, COPD, HTN, DVT on xarelto, bipolar and recent admission for A last admission, anticipate home tomorrow     Questions/concerns were discussed with patient and/or family by bedside.       Updated son 12/20 and dtr 12/23/20 via phone    Thank Hiram Martin M.D. 12/23/20  [facility] Hospitalist  Answering Service Rivaroxaban  20 mg Oral Daily with food   • Ziprasidone HCl  80 mg Oral Daily with dinner   • Pantoprazole Sodium  40 mg Oral QAM AC   • cholecalciferol  1,000 Units Oral Daily   • lidocaine-menthol  1 patch Transdermal Daily   • magnesium oxide  400 mg Or

## 2020-12-23 NOTE — PROGRESS NOTES
Oxygen saturation at rest on RA is 95%. Oxygen saturation on RA with ambulation is 94%. Plan is to discharge to [facility] in [place].

## 2020-12-23 NOTE — PROGRESS NOTES
[facility] AND [facility] Infectious Disease  Progress Note    Terri Berrios Patient Status:  Inpatient    12/3/1940 MRN [id number]   Location [facility] Attending Fareed Bliss MD   Hosp Day # 2 PCP Maryanne Newton MD     Subjective:  Dominic Rick CRP 1.16->1.25->0.88->0.31  - D.dimer 0.59->0.27->0.29  - Will trend     3.  Recent LLE cellulitis  - Resolved currently  - Completed a clindamycin course     4.  Underlying psych issues  - Supportive care ongoing     5.  Disposition - inpatient.  Feeling

## 2020-12-23 NOTE — PLAN OF CARE
Pt received nightly ativan for anxiety and was able to sleep overnight. To continue with covid treatment.     Problem: Patient Centered Care  Goal: Patient preferences are identified and integrated in the patient's plan of care  Description: Interventions: appropriate pain scale  - Administer analgesics based on type and severity of pain and evaluate response  - Implement non-pharmacological measures as appropriate and evaluate response  - Consider cultural and social influences on pain and pain management Complete POLST form as appropriate  - Assess patient's ability to be responsible for managing their own health  - Refer to Case Management Department for coordinating discharge planning if the patient needs post-hospital services based on physician/LIP ord of concerns of patient and caregivers  - Reduce environmental stimuli, as able  - Instruct patient/family in relaxation techniques, as appropriate  - Assess for spiritual and psychosocial needs and initiate Spiritual Care or Behavioral Health consult as ne

## 2020-12-23 NOTE — PLAN OF CARE
Patient alert and oriented. Patient up in the chair today and oxygen weaned completely. Patient now on RA. IV remdesivir continued and PO decadron. Plan is to discharge tomorrow back to [facility] after IV remdesivir dose.      Problem: Patient Centered Care goal  Description: INTERVENTIONS:  - Encourage pt to monitor pain and request assistance  - Assess pain using appropriate pain scale  - Administer analgesics based on type and severity of pain and evaluate response  - Implement non-pharmacological measures to assist at discharge as needed  - Consider post-discharge preferences of patient/family/discharge partner  - Complete POLST form as appropriate  - Assess patient's ability to be responsible for managing their own health  - Refer to Case Management Depart and cultural and spiritual values  - Provide emotional support, including active listening and acknowledgement of concerns of patient and caregivers  - Reduce environmental stimuli, as able  - Instruct patient/family in relaxation techniques, as appropriat

## 2020-12-24 VITALS
RESPIRATION RATE: 18 BRPM | WEIGHT: 251.81 LBS | SYSTOLIC BLOOD PRESSURE: 141 MMHG | TEMPERATURE: 98 F | HEART RATE: 64 BPM | BODY MASS INDEX: 38 KG/M2 | OXYGEN SATURATION: 94 % | DIASTOLIC BLOOD PRESSURE: 76 MMHG

## 2020-12-24 PROCEDURE — 97535 SELF CARE MNGMENT TRAINING: CPT

## 2020-12-24 PROCEDURE — 97530 THERAPEUTIC ACTIVITIES: CPT

## 2020-12-24 PROCEDURE — 80053 COMPREHEN METABOLIC PANEL: CPT | Performed by: HOSPITALIST

## 2020-12-24 PROCEDURE — 97116 GAIT TRAINING THERAPY: CPT

## 2020-12-24 NOTE — DISCHARGE SUMMARY
[facility] Internal Medicine Discharge Summary   Patient ID:  Rayshawn Ireland  [id number]  60 year old  12/3/1940    Admit date: 12/19/2020    Discharge date and time: 12/24/20    Attending Physician: Venice Santos MD     Primary Care Physician: Froy Arreola Senna-Docusate Sodium 8.6-50 MG Tabs  Commonly known as: SENOKOT S           Where to Get Your Medications      You can get these medications from any pharmacy    Bring a paper prescription for each of these medications  · LORazepam 1 MG Tabs     HPI per issues last admission  -cont lamictal 100 mg BID and ziprasidone 80 mg nightly, remeron 30mg  -per d/c med rec ativan and mirtazapine resumed   - per last note has had 3 admissions to inpatient psych in the past year, most recently 10/2020 for suicidal selena hemorrhage, or mass. No midline shift. SKULL: No mass or other significant visible lesion. SINUSES: There is mild mucosal thickening of the ethmoid air cells. ORBITS: Limited views are unremarkable.           CONCLUSION:   Mild chronic microvascular ische the thoracic spine with greatest involvement of the T11-T12 level resulting in mild central vertebral canal stenosis. No associated cord compression. 2. Otherwise no evidence of neural compromise at other levels.     Dictated by (CST): Radha Hawthorne, asymmetric generalized circumferential disc bulge, left greater than right, with stable ligamentum flavum laxity and mild bilateral facet joint hypertrophy.   There is stable mild central vertebral canal stenosis with worsening  neural foraminal stenosis, l canal stenosis and bilateral neural foraminal narrowing. 4. L3-L4:  Unchanged neural foraminal narrowing bilaterally. 5. L4-5:  Unchanged neural foraminal narrowing bilaterally.     Dictated by (CST): Mp Salmon MD on 11/29/2020 at 8:48 AM     Final thickening. Fine reticular interstitial markings throughout the lungs. Biapical pleural thickening. Normal lung volumes. Minimal blunting of the costophrenic sulci.  BONES: Scattered mild degenerative endplate changes in the visualized thoracolumbar spi Brain (no Iv)(cpt=70450)    Result Date: 11/25/2020  PROCEDURE: CT STROKE BRAIN (NO IV) (CPT=70450)  COMPARISON: [facility], CT BRAIN OR HEAD (CPT=70450), 6/15/2020, 7:52 PM.  INDICATIONS: Actute onset right arm weakness with facial droop. Sosa Fletcher MD on 11/25/2020 at 3:22 PM          Xr Abdomen Obstructive Series Routine(2 Vw)(cpt=74019)    Result Date: 11/28/2020  PROCEDURE: XR ABDOMEN OBSTRUCTIVE SERIES ROUTINE (2 VIEWS)(CPT=74019)  COMPARISON: None.   INDICATIONS: Abdominal pain and distent

## 2020-12-24 NOTE — PLAN OF CARE
Pt alert. Ativan for anxiety given. UP with a walker . Plan to go home today of the remdesevir dose today.   Problem: Patient Centered Care  Goal: Patient preferences are identified and integrated in the patient's plan of care  Description: Interventions: appropriate pain scale  - Administer analgesics based on type and severity of pain and evaluate response  - Implement non-pharmacological measures as appropriate and evaluate response  - Consider cultural and social influences on pain and pain management Complete POLST form as appropriate  - Assess patient's ability to be responsible for managing their own health  - Refer to Case Management Department for coordinating discharge planning if the patient needs post-hospital services based on physician/LIP ord of concerns of patient and caregivers  - Reduce environmental stimuli, as able  - Instruct patient/family in relaxation techniques, as appropriate  - Assess for spiritual and psychosocial needs and initiate Spiritual Care or Behavioral Health consult as ne

## 2020-12-24 NOTE — PLAN OF CARE
Problem: Patient Centered Care  Goal: Patient preferences are identified and integrated in the patient's plan of care  Description: Interventions:  - What would you like us to know as we care for you?  From [place]  - Provide timely, complete, and accurat Implement non-pharmacological measures as appropriate and evaluate response  - Consider cultural and social influences on pain and pain management  - Manage/alleviate anxiety  - Utilize distraction and/or relaxation techniques  - Monitor for opioid side ef health  - Refer to Case Management Department for coordinating discharge planning if the patient needs post-hospital services based on physician/LIP order or complex needs related to functional status, cognitive ability or social support system  Outcome: P patient/family in relaxation techniques, as appropriate  - Assess for spiritual and psychosocial needs and initiate Spiritual Care or Behavioral Health consult as needed  Outcome: Progressing     A+Ox4, room air, can be anxious at times.   No c/o pain or SO

## 2020-12-24 NOTE — OCCUPATIONAL THERAPY NOTE
OCCUPATIONAL THERAPY TREATMENT NOTE - INPATIENT        Room Number: 248/849-W                Problem List  Principal Problem:    Acute respiratory failure with hypoxia (HCC)  Active Problems:    Pneumonia due to COVID-19 virus      OCCUPATIONAL THERAPY ASS of impairment may benefit from [facility]. Anticipate pt will progress towards IP OT goals and demonstrate the physical skills required to return to [facility] with home health therapy for home safety evaluation and assist from staff.        DISCHARGE RECOMMENDATIONS available to use per pt's baseline     Education Provided: role of OT, activity promotion   Patient End of Session: Up in chair;Needs met;Call light within reach;RN aware of session/findings; Alarm set    OT Goals:  Patients self stated goal is: home to [facility]

## 2020-12-24 NOTE — CM/SW NOTE
Plan is for pt. To discharge home today 12/24 at 2p, via ambulance, due to COVID+. SW also notified the pt's Agatha Barnes of the discharge home today. The pt. Is aware and agreeable to discharge.       PCS is complete in Epic, RN to print with AVS.

## 2020-12-24 NOTE — PHYSICAL THERAPY NOTE
PHYSICAL THERAPY TREATMENT NOTE - INPATIENT     Room Number: 857/908-J       Presenting Problem: +COVID (12/19)    Problem List  Principal Problem:    Acute respiratory failure with hypoxia (HCC)  Active Problems:    Pneumonia due to COVID-19 virus      PH home with [facility] AT [facility]. PT recommendation home with 24 hr care, HHPT/OT at d/c.     DISCHARGE RECOMMENDATIONS  PT Discharge Recommendations: Intermittent Supervision;Home with home health PT/OT     PLAN  PT Treatment Plan: Bed mobility; Endurance; Energy conservation posture)  Stoop/Curb Assistance: Not tested  Comment : supine to sit tx with HOB 45 deg at supervision with bed rail; sit to stand tx EOB with rolling walker at close SBA; toilet tx at MIN A    Patient End of Session: Up in chair;Needs met;Call light withi

## 2020-12-28 ENCOUNTER — TELEPHONE (OUTPATIENT)
Dept: NEUROLOGY | Facility: CLINIC | Age: 80
End: 2020-12-28

## 2020-12-28 DIAGNOSIS — M54.50 CHRONIC LEFT-SIDED LOW BACK PAIN WITHOUT SCIATICA: ICD-10-CM

## 2020-12-28 DIAGNOSIS — G89.29 CHRONIC LEFT-SIDED LOW BACK PAIN WITHOUT SCIATICA: ICD-10-CM

## 2020-12-28 DIAGNOSIS — M96.1 LUMBAR POST-LAMINECTOMY SYNDROME: Primary | ICD-10-CM

## 2020-12-29 NOTE — TELEPHONE ENCOUNTER
Patient states she has a hospital bed but the mattress is gel and it moves too much at night. Patient requesting Dr. Emerita Rivera place an order for an air mattress. She states medicare will cover it for spine issues. Can this order be placed?

## 2021-01-04 NOTE — TELEPHONE ENCOUNTER
[facility] called stating since patient was COVID positive AND symptomatic and hospitalized she would either need to have her injection done under local or reschedule for 10 weeks out. Patient refused local anesthesia and was rescheduled for 02/26/21.  Date aburto

## 2021-01-05 NOTE — TELEPHONE ENCOUNTER
S/W patient and she stated she no longer requires the order. Informed patient the order is there if she needs it.

## 2021-01-11 ENCOUNTER — HOSPITAL ENCOUNTER (INPATIENT)
Facility: HOSPITAL | Age: 81
LOS: 4 days | Discharge: HOME HEALTH | DRG: 603 | End: 2021-01-15
Attending: EMERGENCY MEDICINE | Admitting: INTERNAL MEDICINE
Payer: MEDICARE

## 2021-01-11 ENCOUNTER — APPOINTMENT (OUTPATIENT)
Dept: GENERAL RADIOLOGY | Facility: HOSPITAL | Age: 81
DRG: 603 | End: 2021-01-11
Attending: EMERGENCY MEDICINE
Payer: MEDICARE

## 2021-01-11 DIAGNOSIS — L03.116 CELLULITIS OF LEFT LOWER EXTREMITY WITHOUT FOOT: ICD-10-CM

## 2021-01-11 DIAGNOSIS — N30.00 ACUTE CYSTITIS WITHOUT HEMATURIA: Primary | ICD-10-CM

## 2021-01-11 LAB
ANION GAP SERPL CALC-SCNC: 2 MMOL/L (ref 0–18)
BASOPHILS # BLD AUTO: 0.04 X10(3) UL (ref 0–0.2)
BASOPHILS NFR BLD AUTO: 0.5 %
BILIRUB UR QL: NEGATIVE
BUN BLD-MCNC: 18 MG/DL (ref 7–18)
BUN/CREAT SERPL: 17.5 (ref 10–20)
CALCIUM BLD-MCNC: 10.6 MG/DL (ref 8.5–10.1)
CHLORIDE SERPL-SCNC: 107 MMOL/L (ref 98–112)
CO2 SERPL-SCNC: 34 MMOL/L (ref 21–32)
COLOR UR: YELLOW
CREAT BLD-MCNC: 1.03 MG/DL
DEPRECATED RDW RBC AUTO: 52 FL (ref 35.1–46.3)
EOSINOPHIL # BLD AUTO: 0.37 X10(3) UL (ref 0–0.7)
EOSINOPHIL NFR BLD AUTO: 4.7 %
ERYTHROCYTE [DISTWIDTH] IN BLOOD BY AUTOMATED COUNT: 14.6 % (ref 11–15)
GLUCOSE BLD-MCNC: 106 MG/DL (ref 70–99)
GLUCOSE UR-MCNC: NEGATIVE MG/DL
HCT VFR BLD AUTO: 44.2 %
HGB BLD-MCNC: 13.8 G/DL
IMM GRANULOCYTES # BLD AUTO: 0.1 X10(3) UL (ref 0–1)
IMM GRANULOCYTES NFR BLD: 1.3 %
KETONES UR-MCNC: NEGATIVE MG/DL
LYMPHOCYTES # BLD AUTO: 3.11 X10(3) UL (ref 1–4)
LYMPHOCYTES NFR BLD AUTO: 39.3 %
MCH RBC QN AUTO: 30.5 PG (ref 26–34)
MCHC RBC AUTO-ENTMCNC: 31.2 G/DL (ref 31–37)
MCV RBC AUTO: 97.6 FL
MONOCYTES # BLD AUTO: 0.57 X10(3) UL (ref 0.1–1)
MONOCYTES NFR BLD AUTO: 7.2 %
MRSA DNA SPEC QL NAA+PROBE: POSITIVE
NEUTROPHILS # BLD AUTO: 3.73 X10 (3) UL (ref 1.5–7.7)
NEUTROPHILS # BLD AUTO: 3.73 X10(3) UL (ref 1.5–7.7)
NEUTROPHILS NFR BLD AUTO: 47 %
NITRITE UR QL STRIP.AUTO: NEGATIVE
NT-PROBNP SERPL-MCNC: 101 PG/ML (ref ?–450)
OSMOLALITY SERPL CALC.SUM OF ELEC: 298 MOSM/KG (ref 275–295)
PH UR: 6 [PH] (ref 5–8)
PLATELET # BLD AUTO: 192 10(3)UL (ref 150–450)
POTASSIUM SERPL-SCNC: 4.2 MMOL/L (ref 3.5–5.1)
PROT UR-MCNC: 30 MG/DL
RBC # BLD AUTO: 4.53 X10(6)UL
RBC #/AREA URNS AUTO: 16 /HPF
SODIUM SERPL-SCNC: 143 MMOL/L (ref 136–145)
SP GR UR STRIP: 1.01 (ref 1–1.03)
UROBILINOGEN UR STRIP-ACNC: <2
WBC # BLD AUTO: 7.9 X10(3) UL (ref 4–11)
WBC #/AREA URNS AUTO: 1078 /HPF

## 2021-01-11 PROCEDURE — 71045 X-RAY EXAM CHEST 1 VIEW: CPT | Performed by: EMERGENCY MEDICINE

## 2021-01-11 RX ORDER — GUAIFENESIN 100 MG/5ML
200 SOLUTION ORAL EVERY 4 HOURS PRN
Status: DISCONTINUED | OUTPATIENT
Start: 2021-01-11 | End: 2021-01-15

## 2021-01-11 RX ORDER — AMLODIPINE BESYLATE 10 MG/1
10 TABLET ORAL DAILY
Status: DISCONTINUED | OUTPATIENT
Start: 2021-01-12 | End: 2021-01-15

## 2021-01-11 RX ORDER — BISACODYL 10 MG
10 SUPPOSITORY, RECTAL RECTAL
Status: DISCONTINUED | OUTPATIENT
Start: 2021-01-11 | End: 2021-01-15

## 2021-01-11 RX ORDER — LAMOTRIGINE 25 MG/1
100 TABLET ORAL 2 TIMES DAILY
Status: DISCONTINUED | OUTPATIENT
Start: 2021-01-11 | End: 2021-01-15

## 2021-01-11 RX ORDER — SENNA AND DOCUSATE SODIUM 50; 8.6 MG/1; MG/1
2 TABLET, FILM COATED ORAL DAILY
Status: DISCONTINUED | OUTPATIENT
Start: 2021-01-11 | End: 2021-01-15

## 2021-01-11 RX ORDER — ONDANSETRON 2 MG/ML
4 INJECTION INTRAMUSCULAR; INTRAVENOUS EVERY 6 HOURS PRN
Status: DISCONTINUED | OUTPATIENT
Start: 2021-01-11 | End: 2021-01-15

## 2021-01-11 RX ORDER — BUTALBITAL, ACETAMINOPHEN AND CAFFEINE 50; 325; 40 MG/1; MG/1; MG/1
1 TABLET ORAL EVERY 6 HOURS PRN
Status: DISCONTINUED | OUTPATIENT
Start: 2021-01-11 | End: 2021-01-15

## 2021-01-11 RX ORDER — MIRTAZAPINE 15 MG/1
30 TABLET, FILM COATED ORAL NIGHTLY
Status: DISCONTINUED | OUTPATIENT
Start: 2021-01-11 | End: 2021-01-15

## 2021-01-11 RX ORDER — SODIUM PHOSPHATE, DIBASIC AND SODIUM PHOSPHATE, MONOBASIC 7; 19 G/133ML; G/133ML
1 ENEMA RECTAL ONCE AS NEEDED
Status: DISCONTINUED | OUTPATIENT
Start: 2021-01-11 | End: 2021-01-15

## 2021-01-11 RX ORDER — PANTOPRAZOLE SODIUM 40 MG/1
40 TABLET, DELAYED RELEASE ORAL
Status: DISCONTINUED | OUTPATIENT
Start: 2021-01-11 | End: 2021-01-15

## 2021-01-11 RX ORDER — LORAZEPAM 1 MG/1
1 TABLET ORAL EVERY 4 HOURS PRN
Status: DISCONTINUED | OUTPATIENT
Start: 2021-01-11 | End: 2021-01-15

## 2021-01-11 RX ORDER — MAGNESIUM HYDROXIDE/ALUMINUM HYDROXICE/SIMETHICONE 120; 1200; 1200 MG/30ML; MG/30ML; MG/30ML
15 SUSPENSION ORAL EVERY 6 HOURS PRN
Status: ON HOLD | COMMUNITY
End: 2021-03-25

## 2021-01-11 RX ORDER — LEVOTHYROXINE SODIUM 0.1 MG/1
100 TABLET ORAL
Status: DISCONTINUED | OUTPATIENT
Start: 2021-01-11 | End: 2021-01-15

## 2021-01-11 RX ORDER — HYDROCODONE BITARTRATE AND ACETAMINOPHEN 10; 325 MG/1; MG/1
1 TABLET ORAL EVERY 6 HOURS PRN
Status: DISCONTINUED | OUTPATIENT
Start: 2021-01-11 | End: 2021-01-15

## 2021-01-11 RX ORDER — GABAPENTIN 100 MG/1
100 CAPSULE ORAL 3 TIMES DAILY
Status: ON HOLD | COMMUNITY
End: 2021-03-25

## 2021-01-11 RX ORDER — SENNA AND DOCUSATE SODIUM 50; 8.6 MG/1; MG/1
1 TABLET, FILM COATED ORAL 2 TIMES DAILY
COMMUNITY

## 2021-01-11 RX ORDER — FLUTICASONE FUROATE AND VILANTEROL TRIFENATATE 100; 25 UG/1; UG/1
1 POWDER RESPIRATORY (INHALATION) DAILY
COMMUNITY

## 2021-01-11 RX ORDER — VANCOMYCIN HYDROCHLORIDE
15 EVERY 24 HOURS
Status: DISCONTINUED | OUTPATIENT
Start: 2021-01-12 | End: 2021-01-15

## 2021-01-11 RX ORDER — GUAIFENESIN 100 MG/5ML
200 SYRUP ORAL EVERY 4 HOURS PRN
Status: ON HOLD | COMMUNITY
End: 2021-03-25

## 2021-01-11 RX ORDER — VANCOMYCIN HYDROCHLORIDE 125 MG/1
125 CAPSULE ORAL DAILY
Status: DISCONTINUED | OUTPATIENT
Start: 2021-01-11 | End: 2021-01-15

## 2021-01-11 RX ORDER — MUSCLE RUB CREAM 100; 150 MG/G; MG/G
CREAM TOPICAL 3 TIMES DAILY PRN
Status: DISCONTINUED | OUTPATIENT
Start: 2021-01-11 | End: 2021-01-15

## 2021-01-11 RX ORDER — BISACODYL 10 MG
10 SUPPOSITORY, RECTAL RECTAL
Status: DISCONTINUED | OUTPATIENT
Start: 2021-01-11 | End: 2021-01-11

## 2021-01-11 RX ORDER — METOCLOPRAMIDE HYDROCHLORIDE 5 MG/ML
10 INJECTION INTRAMUSCULAR; INTRAVENOUS EVERY 8 HOURS PRN
Status: DISCONTINUED | OUTPATIENT
Start: 2021-01-11 | End: 2021-01-15

## 2021-01-11 RX ORDER — ZIPRASIDONE HYDROCHLORIDE 20 MG/1
80 CAPSULE ORAL
Status: DISCONTINUED | OUTPATIENT
Start: 2021-01-11 | End: 2021-01-15

## 2021-01-11 RX ORDER — GABAPENTIN 100 MG/1
100 CAPSULE ORAL 3 TIMES DAILY
Status: DISCONTINUED | OUTPATIENT
Start: 2021-01-11 | End: 2021-01-15

## 2021-01-11 RX ORDER — VANCOMYCIN/0.9 % SOD CHLORIDE 1.75 G/5
15 PLASTIC BAG, INJECTION (ML) INTRAVENOUS EVERY 24 HOURS
Status: DISCONTINUED | OUTPATIENT
Start: 2021-01-11 | End: 2021-01-11

## 2021-01-11 RX ORDER — ONDANSETRON 4 MG/1
4 TABLET, FILM COATED ORAL EVERY 8 HOURS PRN
COMMUNITY
End: 2021-06-28

## 2021-01-11 RX ORDER — ACETAMINOPHEN 325 MG/1
650 TABLET ORAL EVERY 6 HOURS PRN
COMMUNITY

## 2021-01-11 RX ORDER — VANCOMYCIN 2 GRAM/500 ML IN 0.9 % SODIUM CHLORIDE INTRAVENOUS
25 ONCE
Status: COMPLETED | OUTPATIENT
Start: 2021-01-11 | End: 2021-01-11

## 2021-01-11 RX ORDER — HYDROCODONE BITARTRATE AND ACETAMINOPHEN 10; 325 MG/1; MG/1
1 TABLET ORAL EVERY 6 HOURS PRN
Status: ON HOLD | COMMUNITY
End: 2021-02-09

## 2021-01-11 RX ORDER — POLYETHYLENE GLYCOL 3350 17 G/17G
17 POWDER, FOR SOLUTION ORAL DAILY PRN
Status: DISCONTINUED | OUTPATIENT
Start: 2021-01-11 | End: 2021-01-15

## 2021-01-11 RX ORDER — MAGNESIUM HYDROXIDE/ALUMINUM HYDROXICE/SIMETHICONE 120; 1200; 1200 MG/30ML; MG/30ML; MG/30ML
15 SUSPENSION ORAL EVERY 6 HOURS PRN
Status: DISCONTINUED | OUTPATIENT
Start: 2021-01-11 | End: 2021-01-15

## 2021-01-11 NOTE — PROGRESS NOTES
[facility] Pharmacy Note:  Renal Adjustment for cefepime (MAXIPIME)    Carmen Covarrubias is a 78year old patient who has been prescribed cefepime (MAXIPIME) 1000 mg every 8 hrs.   CrCl is estimated creatinine clearance is 42.5 mL/min (A) (based on SCr of 1.03 mg/dL

## 2021-01-11 NOTE — PROGRESS NOTES
[facility] Pharmacy Note: Antimicrobial Weight Based Dose Adjustment for: piperacillin/tazobactam (Peg Deem)    Carey De Los Santos is a 78year old patient who has been prescribed piperacillin/tazobactam (ZOSYN) 3.375 g x1 in ED. Estimated Creatinine Clearance: 42. 5

## 2021-01-11 NOTE — ED INITIAL ASSESSMENT (HPI)
C/o bilateral leg swelling x1 week. Pt also c/o frequent painful urination. Pt comes to ER because she was unable to see her PCP. Pt also mentions she has cellulitis to LLE-not being treated currently.      Pt had COVID mid December and just completed i

## 2021-01-11 NOTE — ED PROVIDER NOTES
Patient Seen in: [facility] AND [facility] Emergency Department      History   Patient presents with:  Swelling Edema  Urinary Symptoms    Stated Complaint: bilateral leg swelling, painful urination    HPI/Subjective:   HPI    The patient is an 78-year-old femal • Leg wound, left 08/21/2019   • Low back pain    • Migraines    • Muscle weakness    • Onychomycosis     Debridement    • Oropharyngeal dysphagia 8/29/2017   • Osteoarthrosis, unspecified whether generalized or localized, unspecified site    • Other and u • EXTREMITY LOWER IRRIGATION & DEBRIDEMENT Left 10/8/2019    Performed by Matias Fermin MD at [facility] OR   • Alyssabury Left 8/21/2019    Performed by Matias Fermin MD at [facility] OR   • [facility]      x2 Current:/63   Pulse 74   Temp 98.2 °F (36.8 °C) (Temporal)   Resp 18   Ht 172.7 cm (5' 8\")   Wt 113.4 kg   SpO2 91%   BMI 38.01 kg/m²         Physical Exam  Vitals signs and nursing note reviewed.    Constitutional:       General: She is not in acute Calculated Osmolality 298 (*)     GFR, Non- 51 (*)     GFR, -American 58 (*)     All other components within normal limits   URINALYSIS WITH CULTURE REFLEX - Abnormal; Notable for the following components:    Clarity Urine Cloudy Patricia Evener CONCLUSION:  1. Small left pleural effusion. 2.  Linear opacities in the lung bases likely representing atelectasis or scarring 3. There is a metallic clip/marker superimposed over the left axilla. This is new since recent radiographs from 12/29/20.   Co

## 2021-01-11 NOTE — H&P
[facility] Hospitalist H&P       CC: Patient presents with:  Swelling Edema  Urinary Symptoms       PCP: Cristin Suarez MD    History of Present Illness: Patient is a 78year old female with PMH sig for cranial aneurysm s/p coiling, COPD, HTN, DVT on xarel and unspecified hyperlipidemia    • Other complicated headache syndrome 11/2/2017   • Other spondylosis, lumbar region 11/2/2017   • Pneumonia 2012   • Pneumonia due to organism    • S/P cervical spinal fusion: C3-6 and C7-T1 11/2/2017   • s/p L5-S1 right EYES\"   • HIP REPLACEMENT SURGERY Bilateral    • HYSTERECTOMY  1967    Partial Hysterectomy   • JAW SURGERY     • KNEE REPLACEMENT SURGERY Bilateral     Bilateral arthritis    • LAMINECTOMY      WITH FUSION PER PATIENT   • OTHER SURGICAL HISTORY  [phone] Besylate 10 MG Oral Tab, Take 1 tablet (10 mg total) by mouth daily. , Disp: 90 tablet, Rfl: 0    •  cholecalciferol 1000 UNITS Oral Cap, Take 2 capsules (2,000 Units total) by mouth daily.  (Patient taking differently: Take 1,000 Units by mouth daily.  ), D bilaterally. Normal effort   Chest wall:  No tenderness or deformity. Heart:  Regular rate and rhythm, S1, S2 normal   Abdomen:   Soft, non-tender. Bowel sounds normal. No masses,  No organomegaly.  Non distended   Extremities: Extremities b/l redness L>R daily     HTN  -  norvasc 10mg     GERD  - PPI     COPD  - not is exacerbation  - continue home MDI    Bipolar/Depression   -cont lamictal 100 mg BID  -ziprasidone 80 mg nightly  -remeron 30mg  -ativan prn  - per last note has had 3 admissions to inpatient

## 2021-01-11 NOTE — CM/SW NOTE
Per chart review pt lives at [facility] and is current w/ [address]. At baseline pt is scooter bound, per [employer] pt has been xfer to chair at bedside w/ RW and using rolling chair to get to the bathroom. PT recs are pending.     Clinical updat

## 2021-01-11 NOTE — PLAN OF CARE
Pt admitted from ED today. Stable vital signs. Using rolling chair with stand and pivot. Pt tolerating general diet. Abx infusing. Pt denies any pain or shortness of breath. PRN ativan for anxiety given. Continue to monitor.        Problem: RISK FOR INFECTI ability or social support system  Outcome: Progressing     Problem: Patient Centered Care  Goal: Patient preferences are identified and integrated in the patient's plan of care  Description: Interventions:  - What would you like us to know as we care for y

## 2021-01-11 NOTE — PROGRESS NOTES
[facility] Dosing Service    Initial Pharmacokinetic Consult for Vancomycin Dosing     Jazmine Arnold is a 78year old patient who is being treated for cellulitis and UTI.   Pharmacy has been asked to dose Vancomycin by Dr. Christie Perea    Allergies:  Mack Chung

## 2021-01-11 NOTE — ED NOTES
Orders for admission, patient is aware of plan and ready to go upstairs. Any questions, please call ED RN Kari Reed at extension [phone].   Type of COVID test sent: NA  COVID Suspicion level: Low  Titratable drug(s) infusing: NA  Rate:    LOC at time of trans

## 2021-01-11 NOTE — OCCUPATIONAL THERAPY NOTE
OCCUPATIONAL THERAPY EVALUATION - INPATIENT     Room Number: 567/567-A  Evaluation Date: 1/11/2021  Type of Evaluation: Initial  Presenting Problem: (Leg swelling, UTI)    Physician Order: IP Consult to Occupational Therapy  Reason for Therapy: ADL/IADL Dy using her scooter primarily. At this time she is below her functional baseline and is a high fall risk. She has had two recent hospitalizations and is deconditioned.  The patient's Approx Degree of Impairment: 53.32% has been calculated based on documentati Leg   • Hypothyroidism    • Incontinence    • L3-4 stable slight grade 1 retrolisthesis 10/31/2014   • L4-5 mild-mod diffuse bulging disc 10/31/2014   • L4-5 slight grade 1 unstable spondylolisthesis 10/31/2014   • Leg wound, left 08/21/2019   • Low back p DEBRIDEMENT OF NAIL(S), 1-5      Toe, Onychomycosis   • EGD  05/2019    Gastric polyps only   • EGD  2006   • ESOPHAGOGASTRODUODENOSCOPY (EGD) N/A 5/17/2019    Performed by Aixa Rosado MD at [facility] last month since her admission for Covid in December of 2020. She reports two falls since she discharged home. SUBJECTIVE  \"I don't get dizzy. I get angry. \"    OCCUPATIONAL THERAPY EXAMINATION      OBJECTIVE  Precautions: Bed/chair alarm  Fall Risk: S Session: Up in chair;Needs met;Call light within reach;RN aware of session/findings; All patient questions and concerns addressed; Alarm set    OT Goals  Patients self stated goal is: to improve indep with mobility and self-care     Patient will complete miguel

## 2021-01-11 NOTE — ED NOTES
3+ pitting edema noted to bilateral legs. +pedal pulses. Decreased sensation-at baseline. Redness noted to LLE.

## 2021-01-11 NOTE — RESPIRATORY THERAPY NOTE
CPAP evaluation completed. Patient stated that she was diagnosed with obstructive sleep apnea (SHANT) in the past, but she does not use a CPAP when sleeping.  She declined to use a CPAP during her stay in the hospital.

## 2021-01-11 NOTE — CONSULTS
[facility] Infectious Disease  Report of Consultation    Karmen Sue Patient Status:  Emergency    12/3/1940 MRN [id number]   Location [address] Attending Christo Stoddard MD   Hosp Day # 0 PCP Choco Morris Low back pain    • Migraines    • Muscle weakness    • Onychomycosis     Debridement    • Oropharyngeal dysphagia 8/29/2017   • Osteoarthrosis, unspecified whether generalized or localized, unspecified site    • Other and unspecified hyperlipidemia    • Ot Performed by Matias Fermin MD at [address] [place] [address] OR   • Alminsabury Left 8/21/2019    Performed by Matias Fermin MD at [address] [place] [address] OR   • EYE SURGERY      x2 FOR \"CROSSED EYES\"   • HIP REPLACEMENT SURGERY Bilateral drink alcohol or use drugs.     Allergies:    Bactrim [Sulfametho*    RASH  Ciprofloxacin           RASH  Depakote [Valproic *    OTHER (SEE COMMENTS)    Comment:pancreatitis  Fluocinolone            OTHER (SEE COMMENTS)    Comment:Unknown  Gabitril [employer] °C) Temporal 81 20 (!) 89 % 5' 8\" (1.727 m) 250 lb (113.4 kg)       Intake/Output:  No intake/output data recorded. Physical Exam:   General: Awake, alert, non-tox and in NAD. Head: Normocephalic, without obvious abnormality, atraumatic.    Eyes: Conj MRSA screen as patient with a h/o MRSA. Trending temps and WBCs. Will follow with further recommendations. Supportive care ongoing. Misa Velazquez [facility] Infectious Disease  [phone]    1/11/2021  8:37 AM

## 2021-01-11 NOTE — PROGRESS NOTES
[facility]    CDI Prediction Tool Protocol (Vancomycin Initiated)    OVP (oral vancomycin prophylaxis) 125 mg PO Daily is being started in this patient based on a score of 19.1.           This patient is currently at high risk for developing CDI

## 2021-01-12 LAB
ALBUMIN SERPL-MCNC: 2.6 G/DL (ref 3.4–5)
ALBUMIN/GLOB SERPL: 0.8 {RATIO} (ref 1–2)
ALP LIVER SERPL-CCNC: 100 U/L
ALT SERPL-CCNC: 14 U/L
ANION GAP SERPL CALC-SCNC: 1 MMOL/L (ref 0–18)
AST SERPL-CCNC: 7 U/L (ref 15–37)
BASOPHILS # BLD AUTO: 0.04 X10(3) UL (ref 0–0.2)
BASOPHILS NFR BLD AUTO: 0.6 %
BILIRUB SERPL-MCNC: 0.3 MG/DL (ref 0.1–2)
BUN BLD-MCNC: 19 MG/DL (ref 7–18)
BUN/CREAT SERPL: 18.6 (ref 10–20)
CALCIUM BLD-MCNC: 9.7 MG/DL (ref 8.5–10.1)
CHLORIDE SERPL-SCNC: 106 MMOL/L (ref 98–112)
CO2 SERPL-SCNC: 35 MMOL/L (ref 21–32)
CREAT BLD-MCNC: 1.02 MG/DL
DEPRECATED RDW RBC AUTO: 53.5 FL (ref 35.1–46.3)
EOSINOPHIL # BLD AUTO: 0.32 X10(3) UL (ref 0–0.7)
EOSINOPHIL NFR BLD AUTO: 4.7 %
ERYTHROCYTE [DISTWIDTH] IN BLOOD BY AUTOMATED COUNT: 14.6 % (ref 11–15)
GLOBULIN PLAS-MCNC: 3.2 G/DL (ref 2.8–4.4)
GLUCOSE BLD-MCNC: 95 MG/DL (ref 70–99)
HAV IGM SER QL: 2 MG/DL (ref 1.6–2.6)
HCT VFR BLD AUTO: 41 %
HGB BLD-MCNC: 12.6 G/DL
IMM GRANULOCYTES # BLD AUTO: 0.09 X10(3) UL (ref 0–1)
IMM GRANULOCYTES NFR BLD: 1.3 %
LYMPHOCYTES # BLD AUTO: 2.01 X10(3) UL (ref 1–4)
LYMPHOCYTES NFR BLD AUTO: 29.3 %
M PROTEIN MFR SERPL ELPH: 5.8 G/DL (ref 6.4–8.2)
MCH RBC QN AUTO: 30.4 PG (ref 26–34)
MCHC RBC AUTO-ENTMCNC: 30.7 G/DL (ref 31–37)
MCV RBC AUTO: 98.8 FL
MONOCYTES # BLD AUTO: 0.56 X10(3) UL (ref 0.1–1)
MONOCYTES NFR BLD AUTO: 8.2 %
NEUTROPHILS # BLD AUTO: 3.84 X10 (3) UL (ref 1.5–7.7)
NEUTROPHILS # BLD AUTO: 3.84 X10(3) UL (ref 1.5–7.7)
NEUTROPHILS NFR BLD AUTO: 55.9 %
OSMOLALITY SERPL CALC.SUM OF ELEC: 296 MOSM/KG (ref 275–295)
PLATELET # BLD AUTO: 190 10(3)UL (ref 150–450)
POTASSIUM SERPL-SCNC: 4.5 MMOL/L (ref 3.5–5.1)
RBC # BLD AUTO: 4.15 X10(6)UL
SODIUM SERPL-SCNC: 142 MMOL/L (ref 136–145)
WBC # BLD AUTO: 6.9 X10(3) UL (ref 4–11)

## 2021-01-12 RX ORDER — ACETAMINOPHEN 325 MG/1
650 TABLET ORAL EVERY 6 HOURS PRN
Status: DISCONTINUED | OUTPATIENT
Start: 2021-01-12 | End: 2021-01-15

## 2021-01-12 NOTE — CM/SW NOTE
BPCI Bundled Advanced Medicare Program    Plan of care reviewed for care coordination and discharge planning. Noted pt falls under  BPCI/Medicare program, with DRG Cellulitis ([address])    [facility]  [facility].   NEHEMIAH currently working on pat

## 2021-01-12 NOTE — PLAN OF CARE
Problem: Patient Centered Care  Goal: Patient preferences are identified and integrated in the patient's plan of care  Description: Interventions:  - What would you like us to know as we care for you?  From [place]  - Provide timely, complete, and accurat on assessment  - Modify environment to reduce risk of injury  - Provide assistive devices as appropriate  - Consider OT/PT consult to assist with strengthening/mobility  - Encourage toileting schedule  Outcome: Progressing     Problem: DISCHARGE PLANNING oxygenation  Description: INTERVENTIONS:  - Assess for changes in respiratory status  - Assess for changes in mentation and behavior  - Position to facilitate oxygenation and minimize respiratory effort  - Oxygen supplementation based on oxygen saturation activity as appropriate  - Communicate ordered activity level and limitations with patient/family  Outcome: Progressing

## 2021-01-12 NOTE — PLAN OF CARE
Pt Aox4. Stand and pivot to chair/rolling chair. Placed on 0.5L throughout night. Lower leg cellulitis red and painful to touch. On IV cefepime. Purewick in place. Vital signs currently stable. Will continue to monitor.      Problem: Patient Centered Care INTERVENTIONS:  - Assess pt frequently for physical needs  - Identify cognitive and physical deficits and behaviors that affect risk of falls.   - [place] fall precautions as indicated by assessment.  - Educate pt/family on patient safety including physic Utilize distraction and/or relaxation techniques  - Monitor for opioid side effects  - Notify MD/LIP if interventions unsuccessful or patient reports new pain  - Anticipate increased pain with activity and pre-medicate as appropriate  Outcome: Progressing Advance activity as appropriate  - Communicate ordered activity level and limitations with patient/family  Outcome: Progressing     Problem: SKIN/TISSUE INTEGRITY - ADULT  Goal: Skin integrity remains intact  Description: INTERVENTIONS  - Assess and docume

## 2021-01-12 NOTE — PHYSICAL THERAPY NOTE
PHYSICAL THERAPY TREATMENT NOTE - INPATIENT     Room Number: 567/567-A       Presenting Problem: UTI, weakness, falls at home; LLE cellulitis; (Hx COVID(+) on 12/19/2020)    Problem List  Principal Problem:    Acute cystitis without hematuria  Active Probl education;Gait training;Patient education; Energy conservation; Endurance; Body mechanics; Coordination;Balance training;Strengthening    SUBJECTIVE  \"I do NOT want to go back to rehab. \"    OBJECTIVE  Precautions: Bed/chair alarm    WEIGHT BEARING RESTRICTIO set    CURRENT GOALS   Goals to be met by: 1/25/21  Patient Goal Patient's self-stated goal is: not stated   Goal #1 Patient is able to demonstrate supine - sit EOB @ level: modified independent      Goal #1   Current Status NT today   Goal #2 Patient is a

## 2021-01-12 NOTE — PROGRESS NOTES
[facility] Hospitalist Progress Note     CC: Hospital Follow up    PCP: Xiang Meyer MD       Assessment/Plan:     Principal Problem:    Acute cystitis without hematuria  Active Problems:    UTI (urinary tract infection)    Cellulitis of left lower extremi Herlinda Mancini MD     [facility] Hospitalist  Answering Service number: [phone]     Subjective:     No CP, SOB, or N/V. Dysuria and leg pain improved.       OBJECTIVE:    Blood pressure 151/59, pulse 71, temperature 97.6 °F (36.4 °C), temperature source color, texture, turgor normal.    Neurologic: Normal strength, no focal deficit appreciated           Data Review:       Labs:     Recent Labs   Lab 01/11/21  0608 01/12/21  0605   RBC 4.53 4.15   HGB 13.8 12.6   HCT 44.2 41.0   MCV 97.6 98.8   MCH 30.5 30 Hydroxide-Simeth, guaiFENesin

## 2021-01-12 NOTE — PROGRESS NOTES
[facility] AND [facility] Infectious Disease  Progress Note    Sherman Tovar Patient Status:  Inpatient    12/3/1940 MRN [id number]   Location [facility] 5SW/SE Attending Rosangela Mora MD   Hosp Day # 1 PCP Isiah Blum MD     Subjective: and vancomycin     2.   Active urine sediment with many WBCs and  symptoms  - Urine cultures pending  - IV cefepime as above     3.  Recent admission for acute hypoxic respiratory failure due to COVID+ status with CXR evidence of some bronchitis  - COVID+

## 2021-01-13 LAB
ANION GAP SERPL CALC-SCNC: 3 MMOL/L (ref 0–18)
BASOPHILS # BLD AUTO: 0.04 X10(3) UL (ref 0–0.2)
BASOPHILS NFR BLD AUTO: 0.7 %
BUN BLD-MCNC: 18 MG/DL (ref 7–18)
BUN/CREAT SERPL: 18.2 (ref 10–20)
CALCIUM BLD-MCNC: 10 MG/DL (ref 8.5–10.1)
CHLORIDE SERPL-SCNC: 106 MMOL/L (ref 98–112)
CO2 SERPL-SCNC: 34 MMOL/L (ref 21–32)
CREAT BLD-MCNC: 0.99 MG/DL
DEPRECATED RDW RBC AUTO: 51.6 FL (ref 35.1–46.3)
EOSINOPHIL # BLD AUTO: 0.28 X10(3) UL (ref 0–0.7)
EOSINOPHIL NFR BLD AUTO: 4.6 %
ERYTHROCYTE [DISTWIDTH] IN BLOOD BY AUTOMATED COUNT: 14.6 % (ref 11–15)
GLUCOSE BLD-MCNC: 102 MG/DL (ref 70–99)
HCT VFR BLD AUTO: 40.5 %
HGB BLD-MCNC: 12.7 G/DL
IMM GRANULOCYTES # BLD AUTO: 0.09 X10(3) UL (ref 0–1)
IMM GRANULOCYTES NFR BLD: 1.5 %
LYMPHOCYTES # BLD AUTO: 1.85 X10(3) UL (ref 1–4)
LYMPHOCYTES NFR BLD AUTO: 30.5 %
MCH RBC QN AUTO: 30.2 PG (ref 26–34)
MCHC RBC AUTO-ENTMCNC: 31.4 G/DL (ref 31–37)
MCV RBC AUTO: 96.4 FL
MONOCYTES # BLD AUTO: 0.55 X10(3) UL (ref 0.1–1)
MONOCYTES NFR BLD AUTO: 9.1 %
NEUTROPHILS # BLD AUTO: 3.25 X10 (3) UL (ref 1.5–7.7)
NEUTROPHILS # BLD AUTO: 3.25 X10(3) UL (ref 1.5–7.7)
NEUTROPHILS NFR BLD AUTO: 53.6 %
OSMOLALITY SERPL CALC.SUM OF ELEC: 298 MOSM/KG (ref 275–295)
PLATELET # BLD AUTO: 189 10(3)UL (ref 150–450)
POTASSIUM SERPL-SCNC: 4.1 MMOL/L (ref 3.5–5.1)
RBC # BLD AUTO: 4.2 X10(6)UL
SODIUM SERPL-SCNC: 143 MMOL/L (ref 136–145)
WBC # BLD AUTO: 6.1 X10(3) UL (ref 4–11)

## 2021-01-13 NOTE — OCCUPATIONAL THERAPY NOTE
OCCUPATIONAL THERAPY TREATMENT NOTE - INPATIENT        Room Number: 567/567-A           Presenting Problem: (Leg swelling, UTI)    Problem List  Principal Problem:    Acute cystitis without hematuria  Active Problems:    UTI (urinary tract infection)    Ce ‘6-Clicks’ Inpatient Daily Activity Short Form  How much help from another person does the patient currently need…  -   Putting on and taking off regular lower body clothing?: A Lot  -   Bathing (including washing, rinsing, drying)?: A Lot  -   Toileting,

## 2021-01-13 NOTE — DOWNTIME EVENT NOTE
The EMR was down for 2 hours on 1/13/2021. Timothy Al was responsible for completing the paper charting during this time period.      The following information was re-entered into the system by Timothy Al: MAR, I/Os    The following information will remain in

## 2021-01-13 NOTE — PROGRESS NOTES
[facility] Hospitalist Progress Note     CC: Hospital Follow up    PCP: Misty Van MD       Assessment/Plan:     Principal Problem:    Acute cystitis without hematuria  Active Problems:    UTI (urinary tract infection)    Cellulitis of left lower extremi questions and note understanding and agreeing with therapeutic plan as outlined     Thank Aditi Thomas MD     [facility] Hospitalist  Answering Service number: [phone]     Subjective:     No CP, SOB, or N/V. Dysuria and leg pain improved.   Has lower normal. No masses,  No organomegaly. Non distended   Extremities: Extremities b/l redness L>R, traumatic residual bellow the knee deformity/indent in shin, no cyanosis 1+ edema.    Skin: Skin color, texture, turgor normal.    Neurologic: Normal strength, no Inhalation Daily   • gabapentin  100 mg Oral TID   • Senna-Docusate Sodium  2 tablet Oral Daily   • vancomycin HCl  125 mg Oral Daily       acetaminophen, PEG 3350, magnesium hydroxide, Fleet Enema, ondansetron HCl, Metoclopramide HCl, bisacodyl, Butalbita

## 2021-01-13 NOTE — PROGRESS NOTES
[facility] AND [facility] Infectious Disease  Progress Note    Joo Vides Patient Status:  Inpatient    12/3/1940 MRN [id number]   Location [facility] 5SW/SE Attending Peyton Dumas MD   Hosp Day # 2 PCP Keisha Santos MD     Subjective: respiratory failure due to COVID+ status with CXR evidence of some bronchitis  - COVID+ 12/17/20 on weekly ECF screen  - PCT negative - no antibiotics given at that time  - Finished remdesivir and dexamethasone courses  - Deconditioning after infection

## 2021-01-13 NOTE — PHYSICAL THERAPY NOTE
PHYSICAL THERAPY TREATMENT NOTE - INPATIENT     Room Number: 567/567-A       Presenting Problem: UTI, weakness, falls at home; LLE cellulitis; (Hx COVID(+) on 12/19/2020)    Problem List  Principal Problem:    Acute cystitis without hematuria  Active Probl from another person does the patient currently need. ..   -   Moving to and from a bed to a chair (including a wheelchair)?: A Little   -   Need to walk in hospital room?: A Little   -   Climbing 3-5 steps with a railing?: A Little     AM-PAC Score:  Raw Sc

## 2021-01-13 NOTE — PLAN OF CARE
Pt resting comfortably throughout night. On room air. Denies pain. Ambulating with walker. On IV Cefepime. Purewick in place. Vital signs currently stable. Plan to return to [facility] with [facility].      Problem: Patient Centered Care  Goal: Patient preferences are falls.  - Louise fall precautions as indicated by assessment.  - Educate pt/family on patient safety including physical limitations  - Instruct pt to call for assistance with activity based on assessment  - Modify environment to reduce risk of injury  - patient reports new pain  - Anticipate increased pain with activity and pre-medicate as appropriate  Outcome: Progressing     Problem: RESPIRATORY - ADULT  Goal: Achieves optimal ventilation and oxygenation  Description: INTERVENTIONS:  - Assess for change transfers and ambulation using safe patient handling equipment as needed  - Ensure adequate protection for wounds/incisions during mobilization  - Obtain PT/OT consults as needed  - Advance activity as appropriate  - Communicate ordered activity level and

## 2021-01-14 PROBLEM — T43.505A NEUROLEPTIC-INDUCED ACUTE AKATHISIA: Status: ACTIVE | Noted: 2021-01-14

## 2021-01-14 PROBLEM — G25.71 NEUROLEPTIC-INDUCED ACUTE AKATHISIA: Status: ACTIVE | Noted: 2021-01-14

## 2021-01-14 LAB
ANION GAP SERPL CALC-SCNC: 2 MMOL/L (ref 0–18)
BASOPHILS # BLD AUTO: 0.04 X10(3) UL (ref 0–0.2)
BASOPHILS NFR BLD AUTO: 0.6 %
BUN BLD-MCNC: 20 MG/DL (ref 7–18)
BUN/CREAT SERPL: 20.8 (ref 10–20)
CALCIUM BLD-MCNC: 9.6 MG/DL (ref 8.5–10.1)
CHLORIDE SERPL-SCNC: 105 MMOL/L (ref 98–112)
CO2 SERPL-SCNC: 34 MMOL/L (ref 21–32)
CREAT BLD-MCNC: 0.96 MG/DL
DEPRECATED RDW RBC AUTO: 51.5 FL (ref 35.1–46.3)
EOSINOPHIL # BLD AUTO: 0.28 X10(3) UL (ref 0–0.7)
EOSINOPHIL NFR BLD AUTO: 4.4 %
ERYTHROCYTE [DISTWIDTH] IN BLOOD BY AUTOMATED COUNT: 14.3 % (ref 11–15)
GLUCOSE BLD-MCNC: 90 MG/DL (ref 70–99)
HCT VFR BLD AUTO: 38.7 %
HGB BLD-MCNC: 11.9 G/DL
IMM GRANULOCYTES # BLD AUTO: 0.07 X10(3) UL (ref 0–1)
IMM GRANULOCYTES NFR BLD: 1.1 %
LYMPHOCYTES # BLD AUTO: 1.78 X10(3) UL (ref 1–4)
LYMPHOCYTES NFR BLD AUTO: 28.1 %
MCH RBC QN AUTO: 30 PG (ref 26–34)
MCHC RBC AUTO-ENTMCNC: 30.7 G/DL (ref 31–37)
MCV RBC AUTO: 97.5 FL
MONOCYTES # BLD AUTO: 0.61 X10(3) UL (ref 0.1–1)
MONOCYTES NFR BLD AUTO: 9.6 %
NEUTROPHILS # BLD AUTO: 3.55 X10 (3) UL (ref 1.5–7.7)
NEUTROPHILS # BLD AUTO: 3.55 X10(3) UL (ref 1.5–7.7)
NEUTROPHILS NFR BLD AUTO: 56.2 %
OSMOLALITY SERPL CALC.SUM OF ELEC: 294 MOSM/KG (ref 275–295)
PLATELET # BLD AUTO: 202 10(3)UL (ref 150–450)
POTASSIUM SERPL-SCNC: 4.4 MMOL/L (ref 3.5–5.1)
RBC # BLD AUTO: 3.97 X10(6)UL
SODIUM SERPL-SCNC: 141 MMOL/L (ref 136–145)
VANCOMYCIN TROUGH SERPL-MCNC: 9.7 UG/ML (ref 10–20)
WBC # BLD AUTO: 6.3 X10(3) UL (ref 4–11)

## 2021-01-14 PROCEDURE — 90792 PSYCH DIAG EVAL W/MED SRVCS: CPT | Performed by: OTHER

## 2021-01-14 RX ORDER — BENZTROPINE MESYLATE 1 MG/1
1 TABLET ORAL 2 TIMES DAILY
Status: DISCONTINUED | OUTPATIENT
Start: 2021-01-14 | End: 2021-01-15

## 2021-01-14 NOTE — PLAN OF CARE
Problem: Patient Centered Care  Goal: Patient preferences are identified and integrated in the patient's plan of care  Description: Interventions:  - What would you like us to know as we care for you?  From [place]  - Provide timely, complete, and accurat on assessment  - Modify environment to reduce risk of injury  - Provide assistive devices as appropriate  - Consider OT/PT consult to assist with strengthening/mobility  - Encourage toileting schedule  Outcome: Progressing     Problem: DISCHARGE PLANNING oxygenation  Description: INTERVENTIONS:  - Assess for changes in respiratory status  - Assess for changes in mentation and behavior  - Position to facilitate oxygenation and minimize respiratory effort  - Oxygen supplementation based on oxygen saturation activity as appropriate  - Communicate ordered activity level and limitations with patient/family  Outcome: Not Progressing     Problem: ANXIETY  Goal: Will report anxiety at manageable levels  Description: INTERVENTIONS:  - Administer medication as ordere

## 2021-01-14 NOTE — PROGRESS NOTES
[facility] AND [facility] Infectious Disease  Progress Note    Janelle Saha Patient Status:  Inpatient    12/3/1940 MRN [id number]   Location [facility] 5SW/SE Attending Fausto Mae MD   Hosp Day # 3 PCP Reuben Mobley MD     Subjective: due to COVID+ status with CXR evidence of some bronchitis  - COVID+ 12/17/20 on weekly ECF screen  - PCT negative - no antibiotics given at that time  - Finished remdesivir and dexamethasone courses  - Deconditioning after infection     4.  Underlying psyc

## 2021-01-14 NOTE — PHYSICAL THERAPY NOTE
PHYSICAL THERAPY TREATMENT NOTE - INPATIENT     Room Number: 567/567-A       Presenting Problem: UTI, weakness, falls at home; LLE cellulitis; (Hx COVID(+) on 12/19/2020)    Problem List  Principal Problem:    Acute cystitis without hematuria  Active Probl Static Sitting: Fair +  Dynamic Sitting: Fair +           Static Standing: Fair -  Dynamic Standing: Fair -    ACTIVITY TOLERANCE  Limited by back pain.    SO2 96% on room air at rest  SO2 93% on room air Goal #3   Current Status In progress    Goal #4 Patient to demonstrate independence with home activity/exercise instructions provided to patient in preparation for discharge.    Goal #4   Current Status Goal is ongoing   Goal #5     Goal #5   Current Stat

## 2021-01-14 NOTE — PROGRESS NOTES
[facility] Hospitalist Progress Note     CC: Hospital Follow up    PCP: Dulce Hyde MD       Assessment/Plan:     Principal Problem:    Acute cystitis without hematuria  Active Problems:    UTI (urinary tract infection)    Cellulitis of left lower extremi course.   [facility] hospitalist to continue to follow patient while in house     Patient and/or patient's family given opportunity to ask questions and note understanding and agreeing with therapeutic plan as outlined     Thank Brenda Hess MD     [facility] Chest wall:  No tenderness or deformity. Heart:  Regular rate and rhythm, S1, S2 normal   Abdomen:   Soft, non-tender. Bowel sounds normal. No masses,  No organomegaly.  Non distended   Extremities: Extremities b/l redness L>R, traumatic residual bellow [id number]

## 2021-01-14 NOTE — PLAN OF CARE
A&Ox4. On 1L. Per patient request gave PO lorazepam.   Received cefepime- tolerated well. Call light within reach. Will continue to monitor.   Problem: Patient Centered Care  Goal: Patient preferences are identified and integrated in the patient's plan by assessment.  - Educate pt/family on patient safety including physical limitations  - Instruct pt to call for assistance with activity based on assessment  - Modify environment to reduce risk of injury  - Provide assistive devices as appropriate  - Consi with activity and pre-medicate as appropriate  Outcome: Progressing     Problem: RESPIRATORY - ADULT  Goal: Achieves optimal ventilation and oxygenation  Description: INTERVENTIONS:  - Assess for changes in respiratory status  - Assess for changes in menta equipment as needed  - Ensure adequate protection for wounds/incisions during mobilization  - Obtain PT/OT consults as needed  - Advance activity as appropriate  - Communicate ordered activity level and limitations with patient/family  Outcome: Progressing

## 2021-01-14 NOTE — CONSULTS
[facility]    Report of Consultation    Jazmine Arnold Patient Status:  Inpatient    12/3/1940 MRN [id number]   Location [facility] 5SW/SE Attending Silvio Hodgkins, MD   Hosp Day # 3 PCP Ana Myers MD     Date of Admis she want to just keep moving. Patient observe with noticeable tremor and shaking her legs and with examination demonstrates some cogwheeling and some stiffness indicating that he is of akathisia.     Patient for the last year has been taking Geodon from he • Arthritis of right hip 2009   • Back problem    • Brain aneurysm     Brain Aneurysm, Stent placed    • Cervical disc disease 1985,1995,2003,2009    Cervical Discectomy    • Cholecystitis 1984    Cholecystectomy    • COPD (chronic obstructive pulmonary Brain Aneurysm, Stent placed    • BRONCHOSCOPY N/A 2/14/2020    Performed by Val Orlando MD at [address] [place] [address] ENDOSCOPY   • BRONCHOSCOPY N/A 12/16/2019    Performed by Val Orlando MD at [address] [place] [address] ENDOSCOPY   • BRONCHOSCOPY N/A 5/9/2018    Performed by Val Orlando MD History  Family History   Problem Relation Age of Onset   • Heart Attack Mother    • Heart Disease Mother         Coronary Artery Disease, Premature    • Fibromyalgia Sister    • Heart Disease Sister    • Heart Attack Sister    • Heart Disease Brother    • Rectal, Daily PRN    •  amLODIPine Besylate (NORVASC) tab 10 mg, 10 mg, Oral, Daily    •  Butalbital-APAP-Caffeine (FIORICET) per tab 1 tablet, 1 tablet, Oral, Q6H PRN    •  HYDROcodone-acetaminophen (NORCO)  MG per tab 1 tablet, 1 tablet, Oral, Q6H Oral Tab, Take 1 tablet (1 mg total) by mouth every 4 (four) hours as needed. (Patient taking differently: Take 4 mg by mouth nightly.  )    •  lamoTRIgine 100 MG Oral Tab, Take 1 tablet (100 mg total) by mouth 2 (two) times daily.     •  mirtazapine 30 MG [Tiagabine]    OTHER (SEE COMMENTS)    Comment:Stroke like symptoms             Unable to move  Metronidazole           RASH  Phentermine             OTHER (SEE COMMENTS)    Comment:Mini stroke  Tramadol                OTHER (SEE COMMENTS)    Comment:Extre cooperative and receptive to treatment. The patient admitted that she has been feeling very anxious recently with inability to control her anxiety even with Ativan. Patient demonstrated anxious affect congruent with the mood.   Patient denied any homicida Panel (8)      CBC With Differential With Platelet      Basic Metabolic Panel (8)      CBC With Differential With Platelet      Basic Metabolic Panel (8)      CBC With Differential With Platelet      Urine Culture, Routine Once      Emergency MRSA Screen b

## 2021-01-15 VITALS
HEART RATE: 74 BPM | WEIGHT: 240 LBS | TEMPERATURE: 98 F | OXYGEN SATURATION: 93 % | RESPIRATION RATE: 18 BRPM | SYSTOLIC BLOOD PRESSURE: 142 MMHG | DIASTOLIC BLOOD PRESSURE: 53 MMHG | BODY MASS INDEX: 36.37 KG/M2 | HEIGHT: 68 IN

## 2021-01-15 LAB
ANION GAP SERPL CALC-SCNC: 3 MMOL/L (ref 0–18)
BASOPHILS # BLD AUTO: 0.03 X10(3) UL (ref 0–0.2)
BASOPHILS NFR BLD AUTO: 0.5 %
BUN BLD-MCNC: 21 MG/DL (ref 7–18)
BUN/CREAT SERPL: 22.3 (ref 10–20)
CALCIUM BLD-MCNC: 9.4 MG/DL (ref 8.5–10.1)
CHLORIDE SERPL-SCNC: 106 MMOL/L (ref 98–112)
CO2 SERPL-SCNC: 33 MMOL/L (ref 21–32)
CREAT BLD-MCNC: 0.94 MG/DL
DEPRECATED RDW RBC AUTO: 50.4 FL (ref 35.1–46.3)
EOSINOPHIL # BLD AUTO: 0.23 X10(3) UL (ref 0–0.7)
EOSINOPHIL NFR BLD AUTO: 3.8 %
ERYTHROCYTE [DISTWIDTH] IN BLOOD BY AUTOMATED COUNT: 14.3 % (ref 11–15)
GLUCOSE BLD-MCNC: 91 MG/DL (ref 70–99)
HCT VFR BLD AUTO: 37.2 %
HGB BLD-MCNC: 11.7 G/DL
IMM GRANULOCYTES # BLD AUTO: 0.06 X10(3) UL (ref 0–1)
IMM GRANULOCYTES NFR BLD: 1 %
LYMPHOCYTES # BLD AUTO: 2.03 X10(3) UL (ref 1–4)
LYMPHOCYTES NFR BLD AUTO: 33.7 %
MCH RBC QN AUTO: 30.4 PG (ref 26–34)
MCHC RBC AUTO-ENTMCNC: 31.5 G/DL (ref 31–37)
MCV RBC AUTO: 96.6 FL
MONOCYTES # BLD AUTO: 0.61 X10(3) UL (ref 0.1–1)
MONOCYTES NFR BLD AUTO: 10.1 %
NEUTROPHILS # BLD AUTO: 3.07 X10 (3) UL (ref 1.5–7.7)
NEUTROPHILS # BLD AUTO: 3.07 X10(3) UL (ref 1.5–7.7)
NEUTROPHILS NFR BLD AUTO: 50.9 %
OSMOLALITY SERPL CALC.SUM OF ELEC: 297 MOSM/KG (ref 275–295)
PLATELET # BLD AUTO: 205 10(3)UL (ref 150–450)
POTASSIUM SERPL-SCNC: 4.4 MMOL/L (ref 3.5–5.1)
RBC # BLD AUTO: 3.85 X10(6)UL
SODIUM SERPL-SCNC: 142 MMOL/L (ref 136–145)
WBC # BLD AUTO: 6 X10(3) UL (ref 4–11)

## 2021-01-15 PROCEDURE — 99232 SBSQ HOSP IP/OBS MODERATE 35: CPT | Performed by: OTHER

## 2021-01-15 RX ORDER — CEFDINIR 300 MG/1
300 CAPSULE ORAL 2 TIMES DAILY
Qty: 20 CAPSULE | Refills: 0 | Status: ON HOLD | OUTPATIENT
Start: 2021-01-15 | End: 2021-02-04 | Stop reason: ALTCHOICE

## 2021-01-15 RX ORDER — FUROSEMIDE 10 MG/ML
20 INJECTION INTRAMUSCULAR; INTRAVENOUS ONCE
Status: COMPLETED | OUTPATIENT
Start: 2021-01-15 | End: 2021-01-15

## 2021-01-15 RX ORDER — BENZTROPINE MESYLATE 1 MG/1
1 TABLET ORAL 2 TIMES DAILY
Qty: 60 TABLET | Refills: 0 | Status: SHIPPED | OUTPATIENT
Start: 2021-01-15

## 2021-01-15 NOTE — PLAN OF CARE
Problem: Patient Centered Care  Goal: Patient preferences are identified and integrated in the patient's plan of care  Description: Interventions:  - What would you like us to know as we care for you?  From [place]  - Provide timely, complete, and accurat on assessment  - Modify environment to reduce risk of injury  - Provide assistive devices as appropriate  - Consider OT/PT consult to assist with strengthening/mobility  - Encourage toileting schedule  Outcome: Progressing     Problem: DISCHARGE PLANNING oxygenation  Description: INTERVENTIONS:  - Assess for changes in respiratory status  - Assess for changes in mentation and behavior  - Position to facilitate oxygenation and minimize respiratory effort  - Oxygen supplementation based on oxygen saturation activity as appropriate  - Communicate ordered activity level and limitations with patient/family  Outcome: Progressing     Problem: ANXIETY  Goal: Will report anxiety at manageable levels  Description: INTERVENTIONS:  - Administer medication as ordered  -

## 2021-01-15 NOTE — PROGRESS NOTES
Consult Duration     The patient seen for over 25-minute, follow-up evaluation, over 50% counseling and coordinating care addressing mood fluctuation, increased anxiety and akathisia.     Record reviewed, communication with attending, communication with 11-19   • Arthritis    • Arthritis of both knees     knee replacement    • Arthritis of right hip 2009   • Back problem    • Brain aneurysm     Brain Aneurysm, Stent placed    • Cervical disc disease 1985,1995,2003,2009    Cervical Discectomy    • Cholecys ANESTH,NECK VESSEL SURGERY NOS      x4   • BRAIN SURGERY  ~2009    Brain Aneurysm, Stent placed    • BRONCHOSCOPY N/A 2/14/2020    Performed by Uriel Malcolm MD at [facility] ENDOSCOPY   • BRONCHOSCOPY N/A 12/16/2019    Performed by Uriel Malcolm MD at [address] Performed by Shannon Villeda MD at [facility] MAIN OR       Family History  Family History   Problem Relation Age of Onset   • Heart Attack Mother    • Heart Disease Mother         Coronary Artery Disease, Premature    • Fibromyalgia Sister    • Heart Disease Admitting/Attending    Results:     Laboratory Data:  Lab Results   Component Value Date    WBC 6.0 01/15/2021    HGB 11.7 (L) 01/15/2021    HCT 37.2 01/15/2021    .0 01/15/2021    CREATSERUM 0.94 01/15/2021    BUN 21 (H) 01/15/2021     01/15/ the nurses from [address] [place] [address]. Impression:     Impression:      Neuroleptic–induced acute akathisia. Bipolar disorder type I recent episode depression moderate. Generalized anxiety disorder. Borderline personality disorder.     The patient demonstrate ne Benztropine Mesylate 1 MG Oral Tab 60 tablet 0     Sig: Take 1 tablet (1 mg total) by mouth 2 (two) times daily. • cefdinir 300 MG Oral Cap 20 capsule 0     Sig: Take 1 capsule (300 mg total) by mouth 2 (two) times daily.            Agata Blood MD  1

## 2021-01-15 NOTE — CM/SW NOTE
MDO for dc. Pt will return to [address] 1940 w/ [facility]. [facility] was notified via aidin. / to remain available for support and/or discharge planning.      Marcia Crockett, RN    Ext [phone]

## 2021-01-15 NOTE — PROGRESS NOTES
[facility] AND [facility] Infectious Disease Progress Note    Carey De Los Santos Patient Status:  Inpatient    12/3/1940 MRN [id number]   Location [facility] 5SW/SE Attending Ren Alexis MD   [facility] Day # 4 PCP MD Swati Spivey HYDROcodone-acetaminophen (NORCO)  MG per tab 1 tablet, 1 tablet, Oral, Q6H PRN  •  lamoTRIgine (LAMICTAL) tab 100 mg, 100 mg, Oral, BID  •  Levothyroxine Sodium (SYNTHROID) tab 100 mcg, 100 mcg, Oral, Before breakfast  •  LORazepam (ATIVAN) tab 1 mg Spleen is not palpable. Extremities:  LE edema   Skin: Mild erythema of the LE  Neurologic: Cranial nerves are grossly intact. Motor strength and sensory examination is grossly normal.  No focal neurologic deficit.     Labs:  Lab Results   Component [place]

## 2021-01-15 NOTE — PROGRESS NOTES
Discharge RN Summary: Patient has discharge order in. Patient to discharge to [facility] with [address]. IV to be removed by primary RN. Understands to follow up with PCP in 1 week.  Patient understands to  cefdinir, lidocaine and cogentin with printed pr

## 2021-01-15 NOTE — DISCHARGE SUMMARY
General Medicine Discharge Summary     Patient ID:  Tisha Workman  78year old  12/3/1940    Admit date: 1/11/2021    Discharge date and time: 01/15/21    Attending Physician: Alexandru Russo MD     Primary Care Physician: Dolores Leavitt MD     R fioricet, tylenol      Exam  GEN: elderly female in NAD  HEENT: EOMI  Neck: Supple  Pulm: CTAB, no crackles or wheezes  CV: RRR, no murmurs   ABD: Soft, non-tender, non-distended, +BS  Neuro: Grossly normal, CN intact, sensory intact  Psych: Affect- normal REMERON     muscle rub 10-15 % Crea     Ondansetron HCl 4 mg tablet  Commonly known as: ZOFRAN     Pantoprazole Sodium 40 MG Tbec  Commonly known as: PROTONIX  Take 1 tablet (40 mg total) by mouth every morning before breakfast.     PEG 3350 17 g Pack  Com

## 2021-01-15 NOTE — PLAN OF CARE
Pt alert and oriented x4, on room air but transitioned to 1L of oxygen while sleeping. Saturating 89-93%. IV cephepime hung. Purewick in place. Pt slept for a majority of the night. Psych and ID on consult. Aloevesta applied to sacrum.  Fall precautions in neutropenic guidelines  Outcome: Progressing     Problem: SAFETY ADULT - FALL  Goal: Free from fall injury  Description: INTERVENTIONS:  - Assess pt frequently for physical needs  - Identify cognitive and physical deficits and behaviors that affect risk of evaluate response  - Consider cultural and social influences on pain and pain management  - Manage/alleviate anxiety  - Utilize distraction and/or relaxation techniques  - Monitor for opioid side effects  - Notify MD/LIP if interventions unsuccessful or pa Progressing     Problem: MUSCULOSKELETAL - ADULT  Goal: Return mobility to safest level of function  Description: INTERVENTIONS:  - Assess patient stability and activity tolerance for standing, transferring and ambulating w/ or w/o assistive devices  - Ass

## 2021-01-27 DIAGNOSIS — Z23 NEED FOR VACCINATION: ICD-10-CM

## 2021-01-27 PROBLEM — I82.409 RECURRENT DEEP VEIN THROMBOSIS (DVT) (HCC): Status: ACTIVE | Noted: 2017-01-05

## 2021-02-04 ENCOUNTER — HOSPITAL ENCOUNTER (INPATIENT)
Facility: HOSPITAL | Age: 81
LOS: 6 days | Discharge: NURSING, REHAB OR LONG-TERM CARE | DRG: 603 | End: 2021-02-10
Attending: EMERGENCY MEDICINE | Admitting: HOSPITALIST
Payer: MEDICARE

## 2021-02-04 DIAGNOSIS — L03.115 BILATERAL LOWER LEG CELLULITIS: Primary | ICD-10-CM

## 2021-02-04 DIAGNOSIS — L03.116 BILATERAL LOWER LEG CELLULITIS: Primary | ICD-10-CM

## 2021-02-04 LAB
ANION GAP SERPL CALC-SCNC: 1 MMOL/L (ref 0–18)
BASOPHILS # BLD AUTO: 0.05 X10(3) UL (ref 0–0.2)
BASOPHILS NFR BLD AUTO: 0.8 %
BUN BLD-MCNC: 28 MG/DL (ref 7–18)
BUN/CREAT SERPL: 23.7 (ref 10–20)
CALCIUM BLD-MCNC: 9.9 MG/DL (ref 8.5–10.1)
CHLORIDE SERPL-SCNC: 105 MMOL/L (ref 98–112)
CO2 SERPL-SCNC: 36 MMOL/L (ref 21–32)
CREAT BLD-MCNC: 1.18 MG/DL
DEPRECATED RDW RBC AUTO: 49.7 FL (ref 35.1–46.3)
EOSINOPHIL # BLD AUTO: 0.26 X10(3) UL (ref 0–0.7)
EOSINOPHIL NFR BLD AUTO: 4.1 %
ERYTHROCYTE [DISTWIDTH] IN BLOOD BY AUTOMATED COUNT: 14 % (ref 11–15)
GLUCOSE BLD-MCNC: 103 MG/DL (ref 70–99)
HCT VFR BLD AUTO: 42.1 %
HGB BLD-MCNC: 12.9 G/DL
IMM GRANULOCYTES # BLD AUTO: 0.03 X10(3) UL (ref 0–1)
IMM GRANULOCYTES NFR BLD: 0.5 %
LYMPHOCYTES # BLD AUTO: 1.63 X10(3) UL (ref 1–4)
LYMPHOCYTES NFR BLD AUTO: 26 %
MCH RBC QN AUTO: 29.8 PG (ref 26–34)
MCHC RBC AUTO-ENTMCNC: 30.6 G/DL (ref 31–37)
MCV RBC AUTO: 97.2 FL
MONOCYTES # BLD AUTO: 0.58 X10(3) UL (ref 0.1–1)
MONOCYTES NFR BLD AUTO: 9.2 %
MRSA DNA SPEC QL NAA+PROBE: POSITIVE
NEUTROPHILS # BLD AUTO: 3.73 X10 (3) UL (ref 1.5–7.7)
NEUTROPHILS # BLD AUTO: 3.73 X10(3) UL (ref 1.5–7.7)
NEUTROPHILS NFR BLD AUTO: 59.4 %
OSMOLALITY SERPL CALC.SUM OF ELEC: 300 MOSM/KG (ref 275–295)
PLATELET # BLD AUTO: 177 10(3)UL (ref 150–450)
POTASSIUM SERPL-SCNC: 3.6 MMOL/L (ref 3.5–5.1)
RBC # BLD AUTO: 4.33 X10(6)UL
SODIUM SERPL-SCNC: 142 MMOL/L (ref 136–145)
WBC # BLD AUTO: 6.3 X10(3) UL (ref 4–11)

## 2021-02-04 RX ORDER — AMLODIPINE BESYLATE 10 MG/1
10 TABLET ORAL DAILY
Status: DISCONTINUED | OUTPATIENT
Start: 2021-02-05 | End: 2021-02-07

## 2021-02-04 RX ORDER — PANTOPRAZOLE SODIUM 40 MG/1
40 TABLET, DELAYED RELEASE ORAL
Status: DISCONTINUED | OUTPATIENT
Start: 2021-02-05 | End: 2021-02-10

## 2021-02-04 RX ORDER — MIRTAZAPINE 15 MG/1
30 TABLET, FILM COATED ORAL NIGHTLY
Status: DISCONTINUED | OUTPATIENT
Start: 2021-02-04 | End: 2021-02-10

## 2021-02-04 RX ORDER — FUROSEMIDE 10 MG/ML
20 INJECTION INTRAMUSCULAR; INTRAVENOUS
Status: DISCONTINUED | OUTPATIENT
Start: 2021-02-04 | End: 2021-02-06

## 2021-02-04 RX ORDER — BENZTROPINE MESYLATE 1 MG/1
1 TABLET ORAL 2 TIMES DAILY
Status: DISCONTINUED | OUTPATIENT
Start: 2021-02-04 | End: 2021-02-07

## 2021-02-04 RX ORDER — BUTALBITAL, ACETAMINOPHEN AND CAFFEINE 50; 325; 40 MG/1; MG/1; MG/1
1 TABLET ORAL EVERY 4 HOURS PRN
Status: DISCONTINUED | OUTPATIENT
Start: 2021-02-04 | End: 2021-02-10

## 2021-02-04 RX ORDER — POLYETHYLENE GLYCOL 3350 17 G/17G
17 POWDER, FOR SOLUTION ORAL DAILY PRN
Status: DISCONTINUED | OUTPATIENT
Start: 2021-02-04 | End: 2021-02-10

## 2021-02-04 RX ORDER — ZIPRASIDONE HYDROCHLORIDE 20 MG/1
80 CAPSULE ORAL NIGHTLY
Status: DISCONTINUED | OUTPATIENT
Start: 2021-02-04 | End: 2021-02-05

## 2021-02-04 RX ORDER — VANCOMYCIN 2 GRAM/500 ML IN 0.9 % SODIUM CHLORIDE INTRAVENOUS
25 ONCE
Status: COMPLETED | OUTPATIENT
Start: 2021-02-04 | End: 2021-02-04

## 2021-02-04 RX ORDER — LORAZEPAM 1 MG/1
1 TABLET ORAL EVERY 4 HOURS PRN
Status: DISCONTINUED | OUTPATIENT
Start: 2021-02-04 | End: 2021-02-10

## 2021-02-04 RX ORDER — BISACODYL 10 MG
10 SUPPOSITORY, RECTAL RECTAL
Status: DISCONTINUED | OUTPATIENT
Start: 2021-02-04 | End: 2021-02-10

## 2021-02-04 RX ORDER — VANCOMYCIN/0.9 % SOD CHLORIDE 1.75 G/5
15 PLASTIC BAG, INJECTION (ML) INTRAVENOUS EVERY 24 HOURS
Status: DISCONTINUED | OUTPATIENT
Start: 2021-02-04 | End: 2021-02-04

## 2021-02-04 RX ORDER — ACETAMINOPHEN 325 MG/1
650 TABLET ORAL EVERY 6 HOURS PRN
Status: DISCONTINUED | OUTPATIENT
Start: 2021-02-04 | End: 2021-02-10

## 2021-02-04 RX ORDER — CLOTRIMAZOLE AND BETAMETHASONE DIPROPIONATE 10; .64 MG/G; MG/G
CREAM TOPICAL 2 TIMES DAILY
Status: DISCONTINUED | OUTPATIENT
Start: 2021-02-04 | End: 2021-02-10

## 2021-02-04 RX ORDER — HYDROCODONE BITARTRATE AND ACETAMINOPHEN 10; 325 MG/1; MG/1
1 TABLET ORAL EVERY 6 HOURS PRN
Status: DISCONTINUED | OUTPATIENT
Start: 2021-02-04 | End: 2021-02-10

## 2021-02-04 RX ORDER — ONDANSETRON 2 MG/ML
4 INJECTION INTRAMUSCULAR; INTRAVENOUS EVERY 6 HOURS PRN
Status: DISCONTINUED | OUTPATIENT
Start: 2021-02-04 | End: 2021-02-04

## 2021-02-04 RX ORDER — SENNA AND DOCUSATE SODIUM 50; 8.6 MG/1; MG/1
2 TABLET, FILM COATED ORAL DAILY
Status: DISCONTINUED | OUTPATIENT
Start: 2021-02-04 | End: 2021-02-10

## 2021-02-04 RX ORDER — LAMOTRIGINE 100 MG/1
100 TABLET ORAL 2 TIMES DAILY
Status: DISCONTINUED | OUTPATIENT
Start: 2021-02-04 | End: 2021-02-10

## 2021-02-04 RX ORDER — LEVOTHYROXINE SODIUM 0.1 MG/1
100 TABLET ORAL
Status: DISCONTINUED | OUTPATIENT
Start: 2021-02-05 | End: 2021-02-10

## 2021-02-04 RX ORDER — MAGNESIUM HYDROXIDE/ALUMINUM HYDROXICE/SIMETHICONE 120; 1200; 1200 MG/30ML; MG/30ML; MG/30ML
15 SUSPENSION ORAL EVERY 6 HOURS PRN
Status: DISCONTINUED | OUTPATIENT
Start: 2021-02-04 | End: 2021-02-10

## 2021-02-04 RX ORDER — GABAPENTIN 100 MG/1
100 CAPSULE ORAL 3 TIMES DAILY
Status: DISCONTINUED | OUTPATIENT
Start: 2021-02-04 | End: 2021-02-05

## 2021-02-04 RX ORDER — VANCOMYCIN HYDROCHLORIDE
15 EVERY 24 HOURS
Status: DISCONTINUED | OUTPATIENT
Start: 2021-02-05 | End: 2021-02-10

## 2021-02-04 NOTE — PROGRESS NOTES
[facility] Dosing Service    Initial Pharmacokinetic Consult for Vancomycin Dosing     Alfa Munoz is a 78year old patient who is being treated for BLE cellulitis. Pharmacy has been asked to dose Vancomycin by Dr. Awilda Merritt.     Allergies:  Bactrim [Sulf

## 2021-02-04 NOTE — H&P
[facility] Hospitalist H&P       CC: Patient presents with:  Rash Skin Problem       PCP: Nancy Villagomez MD    History of Present Illness:   Patient is a 78 year old female with PMH sig for cranial aneurysm s/p coiling, COPD, HTN, DVT on xarelto, bipolar, unspecified site    • Other and unspecified hyperlipidemia    • Other complicated headache syndrome 11/2/2017   • Other spondylosis, lumbar region 11/2/2017   • Pneumonia 2012   • Pneumonia due to organism    • S/P cervical spinal fusion: C3-6 and C7-T1 11 SURGERY      x2 FOR \"CROSSED EYES\"   • HIP REPLACEMENT SURGERY Bilateral    • HYSTERECTOMY  1967    Partial Hysterectomy   • JAW SURGERY     • KNEE REPLACEMENT SURGERY Bilateral     Bilateral arthritis    • LAMINECTOMY      WITH FUSION PER PATIENT   • OT Relation Age of Onset   • Heart Attack Mother    • Heart Disease Mother         Coronary Artery Disease, Premature    • Fibromyalgia Sister    • Heart Disease Sister    • Heart Attack Sister    • Heart Disease Brother    • Heart Attack Brother    • Other ( TPROT    No results for input(s): TROP in the last 168 hours. Radiology: No results found.       ASSESSMENT / PLAN:   Patient is a 78 year old female with PMH sig for cranial aneurysm s/p coiling, COPD, HTN, DVT on xarelto, bipolar, hs COVID, aortic

## 2021-02-04 NOTE — PROGRESS NOTES
[facility] Pharmacy Note:  Renal Adjustment for cefepime (MAXIPIME)    Karmen Sue is a 78year old patient who has been prescribed cefepime (MAXIPIME) 1g every 8 hrs. The estimated creatinine clearance is 37.1 mL/min (A) (based on SCr of 1.18 mg/dL (H)).  Sheryl King

## 2021-02-04 NOTE — ED INITIAL ASSESSMENT (HPI)
Pt sent to the ER from [address] [place] [address] for evaluation of possible right leg cellulitis. Pt recently diagnosed and treated for left leg cellulitis. Pt complaining of bilateral leg pain.

## 2021-02-04 NOTE — CONSULTS
[facility] AND [facility] Infectious Disease  Report of Consultation    Rayshawn Ireland Patient Status:  Inpatient    12/3/1940 MRN [id number]   Location [facility] 5SW/SE Attending Corrie Felder MD   Hosp Day # 0 PCP Arsalan Silva MD     Date Migraines    • Muscle weakness    • Onychomycosis     Debridement    • Oropharyngeal dysphagia 8/29/2017   • Osteoarthrosis, unspecified whether generalized or localized, unspecified site    • Other and unspecified hyperlipidemia    • Other complicated hea Amy Berry MD at [facility] OR   • EXTREMITY LOWER IRRIGATION & DEBRIDEMENT Left 8/21/2019    Performed by Amy Berry MD at [facility] OR   • EYE SURGERY      x2 FOR \"CROSSED EYES\"   • HIP REPLACEMENT SURGERY Bilateral    • HYSTERECTO or use drugs.     Allergies:    Bactrim [Sulfametho*    RASH  Ciprofloxacin           RASH  Depakote [Valproic *    OTHER (SEE COMMENTS)    Comment:pancreatitis  Fluocinolone            OTHER (SEE COMMENTS)    Comment:Unknown  Gabitril [Tiagabine]    OTHER breakfast  •  LORazepam (ATIVAN) tab 1 mg, 1 mg, Oral, Q4H PRN  •  mirtazapine (REMERON) tab 30 mg, 30 mg, Oral, Nightly  •  [START ON 2/5/2021] Pantoprazole Sodium (PROTONIX) EC tab 40 mg, 40 mg, Oral, QAM AC  •  [START ON 2/5/2021] Rivaroxaban (Prakash Pink) % — 250 lb (113.4 kg)       Intake/Output:  No intake/output data recorded. Physical Exam:   General: Awake, alert, non-tox and in NAD. Head: Normocephalic, without obvious abnormality, atraumatic. Eyes: Conjunctivae/corneas clear.   No scleral icter chronic edema  - Add topical lotrisone, tubgrip    5. Disposition - inpatient. Continue vancomycin and cefepime. Will add compression. Check UA/culture given h/o UTIs. Add topical lotrisone, tubigrip.   Patient with frequent recent admission - may need

## 2021-02-04 NOTE — PLAN OF CARE
Patient admitted to room 572. Accurate weight with bed zeroed out. States her previous weight in Dec was only 250. Current with [facility] and live alone.

## 2021-02-04 NOTE — ED NOTES
Orders for admission, patient is aware of plan and ready to go upstairs. Any questions, please call ED TAMMIE James  at extension [phone].    Type of COVID test sent: None--Positive covid 12/19  COVID Suspicion level: Low    Titratable drug(s) infusing: Vancomyci

## 2021-02-04 NOTE — ED PROVIDER NOTES
Patient Seen in: [facility] AND [facility] Emergency Department      History   Patient presents with:  Rash Skin Problem    Stated Complaint:     HPI/Subjective:   HPI    49-year-old female with past medical history significant for anxiety, CAD, arthritis, brain Low back pain    • Migraines    • Muscle weakness    • Onychomycosis     Debridement    • Oropharyngeal dysphagia 8/29/2017   • Osteoarthrosis, unspecified whether generalized or localized, unspecified site    • Other and unspecified hyperlipidemia    • Ot 10/8/2019    Performed by Gely Partida MD at [facility] OR   • Alen Left 8/21/2019    Performed by Gely Partida MD at [facility] OR   • EYE SURGERY      x2 FOR \"CROSSED EYES\"   • HIP REPLACEMENT SURGERY Exam    Physical Exam   Constitutional: AAOx3, well nourished, NAD  Head: Normocephalic and atraumatic.    Ears: TM's clear b/l  Nose: Nose normal.   Mouth/Throat: MMM, post OP clear with no exudates  Eyes: Conjunctivae and EOM are normal. PERRLA  Neck: Nor RAINBOW DRAW BLUE   RAINBOW DRAW LAVENDER   RAINBOW DRAW LIGHT GREEN   RAINBOW DRAW GOLD   BLOOD CULTURE   BLOOD CULTURE   ED/MRSA SCREEN BY PCR-CC                   MDM      Patient has bilateral lower extremity cellulitis.   She has a normal white blood

## 2021-02-05 PROBLEM — G25.71 ANTIPSYCHOTIC-INDUCED AKATHISIA: Status: ACTIVE | Noted: 2021-01-14

## 2021-02-05 PROBLEM — T43.505A ANTIPSYCHOTIC-INDUCED AKATHISIA: Status: ACTIVE | Noted: 2021-01-14

## 2021-02-05 LAB
ANION GAP SERPL CALC-SCNC: 4 MMOL/L (ref 0–18)
BASOPHILS # BLD AUTO: 0.04 X10(3) UL (ref 0–0.2)
BASOPHILS NFR BLD AUTO: 0.7 %
BUN BLD-MCNC: 24 MG/DL (ref 7–18)
BUN/CREAT SERPL: 21.8 (ref 10–20)
CALCIUM BLD-MCNC: 8.9 MG/DL (ref 8.5–10.1)
CHLORIDE SERPL-SCNC: 109 MMOL/L (ref 98–112)
CO2 SERPL-SCNC: 31 MMOL/L (ref 21–32)
CREAT BLD-MCNC: 1.1 MG/DL
DEPRECATED RDW RBC AUTO: 50.7 FL (ref 35.1–46.3)
EOSINOPHIL # BLD AUTO: 0.25 X10(3) UL (ref 0–0.7)
EOSINOPHIL NFR BLD AUTO: 4.5 %
ERYTHROCYTE [DISTWIDTH] IN BLOOD BY AUTOMATED COUNT: 14.1 % (ref 11–15)
GLUCOSE BLD-MCNC: 97 MG/DL (ref 70–99)
HAV IGM SER QL: 2.3 MG/DL (ref 1.6–2.6)
HCT VFR BLD AUTO: 38.3 %
HGB BLD-MCNC: 11.7 G/DL
IMM GRANULOCYTES # BLD AUTO: 0.03 X10(3) UL (ref 0–1)
IMM GRANULOCYTES NFR BLD: 0.5 %
LYMPHOCYTES # BLD AUTO: 1.65 X10(3) UL (ref 1–4)
LYMPHOCYTES NFR BLD AUTO: 29.7 %
MCH RBC QN AUTO: 30.2 PG (ref 26–34)
MCHC RBC AUTO-ENTMCNC: 30.5 G/DL (ref 31–37)
MCV RBC AUTO: 98.7 FL
MONOCYTES # BLD AUTO: 0.57 X10(3) UL (ref 0.1–1)
MONOCYTES NFR BLD AUTO: 10.3 %
NEUTROPHILS # BLD AUTO: 3.02 X10 (3) UL (ref 1.5–7.7)
NEUTROPHILS # BLD AUTO: 3.02 X10(3) UL (ref 1.5–7.7)
NEUTROPHILS NFR BLD AUTO: 54.3 %
OSMOLALITY SERPL CALC.SUM OF ELEC: 302 MOSM/KG (ref 275–295)
PLATELET # BLD AUTO: 146 10(3)UL (ref 150–450)
POTASSIUM SERPL-SCNC: 3.9 MMOL/L (ref 3.5–5.1)
RBC # BLD AUTO: 3.88 X10(6)UL
SODIUM SERPL-SCNC: 144 MMOL/L (ref 136–145)
WBC # BLD AUTO: 5.6 X10(3) UL (ref 4–11)

## 2021-02-05 PROCEDURE — 90792 PSYCH DIAG EVAL W/MED SRVCS: CPT | Performed by: OTHER

## 2021-02-05 RX ORDER — ZIPRASIDONE HYDROCHLORIDE 20 MG/1
40 CAPSULE ORAL NIGHTLY
Status: DISCONTINUED | OUTPATIENT
Start: 2021-02-05 | End: 2021-02-07

## 2021-02-05 RX ORDER — QUETIAPINE 100 MG/1
100 TABLET, FILM COATED ORAL NIGHTLY
Status: DISCONTINUED | OUTPATIENT
Start: 2021-02-05 | End: 2021-02-10

## 2021-02-05 RX ORDER — VANCOMYCIN HYDROCHLORIDE 125 MG/1
125 CAPSULE ORAL DAILY
Status: DISCONTINUED | OUTPATIENT
Start: 2021-02-05 | End: 2021-02-10

## 2021-02-05 RX ORDER — ARIPIPRAZOLE 2 MG/1
2 TABLET ORAL NIGHTLY
Status: DISCONTINUED | OUTPATIENT
Start: 2021-02-05 | End: 2021-02-07

## 2021-02-05 NOTE — PROGRESS NOTES
[facility]    CDI Prediction Tool Protocol (Vancomycin Initiated)    OVP (oral vancomycin prophylaxis) 125 mg PO Daily is being started in this patient based on a score of 20.1.       Score Breakdown:  History of CDI > 1 year ago AND high risk an

## 2021-02-05 NOTE — CM/SW NOTE
Pt admitted with recurrent cellulitis. Per chart review pt lives in [facility] and is current with [facility]. CM sent ref with clinical updates and requested confirmation of above.  RADHA order sent    2/8/21 4680  PT rec is KYLE    CM spoke w/ pt re re

## 2021-02-05 NOTE — PLAN OF CARE
RN called [facility] to receive rest of patient medication list to complete patient med rec. RN left message and call back number for isacc RN.  Will continue to monitor pt

## 2021-02-05 NOTE — PROGRESS NOTES
[facility] Hospitalist Progress Note     CC: Hospital Follow up    PCP: Michael Ardon MD       Assessment/Plan:     Principal Problem:    Bilateral lower leg cellulitis    Patient is a 78year old female with PMH sig for cranial aneurysm s/p coiling, COPD, 138/50      Intake/Output:    Intake/Output Summary (Last 24 hours) at 2/5/2021 1205  Last data filed at 2/5/2021 0502  Gross per 24 hour   Intake —   Output 2400 ml   Net -2400 ml       Last 3 Weights  02/04/21 1147 : 250 lb (113.4 kg)  01/11/21 0905 : 24 Pantoprazole Sodium  40 mg Oral QAM AC   • Rivaroxaban  20 mg Oral Daily with food   • Senna-Docusate Sodium  2 tablet Oral Daily   • Ziprasidone HCl  80 mg Oral Nightly   • vancomycin  15 mg/kg (Adjusted) Intravenous Q24H   • clotrimazole-betamethasone

## 2021-02-05 NOTE — PLAN OF CARE
Problem: Patient Centered Care  Goal: Patient preferences are identified and integrated in the patient's plan of care  Description: Interventions:  - What would you like us to know as we care for you?  From [place]   - Provide timely, complete, and accura needs  - Identify cognitive and physical deficits and behaviors that affect risk of falls.   - [place] fall precautions as indicated by assessment.  - Educate pt/family on patient safety including physical limitations  - Instruct pt to call for assistance Refusing to wear heel boots at this time, will try to get them back on. Refusing CPAP,education provided, patient on 2 L NC overnight due to O2 sats in 80s. Call light within reach, bed alarm on and bed in lowest position.

## 2021-02-05 NOTE — RESPIRATORY THERAPY NOTE
Patient with a history of SHANT but does not use  home CPAP machine. When offered hospital machine patient refused to use it.

## 2021-02-05 NOTE — PLAN OF CARE
Problem: Patient Centered Care  Goal: Patient preferences are identified and integrated in the patient's plan of care  Description: Interventions:  - What would you like us to know as we care for you?   - Provide timely, complete, and accurate informatio Identify cognitive and physical deficits and behaviors that affect risk of falls.   - [place] fall precautions as indicated by assessment.  - Educate pt/family on patient safety including physical limitations  - Instruct pt to call for assistance with act

## 2021-02-05 NOTE — PLAN OF CARE
Problem: Patient Centered Care  Goal: Patient preferences are identified and integrated in the patient's plan of care  Description: Interventions:  - What would you like us to know as we care for you?   - Provide timely, complete, and accurate informatio falls.  - [place] fall precautions as indicated by assessment.  - Educate pt/family on patient safety including physical limitations  - Instruct pt to call for assistance with activity based on assessment  - Modify environment to reduce risk of injury  - has call light within reach, bed in lowest position. Will continue to monitor pt.

## 2021-02-05 NOTE — PROGRESS NOTES
[facility] AND [facility] Infectious Disease  Progress Note    Pierre Lance Patient Status:  Inpatient    12/3/1940 MRN [id number]   Location [facility] 5SW/SE Attending Darcy Moore MD   Hosp Day # 1 PCP Xiang Meyer MD     Subjective:  Alma Garner previous admission  - Cultures with e.coli  - Treated with IV/p.o. antibiotics  - Awaiting repeat UA/culture thsi admission     3.  Recent admission for acute hypoxic respiratory failure due to COVID+ status with CXR evidence of some bronchitis in December

## 2021-02-05 NOTE — CONSULTS
[facility]    Report of Consultation    Callie Don Patient Status:  Inpatient    12/3/1940 MRN [id number]   Location [facility] 5SW/SE Attending Yadira Suazo MD   Hosp Day # 1 PCP Dolores Leavitt MD     Date of Admission: people and isolating in her room her depression has been escalated. Patient reported that even the staff has not been helping her with her medication and from the stability she tells me it is a questionable what patient has been taking recently.     The pa TID, seroquel 100mg and 50mg during this admission  Flex Chaidez has hx rx effexor, ending 2 years ago when she was switched to prestique but doesn't know dosage    Medical History:       Past Medical History  Past Medical History:   Diagnosis Date   • Anxiety Tonsillitis 1950    Tonsillitis    • Unspecified essential hypertension    • Unspecified sleep apnea    • Visual impairment     EYE GLASSES   • Wound of left leg 10/03/2019    Left leg debridement and Split Thickness Skin Graft to left thigh       Past Abby THICKNESS Left 10/8/2019    Performed by Eugene Pearson MD at [facility] OR   • SPLIT GRFT [address] [facility] [address] <100SQCM Left 10/08/2019    Left leg debridement and Split Thickness Skin Graft to left thigh   • TONSILLECTOMY  1950    Tonsillitis    • TRANSFORAM magnesium hydroxide (MILK OF MAGNESIA) 400 MG/5ML suspension 30 mL, 30 mL, Oral, Daily PRN    •  bisacodyl (DULCOLAX) rectal suppository 10 mg, 10 mg, Rectal, Daily PRN    •  furosemide (LASIX) injection 20 mg, 20 mg, Intravenous, BID (Diuretic)    •  Cefe MCG/INH Inhalation Aerosol Powder, Breath Activated, Inhale 1 puff into the lungs daily. •  gabapentin 100 MG Oral Cap, Take 100 mg by mouth 3 (three) times daily.     •  LORazepam 1 MG Oral Tab, Take 1 tablet (1 mg total) by mouth every 4 (four) hours a meals.    •  muscle rub 10-15 % External Cream, Apply topically 3 (three) times daily as needed.     •  STOOL SOFTENER 100 MG Oral Cap, ONE CAPSULE BY MOUTH TWICE DAILY        Allergies    Bactrim [Sulfametho*    RASH  Ciprofloxacin           RASH  Depakote hygiene and noticeable restless movement. Psychomotor: The patient didn't demonstrate any psychomotor agitation or retardation but kept shaking her legs during the evaluation.   Orientation: The patient is alert and oriented to self, place and condition bu support. 2.  Focus on insight orientation, addressing risk and benefit of medication adjustment. 3.  Lower Geodon to 40 mg nightly after dinner. 4.  Restart Abilify 2 mg daily. 5.  Start Seroquel 100 mg nightly to enhance his sleep.   6.  Continue Cogen

## 2021-02-06 LAB
ANION GAP SERPL CALC-SCNC: 3 MMOL/L (ref 0–18)
BASOPHILS # BLD AUTO: 0.03 X10(3) UL (ref 0–0.2)
BASOPHILS NFR BLD AUTO: 0.5 %
BUN BLD-MCNC: 29 MG/DL (ref 7–18)
BUN/CREAT SERPL: 24.4 (ref 10–20)
CALCIUM BLD-MCNC: 8.8 MG/DL (ref 8.5–10.1)
CHLORIDE SERPL-SCNC: 106 MMOL/L (ref 98–112)
CO2 SERPL-SCNC: 32 MMOL/L (ref 21–32)
CREAT BLD-MCNC: 1.19 MG/DL
DEPRECATED RDW RBC AUTO: 49.8 FL (ref 35.1–46.3)
EOSINOPHIL # BLD AUTO: 0.26 X10(3) UL (ref 0–0.7)
EOSINOPHIL NFR BLD AUTO: 4.5 %
ERYTHROCYTE [DISTWIDTH] IN BLOOD BY AUTOMATED COUNT: 13.9 % (ref 11–15)
GLUCOSE BLD-MCNC: 120 MG/DL (ref 70–99)
HAV IGM SER QL: 2 MG/DL (ref 1.6–2.6)
HCT VFR BLD AUTO: 38.4 %
HGB BLD-MCNC: 11.9 G/DL
IMM GRANULOCYTES # BLD AUTO: 0.03 X10(3) UL (ref 0–1)
IMM GRANULOCYTES NFR BLD: 0.5 %
LYMPHOCYTES # BLD AUTO: 1.77 X10(3) UL (ref 1–4)
LYMPHOCYTES NFR BLD AUTO: 30.6 %
MCH RBC QN AUTO: 30.3 PG (ref 26–34)
MCHC RBC AUTO-ENTMCNC: 31 G/DL (ref 31–37)
MCV RBC AUTO: 97.7 FL
MONOCYTES # BLD AUTO: 0.57 X10(3) UL (ref 0.1–1)
MONOCYTES NFR BLD AUTO: 9.9 %
NEUTROPHILS # BLD AUTO: 3.12 X10 (3) UL (ref 1.5–7.7)
NEUTROPHILS # BLD AUTO: 3.12 X10(3) UL (ref 1.5–7.7)
NEUTROPHILS NFR BLD AUTO: 54 %
OSMOLALITY SERPL CALC.SUM OF ELEC: 299 MOSM/KG (ref 275–295)
PLATELET # BLD AUTO: 150 10(3)UL (ref 150–450)
POTASSIUM SERPL-SCNC: 4.3 MMOL/L (ref 3.5–5.1)
RBC # BLD AUTO: 3.93 X10(6)UL
SODIUM SERPL-SCNC: 141 MMOL/L (ref 136–145)
WBC # BLD AUTO: 5.8 X10(3) UL (ref 4–11)

## 2021-02-06 RX ORDER — NYSTATIN AND TRIAMCINOLONE ACETONIDE 100000; 1 [USP'U]/G; MG/G
OINTMENT TOPICAL 2 TIMES DAILY
Status: DISCONTINUED | OUTPATIENT
Start: 2021-02-06 | End: 2021-02-10

## 2021-02-06 RX ORDER — FUROSEMIDE 10 MG/ML
20 INJECTION INTRAMUSCULAR; INTRAVENOUS DAILY
Status: DISCONTINUED | OUTPATIENT
Start: 2021-02-06 | End: 2021-02-10

## 2021-02-06 NOTE — PHYSICAL THERAPY NOTE
PHYSICAL THERAPY EVALUATION - INPATIENT     Room Number: 831/354-R  Evaluation Date: 2/6/2021  Type of Evaluation: Initial   Physician Order: PT Eval and Treat    Presenting Problem: cellulitis of bilateral LEs  Reason for Therapy: Mobility Dysfunction an mechanics; Energy conservation;Patient education; Family education;Stair training;Transfer training;Balance training  Rehab Potential : Fair  Frequency (Obs): 5x/week       PHYSICAL THERAPY MEDICAL/SOCIAL HISTORY     History related to current admission: [facility] region 11/2/2017   • Pneumonia 2012   • Pneumonia due to organism    • S/P cervical spinal fusion: C3-6 and C7-T1 11/2/2017   • s/p L5-S1 right laminectomy 8/28/2014   • Shortness of breath    • Sleep apnea    • stent placement     cerebral   • Thyroid dis • JAW SURGERY     • KNEE REPLACEMENT SURGERY Bilateral     Bilateral arthritis    • LAMINECTOMY      WITH FUSION PER PATIENT   • OTHER SURGICAL HISTORY  1985,1995,2003,2009    Cervical Discectomy AND FUSION   • PREP SITE T/A/L 1ST 100 SQ CM/1PCT Left 08/ bedclothes, sheets and blankets)?: A Little   -   Sitting down on and standing up from a chair with arms (e.g., wheelchair, bedside commode, etc.): A Little   -   Moving from lying on back to sitting on the side of the bed?: A Little   How much help from a

## 2021-02-06 NOTE — OCCUPATIONAL THERAPY NOTE
OCCUPATIONAL THERAPY EVALUATION - INPATIENT     Room Number: 917/783-Z  Evaluation Date: 2/6/2021  Type of Evaluation: Initial  Presenting Problem: Dx of BLE cellulitis    Physician Order: IP Consult to Occupational Therapy  Reason for Therapy: ADL/IADL Dy conservation/work simplification techniques;ADL training;Functional transfer training;UE strengthening/ROM; Endurance training;Patient/Family education; Compensatory technique education       OCCUPATIONAL THERAPY MEDICAL/SOCIAL HISTORY     Problem List  Prin • Pneumonia due to organism    • S/P cervical spinal fusion: C3-6 and C7-T1 11/2/2017   • s/p L5-S1 right laminectomy 8/28/2014   • Shortness of breath    • Sleep apnea    • stent placement     cerebral   • Thyroid disease    • Toe pain     Onychomycosis SURGERY Bilateral     Bilateral arthritis    • LAMINECTOMY      WITH FUSION PER PATIENT   • OTHER SURGICAL HISTORY  1985,1995,2003,2009    Cervical Discectomy AND FUSION   • PREP SITE T/A/L 1ST 100 SQ CM/1PCT Left 08/21/2019    Left leg debridement, VAC pl A Lot  -   Putting on and taking off regular upper body clothing?: A Little  -   Taking care of personal grooming such as brushing teeth?: A Little  -   Eating meals?: A Little    AM-PAC Score:  Score: 14  Approx Degree of Impairment: 59.67%  Standardized

## 2021-02-06 NOTE — PLAN OF CARE
Sitting up in chair, sleepy today. On iv vanco and cefipime for cellulitis. Worked with therapy.   Problem: Patient Centered Care  Goal: Patient preferences are identified and integrated in the patient's plan of care  Description: Interventions:  - What wou non-pharmacological measures as appropriate and evaluate response  - Consider cultural and social influences on pain and pain management  - Manage/alleviate anxiety  - Utilize distraction and/or relaxation techniques  - Monitor for opioid side effects  - N Problem: Impaired Activities of Daily Living  Goal: Achieve highest/safest level of independence in self care  Description: Interventions:  - Assess ability and encourage patient to participate in ADLs to maximize function  - Promote sitting position [facility]

## 2021-02-06 NOTE — PLAN OF CARE
Pt denies shorten of breath, chills, fever, nausea, vomiting. Pain controled with medication. Vital signs monitored. Safety maintained. Call light in reach. We will continue to monitor.   Problem: Patient Centered Care  Goal: Patient preferences are identif analgesics based on type and severity of pain and evaluate response  - Implement non-pharmacological measures as appropriate and evaluate response  - Consider cultural and social influences on pain and pain management  - Manage/alleviate anxiety  - Utilize precautions (e.g. spinal or hip dislocation precautions)  Outcome: Progressing     Problem: Impaired Activities of Daily Living  Goal: Achieve highest/safest level of independence in self care  Description: Interventions:  - Assess ability and encourage pa

## 2021-02-06 NOTE — PROGRESS NOTES
[facility] Hospitalist Progress Note     CC: Hospital Follow up    PCP: Sarai Estrada MD       Assessment/Plan:     Principal Problem:    Bilateral lower leg cellulitis  Active Problems:    Bipolar affective disorder, currently depressed, moderate ([address] 2/6/2021 0843  Last data filed at 2/6/2021 0551  Gross per 24 hour   Intake 580 ml   Output 2425 ml   Net -1845 ml       Last 3 Weights  02/04/21 1147 : 250 lb (113.4 kg)  01/11/21 0905 : 240 lb (108.9 kg)  01/11/21 0543 : 250 lb (113.4 kg)  12/24/20 0518 Fluticasone Furoate-Vilanterol  1 puff Inhalation Daily   • lamoTRIgine  100 mg Oral BID   • Levothyroxine Sodium  100 mcg Oral Before breakfast   • mirtazapine  30 mg Oral Nightly   • Pantoprazole Sodium  40 mg Oral QAM AC   • Rivaroxaban  20 mg Oral Kanika

## 2021-02-07 LAB — VANCOMYCIN TROUGH SERPL-MCNC: 11.4 UG/ML (ref 10–20)

## 2021-02-07 RX ORDER — ARIPIPRAZOLE 5 MG/1
5 TABLET ORAL NIGHTLY
Status: DISCONTINUED | OUTPATIENT
Start: 2021-02-07 | End: 2021-02-08

## 2021-02-07 RX ORDER — ZIPRASIDONE HYDROCHLORIDE 20 MG/1
20 CAPSULE ORAL NIGHTLY
Status: DISCONTINUED | OUTPATIENT
Start: 2021-02-07 | End: 2021-02-08

## 2021-02-07 RX ORDER — BENZTROPINE MESYLATE 0.5 MG/1
0.5 TABLET ORAL 2 TIMES DAILY
Status: DISCONTINUED | OUTPATIENT
Start: 2021-02-07 | End: 2021-02-10

## 2021-02-07 NOTE — PROGRESS NOTES
[facility] AND [facility] Infectious Disease Progress Note    Aleida Nolen Patient Status:  Inpatient    12/3/1940 MRN [id number]   Location [facility] 5SW/SE Attending Shira Browning MD   Hosp Day # 3 PCP Margaret Lobo MD     Subjective:  Sid Aguillon Hydroxide-Simeth (MAALOX) oral suspension 15 mL, 15 mL, Oral, Q6H PRN  •  Benztropine Mesylate (COGENTIN) tab 1 mg, 1 mg, Oral, BID  •  Butalbital-APAP-Caffeine (FIORICET) per tab 1 tablet, 1 tablet, Oral, Q4H PRN  •  Fluticasone Furoate-Vilanterol (BREO E cellulitis  -concurrent edema contributing,  Has diuresed significantly in past 48hrs  -on IV Cefepime and Vancomycin day #4, slowly improving  -MRSA nares positive  -blood cultures negative   -will likely need PO suppression following treatment of acute i

## 2021-02-07 NOTE — PROGRESS NOTES
[facility] Hospitalist Progress Note     CC: Hospital Follow up    PCP: Cecil Ford MD       Assessment/Plan:     Principal Problem:    Bilateral lower leg cellulitis  Active Problems:    Bipolar affective disorder, currently depressed, moderate ([address] Summary (Last 24 hours) at 2/7/2021 0926  Last data filed at 2/7/2021 0532  Gross per 24 hour   Intake 1280 ml   Output 1650 ml   Net -370 ml       Last 3 Weights  02/04/21 1147 : 250 lb (113.4 kg)  01/11/21 0905 : 240 lb (108.9 kg)  01/11/21 0543 : 250 lb Daily   • Benztropine Mesylate  1 mg Oral BID   • Fluticasone Furoate-Vilanterol  1 puff Inhalation Daily   • lamoTRIgine  100 mg Oral BID   • Levothyroxine Sodium  100 mcg Oral Before breakfast   • mirtazapine  30 mg Oral Nightly   • Pantoprazole Sodium

## 2021-02-07 NOTE — PLAN OF CARE
Legs with less redness. Up in chair, eating well. Problem: Patient Centered Care  Goal: Patient preferences are identified and integrated in the patient's plan of care  Description: Interventions:  - What would you like us to know as we care for you?  Pt evaluate response  - Consider cultural and social influences on pain and pain management  - Manage/alleviate anxiety  - Utilize distraction and/or relaxation techniques  - Monitor for opioid side effects  - Notify MD/LIP if interventions unsuccessful or pa and precautions (e.g. spinal or hip dislocation precautions)  Outcome: Progressing     Problem: Impaired Activities of Daily Living  Goal: Achieve highest/safest level of independence in self care  Description: Interventions:  - Assess ability and encourag

## 2021-02-07 NOTE — PLAN OF CARE
Patient on 2L of 02. Received IV Cefepime, vital signs stable. No complaints from patient. Safety precautions in place, call light in reach. Will continue to monitor.

## 2021-02-07 NOTE — PROGRESS NOTES
[facility] dosing service    Follow-up Pharmacokinetic Consult for Vancomycin Dosing     Shira Holguin is a 78year old patient who is being treated for Recurrent BLLE Cellulitis.    Patient is on day 3 of Vancomycin and is currently receiving 1.25 gm I patient.     Eric Tierney [facility]  2/7/2021  2:48 PM  [address]: [phone]

## 2021-02-08 ENCOUNTER — APPOINTMENT (OUTPATIENT)
Dept: PICC SERVICES | Facility: HOSPITAL | Age: 81
DRG: 603 | End: 2021-02-08
Attending: HOSPITALIST
Payer: MEDICARE

## 2021-02-08 LAB
BILIRUB UR QL: NEGATIVE
CLARITY UR: CLEAR
COLOR UR: YELLOW
GLUCOSE UR-MCNC: NEGATIVE MG/DL
HGB UR QL STRIP.AUTO: NEGATIVE
KETONES UR-MCNC: NEGATIVE MG/DL
LEUKOCYTE ESTERASE UR QL STRIP.AUTO: NEGATIVE
NITRITE UR QL STRIP.AUTO: NEGATIVE
PH UR: 8 [PH] (ref 5–8)
PROT UR-MCNC: NEGATIVE MG/DL
SP GR UR STRIP: 1.01 (ref 1–1.03)
UROBILINOGEN UR STRIP-ACNC: <2

## 2021-02-08 PROCEDURE — 99233 SBSQ HOSP IP/OBS HIGH 50: CPT | Performed by: OTHER

## 2021-02-08 RX ORDER — ARIPIPRAZOLE 5 MG/1
7.5 TABLET ORAL NIGHTLY
Status: DISCONTINUED | OUTPATIENT
Start: 2021-02-08 | End: 2021-02-10

## 2021-02-08 NOTE — PROGRESS NOTES
Vascular Access Consult Note  2/8/2021     Reviewed H&P and current condition. Past Medical History:  1950: Tonsillitis      Comment:  Tonsillitis   1984: Cholecystitis      Comment:  Cholecystectomy   2009:  Arthritis of right hip  02/25/2011: Berna Cheatham unspecified site  No date:  Other and unspecified hyperlipidemia  No date: Pneumonia due to organism  No date: Shortness of breath  No date: Sleep apnea  No date: stent placement      Comment:  cerebral  No date: Thyroid disease  No date: Toe pain      Comm HYDROcodone-acetaminophen (NORCO)  MG per tab 1 tablet, 1 tablet, Oral, Q6H PRN    •  lamoTRIgine (LAMICTAL) tab 100 mg, 100 mg, Oral, BID    •  Levothyroxine Sodium (SYNTHROID) tab 100 mcg, 100 mcg, Oral, Before breakfast    •  LORazepam (ATIVAN) ta

## 2021-02-08 NOTE — SPIRITUAL CARE NOTE
Visited per consult, Pt sitting in chair watching television. Provided active listening as pt shared she is nervous about going back home to assisted living because she is alone and will not get the help she needs.   [place]

## 2021-02-08 NOTE — PROGRESS NOTES
[facility] AND [facility] Infectious Disease Progress Note    Yecenia Norris Patient Status:  Inpatient    12/3/1940 MRN [id number]   Location [facility] 5SW/SE Attending Melba Serrano MD   Hosp Day # 4 PCP Sarai Estrada MD     Subjective:  Tiesha Griffith sodium chloride 0.9% 100 mL MBP/add-vantage, 1 g, Intravenous, Q12H  •  Alum & Mag Hydroxide-Simeth (MAALOX) oral suspension 15 mL, 15 mL, Oral, Q6H PRN  •  Butalbital-APAP-Caffeine (FIORICET) per tab 1 tablet, 1 tablet, Oral, Q4H PRN  •  Fluticasone Trevor Donnell cellulitis  -concurrent edema contributing,  Has diuresed significantly in past few days  -on IV Cefepime and Vancomycin day #5, slowly improving  -MRSA nares positive  -blood cultures negative   -will likely need PO suppression following treatment of acut

## 2021-02-08 NOTE — BH PROGRESS NOTE
Psych Liaison provided outpatient therapy referrals that are in network with Gretta's medicare plan and can do phone, video, and face to face treatment options to utilize upon discharge in her discharge instructions.     King Rick [address]  [phone]

## 2021-02-08 NOTE — PHYSICAL THERAPY NOTE
PHYSICAL THERAPY TREATMENT NOTE - INPATIENT     Room Number: 752/930-C       Presenting Problem: cellulitis of right LE     Problem List  Principal Problem:    Bilateral lower leg cellulitis  Active Problems:    Bipolar affective disorder, currently depres back to sitting on the side of the bed?: A Little   How much help from another person does the patient currently need. ..   -   Moving to and from a bed to a chair (including a wheelchair)?: A Little   -   Need to walk in hospital room?: A Lot   -   [place]

## 2021-02-08 NOTE — PROGRESS NOTES
Consult Duration     The patient seen for over 35-minute, follow-up evaluation, over 50% counseling and coordinating care addressing the depression, bipolar disorder, loneliness and the struggle with the medical illnesses.     Record reviewed, lesa of hopelessness and helplessness. The patient today denies any side effect of the medication and she is agreeable that the new set of medication has been working for her much better than the old set. Patient agreeable for more adjustment.         Medica cerebral   • Thyroid disease    • Toe pain     Onychomycosis, Debridement , Hammertoe    • Tonsillitis 1952    Tonsillitis    • Unspecified essential hypertension    • Unspecified sleep apnea    • Visual impairment     EYE GLASSES   • Wound of left leg 10/ 100 SQ CM/1PCT Left 08/21/2019    Left leg debridement, VAC placed   • SKIN GRAFT SPLIT THICKNESS Left 10/8/2019    Performed by Alberta Hurd MD at [facility] OR   • SPLIT GRFT [facility] <100SQCM Left 10/08/2019    Left leg debridement and Split T furosemide (LASIX) injection 20 mg, 20 mg, Intravenous, Daily    •  nystatin-triamcinolone (MYCOLOG) 182455-1.1 UNIT/GM-% ointment, , Topical, BID    •  vancomycin HCl (VANCOCIN) cap 125 mg, 125 mg, Oral, Daily    •  QUEtiapine Fumarate (SEROQUEL) tab 100 mouth 2 (two) times daily. •  HYDROcodone-acetaminophen  MG Oral Tab, Take 1 tablet by mouth every 6 (six) hours as needed for Pain.  4-6 hr prn    •  Fluticasone Furoate-Vilanterol (BREO ELLIPTA) 100-25 MCG/INH Inhalation Aerosol Powder, Breath Ac (10 mg total) rectally daily as needed. •  PEG 3350 17 g Oral Powd Pack, Take 17 g by mouth daily as needed. •  magnesium oxide 400 MG Oral Tab, Take 1 tablet (400 mg total) by mouth 2 (two) times daily with meals.     •  muscle rub 10-15 % External C source Oral, resp. rate 16, height 68\", weight 113.4 kg (250 lb), SpO2 94 %, not currently breastfeeding. Mental Status Exam:     Appearance: stated age female in hospital gown sitting in her recliner but fully awake and attentive. Psychomotor:  The pa improving sleep. Discussed risk and benefit, acknowledging the current symptom and severity. At this point, I would recommend the following approach:     1. Focus on education and support.   2.  Focus on insight orientation, acknowledging the need to a

## 2021-02-08 NOTE — PLAN OF CARE
Problem: Patient Centered Care  Goal: Patient preferences are identified and integrated in the patient's plan of care  Description: Interventions:  - What would you like us to know as we care for you?  Pt is not feeling comfortable with the bunny boots on influences on pain and pain management  - Manage/alleviate anxiety  - Utilize distraction and/or relaxation techniques  - Monitor for opioid side effects  - Notify MD/LIP if interventions unsuccessful or patient reports new pain  - Anticipate increased rommel care  Description: Interventions:  - Assess ability and encourage patient to participate in ADLs to maximize function  - Promote sitting position while performing ADLs such as feeding, grooming, and bathing  - Educate and encourage patient/family in [place]

## 2021-02-08 NOTE — PROGRESS NOTES
[facility] Hospitalist Progress Note     CC: Hospital Follow up    PCP: Vernon Avendano MD       Assessment/Plan:     Principal Problem:    Bilateral lower leg cellulitis  Active Problems:    Bipolar affective disorder, currently depressed, moderate ([address] 24 hours) at 2/8/2021 1253  Last data filed at 2/8/2021 0900  Gross per 24 hour   Intake 600 ml   Output 700 ml   Net -100 ml       Last 3 Weights  02/04/21 1147 : 250 lb (113.4 kg)  01/11/21 0905 : 240 lb (108.9 kg)  01/11/21 0543 : 250 lb (113.4 kg)  12/ Fluticasone Furoate-Vilanterol  1 puff Inhalation Daily   • lamoTRIgine  100 mg Oral BID   • Levothyroxine Sodium  100 mcg Oral Before breakfast   • mirtazapine  30 mg Oral Nightly   • Pantoprazole Sodium  40 mg Oral QAM AC   • Rivaroxaban  20 mg Oral Kanika

## 2021-02-09 ENCOUNTER — APPOINTMENT (OUTPATIENT)
Dept: CV DIAGNOSTICS | Facility: HOSPITAL | Age: 81
DRG: 603 | End: 2021-02-09
Attending: HOSPITALIST
Payer: MEDICARE

## 2021-02-09 LAB
ANION GAP SERPL CALC-SCNC: 6 MMOL/L (ref 0–18)
BUN BLD-MCNC: 25 MG/DL (ref 7–18)
BUN/CREAT SERPL: 26.9 (ref 10–20)
CALCIUM BLD-MCNC: 9.4 MG/DL (ref 8.5–10.1)
CHLORIDE SERPL-SCNC: 103 MMOL/L (ref 98–112)
CO2 SERPL-SCNC: 31 MMOL/L (ref 21–32)
CREAT BLD-MCNC: 0.93 MG/DL
GLUCOSE BLD-MCNC: 106 MG/DL (ref 70–99)
OSMOLALITY SERPL CALC.SUM OF ELEC: 295 MOSM/KG (ref 275–295)
POTASSIUM SERPL-SCNC: 3.9 MMOL/L (ref 3.5–5.1)
SARS-COV-2 RNA RESP QL NAA+PROBE: NOT DETECTED
SODIUM SERPL-SCNC: 140 MMOL/L (ref 136–145)

## 2021-02-09 PROCEDURE — 93306 TTE W/DOPPLER COMPLETE: CPT | Performed by: HOSPITALIST

## 2021-02-09 RX ORDER — MIRTAZAPINE 30 MG/1
30 TABLET, FILM COATED ORAL NIGHTLY
Qty: 5 TABLET | Refills: 0 | Status: SHIPPED | OUTPATIENT
Start: 2021-02-09 | End: 2021-02-14

## 2021-02-09 RX ORDER — HYDROCODONE BITARTRATE AND ACETAMINOPHEN 10; 325 MG/1; MG/1
1 TABLET ORAL EVERY 8 HOURS PRN
Qty: 6 TABLET | Refills: 0 | Status: SHIPPED | OUTPATIENT
Start: 2021-02-09 | End: 2021-02-11

## 2021-02-09 RX ORDER — ARIPIPRAZOLE 15 MG/1
7.5 TABLET ORAL NIGHTLY
Qty: 30 TABLET | Refills: 0 | Status: SHIPPED | OUTPATIENT
Start: 2021-02-09 | End: 2021-03-11

## 2021-02-09 RX ORDER — QUETIAPINE 100 MG/1
100 TABLET, FILM COATED ORAL NIGHTLY
Qty: 30 TABLET | Refills: 0 | Status: SHIPPED | OUTPATIENT
Start: 2021-02-09 | End: 2021-03-11

## 2021-02-09 NOTE — PLAN OF CARE
Problem: Patient Centered Care  Goal: Patient preferences are identified and integrated in the patient's plan of care  Description: Interventions:  - What would you like us to know as we care for you?  Pt is not feeling comfortable with the bunny boots on Assess pt frequently for physical needs  - Identify cognitive and physical deficits and behaviors that affect risk of falls.   - Sherman fall precautions as indicated by assessment.  - Educate pt/family on patient safety including physical limitations  -

## 2021-02-09 NOTE — PHYSICAL THERAPY NOTE
PHYSICAL THERAPY TREATMENT NOTE - INPATIENT     Room Number: 168/715-V       Presenting Problem: cellulitis of right LE     Problem List  Principal Problem:    Bilateral lower leg cellulitis  Active Problems:    Bipolar affective disorder, currently depres wheelchair, bedside commode, etc.): A Little   -   Moving from lying on back to sitting on the side of the bed?: A Little   How much help from another person does the patient currently need. ..   -   Moving to and from a bed to a chair (including a wheelcha

## 2021-02-09 NOTE — PROGRESS NOTES
[facility] AND [facility]  [address] Infectious Disease  Progress Note    Mamadou Arreguin Patient Status:  Inpatient    12/3/1940 MRN [id number]   Location [facility] 5SW/SE Attending Toña Willson MD   Hosp Day # 5 PCP Sumanth Montilla MD     Subjective:  Aida Coelho some bronchitis in December  - COVID+ 12/17/20 on weekly ECF screen  - PCT negative - no antibiotics given at that time  - Finished remdesivir and dexamethasone courses  - Deconditioning after infection     4.  Dry, cracked skin and probable fungal dermati

## 2021-02-09 NOTE — PLAN OF CARE
Problem: Patient Centered Care  Goal: Patient preferences are identified and integrated in the patient's plan of care  Description: Interventions:  - What would you like us to know as we care for you?  Pt is not feeling comfortable with the bunny boots on influences on pain and pain management  - Manage/alleviate anxiety  - Utilize distraction and/or relaxation techniques  - Monitor for opioid side effects  - Notify MD/LIP if interventions unsuccessful or patient reports new pain  - Anticipate increased rommel care  Description: Interventions:  - Assess ability and encourage patient to participate in ADLs to maximize function  - Promote sitting position while performing ADLs such as feeding, grooming, and bathing  - Educate and encourage patient/family in [place]

## 2021-02-09 NOTE — PLAN OF CARE
Patient well rested and slept all through the night. Patient hesitant and refused blood draw initially, this RN informed her of the importance and she willingly complied. 2D Echo today. Plan to go to [address]   Problem: Patient Centered Care assistance  - Assess pain using appropriate pain scale  - Administer analgesics based on type and severity of pain and evaluate response  - Implement non-pharmacological measures as appropriate and evaluate response  - Consider cultural and social influenc orders  - Instruct and reinforce with patient and family use of appropriate assistive device and precautions (e.g. spinal or hip dislocation precautions)  Outcome: Adequate for Discharge     Problem: Impaired Activities of Daily Living  Goal: Achieve highe

## 2021-02-10 VITALS
SYSTOLIC BLOOD PRESSURE: 132 MMHG | DIASTOLIC BLOOD PRESSURE: 46 MMHG | WEIGHT: 250 LBS | OXYGEN SATURATION: 98 % | BODY MASS INDEX: 37.89 KG/M2 | HEIGHT: 68 IN | RESPIRATION RATE: 18 BRPM | TEMPERATURE: 97 F | HEART RATE: 62 BPM

## 2021-02-10 RX ORDER — CEFADROXIL 500 MG/1
500 CAPSULE ORAL 2 TIMES DAILY
Qty: 28 CAPSULE | Refills: 0 | Status: SHIPPED | OUTPATIENT
Start: 2021-02-10 | End: 2021-02-24

## 2021-02-10 NOTE — PROGRESS NOTES
[facility] Hospitalist Progress Note     CC: Hospital Follow up    PCP: Isiah Blum MD       Assessment/Plan:     Principal Problem:    Bilateral lower leg cellulitis  Active Problems:    Bipolar affective disorder, currently depressed, moderate ([address] (132148)/(4663) 132/46      Intake/Output:    Intake/Output Summary (Last 24 hours) at 2/10/2021 1112  Last data filed at 2/10/2021 0515  Gross per 24 hour   Intake 710 ml   Output 1000 ml   Net -290 ml       Last 3 Weights  02/04/21 1147 : 250 lb (113.4 QUEtiapine Fumarate  100 mg Oral Nightly   • cefepime  1 g Intravenous Q12H   • Fluticasone Furoate-Vilanterol  1 puff Inhalation Daily   • lamoTRIgine  100 mg Oral BID   • Levothyroxine Sodium  100 mcg Oral Before breakfast   • mirtazapine  30 mg Oral Nig

## 2021-02-10 NOTE — PLAN OF CARE
Problem: Patient Centered Care  Goal: Patient preferences are identified and integrated in the patient's plan of care  Description: Interventions:  - What would you like us to know as we care for you?  Pt is not feeling comfortable with the bunny boots on influences on pain and pain management  - Manage/alleviate anxiety  - Utilize distraction and/or relaxation techniques  - Monitor for opioid side effects  - Notify MD/LIP if interventions unsuccessful or patient reports new pain  - Anticipate increased rommel care  Description: Interventions:  - Assess ability and encourage patient to participate in ADLs to maximize function  - Promote sitting position while performing ADLs such as feeding, grooming, and bathing  - Educate and encourage patient/family in [place]

## 2021-02-10 NOTE — PHYSICAL THERAPY NOTE
PHYSICAL THERAPY TREATMENT NOTE - INPATIENT     Room Number: 110/197-N       Presenting Problem: cellulitis of right LE     Problem List  Principal Problem:    Bilateral lower leg cellulitis  Active Problems:    Bipolar affective disorder, currently depres chair with arms (e.g., wheelchair, bedside commode, etc.): A Little   -   Moving from lying on back to sitting on the side of the bed?: A Little   How much help from another person does the patient currently need. ..   -   Moving to and from a bed to a zaida

## 2021-02-10 NOTE — PROGRESS NOTES
Discharge RN Summary: Patient has discharge order in. Patient to discharge to [facility]. Norco script in discharge packet signed by MD. RN to call report to facility. IV to be removed by primary RN.

## 2021-02-10 NOTE — DISCHARGE SUMMARY
General Medicine Discharge Summary     Patient ID:  Sarah Brar  78year old  12/3/1940    Admit date: 2/4/2021    Discharge date and time: 2/10/21  Attending Physician: All Galicia MD     Consults: IP CONSULT TO HOSPITALIST  IP CONSULT TO INFECTIOUS NORCO  Take 1 tablet by mouth every 8 (eight) hours as needed for Pain.  4-6 hr prn  What changed: when to take this        CONTINUE taking these medications    acetaminophen 325 MG Tabs  Commonly known as: TYLENOL     Benztropine Mesylate 1 MG Tabs  Common LORazepam 1 MG Tabs  Commonly known as: ATIVAN     Ziprasidone HCl 80 MG Caps  Commonly known as: GEODON           Where to Get Your Medications      These medications were sent to [address] [place] [address] [phone]

## 2021-02-10 NOTE — PROGRESS NOTES
[facility] AND [facility] Infectious Disease Progress Note    Ltanya Hum Patient Status:  Inpatient    12/3/1940 MRN [id number]   Location [facility] 5SW/SE Attending Pepe Mann MD   [facility] Day # 6 PCP Greyson Quezada MD     Subjective:  Pt (MAALOX) oral suspension 15 mL, 15 mL, Oral, Q6H PRN  •  Butalbital-APAP-Caffeine (FIORICET) per tab 1 tablet, 1 tablet, Oral, Q4H PRN  •  Fluticasone Furoate-Vilanterol (BREO ELLIPTA) 100-25 MCG/INH inhaler 1 puff, 1 puff, Inhalation, Daily  •  HYDROcodon clean  -hx of recurrent uti  -hx of e coli  3. Hx of COVID  -PCR positive on 12/17/2020  4. Dispo  -stable for dc on PO cefadroxil  -can continue to follow at [facility]    If you have any questions or concerns please call [facility] at [phone].      Alvarez Leavitt

## 2021-02-10 NOTE — PLAN OF CARE
Patient slept all through the night. Patient tolerated all medications and meals today. Patient ready to discharge per MD orders. Patient being sent with scripts and AVS. IV removed. Ambulance taking her at 4 pm to [facility] at [facility].   Problem: Josse pain and request assistance  - Assess pain using appropriate pain scale  - Administer analgesics based on type and severity of pain and evaluate response  - Implement non-pharmacological measures as appropriate and evaluate response  - Consider cultural an provider's orders  - Instruct and reinforce with patient and family use of appropriate assistive device and precautions (e.g. spinal or hip dislocation precautions)  Outcome: Adequate for Discharge     Problem: Impaired Activities of Daily Living  Goal: Ac

## 2021-02-19 NOTE — TELEPHONE ENCOUNTER
Patient may be on abx and will need to be rescheduled. She will speak with the nurse to find out if she is still taking the abx and call us back.

## 2021-02-22 NOTE — TELEPHONE ENCOUNTER
Patient requesting to cancel injection since she is on an abx and does not know when she will be better and back home. Patient will call to reschedule.

## 2021-02-23 ENCOUNTER — TELEPHONE (OUTPATIENT)
Dept: NEUROLOGY | Facility: CLINIC | Age: 81
End: 2021-02-23

## 2021-02-23 NOTE — TELEPHONE ENCOUNTER
MD Julieta Blanco RN             She is cleared to hold it for 3 days. CRS        Patient has been scheduled for a Bilateral L5 transforaminal epidural steroid injections under MAC  on 03/12/2021 at the [address] [place] [address].  Medications and a

## 2021-02-23 NOTE — TELEPHONE ENCOUNTER
S/W patient and she stated she will be done with antibiotics on 02/25/2021. Patient asked to be schedule for Bilateral L5 transforaminal epidural steroid injections under MAC    Patient currently on Xarelto.  Staff faxed clearance form to Dr. Gustavo Matias to SATYA GALINDO

## 2021-03-14 ENCOUNTER — APPOINTMENT (OUTPATIENT)
Dept: CT IMAGING | Facility: HOSPITAL | Age: 81
End: 2021-03-14
Attending: EMERGENCY MEDICINE
Payer: MEDICARE

## 2021-03-14 ENCOUNTER — HOSPITAL ENCOUNTER (EMERGENCY)
Facility: HOSPITAL | Age: 81
Discharge: HOME | End: 2021-03-14
Attending: EMERGENCY MEDICINE
Payer: MEDICARE

## 2021-03-14 VITALS
RESPIRATION RATE: 20 BRPM | DIASTOLIC BLOOD PRESSURE: 50 MMHG | SYSTOLIC BLOOD PRESSURE: 128 MMHG | TEMPERATURE: 99 F | OXYGEN SATURATION: 95 % | HEART RATE: 77 BPM
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
## 2021-03-14 DIAGNOSIS — S00.83XA CONTUSION OF FACE, INITIAL ENCOUNTER: Primary | ICD-10-CM

## 2021-03-14 PROCEDURE — 70450 CT HEAD/BRAIN W/O DYE: CPT | Performed by: EMERGENCY MEDICINE

## 2021-03-14 PROCEDURE — 93010 ELECTROCARDIOGRAM REPORT: CPT | Performed by: EMERGENCY MEDICINE

## 2021-03-14 PROCEDURE — 99284 EMERGENCY DEPT VISIT MOD MDM: CPT

## 2021-03-14 PROCEDURE — 72125 CT NECK SPINE W/O DYE: CPT | Performed by: EMERGENCY MEDICINE

## 2021-03-14 PROCEDURE — 93005 ELECTROCARDIOGRAM TRACING: CPT

## 2021-03-14 NOTE — ED INITIAL ASSESSMENT (HPI)
[facility] from chair. Pt states she hit the front of her face, no trauma noted +blood thinners.  Pt AOx4

## 2021-03-14 NOTE — ED PROVIDER NOTES
Patient Seen in: [facility] AND [facility] Emergency Department      History   Patient presents with:  Fall    Stated Complaint: fall    HPI/Subjective:   HPI  Patient is an 18-year-old female history of multiple medical problems including COPD, DVT on Xarelto, Oropharyngeal dysphagia 8/29/2017   • Osteoarthrosis, unspecified whether generalized or localized, unspecified site    • Other and unspecified hyperlipidemia    • Other complicated headache syndrome 11/2/2017   • Other spondylosis, lumbar region 11/2/2017 IRRIGATION & DEBRIDEMENT Left 8/21/2019    Performed by Kendal Matias MD at [facility] MAIN OR   • EYE SURGERY      x2 FOR \"CROSSED EYES\"   • HIP REPLACEMENT SURGERY Bilateral    • HYSTERECTOMY  1967    Partial Hysterectomy   • JAW SURGERY     • KNEE R General: She is not in acute distress. Appearance: Normal appearance. She is not toxic-appearing. HENT:      Head: Normocephalic.       Mouth/Throat:      Mouth: Mucous membranes are moist.   Eyes:      Extraocular Movements: Extraocular movements int (CPT=72125)    Result Date: 3/14/2021  CONCLUSION:   Degenerative disc, facet, uncovertebral joint disease throughout the cervical spine without acute fracture or dislocation. . .     Dictated by (CST): Nidia Fine MD on 3/14/2021 at 10:28 AM     Finalized

## 2021-03-19 NOTE — TELEPHONE ENCOUNTER
This patient did not show up for her last injection at the [facility]. Please call her to see how she is doing and if she wants to reschedule or is able to do this.

## 2021-03-20 ENCOUNTER — HOSPITAL ENCOUNTER (INPATIENT)
Facility: HOSPITAL | Age: 81
LOS: 5 days | Discharge: NURSING, REHAB OR LONG-TERM CARE | DRG: 603 | End: 2021-03-25
Attending: EMERGENCY MEDICINE | Admitting: HOSPITALIST
Payer: MEDICARE

## 2021-03-20 ENCOUNTER — APPOINTMENT (OUTPATIENT)
Dept: MRI IMAGING | Facility: HOSPITAL | Age: 81
DRG: 603 | End: 2021-03-20
Attending: HOSPITALIST
Payer: MEDICARE

## 2021-03-20 ENCOUNTER — APPOINTMENT (OUTPATIENT)
Dept: CT IMAGING | Facility: HOSPITAL | Age: 81
DRG: 603 | End: 2021-03-20
Attending: EMERGENCY MEDICINE
Payer: MEDICARE

## 2021-03-20 DIAGNOSIS — L03.115 BILATERAL LOWER LEG CELLULITIS: Primary | ICD-10-CM

## 2021-03-20 DIAGNOSIS — L03.116 BILATERAL LOWER LEG CELLULITIS: Primary | ICD-10-CM

## 2021-03-20 LAB
ANION GAP SERPL CALC-SCNC: 4 MMOL/L (ref 0–18)
BASOPHILS # BLD AUTO: 0.03 X10(3) UL (ref 0–0.2)
BASOPHILS NFR BLD AUTO: 0.4 %
BILIRUB UR QL: NEGATIVE
BUN BLD-MCNC: 18 MG/DL (ref 7–18)
BUN/CREAT SERPL: 13.7 (ref 10–20)
CALCIUM BLD-MCNC: 9.8 MG/DL (ref 8.5–10.1)
CHLORIDE SERPL-SCNC: 102 MMOL/L (ref 98–112)
CLARITY UR: CLEAR
CO2 SERPL-SCNC: 36 MMOL/L (ref 21–32)
COLOR UR: YELLOW
CREAT BLD-MCNC: 1.31 MG/DL
DEPRECATED RDW RBC AUTO: 47.5 FL (ref 35.1–46.3)
EOSINOPHIL # BLD AUTO: 0.27 X10(3) UL (ref 0–0.7)
EOSINOPHIL NFR BLD AUTO: 3.8 %
ERYTHROCYTE [DISTWIDTH] IN BLOOD BY AUTOMATED COUNT: 13.8 % (ref 11–15)
GLUCOSE BLD-MCNC: 121 MG/DL (ref 70–99)
GLUCOSE BLDC GLUCOMTR-MCNC: 134 MG/DL (ref 70–99)
GLUCOSE UR-MCNC: NEGATIVE MG/DL
HCT VFR BLD AUTO: 43.8 %
HGB BLD-MCNC: 13.8 G/DL
HGB UR QL STRIP.AUTO: NEGATIVE
IMM GRANULOCYTES # BLD AUTO: 0.02 X10(3) UL (ref 0–1)
IMM GRANULOCYTES NFR BLD: 0.3 %
KETONES UR-MCNC: NEGATIVE MG/DL
LACTATE SERPL-SCNC: 1.2 MMOL/L (ref 0.4–2)
LEUKOCYTE ESTERASE UR QL STRIP.AUTO: NEGATIVE
LYMPHOCYTES # BLD AUTO: 1.95 X10(3) UL (ref 1–4)
LYMPHOCYTES NFR BLD AUTO: 27.1 %
MCH RBC QN AUTO: 29.8 PG (ref 26–34)
MCHC RBC AUTO-ENTMCNC: 31.5 G/DL (ref 31–37)
MCV RBC AUTO: 94.6 FL
MONOCYTES # BLD AUTO: 0.59 X10(3) UL (ref 0.1–1)
MONOCYTES NFR BLD AUTO: 8.2 %
MRSA DNA SPEC QL NAA+PROBE: POSITIVE
NEUTROPHILS # BLD AUTO: 4.33 X10 (3) UL (ref 1.5–7.7)
NEUTROPHILS # BLD AUTO: 4.33 X10(3) UL (ref 1.5–7.7)
NEUTROPHILS NFR BLD AUTO: 60.2 %
NITRITE UR QL STRIP.AUTO: NEGATIVE
OSMOLALITY SERPL CALC.SUM OF ELEC: 297 MOSM/KG (ref 275–295)
PH UR: 7 [PH] (ref 5–8)
PLATELET # BLD AUTO: 195 10(3)UL (ref 150–450)
POTASSIUM SERPL-SCNC: 3.1 MMOL/L (ref 3.5–5.1)
PROCALCITONIN SERPL-MCNC: 0.03 NG/ML (ref ?–0.16)
PROT UR-MCNC: NEGATIVE MG/DL
RBC # BLD AUTO: 4.63 X10(6)UL
SARS-COV-2 RNA RESP QL NAA+PROBE: NOT DETECTED
SODIUM SERPL-SCNC: 142 MMOL/L (ref 136–145)
SP GR UR STRIP: 1.01 (ref 1–1.03)
UROBILINOGEN UR STRIP-ACNC: <2
WBC # BLD AUTO: 7.2 X10(3) UL (ref 4–11)

## 2021-03-20 PROCEDURE — 70450 CT HEAD/BRAIN W/O DYE: CPT | Performed by: EMERGENCY MEDICINE

## 2021-03-20 PROCEDURE — 70551 MRI BRAIN STEM W/O DYE: CPT | Performed by: HOSPITALIST

## 2021-03-20 RX ORDER — LAMOTRIGINE 100 MG/1
100 TABLET ORAL 2 TIMES DAILY
Status: DISCONTINUED | OUTPATIENT
Start: 2021-03-21 | End: 2021-03-25

## 2021-03-20 RX ORDER — VANCOMYCIN 2 GRAM/500 ML IN 0.9 % SODIUM CHLORIDE INTRAVENOUS
25 ONCE
Status: COMPLETED | OUTPATIENT
Start: 2021-03-20 | End: 2021-03-20

## 2021-03-20 RX ORDER — PANTOPRAZOLE SODIUM 40 MG/1
40 TABLET, DELAYED RELEASE ORAL
Status: DISCONTINUED | OUTPATIENT
Start: 2021-03-21 | End: 2021-03-25

## 2021-03-20 RX ORDER — POTASSIUM CHLORIDE 20 MEQ/1
40 TABLET, EXTENDED RELEASE ORAL ONCE
Status: COMPLETED | OUTPATIENT
Start: 2021-03-20 | End: 2021-03-20

## 2021-03-20 RX ORDER — VANCOMYCIN HYDROCHLORIDE
1500 EVERY 24 HOURS
Status: DISCONTINUED | OUTPATIENT
Start: 2021-03-21 | End: 2021-03-23

## 2021-03-20 RX ORDER — BENZTROPINE MESYLATE 1 MG/1
1 TABLET ORAL 2 TIMES DAILY
Status: DISCONTINUED | OUTPATIENT
Start: 2021-03-21 | End: 2021-03-23

## 2021-03-20 RX ORDER — ACETAMINOPHEN 325 MG/1
650 TABLET ORAL EVERY 6 HOURS PRN
Status: DISCONTINUED | OUTPATIENT
Start: 2021-03-20 | End: 2021-03-25

## 2021-03-20 RX ORDER — POTASSIUM CHLORIDE 20 MEQ/1
40 TABLET, EXTENDED RELEASE ORAL EVERY 4 HOURS
Status: DISCONTINUED | OUTPATIENT
Start: 2021-03-21 | End: 2021-03-21

## 2021-03-20 RX ORDER — MIDAZOLAM HYDROCHLORIDE 10 MG/2ML
5 INJECTION, SOLUTION INTRAMUSCULAR; INTRAVENOUS ONCE
Status: COMPLETED | OUTPATIENT
Start: 2021-03-20 | End: 2021-03-20

## 2021-03-20 RX ORDER — LEVOTHYROXINE SODIUM 0.1 MG/1
100 TABLET ORAL
Status: DISCONTINUED | OUTPATIENT
Start: 2021-03-21 | End: 2021-03-25

## 2021-03-20 RX ORDER — ASPIRIN 81 MG/1
324 TABLET, CHEWABLE ORAL ONCE
Status: COMPLETED | OUTPATIENT
Start: 2021-03-20 | End: 2021-03-20

## 2021-03-20 NOTE — ED INITIAL ASSESSMENT (HPI)
[facility] [employer] [facility] since yesterday, [facility] called stroke alert from the field d/t slurred speech. Per [facility], pt is reportedly normally AOx4, pt presents awake, orientated x4. Pt states \"shaun just been sitting around.  I feel crumm

## 2021-03-20 NOTE — ED PROVIDER NOTES
Patient Seen in: [facility] AND [facility] Emergency Department      History   Patient presents with:  Altered Mental Status    Stated Complaint:     HPI/Subjective:   HPI  78-year-old female with cranial aneurysm status post coiling, COPD, hypertension, hx DVT unstable spondylolisthesis 10/31/2014   • Leg wound, left 08/21/2019   • Low back pain    • Migraines    • Muscle weakness    • Onychomycosis     Debridement    • Oropharyngeal dysphagia 8/29/2017   • Osteoarthrosis, unspecified whether generalized or loca MD GALO at [facility] Left 10/8/2019    Performed by Clark Emery MD at [facility] OR   • Alen Left 8/21/2019    Performed by Clark Emery MD at [facility] 03/20/21 1656 94 %   O2 Device 03/20/21 1656 None (Room air)       Current:/72   Pulse 67   Temp 98.6 °F (37 °C) (Rectal)   Resp 20   Ht 172.7 cm (5' 8\")   Wt 108.9 kg   SpO2 95%   BMI 36.49 kg/m²         Physical Exam  Vitals and nursing note revie (*)     CO2 36.0 (*)     Creatinine 1.31 (*)     Calculated Osmolality 297 (*)     GFR, Non- 38 (*)     GFR, -American 44 (*)     All other components within normal limits   POCT GLUCOSE - Abnormal; Notable for the following componen bilateral lower extremity cellulitis, left worse than right. Chart review reveals that she has responded well to vancomycin and cefepime in the past, these will be administered after blood cultures. No clear UTI.   Admission disposition: 3/20/2021  7:22 P

## 2021-03-21 LAB
ANION GAP SERPL CALC-SCNC: 2 MMOL/L (ref 0–18)
BASOPHILS # BLD AUTO: 0.03 X10(3) UL (ref 0–0.2)
BASOPHILS NFR BLD AUTO: 0.6 %
BUN BLD-MCNC: 17 MG/DL (ref 7–18)
BUN/CREAT SERPL: 15.7 (ref 10–20)
CALCIUM BLD-MCNC: 9 MG/DL (ref 8.5–10.1)
CHLORIDE SERPL-SCNC: 109 MMOL/L (ref 98–112)
CO2 SERPL-SCNC: 35 MMOL/L (ref 21–32)
CREAT BLD-MCNC: 1.08 MG/DL
DEPRECATED RDW RBC AUTO: 48.3 FL (ref 35.1–46.3)
EOSINOPHIL # BLD AUTO: 0.29 X10(3) UL (ref 0–0.7)
EOSINOPHIL NFR BLD AUTO: 5.5 %
ERYTHROCYTE [DISTWIDTH] IN BLOOD BY AUTOMATED COUNT: 13.8 % (ref 11–15)
GLUCOSE BLD-MCNC: 120 MG/DL (ref 70–99)
HCT VFR BLD AUTO: 37.5 %
HGB BLD-MCNC: 11.8 G/DL
IMM GRANULOCYTES # BLD AUTO: 0.01 X10(3) UL (ref 0–1)
IMM GRANULOCYTES NFR BLD: 0.2 %
LYMPHOCYTES # BLD AUTO: 1.12 X10(3) UL (ref 1–4)
LYMPHOCYTES NFR BLD AUTO: 21.1 %
MCH RBC QN AUTO: 30 PG (ref 26–34)
MCHC RBC AUTO-ENTMCNC: 31.5 G/DL (ref 31–37)
MCV RBC AUTO: 95.4 FL
MONOCYTES # BLD AUTO: 0.55 X10(3) UL (ref 0.1–1)
MONOCYTES NFR BLD AUTO: 10.3 %
NEUTROPHILS # BLD AUTO: 3.32 X10 (3) UL (ref 1.5–7.7)
NEUTROPHILS # BLD AUTO: 3.32 X10(3) UL (ref 1.5–7.7)
NEUTROPHILS NFR BLD AUTO: 62.3 %
OSMOLALITY SERPL CALC.SUM OF ELEC: 305 MOSM/KG (ref 275–295)
PLATELET # BLD AUTO: 158 10(3)UL (ref 150–450)
POTASSIUM SERPL-SCNC: 3.7 MMOL/L (ref 3.5–5.1)
RBC # BLD AUTO: 3.93 X10(6)UL
SODIUM SERPL-SCNC: 146 MMOL/L (ref 136–145)
WBC # BLD AUTO: 5.3 X10(3) UL (ref 4–11)

## 2021-03-21 RX ORDER — FUROSEMIDE 10 MG/ML
40 INJECTION INTRAMUSCULAR; INTRAVENOUS
Status: DISCONTINUED | OUTPATIENT
Start: 2021-03-21 | End: 2021-03-25

## 2021-03-21 RX ORDER — FUROSEMIDE 40 MG/1
40 TABLET ORAL DAILY
Status: DISCONTINUED | OUTPATIENT
Start: 2021-03-21 | End: 2021-03-21

## 2021-03-21 RX ORDER — VANCOMYCIN HYDROCHLORIDE 125 MG/1
125 CAPSULE ORAL DAILY
Status: DISCONTINUED | OUTPATIENT
Start: 2021-03-21 | End: 2021-03-25

## 2021-03-21 RX ORDER — FUROSEMIDE 10 MG/ML
20 INJECTION INTRAMUSCULAR; INTRAVENOUS ONCE
Status: COMPLETED | OUTPATIENT
Start: 2021-03-21 | End: 2021-03-21

## 2021-03-21 RX ORDER — FUROSEMIDE 10 MG/ML
40 INJECTION INTRAMUSCULAR; INTRAVENOUS
Status: DISCONTINUED | OUTPATIENT
Start: 2021-03-21 | End: 2021-03-21

## 2021-03-21 RX ORDER — HYDROCODONE BITARTRATE AND ACETAMINOPHEN 5; 325 MG/1; MG/1
1 TABLET ORAL EVERY 6 HOURS PRN
Status: DISCONTINUED | OUTPATIENT
Start: 2021-03-21 | End: 2021-03-25

## 2021-03-21 RX ORDER — POTASSIUM CHLORIDE 20 MEQ/1
40 TABLET, EXTENDED RELEASE ORAL ONCE
Status: COMPLETED | OUTPATIENT
Start: 2021-03-21 | End: 2021-03-21

## 2021-03-21 NOTE — H&P
[facility] Hospitalist H&P     CC: Patient presents with:  Altered Mental Status     PCP: Gale Menon MD    Date of Admission: 3/20/2021  4:52 PM    ASSESSMENT / PLAN:     Ms. Guanaco Crespo is an 78 year old female with PMH sig for cranial aneurysm s/p coiling, hospital records reviewed. Further recommendations pending patient's clinical course.   [facility] hospitalist to continue to follow patient while in house    Patient and/or patient's family given opportunity to ask questions and note understanding and agreein Ximena 02/25/2011    ximena 5 left, pain   • Hearing impairment     Bilateral hearing aids   • High blood pressure    • Hip pain, left    • History of blood clots     Leg   • Hypothyroidism    • Incontinence    • L3-4 stable slight grade 1 retrolist[address] [facility] ENDOSCOPY   • CAROTID ENDARTERECTOMY Right    • CARPAL TUNNEL RELEASE Right ~2008   • CHOLECYSTECTOMY  1984    Cholecystitis   • COLONOSCOPY  2010   • DEBRIDEMENT OF NAIL(S), 1-5      Toe, Onychomycosis   • EGD  05/2019    Gastric polyps only   • EGD OTHER (SEE COMMENTS)    Comment:Unknown     Home Medications:  No outpatient medications have been marked as taking for the 3/20/21 encounter [facility] HOSPITAL Encounter).         Soc Hx  Social History    Tobacco Use      Smoking status: Former Smoker        Packs 03/20/21  1743 03/21/21  0505   RBC 4.63 3.93   HGB 13.8 11.8*   HCT 43.8 37.5   MCV 94.6 95.4   MCH 29.8 30.0   MCHC 31.5 31.5   RDW 13.8 13.8   NEPRELIM 4.33 3.32   WBC 7.2 5.3   .0 158.0         Recent Labs   Lab 03/20/21  1743 03/21/21  0505   G

## 2021-03-21 NOTE — RESPIRATORY THERAPY NOTE
RT attempted to administer BREO inhaler. PT very sleepy and unwilling/unable to follow commands. Pt told RT she does not want to do it.  RN notified

## 2021-03-21 NOTE — PLAN OF CARE
Problem: CARDIOVASCULAR - ADULT  Goal: Maintains optimal cardiac output and hemodynamic stability  Description: INTERVENTIONS:  - Monitor vital signs, rhythm, and trends  - Monitor for bleeding, hypotension and signs of decreased cardiac output  - Evalua status  Description: INTERVENTIONS  - Assess for and report changes in neurological status  - Initiate measures to prevent increased intracranial pressure  - Maintain blood pressure and fluid volume within ordered parameters to optimize cerebral perfusion

## 2021-03-21 NOTE — PROGRESS NOTES
Yunior for medication list. Margarita fax number to  but never received medication list. Called again and transferred to nursing office but no answer so message and call back number left.

## 2021-03-21 NOTE — ED NOTES
Orders for admission, patient is aware of plan and ready to go upstairs. Any questions, please call ED TAMMIE Walton  at [phone].    Type of COVID test sent:Rapid-neg  COVID Suspicion level: Low

## 2021-03-22 LAB — POTASSIUM SERPL-SCNC: 4 MMOL/L (ref 3.5–5.1)

## 2021-03-22 RX ORDER — NYSTATIN 100000 [USP'U]/G
POWDER TOPICAL 2 TIMES DAILY
COMMUNITY

## 2021-03-22 RX ORDER — QUETIAPINE 100 MG/1
100 TABLET, FILM COATED ORAL NIGHTLY
COMMUNITY

## 2021-03-22 RX ORDER — AMMONIUM LACTATE 12 G/100G
LOTION TOPICAL 2 TIMES DAILY
COMMUNITY

## 2021-03-22 RX ORDER — HYDROCODONE BITARTRATE AND ACETAMINOPHEN 10; 325 MG/1; MG/1
1 TABLET ORAL EVERY 6 HOURS PRN
Status: ON HOLD | COMMUNITY
End: 2021-03-25

## 2021-03-22 RX ORDER — CLOTRIMAZOLE AND BETAMETHASONE DIPROPIONATE 10; .64 MG/G; MG/G
CREAM TOPICAL 2 TIMES DAILY
COMMUNITY

## 2021-03-22 RX ORDER — LORAZEPAM 2 MG/1
4 TABLET ORAL NIGHTLY
Status: ON HOLD | COMMUNITY
End: 2021-03-25

## 2021-03-22 RX ORDER — ARIPIPRAZOLE 5 MG/1
7.5 TABLET ORAL DAILY
Status: DISCONTINUED | OUTPATIENT
Start: 2021-03-22 | End: 2021-03-23

## 2021-03-22 RX ORDER — QUETIAPINE 100 MG/1
100 TABLET, FILM COATED ORAL NIGHTLY
Status: DISCONTINUED | OUTPATIENT
Start: 2021-03-22 | End: 2021-03-25

## 2021-03-22 NOTE — PLAN OF CARE
Pt is alert and oriented. Forgetful at times. Drowsy and feeling fatigue tonight. Pt refused to eat dinner. Oral fluids given, but drank very little. IV antibiotics for BLE cellulitis. Chronic back pain. Given PRN norco. Pt needed assistance repositioning. Adolph Seip, RN  Outcome: Progressing      Problem: CARDIOVASCULAR - ADULT  Goal: Maintains optimal cardiac output and hemodynamic stability  Description: INTERVENTIONS:  - Monitor vital signs, rhythm, and trends  - Monitor for bleeding, hypotension and INTERVENTIONS:  - Assess and document risk factors for pressure ulcer development  - Assess and document skin integrity  - Assess and document dressing/incision, wound bed, drain sites and surrounding tissue  - Implement wound care per orders  - Initiate i

## 2021-03-22 NOTE — OCCUPATIONAL THERAPY NOTE
OCCUPATIONAL THERAPY EVALUATION - INPATIENT     Room Number: 329/329-A  Evaluation Date: 3/22/2021  Type of Evaluation: Initial  Presenting Problem:  (BLE celllulitis )    Physician Order: IP Consult to Occupational Therapy  Reason for Therapy: ADL/IADL Dy supports that patients with this level of impairment may benefit from KYLE. DISCHARGE RECOMMENDATIONS  OT Discharge Recommendations: 24 hour care/supervision;Sub-acute rehabilitation       PLAN  OT Treatment Plan: Balance activities; Energy conservation/ hyperlipidemia    • Other complicated headache syndrome 11/2/2017   • Other spondylosis, lumbar region 11/2/2017   • Pneumonia 2012   • Pneumonia due to organism    • S/P cervical spinal fusion: C3-6 and C7-T1 11/2/2017   • s/p L5-S1 right laminectomy 8/28 \"CROSSED EYES\"   • HIP REPLACEMENT SURGERY Bilateral    • HYSTERECTOMY  1967    Partial Hysterectomy   • JAW SURGERY     • KNEE REPLACEMENT SURGERY Bilateral     Bilateral arthritis    • LAMINECTOMY      WITH FUSION PER PATIENT   • OTHER SURGICAL HISTORY instructions needed     VISION  Current Vision: wears glasses     Communication: Pt makes needs known     Behavioral/Emotional/Social: Pt with flat affect, required encouragement to participate     RANGE OF MOTION   Upper extremity ROM is within functional tolerate standing for 4 minutes in prep for adls with CGA. Comment:    Patient will complete LE dressing with min. A with AE prn.    Comment:          Goals  on: 2021  Frequency: 3-5x/week     JESSE Cordova/JOSE ALBERTO

## 2021-03-22 NOTE — PLAN OF CARE
Pt received awake and alert but very sleepy. Pt reports being depressed at home and just walking around her apartment. Per pt's daughter, pt was seen talking to herself at home. C/o chronic back pain which she receives injections for.  Norco given with some FALL  Goal: Free from fall injury  Description: INTERVENTIONS:  - Assess pt frequently for physical needs  - Identify cognitive and physical deficits and behaviors that affect risk of falls.   - [place] fall precautions as indicated by assessment.  - Educ

## 2021-03-22 NOTE — PROGRESS NOTES
[facility] Hospitalist Progress Note     CC: Hospital Follow up    PCP: Bhavana Gilliam MD       Assessment/Plan:     Principal Problem:    Bilateral lower leg cellulitis    Ms. Rik Wells is an 78 year old female with PMH sig for cranial aneurysm s/p coiling, COPD cardiac     DVT Prophy: SCD, xarelto  Lines: PIV     Dispo: pending clinical course     Outpatient records or previous hospital records reviewed.      Further recommendations pending patient's clinical course.   [facility] hospitalist to continue to follow patient MCV 94.6 95.4   MCH 29.8 30.0   MCHC 31.5 31.5   RDW 13.8 13.8   NEPRELIM 4.33 3.32   WBC 7.2 5.3   .0 158.0         Recent Labs   Lab 03/20/21  1743 03/21/21  0505 03/22/21  0515   * 120*  --    BUN 18 17  --    CREATSERUM 1.31* 1.08*  -- lamoTRIgine  100 mg Oral BID   • Levothyroxine Sodium  100 mcg Oral Before breakfast   • Pantoprazole Sodium  40 mg Oral QAM AC   • Rivaroxaban  20 mg Oral Daily with food   • cefepime  1 g Intravenous Q12H       HYDROcodone-acetaminophen, acetaminophen, a

## 2021-03-22 NOTE — PHYSICAL THERAPY NOTE
PHYSICAL THERAPY EVALUATION - INPATIENT     Room Number: 329/329-A  Evaluation Date: 3/22/2021  Type of Evaluation: Initial   Physician Order: PT Eval and Treat    Presenting Problem: Tom LE cellulitis  Reason for Therapy: Mobility Dysfunction and Dischar from EOB to bedside chair. Needs assist to navigate walker and max encouragement to progress LEs. The patient's Approx Degree of Impairment: 76.75% has been calculated based on documentation in the [facility] '6 clicks' Inpatient Basic Mobility Short Form. mild-mod diffuse bulging disc 10/31/2014   • L4-5 slight grade 1 unstable spondylolisthesis 10/31/2014   • Leg wound, left 08/21/2019   • Low back pain    • Migraines    • Muscle weakness    • Onychomycosis     Debridement    • Oropharyngeal dysphagia 8/29 • ESOPHAGOGASTRODUODENOSCOPY (EGD) N/A 5/17/2019    Performed by Antonieta Alvarez MD at [address] Left 10/8/2019    Performed by Samson Gay MD at [facility] OR   • [facility] Cognitive Status:  Impaired  · Arousal/Alertness:  delayed responses to stimuli  · Following Commands:  follows one step commands with increased time and follows one step commands with repetition  · Safety Judgement:  decreased awareness of need for assist technique, role of IP PT. Patient End of Session: Up in chair;Needs met;Call light within reach;RN aware of session/findings; Alarm set    CURRENT GOALS    Goals to be met by: 4/5/2021  Patient Goal Patient's self-stated goal is: not stated.     Goal #1

## 2021-03-22 NOTE — PLAN OF CARE
Patient alert and oriented x4, slow to process what is being said to her. Flat affect and non-engaged but cooperative. Seen by physical therapy today, up in chair with walker. Remained in chair for five minutes before trying to get back into bed.   Slept decreased cardiac output  - Evaluate effectiveness of vasoactive medications to optimize hemodynamic stability  - Monitor arterial and/or venous puncture sites for bleeding and/or hematoma  - Assess quality of pulses, skin color and temperature  - Assess f to optimize cerebral perfusion and minimize risk of hemorrhage  - Monitor temperature, glucose, and sodium.  Initiate appropriate interventions as ordered  Outcome: Progressing     Problem: SAFETY ADULT - FALL  Goal: Free from fall injury  Description: INTE

## 2021-03-22 NOTE — CM/SW NOTE
SW self referred pt for DC planning due to PT/OT recommendations for SNF at time of DC. SW met w/ pt in her room. Pt verified her address and confirmed living at [facility]. Per pt, she has lived there 8.5 yrs.  Pt reports using a walker inside he

## 2021-03-23 LAB — VANCOMYCIN TROUGH SERPL-MCNC: 17.5 UG/ML (ref 10–20)

## 2021-03-23 PROCEDURE — 90792 PSYCH DIAG EVAL W/MED SRVCS: CPT | Performed by: OTHER

## 2021-03-23 RX ORDER — VANCOMYCIN HYDROCHLORIDE
1250 EVERY 24 HOURS
Status: DISCONTINUED | OUTPATIENT
Start: 2021-03-23 | End: 2021-03-24

## 2021-03-23 RX ORDER — ARIPIPRAZOLE 5 MG/1
5 TABLET ORAL 2 TIMES DAILY
Status: DISCONTINUED | OUTPATIENT
Start: 2021-03-23 | End: 2021-03-25

## 2021-03-23 NOTE — PROGRESS NOTES
[facility] Hospitalist Progress Note     CC: Hospital Follow up    PCP: Rafi Ludwig MD       Assessment/Plan:     Principal Problem:    Bilateral lower leg cellulitis    Ms. Saturnino Clifton is an 78 year old female with PMH sig for cranial aneurysm s/p coiling, COPD time     FN:  - IVF: none  - Diet: cardiac     DVT Prophy: SCD, xarelto on hold  Lines: PIV     Dispo: pending clinical course     Outpatient records or previous hospital records reviewed.      Further recommendations pending patient's clinical course.   DM 3.93   HGB 13.8 11.8*   HCT 43.8 37.5   MCV 94.6 95.4   MCH 29.8 30.0   MCHC 31.5 31.5   RDW 13.8 13.8   NEPRELIM 4.33 3.32   WBC 7.2 5.3   .0 158.0         Recent Labs   Lab 03/20/21  1743 03/21/21  0505 03/22/21  0515   * 120*  --    BUN 18 Furoate-Vilanterol  1 puff Inhalation Daily   • lamoTRIgine  100 mg Oral BID   • Levothyroxine Sodium  100 mcg Oral Before breakfast   • Pantoprazole Sodium  40 mg Oral QAM AC   • cefepime  1 g Intravenous Q12H       HYDROcodone-acetaminophen, acetaminophe

## 2021-03-23 NOTE — PLAN OF CARE
Patient alert,oriented and drowsy. Placed on 2L NC, Pt 02 was 84% on RA when first assessed. Went up to 90% on 2L. Pt remains on IV ABX and IV Lasix BID. IV in L arm found infiltrated, red and swollen. Removed IV and replaced on R arm.  No complaints throug hemodynamic stability  - Monitor arterial and/or venous puncture sites for bleeding and/or hematoma  - Assess quality of pulses, skin color and temperature  - Assess for signs of decreased coronary artery perfusion - ex.  Angina  - Evaluate fluid balance, a glucose, and sodium.  Initiate appropriate interventions as ordered  Outcome: Progressing     Problem: SAFETY ADULT - FALL  Goal: Free from fall injury  Description: INTERVENTIONS:  - Assess pt frequently for physical needs  - Identify cognitive and physica

## 2021-03-23 NOTE — CM/SW NOTE
CM received notification from Denise,   They are able to resume [address] on Thurs if patient discharges. PLAN: [facility] w/ [facility], pend med clear   HHC orders are needed on discharge.         SW/CM to remain available for support and/or di

## 2021-03-24 ENCOUNTER — APPOINTMENT (OUTPATIENT)
Dept: GENERAL RADIOLOGY | Facility: HOSPITAL | Age: 81
DRG: 603 | End: 2021-03-24
Attending: HOSPITALIST
Payer: MEDICARE

## 2021-03-24 ENCOUNTER — TELEPHONE (OUTPATIENT)
Dept: NEUROLOGY | Facility: CLINIC | Age: 81
End: 2021-03-24

## 2021-03-24 PROCEDURE — 99233 SBSQ HOSP IP/OBS HIGH 50: CPT | Performed by: OTHER

## 2021-03-24 PROCEDURE — 71046 X-RAY EXAM CHEST 2 VIEWS: CPT | Performed by: HOSPITALIST

## 2021-03-24 NOTE — PHYSICAL THERAPY NOTE
PHYSICAL THERAPY TREATMENT NOTE - INPATIENT     Room Number: 329/329-A       Presenting Problem: Tom LE cellulitis    Problem List  Principal Problem:    Bilateral lower leg cellulitis  Active Problems:    Bipolar affective disorder, currently depressed, m have consistent assist to help with functional task. This decreased support can increase risk for fall. The patient's Approx Degree of Impairment: 57.7% has been calculated based on documentation in the [facility] '6 clicks' Inpatient Basic Mobility Short Form. Little   -   Need to walk in hospital room?: A Lot   -   Climbing 3-5 steps with a railing?: Total     AM-PAC Score:  Raw Score: 15   Approx Degree of Impairment: 57.7%   Standardized Score (AM-PAC Scale): 39.45   CMS Modifier (G-Code): CK    FUNCTIONAL AB

## 2021-03-24 NOTE — PLAN OF CARE
Problem: Patient Centered Care  Goal: Patient preferences are identified and integrated in the patient's plan of care  Description: Interventions:  - What would you like us to know as we care for you?  Lives by herself at [place] [facility]  - Provide timely, arrhythmias or at baseline  Description: INTERVENTIONS:  - Continuous cardiac monitoring, monitor vital signs, obtain 12 lead EKG if indicated  - Evaluate effectiveness of antiarrhythmic and heart rate control medications as ordered  - Initiate emergency m precautions as indicated by assessment.  - Educate pt/family on patient safety including physical limitations  - Instruct pt to call for assistance with activity based on assessment  - Modify environment to reduce risk of injury  - Provide assistive device

## 2021-03-24 NOTE — PLAN OF CARE
Problem: Patient Centered Care  Goal: Patient preferences are identified and integrated in the patient's plan of care  Description: Interventions:  - What would you like us to know as we care for you?  Lives by herself at [place] [facility]  - Provide timely, arrhythmias or at baseline  Description: INTERVENTIONS:  - Continuous cardiac monitoring, monitor vital signs, obtain 12 lead EKG if indicated  - Evaluate effectiveness of antiarrhythmic and heart rate control medications as ordered  - Initiate emergency m precautions as indicated by assessment.  - Educate pt/family on patient safety including physical limitations  - Instruct pt to call for assistance with activity based on assessment  - Modify environment to reduce risk of injury  - Provide assistive device

## 2021-03-24 NOTE — PLAN OF CARE
Alert. Pain managed with [place]. Bed locked and in lowest position. Call light in reach. Will continue to monitor.      Problem: Patient Centered Care  Goal: Patient preferences are identified and integrated in the patient's plan of care  Description: [place] Angina  - Evaluate fluid balance, assess for edema, trend weights  Outcome: Progressing  Goal: Absence of cardiac arrhythmias or at baseline  Description: INTERVENTIONS:  - Continuous cardiac monitoring, monitor vital signs, obtain 12 lead EKG if indicated needs  - Identify cognitive and physical deficits and behaviors that affect risk of falls.   - [place] fall precautions as indicated by assessment.  - Educate pt/family on patient safety including physical limitations  - Instruct pt to call for assistance

## 2021-03-24 NOTE — PROGRESS NOTES
[facility] Hospitalist Progress Note     CC: Hospital Follow up    PCP: Thelma Salas MD       Assessment/Plan:     Principal Problem:    Bilateral lower leg cellulitis  Active Problems:    Bipolar affective disorder, currently depressed, moderate ([address] active symptoms      Migraines  - prn fioricet, tylenol       CODE  - DNR/select  - POA- son and daughter- does not want them to be called at this time  - lives at independent living [facility], refusing SNF     FN:  - IVF: none  - Diet: cardiac     DVT Proph depressed  SKIN: warm, dry, erythema of BL shins, L > R, chronic venous stasis changes  EXT: 1+ pitting edema BLE    Data Review:       Labs:     Recent Labs   Lab 03/20/21  1743 03/21/21  0505   RBC 4.63 3.93   HGB 13.8 11.8*   HCT 43.8 37.5   MCV 94.6 95

## 2021-03-24 NOTE — PROGRESS NOTES
[facility] dosing service    Follow-up Pharmacokinetic Consult for Vancomycin Dosing     Teo Chappell is a 78year old patient who is being treated for cellulitis. Patient is on day 4 of Vancomycin and is currently receiving 1.5 gm IV Q 24 hours.   G  Pharmacy Extension: [phone]

## 2021-03-24 NOTE — CONSULTS
[facility]    Report of Consultation    Carmen Covarrubias Patient Status:  Inpatient    12/3/1940 MRN [id number]   Location [facility] 3W/SW Attending Estrella Linda MD   Hosp Day # 3 PCP Adrianna Gonzalez MD     Date of Adm we give her the benztropine. Otherwise the patient admitted today that she has been getting very confused and she been having difficulty taking care of herself.   Patient did not remember how she ended up in the hospital.  Patient admitted some hallucina • Ximena 02/25/2011    mikaelae 5 left, pain   • Hearing impairment     Bilateral hearing aids   • High blood pressure    • Hip pain, left    • History of blood clots     Leg   • Hypothyroidism    • Incontinence    • L3-4 stable slight grade 1 retrol by Zak Nieves MD at [address] [facility] ENDOSCOPY   • CAROTID ENDARTERECTOMY Right    • CARPAL TUNNEL RELEASE Right ~2008   • [address]    Cholecystitis   • COLONOSCOPY  2010   • DEBRIDEMENT OF NAIL(S), 1-5      Toe, Onychomycosis   • EGD  05/2019    Korey Choi Paternal Grandfather    • Cancer Brother 54        Liver    • Neurological Disorder Sister         ALzheimer's Disease    • Stroke Sister         Cerebrovascular accident    • Heart Disease Brother         Coronary Artery Disease        Social History  Soc External Lotion, Apply topically 2 (two) times a day. clotrimazole-betamethasone 1-0.05 % External Cream, Apply topically 2 (two) times daily.  Left breast  HYDROcodone-acetaminophen  MG Oral Tab, Take 1 tablet by mouth every 6 (six) hours as needed topically 3 (three) times daily as needed. STOOL SOFTENER 100 MG Oral Cap, ONE CAPSULE BY MOUTH TWICE DAILY  cholecalciferol 1000 UNITS Oral Cap, Take 2 capsules (2,000 Units total) by mouth daily.   Rivaroxaban (XARELTO) 20 MG Oral Tab, Take 1 tablet (20 Imaging:  MRI BRAIN (CPT=70551)    Result Date: 3/21/2021  CONCLUSION:  1. No acute intracranial process by noncontrast CT technique. 2. Redemonstration of a coiled aneurysm in the right supraclinoid region.   3. Senescent changes of parenchymal volume history. Judgment and insight are questionable for patient has becoming more depressed with the frequent visit to the hospital not following full recommendation. Impression:     Impression:        Delirium due to other medical condition.   Bipolar diso Prescriptions      No prescriptions requested or ordered in this encounter           Agata Blood MD  3/23/2021

## 2021-03-24 NOTE — CM/SW NOTE
CM /Progression of Care  Physical Medicine consult with Dr. Erick Anna pending, CM contacted Dr. Adrienne Wu office [phone] to confirm consult has been received. SW/CM to remain available for support and/or discharge planning.          Arpan Acevedo

## 2021-03-24 NOTE — PROGRESS NOTES
[facility] Pharmacy Note: Antimicrobial Weight Based Dose Adjustment for: ceftriaxone (ROCEPHIN)    Yecenia Norris is a 78year old patient who has been prescribed ceftriaxone (ROCEPHIN) 1000 mg every 24 hours.     Estimated Creatinine Clearance: 40.5 mL/min (A)

## 2021-03-25 VITALS
TEMPERATURE: 98 F | SYSTOLIC BLOOD PRESSURE: 124 MMHG | RESPIRATION RATE: 18 BRPM | HEART RATE: 68 BPM | WEIGHT: 247.5 LBS | BODY MASS INDEX: 37.51 KG/M2 | HEIGHT: 68 IN | DIASTOLIC BLOOD PRESSURE: 69 MMHG | OXYGEN SATURATION: 96 %

## 2021-03-25 LAB
AMPHET UR QL SCN: NEGATIVE
ANION GAP SERPL CALC-SCNC: 4 MMOL/L (ref 0–18)
BARBITURATES UR QL SCN: NEGATIVE
BENZODIAZ UR QL SCN: NEGATIVE
BUN BLD-MCNC: 30 MG/DL (ref 7–18)
BUN/CREAT SERPL: 19.9 (ref 10–20)
CALCIUM BLD-MCNC: 9.5 MG/DL (ref 8.5–10.1)
CANNABINOIDS UR QL SCN: NEGATIVE
CHLORIDE SERPL-SCNC: 103 MMOL/L (ref 98–112)
CO2 SERPL-SCNC: 33 MMOL/L (ref 21–32)
COCAINE UR QL: NEGATIVE
CREAT BLD-MCNC: 1.51 MG/DL
CREAT UR-SCNC: 123 MG/DL
GLUCOSE BLD-MCNC: 133 MG/DL (ref 70–99)
MDMA UR QL SCN: NEGATIVE
METHADONE UR QL SCN: NEGATIVE
OSMOLALITY SERPL CALC.SUM OF ELEC: 298 MOSM/KG (ref 275–295)
OXYCODONE UR QL SCN: NEGATIVE
PCP UR QL SCN: NEGATIVE
POTASSIUM SERPL-SCNC: 3.5 MMOL/L (ref 3.5–5.1)
SODIUM SERPL-SCNC: 140 MMOL/L (ref 136–145)

## 2021-03-25 RX ORDER — FUROSEMIDE 40 MG/1
40 TABLET ORAL DAILY
Qty: 30 TABLET | Refills: 0 | Status: ON HOLD | OUTPATIENT
Start: 2021-03-25 | End: 2021-08-14

## 2021-03-25 RX ORDER — HYDROCODONE BITARTRATE AND ACETAMINOPHEN 5; 325 MG/1; MG/1
1 TABLET ORAL EVERY 6 HOURS PRN
Qty: 10 TABLET | Refills: 0 | Status: SHIPPED | OUTPATIENT
Start: 2021-03-25 | End: 2021-04-26

## 2021-03-25 RX ORDER — POTASSIUM CHLORIDE 20 MEQ/1
40 TABLET, EXTENDED RELEASE ORAL EVERY 4 HOURS
Status: DISPENSED | OUTPATIENT
Start: 2021-03-25 | End: 2021-03-25

## 2021-03-25 RX ORDER — ARIPIPRAZOLE 5 MG/1
5 TABLET ORAL 2 TIMES DAILY
Qty: 30 TABLET | Refills: 0 | Status: SHIPPED | OUTPATIENT
Start: 2021-03-25 | End: 2021-06-22

## 2021-03-25 NOTE — PROGRESS NOTES
[facility] Hospitalist Progress Note     CC: Hospital Follow up    PCP: Gale Menon MD       Assessment/Plan:     Principal Problem:    Bilateral lower leg cellulitis  Active Problems:    Bipolar affective disorder, currently depressed, moderate ([facility] [address] exacerbation  - continue home MDI     H/o DVT  -mulitple  -cont xarelto 20 mg     Weakness/ Deconditioned   - PT/OT     H/o COVID  - recovered with no active symptoms      Migraines  - prn fioricet, tylenol       CODE  - DNR/select  - POA- son and daughter patient  Pulm: CTAB  CV: RRR, no murmurs  ABD: Soft, non-tender, non-distended, +BS  Neuro: Grossly normal, CN intact, sensory intact  Psych: Affect- depressed  SKIN: warm, dry, erythema of BL shins, L > R, chronic venous stasis changes  EXT: trace edema B acetaminophen, acetaminophen

## 2021-03-25 NOTE — OCCUPATIONAL THERAPY NOTE
OCCUPATIONAL THERAPY TREATMENT NOTE - INPATIENT        Room Number: 329/329-A           Presenting Problem:  (BLE celllulitis )    Problem List  Principal Problem:    Bilateral lower leg cellulitis  Active Problems:    Bipolar affective disorder, currently None                PAIN ASSESSMENT  Ratin  Location:  (Low back pain )  Management Techniques: Repositioning;Relaxation (RN aware to provide when pt able to have next )       ACTIVITIES OF DAILY LIVING ASSESSMENT  AM-PAC ‘6-Clicks’ Inpatient Daily Act adls with [facility]. Comment: Ongoing    Patient will complete LE dressing with min. A with AE prn.    Comment: MET 2021            Goals  on: 2021  Frequency: 3-5x/week      JESSE Cordova/JOSE ALBERTO

## 2021-03-25 NOTE — DISCHARGE PLANNING
I called and gave report to nurse Anshul Nieto at [facility] rehab facility.  Patient's physical and history were relayed to nursing staff and included past medical history, admitting diagnosis of cellulitis and heart failure with strict instructi

## 2021-03-25 NOTE — PROGRESS NOTES
Patient is a 78year old   female with a chronic history of bipolar disorder addition to multiple medical condition of HTN, COPD, DVT, cranial aneurysm, with multiple admission for cellulitis who presented to the hospital with altered me with perseveration repeating herself and repeating the same story in the same discussion. Patient is very talkative and pleasant today with improvement in her cognition and orientation. The patient today deny any homicidal or suicidal ideation.   Josse • Other complicated headache syndrome 11/2/2017   • Other spondylosis, lumbar region 11/2/2017   • Pneumonia 2012   • Pneumonia due to organism    • S/P cervical spinal fusion: C3-6 and C7-T1 11/2/2017   • s/p L5-S1 right laminectomy 8/28/2014   • Shortn REPLACEMENT SURGERY Bilateral    • HYSTERECTOMY  1967    Partial Hysterectomy   • JAW SURGERY     • KNEE REPLACEMENT SURGERY Bilateral     Bilateral arthritis    • LAMINECTOMY      WITH FUSION PER PATIENT   • OTHER SURGICAL HISTORY  9536,1573,1209,0337 drinks    Drug use: No          Current Medications:  cefTRIAXone Sodium (ROCEPHIN) 2 g in sodium chloride 0.9% 100 mL IVPB-ADDV, 2 g, Intravenous, Q24H  ARIpiprazole (ABILIFY) tab 5 mg, 5 mg, Oral, BID  mupirocin (BACTROBAN) 2% nasal ointment OINT 1 Appli mg total) by mouth 2 (two) times daily. Ondansetron HCl (ZOFRAN) 4 mg tablet, Take 4 mg by mouth every 8 (eight) hours as needed for Nausea. acetaminophen 325 MG Oral Tab, Take 650 mg by mouth every 6 (six) hours as needed.   Fluticasone Furoate-Vilantero move  Metronidazole           RASH  Phentermine             OTHER (SEE COMMENTS)    Comment:Mini stroke  Tramadol                OTHER (SEE COMMENTS)    Comment:Extreme sleepiness  Dilaudid [[employer]*    RASH, OTHER (SEE COMMENTS)    Comment:Tolerates hyd patient is alert and oriented to self, place and condition but not to exact date. Gait: Not assessed patient laying down in bed. Attitude/Coorperation: Patient presented cooperative but distracted. Behavior: fair eye contact.   Speech: Regular rate and r physical function. .  3.  Continue Abilify to 5 mg twice daily. 4.  Continue Seroquel 100 mg nightly. 5.  Continue Lamictal 100 mg twice daily. 6.  Still awaiting for toxicology? 7. Consider Aricept when patient improves cognitively.   8.  Coordinate tr

## 2021-03-25 NOTE — PLAN OF CARE
Pt more alert today. No c/o pain. Pt refusing tubigrip at this time. Purewick in place. Will continue to monitor.      Problem: Patient Centered Care  Goal: Patient preferences are identified and integrated in the patient's plan of care  Description: Dian Olsen Angina  - Evaluate fluid balance, assess for edema, trend weights  Outcome: Progressing  Goal: Absence of cardiac arrhythmias or at baseline  Description: INTERVENTIONS:  - Continuous cardiac monitoring, monitor vital signs, obtain 12 lead EKG if indicated needs  - Identify cognitive and physical deficits and behaviors that affect risk of falls.   - Ruston fall precautions as indicated by assessment.  - Educate pt/family on patient safety including physical limitations  - Instruct pt to call for assistance

## 2021-03-25 NOTE — CM/SW NOTE
DISCHARGE RECOMMENDATIONS  PT/OT Discharge Recommendations: Sub-acute rehabilitation    Patient is now agreeable to Sub Acute Rehabiliation.    Patient has chosen [facility]  As she has been there in the past.   KAYLAN jefferson requested

## 2021-03-25 NOTE — PLAN OF CARE
Patient is discharging to [facility] rehab at [address] via [employer].  Pt aware and agreeable to plan of care    Problem: Patient Centered Care  Goal: Patient preferences are identified and integrated in the patient's plan of care  Description: Interventions:  - decreased cardiac output  - Evaluate effectiveness of vasoactive medications to optimize hemodynamic stability  - Monitor arterial and/or venous puncture sites for bleeding and/or hematoma  - Assess quality of pulses, skin color and temperature  - Assess f indicated  3/25/2021 1628 by Shabbir Huynh RN  Outcome: Adequate for Discharge  3/25/2021 1210 by Shabbir Huynh RN  Outcome: Progressing     Problem: NEUROLOGICAL - ADULT  Goal: Achieves stable or improved neurological status  Description: INTERVENTIONS

## 2021-03-25 NOTE — DISCHARGE SUMMARY
General Medicine Discharge Summary     Patient ID:  Amaris Cole  78year old  12/3/1940    Admit date: 3/20/2021    Discharge date and time: 03/25/21    Attending Physician: Sonia Terry MD     Primary Care Physician: MD MARIA LUZ Wang blanching  - last admitted 2/4-2/10 and 1/11 - 1/15  - unclear if truly has new cellulitis vs. Recurrent erythema from venous stasis and worsens with increased edema  - added picture to media section in chart, plan to add picture on discharge so having tamie changes  EXT: trace edema BLE    Operative Procedures:      Imaging: XR CHEST PA + LAT CHEST (CPT=71046)    Result Date: 3/24/2021  CONCLUSION:  1. Suboptimal inspiration. Minimal bibasilar scarring/atelectasis. No acute airspace disease.     Dictated by Tabs  Commonly known as: LAMICTAL     Levothyroxine Sodium 100 MCG Tabs  Commonly known as: SYNTHROID  Take 1 tablet (100 mcg total) by mouth before breakfast.     lidocaine 5 % Ptch  Commonly known as: Lidoderm  Place 1 patch onto the skin daily.      magn tolerated  Diet: regular diet  Wound Care: none needed  Code Status: DNAR/Selective Treatment  O2: n/a    Follow-up with:    PCP   Specialist        SIMRAN ADDISON instructions:       Other Discharge Instructions:       Sometimes managing your health at home re

## 2021-03-29 LAB
OPIATES, UR, 6-ACETYLMORPHINE: <10 NG/ML
OPIATES, URINE, CODEINE: <20 NG/ML
OPIATES, URINE, HYDROCODONE: 89 NG/ML
OPIATES, URINE, HYDROMORPHONE: <20 NG/ML
OPIATES, URINE, MORPHINE: <20 NG/ML
OPIATES, URINE, NORHYDROCODONE: 268 NG/ML
OPIATES, URINE, NOROXYCODONE: <20 NG/ML
OPIATES, URINE, NOROXYMORPHONE: <20 NG/ML
OPIATES, URINE, OXYCODONE: <20 NG/ML
OPIATES, URINE, OXYMORPHONE: <20 NG/ML

## 2021-03-29 NOTE — TELEPHONE ENCOUNTER
S/W [phone] [facility] and tried to call patient phone. Patient did not  will try again.

## 2021-04-07 NOTE — TELEPHONE ENCOUNTER
S/W patient and she fell 3x at her apartment. Patient states she did hit her head but LOC. Patient takes norco once a day for pain. Patient states she she doing PT/OT/SLP at rehab and states she is slowly getting better.  Patient states her back still hurts

## 2021-04-26 NOTE — TELEPHONE ENCOUNTER
S/W patient and she would like to have shoulder injections done at [facility]. Patient would like to have [place] refilled until NOV. Medication request: HYDROcodone-acetaminophen 5-325 MG Oral Tab.  Take 1 tablet by mouth every 6 (six) hours as needed    LOV 11

## 2021-04-28 RX ORDER — HYDROCODONE BITARTRATE AND ACETAMINOPHEN 5; 325 MG/1; MG/1
1 TABLET ORAL EVERY 6 HOURS PRN
Qty: 10 TABLET | Refills: 0 | Status: SHIPPED | OUTPATIENT
Start: 2021-04-28 | End: 2021-05-04

## 2021-04-30 ENCOUNTER — TELEPHONE (OUTPATIENT)
Dept: NEUROLOGY | Facility: CLINIC | Age: 81
End: 2021-04-30

## 2021-04-30 DIAGNOSIS — M54.16 LUMBAR RADICULOPATHY: Primary | ICD-10-CM

## 2021-04-30 NOTE — TELEPHONE ENCOUNTER
Per Medicare guidelines authorization is not required for Bilateral L5 TFESI cpt codes 06389-47, 98747. Will inform Nursing.

## 2021-04-30 NOTE — TELEPHONE ENCOUNTER
S/W patient and fax sent to Dr. Mitzy Benedict. Patient stated she will call their office as well.      Patient can be added to [facility] on 05/07 scheduled but need clearance for XARELTO 20 MG TABLET

## 2021-04-30 NOTE — TELEPHONE ENCOUNTER
S/W patient she would like to have the back injections done. Patient states her back is hurting her a lot and kept her up at night. Rates pain 10/10. Patient is using the lidocaine patches.  Patient states the would like to have injections done that were s

## 2021-05-03 NOTE — TELEPHONE ENCOUNTER
Faxed received and patient is cleared to hold  XARELTO 20 MG TABLET for two days prior    MD Julieta Blanco RN  She is okay to hold before the procedure

## 2021-05-03 NOTE — TELEPHONE ENCOUNTER
Patient has been scheduled for Bilateral L5 TFESI on 5/7/21 at the [facility] with [address]    -Anesthesia type: MAC.  -If receiving MAC or IVC sedation patient will need to get COVID tested 3 days prior (order placed by [facility].)  -Medications and allergies review

## 2021-05-03 NOTE — TELEPHONE ENCOUNTER
LMTCB. Patient to be scheduled for Bilateral L5 TFESI on 5/7/21 at the [place] with Edd Quintero. Anesthesia type: MAC. Will need to review injection instructions and remind patient that Lamas Diandra will need to be held.

## 2021-05-04 ENCOUNTER — OFFICE VISIT (OUTPATIENT)
Dept: OTOLARYNGOLOGY | Facility: CLINIC | Age: 81
End: 2021-05-04
Payer: MEDICARE

## 2021-05-04 ENCOUNTER — OFFICE VISIT (OUTPATIENT)
Dept: AUDIOLOGY | Facility: CLINIC | Age: 81
End: 2021-05-04
Payer: MEDICARE

## 2021-05-04 VITALS
HEART RATE: 81 BPM | TEMPERATURE: 97 F | WEIGHT: 255 LBS | DIASTOLIC BLOOD PRESSURE: 73 MMHG | HEIGHT: 68 IN | BODY MASS INDEX: 38.65 KG/M2 | SYSTOLIC BLOOD PRESSURE: 122 MMHG

## 2021-05-04 DIAGNOSIS — H90.3 SENSORINEURAL HEARING LOSS (SNHL) OF BOTH EARS: Primary | ICD-10-CM

## 2021-05-04 DIAGNOSIS — H91.90 HEARING LOSS, UNSPECIFIED HEARING LOSS TYPE, UNSPECIFIED LATERALITY: Primary | ICD-10-CM

## 2021-05-04 PROCEDURE — 99213 OFFICE O/P EST LOW 20 MIN: CPT | Performed by: OTOLARYNGOLOGY

## 2021-05-04 PROCEDURE — 92552 PURE TONE AUDIOMETRY AIR: CPT | Performed by: AUDIOLOGIST

## 2021-05-04 NOTE — PROGRESS NOTES
Carmen Covarrubias is a 78year old female. Patient presents with:  Cerumen Impaction: Pt is here for bilateral ear wax cleaning.   States she also is requesting note for [facility] where she recently had hearing test done, in order to qualify for hearing aid Senna-Docusate Sodium 8.6-50 MG Oral Tab Take 2 tablets by mouth daily. • lamoTRIgine 100 MG Oral Tab Take 1 tablet (100 mg total) by mouth 2 (two) times daily.  60 tablet 0   • bisacodyl 10 MG Rectal Suppos Place 1 suppository (10 mg total) rectally da pressure    • Hip pain, left    • Hypothyroidism    • Incontinence    • L3-4 stable slight grade 1 retrolisthesis 10/31/2014   • L4-5 mild-mod diffuse bulging disc 10/31/2014   • L4-5 slight grade 1 unstable spondylolisthesis 10/31/2014   • Leg wound, left (1.727 m)   Wt 255 lb (115.7 kg)   BMI 38.77 kg/m²   System Findings Details   Skin Normal Inspection - Normal.   Constitutional Normal Overall appearance - Normal.   Head/Face Normal Facial features - Normal. Eyebrows - Normal. Skull - Normal.   Oral/Orop

## 2021-05-04 NOTE — TELEPHONE ENCOUNTER
Medication request: HYDROcodone-acetaminophen 5-325 MG Oral Tab.   Take 1 tablet by mouth every 6 (six) hours as needed    LOV:11/19/2020   NOV: 05/24/2021    [place] 04/29/2021 #10 Refill-0

## 2021-05-05 RX ORDER — HYDROCODONE BITARTRATE AND ACETAMINOPHEN 5; 325 MG/1; MG/1
TABLET ORAL
Qty: 10 TABLET | Refills: 0 | Status: SHIPPED | OUTPATIENT
Start: 2021-05-05 | End: 2021-05-11

## 2021-05-05 NOTE — PROGRESS NOTES
AUDIOGRAM     Severa Sable was referred for testing by Tonya Garcia for decreased hearing in both ears  12/3/1940  [id number]      Patient was here to obtain medical clearance for hearing aid use, but did not bring copy of most recent testing    Testin

## 2021-05-07 ENCOUNTER — OFFICE VISIT (OUTPATIENT)
Dept: SURGERY | Facility: CLINIC | Age: 81
End: 2021-05-07

## 2021-05-07 DIAGNOSIS — M48.061 SPINAL STENOSIS OF LUMBAR REGION WITHOUT NEUROGENIC CLAUDICATION: ICD-10-CM

## 2021-05-07 DIAGNOSIS — M54.16 LUMBAR RADICULOPATHY: Primary | ICD-10-CM

## 2021-05-07 DIAGNOSIS — M96.1 LUMBAR POST-LAMINECTOMY SYNDROME: ICD-10-CM

## 2021-05-07 DIAGNOSIS — M51.36 LUMBAR DEGENERATIVE DISC DISEASE: ICD-10-CM

## 2021-05-07 PROCEDURE — 64483 NJX AA&/STRD TFRM EPI L/S 1: CPT | Performed by: PHYSICAL MEDICINE & REHABILITATION

## 2021-05-07 NOTE — PROCEDURES
[address]    BILATERAL LUMBAR TRANSFORAMINAL   NAME:  Janelle Saha    MR #:    [id number] :  12/3/1940     PHYSICIAN:  Joanna Baumann        Operative Report    DATE OF PROCEDURE: 2021   PREOPERATIVE DIAGNOSES: 1. bilater the needle to the 6 o'clock position on the right L5 pedicle. At this point in time, Omnipaque-240 contrast was used to obtain a good epidurogram indicating correct needle placement. Then, aspiration was performed. No blood, fluid, or air was aspirated.

## 2021-05-10 ENCOUNTER — TELEPHONE (OUTPATIENT)
Dept: NEUROLOGY | Facility: CLINIC | Age: 81
End: 2021-05-10

## 2021-05-10 NOTE — TELEPHONE ENCOUNTER
Patient is on Apixaban. Will need clearance from PCP- to hold prior to scheduling injection. Letter faxed via Epic to Richa Esrtada requesting clearance to hold blood thinner.

## 2021-05-10 NOTE — TELEPHONE ENCOUNTER
Narcotic Refill Request    Medication request: HYDROCODONE-ACETAMINOPHEN 5-325 MG ONE TABLET BY MOUTH EVERY 6 HOURS AS NEEDED     LOV: injection-5/7/21, OV-11/19/2020  NOV: 5/24/21     [place] 5/5/21 #10

## 2021-05-10 NOTE — TELEPHONE ENCOUNTER
Per Medicare guidelines authorization is not required for Bilateral L5 TFESI cpt codes 84535-49, 89276. Will inform Nursing.

## 2021-05-11 RX ORDER — HYDROCODONE BITARTRATE AND ACETAMINOPHEN 5; 325 MG/1; MG/1
TABLET ORAL
Qty: 60 TABLET | Refills: 0 | Status: SHIPPED | OUTPATIENT
Start: 2021-05-11 | End: 2021-07-14

## 2021-05-14 ENCOUNTER — TELEPHONE (OUTPATIENT)
Dept: NEUROLOGY | Facility: CLINIC | Age: 81
End: 2021-05-14

## 2021-05-17 NOTE — TELEPHONE ENCOUNTER
Patient states she had 60% improvement from injections 05/07/2021 and states it still hurts and when sitting and walking. Patient states she is still taking Norco. Patient has follow up on 05/24.  Patient rates pain 6/10 and denies N/T.

## 2021-05-19 ENCOUNTER — OFFICE VISIT (OUTPATIENT)
Dept: AUDIOLOGY | Facility: CLINIC | Age: 81
End: 2021-05-19
Payer: MEDICARE

## 2021-05-19 DIAGNOSIS — H90.3 SENSORINEURAL HEARING LOSS, BILATERAL: Primary | ICD-10-CM

## 2021-05-19 PROCEDURE — 92567 TYMPANOMETRY: CPT | Performed by: AUDIOLOGIST

## 2021-05-19 PROCEDURE — 92557 COMPREHENSIVE HEARING TEST: CPT | Performed by: AUDIOLOGIST

## 2021-05-20 NOTE — PROGRESS NOTES
AUDIOLOGY REPORT      Tejas Gomez is a 78year old female     Referring Provider: Yolanda Griffith   YOB: 1940  Medical Record: [id number]      Patient Hearing History:  Patient was seen by Dr. Lisa Concepcion on 5-4-21, seeking medical clearance for

## 2021-05-21 ENCOUNTER — TELEPHONE (OUTPATIENT)
Dept: OTOLARYNGOLOGY | Facility: CLINIC | Age: 81
End: 2021-05-21

## 2021-05-21 NOTE — PROGRESS NOTES
Please let her know that her hearing test does show a pretty significant hearing loss but I have not received any hearing test from [facility] to compare them to

## 2021-05-24 ENCOUNTER — TELEPHONE (OUTPATIENT)
Dept: OTOLARYNGOLOGY | Facility: CLINIC | Age: 81
End: 2021-05-24

## 2021-05-24 NOTE — TELEPHONE ENCOUNTER
Author: Jewels Sandoval MD Service: Mychal Espinosa Type: Physician   Filed: 5/21/2021  6:51 AM Encounter Date: 5/19/2021 Status: Signed   : Jewels Sandoval MD (Physician)        Show:Clear all  [x]Manual[]Template[]Copied    Added by:  [x]Rebecca Diaz

## 2021-05-25 ENCOUNTER — TELEPHONE (OUTPATIENT)
Dept: NEUROLOGY | Facility: CLINIC | Age: 81
End: 2021-05-25

## 2021-05-25 NOTE — TELEPHONE ENCOUNTER
Pt was mad that we were unable to schedule her sooner than 8/2. Her appointment was yesterday, 5/24, but she canceled same day. PT refused to work with me to try and find her an appt and would like to speak to a nurse.

## 2021-06-11 NOTE — PLAN OF CARE
Occupational Therapy   Initial Evaluation     Patient Name: Scottie Sylvester  Age:  54 y.o., Sex:  male  Medical Record #: [id number]  Today's Date: 6/11/2021     Precautions  Precautions: (P) Fall Risk, Swallow Precautions ( See Comments), Nasogastric Tube    Assessment  Patient is 54 y.o. male with a diagnosis of Heat exposure, abdominal pn.   Pt currently limited by decreased functional mobility, activity tolerance, cognition, decreased safety awareness, balance, which are affecting pt's ability to complete ADLs/IADLs at baseline. Pt would benefit from OT services in the acute care setting to maximize functional recovery.     Plan    Recommend Occupational Therapy 3 times per week until therapy goals are met for the following treatments:  Self Care/Activities of Daily Living and Therapeutic Activities.       Discharge Recommendations: (P) Recommend post-acute placement for additional occupational therapy services prior to discharge home        06/11/21 0828   Prior Living Situation   Prior Services None   Housing / Facility 1 Story Apartment / Condo  (town house)   Steps In Home 13   Equipment Owned Single Point Cane   Lives with - Patient's Self Care Capacity Spouse;Child Less than 18 Years of Age   Prior Level of ADL Function   Self Feeding Independent   Grooming / Hygiene Independent   Bathing Independent   Dressing Independent   Toileting Independent   ADL Assessment   Grooming Supervision   Upper Body Dressing Supervision   Lower Body Dressing Minimal Assist   Toileting Minimal Assist   Functional Mobility   Sit to Stand Minimal Assist   Bed, Chair, Wheelchair Transfer Moderate Assist   Short Term Goals   Short Term Goal # 1 supervised with LB dressing   Short Term Goal # 2 supervised with LB dressing   Short Term Goal # 3 supervised with ADL txfs            Problem: Patient Centered Care  Goal: Patient preferences are identified and integrated in the patient's plan of care  Description  Interventions:  - What would you like us to know as we care for you?   - Provide timely, complete, and accurate informatio appropriate and evaluate response  - Consider cultural and social influences on pain and pain management  - Manage/alleviate anxiety  - Utilize distraction and/or relaxation techniques  - Monitor for opioid side effects  - Notify MD/LIP if interventions un

## 2021-06-16 ENCOUNTER — OFFICE VISIT (OUTPATIENT)
Dept: NEUROLOGY | Facility: CLINIC | Age: 81
End: 2021-06-16
Payer: MEDICARE

## 2021-06-16 ENCOUNTER — TELEPHONE (OUTPATIENT)
Dept: NEUROLOGY | Facility: CLINIC | Age: 81
End: 2021-06-16

## 2021-06-16 VITALS — HEIGHT: 68 IN | WEIGHT: 240 LBS | BODY MASS INDEX: 36.37 KG/M2

## 2021-06-16 DIAGNOSIS — M47.816 LUMBAR FACET ARTHROPATHY: ICD-10-CM

## 2021-06-16 DIAGNOSIS — M51.36 LUMBAR DEGENERATIVE DISC DISEASE: ICD-10-CM

## 2021-06-16 DIAGNOSIS — F33.2 SEVERE EPISODE OF RECURRENT MAJOR DEPRESSIVE DISORDER, WITHOUT PSYCHOTIC FEATURES (HCC): ICD-10-CM

## 2021-06-16 DIAGNOSIS — L03.116 CELLULITIS OF LEFT LOWER EXTREMITY WITHOUT FOOT: ICD-10-CM

## 2021-06-16 DIAGNOSIS — M96.1 LUMBAR POST-LAMINECTOMY SYNDROME: ICD-10-CM

## 2021-06-16 DIAGNOSIS — Z98.1 S/P CERVICAL SPINAL FUSION: ICD-10-CM

## 2021-06-16 DIAGNOSIS — M19.011 PRIMARY OSTEOARTHRITIS OF RIGHT SHOULDER: ICD-10-CM

## 2021-06-16 DIAGNOSIS — M43.10 RETROLISTHESIS OF VERTEBRAE: ICD-10-CM

## 2021-06-16 DIAGNOSIS — M43.16 SPONDYLOLISTHESIS OF LUMBAR REGION: ICD-10-CM

## 2021-06-16 DIAGNOSIS — M48.061 SPINAL STENOSIS OF LUMBAR REGION WITHOUT NEUROGENIC CLAUDICATION: ICD-10-CM

## 2021-06-16 DIAGNOSIS — M25.511 ACUTE PAIN OF RIGHT SHOULDER: ICD-10-CM

## 2021-06-16 DIAGNOSIS — M51.26 LUMBAR HERNIATED DISC: ICD-10-CM

## 2021-06-16 DIAGNOSIS — M54.16 LUMBAR RADICULOPATHY: Primary | ICD-10-CM

## 2021-06-16 DIAGNOSIS — N31.9 NEUROGENIC BLADDER: ICD-10-CM

## 2021-06-16 PROCEDURE — 99214 OFFICE O/P EST MOD 30 MIN: CPT | Performed by: PHYSICAL MEDICINE & REHABILITATION

## 2021-06-16 NOTE — PROGRESS NOTES
Low Back Pain H & P    Chief Complaint: Patient presents with:  Back Pain: 05/07/2021 Bilateral L5 transforaminal epidural steroid. LOV 10/02/2020. patient states she had 50% iimprovment from injections. Patient rates pain 6/10 and denies N/T.  Patient doin Disorder of thyroid    • Esophageal reflux    • Hammertoe 02/25/2011    hammertoe 5 left, pain   • Hearing impairment     Bilateral hearing aids   • High blood pressure    • Hip pain, left    • Hypothyroidism    • Incontinence    • L3-4 stable slight grade 3390,7913,9622,4104    Cervical Discectomy AND FUSION   • PREP SITE T/A/L 1ST 100 SQ CM/1PCT Left 08/21/2019    Left leg debridement, VAC placed   • SPLIT GRFT PROC TRUNK <100SQCM Left 10/08/2019    Left leg debridement and Split Thickness Skin Graft to le crystal light        Occupational Exposure: Not Asked        Hobby Hazards: Not Asked        Sleep Concern: Not Asked        Stress Concern: Not Asked        Weight Concern: Not Asked        Special Diet: Not Asked        Back Care: Not Asked        Vanessa Ferreira Status:   Intimate Partner Violence:       Fear of Current or Ex-Partner:       Emotionally Abused:       Physically Abused:       Sexually Abused:     Review of Systems  Patient-reported ROS  Constitutional  Sleep Disturbance: denies   Cardiovascular  Nila plantar reflexes: downward response   Reflexes: absent in bilateral lower extremities     Assessment  1. bilateral L5-S1 radiculopathy    2.  Severe episode of recurrent major depressive disorder, without psychotic features ([address]    3. s/p L5-S1 right bret

## 2021-06-16 NOTE — TELEPHONE ENCOUNTER
Patient on Apixaban (Eliquis). Will need clearance from PCP- to hold this 2 days prior to the injection before scheduling. Letter faxed to 's office via [address] [place] [address] Rd.    Left  for patient informing her as soon as clearance is received our

## 2021-06-16 NOTE — PATIENT INSTRUCTIONS
Plan  I will perform right L4-5 and L5-S1 z-joint injections to see if this will help the right low back pain. If these do not help, then she might need to have bilateral L4 TFESI's. The patient will continue with PT and the OT.     She will increase

## 2021-06-16 NOTE — TELEPHONE ENCOUNTER
Per Medicare Guidelines -no authorization is required for facet injections    Status: Approved-no authorization required per health plan     Clinical staff can proceed with scheduling Right L4-5 and L5-S1 z-joint injections CPT [id number] dx:M47.816 at [facility]

## 2021-06-18 NOTE — ED NOTES
Security called for pt belongings. Superior at bedside to transfer pt to [facility] [place] by [facility]. room air

## 2021-06-21 ENCOUNTER — TELEPHONE (OUTPATIENT)
Dept: NEUROLOGY | Facility: CLINIC | Age: 81
End: 2021-06-21

## 2021-06-23 ENCOUNTER — TELEPHONE (OUTPATIENT)
Dept: NEUROLOGY | Facility: CLINIC | Age: 81
End: 2021-06-23

## 2021-06-23 NOTE — TELEPHONE ENCOUNTER
Attempted to inform patient of message below, but she stated she was outside in the rain and would need to call back.

## 2021-06-23 NOTE — TELEPHONE ENCOUNTER
Patient needs to confirm we recvd approval from Dr Gurpreet Pedroza and she wants to know about her injection.  Please call to discuss

## 2021-06-23 NOTE — TELEPHONE ENCOUNTER
Spoke with PCP- office who stated they will get a message sent back to their clinical staff to follow up on message below.

## 2021-06-23 NOTE — TELEPHONE ENCOUNTER
Per Brandy Bolus: Jerardo Baker has my approval to hold for 3 days\"    Patient has been scheduled for Right L4-5 and L5-S1 z-joint injections on 7/9/21 at the [facility] with Delphine Kaur.    -Anesthesia type: local.  -If receiving MAC or IVC sedation patient will need to ge

## 2021-07-09 ENCOUNTER — OFFICE VISIT (OUTPATIENT)
Dept: SURGERY | Facility: CLINIC | Age: 81
End: 2021-07-09

## 2021-07-09 DIAGNOSIS — M47.816 LUMBAR FACET ARTHROPATHY: Primary | ICD-10-CM

## 2021-07-09 PROCEDURE — 64493 INJ PARAVERT F JNT L/S 1 LEV: CPT | Performed by: PHYSICAL MEDICINE & REHABILITATION

## 2021-07-09 PROCEDURE — 64494 INJ PARAVERT F JNT L/S 2 LEV: CPT | Performed by: PHYSICAL MEDICINE & REHABILITATION

## 2021-07-09 NOTE — PROCEDURES
[facility] Z-JOINT/FACET INJECTIONS  NAME:  Elina Perdue    MR #:    [id number] :  12/3/1940     PHYSICIAN:  iVet Baumann        Operative Report    DATE OF PROCEDURE: 2021   PREOPERATIVE DIAGNOSES: 1.  Lumbar f the whole procedure, the patient's pulse oximetry and vital signs were monitored and they remained completely stable. Also, throughout the whole procedure, prior to injection of any medication, aspiration was performed.   No blood, fluid, or air was aspira

## 2021-07-14 RX ORDER — HYDROCODONE BITARTRATE AND ACETAMINOPHEN 5; 325 MG/1; MG/1
1 TABLET ORAL EVERY 6 HOURS PRN
Qty: 60 TABLET | Refills: 0 | Status: ON HOLD | OUTPATIENT
Start: 2021-07-14 | End: 2021-08-14

## 2021-07-14 NOTE — TELEPHONE ENCOUNTER
Narcotic Refill Request    Medication request: HYDROCODONE-ACETAMINOPHEN 5-325 MG ONE TABLET BY MOUTH EVERY 6 HOURS AS NEEDED *DO NOT EXCEED 4GM/APAP IN 24 HOURS FROM ALL SOURCES    LOV: 7/9/21-injection, 6/1621- OV  NOV: 9/1/21     [place]: 5/11

## 2021-07-15 ENCOUNTER — TELEPHONE (OUTPATIENT)
Dept: NEUROLOGY | Facility: CLINIC | Age: 81
End: 2021-07-15

## 2021-07-20 ENCOUNTER — TELEPHONE (OUTPATIENT)
Dept: NEUROLOGY | Facility: CLINIC | Age: 81
End: 2021-07-20

## 2021-07-21 NOTE — TELEPHONE ENCOUNTER
S/W patient and she fell backward but denies hitting head and LOC. Patient states fell on Monday and is having a hard time reaching PCP office due to phone issues. Patient states she takes Norco 1-2 days weekly. Patient states she has increased pain at night

## 2021-08-02 ENCOUNTER — TELEPHONE (OUTPATIENT)
Dept: NEUROLOGY | Facility: CLINIC | Age: 81
End: 2021-08-02

## 2021-08-02 DIAGNOSIS — M54.16 LUMBAR RADICULOPATHY: Primary | ICD-10-CM

## 2021-08-02 NOTE — TELEPHONE ENCOUNTER
Last injection 7/9/21: \"right L4-5 and L5-S1 Z-joint/facet injection \"  LOV: 6/16/21    Per Leonela Saravia at LOV: \"Plan  I will perform right L4-5 and L5-S1 z-joint injections to see if this will help the right low back pain.   If these do not help, then she

## 2021-08-03 ENCOUNTER — TELEPHONE (OUTPATIENT)
Dept: NEUROLOGY | Facility: CLINIC | Age: 81
End: 2021-08-03

## 2021-08-03 NOTE — TELEPHONE ENCOUNTER
Per Medicare Guidelines -no authorization is required for khurram     Status: Approved-Covered Benefit     Clinical staff can proceed with scheduling Bilateral L4 TFESIs CPT 96243-82 dx:M54.16 at [facility]

## 2021-08-03 NOTE — TELEPHONE ENCOUNTER
She should try taking 2 of the Norco at night to see if this works better for her before I will give her a new prescription.

## 2021-08-03 NOTE — TELEPHONE ENCOUNTER
S/W patient and she verbalized she understood she can increase her Norco to 2 tabs at night for pain.

## 2021-08-09 NOTE — TELEPHONE ENCOUNTER
LMTCB. Patient to be scheduled for  Bilateral L4 TFESIs at the [facility] with . -Anesthesia type: local.  - Blood thinner clearance has been received to hold Eliquis 2 days prior to injection.

## 2021-08-09 NOTE — TELEPHONE ENCOUNTER
Patient has been scheduled for a Bilateral L4 TFESIs  on 08/20/2021 at the [facility]. Medications and allergies reviewed. Patient informed she will need a .  Patient informed not to eat or drink anything after midnight the night prior to the procedure if b

## 2021-08-11 ENCOUNTER — HOSPITAL ENCOUNTER (OUTPATIENT)
Facility: HOSPITAL | Age: 81
Setting detail: OBSERVATION
Discharge: NURSING, REHAB OR LONG-TERM CARE | End: 2021-08-14
Attending: EMERGENCY MEDICINE | Admitting: HOSPITALIST
Payer: MEDICARE

## 2021-08-11 DIAGNOSIS — N17.9 AKI (ACUTE KIDNEY INJURY) (HCC): ICD-10-CM

## 2021-08-11 DIAGNOSIS — R53.83 FATIGUE, UNSPECIFIED TYPE: Primary | ICD-10-CM

## 2021-08-11 LAB
ANION GAP SERPL CALC-SCNC: 2 MMOL/L (ref 0–18)
BASE EXCESS BLD CALC-SCNC: 9.8 MMOL/L (ref ?–2)
BASOPHILS # BLD AUTO: 0.05 X10(3) UL (ref 0–0.2)
BASOPHILS NFR BLD AUTO: 0.5 %
BILIRUB UR QL: NEGATIVE
BUN BLD-MCNC: 30 MG/DL (ref 7–18)
BUN/CREAT SERPL: 23.3 (ref 10–20)
CALCIUM BLD-MCNC: 10 MG/DL (ref 8.5–10.1)
CHLORIDE SERPL-SCNC: 108 MMOL/L (ref 98–112)
CLARITY UR: CLEAR
CO2 SERPL-SCNC: 36 MMOL/L (ref 21–32)
COLOR UR: COLORLESS
CREAT BLD-MCNC: 1.29 MG/DL
DEPRECATED RDW RBC AUTO: 49.4 FL (ref 35.1–46.3)
EOSINOPHIL # BLD AUTO: 0.28 X10(3) UL (ref 0–0.7)
EOSINOPHIL NFR BLD AUTO: 2.9 %
ERYTHROCYTE [DISTWIDTH] IN BLOOD BY AUTOMATED COUNT: 13.5 % (ref 11–15)
GLUCOSE BLD-MCNC: 95 MG/DL (ref 70–99)
GLUCOSE UR-MCNC: NEGATIVE MG/DL
HCO3 BLDA-SCNC: 32.4 MEQ/L (ref 21–27)
HCT VFR BLD AUTO: 42.9 %
HGB BLD-MCNC: 13.8 G/DL
HGB UR QL STRIP.AUTO: NEGATIVE
IMM GRANULOCYTES # BLD AUTO: 0.04 X10(3) UL (ref 0–1)
IMM GRANULOCYTES NFR BLD: 0.4 %
KETONES UR-MCNC: NEGATIVE MG/DL
LEUKOCYTE ESTERASE UR QL STRIP.AUTO: NEGATIVE
LYMPHOCYTES # BLD AUTO: 2.51 X10(3) UL (ref 1–4)
LYMPHOCYTES NFR BLD AUTO: 26 %
MCH RBC QN AUTO: 31.8 PG (ref 26–34)
MCHC RBC AUTO-ENTMCNC: 32.2 G/DL (ref 31–37)
MCV RBC AUTO: 98.8 FL
MODIFIED ALLEN TEST: POSITIVE
MONOCYTES # BLD AUTO: 0.58 X10(3) UL (ref 0.1–1)
MONOCYTES NFR BLD AUTO: 6 %
NEUTROPHILS # BLD AUTO: 6.18 X10 (3) UL (ref 1.5–7.7)
NEUTROPHILS # BLD AUTO: 6.18 X10(3) UL (ref 1.5–7.7)
NEUTROPHILS NFR BLD AUTO: 64.2 %
NITRITE UR QL STRIP.AUTO: NEGATIVE
O2 CT BLD-SCNC: 18.6 VOL% (ref 15–23)
OSMOLALITY SERPL CALC.SUM OF ELEC: 308 MOSM/KG (ref 275–295)
PCO2 BLDA: 58 MM HG (ref 35–45)
PH BLDA: 7.41 [PH] (ref 7.35–7.45)
PH UR: 7 [PH] (ref 5–8)
PLATELET # BLD AUTO: 158 10(3)UL (ref 150–450)
PO2 BLDA: 60 MM HG (ref 80–100)
POTASSIUM SERPL-SCNC: 4.3 MMOL/L (ref 3.5–5.1)
PROT UR-MCNC: NEGATIVE MG/DL
PUNCTURE CHARGE: YES
RBC # BLD AUTO: 4.34 X10(6)UL
SAO2 % BLDA: 93.9 % (ref 94–100)
SODIUM SERPL-SCNC: 146 MMOL/L (ref 136–145)
SP GR UR STRIP: 1.01 (ref 1–1.03)
TSI SER-ACNC: 3.13 MIU/ML (ref 0.36–3.74)
UROBILINOGEN UR STRIP-ACNC: <2
WBC # BLD AUTO: 9.6 X10(3) UL (ref 4–11)

## 2021-08-11 RX ORDER — METOCLOPRAMIDE HYDROCHLORIDE 5 MG/ML
5 INJECTION INTRAMUSCULAR; INTRAVENOUS EVERY 8 HOURS PRN
Status: DISCONTINUED | OUTPATIENT
Start: 2021-08-11 | End: 2021-08-14

## 2021-08-11 RX ORDER — ONDANSETRON 2 MG/ML
4 INJECTION INTRAMUSCULAR; INTRAVENOUS EVERY 6 HOURS PRN
Status: DISCONTINUED | OUTPATIENT
Start: 2021-08-11 | End: 2021-08-14

## 2021-08-11 RX ORDER — ACETAMINOPHEN 325 MG/1
650 TABLET ORAL EVERY 6 HOURS PRN
Status: DISCONTINUED | OUTPATIENT
Start: 2021-08-11 | End: 2021-08-14

## 2021-08-11 NOTE — ED INITIAL ASSESSMENT (HPI)
Transported from [facility] by EMS due to increasing fatigue and weakness since yesterday evening.  Denies fever

## 2021-08-12 ENCOUNTER — APPOINTMENT (OUTPATIENT)
Dept: CT IMAGING | Facility: HOSPITAL | Age: 81
End: 2021-08-12
Attending: Other
Payer: MEDICARE

## 2021-08-12 ENCOUNTER — APPOINTMENT (OUTPATIENT)
Dept: GENERAL RADIOLOGY | Facility: HOSPITAL | Age: 81
End: 2021-08-12
Attending: HOSPITALIST
Payer: MEDICARE

## 2021-08-12 ENCOUNTER — APPOINTMENT (OUTPATIENT)
Dept: CT IMAGING | Facility: HOSPITAL | Age: 81
End: 2021-08-12
Attending: HOSPITALIST
Payer: MEDICARE

## 2021-08-12 LAB
ALBUMIN SERPL-MCNC: 2.9 G/DL (ref 3.4–5)
ALBUMIN/GLOB SERPL: 1 {RATIO} (ref 1–2)
ALP LIVER SERPL-CCNC: 96 U/L
ALT SERPL-CCNC: 18 U/L
ANION GAP SERPL CALC-SCNC: 0 MMOL/L (ref 0–18)
AST SERPL-CCNC: 11 U/L (ref 15–37)
BASOPHILS # BLD AUTO: 0.04 X10(3) UL (ref 0–0.2)
BASOPHILS NFR BLD AUTO: 0.3 %
BILIRUB SERPL-MCNC: 0.3 MG/DL (ref 0.1–2)
BUN BLD-MCNC: 27 MG/DL (ref 7–18)
BUN/CREAT SERPL: 24.5 (ref 10–20)
CALCIUM BLD-MCNC: 9.6 MG/DL (ref 8.5–10.1)
CHLORIDE SERPL-SCNC: 109 MMOL/L (ref 98–112)
CHOLEST SMN-MCNC: 165 MG/DL (ref ?–200)
CO2 SERPL-SCNC: 33 MMOL/L (ref 21–32)
CREAT BLD-MCNC: 1.1 MG/DL
DEPRECATED RDW RBC AUTO: 48.4 FL (ref 35.1–46.3)
EOSINOPHIL # BLD AUTO: 0.14 X10(3) UL (ref 0–0.7)
EOSINOPHIL NFR BLD AUTO: 1.2 %
ERYTHROCYTE [DISTWIDTH] IN BLOOD BY AUTOMATED COUNT: 13.6 % (ref 11–15)
EST. AVERAGE GLUCOSE BLD GHB EST-MCNC: 123 MG/DL (ref 68–126)
GLOBULIN PLAS-MCNC: 2.9 G/DL (ref 2.8–4.4)
GLUCOSE BLD-MCNC: 160 MG/DL (ref 70–99)
GLUCOSE BLDC GLUCOMTR-MCNC: 88 MG/DL (ref 70–99)
HBA1C MFR BLD HPLC: 5.9 % (ref ?–5.7)
HCT VFR BLD AUTO: 41.7 %
HDLC SERPL-MCNC: 58 MG/DL (ref 40–59)
HGB BLD-MCNC: 13.5 G/DL
IMM GRANULOCYTES # BLD AUTO: 0.06 X10(3) UL (ref 0–1)
IMM GRANULOCYTES NFR BLD: 0.5 %
LDLC SERPL CALC-MCNC: 95 MG/DL (ref ?–100)
LYMPHOCYTES # BLD AUTO: 1.64 X10(3) UL (ref 1–4)
LYMPHOCYTES NFR BLD AUTO: 14.1 %
M PROTEIN MFR SERPL ELPH: 5.8 G/DL (ref 6.4–8.2)
MCH RBC QN AUTO: 31.5 PG (ref 26–34)
MCHC RBC AUTO-ENTMCNC: 32.4 G/DL (ref 31–37)
MCV RBC AUTO: 97.4 FL
MONOCYTES # BLD AUTO: 0.73 X10(3) UL (ref 0.1–1)
MONOCYTES NFR BLD AUTO: 6.3 %
NEUTROPHILS # BLD AUTO: 8.99 X10 (3) UL (ref 1.5–7.7)
NEUTROPHILS # BLD AUTO: 8.99 X10(3) UL (ref 1.5–7.7)
NEUTROPHILS NFR BLD AUTO: 77.6 %
NONHDLC SERPL-MCNC: 107 MG/DL (ref ?–130)
OSMOLALITY SERPL CALC.SUM OF ELEC: 303 MOSM/KG (ref 275–295)
PLATELET # BLD AUTO: 154 10(3)UL (ref 150–450)
POTASSIUM SERPL-SCNC: 4.3 MMOL/L (ref 3.5–5.1)
RBC # BLD AUTO: 4.28 X10(6)UL
SODIUM SERPL-SCNC: 142 MMOL/L (ref 136–145)
TRIGL SERPL-MCNC: 61 MG/DL (ref 30–149)
VLDLC SERPL CALC-MCNC: 10 MG/DL (ref 0–30)
WBC # BLD AUTO: 11.6 X10(3) UL (ref 4–11)

## 2021-08-12 PROCEDURE — 70450 CT HEAD/BRAIN W/O DYE: CPT | Performed by: HOSPITALIST

## 2021-08-12 PROCEDURE — 70498 CT ANGIOGRAPHY NECK: CPT | Performed by: OTHER

## 2021-08-12 PROCEDURE — 99213 OFFICE O/P EST LOW 20 MIN: CPT | Performed by: OTHER

## 2021-08-12 PROCEDURE — 71045 X-RAY EXAM CHEST 1 VIEW: CPT | Performed by: HOSPITALIST

## 2021-08-12 PROCEDURE — 72131 CT LUMBAR SPINE W/O DYE: CPT | Performed by: OTHER

## 2021-08-12 PROCEDURE — 70496 CT ANGIOGRAPHY HEAD: CPT | Performed by: OTHER

## 2021-08-12 RX ORDER — HYDRALAZINE HYDROCHLORIDE 20 MG/ML
10 INJECTION INTRAMUSCULAR; INTRAVENOUS EVERY 2 HOUR PRN
Status: DISCONTINUED | OUTPATIENT
Start: 2021-08-12 | End: 2021-08-14

## 2021-08-12 RX ORDER — BISACODYL 10 MG
10 SUPPOSITORY, RECTAL RECTAL
Status: DISCONTINUED | OUTPATIENT
Start: 2021-08-12 | End: 2021-08-14

## 2021-08-12 RX ORDER — LAMOTRIGINE 100 MG/1
100 TABLET ORAL 2 TIMES DAILY
Status: DISCONTINUED | OUTPATIENT
Start: 2021-08-12 | End: 2021-08-14

## 2021-08-12 RX ORDER — PANTOPRAZOLE SODIUM 40 MG/1
40 TABLET, DELAYED RELEASE ORAL
Status: DISCONTINUED | OUTPATIENT
Start: 2021-08-12 | End: 2021-08-14

## 2021-08-12 RX ORDER — LABETALOL HYDROCHLORIDE 5 MG/ML
10 INJECTION, SOLUTION INTRAVENOUS EVERY 10 MIN PRN
Status: DISCONTINUED | OUTPATIENT
Start: 2021-08-12 | End: 2021-08-14

## 2021-08-12 RX ORDER — HYDROCODONE BITARTRATE AND ACETAMINOPHEN 5; 325 MG/1; MG/1
1 TABLET ORAL EVERY 6 HOURS PRN
Status: DISCONTINUED | OUTPATIENT
Start: 2021-08-12 | End: 2021-08-14

## 2021-08-12 RX ORDER — LEVOTHYROXINE SODIUM 0.1 MG/1
100 TABLET ORAL
Status: DISCONTINUED | OUTPATIENT
Start: 2021-08-12 | End: 2021-08-14

## 2021-08-12 RX ORDER — QUETIAPINE 25 MG/1
100 TABLET, FILM COATED ORAL NIGHTLY
Status: DISCONTINUED | OUTPATIENT
Start: 2021-08-12 | End: 2021-08-14

## 2021-08-12 RX ORDER — ATORVASTATIN CALCIUM 40 MG/1
80 TABLET, FILM COATED ORAL NIGHTLY
Status: DISCONTINUED | OUTPATIENT
Start: 2021-08-12 | End: 2021-08-14

## 2021-08-12 RX ORDER — DOCUSATE SODIUM 100 MG/1
100 CAPSULE, LIQUID FILLED ORAL 2 TIMES DAILY PRN
Status: DISCONTINUED | OUTPATIENT
Start: 2021-08-12 | End: 2021-08-14

## 2021-08-12 RX ORDER — DONEPEZIL HYDROCHLORIDE 5 MG/1
5 TABLET, FILM COATED ORAL NIGHTLY
Status: DISCONTINUED | OUTPATIENT
Start: 2021-08-12 | End: 2021-08-14

## 2021-08-12 RX ORDER — DONEPEZIL HYDROCHLORIDE 5 MG/1
5 TABLET, FILM COATED ORAL NIGHTLY
COMMUNITY

## 2021-08-12 RX ORDER — GABAPENTIN 100 MG/1
100 CAPSULE ORAL 2 TIMES DAILY
Status: DISCONTINUED | OUTPATIENT
Start: 2021-08-12 | End: 2021-08-14

## 2021-08-12 NOTE — ED QUICK NOTES
Pt A&Ox3, reg resp. Pt here for increased tiredness and sleepiness. Pt states this is not her norm. Pt denies any other ss. Pt placed on monitor, aware of plan of care, call light within reach.

## 2021-08-12 NOTE — CM/SW NOTE
08/12/21 1400   CM/SW Referral Data   Referral Source Physician   Reason for Referral Discharge planning   Informant Patient   Pertinent Medical Hx   Does patient have an established PCP?  Yes  (Dr. Maryanne Newton)   Patient Info   Advanced directive

## 2021-08-12 NOTE — ED PROVIDER NOTES
Patient Seen in: [facility] AND [facility] Emergency Department      History   Patient presents with:  Fatigue    Stated Complaint: weakness fatigue    HPI/Subjective:   HPI    69-year-old female with history of anxiety, brain aneurysm, hypertension, thyroid dis headache syndrome 11/2/2017   • Other spondylosis, lumbar region 11/2/2017   • Pneumonia 2012   • S/P cervical spinal fusion: C3-6 and C7-T1 11/2/2017   • s/p L5-S1 right laminectomy 8/28/2014   • Sleep apnea    • stent placement     cerebral   • Thyroid d Alcohol/week: 0.0 standard drinks    Drug use: No             Review of Systems   Constitutional: Positive for fatigue. Negative for fever. HENT: Negative for congestion. Eyes: Negative for visual disturbance.    Respiratory: Negative for shortness of changes    Cardiac Monitor:    Patient placed on the cardiac monitor and a rhythm strip obtained with the indication of fatigue.   Monitor shows regular rhythm at a rate of 88 bpm.     My interpretation is   normal for rate   Normal for rhythm    Pulse Oxim Specimen: Urine   Result Value Ref Range    Urine Color Colorless (A) Yellow    Clarity Urine Clear Clear    Spec Gravity 1.009 1.001 - 1.030    Glucose Urine Negative Negative mg/dL    Bilirubin Urine Negative Negative    Ketones Urine Negative Negative m DEPARTMENT MEDICAL DECISION MAKING:  After obtaining the patient's history, performing the physical exam and reviewing the diagnostics, multiple initial diagnoses were considered based on the presenting problem including generalized weakness, UTI, pneumoni

## 2021-08-12 NOTE — ED QUICK NOTES
Orders for admission, patient is aware of plan and ready to go upstairs. Any questions, please call ED TAMMIE Gandara  at extension [phone]. Type of COVID test sent:n/a vaccinated, no ss  COVID Suspicion level: Low    Titratable drug(s) infusing:NS  Rate: Astrid Martin

## 2021-08-12 NOTE — CONSULTS
[facility]  [address] [facility], [address] [place] [address]  [phone]          [place] NOTE       [facility]    Report of Consultation    Carly Pizano Patient Status:  Observation before breakfast., Disp: 90 tablet, Rfl: 3  Senna-Docusate Sodium 8.6-50 MG Oral Tab, Take 2 tablets by mouth daily. , Disp: , Rfl:   lamoTRIgine 100 MG Oral Tab, Take 100 mg by mouth 2 (two) times daily.   , Disp: 60 tablet, Rfl: 0  bisacodyl 10 MG Rectal Powd Pack, Take 17 g by mouth daily. (Patient not taking: Reported on 5/4/2021 ), Disp:  , Rfl: 0  muscle rub 10-15 % External Cream, Apply topically 3 (three) times daily as needed. , Disp:  , Rfl: 0  STOOL SOFTENER 100 MG Oral Cap, ONE CAPSULE BY MOUTH • Thyroid disease    • Toe pain     Onychomycosis, Debridement , Ximena    • Tonsillitis 1950    Tonsillitis    • Unspecified sleep apnea    • Visual impairment     EYE GLASSES   • Wound of left leg 10/03/2019    Left leg debridement and Split Thickne Alcohol/week: 0.0 standard drinks      Drug use: No    Other Topics      Concerns:        Caffeine Concern: No          crystal light        Exercise: No          No PT due to Covid        History of tanning: No        Pt has a pacemaker: No        Pt h done and is negative unless otherwise stated in HPI. ? PHYSICAL EXAM:   Hypertensive  /55 (BP Location: Right arm)   Pulse 63   Temp 98.2 °F (36.8 °C) (Oral)   Resp 20   Ht 64\"   Wt 244 lb 11.2 oz (111 kg)   SpO2 94%   BMI 42.00 kg/m²   General a x10(3) uL 0.14   Basophils Absolute      0.00 - 0.20 x10(3) uL 0.04   Immature Granulocyte Absolute      0.00 - 1.00 x10(3) uL 0.06   Neutrophils %      % 77.6   Lymphocytes %      % 14.1   Monocytes %      % 6.3   Eosinophils %      % 1.2   Basophils % strength. This proximal weakness in her arm is related to pain. Hip flexor weakness is concerning for possible radiculopathy in the setting of acute exacerbated back pain. Is unable to do an MRI unless she is under general anesthesia.   It is possible sh

## 2021-08-12 NOTE — OCCUPATIONAL THERAPY NOTE
OCCUPATIONAL THERAPY EVALUATION - INPATIENT     Room Number: 527/527-A  Evaluation Date: 8/12/2021  Type of Evaluation: Initial       Physician Order: IP Consult to Occupational Therapy  Reason for Therapy: ADL/IADL Dysfunction and Discharge Planning able to follow basic commands throughout with delayed processing. Set patient up to use phone and noticed decreased coordination when reaching for the  and sustaining grasp.  Patient did have decreased strength on r side and was buckling in standing Ximena 02/25/2011    hammertoe 5 left, pain   • Hearing impairment     Bilateral hearing aids   • High blood pressure    • Hip pain, left    • Hypothyroidism    • Incontinence    • L3-4 stable slight grade 1 retrolisthesis 10/31/2014   • L4-5 mild-mod d • PREP SITE T/A/L 1ST 100 SQ CM/1PCT Left 08/21/2019    Left leg debridement, VAC placed   • SPLIT GRFT PROC TRUNK <100SQCM Left 10/08/2019    Left leg debridement and Split Thickness Skin Graft to left thigh   • TONSILLECTOMY  1950    Tonsillitis Score (AM-PAC Scale): 33.39  CMS Modifier (G-Code): CK    FUNCTIONAL TRANSFER ASSESSMENT  Functional TF: Mod A x2    FUNCTIONAL ADL ASSESSMENT  Grooming: Setup  Feeding: Setup  Bathing: Mod A   Toileting:  Max A   Upper Extremity Dressing: Min A   Lower Ext

## 2021-08-12 NOTE — PLAN OF CARE
Problem: Patient Centered Care  Goal: Patient preferences are identified and integrated in the patient's plan of care  Description: Interventions:  - What would you like us to know as we care for you?   - Provide timely, complete, and accurate informatio Progressing     Problem: SKIN/TISSUE INTEGRITY - ADULT  Goal: Skin integrity remains intact  Description: INTERVENTIONS  - Assess and document risk factors for pressure ulcer development  - Assess and document skin integrity  - Monitor for areas of redness Goal  Description: Patient's Short Term Goal: To feel better    Interventions:   - Monitor labs  - Pain mgmt  - See additional Care Plan goals for specific interventions  Outcome: Not Progressing     Problem: PAIN - ADULT  Goal: Verbalizes/displays adequat

## 2021-08-12 NOTE — PHYSICAL THERAPY NOTE
PHYSICAL THERAPY EVALUATION - INPATIENT     Room Number: 527/527-A  Evaluation Date: 8/12/2021  Type of Evaluation: Initial   Physician Order: PT Eval and Treat    Presenting Problem: Fatigue  Reason for Therapy: Mobility Dysfunction and Discharge Davidn from continued IP PT services to address these deficits in preparation for discharge. DISCHARGE RECOMMENDATIONS  PT Discharge Recommendations: Sub-acute rehabilitation    PLAN  PT Treatment Plan: Bed mobility; Body mechanics; Endurance; Patient education;E unspecified site    • Other and unspecified hyperlipidemia    • Other complicated headache syndrome 11/2/2017   • Other spondylosis, lumbar region 11/2/2017   • Pneumonia 2012   • S/P cervical spinal fusion: C3-6 and C7-T1 11/2/2017   • s/p L5-S1 right khalil Uses a motorized scooter to mobilize in room.  Patient requires assist with ADLs    SUBJECTIVE  Patient agreeable to therapy services    PHYSICAL THERAPY EXAMINATION     OBJECTIVE  Precautions: Bed/chair alarm  Fall Risk: High fall risk    WEIGHT BEARING RE able to demonstrate supine - sit EOB @ level: supervision     Goal #1   Current Status    Goal #2 Patient is able to demonstrate transfers Sit to/from Stand at assistance level: supervision with walker - rolling     Goal #2  Current Status    Goal #3 Joshua

## 2021-08-12 NOTE — H&P
[facility] Hospitalist H&P       CC: Patient presents with:  Fatigue       PCP: Keisha Santos MD    Date of Admission: 8/11/2021  5:46 PM    ASSESSMENT / PLAN:        Ms. Emily Easton is an 78 year old female with PMH sig for cranial aneurysm s/p coiling, COPD, H Service number: [phone]    HPI       History of Present Illness:     Ms. Rik Fine is an 78 year old female with PMH sig for cranial aneurysm s/p coiling, COPD, HTN, DVT on Eliquis, bipolar, hs COVID, aortic atherosclerosis, hs of multiple admits for LLE c • Other and unspecified hyperlipidemia    • Other complicated headache syndrome 11/2/2017   • Other spondylosis, lumbar region 11/2/2017   • Pneumonia 2012   • S/P cervical spinal fusion: C3-6 and C7-T1 11/2/2017   • s/p L5-S1 right laminectomy 8/28/2014 OTHER (SEE COMMENTS)    Comment:Mini stroke  Tramadol                OTHER (SEE COMMENTS)    Comment:Extreme sleepiness  Dilaudid [[employer]*    RASH, OTHER (SEE COMMENTS)    Comment:Tolerates hydrocodone     Home Medications:  donepezil 5 MG Ora Problem Relation Age of Onset   • Heart Attack Mother    • Heart Disease Mother         Coronary Artery Disease, Premature    • Fibromyalgia Sister    • Heart Disease Sister    • Heart Attack Sister    • Heart Disease Brother    • Heart Attack Brother  08/12/2021    CA 9.6 08/12/2021    ALB 2.9 08/12/2021    ALKPHO 96 08/12/2021    BILT 0.3 08/12/2021    TP 5.8 08/12/2021    AST 11 08/12/2021    ALT 18 08/12/2021    TSH 3.130 08/11/2021       No results for input(s): TROP in the last 168 hours.

## 2021-08-12 NOTE — PLAN OF CARE
Problem: Patient Centered Care  Goal: Patient preferences are identified and integrated in the patient's plan of care  Description: Interventions:  - What would you like us to know as we care for you?   - Provide timely, complete, and accurate informatio neutropenic period  Description: INTERVENTIONS  - Monitor WBC  - Administer growth factors as ordered  - Implement neutropenic guidelines  Outcome: Progressing     Problem: SAFETY ADULT - FALL  Goal: Free from fall injury  Description: INTERVENTIONS:  - As patient handling equipment as needed  - Ensure adequate protection for wounds/incisions during mobilization  - Obtain PT/OT consults as needed  - Advance activity as appropriate  - Communicate ordered activity level and limitations with patient/family  Out

## 2021-08-13 ENCOUNTER — APPOINTMENT (OUTPATIENT)
Dept: CV DIAGNOSTICS | Facility: HOSPITAL | Age: 81
End: 2021-08-13
Attending: Other
Payer: MEDICARE

## 2021-08-13 LAB
ALBUMIN SERPL-MCNC: 2.7 G/DL (ref 3.4–5)
ALBUMIN/GLOB SERPL: 0.9 {RATIO} (ref 1–2)
ALP LIVER SERPL-CCNC: 78 U/L
ALT SERPL-CCNC: 18 U/L
ANION GAP SERPL CALC-SCNC: 2 MMOL/L (ref 0–18)
AST SERPL-CCNC: 12 U/L (ref 15–37)
BILIRUB SERPL-MCNC: 0.6 MG/DL (ref 0.1–2)
BUN BLD-MCNC: 23 MG/DL (ref 7–18)
BUN/CREAT SERPL: 23.5 (ref 10–20)
CALCIUM BLD-MCNC: 9.5 MG/DL (ref 8.5–10.1)
CHLORIDE SERPL-SCNC: 107 MMOL/L (ref 98–112)
CO2 SERPL-SCNC: 33 MMOL/L (ref 21–32)
CREAT BLD-MCNC: 0.98 MG/DL
DEPRECATED RDW RBC AUTO: 49.3 FL (ref 35.1–46.3)
ERYTHROCYTE [DISTWIDTH] IN BLOOD BY AUTOMATED COUNT: 13.4 % (ref 11–15)
GLOBULIN PLAS-MCNC: 3 G/DL (ref 2.8–4.4)
GLUCOSE BLD-MCNC: 85 MG/DL (ref 70–99)
GLUCOSE BLDC GLUCOMTR-MCNC: 98 MG/DL (ref 70–99)
GLUCOSE BLDC GLUCOMTR-MCNC: 98 MG/DL (ref 70–99)
HCT VFR BLD AUTO: 42 %
HGB BLD-MCNC: 13.5 G/DL
M PROTEIN MFR SERPL ELPH: 5.7 G/DL (ref 6.4–8.2)
MCH RBC QN AUTO: 31.7 PG (ref 26–34)
MCHC RBC AUTO-ENTMCNC: 32.1 G/DL (ref 31–37)
MCV RBC AUTO: 98.6 FL
OSMOLALITY SERPL CALC.SUM OF ELEC: 297 MOSM/KG (ref 275–295)
PLATELET # BLD AUTO: 138 10(3)UL (ref 150–450)
POTASSIUM SERPL-SCNC: 4.4 MMOL/L (ref 3.5–5.1)
RBC # BLD AUTO: 4.26 X10(6)UL
SODIUM SERPL-SCNC: 142 MMOL/L (ref 136–145)
WBC # BLD AUTO: 8.5 X10(3) UL (ref 4–11)

## 2021-08-13 PROCEDURE — 93306 TTE W/DOPPLER COMPLETE: CPT | Performed by: OTHER

## 2021-08-13 PROCEDURE — 99212 OFFICE O/P EST SF 10 MIN: CPT | Performed by: OTHER

## 2021-08-13 NOTE — PROGRESS NOTES
[facility]  [address] [facility], [address] [place] [address]  [phone]          INPATIENT NEUROLOGY   FOLLOW UP [facility]     Report of Consultation    Nereida Leyva Patient Status:  Evaristo Mensah PROTEIN      6.4 - 8.2 g/dL 5.7 (L)   Albumin      3.4 - 5.0 g/dL 2.7 (L)   Globulin      2.8 - 4.4 g/dL 3.0   A/G Ratio      1.0 - 2.0 0.9 (L)   WBC      4.0 - 11.0 x10(3) uL 8.5   RBC      3.80 - 5.30 x10(6)uL 4.26   Hemoglobin      12.0 - 16.0 g/dL 13. 5 dysfunction, left atrium at upper limits of normal     Severe back pain in the setting of progressive lumbar spinal stenosis with likely lumbar radiculopathy induced right hip flexor weakness. Low suspicion for cerebrovascular cause.   No etiology on neuroi

## 2021-08-13 NOTE — PHYSICAL THERAPY NOTE
PHYSICAL THERAPY TREATMENT NOTE - INPATIENT     Room Number: 527/527-A       Presenting Problem: Fatigue    Problem List  Principal Problem:    Fatigue, unspecified type  Active Problems:    Fatigue    INDERJIT (acute kidney injury) ([facility] [address]    Right sided weaknes session    OBJECTIVE  Precautions: Bed/chair alarm    WEIGHT BEARING RESTRICTION  Weight Bearing Restriction: None    PAIN ASSESSMENT   Rating: Unable to rate  Location: bottom  Management Techniques:  Activity promotion;Repositioning    BALANCE Goal #4    Goal #4   Current Status    Goal #5 Patient to demonstrate independence with home activity/exercise instructions provided to patient in preparation for discharge.    Goal #5   Current Status nt   Goal #6    Goal #6  Current Status

## 2021-08-13 NOTE — SLP NOTE
ADULT SWALLOWING EVALUATION    ASSESSMENT    ASSESSMENT/OVERALL IMPRESSION:  PPE REQUIRED. THIS THERAPIST WORE GLOVES, DROPLET MASK, AND GOGGLES FOR DURATION OF EVALUATION. HANDS WASHED UPON ENTRANCE/EXIT. SLP BSSE orders received and acknowledged.  EMILY leong Liquids: Thin Liquids (no straws)      Compensatory Strategies Recommended: No straws; Slow rate; Alternate consistencies;Small bites and sips  Aspiration Precautions: Upright position; Slow rate;Small bites and sips; No straw  Medication Administration Recomm generalized or localized, unspecified site    • Other and unspecified hyperlipidemia    • Other complicated headache syndrome 11/2/2017   • Other spondylosis, lumbar region 11/2/2017   • Pneumonia 2012   • S/P cervical spinal fusion: C3-6 and C7-T1 11/2/20 Motion: Within Functional Limits       Respiratory Status: Supplemental O2;Nasal cannula  Consistencies Trialed: Thin liquids; Nectar thick liquids;Puree; Soft solid;Hard solid  Method of Presentation: Staff/Clinician assistance;Spoon;Cup;Single sips;Straw

## 2021-08-13 NOTE — PROGRESS NOTES
[facility] Hospitalist Progress Note     CC: Hospital Follow up    PCP: Brittany Syed MD       Assessment/Plan:     Principal Problem:    Fatigue, unspecified type  Active Problems:    Fatigue    INDERJIT (acute kidney injury) ([facility] [address]    Right sided weakness    Ac records reviewed.      Further recommendations pending patient's clinical course.   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 There is stable osseous remodeling adjacent to the coil mass. Extensive associated streak artifact limits assessment. Within this limitation, no evidence of significant residual/recurrent aneurysm.

## 2021-08-13 NOTE — PHYSICAL THERAPY NOTE
Attempted physical therapy treatment. Patient off unit for testing. Will reattempt as schedule permits.

## 2021-08-13 NOTE — CM/SW NOTE
SW following for discharge planning. Per chart review, plan is [facility]. SW met with patient at bedside and confirmed plan for [facility]. SW provided patient with Aidin list of available [facility] for review. Patient reports that choice is [address]

## 2021-08-13 NOTE — PLAN OF CARE
Problem: Patient Centered Care  Goal: Patient preferences are identified and integrated in the patient's plan of care  Description: Interventions:  - What would you like us to know as we care for you?  - Provide timely, complete, and accurate information neutropenic period  Description: INTERVENTIONS  - Monitor WBC  - Administer growth factors as ordered  - Implement neutropenic guidelines  Outcome: Progressing     Problem: SAFETY ADULT - FALL  Goal: Free from fall injury  Description: INTERVENTIONS:  - As patient handling equipment as needed  - Ensure adequate protection for wounds/incisions during mobilization  - Obtain PT/OT consults as needed  - Advance activity as appropriate  - Communicate ordered activity level and limitations with patient/family  Out

## 2021-08-14 VITALS
OXYGEN SATURATION: 95 % | BODY MASS INDEX: 41.77 KG/M2 | HEART RATE: 63 BPM | SYSTOLIC BLOOD PRESSURE: 137 MMHG | HEIGHT: 64 IN | TEMPERATURE: 98 F | RESPIRATION RATE: 16 BRPM | WEIGHT: 244.69 LBS | DIASTOLIC BLOOD PRESSURE: 47 MMHG

## 2021-08-14 RX ORDER — FUROSEMIDE 40 MG/1
20 TABLET ORAL
Qty: 10 TABLET | Refills: 0 | Status: SHIPPED | COMMUNITY
Start: 2021-11-09

## 2021-08-14 RX ORDER — HYDROCODONE BITARTRATE AND ACETAMINOPHEN 5; 325 MG/1; MG/1
1 TABLET ORAL EVERY 6 HOURS PRN
Qty: 10 TABLET | Refills: 0 | Status: SHIPPED | OUTPATIENT
Start: 2021-08-14 | End: 2021-10-25

## 2021-08-14 NOTE — PLAN OF CARE
Problem: Patient Centered Care  Goal: Patient preferences are identified and integrated in the patient's plan of care  Description: Interventions:  - What would you like us to know as we care for you?  I am from [address] [place] [address] timely, unsuccessful or patient reports new pain  - Anticipate increased pain with activity and pre-medicate as appropriate  Outcome: Not Progressing     Problem: RISK FOR INFECTION - ADULT  Goal: Absence of fever/infection during anticipated neutropenic period  D mobility to safest level of function  Description: INTERVENTIONS:  - Assess patient stability and activity tolerance for standing, transferring and ambulating w/ or w/o assistive devices  - Assist with transfers and ambulation using safe patient handling e

## 2021-08-14 NOTE — PLAN OF CARE
No acute changes. Patient anticipating discharge to SNF today.     Problem: Patient Centered Care  Goal: Patient preferences are identified and integrated in the patient's plan of care  Description: Interventions:  - What would you like us to know as we car for Discharge     Problem: RISK FOR INFECTION - ADULT  Goal: Absence of fever/infection during anticipated neutropenic period  Description: INTERVENTIONS  - Monitor WBC  - Administer growth factors as ordered  - Implement neutropenic guidelines  Outcome: A stability and activity tolerance for standing, transferring and ambulating w/ or w/o assistive devices  - Assist with transfers and ambulation using safe patient handling equipment as needed  - Ensure adequate protection for wounds/incisions during mobiliz

## 2021-08-14 NOTE — CM/SW NOTE
12: 40PM  Received notice from pt's RN/Lilliana - pt cleared for DC today. Pt inquiring about bed rails at SNF. SW left Vmail for liaison Radha Fink and inquired - requested call back.     01:40PM  SW spoke to Leonardo yu/ [facility] - they do not have bed ra

## 2021-08-14 NOTE — PLAN OF CARE
Patient cleared for discharge to SNF. Report called to Osmin Youngblood RN. [facility] here to transport.

## 2021-08-15 NOTE — DISCHARGE SUMMARY
General Medicine Discharge Summary     Patient ID:  Dany Madsen  78year old  12/3/1940    Admit date: 8/11/2021    Discharge date and time: 08/14/21    Attending Physician: No att. providers found     Primary Care Physician: Gale Menon MD f/u PMR, due for steroid injection next week     Hypothyroidism  - continue synthroid 100 mcg daily     HTN  - had norvasc discontinued on prior admission due to leg swelling     GERD  - PPI      COPD  - not is exacerbation  - continue home MDI     H/o DVT at [facility] by (CST): Joshua Pbalo MD on 8/12/2021 at 5:43 PM          XR CHEST AP PORTABLE  (CPT=71045)    Result Date: 8/12/2021  CONCLUSION:   Mild blunting of the left costophrenic angle, which may relate to a trace pleural effusion or p Tabs  Commonly known as: LASIX  What changed:   · how much to take  · when to take this  · reasons to take this     HYDROcodone-acetaminophen 5-325 MG Tabs  Commonly known as: NORCO  Take 1 tablet by mouth every 6 (six) hours as needed.   What changed: Tyrat CAPSULE BY MOUTH TWICE DAILY     Vitamin D3 (Cholecalciferol) 1000 UNITS Caps  Take 2 capsules (2,000 Units total) by mouth daily.            Where to Get Your Medications      You can get these medications from any pharmacy    Bring a paper prescription fo

## 2021-08-16 ENCOUNTER — TELEPHONE (OUTPATIENT)
Dept: NEUROLOGY | Facility: CLINIC | Age: 81
End: 2021-08-16

## 2021-08-18 NOTE — TELEPHONE ENCOUNTER
VM from [facility] requesting clarification for Dr. Maria Teresa Novak 8/20/21 procedure. unsure who to bill [facility] future procedure . States patient is in a rehab facility.      Called [facility] spoke to Cathie Esquivel to provide [facility] billing department #

## 2021-08-20 ENCOUNTER — OFFICE VISIT (OUTPATIENT)
Dept: SURGERY | Facility: CLINIC | Age: 81
End: 2021-08-20

## 2021-08-20 DIAGNOSIS — M96.1 LUMBAR POST-LAMINECTOMY SYNDROME: ICD-10-CM

## 2021-08-20 DIAGNOSIS — M54.16 LUMBAR RADICULOPATHY: Primary | ICD-10-CM

## 2021-08-20 DIAGNOSIS — M51.36 LUMBAR DEGENERATIVE DISC DISEASE: ICD-10-CM

## 2021-08-20 PROCEDURE — 64483 NJX AA&/STRD TFRM EPI L/S 1: CPT | Performed by: PHYSICAL MEDICINE & REHABILITATION

## 2021-08-20 NOTE — PROCEDURES
Don [facility].    BILATERAL LUMBAR TRANSFORAMINAL   NAME:  Dian Phan    MR #:    [id number] :  12/3/1940     PHYSICIAN:  Zohaib Puente MD        Operative Report    DATE OF PROCEDURE: 2021   PREOPERATIVE DIAGNOSES: 1. bi Then, the patient was injected with a 3 cc solution of 1.5 cc of 40 mg/cc of Kenalog and 1.5 cc of 1% PF lidocaine without epinephrine. After this, the needle was removed.   Then  fluoroscopy was right anterior obliqued opening up the left L4-5 interverteb

## 2021-08-25 ENCOUNTER — TELEPHONE (OUTPATIENT)
Dept: NEUROLOGY | Facility: CLINIC | Age: 81
End: 2021-08-25

## 2021-08-25 NOTE — TELEPHONE ENCOUNTER
Patient left a VM stating she had an injection on Friday and is in extreme pain. States she is currently in rehab and her Norco was increased from 5 to 10. States she can only take this every 6 hours and the medication does not last the full 6 hours.      P

## 2021-08-25 NOTE — TELEPHONE ENCOUNTER
Patient had Bilateral L4 transforaminal epidural steroid injections on 8/20/21 with [facility]. Spoke with patient who stated she does not know if [facility] can do much regarding her pain since she is still in rehab at [facility].  Patient

## 2021-08-26 NOTE — TELEPHONE ENCOUNTER
Patient left a  with an update stating [facility] has increased her Norco to 7.5-325 mg every 6 hours, but she is now able to get another dose prior to the 6 hour shaylee if needed.  States she is still having numbness to the left leg which makes it difficul

## 2021-09-02 ENCOUNTER — OFFICE VISIT (OUTPATIENT)
Dept: PHYSICAL MEDICINE AND REHAB | Facility: CLINIC | Age: 81
End: 2021-09-02
Payer: MEDICARE

## 2021-09-02 ENCOUNTER — TELEPHONE (OUTPATIENT)
Dept: PHYSICAL MEDICINE AND REHAB | Facility: CLINIC | Age: 81
End: 2021-09-02

## 2021-09-02 VITALS
OXYGEN SATURATION: 97 % | SYSTOLIC BLOOD PRESSURE: 120 MMHG | DIASTOLIC BLOOD PRESSURE: 82 MMHG | WEIGHT: 247 LBS | HEART RATE: 53 BPM | HEIGHT: 64 IN | BODY MASS INDEX: 42.17 KG/M2

## 2021-09-02 DIAGNOSIS — M51.36 LUMBAR DEGENERATIVE DISC DISEASE: ICD-10-CM

## 2021-09-02 DIAGNOSIS — F33.2 SEVERE EPISODE OF RECURRENT MAJOR DEPRESSIVE DISORDER, WITHOUT PSYCHOTIC FEATURES (HCC): ICD-10-CM

## 2021-09-02 DIAGNOSIS — M43.16 SPONDYLOLISTHESIS OF LUMBAR REGION: ICD-10-CM

## 2021-09-02 DIAGNOSIS — M54.16 LUMBAR RADICULOPATHY: Primary | ICD-10-CM

## 2021-09-02 DIAGNOSIS — M51.26 LUMBAR HERNIATED DISC: ICD-10-CM

## 2021-09-02 DIAGNOSIS — M43.10 RETROLISTHESIS OF VERTEBRAE: ICD-10-CM

## 2021-09-02 DIAGNOSIS — M48.061 SPINAL STENOSIS OF LUMBAR REGION WITHOUT NEUROGENIC CLAUDICATION: ICD-10-CM

## 2021-09-02 DIAGNOSIS — M96.1 LUMBAR POST-LAMINECTOMY SYNDROME: ICD-10-CM

## 2021-09-02 DIAGNOSIS — M47.816 LUMBAR FACET ARTHROPATHY: ICD-10-CM

## 2021-09-02 PROCEDURE — 99214 OFFICE O/P EST MOD 30 MIN: CPT | Performed by: PHYSICAL MEDICINE & REHABILITATION

## 2021-09-02 PROCEDURE — 1111F DSCHRG MED/CURRENT MED MERGE: CPT | Performed by: PHYSICAL MEDICINE & REHABILITATION

## 2021-09-02 NOTE — PROGRESS NOTES
Low Back Pain H & P    Chief Complaint: Patient presents with:  Low Back Pain: INJ Bilateral L4 08/20/21. LOV 06/16/21. States 0% improvement after injection. Pain is getting worse. The pain is constant and gets worse when she tries to move.  She is still d of thyroid    • Esophageal reflux    • Hammertoe 02/25/2011    hammertoe 5 left, pain   • Hearing impairment     Bilateral hearing aids   • High blood pressure    • Hip pain, left    • Hypothyroidism    • Incontinence    • L3-4 stable slight grade 1 retrol 0831,1462,9076,3755    Cervical Discectomy AND FUSION   • PREP SITE T/A/L 1ST 100 SQ CM/1PCT Left 08/21/2019    Left leg debridement, VAC placed   • SPLIT GRFT PROC TRUNK <100SQCM Left 10/08/2019    Left leg debridement and Split Thickness Skin Graft to le crystal light        Occupational Exposure: Not Asked        Hobby Hazards: Not Asked        Sleep Concern: Not Asked        Stress Concern: Not Asked        Weight Concern: Not Asked        Special Diet: Not Asked        Back Care: Not Asked        Natacha Carter Status:   Intimate Partner Violence:       Fear of Current or Ex-Partner:       Emotionally Abused:       Physically Abused:       Sexually Abused:     Review of Systems    PE: The patient does appear in her stated age in mild distress.   The patient is we retrolisthesis    6. L4-5 mild-mod diffuse, L3-4 mod diffuse, L2-3 mild-mod diffuse, L1-2 mod diffuse, T12-L1 left mild-mod, T11-12 mild-mod diffuse bulging discs    7. L5-S1 mild-mod central HNP    8.  L5-S1 bilateral severe foraminal, L2-3 mild-mod centra

## 2021-09-02 NOTE — TELEPHONE ENCOUNTER
Per Medicare Guidelines -no authorization is required for khurram     Status: Approved-Covered Benefit    Clinical staff can proceed with scheduling Bilateral L5 TFESIs CPT 29501-28

## 2021-09-02 NOTE — PATIENT INSTRUCTIONS
Plan  I will perform bilateral L5 TFESI(s). If the above does not help, then she will need to be evaluated for spinal cord stimulator. She will continue with the [place] 7.5/325 for the pain. She will continue with the PT and the OT.     The niko

## 2021-09-03 NOTE — TELEPHONE ENCOUNTER
Patient will call us once she finds out when her  is available. Gave her some time frames of what time the possible injection can be.

## 2021-09-13 ENCOUNTER — HOSPITAL ENCOUNTER (EMERGENCY)
Facility: HOSPITAL | Age: 81
Discharge: HOME | End: 2021-09-13
Attending: EMERGENCY MEDICINE
Payer: MEDICARE

## 2021-09-13 ENCOUNTER — APPOINTMENT (OUTPATIENT)
Dept: CT IMAGING | Facility: HOSPITAL | Age: 81
End: 2021-09-13
Payer: MEDICARE

## 2021-09-13 VITALS
BODY MASS INDEX: 41 KG/M2 | HEART RATE: 87 BPM | OXYGEN SATURATION: 95 % | TEMPERATURE: 97 F | RESPIRATION RATE: 20 BRPM | WEIGHT: 240 LBS | SYSTOLIC BLOOD PRESSURE: 135 MMHG | DIASTOLIC BLOOD PRESSURE: 70 MMHG

## 2021-09-13 DIAGNOSIS — R29.6 FREQUENT FALLS: Primary | ICD-10-CM

## 2021-09-13 DIAGNOSIS — R29.898 WEAKNESS OF BOTH LOWER EXTREMITIES: ICD-10-CM

## 2021-09-13 LAB — SARS-COV-2 RNA RESP QL NAA+PROBE: NOT DETECTED

## 2021-09-13 PROCEDURE — 70450 CT HEAD/BRAIN W/O DYE: CPT

## 2021-09-13 PROCEDURE — 99285 EMERGENCY DEPT VISIT HI MDM: CPT

## 2021-09-13 RX ORDER — HYDROCODONE BITARTRATE AND ACETAMINOPHEN 7.5; 325 MG/1; MG/1
1-2 TABLET ORAL EVERY 6 HOURS PRN
Qty: 20 TABLET | Refills: 0 | Status: SHIPPED | OUTPATIENT
Start: 2021-09-13 | End: 2021-09-20

## 2021-09-13 NOTE — CM/SW NOTE
The pt has been accepted at [facility] [place] [place]. The [facility] is agreeable to this location. Report to main number #[phone]    [facility] called for BLS .  ETA: 1900    PCS completed

## 2021-09-13 NOTE — ED PROVIDER NOTES
Patient Seen in: [facility] AND [facility] Emergency Department    History   Patient presents with:  Fall      HPI    23-year-old female presents the ER for evaluation. Patient had a fall at [facility]. Patient states that the second fall she has had.   Joshua spinal fusion: C3-6 and C7-T1 11/2/2017   • s/p L5-S1 right laminectomy 8/28/2014   • Sleep apnea    • stent placement     cerebral   • Thyroid disease    • Toe pain     Onychomycosis, Debridement , Hammertoe    • Tonsillitis 1950    Tonsillitis    • Unspe (Other[other]) Paternal Grandfather    • Cancer Brother 54        Liver    • Neurological Disorder Sister         ALzheimer's Disease    • Stroke Sister         Cerebrovascular accident    • Heart Disease Brother         Coronary Artery Disease        Smok wearing a droplet mask on my arrival to the room.  Personal protective equipment including droplet mask, eye protection, and gloves were worn throughout the duration of the exam.  Handwashing was performed prior to and after the exam.  Stethoscope and any e ICA. The peripherally calcified aneurysm sac remains unchanged. 3. Senescent changes of parenchymal volume loss with sequela of chronic microvascular ischemic disease.   4. There is large vessel atherosclerosis involving the anterior and posterior circulat [address]  [phone]    Schedule an appointment as soon as possible for a visit  If symptoms worsen      Medications Prescribed:  Current Discharge Medication List    START taking these medications    HYDROcodone-acetaminophen 7.5-325 MG Oral Tab  Take 1-2

## 2021-09-13 NOTE — CM/SW NOTE
Referrals placed in [facility] for rehab to 8 facilities. Reached out to [facility] in [facility] since the pt was there recently.

## 2021-09-13 NOTE — ED INITIAL ASSESSMENT (HPI)
Pt from [facility] with 2 falls. Pt reports hitting her head on carpet. Pt is on blood thinner.

## 2021-09-15 ENCOUNTER — TELEPHONE (OUTPATIENT)
Dept: PHYSICAL MEDICINE AND REHAB | Facility: CLINIC | Age: 81
End: 2021-09-15

## 2021-09-15 NOTE — TELEPHONE ENCOUNTER
Patient left  asking for the name and phone # of the therapist Jovita Vazquez had recommended previously. States she had received a call previously from therapist, but was having phone issues.  Patient is still at [address] [place] [address] and can be reached

## 2021-09-16 NOTE — TELEPHONE ENCOUNTER
S/W patient and let her know she has no appt. Refer to TE 09/02/21. Told her to call us back once she is okay and has a  to schedule for Bilateral L5 TFESIs. Patient agreed and understood.

## 2021-09-16 NOTE — TELEPHONE ENCOUNTER
Patient left VM stating she needs to reschedule her injection that is scheduled for this Friday as she had 2 falls on Monday and has an open wound on her leg. Will need to call patient back to discuss.

## 2021-09-20 ENCOUNTER — TELEPHONE (OUTPATIENT)
Dept: PHYSICAL MEDICINE AND REHAB | Facility: CLINIC | Age: 81
End: 2021-09-20

## 2021-09-20 NOTE — TELEPHONE ENCOUNTER
Patient left  on 9/17 calling to find out the name of the therapist she was referred to and also calling to discuss scheduling her injection.

## 2021-09-22 NOTE — TELEPHONE ENCOUNTER
Per Tammie Palacios at [address] [place] [address] Avenue: 9/2/21: \"  She will see a psychologist to help with the pain  And her depression. \"    Order for [address] [place] [address] was placed by Tammie Palacios.      [facility]  Phone #:[phone]    S

## 2021-09-22 NOTE — TELEPHONE ENCOUNTER
Spoke with patient who stated she is still dealing with an open wound on her leg. She will call the office back to notify our office once this is healed.      Informed patient that once the wound is healed our office will then need to request clearance from

## 2021-09-29 ENCOUNTER — TELEPHONE (OUTPATIENT)
Dept: PHYSICAL MEDICINE AND REHAB | Facility: CLINIC | Age: 81
End: 2021-09-29

## 2021-10-01 NOTE — TELEPHONE ENCOUNTER
Returned patient call who was unsure why she called.  When patient was notified of message that was left, she just wanted to inform Dr. Carolyn Mcgraw that she will see Dr. All Cole on 10/21/21    Dr. Carolyn Mcgraw notified

## 2021-10-07 ENCOUNTER — TELEPHONE (OUTPATIENT)
Dept: NEUROLOGY | Facility: CLINIC | Age: 81
End: 2021-10-07

## 2021-10-07 NOTE — TELEPHONE ENCOUNTER
Pt calling to schedule on apt per  schedule Pt around 11/01/ or 2nd, there are no openings on those days provided next available mid January and Pt will not schedule that appointment she will like a sooner appointment. Please advice.

## 2021-10-15 RX ORDER — HYDROCODONE BITARTRATE AND ACETAMINOPHEN 5; 325 MG/1; MG/1
TABLET ORAL
Qty: 60 TABLET | Refills: 0 | OUTPATIENT
Start: 2021-10-15

## 2021-10-15 NOTE — TELEPHONE ENCOUNTER
Medication request: [address] [facility] \"dose has changed\"    LOV 9/2/21  NOV 11/4/21    [place]: 10/9/21 Norco 7.5-325mg #15 prescribed by Dr. Sandra Helms 7.5-325mg #15 filled 10/5/21, then 10/4/21, 9/30/21, 9/22/21, 9/13/21 then Norco 7.5-325mg #60 pr

## 2021-11-04 ENCOUNTER — TELEPHONE (OUTPATIENT)
Dept: PHYSICAL MEDICINE AND REHAB | Facility: CLINIC | Age: 81
End: 2021-11-04

## 2021-11-04 NOTE — TELEPHONE ENCOUNTER
Pt called - missed he appt due to no transportation. Wants to be seen sooner. Please call to advise.

## 2021-11-08 ENCOUNTER — HOSPITAL ENCOUNTER (INPATIENT)
Facility: HOSPITAL | Age: 81
LOS: 4 days | Discharge: NURSING, REHAB OR LONG-TERM CARE | DRG: 872 | End: 2021-11-12
Attending: EMERGENCY MEDICINE | Admitting: HOSPITALIST
Payer: MEDICARE

## 2021-11-08 ENCOUNTER — APPOINTMENT (OUTPATIENT)
Dept: GENERAL RADIOLOGY | Facility: HOSPITAL | Age: 81
DRG: 872 | End: 2021-11-08
Attending: EMERGENCY MEDICINE
Payer: MEDICARE

## 2021-11-08 DIAGNOSIS — L03.116 CELLULITIS OF LEFT LOWER EXTREMITY: Primary | ICD-10-CM

## 2021-11-08 PROCEDURE — 96365 THER/PROPH/DIAG IV INF INIT: CPT

## 2021-11-08 PROCEDURE — 99285 EMERGENCY DEPT VISIT HI MDM: CPT

## 2021-11-08 PROCEDURE — 87040 BLOOD CULTURE FOR BACTERIA: CPT | Performed by: EMERGENCY MEDICINE

## 2021-11-08 PROCEDURE — 87086 URINE CULTURE/COLONY COUNT: CPT | Performed by: EMERGENCY MEDICINE

## 2021-11-08 PROCEDURE — 0241U SARS-COV-2/FLU A AND B/RSV BY PCR (GENEXPERT): CPT | Performed by: EMERGENCY MEDICINE

## 2021-11-08 PROCEDURE — 96361 HYDRATE IV INFUSION ADD-ON: CPT

## 2021-11-08 PROCEDURE — 81001 URINALYSIS AUTO W/SCOPE: CPT | Performed by: EMERGENCY MEDICINE

## 2021-11-08 PROCEDURE — 85025 COMPLETE CBC W/AUTO DIFF WBC: CPT | Performed by: EMERGENCY MEDICINE

## 2021-11-08 PROCEDURE — 80076 HEPATIC FUNCTION PANEL: CPT | Performed by: EMERGENCY MEDICINE

## 2021-11-08 PROCEDURE — 87186 SC STD MICRODIL/AGAR DIL: CPT | Performed by: EMERGENCY MEDICINE

## 2021-11-08 PROCEDURE — 80048 BASIC METABOLIC PNL TOTAL CA: CPT | Performed by: EMERGENCY MEDICINE

## 2021-11-08 PROCEDURE — 71045 X-RAY EXAM CHEST 1 VIEW: CPT | Performed by: EMERGENCY MEDICINE

## 2021-11-08 PROCEDURE — 83690 ASSAY OF LIPASE: CPT | Performed by: EMERGENCY MEDICINE

## 2021-11-08 PROCEDURE — 87077 CULTURE AEROBIC IDENTIFY: CPT | Performed by: EMERGENCY MEDICINE

## 2021-11-08 PROCEDURE — 83605 ASSAY OF LACTIC ACID: CPT | Performed by: EMERGENCY MEDICINE

## 2021-11-08 RX ORDER — SODIUM CHLORIDE 9 MG/ML
INJECTION, SOLUTION INTRAVENOUS CONTINUOUS
Status: DISCONTINUED | OUTPATIENT
Start: 2021-11-08 | End: 2021-11-12

## 2021-11-08 RX ORDER — ACETAMINOPHEN 325 MG/1
650 TABLET ORAL EVERY 6 HOURS PRN
Status: DISCONTINUED | OUTPATIENT
Start: 2021-11-08 | End: 2021-11-12

## 2021-11-08 RX ORDER — METOCLOPRAMIDE HYDROCHLORIDE 5 MG/ML
5 INJECTION INTRAMUSCULAR; INTRAVENOUS EVERY 8 HOURS PRN
Status: DISCONTINUED | OUTPATIENT
Start: 2021-11-08 | End: 2021-11-12

## 2021-11-08 RX ORDER — ONDANSETRON 2 MG/ML
4 INJECTION INTRAMUSCULAR; INTRAVENOUS EVERY 6 HOURS PRN
Status: DISCONTINUED | OUTPATIENT
Start: 2021-11-08 | End: 2021-11-12

## 2021-11-08 RX ORDER — ACETAMINOPHEN 500 MG
1000 TABLET ORAL ONCE
Status: COMPLETED | OUTPATIENT
Start: 2021-11-08 | End: 2021-11-08

## 2021-11-08 NOTE — PROGRESS NOTES
[facility] Pharmacy Note: Antimicrobial Weight Based Dose Adjustment for: piperacillin/tazobactam (Juan Antonio Carroll)    Pastora Oates is a 78year old patient who has been prescribed piperacillin/tazobactam (ZOSYN) 3375 mg once   Estimated Creatinine Clearance: 39 mL/min

## 2021-11-08 NOTE — ED INITIAL ASSESSMENT (HPI)
Patient arrives via ems for \"dry heaving\" since yesterday. Notes headache and nausea. Denies abdominal pain.   Given zofran ODT in route  Denies vomiting

## 2021-11-08 NOTE — ED PROVIDER NOTES
Patient Seen in: [facility] AND [facility] Emergency Department    History   Patient presents with:  Nausea/Vomiting/Diarrhea    Stated Complaint: vomiting    HPI    Patient complains of nausea, no abd pain, notes she was spitting up stuff. Denies cp or sob.   C spinal fusion: C3-6 and C7-T1 11/2/2017   • s/p L5-S1 right laminectomy 8/28/2014   • Sleep apnea    • stent placement     cerebral   • Thyroid disease    • Toe pain     Onychomycosis, Debridement , Hammertoe    • Tonsillitis 1950    Tonsillitis    • Unspe total) by mouth 2 (two) times daily. ammonium lactate 12 % External Lotion,  Apply topically 2 (two) times a day. clotrimazole-betamethasone 1-0.05 % External Cream,  Apply topically 2 (two) times daily.  Left breast   Nystatin (NYAMYC) [id number] UNIT/GM • Fibromyalgia Sister    • Heart Disease Sister    • Heart Attack Sister    • Heart Disease Brother    • Heart Attack Brother    • Other (Other[other]) Father    • Other (Other[other]) Maternal Grandmother    • Other (Other[other]) Maternal Grandfather bilateral  CARDIO: RRR without murmur  GI: abdomen is soft and non tender, no masses, nl bowel sounds   EXTREMITIES:  ble with eythema and warmth l more erythematous no induration  NEURO: alert and oiented *3, 2-12 intact, no focal deficit noted  SKIN: goo RT-PCR)  assay on the [facility], [facility], [address] [place] [address]. This test is being used under the Food and Drug Administration's Emergency Use Authorization.     The authorized Fact Sheet for Healthcare Providers for this assay is available upon awaiting response. Cultures sent. Broad spectrum abx initiated.                            Disposition and Plan     Clinical Impression:  Cellulitis of left lower extremity  (primary encounter diagnosis)     Disposition:  Admit  11/8/2021  4:59 pm    Foll

## 2021-11-08 NOTE — ED QUICK NOTES
Only able to get 1 set of cultures. 3 staff attempted. Mango Coppola MD aware. Order to start antibiotics.

## 2021-11-09 ENCOUNTER — APPOINTMENT (OUTPATIENT)
Dept: GENERAL RADIOLOGY | Facility: HOSPITAL | Age: 81
DRG: 872 | End: 2021-11-09
Attending: HOSPITALIST
Payer: MEDICARE

## 2021-11-09 PROCEDURE — 97162 PT EVAL MOD COMPLEX 30 MIN: CPT

## 2021-11-09 PROCEDURE — 85027 COMPLETE CBC AUTOMATED: CPT | Performed by: HOSPITALIST

## 2021-11-09 PROCEDURE — 97530 THERAPEUTIC ACTIVITIES: CPT

## 2021-11-09 PROCEDURE — 97166 OT EVAL MOD COMPLEX 45 MIN: CPT

## 2021-11-09 PROCEDURE — 80048 BASIC METABOLIC PNL TOTAL CA: CPT | Performed by: HOSPITALIST

## 2021-11-09 PROCEDURE — 74018 RADEX ABDOMEN 1 VIEW: CPT | Performed by: HOSPITALIST

## 2021-11-09 PROCEDURE — 99213 OFFICE O/P EST LOW 20 MIN: CPT

## 2021-11-09 RX ORDER — QUETIAPINE 100 MG/1
100 TABLET, FILM COATED ORAL NIGHTLY
Status: DISCONTINUED | OUTPATIENT
Start: 2021-11-09 | End: 2021-11-12

## 2021-11-09 RX ORDER — SENNA AND DOCUSATE SODIUM 50; 8.6 MG/1; MG/1
1 TABLET, FILM COATED ORAL 2 TIMES DAILY
Status: DISCONTINUED | OUTPATIENT
Start: 2021-11-09 | End: 2021-11-12

## 2021-11-09 RX ORDER — VANCOMYCIN HYDROCHLORIDE 125 MG/1
125 CAPSULE ORAL DAILY
Status: DISCONTINUED | OUTPATIENT
Start: 2021-11-09 | End: 2021-11-12

## 2021-11-09 RX ORDER — VANCOMYCIN HYDROCHLORIDE
15 EVERY 24 HOURS
Status: DISCONTINUED | OUTPATIENT
Start: 2021-11-10 | End: 2021-11-12

## 2021-11-09 RX ORDER — DONEPEZIL HYDROCHLORIDE 5 MG/1
5 TABLET, FILM COATED ORAL NIGHTLY
Status: DISCONTINUED | OUTPATIENT
Start: 2021-11-09 | End: 2021-11-12

## 2021-11-09 RX ORDER — DOCUSATE SODIUM 100 MG/1
100 CAPSULE, LIQUID FILLED ORAL 2 TIMES DAILY
COMMUNITY

## 2021-11-09 RX ORDER — LEVOTHYROXINE SODIUM 0.1 MG/1
100 TABLET ORAL
Status: DISCONTINUED | OUTPATIENT
Start: 2021-11-09 | End: 2021-11-12

## 2021-11-09 RX ORDER — VANCOMYCIN 2 GRAM/500 ML IN 0.9 % SODIUM CHLORIDE INTRAVENOUS
25 ONCE
Status: COMPLETED | OUTPATIENT
Start: 2021-11-09 | End: 2021-11-09

## 2021-11-09 RX ORDER — LAMOTRIGINE 100 MG/1
100 TABLET ORAL 2 TIMES DAILY
Status: DISCONTINUED | OUTPATIENT
Start: 2021-11-09 | End: 2021-11-12

## 2021-11-09 RX ORDER — PANTOPRAZOLE SODIUM 20 MG/1
20 TABLET, DELAYED RELEASE ORAL
Status: DISCONTINUED | OUTPATIENT
Start: 2021-11-10 | End: 2021-11-12

## 2021-11-09 RX ORDER — HYDRALAZINE HYDROCHLORIDE 25 MG/1
25 TABLET, FILM COATED ORAL EVERY 6 HOURS PRN
COMMUNITY

## 2021-11-09 RX ORDER — ACETAMINOPHEN AND CODEINE PHOSPHATE 300; 30 MG/1; MG/1
1 TABLET ORAL EVERY 4 HOURS PRN
Status: DISCONTINUED | OUTPATIENT
Start: 2021-11-09 | End: 2021-11-12

## 2021-11-09 RX ORDER — BENZTROPINE MESYLATE 1 MG/1
1 TABLET ORAL 2 TIMES DAILY
Status: DISCONTINUED | OUTPATIENT
Start: 2021-11-09 | End: 2021-11-12

## 2021-11-09 RX ORDER — DOCUSATE SODIUM 100 MG/1
100 CAPSULE, LIQUID FILLED ORAL 2 TIMES DAILY
Status: DISCONTINUED | OUTPATIENT
Start: 2021-11-09 | End: 2021-11-12

## 2021-11-09 NOTE — H&P
[facility] Hospitalist H&P       CC: Patient presents with:  Nausea/Vomiting/Diarrhea       PCP: Cristin Suarez MD    Date of Admission: 11/8/2021  3:23 PM    ASSESSMENT / PLAN:      Ms. Omar Toscano is an 78 year old female with PMH sig for cranial aneurysm s/p c Hospitalist  Answering Service number: [phone]    HPI       History of Present Illness:      Ms. Deandre Parra is an 78 year old female with PMH sig for cranial aneurysm s/p coiling, COPD, HTN, DVT on Eliquis, bipolar, hs COVID, aortic atherosclerosis, hs of m generalized or localized, unspecified site    • Other and unspecified hyperlipidemia    • Other complicated headache syndrome 11/2/2017   • Other spondylosis, lumbar region 11/2/2017   • Pneumonia 2012   • S/P cervical spinal fusion: C3-6 and C7-T1 11/2/20 move  Metronidazole           RASH  Phentermine             OTHER (SEE COMMENTS)    Comment:Mini stroke  Tramadol                OTHER (SEE COMMENTS)    Comment:Extreme sleepiness  Dilaudid [[employer]*    RASH, OTHER (SEE COMMENTS)    Comment:Tolerates hyd Affect- normal  SKIN: warm, dry, erythema to BLE, warm to touch, L anterior shin wound  EXT: no edema    Diagnostic Data:    CBC/Chem    Recent Labs   Lab 11/08/21  1541   RBC 4.34   HGB 13.8   HCT 42.6   MCV 98.2   MCH 31.8   MCHC 32.4   RDW 13.2   NEPREL

## 2021-11-09 NOTE — PLAN OF CARE
Patient vital signs stables. No acute changes during shift. Patient understands fall prevention safety measures. ID and wound care on consult.  For antibiotics patient is on IV Zosyn    Problem: Patient Centered Care  Goal: Patient preferences are identifie

## 2021-11-09 NOTE — PHYSICAL THERAPY NOTE
PHYSICAL THERAPY EVALUATION - INPATIENT     Room Number: 547B/547-B  Evaluation Date: 11/9/2021  Type of Evaluation: Initial   Physician Order: PT Eval and Treat    Presenting Problem: LLE cellulitis; vomiting, nausea, fever     Reason for Therapy: Mobili training;Strengthening;Range of motion  Rehab Potential : Fair  Frequency (Obs): 3-5x/week       PHYSICAL THERAPY MEDICAL/SOCIAL HISTORY     History related to current admission: Per H&P: \"Ms. Esther Ortega is an 78 year old female with PMH sig for cranial aneurys 08/21/2019   • Low back pain    • Migraines    • Muscle weakness    • Onychomycosis     Debridement    • Oropharyngeal dysphagia 8/29/2017   • Osteoarthrosis, unspecified whether generalized or localized, unspecified site    • Other and unspecified hyperli : 0  Stairs to Bedroom: 0  Lives With: Alone  Patient Owned Equipment: Rolling walker;Rollator (Motorized scooter)     Prior Level of Yates: Pt normally uses rollator inside and motorized scooter for longer distances.  She was recently at [place] [address] and lef walker    Bed Mobility: MOD A x 2    Transfers: Min A    Exercise/Education Provided:  Bed mobility  Body mechanics  Gait training  Transfer training    Patient End of Session: Up in chair;Needs met;Call light within reach;RN aware of session/findings; All

## 2021-11-09 NOTE — PROGRESS NOTES
[facility] Dosing Service    Initial Pharmacokinetic Consult for Vancomycin AUC Dosing    Mariam Suazo is a 78year old patient who is being treated for cellulitis.   Pharmacy has been asked to dose vancomycin by MD Flores    Weights:  Ruth body we

## 2021-11-09 NOTE — PROGRESS NOTES
[facility]    CDI Prediction Tool Protocol (Vancomycin Initiated)    OVP (oral vancomycin prophylaxis) 125 mg PO Daily is being started in this patient based on a score of 18.1.       Score Breakdown:  History of CDI > 1 year ago AND high risk an

## 2021-11-09 NOTE — PROGRESS NOTES
11/09/21 0945   Wound 11/08/21 Venous Ulcer Leg Lower;Left; Anterior   Date First Assessed/Time First Assessed: 11/08/21 2300   Present on Hospital Admission: Yes  Primary Wound Type: Venous Ulcer  Location: Leg  Wound Location Orientation: Lower;Left; An • Depression    • Disorder of thyroid    • Esophageal reflux    • Hammertoe 02/25/2011    hammertoe 5 left, pain   • Hearing impairment     Bilateral hearing aids   • High blood pressure    • Hip pain, left    • Hypothyroidism    • Incontinence    • L3- 9804,5259,5798,7808    Cervical Discectomy AND FUSION   • PREP SITE T/A/L 1ST 100 SQ CM/1PCT Left 08/21/2019    Left leg debridement, VAC placed   • SPLIT GRFT PROC TRUNK <100SQCM Left 10/08/2019    Left leg debridement and Split Thickness Skin Graft to le 12.6 11/09/2021    HCT 39.7 11/09/2021    .0 11/09/2021    CREATSERUM 0.91 11/09/2021    BUN 16 11/09/2021     11/09/2021    K 3.7 11/09/2021     11/09/2021    CO2 27.0 11/09/2021    GLU 87 11/09/2021    CA 8.8 11/09/2021    ALB 2.5 (L)

## 2021-11-09 NOTE — CONSULTS
[address] [facility] Infectious Disease  Report of Consultation    Sarahriky Brar Patient Status:  Inpatient    12/3/1940 MRN [id number]   Location [facility] 5SW/SE Attending Salome Hearn, [facility] Day # 1 PCP Jamil Alaniz grade 1 unstable spondylolisthesis 10/31/2014   • Leg wound, left 08/21/2019   • Low back pain    • Migraines    • Muscle weakness    • Onychomycosis     Debridement    • Oropharyngeal dysphagia 8/29/2017   • Osteoarthrosis, unspecified whether generalized • Heart Disease Mother         Coronary Artery Disease, Premature    • Fibromyalgia Sister    • Heart Disease Sister    • Heart Attack Sister    • Heart Disease Brother    • Heart Attack Brother    • Other (Other[other]) Father    • Other (Other[other]) (LAMICTAL) tab 100 mg, 100 mg, Oral, BID  •  levothyroxine (SYNTHROID) tab 100 mcg, 100 mcg, Oral, Before breakfast  •  lidocaine-menthol 4-1 % 1 patch, 1 patch, Transdermal, Daily  •  [START ON 11/10/2021] pantoprazole (PROTONIX) EC tab 20 mg, 20 mg, Oral °C) Oral 64 20 90 % 5' 8\" (1.727 m) 245 lb 1.6 oz (111.2 kg)   11/08/21 1857 110/46 99.3 °F (37.4 °C) Oral — 20 96 % — —   11/08/21 1800 — (!) 100.7 °F (38.2 °C) Oral — — — — —   11/08/21 1745 142/83 — — 120 22 93 % — —   11/08/21 1615 132/87 — — 85 22 93 cefdinir  - MRSA PCR positive historically  - IV zosyn started in the ED, will transition to vancomycin/cefepime given previous favorable response     2.  h/o recurrent  infections  - Active urine sediment noted with pyuria, no bacteriauria  - f/u pendin

## 2021-11-09 NOTE — ED QUICK NOTES
Orders for admission, patient is aware of plan and ready to go upstairs. Any questions, please call ED TAMMIE Tse  at extension [phone].    Type of COVID test sent:Genexpert  COVID Suspicion level: Low    Titratable drug(s) infusing:NS sepsis bolus and zosyn @ 2

## 2021-11-09 NOTE — PROGRESS NOTES
[facility] Pharmacy Note:  Renal Adjustment for cefepime (MAXIPIME)    Amaris Cole is a 78year old patient who has been prescribed cefepime (MAXIPIME) 1000 mg every 8 hrs. The estimated creatinine clearance is 48.1 mL/min (based on SCr of 0.91 mg/dL).  The d

## 2021-11-09 NOTE — OCCUPATIONAL THERAPY NOTE
OCCUPATIONAL THERAPY EVALUATION - INPATIENT     Room Number: 547B/547-B  Evaluation Date: 11/9/2021  Type of Evaluation: Initial       Physician Order: IP Consult to Occupational Therapy  Reason for Therapy: ADL/IADL Dysfunction and Discharge Planning    O activities. The patient's Approx Degree of Impairment: 56.46% has been calculated based on documentation in the [facility] '6 clicks' Inpatient Daily Activity Short Form. Research supports that patients with this level of impairment may benefit from KYLE. 8/29/2017   • Osteoarthrosis, unspecified whether generalized or localized, unspecified site    • Other and unspecified hyperlipidemia    • Other complicated headache syndrome 11/2/2017   • Other spondylosis, lumbar region 11/2/2017   • Pneumonia 2012   • and w/c for longer distances. Patient reports increased difficulty managing at [facility] prior to admission. SUBJECTIVE  My foot hurts!     OCCUPATIONAL THERAPY EXAMINATION      OBJECTIVE  Precautions: Bed/chair alarm  Fall Risk: High fall risk       COG week    Ryan Wisdom, OTR/L ext [phone]

## 2021-11-09 NOTE — PROGRESS NOTES
[facility] Hospitalist Progress Note     CC: Hospital Follow up    PCP: Nancy Villagomez MD       Assessment/Plan:     Principal Problem:    Cellulitis of left lower extremity        Ms. Rylan Deal is an 78 year old female with PMH sig for cranial aneurysm s/p coili house     Patient and/or patient's family given opportunity to ask questions and note understanding and agreeing with therapeutic plan as outlined     Bibi Josue MD  [facility] Hospitalist  Answering Service number: [phone]     Subjective:     Feels n 11/8/2021  CONCLUSION:  1. Unchanged chest.  No acute appearing disease. Heart size upper limits of normal and normal pulmonary vascularity. Prominent pulmonary markings throughout the interstitium more likely chronic.   Mild blunting of left costophrenic

## 2021-11-09 NOTE — CM/SW NOTE
SW initiated self referral for discharge planning. SW met with patient at bedside to discuss discharge planning. Patient confirmed address on facesheet. Patient lives at [facility].  Patient reports [facility]

## 2021-11-10 NOTE — PROGRESS NOTES
[facility] Hospitalist Progress Note     PCP: Cristin Suarez MD    Chief Complaint: follow-up    SUBJECTIVE:  - s/p bronch today  - still coughing and wheezing    OBJECTIVE:  Temp:  [96.8 °F (36 °C)-98.1 °F (36.7 °C)] 98 °F (36.7 °C)  Pulse:  [57-97] 88  R assumed care for patient 12pm  complain of low blood pressure , two heplock left and right was inserted in critical , aox3 patient placed on cardiac monitor QUEtiapine Fumarate  300 mg Oral Nightly   • MethylPREDNISolone Sodium Succ  40 mg Intravenous Q12H   • QUEtiapine Fumarate ER  50 mg Oral QPM   • ipratropium-albuterol  3 mL Nebulization Q4H WA (4 times daily)   • Senna  8.6 mg Oral BID   • lamoTRIgine  2 will return at d/c      Hypothyroidism  - synthroid     HTN  - home med     DVTs  - cont full AC      Dispo dc back to SNF when stable from a pulmonary standpoint     Amirah Valladares DO  [facility] Hospitalist  Pager: [phone]  Answering Service: [phone]

## 2021-11-10 NOTE — DIETARY NOTE
ADULT NUTRITION INITIAL ASSESSMENT    Pt is at moderate nutrition risk. Pt does not meet malnutrition criteria.       RECOMMENDATIONS TO MD:  See Nutrition Intervention    ADMITTING DIAGNOSIS:   Cellulitis of left lower extremity [L03.116]    PERTINENT PAS Tonsillitis 1952    Tonsillitis    • Unspecified sleep apnea    • Visual impairment     EYE GLASSES   • Wound of left leg 10/03/2019    Left leg debridement and Split Thickness Skin Graft to left thigh     PATIENT STATUS: Initial 11/10/21: Pt admit for august vancomycin  15 mg/kg (Adjusted) Intravenous Q24H   • Miconazole Nitrate   Topical Kierra@Pharmacy Development     LABS: reviewed  Recent Labs     11/08/21  1541 11/09/21  0517   * 87   BUN 14 16   CREATSERUM 0.96 0.91   CA 9.5 8.8    144   K 4.3 3.7   CL 10 Medical Food Supplements-RD added Ensure Clear (240 calories/ 8 g protein each) Daily Apple. Rational/use of oral supplements discussed.   - Vitamin and mineral supplements: none  - Feeding assistance: meal set up  - Nutrition education: not appropriate at

## 2021-11-10 NOTE — PLAN OF CARE
Patient vital signs stables. No acute changes during shift. Patient understands fall prevention safety measures. ID and wound care on consult. Wound dressing changes on 11/9 by wound care. IV Cefepime for abx.     Problem: Patient Centered Care  Goal: Joshua

## 2021-11-10 NOTE — PROGRESS NOTES
[facility] Infectious Disease Progress Note    [facility] Patient Status:  In[facility]    12/3/1940 MRN [id number]   Location [facility] 5SW/SE Attending Kaylyn Rivera MD   Hosp Day # 2 PCP MD Sully Acosta powder, , Topical, Damir@[employer]  •  acetaminophen-codeine (TYLENOL #3) 300-30 MG tab 1 tablet, 1 tablet, Oral, Q4H PRN  •  0.9% NaCl infusion, , Intravenous, Continuous  •  acetaminophen (TYLENOL) tab 650 mg, 650 mg, Oral, Q6H PRN  •  ondansetron (ZOFRAN)

## 2021-11-10 NOTE — PROGRESS NOTES
[facility] Hospitalist Progress Note     CC: Hospital Follow up    PCP: Arsalan Silva MD       Assessment/Plan:     Principal Problem:    Cellulitis of left lower extremity        Ms. Dory Mueller is an 78 year old female with PMH sig for cranial aneurysm s/p coili follow patient while in house     Patient and/or patient's family given opportunity to ask questions and note understanding and agreeing with therapeutic plan as outlined     Ric Stallings MD  [facility] Hospitalist  Answering Service number: [phone]   ALB 2.5*         Imaging:  XR ABDOMEN (1 VIEW) (CPT=74018)    Result Date: 11/9/2021  CONCLUSION:  1. Nonobstructive bowel gas pattern. 2. Lesser incidental findings as above.     Dictated by (CST): Rober Hernandes MD on 11/09/2021 at 7:57 PM     Fi

## 2021-11-11 ENCOUNTER — APPOINTMENT (OUTPATIENT)
Dept: PICC SERVICES | Facility: HOSPITAL | Age: 81
DRG: 872 | End: 2021-11-11
Attending: INTERNAL MEDICINE
Payer: MEDICARE

## 2021-11-11 ENCOUNTER — APPOINTMENT (OUTPATIENT)
Dept: PICC SERVICES | Facility: HOSPITAL | Age: 81
DRG: 872 | End: 2021-11-11
Attending: HOSPITALIST
Payer: MEDICARE

## 2021-11-11 PROCEDURE — 97116 GAIT TRAINING THERAPY: CPT

## 2021-11-11 PROCEDURE — 97530 THERAPEUTIC ACTIVITIES: CPT

## 2021-11-11 PROCEDURE — 36410 VNPNXR 3YR/> PHY/QHP DX/THER: CPT

## 2021-11-11 PROCEDURE — 76937 US GUIDE VASCULAR ACCESS: CPT

## 2021-11-11 RX ORDER — LIDOCAINE HYDROCHLORIDE 10 MG/ML
5 INJECTION, SOLUTION EPIDURAL; INFILTRATION; INTRACAUDAL; PERINEURAL ONCE
Status: COMPLETED | OUTPATIENT
Start: 2021-11-11 | End: 2021-11-11

## 2021-11-11 NOTE — PLAN OF CARE
Problem: Patient Centered Care  Goal: Patient preferences are identified and integrated in the patient's plan of care  Description: Interventions:  - What would you like us to know as we care for you?  \"I feel nauseous\"  - Provide timely, complete, and Instruct pt to call for assistance with activity based on assessment  - Modify environment to reduce risk of injury  - Provide assistive devices as appropriate  - Consider OT/PT consult to assist with strengthening/mobility  - Encourage toileting schedule

## 2021-11-11 NOTE — PROGRESS NOTES
Jazmine Arnold is a 78year old female with hist of cranial aneurysm s/p coiling, DVT on Eliquis, hx of COVID now admitted to [address] [place] [address] on 11/08 with Cellulitis of LE.      HPI:    Patient seen and examined,  Afebrile,   Previous entries noted,   Comfortable, thrombosis ([facility] [address]     recurrent DVT- on lifelong AC   • Dehydration 2012    Fluids, Medication adjustment    • Depression    • Disorder of thyroid    • Esophageal reflux    • Tysonertoe 02/25/2011    hammjunee 5 left, pain   • Hearing impairment     Bilpraneetha Bilateral arthritis    • LAMINECTOMY      WITH FUSION PER PATIENT   • OTHER SURGICAL HISTORY  1985,1995,2003,2009    Cervical Discectomy AND FUSION   • PREP SITE T/A/L 1ST 100 SQ CM/1PCT Left 08/21/2019    Left leg debridement, VAC placed   • SPLIT GRFT BUN/CREA Ratio 14.6 10.0 - 20.0    Calcium, Total 9.5 8.5 - 10.1 mg/dL    Calculated Osmolality 288 275 - 295 mOsm/kg    GFR, Non- 55 (L) >=60    GFR, -American 64 >=60   Urinalysis with Culture Reflex    Specimen: Urine   Result [place] HCT 39.7 35.0 - 48.0 %    MCV 99.3 80.0 - 100.0 fL    MCH 31.5 26.0 - 34.0 pg    MCHC 31.7 31.0 - 37.0 g/dL    RDW 13.6 11.0 - 15.0 %    RDW-SD 50.2 (H) 35.1 - 46.3 fL    .0 150.0 - 450.0 10(3)uL   RAINBOW DRAW LAVENDER   Result Value Ref Range    H x10(3) uL    Basophil Absolute 0.08 0.00 - 0.20 x10(3) uL    Immature Granulocyte Absolute 0.14 0.00 - 1.00 x10(3) uL    Neutrophil % 84.9 %    Lymphocyte % 9.0 %    Monocyte % 4.9 %    Eosinophil % 0.0 %    Basophil % 0.4 %    Immature Granulocyte % 0.8 % 3,     2.   Abn Urine sediment: Cul with E coli,   - On abx as above,      3.  COVID+ status December 2020  - Fully recovered, vaccinated     4.  Fungal dermatitis: with edema,   - Local care,       PLAN:   1. Continue IV Vanc and Cefepime,   2.  Leg elevat none

## 2021-11-11 NOTE — VASCULAR ACCESS
Vascular Access Consult Note  11/11/2021     Reviewed H&P and current condition. Past Medical History:  1950: Tonsillitis      Comment:  Tonsillitis   1984: Cholecystitis      Comment:  Cholecystectomy   2009:  Arthritis of right hip  02/25/2011: Sweetie Le Other and unspecified hyperlipidemia  No date: Sleep apnea  No date: stent placement      Comment:  cerebral  No date: Thyroid disease  No date: Toe pain      Comment:  Onychomycosis, Debridement , Hammertoe   No date: Unspecified sleep apnea  No date: Vis 650 mg, Oral, Q6H PRN  ondansetron (ZOFRAN) injection 4 mg, 4 mg, Intravenous, Q6H PRN  metoclopramide (REGLAN) injection 5 mg, 5 mg, Intravenous, Q8H PRN         Current Vascular Access:none  PIV Location:  Gauge:   Qty:    Vascular access consult complet

## 2021-11-11 NOTE — PHYSICAL THERAPY NOTE
PHYSICAL THERAPY TREATMENT NOTE - INPATIENT     Room Number: 547B/547-B       Presenting Problem: LLE cellulitis; vomiting, nausea, fever       Problem List  Principal Problem:    Cellulitis of left lower extremity      PHYSICAL THERAPY ASSESSMENT     PT w difficulty does the patient currently have. ..   Patient Difficulty: Turning over in bed (including adjusting bedclothes, sheets and blankets)?: A Lot   Patient Difficulty: Sitting down on and standing up from a chair with arms (e.g., wheelchair, bedside com instructions provided to patient in preparation for discharge.    Goal #5   Current Status IN PROGRESS   Goal #6     Goal #6  Current Status

## 2021-11-11 NOTE — PLAN OF CARE
Patient has pain throughout day with little relief. Given antiemetics for nausea. Patient receiving IV antibiotics. Patient refused dressing change this morning. Patient using Ousmane Elgin and refused activity and most of food today.    Problem: Patient Argentina Lopes Implement non-pharmacological measures as appropriate and evaluate response  - Consider cultural and social influences on pain and pain management  - Manage/alleviate anxiety  - Utilize distraction and/or relaxation techniques  - Monitor for opioid side ef health  - Refer to Case Management Department for coordinating discharge planning if the patient needs post-hospital services based on physician/LIP order or complex needs related to functional status, cognitive ability or social support system  Outcome: P

## 2021-11-11 NOTE — OCCUPATIONAL THERAPY NOTE
OCCUPATIONAL THERAPY TREATMENT NOTE - INPATIENT        Room Number: 547B/547-B                Problem List  Principal Problem:    Cellulitis of left lower extremity      OCCUPATIONAL THERAPY ASSESSMENT   The patient's Approx Degree of Impairment: 59.67% ha Bathing (including washing, rinsing, drying)?: A Lot  -   Toileting, which includes using toilet, bedpan or urinal? : A Lot  -   Putting on and taking off regular upper body clothing?: A Lot  -   Taking care of personal grooming such as brushing teeth?: A

## 2021-11-11 NOTE — PLAN OF CARE
Vitals stable overnight, pain managed with Tylenol #3. Antiemetics given for nausea. No occurrences of vomiting overnight. Dressing changed. Continue to monitor.     Problem: Patient Centered Care  Goal: Patient preferences are identified and integrated in activity and pre-medicate as appropriate  Outcome: Progressing     Problem: RISK FOR INFECTION - ADULT  Goal: Absence of fever/infection during anticipated neutropenic period  Description: INTERVENTIONS  - Monitor WBC  - Administer growth factors as ordere INTERVENTIONS:  - Assess bowel function  - Maintain adequate hydration with IV or PO as ordered and tolerated  - Evaluate effectiveness of GI medications  - Encourage mobilization and activity  - Obtain nutritional consult as needed  - Establish a toiletin

## 2021-11-11 NOTE — PROGRESS NOTES
[facility] Hospitalist Progress Note     CC: Hospital Follow up    PCP: Gale Menon MD       Assessment/Plan:     Principal Problem:    Cellulitis of left lower extremity        Ms. Guanaco Crespo is an 78 year old female with PMH sig for cranial aneurysm s/p coili feels nauseated but thinks it is a little better than yesterday. No vomiting, no abdominal pain    OBJECTIVE:    Blood pressure 160/67, pulse 80, temperature 98 °F (36.7 °C), temperature source Oral, resp.  rate 18, height 5' 8\" (1.727 m), weight 253 lb 3 11/09/2021 at 7:57 PM     Finalized by (CST): Ric Gee MD on 11/09/2021 at 7:59 PM          XR CHEST AP PORTABLE  (CPT=71045)    Result Date: 11/8/2021  CONCLUSION:  1. Unchanged chest.  No acute appearing disease.   Heart size upper limits of nor

## 2021-11-12 VITALS
BODY MASS INDEX: 38.37 KG/M2 | RESPIRATION RATE: 16 BRPM | DIASTOLIC BLOOD PRESSURE: 64 MMHG | WEIGHT: 253.19 LBS | HEART RATE: 65 BPM | SYSTOLIC BLOOD PRESSURE: 150 MMHG | OXYGEN SATURATION: 93 % | TEMPERATURE: 98 F | HEIGHT: 68 IN

## 2021-11-12 PROCEDURE — 84100 ASSAY OF PHOSPHORUS: CPT | Performed by: INTERNAL MEDICINE

## 2021-11-12 PROCEDURE — 83735 ASSAY OF MAGNESIUM: CPT | Performed by: INTERNAL MEDICINE

## 2021-11-12 PROCEDURE — 85025 COMPLETE CBC W/AUTO DIFF WBC: CPT | Performed by: INTERNAL MEDICINE

## 2021-11-12 PROCEDURE — 80053 COMPREHEN METABOLIC PANEL: CPT | Performed by: INTERNAL MEDICINE

## 2021-11-12 RX ORDER — LINEZOLID 600 MG/1
600 TABLET, FILM COATED ORAL EVERY 12 HOURS
Status: DISCONTINUED | OUTPATIENT
Start: 2021-11-12 | End: 2021-11-12

## 2021-11-12 RX ORDER — LINEZOLID 600 MG/1
600 TABLET, FILM COATED ORAL EVERY 12 HOURS
Qty: 20 TABLET | Refills: 0 | Status: SHIPPED | OUTPATIENT
Start: 2021-11-12 | End: 2021-11-22

## 2021-11-12 RX ORDER — CEFUROXIME AXETIL 500 MG/1
500 TABLET ORAL EVERY 12 HOURS SCHEDULED
Qty: 14 TABLET | Refills: 0 | Status: SHIPPED | OUTPATIENT
Start: 2021-11-12 | End: 2021-11-19

## 2021-11-12 RX ORDER — HYDROCODONE BITARTRATE AND ACETAMINOPHEN 5; 325 MG/1; MG/1
1 TABLET ORAL EVERY 6 HOURS PRN
Qty: 30 TABLET | Refills: 0 | Status: SHIPPED | OUTPATIENT
Start: 2021-11-12

## 2021-11-12 RX ORDER — GABAPENTIN 100 MG/1
200 CAPSULE ORAL 2 TIMES DAILY
Status: SHIPPED | COMMUNITY
Start: 2021-11-12

## 2021-11-12 RX ORDER — CEFUROXIME AXETIL 500 MG/1
500 TABLET ORAL EVERY 12 HOURS SCHEDULED
Status: DISCONTINUED | OUTPATIENT
Start: 2021-11-12 | End: 2021-11-12

## 2021-11-12 NOTE — PROGRESS NOTES
[facility] Hospitalist Progress Note     CC: Hospital Follow up    PCP: Sumanth Montilla MD       Assessment/Plan:     Principal Problem:    Cellulitis of left lower extremity        Ms. Rik Fine is an 78 year old female with PMH sig for cranial aneurysm s/p coili PIV     Dispo: KYLE potentially today       Ollie Siu MD  [address]  Hospitalist  Answering Service number: [phone]     Subjective:     Afebrile, still feels nauseated but improving, tolerating diet, complains of back pain which is usual for her.     OB Recent Labs   Lab 11/08/21  1541 11/12/21  0521   ALT 18 12*   AST 20 11*   ALB 2.5* 1.8*         Imaging:  XR ABDOMEN (1 VIEW) (CPT=74018)    Result Date: 11/9/2021  CONCLUSION:  1. Nonobstructive bowel gas pattern.  2. Lesser incidental findings as

## 2021-11-12 NOTE — TELEPHONE ENCOUNTER
GOKUL      PSR:  Left pt a vm to call us back to add her on the sheryl per  nurse.       Note; I didn't give pt date or time

## 2021-11-12 NOTE — DISCHARGE SUMMARY
[facility] Discharge Summary     Patient ID:  Carly Pizano  78year old  12/3/1940    Admit date: 11/8/2021    Discharge date: 11/12/2021    Attending Physician: Shannon Lo MD     Consults: IP CONSULT TO HOSPITALIST  IP CONSULT TO 7.5 mg nightly, seroquel 100 mg nightly      Chronic back pain  - tylenol, muscle rub  - norco prn  - f/u PMR, due for steroid injection next week     Hypothyroidism  - continue synthroid 100 mcg daily     HTN  - had norvasc discontinued on prior admission medications    acetaminophen 325 MG Tabs  Commonly known as: TYLENOL     ammonium lactate 12 % Lotn  Commonly known as: LAC-HYDRIN     apixaban 5 MG Tabs  Commonly known as: ELIQUIS  Take 1 tablet (5 mg total) by mouth 2 (two) times daily.      benztropine medications  · HYDROcodone-acetaminophen 5-325 MG Tabs         FU   Follow-up Information     Paloma Culp MD In 1 week.     Specialty: Internal Medicine  Contact information:  [facility]  [address] [address]  [phone]

## 2021-11-12 NOTE — CDS QUERY
Potential Risk for Infection  CLINICAL DOCUMENTATION CLARIFICATION FORM    How To Answer This Query:  1)  Click \"Edit Button\" on the toolbar. 2)  Type an \"X\" in the bracket for the diagnosis that applies.   (You may also add additional clinical details

## 2021-11-12 NOTE — PLAN OF CARE
Vital signs stable overnight, Tylenol #3 given for pain. Dressing changed. Call light within reach, bed alarm on, frequent monitoring done.     Problem: Patient Centered Care  Goal: Patient preferences are identified and integrated in the patient's plan of pre-medicate as appropriate  Outcome: Progressing     Problem: RISK FOR INFECTION - ADULT  Goal: Absence of fever/infection during anticipated neutropenic period  Description: INTERVENTIONS  - Monitor WBC  - Administer growth factors as ordered  - Gaby Maintain adequate hydration with IV or PO as ordered and tolerated  - Nasogastric tube to low intermittent suction as ordered  - Evaluate effectiveness of ordered antiemetic medications  - Provide nonpharmacologic comfort measures as appropriate  - Advance

## 2021-11-12 NOTE — PLAN OF CARE
Problem: Patient Centered Care  Goal: Patient preferences are identified and integrated in the patient's plan of care  Description: Interventions:  - What would you like us to know as we care for you?  \"I feel nauseous\"  - Provide timely, complete, and fever/infection during anticipated neutropenic period  Description: INTERVENTIONS  - Monitor WBC  - Administer growth factors as ordered  - Implement neutropenic guidelines  Outcome: Adequate for Discharge     Problem: SAFETY ADULT - FALL  Goal: Free from Nasogastric tube to low intermittent suction as ordered  - Evaluate effectiveness of ordered antiemetic medications  - Provide nonpharmacologic comfort measures as appropriate  - Advance diet as tolerated, if ordered  - Obtain nutritional consult as needed

## 2021-11-12 NOTE — PROGRESS NOTES
Discharge RN Summary: Patient has discharge order in. Patient to discharge home with son. IV removed by this Rn. Understands to follow up with PCP in 1 week. Patient understands changes to meds and that there are no new meds.        Scripts sent with pt: n

## 2021-11-12 NOTE — PROGRESS NOTES
Anastasiia Conrad is a 78year old female with hist of cranial aneurysm s/p coiling, DVT on Eliquis, hx of COVID now admitted to [address] [place] [address] on 11/08 with Cellulitis of LE.      HPI:    Patient seen and examined,  Afebrile,   Previous entries noted,   Comfortable, Brain Aneurysm, Stent placed    • Cervical disc disease 1985,1995,2003,2009    Cervical Discectomy    • Cholecystitis 1984    Cholecystectomy    • COPD (chronic obstructive pulmonary disease) (HCC)     PFTs 2-15 with FEV1 1.11 L and FEV1/FVC ratio 63%   • Toe, Onychomycosis   • EGD  05/2019    Gastric polyps only   • EGD  2006   • EYE SURGERY      x2 FOR \"CROSSED EYES\"   • HIP REPLACEMENT SURGERY Bilateral    • HYSTERECTOMY  1967    Partial Hysterectomy   • JAW SURGERY     • KNEE REPLACEMENT SURGERY B Value Ref Range    Glucose 128 (H) 70 - 99 mg/dL    Sodium 138 136 - 145 mmol/L    Potassium 4.3 3.5 - 5.1 mmol/L    Chloride 105 98 - 112 mmol/L    CO2 30.0 21.0 - 32.0 mmol/L    Anion Gap 3 0 - 18 mmol/L    BUN 14 7 - 18 mg/dL    Creatinine 0.96 0.55 - 1 Osmolality 299 (H) 275 - 295 mOsm/kg    GFR, Non- 59 (L) >=60    GFR, -American 68 >=60   CBC, Platelet;  No Differential   Result Value Ref Range    WBC 11.9 (H) 4.0 - 11.0 x10(3) uL    RBC 4.00 3.80 - 5.30 x10(6)uL    HGB 12.6 12.0 Influenza B by PCR Negative Negative    RSV by PCR Negative Negative   Urine Culture, Routine    Specimen: Urine, clean catch   Result Value Ref Range    Urine Culture >100,000 CFU/ML Escherichia coli (A)        Susceptibility    Escherichia coli -  (no me x10(3) uL    Monocyte Absolute 0.60 0.10 - 1.00 x10(3) uL    Eosinophil Absolute 0.37 0.00 - 0.70 x10(3) uL    Basophil Absolute 0.05 0.00 - 0.20 x10(3) uL    Immature Granulocyte Absolute 0.03 0.00 - 1.00 x10(3) uL    Neutrophil % 61.2 %    Lymphocyte % 2 with Cellulitis with + MRSA Carriage + in the past,    - On IV Vancomycin & IV Cefepime, d # 4,   - MRSA in past was only susceptive to Bactrim, Patient has allergy to it,     2. Abn Urine sediment: Cul with E coli,   - On abx as above, No dysuria     3.

## 2021-11-13 NOTE — PLAN OF CARE
Patient being discharge via ambulance to [facility] care) in [address] [place] [address]. Report given to Isabel Flowers, staff RN. All discharge paperwork was send with patient. Left midline removed prior discharge by  Jeanette Johnston.

## 2021-12-27 ENCOUNTER — TELEPHONE (OUTPATIENT)
Dept: PHYSICAL MEDICINE AND REHAB | Facility: CLINIC | Age: 81
End: 2021-12-27

## 2021-12-29 NOTE — TELEPHONE ENCOUNTER
Pt returned call and scheduled appt for 2/23/2022 first available. Pt states she is in a lot of pain and would like something sooner if possible.

## 2021-12-29 NOTE — TELEPHONE ENCOUNTER
Pt c/o constant, localized pain to middle lower back. Rates 6/10. Difficulty with getting in/out of bed. Took norco and percocet w/o any relief so only takes tylenol PRN since its the only medication that provides slight relief.      Also c/o pain to left s

## 2021-12-30 NOTE — TELEPHONE ENCOUNTER
Will proceed with Bilateral L5 TFESI. See telephone encounter 9/2/21 for insurance auth. Pt taking Eliquis 5 mg BID. Faxed Anti-coag form to Dr. Jg Horvath office.  [phone]

## 2022-01-03 NOTE — TELEPHONE ENCOUNTER
Patient said she is returning a phone drew.  She is having phone trouble and you may not be able to reach her. She is getting a new one in the near future. If need be you could call one of her contacts.

## 2022-01-06 NOTE — TELEPHONE ENCOUNTER
Anti-coag form was faxed on 12/30/21. Still awaiting clearance. Will need to f/u on clearance status.

## 2022-01-11 NOTE — TELEPHONE ENCOUNTER
Pt has been cleared to hold Eliquis for 2 days by Dr. Irineo Massey office.      LMTCB to schedule inj.

## 2022-01-12 ENCOUNTER — TELEPHONE (OUTPATIENT)
Dept: NEUROLOGY | Facility: CLINIC | Age: 82
End: 2022-01-12

## 2022-01-12 ENCOUNTER — OFFICE VISIT (OUTPATIENT)
Dept: PHYSICAL MEDICINE AND REHAB | Facility: CLINIC | Age: 82
End: 2022-01-12
Payer: MEDICARE

## 2022-01-12 VITALS — OXYGEN SATURATION: 96 % | DIASTOLIC BLOOD PRESSURE: 60 MMHG | HEART RATE: 83 BPM | SYSTOLIC BLOOD PRESSURE: 114 MMHG

## 2022-01-12 DIAGNOSIS — M47.812 ARTHROPATHY OF CERVICAL FACET JOINT: ICD-10-CM

## 2022-01-12 DIAGNOSIS — F31.4 SEVERE DEPRESSED BIPOLAR I DISORDER WITHOUT PSYCHOTIC FEATURES (HCC): ICD-10-CM

## 2022-01-12 DIAGNOSIS — M51.36 LUMBAR DEGENERATIVE DISC DISEASE: ICD-10-CM

## 2022-01-12 DIAGNOSIS — M48.061 SPINAL STENOSIS OF LUMBAR REGION WITHOUT NEUROGENIC CLAUDICATION: ICD-10-CM

## 2022-01-12 DIAGNOSIS — M96.1 LUMBAR POST-LAMINECTOMY SYNDROME: ICD-10-CM

## 2022-01-12 DIAGNOSIS — M96.1 CERVICAL POST-LAMINECTOMY SYNDROME: ICD-10-CM

## 2022-01-12 DIAGNOSIS — M54.16 LUMBAR RADICULOPATHY: Primary | ICD-10-CM

## 2022-01-12 DIAGNOSIS — M50.90 CERVICAL DISC DISEASE: ICD-10-CM

## 2022-01-12 DIAGNOSIS — M47.816 LUMBAR FACET ARTHROPATHY: ICD-10-CM

## 2022-01-12 DIAGNOSIS — M25.512 ACUTE PAIN OF LEFT SHOULDER: ICD-10-CM

## 2022-01-12 DIAGNOSIS — M51.26 LUMBAR HERNIATED DISC: ICD-10-CM

## 2022-01-12 DIAGNOSIS — M43.16 SPONDYLOLISTHESIS OF LUMBAR REGION: ICD-10-CM

## 2022-01-12 DIAGNOSIS — M54.12 CERVICAL RADICULITIS: ICD-10-CM

## 2022-01-12 DIAGNOSIS — M67.912 DYSFUNCTION OF LEFT ROTATOR CUFF: ICD-10-CM

## 2022-01-12 DIAGNOSIS — M43.10 RETROLISTHESIS OF VERTEBRAE: ICD-10-CM

## 2022-01-12 DIAGNOSIS — Z98.1 S/P CERVICAL SPINAL FUSION: ICD-10-CM

## 2022-01-12 PROCEDURE — 99214 OFFICE O/P EST MOD 30 MIN: CPT | Performed by: PHYSICAL MEDICINE & REHABILITATION

## 2022-01-12 NOTE — TELEPHONE ENCOUNTER
Bilateral L5 (L5-S1) TFESI CPT CODE: 70690-17, 77003-APPROVED  Per Medicare Guidelines: Request is a covered benefit and pre-certification is not require. All Medicare coverage is based on Medical Necessity.    Notified nursing for scheduling

## 2022-01-12 NOTE — PATIENT INSTRUCTIONS
Plan  I will do the bilateral L5 TFESI's. She will do the PT for the left shoulder. She will get a left shoulder x-ray. She would like to hold on having a left shoulder injection for now.     The patient will follow up in 3 months, but the patien

## 2022-01-12 NOTE — PROGRESS NOTES
Cervical Pain H & P    Chief Complaint:  Patient presents with:  Low Back Pain: LOV 09/02/21. She did not get L5 TFESI for some medical issues. LBP is worse when getting in and out of bed.  She is having trouble standing because her knees buckle and she fee • L4-5 slight grade 1 unstable spondylolisthesis 10/31/2014   • Leg wound, left 08/21/2019   • Low back pain    • Migraines    • Muscle weakness    • Onychomycosis     Debridement    • Oropharyngeal dysphagia 8/29/2017   • Osteoarthrosis, unspecified whe History   Problem Relation Age of Onset   • Heart Attack Mother    • Heart Disease Mother         Coronary Artery Disease, Premature    • Fibromyalgia Sister    • Heart Disease Sister    • Heart Attack Sister    • Heart Disease Brother    • Heart Attack Br Belt: Not Asked        Self-Exams: Not Asked        Grew up on a farm: Not Asked        History of tanning: No        Outdoor occupation: Not Asked        Pt has a pacemaker: No        Pt has a defibrillator: No        Breast feeding: Not Asked        Reac Neurological Upper Extremity:    Light Touch: Intact in Bilateral UE except: Increased in the left thumb. Decreased in the left thumb. Pin Prick: Not tested. UE Muscle Strength:  All Upper Extremity strength measurements 5/5   Reflexes: 2+ In the bi patient will call me 2 weeks after having the injection to let me know how the injection worked. The patient understands and agrees with the stated plan. Andrew Champagne MD  1/12/2022

## 2022-01-13 NOTE — TELEPHONE ENCOUNTER
See prior telephone encounter 12/27/21    Pt has been cleared to hold Eliquis for 2 days by Dr. Ingrid Cervantes office.      LMTCB to schedule inj.

## 2022-01-17 NOTE — TELEPHONE ENCOUNTER
Patient has been scheduled for Bilateral L5 (L5-S1) TFESI  on 1/21/22 at the [facility] with Dr. Becca Armenta.    -Anesthesia type: Local.  -If receiving MAC or IVC sedation patient will need to get COVID tested 3 days prior even if already vaccinated (order placed by E

## 2022-01-18 RX ORDER — DIAZEPAM 10 MG/1
10 TABLET ORAL EVERY 6 HOURS PRN
Qty: 1 TABLET | Refills: 0 | Status: SHIPPED | OUTPATIENT
Start: 2022-01-18

## 2022-01-19 NOTE — TELEPHONE ENCOUNTER
Received call from [address], pt is adamant that she is getting injection for L mikal on 1/21/22. LMTCB, to confirm with pt that she is getting Bilateral L5 TFESI.

## 2022-01-19 NOTE — TELEPHONE ENCOUNTER
S/w pt. She was confused since her LOV on 1/12/22 was for her shoulder. Reminded pt that her back injection had been ordered since her office visit in Sept '21 but it had been delayed due to different circumstances on the pt's part.  Pt verbalized April Soria

## 2022-01-21 ENCOUNTER — OFFICE VISIT (OUTPATIENT)
Dept: SURGERY | Facility: CLINIC | Age: 82
End: 2022-01-21

## 2022-01-21 ENCOUNTER — HOSPITAL ENCOUNTER (OUTPATIENT)
Dept: GENERAL RADIOLOGY | Facility: HOSPITAL | Age: 82
Discharge: HOME | End: 2022-01-21
Attending: PHYSICAL MEDICINE & REHABILITATION
Payer: MEDICARE

## 2022-01-21 DIAGNOSIS — M67.912 DYSFUNCTION OF LEFT ROTATOR CUFF: ICD-10-CM

## 2022-01-21 DIAGNOSIS — M96.1 LUMBAR POST-LAMINECTOMY SYNDROME: ICD-10-CM

## 2022-01-21 DIAGNOSIS — M51.36 LUMBAR DEGENERATIVE DISC DISEASE: ICD-10-CM

## 2022-01-21 DIAGNOSIS — M54.16 LUMBAR RADICULOPATHY: Primary | ICD-10-CM

## 2022-01-21 DIAGNOSIS — M25.512 ACUTE PAIN OF LEFT SHOULDER: ICD-10-CM

## 2022-01-21 DIAGNOSIS — M48.061 SPINAL STENOSIS OF LUMBAR REGION WITHOUT NEUROGENIC CLAUDICATION: ICD-10-CM

## 2022-01-21 PROCEDURE — 73030 X-RAY EXAM OF SHOULDER: CPT | Performed by: PHYSICAL MEDICINE & REHABILITATION

## 2022-01-21 PROCEDURE — 64483 NJX AA&/STRD TFRM EPI L/S 1: CPT | Performed by: PHYSICAL MEDICINE & REHABILITATION

## 2022-01-21 NOTE — PROCEDURES
[facility] [address]    BILATERAL LUMBAR TRANSFORAMINAL   NAME:  Denisse Marinelli    MR #:    [id number] :  12/3/1940     PHYSICIAN:  Kevin Boudreaux MD        Operative Report    DATE OF PROCEDURE: 2022   PREOPERATIVE DIAGNOSES: 1. bi advance the needle to the 6 o'clock position on the right L5 pedicle. At this point in time, Omnipaque-240 contrast was used to obtain a good epidurogram indicating correct needle placement. Then, aspiration was performed.   No blood, fluid, or air was as DISPLAY PLAN FREE TEXT

## 2022-02-08 PROBLEM — M19.012 PRIMARY OSTEOARTHRITIS OF LEFT SHOULDER: Status: ACTIVE | Noted: 2022-02-08

## 2022-02-09 ENCOUNTER — TELEPHONE (OUTPATIENT)
Dept: PHYSICAL MEDICINE AND REHAB | Facility: CLINIC | Age: 82
End: 2022-02-09

## 2022-02-09 NOTE — TELEPHONE ENCOUNTER
----- Message from Genoveva Fernando MD sent at 2/8/2022 10:50 PM CST -----  She has moderate shoulder arthritis.   Please make sure that she has started the PT.

## 2022-02-22 NOTE — TELEPHONE ENCOUNTER
S/w pt, shared XR results per MD review. Pt requesting copy of PT order. Pt has appt with Dr. Paula Dubon at OP tomorrow 2/23/22. Pt will obtain copy of PT order at that time. Jacqueline Moore notified.

## 2022-02-23 ENCOUNTER — MED REC SCAN ONLY (OUTPATIENT)
Dept: PHYSICAL MEDICINE AND REHAB | Facility: CLINIC | Age: 82
End: 2022-02-23

## 2022-02-23 ENCOUNTER — TELEPHONE (OUTPATIENT)
Dept: PHYSICAL MEDICINE AND REHAB | Facility: CLINIC | Age: 82
End: 2022-02-23

## 2022-02-23 ENCOUNTER — OFFICE VISIT (OUTPATIENT)
Dept: PHYSICAL MEDICINE AND REHAB | Facility: CLINIC | Age: 82
End: 2022-02-23
Payer: MEDICARE

## 2022-02-23 DIAGNOSIS — M19.011 PRIMARY OSTEOARTHRITIS OF RIGHT SHOULDER: ICD-10-CM

## 2022-02-23 DIAGNOSIS — M48.061 SPINAL STENOSIS OF LUMBAR REGION WITHOUT NEUROGENIC CLAUDICATION: ICD-10-CM

## 2022-02-23 DIAGNOSIS — M51.36 LUMBAR DEGENERATIVE DISC DISEASE: ICD-10-CM

## 2022-02-23 DIAGNOSIS — M47.812 ARTHROPATHY OF CERVICAL FACET JOINT: ICD-10-CM

## 2022-02-23 DIAGNOSIS — M43.10 RETROLISTHESIS OF VERTEBRAE: ICD-10-CM

## 2022-02-23 DIAGNOSIS — Z98.1 S/P CERVICAL SPINAL FUSION: ICD-10-CM

## 2022-02-23 DIAGNOSIS — M96.1 LUMBAR POST-LAMINECTOMY SYNDROME: ICD-10-CM

## 2022-02-23 DIAGNOSIS — M51.26 LUMBAR HERNIATED DISC: ICD-10-CM

## 2022-02-23 DIAGNOSIS — M47.816 LUMBAR FACET ARTHROPATHY: ICD-10-CM

## 2022-02-23 DIAGNOSIS — M25.512 ACUTE PAIN OF LEFT SHOULDER: Primary | ICD-10-CM

## 2022-02-23 DIAGNOSIS — F31.4 SEVERE DEPRESSED BIPOLAR I DISORDER WITHOUT PSYCHOTIC FEATURES (HCC): ICD-10-CM

## 2022-02-23 DIAGNOSIS — M67.912 DYSFUNCTION OF LEFT ROTATOR CUFF: ICD-10-CM

## 2022-02-23 DIAGNOSIS — M43.16 SPONDYLOLISTHESIS OF LUMBAR REGION: ICD-10-CM

## 2022-02-23 DIAGNOSIS — M67.911 DYSFUNCTION OF RIGHT ROTATOR CUFF: ICD-10-CM

## 2022-02-23 DIAGNOSIS — M96.1 CERVICAL POST-LAMINECTOMY SYNDROME: ICD-10-CM

## 2022-02-23 DIAGNOSIS — M19.012 PRIMARY OSTEOARTHRITIS OF LEFT SHOULDER: ICD-10-CM

## 2022-02-23 DIAGNOSIS — M54.16 LUMBAR RADICULOPATHY: ICD-10-CM

## 2022-02-23 PROCEDURE — 99214 OFFICE O/P EST MOD 30 MIN: CPT | Performed by: PHYSICAL MEDICINE & REHABILITATION

## 2022-02-23 PROCEDURE — 20611 DRAIN/INJ JOINT/BURSA W/US: CPT | Performed by: PHYSICAL MEDICINE & REHABILITATION

## 2022-02-23 RX ORDER — TRIAMCINOLONE ACETONIDE 40 MG/ML
60 INJECTION, SUSPENSION INTRA-ARTICULAR; INTRAMUSCULAR ONCE
Status: COMPLETED | OUTPATIENT
Start: 2022-02-23 | End: 2022-02-23

## 2022-02-23 RX ORDER — LIDOCAINE HYDROCHLORIDE 10 MG/ML
2.5 INJECTION, SOLUTION INFILTRATION; PERINEURAL ONCE
Status: COMPLETED | OUTPATIENT
Start: 2022-02-23 | End: 2022-02-23

## 2022-02-23 NOTE — PROCEDURES
The patient is here for a left shoulder injection done under ultrasound guidance. Under ultrasound guidance the left posterior glenohumeral joint was identified and a shaylee was placed on the patient's skin. The skin was cleaned with alcohol swabs x 3 and anesthetized with ethyl chloride spray. Then a 25 gauge needle was inserted under ultrasound guidance into the left posterior glenohumeral joint. Aspiration was performed and no blood, fluid, or air was aspirated. The patient was then injected with 4 ml of 1.5 ml of 40 mg of Kenalog/ml and 2.5 ml of 1% lidocaine without epinephrine. The needle was removed and a band aid was applied. The patient will follow up in 2 week(s) with a phone call. These images are permanently stored in the ultrasound unit or PACS system.

## 2022-02-23 NOTE — TELEPHONE ENCOUNTER
Per Medicare guidelines-no authorization required for Lidocaine/Kenalog injections in office    left shoulder injection under ultrasound guidance CPT 38450+    Status: Approved-Covered Benefit    Routing to clinical staff to schedule

## 2022-03-21 ENCOUNTER — MED REC SCAN ONLY (OUTPATIENT)
Dept: PHYSICAL MEDICINE AND REHAB | Facility: CLINIC | Age: 82
End: 2022-03-21

## 2022-04-04 NOTE — TELEPHONE ENCOUNTER
Contacted David Frazier from [facility], notified MD approved the home health. Update pt is being discharged on the 4/22 and home health will see on 4/23 and provide an update. Sarecycline Counseling: Patient advised regarding possible photosensitivity and discoloration of the teeth, skin, lips, tongue and gums.  Patient instructed to avoid sunlight, if possible.  When exposed to sunlight, patients should wear protective clothing, sunglasses, and sunscreen.  The patient was instructed to call the office immediately if the following severe adverse effects occur:  hearing changes, easy bruising/bleeding, severe headache, or vision changes.  The patient verbalized understanding of the proper use and possible adverse effects of sarecycline.  All of the patient's questions and concerns were addressed.

## 2022-04-11 ENCOUNTER — TELEPHONE (OUTPATIENT)
Dept: NEUROLOGY | Facility: CLINIC | Age: 82
End: 2022-04-11

## 2022-04-11 NOTE — TELEPHONE ENCOUNTER
Patient looking to speak to a nurse to inform that she is in excruciating pain, she will like a drew back to advice.

## 2022-04-12 ENCOUNTER — MED REC SCAN ONLY (OUTPATIENT)
Dept: PHYSICAL MEDICINE AND REHAB | Facility: CLINIC | Age: 82
End: 2022-04-12

## 2022-04-12 NOTE — TELEPHONE ENCOUNTER
Location of Pain: Lower back pain  Old or new pain: Old  Date Pain Began: 2 days ago had flare up. Difficulty getting out of bed. Worsens when laying down. Denies N/T. If the following symptoms are identified route high priority and verbally notify provider: Bowel/bladder incontinence, new/acute weakness, signs of infection (fever, redness, swelling, drainage), HA the worsens while standing and improves while laying. Numeric Rating Scale:  Pain at Present:  8                                                                                                            (No Pain) 0  to  10 (Worst Pain)                 Minimum Pain:   6  Maximum Pain  10    Distribution of Pain:    bilateral  Quality of Pain:   sharp/stabbing  Aggravating Factors:    Sitting  Current pain treatment: OTC medications  Taking Norco    LOV:2/23/22  NOV: Visit date not found     Summary of patient request: Another injections if possible.

## 2022-04-12 NOTE — ADDENDUM NOTE
Addended by: Felicia Boone on: 4/12/2022 02:03 PM     Modules accepted: Orders, SmartSet Pressure like, moderate, left sided, non radiating pain on admission  Heart score 4; JAMES score 2  EKG: NSR  Troponin X3 negative   Echo: 55% EF   Normal stress test  C/w aspirin, statin, metoprolol.  O/p Follow up with cardiology  Dr. Donald on board -pressure like, moderate, left sided, non radiating pain  -EKG: NSR  -Tropnin X3 negative   -Last ECHO from April 2017 showed EF of 56% and grade 2 diastolic dysfunction. repeat ECHO shows similar findings   -Heart score of 4 , so loderate risk of adverse cardiac events  -JAMES score of 2 (age and ASA use)  -c/w aspirin, statin, metoprolol.  -Cardio Dr. Odilon traore noted  - Stress test on Monday Pressure like, moderate, left sided, non radiating pain on admission  Heart score 4; JAMES score 2  EKG: NSR  Troponin X3 negative   Echo: 55% EF   C/w aspirin, statin, metoprolol.  Stress test today  Dr. Donald on board Pressure like, moderate, left sided, non radiating pain on admission  Heart score 4; JAMES score 2  EKG: NSR  Troponin X3 negative   Echo: 55% EF   Normal stress test  C/w aspirin, statin, metoprolol.  O/p Follow up with cardiology  Dr. Donald on board Pressure like, moderate, left sided, non radiating pain on admission  Heart score 4; JAMES score 2  EKG: NSR  Troponin X3 negative   Echo: 55% EF   Normal stress test  C/w aspirin, statin, metoprolol.  O/p Follow up with cardiology  Dr. Donald on board Pressure like, moderate, left sided, non radiating pain on admission  Heart score 4; JAMES score 2  EKG: NSR  Troponin X3 negative   Echo: 55% EF   Normal stress test  C/w aspirin, statin, metoprolol.  Dr. Donald on board -pressure like, moderate, left sided, non radiating pain  -EKG: NSR  -Tropnin X3 negative   -Last ECHO from April 2017 showed EF of 56% and grade 2 diastolic dysfunction. repeat ECHO shows similar findings   -Heart score of 4 , so loderate risk of adverse cardiac events  -JAMES score of 2 (age and ASA use)  -c/w aspirin, statin, metoprolol.  -Cardio Dr. Odilon traore noted  - Stress test on Monday

## 2022-04-14 ENCOUNTER — TELEPHONE (OUTPATIENT)
Dept: PHYSICAL MEDICINE AND REHAB | Facility: CLINIC | Age: 82
End: 2022-04-14

## 2022-04-14 NOTE — TELEPHONE ENCOUNTER
Taking Eliquis. Letter sent to Dr Trevor Zuniga for clearance. Hold 2 days prior. She is wondering if Dr Pancho Rogers is able to prescribe her Norco. She was still taking some from an old prescription she had from last year 5/325 and was having some relief but no enough. She would like to get a higher dose or something different to manage her pain.

## 2022-04-14 NOTE — TELEPHONE ENCOUNTER
Per Medicare Guidelines -no authorization is required for khurram     Status: Approved-Covered Benefit     Clinical staff can proceed with scheduling Bilateral L5 TFESIs CPT [id number]

## 2022-04-15 RX ORDER — HYDROCODONE BITARTRATE AND ACETAMINOPHEN 10; 325 MG/1; MG/1
1 TABLET ORAL EVERY 8 HOURS PRN
Qty: 30 TABLET | Refills: 0 | Status: SHIPPED | OUTPATIENT
Start: 2022-04-15 | End: 2022-05-15

## 2022-04-15 NOTE — TELEPHONE ENCOUNTER
S/w patient, notified about Jolynn Spencer and to update us in a week.  She's aware we're still waiting for clearance

## 2022-04-15 NOTE — TELEPHONE ENCOUNTER
i I called in [address] [place] [address],[address] 10/325 total of 30 tablets for her. I would like her to give us a call next week with an update as to how she is doing. Please let me know when she is cleared to come off the Eliquis for her injection.

## 2022-04-18 ENCOUNTER — TELEPHONE (OUTPATIENT)
Dept: PHYSICAL MEDICINE AND REHAB | Facility: CLINIC | Age: 82
End: 2022-04-18

## 2022-04-21 NOTE — TELEPHONE ENCOUNTER
Received clearance to hold Eliquis for 2 days per KIMBERLY Heard as noted in telephone encounter 4/1/22. Patient has been scheduled for a Bilateral L5 TFESI under local on 4/29/22 at the [facility]. Medications and allergies reviewed. Patient informed to hold aspirins, nsaids, blood thinners, multivitamins, phentermine, vitamin E and fish oils 3 days prior to procedure. Patient informed she will need a . Pt verbalized understanding and agrees with plan. NOV scheduled for 6/8/22. Added to casetabs.

## 2022-05-02 ENCOUNTER — TELEPHONE (OUTPATIENT)
Dept: PHYSICAL MEDICINE AND REHAB | Facility: CLINIC | Age: 82
End: 2022-05-02

## 2022-05-02 NOTE — TELEPHONE ENCOUNTER
Brie Iyer from [address] calling letting us know patient is downstairs at [address] She thought her injection was today but it is for tomorrow 05/03/22. Brie Iyer stated they can accomodate the patient with Dr. Nubia Rizvi if it is okay with Dr. Becca Armenta since she is there already and she needs to schedule transportation since she lives in a correction community. Per Dr. Becca Armenta, he is okay with it as long as the patient and Dr. Nubia Rizvi are okay with it. Brie Iyer notified.

## 2022-05-03 ENCOUNTER — OFFICE VISIT (OUTPATIENT)
Dept: SURGERY | Facility: CLINIC | Age: 82
End: 2022-05-03

## 2022-05-03 DIAGNOSIS — M48.061 SPINAL STENOSIS OF LUMBAR REGION WITHOUT NEUROGENIC CLAUDICATION: ICD-10-CM

## 2022-05-03 DIAGNOSIS — M96.1 LUMBAR POST-LAMINECTOMY SYNDROME: ICD-10-CM

## 2022-05-03 DIAGNOSIS — M51.36 LUMBAR DEGENERATIVE DISC DISEASE: ICD-10-CM

## 2022-05-03 DIAGNOSIS — M54.16 LUMBAR RADICULOPATHY: Primary | ICD-10-CM

## 2022-05-03 PROCEDURE — 64483 NJX AA&/STRD TFRM EPI L/S 1: CPT | Performed by: PHYSICAL MEDICINE & REHABILITATION

## 2022-05-03 NOTE — PROCEDURES
[facility]    BILATERAL LUMBAR TRANSFORAMINAL   NAME:  Jeri Light    MR #:    [id number] :  12/3/1940     PHYSICIAN:  Ramiro Baumann MD        Operative Report    DATE OF PROCEDURE: 5/3/2022   PREOPERATIVE DIAGNOSES: 1. bilateral L5-S1 radiculopathy    2. s/p L5-S1 right laminectomy    3. L4-5 mild-mod diffuse, L3-4 mod diffuse, L2-3 mild-mod diffuse, L1-2 mod diffuse, T12-L1 left mild-mod, T11-12 mild-mod diffuse bulging discs    4. 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 bilateral L5-S1 radiculopathy    2. s/p L5-S1 right laminectomy    3. L4-5 mild-mod diffuse, L3-4 mod diffuse, L2-3 mild-mod diffuse, L1-2 mod diffuse, T12-L1 left mild-mod, T11-12 mild-mod diffuse bulging discs    4. L5-S1 bilateral severe foraminal, L2-3 mild-mod central, L1-2 mod central, T12-L1 mod central, T11-12 mild-mod central stenosis        PROCEDURES: Bilateral L5 transforaminal epidural steroid injections done under fluoroscopic guidance with contrast enhancement. SURGEON: Ramiro Baumann MD   ANESTHESIA: Local   INDICATIONS:      OPERATIVE PROCEDURE:  Written consent was obtained from the patient. The patient was brought into the operating room and placed in the prone position on the fluoroscopy table with pillow underneath her abdomen. The patient's skin was cleaned and draped in a normal sterile fashion. Using AP fluoroscopy, all five lumbar vertebrae were identified. When the fifth vertebra was identified, fluoroscopy was left anterior obliqued opening up the right L5-S1 intervertebral foramen. At this point in time, the patient's skin was anesthetized with 1% PF lidocaine without epinephrine. Then, a 5 inch, 22-gauge spinal needle was inserted and directed towards the right L5-S1 intervertebral foramen.   When it felt to be in good position, AP fluoroscopy was used to advance the needle to the 6 o'clock position on the right L5 pedicle. At this point in time, Omnipaque-240 contrast was used to obtain a good epidurogram indicating correct needle placement. Then, aspiration was performed. No blood, fluid, or air was aspirated. Then, the patient was injected with a 3 cc solution of 1.5 cc of 40 mg/cc of Kenalog and 1.5 cc of 1% PF lidocaine without epinephrine. After this, the needle was removed. Then  fluoroscopy was right anterior obliqued opening up the left L5-S1 intervertebral foramen. At this point in time, the patient's skin was anesthetized with 1 to 2 cc of 1% PF lidocaine without epinephrine. Then, a 5 inch, 22-gauge spinal needle was inserted and directed towards the left L5-S1 intervertebral foramen. When it felt to be in good position, AP fluoroscopy was used to advance the needle to the 6 o'clock position on the left L5 pedicle. At this point in time, Omnipaque-240 contrast was used to obtain a good epidurogram indicating correct needle placement. Then, aspiration was performed. No blood, fluid, or air was aspirated. Then, the patient was injected with a 3 cc solution of 1.5 cc of 40 mg/cc of Kenalog and 1.5 cc of 1% PF lidocaine without epinephrine. After this, the needle was removed. The patient's skin was cleaned. Band-Aids were applied. The patient was transferred to the cart and into [facility]. The patient was given discharge instructions and will follow up in the clinic as scheduled. Throughout the whole procedure, the patient's pulse oximetry and vital signs were monitored and they remained completely stable. Also, throughout the whole procedure, prior to injection of any medication, aspiration was performed. No blood, fluid, or air was aspirated at anytime.

## 2022-05-03 NOTE — TELEPHONE ENCOUNTER
Refill Request    Medication request: HYDROcodone-acetaminophen  MG Oral Tab Take 1 tablet by mouth every 8 (eight) hours as needed for Pain.     LOV: 2/23/22, patient had injection today 5/3/22  Due back to clinic per last office note:  \"follow up in 3 months\"  NOV: 6/8/22     [place] 4/15/22 #21    Urine drug screen (if applicable): none  Pain contract: none    LOV plan (if weaning or changing medications): no change

## 2022-05-04 RX ORDER — HYDROCODONE BITARTRATE AND ACETAMINOPHEN 10; 325 MG/1; MG/1
TABLET ORAL
Qty: 21 TABLET | Refills: 0 | Status: ON HOLD | OUTPATIENT
Start: 2022-05-04

## 2022-05-05 ENCOUNTER — HOSPITAL ENCOUNTER (INPATIENT)
Facility: HOSPITAL | Age: 82
LOS: 5 days | Discharge: NURSING, REHAB OR LONG-TERM CARE | End: 2022-05-11
Attending: EMERGENCY MEDICINE | Admitting: INTERNAL MEDICINE
Payer: MEDICARE

## 2022-05-05 ENCOUNTER — APPOINTMENT (OUTPATIENT)
Dept: GENERAL RADIOLOGY | Facility: HOSPITAL | Age: 82
End: 2022-05-05
Attending: EMERGENCY MEDICINE
Payer: MEDICARE

## 2022-05-05 ENCOUNTER — APPOINTMENT (OUTPATIENT)
Dept: CT IMAGING | Facility: HOSPITAL | Age: 82
End: 2022-05-05
Attending: EMERGENCY MEDICINE
Payer: MEDICARE

## 2022-05-05 DIAGNOSIS — J69.0 ASPIRATION PNEUMONIA OF RIGHT MIDDLE LOBE, UNSPECIFIED ASPIRATION PNEUMONIA TYPE (HCC): Primary | ICD-10-CM

## 2022-05-05 LAB
ANION GAP SERPL CALC-SCNC: 3 MMOL/L (ref 0–18)
BASOPHILS # BLD AUTO: 0.03 X10(3) UL (ref 0–0.2)
BASOPHILS NFR BLD AUTO: 0.2 %
BUN BLD-MCNC: 24 MG/DL (ref 7–18)
BUN/CREAT SERPL: 19.7 (ref 10–20)
CALCIUM BLD-MCNC: 10 MG/DL (ref 8.5–10.1)
CHLORIDE SERPL-SCNC: 104 MMOL/L (ref 98–112)
CO2 SERPL-SCNC: 29 MMOL/L (ref 21–32)
CREAT BLD-MCNC: 1.22 MG/DL
DEPRECATED RDW RBC AUTO: 57.4 FL (ref 35.1–46.3)
EOSINOPHIL # BLD AUTO: 0.01 X10(3) UL (ref 0–0.7)
EOSINOPHIL NFR BLD AUTO: 0.1 %
ERYTHROCYTE [DISTWIDTH] IN BLOOD BY AUTOMATED COUNT: 15.3 % (ref 11–15)
GLUCOSE BLD-MCNC: 86 MG/DL (ref 70–99)
HCT VFR BLD AUTO: 42.1 %
HGB BLD-MCNC: 13.2 G/DL
IMM GRANULOCYTES # BLD AUTO: 0.14 X10(3) UL (ref 0–1)
IMM GRANULOCYTES NFR BLD: 0.8 %
LACTATE SERPL-SCNC: 1.8 MMOL/L (ref 0.4–2)
LYMPHOCYTES # BLD AUTO: 1.36 X10(3) UL (ref 1–4)
LYMPHOCYTES NFR BLD AUTO: 7.8 %
MAGNESIUM SERPL-MCNC: 2.1 MG/DL (ref 1.6–2.6)
MCH RBC QN AUTO: 31.7 PG (ref 26–34)
MCHC RBC AUTO-ENTMCNC: 31.4 G/DL (ref 31–37)
MCV RBC AUTO: 101 FL
MONOCYTES # BLD AUTO: 1.21 X10(3) UL (ref 0.1–1)
MONOCYTES NFR BLD AUTO: 7 %
NEUTROPHILS # BLD AUTO: 14.61 X10 (3) UL (ref 1.5–7.7)
NEUTROPHILS # BLD AUTO: 14.61 X10(3) UL (ref 1.5–7.7)
NEUTROPHILS NFR BLD AUTO: 84.1 %
OSMOLALITY SERPL CALC.SUM OF ELEC: 285 MOSM/KG (ref 275–295)
PLATELET # BLD AUTO: 183 10(3)UL (ref 150–450)
POTASSIUM SERPL-SCNC: 3.6 MMOL/L (ref 3.5–5.1)
PROCALCITONIN SERPL-MCNC: 0.12 NG/ML (ref ?–0.16)
RBC # BLD AUTO: 4.17 X10(6)UL
SARS-COV-2 RNA RESP QL NAA+PROBE: NOT DETECTED
SODIUM SERPL-SCNC: 136 MMOL/L (ref 136–145)
WBC # BLD AUTO: 17.4 X10(3) UL (ref 4–11)

## 2022-05-05 PROCEDURE — 83605 ASSAY OF LACTIC ACID: CPT | Performed by: EMERGENCY MEDICINE

## 2022-05-05 PROCEDURE — 74177 CT ABD & PELVIS W/CONTRAST: CPT | Performed by: EMERGENCY MEDICINE

## 2022-05-05 PROCEDURE — 99285 EMERGENCY DEPT VISIT HI MDM: CPT

## 2022-05-05 PROCEDURE — 83735 ASSAY OF MAGNESIUM: CPT | Performed by: EMERGENCY MEDICINE

## 2022-05-05 PROCEDURE — 93010 ELECTROCARDIOGRAM REPORT: CPT | Performed by: EMERGENCY MEDICINE

## 2022-05-05 PROCEDURE — 87186 SC STD MICRODIL/AGAR DIL: CPT | Performed by: EMERGENCY MEDICINE

## 2022-05-05 PROCEDURE — 87077 CULTURE AEROBIC IDENTIFY: CPT | Performed by: EMERGENCY MEDICINE

## 2022-05-05 PROCEDURE — 85025 COMPLETE CBC W/AUTO DIFF WBC: CPT | Performed by: EMERGENCY MEDICINE

## 2022-05-05 PROCEDURE — 93005 ELECTROCARDIOGRAM TRACING: CPT

## 2022-05-05 PROCEDURE — 80048 BASIC METABOLIC PNL TOTAL CA: CPT | Performed by: EMERGENCY MEDICINE

## 2022-05-05 PROCEDURE — 96374 THER/PROPH/DIAG INJ IV PUSH: CPT

## 2022-05-05 PROCEDURE — 84145 PROCALCITONIN (PCT): CPT | Performed by: EMERGENCY MEDICINE

## 2022-05-05 PROCEDURE — 36415 COLL VENOUS BLD VENIPUNCTURE: CPT

## 2022-05-05 PROCEDURE — 87040 BLOOD CULTURE FOR BACTERIA: CPT | Performed by: EMERGENCY MEDICINE

## 2022-05-05 PROCEDURE — 71045 X-RAY EXAM CHEST 1 VIEW: CPT | Performed by: EMERGENCY MEDICINE

## 2022-05-05 PROCEDURE — 87184 SC STD DISK METHOD PER PLATE: CPT | Performed by: EMERGENCY MEDICINE

## 2022-05-05 PROCEDURE — 87150 DNA/RNA AMPLIFIED PROBE: CPT | Performed by: EMERGENCY MEDICINE

## 2022-05-05 NOTE — ED INITIAL ASSESSMENT (HPI)
Pt via [facility] from [facility] with complaint of dizziness and weakness x 3 days and nausea/vomiting x 1 day. ODT zofran given by medics.

## 2022-05-06 PROBLEM — J69.0 ASPIRATION PNEUMONIA OF RIGHT MIDDLE LOBE, UNSPECIFIED ASPIRATION PNEUMONIA TYPE (HCC): Status: ACTIVE | Noted: 2022-05-06

## 2022-05-06 LAB
ADENOVIRUS PCR:: NOT DETECTED
ANION GAP SERPL CALC-SCNC: 4 MMOL/L (ref 0–18)
B PARAPERT DNA SPEC QL NAA+PROBE: NOT DETECTED
B PERT DNA SPEC QL NAA+PROBE: NOT DETECTED
BASOPHILS # BLD: 0 X10(3) UL (ref 0–0.2)
BASOPHILS NFR BLD: 0 %
BILIRUB UR QL: NEGATIVE
BUN BLD-MCNC: 23 MG/DL (ref 7–18)
BUN/CREAT SERPL: 19.3 (ref 10–20)
C PNEUM DNA SPEC QL NAA+PROBE: NOT DETECTED
CALCIUM BLD-MCNC: 9.1 MG/DL (ref 8.5–10.1)
CHLORIDE SERPL-SCNC: 109 MMOL/L (ref 98–112)
CLARITY UR: CLEAR
CO2 SERPL-SCNC: 30 MMOL/L (ref 21–32)
COLOR UR: YELLOW
CORONAVIRUS 229E PCR:: NOT DETECTED
CORONAVIRUS HKU1 PCR:: NOT DETECTED
CORONAVIRUS NL63 PCR:: NOT DETECTED
CORONAVIRUS OC43 PCR:: NOT DETECTED
CREAT BLD-MCNC: 1.19 MG/DL
DEPRECATED RDW RBC AUTO: 58.3 FL (ref 35.1–46.3)
EOSINOPHIL # BLD: 0 X10(3) UL (ref 0–0.7)
EOSINOPHIL NFR BLD: 0 %
ERYTHROCYTE [DISTWIDTH] IN BLOOD BY AUTOMATED COUNT: 15.4 % (ref 11–15)
FLUAV RNA SPEC QL NAA+PROBE: NOT DETECTED
FLUBV RNA SPEC QL NAA+PROBE: NOT DETECTED
GLUCOSE BLD-MCNC: 89 MG/DL (ref 70–99)
GLUCOSE UR-MCNC: NEGATIVE MG/DL
HCT VFR BLD AUTO: 36.7 %
HGB BLD-MCNC: 11.4 G/DL
LACTATE SERPL-SCNC: 0.9 MMOL/L (ref 0.4–2)
LYMPHOCYTES NFR BLD: 1.1 X10(3) UL (ref 1–4)
LYMPHOCYTES NFR BLD: 6 %
MCH RBC QN AUTO: 31.6 PG (ref 26–34)
MCHC RBC AUTO-ENTMCNC: 31.1 G/DL (ref 31–37)
MCV RBC AUTO: 101.7 FL
METAPNEUMOVIRUS PCR:: NOT DETECTED
MONOCYTES # BLD: 0.18 X10(3) UL (ref 0.1–1)
MONOCYTES NFR BLD: 1 %
MYCOPLASMA PNEUMONIA PCR:: NOT DETECTED
NEUTROPHILS # BLD AUTO: 15.53 X10 (3) UL (ref 1.5–7.7)
NEUTROPHILS NFR BLD: 90 %
NEUTS BAND NFR BLD: 3 %
NEUTS HYPERSEG # BLD: 17.02 X10(3) UL (ref 1.5–7.7)
NITRITE UR QL STRIP.AUTO: NEGATIVE
OSMOLALITY SERPL CALC.SUM OF ELEC: 299 MOSM/KG (ref 275–295)
PARAINFLUENZA 1 PCR:: NOT DETECTED
PARAINFLUENZA 2 PCR:: NOT DETECTED
PARAINFLUENZA 3 PCR:: NOT DETECTED
PARAINFLUENZA 4 PCR:: NOT DETECTED
PH UR: 7 [PH] (ref 5–8)
PLATELET # BLD AUTO: 153 10(3)UL (ref 150–450)
PLATELET MORPHOLOGY: NORMAL
POTASSIUM SERPL-SCNC: 3.2 MMOL/L (ref 3.5–5.1)
PROT UR-MCNC: 100 MG/DL
RBC # BLD AUTO: 3.61 X10(6)UL
RHINOVIRUS/ENTERO PCR:: NOT DETECTED
RSV RNA SPEC QL NAA+PROBE: NOT DETECTED
SARS-COV-2 RNA NPH QL NAA+NON-PROBE: NOT DETECTED
SODIUM SERPL-SCNC: 143 MMOL/L (ref 136–145)
SP GR UR STRIP: >1.03 (ref 1–1.03)
TOTAL CELLS COUNTED BLD: 100
UROBILINOGEN UR STRIP-ACNC: <2
VIT C UR-MCNC: NEGATIVE MG/DL
WBC # BLD AUTO: 18.3 X10(3) UL (ref 4–11)

## 2022-05-06 PROCEDURE — 87449 NOS EACH ORGANISM AG IA: CPT | Performed by: HOSPITALIST

## 2022-05-06 PROCEDURE — 85025 COMPLETE CBC W/AUTO DIFF WBC: CPT | Performed by: INTERNAL MEDICINE

## 2022-05-06 PROCEDURE — 92610 EVALUATE SWALLOWING FUNCTION: CPT

## 2022-05-06 PROCEDURE — 87040 BLOOD CULTURE FOR BACTERIA: CPT | Performed by: HOSPITALIST

## 2022-05-06 PROCEDURE — 97530 THERAPEUTIC ACTIVITIES: CPT

## 2022-05-06 PROCEDURE — 83605 ASSAY OF LACTIC ACID: CPT | Performed by: INTERNAL MEDICINE

## 2022-05-06 PROCEDURE — 85007 BL SMEAR W/DIFF WBC COUNT: CPT | Performed by: INTERNAL MEDICINE

## 2022-05-06 PROCEDURE — 80048 BASIC METABOLIC PNL TOTAL CA: CPT | Performed by: INTERNAL MEDICINE

## 2022-05-06 PROCEDURE — 92526 ORAL FUNCTION THERAPY: CPT

## 2022-05-06 PROCEDURE — 87086 URINE CULTURE/COLONY COUNT: CPT | Performed by: INTERNAL MEDICINE

## 2022-05-06 PROCEDURE — 0202U NFCT DS 22 TRGT SARS-COV-2: CPT | Performed by: HOSPITALIST

## 2022-05-06 PROCEDURE — 97166 OT EVAL MOD COMPLEX 45 MIN: CPT

## 2022-05-06 PROCEDURE — 85027 COMPLETE CBC AUTOMATED: CPT | Performed by: INTERNAL MEDICINE

## 2022-05-06 PROCEDURE — 97162 PT EVAL MOD COMPLEX 30 MIN: CPT

## 2022-05-06 PROCEDURE — 81001 URINALYSIS AUTO W/SCOPE: CPT | Performed by: INTERNAL MEDICINE

## 2022-05-06 RX ORDER — DONEPEZIL HYDROCHLORIDE 5 MG/1
5 TABLET, FILM COATED ORAL NIGHTLY
Status: DISCONTINUED | OUTPATIENT
Start: 2022-05-06 | End: 2022-05-11

## 2022-05-06 RX ORDER — ONDANSETRON 2 MG/ML
4 INJECTION INTRAMUSCULAR; INTRAVENOUS EVERY 6 HOURS PRN
Status: DISCONTINUED | OUTPATIENT
Start: 2022-05-06 | End: 2022-05-06

## 2022-05-06 RX ORDER — OXYCODONE HYDROCHLORIDE AND ACETAMINOPHEN 5; 325 MG/1; MG/1
1 TABLET ORAL EVERY 6 HOURS PRN
COMMUNITY
End: 2022-05-11

## 2022-05-06 RX ORDER — PANTOPRAZOLE SODIUM 40 MG/1
40 TABLET, DELAYED RELEASE ORAL
Status: DISCONTINUED | OUTPATIENT
Start: 2022-05-06 | End: 2022-05-11

## 2022-05-06 RX ORDER — ACETAMINOPHEN 10 MG/ML
1000 INJECTION, SOLUTION INTRAVENOUS EVERY 6 HOURS PRN
Status: DISCONTINUED | OUTPATIENT
Start: 2022-05-06 | End: 2022-05-11

## 2022-05-06 RX ORDER — ONDANSETRON 2 MG/ML
4 INJECTION INTRAMUSCULAR; INTRAVENOUS EVERY 6 HOURS PRN
Status: DISCONTINUED | OUTPATIENT
Start: 2022-05-06 | End: 2022-05-11

## 2022-05-06 RX ORDER — HYDROCODONE BITARTRATE AND ACETAMINOPHEN 10; 325 MG/1; MG/1
1 TABLET ORAL EVERY 8 HOURS PRN
Status: DISCONTINUED | OUTPATIENT
Start: 2022-05-06 | End: 2022-05-11

## 2022-05-06 RX ORDER — GUAIFENESIN 600 MG
1200 TABLET, EXTENDED RELEASE 12 HR ORAL 2 TIMES DAILY
COMMUNITY

## 2022-05-06 RX ORDER — SODIUM CHLORIDE 9 MG/ML
INJECTION, SOLUTION INTRAVENOUS CONTINUOUS
Status: DISCONTINUED | OUTPATIENT
Start: 2022-05-06 | End: 2022-05-09

## 2022-05-06 RX ORDER — CYCLOBENZAPRINE HCL 5 MG
5 TABLET ORAL EVERY 6 HOURS PRN
Status: DISCONTINUED | OUTPATIENT
Start: 2022-05-06 | End: 2022-05-11

## 2022-05-06 RX ORDER — QUETIAPINE FUMARATE 100 MG/1
100 TABLET, FILM COATED ORAL NIGHTLY
Status: DISCONTINUED | OUTPATIENT
Start: 2022-05-06 | End: 2022-05-11

## 2022-05-06 RX ORDER — GUAIFENESIN AND DEXTROMETHORPHAN HYDROBROMIDE 100; 10 MG/5ML; MG/5ML
20 SOLUTION ORAL EVERY 8 HOURS PRN
COMMUNITY
End: 2022-05-11

## 2022-05-06 RX ORDER — LAMOTRIGINE 25 MG/1
100 TABLET ORAL 2 TIMES DAILY
Status: DISCONTINUED | OUTPATIENT
Start: 2022-05-06 | End: 2022-05-11

## 2022-05-06 RX ORDER — BENZTROPINE MESYLATE 1 MG/1
1 TABLET ORAL 2 TIMES DAILY
Status: DISCONTINUED | OUTPATIENT
Start: 2022-05-06 | End: 2022-05-11

## 2022-05-06 RX ORDER — ACETAMINOPHEN 650 MG/1
650 SUPPOSITORY RECTAL EVERY 6 HOURS PRN
Status: DISCONTINUED | OUTPATIENT
Start: 2022-05-06 | End: 2022-05-11

## 2022-05-06 RX ORDER — LEVOTHYROXINE SODIUM 0.1 MG/1
100 TABLET ORAL
Status: DISCONTINUED | OUTPATIENT
Start: 2022-05-06 | End: 2022-05-11

## 2022-05-06 RX ORDER — KETOROLAC TROMETHAMINE 15 MG/ML
15 INJECTION, SOLUTION INTRAMUSCULAR; INTRAVENOUS ONCE
Status: COMPLETED | OUTPATIENT
Start: 2022-05-06 | End: 2022-05-06

## 2022-05-06 RX ORDER — HYDRALAZINE HYDROCHLORIDE 25 MG/1
25 TABLET, FILM COATED ORAL EVERY 6 HOURS PRN
Status: DISCONTINUED | OUTPATIENT
Start: 2022-05-06 | End: 2022-05-11

## 2022-05-06 RX ORDER — HEPARIN SODIUM 5000 [USP'U]/ML
5000 INJECTION, SOLUTION INTRAVENOUS; SUBCUTANEOUS EVERY 8 HOURS SCHEDULED
Status: DISCONTINUED | OUTPATIENT
Start: 2022-05-06 | End: 2022-05-06

## 2022-05-06 RX ORDER — GUAIFENESIN/DEXTROMETHORPHAN 100-10MG/5
20 SYRUP ORAL EVERY 8 HOURS PRN
Status: DISCONTINUED | OUTPATIENT
Start: 2022-05-06 | End: 2022-05-11

## 2022-05-06 RX ORDER — GABAPENTIN 100 MG/1
200 CAPSULE ORAL 2 TIMES DAILY
Status: DISCONTINUED | OUTPATIENT
Start: 2022-05-06 | End: 2022-05-11

## 2022-05-06 RX ORDER — BISACODYL 10 MG
10 SUPPOSITORY, RECTAL RECTAL
Status: DISCONTINUED | OUTPATIENT
Start: 2022-05-06 | End: 2022-05-11

## 2022-05-06 RX ORDER — ACETAMINOPHEN 325 MG/1
650 TABLET ORAL EVERY 6 HOURS PRN
Status: DISCONTINUED | OUTPATIENT
Start: 2022-05-06 | End: 2022-05-11

## 2022-05-06 RX ORDER — ENOXAPARIN SODIUM 100 MG/ML
40 INJECTION SUBCUTANEOUS EVERY 24 HOURS
Status: DISCONTINUED | OUTPATIENT
Start: 2022-05-06 | End: 2022-05-06

## 2022-05-06 RX ORDER — CYCLOBENZAPRINE HCL 5 MG
5 TABLET ORAL EVERY 6 HOURS PRN
COMMUNITY

## 2022-05-06 NOTE — PLAN OF CARE
Patient admitted to unit. Febrile, sepsis BPA fired, MD notified. Urinalysis with culture sent. Blood cultures pending. NPO until speech eval later today. Med rec complete. Admission completed with daughter this AM.   Problem: PAIN - ADULT  Goal: Verbalizes/displays adequate comfort level or patient's stated pain goal  Description: INTERVENTIONS:  - Encourage pt to monitor pain and request assistance  - Assess pain using appropriate pain scale  - Administer analgesics based on type and severity of pain and evaluate response  - Implement non-pharmacological measures as appropriate and evaluate response  - Consider cultural and social influences on pain and pain management  - Manage/alleviate anxiety  - Utilize distraction and/or relaxation techniques  - Monitor for opioid side effects  - Notify MD/LIP if interventions unsuccessful or patient reports new pain  - Anticipate increased pain with activity and pre-medicate as appropriate  Outcome: Progressing     Problem: RISK FOR INFECTION - ADULT  Goal: Absence of fever/infection during anticipated neutropenic period  Description: INTERVENTIONS  - Monitor WBC  - Administer growth factors as ordered  - Implement neutropenic guidelines  Outcome: Progressing     Problem: SAFETY ADULT - FALL  Goal: Free from fall injury  Description: INTERVENTIONS:  - Assess pt frequently for physical needs  - Identify cognitive and physical deficits and behaviors that affect risk of falls.   - [place] fall precautions as indicated by assessment.  - Educate pt/family on patient safety including physical limitations  - Instruct pt to call for assistance with activity based on assessment  - Modify environment to reduce risk of injury  - Provide assistive devices as appropriate  - Consider OT/PT consult to assist with strengthening/mobility  - Encourage toileting schedule  Outcome: Progressing     Problem: DISCHARGE PLANNING  Goal: Discharge to home or other facility with appropriate resources  Description: INTERVENTIONS:  - Identify barriers to discharge w/pt and caregiver  - Include patient/family/discharge partner in discharge planning  - Arrange for needed discharge resources and transportation as appropriate  - Identify discharge learning needs (meds, wound care, etc)  - Arrange for interpreters to assist at discharge as needed  - Consider post-discharge preferences of patient/family/discharge partner  - Complete POLST form as appropriate  - Assess patient's ability to be responsible for managing their own health  - Refer to Case Management Department for coordinating discharge planning if the patient needs post-hospital services based on physician/LIP order or complex needs related to functional status, cognitive ability or social support system  Outcome: Progressing     Problem: Patient Centered Care  Goal: Patient preferences are identified and integrated in the patient's plan of care  Description: Interventions:  - What would you like us to know as we care for you?  [facility]  - Provide timely, complete, and accurate information to patient/family  - Incorporate patient and family knowledge, values, beliefs, and cultural backgrounds into the planning and delivery of care  - Encourage patient/family to participate in care and decision-making at the level they choose  - Honor patient and family perspectives and choices  Outcome: Progressing     Problem: Patient/Family Goals  Goal: Patient/Family Long Term Goal  Description: Patient's Long Term Goal: to go home    Interventions:  - IV antibiotics  - See additional Care Plan goals for specific interventions  Outcome: Progressing  Goal: Patient/Family Short Term Goal  Description: Patient's Short Term Goal: feel better

## 2022-05-06 NOTE — PROGRESS NOTES
[facility]    CDI Prediction Tool Protocol (Vancomycin Prophylaxis Deferred)      This patient is currently at high risk for developing CDI due to his/her score being >/= 13 points. The current score is: 18.1    Score Breakdown:  History of CDI > 1 year ago AND high risk antibiotic use (8 points)  High risk antibiotic use (5 points)  Long term care facility resident (1 point)  Hypoalbuminemia: < 3g/dL (1 point)  Age >/= 80 years (3 points)  History of CDI regardless of high risk antibiotic use (0.1 point)      However, they are not being initiated on OVP (oral vancomycin prophylaxis) at this time because NPO starting at 05/06 0051. This patient does not have C. difficile infection. All measures taken within this protocol are to assess this patient and potentially decrease the risk of CDI development.     Carey Johnson, [facility]  5/6/2022  [address] [place] [address] Extension: [phone]

## 2022-05-06 NOTE — ED QUICK NOTES
Orders for admission, patient is aware of plan and ready to go upstairs. Any questions, please call ED RN Isabella Pagan at [address].      Patient Covid vaccination status: Fully vaccinated     COVID Test Ordered in ED: Rapid SARS-CoV-2 by PCR-Negative     COVID Suspicion at Admission: No risk with negative rapid    Running Infusions:      Mental Status/LOC at time of transport: Pt is confused    Other pertinent information:   CIWA score: N/A   NIH score:  N/A

## 2022-05-06 NOTE — CM/SW NOTE
SW received MDO for discharge planning. PT/OT recommending KYLE. SW met with patient at bedside. Patient confirmed address on facesheet. Patient reports that she lives at [facility]. Patient reports that she receives assistance with medications and showers but completes daily ADLs independently and staff at facility assist with IADLs. Patient reports that she uses wheelchair and is mainly wheelchair bound. Patient reports history of KYLE at [facility] and history of [facility] w/ [facility]. Patient reports that she currently receives onsite outpatient PT w/ [facility] at [facility]. Patient reports that her goal is to return to [facility] w/ onsite PT w/ [facility]. SW discussed KYLE recommendation in detail. Patient reports history of KYLE and reports more success with onsite PT w/ [facility]. Patient is declining KYLE placement. Plan: [facility] w/ Onsite PT w/ Y[facility] pending plans and medical clearance    Addendum 5/11/2022:  MDO present for discharge. SW and RN confirmed plan for return to [facility] w/ Onsite PT w/ Yulisa w/ patient. Patient continued to decline KYLE placement. Updated MD and RN. MD to provide script for outpatient PT. Plan: [facility] w/ Onsite PT w/ [facility]   [facility] arranged via [facility] for 90 mins, 1 PM. PCS complete. RN and patient informed. SW/NEHEMIAH to remain available for support and/or discharge planning.      OLEKSANDR Boyd, [facility]   [id number]

## 2022-05-07 ENCOUNTER — APPOINTMENT (OUTPATIENT)
Dept: GENERAL RADIOLOGY | Facility: HOSPITAL | Age: 82
End: 2022-05-07
Attending: HOSPITALIST
Payer: MEDICARE

## 2022-05-07 LAB
ANION GAP SERPL CALC-SCNC: 4 MMOL/L (ref 0–18)
BASOPHILS # BLD AUTO: 0.02 X10(3) UL (ref 0–0.2)
BASOPHILS NFR BLD AUTO: 0.1 %
BUN BLD-MCNC: 26 MG/DL (ref 7–18)
BUN/CREAT SERPL: 24.5 (ref 10–20)
CALCIUM BLD-MCNC: 8.9 MG/DL (ref 8.5–10.1)
CHLORIDE SERPL-SCNC: 110 MMOL/L (ref 98–112)
CO2 SERPL-SCNC: 27 MMOL/L (ref 21–32)
CREAT BLD-MCNC: 1.06 MG/DL
DEPRECATED RDW RBC AUTO: 59.3 FL (ref 35.1–46.3)
EOSINOPHIL # BLD AUTO: 0.05 X10(3) UL (ref 0–0.7)
EOSINOPHIL NFR BLD AUTO: 0.3 %
ERYTHROCYTE [DISTWIDTH] IN BLOOD BY AUTOMATED COUNT: 15.5 % (ref 11–15)
GLUCOSE BLD-MCNC: 112 MG/DL (ref 70–99)
HCT VFR BLD AUTO: 37.7 %
HGB BLD-MCNC: 11.5 G/DL
IMM GRANULOCYTES # BLD AUTO: 0.2 X10(3) UL (ref 0–1)
IMM GRANULOCYTES NFR BLD: 1.2 %
L PNEUMO AG UR QL: NEGATIVE
LYMPHOCYTES # BLD AUTO: 1.27 X10(3) UL (ref 1–4)
LYMPHOCYTES NFR BLD AUTO: 7.9 %
MAGNESIUM SERPL-MCNC: 2.4 MG/DL (ref 1.6–2.6)
MCH RBC QN AUTO: 31.4 PG (ref 26–34)
MCHC RBC AUTO-ENTMCNC: 30.5 G/DL (ref 31–37)
MCV RBC AUTO: 103 FL
MONOCYTES # BLD AUTO: 0.7 X10(3) UL (ref 0.1–1)
MONOCYTES NFR BLD AUTO: 4.3 %
NEUTROPHILS # BLD AUTO: 13.89 X10 (3) UL (ref 1.5–7.7)
NEUTROPHILS # BLD AUTO: 13.89 X10(3) UL (ref 1.5–7.7)
NEUTROPHILS NFR BLD AUTO: 86.2 %
OSMOLALITY SERPL CALC.SUM OF ELEC: 298 MOSM/KG (ref 275–295)
PLATELET # BLD AUTO: 147 10(3)UL (ref 150–450)
POTASSIUM SERPL-SCNC: 4.2 MMOL/L (ref 3.5–5.1)
POTASSIUM SERPL-SCNC: 4.2 MMOL/L (ref 3.5–5.1)
RBC # BLD AUTO: 3.66 X10(6)UL
SODIUM SERPL-SCNC: 141 MMOL/L (ref 136–145)
STREP PNEUMO ANTIGEN, URINE: NEGATIVE
WBC # BLD AUTO: 16.1 X10(3) UL (ref 4–11)

## 2022-05-07 PROCEDURE — 85025 COMPLETE CBC W/AUTO DIFF WBC: CPT | Performed by: HOSPITALIST

## 2022-05-07 PROCEDURE — 84132 ASSAY OF SERUM POTASSIUM: CPT | Performed by: HOSPITALIST

## 2022-05-07 PROCEDURE — 92611 MOTION FLUOROSCOPY/SWALLOW: CPT

## 2022-05-07 PROCEDURE — 74230 X-RAY XM SWLNG FUNCJ C+: CPT | Performed by: HOSPITALIST

## 2022-05-07 PROCEDURE — 92526 ORAL FUNCTION THERAPY: CPT

## 2022-05-07 PROCEDURE — 83735 ASSAY OF MAGNESIUM: CPT | Performed by: HOSPITALIST

## 2022-05-07 PROCEDURE — 80048 BASIC METABOLIC PNL TOTAL CA: CPT | Performed by: HOSPITALIST

## 2022-05-07 NOTE — PROGRESS NOTES
No acute changes overnight in the patient's status. Patient is to have a video swallow. Patient states \"I hate that pureed food\" and is refusing to eat food. She will drink the nectar liquids. Dietary consult entered d/t patient being at risk for malnutrition and poor PO intake.

## 2022-05-07 NOTE — PLAN OF CARE
Video swallow this am--now on general diet with thin liquids. Problem: PAIN - ADULT  Goal: Verbalizes/displays adequate comfort level or patient's stated pain goal  Description: INTERVENTIONS:  - Encourage pt to monitor pain and request assistance  - Assess pain using appropriate pain scale  - Administer analgesics based on type and severity of pain and evaluate response  - Implement non-pharmacological measures as appropriate and evaluate response  - Consider cultural and social influences on pain and pain management  - Manage/alleviate anxiety  - Utilize distraction and/or relaxation techniques  - Monitor for opioid side effects  - Notify MD/LIP if interventions unsuccessful or patient reports new pain  - Anticipate increased pain with activity and pre-medicate as appropriate  Outcome: Progressing     Problem: RISK FOR INFECTION - ADULT  Goal: Absence of fever/infection during anticipated neutropenic period  Description: INTERVENTIONS  - Monitor WBC  - Administer growth factors as ordered  - Implement neutropenic guidelines  Outcome: Progressing     Problem: SAFETY ADULT - FALL  Goal: Free from fall injury  Description: INTERVENTIONS:  - Assess pt frequently for physical needs  - Identify cognitive and physical deficits and behaviors that affect risk of falls.   - [place] fall precautions as indicated by assessment.  - Educate pt/family on patient safety including physical limitations  - Instruct pt to call for assistance with activity based on assessment  - Modify environment to reduce risk of injury  - Provide assistive devices as appropriate  - Consider OT/PT consult to assist with strengthening/mobility  - Encourage toileting schedule  Outcome: Progressing     Problem: DISCHARGE PLANNING  Goal: Discharge to home or other facility with appropriate resources  Description: INTERVENTIONS:  - Identify barriers to discharge w/pt and caregiver  - Include patient/family/discharge partner in discharge planning  - Arrange for needed discharge resources and transportation as appropriate  - Identify discharge learning needs (meds, wound care, etc)  - Arrange for interpreters to assist at discharge as needed  - Consider post-discharge preferences of patient/family/discharge partner  - Complete POLST form as appropriate  - Assess patient's ability to be responsible for managing their own health  - Refer to Case Management Department for coordinating discharge planning if the patient needs post-hospital services based on physician/LIP order or complex needs related to functional status, cognitive ability or social support system  Outcome: Progressing     Problem: Patient Centered Care  Goal: Patient preferences are identified and integrated in the patient's plan of care  Description: Interventions:  - What would you like us to know as we care for you?  [facility]  - Provide timely, complete, and accurate information to patient/family  - Incorporate patient and family knowledge, values, beliefs, and cultural backgrounds into the planning and delivery of care  - Encourage patient/family to participate in care and decision-making at the level they choose  - Honor patient and family perspectives and choices  Outcome: Progressing     Problem: Patient/Family Goals  Goal: Patient/Family Long Term Goal  Description: Patient's Long Term Goal: to return to [facility]    Interventions:  - antibiotics, therapy  - See additional Care Plan goals for specific interventions  Outcome: Progressing  Goal: Patient/Family Short Term Goal  Description: Patient's Short Term Goal: to feel better    Interventions:   - antibiotics  - See additional Care Plan goals for specific interventions  Outcome: Progressing

## 2022-05-08 LAB
ANION GAP SERPL CALC-SCNC: 4 MMOL/L (ref 0–18)
BASOPHILS # BLD AUTO: 0.01 X10(3) UL (ref 0–0.2)
BASOPHILS NFR BLD AUTO: 0.1 %
BUN BLD-MCNC: 25 MG/DL (ref 7–18)
BUN/CREAT SERPL: 26.9 (ref 10–20)
CALCIUM BLD-MCNC: 9.4 MG/DL (ref 8.5–10.1)
CHLORIDE SERPL-SCNC: 112 MMOL/L (ref 98–112)
CO2 SERPL-SCNC: 29 MMOL/L (ref 21–32)
CREAT BLD-MCNC: 0.93 MG/DL
DEPRECATED RDW RBC AUTO: 59.9 FL (ref 35.1–46.3)
EOSINOPHIL # BLD AUTO: 0.11 X10(3) UL (ref 0–0.7)
EOSINOPHIL NFR BLD AUTO: 0.8 %
ERYTHROCYTE [DISTWIDTH] IN BLOOD BY AUTOMATED COUNT: 15.5 % (ref 11–15)
GLUCOSE BLD-MCNC: 144 MG/DL (ref 70–99)
HCT VFR BLD AUTO: 34.7 %
HGB BLD-MCNC: 10.8 G/DL
IMM GRANULOCYTES # BLD AUTO: 0.1 X10(3) UL (ref 0–1)
IMM GRANULOCYTES NFR BLD: 0.7 %
LYMPHOCYTES # BLD AUTO: 1.45 X10(3) UL (ref 1–4)
LYMPHOCYTES NFR BLD AUTO: 10.1 %
MAGNESIUM SERPL-MCNC: 2.3 MG/DL (ref 1.6–2.6)
MCH RBC QN AUTO: 31.9 PG (ref 26–34)
MCHC RBC AUTO-ENTMCNC: 31.1 G/DL (ref 31–37)
MCV RBC AUTO: 102.4 FL
MONOCYTES # BLD AUTO: 0.56 X10(3) UL (ref 0.1–1)
MONOCYTES NFR BLD AUTO: 3.9 %
NEUTROPHILS # BLD AUTO: 12.13 X10 (3) UL (ref 1.5–7.7)
NEUTROPHILS # BLD AUTO: 12.13 X10(3) UL (ref 1.5–7.7)
NEUTROPHILS NFR BLD AUTO: 84.4 %
OSMOLALITY SERPL CALC.SUM OF ELEC: 307 MOSM/KG (ref 275–295)
PLATELET # BLD AUTO: 156 10(3)UL (ref 150–450)
POTASSIUM SERPL-SCNC: 4.3 MMOL/L (ref 3.5–5.1)
RBC # BLD AUTO: 3.39 X10(6)UL
S PYO DNA BLD POS QL NAA+NON-PROBE: DETECTED
SODIUM SERPL-SCNC: 145 MMOL/L (ref 136–145)
STREPTOCOCCUS DNA BLD POS NAA+NON-PROBE: DETECTED
WBC # BLD AUTO: 14.4 X10(3) UL (ref 4–11)

## 2022-05-08 PROCEDURE — 83735 ASSAY OF MAGNESIUM: CPT | Performed by: INTERNAL MEDICINE

## 2022-05-08 PROCEDURE — 85025 COMPLETE CBC W/AUTO DIFF WBC: CPT | Performed by: INTERNAL MEDICINE

## 2022-05-08 PROCEDURE — 80048 BASIC METABOLIC PNL TOTAL CA: CPT | Performed by: INTERNAL MEDICINE

## 2022-05-08 RX ORDER — VANCOMYCIN HYDROCHLORIDE 125 MG/1
125 CAPSULE ORAL DAILY
Status: DISCONTINUED | OUTPATIENT
Start: 2022-05-08 | End: 2022-05-11

## 2022-05-08 NOTE — PROGRESS NOTES
No acute changes in the patient's status overnight. Patient is pleased that her video swallow study went well and that she is now allowed to have a regular consistency diet. Patient said she is looking forward to therapy at [facility] and has intentions of having a new set of dentures made since hers are loose and causing gums sores.

## 2022-05-09 ENCOUNTER — MED REC SCAN ONLY (OUTPATIENT)
Dept: PHYSICAL MEDICINE AND REHAB | Facility: CLINIC | Age: 82
End: 2022-05-09

## 2022-05-09 LAB
ANION GAP SERPL CALC-SCNC: 3 MMOL/L (ref 0–18)
BASOPHILS # BLD AUTO: 0.02 X10(3) UL (ref 0–0.2)
BASOPHILS NFR BLD AUTO: 0.2 %
BUN BLD-MCNC: 19 MG/DL (ref 7–18)
BUN/CREAT SERPL: 22.1 (ref 10–20)
CALCIUM BLD-MCNC: 9.3 MG/DL (ref 8.5–10.1)
CHLORIDE SERPL-SCNC: 111 MMOL/L (ref 98–112)
CO2 SERPL-SCNC: 28 MMOL/L (ref 21–32)
CREAT BLD-MCNC: 0.86 MG/DL
DEPRECATED RDW RBC AUTO: 59.7 FL (ref 35.1–46.3)
EOSINOPHIL # BLD AUTO: 0.2 X10(3) UL (ref 0–0.7)
EOSINOPHIL NFR BLD AUTO: 2.1 %
ERYTHROCYTE [DISTWIDTH] IN BLOOD BY AUTOMATED COUNT: 15.8 % (ref 11–15)
GLUCOSE BLD-MCNC: 115 MG/DL (ref 70–99)
HCT VFR BLD AUTO: 35.7 %
HGB BLD-MCNC: 11 G/DL
IMM GRANULOCYTES # BLD AUTO: 0.1 X10(3) UL (ref 0–1)
IMM GRANULOCYTES NFR BLD: 1 %
LYMPHOCYTES # BLD AUTO: 1.5 X10(3) UL (ref 1–4)
LYMPHOCYTES NFR BLD AUTO: 15.5 %
MAGNESIUM SERPL-MCNC: 2.2 MG/DL (ref 1.6–2.6)
MCH RBC QN AUTO: 31.7 PG (ref 26–34)
MCHC RBC AUTO-ENTMCNC: 30.8 G/DL (ref 31–37)
MCV RBC AUTO: 102.9 FL
MONOCYTES # BLD AUTO: 0.55 X10(3) UL (ref 0.1–1)
MONOCYTES NFR BLD AUTO: 5.7 %
NEUTROPHILS # BLD AUTO: 7.33 X10 (3) UL (ref 1.5–7.7)
NEUTROPHILS # BLD AUTO: 7.33 X10(3) UL (ref 1.5–7.7)
NEUTROPHILS NFR BLD AUTO: 75.5 %
OSMOLALITY SERPL CALC.SUM OF ELEC: 297 MOSM/KG (ref 275–295)
PLATELET # BLD AUTO: 179 10(3)UL (ref 150–450)
POTASSIUM SERPL-SCNC: 4.3 MMOL/L (ref 3.5–5.1)
RBC # BLD AUTO: 3.47 X10(6)UL
SODIUM SERPL-SCNC: 142 MMOL/L (ref 136–145)
WBC # BLD AUTO: 9.7 X10(3) UL (ref 4–11)

## 2022-05-09 PROCEDURE — 80048 BASIC METABOLIC PNL TOTAL CA: CPT | Performed by: INTERNAL MEDICINE

## 2022-05-09 PROCEDURE — 97116 GAIT TRAINING THERAPY: CPT

## 2022-05-09 PROCEDURE — 92526 ORAL FUNCTION THERAPY: CPT

## 2022-05-09 PROCEDURE — 85025 COMPLETE CBC W/AUTO DIFF WBC: CPT | Performed by: INTERNAL MEDICINE

## 2022-05-09 PROCEDURE — 97110 THERAPEUTIC EXERCISES: CPT

## 2022-05-09 PROCEDURE — 83735 ASSAY OF MAGNESIUM: CPT | Performed by: INTERNAL MEDICINE

## 2022-05-09 RX ORDER — AMOXICILLIN AND CLAVULANATE POTASSIUM 875; 125 MG/1; MG/1
1 TABLET, FILM COATED ORAL 2 TIMES DAILY
Qty: 20 TABLET | Refills: 0 | Status: SHIPPED | OUTPATIENT
Start: 2022-05-09 | End: 2022-05-19

## 2022-05-09 NOTE — PLAN OF CARE
Problem: PAIN - ADULT  Goal: Verbalizes/displays adequate comfort level or patient's stated pain goal  Description: INTERVENTIONS:  - Encourage pt to monitor pain and request assistance  - Assess pain using appropriate pain scale  - Administer analgesics based on type and severity of pain and evaluate response  - Implement non-pharmacological measures as appropriate and evaluate response  - Consider cultural and social influences on pain and pain management  - Manage/alleviate anxiety  - Utilize distraction and/or relaxation techniques  - Monitor for opioid side effects  - Notify MD/LIP if interventions unsuccessful or patient reports new pain  - Anticipate increased pain with activity and pre-medicate as appropriate  5/9/2022 0634 by Maria Elena Barger RN  Outcome: Progressing  5/9/2022 0310 by Maria Elena Barger RN  Outcome: Progressing     Problem: RISK FOR INFECTION - ADULT  Goal: Absence of fever/infection during anticipated neutropenic period  Description: INTERVENTIONS  - Monitor WBC  - Administer growth factors as ordered  - Implement neutropenic guidelines  5/9/2022 0634 by Maria Elena Barger RN  Outcome: Progressing  5/9/2022 0310 by Maria Elena Barger RN  Outcome: Progressing     Problem: SAFETY ADULT - FALL  Goal: Free from fall injury  Description: INTERVENTIONS:  - Assess pt frequently for physical needs  - Identify cognitive and physical deficits and behaviors that affect risk of falls.   - [place] fall precautions as indicated by assessment.  - Educate pt/family on patient safety including physical limitations  - Instruct pt to call for assistance with activity based on assessment  - Modify environment to reduce risk of injury  - Provide assistive devices as appropriate  - Consider OT/PT consult to assist with strengthening/mobility  - Encourage toileting schedule  5/9/2022 0634 by Maria Elena Barger RN  Outcome: Progressing  5/9/2022 0310 by Maria Elena Barger RN  Outcome: Progressing     Problem: DISCHARGE PLANNING  Goal: Discharge to home or other facility with appropriate resources  Description: INTERVENTIONS:  - Identify barriers to discharge w/pt and caregiver  - Include patient/family/discharge partner in discharge planning  - Arrange for needed discharge resources and transportation as appropriate  - Identify discharge learning needs (meds, wound care, etc)  - Arrange for interpreters to assist at discharge as needed  - Consider post-discharge preferences of patient/family/discharge partner  - Complete POLST form as appropriate  - Assess patient's ability to be responsible for managing their own health  - Refer to Case Management Department for coordinating discharge planning if the patient needs post-hospital services based on physician/LIP order or complex needs related to functional status, cognitive ability or social support system  5/9/2022 0634 by Jovani Crocker RN  Outcome: Progressing  5/9/2022 0310 by Jovani Crocker RN  Outcome: Progressing     Problem: Patient Centered Care  Goal: Patient preferences are identified and integrated in the patient's plan of care  Description: Interventions:  - What would you like us to know as we care for you?  From [facility]  - Provide timely, complete, and accurate information to patient/family  - Incorporate patient and family knowledge, values, beliefs, and cultural backgrounds into the planning and delivery of care  - Encourage patient/family to participate in care and decision-making at the level they choose  - Honor patient and family perspectives and choices  5/9/2022 0634 by Jovani Crocker RN  Outcome: Progressing  5/9/2022 0310 by Jovani Crocker RN  Outcome: Progressing     Problem: Patient/Family Goals  Goal: Patient/Family Long Term Goal  Description: Patient's Long Term Goal: to return to [facility]    Interventions:  - antibiotics, therapy  - See additional Care Plan goals for specific interventions  5/9/2022 0634 by Jovani Crocker RN  Outcome: Progressing  5/9/2022 0310 by Ann-Marie Altman RN  Outcome: Progressing     Problem: Patient/Family Goals  Goal: Patient/Family Short Term Goal  Description: Patient's Short Term Goal: to feel better    Interventions:   - antibiotics  - See additional Care Plan goals for specific interventions  5/9/2022 0634 by Ann-Marie Altman RN  Outcome: Progressing  5/9/2022 0310 by Ann-Marie Altman RN  Outcome: Progressing   Pt alert x4. Denies pain. IV antibiotics continued. Bed alarm with call light always within reach. Intentional rounding.

## 2022-05-09 NOTE — SLP NOTE
SPEECH DAILY NOTE - INPATIENT    ASSESSMENT & PLAN   ASSESSMENT  PPE REQUIRED. THIS THERAPIST WORE GLOVES AND DROPLET MASK FOR DURATION OF TX SESSION. HANDS WASHED UPON ENTRANCE/EXIT. SLP f/u for ongoing meal assessment per recommendations of general solids and thin liquids per VFSS. RN reports pt tolerates diet and medication well with no overt clinical s/s aspiration. Pt denies any swallowing challenges. Pt positioned upright to 90 degrees in bedside chair. Pt afebrile, tolerating room air with oxygen status 95% prior to the start of oral trials. SLP reviewed aspiration precautions and safe swallowing compensatory strategies with the patient. Patient acknowledged understanding and demonstrated independence with swallowing precautions of taking small controlled amounts with alternating consistencies. Good tolerance on general solids and thin liquids via open cup/straw with timely oral transit phase with good propulsion and bolus control. Coordinated mastication on hard solids. Slight delay in trigger of pharyngeal phase of the swallow. Hyolaryngeal elevation/excursion adequate to palpation during the swallow with no overt clinical signs/symptoms of aspiration. Pt trialed with thin liquids via straw with timely transit times. The pt followed recommended strategies with single slow rate. No overt clinical signs of aspiration (e.g., immediate/delayed throat clear, immediate/delayed cough, wet vocal quality, increased O2 effort) observed across all trials of thin liquids by straw. Oxygen status remained >95% throughout the entire session. Oral/buccal cavities clear of residue at the end of the session. Recommend to continue current diet of general solids and thin liquids via open cup/straws. Pt may use straws. PLAN: SLP to sign off case secondary to pt tolerating least restrictive diet and demonstrating independent use of swallowing precautions.   Swallowing precautions and diet recommendations remain written on white board in the pt's room. RN alerted with results and recommendations. Diet Recommendations - Solids: Regular (self-selecting soft/tolerable food items)  Diet Recommendations - Liquids: Thin Liquids    Compensatory Strategies Recommended: Slow rate; Alternate consistencies;Small bites and sips (double swallow PRN, small single straw sip-s/s=discontinue)  Aspiration Precautions: Upright position; Slow rate;Small bites and sips (discontinue straw with s/s - small single sips only)  Medication Administration Recommendations: One pill at a time    Nursing Aware of Pain?: No    Discharge Recommendations  Discharge Recommendations/Plan: Undetermined    Treatment Plan  Treatment Plan/Recommendations: Aspiration precautions; Dysphagia therapy    Interdisciplinary Communication: Discussed with RN  Plan posted at bedside  Recommendations posted at bedside       GOALS  Goal #1 The patient will tolerate REGULAR consistency and THIN liquids without overt signs or symptoms of aspiration with 100 % accuracy over 1-2 session(s). Good tolerance on regular solids and thin liquids via open cup/straw without overt clinical signs of aspiration to 100% accuracy. Met   Goal #2 The patient/family/caregiver will demonstrate understanding and implementation of aspiration precautions and swallow strategies independently over 1-2 session(s). Education provided to pt on results of VFSS and swallowing precautions. The pt acknowledged understanding of education. Met   Goal #3 The patient will utilize compensatory strategies as outlined by  VFSS including Slow rate, Small bites, Small sips, Alternate liquids/solids, Upright 90 degrees 30 mins after meal, Eliminate distractions, Supervision with meals, double swallow as needed with PRN assistance 100 % of the time across 2 sessions. The pt self fed po trials with demonstrating use of swallowing precautions to 100% accuracy.   Met     FOLLOW UP  Follow Up Needed (Documentation Required): No    Number of Visits to Meet Established Goals: 2 (1-2x)    Session: 1 post VFSS    If you have any questions, please contact     [address] [place] [address] MS/CCC-SLP  Speech Language Pathologist  [facility]  EXT.  [id number]

## 2022-05-09 NOTE — PLAN OF CARE
Patient has ambulated to the chair and has been up on chair for breakfast and lunch. Patient has safety precautions in place bed in the lowest position, bed alarm on, and call light within reach. Continue to monitor patient with frequent nursing rounds. Problem: PAIN - ADULT  Goal: Verbalizes/displays adequate comfort level or patient's stated pain goal  Description: INTERVENTIONS:  - Encourage pt to monitor pain and request assistance  - Assess pain using appropriate pain scale  - Administer analgesics based on type and severity of pain and evaluate response  - Implement non-pharmacological measures as appropriate and evaluate response  - Consider cultural and social influences on pain and pain management  - Manage/alleviate anxiety  - Utilize distraction and/or relaxation techniques  - Monitor for opioid side effects  - Notify MD/LIP if interventions unsuccessful or patient reports new pain  - Anticipate increased pain with activity and pre-medicate as appropriate  Outcome: Progressing     Problem: RISK FOR INFECTION - ADULT  Goal: Absence of fever/infection during anticipated neutropenic period  Description: INTERVENTIONS  - Monitor WBC  - Administer growth factors as ordered  - Implement neutropenic guidelines  Outcome: Progressing     Problem: SAFETY ADULT - FALL  Goal: Free from fall injury  Description: INTERVENTIONS:  - Assess pt frequently for physical needs  - Identify cognitive and physical deficits and behaviors that affect risk of falls.   - [place] fall precautions as indicated by assessment.  - Educate pt/family on patient safety including physical limitations  - Instruct pt to call for assistance with activity based on assessment  - Modify environment to reduce risk of injury  - Provide assistive devices as appropriate  - Consider OT/PT consult to assist with strengthening/mobility  - Encourage toileting schedule  Outcome: Progressing     Problem: DISCHARGE PLANNING  Goal: Discharge to home or other facility with appropriate resources  Description: INTERVENTIONS:  - Identify barriers to discharge w/pt and caregiver  - Include patient/family/discharge partner in discharge planning  - Arrange for needed discharge resources and transportation as appropriate  - Identify discharge learning needs (meds, wound care, etc)  - Arrange for interpreters to assist at discharge as needed  - Consider post-discharge preferences of patient/family/discharge partner  - Complete POLST form as appropriate  - Assess patient's ability to be responsible for managing their own health  - Refer to Case Management Department for coordinating discharge planning if the patient needs post-hospital services based on physician/LIP order or complex needs related to functional status, cognitive ability or social support system  Outcome: Progressing     Problem: Patient Centered Care  Goal: Patient preferences are identified and integrated in the patient's plan of care  Description: Interventions:  - What would you like us to know as we care for you?  [facility]  - Provide timely, complete, and accurate information to patient/family  - Incorporate patient and family knowledge, values, beliefs, and cultural backgrounds into the planning and delivery of care  - Encourage patient/family to participate in care and decision-making at the level they choose  - Honor patient and family perspectives and choices  Outcome: Progressing     Problem: Patient/Family Goals  Goal: Patient/Family Long Term Goal  Description: Patient's Long Term Goal: to return to [place]    Interventions:  - antibiotics, therapy  - See additional Care Plan goals for specific interventions  Outcome: Progressing  Goal: Patient/Family Short Term Goal  Description: Patient's Short Term Goal: to feel better    Interventions:   - antibiotics  - See additional Care Plan goals for specific interventions  Outcome: Progressing

## 2022-05-10 ENCOUNTER — TELEPHONE (OUTPATIENT)
Dept: PHYSICAL MEDICINE AND REHAB | Facility: CLINIC | Age: 82
End: 2022-05-10

## 2022-05-10 LAB
ANION GAP SERPL CALC-SCNC: 3 MMOL/L (ref 0–18)
BASOPHILS # BLD AUTO: 0.03 X10(3) UL (ref 0–0.2)
BASOPHILS NFR BLD AUTO: 0.4 %
BUN BLD-MCNC: 20 MG/DL (ref 7–18)
BUN/CREAT SERPL: 23.5 (ref 10–20)
CALCIUM BLD-MCNC: 9.4 MG/DL (ref 8.5–10.1)
CHLORIDE SERPL-SCNC: 110 MMOL/L (ref 98–112)
CO2 SERPL-SCNC: 29 MMOL/L (ref 21–32)
CREAT BLD-MCNC: 0.85 MG/DL
DEPRECATED RDW RBC AUTO: 57.6 FL (ref 35.1–46.3)
EOSINOPHIL # BLD AUTO: 0.28 X10(3) UL (ref 0–0.7)
EOSINOPHIL NFR BLD AUTO: 3.6 %
ERYTHROCYTE [DISTWIDTH] IN BLOOD BY AUTOMATED COUNT: 15.5 % (ref 11–15)
GLUCOSE BLD-MCNC: 117 MG/DL (ref 70–99)
HCT VFR BLD AUTO: 35.2 %
HGB BLD-MCNC: 11 G/DL
IMM GRANULOCYTES # BLD AUTO: 0.18 X10(3) UL (ref 0–1)
IMM GRANULOCYTES NFR BLD: 2.3 %
LYMPHOCYTES # BLD AUTO: 1.69 X10(3) UL (ref 1–4)
LYMPHOCYTES NFR BLD AUTO: 22 %
MAGNESIUM SERPL-MCNC: 2.1 MG/DL (ref 1.6–2.6)
MCH RBC QN AUTO: 31.6 PG (ref 26–34)
MCHC RBC AUTO-ENTMCNC: 31.3 G/DL (ref 31–37)
MCV RBC AUTO: 101.1 FL
MONOCYTES # BLD AUTO: 0.64 X10(3) UL (ref 0.1–1)
MONOCYTES NFR BLD AUTO: 8.3 %
NEUTROPHILS # BLD AUTO: 4.86 X10 (3) UL (ref 1.5–7.7)
NEUTROPHILS # BLD AUTO: 4.86 X10(3) UL (ref 1.5–7.7)
NEUTROPHILS NFR BLD AUTO: 63.4 %
OSMOLALITY SERPL CALC.SUM OF ELEC: 298 MOSM/KG (ref 275–295)
PLATELET # BLD AUTO: 188 10(3)UL (ref 150–450)
POTASSIUM SERPL-SCNC: 4.3 MMOL/L (ref 3.5–5.1)
RBC # BLD AUTO: 3.48 X10(6)UL
SODIUM SERPL-SCNC: 142 MMOL/L (ref 136–145)
WBC # BLD AUTO: 7.7 X10(3) UL (ref 4–11)

## 2022-05-10 PROCEDURE — 80048 BASIC METABOLIC PNL TOTAL CA: CPT | Performed by: INTERNAL MEDICINE

## 2022-05-10 PROCEDURE — 85025 COMPLETE CBC W/AUTO DIFF WBC: CPT | Performed by: INTERNAL MEDICINE

## 2022-05-10 PROCEDURE — 83735 ASSAY OF MAGNESIUM: CPT | Performed by: INTERNAL MEDICINE

## 2022-05-10 NOTE — TELEPHONE ENCOUNTER
Order/MD signature requested for buccal primary immunodeficiency test. Per Dr. Tony Aguilar he is not aware of asking for such test/placing an order for one nor is there an order placed on patient's chart. No mention of immunodeficiency test in LOV note. Will call [facility] screening number provided with faxed forms (#[phone]). Left message with number.

## 2022-05-10 NOTE — PLAN OF CARE
Problem: PAIN - ADULT  Goal: Verbalizes/displays adequate comfort level or patient's stated pain goal  Description: INTERVENTIONS:  - Encourage pt to monitor pain and request assistance  - Assess pain using appropriate pain scale  - Administer analgesics based on type and severity of pain and evaluate response  - Implement non-pharmacological measures as appropriate and evaluate response  - Consider cultural and social influences on pain and pain management  - Manage/alleviate anxiety  - Utilize distraction and/or relaxation techniques  - Monitor for opioid side effects  - Notify MD/LIP if interventions unsuccessful or patient reports new pain  - Anticipate increased pain with activity and pre-medicate as appropriate  Outcome: Progressing     Problem: RISK FOR INFECTION - ADULT  Goal: Absence of fever/infection during anticipated neutropenic period  Description: INTERVENTIONS  - Monitor WBC  - Administer growth factors as ordered  - Implement neutropenic guidelines  Outcome: Progressing     Problem: SAFETY ADULT - FALL  Goal: Free from fall injury  Description: INTERVENTIONS:  - Assess pt frequently for physical needs  - Identify cognitive and physical deficits and behaviors that affect risk of falls.   - [place] fall precautions as indicated by assessment.  - Educate pt/family on patient safety including physical limitations  - Instruct pt to call for assistance with activity based on assessment  - Modify environment to reduce risk of injury  - Provide assistive devices as appropriate  - Consider OT/PT consult to assist with strengthening/mobility  - Encourage toileting schedule  Outcome: Progressing     Problem: DISCHARGE PLANNING  Goal: Discharge to home or other facility with appropriate resources  Description: INTERVENTIONS:  - Identify barriers to discharge w/pt and caregiver  - Include patient/family/discharge partner in discharge planning  - Arrange for needed discharge resources and transportation as appropriate  - Identify discharge learning needs (meds, wound care, etc)  - Arrange for interpreters to assist at discharge as needed  - Consider post-discharge preferences of patient/family/discharge partner  - Complete POLST form as appropriate  - Assess patient's ability to be responsible for managing their own health  - Refer to Case Management Department for coordinating discharge planning if the patient needs post-hospital services based on physician/LIP order or complex needs related to functional status, cognitive ability or social support system  Outcome: Progressing     Problem: Patient Centered Care  Goal: Patient preferences are identified and integrated in the patient's plan of care  Description: Interventions:  - What would you like us to know as we care for you? From [facility]  - Provide timely, complete, and accurate information to patient/family  - Incorporate patient and family knowledge, values, beliefs, and cultural backgrounds into the planning and delivery of care  - Encourage patient/family to participate in care and decision-making at the level they choose  - Honor patient and family perspectives and choices  Outcome: Progressing     Problem: Patient/Family Goals  Goal: Patient/Family Long Term Goal  Description: Patient's Long Term Goal: to return to [facility]    Interventions:  - antibiotics, therapy  - See additional Care Plan goals for specific interventions  Outcome: Progressing  Goal: Patient/Family Short Term Goal  Description: Patient's Short Term Goal: to feel better    Interventions:   - antibiotics  - See additional Care Plan goals for specific interventions  Outcome: Progressing   Patient currently stable at this time. Vitals stable. Denies any pain or discomfort. Fall and safety precautions in place. Bed at lowest position, bed alarm in place. Call light and personal belongings within reach. Frequent nursing rounds completed.

## 2022-05-10 NOTE — PLAN OF CARE
Patient A&Ox's 4. Patient has belongings in safe. Patient has safety precautions in place bed in the lowest position, bed alarm on, and call light within reach. Continue to monitor patient with frequent nursing rounds. Problem: PAIN - ADULT  Goal: Verbalizes/displays adequate comfort level or patient's stated pain goal  Description: INTERVENTIONS:  - Encourage pt to monitor pain and request assistance  - Assess pain using appropriate pain scale  - Administer analgesics based on type and severity of pain and evaluate response  - Implement non-pharmacological measures as appropriate and evaluate response  - Consider cultural and social influences on pain and pain management  - Manage/alleviate anxiety  - Utilize distraction and/or relaxation techniques  - Monitor for opioid side effects  - Notify MD/LIP if interventions unsuccessful or patient reports new pain  - Anticipate increased pain with activity and pre-medicate as appropriate  Outcome: Progressing     Problem: RISK FOR INFECTION - ADULT  Goal: Absence of fever/infection during anticipated neutropenic period  Description: INTERVENTIONS  - Monitor WBC  - Administer growth factors as ordered  - Implement neutropenic guidelines  Outcome: Progressing     Problem: SAFETY ADULT - FALL  Goal: Free from fall injury  Description: INTERVENTIONS:  - Assess pt frequently for physical needs  - Identify cognitive and physical deficits and behaviors that affect risk of falls.   - [place] fall precautions as indicated by assessment.  - Educate pt/family on patient safety including physical limitations  - Instruct pt to call for assistance with activity based on assessment  - Modify environment to reduce risk of injury  - Provide assistive devices as appropriate  - Consider OT/PT consult to assist with strengthening/mobility  - Encourage toileting schedule  Outcome: Progressing     Problem: DISCHARGE PLANNING  Goal: Discharge to home or other facility with appropriate resources  Description: INTERVENTIONS:  - Identify barriers to discharge w/pt and caregiver  - Include patient/family/discharge partner in discharge planning  - Arrange for needed discharge resources and transportation as appropriate  - Identify discharge learning needs (meds, wound care, etc)  - Arrange for interpreters to assist at discharge as needed  - Consider post-discharge preferences of patient/family/discharge partner  - Complete POLST form as appropriate  - Assess patient's ability to be responsible for managing their own health  - Refer to Case Management Department for coordinating discharge planning if the patient needs post-hospital services based on physician/LIP order or complex needs related to functional status, cognitive ability or social support system  Outcome: Progressing     Problem: Patient Centered Care  Goal: Patient preferences are identified and integrated in the patient's plan of care  Description: Interventions:  - What would you like us to know as we care for you?  From [facility]  - Provide timely, complete, and accurate information to patient/family  - Incorporate patient and family knowledge, values, beliefs, and cultural backgrounds into the planning and delivery of care  - Encourage patient/family to participate in care and decision-making at the level they choose  - Honor patient and family perspectives and choices  Outcome: Progressing     Problem: Patient/Family Goals  Goal: Patient/Family Long Term Goal  Description: Patient's Long Term Goal: to return to [facility]    Interventions:  - antibiotics, therapy  - See additional Care Plan goals for specific interventions  Outcome: Progressing  Goal: Patient/Family Short Term Goal  Description: Patient's Short Term Goal: to feel better    Interventions:   - antibiotics  - See additional Care Plan goals for specific interventions  Outcome: Progressing

## 2022-05-11 VITALS
RESPIRATION RATE: 16 BRPM | WEIGHT: 208.88 LBS | OXYGEN SATURATION: 96 % | SYSTOLIC BLOOD PRESSURE: 150 MMHG | TEMPERATURE: 98 F | HEART RATE: 68 BPM | BODY MASS INDEX: 32 KG/M2 | DIASTOLIC BLOOD PRESSURE: 59 MMHG

## 2022-05-11 LAB
ANION GAP SERPL CALC-SCNC: 5 MMOL/L (ref 0–18)
BASOPHILS # BLD AUTO: 0.03 X10(3) UL (ref 0–0.2)
BASOPHILS NFR BLD AUTO: 0.3 %
BUN BLD-MCNC: 18 MG/DL (ref 7–18)
BUN/CREAT SERPL: 20.9 (ref 10–20)
CALCIUM BLD-MCNC: 9.7 MG/DL (ref 8.5–10.1)
CHLORIDE SERPL-SCNC: 107 MMOL/L (ref 98–112)
CO2 SERPL-SCNC: 31 MMOL/L (ref 21–32)
CREAT BLD-MCNC: 0.86 MG/DL
DEPRECATED RDW RBC AUTO: 56.1 FL (ref 35.1–46.3)
EOSINOPHIL # BLD AUTO: 0.28 X10(3) UL (ref 0–0.7)
EOSINOPHIL NFR BLD AUTO: 3.2 %
ERYTHROCYTE [DISTWIDTH] IN BLOOD BY AUTOMATED COUNT: 15 % (ref 11–15)
GLUCOSE BLD-MCNC: 122 MG/DL (ref 70–99)
HCT VFR BLD AUTO: 37.8 %
HGB BLD-MCNC: 12 G/DL
IMM GRANULOCYTES # BLD AUTO: 0.34 X10(3) UL (ref 0–1)
IMM GRANULOCYTES NFR BLD: 3.9 %
LYMPHOCYTES # BLD AUTO: 1.95 X10(3) UL (ref 1–4)
LYMPHOCYTES NFR BLD AUTO: 22.1 %
MAGNESIUM SERPL-MCNC: 2.1 MG/DL (ref 1.6–2.6)
MCH RBC QN AUTO: 32 PG (ref 26–34)
MCHC RBC AUTO-ENTMCNC: 31.7 G/DL (ref 31–37)
MCV RBC AUTO: 100.8 FL
MONOCYTES # BLD AUTO: 0.76 X10(3) UL (ref 0.1–1)
MONOCYTES NFR BLD AUTO: 8.6 %
NEUTROPHILS # BLD AUTO: 5.46 X10 (3) UL (ref 1.5–7.7)
NEUTROPHILS # BLD AUTO: 5.46 X10(3) UL (ref 1.5–7.7)
NEUTROPHILS NFR BLD AUTO: 61.9 %
OSMOLALITY SERPL CALC.SUM OF ELEC: 299 MOSM/KG (ref 275–295)
PLATELET # BLD AUTO: 216 10(3)UL (ref 150–450)
POTASSIUM SERPL-SCNC: 4.3 MMOL/L (ref 3.5–5.1)
RBC # BLD AUTO: 3.75 X10(6)UL
SODIUM SERPL-SCNC: 143 MMOL/L (ref 136–145)
WBC # BLD AUTO: 8.8 X10(3) UL (ref 4–11)

## 2022-05-11 PROCEDURE — 85025 COMPLETE CBC W/AUTO DIFF WBC: CPT | Performed by: INTERNAL MEDICINE

## 2022-05-11 PROCEDURE — 83735 ASSAY OF MAGNESIUM: CPT | Performed by: INTERNAL MEDICINE

## 2022-05-11 PROCEDURE — 80048 BASIC METABOLIC PNL TOTAL CA: CPT | Performed by: INTERNAL MEDICINE

## 2022-05-11 NOTE — PLAN OF CARE
Problem: PAIN - ADULT  Goal: Verbalizes/displays adequate comfort level or patient's stated pain goal  Description: INTERVENTIONS:  - Encourage pt to monitor pain and request assistance  - Assess pain using appropriate pain scale  - Administer analgesics based on type and severity of pain and evaluate response  - Implement non-pharmacological measures as appropriate and evaluate response  - Consider cultural and social influences on pain and pain management  - Manage/alleviate anxiety  - Utilize distraction and/or relaxation techniques  - Monitor for opioid side effects  - Notify MD/LIP if interventions unsuccessful or patient reports new pain  - Anticipate increased pain with activity and pre-medicate as appropriate  Outcome: Progressing     Problem: RISK FOR INFECTION - ADULT  Goal: Absence of fever/infection during anticipated neutropenic period  Description: INTERVENTIONS  - Monitor WBC  - Administer growth factors as ordered  - Implement neutropenic guidelines  Outcome: Progressing     Problem: SAFETY ADULT - FALL  Goal: Free from fall injury  Description: INTERVENTIONS:  - Assess pt frequently for physical needs  - Identify cognitive and physical deficits and behaviors that affect risk of falls.   - [place] fall precautions as indicated by assessment.  - Educate pt/family on patient safety including physical limitations  - Instruct pt to call for assistance with activity based on assessment  - Modify environment to reduce risk of injury  - Provide assistive devices as appropriate  - Consider OT/PT consult to assist with strengthening/mobility  - Encourage toileting schedule  Outcome: Progressing     Problem: DISCHARGE PLANNING  Goal: Discharge to home or other facility with appropriate resources  Description: INTERVENTIONS:  - Identify barriers to discharge w/pt and caregiver  - Include patient/family/discharge partner in discharge planning  - Arrange for needed discharge resources and transportation as appropriate  - Identify discharge learning needs (meds, wound care, etc)  - Arrange for interpreters to assist at discharge as needed  - Consider post-discharge preferences of patient/family/discharge partner  - Complete POLST form as appropriate  - Assess patient's ability to be responsible for managing their own health  - Refer to Case Management Department for coordinating discharge planning if the patient needs post-hospital services based on physician/LIP order or complex needs related to functional status, cognitive ability or social support system  Outcome: Progressing     Problem: Patient Centered Care  Goal: Patient preferences are identified and integrated in the patient's plan of care  Description: Interventions:  - What would you like us to know as we care for you? From [facility]  - Provide timely, complete, and accurate information to patient/family  - Incorporate patient and family knowledge, values, beliefs, and cultural backgrounds into the planning and delivery of care  - Encourage patient/family to participate in care and decision-making at the level they choose  - Honor patient and family perspectives and choices  Outcome: Progressing     Problem: Patient/Family Goals  Goal: Patient/Family Long Term Goal  Description: Patient's Long Term Goal: to return to [facility]    Interventions:  - antibiotics, therapy  - See additional Care Plan goals for specific interventions  Outcome: Progressing  Goal: Patient/Family Short Term Goal  Description: Patient's Short Term Goal: to feel better    Interventions:   - antibiotics  - See additional Care Plan goals for specific interventions  Outcome: Progressing    Patient currently stable at this time. Vitals stable. Denies any pain or discomfort. Fall and safety precautions in place. Bed at lowest position, bed alarm in place. Call light and personal belongings within reach. Frequent nursing rounds completed.

## 2022-05-11 NOTE — TELEPHONE ENCOUNTER
Rcvd call to check if we received an immunodeficiency test req form. Let her know we did rcv it, and it is being worked on. Unable to reach RN to transfer call, please call back.   [phone]

## 2022-05-11 NOTE — PLAN OF CARE
Problem: PAIN - ADULT  Goal: Verbalizes/displays adequate comfort level or patient's stated pain goal  Description: INTERVENTIONS:  - Encourage pt to monitor pain and request assistance  - Assess pain using appropriate pain scale  - Administer analgesics based on type and severity of pain and evaluate response  - Implement non-pharmacological measures as appropriate and evaluate response  - Consider cultural and social influences on pain and pain management  - Manage/alleviate anxiety  - Utilize distraction and/or relaxation techniques  - Monitor for opioid side effects  - Notify MD/LIP if interventions unsuccessful or patient reports new pain  - Anticipate increased pain with activity and pre-medicate as appropriate  Outcome: Adequate for Discharge     Problem: RISK FOR INFECTION - ADULT  Goal: Absence of fever/infection during anticipated neutropenic period  Description: INTERVENTIONS  - Monitor WBC  - Administer growth factors as ordered  - Implement neutropenic guidelines  Outcome: Adequate for Discharge     Problem: SAFETY ADULT - FALL  Goal: Free from fall injury  Description: INTERVENTIONS:  - Assess pt frequently for physical needs  - Identify cognitive and physical deficits and behaviors that affect risk of falls.   - [place] fall precautions as indicated by assessment.  - Educate pt/family on patient safety including physical limitations  - Instruct pt to call for assistance with activity based on assessment  - Modify environment to reduce risk of injury  - Provide assistive devices as appropriate  - Consider OT/PT consult to assist with strengthening/mobility  - Encourage toileting schedule  Outcome: Adequate for Discharge     Problem: DISCHARGE PLANNING  Goal: Discharge to home or other facility with appropriate resources  Description: INTERVENTIONS:  - Identify barriers to discharge w/pt and caregiver  - Include patient/family/discharge partner in discharge planning  - Arrange for needed discharge resources and transportation as appropriate  - Identify discharge learning needs (meds, wound care, etc)  - Arrange for interpreters to assist at discharge as needed  - Consider post-discharge preferences of patient/family/discharge partner  - Complete POLST form as appropriate  - Assess patient's ability to be responsible for managing their own health  - Refer to Case Management Department for coordinating discharge planning if the patient needs post-hospital services based on physician/LIP order or complex needs related to functional status, cognitive ability or social support system  Outcome: Adequate for Discharge     Problem: Patient Centered Care  Goal: Patient preferences are identified and integrated in the patient's plan of care  Description: Interventions:  - What would you like us to know as we care for you? From [facility]  - Provide timely, complete, and accurate information to patient/family  - Incorporate patient and family knowledge, values, beliefs, and cultural backgrounds into the planning and delivery of care  - Encourage patient/family to participate in care and decision-making at the level they choose  - Honor patient and family perspectives and choices  Outcome: Adequate for Discharge     Problem: Patient/Family Goals  Goal: Patient/Family Long Term Goal  Description: Patient's Long Term Goal: to return to [facility]    Interventions:  - antibiotics, therapy  - See additional Care Plan goals for specific interventions  Outcome: Adequate for Discharge  Goal: Patient/Family Short Term Goal  Description: Patient's Short Term Goal: to feel better    Interventions:   - antibiotics  - See additional Care Plan goals for specific interventions  Outcome: Adequate for Discharge       Discharge education and IVs removed by this RN. Pt understands follow up instructions. All belongings gathered and all questions answered. Report called to TAMMIE LANDEROS at [facility].  Call Dtr Kusum Fink to update but no response. Discharged in stable condition to [facility] with paper script for PT/OT by [employer].

## 2022-05-12 NOTE — ED INITIAL ASSESSMENT (HPI)
Pt c/o nonproductive cough for about a week and generalized weakness. Denies fevers, denies chest pain. [Lower back] : lower back [Sudden] : sudden [9] : 9 [10] : 10 [Dull/Aching] : dull/aching [Constant] : constant [Household chores] : household chores [Work] : work [Sleep] : sleep [Walking/activity] : walking/activity [Sitting] : sitting [Bending forward] : bending forward [Lying in bed] : lying in bed [de-identified] : 05/12/2022 : a 31 yo female, presents for right lower back pain noticed on 05/02/2022 after waking up. a constant pain that radiate to the right gluteal area with stiffness and swelling of the right lower back, tingling in to the right leg. she described the pain as tightness/soreness. no specific injury/pain medication/x-ray. Reports a 'nerve test' in the legs Sept 2021 showed pinched nerve performed at PT. Was in formal PT guided by PCP at that time and symptoms resolved, has been doing HEP since. \par PMH- denies PSH- septoplasty, appy \par \par  [] : no

## 2022-05-19 ENCOUNTER — OFFICE VISIT (OUTPATIENT)
Dept: DERMATOLOGY CLINIC | Facility: CLINIC | Age: 82
End: 2022-05-19
Payer: MEDICARE

## 2022-05-19 DIAGNOSIS — D23.30 BENIGN NEOPLASM OF SKIN OF FACE: ICD-10-CM

## 2022-05-19 DIAGNOSIS — D23.5 BENIGN NEOPLASM OF SKIN OF TRUNK, EXCEPT SCROTUM: ICD-10-CM

## 2022-05-19 DIAGNOSIS — L82.0 INFLAMED SEBORRHEIC KERATOSIS: Primary | ICD-10-CM

## 2022-05-19 DIAGNOSIS — L82.1 SEBORRHEIC KERATOSES: ICD-10-CM

## 2022-05-19 DIAGNOSIS — L57.0 AK (ACTINIC KERATOSIS): ICD-10-CM

## 2022-05-19 DIAGNOSIS — D23.4 BENIGN NEOPLASM OF SCALP AND SKIN OF NECK: ICD-10-CM

## 2022-05-19 PROCEDURE — 1111F DSCHRG MED/CURRENT MED MERGE: CPT | Performed by: DERMATOLOGY

## 2022-05-19 PROCEDURE — 99213 OFFICE O/P EST LOW 20 MIN: CPT | Performed by: DERMATOLOGY

## 2022-05-20 ENCOUNTER — TELEPHONE (OUTPATIENT)
Dept: PHYSICAL MEDICINE AND REHAB | Facility: CLINIC | Age: 82
End: 2022-05-20

## 2022-05-20 NOTE — TELEPHONE ENCOUNTER
Condition update after Procedure. - Patient had Bilateral L5 transforaminal epidural steroid injections on 5/3/2022 with . - 80% relief. If pain is worse:  - Pain level prior to procedure:   7  - Current pain level:  4    - Pain location: Lower back. - Pain description: throbbing  - Current pain treatment: Trying to get into PT but having a hard time  - Current prescription pain medications/dosing: currently no pain meds taking at this time. - LOV: 2/23/2022 Nisa Parham MD    - NOV: 6/8/2022 Nisa Parham MD   - Plan from LOV: Per Dr. Armando Arias: \"The patient will follow up in 3 months, but the patient will call me 2 weeks after having the injection to let me know how the injection worked. \"

## 2022-06-07 ENCOUNTER — PATIENT MESSAGE (OUTPATIENT)
Dept: PHYSICAL MEDICINE AND REHAB | Facility: CLINIC | Age: 82
End: 2022-06-07

## 2022-06-08 ENCOUNTER — OFFICE VISIT (OUTPATIENT)
Dept: PHYSICAL MEDICINE AND REHAB | Facility: CLINIC | Age: 82
End: 2022-06-08
Payer: MEDICARE

## 2022-06-08 VITALS
OXYGEN SATURATION: 97 % | HEART RATE: 69 BPM | SYSTOLIC BLOOD PRESSURE: 136 MMHG | RESPIRATION RATE: 16 BRPM | DIASTOLIC BLOOD PRESSURE: 70 MMHG

## 2022-06-08 DIAGNOSIS — M43.10 RETROLISTHESIS OF VERTEBRAE: ICD-10-CM

## 2022-06-08 DIAGNOSIS — M51.36 LUMBAR DEGENERATIVE DISC DISEASE: ICD-10-CM

## 2022-06-08 DIAGNOSIS — M47.812 ARTHROPATHY OF CERVICAL FACET JOINT: ICD-10-CM

## 2022-06-08 DIAGNOSIS — M50.90 CERVICAL DISC DISEASE: ICD-10-CM

## 2022-06-08 DIAGNOSIS — M48.061 SPINAL STENOSIS OF LUMBAR REGION WITHOUT NEUROGENIC CLAUDICATION: ICD-10-CM

## 2022-06-08 DIAGNOSIS — M43.16 SPONDYLOLISTHESIS OF LUMBAR REGION: ICD-10-CM

## 2022-06-08 DIAGNOSIS — M54.16 LUMBAR RADICULOPATHY: Primary | ICD-10-CM

## 2022-06-08 DIAGNOSIS — Z98.1 S/P CERVICAL SPINAL FUSION: ICD-10-CM

## 2022-06-08 DIAGNOSIS — F41.9 ANXIETY: Chronic | ICD-10-CM

## 2022-06-08 DIAGNOSIS — M51.26 LUMBAR HERNIATED DISC: ICD-10-CM

## 2022-06-08 DIAGNOSIS — M47.816 LUMBAR FACET ARTHROPATHY: ICD-10-CM

## 2022-06-08 DIAGNOSIS — F31.32 BIPOLAR AFFECTIVE DISORDER, CURRENTLY DEPRESSED, MODERATE (HCC): ICD-10-CM

## 2022-06-08 DIAGNOSIS — F33.2 SEVERE EPISODE OF RECURRENT MAJOR DEPRESSIVE DISORDER, WITHOUT PSYCHOTIC FEATURES (HCC): ICD-10-CM

## 2022-06-08 DIAGNOSIS — M96.1 LUMBAR POST-LAMINECTOMY SYNDROME: ICD-10-CM

## 2022-06-08 PROCEDURE — 99213 OFFICE O/P EST LOW 20 MIN: CPT | Performed by: PHYSICAL MEDICINE & REHABILITATION

## 2022-06-08 PROCEDURE — 1111F DSCHRG MED/CURRENT MED MERGE: CPT | Performed by: PHYSICAL MEDICINE & REHABILITATION

## 2022-06-08 NOTE — PROGRESS NOTES
Last procedure: Bilateral L5 transforaminal epidural steroid injections done under fluoroscopic guidance with contrast enhancement.       Date: 05/03/2022    Percentage of relief obtained: 15%    Duration of relief: After the injections 4 weeks    Current Pain Score: 5/10

## 2022-06-08 NOTE — PATIENT INSTRUCTIONS
Plan  She will continue with the Tylenol for the pain. She will continue with the PT for her shoulders and lumbar spine. The patient does not need any injections at this time. She will follow up in 3-6 months or sooner if needed.

## 2022-06-16 ENCOUNTER — OFFICE VISIT (OUTPATIENT)
Dept: OTOLARYNGOLOGY | Facility: CLINIC | Age: 82
End: 2022-06-16
Payer: MEDICARE

## 2022-06-16 DIAGNOSIS — H61.23 BILATERAL IMPACTED CERUMEN: Primary | ICD-10-CM

## 2022-06-16 PROCEDURE — 99213 OFFICE O/P EST LOW 20 MIN: CPT | Performed by: OTOLARYNGOLOGY

## 2022-06-20 ENCOUNTER — MED REC SCAN ONLY (OUTPATIENT)
Dept: PHYSICAL MEDICINE AND REHAB | Facility: CLINIC | Age: 82
End: 2022-06-20

## 2022-07-19 ENCOUNTER — MED REC SCAN ONLY (OUTPATIENT)
Dept: PHYSICAL MEDICINE AND REHAB | Facility: CLINIC | Age: 82
End: 2022-07-19

## 2022-07-27 RX ORDER — HYDROCODONE BITARTRATE AND ACETAMINOPHEN 10; 325 MG/1; MG/1
TABLET ORAL
Qty: 9 TABLET | Refills: 0 | OUTPATIENT
Start: 2022-07-27

## 2022-07-27 NOTE — TELEPHONE ENCOUNTER
Patient returning phone call from nurse to inform that she is not in need of a refill for  Hydrocodone at this time.

## 2022-07-27 NOTE — TELEPHONE ENCOUNTER
LMTCB. Need to see if patient need this refilled or if it was just an automated request from the pharmacy. Refill Request    Medication request: HYDROCODONE-ACETAMINOPHEN  MG Oral Tab  ONE TABLET BY MOUTH EVERY 8 HOURS AS NEEDED FOR PAIN *DO NOT EXCEED 4GM/APAP IN 24 HOURS FROM ALL SOURCES*     LOV:6/8/22  Due back to clinic per last office note:  \"follow up in 3-6 months \"  NOV: 9/28/22     [place] 5/5/22 #21    Urine drug screen (if applicable): none  Pain contract: none    LOV plan (if weaning or changing medications): Per  at [address]: \"She will continue with the Tylenol for the pain. \"  No mention of continuing [place] at [address].

## 2022-08-13 ENCOUNTER — HOSPITAL ENCOUNTER (EMERGENCY)
Facility: HOSPITAL | Age: 82
Discharge: HOME | End: 2022-08-13
Attending: EMERGENCY MEDICINE
Payer: MEDICARE

## 2022-08-13 VITALS
OXYGEN SATURATION: 97 % | DIASTOLIC BLOOD PRESSURE: 55 MMHG | BODY MASS INDEX: 30 KG/M2 | RESPIRATION RATE: 24 BRPM | HEART RATE: 66 BPM | WEIGHT: 200 LBS | SYSTOLIC BLOOD PRESSURE: 145 MMHG | TEMPERATURE: 97 F

## 2022-08-13 DIAGNOSIS — F41.9 ANXIETY: ICD-10-CM

## 2022-08-13 DIAGNOSIS — S90.414A ABRASION OF TOE OF RIGHT FOOT, INITIAL ENCOUNTER: Primary | ICD-10-CM

## 2022-08-13 PROCEDURE — 93005 ELECTROCARDIOGRAM TRACING: CPT

## 2022-08-13 PROCEDURE — 93010 ELECTROCARDIOGRAM REPORT: CPT | Performed by: EMERGENCY MEDICINE

## 2022-08-13 PROCEDURE — 99283 EMERGENCY DEPT VISIT LOW MDM: CPT

## 2022-08-13 RX ORDER — LORAZEPAM 1 MG/1
0.5 TABLET ORAL ONCE
Status: COMPLETED | OUTPATIENT
Start: 2022-08-13 | End: 2022-08-13

## 2022-08-13 NOTE — ED QUICK NOTES
Patient c/o of chest pain 1042, [facility] NEIL notified, EKG completed unremarkable per MD. Patient now verbalizing she feels better.

## 2022-08-13 NOTE — ED QUICK NOTES
Attempted to call Constantino Velasquez to notify of patient's return. Called x3 and no response, mailbox was full. Report given to [facility]. Patient's R toe lac. Covered with surgical snow, gauze and tape. Informed to keep on for at least 24, and elevate with ice prn.

## 2022-09-20 NOTE — PROGRESS NOTES
[facility] Hospitalist Progress Note     PCP: Vitor Beaulieu MD    Chief Complaint: follow-up    SUBJECTIVE:  - s/p bronch yesterday  - 4+GPC on smear   - off O2 now, still coughing, minimal to no production now     OBJECTIVE:  Temp:  [96.8 °F (36 °C)-99. 1 input(s): TROP in the last 168 hours.       Meds:     • QUEtiapine Fumarate  300 mg Oral Nightly   • MethylPREDNISolone Sodium Succ  40 mg Intravenous Q12H   • QUEtiapine Fumarate ER  50 mg Oral QPM   • ipratropium-albuterol  3 mL Nebulization Q4H WA (4 kelly admission  - psych consulted, she has improved w/ medication adjustment   - psychotropic meds per psych      Falls/ chronic weakness/ wheelchair bound  - here from [facility] - will return at d/c      Hypothyroidism  - synthroid     HTN, stabl Consent (Scalp)/Introductory Paragraph: The rationale for Mohs was explained to the patient and consent was obtained. The risks, benefits and alternatives to therapy were discussed in detail. Specifically, the risks of alopecia in or around the treatment site, changes in hair growth pattern secondary to repair, infection, scarring, bleeding, prolonged wound healing, incomplete removal, allergy to anesthesia, nerve injury and recurrence were addressed. Prior to the procedure, the treatment site was clearly identified and confirmed by the patient using a hand mirror. All components of Universal Protocol/PAUSE Rule completed.

## 2022-09-28 ENCOUNTER — OFFICE VISIT (OUTPATIENT)
Dept: PHYSICAL MEDICINE AND REHAB | Facility: CLINIC | Age: 82
End: 2022-09-28

## 2022-09-28 VITALS
WEIGHT: 200 LBS | SYSTOLIC BLOOD PRESSURE: 134 MMHG | OXYGEN SATURATION: 97 % | BODY MASS INDEX: 30 KG/M2 | HEART RATE: 94 BPM | DIASTOLIC BLOOD PRESSURE: 66 MMHG

## 2022-09-28 DIAGNOSIS — M47.816 LUMBAR FACET ARTHROPATHY: ICD-10-CM

## 2022-09-28 DIAGNOSIS — M19.011 PRIMARY OSTEOARTHRITIS OF RIGHT SHOULDER: ICD-10-CM

## 2022-09-28 DIAGNOSIS — M51.36 LUMBAR DEGENERATIVE DISC DISEASE: ICD-10-CM

## 2022-09-28 DIAGNOSIS — M43.10 RETROLISTHESIS OF VERTEBRAE: ICD-10-CM

## 2022-09-28 DIAGNOSIS — M48.061 SPINAL STENOSIS OF LUMBAR REGION WITHOUT NEUROGENIC CLAUDICATION: ICD-10-CM

## 2022-09-28 DIAGNOSIS — M96.1 CERVICAL POST-LAMINECTOMY SYNDROME: ICD-10-CM

## 2022-09-28 DIAGNOSIS — F31.32 BIPOLAR AFFECTIVE DISORDER, CURRENTLY DEPRESSED, MODERATE (HCC): ICD-10-CM

## 2022-09-28 DIAGNOSIS — E66.01 MORBID (SEVERE) OBESITY DUE TO EXCESS CALORIES (HCC): ICD-10-CM

## 2022-09-28 DIAGNOSIS — M50.90 CERVICAL DISC DISEASE: ICD-10-CM

## 2022-09-28 DIAGNOSIS — M54.16 LUMBAR RADICULOPATHY: Primary | ICD-10-CM

## 2022-09-28 DIAGNOSIS — N31.9 NEUROGENIC BLADDER: ICD-10-CM

## 2022-09-28 DIAGNOSIS — M67.912 DYSFUNCTION OF LEFT ROTATOR CUFF: ICD-10-CM

## 2022-09-28 DIAGNOSIS — M19.012 PRIMARY OSTEOARTHRITIS OF LEFT SHOULDER: ICD-10-CM

## 2022-09-28 DIAGNOSIS — M51.26 LUMBAR HERNIATED DISC: ICD-10-CM

## 2022-09-28 DIAGNOSIS — M96.1 LUMBAR POST-LAMINECTOMY SYNDROME: ICD-10-CM

## 2022-09-28 DIAGNOSIS — M47.812 ARTHROPATHY OF CERVICAL FACET JOINT: ICD-10-CM

## 2022-09-28 DIAGNOSIS — M43.16 SPONDYLOLISTHESIS OF LUMBAR REGION: ICD-10-CM

## 2022-09-28 DIAGNOSIS — Z98.1 S/P CERVICAL SPINAL FUSION: ICD-10-CM

## 2022-09-28 PROCEDURE — 99213 OFFICE O/P EST LOW 20 MIN: CPT | Performed by: PHYSICAL MEDICINE & REHABILITATION

## 2022-09-28 NOTE — PATIENT INSTRUCTIONS
Plan  She will continue with the PT and her home exercise program.    She will work on finding a new psychiatrist.    She will take the Tylenol for the pain as needed. She will follow up in 3 months or sooner if needed.

## 2022-09-29 ENCOUNTER — TELEPHONE (OUTPATIENT)
Dept: PHYSICAL MEDICINE AND REHAB | Facility: CLINIC | Age: 82
End: 2022-09-29

## 2022-09-29 NOTE — TELEPHONE ENCOUNTER
Patient is calling in new referral for a Psychiatrist as the first referral does not accept her insurance. Please call to advise.

## 2022-09-29 NOTE — TELEPHONE ENCOUNTER
Patient was seen by Mariusz Chaudhry yesterday 9/28/22. Per Mariusz Chaudhry: \"She will work on finding a new psychiatrist.\"    Message forwarded to Mariusz Chaudhry for psychiatrist recommendations. If no additional recommendations will need to inform patient to contact her insurance for a list of psychiatrists that accept her insurance.

## 2022-10-17 ENCOUNTER — TELEPHONE (OUTPATIENT)
Dept: PHYSICAL MEDICINE AND REHAB | Facility: CLINIC | Age: 82
End: 2022-10-17

## 2022-10-17 RX ORDER — HYDROCODONE BITARTRATE AND ACETAMINOPHEN 10; 325 MG/1; MG/1
TABLET ORAL
Qty: 9 TABLET | Refills: 0 | Status: SHIPPED | OUTPATIENT
Start: 2022-10-17

## 2022-10-17 NOTE — TELEPHONE ENCOUNTER
Pt is calling to speak to a nurse regarding pain from a would and pain on her leg.  She would like a call back, please advise-NL

## 2022-10-26 RX ORDER — HYDROCODONE BITARTRATE AND ACETAMINOPHEN 10; 325 MG/1; MG/1
TABLET ORAL
Qty: 9 TABLET | Refills: 0 | Status: SHIPPED | OUTPATIENT
Start: 2022-10-26

## 2022-10-26 NOTE — TELEPHONE ENCOUNTER
PCP - thinks wound is yeast infection (?)  Per 10/13/2022 OV with Dr. Nikunj Franco: \"Left leg pain  - no sign of skin issue/wound - no antibiotics necessary  - likely muscle tension/spasm. Possible from sitting in chair so much, potentially related to doing PT recently  - if progressing, can see her PM&R specialist  - advised heat/stretching. Should continue PT. Takes tylenol PRN      Pain to LEFT upper thigh, just below glute, intermittent throbbing. States she has had a \"wound\" for awhile now. Denies any break in skin, redness. LOV: 09/28/2022  NOV: 11/30/2022 at 2:30PM.    Will route patient update to Dr. Scotty Knowles for any recommendations.

## 2022-10-28 NOTE — TELEPHONE ENCOUNTER
Please call on Monday to get an update on how she is doing. If the pain is unchanged, then she will need to be seen in the office. If the pain is not severe, then will keep scheduled appointment.

## 2022-11-08 ENCOUNTER — TELEPHONE (OUTPATIENT)
Dept: NEUROLOGY | Facility: CLINIC | Age: 82
End: 2022-11-08

## 2022-11-08 ENCOUNTER — OFFICE VISIT (OUTPATIENT)
Dept: OTOLARYNGOLOGY | Facility: CLINIC | Age: 82
End: 2022-11-08
Payer: MEDICARE

## 2022-11-08 DIAGNOSIS — H61.23 BILATERAL IMPACTED CERUMEN: Primary | ICD-10-CM

## 2022-11-08 DIAGNOSIS — M54.16 LUMBAR RADICULOPATHY: Primary | ICD-10-CM

## 2022-11-08 PROCEDURE — 69210 REMOVE IMPACTED EAR WAX UNI: CPT | Performed by: OTOLARYNGOLOGY

## 2022-11-09 NOTE — TELEPHONE ENCOUNTER
Attempted to reach patient, but call went straight to , but this was not set up so was unable to leave a message. Also tried to reach patient's son, but call went to .

## 2022-11-09 NOTE — TELEPHONE ENCOUNTER
I was paged by this patient, but when I called back there was no answer. I left a message to call back.

## 2022-11-10 ENCOUNTER — TELEPHONE (OUTPATIENT)
Dept: NEUROLOGY | Facility: CLINIC | Age: 82
End: 2022-11-10

## 2022-11-10 NOTE — TELEPHONE ENCOUNTER
Per : \"is the pain in the anterior, lateral, or posterior thigh. She might need to have a left L3 or L5 TFESI depending on the location of the pain. \"    When patient calls back will need to clarify the exact location of her thigh/buttock pain.

## 2022-11-10 NOTE — TELEPHONE ENCOUNTER
Spoke with patient who stated pain is to the back of her left leg. Starts right below her left buttock and radiates down to behind her knee. This hurts while sitting for longer periods of time. Patient is also on Apixaban so will need clearance to hold this prior to scheduling. Patient will call the office back on Monday for a status update. Update relayed to [address] Awaiting instructions on injection order.

## 2022-11-10 NOTE — TELEPHONE ENCOUNTER
Spoke with patient who stated her phone does not accept incoming calls. (Patient called from a phone # at [facility] [phone].)    Patient stated she has been having worsening left leg pain. Pain is in her upper thigh/buttock and radiates down to her knee. States she had a wound to this leg several weeks ago, but this is healed now. Pain is currently 6/10. Pain is a constant deep ache. No c/o back pain. No numbness or tingling in the left left and no c/o any weakness. Stated pain worsens when she sits for too long. Jaiden Sherwood also does not help, so she has not been taking it. She has been taking tylenol. Patient wanting to know what can be done for her leg pain. LOV: 9/28/22  NOV: 11/30/22    Informed patient update will be relayed to [address] for recommendations. Since I cannot call patient back. I instructed her to call the office back today at 2 pm. Patient understood and was very appreciative. Update relayed to [address] Awaiting recommendations.

## 2022-11-10 NOTE — TELEPHONE ENCOUNTER
Antioag letter faxed to [address] Fax #: [phone]     Status of Anticoag  [x] Clearance pending (Need to hold Eliquis for 2 days prior to inj.)   [] Clearance approved  [] Clearance denied    Since patient's phone does not accept incoming calls, patient will call back on Monday for a status update on the injection/clearance.

## 2022-11-10 NOTE — TELEPHONE ENCOUNTER
Per Medicare guidelines authorization is not required for Left L5 TFESI cpt codes        [id number], 67237. Will inform Nursing.

## 2022-11-14 ENCOUNTER — TELEPHONE (OUTPATIENT)
Dept: PHYSICAL MEDICINE AND REHAB | Facility: CLINIC | Age: 82
End: 2022-11-14

## 2022-11-14 NOTE — TELEPHONE ENCOUNTER
Informed patient of below update. Waiting on clearance to hold blood thinner. Patient was frustrated as she is in pain, but she understood.

## 2022-11-14 NOTE — TELEPHONE ENCOUNTER
Call made to PCP-'s office and was informed to re-fax the clearance letter. Fax # confirmed. Letter re-faxed.

## 2022-11-19 ENCOUNTER — TELEPHONE (OUTPATIENT)
Dept: PHYSICAL MEDICINE AND REHAB | Facility: CLINIC | Age: 82
End: 2022-11-19

## 2022-11-19 NOTE — TELEPHONE ENCOUNTER
Was paged by this patient Saturday 11/19/2022. Called patient twice with no answer, no voicemail box set up. She was recently prescribed Norco 5/325 #40 by Dr. Katerin Mcqueen on 11/9/2022 and is set up for a TFESI on 11/29 and a follow up appointment on 11/30. If she calls back and needs assistance, I am happy to speak with her.

## 2022-11-29 ENCOUNTER — OFFICE VISIT (OUTPATIENT)
Dept: SURGERY | Facility: CLINIC | Age: 82
End: 2022-11-29
Payer: MEDICARE

## 2022-11-29 ENCOUNTER — TELEPHONE (OUTPATIENT)
Dept: NEUROLOGY | Facility: CLINIC | Age: 82
End: 2022-11-29

## 2022-11-29 DIAGNOSIS — M51.26 LUMBAR HERNIATED DISC: ICD-10-CM

## 2022-11-29 DIAGNOSIS — M54.16 LUMBAR RADICULOPATHY: Primary | ICD-10-CM

## 2022-11-29 DIAGNOSIS — M48.061 SPINAL STENOSIS OF LUMBAR REGION WITHOUT NEUROGENIC CLAUDICATION: ICD-10-CM

## 2022-11-29 DIAGNOSIS — M96.1 LUMBAR POST-LAMINECTOMY SYNDROME: ICD-10-CM

## 2022-11-29 PROCEDURE — 64483 NJX AA&/STRD TFRM EPI L/S 1: CPT | Performed by: PHYSICAL MEDICINE & REHABILITATION

## 2022-11-29 NOTE — TELEPHONE ENCOUNTER
Right L5 (L5-S1) TFESI CPT Code [id number]    Status: Authorization is not required per health plan however, may be subject to review once claim is submitted  Per Medicare Guidelines: pre-certification is not require. All Medicare coverage is based on Medical Necessity.    Notified nursing for scheduling

## 2022-11-29 NOTE — PROCEDURES
[facility] [facility]    LUMBAR TRANSFORAMINAL   NAME:  Shaunna Aase    MR #:    [id number] :  12/3/1940     PHYSICIAN:  Nano Gonzalez MD        Operative Report    DATE OF PROCEDURE: 2022   PREOPERATIVE DIAGNOSES: 1. bilateral L5-S1 radiculopathy    2. s/p L5-S1 right laminectomy    3. L5-S1 bilateral severe foraminal, L2-3 mild-mod central, L1-2 mod central, T12-L1 mod central, T11-12 mild-mod central stenosis    4. L5-S1 mild-mod central HNP        POSTOPERATIVE DIAGNOSES:   1. bilateral L5-S1 radiculopathy    2. s/p L5-S1 right laminectomy    3. L5-S1 bilateral severe foraminal, L2-3 mild-mod central, L1-2 mod central, T12-L1 mod central, T11-12 mild-mod central stenosis    4. L5-S1 mild-mod central HNP        PROCEDURES: right L5 transforaminal epidural steroid injection done under fluoroscopic guidance with contrast enhancement. SURGEON: Nano Baumann MD   ANESTHESIA: Local   INDICATIONS:      OPERATIVE PROCEDURE:  Written consent was obtained from the patient. The patient was brought into the operating room and placed in the prone position on the fluoroscopy table with pillow underneath her abdomen. The patient's skin was cleaned and draped in a normal sterile fashion. Using AP fluoroscopy, all five lumbar vertebrae were identified. When the fifth vertebra was identified, fluoroscopy was left anterior obliqued opening up the right L5-S1 intervertebral foramen. At this point in time, the patient's skin was anesthetized with 1% PF lidocaine without epinephrine. Then, a 5 inch, 22-gauge spinal needle was inserted and directed towards the right L5-S1 intervertebral foramen. When it felt to be in good position, AP fluoroscopy was used to advance the needle to the 6 o'clock position on the right L5 pedicle. At this point in time, Omnipaque-240 contrast was used to obtain a good epidurogram indicating correct needle placement. Then, aspiration was performed.   No blood, fluid, or air was aspirated. Then, the patient was injected with a 4 cc solution of 2 cc of 40 mg/cc of Kenalog and 2 cc of 1% PF lidocaine without epinephrine. After this, the needle was removed. The patient's skin was cleaned. A Band-Aid was applied. The patient was transferred to the cart and into [facility]. The patient was given discharge instructions and will follow up in the clinic as scheduled. Throughout the whole procedure, the patient's pulse oximetry and vital signs were monitored and they remained completely stable. Also, throughout the whole procedure, prior to injection of any medication, aspiration was performed. No blood, fluid, or air was aspirated at anytime.

## 2022-11-30 NOTE — TELEPHONE ENCOUNTER
Voicemail not set up-unable to leave message. 1st attempt.      LMTCB 1st attempt  Procedure: Right L5 Transforaminal epidural steroid injection  Anesthesia: Local  Dx: M54.16  Location: [facility]   CPT Codes: K896925, 47885

## 2022-12-06 NOTE — PLAN OF CARE
Problem: Patient/Family Goals  Goal: Patient/Family Long Term Goal  Patient’s Long Term Goal: return home    Interventions:  - take all prescribed medications as ordered  - follow treatment recommendation of MD  - See additional Care Plan goals for specifi assistance when needed. Patient demonstrated understanding. Problem: Patient Centered Care  Goal: Patient preferences are identified and integrated in the patient’s plan of care  Interventions:  - What would you like us to know as we care for you?  I ge no

## 2022-12-23 RX ORDER — HYDROCODONE BITARTRATE AND ACETAMINOPHEN 10; 325 MG/1; MG/1
TABLET ORAL
Qty: 40 TABLET | Refills: 0 | Status: SHIPPED | OUTPATIENT
Start: 2022-12-23

## 2023-01-03 ENCOUNTER — HOSPITAL ENCOUNTER (EMERGENCY)
Facility: HOSPITAL | Age: 83
Discharge: HOME | End: 2023-01-03
Attending: STUDENT IN AN ORGANIZED HEALTH CARE EDUCATION/TRAINING PROGRAM
Payer: MEDICARE

## 2023-01-03 ENCOUNTER — APPOINTMENT (OUTPATIENT)
Dept: GENERAL RADIOLOGY | Facility: HOSPITAL | Age: 83
End: 2023-01-03
Attending: STUDENT IN AN ORGANIZED HEALTH CARE EDUCATION/TRAINING PROGRAM
Payer: MEDICARE

## 2023-01-03 VITALS
HEART RATE: 64 BPM | TEMPERATURE: 97 F | RESPIRATION RATE: 18 BRPM | DIASTOLIC BLOOD PRESSURE: 55 MMHG | SYSTOLIC BLOOD PRESSURE: 137 MMHG | OXYGEN SATURATION: 96 %

## 2023-01-03 DIAGNOSIS — L03.116 CELLULITIS OF LEFT LOWER EXTREMITY: Primary | ICD-10-CM

## 2023-01-03 LAB
ANION GAP SERPL CALC-SCNC: 5 MMOL/L (ref 0–18)
BASOPHILS # BLD AUTO: 0.03 X10(3) UL (ref 0–0.2)
BASOPHILS NFR BLD AUTO: 0.4 %
BUN BLD-MCNC: 18 MG/DL (ref 7–18)
BUN/CREAT SERPL: 14.1 (ref 10–20)
CALCIUM BLD-MCNC: 10 MG/DL (ref 8.5–10.1)
CHLORIDE SERPL-SCNC: 107 MMOL/L (ref 98–112)
CO2 SERPL-SCNC: 33 MMOL/L (ref 21–32)
CREAT BLD-MCNC: 1.28 MG/DL
CRP SERPL-MCNC: 1.9 MG/DL (ref ?–0.3)
DEPRECATED RDW RBC AUTO: 50.4 FL (ref 35.1–46.3)
EOSINOPHIL # BLD AUTO: 0.19 X10(3) UL (ref 0–0.7)
EOSINOPHIL NFR BLD AUTO: 2.6 %
ERYTHROCYTE [DISTWIDTH] IN BLOOD BY AUTOMATED COUNT: 13.6 % (ref 11–15)
ERYTHROCYTE [SEDIMENTATION RATE] IN BLOOD: 17 MM/HR
GFR SERPLBLD BASED ON 1.73 SQ M-ARVRAT: 41 ML/MIN/1.73M2 (ref 60–?)
GLUCOSE BLD-MCNC: 88 MG/DL (ref 70–99)
HCT VFR BLD AUTO: 41 %
HGB BLD-MCNC: 13 G/DL
IMM GRANULOCYTES # BLD AUTO: 0.02 X10(3) UL (ref 0–1)
IMM GRANULOCYTES NFR BLD: 0.3 %
LYMPHOCYTES # BLD AUTO: 2.18 X10(3) UL (ref 1–4)
LYMPHOCYTES NFR BLD AUTO: 30.2 %
MCH RBC QN AUTO: 31.8 PG (ref 26–34)
MCHC RBC AUTO-ENTMCNC: 31.7 G/DL (ref 31–37)
MCV RBC AUTO: 100.2 FL
MONOCYTES # BLD AUTO: 0.56 X10(3) UL (ref 0.1–1)
MONOCYTES NFR BLD AUTO: 7.7 %
NEUTROPHILS # BLD AUTO: 4.25 X10 (3) UL (ref 1.5–7.7)
NEUTROPHILS # BLD AUTO: 4.25 X10(3) UL (ref 1.5–7.7)
NEUTROPHILS NFR BLD AUTO: 58.8 %
OSMOLALITY SERPL CALC.SUM OF ELEC: 301 MOSM/KG (ref 275–295)
PLATELET # BLD AUTO: 167 10(3)UL (ref 150–450)
POTASSIUM SERPL-SCNC: 3.9 MMOL/L (ref 3.5–5.1)
RBC # BLD AUTO: 4.09 X10(6)UL
SODIUM SERPL-SCNC: 145 MMOL/L (ref 136–145)
WBC # BLD AUTO: 7.2 X10(3) UL (ref 4–11)

## 2023-01-03 PROCEDURE — 36415 COLL VENOUS BLD VENIPUNCTURE: CPT

## 2023-01-03 PROCEDURE — 86140 C-REACTIVE PROTEIN: CPT | Performed by: STUDENT IN AN ORGANIZED HEALTH CARE EDUCATION/TRAINING PROGRAM

## 2023-01-03 PROCEDURE — 73590 X-RAY EXAM OF LOWER LEG: CPT | Performed by: STUDENT IN AN ORGANIZED HEALTH CARE EDUCATION/TRAINING PROGRAM

## 2023-01-03 PROCEDURE — 99284 EMERGENCY DEPT VISIT MOD MDM: CPT

## 2023-01-03 PROCEDURE — 80048 BASIC METABOLIC PNL TOTAL CA: CPT | Performed by: STUDENT IN AN ORGANIZED HEALTH CARE EDUCATION/TRAINING PROGRAM

## 2023-01-03 PROCEDURE — 85025 COMPLETE CBC W/AUTO DIFF WBC: CPT | Performed by: STUDENT IN AN ORGANIZED HEALTH CARE EDUCATION/TRAINING PROGRAM

## 2023-01-03 PROCEDURE — 85652 RBC SED RATE AUTOMATED: CPT | Performed by: STUDENT IN AN ORGANIZED HEALTH CARE EDUCATION/TRAINING PROGRAM

## 2023-01-03 RX ORDER — DOXYCYCLINE HYCLATE 100 MG/1
100 CAPSULE ORAL 2 TIMES DAILY
Qty: 14 CAPSULE | Refills: 0 | Status: SHIPPED | OUTPATIENT
Start: 2023-01-03 | End: 2023-01-10

## 2023-01-03 RX ORDER — AMOXICILLIN 875 MG/1
875 TABLET, COATED ORAL 2 TIMES DAILY
Qty: 14 TABLET | Refills: 0 | Status: SHIPPED | OUTPATIENT
Start: 2023-01-03 | End: 2023-01-10

## 2023-01-03 NOTE — ED INITIAL ASSESSMENT (HPI)
Brought via ems from [facility] for wound to L shin x 6 weeks. Denies fever or chills    Upon assessment, dark red skin tear/wound to L shin, approx silver dollar size.  + surrounding redness and edema and tenderness

## 2023-01-04 NOTE — ED QUICK NOTES
Received report, assumed care of pt. Pt resting on cart, NAD noted. [facility] ambulance called for transport back to [facility], ETA 21:00.

## 2023-01-04 NOTE — DISCHARGE INSTRUCTIONS
.Thank you for seeking care at [facility]. You have been seen and evaluated. Your findings were consistent with a skin infection called cellulitis. We discussed the results of your workup   Please read the instructions provided   If given prescriptions, take as instructed   If you were prescribed antibiotics, ensure that you take these as instructed   If your infection was outlined in ink today, return to the emergency department if the redness extends beyond the area outlined by the pen, if the area affected is increasing, or spreading. Drink plenty of fluids. Remember, your care process does not end after your visit today. Please follow-up with your doctor within 1-2 days for a follow-up check to ensure you are  improving, to see if you need any further evaluation/testing, or to evaluate for any alternate diagnoses. Please return to the emergency department if you develop worsening pain or swelling at the site of your infection, if the redness of your skin is extending beyond the area where it is red today, increasing drainage (pus), worsening discoloration, if you develop high fevers or chills, nausea, vomiting, or severe diarrhea, feeling dizzy or lightheaded, or feeling very ill as these could be signs of worsening infection requiring further medical care. Call or return to the ER if you develop any other new or concerning symptoms as these could be signs of more serious medical illness.

## 2023-01-04 NOTE — ED QUICK NOTES
Transport arrived to take pt back to [facility] at this time. Pt remains alert, respirations even and unlabored, skin warm and dry. VSS. Pt transported back in stable condition at this time.

## 2023-01-07 ENCOUNTER — HOSPITAL ENCOUNTER (EMERGENCY)
Facility: HOSPITAL | Age: 83
Discharge: HOME | End: 2023-01-07
Attending: EMERGENCY MEDICINE
Payer: MEDICARE

## 2023-01-07 VITALS
HEART RATE: 62 BPM | BODY MASS INDEX: 28.79 KG/M2 | SYSTOLIC BLOOD PRESSURE: 138 MMHG | RESPIRATION RATE: 18 BRPM | DIASTOLIC BLOOD PRESSURE: 48 MMHG | WEIGHT: 190 LBS | TEMPERATURE: 99 F | OXYGEN SATURATION: 95 % | HEIGHT: 68 IN

## 2023-01-07 DIAGNOSIS — L03.116 CELLULITIS OF LEFT LOWER EXTREMITY: Primary | ICD-10-CM

## 2023-01-07 DIAGNOSIS — T14.8XXA ABRASION OF SKIN: ICD-10-CM

## 2023-01-07 DIAGNOSIS — R11.2 NAUSEA AND VOMITING IN ADULT: ICD-10-CM

## 2023-01-07 PROCEDURE — 99283 EMERGENCY DEPT VISIT LOW MDM: CPT

## 2023-01-07 RX ORDER — ONDANSETRON 4 MG/1
4 TABLET, ORALLY DISINTEGRATING ORAL ONCE
Status: COMPLETED | OUTPATIENT
Start: 2023-01-07 | End: 2023-01-07

## 2023-01-07 RX ORDER — CEPHALEXIN 500 MG/1
500 CAPSULE ORAL 2 TIMES DAILY
Qty: 20 CAPSULE | Refills: 0 | Status: SHIPPED | OUTPATIENT
Start: 2023-01-07 | End: 2023-01-17

## 2023-01-07 RX ORDER — CEPHALEXIN 500 MG/1
500 CAPSULE ORAL ONCE
Status: COMPLETED | OUTPATIENT
Start: 2023-01-07 | End: 2023-01-07

## 2023-01-07 RX ORDER — ONDANSETRON 4 MG/1
4 TABLET, ORALLY DISINTEGRATING ORAL EVERY 4 HOURS PRN
Qty: 15 TABLET | Refills: 0 | Status: SHIPPED | OUTPATIENT
Start: 2023-01-07

## 2023-01-07 NOTE — ED QUICK NOTES
Orlando Brown here to transport patient back to [facility] [place]. AVS and prescriptions printed and provided to patient. Paperwork printed for [employer].

## 2023-01-07 NOTE — ED INITIAL ASSESSMENT (HPI)
Patient arrives via [facility] from [facility] with complaints of worsening LLE wound. Eschar wound noted to left shin with redness and swelling, warm to the touch. Patient was here 1/3/22 for the same wound. Patient was prescribed antibiotics, patient unsure if she's been getting them due to being nauseous.

## 2023-01-24 ENCOUNTER — TELEPHONE (OUTPATIENT)
Dept: PHYSICAL MEDICINE AND REHAB | Facility: CLINIC | Age: 83
End: 2023-01-24

## 2023-01-30 RX ORDER — HYDROCODONE BITARTRATE AND ACETAMINOPHEN 10; 325 MG/1; MG/1
TABLET ORAL
Qty: 40 TABLET | Refills: 0 | Status: SHIPPED | OUTPATIENT
Start: 2023-01-30

## 2023-01-30 NOTE — TELEPHONE ENCOUNTER
Refill Request    Medication request: HYDROCODONE-ACETAMINOPHEN  MG Oral Tab ONE TABLET BY MOUTH EVERY 8 HOURS AS NEEDED FOR PAIN *DO NOT EXCEED 4GM/APAP IN 24 HOURS FROM ALL SOURCES    LOV: 9/28/22, 11/29/22-inj  Due back to clinic per last office note:  \"follow up in 3 months \"  NOV: Visit date not found      [place]: 11/9/22 #40    Urine drug screen (if applicable): none  Pain contract: none    LOV plan (if weaning or changing medications): Per  at [address] [place] [address]: \"She will take the Tylenol for the pain as needed. \"

## 2023-01-30 NOTE — TELEPHONE ENCOUNTER
Spoke with patient who stated she recently finished antibiotics for left leg cellulitis. She was in the ER on 1/3/23 and 1/7/23 for this. Also states her memory is poor. States her left buttock is now sore and her right leg also is painful with cramps while sitting. States she never had problems with this leg up until a couple months ago. Stated 3 days after the last injection the right buttock pain started. States the last injection helped for 3-4 weeks. She also mentioned that she is still in PT    LOV: 9/28/22  Last injection: 11/29/22-right L5 transforaminal epidural steroid injection   F/u appt scheduled for 2/8/23. Informed patient update will be relayed to Ludivina Amaral, but that he may want to re-evaluate at her upcoming appointment prior to making any recommendations. Patient understood and was very thankful.

## 2023-02-08 ENCOUNTER — TELEPHONE (OUTPATIENT)
Dept: PHYSICAL MEDICINE AND REHAB | Facility: CLINIC | Age: 83
End: 2023-02-08

## 2023-02-08 ENCOUNTER — OFFICE VISIT (OUTPATIENT)
Dept: PHYSICAL MEDICINE AND REHAB | Facility: CLINIC | Age: 83
End: 2023-02-08
Payer: MEDICARE

## 2023-02-08 VITALS
HEART RATE: 77 BPM | OXYGEN SATURATION: 96 % | DIASTOLIC BLOOD PRESSURE: 76 MMHG | SYSTOLIC BLOOD PRESSURE: 130 MMHG | RESPIRATION RATE: 18 BRPM

## 2023-02-08 DIAGNOSIS — M48.061 SPINAL STENOSIS OF LUMBAR REGION WITHOUT NEUROGENIC CLAUDICATION: ICD-10-CM

## 2023-02-08 DIAGNOSIS — F31.32 BIPOLAR AFFECTIVE DISORDER, CURRENTLY DEPRESSED, MODERATE (HCC): ICD-10-CM

## 2023-02-08 DIAGNOSIS — M54.16 LUMBAR RADICULOPATHY: Primary | ICD-10-CM

## 2023-02-08 DIAGNOSIS — M47.816 LUMBAR FACET ARTHROPATHY: ICD-10-CM

## 2023-02-08 DIAGNOSIS — M43.16 SPONDYLOLISTHESIS OF LUMBAR REGION: ICD-10-CM

## 2023-02-08 DIAGNOSIS — Z98.1 S/P CERVICAL SPINAL FUSION: ICD-10-CM

## 2023-02-08 DIAGNOSIS — L03.116 CELLULITIS OF LEFT LOWER EXTREMITY: ICD-10-CM

## 2023-02-08 DIAGNOSIS — M43.10 RETROLISTHESIS OF VERTEBRAE: ICD-10-CM

## 2023-02-08 DIAGNOSIS — M51.36 LUMBAR DEGENERATIVE DISC DISEASE: ICD-10-CM

## 2023-02-08 DIAGNOSIS — M51.26 LUMBAR HERNIATED DISC: ICD-10-CM

## 2023-02-08 DIAGNOSIS — R26.9 NEUROLOGIC GAIT DISORDER: ICD-10-CM

## 2023-02-08 DIAGNOSIS — M96.1 LUMBAR POST-LAMINECTOMY SYNDROME: ICD-10-CM

## 2023-02-08 PROCEDURE — 99214 OFFICE O/P EST MOD 30 MIN: CPT | Performed by: PHYSICAL MEDICINE & REHABILITATION

## 2023-02-08 RX ORDER — PANTOPRAZOLE SODIUM 20 MG/1
TABLET, DELAYED RELEASE ORAL
COMMUNITY
Start: 2023-01-10

## 2023-02-09 NOTE — PATIENT INSTRUCTIONS
Plan  I will perform left L5 TFESI(s). She will continue to find someone to help with her depression. She will continue with the [address] [place] [address] for the pain as needed. The patient will continue with PT. The patient will continue with her home exercise program.    The patient will follow up in 3 months, but the patient will call me 2 weeks after having the injection to let me know how the injection worked.

## 2023-02-14 ENCOUNTER — TELEPHONE (OUTPATIENT)
Dept: PHYSICAL MEDICINE AND REHAB | Facility: CLINIC | Age: 83
End: 2023-02-14

## 2023-02-17 ENCOUNTER — APPOINTMENT (OUTPATIENT)
Dept: GENERAL RADIOLOGY | Facility: HOSPITAL | Age: 83
DRG: 369 | End: 2023-02-17
Attending: EMERGENCY MEDICINE
Payer: MEDICARE

## 2023-02-17 ENCOUNTER — TELEPHONE (OUTPATIENT)
Dept: NEUROLOGY | Facility: CLINIC | Age: 83
End: 2023-02-17

## 2023-02-17 ENCOUNTER — APPOINTMENT (OUTPATIENT)
Dept: GENERAL RADIOLOGY | Facility: HOSPITAL | Age: 83
End: 2023-02-17
Attending: EMERGENCY MEDICINE
Payer: MEDICARE

## 2023-02-17 ENCOUNTER — HOSPITAL ENCOUNTER (EMERGENCY)
Facility: HOSPITAL | Age: 83
Discharge: HOME | End: 2023-02-17
Attending: EMERGENCY MEDICINE
Payer: MEDICARE

## 2023-02-17 ENCOUNTER — HOSPITAL ENCOUNTER (EMERGENCY)
Facility: HOSPITAL | Age: 83
Discharge: HOME | DRG: 369 | End: 2023-02-17
Attending: EMERGENCY MEDICINE
Payer: MEDICARE

## 2023-02-17 VITALS
BODY MASS INDEX: 28.04 KG/M2 | TEMPERATURE: 98 F | DIASTOLIC BLOOD PRESSURE: 57 MMHG | OXYGEN SATURATION: 95 % | SYSTOLIC BLOOD PRESSURE: 147 MMHG | HEART RATE: 56 BPM | HEIGHT: 68 IN | WEIGHT: 185 LBS | RESPIRATION RATE: 18 BRPM

## 2023-02-17 DIAGNOSIS — R19.7 NAUSEA VOMITING AND DIARRHEA: Primary | ICD-10-CM

## 2023-02-17 DIAGNOSIS — R11.2 NAUSEA VOMITING AND DIARRHEA: Primary | ICD-10-CM

## 2023-02-17 LAB
ALBUMIN SERPL-MCNC: 3.1 G/DL (ref 3.4–5)
ALP LIVER SERPL-CCNC: 103 U/L
ALT SERPL-CCNC: 15 U/L
ANION GAP SERPL CALC-SCNC: 6 MMOL/L (ref 0–18)
AST SERPL-CCNC: 13 U/L (ref 15–37)
BASOPHILS # BLD AUTO: 0.02 X10(3) UL (ref 0–0.2)
BASOPHILS NFR BLD AUTO: 0.3 %
BILIRUB DIRECT SERPL-MCNC: <0.1 MG/DL (ref 0–0.2)
BILIRUB SERPL-MCNC: 0.4 MG/DL (ref 0.1–2)
BUN BLD-MCNC: 20 MG/DL (ref 7–18)
BUN/CREAT SERPL: 21.3 (ref 10–20)
CALCIUM BLD-MCNC: 9.4 MG/DL (ref 8.5–10.1)
CHLORIDE SERPL-SCNC: 111 MMOL/L (ref 98–112)
CO2 SERPL-SCNC: 29 MMOL/L (ref 21–32)
CREAT BLD-MCNC: 0.94 MG/DL
DEPRECATED RDW RBC AUTO: 46.4 FL (ref 35.1–46.3)
EOSINOPHIL # BLD AUTO: 0.25 X10(3) UL (ref 0–0.7)
EOSINOPHIL NFR BLD AUTO: 3.9 %
ERYTHROCYTE [DISTWIDTH] IN BLOOD BY AUTOMATED COUNT: 13.1 % (ref 11–15)
GFR SERPLBLD BASED ON 1.73 SQ M-ARVRAT: 60 ML/MIN/1.73M2 (ref 60–?)
GLUCOSE BLD-MCNC: 100 MG/DL (ref 70–99)
GLUCOSE BLDC GLUCOMTR-MCNC: 89 MG/DL (ref 70–99)
HCT VFR BLD AUTO: 40.7 %
HGB BLD-MCNC: 13.4 G/DL
IMM GRANULOCYTES # BLD AUTO: 0.01 X10(3) UL (ref 0–1)
IMM GRANULOCYTES NFR BLD: 0.2 %
LYMPHOCYTES # BLD AUTO: 1.52 X10(3) UL (ref 1–4)
LYMPHOCYTES NFR BLD AUTO: 24 %
MCH RBC QN AUTO: 31.8 PG (ref 26–34)
MCHC RBC AUTO-ENTMCNC: 32.9 G/DL (ref 31–37)
MCV RBC AUTO: 96.7 FL
MONOCYTES # BLD AUTO: 0.49 X10(3) UL (ref 0.1–1)
MONOCYTES NFR BLD AUTO: 7.7 %
NEUTROPHILS # BLD AUTO: 4.04 X10 (3) UL (ref 1.5–7.7)
NEUTROPHILS # BLD AUTO: 4.04 X10(3) UL (ref 1.5–7.7)
NEUTROPHILS NFR BLD AUTO: 63.9 %
OSMOLALITY SERPL CALC.SUM OF ELEC: 305 MOSM/KG (ref 275–295)
PLATELET # BLD AUTO: 155 10(3)UL (ref 150–450)
POTASSIUM SERPL-SCNC: 4.2 MMOL/L (ref 3.5–5.1)
PROT SERPL-MCNC: 5.8 G/DL (ref 6.4–8.2)
RBC # BLD AUTO: 4.21 X10(6)UL
SODIUM SERPL-SCNC: 146 MMOL/L (ref 136–145)
WBC # BLD AUTO: 6.3 X10(3) UL (ref 4–11)

## 2023-02-17 PROCEDURE — 71045 X-RAY EXAM CHEST 1 VIEW: CPT | Performed by: EMERGENCY MEDICINE

## 2023-02-17 PROCEDURE — 85025 COMPLETE CBC W/AUTO DIFF WBC: CPT | Performed by: EMERGENCY MEDICINE

## 2023-02-17 PROCEDURE — 96374 THER/PROPH/DIAG INJ IV PUSH: CPT

## 2023-02-17 PROCEDURE — 99285 EMERGENCY DEPT VISIT HI MDM: CPT

## 2023-02-17 PROCEDURE — 99284 EMERGENCY DEPT VISIT MOD MDM: CPT

## 2023-02-17 PROCEDURE — 82962 GLUCOSE BLOOD TEST: CPT

## 2023-02-17 PROCEDURE — 96361 HYDRATE IV INFUSION ADD-ON: CPT

## 2023-02-17 PROCEDURE — 80076 HEPATIC FUNCTION PANEL: CPT | Performed by: EMERGENCY MEDICINE

## 2023-02-17 PROCEDURE — 80048 BASIC METABOLIC PNL TOTAL CA: CPT | Performed by: EMERGENCY MEDICINE

## 2023-02-17 RX ORDER — ONDANSETRON 4 MG/1
4 TABLET, ORALLY DISINTEGRATING ORAL EVERY 4 HOURS PRN
Qty: 10 TABLET | Refills: 0 | Status: SHIPPED | OUTPATIENT
Start: 2023-02-17 | End: 2023-02-26

## 2023-02-17 RX ORDER — SODIUM CHLORIDE 9 MG/ML
125 INJECTION, SOLUTION INTRAVENOUS CONTINUOUS
Status: DISCONTINUED | OUTPATIENT
Start: 2023-02-17 | End: 2023-02-17

## 2023-02-17 RX ORDER — METOCLOPRAMIDE HYDROCHLORIDE 5 MG/ML
5 INJECTION INTRAMUSCULAR; INTRAVENOUS ONCE
Status: COMPLETED | OUTPATIENT
Start: 2023-02-17 | End: 2023-02-17

## 2023-02-17 NOTE — DISCHARGE INSTRUCTIONS
Return for any worsening or concern    Follow up with your physician or the one provided as instructed. If you are having difficulty getting an appointment with physician, please notify the ED Case Manager at [phone].     Take medicines as prescribed

## 2023-02-17 NOTE — TELEPHONE ENCOUNTER
Answering Service Message:  Name:AMAYA DOCTORS [address]                      2023  7:37:35 AM  ProfileId:     [id number]                   PagerID      [phone] ======================================================================  Text Messages    Message # [phone] [phone]         2023 03:25a   [MEGANT]  Primary MD:  Jeremy Turcios  From:  Gi Ramey  :  [birth year]  Phone#:  [phone]  ----------------------------------------------------------------------  OFFICE LOCATION:   CANCEL APPT 23 AT 9:15 AM PT HAS THE FLU.

## 2023-02-17 NOTE — TELEPHONE ENCOUNTER
LVMTCB    Patient has been cleared to hold Eliquis for 2 days prior to procedure by Dr. Ingrid Cervantes. Paperwork sent to scanning.
Patient calling to inform that she did not show up for her injections today 2/17/23 as she has been admitted to the ER  And she will call back when she feels better.
Patient has been scheduled for Left L5 TFESI on 2/17/23 at the [address] [place] [address] with Lise Hansen. -Anesthesia type: local.  -If scheduling [facility] covid testing required for all procedures whether patient is vaccinated or not. n/a  -Patient informed not to eat or drink anything after midnight the night prior to the procedure, if being sedated. (Patient informed to fast 12 hours prior to procedure with IVCS/MAC.) n/a  -Patient was advised that if she does receive the covid vaccine it needs to be at least 2 weeks before or after the injection. (Had last in November 2022.)  -Medications and allergies reviewed. -Patient reminded to hold NSAIDs (Ibuprofen, ASA 81, Aleve, Naproxen, Mobic, Diclofenac, Etodolac, Celebrex etc.) for 3 days prior to [place]  if BMI is greater than 35. For Cervical injections only hold multivitamins, Vitamin E, Fish Oil, Phentermine (Lomaira) for 7 days prior to injection and NSAIDS.  mg to be held for 7 days prior to injections.  -If patient is receiving MAC/IVCS Phentermine Dallie Rogers) will need to be held for 7 days prior to injection.  -If on blood thinner clearance has been received to hold this medication by provider. Clearance received. -Patient informed she will need a  to and from procedure. -[facility] is located in the [place] [facility] 1st floor. Patient may park in the yellow or purple parking. Patient verbalized understanding and agrees with plan.  -----> Scheduled in Epic: Yes  -----> Scheduled in [employer]:  Yes
Patient retirning phone call from nurse to schedule her injection.
Patient's procedure has been cancelled.
Per Medicare Guidelines -no authorization is required for Left L5 TFESI CPT 96350, 34246     Status: Authorization is not required however may be subject to review once claim is submitted-Covered Benefit
Pt is calling to follow up on scheduling her injection appt
Recdimitrios'd med hold- placed in nurses bin.
Status of Anticoag  [x] Clearance pending to hold Eliquis for 2 days prior to Left L5 transforaminal epidural steroid injection faxed to Dr. Tabatha Brown [phone]  [] Clearance approved  [] Clearance denied
WDL

## 2023-02-17 NOTE — ED INITIAL ASSESSMENT (HPI)
Pt arrives via EMS from [facility] for N/V/D. Pt states she began experiencing symptoms around 2 pm yesterday (02/16). Ems gave 4mg zofran PTA with no relief. Pt currently nauseous.

## 2023-02-19 ENCOUNTER — HOSPITAL ENCOUNTER (INPATIENT)
Facility: HOSPITAL | Age: 83
LOS: 7 days | Discharge: NURSING, REHAB OR LONG-TERM CARE | DRG: 369 | End: 2023-02-26
Attending: EMERGENCY MEDICINE | Admitting: STUDENT IN AN ORGANIZED HEALTH CARE EDUCATION/TRAINING PROGRAM
Payer: MEDICARE

## 2023-02-19 ENCOUNTER — HOSPITAL ENCOUNTER (INPATIENT)
Facility: HOSPITAL | Age: 83
LOS: 7 days | Discharge: NURSING, REHAB OR LONG-TERM CARE | End: 2023-02-26
Attending: EMERGENCY MEDICINE | Admitting: STUDENT IN AN ORGANIZED HEALTH CARE EDUCATION/TRAINING PROGRAM
Payer: MEDICARE

## 2023-02-19 ENCOUNTER — APPOINTMENT (OUTPATIENT)
Dept: GENERAL RADIOLOGY | Facility: HOSPITAL | Age: 83
End: 2023-02-19
Attending: STUDENT IN AN ORGANIZED HEALTH CARE EDUCATION/TRAINING PROGRAM
Payer: MEDICARE

## 2023-02-19 ENCOUNTER — APPOINTMENT (OUTPATIENT)
Dept: GENERAL RADIOLOGY | Facility: HOSPITAL | Age: 83
DRG: 369 | End: 2023-02-19
Attending: STUDENT IN AN ORGANIZED HEALTH CARE EDUCATION/TRAINING PROGRAM
Payer: MEDICARE

## 2023-02-19 DIAGNOSIS — R11.2 NAUSEA VOMITING AND DIARRHEA: Primary | ICD-10-CM

## 2023-02-19 DIAGNOSIS — R19.7 NAUSEA VOMITING AND DIARRHEA: Primary | ICD-10-CM

## 2023-02-19 LAB
ALBUMIN SERPL-MCNC: 3.3 G/DL (ref 3.4–5)
ALP LIVER SERPL-CCNC: 102 U/L
ALT SERPL-CCNC: 18 U/L
ANION GAP SERPL CALC-SCNC: 8 MMOL/L (ref 0–18)
AST SERPL-CCNC: 19 U/L (ref 15–37)
ATRIAL RATE: 72 BPM
BASOPHILS # BLD AUTO: 0.04 X10(3) UL (ref 0–0.2)
BASOPHILS NFR BLD AUTO: 0.6 %
BILIRUB DIRECT SERPL-MCNC: 0.2 MG/DL (ref 0–0.2)
BILIRUB SERPL-MCNC: 0.6 MG/DL (ref 0.1–2)
BUN BLD-MCNC: 17 MG/DL (ref 7–18)
BUN/CREAT SERPL: 17.7 (ref 10–20)
CALCIUM BLD-MCNC: 9.9 MG/DL (ref 8.5–10.1)
CHLORIDE SERPL-SCNC: 108 MMOL/L (ref 98–112)
CO2 SERPL-SCNC: 29 MMOL/L (ref 21–32)
CREAT BLD-MCNC: 0.96 MG/DL
DEPRECATED RDW RBC AUTO: 45.7 FL (ref 35.1–46.3)
EOSINOPHIL # BLD AUTO: 0.22 X10(3) UL (ref 0–0.7)
EOSINOPHIL NFR BLD AUTO: 3.3 %
ERYTHROCYTE [DISTWIDTH] IN BLOOD BY AUTOMATED COUNT: 12.8 % (ref 11–15)
GFR SERPLBLD BASED ON 1.73 SQ M-ARVRAT: 58 ML/MIN/1.73M2 (ref 60–?)
GLUCOSE BLD-MCNC: 73 MG/DL (ref 70–99)
HCT VFR BLD AUTO: 43.7 %
HGB BLD-MCNC: 14.5 G/DL
IMM GRANULOCYTES # BLD AUTO: 0.01 X10(3) UL (ref 0–1)
IMM GRANULOCYTES NFR BLD: 0.1 %
LIPASE SERPL-CCNC: 21 U/L (ref 13–75)
LIPASE SERPL-CCNC: 86 U/L (ref 73–393)
LYMPHOCYTES # BLD AUTO: 2.01 X10(3) UL (ref 1–4)
LYMPHOCYTES NFR BLD AUTO: 29.9 %
MCH RBC QN AUTO: 32.2 PG (ref 26–34)
MCHC RBC AUTO-ENTMCNC: 33.2 G/DL (ref 31–37)
MCV RBC AUTO: 96.9 FL
MONOCYTES # BLD AUTO: 0.48 X10(3) UL (ref 0.1–1)
MONOCYTES NFR BLD AUTO: 7.1 %
NEUTROPHILS # BLD AUTO: 3.96 X10 (3) UL (ref 1.5–7.7)
NEUTROPHILS # BLD AUTO: 3.96 X10(3) UL (ref 1.5–7.7)
NEUTROPHILS NFR BLD AUTO: 59 %
OSMOLALITY SERPL CALC.SUM OF ELEC: 300 MOSM/KG (ref 275–295)
P AXIS: 41 DEGREES
P-R INTERVAL: 204 MS
PLATELET # BLD AUTO: 158 10(3)UL (ref 150–450)
POTASSIUM SERPL-SCNC: 4.1 MMOL/L (ref 3.5–5.1)
PROT SERPL-MCNC: 6.6 G/DL (ref 6.4–8.2)
Q-T INTERVAL: 420 MS
QRS DURATION: 80 MS
QTC CALCULATION (BEZET): 459 MS
R AXIS: -14 DEGREES
RBC # BLD AUTO: 4.51 X10(6)UL
SARS-COV-2 RNA RESP QL NAA+PROBE: NOT DETECTED
SODIUM SERPL-SCNC: 145 MMOL/L (ref 136–145)
T AXIS: 22 DEGREES
VENTRICULAR RATE: 72 BPM
WBC # BLD AUTO: 6.7 X10(3) UL (ref 4–11)

## 2023-02-19 PROCEDURE — 74018 RADEX ABDOMEN 1 VIEW: CPT | Performed by: STUDENT IN AN ORGANIZED HEALTH CARE EDUCATION/TRAINING PROGRAM

## 2023-02-19 PROCEDURE — 93010 ELECTROCARDIOGRAM REPORT: CPT

## 2023-02-19 PROCEDURE — 83690 ASSAY OF LIPASE: CPT | Performed by: EMERGENCY MEDICINE

## 2023-02-19 PROCEDURE — 80076 HEPATIC FUNCTION PANEL: CPT | Performed by: EMERGENCY MEDICINE

## 2023-02-19 PROCEDURE — 80048 BASIC METABOLIC PNL TOTAL CA: CPT | Performed by: EMERGENCY MEDICINE

## 2023-02-19 PROCEDURE — 99285 EMERGENCY DEPT VISIT HI MDM: CPT

## 2023-02-19 PROCEDURE — 96374 THER/PROPH/DIAG INJ IV PUSH: CPT

## 2023-02-19 PROCEDURE — 85025 COMPLETE CBC W/AUTO DIFF WBC: CPT | Performed by: EMERGENCY MEDICINE

## 2023-02-19 PROCEDURE — C9113 INJ PANTOPRAZOLE SODIUM, VIA: HCPCS | Performed by: STUDENT IN AN ORGANIZED HEALTH CARE EDUCATION/TRAINING PROGRAM

## 2023-02-19 PROCEDURE — 93005 ELECTROCARDIOGRAM TRACING: CPT

## 2023-02-19 PROCEDURE — 96361 HYDRATE IV INFUSION ADD-ON: CPT

## 2023-02-19 RX ORDER — QUETIAPINE FUMARATE 100 MG/1
100 TABLET, FILM COATED ORAL NIGHTLY
Status: DISCONTINUED | OUTPATIENT
Start: 2023-02-19 | End: 2023-02-26

## 2023-02-19 RX ORDER — ONDANSETRON 2 MG/ML
4 INJECTION INTRAMUSCULAR; INTRAVENOUS EVERY 6 HOURS PRN
Status: DISCONTINUED | OUTPATIENT
Start: 2023-02-19 | End: 2023-02-26

## 2023-02-19 RX ORDER — METOCLOPRAMIDE HYDROCHLORIDE 5 MG/ML
5 INJECTION INTRAMUSCULAR; INTRAVENOUS ONCE
Status: COMPLETED | OUTPATIENT
Start: 2023-02-19 | End: 2023-02-19

## 2023-02-19 RX ORDER — SODIUM CHLORIDE 9 MG/ML
125 INJECTION, SOLUTION INTRAVENOUS CONTINUOUS
Status: DISCONTINUED | OUTPATIENT
Start: 2023-02-19 | End: 2023-02-19

## 2023-02-19 RX ORDER — HYDRALAZINE HYDROCHLORIDE 25 MG/1
25 TABLET, FILM COATED ORAL EVERY 6 HOURS PRN
Status: DISCONTINUED | OUTPATIENT
Start: 2023-02-19 | End: 2023-02-26

## 2023-02-19 RX ORDER — METOCLOPRAMIDE HYDROCHLORIDE 5 MG/ML
10 INJECTION INTRAMUSCULAR; INTRAVENOUS EVERY 8 HOURS PRN
Status: DISCONTINUED | OUTPATIENT
Start: 2023-02-19 | End: 2023-02-19

## 2023-02-19 RX ORDER — SODIUM CHLORIDE 9 MG/ML
INJECTION, SOLUTION INTRAVENOUS CONTINUOUS
Status: DISCONTINUED | OUTPATIENT
Start: 2023-02-19 | End: 2023-02-20

## 2023-02-19 RX ORDER — CYCLOBENZAPRINE HCL 5 MG
5 TABLET ORAL EVERY 6 HOURS PRN
Status: DISCONTINUED | OUTPATIENT
Start: 2023-02-19 | End: 2023-02-26

## 2023-02-19 RX ORDER — LEVOTHYROXINE SODIUM 0.1 MG/1
100 TABLET ORAL
Status: DISCONTINUED | OUTPATIENT
Start: 2023-02-20 | End: 2023-02-26

## 2023-02-19 RX ORDER — MAGNESIUM OXIDE 400 MG/1
400 TABLET ORAL 2 TIMES DAILY WITH MEALS
Status: DISCONTINUED | OUTPATIENT
Start: 2023-02-19 | End: 2023-02-26

## 2023-02-19 RX ORDER — DONEPEZIL HYDROCHLORIDE 5 MG/1
5 TABLET, FILM COATED ORAL NIGHTLY
Status: DISCONTINUED | OUTPATIENT
Start: 2023-02-19 | End: 2023-02-26

## 2023-02-19 RX ORDER — BENZTROPINE MESYLATE 1 MG/1
1 TABLET ORAL 2 TIMES DAILY
Status: DISCONTINUED | OUTPATIENT
Start: 2023-02-19 | End: 2023-02-20

## 2023-02-19 RX ORDER — HYDROCODONE BITARTRATE AND ACETAMINOPHEN 10; 325 MG/1; MG/1
1 TABLET ORAL EVERY 8 HOURS PRN
Status: DISCONTINUED | OUTPATIENT
Start: 2023-02-19 | End: 2023-02-26

## 2023-02-19 RX ORDER — FLUTICASONE FUROATE AND VILANTEROL 100; 25 UG/1; UG/1
1 POWDER RESPIRATORY (INHALATION) DAILY
Status: DISCONTINUED | OUTPATIENT
Start: 2023-02-19 | End: 2023-02-26

## 2023-02-19 RX ORDER — ACETAMINOPHEN 500 MG
500 TABLET ORAL EVERY 4 HOURS PRN
Status: DISCONTINUED | OUTPATIENT
Start: 2023-02-19 | End: 2023-02-26

## 2023-02-19 RX ORDER — KETOROLAC TROMETHAMINE 15 MG/ML
15 INJECTION, SOLUTION INTRAMUSCULAR; INTRAVENOUS ONCE AS NEEDED
Status: COMPLETED | OUTPATIENT
Start: 2023-02-19 | End: 2023-02-19

## 2023-02-19 RX ORDER — GABAPENTIN 100 MG/1
200 CAPSULE ORAL 2 TIMES DAILY
Status: DISCONTINUED | OUTPATIENT
Start: 2023-02-19 | End: 2023-02-26

## 2023-02-19 RX ORDER — LAMOTRIGINE 100 MG/1
100 TABLET ORAL 2 TIMES DAILY
Status: DISCONTINUED | OUTPATIENT
Start: 2023-02-19 | End: 2023-02-26

## 2023-02-19 NOTE — PLAN OF CARE
Problem: Patient Centered Care  Goal: Patient preferences are identified and integrated in the patient's plan of care  Description: Interventions:  - What would you like us to know as we care for you?   - Provide timely, complete, and accurate information to patient/family  - Incorporate patient and family knowledge, values, beliefs, and cultural backgrounds into the planning and delivery of care  - Encourage patient/family to participate in care and decision-making at the level they choose  - Honor patient and family perspectives and choices  Outcome: Progressing     Problem: Patient/Family Goals  Goal: Patient/Family Long Term Goal  Description: Patient's Long Term Goal: to return home    Interventions:  - monitor nausea and vomiting  -monitor labs  - See additional Care Plan goals for specific interventions  Outcome: Progressing  Goal: Patient/Family Short Term Goal  Description: Patient's Short Term Goal: to feel better    Interventions:   - follow MD recommendations  - See additional Care Plan goals for specific interventions  Outcome: Progressing

## 2023-02-19 NOTE — ED INITIAL ASSESSMENT (HPI)
Pt to ED for continued nausea/vomiting/diarrhea. States she has lower bad pain. She was seen here on Friday and states taht despite the medications she was prescribed her symptoms are getting worse. She denies fevers.

## 2023-02-20 ENCOUNTER — APPOINTMENT (OUTPATIENT)
Dept: CT IMAGING | Facility: HOSPITAL | Age: 83
DRG: 369 | End: 2023-02-20
Attending: INTERNAL MEDICINE
Payer: MEDICARE

## 2023-02-20 ENCOUNTER — APPOINTMENT (OUTPATIENT)
Dept: CT IMAGING | Facility: HOSPITAL | Age: 83
End: 2023-02-20
Attending: INTERNAL MEDICINE
Payer: MEDICARE

## 2023-02-20 LAB
ALBUMIN SERPL-MCNC: 2.7 G/DL (ref 3.4–5)
ALBUMIN/GLOB SERPL: 1.1 {RATIO} (ref 1–2)
ALP LIVER SERPL-CCNC: 78 U/L
ALT SERPL-CCNC: 16 U/L
ANION GAP SERPL CALC-SCNC: 6 MMOL/L (ref 0–18)
AST SERPL-CCNC: 21 U/L (ref 15–37)
BILIRUB SERPL-MCNC: 0.5 MG/DL (ref 0.1–2)
BUN BLD-MCNC: 17 MG/DL (ref 7–18)
BUN/CREAT SERPL: 23 (ref 10–20)
CALCIUM BLD-MCNC: 9.1 MG/DL (ref 8.5–10.1)
CHLORIDE SERPL-SCNC: 113 MMOL/L (ref 98–112)
CO2 SERPL-SCNC: 26 MMOL/L (ref 21–32)
CREAT BLD-MCNC: 0.74 MG/DL
DEPRECATED RDW RBC AUTO: 47.7 FL (ref 35.1–46.3)
ERYTHROCYTE [DISTWIDTH] IN BLOOD BY AUTOMATED COUNT: 13 % (ref 11–15)
GFR SERPLBLD BASED ON 1.73 SQ M-ARVRAT: 80 ML/MIN/1.73M2 (ref 60–?)
GLOBULIN PLAS-MCNC: 2.5 G/DL (ref 2.8–4.4)
GLUCOSE BLD-MCNC: 60 MG/DL (ref 70–99)
HCT VFR BLD AUTO: 39.2 %
HGB BLD-MCNC: 12.4 G/DL
MCH RBC QN AUTO: 31.6 PG (ref 26–34)
MCHC RBC AUTO-ENTMCNC: 31.6 G/DL (ref 31–37)
MCV RBC AUTO: 99.7 FL
OSMOLALITY SERPL CALC.SUM OF ELEC: 299 MOSM/KG (ref 275–295)
PLATELET # BLD AUTO: 134 10(3)UL (ref 150–450)
POTASSIUM SERPL-SCNC: 3.7 MMOL/L (ref 3.5–5.1)
PROT SERPL-MCNC: 5.2 G/DL (ref 6.4–8.2)
RBC # BLD AUTO: 3.93 X10(6)UL
SODIUM SERPL-SCNC: 145 MMOL/L (ref 136–145)
WBC # BLD AUTO: 4.6 X10(3) UL (ref 4–11)

## 2023-02-20 PROCEDURE — 85027 COMPLETE CBC AUTOMATED: CPT | Performed by: STUDENT IN AN ORGANIZED HEALTH CARE EDUCATION/TRAINING PROGRAM

## 2023-02-20 PROCEDURE — 80053 COMPREHEN METABOLIC PANEL: CPT | Performed by: STUDENT IN AN ORGANIZED HEALTH CARE EDUCATION/TRAINING PROGRAM

## 2023-02-20 PROCEDURE — 74176 CT ABD & PELVIS W/O CONTRAST: CPT | Performed by: INTERNAL MEDICINE

## 2023-02-20 PROCEDURE — 92610 EVALUATE SWALLOWING FUNCTION: CPT

## 2023-02-20 PROCEDURE — C9113 INJ PANTOPRAZOLE SODIUM, VIA: HCPCS | Performed by: STUDENT IN AN ORGANIZED HEALTH CARE EDUCATION/TRAINING PROGRAM

## 2023-02-20 RX ORDER — DEXTROSE AND SODIUM CHLORIDE 5; .9 G/100ML; G/100ML
INJECTION, SOLUTION INTRAVENOUS CONTINUOUS
Status: DISCONTINUED | OUTPATIENT
Start: 2023-02-20 | End: 2023-02-22

## 2023-02-20 RX ORDER — PROCHLORPERAZINE EDISYLATE 5 MG/ML
10 INJECTION INTRAMUSCULAR; INTRAVENOUS EVERY 6 HOURS PRN
Status: DISCONTINUED | OUTPATIENT
Start: 2023-02-20 | End: 2023-02-26

## 2023-02-20 RX ORDER — BENZTROPINE MESYLATE 0.5 MG/1
0.5 TABLET ORAL 2 TIMES DAILY
Status: DISCONTINUED | OUTPATIENT
Start: 2023-02-20 | End: 2023-02-26

## 2023-02-20 NOTE — PLAN OF CARE
Patient resting overnight. Complaining of nausea and abdominal pain at the start of shift. Was only able to tolerate some oral medications. Prn zofran given as well as one time dose of IV toradol and reglan. Plan for possible CT of the abdomen this morning. Fall precautions in place.   Problem: Patient Centered Care  Goal: Patient preferences are identified and integrated in the patient's plan of care  Description: Interventions:  - What would you like us to know as we care for you?   - Provide timely, complete, and accurate information to patient/family  - Incorporate patient and family knowledge, values, beliefs, and cultural backgrounds into the planning and delivery of care  - Encourage patient/family to participate in care and decision-making at the level they choose  - Honor patient and family perspectives and choices  Outcome: Progressing     Problem: Patient/Family Goals  Goal: Patient/Family Long Term Goal  Description: Patient's Long Term Goal:     Interventions:  -   - See additional Care Plan goals for specific interventions  Outcome: Progressing  Goal: Patient/Family Short Term Goal  Description: Patient's Short Term Goal:     Interventions:   -   - See additional Care Plan goals for specific interventions  Outcome: Progressing     Problem: PAIN - ADULT  Goal: Verbalizes/displays adequate comfort level or patient's stated pain goal  Description: INTERVENTIONS:  - Encourage pt to monitor pain and request assistance  - Assess pain using appropriate pain scale  - Administer analgesics based on type and severity of pain and evaluate response  - Implement non-pharmacological measures as appropriate and evaluate response  - Consider cultural and social influences on pain and pain management  - Manage/alleviate anxiety  - Utilize distraction and/or relaxation techniques  - Monitor for opioid side effects  - Notify MD/LIP if interventions unsuccessful or patient reports new pain  - Anticipate increased pain with activity and pre-medicate as appropriate  Outcome: Progressing     Problem: RISK FOR INFECTION - ADULT  Goal: Absence of fever/infection during anticipated neutropenic period  Description: INTERVENTIONS  - Monitor WBC  - Administer growth factors as ordered  - Implement neutropenic guidelines  Outcome: Progressing     Problem: SAFETY ADULT - FALL  Goal: Free from fall injury  Description: INTERVENTIONS:  - Assess pt frequently for physical needs  - Identify cognitive and physical deficits and behaviors that affect risk of falls.   - [place] fall precautions as indicated by assessment.  - Educate pt/family on patient safety including physical limitations  - Instruct pt to call for assistance with activity based on assessment  - Modify environment to reduce risk of injury  - Provide assistive devices as appropriate  - Consider OT/PT consult to assist with strengthening/mobility  - Encourage toileting schedule  Outcome: Progressing     Problem: DISCHARGE PLANNING  Goal: Discharge to home or other facility with appropriate resources  Description: INTERVENTIONS:  - Identify barriers to discharge w/pt and caregiver  - Include patient/family/discharge partner in discharge planning  - Arrange for needed discharge resources and transportation as appropriate  - Identify discharge learning needs (meds, wound care, etc)  - Arrange for interpreters to assist at discharge as needed  - Consider post-discharge preferences of patient/family/discharge partner  - Complete POLST form as appropriate  - Assess patient's ability to be responsible for managing their own health  - Refer to Case Management Department for coordinating discharge planning if the patient needs post-hospital services based on physician/LIP order or complex needs related to functional status, cognitive ability or social support system  Outcome: Progressing     Problem: GASTROINTESTINAL - ADULT  Goal: Minimal or absence of nausea and vomiting  Description: INTERVENTIONS:  - Maintain adequate hydration with IV or PO as ordered and tolerated  - Nasogastric tube to low intermittent suction as ordered  - Evaluate effectiveness of ordered antiemetic medications  - Provide nonpharmacologic comfort measures as appropriate  - Advance diet as tolerated, if ordered  - Obtain nutritional consult as needed  - Evaluate fluid balance  Outcome: Progressing  Goal: Maintains or returns to baseline bowel function  Description: INTERVENTIONS:  - Assess bowel function  - Maintain adequate hydration with IV or PO as ordered and tolerated  - Evaluate effectiveness of GI medications  - Encourage mobilization and activity  - Obtain nutritional consult as needed  - Establish a toileting routine/schedule  - Consider collaborating with pharmacy to review patient's medication profile  Outcome: Progressing  Goal: Maintains adequate nutritional intake (undernourished)  Description: INTERVENTIONS:  - Monitor percentage of each meal consumed  - Identify factors contributing to decreased intake, treat as appropriate  - Assist with meals as needed  - Monitor I&O, WT and lab values  - Obtain nutritional consult as needed  - Optimize oral hygiene and moisture  - Encourage food from home; allow for food preferences  - Enhance eating environment  Outcome: Progressing

## 2023-02-20 NOTE — PLAN OF CARE
Patient continues to have nausea, vomiting, diarrhea today. Started on IV compazine and continued on zofran. Patient not tolerating CLD for this RN. GI put on the case today per Dr. Maile Gomez. Patient will need barium XR in am. She will need to be NPO for test. Eliquis put on hold per Dr. Clare Kapoor in case patient needs endoscopy. Patient did have 1 episode of diarrhea but it was small and absorbed by incontinence pad, so no sample was obtained. Speech into see patient today. They recommend minced and moist diet, no straw and thin liquids, and meds crushed in puree. Safety precautions maintained. Patient calls appropriately when assistance is needed. Problem: Patient Centered Care  Goal: Patient preferences are identified and integrated in the patient's plan of care  Description: Interventions:  - What would you like us to know as we care for you?  I do not want to be here  - Provide timely, complete, and accurate information to patient/family  - Incorporate patient and family knowledge, values, beliefs, and cultural backgrounds into the planning and delivery of care  - Encourage patient/family to participate in care and decision-making at the level they choose  - Honor patient and family perspectives and choices  Outcome: Not Progressing     Problem: Patient/Family Goals  Goal: Patient/Family Long Term Goal  Description: Patient's Long Term Goal: go home    Interventions:  Speech work up  GI panel and C DIFF needed  Advance diet as tolerated  - See additional Care Plan goals for specific interventions  Outcome: Not Progressing  Goal: Patient/Family Short Term Goal  Description: Patient's Short Term Goal: no more nausea    Interventions:   -anti emetics  CLD  - See additional Care Plan goals for specific interventions  Outcome: Not Progressing     Problem: GASTROINTESTINAL - ADULT  Goal: Minimal or absence of nausea and vomiting  Description: INTERVENTIONS:  - Maintain adequate hydration with IV or PO as ordered and tolerated  - Nasogastric tube to low intermittent suction as ordered  - Evaluate effectiveness of ordered antiemetic medications  - Provide nonpharmacologic comfort measures as appropriate  - Advance diet as tolerated, if ordered  - Obtain nutritional consult as needed  - Evaluate fluid balance  Outcome: Not Progressing  Goal: Maintains or returns to baseline bowel function  Description: INTERVENTIONS:  - Assess bowel function  - Maintain adequate hydration with IV or PO as ordered and tolerated  - Evaluate effectiveness of GI medications  - Encourage mobilization and activity  - Obtain nutritional consult as needed  - Establish a toileting routine/schedule  - Consider collaborating with pharmacy to review patient's medication profile  Outcome: Not Progressing  Goal: Maintains adequate nutritional intake (undernourished)  Description: INTERVENTIONS:  - Monitor percentage of each meal consumed  - Identify factors contributing to decreased intake, treat as appropriate  - Assist with meals as needed  - Monitor I&O, WT and lab values  - Obtain nutritional consult as needed  - Optimize oral hygiene and moisture  - Encourage food from home; allow for food preferences  - Enhance eating environment  Outcome: Not Progressing

## 2023-02-20 NOTE — PHYSICAL THERAPY NOTE
Physical Therapy Attempt Note    Orders received and chart reviewed. Attempted to see patient for Physical Therapy services at 0830 and 1015. Patient not available secondary to patient off-unit for CT scan and then later refusing therapy 2/2 N/V. Pt reporting she is WC-bound at baseline but is able to independently perform transfers. Will re-attempt pending patient availability/appropriateness as schedule allows, otherwise will re-schedule visit. Addendum [id number]: Second attempt to see patient for PT session, pt remains unavailable 2/2 per RN patient continues to experience nausea and declining participation in therapy. Will re-schedule visit.     Clara Valenzuela, PT, DPT  [id number]  Inpatient Rehabilitation  Physical Therapy  [phone]

## 2023-02-20 NOTE — CM/SW NOTE
02/20/23 1600   CM/SW Referral Data   Referral Source    Reason for Referral Discharge planning   Informant Patient   Access to Care / Medical Hx   Do you have a doctor or clinic where you usually go for medical care (including prenatal care)? Yes  Renata Basurto   Patient Info   Patient's Current Mental Status at Time of Assessment Alert;Oriented   Patient's [address] [facility] Acute Care Provider Upon Admission [facility] Nurs   Patient Status Prior to Admission   Independent with ADLs and Mobility No   Pt. requires assistance with Housework;Driving;Meals; Bathing;Medications;Dressing;Finances   Discharge Needs   Anticipated D/C needs To be determined     Pt discussed during nursing rounds. Dx N/V, CT abd ordered, on clear diet. From [facility] AL w/HH services in place at this time. Pt reports being progressively weaker of late. Pt uses RW and WC at [facility]. PT/OT evals needed for dc recommendation, RN is aware. Plan: TBD    / to remain available for support and/or discharge planning.      DARBY Blackwell    [phone]

## 2023-02-20 NOTE — OCCUPATIONAL THERAPY NOTE
OT order was received, chart review was completed. Pt was off of medical floor for a CT earlier this morning and refused therapy session at this time due to vomiting. OT will re-attempt initial eval when pt is able and available and as schedule permits. February 20, 2023 @ 12:34 pm:    This therapist contacted RN to check on pt. RN reported that pt continues to c/o nausea and will not be able to participate in OT session. OT will re-attempt on a later date, as pt is able and available.       Rut Lopez, OTR/L  [address]

## 2023-02-21 ENCOUNTER — APPOINTMENT (OUTPATIENT)
Dept: GENERAL RADIOLOGY | Facility: HOSPITAL | Age: 83
DRG: 369 | End: 2023-02-21
Attending: STUDENT IN AN ORGANIZED HEALTH CARE EDUCATION/TRAINING PROGRAM
Payer: MEDICARE

## 2023-02-21 ENCOUNTER — APPOINTMENT (OUTPATIENT)
Dept: GENERAL RADIOLOGY | Facility: HOSPITAL | Age: 83
End: 2023-02-21
Attending: STUDENT IN AN ORGANIZED HEALTH CARE EDUCATION/TRAINING PROGRAM
Payer: MEDICARE

## 2023-02-21 LAB
ALBUMIN SERPL-MCNC: 2.6 G/DL (ref 3.4–5)
ALBUMIN/GLOB SERPL: 1 {RATIO} (ref 1–2)
ALP LIVER SERPL-CCNC: 78 U/L
ALT SERPL-CCNC: 20 U/L
ANION GAP SERPL CALC-SCNC: 5 MMOL/L (ref 0–18)
AST SERPL-CCNC: 23 U/L (ref 15–37)
BILIRUB SERPL-MCNC: 0.3 MG/DL (ref 0.1–2)
BUN BLD-MCNC: 10 MG/DL (ref 7–18)
BUN/CREAT SERPL: 13.7 (ref 10–20)
CALCIUM BLD-MCNC: 9 MG/DL (ref 8.5–10.1)
CHLORIDE SERPL-SCNC: 114 MMOL/L (ref 98–112)
CO2 SERPL-SCNC: 27 MMOL/L (ref 21–32)
CREAT BLD-MCNC: 0.73 MG/DL
DEPRECATED RDW RBC AUTO: 46.5 FL (ref 35.1–46.3)
ERYTHROCYTE [DISTWIDTH] IN BLOOD BY AUTOMATED COUNT: 13.2 % (ref 11–15)
GFR SERPLBLD BASED ON 1.73 SQ M-ARVRAT: 81 ML/MIN/1.73M2 (ref 60–?)
GLOBULIN PLAS-MCNC: 2.5 G/DL (ref 2.8–4.4)
GLUCOSE BLD-MCNC: 89 MG/DL (ref 70–99)
HCT VFR BLD AUTO: 37.5 %
HGB BLD-MCNC: 12.4 G/DL
MCH RBC QN AUTO: 32.1 PG (ref 26–34)
MCHC RBC AUTO-ENTMCNC: 33.1 G/DL (ref 31–37)
MCV RBC AUTO: 97.2 FL
OSMOLALITY SERPL CALC.SUM OF ELEC: 301 MOSM/KG (ref 275–295)
PLATELET # BLD AUTO: 132 10(3)UL (ref 150–450)
POTASSIUM SERPL-SCNC: 3.4 MMOL/L (ref 3.5–5.1)
PROT SERPL-MCNC: 5.1 G/DL (ref 6.4–8.2)
RBC # BLD AUTO: 3.86 X10(6)UL
SODIUM SERPL-SCNC: 146 MMOL/L (ref 136–145)
WBC # BLD AUTO: 4.3 X10(3) UL (ref 4–11)

## 2023-02-21 PROCEDURE — 97535 SELF CARE MNGMENT TRAINING: CPT

## 2023-02-21 PROCEDURE — 97530 THERAPEUTIC ACTIVITIES: CPT

## 2023-02-21 PROCEDURE — C9113 INJ PANTOPRAZOLE SODIUM, VIA: HCPCS | Performed by: STUDENT IN AN ORGANIZED HEALTH CARE EDUCATION/TRAINING PROGRAM

## 2023-02-21 PROCEDURE — 97166 OT EVAL MOD COMPLEX 45 MIN: CPT

## 2023-02-21 PROCEDURE — 80053 COMPREHEN METABOLIC PANEL: CPT | Performed by: STUDENT IN AN ORGANIZED HEALTH CARE EDUCATION/TRAINING PROGRAM

## 2023-02-21 PROCEDURE — 74220 X-RAY XM ESOPHAGUS 1CNTRST: CPT | Performed by: STUDENT IN AN ORGANIZED HEALTH CARE EDUCATION/TRAINING PROGRAM

## 2023-02-21 PROCEDURE — 97161 PT EVAL LOW COMPLEX 20 MIN: CPT

## 2023-02-21 PROCEDURE — 85027 COMPLETE CBC AUTOMATED: CPT | Performed by: STUDENT IN AN ORGANIZED HEALTH CARE EDUCATION/TRAINING PROGRAM

## 2023-02-21 RX ORDER — HYDRALAZINE HYDROCHLORIDE 20 MG/ML
10 INJECTION INTRAMUSCULAR; INTRAVENOUS EVERY 6 HOURS PRN
Status: DISCONTINUED | OUTPATIENT
Start: 2023-02-21 | End: 2023-02-26

## 2023-02-21 NOTE — SLP NOTE
SLP attempt this AM. Pt to remain NPO per GI for possible x-ray and/or EGD. SLP to follow up as pt cleared for PO and/or as schedule allows. Thank you. Ashleigh Huston  Speech Language Pathologist  Phone Number Ext. [phone]

## 2023-02-21 NOTE — PLAN OF CARE
Problem: PAIN - ADULT  Goal: Verbalizes/displays adequate comfort level or patient's stated pain goal  Description: INTERVENTIONS:  - Encourage pt to monitor pain and request assistance  - Assess pain using appropriate pain scale  - Administer analgesics based on type and severity of pain and evaluate response  - Implement non-pharmacological measures as appropriate and evaluate response  - Consider cultural and social influences on pain and pain management  - Manage/alleviate anxiety  - Utilize distraction and/or relaxation techniques  - Monitor for opioid side effects  - Notify MD/LIP if interventions unsuccessful or patient reports new pain  - Anticipate increased pain with activity and pre-medicate as appropriate  Outcome: Progressing     Problem: RISK FOR INFECTION - ADULT  Goal: Absence of fever/infection during anticipated neutropenic period  Description: INTERVENTIONS  - Monitor WBC  - Administer growth factors as ordered  - Implement neutropenic guidelines  Outcome: Progressing     Problem: SAFETY ADULT - FALL  Goal: Free from fall injury  Description: INTERVENTIONS:  - Assess pt frequently for physical needs  - Identify cognitive and physical deficits and behaviors that affect risk of falls.   - [place] fall precautions as indicated by assessment.  - Educate pt/family on patient safety including physical limitations  - Instruct pt to call for assistance with activity based on assessment  - Modify environment to reduce risk of injury  - Provide assistive devices as appropriate  - Consider OT/PT consult to assist with strengthening/mobility  - Encourage toileting schedule  Outcome: Progressing     Problem: DISCHARGE PLANNING  Goal: Discharge to home or other facility with appropriate resources  Description: INTERVENTIONS:  - Identify barriers to discharge w/pt and caregiver  - Include patient/family/discharge partner in discharge planning  - Arrange for needed discharge resources and transportation as appropriate  - Identify discharge learning needs (meds, wound care, etc)  - Arrange for interpreters to assist at discharge as needed  - Consider post-discharge preferences of patient/family/discharge partner  - Complete POLST form as appropriate  - Assess patient's ability to be responsible for managing their own health  - Refer to Case Management Department for coordinating discharge planning if the patient needs post-hospital services based on physician/LIP order or complex needs related to functional status, cognitive ability or social support system  Outcome: Progressing     Problem: GASTROINTESTINAL - ADULT  Goal: Minimal or absence of nausea and vomiting  Description: INTERVENTIONS:  - Maintain adequate hydration with IV or PO as ordered and tolerated  - Nasogastric tube to low intermittent suction as ordered  - Evaluate effectiveness of ordered antiemetic medications  - Provide nonpharmacologic comfort measures as appropriate  - Advance diet as tolerated, if ordered  - Obtain nutritional consult as needed  - Evaluate fluid balance  Outcome: Progressing  Goal: Maintains or returns to baseline bowel function  Description: INTERVENTIONS:  - Assess bowel function  - Maintain adequate hydration with IV or PO as ordered and tolerated  - Evaluate effectiveness of GI medications  - Encourage mobilization and activity  - Obtain nutritional consult as needed  - Establish a toileting routine/schedule  - Consider collaborating with pharmacy to review patient's medication profile  Outcome: Progressing  Goal: Maintains adequate nutritional intake (undernourished)  Description: INTERVENTIONS:  - Monitor percentage of each meal consumed  - Identify factors contributing to decreased intake, treat as appropriate  - Assist with meals as needed  - Monitor I&O, WT and lab values  - Obtain nutritional consult as needed  - Optimize oral hygiene and moisture  - Encourage food from home; allow for food preferences  - Enhance eating environment  Outcome: Progressing     Monitoring vitals. Patient unable to tolerate meds crushed in apple sauce, spat them out. Kept NPO for barium swallow xray. Safety measures in place. Frequent rounding done.

## 2023-02-22 LAB
ALBUMIN SERPL-MCNC: 2.4 G/DL (ref 3.4–5)
ALBUMIN/GLOB SERPL: 1 {RATIO} (ref 1–2)
ALP LIVER SERPL-CCNC: 73 U/L
ALT SERPL-CCNC: 16 U/L
ANION GAP SERPL CALC-SCNC: 3 MMOL/L (ref 0–18)
AST SERPL-CCNC: 17 U/L (ref 15–37)
BILIRUB SERPL-MCNC: 0.3 MG/DL (ref 0.1–2)
BUN BLD-MCNC: 7 MG/DL (ref 7–18)
BUN/CREAT SERPL: 9.6 (ref 10–20)
CALCIUM BLD-MCNC: 8.8 MG/DL (ref 8.5–10.1)
CHLORIDE SERPL-SCNC: 117 MMOL/L (ref 98–112)
CO2 SERPL-SCNC: 29 MMOL/L (ref 21–32)
CREAT BLD-MCNC: 0.73 MG/DL
DEPRECATED RDW RBC AUTO: 48.2 FL (ref 35.1–46.3)
ERYTHROCYTE [DISTWIDTH] IN BLOOD BY AUTOMATED COUNT: 13.4 % (ref 11–15)
GFR SERPLBLD BASED ON 1.73 SQ M-ARVRAT: 81 ML/MIN/1.73M2 (ref 60–?)
GLOBULIN PLAS-MCNC: 2.4 G/DL (ref 2.8–4.4)
GLUCOSE BLD-MCNC: 91 MG/DL (ref 70–99)
HCT VFR BLD AUTO: 38.3 %
HGB BLD-MCNC: 12.6 G/DL
MCH RBC QN AUTO: 32.6 PG (ref 26–34)
MCHC RBC AUTO-ENTMCNC: 32.9 G/DL (ref 31–37)
MCV RBC AUTO: 99 FL
OSMOLALITY SERPL CALC.SUM OF ELEC: 306 MOSM/KG (ref 275–295)
PLATELET # BLD AUTO: 133 10(3)UL (ref 150–450)
POTASSIUM SERPL-SCNC: 3.6 MMOL/L (ref 3.5–5.1)
POTASSIUM SERPL-SCNC: 3.6 MMOL/L (ref 3.5–5.1)
PROT SERPL-MCNC: 4.8 G/DL (ref 6.4–8.2)
RBC # BLD AUTO: 3.87 X10(6)UL
SODIUM SERPL-SCNC: 149 MMOL/L (ref 136–145)
WBC # BLD AUTO: 5.3 X10(3) UL (ref 4–11)

## 2023-02-22 PROCEDURE — 84132 ASSAY OF SERUM POTASSIUM: CPT | Performed by: STUDENT IN AN ORGANIZED HEALTH CARE EDUCATION/TRAINING PROGRAM

## 2023-02-22 PROCEDURE — C9113 INJ PANTOPRAZOLE SODIUM, VIA: HCPCS | Performed by: STUDENT IN AN ORGANIZED HEALTH CARE EDUCATION/TRAINING PROGRAM

## 2023-02-22 PROCEDURE — 85027 COMPLETE CBC AUTOMATED: CPT | Performed by: STUDENT IN AN ORGANIZED HEALTH CARE EDUCATION/TRAINING PROGRAM

## 2023-02-22 PROCEDURE — 80053 COMPREHEN METABOLIC PANEL: CPT | Performed by: STUDENT IN AN ORGANIZED HEALTH CARE EDUCATION/TRAINING PROGRAM

## 2023-02-22 PROCEDURE — 97530 THERAPEUTIC ACTIVITIES: CPT

## 2023-02-22 RX ORDER — DEXTROSE AND SODIUM CHLORIDE 5; .45 G/100ML; G/100ML
INJECTION, SOLUTION INTRAVENOUS CONTINUOUS
Status: DISCONTINUED | OUTPATIENT
Start: 2023-02-22 | End: 2023-02-26

## 2023-02-22 NOTE — PLAN OF CARE
Problem: Patient Centered Care  Goal: Patient preferences are identified and integrated in the patient's plan of care  Description: Interventions:  - What would you like us to know as we care for you?   - Provide timely, complete, and accurate information to patient/family  - Incorporate patient and family knowledge, values, beliefs, and cultural backgrounds into the planning and delivery of care  - Encourage patient/family to participate in care and decision-making at the level they choose  - Honor patient and family perspectives and choices  Outcome: Progressing     Problem: Patient/Family Goals  Goal: Patient/Family Long Term Goal  Description: Patient's Long Term Goal: Tolerate Meals! Interventions:  - Patient tolerates full liquid diet. - Intake and output is monitored. - See additional Care Plan goals for specific interventions  Outcome: Progressing  Goal: Patient/Family Short Term Goal  Description: Patient's Short Term Goal: Pain relief! Interventions:   - Pain level is assess using pain scale from 1 to 10.  - PRN medication is available for pain or discomfort.   - See additional Care Plan goals for specific interventions  Outcome: Progressing     Problem: PAIN - ADULT  Goal: Verbalizes/displays adequate comfort level or patient's stated pain goal  Description: INTERVENTIONS:  - Encourage pt to monitor pain and request assistance  - Assess pain using appropriate pain scale  - Administer analgesics based on type and severity of pain and evaluate response  - Implement non-pharmacological measures as appropriate and evaluate response  - Consider cultural and social influences on pain and pain management  - Manage/alleviate anxiety  - Utilize distraction and/or relaxation techniques  - Monitor for opioid side effects  - Notify MD/LIP if interventions unsuccessful or patient reports new pain  - Anticipate increased pain with activity and pre-medicate as appropriate  Outcome: Progressing     Problem: RISK FOR INFECTION - ADULT  Goal: Absence of fever/infection during anticipated neutropenic period  Description: INTERVENTIONS  - Monitor WBC  - Administer growth factors as ordered  - Implement neutropenic guidelines  Outcome: Progressing     Problem: SAFETY ADULT - FALL  Goal: Free from fall injury  Description: INTERVENTIONS:  - Assess pt frequently for physical needs  - Identify cognitive and physical deficits and behaviors that affect risk of falls.   - [place] fall precautions as indicated by assessment.  - Educate pt/family on patient safety including physical limitations  - Instruct pt to call for assistance with activity based on assessment  - Modify environment to reduce risk of injury  - Provide assistive devices as appropriate  - Consider OT/PT consult to assist with strengthening/mobility  - Encourage toileting schedule  Outcome: Progressing     Problem: DISCHARGE PLANNING  Goal: Discharge to home or other facility with appropriate resources  Description: INTERVENTIONS:  - Identify barriers to discharge w/pt and caregiver  - Include patient/family/discharge partner in discharge planning  - Arrange for needed discharge resources and transportation as appropriate  - Identify discharge learning needs (meds, wound care, etc)  - Arrange for interpreters to assist at discharge as needed  - Consider post-discharge preferences of patient/family/discharge partner  - Complete POLST form as appropriate  - Assess patient's ability to be responsible for managing their own health  - Refer to Case Management Department for coordinating discharge planning if the patient needs post-hospital services based on physician/LIP order or complex needs related to functional status, cognitive ability or social support system  Outcome: Progressing     Problem: GASTROINTESTINAL - ADULT  Goal: Minimal or absence of nausea and vomiting  Description: INTERVENTIONS:  - Maintain adequate hydration with IV or PO as ordered and tolerated  - Nasogastric tube to low intermittent suction as ordered  - Evaluate effectiveness of ordered antiemetic medications  - Provide nonpharmacologic comfort measures as appropriate  - Advance diet as tolerated, if ordered  - Obtain nutritional consult as needed  - Evaluate fluid balance  Outcome: Progressing  Goal: Maintains or returns to baseline bowel function  Description: INTERVENTIONS:  - Assess bowel function  - Maintain adequate hydration with IV or PO as ordered and tolerated  - Evaluate effectiveness of GI medications  - Encourage mobilization and activity  - Obtain nutritional consult as needed  - Establish a toileting routine/schedule  - Consider collaborating with pharmacy to review patient's medication profile  Outcome: Progressing  Goal: Maintains adequate nutritional intake (undernourished)  Description: INTERVENTIONS:  - Monitor percentage of each meal consumed  - Identify factors contributing to decreased intake, treat as appropriate  - Assist with meals as needed  - Monitor I&O, WT and lab values  - Obtain nutritional consult as needed  - Optimize oral hygiene and moisture  - Encourage food from home; allow for food preferences  - Enhance eating environment  Outcome: Progressing     Patient is presently resting in the bed. Alert x 4. Patient denied pain or discomfort at current time. Vital signs taken and stable. Patient tolerates full liquid diet well. Intravenous fluids infusing and tolerated. Purewick in place to suction and draining. Fall risk precautions are followed at all times for safety. Call light within reach at all times.

## 2023-02-22 NOTE — PLAN OF CARE
Problem: PAIN - ADULT  Goal: Verbalizes/displays adequate comfort level or patient's stated pain goal  Description: INTERVENTIONS:  - Encourage pt to monitor pain and request assistance  - Assess pain using appropriate pain scale  - Administer analgesics based on type and severity of pain and evaluate response  - Implement non-pharmacological measures as appropriate and evaluate response  - Consider cultural and social influences on pain and pain management  - Manage/alleviate anxiety  - Utilize distraction and/or relaxation techniques  - Monitor for opioid side effects  - Notify MD/LIP if interventions unsuccessful or patient reports new pain  - Anticipate increased pain with activity and pre-medicate as appropriate  Outcome: Progressing     Problem: RISK FOR INFECTION - ADULT  Goal: Absence of fever/infection during anticipated neutropenic period  Description: INTERVENTIONS  - Monitor WBC  - Administer growth factors as ordered  - Implement neutropenic guidelines  Outcome: Progressing     Problem: SAFETY ADULT - FALL  Goal: Free from fall injury  Description: INTERVENTIONS:  - Assess pt frequently for physical needs  - Identify cognitive and physical deficits and behaviors that affect risk of falls.   - [place] fall precautions as indicated by assessment.  - Educate pt/family on patient safety including physical limitations  - Instruct pt to call for assistance with activity based on assessment  - Modify environment to reduce risk of injury  - Provide assistive devices as appropriate  - Consider OT/PT consult to assist with strengthening/mobility  - Encourage toileting schedule  Outcome: Progressing     Problem: DISCHARGE PLANNING  Goal: Discharge to home or other facility with appropriate resources  Description: INTERVENTIONS:  - Identify barriers to discharge w/pt and caregiver  - Include patient/family/discharge partner in discharge planning  - Arrange for needed discharge resources and transportation as appropriate  - Identify discharge learning needs (meds, wound care, etc)  - Arrange for interpreters to assist at discharge as needed  - Consider post-discharge preferences of patient/family/discharge partner  - Complete POLST form as appropriate  - Assess patient's ability to be responsible for managing their own health  - Refer to Case Management Department for coordinating discharge planning if the patient needs post-hospital services based on physician/LIP order or complex needs related to functional status, cognitive ability or social support system  Outcome: Progressing     Problem: GASTROINTESTINAL - ADULT  Goal: Minimal or absence of nausea and vomiting  Description: INTERVENTIONS:  - Maintain adequate hydration with IV or PO as ordered and tolerated  - Nasogastric tube to low intermittent suction as ordered  - Evaluate effectiveness of ordered antiemetic medications  - Provide nonpharmacologic comfort measures as appropriate  - Advance diet as tolerated, if ordered  - Obtain nutritional consult as needed  - Evaluate fluid balance  Outcome: Progressing  Goal: Maintains or returns to baseline bowel function  Description: INTERVENTIONS:  - Assess bowel function  - Maintain adequate hydration with IV or PO as ordered and tolerated  - Evaluate effectiveness of GI medications  - Encourage mobilization and activity  - Obtain nutritional consult as needed  - Establish a toileting routine/schedule  - Consider collaborating with pharmacy to review patient's medication profile  Outcome: Progressing  Goal: Maintains adequate nutritional intake (undernourished)  Description: INTERVENTIONS:  - Monitor percentage of each meal consumed  - Identify factors contributing to decreased intake, treat as appropriate  - Assist with meals as needed  - Monitor I&O, WT and lab values  - Obtain nutritional consult as needed  - Optimize oral hygiene and moisture  - Encourage food from home; allow for food preferences  - Enhance eating environment  Outcome: Progressing

## 2023-02-23 ENCOUNTER — ANESTHESIA EVENT (OUTPATIENT)
Dept: ENDOSCOPY | Facility: HOSPITAL | Age: 83
End: 2023-02-23
Payer: MEDICARE

## 2023-02-23 ENCOUNTER — ANESTHESIA (OUTPATIENT)
Dept: ENDOSCOPY | Facility: HOSPITAL | Age: 83
End: 2023-02-23
Payer: MEDICARE

## 2023-02-23 LAB
ANION GAP SERPL CALC-SCNC: 5 MMOL/L (ref 0–18)
BUN BLD-MCNC: 7 MG/DL (ref 7–18)
BUN/CREAT SERPL: 9.5 (ref 10–20)
CALCIUM BLD-MCNC: 9.3 MG/DL (ref 8.5–10.1)
CHLORIDE SERPL-SCNC: 113 MMOL/L (ref 98–112)
CO2 SERPL-SCNC: 28 MMOL/L (ref 21–32)
CREAT BLD-MCNC: 0.74 MG/DL
DEPRECATED RDW RBC AUTO: 49.1 FL (ref 35.1–46.3)
ERYTHROCYTE [DISTWIDTH] IN BLOOD BY AUTOMATED COUNT: 13.5 % (ref 11–15)
GFR SERPLBLD BASED ON 1.73 SQ M-ARVRAT: 80 ML/MIN/1.73M2 (ref 60–?)
GLUCOSE BLD-MCNC: 77 MG/DL (ref 70–99)
HCT VFR BLD AUTO: 39.8 %
HGB BLD-MCNC: 13.1 G/DL
MCH RBC QN AUTO: 32.5 PG (ref 26–34)
MCHC RBC AUTO-ENTMCNC: 32.9 G/DL (ref 31–37)
MCV RBC AUTO: 98.8 FL
OSMOLALITY SERPL CALC.SUM OF ELEC: 299 MOSM/KG (ref 275–295)
PLATELET # BLD AUTO: 119 10(3)UL (ref 150–450)
POTASSIUM SERPL-SCNC: 3.7 MMOL/L (ref 3.5–5.1)
RBC # BLD AUTO: 4.03 X10(6)UL
SARS-COV-2 RNA RESP QL NAA+PROBE: NOT DETECTED
SODIUM SERPL-SCNC: 146 MMOL/L (ref 136–145)
WBC # BLD AUTO: 5.4 X10(3) UL (ref 4–11)

## 2023-02-23 PROCEDURE — C9113 INJ PANTOPRAZOLE SODIUM, VIA: HCPCS | Performed by: STUDENT IN AN ORGANIZED HEALTH CARE EDUCATION/TRAINING PROGRAM

## 2023-02-23 PROCEDURE — 88305 TISSUE EXAM BY PATHOLOGIST: CPT | Performed by: INTERNAL MEDICINE

## 2023-02-23 PROCEDURE — 88312 SPECIAL STAINS GROUP 1: CPT | Performed by: INTERNAL MEDICINE

## 2023-02-23 PROCEDURE — 80048 BASIC METABOLIC PNL TOTAL CA: CPT | Performed by: STUDENT IN AN ORGANIZED HEALTH CARE EDUCATION/TRAINING PROGRAM

## 2023-02-23 PROCEDURE — 0DB58ZX EXCISION OF ESOPHAGUS, VIA NATURAL OR ARTIFICIAL OPENING ENDOSCOPIC, DIAGNOSTIC: ICD-10-PCS | Performed by: INTERNAL MEDICINE

## 2023-02-23 PROCEDURE — 85027 COMPLETE CBC AUTOMATED: CPT | Performed by: STUDENT IN AN ORGANIZED HEALTH CARE EDUCATION/TRAINING PROGRAM

## 2023-02-23 PROCEDURE — 0DB68ZZ EXCISION OF STOMACH, VIA NATURAL OR ARTIFICIAL OPENING ENDOSCOPIC: ICD-10-PCS | Performed by: INTERNAL MEDICINE

## 2023-02-23 RX ORDER — SODIUM CHLORIDE, SODIUM LACTATE, POTASSIUM CHLORIDE, CALCIUM CHLORIDE 600; 310; 30; 20 MG/100ML; MG/100ML; MG/100ML; MG/100ML
INJECTION, SOLUTION INTRAVENOUS CONTINUOUS PRN
Status: DISCONTINUED | OUTPATIENT
Start: 2023-02-23 | End: 2023-02-23 | Stop reason: SURG

## 2023-02-23 RX ORDER — LIDOCAINE HYDROCHLORIDE 10 MG/ML
INJECTION, SOLUTION EPIDURAL; INFILTRATION; INTRACAUDAL; PERINEURAL AS NEEDED
Status: DISCONTINUED | OUTPATIENT
Start: 2023-02-23 | End: 2023-02-23 | Stop reason: SURG

## 2023-02-23 RX ORDER — SODIUM CHLORIDE, SODIUM LACTATE, POTASSIUM CHLORIDE, CALCIUM CHLORIDE 600; 310; 30; 20 MG/100ML; MG/100ML; MG/100ML; MG/100ML
INJECTION, SOLUTION INTRAVENOUS CONTINUOUS
OUTPATIENT
Start: 2023-02-23

## 2023-02-23 RX ORDER — NALOXONE HYDROCHLORIDE 0.4 MG/ML
80 INJECTION, SOLUTION INTRAMUSCULAR; INTRAVENOUS; SUBCUTANEOUS AS NEEDED
OUTPATIENT
Start: 2023-02-23 | End: 2023-02-23

## 2023-02-23 RX ADMIN — SODIUM CHLORIDE, SODIUM LACTATE, POTASSIUM CHLORIDE, CALCIUM CHLORIDE: 600; 310; 30; 20 INJECTION, SOLUTION INTRAVENOUS at 13:52:00

## 2023-02-23 RX ADMIN — LIDOCAINE HYDROCHLORIDE 25 MG: 10 INJECTION, SOLUTION EPIDURAL; INFILTRATION; INTRACAUDAL; PERINEURAL at 13:52:00

## 2023-02-23 RX ADMIN — SODIUM CHLORIDE, SODIUM LACTATE, POTASSIUM CHLORIDE, CALCIUM CHLORIDE: 600; 310; 30; 20 INJECTION, SOLUTION INTRAVENOUS at 14:03:00

## 2023-02-23 NOTE — CDS QUERY
.Potential for Impaired Nutritional Status  DOCUMENTATION CLARIFICATION FORM  Dear Doctor: Frandy Castañeda evaluated this patient and found them to meet Moderate Malnutrition Criteria using the [place] Guidelines based off the following clinical indicators:      * Chronic illness related to weight loss of 20% in 1 year  * Energy intake less than 75% for greater than 1 month   *Per physical exam patient noted with mild body fat depletion to triceps region   * Per physical exam patient noted with mild muscle mass depletion to temple, clavicle, and shoulder region   * Notable loose skin secondary to signfiicant weight loss   * Risk Factors include: oropharyngeal dysphagia and hypernatremia   * Rd recommends magic cup bid and 2% milk at every meal. Patient   Is also educated on increasing protein/calorie intake     Clinical information suggests potential for impaired nutritional status. For accurate ICD-10-CM code assignment to reflect severity of illness and risk of mortality,  PLEASE (X) ALL DIAGNOSES THAT APPLY. SELECTION BY PHYSICIAN ONLY      (x )  Moderate Protein-Calorie Malnutrition    ( )  Undernutrition    ( )  Other (please specify): If you have any questions, please contact Clinical :  Alycia Amaya RN at [phone]     Thank You!     THIS FORM IS A PERMANENT PART OF THE MEDICAL RECORD Never

## 2023-02-23 NOTE — OPERATIVE REPORT
ENDOSCOPY OPERATIVE REPORT    Patient Name: Marlen Palmer  Medical Record #: [id number]  YOB: 1940  Date of Procedure: 2/23/2023    Preoperative Diagnosis: dysphagia; nausea/vomiting. Postoperative Diagnosis:    1.) Suggestion of mild oral / proximal esophageal Candidiasis -- proximal esophageal biopsies obtained. Normal esophagus otherwise. 2.) Numerous (40 -50) benign fundic gland type 4 - 6 mm gastric fundus / body polyps - sample biopsies obtained. Procedure Performed: Esophagogastroduodenoscopy with biopsy. Anesthesia Given: MAC. ASA: 3. Moderate sedation time: NA. Deep sedation was provided by anesthesiologist.   Endoscopist: Jordan Coppola MD  Procedure:  After the patient was interviewed and the procedure again discussed and questions addressed, the patient was brought to the GI Lab and monitoring of the B/P, pulse, and pulse oximetry was performed. A time-out procedure was performed, history and physical were reviewed, medical reconciliation was complete, informed consent was obtained. The patient was then placed in the left lateral decubitus position and sedated with divided doses of IV medication; continuous vital signs were monitored throughout the procedure. The Olympus videogastroscope was intubated into the esophagus and advanced into the 2nd part of duodenum under directed vision without difficulty. Then withdrawn. A thorough exam was performed. The patient tolerated the procedure well. FINDINGS: The esophagus mucosa had an overall normal appearance except for suggestion of mild Candida esophagitis in pharynx and proximal esophagus (white exudates appreciated). The gastric lumen was then entered and views of the gastric mucosa as well as retroverted views of the fundus. Numerous benigna appearing gastric fundus / body polyps were noted as above -- sample biopsies obtained.   A normal pylorus was passed and the duodenal bulb entered, the duodenal bulb and post-bulbar duodenum were unremarkable. The procedure was tolerated well and upon completion and throughtout the vital signs were stable. COMPLICATIONS: None. PLAN: 1.) Nystatin swish / swallow    2.) re-trial of full liquid diet   3.) await biopsy results. Jordan Coppola MD  [facility].

## 2023-02-23 NOTE — SLP NOTE
SLP attempt this AM. Pt to remain NPO for EGD this PM. SLP to follow up as pt cleared for PO and/or as schedule allows. Thank you. Ashleigh Dial  Speech Language Pathologist  Phone Number Ext. [phone]

## 2023-02-23 NOTE — PLAN OF CARE
No acute changes overnight. Maintaining NPO status since midnight. Receiving IV fluids per MD order. Plan for EGD this afternoon.    Problem: Patient Centered Care  Goal: Patient preferences are identified and integrated in the patient's plan of care  Description: Interventions:  - What would you like us to know as we care for you?   - Provide timely, complete, and accurate information to patient/family  - Incorporate patient and family knowledge, values, beliefs, and cultural backgrounds into the planning and delivery of care  - Encourage patient/family to participate in care and decision-making at the level they choose  - Honor patient and family perspectives and choices  Outcome: Progressing     Problem: Patient/Family Goals  Goal: Patient/Family Long Term Goal  Description: Patient's Long Term Goal:     Interventions:  -   - See additional Care Plan goals for specific interventions  Outcome: Progressing  Goal: Patient/Family Short Term Goal  Description: Patient's Short Term Goal:     Interventions:   -   - See additional Care Plan goals for specific interventions  Outcome: Progressing     Problem: PAIN - ADULT  Goal: Verbalizes/displays adequate comfort level or patient's stated pain goal  Description: INTERVENTIONS:  - Encourage pt to monitor pain and request assistance  - Assess pain using appropriate pain scale  - Administer analgesics based on type and severity of pain and evaluate response  - Implement non-pharmacological measures as appropriate and evaluate response  - Consider cultural and social influences on pain and pain management  - Manage/alleviate anxiety  - Utilize distraction and/or relaxation techniques  - Monitor for opioid side effects  - Notify MD/LIP if interventions unsuccessful or patient reports new pain  - Anticipate increased pain with activity and pre-medicate as appropriate  Outcome: Progressing     Problem: RISK FOR INFECTION - ADULT  Goal: Absence of fever/infection during anticipated neutropenic period  Description: INTERVENTIONS  - Monitor WBC  - Administer growth factors as ordered  - Implement neutropenic guidelines  Outcome: Progressing     Problem: SAFETY ADULT - FALL  Goal: Free from fall injury  Description: INTERVENTIONS:  - Assess pt frequently for physical needs  - Identify cognitive and physical deficits and behaviors that affect risk of falls.   - [place] fall precautions as indicated by assessment.  - Educate pt/family on patient safety including physical limitations  - Instruct pt to call for assistance with activity based on assessment  - Modify environment to reduce risk of injury  - Provide assistive devices as appropriate  - Consider OT/PT consult to assist with strengthening/mobility  - Encourage toileting schedule  Outcome: Progressing     Problem: DISCHARGE PLANNING  Goal: Discharge to home or other facility with appropriate resources  Description: INTERVENTIONS:  - Identify barriers to discharge w/pt and caregiver  - Include patient/family/discharge partner in discharge planning  - Arrange for needed discharge resources and transportation as appropriate  - Identify discharge learning needs (meds, wound care, etc)  - Arrange for interpreters to assist at discharge as needed  - Consider post-discharge preferences of patient/family/discharge partner  - Complete POLST form as appropriate  - Assess patient's ability to be responsible for managing their own health  - Refer to Case Management Department for coordinating discharge planning if the patient needs post-hospital services based on physician/LIP order or complex needs related to functional status, cognitive ability or social support system  Outcome: Progressing     Problem: GASTROINTESTINAL - ADULT  Goal: Minimal or absence of nausea and vomiting  Description: INTERVENTIONS:  - Maintain adequate hydration with IV or PO as ordered and tolerated  - Nasogastric tube to low intermittent suction as ordered  - Evaluate effectiveness of ordered antiemetic medications  - Provide nonpharmacologic comfort measures as appropriate  - Advance diet as tolerated, if ordered  - Obtain nutritional consult as needed  - Evaluate fluid balance  Outcome: Progressing  Goal: Maintains or returns to baseline bowel function  Description: INTERVENTIONS:  - Assess bowel function  - Maintain adequate hydration with IV or PO as ordered and tolerated  - Evaluate effectiveness of GI medications  - Encourage mobilization and activity  - Obtain nutritional consult as needed  - Establish a toileting routine/schedule  - Consider collaborating with pharmacy to review patient's medication profile  Outcome: Progressing  Goal: Maintains adequate nutritional intake (undernourished)  Description: INTERVENTIONS:  - Monitor percentage of each meal consumed  - Identify factors contributing to decreased intake, treat as appropriate  - Assist with meals as needed  - Monitor I&O, WT and lab values  - Obtain nutritional consult as needed  - Optimize oral hygiene and moisture  - Encourage food from home; allow for food preferences  - Enhance eating environment  Outcome: Progressing

## 2023-02-23 NOTE — INTERVAL H&P NOTE
Pre-op Diagnosis: nausea and vomiting    The above referenced H&P was reviewed by Liv Warren MD on 2/23/2023, the patient was examined and no significant changes have occurred in the patient's condition since the H&P was performed. I discussed with the patient and/or legal representative the potential benefits, risks and side effects of this procedure; the likelihood of the patient achieving goals; and potential problems that might occur during recuperation. I discussed reasonable alternatives to the procedure, including risks, benefits and side effects related to the alternatives and risks related to not receiving this procedure. We will proceed with procedure as planned.

## 2023-02-23 NOTE — ANESTHESIA POSTPROCEDURE EVALUATION
Patient: Tahir Fischer    Procedure Summary     Date: 02/23/23 Room / Location: [facility] ENDOSCOPY 05 / [facility] ENDOSCOPY    Anesthesia Start: 4820 Anesthesia Stop: 7031    Procedure: ESOPHAGOGASTRODUODENOSCOPY (EGD) Diagnosis: (Gastric polyps)    Surgeons: Kary Guardado MD Anesthesiologist: Pelra Segura MD    Anesthesia Type: MAC, general ASA Status: 3          Anesthesia Type: MAC, general    Vitals Value Taken Time   /49 02/23/23 1408   Temp * 02/23/23 1408   Pulse 57 02/23/23 1408   Resp 18 02/23/23 1408   SpO2 96 02/23/23 1408       [facility] AN Post Evaluation:   Patient Evaluated in PACU  Patient Participation: complete - patient participated  Level of Consciousness: awake and alert  Pain Management: adequate  Airway Patency:patent  Dental exam unchanged from preop  Yes    Cardiovascular Status: acceptable  Respiratory Status: acceptable  Postoperative Hydration acceptable      Frederic Manzano MD  2/23/2023 2:08 PM

## 2023-02-23 NOTE — SLP NOTE
Attempted to see Pt for dysphagia treatment; however, Pt NPO for EGD in p.m. Will f/u 2/24/23 as able. Collaborated with RN regarding Pt's swallowing plan of care.      Han Bolaños M.S. DOMO/SLP  Speech-Language Pathologist  [facility]  #[phone]

## 2023-02-24 ENCOUNTER — APPOINTMENT (OUTPATIENT)
Dept: ULTRASOUND IMAGING | Facility: HOSPITAL | Age: 83
DRG: 369 | End: 2023-02-24
Attending: STUDENT IN AN ORGANIZED HEALTH CARE EDUCATION/TRAINING PROGRAM
Payer: MEDICARE

## 2023-02-24 ENCOUNTER — APPOINTMENT (OUTPATIENT)
Dept: ULTRASOUND IMAGING | Facility: HOSPITAL | Age: 83
End: 2023-02-24
Attending: STUDENT IN AN ORGANIZED HEALTH CARE EDUCATION/TRAINING PROGRAM
Payer: MEDICARE

## 2023-02-24 LAB
ANION GAP SERPL CALC-SCNC: 2 MMOL/L (ref 0–18)
BASOPHILS # BLD AUTO: 0.03 X10(3) UL (ref 0–0.2)
BASOPHILS NFR BLD AUTO: 0.6 %
BUN BLD-MCNC: 6 MG/DL (ref 7–18)
BUN/CREAT SERPL: 8.8 (ref 10–20)
CALCIUM BLD-MCNC: 8.9 MG/DL (ref 8.5–10.1)
CHLORIDE SERPL-SCNC: 113 MMOL/L (ref 98–112)
CO2 SERPL-SCNC: 30 MMOL/L (ref 21–32)
CREAT BLD-MCNC: 0.68 MG/DL
DEPRECATED RDW RBC AUTO: 47.4 FL (ref 35.1–46.3)
EOSINOPHIL # BLD AUTO: 0.26 X10(3) UL (ref 0–0.7)
EOSINOPHIL NFR BLD AUTO: 5.2 %
ERYTHROCYTE [DISTWIDTH] IN BLOOD BY AUTOMATED COUNT: 13.2 % (ref 11–15)
GFR SERPLBLD BASED ON 1.73 SQ M-ARVRAT: 86 ML/MIN/1.73M2 (ref 60–?)
GLUCOSE BLD-MCNC: 93 MG/DL (ref 70–99)
HCT VFR BLD AUTO: 37.1 %
HGB BLD-MCNC: 12.1 G/DL
IMM GRANULOCYTES # BLD AUTO: 0.01 X10(3) UL (ref 0–1)
IMM GRANULOCYTES NFR BLD: 0.2 %
LYMPHOCYTES # BLD AUTO: 1.83 X10(3) UL (ref 1–4)
LYMPHOCYTES NFR BLD AUTO: 36.7 %
MCH RBC QN AUTO: 32 PG (ref 26–34)
MCHC RBC AUTO-ENTMCNC: 32.6 G/DL (ref 31–37)
MCV RBC AUTO: 98.1 FL
MONOCYTES # BLD AUTO: 0.36 X10(3) UL (ref 0.1–1)
MONOCYTES NFR BLD AUTO: 7.2 %
NEUTROPHILS # BLD AUTO: 2.49 X10 (3) UL (ref 1.5–7.7)
NEUTROPHILS # BLD AUTO: 2.49 X10(3) UL (ref 1.5–7.7)
NEUTROPHILS NFR BLD AUTO: 50.1 %
OSMOLALITY SERPL CALC.SUM OF ELEC: 297 MOSM/KG (ref 275–295)
PLATELET # BLD AUTO: 125 10(3)UL (ref 150–450)
POTASSIUM SERPL-SCNC: 3.7 MMOL/L (ref 3.5–5.1)
RBC # BLD AUTO: 3.78 X10(6)UL
SODIUM SERPL-SCNC: 145 MMOL/L (ref 136–145)
WBC # BLD AUTO: 5 X10(3) UL (ref 4–11)

## 2023-02-24 PROCEDURE — 85025 COMPLETE CBC W/AUTO DIFF WBC: CPT | Performed by: STUDENT IN AN ORGANIZED HEALTH CARE EDUCATION/TRAINING PROGRAM

## 2023-02-24 PROCEDURE — 93971 EXTREMITY STUDY: CPT | Performed by: STUDENT IN AN ORGANIZED HEALTH CARE EDUCATION/TRAINING PROGRAM

## 2023-02-24 PROCEDURE — C9113 INJ PANTOPRAZOLE SODIUM, VIA: HCPCS | Performed by: STUDENT IN AN ORGANIZED HEALTH CARE EDUCATION/TRAINING PROGRAM

## 2023-02-24 PROCEDURE — 80048 BASIC METABOLIC PNL TOTAL CA: CPT | Performed by: STUDENT IN AN ORGANIZED HEALTH CARE EDUCATION/TRAINING PROGRAM

## 2023-02-24 NOTE — PLAN OF CARE
Patient resting overnight. Prn hydralazine given for elevated BP at start of shift. This morning the patient was complaining of some pain and tenderness to her right forearm as well as some swelling where a previous IV was located. Tylenol was given as well as an ice pack applied. MD notified. Fall precautions in place. Problem: Patient Centered Care  Goal: Patient preferences are identified and integrated in the patient's plan of care  Description: Interventions:  - What would you like us to know as we care for you? I live at [facility].   - Provide timely, complete, and accurate information to patient/family  - Incorporate patient and family knowledge, values, beliefs, and cultural backgrounds into the planning and delivery of care  - Encourage patient/family to participate in care and decision-making at the level they choose  - Honor patient and family perspectives and choices  Outcome: Progressing     Problem: Patient/Family Goals  Goal: Patient/Family Long Term Goal  Description: Patient's Long Term Goal:     Interventions:  -   - See additional Care Plan goals for specific interventions  Outcome: Progressing  Goal: Patient/Family Short Term Goal  Description: Patient's Short Term Goal:     Interventions:   -   - See additional Care Plan goals for specific interventions  Outcome: Progressing     Problem: PAIN - ADULT  Goal: Verbalizes/displays adequate comfort level or patient's stated pain goal  Description: INTERVENTIONS:  - Encourage pt to monitor pain and request assistance  - Assess pain using appropriate pain scale  - Administer analgesics based on type and severity of pain and evaluate response  - Implement non-pharmacological measures as appropriate and evaluate response  - Consider cultural and social influences on pain and pain management  - Manage/alleviate anxiety  - Utilize distraction and/or relaxation techniques  - Monitor for opioid side effects  - Notify MD/LIP if interventions unsuccessful or patient reports new pain  - Anticipate increased pain with activity and pre-medicate as appropriate  Outcome: Progressing     Problem: RISK FOR INFECTION - ADULT  Goal: Absence of fever/infection during anticipated neutropenic period  Description: INTERVENTIONS  - Monitor WBC  - Administer growth factors as ordered  - Implement neutropenic guidelines  Outcome: Progressing     Problem: SAFETY ADULT - FALL  Goal: Free from fall injury  Description: INTERVENTIONS:  - Assess pt frequently for physical needs  - Identify cognitive and physical deficits and behaviors that affect risk of falls.   - [place] fall precautions as indicated by assessment.  - Educate pt/family on patient safety including physical limitations  - Instruct pt to call for assistance with activity based on assessment  - Modify environment to reduce risk of injury  - Provide assistive devices as appropriate  - Consider OT/PT consult to assist with strengthening/mobility  - Encourage toileting schedule  Outcome: Progressing     Problem: DISCHARGE PLANNING  Goal: Discharge to home or other facility with appropriate resources  Description: INTERVENTIONS:  - Identify barriers to discharge w/pt and caregiver  - Include patient/family/discharge partner in discharge planning  - Arrange for needed discharge resources and transportation as appropriate  - Identify discharge learning needs (meds, wound care, etc)  - Arrange for interpreters to assist at discharge as needed  - Consider post-discharge preferences of patient/family/discharge partner  - Complete POLST form as appropriate  - Assess patient's ability to be responsible for managing their own health  - Refer to Case Management Department for coordinating discharge planning if the patient needs post-hospital services based on physician/LIP order or complex needs related to functional status, cognitive ability or social support system  Outcome: Progressing     Problem: GASTROINTESTINAL - ADULT  Goal: Minimal or absence of nausea and vomiting  Description: INTERVENTIONS:  - Maintain adequate hydration with IV or PO as ordered and tolerated  - Nasogastric tube to low intermittent suction as ordered  - Evaluate effectiveness of ordered antiemetic medications  - Provide nonpharmacologic comfort measures as appropriate  - Advance diet as tolerated, if ordered  - Obtain nutritional consult as needed  - Evaluate fluid balance  Outcome: Progressing  Goal: Maintains or returns to baseline bowel function  Description: INTERVENTIONS:  - Assess bowel function  - Maintain adequate hydration with IV or PO as ordered and tolerated  - Evaluate effectiveness of GI medications  - Encourage mobilization and activity  - Obtain nutritional consult as needed  - Establish a toileting routine/schedule  - Consider collaborating with pharmacy to review patient's medication profile  Outcome: Progressing  Goal: Maintains adequate nutritional intake (undernourished)  Description: INTERVENTIONS:  - Monitor percentage of each meal consumed  - Identify factors contributing to decreased intake, treat as appropriate  - Assist with meals as needed  - Monitor I&O, WT and lab values  - Obtain nutritional consult as needed  - Optimize oral hygiene and moisture  - Encourage food from home; allow for food preferences  - Enhance eating environment  Outcome: Progressing

## 2023-02-24 NOTE — PLAN OF CARE
No acute changes at this time. Safety and fall precautions maintained, bed alarm in place. Call light within reach. Frequent rounding by nursing staff. Problem: Patient Centered Care  Goal: Patient preferences are identified and integrated in the patient's plan of care  Description: Interventions:  - What would you like us to know as we care for you? I live at [facility].   - Provide timely, complete, and accurate information to patient/family  - Incorporate patient and family knowledge, values, beliefs, and cultural backgrounds into the planning and delivery of care  - Encourage patient/family to participate in care and decision-making at the level they choose  - Honor patient and family perspectives and choices  Outcome: Progressing     Problem: PAIN - ADULT  Goal: Verbalizes/displays adequate comfort level or patient's stated pain goal  Description: INTERVENTIONS:  - Encourage pt to monitor pain and request assistance  - Assess pain using appropriate pain scale  - Administer analgesics based on type and severity of pain and evaluate response  - Implement non-pharmacological measures as appropriate and evaluate response  - Consider cultural and social influences on pain and pain management  - Manage/alleviate anxiety  - Utilize distraction and/or relaxation techniques  - Monitor for opioid side effects  - Notify MD/LIP if interventions unsuccessful or patient reports new pain  - Anticipate increased pain with activity and pre-medicate as appropriate  Outcome: Progressing     Problem: RISK FOR INFECTION - ADULT  Goal: Absence of fever/infection during anticipated neutropenic period  Description: INTERVENTIONS  - Monitor WBC  - Administer growth factors as ordered  - Implement neutropenic guidelines  Outcome: Progressing     Problem: SAFETY ADULT - FALL  Goal: Free from fall injury  Description: INTERVENTIONS:  - Assess pt frequently for physical needs  - Identify cognitive and physical deficits and behaviors that affect risk of falls.   - [place] fall precautions as indicated by assessment.  - Educate pt/family on patient safety including physical limitations  - Instruct pt to call for assistance with activity based on assessment  - Modify environment to reduce risk of injury  - Provide assistive devices as appropriate  - Consider OT/PT consult to assist with strengthening/mobility  - Encourage toileting schedule  Outcome: Progressing     Problem: DISCHARGE PLANNING  Goal: Discharge to home or other facility with appropriate resources  Description: INTERVENTIONS:  - Identify barriers to discharge w/pt and caregiver  - Include patient/family/discharge partner in discharge planning  - Arrange for needed discharge resources and transportation as appropriate  - Identify discharge learning needs (meds, wound care, etc)  - Arrange for interpreters to assist at discharge as needed  - Consider post-discharge preferences of patient/family/discharge partner  - Complete POLST form as appropriate  - Assess patient's ability to be responsible for managing their own health  - Refer to Case Management Department for coordinating discharge planning if the patient needs post-hospital services based on physician/LIP order or complex needs related to functional status, cognitive ability or social support system  Outcome: Progressing     Problem: GASTROINTESTINAL - ADULT  Goal: Minimal or absence of nausea and vomiting  Description: INTERVENTIONS:  - Maintain adequate hydration with IV or PO as ordered and tolerated  - Nasogastric tube to low intermittent suction as ordered  - Evaluate effectiveness of ordered antiemetic medications  - Provide nonpharmacologic comfort measures as appropriate  - Advance diet as tolerated, if ordered  - Obtain nutritional consult as needed  - Evaluate fluid balance  Outcome: Progressing  Goal: Maintains or returns to baseline bowel function  Description: INTERVENTIONS:  - Assess bowel function  - Maintain adequate hydration with IV or PO as ordered and tolerated  - Evaluate effectiveness of GI medications  - Encourage mobilization and activity  - Obtain nutritional consult as needed  - Establish a toileting routine/schedule  - Consider collaborating with pharmacy to review patient's medication profile  Outcome: Progressing  Goal: Maintains adequate nutritional intake (undernourished)  Description: INTERVENTIONS:  - Monitor percentage of each meal consumed  - Identify factors contributing to decreased intake, treat as appropriate  - Assist with meals as needed  - Monitor I&O, WT and lab values  - Obtain nutritional consult as needed  - Optimize oral hygiene and moisture  - Encourage food from home; allow for food preferences  - Enhance eating environment  Outcome: Progressing

## 2023-02-24 NOTE — PLAN OF CARE
Problem: Patient Centered Care  Goal: Patient preferences are identified and integrated in the patient's plan of care  Description: Interventions:  - What would you like us to know as we care for you? I live at [facility].   - Provide timely, complete, and accurate information to patient/family  - Incorporate patient and family knowledge, values, beliefs, and cultural backgrounds into the planning and delivery of care  - Encourage patient/family to participate in care and decision-making at the level they choose  - Honor patient and family perspectives and choices  Outcome: Progressing     Problem: Patient/Family Goals  Goal: Patient/Family Long Term Goal  Description: Patient's Long Term Goal: remain free of N/V/D    Interventions:  - advance diet as tolerated   - See additional Care Plan goals for specific interventions  Outcome: Progressing  Goal: Patient/Family Short Term Goal  Description: Patient's Short Term Goal: go home     Interventions:   - iv fluids   - See additional Care Plan goals for specific interventions  Outcome: Progressing     Problem: PAIN - ADULT  Goal: Verbalizes/displays adequate comfort level or patient's stated pain goal  Description: INTERVENTIONS:  - Encourage pt to monitor pain and request assistance  - Assess pain using appropriate pain scale  - Administer analgesics based on type and severity of pain and evaluate response  - Implement non-pharmacological measures as appropriate and evaluate response  - Consider cultural and social influences on pain and pain management  - Manage/alleviate anxiety  - Utilize distraction and/or relaxation techniques  - Monitor for opioid side effects  - Notify MD/LIP if interventions unsuccessful or patient reports new pain  - Anticipate increased pain with activity and pre-medicate as appropriate  Outcome: Progressing     Problem: RISK FOR INFECTION - ADULT  Goal: Absence of fever/infection during anticipated neutropenic period  Description: INTERVENTIONS  - Monitor WBC  - Administer growth factors as ordered  - Implement neutropenic guidelines  Outcome: Progressing     Problem: SAFETY ADULT - FALL  Goal: Free from fall injury  Description: INTERVENTIONS:  - Assess pt frequently for physical needs  - Identify cognitive and physical deficits and behaviors that affect risk of falls.   - [place] fall precautions as indicated by assessment.  - Educate pt/family on patient safety including physical limitations  - Instruct pt to call for assistance with activity based on assessment  - Modify environment to reduce risk of injury  - Provide assistive devices as appropriate  - Consider OT/PT consult to assist with strengthening/mobility  - Encourage toileting schedule  Outcome: Progressing     Problem: DISCHARGE PLANNING  Goal: Discharge to home or other facility with appropriate resources  Description: INTERVENTIONS:  - Identify barriers to discharge w/pt and caregiver  - Include patient/family/discharge partner in discharge planning  - Arrange for needed discharge resources and transportation as appropriate  - Identify discharge learning needs (meds, wound care, etc)  - Arrange for interpreters to assist at discharge as needed  - Consider post-discharge preferences of patient/family/discharge partner  - Complete POLST form as appropriate  - Assess patient's ability to be responsible for managing their own health  - Refer to Case Management Department for coordinating discharge planning if the patient needs post-hospital services based on physician/LIP order or complex needs related to functional status, cognitive ability or social support system  Outcome: Progressing     Problem: GASTROINTESTINAL - ADULT  Goal: Minimal or absence of nausea and vomiting  Description: INTERVENTIONS:  - Maintain adequate hydration with IV or PO as ordered and tolerated  - Nasogastric tube to low intermittent suction as ordered  - Evaluate effectiveness of ordered antiemetic medications  - Provide nonpharmacologic comfort measures as appropriate  - Advance diet as tolerated, if ordered  - Obtain nutritional consult as needed  - Evaluate fluid balance  Outcome: Progressing  Goal: Maintains or returns to baseline bowel function  Description: INTERVENTIONS:  - Assess bowel function  - Maintain adequate hydration with IV or PO as ordered and tolerated  - Evaluate effectiveness of GI medications  - Encourage mobilization and activity  - Obtain nutritional consult as needed  - Establish a toileting routine/schedule  - Consider collaborating with pharmacy to review patient's medication profile  Outcome: Progressing  Goal: Maintains adequate nutritional intake (undernourished)  Description: INTERVENTIONS:  - Monitor percentage of each meal consumed  - Identify factors contributing to decreased intake, treat as appropriate  - Assist with meals as needed  - Monitor I&O, WT and lab values  - Obtain nutritional consult as needed  - Optimize oral hygiene and moisture  - Encourage food from home; allow for food preferences  - Enhance eating environment  Outcome: Progressing     Patient a/o x4  Room air   Wheelchair bound s/p  RUE swelling, redness plan for ultrasound   Iv fluids   Low fiber diet   No complains throughout shift   Call light in reach   Isolation precautions in place

## 2023-02-24 NOTE — PHYSICAL THERAPY NOTE
Chart reviewed. Pt with swollen R elbow, red and warm to the touch. RN Tano Davalos MD re: possible ultrasound. Will hold on PT treatment session at this time until ultrasound results or MD clearance.

## 2023-02-24 NOTE — CM/SW NOTE
PT/OT recommending HH w/PT and OT at [facility]. Pt declining HH since she states she already receives in-house PT/OT by [facility] [facility] at [facility]. Pt requesting assist w/transport at [facility]. Pt's Medicaid will cover cost of transport back to [facility]. Moises Corbin placed on will call from 2/24 to 2/26, PCS updated. Plan: [address] [address] pending medical clearance.     MANJINDER JosueN    [phone]

## 2023-02-25 LAB
ANION GAP SERPL CALC-SCNC: 2 MMOL/L (ref 0–18)
BASOPHILS # BLD AUTO: 0.03 X10(3) UL (ref 0–0.2)
BASOPHILS NFR BLD AUTO: 0.6 %
BUN BLD-MCNC: 10 MG/DL (ref 7–18)
BUN/CREAT SERPL: 13 (ref 10–20)
CALCIUM BLD-MCNC: 9.3 MG/DL (ref 8.5–10.1)
CHLORIDE SERPL-SCNC: 112 MMOL/L (ref 98–112)
CO2 SERPL-SCNC: 30 MMOL/L (ref 21–32)
CREAT BLD-MCNC: 0.77 MG/DL
DEPRECATED RDW RBC AUTO: 48.4 FL (ref 35.1–46.3)
EOSINOPHIL # BLD AUTO: 0.26 X10(3) UL (ref 0–0.7)
EOSINOPHIL NFR BLD AUTO: 4.9 %
ERYTHROCYTE [DISTWIDTH] IN BLOOD BY AUTOMATED COUNT: 13.4 % (ref 11–15)
GFR SERPLBLD BASED ON 1.73 SQ M-ARVRAT: 76 ML/MIN/1.73M2 (ref 60–?)
GLUCOSE BLD-MCNC: 84 MG/DL (ref 70–99)
HCT VFR BLD AUTO: 38.9 %
HGB BLD-MCNC: 12.6 G/DL
IMM GRANULOCYTES # BLD AUTO: 0.02 X10(3) UL (ref 0–1)
IMM GRANULOCYTES NFR BLD: 0.4 %
LYMPHOCYTES # BLD AUTO: 2.18 X10(3) UL (ref 1–4)
LYMPHOCYTES NFR BLD AUTO: 41.1 %
MCH RBC QN AUTO: 32.2 PG (ref 26–34)
MCHC RBC AUTO-ENTMCNC: 32.4 G/DL (ref 31–37)
MCV RBC AUTO: 99.5 FL
MONOCYTES # BLD AUTO: 0.37 X10(3) UL (ref 0.1–1)
MONOCYTES NFR BLD AUTO: 7 %
NEUTROPHILS # BLD AUTO: 2.45 X10 (3) UL (ref 1.5–7.7)
NEUTROPHILS # BLD AUTO: 2.45 X10(3) UL (ref 1.5–7.7)
NEUTROPHILS NFR BLD AUTO: 46 %
OSMOLALITY SERPL CALC.SUM OF ELEC: 296 MOSM/KG (ref 275–295)
PLATELET # BLD AUTO: 121 10(3)UL (ref 150–450)
POTASSIUM SERPL-SCNC: 4.1 MMOL/L (ref 3.5–5.1)
RBC # BLD AUTO: 3.91 X10(6)UL
SODIUM SERPL-SCNC: 144 MMOL/L (ref 136–145)
WBC # BLD AUTO: 5.3 X10(3) UL (ref 4–11)

## 2023-02-25 PROCEDURE — 85025 COMPLETE CBC W/AUTO DIFF WBC: CPT | Performed by: STUDENT IN AN ORGANIZED HEALTH CARE EDUCATION/TRAINING PROGRAM

## 2023-02-25 PROCEDURE — C9113 INJ PANTOPRAZOLE SODIUM, VIA: HCPCS | Performed by: STUDENT IN AN ORGANIZED HEALTH CARE EDUCATION/TRAINING PROGRAM

## 2023-02-25 PROCEDURE — 80048 BASIC METABOLIC PNL TOTAL CA: CPT | Performed by: STUDENT IN AN ORGANIZED HEALTH CARE EDUCATION/TRAINING PROGRAM

## 2023-02-25 NOTE — PLAN OF CARE
Problem: Patient Centered Care  Goal: Patient preferences are identified and integrated in the patient's plan of care  Description: Interventions:  - What would you like us to know as we care for you? I live at [facility].   - Provide timely, complete, and accurate information to patient/family  - Incorporate patient and family knowledge, values, beliefs, and cultural backgrounds into the planning and delivery of care  - Encourage patient/family to participate in care and decision-making at the level they choose  - Honor patient and family perspectives and choices  Outcome: Progressing     Problem: Patient/Family Goals  Goal: Patient/Family Long Term Goal  Description: Patient's Long Term Goal: to be discharged from hospital    Interventions:  - monitor labs and vital signs  Administer medications as ordered  Follow MD orders  Update patient on plan of care  Discharge planning  - See additional Care Plan goals for specific interventions  Outcome: Progressing  Goal: Patient/Family Short Term Goal  Description: Patient's Short Term Goal: to feel better    Interventions:   - monitor labs and vital signs  Administer medications as ordered  Follow MD orders  Update patient on plan of care  Discharge planning  - See additional Care Plan goals for specific interventions  Outcome: Progressing     Problem: PAIN - ADULT  Goal: Verbalizes/displays adequate comfort level or patient's stated pain goal  Description: INTERVENTIONS:  - Encourage pt to monitor pain and request assistance  - Assess pain using appropriate pain scale  - Administer analgesics based on type and severity of pain and evaluate response  - Implement non-pharmacological measures as appropriate and evaluate response  - Consider cultural and social influences on pain and pain management  - Manage/alleviate anxiety  - Utilize distraction and/or relaxation techniques  - Monitor for opioid side effects  - Notify MD/LIP if interventions unsuccessful or patient reports new pain  - Anticipate increased pain with activity and pre-medicate as appropriate  Outcome: Progressing     Problem: RISK FOR INFECTION - ADULT  Goal: Absence of fever/infection during anticipated neutropenic period  Description: INTERVENTIONS  - Monitor WBC  - Administer growth factors as ordered  - Implement neutropenic guidelines  Outcome: Progressing     Problem: SAFETY ADULT - FALL  Goal: Free from fall injury  Description: INTERVENTIONS:  - Assess pt frequently for physical needs  - Identify cognitive and physical deficits and behaviors that affect risk of falls.   - [place] fall precautions as indicated by assessment.  - Educate pt/family on patient safety including physical limitations  - Instruct pt to call for assistance with activity based on assessment  - Modify environment to reduce risk of injury  - Provide assistive devices as appropriate  - Consider OT/PT consult to assist with strengthening/mobility  - Encourage toileting schedule  Outcome: Progressing     Problem: DISCHARGE PLANNING  Goal: Discharge to home or other facility with appropriate resources  Description: INTERVENTIONS:  - Identify barriers to discharge w/pt and caregiver  - Include patient/family/discharge partner in discharge planning  - Arrange for needed discharge resources and transportation as appropriate  - Identify discharge learning needs (meds, wound care, etc)  - Arrange for interpreters to assist at discharge as needed  - Consider post-discharge preferences of patient/family/discharge partner  - Complete POLST form as appropriate  - Assess patient's ability to be responsible for managing their own health  - Refer to Case Management Department for coordinating discharge planning if the patient needs post-hospital services based on physician/LIP order or complex needs related to functional status, cognitive ability or social support system  Outcome: Progressing     Problem: GASTROINTESTINAL - ADULT  Goal: Minimal or absence of nausea and vomiting  Description: INTERVENTIONS:  - Maintain adequate hydration with IV or PO as ordered and tolerated  - Nasogastric tube to low intermittent suction as ordered  - Evaluate effectiveness of ordered antiemetic medications  - Provide nonpharmacologic comfort measures as appropriate  - Advance diet as tolerated, if ordered  - Obtain nutritional consult as needed  - Evaluate fluid balance  Outcome: Progressing  Goal: Maintains or returns to baseline bowel function  Description: INTERVENTIONS:  - Assess bowel function  - Maintain adequate hydration with IV or PO as ordered and tolerated  - Evaluate effectiveness of GI medications  - Encourage mobilization and activity  - Obtain nutritional consult as needed  - Establish a toileting routine/schedule  - Consider collaborating with pharmacy to review patient's medication profile  Outcome: Progressing  Goal: Maintains adequate nutritional intake (undernourished)  Description: INTERVENTIONS:  - Monitor percentage of each meal consumed  - Identify factors contributing to decreased intake, treat as appropriate  - Assist with meals as needed  - Monitor I&O, WT and lab values  - Obtain nutritional consult as needed  - Optimize oral hygiene and moisture  - Encourage food from home; allow for food preferences  - Enhance eating environment  Outcome: Progressing   No changes in patient condition overnight. Fall precautions in place, all belongings and call light within reach. Patient able to make needs known. Frequent rounding by nursing staff.

## 2023-02-26 VITALS
OXYGEN SATURATION: 92 % | WEIGHT: 192 LBS | TEMPERATURE: 98 F | HEIGHT: 68 IN | DIASTOLIC BLOOD PRESSURE: 55 MMHG | HEART RATE: 77 BPM | BODY MASS INDEX: 29.1 KG/M2 | SYSTOLIC BLOOD PRESSURE: 148 MMHG | RESPIRATION RATE: 18 BRPM

## 2023-02-26 PROCEDURE — C9113 INJ PANTOPRAZOLE SODIUM, VIA: HCPCS | Performed by: STUDENT IN AN ORGANIZED HEALTH CARE EDUCATION/TRAINING PROGRAM

## 2023-02-26 RX ORDER — PANTOPRAZOLE SODIUM 40 MG/1
40 TABLET, DELAYED RELEASE ORAL
Qty: 30 TABLET | Refills: 0 | Status: SHIPPED | OUTPATIENT
Start: 2023-02-26 | End: 2023-03-28

## 2023-02-26 NOTE — PLAN OF CARE
Problem: Patient Centered Care  Goal: Patient preferences are identified and integrated in the patient's plan of care  Description: Interventions:  - What would you like us to know as we care for you? I live at [facility].   - Provide timely, complete, and accurate information to patient/family  - Incorporate patient and family knowledge, values, beliefs, and cultural backgrounds into the planning and delivery of care  - Encourage patient/family to participate in care and decision-making at the level they choose  - Honor patient and family perspectives and choices  Outcome: Progressing     Problem: Patient/Family Goals  Goal: Patient/Family Long Term Goal  Description: Patient's Long Term Goal: to be discharged from hospital    Interventions:  - monitor labs and vital signs  Administer medications as ordered  Follow MD orders  Update patient on plan of care  Discharge planning  - See additional Care Plan goals for specific interventions  Outcome: Progressing  Goal: Patient/Family Short Term Goal  Description: Patient's Short Term Goal: to feel better    Interventions:   - monitor labs and vital signs  Administer medications as ordered  Follow MD orders  Update patient on plan of care  Discharge planning  - See additional Care Plan goals for specific interventions  Outcome: Progressing     Problem: PAIN - ADULT  Goal: Verbalizes/displays adequate comfort level or patient's stated pain goal  Description: INTERVENTIONS:  - Encourage pt to monitor pain and request assistance  - Assess pain using appropriate pain scale  - Administer analgesics based on type and severity of pain and evaluate response  - Implement non-pharmacological measures as appropriate and evaluate response  - Consider cultural and social influences on pain and pain management  - Manage/alleviate anxiety  - Utilize distraction and/or relaxation techniques  - Monitor for opioid side effects  - Notify MD/LIP if interventions unsuccessful or patient reports new pain  - Anticipate increased pain with activity and pre-medicate as appropriate  Outcome: Progressing     Problem: RISK FOR INFECTION - ADULT  Goal: Absence of fever/infection during anticipated neutropenic period  Description: INTERVENTIONS  - Monitor WBC  - Administer growth factors as ordered  - Implement neutropenic guidelines  Outcome: Progressing     Problem: SAFETY ADULT - FALL  Goal: Free from fall injury  Description: INTERVENTIONS:  - Assess pt frequently for physical needs  - Identify cognitive and physical deficits and behaviors that affect risk of falls.   - [place] fall precautions as indicated by assessment.  - Educate pt/family on patient safety including physical limitations  - Instruct pt to call for assistance with activity based on assessment  - Modify environment to reduce risk of injury  - Provide assistive devices as appropriate  - Consider OT/PT consult to assist with strengthening/mobility  - Encourage toileting schedule  Outcome: Progressing     Problem: DISCHARGE PLANNING  Goal: Discharge to home or other facility with appropriate resources  Description: INTERVENTIONS:  - Identify barriers to discharge w/pt and caregiver  - Include patient/family/discharge partner in discharge planning  - Arrange for needed discharge resources and transportation as appropriate  - Identify discharge learning needs (meds, wound care, etc)  - Arrange for interpreters to assist at discharge as needed  - Consider post-discharge preferences of patient/family/discharge partner  - Complete POLST form as appropriate  - Assess patient's ability to be responsible for managing their own health  - Refer to Case Management Department for coordinating discharge planning if the patient needs post-hospital services based on physician/LIP order or complex needs related to functional status, cognitive ability or social support system  Outcome: Progressing     Problem: GASTROINTESTINAL - ADULT  Goal: Minimal or absence of nausea and vomiting  Description: INTERVENTIONS:  - Maintain adequate hydration with IV or PO as ordered and tolerated  - Nasogastric tube to low intermittent suction as ordered  - Evaluate effectiveness of ordered antiemetic medications  - Provide nonpharmacologic comfort measures as appropriate  - Advance diet as tolerated, if ordered  - Obtain nutritional consult as needed  - Evaluate fluid balance  Outcome: Progressing  Goal: Maintains or returns to baseline bowel function  Description: INTERVENTIONS:  - Assess bowel function  - Maintain adequate hydration with IV or PO as ordered and tolerated  - Evaluate effectiveness of GI medications  - Encourage mobilization and activity  - Obtain nutritional consult as needed  - Establish a toileting routine/schedule  - Consider collaborating with pharmacy to review patient's medication profile  Outcome: Progressing  Goal: Maintains adequate nutritional intake (undernourished)  Description: INTERVENTIONS:  - Monitor percentage of each meal consumed  - Identify factors contributing to decreased intake, treat as appropriate  - Assist with meals as needed  - Monitor I&O, WT and lab values  - Obtain nutritional consult as needed  - Optimize oral hygiene and moisture  - Encourage food from home; allow for food preferences  - Enhance eating environment  Outcome: Progressing   No acute changes, patient in no acute distress, denies nausea/vomiting, tolerating die well

## 2023-02-26 NOTE — PLAN OF CARE
No acute changes at this time. Safety and fall precautions maintained, bed/chair alarms in place. Call light within reach. Frequent rounding by nursing staff. Problem: Patient Centered Care  Goal: Patient preferences are identified and integrated in the patient's plan of care  Description: Interventions:  - What would you like us to know as we care for you? I live at [facility].   - Provide timely, complete, and accurate information to patient/family  - Incorporate patient and family knowledge, values, beliefs, and cultural backgrounds into the planning and delivery of care  - Encourage patient/family to participate in care and decision-making at the level they choose  - Honor patient and family perspectives and choices  Outcome: Progressing     Problem: Patient/Family Goals  Goal: Patient/Family Long Term Goal  Description: Patient's Long Term Goal: to be discharged from hospital    Interventions:  - monitor labs and vital signs  Administer medications as ordered  Follow MD orders  Update patient on plan of care  Discharge planning  - See additional Care Plan goals for specific interventions  Outcome: Progressing  Goal: Patient/Family Short Term Goal  Description: Patient's Short Term Goal: to feel better    Interventions:   - monitor labs and vital signs  Administer medications as ordered  Follow MD orders  Update patient on plan of care  Discharge planning  - See additional Care Plan goals for specific interventions  Outcome: Progressing     Problem: PAIN - ADULT  Goal: Verbalizes/displays adequate comfort level or patient's stated pain goal  Description: INTERVENTIONS:  - Encourage pt to monitor pain and request assistance  - Assess pain using appropriate pain scale  - Administer analgesics based on type and severity of pain and evaluate response  - Implement non-pharmacological measures as appropriate and evaluate response  - Consider cultural and social influences on pain and pain management  - Manage/alleviate anxiety  - Utilize distraction and/or relaxation techniques  - Monitor for opioid side effects  - Notify MD/LIP if interventions unsuccessful or patient reports new pain  - Anticipate increased pain with activity and pre-medicate as appropriate  Outcome: Progressing     Problem: RISK FOR INFECTION - ADULT  Goal: Absence of fever/infection during anticipated neutropenic period  Description: INTERVENTIONS  - Monitor WBC  - Administer growth factors as ordered  - Implement neutropenic guidelines  Outcome: Progressing     Problem: SAFETY ADULT - FALL  Goal: Free from fall injury  Description: INTERVENTIONS:  - Assess pt frequently for physical needs  - Identify cognitive and physical deficits and behaviors that affect risk of falls.   - [place] fall precautions as indicated by assessment.  - Educate pt/family on patient safety including physical limitations  - Instruct pt to call for assistance with activity based on assessment  - Modify environment to reduce risk of injury  - Provide assistive devices as appropriate  - Consider OT/PT consult to assist with strengthening/mobility  - Encourage toileting schedule  Outcome: Progressing     Problem: DISCHARGE PLANNING  Goal: Discharge to home or other facility with appropriate resources  Description: INTERVENTIONS:  - Identify barriers to discharge w/pt and caregiver  - Include patient/family/discharge partner in discharge planning  - Arrange for needed discharge resources and transportation as appropriate  - Identify discharge learning needs (meds, wound care, etc)  - Arrange for interpreters to assist at discharge as needed  - Consider post-discharge preferences of patient/family/discharge partner  - Complete POLST form as appropriate  - Assess patient's ability to be responsible for managing their own health  - Refer to Case Management Department for coordinating discharge planning if the patient needs post-hospital services based on physician/LIP order or complex needs related to functional status, cognitive ability or social support system  Outcome: Progressing     Problem: GASTROINTESTINAL - ADULT  Goal: Minimal or absence of nausea and vomiting  Description: INTERVENTIONS:  - Maintain adequate hydration with IV or PO as ordered and tolerated  - Nasogastric tube to low intermittent suction as ordered  - Evaluate effectiveness of ordered antiemetic medications  - Provide nonpharmacologic comfort measures as appropriate  - Advance diet as tolerated, if ordered  - Obtain nutritional consult as needed  - Evaluate fluid balance  Outcome: Progressing  Goal: Maintains or returns to baseline bowel function  Description: INTERVENTIONS:  - Assess bowel function  - Maintain adequate hydration with IV or PO as ordered and tolerated  - Evaluate effectiveness of GI medications  - Encourage mobilization and activity  - Obtain nutritional consult as needed  - Establish a toileting routine/schedule  - Consider collaborating with pharmacy to review patient's medication profile  Outcome: Progressing  Goal: Maintains adequate nutritional intake (undernourished)  Description: INTERVENTIONS:  - Monitor percentage of each meal consumed  - Identify factors contributing to decreased intake, treat as appropriate  - Assist with meals as needed  - Monitor I&O, WT and lab values  - Obtain nutritional consult as needed  - Optimize oral hygiene and moisture  - Encourage food from home; allow for food preferences  - Enhance eating environment  Outcome: Progressing

## 2023-02-26 NOTE — CM/SW NOTE
SW notified by RN that patient is cleared to DC. Called and notified [facility] [phone] of patient return. PLAN: Return to [facility] eta is 530pm. PCS DONE.      OLEKSANDR Sepulveda, [facility]    [id number]

## 2023-02-27 NOTE — PLAN OF CARE
Pt discharged home to [facility]. Report given to KAYLAN Pierson. All pt belongings gathered and sent with pt. AVS reviewed with patient. IV and wristband removed. Problem: Patient Centered Care  Goal: Patient preferences are identified and integrated in the patient's plan of care  Description: Interventions:  - What would you like us to know as we care for you? I live at [facility].   - Provide timely, complete, and accurate information to patient/family  - Incorporate patient and family knowledge, values, beliefs, and cultural backgrounds into the planning and delivery of care  - Encourage patient/family to participate in care and decision-making at the level they choose  - Honor patient and family perspectives and choices  Outcome: Completed     Problem: Patient/Family Goals  Goal: Patient/Family Long Term Goal  Description: Patient's Long Term Goal: to be discharged from hospital    Interventions:  - monitor labs and vital signs  Administer medications as ordered  Follow MD orders  Update patient on plan of care  Discharge planning  - See additional Care Plan goals for specific interventions  Outcome: Completed  Goal: Patient/Family Short Term Goal  Description: Patient's Short Term Goal: to feel better    Interventions:   - monitor labs and vital signs  Administer medications as ordered  Follow MD orders  Update patient on plan of care  Discharge planning  - See additional Care Plan goals for specific interventions  Outcome: Completed     Problem: PAIN - ADULT  Goal: Verbalizes/displays adequate comfort level or patient's stated pain goal  Description: INTERVENTIONS:  - Encourage pt to monitor pain and request assistance  - Assess pain using appropriate pain scale  - Administer analgesics based on type and severity of pain and evaluate response  - Implement non-pharmacological measures as appropriate and evaluate response  - Consider cultural and social influences on pain and pain management  - Manage/alleviate anxiety  - Utilize distraction and/or relaxation techniques  - Monitor for opioid side effects  - Notify MD/LIP if interventions unsuccessful or patient reports new pain  - Anticipate increased pain with activity and pre-medicate as appropriate  Outcome: Completed     Problem: RISK FOR INFECTION - ADULT  Goal: Absence of fever/infection during anticipated neutropenic period  Description: INTERVENTIONS  - Monitor WBC  - Administer growth factors as ordered  - Implement neutropenic guidelines  Outcome: Completed     Problem: SAFETY ADULT - FALL  Goal: Free from fall injury  Description: INTERVENTIONS:  - Assess pt frequently for physical needs  - Identify cognitive and physical deficits and behaviors that affect risk of falls.   - [place] fall precautions as indicated by assessment.  - Educate pt/family on patient safety including physical limitations  - Instruct pt to call for assistance with activity based on assessment  - Modify environment to reduce risk of injury  - Provide assistive devices as appropriate  - Consider OT/PT consult to assist with strengthening/mobility  - Encourage toileting schedule  Outcome: Completed     Problem: DISCHARGE PLANNING  Goal: Discharge to home or other facility with appropriate resources  Description: INTERVENTIONS:  - Identify barriers to discharge w/pt and caregiver  - Include patient/family/discharge partner in discharge planning  - Arrange for needed discharge resources and transportation as appropriate  - Identify discharge learning needs (meds, wound care, etc)  - Arrange for interpreters to assist at discharge as needed  - Consider post-discharge preferences of patient/family/discharge partner  - Complete POLST form as appropriate  - Assess patient's ability to be responsible for managing their own health  - Refer to Case Management Department for coordinating discharge planning if the patient needs post-hospital services based on physician/LIP order or complex needs related to functional status, cognitive ability or social support system  Outcome: Completed     Problem: GASTROINTESTINAL - ADULT  Goal: Minimal or absence of nausea and vomiting  Description: INTERVENTIONS:  - Maintain adequate hydration with IV or PO as ordered and tolerated  - Nasogastric tube to low intermittent suction as ordered  - Evaluate effectiveness of ordered antiemetic medications  - Provide nonpharmacologic comfort measures as appropriate  - Advance diet as tolerated, if ordered  - Obtain nutritional consult as needed  - Evaluate fluid balance  Outcome: Completed  Goal: Maintains or returns to baseline bowel function  Description: INTERVENTIONS:  - Assess bowel function  - Maintain adequate hydration with IV or PO as ordered and tolerated  - Evaluate effectiveness of GI medications  - Encourage mobilization and activity  - Obtain nutritional consult as needed  - Establish a toileting routine/schedule  - Consider collaborating with pharmacy to review patient's medication profile  Outcome: Completed  Goal: Maintains adequate nutritional intake (undernourished)  Description: INTERVENTIONS:  - Monitor percentage of each meal consumed  - Identify factors contributing to decreased intake, treat as appropriate  - Assist with meals as needed  - Monitor I&O, WT and lab values  - Obtain nutritional consult as needed  - Optimize oral hygiene and moisture  - Encourage food from home; allow for food preferences  - Enhance eating environment  Outcome: Completed

## 2023-03-07 ENCOUNTER — OFFICE VISIT (OUTPATIENT)
Dept: SURGERY | Facility: CLINIC | Age: 83
End: 2023-03-07
Payer: MEDICARE

## 2023-03-07 DIAGNOSIS — M51.36 LUMBAR DEGENERATIVE DISC DISEASE: ICD-10-CM

## 2023-03-07 DIAGNOSIS — M54.16 LUMBAR RADICULOPATHY: Primary | ICD-10-CM

## 2023-03-07 DIAGNOSIS — M51.26 LUMBAR HERNIATED DISC: ICD-10-CM

## 2023-03-07 DIAGNOSIS — M48.061 SPINAL STENOSIS OF LUMBAR REGION WITHOUT NEUROGENIC CLAUDICATION: ICD-10-CM

## 2023-03-07 PROCEDURE — 64483 NJX AA&/STRD TFRM EPI L/S 1: CPT | Performed by: PHYSICAL MEDICINE & REHABILITATION

## 2023-03-07 NOTE — PROCEDURES
[facility]    LUMBAR TRANSFORAMINAL   NAME:  Niall Jones    MR #:    [id number] :  12/3/1940     PHYSICIAN:  Laurita Rogers MD        Operative Report    DATE OF PROCEDURE: 3/7/2023   PREOPERATIVE DIAGNOSES: 1. bilateral L5-S1 radiculopathy    2. L5-S1 mild-mod central HNP    3. L4-5 mild-mod diffuse, L3-4 mod diffuse, L2-3 mild-mod diffuse, L1-2 mod diffuse, T12-L1 left mild-mod, T11-12 mild-mod diffuse bulging discs    4. 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 bilateral L5-S1 radiculopathy    2. L5-S1 mild-mod central HNP    3. L4-5 mild-mod diffuse, L3-4 mod diffuse, L2-3 mild-mod diffuse, L1-2 mod diffuse, T12-L1 left mild-mod, T11-12 mild-mod diffuse bulging discs    4. L5-S1 bilateral severe foraminal, L2-3 mild-mod central, L1-2 mod central, T12-L1 mod central, T11-12 mild-mod central stenosis        PROCEDURES: left L5 transforaminal epidural steroid injection done under fluoroscopic guidance with contrast enhancement. SURGEON: Laurita Baumann MD   ANESTHESIA: Local   INDICATIONS:      OPERATIVE PROCEDURE:  Written consent was obtained from the patient. The patient was brought into the operating room and placed in the prone position on the fluoroscopy table with pillow underneath her abdomen. The patient's skin was cleaned and draped in a normal sterile fashion. Using AP fluoroscopy, all five lumbar vertebrae were identified. When the fifth vertebra was identified, fluoroscopy was right anterior obliqued opening up the left L5-S1 intervertebral foramen. At this point in time, the patient's skin was anesthetized with 1% PF lidocaine without epinephrine. Then, a 5 inch, 22-gauge spinal needle was inserted and directed towards the left L5-S1 intervertebral foramen.   When it felt to be in good position, AP fluoroscopy was used to advance the needle to the 6 o'clock position on the left L5 pedicle. At this point in time, Omnipaque-240 contrast was used to obtain a good epidurogram indicating correct needle placement. Then, aspiration was performed. No blood, fluid, or air was aspirated. Then, the patient was injected with a 4 cc solution of 2 cc of 40 mg/cc of Kenalog and 2 cc of 1% PF lidocaine without epinephrine. After this, the needle was removed. The patient's skin was cleaned. A Band-Aid was applied. The patient was transferred to the cart and into [facility]. The patient was given discharge instructions and will follow up in the clinic as scheduled. Throughout the whole procedure, the patient's pulse oximetry and vital signs were monitored and they remained completely stable. Also, throughout the whole procedure, prior to injection of any medication, aspiration was performed. No blood, fluid, or air was aspirated at anytime.

## 2023-03-23 ENCOUNTER — TELEPHONE (OUTPATIENT)
Dept: PHYSICAL MEDICINE AND REHAB | Facility: CLINIC | Age: 83
End: 2023-03-23

## 2023-03-31 ENCOUNTER — TELEPHONE (OUTPATIENT)
Dept: PHYSICAL MEDICINE AND REHAB | Facility: CLINIC | Age: 83
End: 2023-03-31

## 2023-03-31 NOTE — TELEPHONE ENCOUNTER
Patient calling to five condition update per patient after having injection there has been no improvement patient is having pain. Please advise Thank you

## 2023-04-01 ENCOUNTER — TELEPHONE (OUTPATIENT)
Dept: PHYSICAL MEDICINE AND REHAB | Facility: CLINIC | Age: 83
End: 2023-04-01

## 2023-04-01 NOTE — TELEPHONE ENCOUNTER
The patient called after hours on Saturday to report her left thigh is still bothering her despite an injection about 3 weeks ago. The injection helped for about two weeks but the pain is now returning. She left a message with the office Friday as well. She stated Norco does not help but tylenol does. She takes 3 tylenol 500 tabs twice daily. I advised her to take 2 tylenol 500 TID and to contact the office on Monday AM to pursue her condition further with Dr. Felicitas Bolaños. The patient understood and agreed with the instructions.

## 2023-04-21 ENCOUNTER — APPOINTMENT (OUTPATIENT)
Dept: GENERAL RADIOLOGY | Facility: HOSPITAL | Age: 83
End: 2023-04-21
Attending: EMERGENCY MEDICINE
Payer: MEDICARE

## 2023-04-21 ENCOUNTER — HOSPITAL ENCOUNTER (EMERGENCY)
Facility: HOSPITAL | Age: 83
Discharge: HOME | End: 2023-04-21
Attending: EMERGENCY MEDICINE
Payer: MEDICARE

## 2023-04-21 VITALS
WEIGHT: 197 LBS | DIASTOLIC BLOOD PRESSURE: 78 MMHG | RESPIRATION RATE: 20 BRPM | BODY MASS INDEX: 29.86 KG/M2 | SYSTOLIC BLOOD PRESSURE: 115 MMHG | TEMPERATURE: 99 F | OXYGEN SATURATION: 98 % | HEIGHT: 68 IN | HEART RATE: 82 BPM

## 2023-04-21 DIAGNOSIS — M25.552 CHRONIC LEFT HIP PAIN: Primary | ICD-10-CM

## 2023-04-21 DIAGNOSIS — G89.29 CHRONIC LEFT HIP PAIN: Primary | ICD-10-CM

## 2023-04-21 PROCEDURE — 99284 EMERGENCY DEPT VISIT MOD MDM: CPT

## 2023-04-21 PROCEDURE — 99285 EMERGENCY DEPT VISIT HI MDM: CPT

## 2023-04-21 PROCEDURE — 73502 X-RAY EXAM HIP UNI 2-3 VIEWS: CPT | Performed by: EMERGENCY MEDICINE

## 2023-04-21 PROCEDURE — 96372 THER/PROPH/DIAG INJ SC/IM: CPT

## 2023-04-21 PROCEDURE — 72100 X-RAY EXAM L-S SPINE 2/3 VWS: CPT | Performed by: EMERGENCY MEDICINE

## 2023-04-21 RX ORDER — MORPHINE SULFATE 4 MG/ML
4 INJECTION, SOLUTION INTRAMUSCULAR; INTRAVENOUS ONCE
Status: COMPLETED | OUTPATIENT
Start: 2023-04-21 | End: 2023-04-21

## 2023-04-21 NOTE — ED INITIAL ASSESSMENT (HPI)
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 Pt c/o lt inner thigh pain. States has had for a couple of months, but the tylenol is not helping. Pain is worse today.

## 2023-04-26 ENCOUNTER — OFFICE VISIT (OUTPATIENT)
Dept: PHYSICAL MEDICINE AND REHAB | Facility: CLINIC | Age: 83
End: 2023-04-26
Payer: MEDICARE

## 2023-04-26 ENCOUNTER — TELEPHONE (OUTPATIENT)
Dept: PHYSICAL MEDICINE AND REHAB | Facility: CLINIC | Age: 83
End: 2023-04-26

## 2023-04-26 DIAGNOSIS — M54.16 LUMBAR RADICULOPATHY: ICD-10-CM

## 2023-04-26 DIAGNOSIS — M51.36 LUMBAR DEGENERATIVE DISC DISEASE: ICD-10-CM

## 2023-04-26 DIAGNOSIS — Z98.1 S/P CERVICAL SPINAL FUSION: ICD-10-CM

## 2023-04-26 DIAGNOSIS — F31.4 SEVERE DEPRESSED BIPOLAR I DISORDER WITHOUT PSYCHOTIC FEATURES (HCC): ICD-10-CM

## 2023-04-26 DIAGNOSIS — M96.1 LUMBAR POST-LAMINECTOMY SYNDROME: ICD-10-CM

## 2023-04-26 DIAGNOSIS — M43.10 RETROLISTHESIS OF VERTEBRAE: ICD-10-CM

## 2023-04-26 DIAGNOSIS — M51.26 LUMBAR HERNIATED DISC: Primary | ICD-10-CM

## 2023-04-26 DIAGNOSIS — M96.1 CERVICAL POST-LAMINECTOMY SYNDROME: ICD-10-CM

## 2023-04-26 DIAGNOSIS — M43.16 SPONDYLOLISTHESIS OF LUMBAR REGION: ICD-10-CM

## 2023-04-26 DIAGNOSIS — F41.9 ANXIETY: Chronic | ICD-10-CM

## 2023-04-26 DIAGNOSIS — M47.816 LUMBAR FACET ARTHROPATHY: ICD-10-CM

## 2023-04-26 DIAGNOSIS — M48.061 SPINAL STENOSIS OF LUMBAR REGION WITHOUT NEUROGENIC CLAUDICATION: ICD-10-CM

## 2023-04-26 PROCEDURE — 99214 OFFICE O/P EST MOD 30 MIN: CPT | Performed by: PHYSICAL MEDICINE & REHABILITATION

## 2023-04-26 NOTE — TELEPHONE ENCOUNTER
Per Medicare Guidelines -no authorization is required for Left L2 TFESI CPT 18139     Status: Authorization is not required however may be subject to review once claim is submitted-Covered Benefit     fyi-CMS guidelines state ESIs are limited to a maximum of four (4) sessions per spinal region in a rolling twelve (12) month period.     Lumbar khurram was done 5/3/22, 11/29/22, 3/7/2023

## 2023-04-27 NOTE — TELEPHONE ENCOUNTER
Antioag letter faxed to [facility] Fax #: [phone]   Status of Anticoag  [x] Clearance pending   [] Clearance approved  [] Clearance denied

## 2023-05-01 ENCOUNTER — TELEPHONE (OUTPATIENT)
Dept: PHYSICAL MEDICINE AND REHAB | Facility: CLINIC | Age: 83
End: 2023-05-01

## 2023-05-01 ENCOUNTER — MED REC SCAN ONLY (OUTPATIENT)
Dept: PHYSICAL MEDICINE AND REHAB | Facility: CLINIC | Age: 83
End: 2023-05-01

## 2023-05-01 DIAGNOSIS — M54.16 LUMBAR RADICULOPATHY: Primary | ICD-10-CM

## 2023-05-01 NOTE — TELEPHONE ENCOUNTER
Patient called to schedule injection and give condition update. Waiting on anti-coag clearance from Dr. Ursula Hameed office. This RN will check and if not received, will follow up with their office. See 5/1/2023 TE for condition update.

## 2023-05-01 NOTE — TELEPHONE ENCOUNTER
Patient calling to schedule injection (clearence letter still pending from Dr. Jack Wiggins). This RN will call and follow up. Patient also wants to give Dr. Bernardino Rubio an update. Patient is in a lot of pain. Wants to come in to see him. Location of Pain: lower back(bilat)    and groin&upper thigh (left)  Old or new pain: old (getting worse)  Date Pain Began: 4/28/2023 (back felt worse and \"triggered thigh\"            Has new weakness (can't walk) - incontinence is chronic    Numeric Rating Scale:  Pain at Present:  6/10                                                                                                            (No Pain) 0  to  10 (Worst Pain)                 Minimum Pain:   5  Maximum Pain  7    Distribution of Pain:    left  Quality of Pain:   aching and throbbing  Aggravating Factors:    Standing and Other transferring in or out of bed or chair (movement hurts)  Current pain treatment: Tylenol and position changes in chair    LOV:4/26/2023 Lesly Baumann MD   NOV: 6/19/2023 Lesly Baumann MD  (appointment made for 5/8/2023 at 8:00 am)    Summary of patient request: Would like an earlier appointment. Pain is worse than when saw him last him. Patient was given May 8th appointment. RN routed to Dr. Bernardino Rubio if he has anything to recommend in the meantime.

## 2023-05-01 NOTE — TELEPHONE ENCOUNTER
Received Fax from [address] - pre-op (medications to hold) forwarded over to RN's folder - 5/1/23 -

## 2023-05-02 NOTE — TELEPHONE ENCOUNTER
LVMTCB. Patient has been cleared to hold Apixaban for 2 days prior to procedure by Dr. Conrad Haines. Paperwork sent to scanning.

## 2023-05-03 NOTE — TELEPHONE ENCOUNTER
LMTCB. Will need to review recommendations from [address] to see if injection needs to be updated. Per [address] on 5/2/23: \"If this is the same pain that she had last week, then instead of her having an office appointment, she should be scheduled for hr injection. If she is having bilateral low back pain, then she might need to have bilateral injections. \"

## 2023-05-03 NOTE — TELEPHONE ENCOUNTER
Lower back pain is across both sides of back. Patient was very Faythe Sara and argumentative about answering the question on whether it was both sides of her back or just the left side. Patient was difficult to understand and she kept saying that she answered all the questions at her visit w/ Dr. Sonya Contreras and that he wanted her to have an injection. Patient acted very suspicious and argued same thing over and over and even when explained to her that this was in response to her call to us on 5/1/2023 about her increasing pain, she denied calling and denied making an office appointment for 5/8/2023 and insisted that this was the date of her injection. RN suspects that patient has memory issues and was very argumentative and kept re-stating the same information over and over and arguing about the same thing over and over and denied calling and making an office appointment on Monday for Monday 5/8/2023. Patient did 2-3 times answer that both sides of her lower back were bothering her, and then would state that she didn't know why she had to keep repeating herself and that all this was gone over with when she was here for her office visit w/ Dr. Sonya Contreras    RN routed to Dr. Sonya Contreras to see if he would like to update the type of injection for this patient and proceed w/ injection or still see patient in office on 5/8/2023.

## 2023-05-03 NOTE — TELEPHONE ENCOUNTER
If this is the same pain that she had last week, then instead of her having an office appointment, she should be scheduled for hr injection. If she is having bilateral low back pain, then she might need to have bilateral injections.

## 2023-05-04 ENCOUNTER — TELEPHONE (OUTPATIENT)
Dept: PHYSICAL MEDICINE AND REHAB | Facility: CLINIC | Age: 83
End: 2023-05-04

## 2023-05-04 DIAGNOSIS — M54.16 LUMBAR RADICULOPATHY: Primary | ICD-10-CM

## 2023-05-04 NOTE — TELEPHONE ENCOUNTER
Patient has been scheduled for Left OR Bilateral L2 TFESI  on 05/12/23  at the [facility] with Sylvie Bumpers. -Anesthesia type: Local.  -If scheduling [address] covid testing required for all procedures whether patient is vaccinated or not. -Patient informed not to eat or drink anything after midnight the night prior to the procedure, if being sedated. (Patient informed to fast 12 hours prior to procedure with IVCS/MAC. )  -Patient was advised that if he/she does receive the covid vaccine it needs to be at least 2 weeks before or after the injection. -Medications and allergies reviewed. -Patient reminded to hold NSAIDs (Ibuprofen, ASA 81, Aleve, Naproxen, Mobic, Diclofenac, Etodolac, Celebrex etc.) for 3 days prior to [place]  if BMI is greater than 35. For Cervical injections only hold multivitamins, Vitamin E, Fish Oil, Phentermine (Lomaira) for 7 days prior to injection and NSAIDS.  mg to be held for 7 days prior to injections.  -If patient is receiving MAC/IVCS Phentermine Chrisandra Sen) will need to be held for 7 days prior to injection.  -If on blood thinner clearance has been received to hold this medication by provider.   -Patient informed he/she will need a  to and from procedure. -[facility] is located in the [facility] 1st floor. Patient may park in the yellow or purple parking.     Patient verbalized understanding and agrees with plan.  -----> Scheduled in Epic: Yes  -----> Scheduled in Cas[facility]: Yes  See TE note from 05/04/23

## 2023-05-05 NOTE — TELEPHONE ENCOUNTER
Sh does not need to have the office appointment, but she will need to have bilateral L2 TFESI's with the 10 mg of Dexamethasone to each side.

## 2023-05-07 ENCOUNTER — HOSPITAL ENCOUNTER (EMERGENCY)
Facility: HOSPITAL | Age: 83
Discharge: HOME | End: 2023-05-07
Attending: EMERGENCY MEDICINE
Payer: MEDICARE

## 2023-05-07 ENCOUNTER — APPOINTMENT (OUTPATIENT)
Dept: GENERAL RADIOLOGY | Facility: HOSPITAL | Age: 83
End: 2023-05-07
Attending: EMERGENCY MEDICINE
Payer: MEDICARE

## 2023-05-07 VITALS
HEART RATE: 58 BPM | OXYGEN SATURATION: 95 % | DIASTOLIC BLOOD PRESSURE: 56 MMHG | RESPIRATION RATE: 18 BRPM | TEMPERATURE: 98 F | SYSTOLIC BLOOD PRESSURE: 158 MMHG

## 2023-05-07 DIAGNOSIS — G47.00 INSOMNIA, UNSPECIFIED TYPE: Primary | ICD-10-CM

## 2023-05-07 DIAGNOSIS — R42 DIZZINESS: ICD-10-CM

## 2023-05-07 LAB
ANION GAP SERPL CALC-SCNC: 2 MMOL/L (ref 0–18)
ATRIAL RATE: 64 BPM
BASOPHILS # BLD AUTO: 0.03 X10(3) UL (ref 0–0.2)
BASOPHILS NFR BLD AUTO: 0.6 %
BILIRUB UR QL: NEGATIVE
BUN BLD-MCNC: 22 MG/DL (ref 7–18)
BUN/CREAT SERPL: 25 (ref 10–20)
CALCIUM BLD-MCNC: 9.5 MG/DL (ref 8.5–10.1)
CHLORIDE SERPL-SCNC: 110 MMOL/L (ref 98–112)
CLARITY UR: CLEAR
CO2 SERPL-SCNC: 29 MMOL/L (ref 21–32)
CREAT BLD-MCNC: 0.88 MG/DL
DEPRECATED RDW RBC AUTO: 53.1 FL (ref 35.1–46.3)
EOSINOPHIL # BLD AUTO: 0.14 X10(3) UL (ref 0–0.7)
EOSINOPHIL NFR BLD AUTO: 3 %
ERYTHROCYTE [DISTWIDTH] IN BLOOD BY AUTOMATED COUNT: 14 % (ref 11–15)
GFR SERPLBLD BASED ON 1.73 SQ M-ARVRAT: 65 ML/MIN/1.73M2 (ref 60–?)
GLUCOSE BLD-MCNC: 79 MG/DL (ref 70–99)
GLUCOSE BLDC GLUCOMTR-MCNC: 81 MG/DL (ref 70–99)
GLUCOSE UR-MCNC: NORMAL MG/DL
HCT VFR BLD AUTO: 41.4 %
HGB BLD-MCNC: 12.9 G/DL
HGB UR QL STRIP.AUTO: NEGATIVE
IMM GRANULOCYTES # BLD AUTO: 0.01 X10(3) UL (ref 0–1)
IMM GRANULOCYTES NFR BLD: 0.2 %
KETONES UR-MCNC: NEGATIVE MG/DL
LEUKOCYTE ESTERASE UR QL STRIP.AUTO: NEGATIVE
LYMPHOCYTES # BLD AUTO: 1.19 X10(3) UL (ref 1–4)
LYMPHOCYTES NFR BLD AUTO: 25.4 %
MCH RBC QN AUTO: 31.9 PG (ref 26–34)
MCHC RBC AUTO-ENTMCNC: 31.2 G/DL (ref 31–37)
MCV RBC AUTO: 102.2 FL
MONOCYTES # BLD AUTO: 0.38 X10(3) UL (ref 0.1–1)
MONOCYTES NFR BLD AUTO: 8.1 %
NEUTROPHILS # BLD AUTO: 2.93 X10 (3) UL (ref 1.5–7.7)
NEUTROPHILS # BLD AUTO: 2.93 X10(3) UL (ref 1.5–7.7)
NEUTROPHILS NFR BLD AUTO: 62.7 %
NITRITE UR QL STRIP.AUTO: NEGATIVE
OSMOLALITY SERPL CALC.SUM OF ELEC: 294 MOSM/KG (ref 275–295)
P AXIS: 87 DEGREES
P-R INTERVAL: 214 MS
PH UR: 7.5 [PH] (ref 5–8)
PLATELET # BLD AUTO: 140 10(3)UL (ref 150–450)
POTASSIUM SERPL-SCNC: 4.2 MMOL/L (ref 3.5–5.1)
PROT UR-MCNC: NEGATIVE MG/DL
Q-T INTERVAL: 416 MS
QRS DURATION: 76 MS
QTC CALCULATION (BEZET): 429 MS
R AXIS: -7 DEGREES
RBC # BLD AUTO: 4.05 X10(6)UL
SODIUM SERPL-SCNC: 141 MMOL/L (ref 136–145)
SP GR UR STRIP: 1.02 (ref 1–1.03)
T AXIS: 38 DEGREES
TROPONIN I HIGH SENSITIVITY: 8 NG/L
TSI SER-ACNC: 0.52 MIU/ML (ref 0.36–3.74)
UROBILINOGEN UR STRIP-ACNC: NORMAL
VENTRICULAR RATE: 64 BPM
WBC # BLD AUTO: 4.7 X10(3) UL (ref 4–11)

## 2023-05-07 PROCEDURE — 93005 ELECTROCARDIOGRAM TRACING: CPT

## 2023-05-07 PROCEDURE — 36415 COLL VENOUS BLD VENIPUNCTURE: CPT

## 2023-05-07 PROCEDURE — 84484 ASSAY OF TROPONIN QUANT: CPT | Performed by: EMERGENCY MEDICINE

## 2023-05-07 PROCEDURE — 71045 X-RAY EXAM CHEST 1 VIEW: CPT | Performed by: EMERGENCY MEDICINE

## 2023-05-07 PROCEDURE — 82962 GLUCOSE BLOOD TEST: CPT

## 2023-05-07 PROCEDURE — 99285 EMERGENCY DEPT VISIT HI MDM: CPT

## 2023-05-07 PROCEDURE — 85025 COMPLETE CBC W/AUTO DIFF WBC: CPT | Performed by: EMERGENCY MEDICINE

## 2023-05-07 PROCEDURE — 80048 BASIC METABOLIC PNL TOTAL CA: CPT | Performed by: EMERGENCY MEDICINE

## 2023-05-07 PROCEDURE — 84443 ASSAY THYROID STIM HORMONE: CPT | Performed by: EMERGENCY MEDICINE

## 2023-05-07 PROCEDURE — 93010 ELECTROCARDIOGRAM REPORT: CPT

## 2023-05-07 NOTE — ED QUICK NOTES
Straight cath completed per protocol with Lilliana PCT without complications. Urine sent to lab for analysis.

## 2023-05-07 NOTE — ED INITIAL ASSESSMENT (HPI)
patient from [facility] by [facility] for fatigue, weakness, nausea starting 2 days ago. Refused zofran from ems. Patient now states chest, abdomen and back pain.

## 2023-05-08 ENCOUNTER — TELEPHONE (OUTPATIENT)
Dept: PHYSICAL MEDICINE AND REHAB | Facility: CLINIC | Age: 83
End: 2023-05-08

## 2023-05-08 NOTE — TELEPHONE ENCOUNTER
Per Medicare Guidelines -no authorization is required for Bilateral L2 TFESIs CPT 35232-95     Status: Authorization is not required however may be subject to review once claim is submitted-Covered Benefit     Per Dr. Brittany Washington 10 mg to each side

## 2023-05-08 NOTE — TELEPHONE ENCOUNTER
Called and S/W patient and she stated that she was already scheduled for 5/12/2023. This RN verified that patient was scheduled in Epic and Case Tabs, and confirmed. RN will check w/ [facility] LPN about injection scheduling notes, as they are not on this TE for PA.

## 2023-05-17 NOTE — PLAN OF CARE
Problem: ALTERED NUTRIENT INTAKE - ADULT  Goal: Nutrient intake appropriate for improving, restoring or maintaining nutritional needs  Description: INTERVENTIONS:  - Assess nutritional status and recommend course of action  - Monitor oral intake, labs, a Alert-The patient is alert, awake and responds to voice. The patient is oriented to time, place, and person. The triage nurse is able to obtain subjective information.

## 2023-05-18 NOTE — TELEPHONE ENCOUNTER
Refill Request    Medication request: HYDROCODONE-ACETAMINOPHEN  MG Oral Tab  ONE TABLET BY MOUTH EVERY 8 HOURS AS NEEDED FOR PAIN *DO NOT EXCEED 4GM/APAP IN 24 HOURS FROM ALL SOURCES    LOV:4/26/23  Due back to clinic per last office note:  \"follow up in 2-3 months\"  NOV: 6/19/23, 5/23/23-inj     [place]: 1/30/23 #40    Urine drug screen (if applicable): none  Pain contract: none    LOV plan (if weaning or changing medications): Per  at LOV: \"Takes [place] on occasion, but tries to stick with Tylenol.   \"

## 2023-05-19 RX ORDER — HYDROCODONE BITARTRATE AND ACETAMINOPHEN 10; 325 MG/1; MG/1
TABLET ORAL
Qty: 40 TABLET | Refills: 0 | Status: SHIPPED | OUTPATIENT
Start: 2023-05-19

## 2023-05-22 RX ORDER — HYDROCODONE BITARTRATE AND ACETAMINOPHEN 10; 325 MG/1; MG/1
TABLET ORAL
Qty: 40 TABLET | Refills: 2 | OUTPATIENT
Start: 2023-05-22

## 2023-05-23 ENCOUNTER — OFFICE VISIT (OUTPATIENT)
Dept: SURGERY | Facility: CLINIC | Age: 83
End: 2023-05-23
Payer: MEDICARE

## 2023-05-23 DIAGNOSIS — M54.16 LUMBAR RADICULOPATHY: Primary | ICD-10-CM

## 2023-05-23 DIAGNOSIS — M51.36 LUMBAR DEGENERATIVE DISC DISEASE: ICD-10-CM

## 2023-05-23 DIAGNOSIS — M96.1 LUMBAR POST-LAMINECTOMY SYNDROME: ICD-10-CM

## 2023-05-23 DIAGNOSIS — M48.061 SPINAL STENOSIS OF LUMBAR REGION WITHOUT NEUROGENIC CLAUDICATION: ICD-10-CM

## 2023-05-23 PROCEDURE — 64483 NJX AA&/STRD TFRM EPI L/S 1: CPT | Performed by: PHYSICAL MEDICINE & REHABILITATION

## 2023-05-23 NOTE — PROCEDURES
[facility]    BILATERAL LUMBAR TRANSFORAMINAL   NAME:  Nicolette Cole    MR #:    [id number] :  12/3/1940     PHYSICIAN:  Robert Rhodes MD        Operative Report    DATE OF PROCEDURE: 2023   PREOPERATIVE DIAGNOSES: 1. bilateral L5-S1 radiculopathy & left L2 radiculitis    2. L5-S1 bilateral mod-large foraminal, L4-5 mod diffuse, L3-4 mild diffuse, L2-3 mod diffuse, L1-2 right > left mod diffuse, T12-L1 left mild-mod, T11-12 mild-mod diffuse bulging discs    3. L5-S1 bilateral severe foraminal & moderate central, L4-5 severe central, L2-3 mod central & left mod-severe foraminal, L1-2 mod central & right mod-severe , T12-L1 mod central stenosis    4. s/p L5-S1 right laminectomy        POSTOPERATIVE DIAGNOSES:   1. bilateral L5-S1 radiculopathy & left L2 radiculitis    2. L5-S1 bilateral mod-large foraminal, L4-5 mod diffuse, L3-4 mild diffuse, L2-3 mod diffuse, L1-2 right > left mod diffuse, T12-L1 left mild-mod, T11-12 mild-mod diffuse bulging discs    3. L5-S1 bilateral severe foraminal & moderate central, L4-5 severe central, L2-3 mod central & left mod-severe foraminal, L1-2 mod central & right mod-severe , T12-L1 mod central stenosis    4. s/p L5-S1 right laminectomy        PROCEDURES: Bilateral L2 transforaminal epidural steroid injections done under fluoroscopic guidance with contrast enhancement. SURGEON: Robert Baumann MD   ANESTHESIA: Local   INDICATIONS:      OPERATIVE PROCEDURE:  Written consent was obtained from the patient. The patient was brought into the operating room and placed in the prone position on the fluoroscopy table with pillow underneath her abdomen. The patient's skin was cleaned and draped in a normal sterile fashion. Using AP fluoroscopy, all five lumbar vertebrae were identified. When the second vertebra was identified, fluoroscopy was left anterior obliqued opening up the right L2-3 intervertebral foramen.   At this point in time, the patient's skin was anesthetized with 1% PF lidocaine without epinephrine. Then, a 3.5 inch, 22-gauge spinal needle was inserted and directed towards the right L2-3 intervertebral foramen. When it felt to be in good position, AP fluoroscopy was used to advance the needle to the 6 o'clock position on the right L2 pedicle. At this point in time, Omnipaque-240 contrast was used to obtain a good epidurogram indicating correct needle placement. Then, aspiration was performed. No blood, fluid, or air was aspirated. Then, the patient was injected with a 2 cc solution of 1 cc of 10 mg/cc of PF Dexamethasone and 1 cc of 1% PF lidocaine without epinephrine. After this, the needle was removed. Then  fluoroscopy was right anterior obliqued opening up the left L2-3 intervertebral foramen. At this point in time, the patient's skin was anesthetized with 1 to 2 cc of 1% PF lidocaine without epinephrine. Then, a 3.5 inch, 22-gauge spinal needle was inserted and directed towards the left L2-3 intervertebral foramen. When it felt to be in good position, AP fluoroscopy was used to advance the needle to the 6 o'clock position on the left L2 pedicle. At this point in time, Omnipaque-240 contrast was used to obtain a good epidurogram indicating correct needle placement. Then, aspiration was performed. No blood, fluid, or air was aspirated. Then, the patient was injected with a 2 cc solution of 1 cc of 10 mg/cc of PF Dexamethasone and 1 cc of 1% PF lidocaine without epinephrine. After this, the needle was removed. The patient's skin was cleaned. Band-Aids were applied. The patient was transferred to the cart and into [facility]. The patient was given discharge instructions and will follow up in the clinic as scheduled. Throughout the whole procedure, the patient's pulse oximetry and vital signs were monitored and they remained completely stable.   Also, throughout the whole procedure, prior to injection of any medication, aspiration was performed. No blood, fluid, or air was aspirated at anytime.

## 2023-05-23 NOTE — TELEPHONE ENCOUNTER
Patient has been scheduled for a bilateral L5 TFESI  on 09/19/17 at the [facility]. Medications and allergies reviewed. Patient informed to hold aspirins, nsaids, blood thinners, vitamins and fish oils 5-7 days prior to procedure.  Patient informed we will need v Eucrisa Counseling: Patient may experience a mild burning sensation during topical application. Eucrisa is not approved in children less than 2 years of age.

## 2023-05-30 NOTE — TELEPHONE ENCOUNTER
Refill Request    Medication request: HYDROCODONE-ACETAMINOPHEN  MG Oral Tab ONE TABLET BY MOUTH EVERY 8 HOURS AS NEEDED FOR PAIN *DO NOT EXCEED 4GM/APAP IN 24 HOURS FROM ALL SOURCES    LOV: 4/26/23   Due back to clinic per last office note:  \"follow up in 2-3 months\"  NOV: 6/19/23     [place] 5/20/23 #22 - 7 day supply    Urine drug screen (if applicable): none  Pain contract: none    LOV plan (if weaning or changing medications): Per  at LOV: \"Cannot take pain meds well. Takes Norco on occasion, but tries to stick with Tylenol.   \"

## 2023-06-01 RX ORDER — HYDROCODONE BITARTRATE AND ACETAMINOPHEN 10; 325 MG/1; MG/1
TABLET ORAL
Qty: 22 TABLET | Refills: 0 | Status: SHIPPED | OUTPATIENT
Start: 2023-06-01

## 2023-06-06 ENCOUNTER — TELEPHONE (OUTPATIENT)
Dept: PHYSICAL MEDICINE AND REHAB | Facility: CLINIC | Age: 83
End: 2023-06-06

## 2023-06-16 NOTE — TELEPHONE ENCOUNTER
Vernon Baum calling states pt has rash all over her body worse on her legs. Pt states not itchy. Pt was on new med bactrim states she advised pt to hold it in case it was causing the an allergy. pts apartment will also be checked for bed bugs.  Please adv Living

## 2023-06-19 ENCOUNTER — OFFICE VISIT (OUTPATIENT)
Dept: PHYSICAL MEDICINE AND REHAB | Facility: CLINIC | Age: 83
End: 2023-06-19
Payer: MEDICARE

## 2023-06-19 VITALS — HEIGHT: 68 IN | WEIGHT: 197 LBS | BODY MASS INDEX: 29.86 KG/M2 | HEART RATE: 77 BPM | OXYGEN SATURATION: 93 %

## 2023-06-19 DIAGNOSIS — Z98.1 S/P CERVICAL SPINAL FUSION: ICD-10-CM

## 2023-06-19 DIAGNOSIS — M79.644 PAIN OF RIGHT MIDDLE FINGER: ICD-10-CM

## 2023-06-19 DIAGNOSIS — F31.32 BIPOLAR AFFECTIVE DISORDER, CURRENTLY DEPRESSED, MODERATE (HCC): ICD-10-CM

## 2023-06-19 DIAGNOSIS — F41.9 ANXIETY: Chronic | ICD-10-CM

## 2023-06-19 DIAGNOSIS — M96.1 LUMBAR POST-LAMINECTOMY SYNDROME: ICD-10-CM

## 2023-06-19 DIAGNOSIS — M43.10 RETROLISTHESIS OF VERTEBRAE: ICD-10-CM

## 2023-06-19 DIAGNOSIS — M51.36 LUMBAR DEGENERATIVE DISC DISEASE: Primary | ICD-10-CM

## 2023-06-19 DIAGNOSIS — I70.0 AORTIC ATHEROSCLEROSIS (HCC): ICD-10-CM

## 2023-06-19 DIAGNOSIS — M54.16 LUMBAR RADICULOPATHY: ICD-10-CM

## 2023-06-19 DIAGNOSIS — M48.061 SPINAL STENOSIS OF LUMBAR REGION WITHOUT NEUROGENIC CLAUDICATION: ICD-10-CM

## 2023-06-19 DIAGNOSIS — M47.816 LUMBAR FACET ARTHROPATHY: ICD-10-CM

## 2023-06-19 DIAGNOSIS — I10 ESSENTIAL HYPERTENSION: ICD-10-CM

## 2023-06-19 DIAGNOSIS — M51.26 LUMBAR HERNIATED DISC: ICD-10-CM

## 2023-06-19 PROCEDURE — 99214 OFFICE O/P EST MOD 30 MIN: CPT | Performed by: PHYSICAL MEDICINE & REHABILITATION

## 2023-06-19 PROCEDURE — 1111F DSCHRG MED/CURRENT MED MERGE: CPT | Performed by: PHYSICAL MEDICINE & REHABILITATION

## 2023-06-22 NOTE — PROGRESS NOTES
[facility]    Progress Note    Aleida Nolen Patient Status:  Inpatient    12/3/1940 MRN [id number]   Location [facility] 5SW/SE Attending Rosey Macedo MD   Hosp Day # 1 PCP Dian Yoder MD       Subjective:   [address] rate and rhythm  Abdominal: soft, non-tender; bowel sounds normal  Extremities: no edema, redness or tenderness in the calves or thighs  Neurologic: Grossly normal    Assessment and Plan:     Febrile illness, acute  The new Zithromax      Acute respiratory - - -

## 2023-06-26 NOTE — ED NOTES
Labor and Childbirth: Support Person's Notes  You may be excited and anxious about the impending labor and childbirth. You may also wonder how you can help. Learning about the birth process can help you know what to expect. And following the suggestions below can help ease you and the mother through this exciting time.   During early labor    Be sure to time the contractions.    Keep the setting soothing. Dim lights and prevent loud noises. Try playing relaxing music.    Suggest that the mother soak in a warm tub to ease the pain of contractions.    Try to distract the mother from the contractions with a short walk or massage.    Encourage the mother to rest if she's tired.    As contractions become stronger, help her use labor breathing techniques.    During active labor    Have the mother walk or change position at least once an hour. This improves circulation and helps the baby descend.    Keep reminding the mother to breathe and relax through each contraction.    Reassure her. Try to keep her from getting anxious or overstressed.    Take care of yourself. Take a short break to eat or go to the bathroom when you need to.    Rest when the mother does. You'll both benefit.    During a vaginal birth    Help the mother into a pushing position. Support her body as she pushes. A semi-sitting or semi-squatting position allows gravity to assist the birth.    Remind her to rest between contractions. Encourage her by telling her when the baby's head appears.    Keep in mind that you may be masked and gowned for the birth, depending on hospital policy.    During a  birth    You will most likely be able to stay with the mother during the . If you remain with her, you'll wear a mask and surgical gown.    Remember that  birth is surgery. The mother's abdominal area will be draped and out of view. Don't touch the draped areas, which are sterile.    If you aren't allowed to attend the delivery or  X-ray at bedside. aren't comfortable doing so, wait with other friends and family members in the family waiting area.    [employer] last reviewed this educational content on 7/1/2021 2000-2022 [employer]. All rights reserved. This information is not intended as a substitute for professional medical care. Always follow your healthcare professional's instructions.

## 2023-06-27 NOTE — PROGRESS NOTES
[facility] Infectious Disease Progress Note    Pierre Lance Patient Status:  Observation    12/3/1940 MRN [id number]   Location [facility]5W Attending Debi Sterling MD   Hosp Day # 3 PCP Xiang Meyer MD     Subjective:  Rosa Blackwell REFILLED MEDICATIONS FOR ONE YEAR  GLUCOSAMINE AND CHONDROITIN SULFATE     injection 3 mL, 3 mL, Intravenous, PRN  •  ondansetron HCl (ZOFRAN) injection 4 mg, 4 mg, Intravenous, Q6H PRN  •  Metoclopramide HCl (REGLAN) injection 10 mg, 10 mg, Intravenous, Q8H PRN  •  PEG 3350 (MIRALAX) powder packet 17 g, 17 g, Oral, Daily PRN  • clear.  Lungs: Clear to auscultation bilaterally. Cardiac: Regular rate and rhythm. No murmur. Abdomen:  Soft, non-distended, non-tender, with no rebound or guarding. No peritoneal signs. No ascites. Liver is within normal limits.   Spleen is not palpab

## 2023-07-13 ENCOUNTER — OFFICE VISIT (OUTPATIENT)
Dept: OTOLARYNGOLOGY | Facility: CLINIC | Age: 83
End: 2023-07-13

## 2023-07-13 ENCOUNTER — MED REC SCAN ONLY (OUTPATIENT)
Dept: PHYSICAL MEDICINE AND REHAB | Facility: CLINIC | Age: 83
End: 2023-07-13

## 2023-07-13 VITALS — WEIGHT: 197 LBS | HEIGHT: 68 IN | BODY MASS INDEX: 29.86 KG/M2

## 2023-07-13 DIAGNOSIS — H61.23 BILATERAL IMPACTED CERUMEN: Primary | ICD-10-CM

## 2023-07-13 PROCEDURE — 69210 REMOVE IMPACTED EAR WAX UNI: CPT | Performed by: OTOLARYNGOLOGY

## 2023-07-13 RX ORDER — PANTOPRAZOLE SODIUM 40 MG/1
TABLET, DELAYED RELEASE ORAL
COMMUNITY
Start: 2023-05-26

## 2023-07-13 RX ORDER — LIDOCAINE 50 MG/G
OINTMENT TOPICAL
COMMUNITY

## 2023-07-18 ENCOUNTER — TELEPHONE (OUTPATIENT)
Dept: PHYSICAL MEDICINE AND REHAB | Facility: CLINIC | Age: 83
End: 2023-07-18

## 2023-07-18 DIAGNOSIS — M79.644 PAIN OF RIGHT MIDDLE FINGER: Primary | ICD-10-CM

## 2023-07-18 NOTE — TELEPHONE ENCOUNTER
Pt's finger has become worse and she is requesting an order for external for PT to  [facility]

## 2023-07-19 NOTE — TELEPHONE ENCOUNTER
Spoke with Dr Teofilo Ordoñez and per Dr pagan for an OT order. Order pended and routed to Dr Teofilo Ordoñez for his signature.

## 2023-07-19 NOTE — TELEPHONE ENCOUNTER
S/w Pt regarding chronic pain but worsening on her right hand and middle finger. Location of Pain: Right hand, middle  finger  Old or new pain: old/worsening  Date Pain Began: Per pt since June 19  and after starting therapy pain on her right hand and middle finger got worsened. Per pt hand is more swollen, denies redness, warmth, drainage or open sores. Numeric Rating Scale:  Pain at Present:  5                                                                                                            (No Pain) 0  to  10 (Worst Pain)                 Minimum Pain:   0  Maximum Pain  7    Distribution of Pain:    right  Quality of Pain:   throbbing (intermittent with movement) Pt states she cannot make a fist.   Aggravating Factors: Movement  Current pain treatment: Patient has done PT, and per her therapist Patient needs OT. Tylenol 500 mg takes 3 times a day (some help). Ariane Arellano MD   NOV: 9/25/2023 Fritz Daniel MD     Summary of patient request: Therapy on right hand, Pt requesting an order for Occupational Therapy as per her Physical therapist suggestion. Per patient she doesn't want to come for a sooner appointment  then 09/25/2023.

## 2023-07-24 NOTE — TELEPHONE ENCOUNTER
Spoke with patient and relayed update below that order was faxed on 7/20/23 to the nurse fax at [facility]. Patient understood and was appreciative. She will look into this further and will call our office back if the fax was not received or if there is a different fax # the order needs to be sent to. Nothing further needed at this time.

## 2023-08-10 ENCOUNTER — MED REC SCAN ONLY (OUTPATIENT)
Dept: PHYSICAL MEDICINE AND REHAB | Facility: CLINIC | Age: 83
End: 2023-08-10

## 2023-09-25 ENCOUNTER — TELEPHONE (OUTPATIENT)
Dept: PHYSICAL MEDICINE AND REHAB | Facility: CLINIC | Age: 83
End: 2023-09-25

## 2023-09-25 ENCOUNTER — OFFICE VISIT (OUTPATIENT)
Dept: PHYSICAL MEDICINE AND REHAB | Facility: CLINIC | Age: 83
End: 2023-09-25
Payer: MEDICARE

## 2023-09-25 VITALS — WEIGHT: 197 LBS | BODY MASS INDEX: 29.86 KG/M2 | HEIGHT: 68 IN

## 2023-09-25 DIAGNOSIS — M51.26 LUMBAR HERNIATED DISC: ICD-10-CM

## 2023-09-25 DIAGNOSIS — M47.816 LUMBAR FACET ARTHROPATHY: Primary | ICD-10-CM

## 2023-09-25 DIAGNOSIS — M54.16 LUMBAR RADICULOPATHY: ICD-10-CM

## 2023-09-25 DIAGNOSIS — M96.1 CERVICAL POST-LAMINECTOMY SYNDROME: ICD-10-CM

## 2023-09-25 DIAGNOSIS — M43.10 RETROLISTHESIS OF VERTEBRAE: ICD-10-CM

## 2023-09-25 DIAGNOSIS — M43.16 SPONDYLOLISTHESIS OF LUMBAR REGION: ICD-10-CM

## 2023-09-25 DIAGNOSIS — I70.0 AORTIC ATHEROSCLEROSIS (HCC): ICD-10-CM

## 2023-09-25 DIAGNOSIS — I10 ESSENTIAL HYPERTENSION: ICD-10-CM

## 2023-09-25 DIAGNOSIS — R26.9 NEUROLOGIC GAIT DISORDER: ICD-10-CM

## 2023-09-25 DIAGNOSIS — F31.4 SEVERE DEPRESSED BIPOLAR I DISORDER WITHOUT PSYCHOTIC FEATURES (HCC): ICD-10-CM

## 2023-09-25 DIAGNOSIS — M51.36 LUMBAR DEGENERATIVE DISC DISEASE: ICD-10-CM

## 2023-09-25 DIAGNOSIS — M96.1 LUMBAR POST-LAMINECTOMY SYNDROME: ICD-10-CM

## 2023-09-25 DIAGNOSIS — M48.061 SPINAL STENOSIS OF LUMBAR REGION WITHOUT NEUROGENIC CLAUDICATION: ICD-10-CM

## 2023-09-25 PROCEDURE — 99214 OFFICE O/P EST MOD 30 MIN: CPT | Performed by: PHYSICAL MEDICINE & REHABILITATION

## 2023-09-25 NOTE — TELEPHONE ENCOUNTER
Patient is taking apixaban.     [employer] letter faxed to [address] [place] [address] Fax #: [phone]

## 2023-09-25 NOTE — TELEPHONE ENCOUNTER
Correction CPT code is 79084, 71941 for Right L4 and Right L5 TFESIs    Authorization is not required per CMS Guidelines  Per CMS Guidelines-ESIs are limited to a maximum of four (4) sessions per spinal region in a rolling twelve (12) month period.     This will be #4  Lumbar khurram(s) done 11/29/22, 3/7/23. 5/23/23

## 2023-09-25 NOTE — TELEPHONE ENCOUNTER
Per CMS Guidelines No Auth required for Right L4-L5, L5-S1 Transforaminal Epidural Injection with Fluoroscopy.  CPT Code 94648   Status: Auth not required  Medicaid is active as of 9/25/23, but medicaid is secondary to pts medicare which should be billed first

## 2023-09-25 NOTE — PROGRESS NOTES
Low Back Pain H & P    Chief Complaint: Patient presents with:  Low Back Pain: LOV 6/19/23 - The pt presents stating that she is having lower back pain. Pain is >R side. Denies any N/T, weakness. The pt is currently taking Norco 10/325 and states it is not helping. Nursing note reviewed and verified. Patient was last seen on 6/19/2023. She has been doing the PT at [facility] where she lives. The PT will increase her pain while she is doing it, but then she will feel better for a few days afterward. She is able to do some of her HEP. She still has difficulty with standing and doing transfers. She will have whole right leg pain when she is laying flat. She denies any numbness or tingling. She denies any new leg weakness. She denies any left leg pain. The right leg pain is a sharp and nagging pain.     Past Medical History   Past Medical History:   Diagnosis Date    Anxiety state, unspecified     Aortic atherosclerosis ([facility] [address]     CT scan 11-19    Arthritis of both knees     knee replacement     Arthritis of right hip 2009    Back problem     Brain aneurysm     Brain Aneurysm, Stent placed     Cervical disc disease 1985,1995,2003,2009    Cervical Discectomy     Cholecystitis 1984    Cholecystectomy     COPD (chronic obstructive pulmonary disease) (HCC)     PFTs 2-15 with FEV1 1.11 L and FEV1/FVC ratio 63%    Deep vein thrombosis (HCC)     recurrent DVT- on lifelong AC    Dehydration 2012    Fluids, Medication adjustment     Depression     Disorder of thyroid     Esophageal reflux     Hammertoe 02/25/2011    hammertoe 5 left, pain    Hearing impairment     Bilateral hearing aids    High blood pressure     Hip pain, left     Hypothyroidism     Incontinence     L3-4 stable slight grade 1 retrolisthesis 10/31/2014    L4-5 mild-mod diffuse bulging disc 10/31/2014    L4-5 slight grade 1 unstable spondylolisthesis 10/31/2014    Leg wound, left 08/21/2019    Low back pain     Migraines     Muscle weakness Onychomycosis     Debridement     Oropharyngeal dysphagia 08/29/2017    Osteoarthrosis, unspecified whether generalized or localized, unspecified site     Other and unspecified hyperlipidemia     Other complicated headache syndrome 11/02/2017    Other spondylosis, lumbar region 11/02/2017    Pneumonia 2012    S/P cervical spinal fusion: C3-6 and C7-T1 11/02/2017    s/p L5-S1 right laminectomy 08/28/2014    Sleep apnea     stent placement     cerebral    Thyroid disease     Toe pain     Onychomycosis, Debridement , Hammertoe     Tonsillitis 1950    Tonsillitis     Unspecified sleep apnea     Visual impairment     EYE GLASSES    Wound of left leg 10/03/2019    Left leg debridement and Split Thickness Skin Graft to left thigh       Past Surgical History   Past Surgical History:   Procedure Laterality Date    ANESTH,NECK VESSEL SURGERY NOS      x4    BRAIN SURGERY  ~2009    Brain Aneurysm, Stent placed     CAROTID ENDARTERECTOMY Right     CARPAL TUNNEL RELEASE Right ~2008    CHOLECYSTECTOMY  1984    Cholecystitis    COLONOSCOPY  2010    DEBRIDEMENT OF NAIL(S), 1-5      Toe, Onychomycosis    EGD  05/2019    Gastric polyps only    EGD  2006    EYE SURGERY      x2 FOR \"CROSSED EYES\"    HIP REPLACEMENT SURGERY Bilateral     HYSTERECTOMY  1967    Partial Hysterectomy    JAW SURGERY      KNEE REPLACEMENT SURGERY Bilateral     Bilateral arthritis     LAMINECTOMY      WITH FUSION PER PATIENT    OTHER SURGICAL HISTORY  1985,1995,2003,2009    Cervical Discectomy AND FUSION    PREP SITE T/A/L 1ST 100 SQ CM/1PCT Left 08/21/2019    Left leg debridement, VAC placed    SPLIT GRFT PROC TRUNK <100SQCM Left 10/08/2019    Left leg debridement and Split Thickness Skin Graft to left thigh    TONSILLECTOMY  1950    Tonsillitis        Family History   Family History   Problem Relation Age of Onset    Heart Attack Mother     Heart Disease Mother         Coronary Artery Disease, Premature     Fibromyalgia Sister     Heart Disease Sister Heart Attack Sister     Heart Disease Brother     Heart Attack Brother     Other (Other[other]) Father     Other (Other[other]) Maternal Grandmother     Other (Other[other]) Maternal Grandfather     Other (Other[other]) Paternal Grandmother     Other (Other[other]) Paternal Grandfather     Cancer Brother 54        Liver     Neurological Disorder Sister         ALzheimer's Disease     Stroke Sister         Cerebrovascular accident     Heart Disease Brother         Coronary Artery Disease        Social History   Social History    Socioeconomic History      Marital status:        Spouse name: Not on file      Number of children: Not on file      Years of education: Not on file      Highest education level: Not on file    Occupational History      Not on file    Tobacco Use      Smoking status: Former        Packs/day: 2.00        Years: 25.00        Additional pack years: 0.00        Total pack years: 50.00        Types: Cigarettes        Start date: 1952        Quit date: 1977        Years since quittin.7      Smokeless tobacco: Never      Tobacco comments: max 2.5 pk/dy, aver 2 pk/dy    Vaping Use      Vaping Use: Never used    Substance and Sexual Activity      Alcohol use: No        Alcohol/week: 0.0 standard drinks of alcohol      Drug use: No      Sexual activity: Not on file    Other Topics      Concerns:         Service: Not Asked        Blood Transfusions: Not Asked        Caffeine Concern: No          crystal light        Occupational Exposure: Not Asked        Hobby Hazards: Not Asked        Sleep Concern: Not Asked        Stress Concern: Not Asked        Weight Concern: Not Asked        Special Diet: Not Asked        Back Care: Not Asked        Exercise: No          No PT due to Covid        Bike Helmet: Not Asked        Seat Belt: Not Asked        Self-Exams: Not Asked        Grew up on a farm: Not Asked        History of tanning: No        Outdoor occupation: Not Asked        Pt has a pacemaker: No        Pt has a defibrillator: No        Breast feeding: Not Asked        Reaction to local anesthetic: No        Left Handed: Yes        Right Handed: No        Currently spends a great deal of time in the sun: No        Past Sunlamp Treatments for Acne: Not Asked        Hx of Spending [place] of Time in Sun: No        Bad sunburns in the past: No        Tanning Salons in the Past: No        Hx of Radiation Treatments: No        Regular use of sun block: Not Asked    Social History Narrative      The patient uses the following assistive device(s):  rolling walker. scooter      The patient does not live in a home with stairs. The [facility] [employer] [facility]    Social Determinants of Health  Financial Resource Strain: Not on file  Food Insecurity: Not on file  Transportation Needs: Not on file  Physical Activity: Not on file  Stress: Not on file  Social Connections: Not on file  Housing Stability: Not on file    PE:  The patient does appear in her stated age in no distress. The patient is well groomed. Psychiatric:  The patient is alert and oriented x 3. The patient has a normal affect and mood. Respiratory:  No acute respiratory distress. Patient does not have a cough. HEENT:  Extraocular muscles are intact. There is no kern icterus. Pupils are equal, round, and reactive to light. No redness or discharge bilaterally. Skin:  There are no rashes or lesions. Vitals: There were no vitals filed for this visit. Neurological Lower Extremity:    Light Touch Sensation: Intact in bilateral Lower Extremities   LE Muscle Strength: All LE strength measurements 5/5 except:  Hamstring RIGHT:   4/5  Hamstring LEFT:   4/5   RIGHT plantar reflexes: downward response   LEFT plantar reflexes: downward response   Reflexes: 2+ in bilateral lower extremities except:  Right patellar tendon which are 1+     Assessment  1.  Lumbar facet arthropathy: L5-S1 bilateral large    2. L5-S1 mild-mod central HNP    3. L5-S1 bilateral severe foraminal & moderate central, L4-5 severe central, L2-3 mod central & left mod-severe foraminal, L1-2 mod central & right mod-severe , T12-L1 mod central stenosis    4. L5-S1 bilateral mod-large foraminal, L4-5 mod diffuse, L3-4 mild diffuse, L2-3 mod diffuse, L1-2 right > left mod diffuse, T12-L1 left mild-mod, T11-12 mild-mod diffuse bulging discs    5. Severe depressed bipolar I disorder without psychotic features ([address]    6. s/p L5-S1 right laminectomy    7. L4-5 slight grade 1 unstable spondylolisthesis    8. L3-4 stable slight grade 1 retrolisthesis    9. Cervical post-laminectomy syndrome: C3-7    10. Neurologic gait disorder    11. Essential hypertension    12. Aortic atherosclerosis ([address]    13. right > left L5-S1 radiculopathy         Plan  I will perform right L4 and L5 TFESI(s). She will continue with the Tylenol and gabapentin for the pain. She will continue with her home exercise program.    The patient will follow up in 3 months, but the patient will call me 2 weeks after having the injection to let me know how the injection worked. The patient understands and agrees with the stated plan. Inés Betancourt MD  9/25/2023

## 2023-09-25 NOTE — PATIENT INSTRUCTIONS
Plan  I will perform right L4 and L5 TFESI(s). She will continue with the Tylenol and gabapentin for the pain. She will continue with her home exercise program.    The patient will follow up in 3 months, but the patient will call me 2 weeks after having the injection to let me know how the injection worked.

## 2023-10-25 ENCOUNTER — OFFICE VISIT (OUTPATIENT)
Dept: SURGERY | Facility: CLINIC | Age: 83
End: 2023-10-25

## 2023-10-25 VITALS — HEIGHT: 68 IN | WEIGHT: 197 LBS | BODY MASS INDEX: 29.86 KG/M2

## 2023-10-25 DIAGNOSIS — R39.15 URGENCY OF URINATION: Primary | ICD-10-CM

## 2023-10-25 PROCEDURE — 99214 OFFICE O/P EST MOD 30 MIN: CPT | Performed by: UROLOGY

## 2023-10-25 RX ORDER — ESTRADIOL 0.1 MG/G
CREAM VAGINAL
Qty: 42.5 G | Refills: 11 | Status: SHIPPED | OUTPATIENT
Start: 2023-10-25

## 2023-10-25 NOTE — PROGRESS NOTES
Angel España MD  Department of Urology  [facility]    T: [phone]  F: [phone]    To: Joel Worthy, [address] [facility] [place] [address] [facility],[address]  [address]    Re: Tahir Fischer   MRN: [id number]  : 12/3/1940    Dear Joel Worthy MD,    Today I had the pleasure of seeing Tahir Fischer in my clinic. As you know, Ms. Evangelist Cuellar is a pleasant 78year old year old female who I am seeing for urinary leakage. Patient was last seen in this department on Visit date not found. Briefly, patient states that she has had urinary leakage for some time now. She did see Dr. Steve Saravia in the past in 2017. She saw him for nocturia and urgency. Plan at his visit was to consider a Husain catheter versus antimuscarinics. She states that for the last year recent starting Lasix she has had significant amount of urinary leakage. She feels like she has leakage without sensory awareness.        PAST MEDICAL HISTORY:  Past Medical History:   Diagnosis Date    Anxiety state, unspecified     Aortic atherosclerosis ([address]     CT scan -    Arthritis of both knees     knee replacement     Arthritis of right hip 2009    Back problem     Brain aneurysm     Brain Aneurysm, Stent placed     Cervical disc disease ,,,    Cervical Discectomy     Cholecystitis     Cholecystectomy     COPD (chronic obstructive pulmonary disease) ([address]     PFTs 2-15 with FEV1 1.11 L and FEV1/FVC ratio 63%    Deep vein thrombosis (HCC)     recurrent DVT- on lifelong AC    Dehydration 2012    Fluids, Medication adjustment     Depression     Disorder of thyroid     Esophageal reflux     Hammertoe 2011    hammertoe 5 left, pain    Hearing impairment     Bilateral hearing aids    High blood pressure     Hip pain, left     Hypothyroidism     Incontinence     L3-4 stable slight grade 1 retrolisthesis 10/31/2014    L4-5 mild-mod diffuse bulging disc 10/31/2014    L4-5 slight grade 1 unstable spondylolisthesis 10/31/2014    Leg wound, left 08/21/2019    Low back pain     Migraines     Muscle weakness     Onychomycosis     Debridement     Oropharyngeal dysphagia 08/29/2017    Osteoarthrosis, unspecified whether generalized or localized, unspecified site     Other and unspecified hyperlipidemia     Other complicated headache syndrome 11/02/2017    Other spondylosis, lumbar region 11/02/2017    Pneumonia 2012    S/P cervical spinal fusion: C3-6 and C7-T1 11/02/2017    s/p L5-S1 right laminectomy 08/28/2014    Sleep apnea     stent placement     cerebral    Thyroid disease     Toe pain     Onychomycosis, Debridement , Hammertoe     Tonsillitis 1950    Tonsillitis     Unspecified sleep apnea     Visual impairment     EYE GLASSES    Wound of left leg 10/03/2019    Left leg debridement and Split Thickness Skin Graft to left thigh        PAST SURGICAL HISTORY:  Past Surgical History:   Procedure Laterality Date    ANESTH,NECK VESSEL SURGERY NOS      x4    BRAIN SURGERY  ~2009    Brain Aneurysm, Stent placed     CAROTID ENDARTERECTOMY Right     CARPAL TUNNEL RELEASE Right ~2008    CHOLECYSTECTOMY  1984    Cholecystitis    COLONOSCOPY  2010    DEBRIDEMENT OF NAIL(S), 1-5      Toe, Onychomycosis    EGD  05/2019    Gastric polyps only    EGD  2006    EYE SURGERY      x2 FOR \"CROSSED EYES\"    HIP REPLACEMENT SURGERY Bilateral     HYSTERECTOMY  1967    Partial Hysterectomy    JAW SURGERY      KNEE REPLACEMENT SURGERY Bilateral     Bilateral arthritis     LAMINECTOMY      WITH FUSION PER PATIENT    OTHER SURGICAL HISTORY  1985,1995,2003,2009    Cervical Discectomy AND FUSION    PREP SITE T/A/L 1ST 100 SQ CM/1PCT Left 08/21/2019    Left leg debridement, VAC placed    SPLIT GRFT PROC TRUNK <100SQCM Left 10/08/2019    Left leg debridement and Split Thickness Skin Graft to left thigh    TONSILLECTOMY  1950    Tonsillitis          ALLERGIES:    Bactrim [Sulfametho*    RASH  Ciprofloxacin           RASH  Depakote [Valproic *    OTHER (SEE COMMENTS)    Comment:pancreatitis  Fluocinolone            OTHER (SEE COMMENTS)    Comment:Unknown  Gabitril [Tiagabine]    OTHER (SEE COMMENTS)    Comment:Stroke like symptoms             Unable to move  Metronidazole           RASH  Phentermine             OTHER (SEE COMMENTS)    Comment:Mini stroke  Tramadol                OTHER (SEE COMMENTS)    Comment:Extreme sleepiness  Dilaudid [Hydromorp*    RASH, OTHER (SEE COMMENTS)    Comment:Tolerates hydrocodone      MEDICATIONS:  Current Outpatient Medications   Medication Instructions    acetaminophen (TYLENOL) 650 mg, Oral, Every 6 hours PRN, *Do not exceed 4gm/apap in 4 hours from all sources*    apixaban (ELIQUIS) 5 mg, Oral, 2 times daily    benztropine (COGENTIN) 1 mg, Oral, 2 times daily    cyclobenzaprine (FLEXERIL) 5 mg, Oral, Every 6 hours PRN    docusate sodium (COLACE) 100 mg, Oral, 2 times daily PRN    donepezil (ARICEPT) 5 mg, Oral, Nightly    furosemide (LASIX) 20 mg, Oral, Daily    gabapentin (NEURONTIN) 200 mg, Oral, 2 times daily    hydrALAZINE (APRESOLINE) 25 mg, Oral, Every 6 hours PRN, SBP >150    lamoTRIgine (LAMICTAL) 100 mg, Oral, 2 times daily    levothyroxine (SYNTHROID) 100 mcg, Oral, Before breakfast    lidocaine 5 % External Ointment     ondansetron (ZOFRAN-ODT) 4 mg, Oral, Every 4 hours PRN    pantoprazole 40 MG Oral Tab EC     QUEtiapine (SEROQUEL) 100 mg, Oral, Nightly        FAMILY HISTORY:  Family History   Problem Relation Age of Onset    Heart Attack Mother     Heart Disease Mother         Coronary Artery Disease, Premature     Fibromyalgia Sister     Heart Disease Sister     Heart Attack Sister     Heart Disease Brother     Heart Attack Brother     Other (Other[other]) Father     Other (Other[other]) Maternal Grandmother     Other (Other[other]) Maternal Grandfather     Other (Other[other]) Paternal Grandmother     Other (Other[other]) Paternal Grandfather     Cancer Brother 54        Liver Neurological Disorder Sister         ALzheimer's Disease     Stroke Sister         Cerebrovascular accident     Heart Disease Brother         Coronary Artery Disease         SOCIAL HISTORY:  Social History     Socioeconomic History    Marital status:    Tobacco Use    Smoking status: Former     Packs/day: 2.00     Years: 25.00     Additional pack years: 0.00     Total pack years: 50.00     Types: Cigarettes     Start date: 1952     Quit date: 1977     Years since quittin.8    Smokeless tobacco: Never    Tobacco comments:     max 2.5 pk/dy, aver 2 pk/dy   Vaping Use    Vaping Use: Never used   Substance and Sexual Activity    Alcohol use: No     Alcohol/week: 0.0 standard drinks of alcohol    Drug use: No   Other Topics Concern    Caffeine Concern No     Comment: crystal light    Exercise No     Comment: No PT due to Covid    History of tanning No    Pt has a pacemaker No    Pt has a defibrillator No    Reaction to local anesthetic No    Left Handed Yes    Right Handed No    Currently spends a great deal of time in the sun No    Hx of Spending [place] of Time in Sun No    Bad sunburns in the past No    Tanning Salons in the Past No    Hx of Radiation Treatments No   Social History Narrative    The patient uses the following assistive device(s):  rolling walker. scooter    The patient does not live in a home with stairs. The patient lives in a care home home. [facility]          PHYSICAL EXAMINATION:  There were no vitals filed for this visit.   CONSTITUTIONAL: No apparent distress, cooperative and communicative  NEUROLOGIC: Alert and oriented   HEAD: Normocephalic, atraumatic   EYES: Sclera non-icteric   ENT: Hearing intact, moist mucous membranes   NECK: No obvious goiter or masses   RESPIRATORY: Normal respiratory effort, Nonlabored breathing on room air  SKIN: No evident rashes   ABDOMEN: Soft, nontender, nondistended, no rebound tenderness, no guarding, no masses      REVIEW OF SYSTEMS:    A comprehensive 10-point review of systems was completed. Pertinent positives and negatives are noted in the the HPI. LABORATORY DATA:  Ref Range & Units 5/7/23 10:03 AM   Urine Color  Yellow Light-Yellow   Clarity Urine  Clear Clear   Spec Gravity  1.005 - 1.030 1.023   Glucose Urine  Normal mg/dL Normal   Bilirubin Urine  Negative Negative   Ketones Urine  Negative mg/dL Negative   Blood Urine  Negative Negative   pH Urine  5.0 - 8.0 7.5   Protein Urine  Negative, Trace mg/dL Negative   Urobilinogen Urine  Normal Normal   Nitrite Urine  Negative Negative   Leukocyte Esterase Urine  Negative Negative   WBC Urine  0 - 5 /HPF 1-5   RBC Urine  0 - 2 /HPF 0-2   Bacteria Urine  None Seen /HPF None Seen   Squamous Epi.  Cells  None Seen /HPF Few Abnormal          Component  Ref Range & Units 5/7/23  9:55 AM   WBC  4.0 - 11.0 x10(3) uL 4.7   RBC  3.80 - 5.30 x10(6)uL 4.05   HGB  12.0 - 16.0 g/dL 12.9   HCT  35.0 - 48.0 % 41.4   MCV  80.0 - 100.0 fL 102.2 High    MCH  26.0 - 34.0 pg 31.9   MCHC  31.0 - 37.0 g/dL 31.2   RDW-SD  35.1 - 46.3 fL 53.1 High    RDW  11.0 - 15.0 % 14.0   PLT  150.0 - 450.0 10(3)uL 140.0 Low      Glucose  70 - 99 mg/dL 79   Sodium  136 - 145 mmol/L 141   Potassium  3.5 - 5.1 mmol/L 4.2   Chloride  98 - 112 mmol/L 110   CO2  21.0 - 32.0 mmol/L 29.0   Anion Gap  0 - 18 mmol/L 2   BUN  7 - 18 mg/dL 22 High    Creatinine  0.55 - 1.02 mg/dL 0.88   BUN/CREA Ratio  10.0 - 20.0 25.0 High    Calcium, Total  8.5 - 10.1 mg/dL 9.5   Calculated Osmolality  275 - 295 mOsm/kg 294   eGFR-Cr  >=60 mL/min/1.73m2 65           IMAGING REVIEW:  Narrative   PROCEDURE:   CT ABDOMEN+PELVIS(CPT=74176)     COMPARISON:   [facility], CT ABDOMEN PELVIS IV CONTRAST NO ORAL (ER), 11/20/2019, 5:47 PM.  [facility], CT ABDOMEN PELVIS IV CONTRAST NO ORAL (ER), 5/05/2022, 7:36 PM.  [facility], CT ABDOMEN PELVIS IV  CONTRAST NO ORAL (ER), 5/03/2020, 7:07 PM. INDICATIONS:   abd pain     TECHNIQUE: CT images of the abdomen and pelvis were obtained without intravenous contrast material.  Automated exposure control for dose reduction was used. Adjustment of the mA and/or kV was done based on the patient's size. Use of iterative  reconstruction technique for dose reduction was used. Dose information is transmitted to the [facility]) NRDR ([address]) which includes the Dose Index Registry. FINDINGS:     Evaluation of parenchymal organs is limited due to lack of IV contrast.    LIVER: Unremarkable  GALLBLADDER: The patient is post cholecystectomy. BILIARY: Mild prominence of the common bile duct and intrahepatic ducts, an expected finding post cholecystectomy. No gross intra-or extrahepatic biliary ductal dilation. SPLEEN: Unremarkable  PANCREAS: No gross ductal dilation. ADRENALS: Unremarkable  KIDNEYS: No radiopaque renal calculus or hydronephrosis. Mild nonspecific bilateral perinephric stranding. AORTA/VASCULAR:   There is atherosclerotic calcification of the abdominal aorta extending into bilateral iliac arteries. There is an infrarenal IVC filter in place. RETROPERITONEUM: There are scattered subcentimeter nonspecific retroperitoneal lymph nodes. BOWEL: Post appendectomy. There is diverticulosis involving the distal descending colon and sigmoid colon without evidence of diverticulitis. MESENTERY: No mass or hernia. PELVIS: Limited evaluation due to streak artifact from bilateral hip arthroplasties. BONES:   There is degenerative disease of the thoracic and lumbar spine. Degenerative changes are seen within the sacroiliac joints and pubic symphysis. Bilateral total hip arthroplasties. There is a area of loculated fat within the left posterior  gluteal soft tissues measuring 2.1 x 3.2 cm which is unchanged since 5/2/2022.   Area of fatty infiltration also seen involving the medial aspect of the left gluteus irineo muscle measuring 3.5 x 3 cm which is also unchanged. Multiple rim calcified  lesions are seen within the gluteal subcutaneous tissues, likely granulomas from prior subcutaneous injections. LUNG BASES: There is bibasilar and lingular atelectasis and scarring. There are coronary artery calcifications. 0.4 cm nodule is seen within the right middle lobe which is unchanged. Impression   CONCLUSION:     No acute process within the unenhanced abdomen or pelvis. Diverticulosis without diverticulitis. Areas of fat necrosis seen involving the left gluteal soft tissues and left gluteus irineo muscles, unchanged. No abscess or drainable collection. Multiple other incidental findings as described in the body of the report which are unchanged. OTHER RELEVANT DATA:   none     IMPRESSION: Urinary incontinence without sensory awareness-recommended behavioral management and initiation of Estrace cream.  Offered cystoscopy versus antimuscarinic or beta 3 agonist.  She would like to work on behaviors and Estrace cream.    Discussed treatment options for urgency urinary incontinence including behavioral management (timed voiding, double voiding, avoiding bladder irritants, constipation management), antimuscarinics (side effects include dry eyes, dry mouth, constipation, mental fogginess), beta 3 agonist (the side effects include hypertension), bladder Botox, PTNS, sacral neuromodulation. Behavioral management includes timed voiding (going to the bathroom on a schedule every 1-4 hours), double voiding (trying to pee twice), avoiding bladder irritants (coffee, tea, soda, pop, alcohol), compression stockings, elevation of feet, voiding prior to bedtime, avoiding fluids 2 to 4 hours before bedtime and constipation management.      For constipation management she can consider fruits (especially ones that start with \"P\"), vegetables, flaxseeds, hydration, fiber, MiraLAX, Colace or senna.  She can also use a squatty potty to help with efficiency of stooling. I also suggested she try Estrace cream every night for 1 week followed by every other night indefinitely for genitourinary syndrome of menopause. This may help with her urgency, frequency and urinary incontinence and help prevent recurrent urinary tract infections. She knows that behavioral management and Estrace cream can take 6 to 12 months to work. It may not be a complete solution but should improve her symptoms. If she has absolutely no improvement in her symptoms, we can proceed with the evaluation test including a cystoscopy, pelvic examination and possible urodynamic evaluation versus trialing medical therapy. Patient understands antimuscarinics and beta 3 agonist take 6 to 8 weeks to start working. Patient also understands the bladder Botox has the side effect of urinary retention in 10 to 12% of patients and does not start working for about 2 weeks post procedure. Additionally it wears off with time and repeat treatments may be required every 3 to 12 months. Patient understand that sacroneuromodulation is a surgical treatment, and that PTNS requires a significant amount of time commitment coming in every week for 12 weeks followed by monthly treatments if it is effective. PLAN:  Behavioral management  Estrace cream  Return to clinic as needed    Thank you for referring this very pleasant patient to my clinic. If you have any questions or concerns, please do not hesitate to contact me. Sincerely,  Conrado Severs, MD    30 minutes were spent on this patient at this visit obtaining a history, reviewing medical records, developing a treatment plan, counseling and discussing treatment strategy with patient, coordination of care and documentation. The [id number] makes medical notes available to patients in the interest of transparency.   However, please be advised that this is a medical document. It is intended as a peer to peer communication. It is written in medical language and may contain abbreviations or verbiage that are technical and unfamiliar. It may appear blunt or direct. Medical documents are intended to carry relevant information, facts as evident, and the clinical opinion of the practitioner.

## 2023-10-27 ENCOUNTER — TELEPHONE (OUTPATIENT)
Dept: PHYSICAL MEDICINE AND REHAB | Facility: CLINIC | Age: 83
End: 2023-10-27

## 2023-10-27 NOTE — TELEPHONE ENCOUNTER
Barbara Gallardo from [facility] for additional clinical information regarding PA   Ref # [id number]
Contacted [facility] regarding Lidocaine patches spoke with rep [employer], additional clinical information provided. Response time up 24 hours REF# [id number].
Contacted [facility] to initiate PA for Lidocaine 5% patches spoke with rep [employer]. Response time 24-72 hours REF# [id number].
Edgar Weinstein calling requesting PA for lidocan 5% patch. Please call  [phone] pts ID [phone].
Naya: I performed a face to face bedside interview with patient regarding history of present illness, review of symptoms and past medical history. I completed an independent physical exam.  I have discussed patient's plan of care with resident.   I agree with note as stated above including HISTORY OF PRESENT ILLNESS, HIV, PAST MEDICAL/SURGICAL/FAMILY/SOCIAL HISTORY, ALLERGIES AND HOME MEDICATIONS, REVIEW OF SYSTEMS, PHYSICAL EXAM, MEDICAL DECISION MAKING and any PROGRESS NOTES during the time I functioned as the attending physician for this patient unless otherwise noted. My brief assessment is as follows: 8-year-old male presenting with 5 to 7 hours of left-sided testicular pain and swelling.  Patient is comfortable appearing, has not had nausea or vomiting, normal cremasteric  reflex and appropriately positioned testicles. Tenderness over L testicle. UA, testicular ultrasound, Motrin for pain.

## 2023-10-27 NOTE — TELEPHONE ENCOUNTER
Condition update after Procedure. - Patient had Right L4-L5, L5-S1 Transforaminal Epidural Injection  (specific procedure) on 10/10/23 (date) with . - 60% relief. Patient states it feels better, but doesn't last long. New symptoms: Pain    Location of symptoms: Left upper posterior thigh (left sit bone)  Date symptoms Began: 4 weeks ago   Current treatment: Vaseline w/ some relief & hydrocortisone         Seen in ER/Urgent care: No    N/A    Numeric Rating Scale:  Pain at Present:  5-6/10                                                                                                            (No Pain) 0  to  10 (Worst Pain)                 Minimum Pain:   4  Maximum Pain  8       Description of Pain:   throbbing    doesn't hurt when transfers to toilet but once sits again it hurts. Aggravating Factors:    Sitting    (wheelchair bound)- transfers on own - patient doesn't see the skin/can't see. Patient states vaseline makes it feel better. This RN     LOV: 9/25/2023 Min Beasley MD    NOV: 12/4/2023 Min Beasley MD     Patient was scheduled to see Dr. Anyi Rosas in [facility] on Wednesday 11/01/2023. NOTE:  This RN notes that what patient describes may be a pressure injury of some sort. RN recommended patient see PCP, but she stated that she went to PCP and he told her it must be coming from her back and to see Dr. Anyi Rosas. Patient states this area hurts only when she sits on it, stated when she transfer to and from wheelchair, during transfer it feel better, but when sits, hurts again. She states she uses vaseline and hydrocortisone cream on the area and it helps a little. Patient states she is unable to visibly see the area. Patient reports she is wheelchair bound and sits all day. Based on PCP LOV patient has urinary incontinence. Patient states she sits all day. She has severe pain to Upper left posterior thigh sub gluteal.  This started about 4 weeks ago.

## 2023-11-01 ENCOUNTER — OFFICE VISIT (OUTPATIENT)
Dept: PHYSICAL MEDICINE AND REHAB | Facility: CLINIC | Age: 83
End: 2023-11-01
Payer: MEDICARE

## 2023-11-01 DIAGNOSIS — F31.4 SEVERE DEPRESSED BIPOLAR I DISORDER WITHOUT PSYCHOTIC FEATURES (HCC): ICD-10-CM

## 2023-11-01 DIAGNOSIS — M51.26 LUMBAR HERNIATED DISC: ICD-10-CM

## 2023-11-01 DIAGNOSIS — M48.061 SPINAL STENOSIS OF LUMBAR REGION WITHOUT NEUROGENIC CLAUDICATION: ICD-10-CM

## 2023-11-01 DIAGNOSIS — M43.16 SPONDYLOLISTHESIS OF LUMBAR REGION: ICD-10-CM

## 2023-11-01 DIAGNOSIS — M96.1 LUMBAR POST-LAMINECTOMY SYNDROME: ICD-10-CM

## 2023-11-01 DIAGNOSIS — M43.10 RETROLISTHESIS OF VERTEBRAE: ICD-10-CM

## 2023-11-01 DIAGNOSIS — M47.816 LUMBAR FACET ARTHROPATHY: ICD-10-CM

## 2023-11-01 DIAGNOSIS — M51.36 LUMBAR DEGENERATIVE DISC DISEASE: ICD-10-CM

## 2023-11-01 DIAGNOSIS — F31.32 BIPOLAR AFFECTIVE DISORDER, CURRENTLY DEPRESSED, MODERATE (HCC): ICD-10-CM

## 2023-11-01 DIAGNOSIS — M54.16 LUMBAR RADICULOPATHY: Primary | ICD-10-CM

## 2023-11-01 DIAGNOSIS — R26.9 NEUROLOGIC GAIT DISORDER: ICD-10-CM

## 2023-11-01 DIAGNOSIS — F41.9 ANXIETY: Chronic | ICD-10-CM

## 2023-11-01 PROCEDURE — 99214 OFFICE O/P EST MOD 30 MIN: CPT | Performed by: PHYSICAL MEDICINE & REHABILITATION

## 2023-11-01 NOTE — PATIENT INSTRUCTIONS
Plan  I will perform left L5 TFESI(s) if the left buttock pain does not improve or if it gets worse. The patient will continue with her home exercise program.    She will let me know if she needs more of the PT. The patient will continue with their current pain medications. She will follow up in 3 months or sooner if needed.

## 2023-11-01 NOTE — PROGRESS NOTES
Low Back Pain H & P    Chief Complaint: Patient presents with: Follow - Up: 10/10/23 Right L4-L5, L5-S1 TFESI. 09/25/23 LOV. States 70% improvement after injection. She is still having L upper posterior thigh pain. States she had this pain before her injection. Denies t/n. Pain 7/10. Gabapentin and tylenol daily. Nursing note reviewed and verified. Patient was last seen on 9/25/2023. The right L4 and L5 TFESI's helped with the pain that she would have when going from sitting to standing. She is now having left buttock pain. This pain started as a welt, but she was told that this is from her lumbar spine. The left buttock pian will get worse later in the day. Tylenol and gabapentin are not helping this pain. This is a constant aching. This pain will improve after sleeping in the recliner. The PT will be ending this week.       Past Medical History   Past Medical History:   Diagnosis Date    Anxiety state, unspecified     Aortic atherosclerosis ([facility] [address]     CT scan 11-19    Arthritis of both knees     knee replacement     Arthritis of right hip 2009    Back problem     Brain aneurysm     Brain Aneurysm, Stent placed     Cervical disc disease 1985,1995,2003,2009    Cervical Discectomy     Cholecystitis 1984    Cholecystectomy     COPD (chronic obstructive pulmonary disease) (HCC)     PFTs 2-15 with FEV1 1.11 L and FEV1/FVC ratio 63%    Deep vein thrombosis (HCC)     recurrent DVT- on lifelong AC    Dehydration 2012    Fluids, Medication adjustment     Depression     Disorder of thyroid     Esophageal reflux     Hammertoe 02/25/2011    hammertoe 5 left, pain    Hearing impairment     Bilateral hearing aids    High blood pressure     Hip pain, left     Hypothyroidism     Incontinence     L3-4 stable slight grade 1 retrolisthesis 10/31/2014    L4-5 mild-mod diffuse bulging disc 10/31/2014    L4-5 slight grade 1 unstable spondylolisthesis 10/31/2014    Leg wound, left 08/21/2019    Low back pain     Migraines Muscle weakness     Onychomycosis     Debridement     Oropharyngeal dysphagia 08/29/2017    Osteoarthrosis, unspecified whether generalized or localized, unspecified site     Other and unspecified hyperlipidemia     Other complicated headache syndrome 11/02/2017    Other spondylosis, lumbar region 11/02/2017    Pneumonia 2012    S/P cervical spinal fusion: C3-6 and C7-T1 11/02/2017    s/p L5-S1 right laminectomy 08/28/2014    Sleep apnea     stent placement     cerebral    Thyroid disease     Toe pain     Onychomycosis, Debridement , Hammertoe     Tonsillitis 1950    Tonsillitis     Unspecified sleep apnea     Visual impairment     EYE GLASSES    Wound of left leg 10/03/2019    Left leg debridement and Split Thickness Skin Graft to left thigh       Past Surgical History   Past Surgical History:   Procedure Laterality Date    ANESTH,NECK VESSEL SURGERY NOS      x4    BRAIN SURGERY  ~2009    Brain Aneurysm, Stent placed     CAROTID ENDARTERECTOMY Right     CARPAL TUNNEL RELEASE Right ~2008    CHOLECYSTECTOMY  1984    Cholecystitis    COLONOSCOPY  2010    DEBRIDEMENT OF NAIL(S), 1-5      Toe, Onychomycosis    EGD  05/2019    Gastric polyps only    EGD  2006    EYE SURGERY      x2 FOR \"CROSSED EYES\"    HIP REPLACEMENT SURGERY Bilateral     HYSTERECTOMY  1967    Partial Hysterectomy    JAW SURGERY      KNEE REPLACEMENT SURGERY Bilateral     Bilateral arthritis     LAMINECTOMY      WITH FUSION PER PATIENT    OTHER SURGICAL HISTORY  1985,1995,2003,2009    Cervical Discectomy AND FUSION    PREP SITE T/A/L 1ST 100 SQ CM/1PCT Left 08/21/2019    Left leg debridement, VAC placed    SPLIT GRFT PROC TRUNK <100SQCM Left 10/08/2019    Left leg debridement and Split Thickness Skin Graft to left thigh    TONSILLECTOMY  1950    Tonsillitis        Family History   Family History   Problem Relation Age of Onset    Heart Attack Mother     Heart Disease Mother         Coronary Artery Disease, Premature     Fibromyalgia Sister     Heart Disease Sister     Heart Attack Sister     Heart Disease Brother     Heart Attack Brother     Other (Other[other]) Father     Other (Other[other]) Maternal Grandmother     Other (Other[other]) Maternal Grandfather     Other (Other[other]) Paternal Grandmother     Other (Other[other]) Paternal Grandfather     Cancer Brother 54        Liver     Neurological Disorder Sister         ALzheimer's Disease     Stroke Sister         Cerebrovascular accident     Heart Disease Brother         Coronary Artery Disease        Social History   Social History    Socioeconomic History      Marital status:        Spouse name: Not on file      Number of children: Not on file      Years of education: Not on file      Highest education level: Not on file    Occupational History      Not on file    Tobacco Use      Smoking status: Former        Packs/day: 2.00        Years: 25.00        Additional pack years: 0.00        Total pack years: 50.00        Types: Cigarettes        Start date: 1952        Quit date: 1977        Years since quittin.8      Smokeless tobacco: Never      Tobacco comments: max 2.5 pk/dy, aver 2 pk/dy    Vaping Use      Vaping Use: Never used    Substance and Sexual Activity      Alcohol use: No        Alcohol/week: 0.0 standard drinks of alcohol      Drug use: No      Sexual activity: Not on file    Other Topics      Concerns:         Service: Not Asked        Blood Transfusions: Not Asked        Caffeine Concern: No          crystal light        Occupational Exposure: Not Asked        Hobby Hazards: Not Asked        Sleep Concern: Not Asked        Stress Concern: Not Asked        Weight Concern: Not Asked        Special Diet: Not Asked        Back Care: Not Asked        Exercise: No          No PT due to Covid        Bike Helmet: Not Asked        Seat Belt: Not Asked        Self-Exams: Not Asked        Grew up on a farm: Not Asked        History of tanning: No        Outdoor occupation: Not Asked        Pt has a pacemaker: No        Pt has a defibrillator: No        Breast feeding: Not Asked        Reaction to local anesthetic: No        Left Handed: Yes        Right Handed: No        Currently spends a great deal of time in the sun: No        Past Sunlamp Treatments for Acne: Not Asked        Hx of Spending [place] of Time in Sun: No        Bad sunburns in the past: No        Tanning [place] in the Past: No        Hx of Radiation Treatments: No        Regular use of sun block: Not Asked    Social History Narrative      The patient uses the following assistive device(s):  rolling walker. scooter      The patient does not live in a home with stairs. The patient lives in a halfway home. [place]    Social Determinants of Health  Financial Resource Strain: Not on file  Food Insecurity: Not on file  Transportation Needs: Not on file  Physical Activity: Not on file  Stress: Not on file  Social Connections: Not on file  Housing Stability: Not on file    PE:  The patient does appear in her stated age in no distress. The patient is well groomed. Psychiatric:  The patient is alert and oriented x 3. The patient has a normal affect and mood. Respiratory:  No acute respiratory distress. Patient does not have a cough. HEENT:  Extraocular muscles are intact. There is no kern icterus. Pupils are equal, round, and reactive to light. No redness or discharge bilaterally. Skin:  There are no rashes or lesions. She has bilateral stage one decubiti of the bilateral posterior proximal thighs. Vitals: There were no vitals filed for this visit.     Gait:    Gait: Wide Based and unsteady   Sit to Stand: moderate-severe difficulty      Lumbar Spine:    Scoliosis: No scoliosis present     Lumbar Spine Palpation:    Spinous Processes: Non-tender for all Spinous Processes   Z-joints: Non-tender for all Z-joints   SIJ: Non-tender for bilateral SIJ   Piriformis Muscle: Non-tender bilateral Piriformis muscles   Greater Trochanteric Bursa: Non-tender for bilateral Greater Trochanteric Bursa   Left buttock/ischial tuberosity is tender to palpation. Vascular lower extremity:   Dorsalis pedis pulse-RIGHT 2+   Dorsalis pedis pulse-LEFT 2+     Neurological Lower Extremity:    Light Touch Sensation: Intact in bilateral Lower Extremities   LE Muscle Strength: All LE strength measurements 5/5 except:  Hamstring LEFT:   4/5   RIGHT plantar reflexes: downward response   LEFT plantar reflexes: downward response   Reflexes: absent in bilateral lower extremities     Assessment  1. right > left L5-S1 radiculopathy    2. Lumbar facet arthropathy: L5-S1 bilateral large    3. L5-S1 mild-mod central HNP    4. L5-S1 bilateral severe foraminal & moderate central, L4-5 severe central, L2-3 mod central & left mod-severe foraminal, L1-2 mod central & right mod-severe , T12-L1 mod central stenosis    5. L5-S1 bilateral mod-large foraminal, L4-5 mod diffuse, L3-4 mild diffuse, L2-3 mod diffuse, L1-2 right > left mod diffuse, T12-L1 left mild-mod, T11-12 mild-mod diffuse bulging discs    6. Severe depressed bipolar I disorder without psychotic features ([address]    7. Bipolar affective disorder, currently depressed, moderate ([address]    8. Anxiety    9. s/p L5-S1 right laminectomy    10. L4-5 slight grade 1 unstable spondylolisthesis    11. L3-4 stable slight grade 1 retrolisthesis    12. Neurologic gait disorder         Plan  I will perform left L5 TFESI(s) if the left buttock pain does not improve or if it gets worse. The patient will continue with her home exercise program.    She will let me know if she needs more of the PT. The patient will continue with their current pain medications. She will follow up in 3 months or sooner if needed. The patient understands and agrees with the stated plan. Christa Rosas MD  11/1/2023

## 2023-11-05 NOTE — TELEPHONE ENCOUNTER
LMTCB - Pt needs OK from Dr. Elder Harder to move forward w/ injections.
MD Dion Randhawa, [facility]             No contraindications to injection from my standpoint
Patient called to see if response was received from Dr. Lizandro Casas yet. Patient notified there has not been a response yet and informed we will contact her as soon as we received response from Dr. Lizandro Casas.  Patient expressed understanding,    Contacted
Patient has cellulitis and is currently on IV antibiotics.   Unable to proceed with injection at this time
Per pt, we are to reach out to Dr. Edinson Herring for his approval.    Letter sent requesting approval to schedule patient for Bilateral L5 TFESI under MAC to Dr. Edinson Herring.
Received call from Dr. Bill Horta, pt's PCP, stating he approved of pt having Bilateral L5 TFESIs under MAC sedation. Order placed. Once approval is granted from Dr. Katherine Wolfe, pt will be contacted to schedule procedure.
<-- Click to add NO significant Past Surgical History

## 2023-11-06 ENCOUNTER — TELEPHONE (OUTPATIENT)
Dept: PHYSICAL MEDICINE AND REHAB | Facility: CLINIC | Age: 83
End: 2023-11-06

## 2023-11-06 DIAGNOSIS — M54.16 LUMBAR RADICULOPATHY: Primary | ICD-10-CM

## 2023-11-06 NOTE — TELEPHONE ENCOUNTER
Pt calling for worsening left buttock pain. Dr Herber Rhodes was in the office while I was on the phone with Patient and per Dr Herber Rhodes ok to continue with left L5 TFESI. -Order placed.   -Pt also informed that she is taking Eliquis 5 mg ordered by Dr Brandon Taylor. Pt aware that clearance is needed as she has injections before. Anticoag letter faxed to  both fax numbers for Katrina Adams Phone# [phone]  There are 2 fax numbers available.     Fax #: [phone]  Fax# [phone]  Status of Anticoag  [x] Clearance pending   [] Clearance approved  [] Clearance denied

## 2023-11-06 NOTE — TELEPHONE ENCOUNTER
Per CMS Guidelines No Auth required for Left L5 Transforaminal Epidural Steroid injection under fluoroscopy with local anesthesia .  CPT Code 95886   Status: Auth not required

## 2023-11-07 RX ORDER — DIAZEPAM 10 MG/1
TABLET ORAL
Qty: 2 TABLET | Refills: 0 | Status: SHIPPED | OUTPATIENT
Start: 2023-11-07

## 2023-11-07 NOTE — TELEPHONE ENCOUNTER
Received clearance to hold Eliquis 5 mg for 2 days prior to the procedure. -Placed for scanning. Status of Anticoag  [] Clearance pending   [x] Clearance approved  [] Clearance denied      Pt is requesting IVCS instead of local.-Messaged Dr Naoma Dance if he is ok with the change. Scheduled patient in [facility] for 11/10/2023. Will call patient back to fully schedule and give directions prior to the procedure once Dr Naoma Dance replies back.      Pended surgical case request.

## 2023-11-09 RX ORDER — HYDROCODONE BITARTRATE AND ACETAMINOPHEN 10; 325 MG/1; MG/1
1 TABLET ORAL EVERY 8 HOURS PRN
Qty: 22 TABLET | Refills: 0 | Status: SHIPPED | OUTPATIENT
Start: 2023-11-09

## 2023-11-09 NOTE — TELEPHONE ENCOUNTER
Refill Request    Medication request: HYDROCODONE-ACETAMINOPHEN  MG Oral Tab ONE TABLET BY MOUTH EVERY 8 HOURS AS NEEDED FOR PAIN *DO NOT EXCEED 4GM/APAP IN 24 HOURS FROM ALL SOURCES     LOV:11/1/23  Due back to clinic per last office note:  \"follow up in 3 months \"  NOV: 12/4/23     [place] 6/1/23 #22    Urine drug screen (if applicable): none  Pain contract: none    LOV plan (if weaning or changing medications): Per  at [address] [place] [address]: \"The patient will continue with their current pain medications. No mention of Norco at [address] [place] [address].

## 2023-11-14 ENCOUNTER — TELEPHONE (OUTPATIENT)
Dept: PHYSICAL MEDICINE AND REHAB | Facility: CLINIC | Age: 83
End: 2023-11-14

## 2023-11-14 DIAGNOSIS — M47.816 LUMBAR FACET ARTHROPATHY: ICD-10-CM

## 2023-11-14 DIAGNOSIS — M96.1 CERVICAL POST-LAMINECTOMY SYNDROME: ICD-10-CM

## 2023-11-14 DIAGNOSIS — M54.16 LUMBAR RADICULOPATHY: Primary | ICD-10-CM

## 2023-11-14 RX ORDER — LIDOCAINE 50 MG/G
1 PATCH TOPICAL EVERY 24 HOURS
Qty: 30 PATCH | Refills: 0 | Status: SHIPPED | OUTPATIENT
Start: 2023-11-14 | End: 2023-12-14

## 2023-11-14 NOTE — TELEPHONE ENCOUNTER
Condition update after Procedure. - Patient had Left L5 TFESI  (specific procedure) on 11/10/2023 (date) with . - 0% relief at this time- injection was only 4 days only. Patient states it feels the same as it did prior to the injection, no relief. This RN advised it can take up to 2 weeks for the steroid to take full effect- and results can differ injection to injection even for same person. Patient states still takes gabapentin 200 mg BID  and tylenol 500mg PRN    Patient has tried lidocaine patches in the past, needs new script. RN stated that often the lidocaine patches require a prior auth, so there are OTC lido patches she could try in the meantime that are only 1% less in medication strength. Patient stated she can't get out to a pharmacy . Patient lives at [facility] at all medication must be ordered. RN recommended heat or ice as well. Patient stated it is too hard to use while sitting in a chair. This RN recommended trying once she was lying down. LOV: 11/01/2023 w/ Dr. Darryl Staton:  \" will perform left L5 TFESI(s) if the left buttock pain does not improve or if it gets worse. The patient will continue with her home exercise program.     She will let me know if she needs more of the PT. The patient will continue with their current pain medications. She will follow up in 3 months or sooner if needed. \"

## 2023-11-14 NOTE — TELEPHONE ENCOUNTER
Lidocaine patches 5% ordered- ok per Dr. Frazier Nims placed for 30 days/30 patches. Prior Auth will be electronically sent.

## 2023-11-16 ENCOUNTER — HOSPITAL ENCOUNTER (OUTPATIENT)
Dept: GENERAL RADIOLOGY | Facility: HOSPITAL | Age: 83
Discharge: HOME | End: 2023-11-16
Attending: INTERNAL MEDICINE
Payer: MEDICARE

## 2023-11-16 DIAGNOSIS — R13.12 OROPHARYNGEAL DYSPHAGIA: ICD-10-CM

## 2023-11-16 DIAGNOSIS — R13.10 DYSPHAGIA: ICD-10-CM

## 2023-11-16 PROCEDURE — 92611 MOTION FLUOROSCOPY/SWALLOW: CPT

## 2023-11-16 PROCEDURE — 74230 X-RAY XM SWLNG FUNCJ C+: CPT | Performed by: INTERNAL MEDICINE

## 2023-11-16 NOTE — PATIENT INSTRUCTIONS
VIDEO SWALLOW STUDY    Diet Recommendations:  Solids: Regular  Liquids: Thin    Recommended compensatory strategies:  Sit upright  Eat slowly  Alternate liquids and solids    Medication Administration:  No restrictions    Further Follow-up:  No follow up warranted with this service. Follow up with Dr. Micaela Dimas.     Zoë Rose MA/[facility]-SLP  Speech Language Pathologist  [address] [place] [address]  [phone]

## 2023-11-16 NOTE — PROGRESS NOTES
ADULT VIDEOFLUOROSCOPIC SWALLOWING STUDY       ADULT VIDEOFLUOROSCOPIC SWALLOWING STUDY:   Referring Physician: Hamzah Yoder      Radiologist: Dr. Peggy Schafer  Diagnosis: dysphagia    Date of Service: 11/16/2023     PATIENT SUMMARY   Chief Complaint: The patient reports food sticking in her throat, choking feelings (\"I give myself the Heimlich\"), dry mouth and pain in her chest when swallowing. She has lost 60 pounds in the last 6 months. She was initially trying to lose weight, but then lost her appetite. She denies shortness of breath. Pt was seen for a VFSS while hospitalized in 5/2022 which revealed transient laryngeal penetration without aspiration and regular foods and liquids were recommended. 5/7/22 VFSS:  Pt presents with mild oropharyngeal dysphagia characterized by posterior bolus loss to pharynx with thin liquid consistency (1s delay in pharyngeal swallow with thin), reduced tongue base retraction, reduced hyolaryngeal elevation/excursion and intermittently delayed epiglottal inversion. Minimal deficits resulting in 2x occurrences of intermittent flash penetration of thin liquids before the swallow (1x cup sip, 1x straw sip), and minimal pharyngeal stasis within valleculae and pyriform across textures with trace amounts coating posterior pharyngeal wall. Laryngeal vestibule infiltrates independently cleared with the natural pressures of the swallow. No aspiration occurrence observed during instrumental procedure. Current Diet: regular foods and liquids       Problem List  Active Problems:  Active Problems:    * No active hospital problems.  *      Past Medical History  Past Medical History:   Diagnosis Date    Anxiety state, unspecified     Aortic atherosclerosis ([facility] [address]     CT scan 11-19    Arthritis of both knees     knee replacement     Arthritis of right hip 2009    Back problem     Brain aneurysm     Brain Aneurysm, Stent placed     Cervical disc disease 1985,1995,2003,2009    Cervical Discectomy Cholecystitis 1984    Cholecystectomy     COPD (chronic obstructive pulmonary disease) ([facility] [address]     PFTs 2-15 with FEV1 1.11 L and FEV1/FVC ratio 63%    Deep vein thrombosis (HCC)     recurrent DVT- on lifelong AC    Dehydration 2012    Fluids, Medication adjustment     Depression     Disorder of thyroid     Esophageal reflux     Hammertoe 02/25/2011    hammertoe 5 left, pain    Hearing impairment     Bilateral hearing aids    High blood pressure     Hip pain, left     Hypothyroidism     Incontinence     L3-4 stable slight grade 1 retrolisthesis 10/31/2014    L4-5 mild-mod diffuse bulging disc 10/31/2014    L4-5 slight grade 1 unstable spondylolisthesis 10/31/2014    Leg wound, left 08/21/2019    Low back pain     Migraines     Muscle weakness     Nausea vomiting and diarrhea 11/20/2019    Onychomycosis     Debridement     Oropharyngeal dysphagia 08/29/2017    Osteoarthrosis, unspecified whether generalized or localized, unspecified site     Other and unspecified hyperlipidemia     Other complicated headache syndrome 11/02/2017    Other spondylosis, lumbar region 11/02/2017    Pneumonia 2012    S/P cervical spinal fusion: C3-6 and C7-T1 11/02/2017    s/p L5-S1 right laminectomy 08/28/2014    Sleep apnea     stent placement     cerebral    Thyroid disease     Toe pain     Onychomycosis, Debridement , Hammertoe     Tonsillitis 1950    Tonsillitis     Unspecified sleep apnea     Visual impairment     EYE GLASSES    Wound of left leg 10/03/2019    Left leg debridement and Split Thickness Skin Graft to left thigh        Imaging results: 5/7/23 CXR:  Borderline cardiomegaly and bibasilar atelectasis/scarring.   2/21/23 esophagram:  Limited exam.  No esophageal obstruction. (Due to emesis)    ASSESSMENT   DYSPHAGIA ASSESSMENT  Test completed in conjunction with Radiologist.   Food/Liquid Types Presented: puree, solid, nectar thick liquid, and thin liquids.     Study Position and View:  Patient was seated upright and viewed laterally. Unable to view A-P due to patient's impaired mobility and equipment limitations. Pain Assessment: The patient reports pain at a level of 0/10. Oral phase:  Oral phase was within normal limits with adequate bilabial seal and bolus containment, timely mastication and transit and minimal to no oral retention. Pharyngeal phase:  The pharyngeal response triggered at the tongue base for all consistencies. Intermittently delayed epiglottic inversion,however, no laryngeal penetration or aspiration was observed. Mildly reduced base of tongue retraction and pharyngeal stripping resulted in intermittent vallecular and occasional pyriform sinus retention with thin liquids. The retention was cleared with spontaneous second swallow or with subsequent swallow of next presentation. Trace to no retention remained with puree and solids. Esophageal phase:   Slow flow of bolus through upper esophagus, with retention remaining throughout. Penetration Aspiration Scale: 1/8. Material does not enter the airway. Overall Impression: Normal oropharyngeal swallow without laryngeal penetration or aspiration, but with intermittent mild vallecular and pyriform sinus retention with was cleared with subsequent swallow. FCM category and level: Swallowing, 7  PLAN   Diet Recommendations:  Solids: Regular  Liquids: Thin    Recommended compensatory strategies:  Sit upright  Eat slowly  Alternate liquids and solids    Medication Administration:  No restrictions    Further Follow-up:  No follow up warranted with this service. Follow up with Dr. Shama Bustos. EDUCATION/INSTRUCTION  Reviewed results and recommendations with patient. Written instructions were provided. Agreement/Understanding verbalized and all questions answered to their apparent satisfaction. INTERDISCIPLINARY COMMUNICATION  Reviewed results with Radiologist; agreement verbalized.       Thank you for your referral. If you have any questions, please contact me at .     Elissa Cazares MA/CCC-SLP  Speech Language Pathologist  [address]  [phone]    Electronically signed by therapist: Ganga German SLP  Physician's certification required: No

## 2023-11-22 ENCOUNTER — TELEPHONE (OUTPATIENT)
Dept: NEUROLOGY | Facility: CLINIC | Age: 83
End: 2023-11-22

## 2023-11-22 NOTE — TELEPHONE ENCOUNTER
Pt states that she has been having slight improvement in pain these past 2 days, however would like to wait until the full 2 weeks are completed to see if she will get any more relief. Pt also wanted to keep her appointment on 12/4/2023 for a follow up at this time.

## 2023-11-22 NOTE — TELEPHONE ENCOUNTER
Patient calling to request a call back from a nurse or MA to go over her condition update after procedure.

## 2023-12-04 ENCOUNTER — OFFICE VISIT (OUTPATIENT)
Dept: PHYSICAL MEDICINE AND REHAB | Facility: CLINIC | Age: 83
End: 2023-12-04
Payer: MEDICARE

## 2023-12-04 VITALS — BODY MASS INDEX: 29.86 KG/M2 | HEIGHT: 68 IN | WEIGHT: 197 LBS

## 2023-12-04 DIAGNOSIS — F31.32 BIPOLAR AFFECTIVE DISORDER, CURRENTLY DEPRESSED, MODERATE (HCC): ICD-10-CM

## 2023-12-04 DIAGNOSIS — M43.10 RETROLISTHESIS OF VERTEBRAE: ICD-10-CM

## 2023-12-04 DIAGNOSIS — F31.4 SEVERE DEPRESSED BIPOLAR I DISORDER WITHOUT PSYCHOTIC FEATURES (HCC): ICD-10-CM

## 2023-12-04 DIAGNOSIS — M51.36 LUMBAR DEGENERATIVE DISC DISEASE: ICD-10-CM

## 2023-12-04 DIAGNOSIS — M96.1 LUMBAR POST-LAMINECTOMY SYNDROME: ICD-10-CM

## 2023-12-04 DIAGNOSIS — M47.816 LUMBAR FACET ARTHROPATHY: ICD-10-CM

## 2023-12-04 DIAGNOSIS — M54.16 LUMBAR RADICULOPATHY: Primary | ICD-10-CM

## 2023-12-04 DIAGNOSIS — R26.9 NEUROLOGIC GAIT DISORDER: ICD-10-CM

## 2023-12-04 DIAGNOSIS — M48.061 SPINAL STENOSIS OF LUMBAR REGION WITHOUT NEUROGENIC CLAUDICATION: ICD-10-CM

## 2023-12-04 DIAGNOSIS — M43.16 SPONDYLOLISTHESIS OF LUMBAR REGION: ICD-10-CM

## 2023-12-04 DIAGNOSIS — M51.26 LUMBAR HERNIATED DISC: ICD-10-CM

## 2023-12-04 PROCEDURE — 99214 OFFICE O/P EST MOD 30 MIN: CPT | Performed by: PHYSICAL MEDICINE & REHABILITATION

## 2023-12-04 NOTE — PATIENT INSTRUCTIONS
Plan  I will perform left L4 and L5 TFESI(s) in February since she has had 4 lumbar FRANCISCO's in the last 12 months. She will get into the PT again for the lumbar spine and her gait and transfers. She will continue with the Tylenol Arthritis for the pain    The patient will follow up in 3 months, but the patient will call me 2 weeks after having the injection to let me know how the injection worked.

## 2023-12-04 NOTE — PROGRESS NOTES
Low Back Pain H & P    Chief Complaint:   Chief Complaint   Patient presents with    Follow - Up     LOV 11/1/2023 Patient following up on Left L5 TFESI done 11/10/23, admits to about 50% relief. Continues to have pain in her posterior left thigh. Denies any new sx. LO 5/10     Nursing note reviewed and verified. Patient was last seen on 11/1/2023. I did the left L5 TFESI on 11/10/2023 which has helped her about 50% for a few days and then the pain returned to its baseline. She is wondering if the pain did not improve since she is sitting all of the time. The pain is located in the left posterior thigh. This pain can radiate to the left medial thigh. This is an aching pain. She is only out of the chair to use the bathroom or to get into bed. She fell outside about 6 days ago when her wheelchair hit a curb. Her hurt her left elbow and the left hip which are both doing better now. She is back to taking Tylenol for the pain which helps some for the pain. She is not taking Norco or Percocet since neither of these have helped her. She is using the Lidocaine patch for the pain and gabapentin which are not helping the pain. She has just finished the PT which helped while she was doing it. The pain is increased when she is sitting too long. She is afraid of falling.     Past Medical History   Past Medical History:   Diagnosis Date    Anxiety state, unspecified     Aortic atherosclerosis ([facility] [address]     CT scan 11-19    Arthritis of both knees     knee replacement     Arthritis of right hip 2009    Back problem     Brain aneurysm     Brain Aneurysm, Stent placed     Cervical disc disease 1985,1995,2003,2009    Cervical Discectomy     Cholecystitis 1984    Cholecystectomy     COPD (chronic obstructive pulmonary disease) (HCC)     PFTs 2-15 with FEV1 1.11 L and FEV1/FVC ratio 63%    Deep vein thrombosis (HCC)     recurrent DVT- on lifelong AC    Dehydration 2012    Fluids, Medication adjustment     Dementia ([address] Depression     Disorder of thyroid     Esophageal reflux     Fibromyalgia     Hammertoe 02/25/2011    hammertoe 5 left, pain    Hearing impairment     Bilateral hearing aids    High blood pressure     Hip pain, left     Hypothyroidism     Incontinence     L3-4 stable slight grade 1 retrolisthesis 10/31/2014    L4-5 mild-mod diffuse bulging disc 10/31/2014    L4-5 slight grade 1 unstable spondylolisthesis 10/31/2014    Leg wound, left 08/21/2019    Low back pain     Migraines     Muscle weakness     Nausea vomiting and diarrhea 11/20/2019    Onychomycosis     Debridement     Oropharyngeal dysphagia 08/29/2017    Osteoarthrosis, unspecified whether generalized or localized, unspecified site     Other and unspecified hyperlipidemia     Other complicated headache syndrome 11/02/2017    Other spondylosis, lumbar region 11/02/2017    Pneumonia 2012    S/P cervical spinal fusion: C3-6 and C7-T1 11/02/2017    s/p L5-S1 right laminectomy 08/28/2014    Sleep apnea     stent placement     cerebral    Thyroid disease     Toe pain     Onychomycosis, Debridement , Hammertoe     Tonsillitis 1950    Tonsillitis     Unspecified sleep apnea     Visual impairment     EYE GLASSES    Wound of left leg 10/03/2019    Left leg debridement and Split Thickness Skin Graft to left thigh       Past Surgical History   Past Surgical History:   Procedure Laterality Date    ANESTH,NECK VESSEL SURGERY NOS      x4    BRAIN SURGERY  ~2009    Brain Aneurysm, Stent placed     CAROTID ENDARTERECTOMY Right     CARPAL TUNNEL RELEASE Right ~2008    CHOLECYSTECTOMY  1984    Cholecystitis    COLONOSCOPY  2010    DEBRIDEMENT OF NAIL(S), 1-5      Toe, Onychomycosis    EGD  05/2019    Gastric polyps only    EGD  2006    EYE SURGERY      x2 FOR \"CROSSED EYES\"    HIP REPLACEMENT SURGERY Bilateral     HYSTERECTOMY  1967    Partial Hysterectomy    JAW SURGERY      KNEE REPLACEMENT SURGERY Bilateral     Bilateral arthritis     LAMINECTOMY      WITH FUSION PER PATIENT    OTHER SURGICAL HISTORY  ,,,    Cervical Discectomy AND FUSION    PREP SITE T/A/L 1ST 100 SQ CM/1PCT Left 2019    Left leg debridement, VAC placed    SPLIT GRFT [address] <100SQCM Left 10/08/2019    Left leg debridement and Split Thickness Skin Graft to left thigh    TONSILLECTOMY  1950    Tonsillitis        Family History   Family History   Problem Relation Age of Onset    Heart Attack Mother     Heart Disease Mother         Coronary Artery Disease, Premature     Fibromyalgia Sister     Heart Disease Sister     Heart Attack Sister     Heart Disease Brother     Heart Attack Brother     Other (Other[other]) Father     Other (Other[other]) Maternal Grandmother     Other (Other[other]) Maternal Grandfather     Other (Other[other]) Paternal Grandmother     Other (Other[other]) Paternal Grandfather     Cancer Brother 54        Liver     Neurological Disorder Sister         ALzheimer's Disease     Depression Sister     Migraines Sister     Stroke Sister         Cerebrovascular accident     Dementia Sister     Heart Disease Brother         Coronary Artery Disease        Social History   Social History     Socioeconomic History    Marital status:       Spouse name: Not on file    Number of children: Not on file    Years of education: Not on file    Highest education level: Not on file   Occupational History    Not on file   Tobacco Use    Smoking status: Former     Packs/day: 2.00     Years: 25.00     Additional pack years: 0.00     Total pack years: 50.00     Types: Cigarettes     Start date: 1952     Quit date: 1977     Years since quittin.9    Smokeless tobacco: Never    Tobacco comments:     Very heavy smoker 2 1/2 daily   Vaping Use    Vaping Use: Never used   Substance and Sexual Activity    Alcohol use: No    Drug use: No    Sexual activity: Not on file   Other Topics Concern     Service Not Asked    Blood Transfusions Not Asked    Caffeine Concern No Comment: crystal light    Occupational Exposure Not Asked    Hobby Hazards Not Asked    Sleep Concern Not Asked    Stress Concern Not Asked    Weight Concern Not Asked    Special Diet Not Asked    Back Care Not Asked    Exercise No     Comment: No PT due to Covid    Bike Helmet Not Asked    Seat Belt Not Asked    Self-Exams Not Asked    Grew up on a farm Not Asked    History of tanning No    Outdoor occupation Not Asked    Pt has a pacemaker No    Pt has a defibrillator No    Breast feeding Not Asked    Reaction to local anesthetic No    Left Handed Yes    Right Handed No    Currently spends a great deal of time in the sun No    Past Sunlamp Treatments for Acne Not Asked    Hx of Spending [place] of Time in [place] No    Bad sunburns in the past No    Tanning Salons in the Past No    Hx of Radiation Treatments No    Regular use of sun block Not Asked   Social History Narrative    The patient uses the following assistive device(s):  rolling walker. scooter    The patient does not live in a home with stairs. The [facility]     Social Determinants of Health     Financial Resource Strain: Not on file   Food Insecurity: Not on file   Transportation Needs: Not on file   Physical Activity: Not on file   Stress: Not on file   Social Connections: Not on file   Housing Stability: Not on file       PE:  The patient does appear in her stated age in no distress. The patient is well groomed. Psychiatric:  The patient is alert and oriented x 3. The patient has a normal affect and mood. Respiratory:  No acute respiratory distress. Patient does not have a cough. HEENT:  Extraocular muscles are intact. There is no kern icterus. Pupils are equal, round, and reactive to light. No redness or discharge bilaterally. Skin:  There are no rashes or lesions. Vitals: There were no vitals filed for this visit. Neurological Lower Extremity:    LE Muscle Strength:  All LE strength measurements 5/5 except:  Hamstring LEFT:   4-/5  Hip Flexion LEFT:   4-/5  Knee Extension LEFT:   4-/5   Reflexes: 1+ in bilateral lower extremities     Assessment  1. left L4 and L5-S1 radiculopathy    2. Lumbar facet arthropathy: L5-S1 bilateral large    3. L5-S1 mild-mod central HNP    4. L5-S1 bilateral severe foraminal & moderate central, L4-5 severe central, L2-3 mod central & left mod-severe foraminal, L1-2 mod central & right mod-severe , T12-L1 mod central stenosis    5. L5-S1 bilateral mod-large foraminal, L4-5 mod diffuse, L3-4 mild diffuse, L2-3 mod diffuse, L1-2 right > left mod diffuse, T12-L1 left mild-mod, T11-12 mild-mod diffuse bulging discs    6. Severe depressed bipolar I disorder without psychotic features ([address]    7. Bipolar affective disorder, currently depressed, moderate ([address]    8. s/p L5-S1 right laminectomy    9. L4-5 slight grade 1 unstable spondylolisthesis    10. L3-4 stable slight grade 1 retrolisthesis    11. Neurologic gait disorder         Plan  I will perform left L4 and L5 TFESI(s) in February since she has had 4 lumbar FRANCISCO's in the last 12 months. She will get into the PT again for the lumbar spine and her gait and transfers. She will continue with the Tylenol Arthritis for the pain    The patient will follow up in 3 months, but the patient will call me 2 weeks after having the injection to let me know how the injection worked. The patient understands and agrees with the stated plan. Heather Bond MD  12/4/2023

## 2023-12-05 ENCOUNTER — TELEPHONE (OUTPATIENT)
Dept: PHYSICAL MEDICINE AND REHAB | Facility: CLINIC | Age: 83
End: 2023-12-05

## 2023-12-05 NOTE — TELEPHONE ENCOUNTER
Patient had lumbar esis on 3/7/23, 5/23/23, 10/10/23 and 11/10/23     Since patient has reached the maximum number of lumbar ESIs in a 12 month period.   Dr Kajal Barrett will hold off until procedure can be covered under patients plan  This encounter closed

## 2023-12-05 NOTE — TELEPHONE ENCOUNTER
Per CMS Guidelines -no authorization is required for Left L5 Transforaminal Epidural Steroid injection under fluoroscopy CPT 06044     Status: Authorization is not required based on medical necessity however may be subject to review once claim is submitted-Covered Benefit     Per CMS Guidelines-ESIs are limited to a maximum of four (4) sessions per spinal region in a rolling twelve (12) month period. Patient had lumbar esis on 3/7/23, 5/23/23, 10/10/23 and 11/10/23    Patient has reached the maximum number of lumbar ESIs in a 12 month period.

## 2023-12-10 ENCOUNTER — APPOINTMENT (OUTPATIENT)
Dept: GENERAL RADIOLOGY | Facility: HOSPITAL | Age: 83
End: 2023-12-10
Attending: EMERGENCY MEDICINE
Payer: MEDICARE

## 2023-12-10 ENCOUNTER — HOSPITAL ENCOUNTER (INPATIENT)
Facility: HOSPITAL | Age: 83
LOS: 7 days | Discharge: NURSING, REHAB OR LONG-TERM CARE | End: 2023-12-18
Attending: EMERGENCY MEDICINE | Admitting: STUDENT IN AN ORGANIZED HEALTH CARE EDUCATION/TRAINING PROGRAM
Payer: MEDICARE

## 2023-12-10 DIAGNOSIS — J44.1 COPD EXACERBATION (HCC): Primary | ICD-10-CM

## 2023-12-10 DIAGNOSIS — M54.16 LUMBAR RADICULOPATHY: ICD-10-CM

## 2023-12-10 DIAGNOSIS — M47.816 LUMBAR FACET ARTHROPATHY: ICD-10-CM

## 2023-12-10 LAB
ANION GAP SERPL CALC-SCNC: 4 MMOL/L (ref 0–18)
BASOPHILS # BLD AUTO: 0.04 X10(3) UL (ref 0–0.2)
BASOPHILS NFR BLD AUTO: 0.5 %
BNP SERPL-MCNC: 37 PG/ML
BUN BLD-MCNC: 15 MG/DL (ref 9–23)
BUN/CREAT SERPL: 13.6 (ref 10–20)
CALCIUM BLD-MCNC: 9.5 MG/DL (ref 8.7–10.4)
CHLORIDE SERPL-SCNC: 102 MMOL/L (ref 98–112)
CO2 SERPL-SCNC: 35 MMOL/L (ref 21–32)
CREAT BLD-MCNC: 1.1 MG/DL
DEPRECATED RDW RBC AUTO: 49.9 FL (ref 35.1–46.3)
EGFRCR SERPLBLD CKD-EPI 2021: 49 ML/MIN/1.73M2 (ref 60–?)
EOSINOPHIL # BLD AUTO: 0.35 X10(3) UL (ref 0–0.7)
EOSINOPHIL NFR BLD AUTO: 4.1 %
ERYTHROCYTE [DISTWIDTH] IN BLOOD BY AUTOMATED COUNT: 13.7 % (ref 11–15)
FLUAV + FLUBV RNA SPEC NAA+PROBE: NEGATIVE
FLUAV + FLUBV RNA SPEC NAA+PROBE: NEGATIVE
GLUCOSE BLD-MCNC: 89 MG/DL (ref 70–99)
HCT VFR BLD AUTO: 40.9 %
HGB BLD-MCNC: 13.1 G/DL
IMM GRANULOCYTES # BLD AUTO: 0.04 X10(3) UL (ref 0–1)
IMM GRANULOCYTES NFR BLD: 0.5 %
LYMPHOCYTES # BLD AUTO: 1.75 X10(3) UL (ref 1–4)
LYMPHOCYTES NFR BLD AUTO: 20.3 %
MCH RBC QN AUTO: 31.6 PG (ref 26–34)
MCHC RBC AUTO-ENTMCNC: 32 G/DL (ref 31–37)
MCV RBC AUTO: 98.8 FL
MONOCYTES # BLD AUTO: 0.77 X10(3) UL (ref 0.1–1)
MONOCYTES NFR BLD AUTO: 8.9 %
NEUTROPHILS # BLD AUTO: 5.68 X10 (3) UL (ref 1.5–7.7)
NEUTROPHILS # BLD AUTO: 5.68 X10(3) UL (ref 1.5–7.7)
NEUTROPHILS NFR BLD AUTO: 65.7 %
OSMOLALITY SERPL CALC.SUM OF ELEC: 292 MOSM/KG (ref 275–295)
PLATELET # BLD AUTO: 203 10(3)UL (ref 150–450)
POTASSIUM SERPL-SCNC: 3.6 MMOL/L (ref 3.5–5.1)
PROCALCITONIN SERPL-MCNC: 0.03 NG/ML (ref ?–0.16)
RBC # BLD AUTO: 4.14 X10(6)UL
RSV RNA SPEC NAA+PROBE: NEGATIVE
SARS-COV-2 RNA RESP QL NAA+PROBE: NOT DETECTED
SODIUM SERPL-SCNC: 141 MMOL/L (ref 136–145)
WBC # BLD AUTO: 8.6 X10(3) UL (ref 4–11)

## 2023-12-10 PROCEDURE — 5A0945Z ASSISTANCE WITH RESPIRATORY VENTILATION, 24-96 CONSECUTIVE HOURS: ICD-10-PCS | Performed by: STUDENT IN AN ORGANIZED HEALTH CARE EDUCATION/TRAINING PROGRAM

## 2023-12-10 PROCEDURE — 71045 X-RAY EXAM CHEST 1 VIEW: CPT | Performed by: EMERGENCY MEDICINE

## 2023-12-10 RX ORDER — DIAZEPAM 5 MG/ML
2.5 INJECTION, SOLUTION INTRAMUSCULAR; INTRAVENOUS ONCE
Status: COMPLETED | OUTPATIENT
Start: 2023-12-10 | End: 2023-12-10

## 2023-12-10 RX ORDER — ENOXAPARIN SODIUM 100 MG/ML
40 INJECTION SUBCUTANEOUS DAILY
Status: CANCELLED | OUTPATIENT
Start: 2023-12-11

## 2023-12-10 RX ORDER — ALBUTEROL SULFATE 2.5 MG/3ML
5 SOLUTION RESPIRATORY (INHALATION) ONCE
Status: COMPLETED | OUTPATIENT
Start: 2023-12-10 | End: 2023-12-10

## 2023-12-10 RX ORDER — METHYLPREDNISOLONE SODIUM SUCCINATE 40 MG/ML
40 INJECTION, POWDER, LYOPHILIZED, FOR SOLUTION INTRAMUSCULAR; INTRAVENOUS ONCE
Status: COMPLETED | OUTPATIENT
Start: 2023-12-10 | End: 2023-12-10

## 2023-12-10 NOTE — ED INITIAL ASSESSMENT (HPI)
Patient aox3 to ed via private ems co of matt and weakness and cough. From [facility]. Sx x 2 days.

## 2023-12-11 LAB
ATRIAL RATE: 77 BPM
P AXIS: 36 DEGREES
P-R INTERVAL: 192 MS
POTASSIUM SERPL-SCNC: 3.9 MMOL/L (ref 3.5–5.1)
Q-T INTERVAL: 388 MS
QRS DURATION: 88 MS
QTC CALCULATION (BEZET): 439 MS
R AXIS: -12 DEGREES
T AXIS: 40 DEGREES
VENTRICULAR RATE: 77 BPM

## 2023-12-11 RX ORDER — POTASSIUM CHLORIDE 20 MEQ/1
40 TABLET, EXTENDED RELEASE ORAL EVERY 4 HOURS
Status: COMPLETED | OUTPATIENT
Start: 2023-12-11 | End: 2023-12-11

## 2023-12-11 RX ORDER — BENZTROPINE MESYLATE 1 MG/1
1 TABLET ORAL 2 TIMES DAILY
Status: DISCONTINUED | OUTPATIENT
Start: 2023-12-11 | End: 2023-12-18

## 2023-12-11 RX ORDER — METHYLPREDNISOLONE SODIUM SUCCINATE 40 MG/ML
40 INJECTION, POWDER, LYOPHILIZED, FOR SOLUTION INTRAMUSCULAR; INTRAVENOUS EVERY 8 HOURS
Status: DISCONTINUED | OUTPATIENT
Start: 2023-12-11 | End: 2023-12-11

## 2023-12-11 RX ORDER — IPRATROPIUM BROMIDE AND ALBUTEROL SULFATE 2.5; .5 MG/3ML; MG/3ML
3 SOLUTION RESPIRATORY (INHALATION)
Status: DISCONTINUED | OUTPATIENT
Start: 2023-12-11 | End: 2023-12-12

## 2023-12-11 RX ORDER — ACETAMINOPHEN 500 MG
500 TABLET ORAL EVERY 4 HOURS PRN
Status: DISCONTINUED | OUTPATIENT
Start: 2023-12-11 | End: 2023-12-18

## 2023-12-11 RX ORDER — LAMOTRIGINE 100 MG/1
100 TABLET ORAL 2 TIMES DAILY
Status: DISCONTINUED | OUTPATIENT
Start: 2023-12-11 | End: 2023-12-18

## 2023-12-11 RX ORDER — METHYLPREDNISOLONE SODIUM SUCCINATE 125 MG/2ML
60 INJECTION, POWDER, LYOPHILIZED, FOR SOLUTION INTRAMUSCULAR; INTRAVENOUS EVERY 8 HOURS
Status: DISCONTINUED | OUTPATIENT
Start: 2023-12-11 | End: 2023-12-14

## 2023-12-11 RX ORDER — DONEPEZIL HYDROCHLORIDE 5 MG/1
5 TABLET, FILM COATED ORAL 2 TIMES DAILY
Status: DISCONTINUED | OUTPATIENT
Start: 2023-12-11 | End: 2023-12-18

## 2023-12-11 RX ORDER — DONEPEZIL HYDROCHLORIDE 10 MG/1
10 TABLET, FILM COATED ORAL NIGHTLY
Status: ON HOLD | COMMUNITY
End: 2023-12-11 | Stop reason: CLARIF

## 2023-12-11 RX ORDER — PANTOPRAZOLE SODIUM 40 MG/1
40 TABLET, DELAYED RELEASE ORAL
Status: DISCONTINUED | OUTPATIENT
Start: 2023-12-11 | End: 2023-12-18

## 2023-12-11 RX ORDER — BENZONATATE 100 MG/1
100 CAPSULE ORAL 3 TIMES DAILY PRN
Status: DISCONTINUED | OUTPATIENT
Start: 2023-12-11 | End: 2023-12-18

## 2023-12-11 RX ORDER — CLOTRIMAZOLE 1 %
1 CREAM (GRAM) TOPICAL 2 TIMES DAILY
COMMUNITY

## 2023-12-11 RX ORDER — DONEPEZIL HYDROCHLORIDE 5 MG/1
5 TABLET, FILM COATED ORAL NIGHTLY
Status: DISCONTINUED | OUTPATIENT
Start: 2023-12-11 | End: 2023-12-11

## 2023-12-11 RX ORDER — DONEPEZIL HYDROCHLORIDE 5 MG/1
5 TABLET, FILM COATED ORAL 2 TIMES DAILY
COMMUNITY

## 2023-12-11 RX ORDER — MELATONIN
3 NIGHTLY PRN
Status: DISCONTINUED | OUTPATIENT
Start: 2023-12-11 | End: 2023-12-18

## 2023-12-11 RX ORDER — GABAPENTIN 100 MG/1
200 CAPSULE ORAL 2 TIMES DAILY
Status: DISCONTINUED | OUTPATIENT
Start: 2023-12-11 | End: 2023-12-18

## 2023-12-11 RX ORDER — HYDRALAZINE HYDROCHLORIDE 20 MG/ML
INJECTION INTRAMUSCULAR; INTRAVENOUS
Status: DISPENSED
Start: 2023-12-11 | End: 2023-12-12

## 2023-12-11 RX ORDER — ALBUTEROL SULFATE 90 UG/1
2 AEROSOL, METERED RESPIRATORY (INHALATION) EVERY 6 HOURS PRN
COMMUNITY

## 2023-12-11 RX ORDER — HYDRALAZINE HYDROCHLORIDE 25 MG/1
25 TABLET, FILM COATED ORAL EVERY 6 HOURS PRN
Status: DISCONTINUED | OUTPATIENT
Start: 2023-12-11 | End: 2023-12-18

## 2023-12-11 RX ORDER — GUAIFENESIN 600 MG/1
600 TABLET, EXTENDED RELEASE ORAL 2 TIMES DAILY
Status: DISCONTINUED | OUTPATIENT
Start: 2023-12-11 | End: 2023-12-18

## 2023-12-11 RX ORDER — FUROSEMIDE 20 MG/1
20 TABLET ORAL DAILY
Status: DISCONTINUED | OUTPATIENT
Start: 2023-12-11 | End: 2023-12-18

## 2023-12-11 RX ORDER — FLUTICASONE FUROATE AND VILANTEROL 100; 25 UG/1; UG/1
1 POWDER RESPIRATORY (INHALATION) DAILY
COMMUNITY

## 2023-12-11 RX ORDER — CLOTRIMAZOLE 1 %
CREAM (GRAM) TOPICAL 2 TIMES DAILY
Status: DISCONTINUED | OUTPATIENT
Start: 2023-12-11 | End: 2023-12-18

## 2023-12-11 RX ORDER — AZITHROMYCIN 250 MG/1
500 TABLET, FILM COATED ORAL DAILY
Status: DISCONTINUED | OUTPATIENT
Start: 2023-12-11 | End: 2023-12-15

## 2023-12-11 RX ORDER — CYCLOBENZAPRINE HCL 5 MG
5 TABLET ORAL EVERY 6 HOURS PRN
Status: DISCONTINUED | OUTPATIENT
Start: 2023-12-11 | End: 2023-12-18

## 2023-12-11 RX ORDER — DONEPEZIL HYDROCHLORIDE 10 MG/1
10 TABLET, FILM COATED ORAL NIGHTLY
Status: DISCONTINUED | OUTPATIENT
Start: 2023-12-11 | End: 2023-12-11

## 2023-12-11 RX ORDER — QUETIAPINE FUMARATE 100 MG/1
100 TABLET, FILM COATED ORAL NIGHTLY
Status: DISCONTINUED | OUTPATIENT
Start: 2023-12-11 | End: 2023-12-18

## 2023-12-11 RX ORDER — DOCUSATE SODIUM 100 MG/1
100 CAPSULE, LIQUID FILLED ORAL 2 TIMES DAILY PRN
Status: DISCONTINUED | OUTPATIENT
Start: 2023-12-11 | End: 2023-12-18

## 2023-12-11 RX ORDER — LEVOTHYROXINE SODIUM 0.1 MG/1
100 TABLET ORAL
Status: DISCONTINUED | OUTPATIENT
Start: 2023-12-11 | End: 2023-12-18

## 2023-12-11 NOTE — ED QUICK NOTES
Orders for admission, patient is aware of plan and ready to go upstairs. Any questions, please call ED RN edy at extension [phone].      Patient Covid vaccination status: Fully vaccinated     COVID Test Ordered in ED: SARS-CoV-2/Flu A and B/RSV by PCR (GeneXpert)    COVID Suspicion at Admission: N/A    Running Infusions:  None    Mental Status/LOC at time of transport: a & o x 3    Other pertinent information:   CIWA score: N/A   NIH score:  N/A

## 2023-12-12 LAB
ANION GAP SERPL CALC-SCNC: 3 MMOL/L (ref 0–18)
BASOPHILS # BLD AUTO: 0.01 X10(3) UL (ref 0–0.2)
BASOPHILS NFR BLD AUTO: 0.1 %
BUN BLD-MCNC: 25 MG/DL (ref 9–23)
BUN/CREAT SERPL: 22.9 (ref 10–20)
CALCIUM BLD-MCNC: 10.3 MG/DL (ref 8.7–10.4)
CHLORIDE SERPL-SCNC: 108 MMOL/L (ref 98–112)
CO2 SERPL-SCNC: 31 MMOL/L (ref 21–32)
CREAT BLD-MCNC: 1.09 MG/DL
DEPRECATED RDW RBC AUTO: 49 FL (ref 35.1–46.3)
EGFRCR SERPLBLD CKD-EPI 2021: 50 ML/MIN/1.73M2 (ref 60–?)
EOSINOPHIL # BLD AUTO: 0 X10(3) UL (ref 0–0.7)
EOSINOPHIL NFR BLD AUTO: 0 %
ERYTHROCYTE [DISTWIDTH] IN BLOOD BY AUTOMATED COUNT: 13.7 % (ref 11–15)
GLUCOSE BLD-MCNC: 210 MG/DL (ref 70–99)
HCT VFR BLD AUTO: 36.9 %
HGB BLD-MCNC: 12.3 G/DL
IMM GRANULOCYTES # BLD AUTO: 0.06 X10(3) UL (ref 0–1)
IMM GRANULOCYTES NFR BLD: 0.6 %
LYMPHOCYTES # BLD AUTO: 0.39 X10(3) UL (ref 1–4)
LYMPHOCYTES NFR BLD AUTO: 3.7 %
MCH RBC QN AUTO: 32.2 PG (ref 26–34)
MCHC RBC AUTO-ENTMCNC: 33.3 G/DL (ref 31–37)
MCV RBC AUTO: 96.6 FL
MONOCYTES # BLD AUTO: 0.31 X10(3) UL (ref 0.1–1)
MONOCYTES NFR BLD AUTO: 3 %
NEUTROPHILS # BLD AUTO: 9.72 X10 (3) UL (ref 1.5–7.7)
NEUTROPHILS # BLD AUTO: 9.72 X10(3) UL (ref 1.5–7.7)
NEUTROPHILS NFR BLD AUTO: 92.6 %
OSMOLALITY SERPL CALC.SUM OF ELEC: 305 MOSM/KG (ref 275–295)
PLATELET # BLD AUTO: 183 10(3)UL (ref 150–450)
POTASSIUM SERPL-SCNC: 4.5 MMOL/L (ref 3.5–5.1)
RBC # BLD AUTO: 3.82 X10(6)UL
SODIUM SERPL-SCNC: 142 MMOL/L (ref 136–145)
WBC # BLD AUTO: 10.5 X10(3) UL (ref 4–11)

## 2023-12-12 RX ORDER — IPRATROPIUM BROMIDE AND ALBUTEROL SULFATE 2.5; .5 MG/3ML; MG/3ML
3 SOLUTION RESPIRATORY (INHALATION)
Status: DISCONTINUED | OUTPATIENT
Start: 2023-12-12 | End: 2023-12-13

## 2023-12-12 RX ORDER — BUDESONIDE 0.25 MG/2ML
0.25 INHALANT ORAL
Status: DISCONTINUED | OUTPATIENT
Start: 2023-12-12 | End: 2023-12-15

## 2023-12-12 NOTE — PLAN OF CARE
Pt a/ox4, 1.5 L of o2. Iv solumedrol given. Bed alarm on call light in reach. Problem: Risk for Violence/Aggression  Goal: Absence of Violence/Aggression  Description: INTERVENTIONS:   - Identify precipitating factors for behavior   - Notify Charge RN/Provider   - Consider decreasing stimulation   - Consider distraction measures   - Consider discussion with provider regarding prn meds    - Consider SHERI (Moderate Risk only)   - Consider Code Support (High Risk only)   - Consider room safety checks   - Consider restraints  Outcome: Progressing     Problem: Patient Centered Care  Goal: Patient preferences are identified and integrated in the patient's plan of care  Description: Interventions:  - What would you like us to know as we care for you?  From concord  - Provide timely, complete, and accurate information to patient/family  - Incorporate patient and family knowledge, values, beliefs, and cultural backgrounds into the planning and delivery of care  - Encourage patient/family to participate in care and decision-making at the level they choose  - Honor patient and family perspectives and choices  Outcome: Progressing     Problem: Patient/Family Goals  Goal: Patient/Family Long Term Goal  Description: Patient's Long Term Goal:   Interventions:  - See additional Care Plan goals for specific interventions  Outcome: Progressing  Goal: Patient/Family Short Term Goal  Description: Patient's Short Term Goal  Interventions  - See additional Care Plan goals for specific interventions  Outcome: Progressing

## 2023-12-12 NOTE — PLAN OF CARE
Weaned to room air. Congested cough, prn cough meds ordered. Pt states she feels better, but gets short of breath with coughing. Pt able to rotate herself, assist offered. Tolerating diet. Incontinence care provided, excoriation or sacrum and periarea. All safety measures in place. Left voicemail for son about room change and number to call for updates. Report given to [facility]. Problem: Risk for Violence/Aggression  Goal: Absence of Violence/Aggression  Description: INTERVENTIONS:   - Identify precipitating factors for behavior   - Notify Charge RN/Provider   - Consider decreasing stimulation   - Consider distraction measures   - Consider discussion with provider regarding prn meds    - Consider SHERI (Moderate Risk only)   - Consider Code Support (High Risk only)   - Consider room safety checks   - Consider restraints  Outcome: Progressing     Problem: Patient Centered Care  Goal: Patient preferences are identified and integrated in the patient's plan of care  Description: Interventions:  - What would you like us to know as we care for you?  I use a wheelchair   - Provide timely, complete, and accurate information to patient/family  - Incorporate patient and family knowledge, values, beliefs, and cultural backgrounds into the planning and delivery of care  - Encourage patient/family to participate in care and decision-making at the level they choose  - Honor patient and family perspectives and choices  Outcome: Progressing     Problem: Patient/Family Goals  Goal: Patient/Family Long Term Goal  Description: Patient's Long Term Goal: go home    Interventions:  - wean O2  -PRN meds  -activity as tolerated  - See additional Care Plan goals for specific interventions  Outcome: Progressing  Goal: Patient/Family Short Term Goal  Description: Patient's Short Term Goal: cough management     Interventions:   - PRN meds  -nebs  -IS  - See additional Care Plan goals for specific interventions  Outcome: Progressing Problem: SAFETY ADULT - FALL  Goal: Free from fall injury  Description: INTERVENTIONS:  - Assess pt frequently for physical needs  - Identify cognitive and physical deficits and behaviors that affect risk of falls.   - Venus fall precautions as indicated by assessment.  - Educate pt/family on patient safety including physical limitations  - Instruct pt to call for assistance with activity based on assessment  - Modify environment to reduce risk of injury  - Provide assistive devices as appropriate  - Consider OT/PT consult to assist with strengthening/mobility  - Encourage toileting schedule  Outcome: Progressing     Problem: DISCHARGE PLANNING  Goal: Discharge to home or other facility with appropriate resources  Description: INTERVENTIONS:  - Identify barriers to discharge w/pt and caregiver  - Include patient/family/discharge partner in discharge planning  - Arrange for needed discharge resources and transportation as appropriate  - Identify discharge learning needs (meds, wound care, etc)  - Arrange for interpreters to assist at discharge as needed  - Consider post-discharge preferences of patient/family/discharge partner  - Complete POLST form as appropriate  - Assess patient's ability to be responsible for managing their own health  - Refer to Case Management Department for coordinating discharge planning if the patient needs post-hospital services based on physician/LIP order or complex needs related to functional status, cognitive ability or social support system  Outcome: Progressing     Problem: RESPIRATORY - ADULT  Goal: Achieves optimal ventilation and oxygenation  Description: INTERVENTIONS:  - Assess for changes in respiratory status  - Assess for changes in mentation and behavior  - Position to facilitate oxygenation and minimize respiratory effort  - Oxygen supplementation based on oxygen saturation or ABGs  - Provide Smoking Cessation handout, if applicable  - Encourage broncho-pulmonary hygiene including cough, deep breathe, Incentive Spirometry  - Assess the need for suctioning and perform as needed  - Assess and instruct to report SOB or any respiratory difficulty  - Respiratory Therapy support as indicated  - Manage/alleviate anxiety  - Monitor for signs/symptoms of CO2 retention  Outcome: Progressing

## 2023-12-13 LAB
EST. AVERAGE GLUCOSE BLD GHB EST-MCNC: 123 MG/DL (ref 68–126)
GLUCOSE BLDC GLUCOMTR-MCNC: 131 MG/DL (ref 70–99)
GLUCOSE BLDC GLUCOMTR-MCNC: 153 MG/DL (ref 70–99)
GLUCOSE BLDC GLUCOMTR-MCNC: 170 MG/DL (ref 70–99)
GLUCOSE BLDC GLUCOMTR-MCNC: 176 MG/DL (ref 70–99)
HBA1C MFR BLD: 5.9 % (ref ?–5.7)

## 2023-12-13 RX ORDER — BUDESONIDE 0.5 MG/2ML
INHALANT ORAL
Status: COMPLETED
Start: 2023-12-13 | End: 2023-12-13

## 2023-12-13 RX ORDER — DEXTROSE MONOHYDRATE 25 G/50ML
50 INJECTION, SOLUTION INTRAVENOUS
Status: DISCONTINUED | OUTPATIENT
Start: 2023-12-13 | End: 2023-12-18

## 2023-12-13 RX ORDER — NICOTINE POLACRILEX 4 MG
30 LOZENGE BUCCAL
Status: DISCONTINUED | OUTPATIENT
Start: 2023-12-13 | End: 2023-12-18

## 2023-12-13 RX ORDER — DIAZEPAM 2 MG/1
2 TABLET ORAL EVERY 6 HOURS PRN
Status: DISCONTINUED | OUTPATIENT
Start: 2023-12-13 | End: 2023-12-18

## 2023-12-13 RX ORDER — IPRATROPIUM BROMIDE AND ALBUTEROL SULFATE 2.5; .5 MG/3ML; MG/3ML
3 SOLUTION RESPIRATORY (INHALATION)
Status: DISCONTINUED | OUTPATIENT
Start: 2023-12-13 | End: 2023-12-18

## 2023-12-13 RX ORDER — NICOTINE POLACRILEX 4 MG
15 LOZENGE BUCCAL
Status: DISCONTINUED | OUTPATIENT
Start: 2023-12-13 | End: 2023-12-18

## 2023-12-13 NOTE — CM/SW NOTE
12/13/23 1300   CM/SW Referral Data   Referral Source    Reason for Referral Discharge planning   Informant Patient   Medical Hx   Does patient have an established PCP? Yes  Ayala Layton)   Patient Info   Patient's Current Mental Status at Time of Assessment Alert;Oriented   Patient's Home Environment Assisted Living   Post Acute Care Provider Upon Admission   [facility] in [place])   Patient Status Prior to Admission   Independent with ADLs and Mobility No   Pt. requires assistance with Housework;Driving;Meals; Bathing; Ambulating;Dressing;Medications; Toileting;Finances   Discharge Needs   Anticipated D/C needs No anticipated discharge needs     Pt discussed during nursing rounds. Dx COPD exacerbation on RA. From [facility] in [place], R Brina Rob 23 bound at baseline. Pt reports she was to start in-house therapy services at her [facility] prior to her admission. PT/OT recommending [facility]. Pt is declining [facility], and is stating that she would prefer to return to [facility] and start in-house therapy services there. SW on 12/12 placed ambulance on will call from 12/12 to 12/15. FLAKITA also completed PCS on 12/12. Plan: [facility] pending medical clearance. / to remain available for support and/or discharge planning.      DARBY Chen    [phone]

## 2023-12-13 NOTE — PHYSICAL THERAPY NOTE
PHYSICAL THERAPY EVALUATION - INPATIENT     Room Number: 123/354-B  Evaluation Date: 12/13/2023  Type of Evaluation: Initial      Reason for Therapy: Mobility Dysfunction and Discharge Planning    PHYSICAL THERAPY ASSESSMENT   Orders received and chart reviewed. RN approved participation in physical therapy. Patient is a 78year old female admitted 12/10/2023 for ambulation. Patient presented in bed anxious about sitting up. Education provided on Physical therapy plan of care, physiological benefits of out of bed mobility, and activity pacing . Patient with fair carryover. Pt refused out of bed to chair but was agreeable to sit EOB with support. Pt tolerated sitting EOB for 3 min prior to sudden onset of dizziness. Pt returned to supine /86 mmHg. Bed mobility: mod x2 supine to/from sit and mod Ax1 for balance sitting EOB. Transfers: NT, pt too fearful       Patient was left in bed at end of session with all needs in reach. Patient's current functional deficits include ambulation. The patient's Approx Degree of Impairment: 76.75% has been calculated based on documentation in the [facility] '6 clicks' Inpatient Basic Mobility Short Form. Research supports that patients with this level of impairment may benefit from Home with home health PT. RN aware of patient status post session. Patient will benefit from continued IP PT services to address these deficits in preparation for discharge.     DISCHARGE RECOMMENDATIONS  PT Discharge Recommendations: Sub-acute rehabilitation    PLAN      PHYSICAL THERAPY MEDICAL/SOCIAL HISTORY     History related to current admission:      Problem List  Principal Problem:    COPD exacerbation [facility])      Past Medical History  Past Medical History:   Diagnosis Date    Anxiety state, unspecified     Aortic atherosclerosis ([facility] [address]     CT scan 11-19    Arthritis of both knees     knee replacement     Arthritis of right hip 2009    Back problem     Brain aneurysm     Brain Aneurysm, Stent placed     Cervical disc disease 1985,1995,2003,2009    Cervical Discectomy     Cholecystitis 1984    Cholecystectomy     COPD (chronic obstructive pulmonary disease) ([facility])     PFTs 2-15 with FEV1 1.11 L and FEV1/FVC ratio 63%    Deep vein thrombosis (HCC)     recurrent DVT- on lifelong AC    Dehydration 2012    Fluids, Medication adjustment     Dementia (HCC)     Depression     Disorder of thyroid     Esophageal reflux     Fibromyalgia     Hammertoe 02/25/2011    hammertoe 5 left, pain    Hearing impairment     Bilateral hearing aids    High blood pressure     Hip pain, left     Hypothyroidism     Incontinence     L3-4 stable slight grade 1 retrolisthesis 10/31/2014    L4-5 mild-mod diffuse bulging disc 10/31/2014    L4-5 slight grade 1 unstable spondylolisthesis 10/31/2014    Leg wound, left 08/21/2019    Low back pain     Migraines     Muscle weakness     Nausea vomiting and diarrhea 11/20/2019    Onychomycosis     Debridement     Oropharyngeal dysphagia 08/29/2017    Osteoarthrosis, unspecified whether generalized or localized, unspecified site     Other and unspecified hyperlipidemia     Other complicated headache syndrome 11/02/2017    Other spondylosis, lumbar region 11/02/2017    Pneumonia 2012    S/P cervical spinal fusion: C3-6 and C7-T1 11/02/2017    s/p L5-S1 right laminectomy 08/28/2014    Sleep apnea     stent placement     cerebral    Thyroid disease     Toe pain     Onychomycosis, Debridement , Hammertoe     Tonsillitis 1950    Tonsillitis     Unspecified sleep apnea     Visual impairment     EYE GLASSES    Wound of left leg 10/03/2019    Left leg debridement and Split Thickness Skin Graft to left thigh       Past Surgical History  Past Surgical History:   Procedure Laterality Date    ANESTH,NECK VESSEL SURGERY NOS      x4    BRAIN SURGERY  ~2009    Brain Aneurysm, Stent placed     CAROTID ENDARTERECTOMY Right     CARPAL TUNNEL RELEASE Right ~2008    CHOLECYSTECTOMY  1984    Cholecystitis COLONOSCOPY  2010    DEBRIDEMENT OF NAIL(S), 1-5      Toe, Onychomycosis    EGD  05/2019    Gastric polyps only    EGD  2006    EYE SURGERY      x2 FOR \"CROSSED EYES\"    HIP REPLACEMENT SURGERY Bilateral     HYSTERECTOMY  1967    Partial Hysterectomy    JAW SURGERY      KNEE REPLACEMENT SURGERY Bilateral     Bilateral arthritis     LAMINECTOMY      WITH FUSION PER PATIENT    OTHER SURGICAL HISTORY  1985,1995,2003,2009    Cervical Discectomy AND FUSION    PREP SITE T/A/L 1ST 100 SQ CM/1PCT Left 08/21/2019    Left leg debridement, VAC placed    SPLIT GRFT PROC TRUNK <100SQCM Left 10/08/2019    Left leg debridement and Split Thickness Skin Graft to left thigh    TONSILLECTOMY  1950    Tonsillitis        HOME SITUATION  Type of Home: Assisted living facility   Home Layout: One level              Lives With:  (Staff part-time)  Drives: No          Prior Level of [place]: ind    SUBJECTIVE  \"I can't do this\"    PHYSICAL THERAPY EXAMINATION     OBJECTIVE  Precautions: Bed/chair alarm  Fall Risk: High fall risk    WEIGHT BEARING RESTRICTION  Weight Bearing Restriction: None             COGNITION  Overall Cognitive Status:  WFL - within functional limits    RANGE OF MOTION AND STRENGTH ASSESSMENT  Upper extremity ROM and strength are within functional limits   Lower extremity ROM is within functional limits   Lower extremity strength is within functional limits     BALANCE  Static Sitting: Poor  Dynamic Sitting: Poor  Static Standing: Not tested  Dynamic Standing: Not tested        AM-PAC '6-Clicks' INPATIENT SHORT FORM - BASIC MOBILITY  How much difficulty does the patient currently have. ..   Patient Difficulty: Turning over in bed (including adjusting bedclothes, sheets and blankets)?: A Lot   Patient Difficulty: Sitting down on and standing up from a chair with arms (e.g., wheelchair, bedside commode, etc.): A Lot   Patient Difficulty: Moving from lying on back to sitting on the side of the bed?: A Lot   How much help from another person does the patient currently need. .. Help from Another: Moving to and from a bed to a chair (including a wheelchair)?: A Lot   Help from Another: Need to walk in hospital room?: Total   Help from Another: Climbing 3-5 steps with a railing?: Total     AM-PAC Score:  Raw Score: 10   Approx Degree of Impairment: 76.75%   Standardized Score (AM-PAC Scale): 32.29   CMS Modifier (G-Code): CL      Exercise/Education Provided:  Bed mobility  Gait training  Transfer training    Patient End of Session: In bed    CURRENT GOALS    Goals to be met by: 12/27  Patient Goal Patient's self-stated goal is: home   Goal #1 Patient is able to demonstrate supine - sit EOB @ level: supervision     Goal #1   Current Status    Goal #2 Patient is able to demonstrate transfers Sit to/from Stand at assistance level: supervision with walker - rolling     Goal #2  Current Status    Goal #3 Patient is able to ambulate 150 feet with assist device: walker - rolling at assistance level: supervision   Goal #3   Current Status    Goal #4 Patient will negotiate 3 stairs/one curb w/ assistive device and supervision   Goal #4   Current Status    Goal #5 Patient to demonstrate independence with home activity/exercise instructions provided to patient in preparation for discharge.    Goal #5   Current Status    Goal #6    Goal #6  Current Status      Patient Evaluation Complexity Level:  History Low - no personal factors and/or co-morbidities   Examination of body systems Low - addressing 1-2 elements   Clinical Presentation Low - Stable   Clinical Decision Making Low Complexity

## 2023-12-13 NOTE — PLAN OF CARE
Pt in bed resting. Oxygen therapy continued, pt on 2L O2 with productive cough present. IV saline locked. Remote tele and pulse oximetry in place. Pt's wounds on sacrum and heels dressed with aquacel foam. Frequent rounding done. Problem: Risk for Violence/Aggression  Goal: Absence of Violence/Aggression  Description: INTERVENTIONS:   - Identify precipitating factors for behavior   - Notify Charge RN/Provider   - Consider decreasing stimulation   - Consider distraction measures   - Consider discussion with provider regarding prn meds    - Consider SHERI (Moderate Risk only)   - Consider Code Support (High Risk only)   - Consider room safety checks   - Consider restraints  Outcome: Progressing     Problem: Patient Centered Care  Goal: Patient preferences are identified and integrated in the patient's plan of care  Description: Interventions:  - What would you like us to know as we care for you?  I use a wheelchair   - Provide timely, complete, and accurate information to patient/family  - Incorporate patient and family knowledge, values, beliefs, and cultural backgrounds into the planning and delivery of care  - Encourage patient/family to participate in care and decision-making at the level they choose  - Honor patient and family perspectives and choices  Outcome: Progressing     Problem: Patient/Family Goals  Goal: Patient/Family Long Term Goal  Description: Patient's Long Term Goal: go home    Interventions:  - wean O2  -PRN meds  -activity as tolerated  - See additional Care Plan goals for specific interventions  Outcome: Progressing  Goal: Patient/Family Short Term Goal  Description: Patient's Short Term Goal: cough management     Interventions:   - PRN meds  -nebs  -IS  - See additional Care Plan goals for specific interventions  Outcome: Progressing     Problem: SAFETY ADULT - FALL  Goal: Free from fall injury  Description: INTERVENTIONS:  - Assess pt frequently for physical needs  - Identify cognitive and physical deficits and behaviors that affect risk of falls.   - [place] fall precautions as indicated by assessment.  - Educate pt/family on patient safety including physical limitations  - Instruct pt to call for assistance with activity based on assessment  - Modify environment to reduce risk of injury  - Provide assistive devices as appropriate  - Consider OT/PT consult to assist with strengthening/mobility  - Encourage toileting schedule  Outcome: Progressing     Problem: DISCHARGE PLANNING  Goal: Discharge to home or other facility with appropriate resources  Description: INTERVENTIONS:  - Identify barriers to discharge w/pt and caregiver  - Include patient/family/discharge partner in discharge planning  - Arrange for needed discharge resources and transportation as appropriate  - Identify discharge learning needs (meds, wound care, etc)  - Arrange for interpreters to assist at discharge as needed  - Consider post-discharge preferences of patient/family/discharge partner  - Complete POLST form as appropriate  - Assess patient's ability to be responsible for managing their own health  - Refer to Case Management Department for coordinating discharge planning if the patient needs post-hospital services based on physician/LIP order or complex needs related to functional status, cognitive ability or social support system  Outcome: Progressing     Problem: RESPIRATORY - ADULT  Goal: Achieves optimal ventilation and oxygenation  Description: INTERVENTIONS:  - Assess for changes in respiratory status  - Assess for changes in mentation and behavior  - Position to facilitate oxygenation and minimize respiratory effort  - Oxygen supplementation based on oxygen saturation or ABGs  - Provide Smoking Cessation handout, if applicable  - Encourage broncho-pulmonary hygiene including cough, deep breathe, Incentive Spirometry  - Assess the need for suctioning and perform as needed  - Assess and instruct to report SOB or any respiratory difficulty  - Respiratory Therapy support as indicated  - Manage/alleviate anxiety  - Monitor for signs/symptoms of CO2 retention  Outcome: Progressing

## 2023-12-13 NOTE — PROGRESS NOTES
[facility] reviewed [facility] [place] plan and has no additional suggestions for management of patient behaviors. Treatment team can consult psychiatry for medication management if behavioral interventions prove insufficient.     Juan Keane, [facility]

## 2023-12-13 NOTE — PLAN OF CARE
Problem: Risk for Violence/Aggression  Goal: Absence of Violence/Aggression  Description: INTERVENTIONS:   - Identify precipitating factors for behavior   - Notify Charge RN/Provider   - Consider decreasing stimulation   - Consider distraction measures   - Consider discussion with provider regarding prn meds    - Consider SHERI (Moderate Risk only)   - Consider Code Support (High Risk only)   - Consider room safety checks   - Consider restraints  Outcome: Progressing     Problem: Patient Centered Care  Goal: Patient preferences are identified and integrated in the patient's plan of care  Description: Interventions:  - What would you like us to know as we care for you? I use a wheelchair   - Provide timely, complete, and accurate information to patient/family  - Incorporate patient and family knowledge, values, beliefs, and cultural backgrounds into the planning and delivery of care  - Encourage patient/family to participate in care and decision-making at the level they choose  - Honor patient and family perspectives and choices  Outcome: Progressing     Problem: Patient/Family Goals  Goal: Patient/Family Long Term Goal  Description: Patient's Long Term Goal: go home    Interventions:  - wean O2  -PRN meds  -activity as tolerated  - See additional Care Plan goals for specific interventions  Outcome: Progressing  Goal: Patient/Family Short Term Goal  Description: Patient's Short Term Goal: cough management     Interventions:   - PRN meds  -nebs  -IS  - See additional Care Plan goals for specific interventions  Outcome: Progressing     Problem: SAFETY ADULT - FALL  Goal: Free from fall injury  Description: INTERVENTIONS:  - Assess pt frequently for physical needs  - Identify cognitive and physical deficits and behaviors that affect risk of falls.   - [place] fall precautions as indicated by assessment.  - Educate pt/family on patient safety including physical limitations  - Instruct pt to call for assistance with activity based on assessment  - Modify environment to reduce risk of injury  - Provide assistive devices as appropriate  - Consider OT/PT consult to assist with strengthening/mobility  - Encourage toileting schedule  Outcome: Progressing     Problem: DISCHARGE PLANNING  Goal: Discharge to home or other facility with appropriate resources  Description: INTERVENTIONS:  - Identify barriers to discharge w/pt and caregiver  - Include patient/family/discharge partner in discharge planning  - Arrange for needed discharge resources and transportation as appropriate  - Identify discharge learning needs (meds, wound care, etc)  - Arrange for interpreters to assist at discharge as needed  - Consider post-discharge preferences of patient/family/discharge partner  - Complete POLST form as appropriate  - Assess patient's ability to be responsible for managing their own health  - Refer to Case Management Department for coordinating discharge planning if the patient needs post-hospital services based on physician/LIP order or complex needs related to functional status, cognitive ability or social support system  Outcome: Progressing     Problem: RESPIRATORY - ADULT  Goal: Achieves optimal ventilation and oxygenation  Description: INTERVENTIONS:  - Assess for changes in respiratory status  - Assess for changes in mentation and behavior  - Position to facilitate oxygenation and minimize respiratory effort  - Oxygen supplementation based on oxygen saturation or ABGs  - Provide Smoking Cessation handout, if applicable  - Encourage broncho-pulmonary hygiene including cough, deep breathe, Incentive Spirometry  - Assess the need for suctioning and perform as needed  - Assess and instruct to report SOB or any respiratory difficulty  - Respiratory Therapy support as indicated  - Manage/alleviate anxiety  - Monitor for signs/symptoms of CO2 retention  Outcome: Progressing     Problem: Diabetes/Glucose Control  Goal: Glucose maintained within prescribed range  Description: INTERVENTIONS:  - Monitor Blood Glucose as ordered  - Assess for signs and symptoms of hyperglycemia and hypoglycemia  - Administer ordered medications to maintain glucose within target range  - Assess barriers to adequate nutritional intake and initiate nutrition consult as needed  - Instruct patient on self management of diabetes  Outcome: Progressing     Patient is presently resting in the bed. Alert x 4. Vital signs taken and stable. Patient is medicated as needed for pain or discomfort. Purewick in place and to suction and draining yellow urine. Patient tolerates diet well. Kept clean and dry. Prompt care is given to incontinence. Patient receives oral antibiotics per doctor orders. Fall risk precautions are followed to ensure safety at all times. Call light within reach at all times.

## 2023-12-14 ENCOUNTER — APPOINTMENT (OUTPATIENT)
Dept: GENERAL RADIOLOGY | Facility: HOSPITAL | Age: 83
End: 2023-12-14
Attending: INTERNAL MEDICINE
Payer: MEDICARE

## 2023-12-14 LAB
ANION GAP SERPL CALC-SCNC: 4 MMOL/L (ref 0–18)
ANION GAP SERPL CALC-SCNC: 8 MMOL/L (ref 0–18)
BASOPHILS # BLD AUTO: 0.05 X10(3) UL (ref 0–0.2)
BASOPHILS NFR BLD AUTO: 0.3 %
BUN BLD-MCNC: 35 MG/DL (ref 9–23)
BUN BLD-MCNC: 40 MG/DL (ref 9–23)
BUN/CREAT SERPL: 31.3 (ref 10–20)
BUN/CREAT SERPL: 32.7 (ref 10–20)
CALCIUM BLD-MCNC: 10.1 MG/DL (ref 8.7–10.4)
CALCIUM BLD-MCNC: 10.1 MG/DL (ref 8.7–10.4)
CHLORIDE SERPL-SCNC: 103 MMOL/L (ref 98–112)
CHLORIDE SERPL-SCNC: 104 MMOL/L (ref 98–112)
CO2 SERPL-SCNC: 30 MMOL/L (ref 21–32)
CO2 SERPL-SCNC: 31 MMOL/L (ref 21–32)
CREAT BLD-MCNC: 1.07 MG/DL
CREAT BLD-MCNC: 1.28 MG/DL
DEPRECATED RDW RBC AUTO: 50.2 FL (ref 35.1–46.3)
EGFRCR SERPLBLD CKD-EPI 2021: 41 ML/MIN/1.73M2 (ref 60–?)
EGFRCR SERPLBLD CKD-EPI 2021: 51 ML/MIN/1.73M2 (ref 60–?)
EOSINOPHIL # BLD AUTO: 0 X10(3) UL (ref 0–0.7)
EOSINOPHIL NFR BLD AUTO: 0 %
ERYTHROCYTE [DISTWIDTH] IN BLOOD BY AUTOMATED COUNT: 13.5 % (ref 11–15)
GLUCOSE BLD-MCNC: 183 MG/DL (ref 70–99)
GLUCOSE BLD-MCNC: 204 MG/DL (ref 70–99)
GLUCOSE BLDC GLUCOMTR-MCNC: 144 MG/DL (ref 70–99)
GLUCOSE BLDC GLUCOMTR-MCNC: 169 MG/DL (ref 70–99)
GLUCOSE BLDC GLUCOMTR-MCNC: 179 MG/DL (ref 70–99)
GLUCOSE BLDC GLUCOMTR-MCNC: 212 MG/DL (ref 70–99)
GLUCOSE BLDC GLUCOMTR-MCNC: 263 MG/DL (ref 70–99)
HCT VFR BLD AUTO: 39.9 %
HGB BLD-MCNC: 12.6 G/DL
IMM GRANULOCYTES # BLD AUTO: 0.4 X10(3) UL (ref 0–1)
IMM GRANULOCYTES NFR BLD: 2.8 %
LYMPHOCYTES # BLD AUTO: 0.86 X10(3) UL (ref 1–4)
LYMPHOCYTES NFR BLD AUTO: 5.9 %
MAGNESIUM SERPL-MCNC: 2 MG/DL (ref 1.6–2.6)
MCH RBC QN AUTO: 31.5 PG (ref 26–34)
MCHC RBC AUTO-ENTMCNC: 31.6 G/DL (ref 31–37)
MCV RBC AUTO: 99.8 FL
MONOCYTES # BLD AUTO: 0.28 X10(3) UL (ref 0.1–1)
MONOCYTES NFR BLD AUTO: 1.9 %
NEUTROPHILS # BLD AUTO: 12.87 X10 (3) UL (ref 1.5–7.7)
NEUTROPHILS # BLD AUTO: 12.87 X10(3) UL (ref 1.5–7.7)
NEUTROPHILS NFR BLD AUTO: 89.1 %
OSMOLALITY SERPL CALC.SUM OF ELEC: 301 MOSM/KG (ref 275–295)
OSMOLALITY SERPL CALC.SUM OF ELEC: 308 MOSM/KG (ref 275–295)
PLATELET # BLD AUTO: 211 10(3)UL (ref 150–450)
POTASSIUM SERPL-SCNC: 4.6 MMOL/L (ref 3.5–5.1)
POTASSIUM SERPL-SCNC: 4.7 MMOL/L (ref 3.5–5.1)
RBC # BLD AUTO: 4 X10(6)UL
SODIUM SERPL-SCNC: 139 MMOL/L (ref 136–145)
SODIUM SERPL-SCNC: 141 MMOL/L (ref 136–145)
TROPONIN I SERPL HS-MCNC: 12 NG/L
WBC # BLD AUTO: 14.5 X10(3) UL (ref 4–11)

## 2023-12-14 PROCEDURE — 71045 X-RAY EXAM CHEST 1 VIEW: CPT | Performed by: INTERNAL MEDICINE

## 2023-12-14 PROCEDURE — 99291 CRITICAL CARE FIRST HOUR: CPT | Performed by: INTERNAL MEDICINE

## 2023-12-14 RX ORDER — LORAZEPAM 2 MG/ML
INJECTION INTRAMUSCULAR
Status: COMPLETED
Start: 2023-12-14 | End: 2023-12-14

## 2023-12-14 RX ORDER — LORAZEPAM 2 MG/ML
1 INJECTION INTRAMUSCULAR ONCE
Status: COMPLETED | OUTPATIENT
Start: 2023-12-14 | End: 2023-12-14

## 2023-12-14 RX ORDER — DIAZEPAM 5 MG/ML
2.5 INJECTION, SOLUTION INTRAMUSCULAR; INTRAVENOUS ONCE
Status: DISCONTINUED | OUTPATIENT
Start: 2023-12-14 | End: 2023-12-14

## 2023-12-14 RX ORDER — METHYLPREDNISOLONE SODIUM SUCCINATE 40 MG/ML
40 INJECTION, POWDER, LYOPHILIZED, FOR SOLUTION INTRAMUSCULAR; INTRAVENOUS EVERY 12 HOURS
Status: DISCONTINUED | OUTPATIENT
Start: 2023-12-14 | End: 2023-12-15

## 2023-12-14 NOTE — PLAN OF CARE
Problem: Patient Centered Care  Goal: Patient preferences are identified and integrated in the patient's plan of care  Description: Interventions:  - What would you like us to know as we care for you? I use a wheelchair   - Provide timely, complete, and accurate information to patient/family  - Incorporate patient and family knowledge, values, beliefs, and cultural backgrounds into the planning and delivery of care  - Encourage patient/family to participate in care and decision-making at the level they choose  - Honor patient and family perspectives and choices  Outcome: Progressing     Problem: Patient/Family Goals  Goal: Patient/Family Long Term Goal  Description: Patient's Long Term Goal: go home    Interventions:  - wean O2  -PRN meds  -activity as tolerated  - See additional Care Plan goals for specific interventions  Outcome: Progressing  Goal: Patient/Family Short Term Goal  Description: Patient's Short Term Goal: cough management     Interventions:   - PRN meds  -nebs  -IS  - See additional Care Plan goals for specific interventions  Outcome: Progressing     Problem: SAFETY ADULT - FALL  Goal: Free from fall injury  Description: INTERVENTIONS:  - Assess pt frequently for physical needs  - Identify cognitive and physical deficits and behaviors that affect risk of falls.   - [place] fall precautions as indicated by assessment.  - Educate pt/family on patient safety including physical limitations  - Instruct pt to call for assistance with activity based on assessment  - Modify environment to reduce risk of injury  - Provide assistive devices as appropriate  - Consider OT/PT consult to assist with strengthening/mobility  - Encourage toileting schedule  Outcome: Progressing     Problem: DISCHARGE PLANNING  Goal: Discharge to home or other facility with appropriate resources  Description: INTERVENTIONS:  - Identify barriers to discharge w/pt and caregiver  - Include patient/family/discharge partner in discharge planning  - Arrange for needed discharge resources and transportation as appropriate  - Identify discharge learning needs (meds, wound care, etc)  - Arrange for interpreters to assist at discharge as needed  - Consider post-discharge preferences of patient/family/discharge partner  - Complete POLST form as appropriate  - Assess patient's ability to be responsible for managing their own health  - Refer to Case Management Department for coordinating discharge planning if the patient needs post-hospital services based on physician/LIP order or complex needs related to functional status, cognitive ability or social support system  Outcome: Progressing     Problem: RESPIRATORY - ADULT  Goal: Achieves optimal ventilation and oxygenation  Description: INTERVENTIONS:  - Assess for changes in respiratory status  - Assess for changes in mentation and behavior  - Position to facilitate oxygenation and minimize respiratory effort  - Oxygen supplementation based on oxygen saturation or ABGs  - Provide Smoking Cessation handout, if applicable  - Encourage broncho-pulmonary hygiene including cough, deep breathe, Incentive Spirometry  - Assess the need for suctioning and perform as needed  - Assess and instruct to report SOB or any respiratory difficulty  - Respiratory Therapy support as indicated  - Manage/alleviate anxiety  - Monitor for signs/symptoms of CO2 retention  Outcome: Progressing     Problem: Diabetes/Glucose Control  Goal: Glucose maintained within prescribed range  Description: INTERVENTIONS:  - Monitor Blood Glucose as ordered  - Assess for signs and symptoms of hyperglycemia and hypoglycemia  - Administer ordered medications to maintain glucose within target range  - Assess barriers to adequate nutritional intake and initiate nutrition consult as needed  - Instruct patient on self management of diabetes  Outcome: Progressing  Monitoring blood glucose levels.  Fall precautions maintained, bed locked in lowest position, bed alarm on, call light within reach and frequent rounding by nursing staff.

## 2023-12-15 LAB
ATRIAL RATE: 112 BPM
ATRIAL RATE: 81 BPM
GLUCOSE BLDC GLUCOMTR-MCNC: 121 MG/DL (ref 70–99)
GLUCOSE BLDC GLUCOMTR-MCNC: 151 MG/DL (ref 70–99)
GLUCOSE BLDC GLUCOMTR-MCNC: 195 MG/DL (ref 70–99)
GLUCOSE BLDC GLUCOMTR-MCNC: 275 MG/DL (ref 70–99)
P AXIS: 59 DEGREES
P AXIS: 63 DEGREES
P-R INTERVAL: 178 MS
P-R INTERVAL: 182 MS
Q-T INTERVAL: 302 MS
Q-T INTERVAL: 346 MS
Q-T INTERVAL: 358 MS
QRS DURATION: 68 MS
QRS DURATION: 80 MS
QRS DURATION: 92 MS
QTC CALCULATION (BEZET): 408 MS
QTC CALCULATION (BEZET): 412 MS
QTC CALCULATION (BEZET): 415 MS
R AXIS: -6 DEGREES
R AXIS: -9 DEGREES
R AXIS: 12 DEGREES
T AXIS: 46 DEGREES
T AXIS: 48 DEGREES
T AXIS: 53 DEGREES
TROPONIN I SERPL HS-MCNC: 12 NG/L
VENTRICULAR RATE: 112 BPM
VENTRICULAR RATE: 81 BPM
VENTRICULAR RATE: 84 BPM

## 2023-12-15 RX ORDER — PREDNISONE 20 MG/1
40 TABLET ORAL
Status: DISCONTINUED | OUTPATIENT
Start: 2023-12-16 | End: 2023-12-18

## 2023-12-15 RX ORDER — LORAZEPAM 0.5 MG/1
0.5 TABLET ORAL 2 TIMES DAILY
Status: DISCONTINUED | OUTPATIENT
Start: 2023-12-15 | End: 2023-12-17

## 2023-12-15 RX ORDER — LORAZEPAM 2 MG/ML
INJECTION INTRAMUSCULAR
Status: COMPLETED
Start: 2023-12-15 | End: 2023-12-15

## 2023-12-15 RX ORDER — CALCIUM CARBONATE 500 MG/1
500 TABLET, CHEWABLE ORAL 3 TIMES DAILY PRN
Status: DISCONTINUED | OUTPATIENT
Start: 2023-12-15 | End: 2023-12-18

## 2023-12-15 RX ORDER — LORAZEPAM 2 MG/ML
0.5 INJECTION INTRAMUSCULAR ONCE
Status: COMPLETED | OUTPATIENT
Start: 2023-12-15 | End: 2023-12-15

## 2023-12-15 RX ORDER — POLYETHYLENE GLYCOL 3350 17 G/17G
17 POWDER, FOR SOLUTION ORAL DAILY
Status: DISCONTINUED | OUTPATIENT
Start: 2023-12-15 | End: 2023-12-18

## 2023-12-15 NOTE — PLAN OF CARE
VSS. 1-2L O2 nasal cannula. Tele in place - no calls. Up to chair w/ lift this evening. RRT at shift change (see prior note). Call light within reach. Patient reported her glasses went missing - public safety notified. Problem: Risk for Violence/Aggression  Goal: Absence of Violence/Aggression  Description: INTERVENTIONS:   - Identify precipitating factors for behavior   - Notify Charge RN/Provider   - Consider decreasing stimulation   - Consider distraction measures   - Consider discussion with provider regarding prn meds    - Consider SHERI (Moderate Risk only)   - Consider Code Support (High Risk only)   - Consider room safety checks   - Consider restraints  Outcome: Progressing     Problem: Patient Centered Care  Goal: Patient preferences are identified and integrated in the patient's plan of care  Description: Interventions:  - What would you like us to know as we care for you?  I use a wheelchair   - Provide timely, complete, and accurate information to patient/family  - Incorporate patient and family knowledge, values, beliefs, and cultural backgrounds into the planning and delivery of care  - Encourage patient/family to participate in care and decision-making at the level they choose  - Honor patient and family perspectives and choices  Outcome: Progressing     Problem: Patient/Family Goals  Goal: Patient/Family Long Term Goal  Description: Patient's Long Term Goal: go home    Interventions:  - wean O2  -PRN meds  -activity as tolerated  - See additional Care Plan goals for specific interventions  Outcome: Progressing  Goal: Patient/Family Short Term Goal  Description: Patient's Short Term Goal: cough management     Interventions:   - PRN meds  -nebs  -IS  - See additional Care Plan goals for specific interventions  Outcome: Progressing     Problem: SAFETY ADULT - FALL  Goal: Free from fall injury  Description: INTERVENTIONS:  - Assess pt frequently for physical needs  - Identify cognitive and physical deficits and behaviors that affect risk of falls.   - [place] fall precautions as indicated by assessment.  - Educate pt/family on patient safety including physical limitations  - Instruct pt to call for assistance with activity based on assessment  - Modify environment to reduce risk of injury  - Provide assistive devices as appropriate  - Consider OT/PT consult to assist with strengthening/mobility  - Encourage toileting schedule  Outcome: Progressing     Problem: DISCHARGE PLANNING  Goal: Discharge to home or other facility with appropriate resources  Description: INTERVENTIONS:  - Identify barriers to discharge w/pt and caregiver  - Include patient/family/discharge partner in discharge planning  - Arrange for needed discharge resources and transportation as appropriate  - Identify discharge learning needs (meds, wound care, etc)  - Arrange for interpreters to assist at discharge as needed  - Consider post-discharge preferences of patient/family/discharge partner  - Complete POLST form as appropriate  - Assess patient's ability to be responsible for managing their own health  - Refer to Case Management Department for coordinating discharge planning if the patient needs post-hospital services based on physician/LIP order or complex needs related to functional status, cognitive ability or social support system  Outcome: Progressing     Problem: Diabetes/Glucose Control  Goal: Glucose maintained within prescribed range  Description: INTERVENTIONS:  - Monitor Blood Glucose as ordered  - Assess for signs and symptoms of hyperglycemia and hypoglycemia  - Administer ordered medications to maintain glucose within target range  - Assess barriers to adequate nutritional intake and initiate nutrition consult as needed  - Instruct patient on self management of diabetes  Outcome: Progressing

## 2023-12-15 NOTE — PLAN OF CARE
RRT    *See RRT Documentation Record*    Reason the RRT was called: Chest pain/tightness  Assessment of patient leading up to RRT: Patient called complaining of chest tightness and discomfort, stating \"I can't breath. \" Appears very anxious and restless. SpO2 was stable at 93% on 1L O2. Patient lifted from chair back to bed. RRT called. Interventions/Testing: STAT EKG, troponin, bmp, magnesium. Ativan 1mg once  Patient Outcome/Disposition: Patient remained on unit. Difficult to obtain EKG d/t patient agitation - per MD, retry when more calm. Family Notified: no - patient requested family not to be notified.    Name of family notified: n/a

## 2023-12-15 NOTE — RESPIRATORY THERAPY NOTE
Pt refused vest therapy,alternative ernestina percussor tried pt refused. pt can't tolerate both machine.

## 2023-12-15 NOTE — PLAN OF CARE
Problem: Risk for Violence/Aggression  Goal: Absence of Violence/Aggression  Description: INTERVENTIONS:   - Identify precipitating factors for behavior   - Notify Charge RN/Provider   - Consider decreasing stimulation   - Consider distraction measures   - Consider discussion with provider regarding prn meds    - Consider SHERI (Moderate Risk only)   - Consider Code Support (High Risk only)   - Consider room safety checks   - Consider restraints  Outcome: Progressing     Problem: Patient Centered Care  Goal: Patient preferences are identified and integrated in the patient's plan of care  Description: Interventions:  - What would you like us to know as we care for you? I use a wheelchair   - Provide timely, complete, and accurate information to patient/family  - Incorporate patient and family knowledge, values, beliefs, and cultural backgrounds into the planning and delivery of care  - Encourage patient/family to participate in care and decision-making at the level they choose  - Honor patient and family perspectives and choices  Outcome: Progressing     Problem: Patient/Family Goals  Goal: Patient/Family Long Term Goal  Description: Patient's Long Term Goal: go home    Interventions:  - wean O2  -PRN meds  -activity as tolerated  - See additional Care Plan goals for specific interventions  Outcome: Progressing  Goal: Patient/Family Short Term Goal  Description: Patient's Short Term Goal: cough management     Interventions:   - PRN meds  -nebs  -IS  - See additional Care Plan goals for specific interventions  Outcome: Progressing     Problem: SAFETY ADULT - FALL  Goal: Free from fall injury  Description: INTERVENTIONS:  - Assess pt frequently for physical needs  - Identify cognitive and physical deficits and behaviors that affect risk of falls.   - [place] fall precautions as indicated by assessment.  - Educate pt/family on patient safety including physical limitations  - Instruct pt to call for assistance with activity based on assessment  - Modify environment to reduce risk of injury  - Provide assistive devices as appropriate  - Consider OT/PT consult to assist with strengthening/mobility  - Encourage toileting schedule  Outcome: Progressing     Problem: DISCHARGE PLANNING  Goal: Discharge to home or other facility with appropriate resources  Description: INTERVENTIONS:  - Identify barriers to discharge w/pt and caregiver  - Include patient/family/discharge partner in discharge planning  - Arrange for needed discharge resources and transportation as appropriate  - Identify discharge learning needs (meds, wound care, etc)  - Arrange for interpreters to assist at discharge as needed  - Consider post-discharge preferences of patient/family/discharge partner  - Complete POLST form as appropriate  - Assess patient's ability to be responsible for managing their own health  - Refer to Case Management Department for coordinating discharge planning if the patient needs post-hospital services based on physician/LIP order or complex needs related to functional status, cognitive ability or social support system  Outcome: Progressing     Problem: RESPIRATORY - ADULT  Goal: Achieves optimal ventilation and oxygenation  Description: INTERVENTIONS:  - Assess for changes in respiratory status  - Assess for changes in mentation and behavior  - Position to facilitate oxygenation and minimize respiratory effort  - Oxygen supplementation based on oxygen saturation or ABGs  - Provide Smoking Cessation handout, if applicable  - Encourage broncho-pulmonary hygiene including cough, deep breathe, Incentive Spirometry  - Assess the need for suctioning and perform as needed  - Assess and instruct to report SOB or any respiratory difficulty  - Respiratory Therapy support as indicated  - Manage/alleviate anxiety  - Monitor for signs/symptoms of CO2 retention  Outcome: Progressing     Problem: Diabetes/Glucose Control  Goal: Glucose maintained within prescribed range  Description: INTERVENTIONS:  - Monitor Blood Glucose as ordered  - Assess for signs and symptoms of hyperglycemia and hypoglycemia  - Administer ordered medications to maintain glucose within target range  - Assess barriers to adequate nutritional intake and initiate nutrition consult as needed  - Instruct patient on self management of diabetes  Outcome: Progressing  Vital signs stable at this time. Monitoring blood glucose levels. Repeat EKG done, MD notified. No complaints of pain. Fall precautions maintained, bed locked in lowest position, bed alarm on, call light within reach and frequent rounding by nursing staff.

## 2023-12-15 NOTE — PROGRESS NOTES
12/15/23 1256   Clinical Encounter Type   Visited With Patient not available;Health care provider   Crisis Visit   (RRT)   Patient Spiritual Encounters   Spiritual Assessment Completed No     Summary:  responded to RRT. Daughter present in room, no needs identified. Spiritual Care support can be requested via an [facility] consult.     -Edwige Wylie.

## 2023-12-15 NOTE — PROGRESS NOTES
RAPID RESPONSE NOTE    Subjective:     Rapid response called 2/2 to CP and SOB. Similar circumstances to yesterday. Upon my arrival patient is tachypneic, complaining of centralized CP, worse with movement or deep breaths. Objective:   Vitals:    12/15/23 1252   BP: 119/59   Pulse: 80   Resp: 22   Temp:      General: anxious  HEENT: normocephalic  Lungs: Minimal wheezing, cough  Chest: chest pain reproducible with palpation  CV: RRR  Abdomen: soft nontender    Assessment/Plan:     CP/SOB- suspect all from uncontrolled anxiety in addition to pleuritic CP from cough and COPD exacerbation. EKG reviewed. Troponin normal. Similar episode occurred yesterday and improved with IV ativan. .5 mg of IV ativan given now with improvement of symptoms. She does have PRN valium from home so should continue but I think that she would benefit from a scheduled benzodiazepine while she is here. Will do ativan . 5 mg PO BID. She is also agreeable to chest physiotherapy as this will help clear secretions and ultimately make her cough less and improve her pleuritic CP.  Discussed all of this with daughter who was at bedside during entire RRT    Virginia Hameed DO  12/15/2023    35 minutes of critical care time spent

## 2023-12-16 LAB
ANION GAP SERPL CALC-SCNC: 3 MMOL/L (ref 0–18)
ATRIAL RATE: 66 BPM
ATRIAL RATE: 74 BPM
BUN BLD-MCNC: 32 MG/DL (ref 9–23)
BUN/CREAT SERPL: 33.3 (ref 10–20)
CALCIUM BLD-MCNC: 10 MG/DL (ref 8.7–10.4)
CHLORIDE SERPL-SCNC: 104 MMOL/L (ref 98–112)
CO2 SERPL-SCNC: 34 MMOL/L (ref 21–32)
CREAT BLD-MCNC: 0.96 MG/DL
EGFRCR SERPLBLD CKD-EPI 2021: 58 ML/MIN/1.73M2 (ref 60–?)
GLUCOSE BLD-MCNC: 91 MG/DL (ref 70–99)
GLUCOSE BLDC GLUCOMTR-MCNC: 131 MG/DL (ref 70–99)
GLUCOSE BLDC GLUCOMTR-MCNC: 156 MG/DL (ref 70–99)
GLUCOSE BLDC GLUCOMTR-MCNC: 222 MG/DL (ref 70–99)
GLUCOSE BLDC GLUCOMTR-MCNC: 296 MG/DL (ref 70–99)
OSMOLALITY SERPL CALC.SUM OF ELEC: 298 MOSM/KG (ref 275–295)
P AXIS: 44 DEGREES
P AXIS: 46 DEGREES
P-R INTERVAL: 174 MS
P-R INTERVAL: 184 MS
POTASSIUM SERPL-SCNC: 4.4 MMOL/L (ref 3.5–5.1)
Q-T INTERVAL: 358 MS
Q-T INTERVAL: 396 MS
QRS DURATION: 80 MS
QRS DURATION: 82 MS
QTC CALCULATION (BEZET): 397 MS
QTC CALCULATION (BEZET): 415 MS
R AXIS: -12 DEGREES
R AXIS: -12 DEGREES
SODIUM SERPL-SCNC: 141 MMOL/L (ref 136–145)
T AXIS: 36 DEGREES
T AXIS: 39 DEGREES
VENTRICULAR RATE: 66 BPM
VENTRICULAR RATE: 74 BPM

## 2023-12-16 NOTE — PLAN OF CARE
Problem: Risk for Violence/Aggression  Goal: Absence of Violence/Aggression  Description: INTERVENTIONS:   - Identify precipitating factors for behavior   - Notify Charge RN/Provider   - Consider decreasing stimulation   - Consider distraction measures   - Consider discussion with provider regarding prn meds    - Consider SHERI (Moderate Risk only)   - Consider Code Support (High Risk only)   - Consider room safety checks   - Consider restraints  Outcome: Progressing     Problem: Patient Centered Care  Goal: Patient preferences are identified and integrated in the patient's plan of care  Description: Interventions:  - What would you like us to know as we care for you? I use a wheelchair   - Provide timely, complete, and accurate information to patient/family  - Incorporate patient and family knowledge, values, beliefs, and cultural backgrounds into the planning and delivery of care  - Encourage patient/family to participate in care and decision-making at the level they choose  - Honor patient and family perspectives and choices  Outcome: Progressing     Problem: Patient/Family Goals  Goal: Patient/Family Long Term Goal  Description: Patient's Long Term Goal: go home    Interventions:  - wean O2  -PRN meds  -activity as tolerated  - See additional Care Plan goals for specific interventions  Outcome: Progressing  Goal: Patient/Family Short Term Goal  Description: Patient's Short Term Goal: cough management     Interventions:   - PRN meds  -nebs  -IS  - See additional Care Plan goals for specific interventions  Outcome: Progressing     Problem: SAFETY ADULT - FALL  Goal: Free from fall injury  Description: INTERVENTIONS:  - Assess pt frequently for physical needs  - Identify cognitive and physical deficits and behaviors that affect risk of falls.   - [place] fall precautions as indicated by assessment.  - Educate pt/family on patient safety including physical limitations  - Instruct pt to call for assistance with activity based on assessment  - Modify environment to reduce risk of injury  - Provide assistive devices as appropriate  - Consider OT/PT consult to assist with strengthening/mobility  - Encourage toileting schedule  Outcome: Progressing     Problem: RESPIRATORY - ADULT  Goal: Achieves optimal ventilation and oxygenation  Description: INTERVENTIONS:  - Assess for changes in respiratory status  - Assess for changes in mentation and behavior  - Position to facilitate oxygenation and minimize respiratory effort  - Oxygen supplementation based on oxygen saturation or ABGs  - Provide Smoking Cessation handout, if applicable  - Encourage broncho-pulmonary hygiene including cough, deep breathe, Incentive Spirometry  - Assess the need for suctioning and perform as needed  - Assess and instruct to report SOB or any respiratory difficulty  - Respiratory Therapy support as indicated  - Manage/alleviate anxiety  - Monitor for signs/symptoms of CO2 retention  Outcome: Progressing     Problem: Diabetes/Glucose Control  Goal: Glucose maintained within prescribed range  Description: INTERVENTIONS:  - Monitor Blood Glucose as ordered  - Assess for signs and symptoms of hyperglycemia and hypoglycemia  - Administer ordered medications to maintain glucose within target range  - Assess barriers to adequate nutritional intake and initiate nutrition consult as needed  - Instruct patient on self management of diabetes  Outcome: Progressing   No acute changes, patient on room air, saturating well, denies shortness of breath, denies acute pain.  Safety precautions in place

## 2023-12-16 NOTE — PLAN OF CARE
RRT    *See RRT Documentation Record*    Reason the RRT was called: Pt reported difficulty breathing and chest discomfort  Assessment of patient leading up to RRT: Labored breathing, anxiety  Interventions/Testing: Stat EKG, troponin, and ativan  Patient Outcome/Disposition: Pt remained on unit  Family Notified: yes  Name of family notified: Daughter Sarita Lefort at bedside

## 2023-12-16 NOTE — PLAN OF CARE
VSS. Tylenol PRN for pain. Able to wean to room this evening. Had RRT this afternoon (see prior note). Bed low and locked, call light within reach - able to make needs known. Problem: Risk for Violence/Aggression  Goal: Absence of Violence/Aggression  Description: INTERVENTIONS:   - Identify precipitating factors for behavior   - Notify Charge RN/Provider   - Consider decreasing stimulation   - Consider distraction measures   - Consider discussion with provider regarding prn meds    - Consider SHERI (Moderate Risk only)   - Consider Code Support (High Risk only)   - Consider room safety checks   - Consider restraints  Outcome: Progressing     Problem: Patient Centered Care  Goal: Patient preferences are identified and integrated in the patient's plan of care  Description: Interventions:  - What would you like us to know as we care for you?  I use a wheelchair   - Provide timely, complete, and accurate information to patient/family  - Incorporate patient and family knowledge, values, beliefs, and cultural backgrounds into the planning and delivery of care  - Encourage patient/family to participate in care and decision-making at the level they choose  - Honor patient and family perspectives and choices  Outcome: Progressing     Problem: Patient/Family Goals  Goal: Patient/Family Long Term Goal  Description: Patient's Long Term Goal: go home    Interventions:  - wean O2  -PRN meds  -activity as tolerated  - See additional Care Plan goals for specific interventions  Outcome: Progressing  Goal: Patient/Family Short Term Goal  Description: Patient's Short Term Goal: cough management     Interventions:   - PRN meds  -nebs  -IS  - See additional Care Plan goals for specific interventions  Outcome: Progressing     Problem: SAFETY ADULT - FALL  Goal: Free from fall injury  Description: INTERVENTIONS:  - Assess pt frequently for physical needs  - Identify cognitive and physical deficits and behaviors that affect risk of falls.  - [place] fall precautions as indicated by assessment.  - Educate pt/family on patient safety including physical limitations  - Instruct pt to call for assistance with activity based on assessment  - Modify environment to reduce risk of injury  - Provide assistive devices as appropriate  - Consider OT/PT consult to assist with strengthening/mobility  - Encourage toileting schedule  Outcome: Progressing     Problem: DISCHARGE PLANNING  Goal: Discharge to home or other facility with appropriate resources  Description: INTERVENTIONS:  - Identify barriers to discharge w/pt and caregiver  - Include patient/family/discharge partner in discharge planning  - Arrange for needed discharge resources and transportation as appropriate  - Identify discharge learning needs (meds, wound care, etc)  - Arrange for interpreters to assist at discharge as needed  - Consider post-discharge preferences of patient/family/discharge partner  - Complete POLST form as appropriate  - Assess patient's ability to be responsible for managing their own health  - Refer to Case Management Department for coordinating discharge planning if the patient needs post-hospital services based on physician/LIP order or complex needs related to functional status, cognitive ability or social support system  Outcome: Progressing     Problem: RESPIRATORY - ADULT  Goal: Achieves optimal ventilation and oxygenation  Description: INTERVENTIONS:  - Assess for changes in respiratory status  - Assess for changes in mentation and behavior  - Position to facilitate oxygenation and minimize respiratory effort  - Oxygen supplementation based on oxygen saturation or ABGs  - Provide Smoking Cessation handout, if applicable  - Encourage broncho-pulmonary hygiene including cough, deep breathe, Incentive Spirometry  - Assess the need for suctioning and perform as needed  - Assess and instruct to report SOB or any respiratory difficulty  - Respiratory Therapy support as indicated  - Manage/alleviate anxiety  - Monitor for signs/symptoms of CO2 retention  Outcome: Progressing     Problem: Diabetes/Glucose Control  Goal: Glucose maintained within prescribed range  Description: INTERVENTIONS:  - Monitor Blood Glucose as ordered  - Assess for signs and symptoms of hyperglycemia and hypoglycemia  - Administer ordered medications to maintain glucose within target range  - Assess barriers to adequate nutritional intake and initiate nutrition consult as needed  - Instruct patient on self management of diabetes  Outcome: Progressing

## 2023-12-17 LAB
GLUCOSE BLDC GLUCOMTR-MCNC: 183 MG/DL (ref 70–99)
GLUCOSE BLDC GLUCOMTR-MCNC: 194 MG/DL (ref 70–99)
GLUCOSE BLDC GLUCOMTR-MCNC: 229 MG/DL (ref 70–99)
GLUCOSE BLDC GLUCOMTR-MCNC: 88 MG/DL (ref 70–99)

## 2023-12-17 NOTE — RESPIRATORY THERAPY NOTE
Patient refused CPT therapy. [facility] has educated the patient on the purpose of and need for this therapy as well as potential negative outcomes associated with deferring treatment. Despite education, patient maintains refusal. Patient still sleeping at this time.

## 2023-12-17 NOTE — PLAN OF CARE
Problem: Patient Centered Care  Goal: Patient preferences are identified and integrated in the patient's plan of care  Description: Interventions:  - What would you like us to know as we care for you? I use a wheelchair   - Provide timely, complete, and accurate information to patient/family  - Incorporate patient and family knowledge, values, beliefs, and cultural backgrounds into the planning and delivery of care  - Encourage patient/family to participate in care and decision-making at the level they choose  - Honor patient and family perspectives and choices  Outcome: Progressing     Problem: Patient/Family Goals  Goal: Patient/Family Long Term Goal  Description: Patient's Long Term Goal: go home    Interventions:  - wean O2  -PRN meds  -activity as tolerated  - See additional Care Plan goals for specific interventions  Outcome: Progressing  Goal: Patient/Family Short Term Goal  Description: Patient's Short Term Goal: cough management     Interventions:   - PRN meds  -nebs  -IS  - See additional Care Plan goals for specific interventions  Outcome: Progressing     Problem: SAFETY ADULT - FALL  Goal: Free from fall injury  Description: INTERVENTIONS:  - Assess pt frequently for physical needs  - Identify cognitive and physical deficits and behaviors that affect risk of falls.   - [place] fall precautions as indicated by assessment.  - Educate pt/family on patient safety including physical limitations  - Instruct pt to call for assistance with activity based on assessment  - Modify environment to reduce risk of injury  - Provide assistive devices as appropriate  - Consider OT/PT consult to assist with strengthening/mobility  - Encourage toileting schedule  Outcome: Progressing     Problem: DISCHARGE PLANNING  Goal: Discharge to home or other facility with appropriate resources  Description: INTERVENTIONS:  - Identify barriers to discharge w/pt and caregiver  - Include patient/family/discharge partner in discharge planning  - Arrange for needed discharge resources and transportation as appropriate  - Identify discharge learning needs (meds, wound care, etc)  - Arrange for interpreters to assist at discharge as needed  - Consider post-discharge preferences of patient/family/discharge partner  - Complete POLST form as appropriate  - Assess patient's ability to be responsible for managing their own health  - Refer to Case Management Department for coordinating discharge planning if the patient needs post-hospital services based on physician/LIP order or complex needs related to functional status, cognitive ability or social support system  Outcome: Progressing     Problem: RESPIRATORY - ADULT  Goal: Achieves optimal ventilation and oxygenation  Description: INTERVENTIONS:  - Assess for changes in respiratory status  - Assess for changes in mentation and behavior  - Position to facilitate oxygenation and minimize respiratory effort  - Oxygen supplementation based on oxygen saturation or ABGs  - Provide Smoking Cessation handout, if applicable  - Encourage broncho-pulmonary hygiene including cough, deep breathe, Incentive Spirometry  - Assess the need for suctioning and perform as needed  - Assess and instruct to report SOB or any respiratory difficulty  - Respiratory Therapy support as indicated  - Manage/alleviate anxiety  - Monitor for signs/symptoms of CO2 retention  Outcome: Progressing     Problem: Diabetes/Glucose Control  Goal: Glucose maintained within prescribed range  Description: INTERVENTIONS:  - Monitor Blood Glucose as ordered  - Assess for signs and symptoms of hyperglycemia and hypoglycemia  - Administer ordered medications to maintain glucose within target range  - Assess barriers to adequate nutritional intake and initiate nutrition consult as needed  - Instruct patient on self management of diabetes  Outcome: Progressing   No acute changes, patient in no acute distress, denies acute pain, denies shortness of breath, remains on room air.  Safety precautions in place

## 2023-12-17 NOTE — PLAN OF CARE
Problem: Risk for Violence/Aggression  Goal: Absence of Violence/Aggression  Description: INTERVENTIONS:   - Identify precipitating factors for behavior   - Notify Charge RN/Provider   - Consider decreasing stimulation   - Consider distraction measures   - Consider discussion with provider regarding prn meds    - Consider SHERI (Moderate Risk only)   - Consider Code Support (High Risk only)   - Consider room safety checks   - Consider restraints  Outcome: Progressing     Problem: Patient Centered Care  Goal: Patient preferences are identified and integrated in the patient's plan of care  Description: Interventions:  - What would you like us to know as we care for you? I use a wheelchair   - Provide timely, complete, and accurate information to patient/family  - Incorporate patient and family knowledge, values, beliefs, and cultural backgrounds into the planning and delivery of care  - Encourage patient/family to participate in care and decision-making at the level they choose  - Honor patient and family perspectives and choices  Outcome: Progressing     Problem: Patient/Family Goals  Goal: Patient/Family Long Term Goal  Description: Patient's Long Term Goal: go home    Interventions:  - wean O2  -PRN meds  -activity as tolerated  - See additional Care Plan goals for specific interventions  Outcome: Progressing  Goal: Patient/Family Short Term Goal  Description: Patient's Short Term Goal: cough management     Interventions:   - PRN meds  -nebs  -IS  - See additional Care Plan goals for specific interventions  Outcome: Progressing     Problem: SAFETY ADULT - FALL  Goal: Free from fall injury  Description: INTERVENTIONS:  - Assess pt frequently for physical needs  - Identify cognitive and physical deficits and behaviors that affect risk of falls.   - [place] fall precautions as indicated by assessment.  - Educate pt/family on patient safety including physical limitations  - Instruct pt to call for assistance with activity based on assessment  - Modify environment to reduce risk of injury  - Provide assistive devices as appropriate  - Consider OT/PT consult to assist with strengthening/mobility  - Encourage toileting schedule  Outcome: Progressing     Problem: DISCHARGE PLANNING  Goal: Discharge to home or other facility with appropriate resources  Description: INTERVENTIONS:  - Identify barriers to discharge w/pt and caregiver  - Include patient/family/discharge partner in discharge planning  - Arrange for needed discharge resources and transportation as appropriate  - Identify discharge learning needs (meds, wound care, etc)  - Arrange for interpreters to assist at discharge as needed  - Consider post-discharge preferences of patient/family/discharge partner  - Complete POLST form as appropriate  - Assess patient's ability to be responsible for managing their own health  - Refer to Case Management Department for coordinating discharge planning if the patient needs post-hospital services based on physician/LIP order or complex needs related to functional status, cognitive ability or social support system  Outcome: Progressing     Problem: RESPIRATORY - ADULT  Goal: Achieves optimal ventilation and oxygenation  Description: INTERVENTIONS:  - Assess for changes in respiratory status  - Assess for changes in mentation and behavior  - Position to facilitate oxygenation and minimize respiratory effort  - Oxygen supplementation based on oxygen saturation or ABGs  - Provide Smoking Cessation handout, if applicable  - Encourage broncho-pulmonary hygiene including cough, deep breathe, Incentive Spirometry  - Assess the need for suctioning and perform as needed  - Assess and instruct to report SOB or any respiratory difficulty  - Respiratory Therapy support as indicated  - Manage/alleviate anxiety  - Monitor for signs/symptoms of CO2 retention  Outcome: Progressing     Problem: Diabetes/Glucose Control  Goal: Glucose maintained within prescribed range  Description: INTERVENTIONS:  - Monitor Blood Glucose as ordered  - Assess for signs and symptoms of hyperglycemia and hypoglycemia  - Administer ordered medications to maintain glucose within target range  - Assess barriers to adequate nutritional intake and initiate nutrition consult as needed  - Instruct patient on self management of diabetes  Outcome: Progressing     No acute changes. Continuing Neb treatments and chest physiotherapy. Safety precautions in place. Call light is within reach.

## 2023-12-18 VITALS
DIASTOLIC BLOOD PRESSURE: 60 MMHG | WEIGHT: 181.31 LBS | TEMPERATURE: 98 F | OXYGEN SATURATION: 98 % | BODY MASS INDEX: 28 KG/M2 | RESPIRATION RATE: 18 BRPM | HEART RATE: 68 BPM | SYSTOLIC BLOOD PRESSURE: 162 MMHG

## 2023-12-18 LAB
GLUCOSE BLDC GLUCOMTR-MCNC: 114 MG/DL (ref 70–99)
GLUCOSE BLDC GLUCOMTR-MCNC: 179 MG/DL (ref 70–99)

## 2023-12-18 RX ORDER — BENZONATATE 100 MG/1
100 CAPSULE ORAL 3 TIMES DAILY PRN
Qty: 20 CAPSULE | Refills: 0 | Status: SHIPPED | OUTPATIENT
Start: 2023-12-18

## 2023-12-18 RX ORDER — PREDNISONE 10 MG/1
TABLET ORAL
Qty: 10 TABLET | Refills: 0 | Status: SHIPPED | OUTPATIENT
Start: 2023-12-18

## 2023-12-18 RX ORDER — LIDOCAINE 50 MG/G
1 PATCH TOPICAL EVERY 24 HOURS
Status: SHIPPED | COMMUNITY
Start: 2023-12-18

## 2023-12-18 NOTE — PLAN OF CARE
Problem: Risk for Violence/Aggression  Goal: Absence of Violence/Aggression  Description: INTERVENTIONS:   - Identify precipitating factors for behavior   - Notify Charge RN/Provider   - Consider decreasing stimulation   - Consider distraction measures   - Consider discussion with provider regarding prn meds    - Consider SHERI (Moderate Risk only)   - Consider Code Support (High Risk only)   - Consider room safety checks   - Consider restraints  Outcome: Progressing     Problem: Patient Centered Care  Goal: Patient preferences are identified and integrated in the patient's plan of care  Description: Interventions:  - What would you like us to know as we care for you? I use a wheelchair   - Provide timely, complete, and accurate information to patient/family  - Incorporate patient and family knowledge, values, beliefs, and cultural backgrounds into the planning and delivery of care  - Encourage patient/family to participate in care and decision-making at the level they choose  - Honor patient and family perspectives and choices  Outcome: Progressing     Problem: Patient/Family Goals  Goal: Patient/Family Long Term Goal  Description: Patient's Long Term Goal: go home    Interventions:  - wean O2  -PRN meds  -activity as tolerated  - See additional Care Plan goals for specific interventions  Outcome: Progressing  Goal: Patient/Family Short Term Goal  Description: Patient's Short Term Goal: cough management     Interventions:   - PRN meds  -nebs  -IS  - See additional Care Plan goals for specific interventions  Outcome: Progressing     Problem: SAFETY ADULT - FALL  Goal: Free from fall injury  Description: INTERVENTIONS:  - Assess pt frequently for physical needs  - Identify cognitive and physical deficits and behaviors that affect risk of falls.   - [place] fall precautions as indicated by assessment.  - Educate pt/family on patient safety including physical limitations  - Instruct pt to call for assistance with activity based on assessment  - Modify environment to reduce risk of injury  - Provide assistive devices as appropriate  - Consider OT/PT consult to assist with strengthening/mobility  - Encourage toileting schedule  Outcome: Progressing     Problem: DISCHARGE PLANNING  Goal: Discharge to home or other facility with appropriate resources  Description: INTERVENTIONS:  - Identify barriers to discharge w/pt and caregiver  - Include patient/family/discharge partner in discharge planning  - Arrange for needed discharge resources and transportation as appropriate  - Identify discharge learning needs (meds, wound care, etc)  - Arrange for interpreters to assist at discharge as needed  - Consider post-discharge preferences of patient/family/discharge partner  - Complete POLST form as appropriate  - Assess patient's ability to be responsible for managing their own health  - Refer to Case Management Department for coordinating discharge planning if the patient needs post-hospital services based on physician/LIP order or complex needs related to functional status, cognitive ability or social support system  Outcome: Progressing     Problem: RESPIRATORY - ADULT  Goal: Achieves optimal ventilation and oxygenation  Description: INTERVENTIONS:  - Assess for changes in respiratory status  - Assess for changes in mentation and behavior  - Position to facilitate oxygenation and minimize respiratory effort  - Oxygen supplementation based on oxygen saturation or ABGs  - Provide Smoking Cessation handout, if applicable  - Encourage broncho-pulmonary hygiene including cough, deep breathe, Incentive Spirometry  - Assess the need for suctioning and perform as needed  - Assess and instruct to report SOB or any respiratory difficulty  - Respiratory Therapy support as indicated  - Manage/alleviate anxiety  - Monitor for signs/symptoms of CO2 retention  Outcome: Progressing     Problem: Diabetes/Glucose Control  Goal: Glucose maintained within prescribed range  Description: INTERVENTIONS:  - Monitor Blood Glucose as ordered  - Assess for signs and symptoms of hyperglycemia and hypoglycemia  - Administer ordered medications to maintain glucose within target range  - Assess barriers to adequate nutritional intake and initiate nutrition consult as needed  - Instruct patient on self management of diabetes  Outcome: Progressing       No acute changes. Pt tends to destat 88-90% on RA when sleeping and was sustaining low 90s when awake, this RN placed oxygen back on. Safety precautions in place. Call light is within reach.

## 2023-12-18 NOTE — PLAN OF CARE
Patient is Aox4, vitals stable on room air and meds given as ordered. No complaints of pain noted. Discharge orders in for patient, went over AVS with patient and IV removed. [facility] picked patient up and patient left with all belongings to home. Problem: Risk for Violence/Aggression  Goal: Absence of Violence/Aggression  Description: INTERVENTIONS:   - Identify precipitating factors for behavior   - Notify Charge RN/Provider   - Consider decreasing stimulation   - Consider distraction measures   - Consider discussion with provider regarding prn meds    - Consider SHERI (Moderate Risk only)   - Consider Code Support (High Risk only)   - Consider room safety checks   - Consider restraints  Outcome: Progressing     Problem: Patient Centered Care  Goal: Patient preferences are identified and integrated in the patient's plan of care  Description: Interventions:  - What would you like us to know as we care for you?  I use a wheelchair   - Provide timely, complete, and accurate information to patient/family  - Incorporate patient and family knowledge, values, beliefs, and cultural backgrounds into the planning and delivery of care  - Encourage patient/family to participate in care and decision-making at the level they choose  - Honor patient and family perspectives and choices  Outcome: Progressing     Problem: Patient/Family Goals  Goal: Patient/Family Long Term Goal  Description: Patient's Long Term Goal: go home    Interventions:  - wean O2  -PRN meds  -activity as tolerated  - See additional Care Plan goals for specific interventions  Outcome: Progressing  Goal: Patient/Family Short Term Goal  Description: Patient's Short Term Goal: cough management     Interventions:   - PRN meds  -nebs  -IS  - See additional Care Plan goals for specific interventions  Outcome: Progressing     Problem: SAFETY ADULT - FALL  Goal: Free from fall injury  Description: INTERVENTIONS:  - Assess pt frequently for physical needs  - Identify cognitive and physical deficits and behaviors that affect risk of falls.   - [place] fall precautions as indicated by assessment.  - Educate pt/family on patient safety including physical limitations  - Instruct pt to call for assistance with activity based on assessment  - Modify environment to reduce risk of injury  - Provide assistive devices as appropriate  - Consider OT/PT consult to assist with strengthening/mobility  - Encourage toileting schedule  Outcome: Progressing     Problem: DISCHARGE PLANNING  Goal: Discharge to home or other facility with appropriate resources  Description: INTERVENTIONS:  - Identify barriers to discharge w/pt and caregiver  - Include patient/family/discharge partner in discharge planning  - Arrange for needed discharge resources and transportation as appropriate  - Identify discharge learning needs (meds, wound care, etc)  - Arrange for interpreters to assist at discharge as needed  - Consider post-discharge preferences of patient/family/discharge partner  - Complete POLST form as appropriate  - Assess patient's ability to be responsible for managing their own health  - Refer to Case Management Department for coordinating discharge planning if the patient needs post-hospital services based on physician/LIP order or complex needs related to functional status, cognitive ability or social support system  Outcome: Progressing     Problem: RESPIRATORY - ADULT  Goal: Achieves optimal ventilation and oxygenation  Description: INTERVENTIONS:  - Assess for changes in respiratory status  - Assess for changes in mentation and behavior  - Position to facilitate oxygenation and minimize respiratory effort  - Oxygen supplementation based on oxygen saturation or ABGs  - Provide Smoking Cessation handout, if applicable  - Encourage broncho-pulmonary hygiene including cough, deep breathe, Incentive Spirometry  - Assess the need for suctioning and perform as needed  - Assess and instruct to report SOB or any respiratory difficulty  - Respiratory Therapy support as indicated  - Manage/alleviate anxiety  - Monitor for signs/symptoms of CO2 retention  Outcome: Progressing     Problem: Diabetes/Glucose Control  Goal: Glucose maintained within prescribed range  Description: INTERVENTIONS:  - Monitor Blood Glucose as ordered  - Assess for signs and symptoms of hyperglycemia and hypoglycemia  - Administer ordered medications to maintain glucose within target range  - Assess barriers to adequate nutritional intake and initiate nutrition consult as needed  - Instruct patient on self management of diabetes  Outcome: Progressing

## 2023-12-18 NOTE — CM/SW NOTE
12/18/23 1000   Discharge disposition   Expected discharge disposition [facility]   Post Acute Care Provider [facility]   Discharge transportation [address] confirmed with TAMMIE Horvath that pt would be medically cleared to DC today. Pt received DC order- SW called [facility] to schedule transportation for pt. Pt has Medicaid secondary insurance- transportation covered insurance benefit. [facility] to transport pt to [facility] living at 12:30 PM.  RN Minus Yuliet informed pt of DC plan. PLAN: DC to [address] at 12:30 PM via [facility]. PCS done.     RN call to report: [id number] Shauna Kidd MSW, [facility]

## 2023-12-18 NOTE — PLAN OF CARE
Problem: Patient Centered Care  Goal: Patient preferences are identified and integrated in the patient's plan of care  Description: Interventions:  - What would you like us to know as we care for you? I use a wheelchair   - Provide timely, complete, and accurate information to patient/family  - Incorporate patient and family knowledge, values, beliefs, and cultural backgrounds into the planning and delivery of care  - Encourage patient/family to participate in care and decision-making at the level they choose  - Honor patient and family perspectives and choices  12/17/2023 2323 by Shanita Cast RN  Outcome: Progressing  12/17/2023 2323 by Shanita Cast RN  Outcome: Progressing     Problem: Patient/Family Goals  Goal: Patient/Family Long Term Goal  Description: Patient's Long Term Goal: go home    Interventions:  - wean O2  -PRN meds  -activity as tolerated  - See additional Care Plan goals for specific interventions  12/17/2023 2323 by Shanita Cast RN  Outcome: Progressing  12/17/2023 2323 by Shanita Cast RN  Outcome: Progressing  Goal: Patient/Family Short Term Goal  Description: Patient's Short Term Goal: cough management     Interventions:   - PRN meds  -nebs  -IS  - See additional Care Plan goals for specific interventions  12/17/2023 2323 by Shanita Cast RN  Outcome: Progressing  12/17/2023 2323 by Shanita Cast RN  Outcome: Progressing     Problem: SAFETY ADULT - FALL  Goal: Free from fall injury  Description: INTERVENTIONS:  - Assess pt frequently for physical needs  - Identify cognitive and physical deficits and behaviors that affect risk of falls.   - [place] fall precautions as indicated by assessment.  - Educate pt/family on patient safety including physical limitations  - Instruct pt to call for assistance with activity based on assessment  - Modify environment to reduce risk of injury  - Provide assistive devices as appropriate  - Consider OT/PT consult to assist with strengthening/mobility  - Encourage toileting schedule  12/17/2023 2323 by Rosangela Mulligan RN  Outcome: Progressing  12/17/2023 2323 by Rosangela Mulligan RN  Outcome: Progressing     Problem: DISCHARGE PLANNING  Goal: Discharge to home or other facility with appropriate resources  Description: INTERVENTIONS:  - Identify barriers to discharge w/pt and caregiver  - Include patient/family/discharge partner in discharge planning  - Arrange for needed discharge resources and transportation as appropriate  - Identify discharge learning needs (meds, wound care, etc)  - Arrange for interpreters to assist at discharge as needed  - Consider post-discharge preferences of patient/family/discharge partner  - Complete POLST form as appropriate  - Assess patient's ability to be responsible for managing their own health  - Refer to Case Management Department for coordinating discharge planning if the patient needs post-hospital services based on physician/LIP order or complex needs related to functional status, cognitive ability or social support system  12/17/2023 2323 by Rosangela Mulligan RN  Outcome: Progressing  12/17/2023 2323 by Rosangela Mulligan RN  Outcome: Progressing     Problem: RESPIRATORY - ADULT  Goal: Achieves optimal ventilation and oxygenation  Description: INTERVENTIONS:  - Assess for changes in respiratory status  - Assess for changes in mentation and behavior  - Position to facilitate oxygenation and minimize respiratory effort  - Oxygen supplementation based on oxygen saturation or ABGs  - Provide Smoking Cessation handout, if applicable  - Encourage broncho-pulmonary hygiene including cough, deep breathe, Incentive Spirometry  - Assess the need for suctioning and perform as needed  - Assess and instruct to report SOB or any respiratory difficulty  - Respiratory Therapy support as indicated  - Manage/alleviate anxiety  - Monitor for signs/symptoms of CO2 retention  12/17/2023 2323 by Rosangela Mulligan RN  Outcome: Progressing  12/17/2023 2323 by Sherron Elaine RN  Outcome: Progressing     Problem: Diabetes/Glucose Control  Goal: Glucose maintained within prescribed range  Description: INTERVENTIONS:  - Monitor Blood Glucose as ordered  - Assess for signs and symptoms of hyperglycemia and hypoglycemia  - Administer ordered medications to maintain glucose within target range  - Assess barriers to adequate nutritional intake and initiate nutrition consult as needed  - Instruct patient on self management of diabetes  12/17/2023 2323 by Sherron Elaine RN  Outcome: Progressing  12/17/2023 2323 by Sherron Elaine RN  Outcome: Progressing   No acute changes, patient denies pain, denies shortness of breath, plan of care reviewed safety precautions in place

## 2023-12-18 NOTE — DISCHARGE SUMMARY
General Medicine Discharge Summary     Patient ID:  Myrna Gardner  78year old  12/3/1940    Admit date: 12/10/2023    Discharge date and time: 12/18/23    Attending Physician: Gloria Fisher DO     Consults: IP CONSULT TO HOSPITALIST  IP CONSULT TO PULMONOLOGY  IP CONSULT TO RESPIRATORY CARE    Primary Care Physician: Maria Teresa Tovar MD     Reason for admission: COPD exacerbation    Risk of Readmission Lace+ Score: 78  59-90 High Risk  29-58 Medium Risk  0-28   Low Risk    Discharge Diagnoses: COPD exacerbation ([address] [J44.1]  See Additional Discharge Diagnoses in Hospital Course    Discharged Condition: good    Follow-up with labs/images appointments:   F/u with PCP    Exam  Gen: No acute distress  Pulm: Lungs clear, normal respiratory effort  CV: Heart with regular rate and rhythm  Abd: Abdomen soft,   EXT: no edema     HPI: Patient is a 78year old female with PMH sig for cranial aneurysm s/p coiling, COPD, HTN, DVT on Eliquis, bipolar, hs COVID, aortic atherosclerosis, hs of multiple admits who presented to the hospital with cough, SOB. Patient says that her cough first started about 5 days prior. She is unable to clear her throat but feels like she needs to. Her cough has been progressively worsening and then she noticed some shortness of breath yesterday. No fevers or chills. Upon arrival to the ED her initial vitals were stable however she was then noted to be hypoxemic with an SpO2 of 89% on RA and was subsequently placed on NC. Initial labwork was unremarkable. COVID was negative. Hospital Course:   Patient is a 78year old female with PMH sig for cranial aneurysm s/p coiling, COPD, HTN, DVT on Eliquis, bipolar, hs COVID, aortic atherosclerosis, hs of multiple admits who presented to the hospital with cough, SOB. Admitted with COPD exacerbation and hypoxemia. Treated with steroids and nebulizer treatments. Some anxiety during the admission, treated with benzo. Dc on prednisone taper.  Plan to go back to longterm with in house therapy there     Acute hypoxemic respiratory failure  COPD exacerbation  - patient with wheezing, cough, tight air movement  - SpO2 of 89% on RA, now on 1.5L NC-> wean as tolerated  - continuous pulse ox, wean O2 as tolerated  - solumedrol 60 mg q 8 hours now on PO steroids  - no bronchoscopy per edel  - chest PT, now agreeable  - duonebs q4 and PRN  - COVID negative  - pCXR reviewed  - trelegy  - mucinex 600 BID  - azithromycin 500 mg now completion  - pulm colleagues consulted, appreciate recommendations     Elevated glucose  - Per outpatient chart review last hemoglobin A1c was 5.9, repeat, likely prediabetes exacerbated by steroids  - Insulin sliding scale and titrate as needed     Hypothyroidism  - synthroid 100 mcg     Bipolar disorder  MDD  - resume benztropine, lamictal, seroquel,   -Add as needed Valium for anxiety     Chronic back pain  - norco PRN     HX of DVT  - multiple, plan for lifelong AC per PCP  - resume eliquis     Rapid response x 2 ( 12/14 and 12/15)  - for CP and SOB, both times EKG and troponins normal ( no afib per my read with repolarization that was seen on prior EKGS, no acute ST changes or signs of ischemia)  - suspect all from anxiety  - symptoms improved with IV ativan  - now ativan scheduled BID but will stop as patient appears to be sleepy, will keep PRN   - continue PRN valium  - discussed careful benzo use to not cause respiratory depression with daughter       Operative Procedures:      Imaging: No results found. Disposition:  Assisted living facility    Activity: activity as tolerated  Diet: regular diet  Wound Care: as directed  Code Status: DNAR/Selective Treatment  O2: none needed    Home Medication Changes:     Med list     Medication List        START taking these medications      benzonatate 100 MG Caps  Commonly known as: Tessalon  Take 1 capsule (100 mg total) by mouth 3 (three) times daily as needed for cough.      predniSONE 10 MG Tabs  Commonly known as: Deltasone  Take 4 pills x 1 day then take 3 pills x 1 day then take 2 pills x 1 day then take 1 pill x 1 day then stop            CONTINUE taking these medications      albuterol 108 (90 Base) MCG/ACT Aers  Commonly known as: Ventolin HFA     apixaban 5 MG Tabs  Commonly known as: Eliquis  Take 1 tablet (5 mg total) by mouth 2 (two) times daily. benztropine 1 MG Tabs  Commonly known as: Cogentin  Take 1 tablet (1 mg total) by mouth 2 (two) times daily. Breo Ellipta 100-25 MCG/ACT Aepb  Generic drug: fluticasone furoate-vilanterol     clotrimazole 1 % Crea  Commonly known as: Lotrimin     cyclobenzaprine 5 MG Tabs  Commonly known as: Flexeril     docusate sodium 100 MG Caps  Commonly known as: Colace     donepezil 5 MG Tabs  Commonly known as: Aricept     estradiol 0.1 MG/GM Crea  Commonly known as: Estrace  Apply fingertip amount of cream to the vagina (0.5-1g) every night for 1 week and then every other night indefinitely     furosemide 40 MG Tabs  Commonly known as: Lasix  Take 0.5 tablets (20 mg total) by mouth daily. gabapentin 100 MG Caps  Commonly known as: Neurontin     hydrALAZINE 25 MG Tabs  Commonly known as: Apresoline     lamoTRIgine 100 MG Tabs  Commonly known as: LaMICtal     levothyroxine 100 MCG Tabs  Commonly known as: Synthroid  Take 1 tablet (100 mcg total) by mouth before breakfast.     lidocaine 5 % Ptch  Commonly known as: Lidoderm     ondansetron 4 MG Tbdp  Commonly known as: Zofran-ODT  Take 1 tablet (4 mg total) by mouth every 4 (four) hours as needed for Nausea.      pantoprazole 40 MG Tbec  Commonly known as: Protonix     QUEtiapine 100 MG Tabs  Commonly known as: SEROquel               Where to Get Your Medications        You can get these medications from any pharmacy    Bring a paper prescription for each of these medications  benzonatate 100 MG Caps  predniSONE 10 MG Tabs         [facility] instructions:      I reconciled current and discharge medications on day of discharge, discussed changes with patient and noted changes above.        Total Time Coordinating Care: Greater than 30 minutes    Patient had opportunity to ask questions and state understand and agree with therapeutic plan as outlined    Thank You,    Mark Randle DO   Hospitalist with [facility]

## 2023-12-19 NOTE — CM/SW NOTE
SW received RL6 regarding this patient. SW followed up with the pt and she stated I was correct, she did refuse to go to rehab as she wanted to return home with the therapy at [facility]. She also stated she is working with Thelma Ferguson at [facility] to get into [facility] [address] as the therapy at [facility] is currently understaffed and they would need more help. The pt. Is agreeable to going to rehab at this time, due to the circumstances. The pt. Was agreeable to SW contacting staff at [facility] and sending clinical information from her hospital stay to [facility] if it will help with the admission process. SW contacted Jovita at [facility] and she confirmed the plan. Clinical information has been sent in Aidin.       Honey Zabala, [facility] ext [phone]

## 2024-01-16 NOTE — PROGRESS NOTES
[facility]    Progress Note    Pierre Lance Patient Status:  Inpatient    12/3/1940 MRN [id number]   Location [facility] 5SW/SE Attending Darcy Moore MD   [facility] Day # 6 PCP Xiang Meyer MD       Subjective:   Julianna Ceron days  Breo and Diflucan DuoNeb's every 4 hours as needed    Hypoxia   resolved      Acute febrile illness  Resolved        Results:     Lab Results   Component Value Date    WBC 10.4 12/16/2019    HGB 10.9 (L) 12/16/2019    HCT 34.5 (L) 12/16/2019    PLT 2 (V5) oriented

## 2024-01-18 ENCOUNTER — TELEPHONE (OUTPATIENT)
Dept: PHYSICAL MEDICINE AND REHAB | Facility: CLINIC | Age: 84
End: 2024-01-18

## 2024-01-18 NOTE — TELEPHONE ENCOUNTER
Patient calling to update Dr. Baumann regarding her condition    - Pt was hospitalized on 12/10/23-12/19/23 for pneumonia & COPD.     - Patient has been in [facility] since 12/19/23 or 12/20/23, she does not remember exactly. Pt states staff is anticipating discharge by the end of next week.    - Pt unable to state if she has been receiving respiratory therapy or physical therapy. However pt stating that she did not receive PT during her entire stay during her LOV. Informed pt that Dr. Baumann wanted her to get into PT again for the Lumbar Spine and her gait and transfers. Pt requesting PT order be sent to \"Calvin the manager of PT\" at [facility]. -- S/w Calvin with [facility] who states Fax#[phone]      - Pt stating she was told by Dr. Baumann to have a follow up appointment in February. This RN informed pt that per Dr. Baumann's LOV note she is to follow up around 03/04/2024; pt is currently scheduled for: 03/11/24. Pt was informed that she is currently scheduled correctly for a follow up visit. RN also informed pt that his LOV did state: \"I will perform left L4 and L5 TFESI(s) in February since she has had 4 lumbar FRANCISCO's in the last 12 months.\" However, per referrals TE 12/05/23: \"Patient had lumbar esis on 3/7/23, 5/23/23, 10/10/23 and 11/10/23. Patient has reached the maximum number of lumbar ESIs in a 12 month period.\" &\"Dr. Baumann however after 3/7 will be the soonest they may cover.\" Therefore, her next authorized FRANCISCO will not be until beginning of March. Pt requesting I verify this with Dr. Baumann.

## 2024-03-11 ENCOUNTER — TELEPHONE (OUTPATIENT)
Dept: PHYSICAL MEDICINE AND REHAB | Facility: CLINIC | Age: 84
End: 2024-03-11

## 2024-03-11 ENCOUNTER — OFFICE VISIT (OUTPATIENT)
Dept: PHYSICAL MEDICINE AND REHAB | Facility: CLINIC | Age: 84
End: 2024-03-11
Payer: MEDICARE

## 2024-03-11 VITALS — BODY MASS INDEX: 29.86 KG/M2 | WEIGHT: 197 LBS | HEIGHT: 68 IN

## 2024-03-11 DIAGNOSIS — M48.061 SPINAL STENOSIS OF LUMBAR REGION WITHOUT NEUROGENIC CLAUDICATION: ICD-10-CM

## 2024-03-11 DIAGNOSIS — M43.10 RETROLISTHESIS OF VERTEBRAE: ICD-10-CM

## 2024-03-11 DIAGNOSIS — M96.1 LUMBAR POST-LAMINECTOMY SYNDROME: ICD-10-CM

## 2024-03-11 DIAGNOSIS — M47.816 LUMBAR FACET ARTHROPATHY: ICD-10-CM

## 2024-03-11 DIAGNOSIS — M54.16 LUMBAR RADICULOPATHY: ICD-10-CM

## 2024-03-11 DIAGNOSIS — F31.32 BIPOLAR AFFECTIVE DISORDER, CURRENTLY DEPRESSED, MODERATE (HCC): ICD-10-CM

## 2024-03-11 DIAGNOSIS — Z98.1 S/P CERVICAL SPINAL FUSION: ICD-10-CM

## 2024-03-11 DIAGNOSIS — I10 ESSENTIAL HYPERTENSION: ICD-10-CM

## 2024-03-11 DIAGNOSIS — M51.26 LUMBAR HERNIATED DISC: ICD-10-CM

## 2024-03-11 DIAGNOSIS — M51.36 LUMBAR DEGENERATIVE DISC DISEASE: Primary | ICD-10-CM

## 2024-03-11 PROCEDURE — 99214 OFFICE O/P EST MOD 30 MIN: CPT | Performed by: PHYSICAL MEDICINE & REHABILITATION

## 2024-03-11 NOTE — TELEPHONE ENCOUNTER
Per CMS Guidelines -no authorization is required for Bilateral L5 TFESIs-CPT 02779-95     Status: Authorization is not required based on medical necessity however may be subject to review once claim is submitted-Covered Benefit    Per CMS Guidelines-ESIs are limited to a maximum of four (4) sessions per spinal region in a rolling twelve (12) month period.      This will be #4 (pt had lumbar khurram 5/23/23, 10/10/23 and 11/10/23)    Fyi-[facility] max may be 3 in a rolling 12 month period, also [facility] no longer accepts patients secondary insurance

## 2024-03-11 NOTE — PATIENT INSTRUCTIONS
Plan  I will perform bilateral L5 TFESI(s).    The patient will continue with PT.    The patient will continue with their current pain medications.    The patient will follow up in 3 months, but the patient will call me 2 weeks after having the injection to let me know how the injection worked.

## 2024-03-11 NOTE — PROGRESS NOTES
Low Back Pain H & P    Chief Complaint:   Chief Complaint   Patient presents with    Follow - Up     LOV 12/4/2023 Patient follows up on low back pain, received inj last in [facility] and states she still has the same pain. Was hospitalized for pneumonia 12/19/23 and was in rehab after. Denies new sx. LOP 6/10     Nursing note reviewed and verified.    Patient was last seen on 12/4/2023.  She was hospitalized for pneumonia on 12/10/2023 and she was in the hospital for one month.  She is currently in PT in her building.  The PT will increase her low back pain and standing will increase her low back pain.    Description of the Pain  The pain is located in the bilateral low back.    She is having right whole leg pain for the last couple of weeks.  The left leg is usually her worst leg.  The right leg is now more painful than the left leg.  She is having more difficulty with her transfers due to her pain and weakness.  The pain is worse at night.    The pain is significant. The pain is described as a(n) aching sensation.    There is no numbness.  There is no tingling in the legs.  There is weakness in both legs.     Past Medical History   Past Medical History:   Diagnosis Date    Anxiety state, unspecified     Aortic atherosclerosis (HCC)     CT scan 11-19    Arthritis of both knees     knee replacement     Arthritis of right hip 2009    Back problem     Brain aneurysm (HCC)     Brain Aneurysm, Stent placed     Cervical disc disease 1985,1995,2003,2009    Cervical Discectomy     Cholecystitis 1984    Cholecystectomy     COPD (chronic obstructive pulmonary disease) (HCC)     PFTs 2-15 with FEV1 1.11 L and FEV1/FVC ratio 63%    Deep vein thrombosis (HCC)     recurrent DVT- on lifelong AC    Dehydration 2012    Fluids, Medication adjustment     Dementia (HCC)     Depression     Disorder of thyroid     Esophageal reflux     Fibromyalgia     Hammertoe 02/25/2011    hammertoe 5 left, pain    Hearing impairment     Bilateral  hearing aids    High blood pressure     Hip pain, left     Hypothyroidism     Incontinence     L3-4 stable slight grade 1 retrolisthesis 10/31/2014    L4-5 mild-mod diffuse bulging disc 10/31/2014    L4-5 slight grade 1 unstable spondylolisthesis 10/31/2014    Leg wound, left 08/21/2019    Low back pain     Migraines     Muscle weakness     Nausea vomiting and diarrhea 11/20/2019    Onychomycosis     Debridement     Oropharyngeal dysphagia 08/29/2017    Osteoarthrosis, unspecified whether generalized or localized, unspecified site     Other and unspecified hyperlipidemia     Other complicated headache syndrome 11/02/2017    Other spondylosis, lumbar region 11/02/2017    Pneumonia 2012    S/P cervical spinal fusion: C3-6 and C7-T1 11/02/2017    s/p L5-S1 right laminectomy 08/28/2014    Sleep apnea     stent placement     cerebral    Thyroid disease     Toe pain     Onychomycosis, Debridement , Hammertoe     Tonsillitis 1950    Tonsillitis     Unspecified sleep apnea     Visual impairment     EYE GLASSES    Wound of left leg 10/03/2019    Left leg debridement and Split Thickness Skin Graft to left thigh       Past Surgical History   Past Surgical History:   Procedure Laterality Date    ANESTH,NECK VESSEL SURGERY NOS      x4    BRAIN SURGERY  ~2009    Brain Aneurysm, Stent placed     CAROTID ENDARTERECTOMY Right     CARPAL TUNNEL RELEASE Right ~2008    CHOLECYSTECTOMY  1984    Cholecystitis    COLONOSCOPY  2010    DEBRIDEMENT OF NAIL(S), 1-5      Toe, Onychomycosis    EGD  05/2019    Gastric polyps only    EGD  2006    EYE SURGERY      x2 FOR \"CROSSED EYES\"    HIP REPLACEMENT SURGERY Bilateral     HYSTERECTOMY  1967    Partial Hysterectomy    JAW SURGERY      KNEE REPLACEMENT SURGERY Bilateral     Bilateral arthritis     LAMINECTOMY      WITH FUSION PER PATIENT    OTHER SURGICAL HISTORY  1985,1995,2003,2009    Cervical Discectomy AND FUSION    PREP SITE T/A/L 1ST 100 SQ CM/1PCT Left 08/21/2019    Left leg debridement,  VAC placed    SPLIT GRFT PROC TRUNK <100SQCM Left 10/08/2019    Left leg debridement and Split Thickness Skin Graft to left thigh    TONSILLECTOMY  1950    Tonsillitis        Family History   Family History   Problem Relation Age of Onset    Heart Attack Mother     Heart Disease Mother         Coronary Artery Disease, Premature     Fibromyalgia Sister     Heart Disease Sister     Heart Attack Sister     Heart Disease Brother     Heart Attack Brother     Other (Other[other]) Father     Other (Other[other]) Maternal Grandmother     Other (Other[other]) Maternal Grandfather     Other (Other[other]) Paternal Grandmother     Other (Other[other]) Paternal Grandfather     Cancer Brother 55        Liver     Neurological Disorder Sister         ALzheimer's Disease     Depression Sister     Migraines Sister     Stroke Sister         Cerebrovascular accident     Dementia Sister     Heart Disease Brother         Coronary Artery Disease        Social History   Social History     Socioeconomic History    Marital status:      Spouse name: Not on file    Number of children: Not on file    Years of education: Not on file    Highest education level: Not on file   Occupational History    Not on file   Tobacco Use    Smoking status: Former     Packs/day: 2.00     Years: 25.00     Additional pack years: 0.00     Total pack years: 50.00     Types: Cigarettes     Start date: 1952     Quit date: 1977     Years since quittin.2    Smokeless tobacco: Never    Tobacco comments:     Very heavy smoker 2 1/2 daily   Vaping Use    Vaping Use: Never used   Substance and Sexual Activity    Alcohol use: No    Drug use: No    Sexual activity: Not on file   Other Topics Concern     Service Not Asked    Blood Transfusions Not Asked    Caffeine Concern No     Comment: crystal light    Occupational Exposure Not Asked    Hobby Hazards Not Asked    Sleep Concern Not Asked    Stress Concern Not Asked    Weight Concern Not Asked     Special Diet Not Asked    Back Care Not Asked    Exercise No     Comment: No PT due to Covid    Bike Helmet Not Asked    Seat Belt Not Asked    Self-Exams Not Asked    Grew up on a farm Not Asked    History of tanning No    Outdoor occupation Not Asked    Pt has a pacemaker No    Pt has a defibrillator No    Breast feeding Not Asked    Reaction to local anesthetic No    Left Handed Yes    Right Handed No    Currently spends a great deal of time in the sun No    Past Sunlamp Treatments for Acne Not Asked    Hx of Spending Great Deal of Time in Sun No    Bad sunburns in the past No    Tanning Salons in the Past No    Hx of Radiation Treatments No    Regular use of sun block Not Asked   Social History Narrative    The patient uses the following assistive device(s):  rolling walker.  scooter    The patient does not live in a home with stairs.    The patient lives in a [facility] home. [facility]     Social Determinants of Health     Financial Resource Strain: Not on file   Food Insecurity: No Food Insecurity (12/11/2023)    Food Insecurity     Food Insecurity: Never true   Transportation Needs: No Transportation Needs (12/11/2023)    Transportation Needs     Lack of Transportation: No   Physical Activity: Not on file   Stress: Not on file   Social Connections: Not on file   Housing Stability: Low Risk  (12/11/2023)    Housing Stability     Housing Instability: No     Housing Instability Emergency: Not on file       PE:  The patient does appear in her stated age in no distress.  The patient is well groomed.    Psychiatric:  The patient is alert and oriented x 3.  The patient has a normal affect and mood.      Respiratory:  No acute respiratory distress. Patient does not have a cough.    HEENT:  Extraocular muscles are intact. There is no kern icterus. Pupils are equal, round, and reactive to light. No redness or discharge bilaterally.    Skin:  There are no rashes or lesions.    Vitals:  There were no vitals filed for  this visit.    Vascular lower extremity:   Dorsalis pedis pulse-RIGHT 2+   Dorsalis pedis pulse-LEFT 2+     Neurological Lower Extremity:    Light Touch Sensation: Intact in bilateral Lower Extremities   LE Muscle Strength: All LE strength measurements 5/5 except:  Gluteus medius RIGHT:   3+/5  Gluteus medius LEFT:   3+/5  Hamstring RIGHT:   3+/5  Hamstring LEFT:   3+/5  Knee Extension RIGHT:   4-/5  Knee Extension LEFT:   4-/5  Extensor Hallucis Longus RIGHT:   4/5  Extensor Hallucis Longus LEFT:   4/5   RIGHT plantar reflexes: downward response   LEFT plantar reflexes: downward response   Reflexes: absent in bilateral lower extremities     Assessment  1. L5-S1 bilateral mod-large foraminal, L4-5 mod diffuse, L3-4 mild diffuse, L2-3 mod diffuse, L1-2 right > left mod diffuse, T12-L1 left mild-mod, T11-12 mild-mod diffuse bulging discs    2. L5-S1 bilateral severe foraminal & moderate central, L4-5 severe central, L2-3 mod central & left mod-severe foraminal, L1-2 mod central & right mod-severe , T12-L1 mod central stenosis    3. L5-S1 mild-mod central HNP    4. Lumbar facet arthropathy: L5-S1 bilateral large    5. S/P cervical spinal fusion: C3-6 and C7-T1    6. L3-4 stable slight grade 1 retrolisthesis    7. s/p L5-S1 right laminectomy    8. Bipolar affective disorder, currently depressed, moderate (HCC)    9. Essential hypertension    10. bilateral L4 and L5-S1 radiculopathy         Plan  I will perform bilateral L5 TFESI(s).    The patient will continue with PT.    The patient will continue with their current pain medications.    The patient will follow up in 3 months, but the patient will call me 2 weeks after having the injection to let me know how the injection worked.    The patient understands and agrees with the stated plan.  Alex Baumann MD  3/11/2024

## 2024-03-14 NOTE — TELEPHONE ENCOUNTER
Status of Anticoag  [x] Clearance pending to hold Eliquis for 2 days prior to bilateral L5 transforaminal epidural steroid injection faxed to Dr. Timothy Hicks F: [phone]  [] Clearance approved  [] Clearance denied

## 2024-03-18 NOTE — TELEPHONE ENCOUNTER
Pt called back asking to schedule her injection. Informed that currently we are waiting on clearance to hold the Eliquis from [facility].     Pt asked why do we need clearance and she can just hold it. Informed patient that if clearance if not received, that would be against medical advise and increase the risk of clotting. Educated to not hold the eliquis unless advised by our office once we receive clearance.     Pt verbalized understanding, however phone was disconnected.   Tried to call patient back but there was no answer.

## 2024-03-18 NOTE — TELEPHONE ENCOUNTER
Patient called back and advised she was disconnected with another nurse earlier.     This RN advised that we were still waiting on her clearance letter from Dr. Hicks's office and recommended patient call as well to follow up, and that this RN will call Dr. Young's office to inquire as well.  Advised office policy is that we must receive the clearance from her doctor prior to scheduling the injection.  This RN educated patient to not stop taking it and once we schedule her we will give her the date to stop taking it.       Patient stated she was recently hospitalized for pneumonia. She did not discuss this further.    Patient is asking if she can be given IVCS for the injection.  Last injection they had difficulty with IV access even w/ ultrasound, and patient had valium instead of IVCS, and patient stated that it was very painful for her and that valium doesn't work well for her because she has been taking it for so long, so she didn't feel any relaxation from it.    This RN advised that she would ask Dr. Baumann , but also advised if they have difficulty with obtaining an IV access, they may ask her again about an oral sedative and local.

## 2024-03-18 NOTE — TELEPHONE ENCOUNTER
S/W Estela at [facility]- Dr. Hicks's office.  She transferred to Dr. Hicks office.    This RN waiting over 40 min for someone to answer at [facility].
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
## 2024-04-12 ENCOUNTER — HOSPITAL ENCOUNTER (OUTPATIENT)
Dept: GENERAL RADIOLOGY | Facility: HOSPITAL | Age: 84
Discharge: HOME | End: 2024-04-12
Attending: PHYSICAL MEDICINE & REHABILITATION
Payer: MEDICARE

## 2024-04-12 ENCOUNTER — OFFICE VISIT (OUTPATIENT)
Dept: SURGERY | Facility: CLINIC | Age: 84
End: 2024-04-12
Payer: MEDICARE

## 2024-04-12 DIAGNOSIS — M48.061 SPINAL STENOSIS OF LUMBAR REGION WITHOUT NEUROGENIC CLAUDICATION: ICD-10-CM

## 2024-04-12 DIAGNOSIS — M54.16 LUMBAR RADICULOPATHY: ICD-10-CM

## 2024-04-12 DIAGNOSIS — M51.36 LUMBAR DEGENERATIVE DISC DISEASE: ICD-10-CM

## 2024-04-12 DIAGNOSIS — M54.16 LUMBAR RADICULOPATHY: Primary | ICD-10-CM

## 2024-04-12 PROCEDURE — 77002 NEEDLE LOCALIZATION BY XRAY: CPT | Performed by: PHYSICAL MEDICINE & REHABILITATION

## 2024-04-12 PROCEDURE — 64483 NJX AA&/STRD TFRM EPI L/S 1: CPT | Performed by: PHYSICAL MEDICINE & REHABILITATION

## 2024-04-12 RX ORDER — LIDOCAINE HYDROCHLORIDE 10 MG/ML
30 INJECTION, SOLUTION EPIDURAL; INFILTRATION; INTRACAUDAL; PERINEURAL ONCE
Status: COMPLETED | OUTPATIENT
Start: 2024-04-12 | End: 2024-04-12

## 2024-04-12 RX ORDER — TRIAMCINOLONE ACETONIDE 40 MG/ML
120 INJECTION, SUSPENSION INTRA-ARTICULAR; INTRAMUSCULAR ONCE
Status: COMPLETED | OUTPATIENT
Start: 2024-04-12 | End: 2024-04-12

## 2024-04-12 RX ORDER — TRIAMCINOLONE ACETONIDE 40 MG/ML
INJECTION, SUSPENSION INTRA-ARTICULAR; INTRAMUSCULAR
Status: COMPLETED
Start: 2024-04-12 | End: 2024-04-12

## 2024-04-12 RX ADMIN — LIDOCAINE HYDROCHLORIDE 20 ML: 10 INJECTION, SOLUTION EPIDURAL; INFILTRATION; INTRACAUDAL; PERINEURAL at 13:30:00

## 2024-04-12 RX ADMIN — TRIAMCINOLONE ACETONIDE 120 MG: 40 INJECTION, SUSPENSION INTRA-ARTICULAR; INTRAMUSCULAR at 13:30:00

## 2024-04-12 NOTE — PROCEDURES
[facility]    BILATERAL LUMBAR TRANSFORAMINAL   NAME:  Gretta Canales    MR #:    [id number] :  12/3/1940     PHYSICIAN:  Alex Baumann MD        Operative Report    DATE OF PROCEDURE: 2024   PREOPERATIVE DIAGNOSES: 1. bilateral L4 and L5-S1 radiculopathy    2. L5-S1 bilateral mod-large foraminal, L4-5 mod diffuse, L3-4 mild diffuse, L2-3 mod diffuse, L1-2 right > left mod diffuse, T12-L1 left mild-mod, T11-12 mild-mod diffuse bulging discs    3. L5-S1 bilateral severe foraminal & moderate central, L4-5 severe central, L2-3 mod central & left mod-severe foraminal, L1-2 mod central & right mod-severe , T12-L1 mod central stenosis       POSTOPERATIVE DIAGNOSES:   same   PROCEDURES: Bilateral L5 transforaminal epidural steroid injections done under fluoroscopic guidance with contrast enhancement.   SURGEON: Alex Baumann MD   ANESTHESIA: Local   INDICATIONS:      OPERATIVE PROCEDURE:  Written consent was obtained from the patient.  The patient was brought into the operating room and placed in the prone position on the fluoroscopy table with pillow underneath her abdomen.  The patient's skin was cleaned and draped in a normal sterile fashion.  Using AP fluoroscopy, all five lumbar vertebrae were identified.  When the fifth vertebra was identified, fluoroscopy was left anterior obliqued opening up the right L5-S1 intervertebral foramen.  At this point in time, the patient's skin was anesthetized with 1% PF lidocaine without epinephrine.  Then, a 5 inch, 22-gauge spinal needle was inserted and directed towards the right L5-S1 intervertebral foramen.  When it felt to be in good position, AP fluoroscopy was used to advance the needle to the 6 o'clock position on the right L5 pedicle.  At this point in time, Omnipaque-240 contrast was used to obtain a good epidurogram indicating correct needle placement.  Then, aspiration was performed.  No blood, fluid, or air was aspirated.  Then, the  patient was injected with a 3 cc solution of 1.5 cc of 40 mg/cc of Kenalog and 1.5 cc of 1% PF lidocaine without epinephrine.  After this, the needle was removed.  Then  fluoroscopy was right anterior obliqued opening up the left L5-S1 intervertebral foramen.  At this point in time, the patient's skin was anesthetized with 1 to 2 cc of 1% PF lidocaine without epinephrine.  Then, a 5 inch, 22-gauge spinal needle was inserted and directed towards the left L5-S1 intervertebral foramen.  When it felt to be in good position, AP fluoroscopy was used to advance the needle to the 6 o'clock position on the left L5 pedicle.  At this point in time, Omnipaque-240 contrast was used to obtain a good epidurogram indicating correct needle placement.  Then, aspiration was performed.  No blood, fluid, or air was aspirated.  Then, the patient was injected with a 3 cc solution of 1.5 cc of 40 mg/cc of Kenalog and 1.5 cc of 1% PF lidocaine without epinephrine.  After this, the needle was removed.  The patient's skin was cleaned.  Band-Aids were applied.  The patient was transferred to the cart and into [facility].  The patient was given discharge instructions and will follow up in the clinic as scheduled.  Throughout the whole procedure, the patient's pulse oximetry and vital signs were monitored and they remained completely stable.  Also, throughout the whole procedure, prior to injection of any medication, aspiration was performed.  No blood, fluid, or air was aspirated at anytime.

## 2024-05-03 ENCOUNTER — HOSPITAL ENCOUNTER (EMERGENCY)
Facility: HOSPITAL | Age: 84
Discharge: HOME | End: 2024-05-03
Attending: EMERGENCY MEDICINE
Payer: MEDICARE

## 2024-05-03 VITALS
HEIGHT: 68 IN | RESPIRATION RATE: 20 BRPM | SYSTOLIC BLOOD PRESSURE: 151 MMHG | DIASTOLIC BLOOD PRESSURE: 61 MMHG | HEART RATE: 50 BPM | WEIGHT: 195 LBS | OXYGEN SATURATION: 95 % | TEMPERATURE: 99 F | BODY MASS INDEX: 29.55 KG/M2

## 2024-05-03 DIAGNOSIS — B37.2 INTERTRIGINOUS CANDIDIASIS: Primary | ICD-10-CM

## 2024-05-03 LAB — GLUCOSE BLDC GLUCOMTR-MCNC: 101 MG/DL (ref 70–99)

## 2024-05-03 PROCEDURE — 99283 EMERGENCY DEPT VISIT LOW MDM: CPT

## 2024-05-03 PROCEDURE — 82962 GLUCOSE BLOOD TEST: CPT

## 2024-05-03 PROCEDURE — 99284 EMERGENCY DEPT VISIT MOD MDM: CPT

## 2024-05-03 PROCEDURE — 99285 EMERGENCY DEPT VISIT HI MDM: CPT

## 2024-05-03 RX ORDER — CLOTRIMAZOLE AND BETAMETHASONE DIPROPIONATE 10; .64 MG/G; MG/G
CREAM TOPICAL
Qty: 45 G | Refills: 0 | Status: SHIPPED | OUTPATIENT
Start: 2024-05-03

## 2024-05-03 NOTE — ED INITIAL ASSESSMENT (HPI)
Patient arrives via EMS with reports of rash . Rash noted to bilateral abdominal folds. Denies fevers. Cream/powder used with no relief.

## 2024-05-03 NOTE — ED PROVIDER NOTES
Patient Seen in: [facility] Emergency Department      History     Chief Complaint   Patient presents with    Rash Skin Problem     Stated Complaint: Rash    Subjective:   The  HPI    Patient is an 83-year-old female that complains of a rash to her left inguinal area.  She stated started on 1 side and then started involve the other side sometimes it involves the inside of her left thigh.  Sometimes pruritic sometimes it stings.  No fever or chills.  She has been applying Vaseline with some relief.    Objective:   Past Medical History:    Anxiety state, unspecified    Aortic atherosclerosis ([facility])    CT scan 11-19    Arthritis of both knees    knee replacement     Arthritis of right hip    Back problem    Brain aneurysm ([facility])    Brain Aneurysm, Stent placed     Cervical disc disease    Cervical Discectomy     Cholecystitis    Cholecystectomy     COPD (chronic obstructive pulmonary disease) ([facility])    PFTs 2-15 with FEV1 1.11 L and FEV1/FVC ratio 63%    Deep vein thrombosis ([facility])    recurrent DVT- on lifelong AC    Dehydration    Fluids, Medication adjustment     Dementia ([facility])    Depression    Disorder of thyroid    Esophageal reflux    Fibromyalgia    Hammertoe    hammertoe 5 left, pain    Hearing impairment    Bilateral hearing aids    High blood pressure    Hip pain, left    Hypothyroidism    Incontinence    L3-4 stable slight grade 1 retrolisthesis    L4-5 mild-mod diffuse bulging disc    L4-5 slight grade 1 unstable spondylolisthesis    Leg wound, left    Low back pain    Migraines    Muscle weakness    Nausea vomiting and diarrhea    Onychomycosis    Debridement     Oropharyngeal dysphagia    Osteoarthrosis, unspecified whether generalized or localized, unspecified site    Other and unspecified hyperlipidemia    Other complicated headache syndrome    Other spondylosis, lumbar region    Pneumonia    S/P cervical spinal fusion: C3-6 and C7-T1    s/p L5-S1 right laminectomy    Sleep apnea    stent placement     cerebral    Thyroid disease    Toe pain    Onychomycosis, Debridement , Hammertoe     Tonsillitis    Tonsillitis     Unspecified sleep apnea    Visual impairment    EYE GLASSES    Wound of left leg    Left leg debridement and Split Thickness Skin Graft to left thigh              Past Surgical History:   Procedure Laterality Date    Anesth,neck vessel surgery nos      x4    Brain surgery  ~2009    Brain Aneurysm, Stent placed     Carotid endarterectomy Right     Carpal tunnel release Right ~2008    Cholecystectomy  1984    Cholecystitis    Colonoscopy  2010    Debridement of nail(s), 1-5      Toe, Onychomycosis    Egd  2019    Gastric polyps only    Egd      Eye surgery      x2 FOR \"CROSSED EYES\"    Hip replacement surgery Bilateral     Hysterectomy  1967    Partial Hysterectomy    Jaw surgery      Knee replacement surgery Bilateral     Bilateral arthritis     Laminectomy      WITH FUSION PER PATIENT    Other surgical history  ,,,    Cervical Discectomy AND FUSION    Prep site t/a/l 1st 100 sq cm/1pct Left 2019    Left leg debridement, VAC placed    Split grft proc trunk <100sqcm Left 10/08/2019    Left leg debridement and Split Thickness Skin Graft to left thigh    Tonsillectomy  1950    Tonsillitis                 Social History     Socioeconomic History    Marital status:    Tobacco Use    Smoking status: Former     Current packs/day: 0.00     Average packs/day: 2.0 packs/day for 25.0 years (50.0 ttl pk-yrs)     Types: Cigarettes     Start date: 1952     Quit date: 1977     Years since quittin.3    Smokeless tobacco: Never    Tobacco comments:     Very heavy smoker 2 1/2 daily   Vaping Use    Vaping status: Never Used   Substance and Sexual Activity    Alcohol use: No    Drug use: No   Other Topics Concern    Caffeine Concern No     Comment: crystal light    Exercise No     Comment: No PT due to Covid    History of tanning No    Pt has a pacemaker No    Pt has a  defibrillator No    Reaction to local anesthetic No    Left Handed Yes    Right Handed No    Currently spends a great deal of time in the sun No    Hx of Spending Great Deal of Time in Sun No    Bad sunburns in the past No    Tanning Salons in the Past No    Hx of Radiation Treatments No   Social History Narrative    The patient uses the following assistive device(s):  rolling walker.  scooter    The patient does not live in a home with stairs.    The patient lives in a intermediate home. [facility]     Social Determinants of Health     Food Insecurity: No Food Insecurity (12/11/2023)    Food Insecurity     Food Insecurity: Never true   Transportation Needs: No Transportation Needs (12/11/2023)    Transportation Needs     Lack of Transportation: No   Housing Stability: Low Risk  (12/11/2023)    Housing Stability     Housing Instability: No              Review of Systems    Positive for stated complaint: Rash  Other systems are as noted in HPI.  Constitutional and vital signs reviewed.      All other systems reviewed and negative except as noted above.    Physical Exam     ED Triage Vitals [05/03/24 1728]   /65   Pulse 79   Resp 22   Temp 98.6 °F (37 °C)   Temp src Temporal   SpO2 94 %   O2 Device None (Room air)       Current:/65   Pulse 79   Temp 98.6 °F (37 °C) (Temporal)   Resp 22   Ht 172.7 cm (5' 8\")   Wt 88.5 kg   SpO2 94%   BMI 29.65 kg/m²         Physical Exam    Constitutional: Oriented to person, place, and time. Appears well-developed and well-nourished.   HEENT:   Head: Normocephalic and atraumatic.   Right Ear: External ear normal.   Left Ear: External ear normal.   Nose: Nose normal.   Mouth/Throat: Oropharynx is clear and moist.   Eyes: Conjunctivae and EOM are normal. Pupils are equal, round, and reactive to light.   Neck: Neck supple.   Cardiovascular: Normal rate, regular rhythm, normal heart sounds and intact distal pulses.    Pulmonary/Chest: Effort normal and breath sounds  normal. No respiratory distress.   Abdominal: Soft. Bowel sounds are normal. Exhibits no distension and no mass. There is no tenderness. There is no rebound and no guarding.   Musculoskeletal: Normal range of motion. Exhibits no edema or tenderness.   Lymphadenopathy: No cervical adenopathy.   Neurological: Alert and oriented to person, place, and time. Normal reflexes. No cranial nerve deficit. No motor os sensory defecits noted Coordination normal.   Skin: Skin is warm and dry.  Patient has an intertriginous candidiasis appearing rash in the left more than right inguinal area underneath the pannus of her abdomen.  No signs of secondary infection  Psychiatric: Normal mood and affect. Behavior is normal. Judgment and thought content normal.   Nursing note and vitals reviewed.      ED Course   Labs Reviewed - No data to display                   MDM      Use of independent historian: EMS provides history    I personally reviewed and interpreted the images :     No results found.    Vitals:    05/03/24 1728   BP: 150/65   Pulse: 79   Resp: 22   Temp: 98.6 °F (37 °C)   TempSrc: Temporal   SpO2: 94%   Weight: 88.5 kg   Height: 172.7 cm (5' 8\")     *I personally reviewed and interpreted all ED vitals.    Pulse Ox: 94%, Room air, Normal         Differential Diagnosis/ Diagnostic Considerations: Well-appearing patient with normal vital signs with inguinal rash consider candidiasis consider shingles consider cellulitis    Medical Record Review: I personally reviewed available prior medical records for any recent pertinent discharge summaries, testing, and procedures and reviewed those reports and found discharge summary from 12/10/2023 diagnosis COPD exacerbation notes reviewed.    Complicating Factors: The patient already has COPD anxiety low back pain cervical disc disease which contribute to the complexity of this ED evaluation.    Social determinants of health:    Prescription drug management:      Shared Decision  Making:    ED Course: Patient is in agreement with plan    Discussion of management with other healthcare providers:    Condition upon leaving the department: Stable                                     Medical Decision Making      Disposition and Plan     Clinical Impression:  1. Intertriginous candidiasis         Disposition:  Discharge  5/3/2024  5:42 pm    Follow-up:  Timothy Hicks MD  [address] [place] [id number]  [phone]    Follow up in 3 day(s)      We recommend that you schedule follow up care with a primary care provider within the next three months to obtain basic health screening including reassessment of your blood pressure.      Medications Prescribed:  Current Discharge Medication List

## 2024-05-04 NOTE — ED QUICK NOTES
Patient safe to discharge home per ED Provider. Discharge education provided, including follow up instructions. Patient verbalizes understanding. Handoff given to [facility] to transport patient to [facility].

## 2024-05-04 NOTE — ED QUICK NOTES
Spoke with [facility] staff, Cheryl, regarding patients return to [facility].    no

## 2024-05-28 ENCOUNTER — TELEPHONE (OUTPATIENT)
Dept: PHYSICAL MEDICINE AND REHAB | Facility: CLINIC | Age: 84
End: 2024-05-28

## 2024-05-28 NOTE — TELEPHONE ENCOUNTER
S/W Medicare &     States she dropped the medicaid.     Agent at medicare suggested to call welfare-     Patient discussed a lot of changes     Re-enrolled -  This RN had difficulty understanding what patient was referring to and needed, asked for clarification.  Patient ended up thinking she was calling insurance.  This RN advised patient she was on the phone with Dr. Baumann's office.      Patient advised she was needing to provide condition update:    Patient had Bilateral L5 transforaminal epidural steroid injections on 4/12/2024 performed by Dr. Baumann.      Back is feeling better, but has pain in left upper thigh- Patient described that it was wrapping around at times.  Phone connection was not clear, this RN had difficulty understanding all that patient was verbalizing, as patient stated she wanted to ask about a specific procedure her daughter told her about, but could not recall the name nor could describe it.  This RN recommended an appointment with Dr. Baumann so she could show Dr. Baumann where the pain is.  Patient accepted.  Patient preferred [place] location.  Patient was provided an appointment on 5/30/2024.  This RN encouraged patient to write down the appointment information, as patient mentioned she has been forgetting a lot of things lately .

## 2024-05-29 NOTE — TELEPHONE ENCOUNTER
Patient called office back, this RN advised that Dr. Baumann will not be in tomorrow afternoon and that we needed to reschedule her.  Patient verbalized understanding and patient accepted appt on 6/6/2024 at 4:30 pm.    Patient verbalized understanding and this RN prompted patient to write the appt on her calendar and to remove the 5/30/2024 appt.  Patient was appreciative .

## 2024-06-05 ENCOUNTER — TELEPHONE (OUTPATIENT)
Dept: PHYSICAL MEDICINE AND REHAB | Facility: CLINIC | Age: 84
End: 2024-06-05

## 2024-06-05 NOTE — TELEPHONE ENCOUNTER
Spoke with patient who stated she just missed a call from our office.     No message documented. Informed patient call was most likely to confirm her 6/6/24 appointment tomorrow at 4:30 pm.     Patient confirmed appt.     Nothing further needed at this time.  
No, Declined

## 2024-06-06 ENCOUNTER — OFFICE VISIT (OUTPATIENT)
Dept: PHYSICAL MEDICINE AND REHAB | Facility: CLINIC | Age: 84
End: 2024-06-06
Payer: MEDICARE

## 2024-06-06 ENCOUNTER — TELEPHONE (OUTPATIENT)
Dept: PHYSICAL MEDICINE AND REHAB | Facility: CLINIC | Age: 84
End: 2024-06-06

## 2024-06-06 VITALS — WEIGHT: 195 LBS | HEIGHT: 68 IN | BODY MASS INDEX: 29.55 KG/M2

## 2024-06-06 DIAGNOSIS — Z98.1 S/P CERVICAL SPINAL FUSION: ICD-10-CM

## 2024-06-06 DIAGNOSIS — I10 ESSENTIAL HYPERTENSION: ICD-10-CM

## 2024-06-06 DIAGNOSIS — M54.16 LUMBAR RADICULOPATHY: ICD-10-CM

## 2024-06-06 DIAGNOSIS — F31.32 BIPOLAR AFFECTIVE DISORDER, CURRENTLY DEPRESSED, MODERATE (HCC): ICD-10-CM

## 2024-06-06 DIAGNOSIS — M43.16 SPONDYLOLISTHESIS OF LUMBAR REGION: ICD-10-CM

## 2024-06-06 DIAGNOSIS — M48.061 SPINAL STENOSIS OF LUMBAR REGION WITHOUT NEUROGENIC CLAUDICATION: ICD-10-CM

## 2024-06-06 DIAGNOSIS — M51.36 LUMBAR DEGENERATIVE DISC DISEASE: Primary | ICD-10-CM

## 2024-06-06 DIAGNOSIS — M43.10 RETROLISTHESIS OF VERTEBRAE: ICD-10-CM

## 2024-06-06 DIAGNOSIS — M51.26 LUMBAR HERNIATED DISC: ICD-10-CM

## 2024-06-06 DIAGNOSIS — F41.9 ANXIETY: Chronic | ICD-10-CM

## 2024-06-06 DIAGNOSIS — R26.9 NEUROLOGIC GAIT DISORDER: ICD-10-CM

## 2024-06-06 DIAGNOSIS — M47.816 LUMBAR FACET ARTHROPATHY: ICD-10-CM

## 2024-06-06 DIAGNOSIS — M96.1 LUMBAR POST-LAMINECTOMY SYNDROME: ICD-10-CM

## 2024-06-06 PROCEDURE — 99214 OFFICE O/P EST MOD 30 MIN: CPT | Performed by: PHYSICAL MEDICINE & REHABILITATION

## 2024-06-06 NOTE — TELEPHONE ENCOUNTER
Per CMS Guidelines -no authorization is required for Left L4 and Left L5 transforaminal epidural steroid injections CPT      Status: Authorization is not required based on medical necessity however is not a guarantee of payment and may be subject to review once claim is submitted-Covered Benefit     Per CMS Guidelines-ESIs are limited to a maximum of four (4) sessions per spinal region in a rolling twelve (12) month period.    Pt had lumbar lumbar khurram 10/10/23, 11/10/23, 4/12/24  This is #4

## 2024-06-06 NOTE — PATIENT INSTRUCTIONS
Plan  I will perform left L4 and L5 TFESI(s).     The patient will continue with PT.     The patient will continue with her home exercise program.     The patient will continue with their current pain medications.     The patient will follow up in 3 months, but the patient will call me 2 weeks after having the injection to let me know how the injection worked.

## 2024-06-06 NOTE — H&P (VIEW-ONLY)
Low Back Pain H & P    Chief Complaint:   Chief Complaint   Patient presents with    Low Back Pain     LOV 04/12/24 Pt presents with pain that starts in the back of her left thigh area and travels proximal to her gluteus area. Pt states she has pain in this area 6/10 and she is taking Tylenol for pain but it doesn't provide her any relief.      Nursing note reviewed and verified.    Patient was last seen on 3/11/2024.  On 4/12/2024, I did bilateral L5 TFESI's which helped some.  Then about 4 weeks ago, she developed left buttock pain that radiates to the left posterior thigh.  She has been doing the PT at her facility every day.  They are working on her walking and leg strength.  This pain is 6-7/10.  She denies any numbness ot tingling or new weakness. She is having more difficulty with her transfers.  The pain is worse with sitting.    Past Medical History   Past Medical History:    Anxiety state, unspecified    Aortic atherosclerosis ([facility])    CT scan 11-19    Arthritis of both knees    knee replacement     Arthritis of right hip    Back problem    Brain aneurysm (HCC)    Brain Aneurysm, Stent placed     Cervical disc disease    Cervical Discectomy     Cholecystitis    Cholecystectomy     COPD (chronic obstructive pulmonary disease) ([facility])    PFTs 2-15 with FEV1 1.11 L and FEV1/FVC ratio 63%    Deep vein thrombosis (HCC)    recurrent DVT- on lifelong AC    Dehydration    Fluids, Medication adjustment     Dementia ([facility])    Depression    Disorder of thyroid    Esophageal reflux    Fibromyalgia    Hammertoe    hammertoe 5 left, pain    Hearing impairment    Bilateral hearing aids    High blood pressure    Hip pain, left    Hypothyroidism    Incontinence    L3-4 stable slight grade 1 retrolisthesis    L4-5 mild-mod diffuse bulging disc    L4-5 slight grade 1 unstable spondylolisthesis    Leg wound, left    Low back pain    Migraines    Muscle weakness    Nausea vomiting and diarrhea    Onychomycosis    Debridement      Oropharyngeal dysphagia    Osteoarthrosis, unspecified whether generalized or localized, unspecified site    Other and unspecified hyperlipidemia    Other complicated headache syndrome    Other spondylosis, lumbar region    Pneumonia    S/P cervical spinal fusion: C3-6 and C7-T1    s/p L5-S1 right laminectomy    Sleep apnea    stent placement    cerebral    Thyroid disease    Toe pain    Onychomycosis, Debridement , Hammertoe     Tonsillitis    Tonsillitis     Unspecified sleep apnea    Visual impairment    EYE GLASSES    Wound of left leg    Left leg debridement and Split Thickness Skin Graft to left thigh       Past Surgical History   Past Surgical History:   Procedure Laterality Date    Anesth,neck vessel surgery nos      x4    Brain surgery  ~2009    Brain Aneurysm, Stent placed     Carotid endarterectomy Right     Carpal tunnel release Right ~2008    Cholecystectomy  1984    Cholecystitis    Colonoscopy  2010    Debridement of nail(s), 1-5      Toe, Onychomycosis    Egd  05/2019    Gastric polyps only    Egd  2006    Eye surgery      x2 FOR \"CROSSED EYES\"    Hip replacement surgery Bilateral     Hysterectomy  1967    Partial Hysterectomy    Jaw surgery      Knee replacement surgery Bilateral     Bilateral arthritis     Laminectomy      WITH FUSION PER PATIENT    Other surgical history  1985,1995,2003,2009    Cervical Discectomy AND FUSION    Prep site t/a/l 1st 100 sq cm/1pct Left 08/21/2019    Left leg debridement, VAC placed    Split grft proc trunk <100sqcm Left 10/08/2019    Left leg debridement and Split Thickness Skin Graft to left thigh    Tonsillectomy  1950    Tonsillitis        Family History   Family History   Problem Relation Age of Onset    Heart Attack Mother     Heart Disease Mother         Coronary Artery Disease, Premature     Fibromyalgia Sister     Heart Disease Sister     Heart Attack Sister     Heart Disease Brother     Heart Attack Brother     Other (Other[other]) Father     Other  (Other[other]) Maternal Grandmother     Other (Other[other]) Maternal Grandfather     Other (Other[other]) Paternal Grandmother     Other (Other[other]) Paternal Grandfather     Cancer Brother 55        Liver     Neurological Disorder Sister         ALzheimer's Disease     Depression Sister     Migraines Sister     Stroke Sister         Cerebrovascular accident     Dementia Sister     Heart Disease Brother         Coronary Artery Disease        Social History   Social History     Socioeconomic History    Marital status:      Spouse name: Not on file    Number of children: Not on file    Years of education: Not on file    Highest education level: Not on file   Occupational History    Not on file   Tobacco Use    Smoking status: Former     Current packs/day: 0.00     Average packs/day: 2.0 packs/day for 25.0 years (50.0 ttl pk-yrs)     Types: Cigarettes     Start date: 1952     Quit date: 1977     Years since quittin.4    Smokeless tobacco: Never    Tobacco comments:     Very heavy smoker 2 1/2 daily   Vaping Use    Vaping status: Never Used   Substance and Sexual Activity    Alcohol use: No    Drug use: No    Sexual activity: Not on file   Other Topics Concern     Service Not Asked    Blood Transfusions Not Asked    Caffeine Concern No     Comment: crystal light    Occupational Exposure Not Asked    Hobby Hazards Not Asked    Sleep Concern Not Asked    Stress Concern Not Asked    Weight Concern Not Asked    Special Diet Not Asked    Back Care Not Asked    Exercise No     Comment: No PT due to Covid    Bike Helmet Not Asked    Seat Belt Not Asked    Self-Exams Not Asked    Grew up on a farm Not Asked    History of tanning No    Outdoor occupation Not Asked    Pt has a pacemaker No    Pt has a defibrillator No    Breast feeding Not Asked    Reaction to local anesthetic No    Left Handed Yes    Right Handed No    Currently spends a great deal of time in the sun No    Past Sunlamp Treatments  for Acne Not Asked    Hx of Spending Great Deal of Time in Sun No    Bad sunburns in the past No    Tanning Salons in the Past No    Hx of Radiation Treatments No    Regular use of sun block Not Asked   Social History Narrative    The patient uses the following assistive device(s):  rolling walker.  scooter    The patient does not live in a home with stairs.    The patient lives in a [facility] home. [facility]     Social Determinants of Health     Financial Resource Strain: Not on file   Food Insecurity: No Food Insecurity (12/11/2023)    Food Insecurity     Food Insecurity: Never true   Transportation Needs: No Transportation Needs (12/11/2023)    Transportation Needs     Lack of Transportation: No   Physical Activity: Not on file   Stress: Not on file   Social Connections: Not on file   Housing Stability: Low Risk  (12/11/2023)    Housing Stability     Housing Instability: No     Housing Instability Emergency: Not on file     Crib or Bassinette: Not on file       PE:  The patient does appear in her stated age in no distress.  The patient is well groomed.    Psychiatric:  The patient is alert and oriented x 3.  The patient has a normal affect and mood.      Respiratory:  No acute respiratory distress. Patient does not have a cough.    HEENT:  Extraocular muscles are intact. There is no kern icterus. Pupils are equal, round, and reactive to light. No redness or discharge bilaterally.    Skin:  There are no rashes or lesions.    Vitals:  There were no vitals filed for this visit.    Vascular lower extremity:   Dorsalis pedis pulse-RIGHT 2+   Dorsalis pedis pulse-LEFT 2+   Tibialis posterior pulse-RIGHT 2+   Tibialis posterior pulse-LEFT 2+     Neurological Lower Extremity:    Light Touch Sensation: Intact in bilateral Lower Extremities   LE Muscle Strength: All LE strength measurements 5/5 except:  Hamstring RIGHT:   4+/5  Hamstring LEFT:   4/5  Hip Flexion LEFT:   4-/5  Extensor Hallucis Longus RIGHT:    4+/5  Extensor Hallucis Longus LEFT:   4/5   RIGHT plantar reflexes: downward response   LEFT plantar reflexes: downward response   Reflexes: 1+ in bilateral lower extremities except:  Right Achilles tendon which are absent  Left Achilles tendon which are absent     Neural Tension Tests Lumbar Spine:  Sitting straight leg raise-RIGHT Positive for right posterior leg pain that is mild   Sitting straight leg raise-LEFT Positive for left posterior leg pain     Assessment  1. L5-S1 bilateral mod-large foraminal, L4-5 mod diffuse, L3-4 mild diffuse, L2-3 mod diffuse, L1-2 right > left mod diffuse, T12-L1 left mild-mod, T11-12 mild-mod diffuse bulging discs    2. L5-S1 bilateral severe foraminal & moderate central, L4-5 severe central, L2-3 mod central & left mod-severe foraminal, L1-2 mod central & right mod-severe , T12-L1 mod central stenosis    3. L5-S1 mild-mod central HNP    4. Lumbar facet arthropathy: L5-S1 bilateral large    5. S/P cervical spinal fusion: C3-6 and C7-T1    6. Bipolar affective disorder, currently depressed, moderate (HCC)    7. Essential hypertension    8. Anxiety    9. L3-4 stable slight grade 1 retrolisthesis    10. L4-5 slight grade 1 unstable spondylolisthesis    11. s/p L5-S1 right laminectomy    12. Neurologic gait disorder    13. left L4 and left > right L5-S1 radiculopathy         Plan  I will perform left L4 and L5 TFESI(s).    The patient will continue with PT.    The patient will continue with her home exercise program.    The patient will continue with their current pain medications.    The patient will follow up in 3 months, but the patient will call me 2 weeks after having the injection to let me know how the injection worked.    The patient understands and agrees with the stated plan.  Alex Baumann MD  6/6/2024

## 2024-06-06 NOTE — PROGRESS NOTES
Low Back Pain H & P    Chief Complaint:   Chief Complaint   Patient presents with    Low Back Pain     LOV 04/12/24 Pt presents with pain that starts in the back of her left thigh area and travels proximal to her gluteus area. Pt states she has pain in this area 6/10 and she is taking Tylenol for pain but it doesn't provide her any relief.      Nursing note reviewed and verified.    Patient was last seen on 3/11/2024.  On 4/12/2024, I did bilateral L5 TFESI's which helped some.  Then about 4 weeks ago, she developed left buttock pain that radiates to the left posterior thigh.  She has been doing the PT at her facility every day.  They are working on her walking and leg strength.  This pain is 6-7/10.  She denies any numbness ot tingling or new weakness. She is having more difficulty with her transfers.  The pain is worse with sitting.    Past Medical History   Past Medical History:    Anxiety state, unspecified    Aortic atherosclerosis ([facility])    CT scan 11-19    Arthritis of both knees    knee replacement     Arthritis of right hip    Back problem    Brain aneurysm (HCC)    Brain Aneurysm, Stent placed     Cervical disc disease    Cervical Discectomy     Cholecystitis    Cholecystectomy     COPD (chronic obstructive pulmonary disease) ([facility])    PFTs 2-15 with FEV1 1.11 L and FEV1/FVC ratio 63%    Deep vein thrombosis (HCC)    recurrent DVT- on lifelong AC    Dehydration    Fluids, Medication adjustment     Dementia ([facility])    Depression    Disorder of thyroid    Esophageal reflux    Fibromyalgia    Hammertoe    hammertoe 5 left, pain    Hearing impairment    Bilateral hearing aids    High blood pressure    Hip pain, left    Hypothyroidism    Incontinence    L3-4 stable slight grade 1 retrolisthesis    L4-5 mild-mod diffuse bulging disc    L4-5 slight grade 1 unstable spondylolisthesis    Leg wound, left    Low back pain    Migraines    Muscle weakness    Nausea vomiting and diarrhea    Onychomycosis    Debridement      Oropharyngeal dysphagia    Osteoarthrosis, unspecified whether generalized or localized, unspecified site    Other and unspecified hyperlipidemia    Other complicated headache syndrome    Other spondylosis, lumbar region    Pneumonia    S/P cervical spinal fusion: C3-6 and C7-T1    s/p L5-S1 right laminectomy    Sleep apnea    stent placement    cerebral    Thyroid disease    Toe pain    Onychomycosis, Debridement , Hammertoe     Tonsillitis    Tonsillitis     Unspecified sleep apnea    Visual impairment    EYE GLASSES    Wound of left leg    Left leg debridement and Split Thickness Skin Graft to left thigh       Past Surgical History   Past Surgical History:   Procedure Laterality Date    Anesth,neck vessel surgery nos      x4    Brain surgery  ~2009    Brain Aneurysm, Stent placed     Carotid endarterectomy Right     Carpal tunnel release Right ~2008    Cholecystectomy  1984    Cholecystitis    Colonoscopy  2010    Debridement of nail(s), 1-5      Toe, Onychomycosis    Egd  05/2019    Gastric polyps only    Egd  2006    Eye surgery      x2 FOR \"CROSSED EYES\"    Hip replacement surgery Bilateral     Hysterectomy  1967    Partial Hysterectomy    Jaw surgery      Knee replacement surgery Bilateral     Bilateral arthritis     Laminectomy      WITH FUSION PER PATIENT    Other surgical history  1985,1995,2003,2009    Cervical Discectomy AND FUSION    Prep site t/a/l 1st 100 sq cm/1pct Left 08/21/2019    Left leg debridement, VAC placed    Split grft proc trunk <100sqcm Left 10/08/2019    Left leg debridement and Split Thickness Skin Graft to left thigh    Tonsillectomy  1950    Tonsillitis        Family History   Family History   Problem Relation Age of Onset    Heart Attack Mother     Heart Disease Mother         Coronary Artery Disease, Premature     Fibromyalgia Sister     Heart Disease Sister     Heart Attack Sister     Heart Disease Brother     Heart Attack Brother     Other (Other[other]) Father     Other  (Other[other]) Maternal Grandmother     Other (Other[other]) Maternal Grandfather     Other (Other[other]) Paternal Grandmother     Other (Other[other]) Paternal Grandfather     Cancer Brother 55        Liver     Neurological Disorder Sister         ALzheimer's Disease     Depression Sister     Migraines Sister     Stroke Sister         Cerebrovascular accident     Dementia Sister     Heart Disease Brother         Coronary Artery Disease        Social History   Social History     Socioeconomic History    Marital status:      Spouse name: Not on file    Number of children: Not on file    Years of education: Not on file    Highest education level: Not on file   Occupational History    Not on file   Tobacco Use    Smoking status: Former     Current packs/day: 0.00     Average packs/day: 2.0 packs/day for 25.0 years (50.0 ttl pk-yrs)     Types: Cigarettes     Start date: 1952     Quit date: 1977     Years since quittin.4    Smokeless tobacco: Never    Tobacco comments:     Very heavy smoker 2 1/2 daily   Vaping Use    Vaping status: Never Used   Substance and Sexual Activity    Alcohol use: No    Drug use: No    Sexual activity: Not on file   Other Topics Concern     Service Not Asked    Blood Transfusions Not Asked    Caffeine Concern No     Comment: crystal light    Occupational Exposure Not Asked    Hobby Hazards Not Asked    Sleep Concern Not Asked    Stress Concern Not Asked    Weight Concern Not Asked    Special Diet Not Asked    Back Care Not Asked    Exercise No     Comment: No PT due to Covid    Bike Helmet Not Asked    Seat Belt Not Asked    Self-Exams Not Asked    Grew up on a farm Not Asked    History of tanning No    Outdoor occupation Not Asked    Pt has a pacemaker No    Pt has a defibrillator No    Breast feeding Not Asked    Reaction to local anesthetic No    Left Handed Yes    Right Handed No    Currently spends a great deal of time in the sun No    Past Sunlamp Treatments  for Acne Not Asked    Hx of Spending Great Deal of Time in Sun No    Bad sunburns in the past No    Tanning Salons in the Past No    Hx of Radiation Treatments No    Regular use of sun block Not Asked   Social History Narrative    The patient uses the following assistive device(s):  rolling walker.  scooter    The patient does not live in a home with stairs.    The patient lives in a halfway home. [facility]     Social Determinants of Health     Financial Resource Strain: Not on file   Food Insecurity: No Food Insecurity (12/11/2023)    Food Insecurity     Food Insecurity: Never true   Transportation Needs: No Transportation Needs (12/11/2023)    Transportation Needs     Lack of Transportation: No   Physical Activity: Not on file   Stress: Not on file   Social Connections: Not on file   Housing Stability: Low Risk  (12/11/2023)    Housing Stability     Housing Instability: No     Housing Instability Emergency: Not on file     Crib or Bassinette: Not on file       PE:  The patient does appear in her stated age in no distress.  The patient is well groomed.    Psychiatric:  The patient is alert and oriented x 3.  The patient has a normal affect and mood.      Respiratory:  No acute respiratory distress. Patient does not have a cough.    HEENT:  Extraocular muscles are intact. There is no kern icterus. Pupils are equal, round, and reactive to light. No redness or discharge bilaterally.    Skin:  There are no rashes or lesions.    Vitals:  There were no vitals filed for this visit.    Vascular lower extremity:   Dorsalis pedis pulse-RIGHT 2+   Dorsalis pedis pulse-LEFT 2+   Tibialis posterior pulse-RIGHT 2+   Tibialis posterior pulse-LEFT 2+     Neurological Lower Extremity:    Light Touch Sensation: Intact in bilateral Lower Extremities   LE Muscle Strength: All LE strength measurements 5/5 except:  Hamstring RIGHT:   4+/5  Hamstring LEFT:   4/5  Hip Flexion LEFT:   4-/5  Extensor Hallucis Longus RIGHT:    4+/5  Extensor Hallucis Longus LEFT:   4/5   RIGHT plantar reflexes: downward response   LEFT plantar reflexes: downward response   Reflexes: 1+ in bilateral lower extremities except:  Right Achilles tendon which are absent  Left Achilles tendon which are absent     Neural Tension Tests Lumbar Spine:  Sitting straight leg raise-RIGHT Positive for right posterior leg pain that is mild   Sitting straight leg raise-LEFT Positive for left posterior leg pain     Assessment  1. L5-S1 bilateral mod-large foraminal, L4-5 mod diffuse, L3-4 mild diffuse, L2-3 mod diffuse, L1-2 right > left mod diffuse, T12-L1 left mild-mod, T11-12 mild-mod diffuse bulging discs    2. L5-S1 bilateral severe foraminal & moderate central, L4-5 severe central, L2-3 mod central & left mod-severe foraminal, L1-2 mod central & right mod-severe , T12-L1 mod central stenosis    3. L5-S1 mild-mod central HNP    4. Lumbar facet arthropathy: L5-S1 bilateral large    5. S/P cervical spinal fusion: C3-6 and C7-T1    6. Bipolar affective disorder, currently depressed, moderate (HCC)    7. Essential hypertension    8. Anxiety    9. L3-4 stable slight grade 1 retrolisthesis    10. L4-5 slight grade 1 unstable spondylolisthesis    11. s/p L5-S1 right laminectomy    12. Neurologic gait disorder    13. left L4 and left > right L5-S1 radiculopathy         Plan  I will perform left L4 and L5 TFESI(s).    The patient will continue with PT.    The patient will continue with her home exercise program.    The patient will continue with their current pain medications.    The patient will follow up in 3 months, but the patient will call me 2 weeks after having the injection to let me know how the injection worked.    The patient understands and agrees with the stated plan.  Alex Baumann MD  6/6/2024

## 2024-06-07 NOTE — TELEPHONE ENCOUNTER
Status of Anticoag  [x] Clearance pending  to hold Eliquis for 2 days prior to Left L4 and L5 transforaminal epidural steroid injection faxed to Dr. Ness F: [phone]  [] Clearance approved  [] Clearance denied

## 2024-06-10 DIAGNOSIS — M54.16 LUMBAR RADICULOPATHY: Primary | ICD-10-CM

## 2024-06-10 RX ORDER — HYDROCODONE BITARTRATE AND ACETAMINOPHEN 5; 325 MG/1; MG/1
1 TABLET ORAL EVERY 8 HOURS PRN
Qty: 30 TABLET | Refills: 0 | Status: SHIPPED | OUTPATIENT
Start: 2024-06-10

## 2024-06-10 NOTE — TELEPHONE ENCOUNTER
Incoming call from patient from who would like to request pain medication. Pt reporting severe pain, constant. Clinical staff currently awaiting clearance from Dr. Hicks before scheduling. Pt requesting pain medication from Dr. Baumann. Pt stating she has taken Norco in the past which previously helped with the pain; \"it took the edge off\"    [place] prescribed:       Routing patient's request to Dr. Baumann at this time.

## 2024-06-10 NOTE — TELEPHONE ENCOUNTER
Incoming call from patient inquiring on status of injection scheduling. Informed pt that we are currently awaiting approval from Dr. Hicks's office to hold her Eliquis. This RN re-sent clearance letter to Dr. Hicks via [employer] --- confirmation message received while RN was still on the phone with patient.     Pt verbalized understanding but will call back today or tomorrow to check on status.

## 2024-06-10 NOTE — TELEPHONE ENCOUNTER
Status of Anticoag  [] Clearance pending  [x] Clearance approved to hold Eliquis for 2 days prior to procedure.  Placed into scanning.   [] Clearance denied    LMTCB 1st attempt

## 2024-06-28 ENCOUNTER — APPOINTMENT (OUTPATIENT)
Dept: GENERAL RADIOLOGY | Facility: HOSPITAL | Age: 84
End: 2024-06-28
Attending: PHYSICAL MEDICINE & REHABILITATION
Payer: MEDICARE

## 2024-06-28 ENCOUNTER — APPOINTMENT (OUTPATIENT)
Dept: PICC SERVICES | Facility: HOSPITAL | Age: 84
End: 2024-06-28
Attending: PHYSICAL MEDICINE & REHABILITATION
Payer: MEDICARE

## 2024-06-28 ENCOUNTER — HOSPITAL ENCOUNTER (OUTPATIENT)
Facility: HOSPITAL | Age: 84
Setting detail: HOSPITAL OUTPATIENT SURGERY
Discharge: HOME | End: 2024-06-28
Attending: PHYSICAL MEDICINE & REHABILITATION | Admitting: PHYSICAL MEDICINE & REHABILITATION
Payer: MEDICARE

## 2024-06-28 VITALS
RESPIRATION RATE: 16 BRPM | DIASTOLIC BLOOD PRESSURE: 95 MMHG | OXYGEN SATURATION: 92 % | SYSTOLIC BLOOD PRESSURE: 158 MMHG | HEART RATE: 76 BPM

## 2024-06-28 LAB — SARS-COV-2 RNA RESP QL NAA+PROBE: NOT DETECTED

## 2024-06-28 PROCEDURE — 3E0S3BZ INTRODUCTION OF ANESTHETIC AGENT INTO EPIDURAL SPACE, PERCUTANEOUS APPROACH: ICD-10-PCS | Performed by: PHYSICAL MEDICINE & REHABILITATION

## 2024-06-28 PROCEDURE — 3E0S33Z INTRODUCTION OF ANTI-INFLAMMATORY INTO EPIDURAL SPACE, PERCUTANEOUS APPROACH: ICD-10-PCS | Performed by: PHYSICAL MEDICINE & REHABILITATION

## 2024-06-28 PROCEDURE — 64483 NJX AA&/STRD TFRM EPI L/S 1: CPT | Performed by: PHYSICAL MEDICINE & REHABILITATION

## 2024-06-28 PROCEDURE — 64484 NJX AA&/STRD TFRM EPI L/S EA: CPT | Performed by: PHYSICAL MEDICINE & REHABILITATION

## 2024-06-28 RX ORDER — SODIUM CHLORIDE, SODIUM LACTATE, POTASSIUM CHLORIDE, CALCIUM CHLORIDE 600; 310; 30; 20 MG/100ML; MG/100ML; MG/100ML; MG/100ML
INJECTION, SOLUTION INTRAVENOUS CONTINUOUS
Status: DISCONTINUED | OUTPATIENT
Start: 2024-06-28 | End: 2024-06-28

## 2024-06-28 RX ORDER — MIDAZOLAM HYDROCHLORIDE 1 MG/ML
1 INJECTION INTRAMUSCULAR; INTRAVENOUS EVERY 5 MIN PRN
Status: DISCONTINUED | OUTPATIENT
Start: 2024-06-28 | End: 2024-06-28

## 2024-06-28 RX ORDER — SODIUM CHLORIDE 0.9 % (FLUSH) 0.9 %
10 SYRINGE (ML) INJECTION AS NEEDED
Status: DISCONTINUED | OUTPATIENT
Start: 2024-06-28 | End: 2024-06-28

## 2024-06-28 RX ORDER — TRIAMCINOLONE ACETONIDE 40 MG/ML
INJECTION, SUSPENSION INTRA-ARTICULAR; INTRAMUSCULAR
Status: DISCONTINUED | OUTPATIENT
Start: 2024-06-28 | End: 2024-06-28

## 2024-06-28 RX ORDER — LIDOCAINE HYDROCHLORIDE 10 MG/ML
INJECTION, SOLUTION EPIDURAL; INFILTRATION; INTRACAUDAL; PERINEURAL
Status: DISCONTINUED | OUTPATIENT
Start: 2024-06-28 | End: 2024-06-28

## 2024-06-28 RX ORDER — IOPAMIDOL 408 MG/ML
INJECTION, SOLUTION INTRATHECAL
Status: DISCONTINUED | OUTPATIENT
Start: 2024-06-28 | End: 2024-06-28

## 2024-06-28 NOTE — OR NURSING
Pt resides at [facility].    Medications & allergies reviewed with valente from the facility. Covid test ordered per protocol.    Eliquis last dose was 06/27/4 per Valente.     notified regarding last dose of eliquis. Pt stating she needs sedation and if not she is leaving.  aware, ok to proceed with procedure with sedation and last dose of eliquis yesterday.

## 2024-06-28 NOTE — OPERATIVE REPORT
[facility]    2-LEVEL LUMBAR TRANSFORAMINAL   NAME:  Gretta Canales    MR #:    [id number] :  12/3/1940     PHYSICIAN:  Alex Baumann MD        Operative Report    DATE OF PROCEDURE: 2024   PREOPERATIVE DIAGNOSES: Problem   left L4 and left > right L5-S1 radiculopathy   L5-S1 bilateral severe foraminal & moderate central, L4-5 severe central, L2-3 mod central & left mod-severe foraminal, L1-2 mod central & right mod-severe , T12-L1 mod central stenosis   L5-S1 bilateral mod-large foraminal, L4-5 mod diffuse, L3-4 mild diffuse, L2-3 mod diffuse, L1-2 right > left mod diffuse, T12-L1 left mild-mod, T11-12 mild-mod diffuse bulging discs   s/p L5-S1 right laminectomy      POSTOPERATIVE DIAGNOSES:   same   PROCEDURES: Left L4 and L5 transforaminal epidural steroid injections done under fluoroscopic guidance with contrast enhancement.   SURGEON: Alex Baumann MD   ANESTHESIA: IV conscious sedation   INDICATIONS:      OPERATIVE PROCEDURE:  Written consent was obtained from the patient.  The patient was brought into the operating room and placed in the prone position on the fluoroscopy table with pillow underneath the abdomen.  The patient's skin was cleaned and draped in a normal sterile fashion.  Using AP fluoroscopy, all five lumbar vertebrae were identified.  When the fourth and fifth vertebrae were identified, fluoroscopy was right anterior obliqued opening up the left L4-5 and left L5-S1 intervertebral foramen.  At this point in time, each site was anesthetized with 1% PF lidocaine without epinephrine.  Then, 5 inch, 22-gauge spinal needles were inserted and directed towards the left L4-5 and left L5-S1 intervertebral foramen.  When they felt to be in good position, AP fluoroscopy was used to advance the needles to the 6 o'clock position on the left L4 and left L5 pedicles.  At this point in time, Omnipaque-240 contrast was used to obtain a good epidurogram indicating correct  needle placement at each level.  Then, aspiration was performed.  No blood, fluid, or air was aspirated.  Then, the patient was injected with a 3 cc solution of 1.5 cc of 40 mg/cc of Kenalog and 1.5 cc of 1% PF lidocaine without epinephrine at each site.  After this, the needles were removed.  Each site was cleaned.  Band-Aids were applied.  The patient was transferred to the cart and into [facility].  The patient was given discharge instructions and will follow up in the clinic as scheduled.  Throughout the whole procedure, the patient's pulse oximetry and vital signs were monitored and they remained completely stable.  Also, throughout the whole procedure, prior to injection of any medication, aspiration was performed.  No blood, fluid, or air was aspirated at anytime.    The total time for conscious sedation was 8 minutes and the total medication used was 50 micrograms of IV Fentanyl and 3 mg of IV Versed.

## 2024-06-28 NOTE — OR NURSING
Spoke with patient's nurse from [facility] to let him know the patient cannot resume her Eliquis until tomorrow 6/29 per .  Also made Valente RN aware that the patient's AVS discharge instructions are being sent back with her.

## 2024-06-28 NOTE — DISCHARGE INSTRUCTIONS
[facility] ENDOSCOPY LAB SUITES  [address]  [facility] [address]  Dept: [phone]  Loc: [phone]    No name on file       Post Injection/Procedure Instructions    PAIN:  Your usual pain should gradually decrease in 2-3 days, but relief may sometimes take up to two weeks after your procedure.  A temporary flare-up of pain for 1-2 days after a procedure is also normal.  Please take your usual pain medicines if this happens.      ACTIVITY LEVELS:  Day of Procedure:  Take it easy.  We recommend no new activities or heavy work.  Avoid sitting or standing for long periods of time and change your position as needed.  Day 2:  You may return to normal activities within reason.  If your physician gives you specific instructions, please follow these.  You may return to physical therapy.      DIET:  Return to your normal diet as tolerated.    MEDICATIONS:  Aspirin and Blood-thinners:  Follow specific instructions your doctor gave you.  Otherwise, refrain from taking aspirin and other blood-thinning medications for 6 hours after your injection.  Then resume your next scheduled dose.    Resume your other medications the day of the injection.    BANDAGE:  Remove after 24 hours.    SHOWERING:  You may shower the following day.  No bathing, swimming, or hot tub for 2 days after the procedure to reduce the risk of infection.    COLD PACKS:  You may use ice compresses over the injection site - 20 minutes on, then 40 minutes off as needed.  To avoid skin injury, place a thin cloth between cold pack and the skin.  Do not apply cold packs to numb areas following injection.    [place] [facility] immediately if you experience any of the following:  Fever (over 100 degrees F), chills, drainage, excessive swelling or redness from the injection site, problems with bowel or bladder control, or new weakness or new severe pain.  If you cannot reach your physician, please go to the nearest  emergency room.      COMMON SIDE EFFECTS:  Numbness for 4 to 8 hours due to the local anesthetic.  Minor pain at the injection site.  A small amount of bleeding at the injection site.  If a steroid medication was used during the procedure, possible side effects include redness of the face, headache, trouble sleeping, indigestion, increased appetite and/or a low grade fever (less that 100 degrees F).  All side effects should disappear within 1 to 3 days after the procedure.    POST-PROCEDURAL HEADACHE:  Mild headaches may be a normal reaction after a steroid injection.  Lie down as much as possible for the first 24 hours.  You can take Tylenol up to 3 grams per day in doses of 1 gram every 8 hours.  Drink plenty of caffeinated beverages.  If you continue to have headaches after 24 hours following the procedure, or experience severe headaches, please contact our office.

## 2024-06-28 NOTE — INTERVAL H&P NOTE
Pre-op Diagnosis: Lumbar radiculopathy    The above referenced H&P was reviewed by Alex Baumann MD on 6/28/2024, the patient was examined and no significant changes have occurred in the patient's condition since the H&P was performed.  I discussed with the patient and/or legal representative the potential benefits, risks and side effects of this procedure; the likelihood of the patient achieving goals; and potential problems that might occur during recuperation.  I discussed reasonable alternatives to the procedure, including risks, benefits and side effects related to the alternatives and risks related to not receiving this procedure.  We will proceed with procedure as planned.

## 2024-07-12 ENCOUNTER — TELEPHONE (OUTPATIENT)
Dept: SURGERY | Facility: CLINIC | Age: 84
End: 2024-07-12

## 2024-07-12 NOTE — TELEPHONE ENCOUNTER
Spoke w/ pt.  Pt requesting an appt for R earlobe lesion that is itchy.  Per pt lesion has not been biopsied, has not been evaluated by PCP or her derm.  Recommended pt be seen by PCP who will make referral, if needed.

## 2024-07-18 ENCOUNTER — TELEPHONE (OUTPATIENT)
Dept: PHYSICAL MEDICINE AND REHAB | Facility: CLINIC | Age: 84
End: 2024-07-18

## 2024-07-18 NOTE — TELEPHONE ENCOUNTER
Condition update after Procedure.    - Patient had Left L4 and L5 transforaminal epidural steroid injection on 06/28/2024 with Dr. Baumann.  - 60-65% relief for 3-4 days after the injection. Currently patient reporting 0% relief.      - Patient has Norco & Lidocaine patches both w/o relief. Patient also taking Tylenol more often than the Norco - also w/o relief.  - Patient denies any changes to pain location or descriptors.   - Pain is improved at night when laying down for bed.   - Pain is worse with walking.       - LOV: 6/28/2024 Alex Baumann MD    - NOV: Visit date not found   - Plan from LOV: Per Dr. Baumann: \"I will perform left L4 and L5 TFESI(s).     The patient will continue with PT.     The patient will continue with her home exercise program.     The patient will continue with their current pain medications.     The patient will follow up in 3 months, but the patient will call me 2 weeks after having the injection to let me know how the injection worked.\"      Patient was scheduled for a follow up appointment as per post injection protocol - NOV: 10/16/2024.     Pt's update routed to Dr. Baumann for his review/recommendations.

## 2024-07-22 ENCOUNTER — OFFICE VISIT (OUTPATIENT)
Dept: OTOLARYNGOLOGY | Facility: CLINIC | Age: 84
End: 2024-07-22

## 2024-07-22 VITALS — BODY MASS INDEX: 30.31 KG/M2 | WEIGHT: 200 LBS | HEIGHT: 68 IN

## 2024-07-22 DIAGNOSIS — H61.91 LESION OF RIGHT EXTERNAL EAR: ICD-10-CM

## 2024-07-22 DIAGNOSIS — H61.23 BILATERAL IMPACTED CERUMEN: Primary | ICD-10-CM

## 2024-07-22 PROCEDURE — 69210 REMOVE IMPACTED EAR WAX UNI: CPT | Performed by: OTOLARYNGOLOGY

## 2024-07-22 PROCEDURE — 99213 OFFICE O/P EST LOW 20 MIN: CPT | Performed by: OTOLARYNGOLOGY

## 2024-07-22 RX ORDER — OMEPRAZOLE 40 MG/1
CAPSULE, DELAYED RELEASE ORAL
COMMUNITY
Start: 2024-06-17

## 2024-07-22 NOTE — PROGRESS NOTES
NEW PATIENT PROGRESS NOTE  OTOLOGY/OTOLARYNGOLOGY    REF MD:  No ref. provider found    PCP: Timothy Hicks MD    CHIEF COMPLAINT:  cerumen impaction     HISTORY OF PRESENT ILLNESS: Gretta Canales is a 83 year old female who presents for evaluation of bilateral cerumen impaction. Of note she has a lesion on her right pinna, but states she has a follow-up scheduled with a plastic surgeon she has previously seen.     PAST MEDICAL HISTORY:    Past Medical History:    Anxiety state, unspecified    Aortic atherosclerosis (HCC)    CT scan 11-19    Arthritis of both knees    knee replacement     Arthritis of right hip    Back problem    Brain aneurysm (HCC)    Brain Aneurysm, Stent placed     Cervical disc disease    Cervical Discectomy     Cholecystitis    Cholecystectomy     COPD (chronic obstructive pulmonary disease) ([facility])    PFTs 2-15 with FEV1 1.11 L and FEV1/FVC ratio 63%    Deep vein thrombosis ([facility])    recurrent DVT- on lifelong AC    Dehydration    Fluids, Medication adjustment     Dementia (Former[facility])    Depression    Disorder of thyroid    Esophageal reflux    Fibromyalgia    Hammertoe    hammertoe 5 left, pain    Hearing impairment    Bilateral hearing aids    High blood pressure    Hip pain, left    Hypothyroidism    Incontinence    L3-4 stable slight grade 1 retrolisthesis    L4-5 mild-mod diffuse bulging disc    L4-5 slight grade 1 unstable spondylolisthesis    Leg wound, left    Low back pain    Migraines    Muscle weakness    Nausea vomiting and diarrhea    Onychomycosis    Debridement     Oropharyngeal dysphagia    Osteoarthrosis, unspecified whether generalized or localized, unspecified site    Other and unspecified hyperlipidemia    Other complicated headache syndrome    Other spondylosis, lumbar region    Pneumonia    S/P cervical spinal fusion: C3-6 and C7-T1    s/p L5-S1 right laminectomy    Sleep apnea    stent placement    cerebral    Thyroid disease    Toe pain    Onychomycosis, Debridement ,  Hammertoe     Tonsillitis    Tonsillitis     Unspecified sleep apnea    Visual impairment    EYE GLASSES    Wound of left leg    Left leg debridement and Split Thickness Skin Graft to left thigh       PAST SURGICAL HISTORY:    Past Surgical History:   Procedure Laterality Date    Anesth,neck vessel surgery nos      x4    Brain surgery  ~2009    Brain Aneurysm, Stent placed     Carotid endarterectomy Right     Carpal tunnel release Right ~2008    Cholecystectomy  1984    Cholecystitis    Colonoscopy  2010    Debridement of nail(s), 1-5      Toe, Onychomycosis    Egd  05/2019    Gastric polyps only    Egd  2006    Eye surgery      x2 FOR \"CROSSED EYES\"    Hip replacement surgery Bilateral     Hysterectomy  1967    Partial Hysterectomy    Jaw surgery      Knee replacement surgery Bilateral     Bilateral arthritis     Laminectomy      WITH FUSION PER PATIENT    Other surgical history  1985,1995,2003,2009    Cervical Discectomy AND FUSION    Prep site t/a/l 1st 100 sq cm/1pct Left 08/21/2019    Left leg debridement, VAC placed    Split grft proc trunk <100sqcm Left 10/08/2019    Left leg debridement and Split Thickness Skin Graft to left thigh    Tonsillectomy  1950    Tonsillitis        Current Outpatient Medications on File Prior to Visit   Medication Sig Dispense Refill    HYDROcodone-acetaminophen 5-325 MG Oral Tab Take 1 tablet by mouth every 8 (eight) hours as needed for Pain. 30 tablet 0    clotrimazole-betamethasone 1-0.05 % External Cream Apply to affected area twice daily 45 g 0    ketoconazole 2 % External Cream       NYSTOP [id number] UNIT/GM External Powder       ondansetron (ZOFRAN) 4 mg tablet       lidocaine 5 % External Patch Place 1 patch onto the skin daily.      benzonatate 100 MG Oral Cap Take 1 capsule (100 mg total) by mouth 3 (three) times daily as needed for cough. 20 capsule 0    predniSONE 10 MG Oral Tab Take 4 pills x 1 day then take 3 pills x 1 day then take 2 pills x 1 day then take 1 pill x 1  day then stop 10 tablet 0    fluticasone furoate-vilanterol (BREO ELLIPTA) 100-25 MCG/ACT Inhalation Aerosol Powder, Breath Activated Inhale 1 puff into the lungs daily.      albuterol 108 (90 Base) MCG/ACT Inhalation Aero Soln Inhale 2 puffs into the lungs every 6 (six) hours as needed for Wheezing.      clotrimazole 1 % External Cream Apply 1 % topically 2 (two) times daily.      donepezil 5 MG Oral Tab Take 1 tablet (5 mg total) by mouth in the morning and 1 tablet (5 mg total) before bedtime.      estradiol (ESTRACE) 0.1 MG/GM Vaginal Cream Apply fingertip amount of cream to the vagina (0.5-1g) every night for 1 week and then every other night indefinitely 42.5 g 11    pantoprazole 40 MG Oral Tab EC Take 1 tablet (40 mg total) by mouth 2 (two) times daily before meals.      ondansetron 4 MG Oral Tablet Dispersible Take 1 tablet (4 mg total) by mouth every 4 (four) hours as needed for Nausea. 15 tablet 0    cyclobenzaprine 5 MG Oral Tab Take 1 tablet (5 mg total) by mouth every 6 (six) hours as needed for Muscle spasms.      furosemide 40 MG Oral Tab Take 0.5 tablets (20 mg total) by mouth daily. 45 tablet 3    gabapentin 100 MG Oral Cap Take 2 capsules (200 mg total) by mouth 2 (two) times daily.      docusate sodium 100 MG Oral Cap Take 1 capsule (100 mg total) by mouth 2 (two) times daily as needed.      hydrALAZINE 25 MG Oral Tab Take 1 tablet (25 mg total) by mouth every 6 (six) hours as needed. SBP >150      apixaban 5 MG Oral Tab Take 1 tablet (5 mg total) by mouth 2 (two) times daily. 60 tablet 0    QUEtiapine Fumarate 100 MG Oral Tab Take 1 tablet (100 mg total) by mouth nightly.      Levothyroxine Sodium 100 MCG Oral Tab Take 1 tablet (100 mcg total) by mouth before breakfast. 90 tablet 3    Benztropine Mesylate 1 MG Oral Tab Take 1 tablet (1 mg total) by mouth 2 (two) times daily. 60 tablet 0    lamoTRIgine 100 MG Oral Tab Take 1 tablet (100 mg total) by mouth 2 (two) times daily. 60 tablet 0     No  current facility-administered medications on file prior to visit.       Allergies:   Allergies   Allergen Reactions    Bactrim [Sulfamethoxazole W/Trimethoprim] RASH    Ciprofloxacin RASH    Depakote [Valproic Acid] OTHER (SEE COMMENTS)     pancreatitis    Fluocinolone OTHER (SEE COMMENTS)     Unknown    Gabitril [Tiagabine] OTHER (SEE COMMENTS)     Stroke like symptoms  Unable to move    Metronidazole RASH    Phentermine OTHER (SEE COMMENTS)     Mini stroke    Tramadol OTHER (SEE COMMENTS)     Extreme sleepiness    Dilaudid [Hydromorphone] RASH and OTHER (SEE COMMENTS)     Tolerates hydrocodone       SOCIAL HISTORY:    Social History     Tobacco Use    Smoking status: Former     Current packs/day: 0.00     Average packs/day: 2.0 packs/day for 25.0 years (50.0 ttl pk-yrs)     Types: Cigarettes     Start date: 1952     Quit date: 1977     Years since quittin.5    Smokeless tobacco: Never    Tobacco comments:     Very heavy smoker 2 1/2 daily   Substance Use Topics    Alcohol use: No       FAMILY HISTORY: Denies known family history of hearing loss, tinnitus, vertigo, or migraine.  Denies known family history of head and neck cancer, thyroid cancer, bleeding disorders.     REVIEW OF SYSTEMS:   Positives are in bold  ENT: Hearing change, tinnitus, otorrhea, otalgia, aural fullness, ear pressure, vertigo, imbalance  Sinus pressure, rhinorrhea, congestion, facial pain, jaw pain, dysphagia, odynophagia, sore throat, voice changes, shortness of breath    EXAMINATION:  I washed my hands with an alcohol-based hand gel prior to examination  Constitutional:   --Vitals: not currently breastfeeding.  --General: no apparent distress, well-developed, conversant  Psych: affect pleasant and appropriate for age, alert and oriented  Neuro: Facial movement normal bilateral, HB 1/6  Respiratory: No stridor, stertor or increased work of breathing  ENT:  --Ear: (Bilateral ears were examined under binocular microscopy)  Right  ear microscopic exam:  Pinna: Normal, no lesions or masses. 7-8 mm lesion on posterior edge of the lobule. Irregular in appearence. Appears to have bled recently.   Mastoid: Nontender on palpation.   External auditory canal: Cerumen impaction - removed, Clear, no masses or lesions.  Tympanic membrane: Intact, no lesions, normal landmarks.  Middle ear: Aerated.    Left ear microscopic exam:  Pinna: Normal, no lesions or masses.  Mastoid: Nontender on palpation.   External auditory canal: Cerumen impaction - removed, Clear, no masses or lesions.  Tympanic membrane: Intact, no lesions, normal landmarks.  Middle ear: Aerated.    Cerumen removal: (07/22/24)  Under binocular microscopy cerumen was removed from the bilateral external auditory canal using a wax loop curette and suction. Patient noted subjective improvement in hearing.    ASSESSMENT/PLAN:  Gretta Canales is a 83 year old female with   No diagnosis found.     IMPRESSION:  Bilateral cerumen impaction - removed   Patient has previously seen Dr. Anders (plastic surgery) and notes that she is making a follow up appointment with him regarding this lesion of the right external ear    PLAN:  -Avoid q-tips/instrumentation of the ears  -Follow up with plastic surgery for evaluation of right external ear lesion. If patient is unable to get an appointment we can address this lesion in a follow-up.  -Follow-up as needed for routine cerumen removal     Situation reviewed with the patient in detail.    Attention: This note has been scribed by Shelley Georges under the supervision of Raulito Finnegan MD.     Raulito Finnegan MD  Otology/Otolaryngology  [facility] [address] [place] [id number]  Phone [phone]  Fax [phone]

## 2024-07-31 ENCOUNTER — OFFICE VISIT (OUTPATIENT)
Dept: DERMATOLOGY CLINIC | Facility: CLINIC | Age: 84
End: 2024-07-31
Payer: MEDICARE

## 2024-07-31 DIAGNOSIS — L30.4 INTERTRIGO: ICD-10-CM

## 2024-07-31 DIAGNOSIS — L81.4 LENTIGINES: Primary | ICD-10-CM

## 2024-07-31 DIAGNOSIS — D48.5 NEOPLASM OF UNCERTAIN BEHAVIOR OF SKIN: ICD-10-CM

## 2024-07-31 PROCEDURE — 11102 TANGNTL BX SKIN SINGLE LES: CPT | Performed by: STUDENT IN AN ORGANIZED HEALTH CARE EDUCATION/TRAINING PROGRAM

## 2024-07-31 PROCEDURE — 88305 TISSUE EXAM BY PATHOLOGIST: CPT | Performed by: STUDENT IN AN ORGANIZED HEALTH CARE EDUCATION/TRAINING PROGRAM

## 2024-07-31 PROCEDURE — 99214 OFFICE O/P EST MOD 30 MIN: CPT | Performed by: STUDENT IN AN ORGANIZED HEALTH CARE EDUCATION/TRAINING PROGRAM

## 2024-07-31 RX ORDER — NYSTATIN AND TRIAMCINOLONE ACETONIDE 100000; 1 [USP'U]/G; MG/G
OINTMENT TOPICAL
Qty: 60 G | Refills: 3 | Status: SHIPPED | OUTPATIENT
Start: 2024-07-31

## 2024-07-31 RX ORDER — NYSTATIN 100000 [USP'U]/G
POWDER TOPICAL
Qty: 60 G | Refills: 6 | Status: SHIPPED | OUTPATIENT
Start: 2024-07-31

## 2024-07-31 NOTE — PATIENT INSTRUCTIONS
The spot on the ear with biopsy today to rule out a skin cancer.  Depending on what the biopsy shows you may need additional surgery called Mohs surgery.  The 2 Mohs surgeons we use are Dr. Sahara Tracy or Dr.Steven Tamera Frausto

## 2024-07-31 NOTE — PROGRESS NOTES
New Patient    Referred by: No referring provider defined for this encounter.    CHIEF COMPLAINT: Lesion of concern     HISTORY OF PRESENT ILLNESS: Gretta Canales is a 83 year old female here for evaluation of lesion of concern.    1. Growth   Location: R ear lobe  Duration: 2 weeks   Signs and symptoms: flaky, itchy and raised     Personal Dermatologic History  History of skin cancer: No  History of  atypical moles: No    FAMILY HISTORY:  History of melanoma: No    Past Medical History  Past Medical History:    Anxiety state, unspecified    Aortic atherosclerosis ([facility])    CT scan 11-19    Arthritis of both knees    knee replacement     Arthritis of right hip    Back problem    Brain aneurysm ([facility])    Brain Aneurysm, Stent placed     Cervical disc disease    Cervical Discectomy     Cholecystitis    Cholecystectomy     COPD (chronic obstructive pulmonary disease) ([facility])    PFTs 2-15 with FEV1 1.11 L and FEV1/FVC ratio 63%    Deep vein thrombosis ([facility])    recurrent DVT- on lifelong AC    Dehydration    Fluids, Medication adjustment     Dementia ([facility])    Depression    Disorder of thyroid    Esophageal reflux    Fibromyalgia    Hammertoe    hammertoe 5 left, pain    Hearing impairment    Bilateral hearing aids    High blood pressure    Hip pain, left    Hypothyroidism    Incontinence    L3-4 stable slight grade 1 retrolisthesis    L4-5 mild-mod diffuse bulging disc    L4-5 slight grade 1 unstable spondylolisthesis    Leg wound, left    Low back pain    Migraines    Muscle weakness    Nausea vomiting and diarrhea    Onychomycosis    Debridement     Oropharyngeal dysphagia    Osteoarthrosis, unspecified whether generalized or localized, unspecified site    Other and unspecified hyperlipidemia    Other complicated headache syndrome    Other spondylosis, lumbar region    Pneumonia    S/P cervical spinal fusion: C3-6 and C7-T1    s/p L5-S1 right laminectomy    Sleep apnea    stent placement    cerebral    Thyroid disease     Toe pain    Onychomycosis, Debridement , Hammertoe     Tonsillitis    Tonsillitis     Unspecified sleep apnea    Visual impairment    EYE GLASSES    Wound of left leg    Left leg debridement and Split Thickness Skin Graft to left thigh       REVIEW OF SYSTEMS:  Constitutional: Denies fever, chills, unintentional weight loss.   Skin as per HPI    Medications  Current Outpatient Medications   Medication Sig Dispense Refill    Omeprazole 40 MG Oral Capsule Delayed Release       HYDROcodone-acetaminophen 5-325 MG Oral Tab Take 1 tablet by mouth every 8 (eight) hours as needed for Pain. 30 tablet 0    clotrimazole-betamethasone 1-0.05 % External Cream Apply to affected area twice daily 45 g 0    ketoconazole 2 % External Cream       NYSTOP 812590 UNIT/GM External Powder       ondansetron (ZOFRAN) 4 mg tablet       lidocaine 5 % External Patch Place 1 patch onto the skin daily.      benzonatate 100 MG Oral Cap Take 1 capsule (100 mg total) by mouth 3 (three) times daily as needed for cough. (Patient not taking: Reported on 7/22/2024) 20 capsule 0    predniSONE 10 MG Oral Tab Take 4 pills x 1 day then take 3 pills x 1 day then take 2 pills x 1 day then take 1 pill x 1 day then stop 10 tablet 0    fluticasone furoate-vilanterol (BREO ELLIPTA) 100-25 MCG/ACT Inhalation Aerosol Powder, Breath Activated Inhale 1 puff into the lungs daily.      albuterol 108 (90 Base) MCG/ACT Inhalation Aero Soln Inhale 2 puffs into the lungs every 6 (six) hours as needed for Wheezing.      clotrimazole 1 % External Cream Apply 1 % topically 2 (two) times daily.      donepezil 5 MG Oral Tab Take 1 tablet (5 mg total) by mouth in the morning and 1 tablet (5 mg total) before bedtime.      estradiol (ESTRACE) 0.1 MG/GM Vaginal Cream Apply fingertip amount of cream to the vagina (0.5-1g) every night for 1 week and then every other night indefinitely 42.5 g 11    pantoprazole 40 MG Oral Tab EC Take 1 tablet (40 mg total) by mouth 2 (two) times daily  before meals.      ondansetron 4 MG Oral Tablet Dispersible Take 1 tablet (4 mg total) by mouth every 4 (four) hours as needed for Nausea. 15 tablet 0    cyclobenzaprine 5 MG Oral Tab Take 1 tablet (5 mg total) by mouth every 6 (six) hours as needed for Muscle spasms.      furosemide 40 MG Oral Tab Take 0.5 tablets (20 mg total) by mouth daily. 45 tablet 3    gabapentin 100 MG Oral Cap Take 2 capsules (200 mg total) by mouth 2 (two) times daily.      docusate sodium 100 MG Oral Cap Take 1 capsule (100 mg total) by mouth 2 (two) times daily as needed.      hydrALAZINE 25 MG Oral Tab Take 1 tablet (25 mg total) by mouth every 6 (six) hours as needed. SBP >150      apixaban 5 MG Oral Tab Take 1 tablet (5 mg total) by mouth 2 (two) times daily. 60 tablet 0    QUEtiapine Fumarate 100 MG Oral Tab Take 1 tablet (100 mg total) by mouth nightly.      Levothyroxine Sodium 100 MCG Oral Tab Take 1 tablet (100 mcg total) by mouth before breakfast. 90 tablet 3    Benztropine Mesylate 1 MG Oral Tab Take 1 tablet (1 mg total) by mouth 2 (two) times daily. 60 tablet 0    lamoTRIgine 100 MG Oral Tab Take 1 tablet (100 mg total) by mouth 2 (two) times daily. 60 tablet 0       PHYSICAL EXAM:  General: awake, alert, no acute distress  Neuropsych: appropriate mood and affect  Eyes: Sclerae anicteric, without conjunctival injection, eyelids unremarkable  Skin: Skin exam was performed today including the following: face and ear and abdomen. Pertinent findings include:   -Ear within eroded papule  - Face with numerous stellate light brown macules  - Abdomen with pink macerated plaques    ASSESSMENT & PLAN:  Pathophysiology of diagnoses discussed with patient.  Therapeutic options reviewed. Risks, benefits, and alternatives discussed with patient. Instructions reviewed at length.    #Lentigines  - Discussed benign appearance and provided reassurance. No treatment but observation at this time. Follow-up for concerning physical changes or new  symptoms.  - Recommend sun protection with spf 30 or higher, sun protective clothing such as wide brimmed hats and long sleeves. Recommend avoiding midday sun (10 am- 3 pm).     #Neoplasm(s) of uncertain behavior of skin  - Shave biopsy performed today   - Will notify patient with results and arrange for appropriate definitive treatment, if indicated.      Shave of lesion to establish and confirm diagnosis:  Photo taken: Yes    Risks, benefits, alternatives and personnel required for shave biopsy reviewed with patient. Risks discussed include, but not limited to: pain, bleeding, infection, scar, reaction to anesthetic, and recurrence/need for further treatment.  Patient and physician agree as to site(s) to be biopsied. Patient verbalizes understanding and wishes to proceed.     Site(s) prepped with alcohol and anesthetized with 1% lidocaine with epinephrine.   Shave of lesion(s) performed to the level of the dermis. Specimen(s) from A. R helix of ear  sent for pathology to r/o NMSC 50% ALCL and bandaging applied.   Written and verbal wound care instructions provided to patient, understanding verbalized.    #Intertrigo  - Start nystatin powder to affected areas once daily  - Start nystatin-triamcinolone twice daily to affected areas with flares.    Return to clinic: 3 months for follow-up of intertrigo and every 6 months for full-body skin checksor sooner if something concerning arises. Treatment of right ear pending biopsy     Brian Gallego MD   partial relief

## 2024-08-19 ENCOUNTER — TELEPHONE (OUTPATIENT)
Dept: DERMATOLOGY CLINIC | Facility: CLINIC | Age: 84
End: 2024-08-19

## 2024-08-19 NOTE — TELEPHONE ENCOUNTER
Patient cancelled appointment same day 45 mins before visit.   Asked to reschedule.   Offered 9/16/24 next available.  Patient became angry and declined.   Wants to be seen asap.   Please advise

## 2024-10-09 ENCOUNTER — APPOINTMENT (OUTPATIENT)
Dept: CT IMAGING | Facility: HOSPITAL | Age: 84
End: 2024-10-09
Attending: EMERGENCY MEDICINE
Payer: MEDICARE

## 2024-10-09 ENCOUNTER — HOSPITAL ENCOUNTER (EMERGENCY)
Facility: HOSPITAL | Age: 84
Discharge: HOME | End: 2024-10-09
Attending: EMERGENCY MEDICINE
Payer: MEDICARE

## 2024-10-09 ENCOUNTER — APPOINTMENT (OUTPATIENT)
Dept: GENERAL RADIOLOGY | Facility: HOSPITAL | Age: 84
End: 2024-10-09
Attending: EMERGENCY MEDICINE
Payer: MEDICARE

## 2024-10-09 VITALS
DIASTOLIC BLOOD PRESSURE: 70 MMHG | OXYGEN SATURATION: 97 % | TEMPERATURE: 100 F | HEART RATE: 81 BPM | RESPIRATION RATE: 22 BRPM | SYSTOLIC BLOOD PRESSURE: 136 MMHG

## 2024-10-09 DIAGNOSIS — S39.012A BACK STRAIN, INITIAL ENCOUNTER: ICD-10-CM
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
## 2024-10-09 PROCEDURE — 72125 CT NECK SPINE W/O DYE: CPT | Performed by: EMERGENCY MEDICINE

## 2024-10-09 PROCEDURE — 72100 X-RAY EXAM L-S SPINE 2/3 VWS: CPT | Performed by: EMERGENCY MEDICINE

## 2024-10-09 PROCEDURE — 70450 CT HEAD/BRAIN W/O DYE: CPT | Performed by: EMERGENCY MEDICINE

## 2024-10-09 PROCEDURE — 99284 EMERGENCY DEPT VISIT MOD MDM: CPT

## 2024-10-09 PROCEDURE — 99285 EMERGENCY DEPT VISIT HI MDM: CPT

## 2024-10-09 NOTE — ED PROVIDER NOTES
Patient Seen in: [facility] Emergency Department      History     Chief Complaint   Patient presents with    Fall     Stated Complaint: fall    Subjective:   HPI      83-year-old female with history of hypertension, hyperlipidemia, thyroid disorder, sleep apnea, COPD, prior DVT and PE presently on Eliquis, dementia, muscle weakness, fibromyalgia presents after a fall.  The patient was transferring from her wheelchair to a chair when she fell to the floor.  She does not believe that she hit her head.  She complains of some pain to her neck and low back.  She denies extremity injuries.  She denies focal weakness or numbness.  She denies any chest or abdominal pain.    Objective:     Past Medical History:    Anxiety state, unspecified    Aortic atherosclerosis ([facility])    CT scan 11-19    Arthritis of both knees    knee replacement     Arthritis of right hip    Back problem    Brain aneurysm ([facility])    Brain Aneurysm, Stent placed     Cervical disc disease    Cervical Discectomy     Cholecystitis    Cholecystectomy     COPD (chronic obstructive pulmonary disease) ([facility])    PFTs 2-15 with FEV1 1.11 L and FEV1/FVC ratio 63%    Deep vein thrombosis ([facility])    recurrent DVT- on lifelong AC    Dehydration    Fluids, Medication adjustment     Dementia ([facility])    Depression    Disorder of thyroid    Esophageal reflux    Fibromyalgia    Hammertoe    hammertoe 5 left, pain    Hearing impairment    Bilateral hearing aids    High blood pressure    Hip pain, left    Hypothyroidism    Incontinence    L3-4 stable slight grade 1 retrolisthesis    L4-5 mild-mod diffuse bulging disc    L4-5 slight grade 1 unstable spondylolisthesis    Leg wound, left    Low back pain    Migraines    Muscle weakness    Nausea vomiting and diarrhea    Onychomycosis    Debridement     Oropharyngeal dysphagia    Osteoarthrosis, unspecified whether generalized or localized, unspecified site    Other and unspecified hyperlipidemia    Other complicated  headache syndrome    Other spondylosis, lumbar region    Pneumonia    S/P cervical spinal fusion: C3-6 and C7-T1    s/p L5-S1 right laminectomy    Sleep apnea    stent placement    cerebral    Thyroid disease    Toe pain    Onychomycosis, Debridement , Hammertoe     Tonsillitis    Tonsillitis     Unspecified sleep apnea    Visual impairment    EYE GLASSES    Wound of left leg    Left leg debridement and Split Thickness Skin Graft to left thigh              Past Surgical History:   Procedure Laterality Date    Anesth,neck vessel surgery nos      x4    Brain surgery  ~2009    Brain Aneurysm, Stent placed     Carotid endarterectomy Right     Carpal tunnel release Right ~2008    Cholecystectomy  1984    Cholecystitis    Colonoscopy  2010    Debridement of nail(s), 1-5      Toe, Onychomycosis    Egd  2019    Gastric polyps only    Egd      Eye surgery      x2 FOR \"CROSSED EYES\"    Hip replacement surgery Bilateral     Hysterectomy  1967    Partial Hysterectomy    Jaw surgery      Knee replacement surgery Bilateral     Bilateral arthritis     Laminectomy      WITH FUSION PER PATIENT    Other surgical history  ,,,    Cervical Discectomy AND FUSION    Prep site t/a/l 1st 100 sq cm/1pct Left 2019    Left leg debridement, VAC placed    Split grft proc trunk <100sqcm Left 10/08/2019    Left leg debridement and Split Thickness Skin Graft to left thigh    Tonsillectomy  1950    Tonsillitis                 Social History     Socioeconomic History    Marital status:    Tobacco Use    Smoking status: Former     Current packs/day: 0.00     Average packs/day: 2.0 packs/day for 25.0 years (50.0 ttl pk-yrs)     Types: Cigarettes     Start date: 1952     Quit date: 1977     Years since quittin.8    Smokeless tobacco: Never    Tobacco comments:     Very heavy smoker 2 1/2 daily   Vaping Use    Vaping status: Never Used   Substance and Sexual Activity    Alcohol use: No    Drug use: No    Other Topics Concern    Caffeine Concern No     Comment: crystal light    Exercise No     Comment: No PT due to Covid    History of tanning No    Pt has a pacemaker No    Pt has a defibrillator No    Reaction to local anesthetic No    Left Handed Yes    Right Handed No    Currently spends a great deal of time in the sun No    Hx of Spending Great Deal of Time in Sun No    Bad sunburns in the past No    Tanning Salons in the Past No    Hx of Radiation Treatments No   Social History Narrative    The patient uses the following assistive device(s):  rolling walker.  scooter    The patient does not live in a home with stairs.    The patient lives in a nursing home home. [facility]     Social Drivers of Health     Food Insecurity: No Food Insecurity (12/11/2023)    Food Insecurity     Food Insecurity: Never true   Transportation Needs: No Transportation Needs (12/11/2023)    Transportation Needs     Lack of Transportation: No   Housing Stability: Low Risk  (12/11/2023)    Housing Stability     Housing Instability: No                  Physical Exam     ED Triage Vitals [10/09/24 0351]   /88   Pulse 85   Resp 22   Temp 99.7 °F (37.6 °C)   Temp src Oral   SpO2 94 %   O2 Device        Current Vitals:   Vital Signs  BP: (!) 135/95  Pulse: 85  Resp: 22  Temp: 99.7 °F (37.6 °C)  Temp src: Oral  MAP (mmHg): (!) 107    Oxygen Therapy  SpO2: 94 %        Physical Exam      General Appearance: alert, no distress  Eyes: pupils equal and round no injection  Respiratory: chest is non tender to palpation, breath sounds are equal  Cardiac: regular rate and rhythm  Gastrointestinal:  soft and non tender, there is no evidence of external or internal trauma by exam.  Neurological: Speech normal.  Motor and sensation is intact and symmetric to bilateral upper and lower extremities.  Skin: No laceration or abrasions.  Musculoskeletal                Head: atraumatic without scalp tenderness                Neck: The cervical spine is  mildly tender to palpation                Back: there is mild tenderness to palpation to the midline of the lumbar spine.  No thoracic spinal tenderness noted.                Extremities are non tender to palpation and there is full range of motion of the joints.    DIFFERENTIAL DIAGNOSIS: after history and physical exam differential diagnosis was considered for trauma post fall including head injury including concussion, skull fracture, intraparenchymal contusion and subdural hematoma        ED Course   Labs Reviewed - No data to display              MDM      Brain CT was reviewed independently by me.  No hemorrhage or intracranial injury noted.  I reveiewed the cervical spine CT and agree with the radiologist report that showed no fracture or malalignment.  Lumbar spine x-ray results noted.  No serious injuries identified.  Will discharge the patient back to her assisted living center.  She is advised to take Tylenol as needed for pain and follow-up with her primary physician.        Medical Decision Making      Disposition and Plan     Clinical Impression:  1. Fall, initial encounter    2. Back strain, initial encounter         Disposition:  Discharge  10/9/2024  5:57 am    Follow-up:  Timothy Hicks MD  [address] [place] [id number]  [phone]    Follow up      We recommend that you schedule follow up care with a primary care provider within the next three months to obtain basic health screening including reassessment of your blood pressure.      Medications Prescribed:  Current Discharge Medication List              Supplementary Documentation:

## 2024-10-09 NOTE — DISCHARGE INSTRUCTIONS
Take Tylenol as needed for pain.  Follow-up with your primary physician for reevaluation.  Return to the emergency department if increased pain, severe headache, focal weakness, or other new symptoms develop.

## 2024-10-16 ENCOUNTER — OFFICE VISIT (OUTPATIENT)
Dept: PHYSICAL MEDICINE AND REHAB | Facility: CLINIC | Age: 84
End: 2024-10-16
Payer: MEDICARE

## 2024-10-16 VITALS — HEIGHT: 68 IN | BODY MASS INDEX: 30.31 KG/M2 | WEIGHT: 200 LBS

## 2024-10-16 DIAGNOSIS — M47.816 LUMBAR FACET ARTHROPATHY: ICD-10-CM

## 2024-10-16 DIAGNOSIS — M51.26 LUMBAR HERNIATED DISC: ICD-10-CM

## 2024-10-16 DIAGNOSIS — M48.061 SPINAL STENOSIS OF LUMBAR REGION WITHOUT NEUROGENIC CLAUDICATION: ICD-10-CM

## 2024-10-16 DIAGNOSIS — M25.561 ACUTE PAIN OF RIGHT KNEE: ICD-10-CM

## 2024-10-16 DIAGNOSIS — F31.32 BIPOLAR AFFECTIVE DISORDER, CURRENTLY DEPRESSED, MODERATE (HCC): ICD-10-CM

## 2024-10-16 DIAGNOSIS — M43.16 SPONDYLOLISTHESIS OF LUMBAR REGION: ICD-10-CM

## 2024-10-16 DIAGNOSIS — M54.16 LUMBAR RADICULOPATHY: ICD-10-CM

## 2024-10-16 DIAGNOSIS — F41.9 ANXIETY: Chronic | ICD-10-CM

## 2024-10-16 DIAGNOSIS — M96.1 LUMBAR POST-LAMINECTOMY SYNDROME: ICD-10-CM

## 2024-10-16 DIAGNOSIS — M51.9 LUMBAR DISC DISEASE: Primary | ICD-10-CM

## 2024-10-16 DIAGNOSIS — E66.01 MORBID (SEVERE) OBESITY DUE TO EXCESS CALORIES (HCC): ICD-10-CM

## 2024-10-16 DIAGNOSIS — M43.10 RETROLISTHESIS OF VERTEBRAE: ICD-10-CM

## 2024-10-16 PROCEDURE — 99214 OFFICE O/P EST MOD 30 MIN: CPT | Performed by: PHYSICAL MEDICINE & REHABILITATION

## 2024-10-16 RX ORDER — DICLOFENAC EPOLAMINE 0.01 G/1
1 SYSTEM TOPICAL EVERY 12 HOURS
Qty: 60 PATCH | Refills: 0 | Status: SHIPPED | OUTPATIENT
Start: 2024-10-16 | End: 2024-11-15

## 2024-10-16 NOTE — PROGRESS NOTES
Low Back Pain [facility]    Chief Complaint:   Chief Complaint   Patient presents with    Follow - Up     LOV  6/6/24 pt is here for a follow up after injection. Was given a Left L4 and L5 transforaminal epidural steroid injection 6/28th and received 75% relief. No n/t.  Takes tylenol to ease pain, states the norco doesn't work. XR of lumbar spine  and CT of brain  and spine cervical was completed 10/9/24.      Nursing note reviewed and verified.    Patient was last seen on 6/6/2024.  On 6/28/2024, I did left L4 and L5 TFESI's which helped her up to 50%.  She still has some left posterior thigh pain which she attributes to sitting all day.  She has developed right medial knee pain which she will have only if she is moving the right leg.  This pain will wake her up at night.  She denies numbness, tingling, and weakness.  This is a throbbing pain.  She is doing PT which is helping her with transfers.  She slid to the floor 3-4 weeks ago with injury.  Norco is not helping her pain.  The gabapentin is not helping the pain.    Past Medical History   Past Medical History:    Anxiety state, unspecified    Aortic atherosclerosis ([facility])    CT scan 11-19    Arthritis of both knees    knee replacement     Arthritis of right hip    Back problem    Brain aneurysm ([facility])    Brain Aneurysm, Stent placed     Cervical disc disease    Cervical Discectomy     Cholecystitis    Cholecystectomy     COPD (chronic obstructive pulmonary disease) ([facility])    PFTs 2-15 with FEV1 1.11 L and FEV1/FVC ratio 63%    Deep vein thrombosis (HCC)    recurrent DVT- on lifelong AC    Dehydration    Fluids, Medication adjustment     Dementia ([facility])    Depression    Disorder of thyroid    Esophageal reflux    Fibromyalgia    Hammertoe    hammertoe 5 left, pain    Hearing impairment    Bilateral hearing aids    High blood pressure    Hip pain, left    Hypothyroidism    Incontinence    L3-4 stable slight grade 1 retrolisthesis    L4-5 mild-mod diffuse bulging disc     L4-5 slight grade 1 unstable spondylolisthesis    Leg wound, left    Low back pain    Migraines    Muscle weakness    Nausea vomiting and diarrhea    Onychomycosis    Debridement     Oropharyngeal dysphagia    Osteoarthrosis, unspecified whether generalized or localized, unspecified site    Other and unspecified hyperlipidemia    Other complicated headache syndrome    Other spondylosis, lumbar region    Pneumonia    S/P cervical spinal fusion: C3-6 and C7-T1    s/p L5-S1 right laminectomy    Sleep apnea    stent placement    cerebral    Thyroid disease    Toe pain    Onychomycosis, Debridement , Hammertoe     Tonsillitis    Tonsillitis     Unspecified sleep apnea    Visual impairment    EYE GLASSES    Wound of left leg    Left leg debridement and Split Thickness Skin Graft to left thigh       Past Surgical History   Past Surgical History:   Procedure Laterality Date    Anesth,neck vessel surgery nos      x4    Brain surgery  ~2009    Brain Aneurysm, Stent placed     Carotid endarterectomy Right     Carpal tunnel release Right ~2008    Cholecystectomy  1984    Cholecystitis    Colonoscopy  2010    Debridement of nail(s), 1-5      Toe, Onychomycosis    Egd  05/2019    Gastric polyps only    Egd  2006    Eye surgery      x2 FOR \"CROSSED EYES\"    Hip replacement surgery Bilateral     Hysterectomy  1967    Partial Hysterectomy    Jaw surgery      Knee replacement surgery Bilateral     Bilateral arthritis     Laminectomy      WITH FUSION PER PATIENT    Other surgical history  1985,1995,2003,2009    Cervical Discectomy AND FUSION    Prep site t/a/l 1st 100 sq cm/1pct Left 08/21/2019    Left leg debridement, VAC placed    Split grft proc trunk <100sqcm Left 10/08/2019    Left leg debridement and Split Thickness Skin Graft to left thigh    Tonsillectomy  1950    Tonsillitis        Family History   Family History   Problem Relation Age of Onset    Heart Attack Mother     Heart Disease Mother         Coronary Artery Disease,  Premature     Fibromyalgia Sister     Heart Disease Sister     Heart Attack Sister     Heart Disease Brother     Heart Attack Brother     Other (Other[other]) Father     Other (Other[other]) Maternal Grandmother     Other (Other[other]) Maternal Grandfather     Other (Other[other]) Paternal Grandmother     Other (Other[other]) Paternal Grandfather     Cancer Brother 55        Liver     Neurological Disorder Sister         ALzheimer's Disease     Depression Sister     Migraines Sister     Stroke Sister         Cerebrovascular accident     Dementia Sister     Heart Disease Brother         Coronary Artery Disease        Social History   Social History     Socioeconomic History    Marital status:      Spouse name: Not on file    Number of children: Not on file    Years of education: Not on file    Highest education level: Not on file   Occupational History    Not on file   Tobacco Use    Smoking status: Former     Current packs/day: 0.00     Average packs/day: 2.0 packs/day for 25.0 years (50.0 ttl pk-yrs)     Types: Cigarettes     Start date: 1952     Quit date: 1977     Years since quittin.8    Smokeless tobacco: Never    Tobacco comments:     Very heavy smoker 2 1/2 daily   Vaping Use    Vaping status: Never Used   Substance and Sexual Activity    Alcohol use: No    Drug use: No    Sexual activity: Not on file   Other Topics Concern     Service Not Asked    Blood Transfusions Not Asked    Caffeine Concern No     Comment: crystal light    Occupational Exposure Not Asked    Hobby Hazards Not Asked    Sleep Concern Not Asked    Stress Concern Not Asked    Weight Concern Not Asked    Special Diet Not Asked    Back Care Not Asked    Exercise No     Comment: No PT due to Covid    Bike Helmet Not Asked    Seat Belt Not Asked    Self-Exams Not Asked    Grew up on a farm Not Asked    History of tanning No    Outdoor occupation Not Asked    Pt has a pacemaker No    Pt has a defibrillator No     Breast feeding Not Asked    Reaction to local anesthetic No    Left Handed Yes    Right Handed No    Currently spends a great deal of time in the sun No    Past Sunlamp Treatments for Acne Not Asked    Hx of Spending Great Deal of Time in Sun No    Bad sunburns in the past No    Tanning Salons in the Past No    Hx of Radiation Treatments No    Regular use of sun block Not Asked   Social History Narrative    The patient uses the following assistive device(s):  rolling walker.  scooter    The patient does not live in a home with stairs.    The patient lives in a halfway home. [facility]     Social Drivers of Health     Financial Resource Strain: Not on file   Food Insecurity: No Food Insecurity (12/11/2023)    Food Insecurity     Food Insecurity: Never true   Transportation Needs: No Transportation Needs (12/11/2023)    Transportation Needs     Lack of Transportation: No   Physical Activity: Not on file   Stress: Not on file   Social Connections: Not on file   Housing Stability: Low Risk  (12/11/2023)    Housing Stability     Housing Instability: No     Housing Instability Emergency: Not on file     Crib or Bassinette: Not on file       PE:  The patient does appear in her stated age in no distress.  The patient is well groomed.    Psychiatric:  The patient is alert and oriented x 3.  The patient has a normal affect and mood.      Respiratory:  No acute respiratory distress. Patient does not have a cough.    HEENT:  Extraocular muscles are intact. There is no kern icterus. Pupils are equal, round, and reactive to light. No redness or discharge bilaterally.    Skin:  There are no rashes or lesions.    Vitals:  There were no vitals filed for this visit.    Knee  Inspection: No ecchymosis, no erythema, no swelling   Palpation: Not warm, non tender   ROM: Extension lacks 5 degrees on the right and 10 degrees on the left   Tests: No medial laxity, lateral laxity, anterior drawer sign, or posterior drawer sign      Radiology Imaging:  I reviewed with the patient her X-ray of the lumbar spine from [id number].      Assessment  1. L5-S1 bilateral mod-large foraminal, L4-5 mod diffuse, L3-4 mild diffuse, L2-3 mod diffuse, L1-2 right > left mod diffuse, T12-L1 left mild-mod, T11-12 mild-mod diffuse bulging discs    2. L5-S1 bilateral severe foraminal & moderate central, L4-5 severe central, L2-3 mod central & left mod-severe foraminal, L1-2 mod central & right mod-severe , T12-L1 mod central stenosis    3. L5-S1 mild-mod central HNP    4. left L4 and left > right L5-S1 radiculopathy    5. Lumbar facet arthropathy: L5-S1 bilateral large    6. Bipolar affective disorder, currently depressed, moderate ([facility])    7. Morbid (severe) obesity due to excess calories ([facility])    8. Anxiety    9. L3-4 stable slight grade 1 retrolisthesis    10. L4-5 slight grade 1 unstable spondylolisthesis    11. s/p L5-S1 right laminectomy    12. Acute pain of right knee and s/p TKA         Plan  She will try the Diclofenac patch for the pain in the right medial knee.    She wants to hold on PT for now.    She will continue with the Norco and the gabapentin for the pain.    She will get a right knee x-ray to check for loosening.    She will me know in one week how th patch is working for her.    The patient understands and agrees with the stated plan.  Alex Baumann MD  10/16/2024

## 2024-10-16 NOTE — PATIENT INSTRUCTIONS
Plan  She will try the Diclofenac patch for the pain in the right medial knee.    She wants to hold on PT for now.    She will continue with the Norco and the gabapentin for the pain.    She will get a right knee x-ray to check for loosening.    She will me know in one week how th patch is working for her.

## 2024-10-23 ENCOUNTER — TELEPHONE (OUTPATIENT)
Dept: PHYSICAL MEDICINE AND REHAB | Facility: CLINIC | Age: 84
End: 2024-10-23

## 2024-10-23 DIAGNOSIS — M25.561 ACUTE PAIN OF RIGHT KNEE: Primary | ICD-10-CM

## 2024-10-23 DIAGNOSIS — Z96.651 S/P TOTAL KNEE ARTHROPLASTY, RIGHT: ICD-10-CM

## 2024-10-23 NOTE — TELEPHONE ENCOUNTER
Spoke with patient who stated at last office visit  mentioned an xray being ordered.    Per  at last office visit 10/16/24:  \"Plan  She will try the Diclofenac patch for the pain in the right medial knee.  She wants to hold on PT for now.  She will continue with the Norco and the gabapentin for the pain.  She will get a right knee x-ray to check for loosening.  She will me know in one week how th patch is working for her.\"    Right knee xray was not placed at last office visit.   Right knee xray placed per plan at last office visit.    Informed patient order has now been placed and she can get this completed. Patient understood and was appreciative.    Nothing further needed.

## 2024-11-05 ENCOUNTER — TELEPHONE (OUTPATIENT)
Dept: PHYSICAL MEDICINE AND REHAB | Facility: CLINIC | Age: 84
End: 2024-11-05

## 2024-11-05 DIAGNOSIS — Z96.651 S/P TOTAL KNEE ARTHROPLASTY, RIGHT: ICD-10-CM

## 2024-11-05 DIAGNOSIS — M25.561 ACUTE PAIN OF RIGHT KNEE: Primary | ICD-10-CM

## 2024-11-05 NOTE — TELEPHONE ENCOUNTER
Patient states the diclofenac patches are $44.50    Diclofenac Epolamine- good Rx prices are $108 and above.    Patient stated that TAMMIE Hamilton- (at [facility]) tried to call our office- this RN advised there is no record of her call, so she might have not been able to get through.,     Ok to call patient, but may be difficult to get through.    This RN advised she would talk to Dr. Baumann to see what else he could recommend (either diclofenac gel or some other medication).  Patient stated that $45 is almost half of her monthly income, and she is unable to afford that.     Routed to Dr. Baumann (also sent Epic message)       Dr. Baumann epic messaging response \" ok to try gel instead\"    Can try prescribing or if cost is high- otc voltaren or diclofenac sodium gel is approx $10      This RN entered order and called back patient to advise of new prescription and that the patch was cancelled.  This RN advised if any issues to give our office a call.    This RN went over application instructions with patient and recommended she either wear gloves when applying or wash hands thoroughly after applying.    Patient verbalized understanding.

## 2024-11-06 DIAGNOSIS — M54.16 LUMBAR RADICULOPATHY: ICD-10-CM

## 2024-11-06 NOTE — TELEPHONE ENCOUNTER
Refill Request    Medication request: HYDROcodone-acetaminophen 5-325 MG Oral Tab Take 1 tablet by mouth every 8 (eight) hours as needed for Pain.     LOV:10/16/2024 Alex Baumann MD   Due back to clinic per last office note:  3 months  NOV: 2/3/2025 Alex Baumann [place]      [place]: 8/22/2024 #30    Urine drug screen (if applicable): 3/25/2021  Pain contract: none    LOV plan (if weaning or changing medications): \"She will continue with the Norco and the gabapentin for the pain. \"

## 2024-11-07 RX ORDER — HYDROCODONE BITARTRATE AND ACETAMINOPHEN 5; 325 MG/1; MG/1
1 TABLET ORAL EVERY 8 HOURS PRN
Qty: 30 TABLET | Refills: 0 | Status: SHIPPED | OUTPATIENT
Start: 2024-11-07

## 2024-11-26 ENCOUNTER — HOSPITAL ENCOUNTER (OUTPATIENT)
Dept: GENERAL RADIOLOGY | Facility: HOSPITAL | Age: 84
Discharge: HOME | End: 2024-11-26
Attending: PHYSICAL MEDICINE & REHABILITATION
Payer: MEDICARE

## 2024-11-26 DIAGNOSIS — Z96.651 S/P TOTAL KNEE ARTHROPLASTY, RIGHT: ICD-10-CM

## 2024-11-26 PROCEDURE — 73562 X-RAY EXAM OF KNEE 3: CPT | Performed by: PHYSICAL MEDICINE & REHABILITATION

## 2024-12-23 ENCOUNTER — TELEPHONE (OUTPATIENT)
Dept: PHYSICAL MEDICINE AND REHAB | Facility: CLINIC | Age: 84
End: 2024-12-23

## 2024-12-23 DIAGNOSIS — M54.16 LUMBAR RADICULOPATHY: Primary | ICD-10-CM

## 2024-12-23 DIAGNOSIS — M54.16 LUMBAR RADICULOPATHY: ICD-10-CM

## 2024-12-23 DIAGNOSIS — M51.9 LUMBAR DISC DISEASE: ICD-10-CM

## 2024-12-23 DIAGNOSIS — M48.061 SPINAL STENOSIS OF LUMBAR REGION WITHOUT NEUROGENIC CLAUDICATION: ICD-10-CM

## 2024-12-23 DIAGNOSIS — M43.16 SPONDYLOLISTHESIS OF LUMBAR REGION: ICD-10-CM

## 2024-12-23 RX ORDER — HYDROCODONE BITARTRATE AND ACETAMINOPHEN 5; 325 MG/1; MG/1
1 TABLET ORAL EVERY 8 HOURS PRN
Qty: 30 TABLET | Refills: 0 | Status: SHIPPED | OUTPATIENT
Start: 2024-12-23

## 2024-12-23 NOTE — TELEPHONE ENCOUNTER
Patient states that Dr. Baumann had prescribed the Norco and Gabapentin for her.    Saw Dr. Baumann in October.  Nothing new is happening- patient has something on left upper thigh- nobody has found anything yet. Has been bothering her for two years. Followed up with Dr. Hicks - was told it was yeast inf.     Norco doesn't do much for her.     Patient states she hurts all day- has tried position changes, offloading. Patient states she feels helpless and doesn't know what to     Location: posterior left upper thigh pain  Pain: 6-7/10(constant)  Description:  Burning & stinging pain-gets worse with movement, (tylenol brings some relief) States pain was so bad and reports she was in tears on 12/22/2024.  Takes gabapenin (prescribed by Dr. Whitney)  Takes Norco (states this does not help very much-states that regular tylenol works better).       Patient's last injection: 6/28/2024 Left L4 and L5 transforaminal epidural steroid injections done under fluoroscopic guidance with contrast enhancement.     Patient states that the injections help for only approximately 4-5 weeks.    Patient was provided a sooner appointment with Dr. Baumann on 1/16/2025      LOV: 10/16/2024 Alex Baumann MD  LOV PLAN:  \"Plan  She will try the Diclofenac patch for the pain in the right medial knee.     She wants to hold on PT for now.     She will continue with the Norco and the gabapentin for the pain.     She will get a right knee x-ray to check for loosening.     She will me know in one week how th patch is working for her.\"

## 2025-01-03 NOTE — TELEPHONE ENCOUNTER
Sounds like she will need to have left L4 and L5 TFESI's again.  If this is the same pain that improved with the left L4 and L5 transforaminal epidural steroid injections in the past, then she will need to have these repeated.  She can follow-up with me after have done the repeat injections.

## 2025-01-06 ENCOUNTER — MED REC SCAN ONLY (OUTPATIENT)
Dept: PHYSICAL MEDICINE AND REHAB | Facility: CLINIC | Age: 85
End: 2025-01-06

## 2025-01-06 ENCOUNTER — TELEPHONE (OUTPATIENT)
Dept: PHYSICAL MEDICINE AND REHAB | Facility: CLINIC | Age: 85
End: 2025-01-06

## 2025-01-06 DIAGNOSIS — M54.16 LUMBAR RADICULOPATHY: Primary | ICD-10-CM

## 2025-01-06 NOTE — TELEPHONE ENCOUNTER
Status of Anticoag  [x] Clearance pending to hold Eliquis for 2 days prior to Left L4 and L5 transforaminal epidural steroid injections faxed to Dr. Ness F: [phone]  [] Clearance approved  [] Clearance denied    @ [facility] due to living in [facility].

## 2025-01-06 NOTE — TELEPHONE ENCOUNTER
Spoke with patient and she stated that she will wait until she speaks with Dr Baumann on 1/16/24 to decide if she wants to proceed with the injection.     Closing the encounter until patient calls back.

## 2025-01-06 NOTE — TELEPHONE ENCOUNTER
Status of Anticoag  [] Clearance pending  [x] Clearance approved to hold Eliquis for 2 days prior to procedure.  Placed into scanning.   [] Clearance denied    LMTCB 1st attempt-surgical case pended.  Local.

## 2025-01-06 NOTE — TELEPHONE ENCOUNTER
Per CMS Guidelines -no authorization is required for Left L4 and Left L5 TFESI under fluoroscopic guidance  CPT codes: 64183, 01519       Status: Authorization is not required based on medical necessity however is not a guarantee of payment and may be subject to review once claim is submitted-Covered Benefit.

## 2025-01-16 ENCOUNTER — OFFICE VISIT (OUTPATIENT)
Dept: PHYSICAL MEDICINE AND REHAB | Facility: CLINIC | Age: 85
End: 2025-01-16
Payer: MEDICARE

## 2025-01-16 VITALS — BODY MASS INDEX: 30.31 KG/M2 | WEIGHT: 200 LBS | HEIGHT: 68 IN

## 2025-01-16 DIAGNOSIS — M43.10 RETROLISTHESIS OF VERTEBRAE: ICD-10-CM

## 2025-01-16 DIAGNOSIS — I10 ESSENTIAL HYPERTENSION: ICD-10-CM

## 2025-01-16 DIAGNOSIS — F31.4 SEVERE DEPRESSED BIPOLAR I DISORDER WITHOUT PSYCHOTIC FEATURES (HCC): ICD-10-CM

## 2025-01-16 DIAGNOSIS — M47.816 LUMBAR FACET ARTHROPATHY: ICD-10-CM

## 2025-01-16 DIAGNOSIS — F31.32 BIPOLAR AFFECTIVE DISORDER, CURRENTLY DEPRESSED, MODERATE (HCC): ICD-10-CM

## 2025-01-16 DIAGNOSIS — M96.1 CERVICAL POST-LAMINECTOMY SYNDROME: ICD-10-CM

## 2025-01-16 DIAGNOSIS — M48.061 SPINAL STENOSIS OF LUMBAR REGION WITHOUT NEUROGENIC CLAUDICATION: ICD-10-CM

## 2025-01-16 DIAGNOSIS — Z98.1 S/P CERVICAL SPINAL FUSION: ICD-10-CM

## 2025-01-16 DIAGNOSIS — M25.561 ACUTE PAIN OF RIGHT KNEE: ICD-10-CM

## 2025-01-16 DIAGNOSIS — M51.26 LUMBAR HERNIATED DISC: ICD-10-CM

## 2025-01-16 DIAGNOSIS — M96.1 LUMBAR POST-LAMINECTOMY SYNDROME: ICD-10-CM

## 2025-01-16 DIAGNOSIS — M54.16 LUMBAR RADICULOPATHY: ICD-10-CM

## 2025-01-16 DIAGNOSIS — M43.16 SPONDYLOLISTHESIS OF LUMBAR REGION: ICD-10-CM

## 2025-01-16 DIAGNOSIS — M51.9 LUMBAR DISC DISEASE: Primary | ICD-10-CM

## 2025-01-16 PROCEDURE — 99214 OFFICE O/P EST MOD 30 MIN: CPT | Performed by: PHYSICAL MEDICINE & REHABILITATION

## 2025-01-16 NOTE — PROGRESS NOTES
Low Back Pain H & P    Chief Complaint:   Chief Complaint   Patient presents with    Follow - Up     LOV 10/16/24 pt is here for a follow up on bilateral knee pain  . XR of right knee was completed 11/26/24. No n/t. Takes norco , tylenol and gabapentin prn to ease pain. No current physical therapy but would like to do both OT and PT. Pain 7/10     Nursing note reviewed and verified.    Patient was last seen on 10/16/2024.  She slid off of her bed yesterday and landed on the floor and hit her head 2 times.  She did not have any injuries.  The right knee pain is worse at night time and movement will give her the pain.    She has the gabapentin for the pain and she is supposed to be taking 200 mg TID.  The Diclofenac gel is not helping her.    She has developed the left low back and leg pain again    Past Medical History   Past Medical History:    Anxiety state, unspecified    Aortic atherosclerosis (HCC)    CT scan 11-19    Arthritis of both knees    knee replacement     Arthritis of right hip    Back problem    Brain aneurysm (HCC)    Brain Aneurysm, Stent placed     Cervical disc disease    Cervical Discectomy     Cholecystitis    Cholecystectomy     COPD (chronic obstructive pulmonary disease) ([facility])    PFTs 2-15 with FEV1 1.11 L and FEV1/FVC ratio 63%    Deep vein thrombosis (HCC)    recurrent DVT- on lifelong AC    Dehydration    Fluids, Medication adjustment     Dementia (HCC)    Depression    Disorder of thyroid    Esophageal reflux    Fibromyalgia    Hammertoe    hammertoe 5 left, pain    Hearing impairment    Bilateral hearing aids    High blood pressure    Hip pain, left    Hypothyroidism    Incontinence    L3-4 stable slight grade 1 retrolisthesis    L4-5 mild-mod diffuse bulging disc    L4-5 slight grade 1 unstable spondylolisthesis    Leg wound, left    Low back pain    Migraines    Muscle weakness    Nausea vomiting and diarrhea    Onychomycosis    Debridement     Oropharyngeal dysphagia     Osteoarthrosis, unspecified whether generalized or localized, unspecified site    Other and unspecified hyperlipidemia    Other complicated headache syndrome    Other spondylosis, lumbar region    Pneumonia    S/P cervical spinal fusion: C3-6 and C7-T1    s/p L5-S1 right laminectomy    Sleep apnea    stent placement    cerebral    Thyroid disease    Toe pain    Onychomycosis, Debridement , Hammertoe     Tonsillitis    Tonsillitis     Unspecified sleep apnea    Visual impairment    EYE GLASSES    Wound of left leg    Left leg debridement and Split Thickness Skin Graft to left thigh       Past Surgical History   Past Surgical History:   Procedure Laterality Date    Anesth,neck vessel surgery nos      x4    Brain surgery  ~2009    Brain Aneurysm, Stent placed     Carotid endarterectomy Right     Carpal tunnel release Right ~2008    Cholecystectomy  1984    Cholecystitis    Colonoscopy  2010    Debridement of nail(s), 1-5      Toe, Onychomycosis    Egd  05/2019    Gastric polyps only    Egd  2006    Eye surgery      x2 FOR \"CROSSED EYES\"    Hip replacement surgery Bilateral     Hysterectomy  1967    Partial Hysterectomy    Jaw surgery      Knee replacement surgery Bilateral     Bilateral arthritis     Laminectomy      WITH FUSION PER PATIENT    Other surgical history  1985,1995,2003,2009    Cervical Discectomy AND FUSION    Prep site t/a/l 1st 100 sq cm/1pct Left 08/21/2019    Left leg debridement, VAC placed    Split grft proc trunk <100sqcm Left 10/08/2019    Left leg debridement and Split Thickness Skin Graft to left thigh    Tonsillectomy  1950    Tonsillitis        Family History   Family History   Problem Relation Age of Onset    Heart Attack Mother     Heart Disease Mother         Coronary Artery Disease, Premature     Fibromyalgia Sister     Heart Disease Sister     Heart Attack Sister     Heart Disease Brother     Heart Attack Brother     Other (Other[other]) Father     Other (Other[other]) Maternal Grandmother      Other (Other[other]) Maternal Grandfather     Other (Other[other]) Paternal Grandmother     Other (Other[other]) Paternal Grandfather     Cancer Brother 55        Liver     Neurological Disorder Sister         ALzheimer's Disease     Depression Sister     Migraines Sister     Stroke Sister         Cerebrovascular accident     Dementia Sister     Heart Disease Brother         Coronary Artery Disease        Social History   Social History     Socioeconomic History    Marital status:      Spouse name: Not on file    Number of children: Not on file    Years of education: Not on file    Highest education level: Not on file   Occupational History    Not on file   Tobacco Use    Smoking status: Former     Current packs/day: 0.00     Average packs/day: 2.0 packs/day for 25.0 years (50.0 ttl pk-yrs)     Types: Cigarettes     Start date: 1952     Quit date: 1977     Years since quittin.0    Smokeless tobacco: Never    Tobacco comments:     Very heavy smoker 2 1/2 daily   Vaping Use    Vaping status: Never Used   Substance and Sexual Activity    Alcohol use: No    Drug use: No    Sexual activity: Not on file   Other Topics Concern     Service Not Asked    Blood Transfusions Not Asked    Caffeine Concern No     Comment: crystal light    Occupational Exposure Not Asked    Hobby Hazards Not Asked    Sleep Concern Not Asked    Stress Concern Not Asked    Weight Concern Not Asked    Special Diet Not Asked    Back Care Not Asked    Exercise No     Comment: No PT due to Covid    Bike Helmet Not Asked    Seat Belt Not Asked    Self-Exams Not Asked    Grew up on a farm Not Asked    History of tanning No    Outdoor occupation Not Asked    Pt has a pacemaker No    Pt has a defibrillator No    Breast feeding Not Asked    Reaction to local anesthetic No    Left Handed Yes    Right Handed No    Currently spends a great deal of time in the sun No    Past Sunlamp Treatments for Acne Not Asked    Hx of Spending  Great Deal of Time in Sun No    Bad sunburns in the past No    Tanning Salons in the Past No    Hx of Radiation Treatments No    Regular use of sun block Not Asked   Social History Narrative    The patient uses the following assistive device(s):  rolling walker.  scooter    The patient does not live in a home with stairs.    The patient lives in a custodial home. [facility]     Social Drivers of Health     Financial Resource Strain: Not on file   Food Insecurity: No Food Insecurity (12/11/2023)    Food Insecurity     Food Insecurity: Never true   Transportation Needs: No Transportation Needs (12/11/2023)    Transportation Needs     Lack of Transportation: No   Physical Activity: Not on file   Stress: Not on file   Social Connections: Not on file   Housing Stability: Low Risk  (12/11/2023)    Housing Stability     Housing Instability: No     Housing Instability Emergency: Not on file     Crib or Bassinette: Not on file       PE:  The patient does appear in her stated age in no distress.  The patient is well groomed.    Psychiatric:  The patient is alert and oriented x 3.  The patient has a normal affect and mood.      Respiratory:  No acute respiratory distress. Patient does not have a cough.    HEENT:  Extraocular muscles are intact. There is no kern icterus. Pupils are equal, round, and reactive to light. No redness or discharge bilaterally.    Skin:  There are no rashes or lesions.    Vitals:  There were no vitals filed for this visit.    Neurological Lower Extremity:    LE Muscle Strength: All LE strength measurements 5/5 except:  Hamstring LEFT:   3+/5  Hip Flexion LEFT:   4/5  Knee Extension LEFT:   4/5  Ankle Dorsiflexlon LEFT:   4/5   Reflexes: absent in bilateral lower extremities     Radiology Imaging:  I reviewed with the patient her X-ray of the knees right from 11/26/2024.      Assessment  1. L5-S1 bilateral mod-large foraminal, L4-5 mod diffuse, L3-4 mild diffuse, L2-3 mod diffuse, L1-2 right > left  mod diffuse, T12-L1 left mild-mod, T11-12 mild-mod diffuse bulging discs    2. L5-S1 mild-mod central HNP    3. L5-S1 bilateral severe foraminal & moderate central, L4-5 severe central, L2-3 mod central & left mod-severe foraminal, L1-2 mod central & right mod-severe , T12-L1 mod central stenosis    4. left L4 and left > right L5-S1 radiculopathy    5. Lumbar facet arthropathy: L5-S1 bilateral large    6. S/P cervical spinal fusion: C3-6 and C7-T1    7. L3-4 stable slight grade 1 retrolisthesis    8. L4-5 slight grade 1 unstable spondylolisthesis    9. Cervical post-laminectomy syndrome: C3-7    10. s/p L5-S1 right laminectomy    11. Acute pain of right knee and s/p TKA    12. Severe depressed bipolar I disorder without psychotic features (HCC)    13. Bipolar affective disorder, currently depressed, moderate (HCC)    14. Essential hypertension         Plan  I will perform left L4 and L5 TFESI(s) under IV conscious sedation.    The patient will continue with their current pain medications.    She will monitor the right knee pain for now.    The patient will follow up in 3 months, but the patient will call me 2 weeks after having the injection to let me know how the injection worked.    The patient understands and agrees with the stated plan.  Alex Baumann MD  1/16/2025

## 2025-01-17 ENCOUNTER — TELEPHONE (OUTPATIENT)
Dept: PHYSICAL MEDICINE AND REHAB | Facility: CLINIC | Age: 85
End: 2025-01-17

## 2025-01-17 DIAGNOSIS — M54.16 LUMBAR RADICULOPATHY: ICD-10-CM

## 2025-01-17 RX ORDER — HYDROCODONE BITARTRATE AND ACETAMINOPHEN 5; 325 MG/1; MG/1
1 TABLET ORAL EVERY 8 HOURS PRN
Qty: 30 TABLET | Refills: 0 | Status: SHIPPED | OUTPATIENT
Start: 2025-01-17

## 2025-01-17 NOTE — TELEPHONE ENCOUNTER
Refill Request    Medication request: HYDROCODONE-ACETAMINOPHEN 5-325 MG Oral Tab.  ONE TABLET BY MOUTH EVERY 8 HOURS AS NEEDED FOR PAIN     LOV:1/16/2025 Alex Baumann MD   Due back to clinic per last office note: Per Dr. Baumann: \"The patient will follow up in 3 months, but the patient will call me 2 weeks after having the injection to let me know how the injection worked.\"  NOV: Visit date not found      [facility]/Last refill: 12/24/2024 #30 - 10 day supply per [facility]    Urine drug screen (if applicable): 03/25/2021  Pain contract: NONE    LOV plan (if weaning or changing medications): Per Dr. Baumann: \"The patient will continue with their current pain medications.\"

## 2025-01-17 NOTE — PATIENT INSTRUCTIONS
Plan  I will perform left L4 and L5 TFESI(s) under IV conscious sedation.     The patient will continue with their current pain medications.     She will monitor the right knee pain for now.     The patient will follow up in 3 months, but the patient will call me 2 weeks after having the injection to let me know how the injection worked.

## 2025-01-17 NOTE — TELEPHONE ENCOUNTER
Per CMS Guidelines -no authorization is required for Left L4 and Left L5 TFESI under fluoroscopic guidance  CPT codes: 74351, 46814       Status: Authorization is not required based on medical necessity however is not a guarantee of payment and may be subject to review once claim is submitted-Covered Benefit.  This will be #3 in a rolling 12 month period

## 2025-01-17 NOTE — TELEPHONE ENCOUNTER
Patient was approved for Left L4 and L5 transforaminal epidural steroid injections - clearance received.     Attempted to schedule patient, states prior to scheduling- patient would like RN to reach out due to having a fall yesterday after seeing Dr. Baumann.

## 2025-01-18 ENCOUNTER — TELEPHONE (OUTPATIENT)
Dept: PHYSICAL MEDICINE AND REHAB | Facility: CLINIC | Age: 85
End: 2025-01-18

## 2025-01-18 NOTE — TELEPHONE ENCOUNTER
Late entry:  The patient called after office hours Friday afternoon stating that she had increased back pain.  She saw Dr. Baumann on Thursday.  After the visit, she was transported home and in the process apparently fell out of her wheelchair onto her head and back on Thursday.  Since that time the back pain had increased.  She was wondering whether there was anything I could recommend.  I recommended that she proceed to her nearest urgent care or emergency department for x-ray imaging and evaluation.  She stated that she lived in an assisted living facility and had limited transportation. I recommended she discuss this with the on staff nurse at her facility or call 911.  The patient seemed to understand the instructions and was grateful.

## 2025-02-05 ENCOUNTER — TELEPHONE (OUTPATIENT)
Dept: PHYSICAL MEDICINE AND REHAB | Facility: CLINIC | Age: 85
End: 2025-02-05

## 2025-02-05 DIAGNOSIS — R29.6 FALLS FREQUENTLY: ICD-10-CM

## 2025-02-05 DIAGNOSIS — R53.81 PHYSICAL DECONDITIONING: Primary | ICD-10-CM

## 2025-02-05 DIAGNOSIS — R41.3 MEMORY IMPAIRMENT: ICD-10-CM

## 2025-02-06 NOTE — TELEPHONE ENCOUNTER
Request received from Ambrose Montoya-[facility] m requesting Outpatient therapy orders for OT, PT and ST with [facility] for the following diagnosis: deconditioning, falls, memory impairment.    Once orders are placed will need to fax to: [phone].    External therapy orders pended to  for completion.     Once orders are placed will need to fax back to:  ATTN: [place]-Director of [facility]   Fax #: [phone]

## 2025-02-17 ENCOUNTER — MED REC SCAN ONLY (OUTPATIENT)
Dept: PHYSICAL MEDICINE AND REHAB | Facility: CLINIC | Age: 85
End: 2025-02-17

## 2025-03-03 ENCOUNTER — TELEPHONE (OUTPATIENT)
Dept: PHYSICAL MEDICINE AND REHAB | Facility: CLINIC | Age: 85
End: 2025-03-03

## 2025-03-03 DIAGNOSIS — M54.16 LUMBAR RADICULOPATHY: ICD-10-CM

## 2025-03-03 RX ORDER — HYDROCODONE BITARTRATE AND ACETAMINOPHEN 5; 325 MG/1; MG/1
1 TABLET ORAL EVERY 8 HOURS PRN
Qty: 30 TABLET | Refills: 0 | Status: SHIPPED | OUTPATIENT
Start: 2025-03-03

## 2025-03-03 NOTE — TELEPHONE ENCOUNTER
Refill Request    Medication request:   Requested Prescriptions     Pending Prescriptions Disp Refills    HYDROCODONE-ACETAMINOPHEN 5-325 MG Oral Tab [Pharmacy Med Name: HYDROcodone-Acetaminophen 5-325 MG Tablet] 30 tablet 1     Sig: ONE TABLET BY MOUTH EVERY 8 HOURS AS NEEDED FOR PAIN *DO NOT EXCEED 4GM/APAP IN 24 HOURS FROM ALL SOURCES*         LOV:1/16/2025 Alex Baumann MD   Due back to clinic per last office note:  for injection 3/28/25 and then 3 months   NOV: 3/28/2025 Alex Baumann MD      [place]Last refill: 1/18/25 #30 (10 days supply)    Urine drug screen (if applicable): none  Pain contract: none     LOV plan (if weaning or changing medications): Per Dr. Baumann, \"The patient will continue with their current pain medications\"    Routed to Dr. Baumann.

## 2025-03-05 ENCOUNTER — MED REC SCAN ONLY (OUTPATIENT)
Dept: PHYSICAL MEDICINE AND REHAB | Facility: CLINIC | Age: 85
End: 2025-03-05

## 2025-03-24 ENCOUNTER — HOSPITAL ENCOUNTER (OUTPATIENT)
Dept: GENERAL RADIOLOGY | Facility: HOSPITAL | Age: 85
Discharge: HOME | End: 2025-03-24
Attending: PLASTIC SURGERY
Payer: MEDICARE

## 2025-03-24 ENCOUNTER — OFFICE VISIT (OUTPATIENT)
Dept: SURGERY | Facility: CLINIC | Age: 85
End: 2025-03-24

## 2025-03-24 DIAGNOSIS — R52 PAIN: ICD-10-CM

## 2025-03-24 DIAGNOSIS — R52 PAIN: Primary | ICD-10-CM

## 2025-03-24 DIAGNOSIS — M19.041 PRIMARY OSTEOARTHRITIS OF RIGHT HAND: ICD-10-CM

## 2025-03-24 PROCEDURE — 73140 X-RAY EXAM OF FINGER(S): CPT | Performed by: PLASTIC SURGERY

## 2025-03-24 PROCEDURE — 99204 OFFICE O/P NEW MOD 45 MIN: CPT | Performed by: PLASTIC SURGERY

## 2025-03-24 NOTE — H&P
Gretta Canales is a 84 year old female that presents with   Chief Complaint   Patient presents with    Pain     RMF   .    REFERRED BY:  No ref. provider found      Pacemaker: No  Latex Allergy: no  Coumadin: no  Plavix: No  Other anticoagulants: YES Eliquis BID last dose 3/24/25  Cardiac stents: YES    HAND DOMINANCE: Left  Profession: retired    RECONSTRUCTIVE HISTORY    SUN EXPOSURE  Current no  Past no  Sunburns no  Tanning salons current no  Tanning salons past no  Radiation treatments No     SKIN CANCER    Personal history of skin cancer: none    HAND    Previous hand injury (personal) YES      HPI:       84-year-old female left hand dominant with RMF pain which extends into the hand    2 months duration  The hand is weak and she drops things    She has pain in the PIP and DIP which radiates proximally          Review of Systems:   Constitutional: No change in appetite, chill/rigors, or fatigue  GI: No jaundice  Endocrine: No generalized weakness  Neurological: No aphasia, loss of consciousness, or seizures    Musculoskeletal:     PAIN:  ONSET    2 months    LOCATION:  right   middle finger    NIGHT SYMPTOMS:  yes awakened every night     FUNCTIONAL PROBLEMS: weakness and drops objects, unable to perform flexion    PREVIOUS TREATMENT Injection  From outside provider    Treatment helped?  No           PMH:     MEDICAL  Past Medical History:    Anxiety state, unspecified    Aortic atherosclerosis    CT scan 11-19    Arthritis of both knees    knee replacement     Arthritis of right hip    Back problem    Brain aneurysm (HCC)    Brain Aneurysm, Stent placed     Cervical disc disease    Cervical Discectomy     Cholecystitis    Cholecystectomy     COPD (chronic obstructive pulmonary disease) (HCC)    PFTs 2-15 with FEV1 1.11 L and FEV1/FVC ratio 63%    Deep vein thrombosis (HCC)    recurrent DVT- on lifelong AC    Dehydration    Fluids, Medication adjustment     Dementia (HCC)    Depression    Disorder of thyroid     Esophageal reflux    Fibromyalgia    Hammertoe    hammertoe 5 left, pain    Hearing impairment    Bilateral hearing aids    High blood pressure    Hip pain, left    Hypothyroidism    Incontinence    L3-4 stable slight grade 1 retrolisthesis    L4-5 mild-mod diffuse bulging disc    L4-5 slight grade 1 unstable spondylolisthesis    Leg wound, left    Low back pain    Migraines    Muscle weakness    Nausea vomiting and diarrhea    Onychomycosis    Debridement     Oropharyngeal dysphagia    Osteoarthrosis, unspecified whether generalized or localized, unspecified site    Other and unspecified hyperlipidemia    Other complicated headache syndrome    Other spondylosis, lumbar region    Pneumonia    S/P cervical spinal fusion: C3-6 and C7-T1    s/p L5-S1 right laminectomy    Sleep apnea    stent placement    cerebral    Thyroid disease    Toe pain    Onychomycosis, Debridement , Hammertoe     Tonsillitis    Tonsillitis     Unspecified sleep apnea    Visual impairment    EYE GLASSES    Wound of left leg    Left leg debridement and Split Thickness Skin Graft to left thigh        SURGICAL  Past Surgical History:   Procedure Laterality Date    Anesth,neck vessel surgery nos      x4    Brain surgery  ~2009    Brain Aneurysm, Stent placed     Carotid endarterectomy Right     Carpal tunnel release Right ~2008    Cholecystectomy  1984    Cholecystitis    Colonoscopy  2010    Debridement of nail(s), 1-5      Toe, Onychomycosis    Egd  05/2019    Gastric polyps only    Egd  2006    Eye surgery      x2 FOR \"CROSSED EYES\"    Hip replacement surgery Bilateral     Hysterectomy  1967    Partial Hysterectomy    Jaw surgery      Knee replacement surgery Bilateral     Bilateral arthritis     Laminectomy      WITH FUSION PER PATIENT    Other surgical history  1985,1995,2003,2009    Cervical Discectomy AND FUSION    Prep site t/a/l 1st 100 sq cm/1pct Left 08/21/2019    Left leg debridement, VAC placed    Split grft proc trunk <100sqcm Left  10/08/2019    Left leg debridement and Split Thickness Skin Graft to left thigh    Tonsillectomy  1950    Tonsillitis         ALLERIGIES  Allergies[1]     MEDICATIONS  Current Outpatient Medications   Medication Sig Dispense Refill    HYDROcodone-acetaminophen 5-325 MG Oral Tab ONE TABLET BY MOUTH EVERY 8 HOURS AS NEEDED FOR PAIN *DO NOT EXCEED 4GM/APAP IN 24 HOURS FROM ALL SOURCES* 30 tablet 0    nystatin-triamcinolone 100,000-0.1 Units/g-% External Ointment Apply twice daily  Monday-Friday to affected areas of rash. 60 g 3    Nystatin 894738 UNIT/GM External Powder Use once every morning to areas of rash 60 g 6    Omeprazole 40 MG Oral Capsule Delayed Release       clotrimazole-betamethasone 1-0.05 % External Cream Apply to affected area twice daily 45 g 0    ketoconazole 2 % External Cream       NYSTOP [id number] UNIT/GM External Powder       ondansetron (ZOFRAN) 4 mg tablet       lidocaine 5 % External Patch Place 1 patch onto the skin daily.      benzonatate 100 MG Oral Cap Take 1 capsule (100 mg total) by mouth 3 (three) times daily as needed for cough. 20 capsule 0    predniSONE 10 MG Oral Tab Take 4 pills x 1 day then take 3 pills x 1 day then take 2 pills x 1 day then take 1 pill x 1 day then stop 10 tablet 0    fluticasone furoate-vilanterol (BREO ELLIPTA) 100-25 MCG/ACT Inhalation Aerosol Powder, Breath Activated Inhale 1 puff into the lungs daily.      albuterol 108 (90 Base) MCG/ACT Inhalation Aero Soln Inhale 2 puffs into the lungs every 6 (six) hours as needed for Wheezing.      clotrimazole 1 % External Cream Apply 1 % topically 2 (two) times daily.      donepezil 5 MG Oral Tab Take 1 tablet (5 mg total) by mouth in the morning and 1 tablet (5 mg total) before bedtime.      estradiol (ESTRACE) 0.1 MG/GM Vaginal Cream Apply fingertip amount of cream to the vagina (0.5-1g) every night for 1 week and then every other night indefinitely 42.5 g 11    pantoprazole 40 MG Oral Tab EC Take 1 tablet (40 mg total)  by mouth 2 (two) times daily before meals.      ondansetron 4 MG Oral Tablet Dispersible Take 1 tablet (4 mg total) by mouth every 4 (four) hours as needed for Nausea. 15 tablet 0    cyclobenzaprine 5 MG Oral Tab Take 1 tablet (5 mg total) by mouth every 6 (six) hours as needed for Muscle spasms.      furosemide 40 MG Oral Tab Take 0.5 tablets (20 mg total) by mouth daily. 45 tablet 3    gabapentin 100 MG Oral Cap Take 2 capsules (200 mg total) by mouth 2 (two) times daily.      docusate sodium 100 MG Oral Cap Take 1 capsule (100 mg total) by mouth 2 (two) times daily as needed.      hydrALAZINE 25 MG Oral Tab Take 1 tablet (25 mg total) by mouth every 6 (six) hours as needed. SBP >150      apixaban 5 MG Oral Tab Take 1 tablet (5 mg total) by mouth 2 (two) times daily. 60 tablet 0    QUEtiapine Fumarate 100 MG Oral Tab Take 1 tablet (100 mg total) by mouth nightly.      Levothyroxine Sodium 100 MCG Oral Tab Take 1 tablet (100 mcg total) by mouth before breakfast. 90 tablet 3    Benztropine Mesylate 1 MG Oral Tab Take 1 tablet (1 mg total) by mouth 2 (two) times daily. 60 tablet 0    lamoTRIgine 100 MG Oral Tab Take 1 tablet (100 mg total) by mouth 2 (two) times daily. 60 tablet 0        SOCIAL HISTORY  Social History     Socioeconomic History    Marital status:    Tobacco Use    Smoking status: Former     Current packs/day: 0.00     Average packs/day: 2.0 packs/day for 25.0 years (50.0 ttl pk-yrs)     Types: Cigarettes     Start date: 1952     Quit date: 1977     Years since quittin.2    Smokeless tobacco: Never    Tobacco comments:     Very heavy smoker 2 1/2 daily   Vaping Use    Vaping status: Never Used   Substance and Sexual Activity    Alcohol use: No    Drug use: No   Other Topics Concern    Caffeine Concern No     Comment: crystal light    Exercise No     Comment: No PT due to Covid    History of tanning No    Pt has a pacemaker No    Pt has a defibrillator No    Reaction to local  anesthetic No    Left Handed Yes    Right Handed No    Currently spends a great deal of time in the sun No    Hx of Spending Great Deal of Time in Sun No    Bad sunburns in the past No    Tanning Salons in the Past No    Hx of Radiation Treatments No   Social History Narrative    The patient uses the following assistive device(s):  rolling walker.  scooter    The patient does not live in a home with stairs.    The patient lives in a prison home. [facility]     Social Drivers of Health     Food Insecurity: No Food Insecurity (12/11/2023)    Food Insecurity     Food Insecurity: Never true   Transportation Needs: No Transportation Needs (12/11/2023)    Transportation Needs     Lack of Transportation: No   Housing Stability: Low Risk  (12/11/2023)    Housing Stability     Housing Instability: No        FAMILY HISTORY  Family History   Problem Relation Age of Onset    Heart Attack Mother     Heart Disease Mother         Coronary Artery Disease, Premature     Fibromyalgia Sister     Heart Disease Sister     Heart Attack Sister     Heart Disease Brother     Heart Attack Brother     Other (Other[other]) Father     Other (Other[other]) Maternal Grandmother     Other (Other[other]) Maternal Grandfather     Other (Other[other]) Paternal Grandmother     Other (Other[other]) Paternal Grandfather     Cancer Brother 55        Liver     Neurological Disorder Sister         ALzheimer's Disease     Depression Sister     Migraines Sister     Stroke Sister         Cerebrovascular accident     Dementia Sister     Heart Disease Brother         Coronary Artery Disease           PHYSICAL EXAM:     CONSTITUTIONAL: Overall appearance - Normal  HEENT: Normocephalic  EYES: Conjunctiva - Right: Normal, Left: Normal; EOMI  EARS: Inspection - Right: Normal, Left: Normal  NECK/THYROID: Inspection - Normal, Palpation - Normal, Thyroid gland - Normal, No adenopathy  RESPIRATORY: Inspection - Normal, Effort - Normal  CARDIOVASCULAR: Regular  rhythm, No murmurs  ABDOMEN: Inspection - Normal, No abdominal tenderness  NEURO: Memory intact  PSYCH: Oriented to person, place, time, and situation, Appropriate mood and affect      Hand Physical Exam:       Right hand near full range of motion but some limitation of flexion  Pain with motion    R MF PIP edematous with periarticular tenderness  No lag  FDS, FDP, central slip, terminal slip intact  PIP/DIP   RCL/UCL intact    X-ray independently interpreted: Severe PIP and DIP arthritis with near destruction of the PIP    IMPRESSION:      Arthritis RMF PIP and diffuse hand    We discussed what arthritis is, including treatment options.  Arthritis is not curable.  Treatment is designed to try to improve symptoms and function, but this is not always possible.  Arthritis may require multiple treatments over a long period of time, and symptoms may not go away completely. Surgery is sometimes necessary.  Questions were answered and the patient wishes to proceed with treatment.    OT for range of motion and strengthening  Paraffin  Home paraffin unit    If symptoms are not relieved, I would recommend the patient see physiatry.             ASSESSMENT/PLAN:     Encounter Diagnosis   Name Primary?    Pain Yes         3/24/2025  Marques Kruger MD      +++++++++++++++++++++++++++++++++++++++++      MEDICAL DECISION MAKING    PROBLEMS      MODERATE    (number / complexity)          Undiagnosed new illness with uncertain prognosis    DATA         MODERATE    (amount / complexity)          X-rays independently reviewed    MANAGEMENT RISK  LOW    (complications/ morbidity)       Splint/OT                  MDM LEVEL    MODERATE                [1]   Allergies  Allergen Reactions    Ciprofloxacin RASH    Fluocinolone OTHER (SEE COMMENTS)     Unknown    Metronidazole RASH    Phentermine OTHER (SEE COMMENTS)     Mini stroke    Sulfamethoxazole W/Trimethoprim RASH    Tiagabine OTHER (SEE COMMENTS)     Stroke like  symptoms    Unable to move    Tramadol OTHER (SEE COMMENTS)     Extreme sleepiness    Valproic Acid OTHER (SEE COMMENTS)     pancreatitis    Hydromorphone OTHER (SEE COMMENTS) and RASH     Tolerates hydrocodone

## 2025-03-25 ENCOUNTER — TELEPHONE (OUTPATIENT)
Dept: PHYSICAL MEDICINE AND REHAB | Facility: CLINIC | Age: 85
End: 2025-03-25

## 2025-03-25 NOTE — TELEPHONE ENCOUNTER
Pt calling again - informed her that we have not called her and it was most likely PAT. Attempted to transfer pt to PAT, however no answer. Informed patient that someone will reach back out to her. Left vm for PAT to return call to patient.

## 2025-03-25 NOTE — TELEPHONE ENCOUNTER
Per  patient was returning our call. Our office did not contact patient, however she might have received a call from Endo on her procedure.

## 2025-04-01 NOTE — TELEPHONE ENCOUNTER
Occupational Therapy called this patient today, secondary to her missing her scheduled appointment: A message was left to call and re-schedule her OT appointment when able.    Derik Casillas  OTR/L

## 2025-04-02 ENCOUNTER — TELEPHONE (OUTPATIENT)
Dept: SURGERY | Facility: CLINIC | Age: 85
End: 2025-04-02

## 2025-04-02 NOTE — TELEPHONE ENCOUNTER
Pt called wanting to reschedule her missed appt on 4/1 with OT(giovanny). Informed pt she may get charged a $50 no show fee for that appt she missed and pt stated she can't pay that she will go without having OT and hung up.

## 2025-04-09 NOTE — LETTER
9/25/2023      Malinda Claude A. Bunnie Pac, MD  Physical Medicine and Rehabilitation  [facility], [address] [place] [address]  Dept: [phone]  Dept Fax: [phone]        RE: Consultation for Amrik Lawrence        Dear Crystal Kee MD,    Thank you very much for the opportunity to see your patient. Attached please find a summary from your patient's recent visit. I appreciate the chance to take care of your patient with you. Please feel free to call me with any questions or concerns. Sincerely,        Selena Dubon MD  Electronically Signed on 9/25/2023
9/25/2023      Sara Knowles MD  Physical Medicine and Rehabilitation  [address] [facility], [address] [place] [address]  Dept: [phone]  Dept Fax: [phone]        RE: Consultation for Taran Hussein        Dear Saida Macias MD,    Thank you very much for the opportunity to see your patient. Attached please find a summary from your patient's recent visit. I appreciate the chance to take care of your patient with you. Please feel free to call me with any questions or concerns. Sincerely,        Adele Hughes.  Scotty Knowles MD  Electronically Signed on 9/25/2023
soft

## 2025-04-18 NOTE — OPERATIVE REPORT
[facility]    PATIENT'S NAME: ZEENAT NEWTON   ATTENDING PHYSICIAN: Anastasiia Sherwood MD   OPERATING PHYSICIAN: Robi Shah MD   PATIENT ACCOUNT#:   [id number]    LOCATION:  [address] ROOM 4 [facility] 10  MEDICAL RECORD #:   [id number] Initial (On Arrival)

## 2025-05-12 ENCOUNTER — TELEPHONE (OUTPATIENT)
Dept: PHYSICAL MEDICINE AND REHAB | Facility: CLINIC | Age: 85
End: 2025-05-12

## 2025-05-12 NOTE — TELEPHONE ENCOUNTER
Received request for Outpatient Speech Therapy, Placed referral in 's folder for review (by nurse station)

## 2025-06-05 RX ORDER — GABAPENTIN 100 MG/1
200 CAPSULE ORAL 2 TIMES DAILY
Qty: 60 CAPSULE | Refills: 0 | Status: SHIPPED | OUTPATIENT
Start: 2025-06-05

## 2025-06-05 NOTE — TELEPHONE ENCOUNTER
Incoming call from patient who states that Dr. Baumann told her to increase her Gabapentin to 200MG instead of 100MG. Pt stating that [place] did not receive these orders so she has continued to receive 100MG.     RN could not find any record of Dr. Baumann mentioning increasing Gabapentin.     Pended order to Dr. Baumann for his review/signature if appropriate.

## 2025-06-06 ENCOUNTER — TELEPHONE (OUTPATIENT)
Dept: PHYSICAL MEDICINE AND REHAB | Facility: CLINIC | Age: 85
End: 2025-06-06

## 2025-06-06 NOTE — TELEPHONE ENCOUNTER
Incoming call from patient who states that Dr. Kruger is kicking her case back to Dr. Baumann. \"I know when I'm being pushed off and it was quick.\"     In Dr. Slick ROY (03/24/2025), it was noted if pt's symptoms are not relieved, he recommends pt see Physiatry. Dr. Kruger ordered OT which was scheduled for the patient on 04/01/2025, patient no showed that appt stating she wasn't feeling well. Patient also mentions how her assisted living facility does her therapy for her. RN attempted to clarify to confirm if they are currently doing OT (as opposed to PT/ST etc.), pt unable to provide clarification.     Pt reports finger pain is severe and significant. She reports weakness and is still dropping items.         Plan per Dr. Slick ROY (03/24/2025) note: \"We discussed what arthritis is, including treatment options.  Arthritis is not curable.  Treatment is designed to try to improve symptoms and function, but this is not always possible.  Arthritis may require multiple treatments over a long period of time, and symptoms may not go away completely. Surgery is sometimes necessary.  Questions were answered and the patient wishes to proceed with treatment.     OT for range of motion and strengthening  Paraffin  Home paraffin unit     If symptoms are not relieved, I would recommend the patient see physiatry.\"          Offered pt a follow up appointment with Dr. Baumann to discuss her finger pain and plan in more depth. Pt declined stating she wants to make sure she has a ride for her procedure on 06/13/2025 before making another appointment.     Patient will discuss/clarify the plan of care with the head RN at [facility] and callback Monday to discuss further.

## 2025-06-10 ENCOUNTER — TELEPHONE (OUTPATIENT)
Dept: PHYSICAL MEDICINE AND REHAB | Facility: CLINIC | Age: 85
End: 2025-06-10

## 2025-06-10 ENCOUNTER — MED REC SCAN ONLY (OUTPATIENT)
Dept: PHYSICAL MEDICINE AND REHAB | Facility: CLINIC | Age: 85
End: 2025-06-10

## 2025-06-10 NOTE — TELEPHONE ENCOUNTER
Pharmacy needs clarification on (gabapentin 100 MG Oral Cap)   Please call this is the 2nd request.

## 2025-06-10 NOTE — TELEPHONE ENCOUNTER
RN called and verified with pharmacy that patient should be increasing gabapentin to 200mg BID and approved discontinuing order for gabapentin 200mg nightly. Nothing further needed at this time.

## 2025-06-11 DIAGNOSIS — M54.16 LUMBAR RADICULOPATHY: ICD-10-CM

## 2025-06-11 NOTE — TELEPHONE ENCOUNTER
Refill Request    Medication request: HYDROcodone-acetaminophen 5-325 MG Oral Tab  ONE TABLET BY MOUTH EVERY 8 HOURS AS NEEDED FOR PAIN *DO NOT EXCEED 4GM/APAP IN 24 HOURS FROM ALL SOURCES*     LOV:1/16/2025 Alex Baumann MD   Due back to clinic per last office note:  The patient will follow up in 3 months, but the patient will call me 2 weeks after having the injection to let me know how the injection worked.   NOV: 6/13/2025 Alex Baumann MD      [place]: 3/4/25 #30 (10 days)    Urine drug screen (if applicable): 3/25/2021  Pain contract: none    LOV plan (if weaning or changing medications): The patient will continue with their current pain medications.

## 2025-06-12 RX ORDER — BISACODYL 10 MG
10 SUPPOSITORY, RECTAL RECTAL DAILY
COMMUNITY

## 2025-06-12 RX ORDER — FUROSEMIDE 20 MG/1
20 TABLET ORAL DAILY
COMMUNITY
Start: 2025-05-17

## 2025-06-12 RX ORDER — QUETIAPINE FUMARATE 200 MG/1
200 TABLET, FILM COATED ORAL NIGHTLY
COMMUNITY
Start: 2025-05-17

## 2025-06-12 RX ORDER — LORAZEPAM 0.5 MG/1
0.5 TABLET ORAL EVERY 4 HOURS PRN
COMMUNITY

## 2025-06-12 RX ORDER — FOLIC ACID 1 MG/1
1 TABLET ORAL WEEKLY
COMMUNITY

## 2025-06-12 RX ORDER — UREA 8.5 G/85G
1 CREAM TOPICAL AS NEEDED
COMMUNITY

## 2025-06-12 RX ORDER — POLYETHYLENE GLYCOL 3350 17 G/17G
17 POWDER, FOR SOLUTION ORAL DAILY
COMMUNITY

## 2025-06-12 NOTE — PAT NURSING NOTE
6/12 at 1720 called pt's cell phone for pre-op interview. No answer. Left message in VM. No Arrival Time given.  6/12 at 1721 MCM sent. No Arrival Time given.  6/12 at 1720 called pt's son cell phone for pre-op interview. No answer. Left message in VM. No Arrival Time given  Medical Reconciliation List and Instructions for the day of surgery faxed to Joy CHO at [facility] in [address] [place] [id number]. Successful confirmation received at 1812.  Fax: [phone]  Ph: [phone]/ [phone]  LM on RN's VM. PAT requested confirmation of fax received with Medical Reconciliation List and Instructions for the day of surgery.

## 2025-06-12 NOTE — DISCHARGE INSTRUCTIONS
Epidural Steroid Injection    Procedure Explanation:  Today, you had an epidural steroid injection. An epidural steroid injection is performed by placing a small amount of steroid medicine (usually Depo Medrol) through a needle into the space outside your spinal cord. The placement of steroid medicine reduces the swelling, irritation, and pain in the inflamed nerves.    Post-Procedure Instructions:  Following your epidural steroid injection please follow these instructions.  1. Limit activities (nothing strenuous) the day of the procedure. You may resume normal activities the following day.  2. Keep the band-aid clean and dry for 24 hours. Do not submerge the procedure site in water for the remainder of the day (no swimming, bathing, or whirlpools). You may take a shower. You may remove the band-aid after 24 hours.  3. For pain at the injection site, you may take acetaminophen for mild pain or tenderness or your prescribed pain medication.  4. You may also apply a cold pack or ice at the injection site for 15-20 minutes two to three times a day over the next 3 days. Do not apply ice directly to the skin.      Expected Recovery:   Soreness, stiffness, mild bruising and muscle cramping at the injection site are not uncommon following this procedure. These effects are transient and will resolve over time.   It may be several days before you see improvement. For some patients the pain may get worse the day following the procedure before it gets better.   If you get pain relief, it may last from a few days to several months following the injection.      Please call Dr. Baumann's office if anything of the following are noted:    ? Signs of infection at the injection site: Redness, drainage or swelling, pain at the injection site lasting longer than 3 days, and fever/chills.  ? Severe Headache that is worse when standing upright  ? Increased numbness and tingling in your arms or legs  ? Any new bowel or bladder  incontinence     HOME INSTRUCTIONS  [facility] HOME CARE INSTRUCTIONS: POST-OP ANESTHESIA  The medication that you received for sedation or general anesthesia can last up to 24 hours. Your judgment and reflexes may be altered, even if you feel like your normal self.      We Recommend:   Do not drive any motor vehicle or bicycle   Avoid mowing the lawn, playing sports, or working with power tools/applicances (power saws, electric knives or mixers)   That you have someone stay with you on your first night home   Do not drink alcohol or take sleeping pills or tranquilizers   Do not sign legal documents within 24 hours of your procedure   If you had a nerve block for your surgery, take extra care not to put any pressure on your arm or hand for 24 hours    It is normal:  For you to have a sore throat if you had a breathing tube during surgery (while you were asleep!). The sore throat should get better within 48 hours. You can gargle with warm salt water (1/2 tsp in 4 oz warm water) or use a throat lozenge for comfort  To feel muscle aches or soreness especially in the abdomen, chest or neck. The achy feeling should go away in the next 24 hours  To feel weak, sleepy or \"wiped out\". Your should start feeling better in the next 24 hours.   To experience mild discomforts such as sore lip or tongue, headache, cramps, gas pains or a bloated feeling in your abdomen.   To experience mild back pain or soreness for a day or two if you had spinal or epidural anesthesia.   If you had laparoscopic surgery, to feel shoulder pain or discomfort on the day of surgery.   For some patients to have nausea after surgery/anesthesia    If you feel nausea or experience vomiting:   Try to move around less.   Eat less than usual or drink only liquids until the next morning   Nausea should resolve in about 24 hours    If you have a problem when you are at home:    Call your surgeons office   Discharge Instructions: After Your Surgery  You’ve just had  surgery. During surgery, you were given medicine called anesthesia to keep you relaxed and free of pain. After surgery, you may have some pain or nausea. This is common. Here are some tips for feeling better and getting well after surgery.   Going home  Your healthcare provider will show you how to take care of yourself when you go home. They'll also answer your questions. Have an adult family member or friend drive you home. For the first 24 hours after your surgery:   Don't drive or use heavy equipment.  Don't make important decisions or sign legal papers.  Take medicines as directed.  Don't drink alcohol.  Have someone stay with you, if needed. They can watch for problems and help keep you safe.  Be sure to go to all follow-up visits with your healthcare provider. And rest after your surgery for as long as your provider tells you to.   Coping with pain  If you have pain after surgery, pain medicine will help you feel better. Take it as directed, before pain becomes severe. Also, ask your healthcare provider or pharmacist about other ways to control pain. This might be with heat, ice, or relaxation. And follow any other instructions your surgeon or nurse gives you.      Stay on schedule with your medicine.     Tips for taking pain medicine  To get the best relief possible, remember these points:   Pain medicines can upset your stomach. Taking them with a little food may help.  Most pain relievers taken by mouth need at least 20 to 30 minutes to start to work.  Don't wait till your pain becomes severe before you take your medicine. Try to time your medicine so that you can take it before starting an activity. This might be before you get dressed, go for a walk, or sit down for dinner.  Constipation is a common side effect of some pain medicines. Call your healthcare provider before taking any medicines, such as laxatives or stool softeners, to help ease constipation. Also ask if you should skip any foods. Drinking  lots of fluids and eating foods, such as fruits and vegetables, that are high in fiber can also help. Remember, don't take laxatives unless your surgeon has prescribed them.  Drinking alcohol and taking pain medicine can cause dizziness and slow your breathing. It can even be deadly. Don't drink alcohol while taking pain medicine.  Pain medicine can make you react more slowly to things. Don't drive or run machinery while taking pain medicine.  Your healthcare provider may tell you to take acetaminophen to help ease your pain. Ask them how much you're supposed to take each day. Acetaminophen or other pain relievers may interact with your prescription medicines or other over-the-counter (OTC) medicines. Some prescription medicines have acetaminophen and other ingredients in them. Using both prescription and OTC acetaminophen for pain can cause you to accidentally overdose. Read the labels on your OTC medicines with care. This will help you to clearly know the list of ingredients, how much to take, and any warnings. It may also help you not take too much acetaminophen. If you have questions or don't understand the information, ask your pharmacist or healthcare provider to explain it to you before you take the OTC medicine.   Managing nausea  Some people have an upset stomach (nausea) after surgery. This is often because of anesthesia, pain, or pain medicine, less movement of food in the stomach, or the stress of surgery. These tips will help you handle nausea and eat healthy foods as you get better. If you were on a special food plan before surgery, ask your healthcare provider if you should follow it while you get better. Check with your provider on how your eating should progress. It may depend on the surgery you had. These general tips may help:   Don't push yourself to eat. Your body will tell you when to eat and how much.  Start off with clear liquids and soup. They're easier to digest.  Next try semi-solid foods  as you feel ready. These include mashed potatoes, applesauce, and gelatin.  Slowly move to solid foods. Don’t eat fatty, rich, or spicy foods at first.  Don't force yourself to have 3 large meals a day. Instead eat smaller amounts more often.  Take pain medicines with a small amount of solid food, such as crackers or toast. This helps prevent nausea.  When to call your healthcare provider  Call your healthcare provider right away if you have any of these:   You still have too much pain, or the pain gets worse, after taking the medicine. The medicine may not be strong enough. Or there may be a complication from the surgery.  You feel too sleepy, dizzy, or groggy. The medicine may be too strong.  Side effects, such as nausea or vomiting. Your healthcare provider may advise taking other medicines to treat these or may change your treatment plan..  Skin changes, such as rash, itching, or hives. This may mean you have an allergic reaction. Your provider may advise taking other medicines.  The incision looks different (for instance, part of it opens up).  Bleeding or fluid leaking from the incision site, and you weren't told to expect that.  Fever of 100.4°F (38°C) or higher, or as directed by your healthcare provider.  Call 911  Call 911 right away if you have:   Trouble breathing  Facial swelling    If you have obstructive sleep apnea   You were given anesthesia medicine during surgery to keep you comfortable and free of pain. After surgery, you may have more apnea spells because of this medicine and other medicines you were given. The spells may last longer than normal.    At home:  Keep using the continuous positive airway pressure (CPAP) device when you sleep. Unless your healthcare provider tells you not to, use it when you sleep, day or night. CPAP is a common device used to treat obstructive sleep apnea.  Talk with your provider before taking any pain medicine, muscle relaxants, or sedatives. Your provider will  tell you about the possible dangers of taking these medicines.  Contact your provider if your sleeping changes a lot even when taking medicines as directed.  Lucille last reviewed this educational content on 4/1/2024  This information is for informational purposes only. This is not intended to be a substitute for professional medical advice, diagnosis, or treatment. Always seek the advice and follow the directions from your physician or other qualified health care provider.  © 7512-4561 [employer]. All rights reserved. This information is not intended as a substitute for professional medical care. Always follow your healthcare professional's instructions.

## 2025-06-13 ENCOUNTER — HOSPITAL ENCOUNTER (OUTPATIENT)
Facility: HOSPITAL | Age: 85
Setting detail: HOSPITAL OUTPATIENT SURGERY
Discharge: HOME | End: 2025-06-13
Attending: PHYSICAL MEDICINE & REHABILITATION | Admitting: PHYSICAL MEDICINE & REHABILITATION
Payer: MEDICARE

## 2025-06-13 ENCOUNTER — APPOINTMENT (OUTPATIENT)
Dept: GENERAL RADIOLOGY | Facility: HOSPITAL | Age: 85
End: 2025-06-13
Attending: PHYSICAL MEDICINE & REHABILITATION
Payer: MEDICARE

## 2025-06-13 ENCOUNTER — ANESTHESIA (OUTPATIENT)
Dept: SURGERY | Facility: HOSPITAL | Age: 85
End: 2025-06-13
Payer: MEDICARE

## 2025-06-13 ENCOUNTER — ANESTHESIA EVENT (OUTPATIENT)
Dept: SURGERY | Facility: HOSPITAL | Age: 85
End: 2025-06-13
Payer: MEDICARE

## 2025-06-13 VITALS
WEIGHT: 180 LBS | OXYGEN SATURATION: 96 % | TEMPERATURE: 98 F | SYSTOLIC BLOOD PRESSURE: 163 MMHG | BODY MASS INDEX: 27.28 KG/M2 | RESPIRATION RATE: 20 BRPM | HEART RATE: 68 BPM | DIASTOLIC BLOOD PRESSURE: 63 MMHG | HEIGHT: 68 IN

## 2025-06-13 PROCEDURE — 64483 NJX AA&/STRD TFRM EPI L/S 1: CPT | Performed by: PHYSICAL MEDICINE & REHABILITATION

## 2025-06-13 PROCEDURE — 64484 NJX AA&/STRD TFRM EPI L/S EA: CPT | Performed by: PHYSICAL MEDICINE & REHABILITATION

## 2025-06-13 RX ORDER — ACETAMINOPHEN 500 MG
1000 TABLET ORAL ONCE AS NEEDED
Status: DISCONTINUED | OUTPATIENT
Start: 2025-06-13 | End: 2025-06-13

## 2025-06-13 RX ORDER — ONDANSETRON 2 MG/ML
4 INJECTION INTRAMUSCULAR; INTRAVENOUS EVERY 6 HOURS PRN
Status: DISCONTINUED | OUTPATIENT
Start: 2025-06-13 | End: 2025-06-13

## 2025-06-13 RX ORDER — SODIUM CHLORIDE 9 MG/ML
INJECTION, SOLUTION INTRAVENOUS CONTINUOUS PRN
Status: DISCONTINUED | OUTPATIENT
Start: 2025-06-13 | End: 2025-06-13

## 2025-06-13 RX ORDER — TRIAMCINOLONE ACETONIDE 40 MG/ML
INJECTION, SUSPENSION INTRA-ARTICULAR; INTRAMUSCULAR AS NEEDED
Status: DISCONTINUED | OUTPATIENT
Start: 2025-06-13 | End: 2025-06-13 | Stop reason: HOSPADM

## 2025-06-13 RX ORDER — SODIUM CHLORIDE, SODIUM LACTATE, POTASSIUM CHLORIDE, CALCIUM CHLORIDE 600; 310; 30; 20 MG/100ML; MG/100ML; MG/100ML; MG/100ML
INJECTION, SOLUTION INTRAVENOUS CONTINUOUS
Status: DISCONTINUED | OUTPATIENT
Start: 2025-06-13 | End: 2025-06-13

## 2025-06-13 RX ORDER — MORPHINE SULFATE 10 MG/ML
6 INJECTION, SOLUTION INTRAMUSCULAR; INTRAVENOUS EVERY 10 MIN PRN
Status: DISCONTINUED | OUTPATIENT
Start: 2025-06-13 | End: 2025-06-13

## 2025-06-13 RX ORDER — IOPAMIDOL 408 MG/ML
INJECTION, SOLUTION INTRATHECAL AS NEEDED
Status: DISCONTINUED | OUTPATIENT
Start: 2025-06-13 | End: 2025-06-13 | Stop reason: HOSPADM

## 2025-06-13 RX ORDER — LIDOCAINE HYDROCHLORIDE 10 MG/ML
INJECTION, SOLUTION EPIDURAL; INFILTRATION; INTRACAUDAL; PERINEURAL AS NEEDED
Status: DISCONTINUED | OUTPATIENT
Start: 2025-06-13 | End: 2025-06-13 | Stop reason: HOSPADM

## 2025-06-13 RX ORDER — NALOXONE HYDROCHLORIDE 0.4 MG/ML
80 INJECTION, SOLUTION INTRAMUSCULAR; INTRAVENOUS; SUBCUTANEOUS AS NEEDED
Status: DISCONTINUED | OUTPATIENT
Start: 2025-06-13 | End: 2025-06-13

## 2025-06-13 RX ORDER — MORPHINE SULFATE 4 MG/ML
2 INJECTION, SOLUTION INTRAMUSCULAR; INTRAVENOUS EVERY 10 MIN PRN
Status: DISCONTINUED | OUTPATIENT
Start: 2025-06-13 | End: 2025-06-13

## 2025-06-13 RX ORDER — MORPHINE SULFATE 4 MG/ML
4 INJECTION, SOLUTION INTRAMUSCULAR; INTRAVENOUS EVERY 10 MIN PRN
Status: DISCONTINUED | OUTPATIENT
Start: 2025-06-13 | End: 2025-06-13

## 2025-06-13 RX ORDER — HYDROMORPHONE HYDROCHLORIDE 1 MG/ML
0.4 INJECTION, SOLUTION INTRAMUSCULAR; INTRAVENOUS; SUBCUTANEOUS EVERY 5 MIN PRN
Status: DISCONTINUED | OUTPATIENT
Start: 2025-06-13 | End: 2025-06-13

## 2025-06-13 RX ORDER — METOCLOPRAMIDE HYDROCHLORIDE 5 MG/ML
10 INJECTION INTRAMUSCULAR; INTRAVENOUS EVERY 8 HOURS PRN
Status: DISCONTINUED | OUTPATIENT
Start: 2025-06-13 | End: 2025-06-13

## 2025-06-13 RX ORDER — HYDROMORPHONE HYDROCHLORIDE 1 MG/ML
0.6 INJECTION, SOLUTION INTRAMUSCULAR; INTRAVENOUS; SUBCUTANEOUS EVERY 5 MIN PRN
Status: DISCONTINUED | OUTPATIENT
Start: 2025-06-13 | End: 2025-06-13

## 2025-06-13 RX ORDER — HYDROMORPHONE HYDROCHLORIDE 1 MG/ML
0.2 INJECTION, SOLUTION INTRAMUSCULAR; INTRAVENOUS; SUBCUTANEOUS EVERY 5 MIN PRN
Status: DISCONTINUED | OUTPATIENT
Start: 2025-06-13 | End: 2025-06-13

## 2025-06-13 RX ORDER — ACETAMINOPHEN 500 MG
1000 TABLET ORAL ONCE
Status: COMPLETED | OUTPATIENT
Start: 2025-06-13 | End: 2025-06-13

## 2025-06-13 RX ADMIN — SODIUM CHLORIDE, SODIUM LACTATE, POTASSIUM CHLORIDE, CALCIUM CHLORIDE: 600; 310; 30; 20 INJECTION, SOLUTION INTRAVENOUS at 16:01:00

## 2025-06-13 NOTE — H&P
[facility]  part of [facility]    History & Physical    Gretta Canales Patient Status:  Hospital Outpatient Surgery    12/3/1940 MRN [id number]   Location [facility] PRE OP RECOVERY Attending Alex Baumann MD   Hosp Day # 0 PCP Timothy Hicks MD     Date of Admission:  2025    History of Present Illness:  Gretta Canales is a(n) 84 year old female. She has left low back and leg pain.    History:  Past Medical History[1]  Past Surgical History[2]  Family History[3]   reports that she quit smoking about 48 years ago. Her smoking use included cigarettes. She started smoking about 73 years ago. She has a 50 pack-year smoking history. She has never used smokeless tobacco. She reports that she does not drink alcohol and does not use drugs.    Allergies:  Allergies[4]    Home Medications:  Prescriptions Prior to Admission[5]    Physical Exam:   General: Alert, orientated x3.  Cooperative.  No apparent distress.  Vital Signs:  Blood pressure 151/61, pulse 68, temperature 97.8 °F (36.6 °C), temperature source Oral, resp. rate 18, height 68\", weight 180 lb (81.6 kg), SpO2 93%, not currently breastfeeding.  HEENT: Exam is unremarkable.  Pupils are equal and round.    Lungs: no labored breathing.  Cardiac: Regular rate and rhythm.  Abdomen:  Soft, non-distended  Extremities:  No lower extremity edema noted.    Skin: Normal texture and turgor.  Neurologic: left L4 and L5 radiculopathies    Impression and Plan:  Problem List[6]    I will do left L4 and L5 TFESI's.    Alex Baumann MD  2025  2:40 PM         [1]   Past Medical History:   Anxiety state, unspecified    Aortic atherosclerosis    CT scan 11-19    Arthritis of both knees    knee replacement     Arthritis of right hip    Back problem    Brain aneurysm (HCC)    Brain Aneurysm, Stent placed     Cervical disc disease    Cervical Discectomy     Cholecystitis    Cholecystectomy     COPD (chronic obstructive pulmonary  disease) (HCC)    PFTs 2-15 with FEV1 1.11 L and FEV1/FVC ratio 63%    Deep vein thrombosis (HCC)    recurrent DVT- on lifelong AC    Dehydration    Fluids, Medication adjustment     Dementia (HCC)    Depression    Disorder of thyroid    Esophageal reflux    Fibromyalgia    Hammertoe    hammertoe 5 left, pain    Hearing impairment    Bilateral hearing aids    High blood pressure    Hip pain, left    Hypothyroidism    Incontinence    L3-4 stable slight grade 1 retrolisthesis    L4-5 mild-mod diffuse bulging disc    L4-5 slight grade 1 unstable spondylolisthesis    Leg wound, left    Low back pain    Migraines    Muscle weakness    Nausea vomiting and diarrhea    Onychomycosis    Debridement     Oropharyngeal dysphagia    Osteoarthrosis, unspecified whether generalized or localized, unspecified site    Other and unspecified hyperlipidemia    Other complicated headache syndrome    Other spondylosis, lumbar region    Pneumonia    S/P cervical spinal fusion: C3-6 and C7-T1    s/p L5-S1 right laminectomy    stent placement    cerebral    Thyroid disease    Toe pain    Onychomycosis, Debridement , Hammertoe     Tonsillitis    Tonsillitis     Visual impairment    EYE GLASSES    Wound of left leg    Left leg debridement and Split Thickness Skin Graft to left thigh   [2]   Past Surgical History:  Procedure Laterality Date    Anesth,neck vessel surgery nos      x4    Brain surgery  ~2009    Brain Aneurysm, Stent placed     Carotid endarterectomy Right     Carpal tunnel release Right ~2008    Cholecystectomy  1984    Cholecystitis    Colonoscopy  2010    Debridement of nail(s), 1-5      Toe, Onychomycosis    Egd  05/2019    Gastric polyps only    Egd  2006    Eye surgery      x2 FOR \"CROSSED EYES\"    Hip replacement surgery Bilateral     Hysterectomy  1967    Partial Hysterectomy    Jaw surgery      Knee replacement surgery Bilateral     Bilateral arthritis     Laminectomy      WITH FUSION PER PATIENT    Other surgical history   1985,1995,2003,2009    Cervical Discectomy AND FUSION    Prep site t/a/l 1st 100 sq cm/1pct Left 08/21/2019    Left leg debridement, VAC placed    Spine surgery procedure unlisted      Split grft proc trunk <100sqcm Left 10/08/2019    Left leg debridement and Split Thickness Skin Graft to left thigh    Tonsillectomy  1950    Tonsillitis     Total hip replacement      Total knee replacement     [3]   Family History  Problem Relation Age of Onset    Heart Attack Mother     Heart Disease Mother         Coronary Artery Disease, Premature     Fibromyalgia Sister     Heart Disease Sister     Heart Attack Sister     Heart Disease Brother     Heart Attack Brother     Other (Other[other]) Father     Other (Other[other]) Maternal Grandmother     Other (Other[other]) Maternal Grandfather     Other (Other[other]) Paternal Grandmother     Other (Other[other]) Paternal Grandfather     Cancer Brother 55        Liver     Neurological Disorder Sister         ALzheimer's Disease     Depression Sister     Migraines Sister     Stroke Sister         Cerebrovascular accident     Dementia Sister     Heart Disease Brother         Coronary Artery Disease    [4]   Allergies  Allergen Reactions    Phentermine OTHER (SEE COMMENTS)     \"Mini stroke\"    Tiagabine OTHER (SEE COMMENTS)     Stroke like symptoms    Unable to move    Valproic Acid OTHER (SEE COMMENTS)     pancreatitis    Ciprofloxacin RASH    Fluocinolone OTHER (SEE COMMENTS)     Reaction Unknown    Hydromorphone OTHER (SEE COMMENTS) and RASH     Tolerates hydrocodone    Metronidazole RASH    Sulfamethoxazole W/Trimethoprim RASH    Tramadol OTHER (SEE COMMENTS)     Extreme sleepiness   [5]   Medications Prior to Admission   Medication Sig Dispense Refill Last Dose/Taking    Cholecalciferol (VITAMIN D3) 1.25 MG (19590 UT) Oral Cap Take 1 capsule by mouth once a week.   Taking    diclofenac 1 % External Gel Apply 2 g topically in the morning and 2 g before bedtime.   Taking     bisacodyl 10 MG Rectal Suppos Place 1 suppository (10 mg total) rectally daily.   Taking    benzocaine-menthol Mouth/Throat Lozenge Take 1 lozenge by mouth 4 (four) times daily.   Taking    LORazepam 0.5 MG Oral Tab Take 1 tablet (0.5 mg total) by mouth every 4 (four) hours as needed for Anxiety.   6/12/2025 Bedtime    Benzocaine-Menthol-Zinc Cl 20-0.26-0.15 % Mouth/Throat Gel Place 1 Application inside cheek 4 (four) times daily.   Taking    Polyethylene Glycol 3350 17 g Oral Powd Pack Take 17 g by mouth daily.   Taking    urea 10 % External Cream Apply 1 Application topically as needed.   Taking As Needed    White Petrolatum (VASELINE EX) Apply 1 Application topically daily as needed.   Taking As Needed    gabapentin 100 MG Oral Cap Take 2 capsules (200 mg total) by mouth 2 (two) times daily. 60 capsule 0 6/12/2025 Bedtime    HYDROcodone-acetaminophen 5-325 MG Oral Tab ONE TABLET BY MOUTH EVERY 8 HOURS AS NEEDED FOR PAIN *DO NOT EXCEED 4GM/APAP IN 24 HOURS FROM ALL SOURCES* 30 tablet 0 Past Month    nystatin-triamcinolone 100,000-0.1 Units/g-% External Ointment Apply twice daily  Monday-Friday to affected areas of rash. (Patient taking differently: Apply topically As Directed. Apply twice daily  Monday-Friday to affected areas of rash.) 60 g 3 Taking Differently    clotrimazole-betamethasone 1-0.05 % External Cream Apply to affected area twice daily (Patient taking differently: Apply topically 2 (two) times daily. Apply to affected area twice daily) 45 g 0 Taking Differently    ketoconazole 2 % External Cream Apply topically 2 (two) times daily.   Taking    estradiol (ESTRACE) 0.1 MG/GM Vaginal Cream Apply fingertip amount of cream to the vagina (0.5-1g) every night for 1 week and then every other night indefinitely (Patient taking differently: Place 1 g vaginally every other day. Apply fingertip amount of cream to the vagina (0.5-1g) every night for 1 week and then every other night indefinitely) 42.5 g 11 Taking  Differently    pantoprazole 40 MG Oral Tab EC Take 20 mg by mouth every morning before breakfast.   Taking    docusate sodium 100 MG Oral Cap Take 1 capsule (100 mg total) by mouth as needed in the morning and 1 capsule (100 mg total) as needed in the evening for constipation.   Taking As Needed    hydrALAZINE 25 MG Oral Tab Take 1 tablet (25 mg total) by mouth in the morning and 1 tablet (25 mg total) before bedtime. SBP >150.   6/12/2025 Evening    apixaban 5 MG Oral Tab Take 1 tablet (5 mg total) by mouth 2 (two) times daily. (Patient taking differently: Take 1 tablet (5 mg total) by mouth in the morning and 1 tablet (5 mg total) before bedtime.) 60 tablet 0 6/10/2025    Levothyroxine Sodium 100 MCG Oral Tab Take 1 tablet (100 mcg total) by mouth before breakfast. 90 tablet 3 6/13/2025 Morning    furosemide 20 MG Oral Tab Take 1 tablet (20 mg total) by mouth daily.   Unknown    QUEtiapine 200 MG Oral Tab Take 1 tablet (200 mg total) by mouth nightly.   Unknown    benzonatate 100 MG Oral Cap Take 1 capsule (100 mg total) by mouth 3 (three) times daily as needed for cough. 20 capsule 0 Unknown    donepezil 5 MG Oral Tab Take 1 tablet (5 mg total) by mouth nightly.   Unknown    ondansetron 4 MG Oral Tablet Dispersible Take 1 tablet (4 mg total) by mouth every 4 (four) hours as needed for Nausea. 15 tablet 0 Unknown    cyclobenzaprine 5 MG Oral Tab Take 1 tablet (5 mg total) by mouth every 6 (six) hours as needed for Muscle spasms.   Unknown    Benztropine Mesylate 1 MG Oral Tab Take 1 tablet (1 mg total) by mouth 2 (two) times daily. 60 tablet 0 Unknown    lamoTRIgine 100 MG Oral Tab Take 1 tablet (100 mg total) by mouth in the morning and 1 tablet (100 mg total) before bedtime. 60 tablet 0 Unknown   [6]   Patient Active Problem List  Diagnosis    Bilateral low back pain    left L4 and left > right L5-S1 radiculopathy    s/p L5-S1 right laminectomy    L4-5 slight grade 1 unstable spondylolisthesis    L3-4 stable  slight grade 1 retrolisthesis    L5-S1 mild-mod central HNP    Bilateral groin pain    SHANT (obstructive sleep apnea)    Neck pain on right side    Cellulitis of left lower extremity    Recurrent deep vein thrombosis (DVT) ([facility])    Anxiety    Urinary incontinence    Neurogenic bladder    Chronic diarrhea    Oropharyngeal dysphagia    L5-S1 bilateral severe foraminal & moderate central, L4-5 severe central, L2-3 mod central & left mod-severe foraminal, L1-2 mod central & right mod-severe , T12-L1 mod central stenosis    Other spondylosis, lumbar region    Other complicated headache syndrome    Cervical post-laminectomy syndrome: C3-7    S/P cervical spinal fusion: C3-6 and C7-T1    COPD (chronic obstructive pulmonary disease) ([facility])    Unable to ambulate    Lumbar facet arthropathy: L5-S1 bilateral large    Neurologic gait disorder    Lung granuloma ([facility])    Arthropathy of cervical facet joint: C1-2 bilateral mod, C2-3 right severe    Headache, acute    Hypertensive emergency    Cellulitis of left lower leg    Wound infection    Unspecified open wound, left lower leg, initial encounter    Wound healing, delayed    Essential hypertension    Intractable nausea and vomiting    Nausea vomiting and diarrhea    Bronchitis    Hypoxia    Acute febrile illness    Aortic atherosclerosis    Influenza    Acute respiratory failure with hypoxia ([facility])    Severe depressed bipolar I disorder without psychotic features ([facility])    Hypothyroidism, unspecified type    Acute pain of right shoulder    Impingement syndrome of right shoulder region    Arthritis of shoulder region, right    Dysfunction of right rotator cuff    left cervical radiculitis    Primary osteoarthritis of right shoulder: moderate    Cervical disc disease: C7-T1 moderate    Major depressive disorder    Migraines    Brain aneurysm ([facility])    Cholecystitis    Hearing impairment    Morbid (severe) obesity due to excess calories ([facility])    On continuous oral anticoagulation     UTI (urinary tract infection)    Bilateral lower extremity edema    Altered mental status, unspecified altered mental status type    AMS (altered mental status)    Bipolar affective disorder, currently depressed, moderate (HCC)    Delirium due to another medical condition    Pneumonia due to COVID-19 virus    Acute cystitis without hematuria    Cellulitis of left lower extremity without foot    Antipsychotic-induced akathisia    Bilateral lower leg cellulitis    Fatigue    Fatigue, unspecified type    INDERJIT (acute kidney injury)    Right sided weakness    Acute right-sided low back pain with right-sided sciatica    At risk for stroke    Acute pain of left shoulder    Dysfunction of left rotator cuff    Primary osteoarthritis of left shoulder: moderate glenohumeral joint and moderate-severe AC joint    Aspiration pneumonia of right middle lobe (HCC)    Aspiration pneumonia of right middle lobe, unspecified aspiration pneumonia type (HCC)    Pain of right middle finger    COPD exacerbation (HCC)    L5-S1 bilateral mod-large foraminal, L4-5 mod diffuse, L3-4 mild diffuse, L2-3 mod diffuse, L1-2 right > left mod diffuse, T12-L1 left mild-mod, T11-12 mild-mod diffuse bulging discs    Acute pain of right knee and s/p TKA

## 2025-06-13 NOTE — OPERATIVE REPORT
[facility]    2-LEVEL LUMBAR TRANSFORAMINAL   NAME:  Gretta Canales    MR #:    [id number] :  12/3/1940     PHYSICIAN:  Alex Baumann MD        Operative Report    DATE OF PROCEDURE: 2025   PREOPERATIVE DIAGNOSES: Problem   left L4 and left > right L5-S1 radiculopathy   L5-S1 bilateral mod-large foraminal, L4-5 mod diffuse, L3-4 mild diffuse, L2-3 mod diffuse, L1-2 right > left mod diffuse, T12-L1 left mild-mod, T11-12 mild-mod diffuse bulging discs   L5-S1 bilateral severe foraminal & moderate central, L4-5 severe central, L2-3 mod central & left mod-severe foraminal, L1-2 mod central & right mod-severe , T12-L1 mod central stenosis   L5-S1 mild-mod central HNP   s/p L5-S1 right laminectomy      POSTOPERATIVE DIAGNOSES:   same   PROCEDURES: Left L4 and L5 transforaminal epidural steroid injections done under fluoroscopic guidance with contrast enhancement.   SURGEON: Alex Baumann MD   ANESTHESIA: MAC   INDICATIONS:      OPERATIVE PROCEDURE:  Written consent was obtained from the patient.  The patient was brought into the operating room and placed in the prone position on the fluoroscopy table with pillow underneath the abdomen.  The patient's skin was cleaned and draped in a normal sterile fashion.  Using AP fluoroscopy, all five lumbar vertebrae were identified.  When the fourth and fifth vertebrae were identified, fluoroscopy was right anterior obliqued opening up the left L4-5 and left L5-S1 intervertebral foramen.  At this point in time, each site was anesthetized with 1% PF lidocaine without epinephrine.  Then, 5 inch, 22-gauge spinal needles were inserted and directed towards the left L4-5 and left L5-S1 intervertebral foramen.  When they felt to be in good position, AP fluoroscopy was used to advance the needles to the 6 o'clock position on the left L4 and left L5 pedicles.  At this point in time, Omnipaque-240 contrast was used to obtain a good epidurogram indicating  correct needle placement at each level.  Then, aspiration was performed.  No blood, fluid, or air was aspirated.  Then, the patient was injected with a 3 cc solution of 1.5 cc of 40 mg/cc of Kenalog and 1.5 cc of 1% PF lidocaine without epinephrine at each site.  After this, the needles were removed.  Each site was cleaned.  Band-Aids were applied.  The patient was transferred to the cart and into [facility].  The patient was given discharge instructions and will follow up in the clinic as scheduled.  Throughout the whole procedure, the patient's pulse oximetry and vital signs were monitored and they remained completely stable.  Also, throughout the whole procedure, prior to injection of any medication, aspiration was performed.  No blood, fluid, or air was aspirated at anytime.

## 2025-06-13 NOTE — ANESTHESIA PREPROCEDURE EVALUATION
Anesthesia PreOp Note    HPI:     Gretta Canales is a 84 year old female who presents for preoperative consultation requested by: Alex Baumann MD    Date of Surgery: 6/13/2025    Procedure(s):  Left L4 and L5 Transforaminal Epidural Steroid Injection  Indication: Lumbar radiculopathy [M54.16]    Relevant Problems   No relevant active problems       NPO:  Last Liquid Consumption Date: 06/12/25  Last Liquid Consumption Time: 2100  Last Solid Consumption Date: 06/12/25  Last Solid Consumption Time: 1400  Last Liquid Consumption Date: 06/12/25          History Review:  Patient Active Problem List    Diagnosis Date Noted    L5-S1 bilateral mod-large foraminal, L4-5 mod diffuse, L3-4 mild diffuse, L2-3 mod diffuse, L1-2 right > left mod diffuse, T12-L1 left mild-mod, T11-12 mild-mod diffuse bulging discs 10/16/2024    Acute pain of right knee and s/p TKA 10/16/2024    COPD exacerbation (HCC) 12/10/2023    Pain of right middle finger 06/19/2023    Aspiration pneumonia of right middle lobe, unspecified aspiration pneumonia type (HCC) 05/06/2022    Aspiration pneumonia of right middle lobe (HCC) 05/05/2022    Primary osteoarthritis of left shoulder: moderate glenohumeral joint and moderate-severe AC joint 02/08/2022    Acute pain of left shoulder 01/12/2022    Dysfunction of left rotator cuff 01/12/2022    Right sided weakness     Acute right-sided low back pain with right-sided sciatica     At risk for stroke     Fatigue 08/11/2021    Fatigue, unspecified type 08/11/2021    INDERJIT (acute kidney injury) 08/11/2021    Bilateral lower leg cellulitis 02/04/2021    Antipsychotic-induced akathisia 01/14/2021    Acute cystitis without hematuria 01/11/2021    Cellulitis of left lower extremity without foot 01/11/2021    Pneumonia due to COVID-19 virus 12/20/2020    Bipolar affective disorder, currently depressed, moderate (HCC) 11/26/2020    Delirium due to another medical condition     Altered mental status, unspecified altered  mental status type 11/25/2020    AMS (altered mental status) 11/25/2020    Bilateral lower extremity edema 11/16/2020    Major depressive disorder 10/31/2020    On continuous oral anticoagulation 10/31/2020    UTI (urinary tract infection) 10/31/2020    Migraines     Brain aneurysm ([facility])     Hearing impairment     Morbid (severe) obesity due to excess calories ([facility])     Dysfunction of right rotator cuff 08/06/2020    left cervical radiculitis 08/06/2020    Primary osteoarthritis of right shoulder: moderate 08/06/2020    Cervical disc disease: C7-T1 moderate 08/06/2020    Acute pain of right shoulder 07/30/2020    Impingement syndrome of right shoulder region 07/30/2020    Arthritis of shoulder region, right 07/30/2020    Hypothyroidism, unspecified type 05/26/2020    Severe depressed bipolar I disorder without psychotic features ([facility]) 02/11/2020    Influenza 02/09/2020    Acute respiratory failure with hypoxia ([facility]) 02/09/2020    Aortic atherosclerosis     Bronchitis 12/11/2019    Hypoxia 12/11/2019    Acute febrile illness 12/11/2019    Intractable nausea and vomiting 11/20/2019    Nausea vomiting and diarrhea 11/20/2019    Essential hypertension 11/19/2019    Wound healing, delayed 10/08/2019    Unspecified open wound, left lower leg, initial encounter     Cellulitis of left lower leg 08/14/2019    Wound infection 08/14/2019    Headache, acute 07/29/2019    Hypertensive emergency 07/29/2019    Arthropathy of cervical facet joint: C1-2 bilateral mod, C2-3 right severe 07/22/2019    Lung granuloma ([facility]) 04/09/2019    Lumbar facet arthropathy: L5-S1 bilateral large 02/08/2019    Neurologic gait disorder 02/08/2019    Unable to ambulate 02/06/2019    COPD (chronic obstructive pulmonary disease) ([facility]) 11/07/2018    L5-S1 bilateral severe foraminal & moderate central, L4-5 severe central, L2-3 mod central & left mod-severe foraminal, L1-2 mod central & right mod-severe , T12-L1 mod central stenosis 11/02/2017    Other  spondylosis, lumbar region 11/02/2017    Other complicated headache syndrome 11/02/2017    Cervical post-laminectomy syndrome: C3-7 11/02/2017    S/P cervical spinal fusion: C3-6 and C7-T1 11/02/2017    Chronic diarrhea 08/29/2017    Oropharyngeal dysphagia 08/29/2017    Urinary incontinence 03/07/2017    Neurogenic bladder 03/07/2017    Anxiety 01/10/2017    Recurrent deep vein thrombosis (DVT) (HCC) 01/05/2017    Cellulitis of left lower extremity 01/04/2017    Neck pain on right side 05/05/2016    SHANT (obstructive sleep apnea) 03/21/2016    Bilateral groin pain 06/22/2015    L4-5 slight grade 1 unstable spondylolisthesis 10/31/2014    L3-4 stable slight grade 1 retrolisthesis 10/31/2014    L5-S1 mild-mod central HNP 10/31/2014    Bilateral low back pain 08/28/2014    left L4 and left > right L5-S1 radiculopathy 08/28/2014    s/p L5-S1 right laminectomy 08/28/2014    Cholecystitis 1984       Past Medical History[1]    Past Surgical History[2]    Prescriptions Prior to Admission[3]  Current Medications and Prescriptions Ordered in Epic[4]    Allergies[5]    Family History[6]  Social Hx on file[7]    Available pre-op labs reviewed.             Vital Signs:  Body mass index is 27.37 kg/m².   height is 1.727 m (5' 8\") and weight is 81.6 kg (180 lb). Her oral temperature is 97.8 °F (36.6 °C). Her blood pressure is 151/61 and her pulse is 68. Her respiration is 18 and oxygen saturation is 93%.   Vitals:    06/13/25 1054 06/13/25 1415   BP:  151/61   Pulse:  68   Resp:  18   Temp:  97.8 °F (36.6 °C)   TempSrc:  Oral   SpO2:  93%   Weight: 81.6 kg (180 lb) 81.6 kg (180 lb)   Height: 1.727 m (5' 8\")         Anesthesia Evaluation     Patient summary reviewed and Nursing notes reviewed    Airway   Mallampati: II  TM distance: >3 FB  Neck ROM: limited  Dental - Dentition appears grossly intact     Pulmonary    (+) COPD, sleep apnea, decreased breath sounds  Cardiovascular - normal exam  (+) hypertension    Neuro/Psych    (+)   neuromuscular disease, anxiety/panic attacks,  bipolar disorder, depression      GI/Hepatic/Renal    (+) GERD    Endo/Other - negative ROS   Abdominal  - normal exam                 Anesthesia Plan:   ASA:  4  Plan:   MAC  Post-op Pain Management: IV analgesics  Informed Consent Plan and Risks Discussed With:  Patient      I have informed Gretta Canales and/or legal guardian or family member of the nature of the anesthetic plan, benefits, risks including possible dental damage if relevant, major complications, and any alternative forms of anesthetic management.   All of the patient's questions were answered to the best of my ability. The patient desires the anesthetic management as planned.  PARDEEP RIZVI MD  6/13/2025 3:20 PM  Present on Admission:   left L4 and left > right L5-S1 radiculopathy   s/p L5-S1 right laminectomy   L5-S1 mild-mod central HNP   L5-S1 bilateral severe foraminal & moderate central, L4-5 severe central, L2-3 mod central & left mod-severe foraminal, L1-2 mod central & right mod-severe , T12-L1 mod central stenosis   L5-S1 bilateral mod-large foraminal, L4-5 mod diffuse, L3-4 mild diffuse, L2-3 mod diffuse, L1-2 right > left mod diffuse, T12-L1 left mild-mod, T11-12 mild-mod diffuse bulging discs           [1]   Past Medical History:   Anxiety state, unspecified    Aortic atherosclerosis    CT scan 11-19    Arthritis of both knees    knee replacement     Arthritis of right hip    Back problem    Brain aneurysm (HCC)    Brain Aneurysm, Stent placed     Cervical disc disease    Cervical Discectomy     Cholecystitis    Cholecystectomy     COPD (chronic obstructive pulmonary disease) ([facility])    PFTs 2-15 with FEV1 1.11 L and FEV1/FVC ratio 63%    Deep vein thrombosis (HCC)    recurrent DVT- on lifelong AC    Dehydration    Fluids, Medication adjustment     Dementia ([facility])    Depression    Disorder of thyroid    Esophageal reflux    Fibromyalgia    Hammertoe    hammertoe 5 left, pain    Hearing  impairment    Bilateral hearing aids    High blood pressure    Hip pain, left    Hypothyroidism    Incontinence    L3-4 stable slight grade 1 retrolisthesis    L4-5 mild-mod diffuse bulging disc    L4-5 slight grade 1 unstable spondylolisthesis    Leg wound, left    Low back pain    Migraines    Muscle weakness    Nausea vomiting and diarrhea    Onychomycosis    Debridement     Oropharyngeal dysphagia    Osteoarthrosis, unspecified whether generalized or localized, unspecified site    Other and unspecified hyperlipidemia    Other complicated headache syndrome    Other spondylosis, lumbar region    Pneumonia    S/P cervical spinal fusion: C3-6 and C7-T1    s/p L5-S1 right laminectomy    stent placement    cerebral    Thyroid disease    Toe pain    Onychomycosis, Debridement , Hammertoe     Tonsillitis    Tonsillitis     Visual impairment    EYE GLASSES    Wound of left leg    Left leg debridement and Split Thickness Skin Graft to left thigh   [2]   Past Surgical History:  Procedure Laterality Date    Anesth,neck vessel surgery nos      x4    Brain surgery  ~2009    Brain Aneurysm, Stent placed     Carotid endarterectomy Right     Carpal tunnel release Right ~2008    Cholecystectomy  1984    Cholecystitis    Colonoscopy  2010    Debridement of nail(s), 1-5      Toe, Onychomycosis    Egd  05/2019    Gastric polyps only    Egd  2006    Eye surgery      x2 FOR \"CROSSED EYES\"    Hip replacement surgery Bilateral     Hysterectomy  1967    Partial Hysterectomy    Jaw surgery      Knee replacement surgery Bilateral     Bilateral arthritis     Laminectomy      WITH FUSION PER PATIENT    Other surgical history  1985,1995,2003,2009    Cervical Discectomy AND FUSION    Prep site t/a/l 1st 100 sq cm/1pct Left 08/21/2019    Left leg debridement, VAC placed    Spine surgery procedure unlisted      Split grft proc trunk <100sqcm Left 10/08/2019    Left leg debridement and Split Thickness Skin Graft to left thigh    Tonsillectomy   1950    Tonsillitis     Total hip replacement      Total knee replacement     [3]   Medications Prior to Admission   Medication Sig Dispense Refill Last Dose/Taking    Cholecalciferol (VITAMIN D3) 1.25 MG (04444 UT) Oral Cap Take 1 capsule by mouth once a week.   Taking    diclofenac 1 % External Gel Apply 2 g topically in the morning and 2 g before bedtime.   Taking    bisacodyl 10 MG Rectal Suppos Place 1 suppository (10 mg total) rectally daily.   Taking    benzocaine-menthol Mouth/Throat Lozenge Take 1 lozenge by mouth 4 (four) times daily.   Taking    LORazepam 0.5 MG Oral Tab Take 1 tablet (0.5 mg total) by mouth every 4 (four) hours as needed for Anxiety.   6/12/2025 Bedtime    Benzocaine-Menthol-Zinc Cl 20-0.26-0.15 % Mouth/Throat Gel Place 1 Application inside cheek 4 (four) times daily.   Taking    Polyethylene Glycol 3350 17 g Oral Powd Pack Take 17 g by mouth daily.   Taking    urea 10 % External Cream Apply 1 Application topically as needed.   Taking As Needed    White Petrolatum (VASELINE EX) Apply 1 Application topically daily as needed.   Taking As Needed    gabapentin 100 MG Oral Cap Take 2 capsules (200 mg total) by mouth 2 (two) times daily. 60 capsule 0 6/12/2025 Bedtime    HYDROcodone-acetaminophen 5-325 MG Oral Tab ONE TABLET BY MOUTH EVERY 8 HOURS AS NEEDED FOR PAIN *DO NOT EXCEED 4GM/APAP IN 24 HOURS FROM ALL SOURCES* 30 tablet 0 Past Month    nystatin-triamcinolone 100,000-0.1 Units/g-% External Ointment Apply twice daily  Monday-Friday to affected areas of rash. (Patient taking differently: Apply topically As Directed. Apply twice daily  Monday-Friday to affected areas of rash.) 60 g 3 Taking Differently    clotrimazole-betamethasone 1-0.05 % External Cream Apply to affected area twice daily (Patient taking differently: Apply topically 2 (two) times daily. Apply to affected area twice daily) 45 g 0 Taking Differently    ketoconazole 2 % External Cream Apply topically 2 (two) times daily.    Taking    estradiol (ESTRACE) 0.1 MG/GM Vaginal Cream Apply fingertip amount of cream to the vagina (0.5-1g) every night for 1 week and then every other night indefinitely (Patient taking differently: Place 1 g vaginally every other day. Apply fingertip amount of cream to the vagina (0.5-1g) every night for 1 week and then every other night indefinitely) 42.5 g 11 Taking Differently    pantoprazole 40 MG Oral Tab EC Take 20 mg by mouth every morning before breakfast.   Taking    docusate sodium 100 MG Oral Cap Take 1 capsule (100 mg total) by mouth as needed in the morning and 1 capsule (100 mg total) as needed in the evening for constipation.   Taking As Needed    hydrALAZINE 25 MG Oral Tab Take 1 tablet (25 mg total) by mouth in the morning and 1 tablet (25 mg total) before bedtime. SBP >150.   6/12/2025 Evening    apixaban 5 MG Oral Tab Take 1 tablet (5 mg total) by mouth 2 (two) times daily. (Patient taking differently: Take 1 tablet (5 mg total) by mouth in the morning and 1 tablet (5 mg total) before bedtime.) 60 tablet 0 6/10/2025    Levothyroxine Sodium 100 MCG Oral Tab Take 1 tablet (100 mcg total) by mouth before breakfast. 90 tablet 3 6/13/2025 Morning    furosemide 20 MG Oral Tab Take 1 tablet (20 mg total) by mouth daily.   Unknown    QUEtiapine 200 MG Oral Tab Take 1 tablet (200 mg total) by mouth nightly.   Unknown    benzonatate 100 MG Oral Cap Take 1 capsule (100 mg total) by mouth 3 (three) times daily as needed for cough. 20 capsule 0 Unknown    donepezil 5 MG Oral Tab Take 1 tablet (5 mg total) by mouth nightly.   Unknown    ondansetron 4 MG Oral Tablet Dispersible Take 1 tablet (4 mg total) by mouth every 4 (four) hours as needed for Nausea. 15 tablet 0 Unknown    cyclobenzaprine 5 MG Oral Tab Take 1 tablet (5 mg total) by mouth every 6 (six) hours as needed for Muscle spasms.   Unknown    Benztropine Mesylate 1 MG Oral Tab Take 1 tablet (1 mg total) by mouth 2 (two) times daily. 60 tablet 0  Unknown    lamoTRIgine 100 MG Oral Tab Take 1 tablet (100 mg total) by mouth in the morning and 1 tablet (100 mg total) before bedtime. 60 tablet 0 Unknown   [4]   Current Facility-Administered Medications Ordered in Epic   Medication Dose Route Frequency Provider Last Rate Last Admin    lactated ringers infusion   Intravenous Continuous Alex Baumann MD 20 mL/hr at 06/13/25 1457 New Bag at 06/13/25 1457     No current [facility]-ordered outpatient medications on file.   [5]   Allergies  Allergen Reactions    Phentermine OTHER (SEE COMMENTS)     \"Mini stroke\"    Tiagabine OTHER (SEE COMMENTS)     Stroke like symptoms    Unable to move    Valproic Acid OTHER (SEE COMMENTS)     pancreatitis    Ciprofloxacin RASH    Fluocinolone OTHER (SEE COMMENTS)     Reaction Unknown    Hydromorphone OTHER (SEE COMMENTS) and RASH     Tolerates hydrocodone    Metronidazole RASH    Sulfamethoxazole W/Trimethoprim RASH    Tramadol OTHER (SEE COMMENTS)     Extreme sleepiness   [6]   Family History  Problem Relation Age of Onset    Heart Attack Mother     Heart Disease Mother         Coronary Artery Disease, Premature     Fibromyalgia Sister     Heart Disease Sister     Heart Attack Sister     Heart Disease Brother     Heart Attack Brother     Other (Other[other]) Father     Other (Other[other]) Maternal Grandmother     Other (Other[other]) Maternal Grandfather     Other (Other[other]) Paternal Grandmother     Other (Other[other]) Paternal Grandfather     Cancer Brother 55        Liver     Neurological Disorder Sister         ALzheimer's Disease     Depression Sister     Migraines Sister     Stroke Sister         Cerebrovascular accident     Dementia Sister     Heart Disease Brother         Coronary Artery Disease    [7]   Social History  Socioeconomic History    Marital status:    Tobacco Use    Smoking status: Former     Current packs/day: 0.00     Average packs/day: 2.0 packs/day for 25.0 years (50.0 ttl pk-yrs)     Types:  Cigarettes     Start date: 1952     Quit date: 1977     Years since quittin.4    Smokeless tobacco: Never    Tobacco comments:     Very heavy smoker 2 1/2 daily   Vaping Use    Vaping status: Never Used   Substance and Sexual Activity    Alcohol use: No    Drug use: No   Other Topics Concern    Caffeine Concern No     Comment: crystal light    Exercise No     Comment: No PT due to Covid    History of tanning No    Pt has a pacemaker No    Pt has a defibrillator No    Reaction to local anesthetic No    Left Handed Yes    Right Handed No    Currently spends a great deal of time in the sun No    Hx of Spending Great Deal of Time in Sun No    Bad sunburns in the past No    Tanning Salons in the Past No    Hx of Radiation Treatments No

## 2025-06-14 RX ORDER — HYDROCODONE BITARTRATE AND ACETAMINOPHEN 5; 325 MG/1; MG/1
1 TABLET ORAL EVERY 8 HOURS PRN
Qty: 30 TABLET | Refills: 0 | Status: SHIPPED | OUTPATIENT
Start: 2025-06-14

## 2025-06-26 RX ORDER — GABAPENTIN 100 MG/1
CAPSULE ORAL
Qty: 38 CAPSULE | Refills: 11 | Status: SHIPPED | OUTPATIENT
Start: 2025-06-26

## 2025-06-26 NOTE — TELEPHONE ENCOUNTER
Refill Request    Medication request: gabapentin 100 MG Oral Cap.  Take 2 capsules (200 mg total) by mouth 2 (two) times daily.      LOV:6/13/2025 Alex Baumann MD   Due back to clinic per last office note:  The patient will follow up in 3 months, but the patient will call me 2 weeks after having the injection to let me know how the injection worked.   NOV: Visit date not found      [place]: 5/31/25 #60 (22 days )    Urine drug screen (if applicable): N/A  Pain contract: N/A    LOV plan (if weaning or changing medications): The patient will continue with their current pain medications.

## 2025-06-30 ENCOUNTER — TELEPHONE (OUTPATIENT)
Dept: PHYSICAL MEDICINE AND REHAB | Facility: CLINIC | Age: 85
End: 2025-06-30

## 2025-06-30 DIAGNOSIS — R29.6 FALLS FREQUENTLY: ICD-10-CM

## 2025-06-30 DIAGNOSIS — R26.2 UNABLE TO AMBULATE: Primary | ICD-10-CM

## 2025-06-30 DIAGNOSIS — M43.16 SPONDYLOLISTHESIS OF LUMBAR REGION: ICD-10-CM

## 2025-06-30 DIAGNOSIS — M47.896 OTHER SPONDYLOSIS, LUMBAR REGION: ICD-10-CM

## 2025-06-30 DIAGNOSIS — M51.9 LUMBAR DISC DISEASE: ICD-10-CM

## 2025-06-30 DIAGNOSIS — M47.816 LUMBAR FACET ARTHROPATHY: ICD-10-CM

## 2025-06-30 DIAGNOSIS — M51.361 DEGENERATION OF INTERVERTEBRAL DISC OF LUMBAR REGION WITH LOWER EXTREMITY PAIN: ICD-10-CM

## 2025-06-30 DIAGNOSIS — M54.16 LUMBAR RADICULOPATHY: ICD-10-CM

## 2025-06-30 DIAGNOSIS — M48.061 SPINAL STENOSIS OF LUMBAR REGION WITHOUT NEUROGENIC CLAUDICATION: ICD-10-CM

## 2025-06-30 DIAGNOSIS — M43.10 RETROLISTHESIS OF VERTEBRAE: ICD-10-CM

## 2025-06-30 DIAGNOSIS — M96.1 LUMBAR POST-LAMINECTOMY SYNDROME: ICD-10-CM

## 2025-06-30 DIAGNOSIS — M51.26 LUMBAR HERNIATED DISC: ICD-10-CM

## 2025-06-30 DIAGNOSIS — R53.81 PHYSICAL DECONDITIONING: ICD-10-CM

## 2025-06-30 DIAGNOSIS — M54.40 ACUTE LEFT-SIDED LOW BACK PAIN WITH SCIATICA, SCIATICA LATERALITY UNSPECIFIED: ICD-10-CM

## 2025-06-30 DIAGNOSIS — M25.561 ACUTE PAIN OF RIGHT KNEE: ICD-10-CM

## 2025-06-30 DIAGNOSIS — Z96.651 S/P TOTAL KNEE ARTHROPLASTY, RIGHT: ICD-10-CM

## 2025-06-30 DIAGNOSIS — R53.1 RIGHT SIDED WEAKNESS: ICD-10-CM

## 2025-06-30 DIAGNOSIS — R26.9 NEUROLOGIC GAIT DISORDER: ICD-10-CM

## 2025-06-30 NOTE — TELEPHONE ENCOUNTER
Incoming call from patient who states she is in need of a new order for a wheelchair. Patient informed this RN that no one wrote her an order for her wheelchair originally, she paid for it OOP. However, now her wheelchair is falling apart and she needs an order.       Order pended & routed to Dr. Baumann for his review/recommendations/signature if appropriate.

## 2025-07-09 NOTE — TELEPHONE ENCOUNTER
Spoke with patient and informed on the weelchair order. Mailed order to patient's home address.     Patient also stated that she received 75 % relief from Left L4 and L5 Transforaminal Epidural Steroid Injection done on 6/13/25. However the pain in her inner left thigh is constantly present, the same as before the injection.   Per patient, pain is worst when she is sitting and would like to have recommendations from Dr Baumann on this.     Patient stated that she has had the inner thigh pain on the left for about 2 years now and Dr Baumann is aware.

## 2025-07-14 ENCOUNTER — MED REC SCAN ONLY (OUTPATIENT)
Dept: PHYSICAL MEDICINE AND REHAB | Facility: CLINIC | Age: 85
End: 2025-07-14

## 2025-07-18 ENCOUNTER — APPOINTMENT (OUTPATIENT)
Dept: CT IMAGING | Facility: HOSPITAL | Age: 85
End: 2025-07-18
Attending: EMERGENCY MEDICINE

## 2025-07-18 ENCOUNTER — HOSPITAL ENCOUNTER (INPATIENT)
Facility: HOSPITAL | Age: 85
LOS: 6 days | Discharge: NURSING, REHAB OR LONG-TERM CARE | End: 2025-07-24
Attending: EMERGENCY MEDICINE | Admitting: HOSPITALIST

## 2025-07-18 DIAGNOSIS — K57.20 DIVERTICULITIS OF COLON WITH PERFORATION: ICD-10-CM

## 2025-07-18 DIAGNOSIS — K57.92 DIVERTICULITIS: Primary | ICD-10-CM

## 2025-07-18 DIAGNOSIS — F31.32 BIPOLAR AFFECTIVE DISORDER, CURRENTLY DEPRESSED, MODERATE (HCC): ICD-10-CM

## 2025-07-18 LAB
ALBUMIN SERPL-MCNC: 3.7 G/DL (ref 3.2–4.8)
ALP LIVER SERPL-CCNC: 84 U/L (ref 55–142)
ALT SERPL-CCNC: <7 U/L (ref 10–49)
ANION GAP SERPL CALC-SCNC: 5 MMOL/L (ref 0–18)
AST SERPL-CCNC: 12 U/L (ref ?–34)
BASOPHILS # BLD AUTO: 0.02 X10(3) UL (ref 0–0.2)
BASOPHILS NFR BLD AUTO: 0.2 %
BILIRUB DIRECT SERPL-MCNC: 0.2 MG/DL (ref ?–0.3)
BILIRUB SERPL-MCNC: 0.7 MG/DL (ref 0.2–1.1)
BUN BLD-MCNC: 19 MG/DL (ref 9–23)
BUN/CREAT SERPL: 21.1 (ref 10–20)
CALCIUM BLD-MCNC: 9.2 MG/DL (ref 8.7–10.4)
CHLORIDE SERPL-SCNC: 104 MMOL/L (ref 98–112)
CO2 SERPL-SCNC: 31 MMOL/L (ref 21–32)
CREAT BLD-MCNC: 0.9 MG/DL (ref 0.55–1.02)
DEPRECATED RDW RBC AUTO: 50.9 FL (ref 35.1–46.3)
EGFRCR SERPLBLD CKD-EPI 2021: 62 ML/MIN/1.73M2 (ref 60–?)
EOSINOPHIL # BLD AUTO: 0.12 X10(3) UL (ref 0–0.7)
EOSINOPHIL NFR BLD AUTO: 1.3 %
ERYTHROCYTE [DISTWIDTH] IN BLOOD BY AUTOMATED COUNT: 13.9 % (ref 11–15)
GLUCOSE BLD-MCNC: 92 MG/DL (ref 70–99)
HCT VFR BLD AUTO: 35 % (ref 35–48)
HGB BLD-MCNC: 11.3 G/DL (ref 12–16)
IMM GRANULOCYTES # BLD AUTO: 0.03 X10(3) UL (ref 0–1)
IMM GRANULOCYTES NFR BLD: 0.3 %
LIPASE SERPL-CCNC: 71 U/L (ref 12–53)
LYMPHOCYTES # BLD AUTO: 1.58 X10(3) UL (ref 1–4)
LYMPHOCYTES NFR BLD AUTO: 17.6 %
MCH RBC QN AUTO: 32.2 PG (ref 26–34)
MCHC RBC AUTO-ENTMCNC: 32.3 G/DL (ref 31–37)
MCV RBC AUTO: 99.7 FL (ref 80–100)
MONOCYTES # BLD AUTO: 0.8 X10(3) UL (ref 0.1–1)
MONOCYTES NFR BLD AUTO: 8.9 %
NEUTROPHILS # BLD AUTO: 6.41 X10 (3) UL (ref 1.5–7.7)
NEUTROPHILS # BLD AUTO: 6.41 X10(3) UL (ref 1.5–7.7)
NEUTROPHILS NFR BLD AUTO: 71.7 %
OSMOLALITY SERPL CALC.SUM OF ELEC: 292 MOSM/KG (ref 275–295)
PLATELET # BLD AUTO: 153 10(3)UL (ref 150–450)
POTASSIUM SERPL-SCNC: 4 MMOL/L (ref 3.5–5.1)
PROT SERPL-MCNC: 5.7 G/DL (ref 5.7–8.2)
RBC # BLD AUTO: 3.51 X10(6)UL (ref 3.8–5.3)
SODIUM SERPL-SCNC: 140 MMOL/L (ref 136–145)
WBC # BLD AUTO: 9 X10(3) UL (ref 4–11)

## 2025-07-18 PROCEDURE — 83690 ASSAY OF LIPASE: CPT | Performed by: EMERGENCY MEDICINE

## 2025-07-18 PROCEDURE — 96361 HYDRATE IV INFUSION ADD-ON: CPT

## 2025-07-18 PROCEDURE — 99285 EMERGENCY DEPT VISIT HI MDM: CPT

## 2025-07-18 PROCEDURE — 85025 COMPLETE CBC W/AUTO DIFF WBC: CPT | Performed by: EMERGENCY MEDICINE

## 2025-07-18 PROCEDURE — 96375 TX/PRO/DX INJ NEW DRUG ADDON: CPT

## 2025-07-18 PROCEDURE — 80048 BASIC METABOLIC PNL TOTAL CA: CPT | Performed by: EMERGENCY MEDICINE

## 2025-07-18 PROCEDURE — 74177 CT ABD & PELVIS W/CONTRAST: CPT | Performed by: RADIOLOGY

## 2025-07-18 PROCEDURE — 80076 HEPATIC FUNCTION PANEL: CPT | Performed by: EMERGENCY MEDICINE

## 2025-07-18 PROCEDURE — 96365 THER/PROPH/DIAG IV INF INIT: CPT

## 2025-07-18 RX ORDER — GABAPENTIN 100 MG/1
200 CAPSULE ORAL 2 TIMES DAILY
Status: DISCONTINUED | OUTPATIENT
Start: 2025-07-18 | End: 2025-07-24

## 2025-07-18 RX ORDER — ACETAZOLAMIDE 250 MG/1
250 TABLET ORAL DAILY
COMMUNITY
End: 2025-07-18

## 2025-07-18 RX ORDER — AMOXICILLIN 250 MG
2 CAPSULE ORAL DAILY
COMMUNITY

## 2025-07-18 RX ORDER — HEPARIN SODIUM 5000 [USP'U]/ML
5000 INJECTION, SOLUTION INTRAVENOUS; SUBCUTANEOUS EVERY 8 HOURS SCHEDULED
Status: DISCONTINUED | OUTPATIENT
Start: 2025-07-18 | End: 2025-07-18

## 2025-07-18 RX ORDER — ACETAMINOPHEN 500 MG
1000 TABLET ORAL EVERY 6 HOURS PRN
Status: DISCONTINUED | OUTPATIENT
Start: 2025-07-18 | End: 2025-07-19

## 2025-07-18 RX ORDER — ONDANSETRON 2 MG/ML
4 INJECTION INTRAMUSCULAR; INTRAVENOUS EVERY 6 HOURS PRN
Status: DISCONTINUED | OUTPATIENT
Start: 2025-07-18 | End: 2025-07-24

## 2025-07-18 RX ORDER — HYDROCODONE BITARTRATE AND ACETAMINOPHEN 5; 325 MG/1; MG/1
1 TABLET ORAL EVERY 6 HOURS PRN
Refills: 0 | Status: DISCONTINUED | OUTPATIENT
Start: 2025-07-18 | End: 2025-07-24

## 2025-07-18 RX ORDER — ACETAMINOPHEN 500 MG
1000 TABLET ORAL EVERY 8 HOURS PRN
COMMUNITY

## 2025-07-18 RX ORDER — CYCLOBENZAPRINE HCL 5 MG
5 TABLET ORAL EVERY 6 HOURS PRN
Status: DISCONTINUED | OUTPATIENT
Start: 2025-07-18 | End: 2025-07-24

## 2025-07-18 RX ORDER — HYDRALAZINE HYDROCHLORIDE 25 MG/1
50 TABLET, FILM COATED ORAL 2 TIMES DAILY
Status: DISCONTINUED | OUTPATIENT
Start: 2025-07-18 | End: 2025-07-24

## 2025-07-18 RX ORDER — LORAZEPAM 0.5 MG/1
0.5 TABLET ORAL 2 TIMES DAILY PRN
Status: DISCONTINUED | OUTPATIENT
Start: 2025-07-18 | End: 2025-07-24

## 2025-07-18 RX ORDER — LAMOTRIGINE 25 MG/1
100 TABLET ORAL 2 TIMES DAILY
Status: DISCONTINUED | OUTPATIENT
Start: 2025-07-18 | End: 2025-07-24

## 2025-07-18 RX ORDER — MORPHINE SULFATE 2 MG/ML
2 INJECTION, SOLUTION INTRAMUSCULAR; INTRAVENOUS
Refills: 0 | Status: DISCONTINUED | OUTPATIENT
Start: 2025-07-18 | End: 2025-07-19

## 2025-07-18 RX ORDER — DONEPEZIL HYDROCHLORIDE 10 MG/1
10 TABLET, FILM COATED ORAL EVERY MORNING
Status: DISCONTINUED | OUTPATIENT
Start: 2025-07-19 | End: 2025-07-24

## 2025-07-18 RX ORDER — LEVOTHYROXINE SODIUM 100 UG/1
100 TABLET ORAL
Status: DISCONTINUED | OUTPATIENT
Start: 2025-07-19 | End: 2025-07-24

## 2025-07-18 RX ORDER — QUETIAPINE FUMARATE 100 MG/1
200 TABLET, FILM COATED ORAL NIGHTLY
Status: DISCONTINUED | OUTPATIENT
Start: 2025-07-18 | End: 2025-07-24

## 2025-07-18 RX ORDER — MORPHINE SULFATE 2 MG/ML
2 INJECTION, SOLUTION INTRAMUSCULAR; INTRAVENOUS ONCE
Refills: 0 | Status: DISCONTINUED | OUTPATIENT
Start: 2025-07-18 | End: 2025-07-22

## 2025-07-18 RX ORDER — ERGOCALCIFEROL 1.25 MG/1
50000 CAPSULE ORAL WEEKLY
COMMUNITY

## 2025-07-18 RX ORDER — NYSTATIN 100000 [USP'U]/G
1 POWDER TOPICAL 2 TIMES DAILY
COMMUNITY

## 2025-07-18 RX ORDER — ACETAMINOPHEN 500 MG
1000 TABLET ORAL ONCE
Status: COMPLETED | OUTPATIENT
Start: 2025-07-18 | End: 2025-07-18

## 2025-07-18 RX ORDER — SODIUM CHLORIDE 9 MG/ML
INJECTION, SOLUTION INTRAVENOUS CONTINUOUS
Status: DISCONTINUED | OUTPATIENT
Start: 2025-07-18 | End: 2025-07-19

## 2025-07-18 RX ORDER — HYDROCODONE BITARTRATE AND ACETAMINOPHEN 5; 325 MG/1; MG/1
1 TABLET ORAL EVERY 6 HOURS PRN
Refills: 0 | Status: DISCONTINUED | OUTPATIENT
Start: 2025-07-18 | End: 2025-07-18

## 2025-07-18 RX ORDER — BENZONATATE 100 MG/1
200 CAPSULE ORAL 3 TIMES DAILY PRN
Status: DISCONTINUED | OUTPATIENT
Start: 2025-07-18 | End: 2025-07-24

## 2025-07-18 RX ORDER — MORPHINE SULFATE 2 MG/ML
2 INJECTION, SOLUTION INTRAMUSCULAR; INTRAVENOUS ONCE
Status: COMPLETED | OUTPATIENT
Start: 2025-07-18 | End: 2025-07-18

## 2025-07-18 NOTE — H&P
[facility] Hospitalist H&P       CC:   Chief Complaint   Patient presents with    Abdomen/Flank Pain        PCP: Timothy Hicks MD    History of Present Illness: Ms. Canales is a 84 year old female with PMH sig for cranial aneurysm s/p coiling, COPD, HTN, DVT on Eliquis, bipolar, hypothyroidism, aortic atherosclerosis, who presents with nausea, vomiting and abd pain, found to have acute diverticulitis with contained perforation.  Patient states 3 days ago she noted some abd pain with associated diarrhea, that resolved, she felt fine the next day, and felt well yesterday until about 9 pm when symptoms of nausea/vomiting/diarrhea and abd pain returned, but were persistent with persistant lower abd pain, no fevers, but some chills, an gen weakness, no CP or sob, no other complaints.          PMH  Past Medical History[1]     PSH  Past Surgical History[2]     ALL:  Allergies[3]     Home Medications:  Medications Taking[4]      Soc Hx  Social History     Tobacco Use    Smoking status: Former     Current packs/day: 0.00     Average packs/day: 2.0 packs/day for 25.0 years (50.0 ttl pk-yrs)     Types: Cigarettes     Start date: 1952     Quit date: 1977     Years since quittin.5    Smokeless tobacco: Never    Tobacco comments:     Very heavy smoker 2 1/2 daily   Substance Use Topics    Alcohol use: No        Fam Hx  Family History[5]    Review of Systems  Comprehensive ROS reviewed and negative except for what's stated above.     OBJECTIVE:  /49   Pulse 64   Temp 99.7 °F (37.6 °C) (Oral)   Resp 18   Wt 154 lb 5.2 oz (70 kg)   SpO2 90%   BMI 23.46 kg/m²   General:  Alert, no distress   Head:  Normocephalic, without obvious abnormality, atraumatic.   Eyes:  Sclera anicteric, No conjunctival pallor,     Nose: Nares normal. Septum midline.     Throat: Lips, mucosa, and tongue normal. Teeth and gums normal.   Neck: Supple,     Lungs:   Clear to auscultation bilaterally. Normal effort   Chest  wall:  No tenderness or deformity.   Heart:  Regular rate and rhythm,    Abdomen:   Soft, mild TTP no rebound or guarding, BS diminished, ND   Extremities: Extremities normal, atraumatic, no cyanosis or edema.   Skin: Skin color, texture, turgor normal. No rashes or lesions.    Neurologic: Normal strength, no focal deficit appreciated     Diagnostic Data:    CBC/Chem  Recent Labs   Lab 07/18/25  1117   WBC 9.0   HGB 11.3*   MCV 99.7   .0       Recent Labs   Lab 07/18/25  1118      K 4.0      CO2 31.0   BUN 19   CREATSERUM 0.90   GLU 92   CA 9.2       Recent Labs   Lab 07/18/25  1118   ALT <7*   AST 12   ALB 3.7       No results for input(s): \"TROP\" in the last 168 hours.       Radiology: CT ABDOMEN+PELVIS(CONTRAST ONLY)(CPT=74177)  Result Date: 7/18/2025  CONCLUSION: 1.  Intermediate length segment sigmoid colonic diverticulitis with suspected contained perforation and phlegmonous intramural fluid/gas which may represent developing intramural abscess formation. Following resolution of acute symptomatology, colonoscopy should be considered to exclude potential underlying malignancy. 2.  There is a small cystic lesion of the pancreatic body. Follow-up MRCP of the abdomen with and without contrast can be considered for further assessment if clinically warranted. 3.  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 4.  Bilateral hip arthroplasties with resultant artifactual degradation of the pelvis. 5.  Postoperative changes of hysterectomy. 6.  Status post cholecystectomy with extrahepatic biliary dilatation that may represent age ectasia superimposed on the postcholecystectomy state. 7.  Lesser incidental findings as above. Electronically Verified and Signed by Attending Radiologist: Tony Campos MD 7/18/2025 1:03 PM Workstation: [id number]        ASSESSMENT / PLAN:   Ms. Canales is a 84 year old female with PMH sig for cranial aneurysm s/p coiling, COPD, HTN, DVT on Eliquis, bipolar, hypothyroidism,  aortic atherosclerosis, who presents with nausea, vomiting and abd pain, found to have acute diverticulitis with contained perforation.      Acute diverticulitis w/ contained perforation  Nausea/vomiting/abd pain  - CT scan with above, no abscess  - no hs of diverticulitis  - NPO, IVF  - IV zosyn  - bowel rest  - Gen surg consulted, discussed with PA    Cystic lesion on pancreas   - needs outpatient fu with poss MRI    HTN  - resume home meds as able    Hypothyroidism   - resume home meds    Chronic back pain  - see physiatry as OP    Hs of bipolar DO  - resume home meds    Hs of DVT  - continue eliquis     Hs of cranial aneurysm   - s/p coiling     FN:  - IVF: 83  - Diet: NPO, sips with meds    DVT Prophy:scd, heparin  Lines: PIV    Dispo: pending clinical course    Outpatient records or previous hospital records reviewed.     Further recommendations pending patient's clinical course.  [facility] hospitalist to continue to follow patient while in house    Patient and/or patient's family given opportunity to ask questions and note understanding and agreeing with therapeutic plan as outlined    Thank You,  Sameer Dang MD    Hospitalist with [facility]  Answering Service number: [phone]         [1]   Past Medical History:   Anxiety state, unspecified    Aortic atherosclerosis    CT scan 11-19    Arthritis of both knees    knee replacement     Arthritis of right hip    Back problem    Brain aneurysm (HCC)    Brain Aneurysm, Stent placed     Cervical disc disease    Cervical Discectomy     Cholecystitis    Cholecystectomy     COPD (chronic obstructive pulmonary disease) ([facility])    PFTs 2-15 with FEV1 1.11 L and FEV1/FVC ratio 63%    Deep vein thrombosis (HCC)    recurrent DVT- on lifelong AC    Dehydration    Fluids, Medication adjustment     Dementia (HCC)    Depression    Disorder of thyroid    Esophageal reflux    Fibromyalgia    Hammertoe    hammertoe 5 left, pain    Hearing impairment    Bilateral hearing aids     High blood pressure    Hip pain, left    Hypothyroidism    Incontinence    L3-4 stable slight grade 1 retrolisthesis    L4-5 mild-mod diffuse bulging disc    L4-5 slight grade 1 unstable spondylolisthesis    Leg wound, left    Low back pain    Migraines    Muscle weakness    Nausea vomiting and diarrhea    Onychomycosis    Debridement     Oropharyngeal dysphagia    Osteoarthrosis, unspecified whether generalized or localized, unspecified site    Other and unspecified hyperlipidemia    Other complicated headache syndrome    Other spondylosis, lumbar region    Pneumonia    S/P cervical spinal fusion: C3-6 and C7-T1    s/p L5-S1 right laminectomy    stent placement    cerebral    Thyroid disease    Toe pain    Onychomycosis, Debridement , Hammertoe     Tonsillitis    Tonsillitis     Visual impairment    EYE GLASSES    Wound of left leg    Left leg debridement and Split Thickness Skin Graft to left thigh   [2]   Past Surgical History:  Procedure Laterality Date    Anesth,neck vessel surgery nos      x4    Brain surgery  ~2009    Brain Aneurysm, Stent placed     Carotid endarterectomy Right     Carpal tunnel release Right ~2008    Cholecystectomy  1984    Cholecystitis    Colonoscopy  2010    Debridement of nail(s), 1-5      Toe, Onychomycosis    Egd  05/2019    Gastric polyps only    Egd  2006    Eye surgery      x2 FOR \"CROSSED EYES\"    Hip replacement surgery Bilateral     Hysterectomy  1967    Partial Hysterectomy    Jaw surgery      Knee replacement surgery Bilateral     Bilateral arthritis     Laminectomy      WITH FUSION PER PATIENT    Other surgical history  1985,1995,2003,2009    Cervical Discectomy AND FUSION    Prep site t/a/l 1st 100 sq cm/1pct Left 08/21/2019    Left leg debridement, VAC placed    Spine surgery procedure unlisted      Split grft proc trunk <100sqcm Left 10/08/2019    Left leg debridement and Split Thickness Skin Graft to left thigh    Tonsillectomy  1950    Tonsillitis     Total hip  replacement      Total knee replacement     [3]   Allergies  Allergen Reactions    Phentermine OTHER (SEE COMMENTS)     \"Mini stroke\"    Tiagabine OTHER (SEE COMMENTS)     Stroke like symptoms    Unable to move    Valproic Acid OTHER (SEE COMMENTS)     pancreatitis    Ciprofloxacin RASH    Fluocinolone OTHER (SEE COMMENTS)     Reaction Unknown    Hydromorphone OTHER (SEE COMMENTS) and RASH     Tolerates hydrocodone    Metronidazole RASH    Sulfamethoxazole W/Trimethoprim RASH    Tramadol OTHER (SEE COMMENTS)     Extreme sleepiness   [4]   No outpatient medications have been marked as taking for the 7/18/25 encounter (Hospital Encounter).   [5]   Family History  Problem Relation Age of Onset    Heart Attack Mother     Heart Disease Mother         Coronary Artery Disease, Premature     Fibromyalgia Sister     Heart Disease Sister     Heart Attack Sister     Heart Disease Brother     Heart Attack Brother     Other (Other[other]) Father     Other (Other[other]) Maternal Grandmother     Other (Other[other]) Maternal Grandfather     Other (Other[other]) Paternal Grandmother     Other (Other[other]) Paternal Grandfather     Cancer Brother 55        Liver     Neurological Disorder Sister         ALzheimer's Disease     Depression Sister     Migraines Sister     Stroke Sister         Cerebrovascular accident     Dementia Sister     Heart Disease Brother         Coronary Artery Disease

## 2025-07-18 NOTE — CONSULTS
[facility]  part of [facility]    Report of Consultation    Gretta Canales Patient Status:  Inpatient    12/3/1940 MRN [id number]   Location [facility] 4W/SW/SE Attending Sameer Dang MD   Hosp Day # 0 PCP Timothy Hicks MD     Date of Admission:  2025  Date of Consult:  2025    I am seeing this patient on behalf of Dr. Narayan Talbert who will examine the patient and adjust the plan accordingly.     Reason for Consultation:  Sigmoid diverticulitis       History of Present Illness:  Gretta Canales is a 84 year old female who presents with a PMH of cranial aneurysm s/p coiling, COPD, HTN, history of Eliquis (last dose yesterday night), Bipolar, hypothyroidism, and aortic atherosclerosis who presents with abdominal pain since Tuesday. Patient reports Tuesday night following supper severe crampy abdominal pain followed by diarrhea. She believed she had some type of flu. The pain improved Wednesday but then worsened today which prompted her to report to the ER. Patient underwent evaluation and was found to have sigmoid diverticulitis with contained perforation. We are called for further evaluation.    Of note patient reports 30 lbs of weight loss over the last year, but attributes it to a decrease in appetite only having one formal meal per day. Patient does not ambulate.     Past abdominal surgical history: Open samuel, partial hysterectomy.  Last colonoscopy: \"Many years ago\"    History:  Past Medical History[1]  Past Surgical History[2]  Family History[3]   reports that she quit smoking about 48 years ago. Her smoking use included cigarettes. She started smoking about 73 years ago. She has a 50 pack-year smoking history. She has never used smokeless tobacco. She reports that she does not drink alcohol and does not use drugs.    Allergies:  Allergies[4]    Medications:  Current Hospital Medications[5]    Review of Systems:  Pertinent items are noted in HPI.    Physical  Exam:  Blood pressure 131/76, pulse 68, temperature 99.7 °F (37.6 °C), temperature source Oral, resp. rate 18, weight 154 lb 5.2 oz (70 kg), SpO2 90%, not currently breastfeeding.    General appearance:  alert, appears stated age, cooperative, no distress, and non-toxic  Abdominal: soft, tender to both LLQ and LUQ w localized guarding. Mature scar noted from open samuel, and open hysterectomy. No peritonitis. No organomegaly.   Extremities: SCD device in place and functioning  Cardiac exam: frequent ectopy, nl S1 and S2, IV/VI holosystolic murmur LLSB and RUSB  Results:  Lab Results   Component Value Date    WBC 9.0 07/18/2025    HGB 11.3 (L) 07/18/2025    HCT 35.0 07/18/2025    .0 07/18/2025    CREATSERUM 0.90 07/18/2025    BUN 19 07/18/2025     07/18/2025    K 4.0 07/18/2025     07/18/2025    CO2 31.0 07/18/2025    GLU 92 07/18/2025    CA 9.2 07/18/2025    ALB 3.7 07/18/2025    ALKPHO 84 07/18/2025    BILT 0.7 07/18/2025    TP 5.7 07/18/2025    AST 12 07/18/2025    ALT <7 (L) 07/18/2025    PTT 31.7 05/03/2020    INR 2.38 (H) 11/25/2020    T4F 2.4 (H) 11/09/2019    TSH 0.523 05/07/2023    LIP 71 (H) 07/18/2025    DDIMER 0.34 12/23/2020    ESRML 17 01/03/2023    CRP 1.90 (H) 01/03/2023    MG 2.0 12/14/2023    PHOS 2.2 (L) 11/12/2021    TROP <0.045 12/19/2020    CK 28 12/19/2020    ETOH <3 10/29/2020       CT ABDOMEN+PELVIS(CONTRAST ONLY)(CPT=74177)  Result Date: 7/18/2025  CONCLUSION: 1.  Intermediate length segment sigmoid colonic diverticulitis with suspected contained perforation and phlegmonous intramural fluid/gas which may represent developing intramural abscess formation. Following resolution of acute symptomatology, colonoscopy should be considered to exclude potential underlying malignancy. 2.  There is a small cystic lesion of the pancreatic body. Follow-up MRCP of the abdomen with and without contrast can be considered for further assessment if clinically warranted. 3.  Probable mild  pulmonary social fibrosis. 4.  Bilateral hip arthroplasties with resultant artifactual degradation of the pelvis. 5.  Postoperative changes of hysterectomy. 6.  Status post cholecystectomy with extrahepatic biliary dilatation that may represent age ectasia superimposed on the postcholecystectomy state. 7.  Lesser incidental findings as above. Electronically Verified and Signed by Attending Radiologist: Tony Campos MD 7/18/2025 1:03 PM Workstation: [employer]          Impression and Plan:  Problem List[6]    Patient is a 84 year old female with significant medical comorbidities. Patient presents with a 3 day history of abdominal pain, found to have sigmoid diverticulitis with contained perforation.  I discussed with her the etiology and pathophysiology of diverticulitis.  I discussed that CT results show contained perforation.  I discussed the need for interval scans to ensure resolution and improvement of infection and inflammation. Patient is adamant she does not want surgery.  At this time I recommend IV antibiotics, IV hydration, and bowel rest.  With planned interval CT scan in 2 to 3 days.    Plan  Diet: N.p.o., ice chips okay  IV hydration  IV antibiotics  Trend labs  Bowel rest  PT/OT  Pain control  No plans for surgical intervention    At all points during my encounter with the patient my collaborating physician Dr. Narayan Talbert  was available to answer questions and update the plan as necessary. The plan as stated previously is in agreement with the team.       Time spent in direct patient contact and decision making as well as counseling/coordination of care:    [id number]- 80 min    J Luis Arnold PA-C  General Surgery  [facility]  07/18/25  2:35 PM    The patient was seen and examined by myself.  The above note was modified accordingly    Narayan Talbert MD [facility]  07/19/25  11:04 AM           [1]   Past Medical History:   Anxiety state, unspecified    Aortic  atherosclerosis    CT scan 11-19    Arthritis of both knees    knee replacement     Arthritis of right hip    Back problem    Brain aneurysm (HCC)    Brain Aneurysm, Stent placed     Cervical disc disease    Cervical Discectomy     Cholecystitis    Cholecystectomy     COPD (chronic obstructive pulmonary disease) ([facility])    PFTs 2-15 with FEV1 1.11 L and FEV1/FVC ratio 63%    Deep vein thrombosis (HCC)    recurrent DVT- on lifelong AC    Dehydration    Fluids, Medication adjustment     Dementia (HCC)    Depression    Disorder of thyroid    Esophageal reflux    Fibromyalgia    Hammertoe    hammertoe 5 left, pain    Hearing impairment    Bilateral hearing aids    High blood pressure    Hip pain, left    Hypothyroidism    Incontinence    L3-4 stable slight grade 1 retrolisthesis    L4-5 mild-mod diffuse bulging disc    L4-5 slight grade 1 unstable spondylolisthesis    Leg wound, left    Low back pain    Migraines    Muscle weakness    Nausea vomiting and diarrhea    Onychomycosis    Debridement     Oropharyngeal dysphagia    Osteoarthrosis, unspecified whether generalized or localized, unspecified site    Other and unspecified hyperlipidemia    Other complicated headache syndrome    Other spondylosis, lumbar region    Pneumonia    S/P cervical spinal fusion: C3-6 and C7-T1    s/p L5-S1 right laminectomy    stent placement    cerebral    Thyroid disease    Toe pain    Onychomycosis, Debridement , Hammertoe     Tonsillitis    Tonsillitis     Visual impairment    EYE GLASSES    Wound of left leg    Left leg debridement and Split Thickness Skin Graft to left thigh   [2]   Past Surgical History:  Procedure Laterality Date    Anesth,neck vessel surgery nos      x4    Brain surgery  ~2009    Brain Aneurysm, Stent placed     Carotid endarterectomy Right     Carpal tunnel release Right ~2008    Cholecystectomy  1984    Cholecystitis    Colonoscopy  2010    Debridement of nail(s), 1-5      Toe, Onychomycosis    Egd  05/2019     Gastric polyps only    Egd  2006    Eye surgery      x2 FOR \"CROSSED EYES\"    Hip replacement surgery Bilateral     Hysterectomy  1967    Partial Hysterectomy    Jaw surgery      Knee replacement surgery Bilateral     Bilateral arthritis     Laminectomy      WITH FUSION PER PATIENT    Other surgical history  1985,1995,2003,2009    Cervical Discectomy AND FUSION    Prep site t/a/l 1st 100 sq cm/1pct Left 08/21/2019    Left leg debridement, VAC placed    Spine surgery procedure unlisted      Split grft proc trunk <100sqcm Left 10/08/2019    Left leg debridement and Split Thickness Skin Graft to left thigh    Tonsillectomy  1950    Tonsillitis     Total hip replacement      Total knee replacement     [3]   Family History  Problem Relation Age of Onset    Heart Attack Mother     Heart Disease Mother         Coronary Artery Disease, Premature     Fibromyalgia Sister     Heart Disease Sister     Heart Attack Sister     Heart Disease Brother     Heart Attack Brother     Other (Other[other]) Father     Other (Other[other]) Maternal Grandmother     Other (Other[other]) Maternal Grandfather     Other (Other[other]) Paternal Grandmother     Other (Other[other]) Paternal Grandfather     Cancer Brother 55        Liver     Neurological Disorder Sister         ALzheimer's Disease     Depression Sister     Migraines Sister     Stroke Sister         Cerebrovascular accident     Dementia Sister     Heart Disease Brother         Coronary Artery Disease    [4]   Allergies  Allergen Reactions    Phentermine OTHER (SEE COMMENTS)     \"Mini stroke\"    Tiagabine OTHER (SEE COMMENTS)     Stroke like symptoms    Unable to move    Valproic Acid OTHER (SEE COMMENTS)     pancreatitis    Ciprofloxacin RASH    Fluocinolone OTHER (SEE COMMENTS)     Reaction Unknown    Hydromorphone OTHER (SEE COMMENTS) and RASH     Tolerates hydrocodone    Metronidazole RASH    Sulfamethoxazole W/Trimethoprim RASH    Tramadol OTHER (SEE COMMENTS)     Extreme  sleepiness   [5]   Current Facility-Administered Medications:     sodium chloride 0.9% infusion, , Intravenous, Continuous    heparin (Porcine) 5000 UNIT/ML injection 5,000 Units, 5,000 Units, Subcutaneous, Q8H KRISTINA    acetaminophen (Tylenol Extra Strength) tab 1,000 mg, 1,000 mg, Oral, Q6H PRN    melatonin tab 3 mg, 3 mg, Oral, Nightly PRN    ondansetron (Zofran) 4 MG/2ML injection 4 mg, 4 mg, Intravenous, Q6H PRN    benzonatate (Tessalon) cap 200 mg, 200 mg, Oral, TID PRN    piperacillin-tazobactam (Zosyn) 3.375g in D5W 100mL IVPB-GERARD, 3.375 g, Intravenous, Q8H    morphINE PF 2 MG/ML injection 2 mg, 2 mg, Intravenous, Q3H PRN  [6]   Patient Active Problem List  Diagnosis    Bilateral low back pain    left L4 and left > right L5-S1 radiculopathy    s/p L5-S1 right laminectomy    L4-5 slight grade 1 unstable spondylolisthesis    L3-4 stable slight grade 1 retrolisthesis    L5-S1 mild-mod central HNP    Bilateral groin pain    SHANT (obstructive sleep apnea)    Neck pain on right side    Cellulitis of left lower extremity    Recurrent deep vein thrombosis (DVT) ([facility])    Anxiety    Urinary incontinence    Neurogenic bladder    Chronic diarrhea    Oropharyngeal dysphagia    L5-S1 bilateral severe foraminal & moderate central, L4-5 severe central, L2-3 mod central & left mod-severe foraminal, L1-2 mod central & right mod-severe , T12-L1 mod central stenosis    Other spondylosis, lumbar region    Other complicated headache syndrome    Cervical post-laminectomy syndrome: C3-7    S/P cervical spinal fusion: C3-6 and C7-T1    COPD (chronic obstructive pulmonary disease) ([facility])    Unable to ambulate    Lumbar facet arthropathy: L5-S1 bilateral large    Neurologic gait disorder    Lung granuloma ([facility])    Arthropathy of cervical facet joint: C1-2 bilateral mod, C2-3 right severe    Headache, acute    Hypertensive emergency    Cellulitis of left lower leg    Wound infection    Unspecified open wound, left lower leg, initial  encounter    Wound healing, delayed    Essential hypertension    Intractable nausea and vomiting    Nausea vomiting and diarrhea    Bronchitis    Hypoxia    Acute febrile illness    Aortic atherosclerosis    Influenza    Acute respiratory failure with hypoxia (HCC)    Severe depressed bipolar I disorder without psychotic features (HCC)    Hypothyroidism, unspecified type    Acute pain of right shoulder    Impingement syndrome of right shoulder region    Arthritis of shoulder region, right    Dysfunction of right rotator cuff    left cervical radiculitis    Primary osteoarthritis of right shoulder: moderate    Cervical disc disease: C7-T1 moderate    Major depressive disorder    Migraines    Brain aneurysm (HCC)    Cholecystitis    Hearing impairment    Morbid (severe) obesity due to excess calories (HCC)    On continuous oral anticoagulation    UTI (urinary tract infection)    Bilateral lower extremity edema    Altered mental status, unspecified altered mental status type    AMS (altered mental status)    Bipolar affective disorder, currently depressed, moderate (HCC)    Delirium due to another medical condition    Pneumonia due to COVID-19 virus    Acute cystitis without hematuria    Cellulitis of left lower extremity without foot    Antipsychotic-induced akathisia    Bilateral lower leg cellulitis    Fatigue    Fatigue, unspecified type    INDERJIT (acute kidney injury)    Right sided weakness    Acute right-sided low back pain with right-sided sciatica    At risk for stroke    Acute pain of left shoulder    Dysfunction of left rotator cuff    Primary osteoarthritis of left shoulder: moderate glenohumeral joint and moderate-severe AC joint    Aspiration pneumonia of right middle lobe (HCC)    Aspiration pneumonia of right middle lobe, unspecified aspiration pneumonia type (HCC)    Pain of right middle finger    COPD exacerbation (HCC)    L5-S1 bilateral mod-large foraminal, L4-5 mod diffuse, L3-4 mild diffuse, L2-3 mod  diffuse, L1-2 right > left mod diffuse, T12-L1 left mild-mod, T11-12 mild-mod diffuse bulging discs    Acute pain of right knee and s/p TKA    Diverticulitis    Diverticulitis of colon with perforation

## 2025-07-18 NOTE — DISCHARGE INSTRUCTIONS
You have a cyst on your pancrease and may need repeat imaging please follow up with your PCP to arrange.

## 2025-07-18 NOTE — ED INITIAL ASSESSMENT (HPI)
Patient arrives to the ER complaining of nausea, vomiting, and abdominal cramping that started last night. 4mg zofran given IV via EMS

## 2025-07-18 NOTE — PLAN OF CARE
Admitted to 457 from ED. Oriented to room and safety precautions. A/O x 3, forgetful. NPO. IVF infusing, zosyn per orders. Voiding via purewick. Pain managed with morphine. Reports using scooter at home to get around but able to stand pivot, awaiting PT/OT eval. Bed in lowest position, call light in reach, frequent rounding, nonskid footwear, fall precautions in place.

## 2025-07-18 NOTE — RESPIRATORY THERAPY NOTE
CPAP/BIPAP EVALUATION: Done     CPAP/BIPAP INITIATION: Patient refused CPAP during their hospital stay     NOTES: Patient has been diagnosed with SHANT but does not regularly use CPAP at home

## 2025-07-18 NOTE — ED PROVIDER NOTES
Patient Seen in: [facility] Emergency Department        History  Chief Complaint   Patient presents with    Abdomen/Flank Pain     Stated Complaint: abd pain    Subjective:   HPI                  Objective:     Past Medical History:    Anxiety state, unspecified    Aortic atherosclerosis    CT scan 11-19    Arthritis of both knees    knee replacement     Arthritis of right hip    Back problem    Brain aneurysm (HCC)    Brain Aneurysm, Stent placed     Cervical disc disease    Cervical Discectomy     Cholecystitis    Cholecystectomy     COPD (chronic obstructive pulmonary disease) ([facility])    PFTs 2-15 with FEV1 1.11 L and FEV1/FVC ratio 63%    Deep vein thrombosis (HCC)    recurrent DVT- on lifelong AC    Dehydration    Fluids, Medication adjustment     Dementia ([facility])    Depression    Disorder of thyroid    Esophageal reflux    Fibromyalgia    Hammertoe    hammertoe 5 left, pain    Hearing impairment    Bilateral hearing aids    High blood pressure    Hip pain, left    Hypothyroidism    Incontinence    L3-4 stable slight grade 1 retrolisthesis    L4-5 mild-mod diffuse bulging disc    L4-5 slight grade 1 unstable spondylolisthesis    Leg wound, left    Low back pain    Migraines    Muscle weakness    Nausea vomiting and diarrhea    Onychomycosis    Debridement     Oropharyngeal dysphagia    Osteoarthrosis, unspecified whether generalized or localized, unspecified site    Other and unspecified hyperlipidemia    Other complicated headache syndrome    Other spondylosis, lumbar region    Pneumonia    S/P cervical spinal fusion: C3-6 and C7-T1    s/p L5-S1 right laminectomy    stent placement    cerebral    Thyroid disease    Toe pain    Onychomycosis, Debridement , Hammertoe     Tonsillitis    Tonsillitis     Visual impairment    EYE GLASSES    Wound of left leg    Left leg debridement and Split Thickness Skin Graft to left thigh              Past Surgical History:   Procedure Laterality Date    Anesth,neck vessel  surgery nos      x4    Brain surgery  ~2009    Brain Aneurysm, Stent placed     Carotid endarterectomy Right     Carpal tunnel release Right ~2008    Cholecystectomy  1984    Cholecystitis    Colonoscopy  2010    Debridement of nail(s), 1-5      Toe, Onychomycosis    Egd  2019    Gastric polyps only    Egd      Eye surgery      x2 FOR \"CROSSED EYES\"    Hip replacement surgery Bilateral     Hysterectomy  1967    Partial Hysterectomy    Jaw surgery      Knee replacement surgery Bilateral     Bilateral arthritis     Laminectomy      WITH FUSION PER PATIENT    Other surgical history  ,,,    Cervical Discectomy AND FUSION    Prep site t/a/l 1st 100 sq cm/1pct Left 2019    Left leg debridement, VAC placed    Spine surgery procedure unlisted      Split grft proc trunk <100sqcm Left 10/08/2019    Left leg debridement and Split Thickness Skin Graft to left thigh    Tonsillectomy  1950    Tonsillitis     Total hip replacement      Total knee replacement                  Social History     Socioeconomic History    Marital status:    Tobacco Use    Smoking status: Former     Current packs/day: 0.00     Average packs/day: 2.0 packs/day for 25.0 years (50.0 ttl pk-yrs)     Types: Cigarettes     Start date: 1952     Quit date: 1977     Years since quittin.5    Smokeless tobacco: Never    Tobacco comments:     Very heavy smoker 2 1/2 daily   Vaping Use    Vaping status: Never Used   Substance and Sexual Activity    Alcohol use: No    Drug use: No   Other Topics Concern    Caffeine Concern No     Comment: crystal light    Exercise No     Comment: No PT due to Covid    History of tanning No    Pt has a pacemaker No    Pt has a defibrillator No    Reaction to local anesthetic No    Left Handed Yes    Right Handed No    Currently spends a great deal of time in the sun No    Hx of Spending Great Deal of Time in Sun No    Bad sunburns in the past No    Tanning Salons in the Past No    Hx  of Radiation Treatments No   Social History Narrative    The patient uses the following assistive device(s):  rolling walker.  scooter    The patient does not live in a home with stairs.    The patient lives in a nursing home home. [facility]     Social Drivers of Health     Food Insecurity: No Food Insecurity (12/11/2023)    Food Insecurity     Food Insecurity: Never true   Transportation Needs: No Transportation Needs (12/11/2023)    Transportation Needs     Lack of Transportation: No   Housing Stability: Low Risk  (12/11/2023)    Housing Stability     Housing Instability: No                                Physical Exam    ED Triage Vitals [07/18/25 1110]   BP (!) 164/51   Pulse 76   Resp 18   Temp 99.7 °F (37.6 °C)   Temp src Oral   SpO2 90 %   O2 Device        Current Vitals:   Vital Signs  BP: 129/49  Pulse: 64  Resp: 18  Temp: 99.7 °F (37.6 °C)  Temp src: Oral  MAP (mmHg): 73    Oxygen Therapy  SpO2: 90 %            Physical Exam            ED Course  Labs Reviewed   BASIC METABOLIC PANEL (8) - Abnormal; Notable for the following components:       Result Value    BUN/CREA Ratio 21.1 (*)     All other components within normal limits   HEPATIC FUNCTION PANEL (7) - Abnormal; Notable for the following components:    ALT <7 (*)     All other components within normal limits   CBC WITH DIFFERENTIAL WITH PLATELET - Abnormal; Notable for the following components:    RBC 3.51 (*)     HGB 11.3 (*)     RDW-SD 50.9 (*)     All other components within normal limits   LIPASE - Abnormal; Notable for the following components:    Lipase 71 (*)     All other components within normal limits   RAINBOW DRAW LAVENDER   RAINBOW DRAW LIGHT GREEN   RAINBOW DRAW BLUE                            [facility]     84-year-old female with a history of DVT on apixaban, COPD, cholecystectomy, dementia presents with nausea, vomiting, and abdominal pain.  Patient states symptoms started last night and have been worsening.  Denies fevers, diarrhea,  dysuria, or other new symptoms.    On exam, vitals normal, nontoxic-appearing, answering questions appropriately, tenderness to palpation particularly in the left lower quadrant with localized guarding but no distention.    Differential: Diverticulitis, cystitis, gastroenteritis    Patient evaluated with labs which were reassuring  CT abdomen pelvis reviewed by myself shows a diverticulitis with a small, contained, perforation and phlegmon    Patient ordered for Zosyn and IV fluids.  General surgery consulted and recommending conservative management.    Patient admitted for further management.    Admission disposition: 7/18/2025  1:25 PM           MDM    Disposition and Plan     Clinical Impression:  1. Diverticulitis of colon with perforation         Disposition:  Admit  7/18/2025  1:25 pm    Follow-up:  No follow-up provider specified.  We recommend that you schedule follow up care with a primary care provider within the next three months to obtain basic health screening including reassessment of your blood pressure.      Medications Prescribed:  Current Discharge Medication List                Supplementary Documentation:         Hospital Problems       Present on Admission  Date Reviewed: 3/24/2025          ICD-10-CM Noted POA    Diverticulitis K57.92 7/18/2025 Unknown

## 2025-07-19 LAB
ANION GAP SERPL CALC-SCNC: 4 MMOL/L (ref 0–18)
BASOPHILS # BLD AUTO: 0.02 X10(3) UL (ref 0–0.2)
BASOPHILS NFR BLD AUTO: 0.3 %
BUN BLD-MCNC: 13 MG/DL (ref 9–23)
BUN/CREAT SERPL: 16 (ref 10–20)
CALCIUM BLD-MCNC: 9 MG/DL (ref 8.7–10.4)
CHLORIDE SERPL-SCNC: 108 MMOL/L (ref 98–112)
CO2 SERPL-SCNC: 28 MMOL/L (ref 21–32)
CREAT BLD-MCNC: 0.81 MG/DL (ref 0.55–1.02)
DEPRECATED RDW RBC AUTO: 49.8 FL (ref 35.1–46.3)
EGFRCR SERPLBLD CKD-EPI 2021: 71 ML/MIN/1.73M2 (ref 60–?)
EOSINOPHIL # BLD AUTO: 0.13 X10(3) UL (ref 0–0.7)
EOSINOPHIL NFR BLD AUTO: 2.1 %
ERYTHROCYTE [DISTWIDTH] IN BLOOD BY AUTOMATED COUNT: 13.5 % (ref 11–15)
GLUCOSE BLD-MCNC: 70 MG/DL (ref 70–99)
HCT VFR BLD AUTO: 34.1 % (ref 35–48)
HGB BLD-MCNC: 10.9 G/DL (ref 12–16)
IMM GRANULOCYTES # BLD AUTO: 0.01 X10(3) UL (ref 0–1)
IMM GRANULOCYTES NFR BLD: 0.2 %
LYMPHOCYTES # BLD AUTO: 1.1 X10(3) UL (ref 1–4)
LYMPHOCYTES NFR BLD AUTO: 17.8 %
MAGNESIUM SERPL-MCNC: 1.9 MG/DL (ref 1.6–2.6)
MCH RBC QN AUTO: 32.1 PG (ref 26–34)
MCHC RBC AUTO-ENTMCNC: 32 G/DL (ref 31–37)
MCV RBC AUTO: 100.3 FL (ref 80–100)
MONOCYTES # BLD AUTO: 0.45 X10(3) UL (ref 0.1–1)
MONOCYTES NFR BLD AUTO: 7.3 %
NEUTROPHILS # BLD AUTO: 4.47 X10 (3) UL (ref 1.5–7.7)
NEUTROPHILS # BLD AUTO: 4.47 X10(3) UL (ref 1.5–7.7)
NEUTROPHILS NFR BLD AUTO: 72.3 %
OSMOLALITY SERPL CALC.SUM OF ELEC: 289 MOSM/KG (ref 275–295)
PLATELET # BLD AUTO: 136 10(3)UL (ref 150–450)
PLATELETS.RETICULATED NFR BLD AUTO: 5.9 % (ref 0–7)
POTASSIUM SERPL-SCNC: 3.9 MMOL/L (ref 3.5–5.1)
RBC # BLD AUTO: 3.4 X10(6)UL (ref 3.8–5.3)
SODIUM SERPL-SCNC: 140 MMOL/L (ref 136–145)
WBC # BLD AUTO: 6.2 X10(3) UL (ref 4–11)

## 2025-07-19 PROCEDURE — 83735 ASSAY OF MAGNESIUM: CPT | Performed by: HOSPITALIST

## 2025-07-19 PROCEDURE — 97162 PT EVAL MOD COMPLEX 30 MIN: CPT

## 2025-07-19 PROCEDURE — 85025 COMPLETE CBC W/AUTO DIFF WBC: CPT | Performed by: HOSPITALIST

## 2025-07-19 PROCEDURE — 80048 BASIC METABOLIC PNL TOTAL CA: CPT | Performed by: HOSPITALIST

## 2025-07-19 PROCEDURE — 97530 THERAPEUTIC ACTIVITIES: CPT

## 2025-07-19 RX ORDER — MORPHINE SULFATE 2 MG/ML
2 INJECTION, SOLUTION INTRAMUSCULAR; INTRAVENOUS EVERY 2 HOUR PRN
Refills: 0 | Status: DISCONTINUED | OUTPATIENT
Start: 2025-07-19 | End: 2025-07-22

## 2025-07-19 RX ORDER — ACETAMINOPHEN 500 MG
1000 TABLET ORAL EVERY 8 HOURS
Status: DISCONTINUED | OUTPATIENT
Start: 2025-07-19 | End: 2025-07-22

## 2025-07-19 RX ORDER — DEXTROSE MONOHYDRATE, SODIUM CHLORIDE, AND POTASSIUM CHLORIDE 50; 1.49; 4.5 G/1000ML; G/1000ML; G/1000ML
INJECTION, SOLUTION INTRAVENOUS CONTINUOUS
Status: DISCONTINUED | OUTPATIENT
Start: 2025-07-19 | End: 2025-07-22

## 2025-07-19 NOTE — CM/SW NOTE
Department  notified of request for HHC, aidin referrals started. Assigned CM/SW to follow up with pt/family on further discharge planning.     Neeru Martin, DSC

## 2025-07-19 NOTE — PLAN OF CARE
Patient alert & oriented x4 but very forgetful. Patient complaining of abdominal pain. Prn morphine given for pain. Patient is very sleepy but arouses easily. Daughter Ernestine updated over the phone. Patient updated on POC and verbalized understanding. However, the patient requires frequent reinforcement of teaching.    Problem: Patient Centered Care  Goal: Patient preferences are identified and integrated in the patient's plan of care  Description: Interventions:  - What would you like us to know as we care for you? I live at [facility]  - Provide timely, complete, and accurate information to patient/family  - Incorporate patient and family knowledge, values, beliefs, and cultural backgrounds into the planning and delivery of care  - Encourage patient/family to participate in care and decision-making at the level they choose  - Honor patient and family perspectives and choices  Outcome: Progressing     Problem: Patient/Family Goals  Goal: Patient/Family Long Term Goal  Description: Patient's Long Term Goal: discharge home    Interventions:  - Monitor labs and vital signs  - Pain control  - Further testing  - Follow provider recommendations  - See additional Care Plan goals for specific interventions  Outcome: Progressing  Goal: Patient/Family Short Term Goal  Description: Patient's Short Term Goal: pain control    Interventions:   - Prn pain medication  - NPO - bowel rest  - Follow provider recommendation  - See additional Care Plan goals for specific interventions  Outcome: Progressing     Problem: PAIN - ADULT  Goal: Verbalizes/displays adequate comfort level or patient's stated pain goal  Description: INTERVENTIONS:  - Encourage pt to monitor pain and request assistance  - Assess pain using appropriate pain scale  - Administer analgesics based on type and severity of pain and evaluate response  - Implement non-pharmacological measures as appropriate and evaluate response  - Consider cultural and  social influences on pain and pain management  - Manage/alleviate anxiety  - Utilize distraction and/or relaxation techniques  - Monitor for opioid side effects  - Notify MD/LIP if interventions unsuccessful or patient reports new pain  - Anticipate increased pain with activity and pre-medicate as appropriate  Outcome: Progressing     Problem: SAFETY ADULT - FALL  Goal: Free from fall injury  Description: INTERVENTIONS:  - Assess pt frequently for physical needs  - Identify cognitive and physical deficits and behaviors that affect risk of falls.  - [place] fall precautions as indicated by assessment.  - Educate pt/family on patient safety including physical limitations  - Instruct pt to call for assistance with activity based on assessment  - Modify environment to reduce risk of injury  - Provide assistive devices as appropriate  - Consider OT/PT consult to assist with strengthening/mobility  - Encourage toileting schedule  Outcome: Progressing     Problem: DISCHARGE PLANNING  Goal: Discharge to home or other facility with appropriate resources  Description: INTERVENTIONS:  - Identify barriers to discharge w/pt and caregiver  - Include patient/family/discharge partner in discharge planning  - Arrange for needed discharge resources and transportation as appropriate  - Identify discharge learning needs (meds, wound care, etc)  - Arrange for interpreters to assist at discharge as needed  - Consider post-discharge preferences of patient/family/discharge partner  - Complete POLST form as appropriate  - Assess patient's ability to be responsible for managing their own health  - Refer to Case Management Department for coordinating discharge planning if the patient needs post-hospital services based on physician/LIP order or complex needs related to functional status, cognitive ability or social support system  Outcome: Progressing     Problem: GASTROINTESTINAL - ADULT  Goal: Minimal or absence of nausea and  vomiting  Description: INTERVENTIONS:  - Maintain adequate hydration with IV or PO as ordered and tolerated  - Nasogastric tube to low intermittent suction as ordered  - Evaluate effectiveness of ordered antiemetic medications  - Provide nonpharmacologic comfort measures as appropriate  - Advance diet as tolerated, if ordered  - Obtain nutritional consult as needed  - Evaluate fluid balance  Outcome: Progressing  Goal: Maintains or returns to baseline bowel function  Description: INTERVENTIONS:  - Assess bowel function  - Maintain adequate hydration with IV or PO as ordered and tolerated  - Evaluate effectiveness of GI medications  - Encourage mobilization and activity  - Obtain nutritional consult as needed  - Establish a toileting routine/schedule  - Consider collaborating with pharmacy to review patient's medication profile  Outcome: Progressing     Problem: METABOLIC/FLUID AND ELECTROLYTES - ADULT  Goal: Electrolytes maintained within normal limits  Description: INTERVENTIONS:  - Monitor labs and rhythm and assess patient for signs and symptoms of electrolyte imbalances  - Administer electrolyte replacement as ordered  - Monitor response to electrolyte replacements, including rhythm and repeat lab results as appropriate  - Fluid restriction as ordered  - Instruct patient on fluid and nutrition restrictions as appropriate  Outcome: Progressing

## 2025-07-19 NOTE — PHYSICAL THERAPY NOTE
PHYSICAL THERAPY EVALUATION - INPATIENT     Room Number: 457/457-A  Evaluation Date: 7/19/2025  Type of Evaluation: Initial   Physician Order: PT Eval and Treat    Presenting Problem: sigmoid diverticulitis     Reason for Therapy: Mobility Dysfunction and Discharge Planning    PHYSICAL THERAPY ASSESSMENT   Patient is a 84 year old female admitted 7/18/2025 for sigmoid diverticulitis with perforation.  Prior to admission, patient's baseline is min/mod A.  Patient is currently functioning below baseline with bed mobility and transfers.  Patient is requiring maximum assist and dependent as a result of the following impairments: decreased functional strength, decreased endurance/aerobic capacity, pain, difficulty maintaining precautions, and medical status.  Physical Therapy will continue to follow for duration of hospitalization.    Patient will benefit from continued skilled PT Services at discharge to promote prior level of function.  Anticipate patient will return home with home health PT.    PLAN DURING HOSPITALIZATION  Nursing Mobility Recommendation : Lift Equipment  PT Device Recommendation: Hospital bed  PT Treatment Plan: Bed mobility, Energy conservation, Patient education, Range of motion  Rehab Potential : Guarded  Frequency (Obs): 3x/week     PHYSICAL THERAPY MEDICAL/SOCIAL HISTORY   History related to current admission: ER with nausea, abdom cramps and diarrhea.  Hx bipolar disorder, cranial aneurysm with looping     Problem List  Principal Problem:    Diverticulitis of colon with perforation  Active Problems:    Diverticulitis      HOME SITUATION  Type of Home: Assisted living facility                        Lives With: Staff 24 hours    Drives: No   Patient Regularly Uses: Wheelchair     Prior Level of [place]: lives at [facility].  Inconsistent Hx- states she sleeps in recliner and is able to get to [facility].  In chart states she has scooter in room.  Patient states her power chair is too big for  bedroom.    SUBJECTIVE  I don't want to get up    PHYSICAL THERAPY EXAMINATION   OBJECTIVE     Fall Risk: High fall risk    WEIGHT BEARING RESTRICTION       PAIN ASSESSMENT  Rating: Unable to rate          COGNITION  Overall Cognitive Status:  Impaired    RANGE OF MOTION AND STRENGTH ASSESSMENT  Upper extremity ROM and strength are within functional limits except for the following:  BUE  Lower extremity ROM is within functional limits except for the following: BLE  Lower extremity strength is within functional limits except for the following:  BLE    BALANCE  Static Sitting: Poor  Dynamic Sitting: Poor  Static Standing: Not tested       ADDITIONAL TESTS                                    NEUROLOGICAL FINDINGS                      ACTIVITY TOLERANCE                         O2 WALK       AM-PAC '6-Clicks' INPATIENT SHORT FORM - BASIC MOBILITY  How much difficulty does the patient currently have...  Patient Difficulty: Turning over in bed (including adjusting bedclothes, sheets and blankets)?: A Lot   Patient Difficulty: Sitting down on and standing up from a chair with arms (e.g., wheelchair, bedside commode, etc.): Unable   Patient Difficulty: Moving from lying on back to sitting on the side of the bed?: A Lot   How much help from another person does the patient currently need...   Help from Another: Moving to and from a bed to a chair (including a wheelchair)?: Total   Help from Another: Need to walk in hospital room?: Total   Help from Another: Climbing 3-5 steps with a railing?: Total     AM-PAC Score:  Raw Score: 8   Approx Degree of Impairment: 86.62%   Standardized Score (AM-PAC Scale): 28.58   CMS Modifier (G-Code): CM    FUNCTIONAL ABILITY STATUS  Functional Mobility/Gait Assessment  Gait Assistance: Not tested  Rolling: dependent  Supine to Sit: dependent  Sit to Supine: dependent  Sit to Stand: NT    Exercise/Education Provided:  Bed mobility  Body mechanics  Energy conservation  Functional activity  tolerated  Posture  ROM    Skilled Therapy Provided: Chart reviewed.  RN aware.  Patient up in bed.  Poor and inconsistent historian.  Needs cues to participate in PT.  Discussed need to assess ability to get out of bed, SPT to see if patient can return to [facility].  Patinet able to get to sitting with max A.  Poor sitting balance. Max A to return to supine, slide up bed.  Patient needs cues to focus and easily agitated.  All needs left within reach. RN aware.    The patient's Approx Degree of Impairment: 86.62% has been calculated based on documentation in the [facility] '6 clicks' Inpatient Basic Mobility Short Form.  Research supports that patients with this level of impairment may benefit from  PT once return to assisted.  Final disposition will be made by interdisciplinary medical team.    Patient End of Session: In bed, Needs met, Call light within reach, RN aware of session/findings, All patient questions and concerns addressed, Hospital anti-slip socks, Alarm set    CURRENT GOALS  Goals to be met by: 2025  Patient Goal Patient's self-stated goal is: to go home   Goal #1 Patient is able to demonstrate supine - sit EOB @ level: moderate assistance     Goal #1   Current Status    Goal #2 Patient is able to demonstrate transfers EOB to/from C at assistance level: moderate assistance with none     Goal #2  Current Status                    Goal #5 Patient to demonstrate independence with home activity/exercise instructions provided to patient in preparation for discharge.   Goal #5   Current Status    Goal #6    Goal #6  Current Status      Patient Evaluation Complexity Level:  History Moderate - 1 or 2 personal factors and/or co-morbidities   Examination of body systems Moderate - addressing a total of 3 or more elements   Clinical Presentation  Moderate - Evolving   Clinical Decision Making  Moderate Complexity     Gait Trainin minutes  Therapeutic Activity:  25 minutes  Neuromuscular Re-education: 5  minutes  Therapeutic Exercise: 0 minutes  Canalith Repositionin minutes  Manual Therapy: 0 minutes  Can add/delete any of these

## 2025-07-19 NOTE — PROGRESS NOTES
[facility]  part of [facility]    General Surgery Progress Note  Gretta Canales  CSN:[id number]  HD# 1       Subjective:   Complains of left sided abdominal pain and denies N/V - states morphine dose of 2 mg is too low, mad at her son for not calling her but RN states she has called him 7 or 8 times. She has dementia. Lives at assisted living facility and not ambulatory. Claims she can transfer to wheelchair.  Passing no flatus or BM     Exam:     General: awake and alert and in no acute distress  Pulmonary:lungs clear to auscultation bilaterally  Cardiac: nl S1,S2, no S3, no S4, IV/VI systolic murmur at LLSB and RUSB, and irregular w frequent ectopy  Abdomen: normal BS, nondistended, and severe tenderness LLQ/suprapubic > LUQ, + localized guarding, nontender right side   Extremities: calves nontender, no edema, motor intact, and sensory intact    Assessment and Plan:   Diverticulitis of colon with perforation   Diverticulitis of sigmoid colon w perforation and phlegmon/developing abscess    Conservative treatment w Zosyn and bowel rest.  Pain control w morphine Q2h as needed   Tylenol scheduled  Will repeat CT a/p on Monday and evaluate abscess for possible IR drainage.  Ice chips and oral care and sips w meds.  I will follow closely    Objective:     Vitals:    07/18/25 2000 07/19/25 0439 07/19/25 0821 07/19/25 0825   BP:  141/50 130/51    Pulse:  74 68    Resp:  20 20    Temp:  97.6 °F (36.4 °C) 98.6 °F (37 °C)    TempSrc:  Axillary Oral    SpO2: 94% 97% 99% 97%   Weight:         Body mass index is 23.46 kg/m².       Intake/Output Summary (Last 24 hours) at 7/19/2025 1105  Last data filed at 7/19/2025 0500  Gross per 24 hour   Intake 2391.05 ml   Output 500 ml   Net 1891.05 ml       CT ABDOMEN+PELVIS(CONTRAST ONLY)(CPT=74177)  Result Date: 7/18/2025  CONCLUSION: 1.  Intermediate length segment sigmoid colonic diverticulitis with suspected contained perforation and phlegmonous intramural  fluid/gas which may represent developing intramural abscess formation. Following resolution of acute symptomatology, colonoscopy should be considered to exclude potential underlying malignancy. 2.  There is a small cystic lesion of the pancreatic body. Follow-up MRCP of the abdomen with and without contrast can be considered for further assessment if clinically warranted. 3.  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 4.  Bilateral hip arthroplasties with resultant artifactual degradation of the pelvis. 5.  Postoperative changes of hysterectomy. 6.  Status post cholecystectomy with extrahepatic biliary dilatation that may represent age ectasia superimposed on the postcholecystectomy state. 7.  Lesser incidental findings as above. Electronically Verified and Signed by Attending Radiologist: Tony Campos MD 7/18/2025 1:03 PM Workstation: [id number]      Results:     Recent Labs   Lab 07/18/25  1117 07/19/25  0657   RBC 3.51* 3.40*   HGB 11.3* 10.9*   HCT 35.0 34.1*   MCV 99.7 100.3*   MCH 32.2 32.1   MCHC 32.3 32.0   RDW 13.9 13.5   NEPRELIM 6.41 4.47   WBC 9.0 6.2   .0 136.0*       Recent Labs   Lab 07/18/25  1118 07/19/25  0657   GLU 92 70   BUN 19 13   CREATSERUM 0.90 0.81   CA 9.2 9.0    140   K 4.0 3.9    108   CO2 31.0 28.0       Lab Results   Component Value Date    WBC 6.2 07/19/2025    HGB 10.9 07/19/2025    HCT 34.1 07/19/2025    .0 07/19/2025     07/19/2025    K 3.9 07/19/2025     07/19/2025    CO2 28.0 07/19/2025    BUN 13 07/19/2025    CREATSERUM 0.81 07/19/2025    GLU 70 07/19/2025    MG 1.9 07/19/2025    BILT 0.7 07/18/2025    AST 12 07/18/2025    ALT <7 07/18/2025    LIP 71 07/18/2025    CA 9.0 07/19/2025    ALB 3.7 07/18/2025    TP 5.7 07/18/2025            Narayan Talbert MD [facility]  07/19/25  11:05 AM

## 2025-07-19 NOTE — PROGRESS NOTES
[facility] Hospitalist Progress Note     CC: Hospital Follow up    PCP: Timothy Hicks MD       Assessment/Plan:     Principal Problem:    Diverticulitis of colon with perforation  Active Problems:    Diverticulitis    Ms. Canales is a 84 year old female with PMH sig for cranial aneurysm s/p coiling, COPD, HTN, DVT on Eliquis, bipolar, hypothyroidism, aortic atherosclerosis, who presents with nausea, vomiting and abd pain, found to have acute diverticulitis with contained perforation.       Acute diverticulitis w/ contained perforation  Nausea/vomiting/abd pain  - CT scan with above, no abscess  - no hs of diverticulitis  - NPO, IVF  - IV zosyn  - IV and oral pain meds   - bowel rest  - Gen surg consulted, discussed with PA     Cystic lesion on pancreas   - needs outpatient fu with poss MRI     HTN  - resume home meds as able     Hypothyroidism   - resume home meds     Chronic back pain  - see physiatry as OP     Hs of bipolar DO  - resume home meds     Hs of DVT  - continue eliquis      Hs of cranial aneurysm   - s/p coiling      FN:  - IVF: 83  - Diet: NPO, sips with meds     DVT Prophy:scd, heparin  Lines: PIV     Dispo: pending clinical course    Questions/concerns were discussed with patient and/or family by bedside.    Thank You,  Sameer Dang MD    Hospitalist with [facility]     Subjective:     States sill having pain in her left abd, no CP or sob, no nausea, forgetful at times     OBJECTIVE:    Blood pressure 139/52, pulse 68, temperature 98.8 °F (37.1 °C), temperature source Axillary, resp. rate 17, weight 154 lb 5.2 oz (70 kg), SpO2 90%, not currently breastfeeding.    Temp:  [97.6 °F (36.4 °C)-99 °F (37.2 °C)] 98.8 °F (37.1 °C)  Pulse:  [54-74] 68  Resp:  [17-22] 17  BP: (129-144)/(49-76) 139/52  SpO2:  [88 %-99 %] 90 %      Intake/Output:    Intake/Output Summary (Last 24 hours) at 7/19/2025 1220  Last data filed at 7/19/2025 0821  Gross per 24 hour   Intake 2491.05 ml   Output 500 ml    Net 1991.05 ml       Last 3 Weights   07/18/25 1419 154 lb 5.2 oz (70 kg)   07/18/25 1110 154 lb 5.2 oz (70 kg)   06/13/25 1415 180 lb (81.6 kg)   06/13/25 1054 180 lb (81.6 kg)   05/08/25 1117 192 lb (87.1 kg)       Exam    Gen: No acute distress,    Heent: NC AT,    Pulm: Lungs clear, normal respiratory effort  CV: Heart with regular rate and rhythm   Abd: Abdomen soft, mild TTP in LLQ, no rebound or guarding   MSK: no clubbing, no cyanosis  Skin: no rashes or lesions         Data Review:       Labs:     Recent Labs   Lab 07/18/25  1117 07/19/25  0657   RBC 3.51* 3.40*   HGB 11.3* 10.9*   HCT 35.0 34.1*   MCV 99.7 100.3*   MCH 32.2 32.1   MCHC 32.3 32.0   RDW 13.9 13.5   NEPRELIM 6.41 4.47   WBC 9.0 6.2   .0 136.0*         Recent Labs   Lab 07/18/25  1118 07/19/25  0657   GLU 92 70   BUN 19 13   CREATSERUM 0.90 0.81   EGFRCR 62 71   CA 9.2 9.0    140   K 4.0 3.9    108   CO2 31.0 28.0       Recent Labs   Lab 07/18/25  1118   ALT <7*   AST 12   ALB 3.7         Imaging:  CT ABDOMEN+PELVIS(CONTRAST ONLY)(CPT=74177)  Result Date: 7/18/2025  CONCLUSION: 1.  Intermediate length segment sigmoid colonic diverticulitis with suspected contained perforation and phlegmonous intramural fluid/gas which may represent developing intramural abscess formation. Following resolution of acute symptomatology, colonoscopy should be considered to exclude potential underlying malignancy. 2.  There is a small cystic lesion of the pancreatic body. Follow-up MRCP of the abdomen with and without contrast can be considered for further assessment if clinically warranted. 3.  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 4.  Bilateral hip arthroplasties with resultant artifactual degradation of the pelvis. 5.  Postoperative changes of hysterectomy. 6.  Status post cholecystectomy with extrahepatic biliary dilatation that may represent age ectasia superimposed on the postcholecystectomy state. 7.  Lesser incidental findings as  above. Electronically Verified and Signed by Attending Radiologist: Tony Campos MD 7/18/2025 1:03 PM Workstation: [id number]        Meds:     Scheduled Medications[1]  Medication Infusions[2]  PRN Medications[3]           [1]    acetaminophen  1,000 mg Oral Q8H    miconazole   Topical BID    piperacillin-tazobactam  3.375 g Intravenous Q8H    apixaban  5 mg Oral BID    donepezil  10 mg Oral QAM    gabapentin  200 mg Oral BID    hydrALAZINE  50 mg Oral BID    lamoTRIgine  100 mg Oral BID    levothyroxine  100 mcg Oral Before breakfast    QUEtiapine  200 mg Oral Nightly    morphINE PF  2 mg Intravenous Once   [2]    potassium chloride in dextrose 5%-sodium chloride 0.45% 83 mL/hr at 07/19/25 1217   [3]   morphINE PF    melatonin    ondansetron    benzonatate    cyclobenzaprine    HYDROcodone-acetaminophen    LORazepam

## 2025-07-19 NOTE — PLAN OF CARE
Patient is AxO4. Receiving 1L of O2 via nasal cannula. C/o severe lower abdominal pain. Flexeril and morphine provided as needed. NPO except ice chips/sips with meds. Denies nausea/vomiting. Voiding per Purewick. No BM - cdiff sample pending. IVF and IV abx continued. Remote tele in place. Appropriate safety measures maintained, call light within reach, and fall precautions in place.             Problem: Patient Centered Care  Goal: Patient preferences are identified and integrated in the patient's plan of care  Description: Interventions:  - What would you like us to know as we care for you? I live at [facility]  - Provide timely, complete, and accurate information to patient/family  - Incorporate patient and family knowledge, values, beliefs, and cultural backgrounds into the planning and delivery of care  - Encourage patient/family to participate in care and decision-making at the level they choose  - Honor patient and family perspectives and choices  Outcome: Progressing     Problem: Patient/Family Goals  Goal: Patient/Family Long Term Goal  Description: Patient's Long Term Goal: discharge home    Interventions:    - See additional Care Plan goals for specific interventions  Outcome: Progressing  Goal: Patient/Family Short Term Goal  Description: Patient's Short Term Goal: pain control    Interventions:     - See additional Care Plan goals for specific interventions  Outcome: Progressing     Problem: PAIN - ADULT  Goal: Verbalizes/displays adequate comfort level or patient's stated pain goal  Description: INTERVENTIONS:  - Encourage pt to monitor pain and request assistance  - Assess pain using appropriate pain scale  - Administer analgesics based on type and severity of pain and evaluate response  - Implement non-pharmacological measures as appropriate and evaluate response  - Consider cultural and social influences on pain and pain management  - Manage/alleviate anxiety  - Utilize distraction  and/or relaxation techniques  - Monitor for opioid side effects  - Notify MD/LIP if interventions unsuccessful or patient reports new pain  - Anticipate increased pain with activity and pre-medicate as appropriate  Outcome: Progressing     Problem: SAFETY ADULT - FALL  Goal: Free from fall injury  Description: INTERVENTIONS:  - Assess pt frequently for physical needs  - Identify cognitive and physical deficits and behaviors that affect risk of falls.  - [place] fall precautions as indicated by assessment.  - Educate pt/family on patient safety including physical limitations  - Instruct pt to call for assistance with activity based on assessment  - Modify environment to reduce risk of injury  - Provide assistive devices as appropriate  - Consider OT/PT consult to assist with strengthening/mobility  - Encourage toileting schedule  Outcome: Progressing     Problem: DISCHARGE PLANNING  Goal: Discharge to home or other facility with appropriate resources  Description: INTERVENTIONS:  - Identify barriers to discharge w/pt and caregiver  - Include patient/family/discharge partner in discharge planning  - Arrange for needed discharge resources and transportation as appropriate  - Identify discharge learning needs (meds, wound care, etc)  - Arrange for interpreters to assist at discharge as needed  - Consider post-discharge preferences of patient/family/discharge partner  - Complete POLST form as appropriate  - Assess patient's ability to be responsible for managing their own health  - Refer to Case Management Department for coordinating discharge planning if the patient needs post-hospital services based on physician/LIP order or complex needs related to functional status, cognitive ability or social support system  Outcome: Progressing     Problem: GASTROINTESTINAL - ADULT  Goal: Minimal or absence of nausea and vomiting  Description: INTERVENTIONS:  - Maintain adequate hydration with IV or PO as ordered and tolerated  -  Nasogastric tube to low intermittent suction as ordered  - Evaluate effectiveness of ordered antiemetic medications  - Provide nonpharmacologic comfort measures as appropriate  - Advance diet as tolerated, if ordered  - Obtain nutritional consult as needed  - Evaluate fluid balance  Outcome: Progressing  Goal: Maintains or returns to baseline bowel function  Description: INTERVENTIONS:  - Assess bowel function  - Maintain adequate hydration with IV or PO as ordered and tolerated  - Evaluate effectiveness of GI medications  - Encourage mobilization and activity  - Obtain nutritional consult as needed  - Establish a toileting routine/schedule  - Consider collaborating with pharmacy to review patient's medication profile  Outcome: Progressing     Problem: METABOLIC/FLUID AND ELECTROLYTES - ADULT  Goal: Electrolytes maintained within normal limits  Description: INTERVENTIONS:  - Monitor labs and rhythm and assess patient for signs and symptoms of electrolyte imbalances  - Administer electrolyte replacement as ordered  - Monitor response to electrolyte replacements, including rhythm and repeat lab results as appropriate  - Fluid restriction as ordered  - Instruct patient on fluid and nutrition restrictions as appropriate  Outcome: Progressing

## 2025-07-19 NOTE — CM/SW NOTE
Anticipated therapy need: Home with Home Healthcare      CM req DSC send tentative ref, f2f done    Plan  Pending    / to remain available for support and/or discharge planning.     Chinyere Uribe, TAMMIE    Ext [phone]

## 2025-07-20 LAB
ANION GAP SERPL CALC-SCNC: 2 MMOL/L (ref 0–18)
BASOPHILS # BLD AUTO: 0.02 X10(3) UL (ref 0–0.2)
BASOPHILS NFR BLD AUTO: 0.3 %
BUN BLD-MCNC: 12 MG/DL (ref 9–23)
BUN/CREAT SERPL: 15.6 (ref 10–20)
CALCIUM BLD-MCNC: 8.7 MG/DL (ref 8.7–10.4)
CHLORIDE SERPL-SCNC: 107 MMOL/L (ref 98–112)
CO2 SERPL-SCNC: 28 MMOL/L (ref 21–32)
CREAT BLD-MCNC: 0.77 MG/DL (ref 0.55–1.02)
DEPRECATED RDW RBC AUTO: 49.1 FL (ref 35.1–46.3)
EGFRCR SERPLBLD CKD-EPI 2021: 75 ML/MIN/1.73M2 (ref 60–?)
EOSINOPHIL # BLD AUTO: 0.18 X10(3) UL (ref 0–0.7)
EOSINOPHIL NFR BLD AUTO: 2.8 %
ERYTHROCYTE [DISTWIDTH] IN BLOOD BY AUTOMATED COUNT: 13.2 % (ref 11–15)
GLUCOSE BLD-MCNC: 92 MG/DL (ref 70–99)
HCT VFR BLD AUTO: 32.6 % (ref 35–48)
HGB BLD-MCNC: 10.5 G/DL (ref 12–16)
IMM GRANULOCYTES # BLD AUTO: 0.02 X10(3) UL (ref 0–1)
IMM GRANULOCYTES NFR BLD: 0.3 %
LYMPHOCYTES # BLD AUTO: 1.16 X10(3) UL (ref 1–4)
LYMPHOCYTES NFR BLD AUTO: 18 %
MAGNESIUM SERPL-MCNC: 1.9 MG/DL (ref 1.6–2.6)
MCH RBC QN AUTO: 32.6 PG (ref 26–34)
MCHC RBC AUTO-ENTMCNC: 32.2 G/DL (ref 31–37)
MCV RBC AUTO: 101.2 FL (ref 80–100)
MONOCYTES # BLD AUTO: 0.49 X10(3) UL (ref 0.1–1)
MONOCYTES NFR BLD AUTO: 7.6 %
NEUTROPHILS # BLD AUTO: 4.56 X10 (3) UL (ref 1.5–7.7)
NEUTROPHILS # BLD AUTO: 4.56 X10(3) UL (ref 1.5–7.7)
NEUTROPHILS NFR BLD AUTO: 71 %
OSMOLALITY SERPL CALC.SUM OF ELEC: 283 MOSM/KG (ref 275–295)
PLATELET # BLD AUTO: 133 10(3)UL (ref 150–450)
PLATELETS.RETICULATED NFR BLD AUTO: 4.8 % (ref 0–7)
POTASSIUM SERPL-SCNC: 4.2 MMOL/L (ref 3.5–5.1)
RBC # BLD AUTO: 3.22 X10(6)UL (ref 3.8–5.3)
SODIUM SERPL-SCNC: 137 MMOL/L (ref 136–145)
WBC # BLD AUTO: 6.4 X10(3) UL (ref 4–11)

## 2025-07-20 PROCEDURE — 80048 BASIC METABOLIC PNL TOTAL CA: CPT | Performed by: HOSPITALIST

## 2025-07-20 PROCEDURE — 85025 COMPLETE CBC W/AUTO DIFF WBC: CPT | Performed by: HOSPITALIST

## 2025-07-20 PROCEDURE — 83735 ASSAY OF MAGNESIUM: CPT | Performed by: HOSPITALIST

## 2025-07-20 NOTE — PROGRESS NOTES
[facility]  part of [facility]    General Surgery Progress Note  Gretta Canales  CSN:[id number]  HD# [id number]       Subjective:   Complains of improved left sided abdominal pain and denies N/V - pain radiating to her back. More comfortable today. Denied pain at rest  Passing no flatus or BM     Exam:     General: awake and alert and in no acute distress  Pulmonary:lungs clear to auscultation bilaterally  Cardiac: nl S1,S2, no S3, no S4, IV/VI systolic murmur at LLSB and RUSB, and irregular w frequent ectopy  Abdomen: normal BS, nondistended, and severe tenderness LLQ > LUQ, + localized guarding to LLQ, nontender right side   Extremities: calves nontender, no edema, motor intact, and sensory intact    Assessment and Plan:   Diverticulitis of colon with perforation   Diverticulitis of sigmoid colon w perforation and phlegmon/developing abscess    Conservative treatment w Zosyn and bowel rest.  Pain control w morphine Q2h as needed   Tylenol scheduled  Will repeat CT a/p on Monday and evaluate abscess for possible IR drainage.  Ice chips and oral care and sips w meds.  I will follow closely    Objective:     Vitals:    07/19/25 2004 07/20/25 0405 07/20/25 0429 07/20/25 0430   BP: 126/55 123/46     Pulse: 61 57     Resp: 18 18     Temp: 97.7 °F (36.5 °C) 97.3 °F (36.3 °C)     TempSrc: Oral Temporal     SpO2: 97% 90% (!) 86% 100%   Weight:         Body mass index is 23.46 kg/m².       Intake/Output Summary (Last 24 hours) at 7/20/2025 0925  Last data filed at 7/20/2025 0500  Gross per 24 hour   Intake 1737.48 ml   Output 200 ml   Net 1537.48 ml       No results found.      Results:     Recent Labs   Lab 07/18/25  1117 07/19/25  0657 07/20/25  0630   RBC 3.51* 3.40* 3.22*   HGB 11.3* 10.9* 10.5*   HCT 35.0 34.1* 32.6*   MCV 99.7 100.3* 101.2*   MCH 32.2 32.1 32.6   MCHC 32.3 32.0 32.2   RDW 13.9 13.5 13.2   NEPRELIM 6.41 4.47 4.56   WBC 9.0 6.2 6.4   .0 136.0* 133.0*       Recent Labs   Lab  07/18/25  1118 07/19/25  0657 07/20/25  0630   GLU 92 70 92   BUN 19 13 12   CREATSERUM 0.90 0.81 0.77   CA 9.2 9.0 8.7    140 137   K 4.0 3.9 4.2    108 107   CO2 31.0 28.0 28.0       Lab Results   Component Value Date    WBC 6.4 07/20/2025    HGB 10.5 07/20/2025    HCT 32.6 07/20/2025    .0 07/20/2025     07/20/2025    K 4.2 07/20/2025     07/20/2025    CO2 28.0 07/20/2025    BUN 12 07/20/2025    CREATSERUM 0.77 07/20/2025    GLU 92 07/20/2025    MG 1.9 07/20/2025    CA 8.7 07/20/2025          Narayan Talbert MD [facility]  07/20/25  9:27 AM

## 2025-07-20 NOTE — PROGRESS NOTES
[facility] Hospitalist Progress Note     CC: Hospital Follow up    PCP: Timothy Hicks MD       Assessment/Plan:     Principal Problem:    Diverticulitis of colon with perforation  Active Problems:    Diverticulitis    Ms. Canales is a 84 year old female with PMH sig for cranial aneurysm s/p coiling, COPD, HTN, DVT on Eliquis, bipolar, hypothyroidism, aortic atherosclerosis, who presents with nausea, vomiting and abd pain, found to have acute diverticulitis with contained perforation.       Acute diverticulitis w/ contained perforation  Nausea/vomiting/abd pain  - CT scan with above, no abscess  - no hs of diverticulitis  - NPO, IVF  - IV zosyn  - IV and oral pain meds   - bowel rest  - Gen surg consulted  - plan for CT tomorrow      Cystic lesion on pancreas   - needs outpatient fu with poss MRI     HTN  - resume home meds as able     Hypothyroidism   - resume home meds     Chronic back pain  - see physiatry as OP     Hs of bipolar DO  - resume home meds     Hs of DVT  - continue eliquis      Hs of cranial aneurysm   - s/p coiling      FN:  - IVF: 83  - Diet: NPO, sips with meds     DVT Prophy:scd, eliquis   Lines: PIV     Dispo: pending clinical course    Questions/concerns were discussed with patient and/or family by bedside.    Thank You,  Sameer Dang MD    Hospitalist with [facility]     Subjective:     Pain slightly better today, no nausea no vomiting, no fevers or chills     OBJECTIVE:    Blood pressure 107/49, pulse 58, temperature 98.4 °F (36.9 °C), temperature source Oral, resp. rate 18, weight 154 lb 5.2 oz (70 kg), SpO2 91%, not currently breastfeeding.    Temp:  [97.3 °F (36.3 °C)-98.8 °F (37.1 °C)] 98.4 °F (36.9 °C)  Pulse:  [57-90] 58  Resp:  [16-18] 18  BP: (107-139)/(46-99) 107/49  SpO2:  [86 %-100 %] 91 %      Intake/Output:    Intake/Output Summary (Last 24 hours) at 7/20/2025 1143  Last data filed at 7/20/2025 1000  Gross per 24 hour   Intake 2312.48 ml   Output 200 ml   Net  2112.48 ml       Last 3 Weights   07/18/25 1419 154 lb 5.2 oz (70 kg)   07/18/25 1110 154 lb 5.2 oz (70 kg)   06/13/25 1415 180 lb (81.6 kg)   06/13/25 1054 180 lb (81.6 kg)   05/08/25 1117 192 lb (87.1 kg)       Exam    Gen: No acute distress,    Heent: NC AT,    Pulm: Lungs clear, normal respiratory effort  CV: Heart with regular rate and rhythm   Abd: Abdomen soft, mild TTP in LLQ, no rebound or guarding   MSK: no clubbing, no cyanosis  Skin: no rashes or lesions         Data Review:       Labs:     Recent Labs   Lab 07/18/25  1117 07/19/25  0657 07/20/25  0630   RBC 3.51* 3.40* 3.22*   HGB 11.3* 10.9* 10.5*   HCT 35.0 34.1* 32.6*   MCV 99.7 100.3* 101.2*   MCH 32.2 32.1 32.6   MCHC 32.3 32.0 32.2   RDW 13.9 13.5 13.2   NEPRELIM 6.41 4.47 4.56   WBC 9.0 6.2 6.4   .0 136.0* 133.0*         Recent Labs   Lab 07/18/25  1118 07/19/25  0657 07/20/25  0630   GLU 92 70 92   BUN 19 13 12   CREATSERUM 0.90 0.81 0.77   EGFRCR 62 71 75   CA 9.2 9.0 8.7    140 137   K 4.0 3.9 4.2    108 107   CO2 31.0 28.0 28.0       Recent Labs   Lab 07/18/25  1118   ALT <7*   AST 12   ALB 3.7         Imaging:  CT ABDOMEN+PELVIS(CONTRAST ONLY)(CPT=74177)  Result Date: 7/18/2025  CONCLUSION: 1.  Intermediate length segment sigmoid colonic diverticulitis with suspected contained perforation and phlegmonous intramural fluid/gas which may represent developing intramural abscess formation. Following resolution of acute symptomatology, colonoscopy should be considered to exclude potential underlying malignancy. 2.  There is a small cystic lesion of the pancreatic body. Follow-up MRCP of the abdomen with and without contrast can be considered for further assessment if clinically warranted. 3.  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 4.  Bilateral hip arthroplasties with resultant artifactual degradation of the pelvis. 5.  Postoperative changes of hysterectomy. 6.  Status post cholecystectomy with extrahepatic biliary dilatation  that may represent age ectasia superimposed on the postcholecystectomy state. 7.  Lesser incidental findings as above. Electronically Verified and Signed by Attending Radiologist: Tony Campos MD 7/18/2025 1:03 PM Workstation: [id number]        Meds:     Scheduled Medications[1]  Medication Infusions[2]  PRN Medications[3]             [1]    acetaminophen  1,000 mg Oral Q8H    miconazole   Topical BID    piperacillin-tazobactam  3.375 g Intravenous Q8H    [Held by provider] apixaban  5 mg Oral BID    donepezil  10 mg Oral QAM    gabapentin  200 mg Oral BID    hydrALAZINE  50 mg Oral BID    lamoTRIgine  100 mg Oral BID    levothyroxine  100 mcg Oral Before breakfast    QUEtiapine  200 mg Oral Nightly    morphINE PF  2 mg Intravenous Once   [2]    potassium chloride in dextrose 5%-sodium chloride 0.45% 83 mL/hr at 07/20/25 0028   [3]   morphINE PF    melatonin    ondansetron    benzonatate    cyclobenzaprine    HYDROcodone-acetaminophen    LORazepam

## 2025-07-20 NOTE — PLAN OF CARE
Patient alert and oriented x 4, but forgetful, on room air. Patient reports pain, see MAR. Remains NPO. IVF and IV abx continued. Plan is repeat CT abdomen/pelvis tomorrow. Call light within reach and safety precautions in place.     Problem: Patient Centered Care  Goal: Patient preferences are identified and integrated in the patient's plan of care  Description: Interventions:  - What would you like us to know as we care for you? I live at [facility]  - Provide timely, complete, and accurate information to patient/family  - Incorporate patient and family knowledge, values, beliefs, and cultural backgrounds into the planning and delivery of care  - Encourage patient/family to participate in care and decision-making at the level they choose  - Honor patient and family perspectives and choices  Outcome: Progressing     Problem: Patient/Family Goals  Goal: Patient/Family Long Term Goal  Description: Patient's Long Term Goal: discharge home    Interventions:  - Monitor labs and vital signs  - Pain control  - Further testing  - Follow provider recommendations  - See additional Care Plan goals for specific interventions  Outcome: Progressing  Goal: Patient/Family Short Term Goal  Description: Patient's Short Term Goal: pain control    Interventions:   - Prn pain medication  - NPO - bowel rest  - Follow provider recommendation  - See additional Care Plan goals for specific interventions  Outcome: Progressing     Problem: PAIN - ADULT  Goal: Verbalizes/displays adequate comfort level or patient's stated pain goal  Description: INTERVENTIONS:  - Encourage pt to monitor pain and request assistance  - Assess pain using appropriate pain scale  - Administer analgesics based on type and severity of pain and evaluate response  - Implement non-pharmacological measures as appropriate and evaluate response  - Consider cultural and social influences on pain and pain management  - Manage/alleviate anxiety  - Utilize  distraction and/or relaxation techniques  - Monitor for opioid side effects  - Notify MD/LIP if interventions unsuccessful or patient reports new pain  - Anticipate increased pain with activity and pre-medicate as appropriate  Outcome: Progressing     Problem: SAFETY ADULT - FALL  Goal: Free from fall injury  Description: INTERVENTIONS:  - Assess pt frequently for physical needs  - Identify cognitive and physical deficits and behaviors that affect risk of falls.  - [place] fall precautions as indicated by assessment.  - Educate pt/family on patient safety including physical limitations  - Instruct pt to call for assistance with activity based on assessment  - Modify environment to reduce risk of injury  - Provide assistive devices as appropriate  - Consider OT/PT consult to assist with strengthening/mobility  - Encourage toileting schedule  Outcome: Progressing     Problem: DISCHARGE PLANNING  Goal: Discharge to home or other facility with appropriate resources  Description: INTERVENTIONS:  - Identify barriers to discharge w/pt and caregiver  - Include patient/family/discharge partner in discharge planning  - Arrange for needed discharge resources and transportation as appropriate  - Identify discharge learning needs (meds, wound care, etc)  - Arrange for interpreters to assist at discharge as needed  - Consider post-discharge preferences of patient/family/discharge partner  - Complete POLST form as appropriate  - Assess patient's ability to be responsible for managing their own health  - Refer to Case Management Department for coordinating discharge planning if the patient needs post-hospital services based on physician/LIP order or complex needs related to functional status, cognitive ability or social support system  Outcome: Progressing     Problem: GASTROINTESTINAL - ADULT  Goal: Minimal or absence of nausea and vomiting  Description: INTERVENTIONS:  - Maintain adequate hydration with IV or PO as ordered and  tolerated  - Nasogastric tube to low intermittent suction as ordered  - Evaluate effectiveness of ordered antiemetic medications  - Provide nonpharmacologic comfort measures as appropriate  - Advance diet as tolerated, if ordered  - Obtain nutritional consult as needed  - Evaluate fluid balance  Outcome: Progressing  Goal: Maintains or returns to baseline bowel function  Description: INTERVENTIONS:  - Assess bowel function  - Maintain adequate hydration with IV or PO as ordered and tolerated  - Evaluate effectiveness of GI medications  - Encourage mobilization and activity  - Obtain nutritional consult as needed  - Establish a toileting routine/schedule  - Consider collaborating with pharmacy to review patient's medication profile  Outcome: Progressing     Problem: METABOLIC/FLUID AND ELECTROLYTES - ADULT  Goal: Electrolytes maintained within normal limits  Description: INTERVENTIONS:  - Monitor labs and rhythm and assess patient for signs and symptoms of electrolyte imbalances  - Administer electrolyte replacement as ordered  - Monitor response to electrolyte replacements, including rhythm and repeat lab results as appropriate  - Fluid restriction as ordered  - Instruct patient on fluid and nutrition restrictions as appropriate  Outcome: Progressing

## 2025-07-20 NOTE — PLAN OF CARE
Patient is AxO4. No acute changes overnight.On 1L of O2. Scheduled tylenol and Morphine provided as needed for left sided abdominal pain. NPO except ice chips/sips with meds. Denies nausea/vomiting. Voiding per Purewick. No BM - cdiff sample pending. IVF and IV abx continued. Remote tele in place. Appropriate safety measures maintained, call light within reach, and fall precautions in place.         Problem: Patient Centered Care  Goal: Patient preferences are identified and integrated in the patient's plan of care  Description: Interventions:  - What would you like us to know as we care for you? I live at [facility]  - Provide timely, complete, and accurate information to patient/family  - Incorporate patient and family knowledge, values, beliefs, and cultural backgrounds into the planning and delivery of care  - Encourage patient/family to participate in care and decision-making at the level they choose  - Honor patient and family perspectives and choices  Outcome: Progressing     Problem: Patient/Family Goals  Goal: Patient/Family Long Term Goal  Description: Patient's Long Term Goal: discharge home    Interventions:  - Monitor labs and vital signs  - Pain control  - Further testing  - Follow provider recommendations  - See additional Care Plan goals for specific interventions  Outcome: Progressing  Goal: Patient/Family Short Term Goal  Description: Patient's Short Term Goal: pain control    Interventions:   - Prn pain medication  - NPO - bowel rest  - Follow provider recommendation  - See additional Care Plan goals for specific interventions  Outcome: Progressing     Problem: PAIN - ADULT  Goal: Verbalizes/displays adequate comfort level or patient's stated pain goal  Description: INTERVENTIONS:  - Encourage pt to monitor pain and request assistance  - Assess pain using appropriate pain scale  - Administer analgesics based on type and severity of pain and evaluate response  - Implement  non-pharmacological measures as appropriate and evaluate response  - Consider cultural and social influences on pain and pain management  - Manage/alleviate anxiety  - Utilize distraction and/or relaxation techniques  - Monitor for opioid side effects  - Notify MD/LIP if interventions unsuccessful or patient reports new pain  - Anticipate increased pain with activity and pre-medicate as appropriate  Outcome: Progressing     Problem: SAFETY ADULT - FALL  Goal: Free from fall injury  Description: INTERVENTIONS:  - Assess pt frequently for physical needs  - Identify cognitive and physical deficits and behaviors that affect risk of falls.  - [place] fall precautions as indicated by assessment.  - Educate pt/family on patient safety including physical limitations  - Instruct pt to call for assistance with activity based on assessment  - Modify environment to reduce risk of injury  - Provide assistive devices as appropriate  - Consider OT/PT consult to assist with strengthening/mobility  - Encourage toileting schedule  Outcome: Progressing     Problem: DISCHARGE PLANNING  Goal: Discharge to home or other facility with appropriate resources  Description: INTERVENTIONS:  - Identify barriers to discharge w/pt and caregiver  - Include patient/family/discharge partner in discharge planning  - Arrange for needed discharge resources and transportation as appropriate  - Identify discharge learning needs (meds, wound care, etc)  - Arrange for interpreters to assist at discharge as needed  - Consider post-discharge preferences of patient/family/discharge partner  - Complete POLST form as appropriate  - Assess patient's ability to be responsible for managing their own health  - Refer to Case Management Department for coordinating discharge planning if the patient needs post-hospital services based on physician/LIP order or complex needs related to functional status, cognitive ability or social support system  Outcome:  Progressing     Problem: GASTROINTESTINAL - ADULT  Goal: Minimal or absence of nausea and vomiting  Description: INTERVENTIONS:  - Maintain adequate hydration with IV or PO as ordered and tolerated  - Nasogastric tube to low intermittent suction as ordered  - Evaluate effectiveness of ordered antiemetic medications  - Provide nonpharmacologic comfort measures as appropriate  - Advance diet as tolerated, if ordered  - Obtain nutritional consult as needed  - Evaluate fluid balance  Outcome: Progressing  Goal: Maintains or returns to baseline bowel function  Description: INTERVENTIONS:  - Assess bowel function  - Maintain adequate hydration with IV or PO as ordered and tolerated  - Evaluate effectiveness of GI medications  - Encourage mobilization and activity  - Obtain nutritional consult as needed  - Establish a toileting routine/schedule  - Consider collaborating with pharmacy to review patient's medication profile  Outcome: Progressing     Problem: METABOLIC/FLUID AND ELECTROLYTES - ADULT  Goal: Electrolytes maintained within normal limits  Description: INTERVENTIONS:  - Monitor labs and rhythm and assess patient for signs and symptoms of electrolyte imbalances  - Administer electrolyte replacement as ordered  - Monitor response to electrolyte replacements, including rhythm and repeat lab results as appropriate  - Fluid restriction as ordered  - Instruct patient on fluid and nutrition restrictions as appropriate  Outcome: Progressing

## 2025-07-21 ENCOUNTER — APPOINTMENT (OUTPATIENT)
Dept: CT IMAGING | Facility: HOSPITAL | Age: 85
End: 2025-07-21
Attending: SURGERY

## 2025-07-21 LAB
ANION GAP SERPL CALC-SCNC: 1 MMOL/L (ref 0–18)
APTT PPP: 42.5 SECONDS (ref 23–36)
BASOPHILS # BLD AUTO: 0.03 X10(3) UL (ref 0–0.2)
BASOPHILS NFR BLD AUTO: 0.5 %
BUN BLD-MCNC: 10 MG/DL (ref 9–23)
BUN/CREAT SERPL: 13.7 (ref 10–20)
CALCIUM BLD-MCNC: 9 MG/DL (ref 8.7–10.4)
CHLORIDE SERPL-SCNC: 107 MMOL/L (ref 98–112)
CO2 SERPL-SCNC: 30 MMOL/L (ref 21–32)
CREAT BLD-MCNC: 0.73 MG/DL (ref 0.55–1.02)
DEPRECATED RDW RBC AUTO: 50 FL (ref 35.1–46.3)
EGFRCR SERPLBLD CKD-EPI 2021: 80 ML/MIN/1.73M2 (ref 60–?)
EOSINOPHIL # BLD AUTO: 0.17 X10(3) UL (ref 0–0.7)
EOSINOPHIL NFR BLD AUTO: 3 %
ERYTHROCYTE [DISTWIDTH] IN BLOOD BY AUTOMATED COUNT: 13.3 % (ref 11–15)
GLUCOSE BLD-MCNC: 93 MG/DL (ref 70–99)
HCT VFR BLD AUTO: 34.4 % (ref 35–48)
HGB BLD-MCNC: 10.8 G/DL (ref 12–16)
IMM GRANULOCYTES # BLD AUTO: 0.02 X10(3) UL (ref 0–1)
IMM GRANULOCYTES NFR BLD: 0.3 %
INR BLD: 1.24 (ref 0.8–1.2)
LYMPHOCYTES # BLD AUTO: 1.35 X10(3) UL (ref 1–4)
LYMPHOCYTES NFR BLD AUTO: 23.5 %
MAGNESIUM SERPL-MCNC: 1.9 MG/DL (ref 1.6–2.6)
MCH RBC QN AUTO: 31.9 PG (ref 26–34)
MCHC RBC AUTO-ENTMCNC: 31.4 G/DL (ref 31–37)
MCV RBC AUTO: 101.5 FL (ref 80–100)
MONOCYTES # BLD AUTO: 0.47 X10(3) UL (ref 0.1–1)
MONOCYTES NFR BLD AUTO: 8.2 %
NEUTROPHILS # BLD AUTO: 3.71 X10 (3) UL (ref 1.5–7.7)
NEUTROPHILS # BLD AUTO: 3.71 X10(3) UL (ref 1.5–7.7)
NEUTROPHILS NFR BLD AUTO: 64.5 %
OSMOLALITY SERPL CALC.SUM OF ELEC: 285 MOSM/KG (ref 275–295)
PHOSPHATE SERPL-MCNC: 2.3 MG/DL (ref 2.4–5.1)
PLATELET # BLD AUTO: 141 10(3)UL (ref 150–450)
POTASSIUM SERPL-SCNC: 4.6 MMOL/L (ref 3.5–5.1)
PROTHROMBIN TIME: 16.3 SECONDS (ref 11.6–14.8)
RBC # BLD AUTO: 3.39 X10(6)UL (ref 3.8–5.3)
SODIUM SERPL-SCNC: 138 MMOL/L (ref 136–145)
WBC # BLD AUTO: 5.8 X10(3) UL (ref 4–11)

## 2025-07-21 PROCEDURE — 85025 COMPLETE CBC W/AUTO DIFF WBC: CPT | Performed by: SURGERY

## 2025-07-21 PROCEDURE — 74177 CT ABD & PELVIS W/CONTRAST: CPT | Performed by: SURGERY

## 2025-07-21 PROCEDURE — 85610 PROTHROMBIN TIME: CPT | Performed by: SURGERY

## 2025-07-21 PROCEDURE — 80048 BASIC METABOLIC PNL TOTAL CA: CPT | Performed by: SURGERY

## 2025-07-21 PROCEDURE — 84100 ASSAY OF PHOSPHORUS: CPT | Performed by: SURGERY

## 2025-07-21 PROCEDURE — 85730 THROMBOPLASTIN TIME PARTIAL: CPT | Performed by: SURGERY

## 2025-07-21 PROCEDURE — 83735 ASSAY OF MAGNESIUM: CPT | Performed by: SURGERY

## 2025-07-21 PROCEDURE — 97166 OT EVAL MOD COMPLEX 45 MIN: CPT

## 2025-07-21 NOTE — PROGRESS NOTES
[facility] Hospitalist Progress Note     CC: Hospital Follow up    PCP: Timothy Hicks MD       Assessment/Plan:     Principal Problem:    Diverticulitis of colon with perforation  Active Problems:    Diverticulitis    Ms. Canales is a 84 year old female with PMH sig for cranial aneurysm s/p coiling, COPD, HTN, DVT on Eliquis, bipolar, hypothyroidism, aortic atherosclerosis, who presents with nausea, vomiting and abd pain, found to have acute diverticulitis with contained perforation.       Acute diverticulitis w/ contained perforation  Nausea/vomiting/abd pain  - CT scan with above, no abscess  - no hs of diverticulitis  - NPO, IVF  - IV zosyn  - IV and oral pain meds   - bowel rest  - Gen surg consulted  - plan for CT today     Cystic lesion on pancreas   - needs outpatient fu with poss MRI     HTN  - resume home meds as able     Hypothyroidism   - resume home meds     Chronic back pain  - see physiatry as OP     Hs of bipolar DO  - resume home meds     Hs of DVT  - continue eliquis (hold for now)     Hs of cranial aneurysm   - s/p coiling      FN:  - IVF: 83  - Diet: NPO, sips with meds     DVT Prophy:scd, eliquis on hold in case of drain  Lines: PIV     Dispo: pending clinical course    Questions/concerns were discussed with patient and/or family by bedside.    Thank You,  Sameer Dang MD    Hospitalist with [facility]     Subjective:     Pain slightly better today, no nausea no vomiting, no fevers or chills     OBJECTIVE:    Blood pressure 151/67, pulse 66, temperature 98 °F (36.7 °C), temperature source Oral, resp. rate 16, weight 154 lb 5.2 oz (70 kg), SpO2 96%, not currently breastfeeding.    Temp:  [98 °F (36.7 °C)-98.9 °F (37.2 °C)] 98 °F (36.7 °C)  Pulse:  [66-91] 66  Resp:  [16-18] 16  BP: (108-182)/(40-75) 151/67  SpO2:  [87 %-96 %] 96 %      Intake/Output:    Intake/Output Summary (Last 24 hours) at 7/21/2025 1219  Last data filed at 7/21/2025 1200  Gross per 24 hour   Intake 1864.35  ml   Output 2300 ml   Net -435.65 ml       Last 3 Weights   07/18/25 1419 154 lb 5.2 oz (70 kg)   07/18/25 1110 154 lb 5.2 oz (70 kg)   06/13/25 1415 180 lb (81.6 kg)   06/13/25 1054 180 lb (81.6 kg)   05/08/25 1117 192 lb (87.1 kg)       Exam    Gen: No acute distress,    Heent: NC AT,    Pulm: Lungs clear, normal respiratory effort  CV: Heart with regular rate and rhythm   Abd: Abdomen soft, mild TTP in LLQ, no rebound or guarding   MSK: no clubbing, no cyanosis  Skin: no rashes or lesions         Data Review:       Labs:     Recent Labs   Lab 07/19/25  0657 07/20/25  0630 07/21/25  0706   RBC 3.40* 3.22* 3.39*   HGB 10.9* 10.5* 10.8*   HCT 34.1* 32.6* 34.4*   .3* 101.2* 101.5*   MCH 32.1 32.6 31.9   MCHC 32.0 32.2 31.4   RDW 13.5 13.2 13.3   NEPRELIM 4.47 4.56 3.71   WBC 6.2 6.4 5.8   .0* 133.0* 141.0*         Recent Labs   Lab 07/19/25  0657 07/20/25  0630 07/21/25  0706   GLU 70 92 93   BUN 13 12 10   CREATSERUM 0.81 0.77 0.73   EGFRCR 71 75 80   CA 9.0 8.7 9.0    137 138   K 3.9 4.2 4.6    107 107   CO2 28.0 28.0 30.0       Recent Labs   Lab 07/18/25  1118   ALT <7*   AST 12   ALB 3.7         Imaging:  No results found.        Meds:     Scheduled Medications[1]  Medication Infusions[2]  PRN Medications[3]               [1]    sodium phosphate  15 mmol Intravenous Once    acetaminophen  1,000 mg Oral Q8H    miconazole   Topical BID    piperacillin-tazobactam  3.375 g Intravenous Q8H    [Held by provider] apixaban  5 mg Oral BID    donepezil  10 mg Oral QAM    gabapentin  200 mg Oral BID    hydrALAZINE  50 mg Oral BID    lamoTRIgine  100 mg Oral BID    levothyroxine  100 mcg Oral Before breakfast    QUEtiapine  200 mg Oral Nightly    morphINE PF  2 mg Intravenous Once   [2]    potassium chloride in dextrose 5%-sodium chloride 0.45% 83 mL/hr at 07/21/25 0056   [3]   morphINE PF    melatonin    ondansetron    benzonatate    cyclobenzaprine    HYDROcodone-acetaminophen    LORazepam

## 2025-07-21 NOTE — PLAN OF CARE
Patient is AxO4. No acute changes overnight.On 1L of O2. Scheduled tylenol and Morphine provided as needed for left sided abdominal pain. NPO except ice chips/sips with meds. Denies nausea/vomiting. Voiding per Purewick. No BM. IVF and IV abx continued. Remote tele in place. Appropriate safety measures maintained, call light within reach, and fall precautions in place.      Problem: Patient Centered Care  Goal: Patient preferences are identified and integrated in the patient's plan of care  Description: Interventions:  - What would you like us to know as we care for you? I live at [facility]  - Provide timely, complete, and accurate information to patient/family  - Incorporate patient and family knowledge, values, beliefs, and cultural backgrounds into the planning and delivery of care  - Encourage patient/family to participate in care and decision-making at the level they choose  - Honor patient and family perspectives and choices  Outcome: Progressing     Problem: Patient/Family Goals  Goal: Patient/Family Long Term Goal  Description: Patient's Long Term Goal: discharge home    Interventions:  - Monitor labs and vital signs  - Pain control  - Further testing  - Follow provider recommendations  - See additional Care Plan goals for specific interventions  Outcome: Progressing  Goal: Patient/Family Short Term Goal  Description: Patient's Short Term Goal: pain control    Interventions:   - Prn pain medication  - NPO - bowel rest  - Follow provider recommendation  - See additional Care Plan goals for specific interventions  Outcome: Progressing     Problem: PAIN - ADULT  Goal: Verbalizes/displays adequate comfort level or patient's stated pain goal  Description: INTERVENTIONS:  - Encourage pt to monitor pain and request assistance  - Assess pain using appropriate pain scale  - Administer analgesics based on type and severity of pain and evaluate response  - Implement non-pharmacological measures as  appropriate and evaluate response  - Consider cultural and social influences on pain and pain management  - Manage/alleviate anxiety  - Utilize distraction and/or relaxation techniques  - Monitor for opioid side effects  - Notify MD/LIP if interventions unsuccessful or patient reports new pain  - Anticipate increased pain with activity and pre-medicate as appropriate  Outcome: Progressing     Problem: SAFETY ADULT - FALL  Goal: Free from fall injury  Description: INTERVENTIONS:  - Assess pt frequently for physical needs  - Identify cognitive and physical deficits and behaviors that affect risk of falls.  - [place] fall precautions as indicated by assessment.  - Educate pt/family on patient safety including physical limitations  - Instruct pt to call for assistance with activity based on assessment  - Modify environment to reduce risk of injury  - Provide assistive devices as appropriate  - Consider OT/PT consult to assist with strengthening/mobility  - Encourage toileting schedule  Outcome: Progressing     Problem: DISCHARGE PLANNING  Goal: Discharge to home or other facility with appropriate resources  Description: INTERVENTIONS:  - Identify barriers to discharge w/pt and caregiver  - Include patient/family/discharge partner in discharge planning  - Arrange for needed discharge resources and transportation as appropriate  - Identify discharge learning needs (meds, wound care, etc)  - Arrange for interpreters to assist at discharge as needed  - Consider post-discharge preferences of patient/family/discharge partner  - Complete POLST form as appropriate  - Assess patient's ability to be responsible for managing their own health  - Refer to Case Management Department for coordinating discharge planning if the patient needs post-hospital services based on physician/LIP order or complex needs related to functional status, cognitive ability or social support system  Outcome: Progressing     Problem: GASTROINTESTINAL -  ADULT  Goal: Minimal or absence of nausea and vomiting  Description: INTERVENTIONS:  - Maintain adequate hydration with IV or PO as ordered and tolerated  - Nasogastric tube to low intermittent suction as ordered  - Evaluate effectiveness of ordered antiemetic medications  - Provide nonpharmacologic comfort measures as appropriate  - Advance diet as tolerated, if ordered  - Obtain nutritional consult as needed  - Evaluate fluid balance  Outcome: Progressing  Goal: Maintains or returns to baseline bowel function  Description: INTERVENTIONS:  - Assess bowel function  - Maintain adequate hydration with IV or PO as ordered and tolerated  - Evaluate effectiveness of GI medications  - Encourage mobilization and activity  - Obtain nutritional consult as needed  - Establish a toileting routine/schedule  - Consider collaborating with pharmacy to review patient's medication profile  Outcome: Progressing     Problem: METABOLIC/FLUID AND ELECTROLYTES - ADULT  Goal: Electrolytes maintained within normal limits  Description: INTERVENTIONS:  - Monitor labs and rhythm and assess patient for signs and symptoms of electrolyte imbalances  - Administer electrolyte replacement as ordered  - Monitor response to electrolyte replacements, including rhythm and repeat lab results as appropriate  - Fluid restriction as ordered  - Instruct patient on fluid and nutrition restrictions as appropriate  Outcome: Progressing

## 2025-07-21 NOTE — CM/SW NOTE
SW attempted to meet with pt, pt unavailable.    SW/CM to follow up.    SW/CM to remain available for support and/or discharge planning.    MS RadhaW, LSW [id number]

## 2025-07-21 NOTE — OCCUPATIONAL THERAPY NOTE
OCCUPATIONAL THERAPY EVALUATION - INPATIENT     Room Number: 457/457-A  Evaluation Date: 7/21/2025  Type of Evaluation: Initial       Physician Order: IP Consult to Occupational Therapy  Reason for Therapy: ADL/IADL Dysfunction and Discharge Planning    OCCUPATIONAL THERAPY ASSESSMENT   RN cleared pt for participation in OT session. Upon arrival, pt was supine in bed and agreeable to activity. No visitors present during session. Pt was left in bed and alarm was activated. Call light and all needs left in reach. Handoff given to RN.    Patient is a 84 year old female admitted 7/18/2025 for Diverticulitis of sigmoid colon w perforation and phlegmon/developing abscess .  Prior to admission, pt was receiving assist for ADLs and functional tranfers.  Patient is currently requiring up to total A for ADLs overall along with max A for rolling and supine scooting. Pt has the following impairments: weakness and pain.    ACTIVITY TOLERANCE  Pulse: 57                    Oxygen Therapy  SPO2% on Room Air at Rest: 96    Education provided  Educated pt about role of OT and hospital therapy process as well as proper safety techniques including proper hand placement, body mechanics, safety techniques . Pt/family verbalized/demonstrated fair carryover.    Patient will benefit from continued skilled OT Services to promote return to prior level of function and safety with continuous assistance and gradual rehabilitative therapy  **unsure of pt's baseline    PLAN  OT Treatment Plan: Balance activities;Energy conservation/work simplification techniques;Continued evaluation;Compensatory technique education;Fine motor coordination activities;Neuromuscluar reeducation;Equipment eval/education;Patient/Family training;Patient/Family education;Cognitive reorientation;Endurance training;UE strengthening/ROM;Functional transfer training;Visual perceptual training;IADL training;ADL training      OCCUPATIONAL THERAPY MEDICAL/SOCIAL HISTORY      Problem List  Principal Problem:    Diverticulitis of colon with perforation  Active Problems:    Diverticulitis      Past Medical History  Past Medical History[1]    Past Surgical History  Past Surgical History[2]    HOME SITUATION  Type of Home: Assisted living facility     Lives With: Staff 24 hours                      Drives: No  Patient Regularly Uses: Wheelchair    SUBJECTIVE  \"I am going for CT.\"    OCCUPATIONAL THERAPY EXAMINATION      OBJECTIVE  Precautions: Abdominal protective strategies  Fall Risk: High fall risk    PAIN ASSESSMENT  Rating: Unable to rate  Location: abdomen        RANGE OF MOTION   Upper extremity ROM is within functional limits     STRENGTH ASSESSMENT  Upper extremity strength is within functional limits     COORDINATION  Gross Motor: WFL   Fine Motor: WFL     ACTIVITIES OF DAILY LIVING ASSESSMENT  AM-PAC ‘6-Clicks’ Inpatient Daily Activity Short Form  How much help from another person does the patient currently need…  -   Putting on and taking off regular lower body clothing?: Total  -   Bathing (including washing, rinsing, drying)?: A Lot  -   Toileting, which includes using toilet, bedpan or urinal? : Total  -   Putting on and taking off regular upper body clothing?: A Lot  -   Taking care of personal grooming such as brushing teeth?: A Little  -   Eating meals?: A Little    AM-PAC Score:  Score: 12  Approx Degree of Impairment: 66.57%  Standardized Score (AM-PAC Scale): 30.6  CMS Modifier (G-Code): CL    Bed mobility:  Max A for rolling and supine scooting    FUNCTIONAL ADL ASSESSMENT  Overall total A    The patient's Approx Degree of Impairment: 66.57% has been calculated based on documentation in the [facility] '6 clicks' Inpatient Daily Activity Short Form.  Research supports that patients with this level of impairment may benefit from GR. Final disposition will be made by interdisciplinary medical team.    OT Goals  Patients self stated goal is: to have less pain     Patient  will complete functional transfer with mod A  Comment:     Patient will complete toileting with mod A  Comment:     Patient will tolerate standing for 1 minutes in prep for adls with mod A   Comment:               Goals  on:   Frequency: 3-5x/wk    Patient Evaluation Complexity Level:   Occupational Profile/Medical History MODERATE - Expanded review of history including review of medical or therapy record   Specific performance deficits impacting engagement in ADL/IADL MODERATE  3 - 5 performance deficits   Client Assessment/Performance Deficits MODERATE - Comorbidities and min to mod modifications of tasks    Clinical Decision Making MODERATE - Analysis of occupational profile, detailed assessments, several treatment options    Overall Complexity MODERATE     OT Session Time: 15    Michelledaniel Orantes OT  [facility]  Inpatient Rehabilitation  Occupational Therapy  [phone]         [1]   Past Medical History:   Anxiety state, unspecified    Aortic atherosclerosis    CT scan 11-19    Arthritis of both knees    knee replacement     Arthritis of right hip    Back problem    Brain aneurysm (HCC)    Brain Aneurysm, Stent placed     Cervical disc disease    Cervical Discectomy     Cholecystitis    Cholecystectomy     COPD (chronic obstructive pulmonary disease) (HCC)    PFTs 2-15 with FEV1 1.11 L and FEV1/FVC ratio 63%    Deep vein thrombosis (HCC)    recurrent DVT- on lifelong AC    Dehydration    Fluids, Medication adjustment     Dementia (HCC)    Depression    Disorder of thyroid    Esophageal reflux    Fibromyalgia    Hammertoe    hammertoe 5 left, pain    Hearing impairment    Bilateral hearing aids    High blood pressure    Hip pain, left    Hypothyroidism    Incontinence    L3-4 stable slight grade 1 retrolisthesis    L4-5 mild-mod diffuse bulging disc    L4-5 slight grade 1 unstable spondylolisthesis    Leg wound, left    Low back pain    Migraines    Muscle weakness    Nausea vomiting and  diarrhea    Onychomycosis    Debridement     Oropharyngeal dysphagia    Osteoarthrosis, unspecified whether generalized or localized, unspecified site    Other and unspecified hyperlipidemia    Other complicated headache syndrome    Other spondylosis, lumbar region    Pneumonia    S/P cervical spinal fusion: C3-6 and C7-T1    s/p L5-S1 right laminectomy    stent placement    cerebral    Thyroid disease    Toe pain    Onychomycosis, Debridement , Hammertoe     Tonsillitis    Tonsillitis     Visual impairment    EYE GLASSES    Wound of left leg    Left leg debridement and Split Thickness Skin Graft to left thigh   [2]   Past Surgical History:  Procedure Laterality Date    Anesth,neck vessel surgery nos      x4    Brain surgery  ~2009    Brain Aneurysm, Stent placed     Carotid endarterectomy Right     Carpal tunnel release Right ~2008    Cholecystectomy  1984    Cholecystitis    Colonoscopy  2010    Debridement of nail(s), 1-5      Toe, Onychomycosis    Egd  05/2019    Gastric polyps only    Egd  2006    Eye surgery      x2 FOR \"CROSSED EYES\"    Hip replacement surgery Bilateral     Hysterectomy  1967    Partial Hysterectomy    Jaw surgery      Knee replacement surgery Bilateral     Bilateral arthritis     Laminectomy      WITH FUSION PER PATIENT    Other surgical history  1985,1995,2003,2009    Cervical Discectomy AND FUSION    Prep site t/a/l 1st 100 sq cm/1pct Left 08/21/2019    Left leg debridement, VAC placed    Spine surgery procedure unlisted      Split grft proc trunk <100sqcm Left 10/08/2019    Left leg debridement and Split Thickness Skin Graft to left thigh    Tonsillectomy  1950    Tonsillitis     Total hip replacement      Total knee replacement

## 2025-07-21 NOTE — PROGRESS NOTES
[facility]  part of [facility]  Progress Note    Gretta Canales Patient Status:  Inpatient    12/3/1940 MRN [id number]   Location [facility] 4W/SW/SE Attending Sameer Dang MD   Hosp Day # 3 PCP Timothy Hicks MD       Subjective:   Feels better, less abdominal pain.     Objective:       Blood pressure 135/54, pulse 62, temperature 97.4 °F (36.3 °C), temperature source Oral, resp. rate 16, weight 154 lb 5.2 oz (70 kg), SpO2 98%, not currently breastfeeding.      Results:   Labs:  Lab Results   Component Value Date    WBC 5.8 2025    RBC 3.39 2025    HGB 10.8 2025    HCT 34.4 2025    .5 2025    MCH 31.9 2025    MCHC 31.4 2025    RDW 13.3 2025    .0 2025       Assessment and Plan:   Diverticulitis - today's repeat CT scan reviewed.    Area in question of sigmoid colon phlegmonous fluid/gas collection has improved.  Minimal stranding seen.  No indication for drain placement.       ROMIE SAWANT, APRN  2025

## 2025-07-21 NOTE — PLAN OF CARE
Patient alert and oriented x4, forgetful. VSS. On 1L o2, unable to wean. CT completed. CLD started, LF/ soft to be started tonight. Purewick in place. PRN morphine and scheduled tylenol for pain. IVF as ordered. Patient bathed in bed. Bed locked and in lowest position.   Problem: Patient Centered Care  Goal: Patient preferences are identified and integrated in the patient's plan of care  Description: Interventions:  - What would you like us to know as we care for you? I live at [facility]  - Provide timely, complete, and accurate information to patient/family  - Incorporate patient and family knowledge, values, beliefs, and cultural backgrounds into the planning and delivery of care  - Encourage patient/family to participate in care and decision-making at the level they choose  - Honor patient and family perspectives and choices  Outcome: Progressing     Problem: Patient/Family Goals  Goal: Patient/Family Long Term Goal  Description: Patient's Long Term Goal: discharge home    Interventions:  - Monitor labs and vital signs  - Pain control  - Further testing  - Follow provider recommendations  - See additional Care Plan goals for specific interventions  Outcome: Progressing  Goal: Patient/Family Short Term Goal  Description: Patient's Short Term Goal: pain control    Interventions:   - Prn pain medication  - NPO - bowel rest  - Follow provider recommendation  - See additional Care Plan goals for specific interventions  Outcome: Progressing     Problem: PAIN - ADULT  Goal: Verbalizes/displays adequate comfort level or patient's stated pain goal  Description: INTERVENTIONS:  - Encourage pt to monitor pain and request assistance  - Assess pain using appropriate pain scale  - Administer analgesics based on type and severity of pain and evaluate response  - Implement non-pharmacological measures as appropriate and evaluate response  - Consider cultural and social influences on pain and pain management  -  Manage/alleviate anxiety  - Utilize distraction and/or relaxation techniques  - Monitor for opioid side effects  - Notify MD/LIP if interventions unsuccessful or patient reports new pain  - Anticipate increased pain with activity and pre-medicate as appropriate  Outcome: Progressing     Problem: SAFETY ADULT - FALL  Goal: Free from fall injury  Description: INTERVENTIONS:  - Assess pt frequently for physical needs  - Identify cognitive and physical deficits and behaviors that affect risk of falls.  - Collins fall precautions as indicated by assessment.  - Educate pt/family on patient safety including physical limitations  - Instruct pt to call for assistance with activity based on assessment  - Modify environment to reduce risk of injury  - Provide assistive devices as appropriate  - Consider OT/PT consult to assist with strengthening/mobility  - Encourage toileting schedule  Outcome: Progressing     Problem: DISCHARGE PLANNING  Goal: Discharge to home or other facility with appropriate resources  Description: INTERVENTIONS:  - Identify barriers to discharge w/pt and caregiver  - Include patient/family/discharge partner in discharge planning  - Arrange for needed discharge resources and transportation as appropriate  - Identify discharge learning needs (meds, wound care, etc)  - Arrange for interpreters to assist at discharge as needed  - Consider post-discharge preferences of patient/family/discharge partner  - Complete POLST form as appropriate  - Assess patient's ability to be responsible for managing their own health  - Refer to Case Management Department for coordinating discharge planning if the patient needs post-hospital services based on physician/LIP order or complex needs related to functional status, cognitive ability or social support system  Outcome: Progressing     Problem: GASTROINTESTINAL - ADULT  Goal: Minimal or absence of nausea and vomiting  Description: INTERVENTIONS:  - Maintain adequate  hydration with IV or PO as ordered and tolerated  - Nasogastric tube to low intermittent suction as ordered  - Evaluate effectiveness of ordered antiemetic medications  - Provide nonpharmacologic comfort measures as appropriate  - Advance diet as tolerated, if ordered  - Obtain nutritional consult as needed  - Evaluate fluid balance  Outcome: Progressing  Goal: Maintains or returns to baseline bowel function  Description: INTERVENTIONS:  - Assess bowel function  - Maintain adequate hydration with IV or PO as ordered and tolerated  - Evaluate effectiveness of GI medications  - Encourage mobilization and activity  - Obtain nutritional consult as needed  - Establish a toileting routine/schedule  - Consider collaborating with pharmacy to review patient's medication profile  Outcome: Progressing     Problem: METABOLIC/FLUID AND ELECTROLYTES - ADULT  Goal: Electrolytes maintained within normal limits  Description: INTERVENTIONS:  - Monitor labs and rhythm and assess patient for signs and symptoms of electrolyte imbalances  - Administer electrolyte replacement as ordered  - Monitor response to electrolyte replacements, including rhythm and repeat lab results as appropriate  - Fluid restriction as ordered  - Instruct patient on fluid and nutrition restrictions as appropriate  Outcome: Progressing

## 2025-07-21 NOTE — PROGRESS NOTES
[facility]  part of [facility]    General Surgery Progress Note  Gretta Canales  CSN:[id number]  HD# [id number]       Subjective:   Complains of much improved left sided abdominal pain and denies N/V. More comfortable today. Denied pain at rest  Passing flatus, no BM     Exam:     General: awake and alert and in no acute distress  Pulmonary:lungs clear to auscultation bilaterally  Cardiac: nl S1,S2, no S3, no S4, IV/VI systolic murmur at LLSB and RUSB, and irregular w frequent ectopy  Abdomen: normal BS, nondistended, and nontender   Extremities: calves nontender, no edema, motor intact, and sensory intact    Assessment and Plan:   Diverticulitis of colon with perforation   Diverticulitis of sigmoid colon w perforation and phlegmon/developing abscess    Repeat CT a/p showed no abscess and improved inflammation  - no need for IR drainage  Conservative treatment w Zosyn.   Advance diet today.  Pain control w morphine Q2h as needed   Tylenol scheduled    I will follow closely    Objective:     Vitals:    07/20/25 2058 07/21/25 0359 07/21/25 0854 07/21/25 1257   BP: 108/40 143/53 151/67 135/54   Pulse: 91 66  62   Resp:  16  16   Temp:  98 °F (36.7 °C)  97.4 °F (36.3 °C)   TempSrc:  Oral  Oral   SpO2: 94% 92% 96% 98%   Weight:         Body mass index is 23.46 kg/m².       Intake/Output Summary (Last 24 hours) at 7/21/2025 1508  Last data filed at 7/21/2025 1345  Gross per 24 hour   Intake 1814.35 ml   Output 2200 ml   Net -385.65 ml       No results found.      Results:     Recent Labs   Lab 07/19/25  0657 07/20/25  0630 07/21/25  0706   RBC 3.40* 3.22* 3.39*   HGB 10.9* 10.5* 10.8*   HCT 34.1* 32.6* 34.4*   .3* 101.2* 101.5*   MCH 32.1 32.6 31.9   MCHC 32.0 32.2 31.4   RDW 13.5 13.2 13.3   NEPRELIM 4.47 4.56 3.71   WBC 6.2 6.4 5.8   .0* 133.0* 141.0*       Recent Labs   Lab 07/19/25  0657 07/20/25  0630 07/21/25  0706   GLU 70 92 93   BUN 13 12 10   CREATSERUM 0.81 0.77 0.73   CA 9.0 8.7 9.0     137 138   K 3.9 4.2 4.6    107 107   CO2 28.0 28.0 30.0       Lab Results   Component Value Date    WBC 5.8 07/21/2025    HGB 10.8 07/21/2025    HCT 34.4 07/21/2025    .0 07/21/2025     07/21/2025    K 4.6 07/21/2025     07/21/2025    CO2 30.0 07/21/2025    BUN 10 07/21/2025    CREATSERUM 0.73 07/21/2025    GLU 93 07/21/2025    MG 1.9 07/21/2025    PHOS 2.3 07/21/2025    INR 1.24 07/21/2025    PTT 42.5 07/21/2025    CA 9.0 07/21/2025          Narayan Talbert MD [facility]  07/21/25  3:10 PM

## 2025-07-21 NOTE — DIETARY NOTE
ADULT NUTRITION INITIAL ASSESSMENT    Pt is at moderate nutrition risk.  Pt meets moderate malnutrition criteria.      RECOMMENDATIONS TO MD: See nutrition intervention for ONS (oral nutrition supplements)    ADMITTING DIAGNOSIS:  Diverticulitis of colon with perforation [K57.20]  PERTINENT PAST MEDICAL HISTORY: Past Medical History[1]    PATIENT STATUS: Initial 07/21/25: Pt assessed due to screening at risk for weight loss d/t poor appetite. 85 y/o female with PMH cranial aneurysm s/p coiling, COPD presents with nausea, vomiting, and abd pain, found to have acute diverticulitis with contained perforation. Pt NPO since admit for bowel rest per surg. Diet adv to clear liquids earlier this afternoon. Pt visited, reported weight loss over past year r/t poor appetite. Per chart review, pt weighed 200 lbs in January and 180 lbs in June. Pt stated she weighed herself last week and she was 178 lbs (-22 lbs, 11%, x 6 months, significant per guidelines). Suspect that current weight 154 lbs likely inaccurate, recommend re-weighing pt using zeroed bed scale or standing scale for more accurate weight reading to determine true weight loss. Pt stated she has been eating less lately, normally has 2 meals per day, but now only having 1 meal per day. Pt with very poor intakes over the past week r/t n/v/abd pain, however is feeling better today upon visit. Noted diet adv to clear liquids, will add ONS to order to help maximize intake. Had some apple juice prior to visit, tolerating well. Feeling hungry at time of visit. Will monitor for diet adv and follow per protocol.    FOOD/NUTRITION RELATED HISTORY:  Appetite: poor appetite PTA  Intake: NPO since admit, diet just adv to clear liquids this afternoon  Intake Meeting Needs: No, but oral nutrition supplements (ONS) to maximize  Percent Meals Eaten (last 6 days)       Date/Time Percent Meals Eaten (%)    07/18/25 1553 0 %     Percent Meals Eaten (%): npo at 07/18/25 1553    07/18/25  1828 0 %     Percent Meals Eaten (%): pt npo at 07/18/25 1828    07/18/25 2100 0 %    07/19/25 0821 0 %     Percent Meals Eaten (%): NPO at 07/19/25 0821    07/19/25 1217 0 %     Percent Meals Eaten (%): NPO at 07/19/25 1217    07/19/25 1900 0 %     Percent Meals Eaten (%): NPO at 07/19/25 1900    07/20/25 0429 0 %    07/20/25 0950 0 %     Percent Meals Eaten (%): npo at 07/20/25 0950    07/20/25 2000 0 %    07/21/25 0500 0 %           Food Allergies: No Known Food Allergies (NKFA)  Cultural/Ethnic/Restoration Preferences: None    GASTROINTESTINAL: n/v/abd pain PTA    MEDICATIONS: reviewed  Scheduled Medications[2]  LABS: reviewed  Recent Labs     07/19/25  0657 07/20/25  0630 07/21/25  0706   GLU 70 92 93   BUN 13 12 10   CREATSERUM 0.81 0.77 0.73   CA 9.0 8.7 9.0   MG 1.9 1.9 1.9    137 138   K 3.9 4.2 4.6    107 107   CO2 28.0 28.0 30.0   PHOS  --   --  2.3*   OSMOCALC 289 283 285       WEIGHT HISTORY:  Patient Weight(s) for the past 336 hrs:   Weight   07/18/25 1419 70 kg (154 lb 5.2 oz)   07/18/25 1110 70 kg (154 lb 5.2 oz)     Wt Readings from Last 10 Encounters:   07/18/25 70 kg (154 lb 5.2 oz)   06/13/25 81.6 kg (180 lb)   05/08/25 87.1 kg (192 lb)   03/26/25 81.6 kg (180 lb)   01/16/25 90.7 kg (200 lb)   10/16/24 90.7 kg (200 lb)   07/22/24 90.7 kg (200 lb)   06/06/24 88.5 kg (195 lb)   05/03/24 88.5 kg (195 lb)   03/11/24 89.4 kg (197 lb)       ANTHROPOMETRICS:  HT:  172.7 cm (5' 8\")  Wt Readings from Last 1 Encounters:   07/18/25 70 kg (154 lb 5.2 oz)     Last weight: Suspect last weight inaccurate, recommend re-weighing pt for more accurate true weight reading  BMI: Body mass index is 23.46 kg/m².  Dosing Weight: 80.9 kg - per pt report, weighed herself last week, utilized for anthropometric calculations  BMI CLASSIFICATION: 25-29.9 kg/m2 - overweight  No data recorded           127% IBW  Usual Body Wt: 200 lbs       89% UBW    NUTRITION RELATED PHYSICAL FINDINGS:  - Nutrition Focused Physical  Exam (NFPE): mild depletion body fat orbital region and mild depletion muscle mass temple region  - Fluid Accumulation: none . See RN documentation for details  - Skin Integrity: at risk. See RN documentation for details    CRITERIA FOR MALNUTRITION DIAGNOSIS:  Criteria for non-severe malnutrition diagnosis: chronic illness related to wt loss 10% in 6 months, energy intake less than75% for greater than 1 month, body fat mild depletion, and muscle mass mild depletion.     NUTRITION DIAGNOSIS/PROBLEM:   Moderate Malnutrition related to Chronic - Physiological causes resulting in anorexia or diminished intake  as evidenced by wt loss 10% in 6 months, energy intake less than75% for greater than 1 month, body fat mild depletion, and muscle mass mild depletion    NUTRITION INTERVENTION:     NUTRITION PRESCRIPTION:   Estimated Nutrition needs: --dosing wt of 80.9 kg - wt pt reported from last week  Calories: 1615 - 2000 calories/day (20-25 calories per kg Dosing wt)  Protein: 65 - 105 g protein/day (0.8-1.3 g protein/kg Dosing wt)  Fluid Needs: 1 mL/kcal    - Diet:       Procedures    Clear liquid diet Is Patient on Accuchecks? No      - Nutrition Care Plan: Encouraged increased PO intake and Initiated ONS (oral nutritional supplements)  - ONS (Oral Nutrition Supplements)/Meals/Snacks: Ensure Clear (240 calories/ 8 g protein each) BID Apple or Mixed Berry   - Vitamin and mineral supplements: none  - Feeding assistance: independent  - Nutrition education: Discussed importance of adequate energy and protein intake    - Coordination of nutrition care: collaboration with other providers  - Discharge and transfer of nutrition care to new setting or provider: monitor plans    MONITOR AND EVALUATE/NUTRITION GOALS:  - Food and Nutrient Intake:      Monitor: adequacy of PO intake and adequacy of supplement intake  - Food and Nutrient Administration:      Monitor: N/A  - Anthropometric Measurement:    Monitor weight  - Nutrition  Goals:      halt wt loss, good supplement intake, and diet beyond clears in 48hrs    DIETITIAN FOLLOW UP: RD to follow and monitor nutrition status      Vivian Dang, MS, RD, LDN  Clinical Dietitian  [id number]         [1]   Past Medical History:   Anxiety state, unspecified    Aortic atherosclerosis    CT scan 11-19    Arthritis of both knees    knee replacement     Arthritis of right hip    Back problem    Brain aneurysm (HCC)    Brain Aneurysm, Stent placed     Cervical disc disease    Cervical Discectomy     Cholecystitis    Cholecystectomy     COPD (chronic obstructive pulmonary disease) ([facility])    PFTs 2-15 with FEV1 1.11 L and FEV1/FVC ratio 63%    Deep vein thrombosis (HCC)    recurrent DVT- on lifelong AC    Dehydration    Fluids, Medication adjustment     Dementia (HCC)    Depression    Disorder of thyroid    Esophageal reflux    Fibromyalgia    Hammertoe    hammertoe 5 left, pain    Hearing impairment    Bilateral hearing aids    High blood pressure    Hip pain, left    Hypothyroidism    Incontinence    L3-4 stable slight grade 1 retrolisthesis    L4-5 mild-mod diffuse bulging disc    L4-5 slight grade 1 unstable spondylolisthesis    Leg wound, left    Low back pain    Migraines    Muscle weakness    Nausea vomiting and diarrhea    Onychomycosis    Debridement     Oropharyngeal dysphagia    Osteoarthrosis, unspecified whether generalized or localized, unspecified site    Other and unspecified hyperlipidemia    Other complicated headache syndrome    Other spondylosis, lumbar region    Pneumonia    S/P cervical spinal fusion: C3-6 and C7-T1    s/p L5-S1 right laminectomy    stent placement    cerebral    Thyroid disease    Toe pain    Onychomycosis, Debridement , Hammertoe     Tonsillitis    Tonsillitis     Visual impairment    EYE GLASSES    Wound of left leg    Left leg debridement and Split Thickness Skin Graft to left thigh   [2]    sodium phosphate  15 mmol Intravenous Once    acetaminophen  1,000 mg  Oral Q8H    miconazole   Topical BID    piperacillin-tazobactam  3.375 g Intravenous Q8H    [Held by provider] apixaban  5 mg Oral BID    donepezil  10 mg Oral QAM    gabapentin  200 mg Oral BID    hydrALAZINE  50 mg Oral BID    lamoTRIgine  100 mg Oral BID    levothyroxine  100 mcg Oral Before breakfast    QUEtiapine  200 mg Oral Nightly    morphINE PF  2 mg Intravenous Once

## 2025-07-22 LAB
ANION GAP SERPL CALC-SCNC: 5 MMOL/L (ref 0–18)
BASOPHILS # BLD AUTO: 0.03 X10(3) UL (ref 0–0.2)
BASOPHILS NFR BLD AUTO: 0.6 %
BUN BLD-MCNC: 9 MG/DL (ref 9–23)
BUN/CREAT SERPL: 11.8 (ref 10–20)
CALCIUM BLD-MCNC: 9.3 MG/DL (ref 8.7–10.4)
CHLORIDE SERPL-SCNC: 105 MMOL/L (ref 98–112)
CO2 SERPL-SCNC: 31 MMOL/L (ref 21–32)
CREAT BLD-MCNC: 0.76 MG/DL (ref 0.55–1.02)
DEPRECATED RDW RBC AUTO: 50.5 FL (ref 35.1–46.3)
EGFRCR SERPLBLD CKD-EPI 2021: 76 ML/MIN/1.73M2 (ref 60–?)
EOSINOPHIL # BLD AUTO: 0.19 X10(3) UL (ref 0–0.7)
EOSINOPHIL NFR BLD AUTO: 3.6 %
ERYTHROCYTE [DISTWIDTH] IN BLOOD BY AUTOMATED COUNT: 13.3 % (ref 11–15)
GLUCOSE BLD-MCNC: 95 MG/DL (ref 70–99)
HCT VFR BLD AUTO: 36.5 % (ref 35–48)
HGB BLD-MCNC: 11.4 G/DL (ref 12–16)
IMM GRANULOCYTES # BLD AUTO: 0.01 X10(3) UL (ref 0–1)
IMM GRANULOCYTES NFR BLD: 0.2 %
LYMPHOCYTES # BLD AUTO: 1.27 X10(3) UL (ref 1–4)
LYMPHOCYTES NFR BLD AUTO: 24 %
MAGNESIUM SERPL-MCNC: 1.9 MG/DL (ref 1.6–2.6)
MCH RBC QN AUTO: 31.4 PG (ref 26–34)
MCHC RBC AUTO-ENTMCNC: 31.2 G/DL (ref 31–37)
MCV RBC AUTO: 100.6 FL (ref 80–100)
MONOCYTES # BLD AUTO: 0.47 X10(3) UL (ref 0.1–1)
MONOCYTES NFR BLD AUTO: 8.9 %
NEUTROPHILS # BLD AUTO: 3.32 X10 (3) UL (ref 1.5–7.7)
NEUTROPHILS # BLD AUTO: 3.32 X10(3) UL (ref 1.5–7.7)
NEUTROPHILS NFR BLD AUTO: 62.7 %
OSMOLALITY SERPL CALC.SUM OF ELEC: 290 MOSM/KG (ref 275–295)
PHOSPHATE SERPL-MCNC: 2.7 MG/DL (ref 2.4–5.1)
PLATELET # BLD AUTO: 158 10(3)UL (ref 150–450)
POTASSIUM SERPL-SCNC: 4.6 MMOL/L (ref 3.5–5.1)
RBC # BLD AUTO: 3.63 X10(6)UL (ref 3.8–5.3)
SODIUM SERPL-SCNC: 141 MMOL/L (ref 136–145)
WBC # BLD AUTO: 5.3 X10(3) UL (ref 4–11)

## 2025-07-22 PROCEDURE — 80048 BASIC METABOLIC PNL TOTAL CA: CPT | Performed by: HOSPITALIST

## 2025-07-22 PROCEDURE — 84100 ASSAY OF PHOSPHORUS: CPT | Performed by: HOSPITALIST

## 2025-07-22 PROCEDURE — 83735 ASSAY OF MAGNESIUM: CPT | Performed by: HOSPITALIST

## 2025-07-22 PROCEDURE — 85025 COMPLETE CBC W/AUTO DIFF WBC: CPT | Performed by: HOSPITALIST

## 2025-07-22 RX ORDER — ACETAMINOPHEN 500 MG
1000 TABLET ORAL EVERY 8 HOURS PRN
Status: DISCONTINUED | OUTPATIENT
Start: 2025-07-22 | End: 2025-07-24

## 2025-07-22 RX ORDER — MORPHINE SULFATE 2 MG/ML
2 INJECTION, SOLUTION INTRAMUSCULAR; INTRAVENOUS EVERY 4 HOURS PRN
Refills: 0 | Status: DISCONTINUED | OUTPATIENT
Start: 2025-07-22 | End: 2025-07-23

## 2025-07-22 RX ORDER — POLYETHYLENE GLYCOL 3350 17 G/17G
17 POWDER, FOR SOLUTION ORAL DAILY PRN
Status: DISCONTINUED | OUTPATIENT
Start: 2025-07-22 | End: 2025-07-24

## 2025-07-22 RX ORDER — DOCUSATE SODIUM 100 MG/1
100 CAPSULE, LIQUID FILLED ORAL 2 TIMES DAILY
Status: DISCONTINUED | OUTPATIENT
Start: 2025-07-22 | End: 2025-07-24

## 2025-07-22 NOTE — PROGRESS NOTES
[facility]  part of [facility]    General Surgery Progress Note  Gretta Canales  CSN:[id number]  HD# 4       Subjective:   Feels better denies abdominal pain and denies N/V  Tolerated low fiber diet yesterday  Passing flatus, no BM     Exam:     General: awake and alert and in no acute distress  Pulmonary:lungs clear to auscultation bilaterally  Cardiac: nl S1,S2, no S3, no S4, IV/VI systolic murmur at LLSB and RUSB, and irregular   Abdomen: normal BS, nondistended, and nontender   Extremities: calves nontender, no edema, motor intact, and sensory intact    Assessment and Plan:   Diverticulitis of colon with perforation   Diverticulitis of sigmoid colon w perforation and phlegmon/developing abscess    Repeat CT a/p showed only intramural abscess and improved inflammation  - no need for IR drainage  Cont conservative treatment w Zosyn.   Low fiber diet  ID consult for outpt abx  Bowel program w MiraLax    I will follow closely  D/w RN and hospitalist    Objective:     Vitals:    07/22/25 0300 07/22/25 0504 07/22/25 0823 07/22/25 0835   BP:  158/57 146/51    Pulse: 62  60 68   Resp:  17     Temp:  97.3 °F (36.3 °C)     TempSrc:  Oral     SpO2:  95% 96% 92%   Weight:         Body mass index is 23.46 kg/m².       Intake/Output Summary (Last 24 hours) at 7/22/2025 0847  Last data filed at 7/22/2025 0522  Gross per 24 hour   Intake 1951.31 ml   Output 2750 ml   Net -798.69 ml       CT ABDOMEN+PELVIS(CONTRAST ONLY)(CPT=74177)  Result Date: 7/21/2025  CONCLUSION: 1. There is evidence of diverticulitis in the proximal sigmoid colon. Fat stranding and scattered foci of gas within this region demonstrated on preceding exam have significantly improved. No evidence of contained perforation or organized measurable extraluminal collection such as abscess however a 3 x 2 cm intramural abscess is present within the affected sigmoid colon. Phlegmonous changes which have improved or otherwise present. 2. 7 cm  focus of fecal material within the rectum with perirectal and presacral fat stranding, which could represent changes of impaction/stercoral colitis. 3. Post bilateral hip arthroplasties which cause beam hardening artifact limiting assessment of adjacent structures. This includes structures within the central pelvis. 4. Coronary atherosclerosis. 5. Small bilateral pleural effusions. Both effusions are larger compared to 7/18/2025 CT. Bibasilar pulmonary opacities likely atelectasis. 6. Post IVC filter. 7. Post cholecystectomy. Stable intra and extrahepatic bili ductal dilatation. Electronically Verified and Signed by Attending Radiologist: Aguila Fountain MD 7/21/2025 3:26 PM Workstation: [email or web address]        Results:     Recent Labs   Lab 07/20/25  0630 07/21/25  0706 07/22/25  0529   RBC 3.22* 3.39* 3.63*   HGB 10.5* 10.8* 11.4*   HCT 32.6* 34.4* 36.5   .2* 101.5* 100.6*   MCH 32.6 31.9 31.4   MCHC 32.2 31.4 31.2   RDW 13.2 13.3 13.3   NEPRELIM 4.56 3.71 3.32   WBC 6.4 5.8 5.3   .0* 141.0* 158.0       Recent Labs   Lab 07/20/25  0630 07/21/25  0706 07/22/25  0529   GLU 92 93 95   BUN 12 10 9   CREATSERUM 0.77 0.73 0.76   CA 8.7 9.0 9.3    138 141   K 4.2 4.6 4.6    107 105   CO2 28.0 30.0 31.0       Lab Results   Component Value Date    WBC 5.3 07/22/2025    HGB 11.4 07/22/2025    HCT 36.5 07/22/2025    .0 07/22/2025     07/22/2025    K 4.6 07/22/2025     07/22/2025    CO2 31.0 07/22/2025    BUN 9 07/22/2025    CREATSERUM 0.76 07/22/2025    GLU 95 07/22/2025    MG 1.9 07/22/2025    PHOS 2.7 07/22/2025    CA 9.3 07/22/2025          Narayan Talbert MD [facility]  07/22/25  8:50 AM

## 2025-07-22 NOTE — CONSULTS
[facility]  part of [facility] Infectious Disease  Report of Consultation    Gretta Canales Patient Status:  Inpatient    12/3/1940 MRN [id number]   Location [facility] 4W/SW/SE Attending Sameer Dang MD   Hosp Day # 4 PCP Timothy Hicks MD     Date of Admission:  2025  Date of Consult:  2025    Reason for Consultation:  Perforated diverticulitis    History of Present Illness:  Gretta Canales is a a(n) 84 year old female being seen at your request regarding perforated diverticulitis.  Patient is known to myself from a h/o  admission for strep sepsis from RML pneumonia.  She presented to the ED with a c/o N/V/D and abdominal pain.  She initially had pain with some diarrhea but this resolved briefly and returned a few days later.  In the ED, CT was done showing acute diverticulitis with suspected contained microperforation and intramural phlegmon.    CT was repeated 25 with improvement in the fat stranding and continued intramural abscess.  Patient is currently on IV zosyn and is feeling better.  No N/V.  Eating well.      We are asked to see and assist with forthcoming discharge antibiotic needs.    History:  Past Medical History[1]  Past Surgical History[2]  Family History[3]   reports that she quit smoking about 48 years ago. Her smoking use included cigarettes. She started smoking about 73 years ago. She has a 50 pack-year smoking history. She has never used smokeless tobacco. She reports that she does not drink alcohol and does not use drugs.    Allergies:  Allergies[4]    Medications:  Current Hospital Medications[5]    Review of Systems:    Constitutional:  No fevers, chills, diaphoresis, weight changes.   HEENT:  No visual changes, oral ulcers, sore throat, difficulty swallowing.   Respiratory: Negative for cough, sputum, hemoptysis, chest pain, wheezing, dyspnea on exertion, or stridor.   Cardiovascular: Negative for chest pain,  palpitations, irregular heart beats.   Gastrointestinal:  Abdominal pain, N/V/D.   Genitourinary:  No dysuria, hematuria, urine urgency or frequency.   Integument/breast: Negative for rash, skin lesions, and pruritus.   Hematologic/lymphatic: Negative for easy bruising, bleeding, and lymphadenopathy.   Musculoskeletal: Negative for myalgias, arthralgias, muscle weakness.   Neurological: No focal neurologic deficits, seizures, tremors.   Psych:  No h/o anxiety, depression, other psych d/o.   Endocrine: No history of of diabetes, thyroid disorder.    Remainder of 12 point review of systems otherwise negative.    Vital signs in last 24 hours:  Patient Vitals for the past 24 hrs:   BP Temp Temp src Pulse Resp SpO2   07/22/25 1138 131/80 98.2 °F (36.8 °C) Oral 80 20 92 %   07/22/25 0835 -- -- -- 68 -- 92 %   07/22/25 0823 146/51 -- -- 60 -- 96 %   07/22/25 0504 158/57 97.3 °F (36.3 °C) Oral -- 17 95 %   07/22/25 0300 -- -- -- 62 -- --   07/21/25 2100 (!) 169/70 -- -- 67 -- 96 %   07/21/25 2032 154/60 98.6 °F (37 °C) Oral -- -- 95 %   07/21/25 1900 -- -- -- 63 -- --   07/21/25 1713 -- -- -- 64 -- 96 %   07/21/25 1257 135/54 97.4 °F (36.3 °C) Oral 62 16 98 %       Intake/Output:  I/O this shift:  In: 236 [P.O.:236]  Out: 700 [Urine:700]    Physical Exam:   General: Awake, alert, non-tox and in NAD.   Head: Normocephalic, without obvious abnormality, atraumatic.   Eyes: Conjunctivae/corneas clear.  No scleral icterus.  No conjunctival     hemorrhage.   Nose: Nares normal.   Throat:  Oropharynx clear, MMs moist.   Neck: Trachea ML, no masses.   Lungs: CTA b/l no rhonchi, rales, wheezes.   Chest wall: No tenderness or deformity.   Heart: Regular rate and rhythm, normal S1S2, no murmurs.   Abdomen: Soft, NT/ND.  Bowel sounds present.  No organomegaly.   Extremity: No edema.   Skin: No rashes or lesions.   Neurological: No focal neurologic deficits.    Lab Data Review:  Lab Results   Component Value Date    WBC 5.3 07/22/2025     HGB 11.4 07/22/2025    HCT 36.5 07/22/2025    .0 07/22/2025    CREATSERUM 0.76 07/22/2025    BUN 9 07/22/2025     07/22/2025    K 4.6 07/22/2025     07/22/2025    CO2 31.0 07/22/2025    GLU 95 07/22/2025    CA 9.3 07/22/2025    MG 1.9 07/22/2025    PHOS 2.7 07/22/2025      Radiology:  CONCLUSION:   1. There is evidence of diverticulitis in the proximal sigmoid colon. Fat stranding and scattered foci of gas within this region demonstrated on preceding exam have significantly improved. No evidence of contained perforation or organized measurable   extraluminal collection such as abscess however a 3 x 2 cm intramural abscess is present within the affected sigmoid colon. Phlegmonous changes which have improved or otherwise present.   2. 7 cm focus of fecal material within the rectum with perirectal and presacral fat stranding, which could represent changes of impaction/stercoral colitis.   3. Post bilateral hip arthroplasties which cause beam hardening artifact limiting assessment of adjacent structures. This includes structures within the central pelvis.   4. Coronary atherosclerosis.   5. Small bilateral pleural effusions. Both effusions are larger compared to 7/18/2025 CT. Bibasilar pulmonary opacities likely atelectasis.   6. Post IVC filter.   7. Post cholecystectomy. Stable intra and extrahepatic bili ductal dilatation.     Assessment and Plan:    Perforated diverticulitis in this woman who presented with acute abdominal pain and evidence of diverticulitis (first episode) with intramural abscess  - Repeat CT with less inflammation but persistent 3cm intraluminal abscess not amenable to drainage  - IV zosyn ongoing    2.  H/o streptococcal sepsis in 2022 s/p treatment    3.  Disposition - inpatient.  Long d/w patient regarding benefits and risks of IV vs. P.o. antibiotcs at discharge in cases of intramural abscess.  After d/w patient she wishes to proceed with a more aggressive approach.  Will  place PICC and plan for 3 weeks of IV invanz at discharge with repeat CT near EOT.  Social work to assist with antibiotic arrangements (unclear if this is offered at her assisted living?).  All questions answered.  Will follow.    Misa Naaryan DO, [facility] Infectious Disease  [phone]    7/22/2025  12:36 PM         [1]   Past Medical History:   Anxiety state, unspecified    Aortic atherosclerosis    CT scan 11-19    Arthritis of both knees    knee replacement     Arthritis of right hip    Back problem    Brain aneurysm (HCC)    Brain Aneurysm, Stent placed     Cervical disc disease    Cervical Discectomy     Cholecystitis    Cholecystectomy     COPD (chronic obstructive pulmonary disease) ([facility])    PFTs 2-15 with FEV1 1.11 L and FEV1/FVC ratio 63%    Deep vein thrombosis (HCC)    recurrent DVT- on lifelong AC    Dehydration    Fluids, Medication adjustment     Dementia ([facility])    Depression    Disorder of thyroid    Esophageal reflux    Fibromyalgia    Hammertoe    hammertoe 5 left, pain    Hearing impairment    Bilateral hearing aids    High blood pressure    Hip pain, left    Hypothyroidism    Incontinence    L3-4 stable slight grade 1 retrolisthesis    L4-5 mild-mod diffuse bulging disc    L4-5 slight grade 1 unstable spondylolisthesis    Leg wound, left    Low back pain    Migraines    Muscle weakness    Nausea vomiting and diarrhea    Onychomycosis    Debridement     Oropharyngeal dysphagia    Osteoarthrosis, unspecified whether generalized or localized, unspecified site    Other and unspecified hyperlipidemia    Other complicated headache syndrome    Other spondylosis, lumbar region    Pneumonia    S/P cervical spinal fusion: C3-6 and C7-T1    s/p L5-S1 right laminectomy    stent placement    cerebral    Thyroid disease    Toe pain    Onychomycosis, Debridement , Hammertoe     Tonsillitis    Tonsillitis     Visual impairment    EYE GLASSES    Wound of left leg    Left leg debridement  and Split Thickness Skin Graft to left thigh   [2]   Past Surgical History:  Procedure Laterality Date    Anesth,neck vessel surgery nos      x4    Brain surgery  ~2009    Brain Aneurysm, Stent placed     Carotid endarterectomy Right     Carpal tunnel release Right ~2008    Cholecystectomy  1984    Cholecystitis    Colonoscopy  2010    Debridement of nail(s), 1-5      Toe, Onychomycosis    Egd  05/2019    Gastric polyps only    Egd  2006    Eye surgery      x2 FOR \"CROSSED EYES\"    Hip replacement surgery Bilateral     Hysterectomy  1967    Partial Hysterectomy    Jaw surgery      Knee replacement surgery Bilateral     Bilateral arthritis     Laminectomy      WITH FUSION PER PATIENT    Other surgical history  1985,1995,2003,2009    Cervical Discectomy AND FUSION    Prep site t/a/l 1st 100 sq cm/1pct Left 08/21/2019    Left leg debridement, VAC placed    Spine surgery procedure unlisted      Split grft proc trunk <100sqcm Left 10/08/2019    Left leg debridement and Split Thickness Skin Graft to left thigh    Tonsillectomy  1950    Tonsillitis     Total hip replacement      Total knee replacement     [3]   Family History  Problem Relation Age of Onset    Heart Attack Mother     Heart Disease Mother         Coronary Artery Disease, Premature     Fibromyalgia Sister     Heart Disease Sister     Heart Attack Sister     Heart Disease Brother     Heart Attack Brother     Other (Other[other]) Father     Other (Other[other]) Maternal Grandmother     Other (Other[other]) Maternal Grandfather     Other (Other[other]) Paternal Grandmother     Other (Other[other]) Paternal Grandfather     Cancer Brother 55        Liver     Neurological Disorder Sister         ALzheimer's Disease     Depression Sister     Migraines Sister     Stroke Sister         Cerebrovascular accident     Dementia Sister     Heart Disease Brother         Coronary Artery Disease    [4]   Allergies  Allergen Reactions    Phentermine OTHER (SEE COMMENTS)      \"Mini stroke\"    Tiagabine OTHER (SEE COMMENTS)     Stroke like symptoms    Unable to move    Valproic Acid OTHER (SEE COMMENTS)     pancreatitis    Ciprofloxacin RASH    Fluocinolone OTHER (SEE COMMENTS)     Reaction Unknown    Hydromorphone OTHER (SEE COMMENTS) and RASH     Tolerates hydrocodone    Metronidazole RASH    Sulfamethoxazole W/Trimethoprim RASH    Tramadol OTHER (SEE COMMENTS)     Extreme sleepiness   [5]   Current Facility-Administered Medications:     docusate sodium (Colace) cap 100 mg, 100 mg, Oral, BID    polyethylene glycol (PEG 3350) (Miralax) 17 g oral packet 17 g, 17 g, Oral, Daily PRN    acetaminophen (Tylenol Extra Strength) tab 1,000 mg, 1,000 mg, Oral, Q8H PRN    morphINE PF 2 MG/ML injection 2 mg, 2 mg, Intravenous, Q4H PRN    miconazole 2 % powder, , Topical, BID    melatonin tab 3 mg, 3 mg, Oral, Nightly PRN    ondansetron (Zofran) 4 MG/2ML injection 4 mg, 4 mg, Intravenous, Q6H PRN    benzonatate (Tessalon) cap 200 mg, 200 mg, Oral, TID PRN    piperacillin-tazobactam (Zosyn) 3.375g in D5W 100mL IVPB-GERARD, 3.375 g, Intravenous, Q8H    apixaban (Eliquis) tab 5 mg, 5 mg, Oral, BID    cyclobenzaprine (Flexeril) tab 5 mg, 5 mg, Oral, Q6H PRN    donepezil (Aricept) tab 10 mg, 10 mg, Oral, QAM    gabapentin (Neurontin) cap 200 mg, 200 mg, Oral, BID    hydrALAZINE (Apresoline) tab 50 mg, 50 mg, Oral, BID    HYDROcodone-acetaminophen (Norco) 5-325 MG per tab 1 tablet, 1 tablet, Oral, Q6H PRN    lamoTRIgine (LaMICtal) tab 100 mg, 100 mg, Oral, BID    levothyroxine (Synthroid) tab 100 mcg, 100 mcg, Oral, Before breakfast    LORazepam (Ativan) tab 0.5 mg, 0.5 mg, Oral, BID PRN    QUEtiapine (SEROquel) tab 200 mg, 200 mg, Oral, Nightly

## 2025-07-22 NOTE — PLAN OF CARE
Patient alert and oriented x4, forgetful. VSS. On RA. Tolerating LF/ soft diet. Purewick in place. +BM. IVF stopped. Bed locked and in lowest position. Up to chair with lift equipment. Fall precautions in place.   Problem: Patient Centered Care  Goal: Patient preferences are identified and integrated in the patient's plan of care  Description: Interventions:  - What would you like us to know as we care for you? I live at [facility]  - Provide timely, complete, and accurate information to patient/family  - Incorporate patient and family knowledge, values, beliefs, and cultural backgrounds into the planning and delivery of care  - Encourage patient/family to participate in care and decision-making at the level they choose  - Honor patient and family perspectives and choices  Outcome: Progressing     Problem: Patient/Family Goals  Goal: Patient/Family Long Term Goal  Description: Patient's Long Term Goal: discharge home    Interventions:  - Monitor labs and vital signs  - Pain control  - Further testing  - Follow provider recommendations  - See additional Care Plan goals for specific interventions  Outcome: Progressing  Goal: Patient/Family Short Term Goal  Description: Patient's Short Term Goal: pain control    Interventions:   - Prn pain medication  - NPO - bowel rest  - Follow provider recommendation  - See additional Care Plan goals for specific interventions  Outcome: Progressing     Problem: PAIN - ADULT  Goal: Verbalizes/displays adequate comfort level or patient's stated pain goal  Description: INTERVENTIONS:  - Encourage pt to monitor pain and request assistance  - Assess pain using appropriate pain scale  - Administer analgesics based on type and severity of pain and evaluate response  - Implement non-pharmacological measures as appropriate and evaluate response  - Consider cultural and social influences on pain and pain management  - Manage/alleviate anxiety  - Utilize distraction and/or  relaxation techniques  - Monitor for opioid side effects  - Notify MD/LIP if interventions unsuccessful or patient reports new pain  - Anticipate increased pain with activity and pre-medicate as appropriate  Outcome: Progressing     Problem: SAFETY ADULT - FALL  Goal: Free from fall injury  Description: INTERVENTIONS:  - Assess pt frequently for physical needs  - Identify cognitive and physical deficits and behaviors that affect risk of falls.  - [place] fall precautions as indicated by assessment.  - Educate pt/family on patient safety including physical limitations  - Instruct pt to call for assistance with activity based on assessment  - Modify environment to reduce risk of injury  - Provide assistive devices as appropriate  - Consider OT/PT consult to assist with strengthening/mobility  - Encourage toileting schedule  Outcome: Progressing     Problem: DISCHARGE PLANNING  Goal: Discharge to home or other facility with appropriate resources  Description: INTERVENTIONS:  - Identify barriers to discharge w/pt and caregiver  - Include patient/family/discharge partner in discharge planning  - Arrange for needed discharge resources and transportation as appropriate  - Identify discharge learning needs (meds, wound care, etc)  - Arrange for interpreters to assist at discharge as needed  - Consider post-discharge preferences of patient/family/discharge partner  - Complete POLST form as appropriate  - Assess patient's ability to be responsible for managing their own health  - Refer to Case Management Department for coordinating discharge planning if the patient needs post-hospital services based on physician/LIP order or complex needs related to functional status, cognitive ability or social support system  Outcome: Progressing     Problem: GASTROINTESTINAL - ADULT  Goal: Minimal or absence of nausea and vomiting  Description: INTERVENTIONS:  - Maintain adequate hydration with IV or PO as ordered and tolerated  -  Nasogastric tube to low intermittent suction as ordered  - Evaluate effectiveness of ordered antiemetic medications  - Provide nonpharmacologic comfort measures as appropriate  - Advance diet as tolerated, if ordered  - Obtain nutritional consult as needed  - Evaluate fluid balance  Outcome: Progressing  Goal: Maintains or returns to baseline bowel function  Description: INTERVENTIONS:  - Assess bowel function  - Maintain adequate hydration with IV or PO as ordered and tolerated  - Evaluate effectiveness of GI medications  - Encourage mobilization and activity  - Obtain nutritional consult as needed  - Establish a toileting routine/schedule  - Consider collaborating with pharmacy to review patient's medication profile  Outcome: Progressing     Problem: METABOLIC/FLUID AND ELECTROLYTES - ADULT  Goal: Electrolytes maintained within normal limits  Description: INTERVENTIONS:  - Monitor labs and rhythm and assess patient for signs and symptoms of electrolyte imbalances  - Administer electrolyte replacement as ordered  - Monitor response to electrolyte replacements, including rhythm and repeat lab results as appropriate  - Fluid restriction as ordered  - Instruct patient on fluid and nutrition restrictions as appropriate  Outcome: Progressing

## 2025-07-22 NOTE — CM/SW NOTE
07/22/25 1400   CM/SW Referral Data   Referral Source Social Work (self-referral)   Reason for Referral Discharge planning   Informant Patient   Medical Hx   Does patient have an established PCP? Yes   Patient Info   Patient's Home Environment Assisted Living   Post Acute Care Provider Upon Admission   ([facility] [place])   Patient lives with Alone   Patient Status Prior to Admission   Independent with ADLs and Mobility No   Pt. requires assistance with Meals;Bathing;Ambulating;Dressing;Medications;Feeding;Toileting   Discharge Needs   Anticipated D/C needs To be determined   Services Requested   PASRR Level 1 Submitted Yes  (queued for review)     SW initiated self-referral for discharge planning.  SW introduced self and role to pt.    Pt confirmed information above.  Pt reports the use of a wheelchair/power chair to lift her up.  Pt reports she lives on the assisted living side of [place].  SW met with pt to discuss choice for  agency. Pt is agreeable to [facility] home health as they are contracted with [facility]. SW reserved via [facility].    Pt reported not being able to ambulate.  SW was notified OT is recommending [facility], awaiting PT to see pt.  Pt was discussed during RN rounds and SW was informed pt might need IV abx.  Tentative [facility] referral has been sent.  Pasrr has been submitted but queued for review.    SW attempted to call pt's daughter no response, unable to leave voicemail.    PLAN:TBD return to assisted living w/ [facility] ([facility]) vs [facility] P.list/choice/clrc/pasrr queued    SW/CM to remain available for support and/or discharge planning.    Radha, MSW, LSW [id number]

## 2025-07-22 NOTE — PLAN OF CARE
Patient is AxO4. No acute changes overnight.On 1L of O2 prn. Patient reports abdominal pain improving, receiving scheduled tylenol. LF/S diet- minimal appetite overnight. Denies nasuea/vomiting.  No BM overnight - c/o constipation. Voiding per Purewick. IVF and IV abx continued. Remote tele in place. Appropriate safety measures maintained, call light within reach, and fall precautions in place.       Problem: Patient Centered Care  Goal: Patient preferences are identified and integrated in the patient's plan of care  Description: Interventions:  - What would you like us to know as we care for you? I live at [facility]  - Provide timely, complete, and accurate information to patient/family  - Incorporate patient and family knowledge, values, beliefs, and cultural backgrounds into the planning and delivery of care  - Encourage patient/family to participate in care and decision-making at the level they choose  - Honor patient and family perspectives and choices  Outcome: Progressing     Problem: Patient/Family Goals  Goal: Patient/Family Long Term Goal  Description: Patient's Long Term Goal: discharge home    Interventions:  - Monitor labs and vital signs  - Pain control  - Further testing  - Follow provider recommendations  - See additional Care Plan goals for specific interventions  Outcome: Progressing  Goal: Patient/Family Short Term Goal  Description: Patient's Short Term Goal: pain control    Interventions:   - Prn pain medication  - NPO - bowel rest  - Follow provider recommendation  - See additional Care Plan goals for specific interventions  Outcome: Progressing     Problem: PAIN - ADULT  Goal: Verbalizes/displays adequate comfort level or patient's stated pain goal  Description: INTERVENTIONS:  - Encourage pt to monitor pain and request assistance  - Assess pain using appropriate pain scale  - Administer analgesics based on type and severity of pain and evaluate response  - Implement  non-pharmacological measures as appropriate and evaluate response  - Consider cultural and social influences on pain and pain management  - Manage/alleviate anxiety  - Utilize distraction and/or relaxation techniques  - Monitor for opioid side effects  - Notify MD/LIP if interventions unsuccessful or patient reports new pain  - Anticipate increased pain with activity and pre-medicate as appropriate  Outcome: Progressing     Problem: DISCHARGE PLANNING  Goal: Discharge to home or other facility with appropriate resources  Description: INTERVENTIONS:  - Identify barriers to discharge w/pt and caregiver  - Include patient/family/discharge partner in discharge planning  - Arrange for needed discharge resources and transportation as appropriate  - Identify discharge learning needs (meds, wound care, etc)  - Arrange for interpreters to assist at discharge as needed  - Consider post-discharge preferences of patient/family/discharge partner  - Complete POLST form as appropriate  - Assess patient's ability to be responsible for managing their own health  - Refer to Case Management Department for coordinating discharge planning if the patient needs post-hospital services based on physician/LIP order or complex needs related to functional status, cognitive ability or social support system  Outcome: Progressing     Problem: GASTROINTESTINAL - ADULT  Goal: Minimal or absence of nausea and vomiting  Description: INTERVENTIONS:  - Maintain adequate hydration with IV or PO as ordered and tolerated  - Nasogastric tube to low intermittent suction as ordered  - Evaluate effectiveness of ordered antiemetic medications  - Provide nonpharmacologic comfort measures as appropriate  - Advance diet as tolerated, if ordered  - Obtain nutritional consult as needed  - Evaluate fluid balance  Outcome: Progressing  Goal: Maintains or returns to baseline bowel function  Description: INTERVENTIONS:  - Assess bowel function  - Maintain adequate  hydration with IV or PO as ordered and tolerated  - Evaluate effectiveness of GI medications  - Encourage mobilization and activity  - Obtain nutritional consult as needed  - Establish a toileting routine/schedule  - Consider collaborating with pharmacy to review patient's medication profile  Outcome: Progressing     Problem: METABOLIC/FLUID AND ELECTROLYTES - ADULT  Goal: Electrolytes maintained within normal limits  Description: INTERVENTIONS:  - Monitor labs and rhythm and assess patient for signs and symptoms of electrolyte imbalances  - Administer electrolyte replacement as ordered  - Monitor response to electrolyte replacements, including rhythm and repeat lab results as appropriate  - Fluid restriction as ordered  - Instruct patient on fluid and nutrition restrictions as appropriate  Outcome: Progressing

## 2025-07-22 NOTE — PROGRESS NOTES
[facility] Hospitalist Progress Note     CC: Hospital Follow up    PCP: Timothy Hicks MD       Assessment/Plan:     Principal Problem:    Diverticulitis of colon with perforation  Active Problems:    Diverticulitis    Ms. Canales is a 84 year old female with PMH sig for cranial aneurysm s/p coiling, COPD, HTN, DVT on Eliquis, bipolar, hypothyroidism, aortic atherosclerosis, who presents with nausea, vomiting and abd pain, found to have acute diverticulitis with contained perforation.       Acute diverticulitis w/ contained perforation  Nausea/vomiting/abd pain  - CT scan with above, no abscess  - no hs of diverticulitis  - IV zosyn  - pain control  - started LRD  - Gen surg consulted  - repeat CT without intra-abdominal abscess but intramural abscess  - ID consulted as patient likely will need IV abx on DC     Cystic lesion on pancreas   - needs outpatient fu with poss MRI     HTN  - resume home meds as able     Hypothyroidism   - resume home meds     Chronic back pain  - see physiatry as OP     Hs of bipolar DO  - resume home meds     Hs of DVT  - continue eliquis (hold for now)     Hs of cranial aneurysm   - s/p coiling      FN:  - IVF: stopped  - Diet: LRD     DVT Prophy:scd, eliquis resumed  Lines: PIV     Dispo: pending clinical course    Questions/concerns were discussed with patient and/or family by bedside.    Thank You,  Sameer Dang MD    Hospitalist with [facility]     Subjective:     Pain much better, eating breakfast, no nausea, no vomiting     OBJECTIVE:    Blood pressure 146/51, pulse 68, temperature 97.3 °F (36.3 °C), temperature source Oral, resp. rate 17, weight 154 lb 5.2 oz (70 kg), SpO2 92%, not currently breastfeeding.    Temp:  [97.3 °F (36.3 °C)-98.6 °F (37 °C)] 97.3 °F (36.3 °C)  Pulse:  [60-68] 68  Resp:  [16-17] 17  BP: (135-169)/(51-70) 146/51  SpO2:  [92 %-98 %] 92 %      Intake/Output:    Intake/Output Summary (Last 24 hours) at 7/22/2025 1054  Last data filed at  7/22/2025 0522  Gross per 24 hour   Intake 1951.31 ml   Output 2750 ml   Net -798.69 ml       Last 3 Weights   07/18/25 1419 154 lb 5.2 oz (70 kg)   07/18/25 1110 154 lb 5.2 oz (70 kg)   06/13/25 1415 180 lb (81.6 kg)   06/13/25 1054 180 lb (81.6 kg)   05/08/25 1117 192 lb (87.1 kg)       Exam    Gen: No acute distress,    Heent: NC AT,    Pulm: Lungs clear, normal respiratory effort  CV: Heart with regular rate and rhythm   Abd: Abdomen soft, minimal pain LLQ, no rebound or guarding   MSK: no clubbing, no cyanosis  Skin: no rashes or lesions         Data Review:       Labs:     Recent Labs   Lab 07/20/25  0630 07/21/25  0706 07/22/25  0529   RBC 3.22* 3.39* 3.63*   HGB 10.5* 10.8* 11.4*   HCT 32.6* 34.4* 36.5   .2* 101.5* 100.6*   MCH 32.6 31.9 31.4   MCHC 32.2 31.4 31.2   RDW 13.2 13.3 13.3   NEPRELIM 4.56 3.71 3.32   WBC 6.4 5.8 5.3   .0* 141.0* 158.0         Recent Labs   Lab 07/20/25  0630 07/21/25  0706 07/22/25  0529   GLU 92 93 95   BUN 12 10 9   CREATSERUM 0.77 0.73 0.76   EGFRCR 75 80 76   CA 8.7 9.0 9.3    138 141   K 4.2 4.6 4.6    107 105   CO2 28.0 30.0 31.0       Recent Labs   Lab 07/18/25  1118   ALT <7*   AST 12   ALB 3.7         Imaging:  CT ABDOMEN+PELVIS(CONTRAST ONLY)(CPT=74177)  Result Date: 7/21/2025  CONCLUSION: 1. There is evidence of diverticulitis in the proximal sigmoid colon. Fat stranding and scattered foci of gas within this region demonstrated on preceding exam have significantly improved. No evidence of contained perforation or organized measurable extraluminal collection such as abscess however a 3 x 2 cm intramural abscess is present within the affected sigmoid colon. Phlegmonous changes which have improved or otherwise present. 2. 7 cm focus of fecal material within the rectum with perirectal and presacral fat stranding, which could represent changes of impaction/stercoral colitis. 3. Post bilateral hip arthroplasties which cause beam hardening artifact  limiting assessment of adjacent structures. This includes structures within the central pelvis. 4. Coronary atherosclerosis. 5. Small bilateral pleural effusions. Both effusions are larger compared to 7/18/2025 CT. Bibasilar pulmonary opacities likely atelectasis. 6. Post IVC filter. 7. Post cholecystectomy. Stable intra and extrahepatic bili ductal dilatation. Electronically Verified and Signed by Attending Radiologist: Aguila Fountain MD 7/21/2025 3:26 PM Workstation: [employer]          Meds:     Scheduled Medications[1]  Medication Infusions[2]  PRN Medications[3]                 [1]    docusate sodium  100 mg Oral BID    miconazole   Topical BID    piperacillin-tazobactam  3.375 g Intravenous Q8H    apixaban  5 mg Oral BID    donepezil  10 mg Oral QAM    gabapentin  200 mg Oral BID    hydrALAZINE  50 mg Oral BID    lamoTRIgine  100 mg Oral BID    levothyroxine  100 mcg Oral Before breakfast    QUEtiapine  200 mg Oral Nightly    morphINE PF  2 mg Intravenous Once   [2] [3]   polyethylene glycol (PEG 3350)    acetaminophen    morphINE PF    melatonin    ondansetron    benzonatate    cyclobenzaprine    HYDROcodone-acetaminophen    LORazepam

## 2025-07-23 ENCOUNTER — APPOINTMENT (OUTPATIENT)
Dept: PICC SERVICES | Facility: HOSPITAL | Age: 85
End: 2025-07-23
Attending: INTERNAL MEDICINE

## 2025-07-23 LAB
BASOPHILS # BLD AUTO: 0.04 X10(3) UL (ref 0–0.2)
BASOPHILS NFR BLD AUTO: 0.7 %
DEPRECATED RDW RBC AUTO: 47.5 FL (ref 35.1–46.3)
EOSINOPHIL # BLD AUTO: 0.2 X10(3) UL (ref 0–0.7)
EOSINOPHIL NFR BLD AUTO: 3.3 %
ERYTHROCYTE [DISTWIDTH] IN BLOOD BY AUTOMATED COUNT: 13.2 % (ref 11–15)
HCT VFR BLD AUTO: 36.7 % (ref 35–48)
HGB BLD-MCNC: 12 G/DL (ref 12–16)
IMM GRANULOCYTES # BLD AUTO: 0.02 X10(3) UL (ref 0–1)
IMM GRANULOCYTES NFR BLD: 0.3 %
LYMPHOCYTES # BLD AUTO: 1.75 X10(3) UL (ref 1–4)
LYMPHOCYTES NFR BLD AUTO: 28.7 %
MAGNESIUM SERPL-MCNC: 1.9 MG/DL (ref 1.6–2.6)
MCH RBC QN AUTO: 32.2 PG (ref 26–34)
MCHC RBC AUTO-ENTMCNC: 32.7 G/DL (ref 31–37)
MCV RBC AUTO: 98.4 FL (ref 80–100)
MONOCYTES # BLD AUTO: 0.53 X10(3) UL (ref 0.1–1)
MONOCYTES NFR BLD AUTO: 8.7 %
NEUTROPHILS # BLD AUTO: 3.56 X10 (3) UL (ref 1.5–7.7)
NEUTROPHILS # BLD AUTO: 3.56 X10(3) UL (ref 1.5–7.7)
NEUTROPHILS NFR BLD AUTO: 58.3 %
PLATELET # BLD AUTO: 168 10(3)UL (ref 150–450)
RBC # BLD AUTO: 3.73 X10(6)UL (ref 3.8–5.3)
WBC # BLD AUTO: 6.1 X10(3) UL (ref 4–11)

## 2025-07-23 PROCEDURE — 85025 COMPLETE CBC W/AUTO DIFF WBC: CPT | Performed by: HOSPITALIST

## 2025-07-23 PROCEDURE — 36569 INSJ PICC 5 YR+ W/O IMAGING: CPT

## 2025-07-23 PROCEDURE — B548ZZA ULTRASONOGRAPHY OF SUPERIOR VENA CAVA, GUIDANCE: ICD-10-PCS | Performed by: HOSPITALIST

## 2025-07-23 PROCEDURE — 02HV33Z INSERTION OF INFUSION DEVICE INTO SUPERIOR VENA CAVA, PERCUTANEOUS APPROACH: ICD-10-PCS | Performed by: HOSPITALIST

## 2025-07-23 PROCEDURE — 83735 ASSAY OF MAGNESIUM: CPT | Performed by: HOSPITALIST

## 2025-07-23 RX ORDER — ERTAPENEM 1 G/1
1 INJECTION, POWDER, LYOPHILIZED, FOR SOLUTION INTRAMUSCULAR; INTRAVENOUS DAILY
Qty: 14 EACH | Refills: 0 | Status: SHIPPED | OUTPATIENT
Start: 2025-07-23 | End: 2025-08-06

## 2025-07-23 RX ORDER — LIDOCAINE HYDROCHLORIDE 10 MG/ML
5 INJECTION, SOLUTION EPIDURAL; INFILTRATION; INTRACAUDAL; PERINEURAL
Status: COMPLETED | OUTPATIENT
Start: 2025-07-23 | End: 2025-07-23

## 2025-07-23 NOTE — PROGRESS NOTES
[facility]  part of [facility]    General Surgery Progress Note  Gretta Canales  CSN:[id number]  HD# [id number]       Subjective:   Feels better denies abdominal pain but w nausea due to morphine, no emesis  Tolerated low fiber diet yesterday  Passing flatus and BM(s)     Exam:     General: awake and alert and in no acute distress  Pulmonary:lungs clear to auscultation bilaterally  Cardiac: nl S1,S2, no S3, no S4, IV/VI systolic murmur at LLSB and RUSB, and irregular   Abdomen: normal BS, nondistended, and nontender   Extremities: calves nontender, no edema, motor intact, and sensory intact    Assessment and Plan:   Diverticulitis of colon with perforation   Diverticulitis of sigmoid colon w perforation and phlegmon/developing abscess    Repeat CT a/p showed only intramural abscess and improved inflammation  - no need for IR drainage  Cont conservative treatment w Zosyn.   Low fiber diet  ID consult - 3 weeks of IV outpt abx  Bowel program w MiraLax - + BM yesteday    I will follow closely  OK to d/c home from surgery standpoint likely in AM if tolerating diet  Instructions given  F/u with Dr. Talbert in 2-3 weeks.     Objective:     Vitals:    07/22/25 2134 07/23/25 0457 07/23/25 0950 07/23/25 1208   BP: 154/61 159/74 (!) 173/79 143/61   Pulse:  76 75 77   Resp:  18  18   Temp:  97.2 °F (36.2 °C)     TempSrc:  Temporal     SpO2:  92%  90%   Weight:         Body mass index is 23.46 kg/m².       Intake/Output Summary (Last 24 hours) at 7/23/2025 1218  Last data filed at 7/23/2025 1043  Gross per 24 hour   Intake 200 ml   Output 1850 ml   Net -1650 ml       No results found.        Results:     Recent Labs   Lab 07/21/25  0706 07/22/25  0529 07/23/25  0546   RBC 3.39* 3.63* 3.73*   HGB 10.8* 11.4* 12.0   HCT 34.4* 36.5 36.7   .5* 100.6* 98.4   MCH 31.9 31.4 32.2   MCHC 31.4 31.2 32.7   RDW 13.3 13.3 13.2   NEPRELIM 3.71 3.32 3.56   WBC 5.8 5.3 6.1   .0* 158.0 168.0       Recent Labs   Lab  07/20/25  0630 07/21/25  0706 07/22/25  0529   GLU 92 93 95   BUN 12 10 9   CREATSERUM 0.77 0.73 0.76   CA 8.7 9.0 9.3    138 141   K 4.2 4.6 4.6    107 105   CO2 28.0 30.0 31.0       Lab Results   Component Value Date    WBC 6.1 07/23/2025    HGB 12.0 07/23/2025    HCT 36.7 07/23/2025    .0 07/23/2025    MG 1.9 07/23/2025          Narayan Talbert MD [facility]  07/23/25  12:20 PM

## 2025-07-23 NOTE — PLAN OF CARE
Problem: Patient Centered Care  Goal: Patient preferences are identified and integrated in the patient's plan of care  Description: Interventions:  - What would you like us to know as we care for you? I live at [facility]  - Provide timely, complete, and accurate information to patient/family  - Incorporate patient and family knowledge, values, beliefs, and cultural backgrounds into the planning and delivery of care  - Encourage patient/family to participate in care and decision-making at the level they choose  - Honor patient and family perspectives and choices  Outcome: Progressing     Problem: Patient/Family Goals  Goal: Patient/Family Long Term Goal  Description: Patient's Long Term Goal: discharge home    Interventions:  - Monitor labs and vital signs  - Pain control  - Further testing  - Follow provider recommendations  - See additional Care Plan goals for specific interventions  Outcome: Progressing  Goal: Patient/Family Short Term Goal  Description: Patient's Short Term Goal: pain control    Interventions:   - Prn pain medication  - NPO - bowel rest  - Follow provider recommendation  - See additional Care Plan goals for specific interventions  Outcome: Progressing     Problem: PAIN - ADULT  Goal: Verbalizes/displays adequate comfort level or patient's stated pain goal  Description: INTERVENTIONS:  - Encourage pt to monitor pain and request assistance  - Assess pain using appropriate pain scale  - Administer analgesics based on type and severity of pain and evaluate response  - Implement non-pharmacological measures as appropriate and evaluate response  - Consider cultural and social influences on pain and pain management  - Manage/alleviate anxiety  - Utilize distraction and/or relaxation techniques  - Monitor for opioid side effects  - Notify MD/LIP if interventions unsuccessful or patient reports new pain  - Anticipate increased pain with activity and pre-medicate as appropriate  Outcome:  Progressing     Problem: SAFETY ADULT - FALL  Goal: Free from fall injury  Description: INTERVENTIONS:  - Assess pt frequently for physical needs  - Identify cognitive and physical deficits and behaviors that affect risk of falls.  - Duff fall precautions as indicated by assessment.  - Educate pt/family on patient safety including physical limitations  - Instruct pt to call for assistance with activity based on assessment  - Modify environment to reduce risk of injury  - Provide assistive devices as appropriate  - Consider OT/PT consult to assist with strengthening/mobility  - Encourage toileting schedule  Outcome: Progressing     Problem: DISCHARGE PLANNING  Goal: Discharge to home or other facility with appropriate resources  Description: INTERVENTIONS:  - Identify barriers to discharge w/pt and caregiver  - Include patient/family/discharge partner in discharge planning  - Arrange for needed discharge resources and transportation as appropriate  - Identify discharge learning needs (meds, wound care, etc)  - Arrange for interpreters to assist at discharge as needed  - Consider post-discharge preferences of patient/family/discharge partner  - Complete POLST form as appropriate  - Assess patient's ability to be responsible for managing their own health  - Refer to Case Management Department for coordinating discharge planning if the patient needs post-hospital services based on physician/LIP order or complex needs related to functional status, cognitive ability or social support system  Outcome: Progressing     Problem: GASTROINTESTINAL - ADULT  Goal: Minimal or absence of nausea and vomiting  Description: INTERVENTIONS:  - Maintain adequate hydration with IV or PO as ordered and tolerated  - Nasogastric tube to low intermittent suction as ordered  - Evaluate effectiveness of ordered antiemetic medications  - Provide nonpharmacologic comfort measures as appropriate  - Advance diet as tolerated, if ordered  -  Obtain nutritional consult as needed  - Evaluate fluid balance  Outcome: Progressing  Goal: Maintains or returns to baseline bowel function  Description: INTERVENTIONS:  - Assess bowel function  - Maintain adequate hydration with IV or PO as ordered and tolerated  - Evaluate effectiveness of GI medications  - Encourage mobilization and activity  - Obtain nutritional consult as needed  - Establish a toileting routine/schedule  - Consider collaborating with pharmacy to review patient's medication profile  Outcome: Progressing     Problem: METABOLIC/FLUID AND ELECTROLYTES - ADULT  Goal: Electrolytes maintained within normal limits  Description: INTERVENTIONS:  - Monitor labs and rhythm and assess patient for signs and symptoms of electrolyte imbalances  - Administer electrolyte replacement as ordered  - Monitor response to electrolyte replacements, including rhythm and repeat lab results as appropriate  - Fluid restriction as ordered  - Instruct patient on fluid and nutrition restrictions as appropriate  Outcome: Progressing

## 2025-07-23 NOTE — PROGRESS NOTES
[facility]  part of [facility] Infectious Disease  Progress Note    Gretta Canales Patient Status:  Inpatient    12/3/1940 MRN [id number]   Location [facility] 4W/SW/SE Attending Sameer Dang MD   Hosp Day # 5 PCP Timothy Hicks MD     Subjective:  Patient seen/examined.  Up in bed in NAD.  No F/C.  No new issues.    Objective:  Blood pressure (!) 173/79, pulse 75, temperature 97.2 °F (36.2 °C), temperature source Temporal, resp. rate 18, weight 154 lb 5.2 oz (70 kg), SpO2 92%, not currently breastfeeding.    Intake/Output:    Intake/Output Summary (Last 24 hours) at 2025 1132  Last data filed at 2025 1043  Gross per 24 hour   Intake 336 ml   Output 1850 ml   Net -1514 ml       Physical Exam:  General: Awake, alert, non-tox, NAD.  HEENT:  Oropharynx clear, trachea ML.  Heart: RRR S1S2 no murmurs.  Lungs: Essentially CTA b/l, no rhonchi, rales, wheezes.  Abdomen: Soft, NT/ND.  BS present.  No organomegaly.  Extremity: No edema.  Neurological: No focal deficits.  Derm:  Warm, dry, free from rashes.    Lab Data Review:  Lab Results   Component Value Date    WBC 6.1 2025    HGB 12.0 2025    HCT 36.7 2025    .0 2025    MG 1.9 2025        Radiology:  CONCLUSION:   1. There is evidence of diverticulitis in the proximal sigmoid colon. Fat stranding and scattered foci of gas within this region demonstrated on preceding exam have significantly improved. No evidence of contained perforation or organized measurable   extraluminal collection such as abscess however a 3 x 2 cm intramural abscess is present within the affected sigmoid colon. Phlegmonous changes which have improved or otherwise present.   2. 7 cm focus of fecal material within the rectum with perirectal and presacral fat stranding, which could represent changes of impaction/stercoral colitis.   3. Post bilateral hip arthroplasties which cause beam hardening  artifact limiting assessment of adjacent structures. This includes structures within the central pelvis.   4. Coronary atherosclerosis.   5. Small bilateral pleural effusions. Both effusions are larger compared to 7/18/2025 CT. Bibasilar pulmonary opacities likely atelectasis.   6. Post IVC filter.   7. Post cholecystectomy. Stable intra and extrahepatic bili ductal dilatation.      Assessment and Plan:     Perforated diverticulitis in this woman who presented with acute abdominal pain and evidence of diverticulitis (first episode) with intramural abscess  - Repeat CT with less inflammation but persistent 3cm intraluminal abscess not amenable to drainage  - IV zosyn ongoing     2.  H/o streptococcal sepsis in 2022 s/p treatment     3.  Disposition - stable from ID.  Long d/w patient regarding benefits and risks of IV vs. P.o. antibiotcs at discharge in cases of intramural abscess.  After d/w patient she wishes to proceed with a more aggressive approach.  Will place PICC and plan to complete 3 weeks of IV invanz at discharge through 8/6/25 with repeat CT near EOT.  Social work to assist with antibiotic arrangements.  OK for discharge soon from ID standpoint once arrangements made.  Orders entered.  F/u with ID in 7-10 days so CT can be ordered prior to EOT.    Misa Narayan DO, [facility] Infectious Disease  [phone]    7/23/2025  11:32 AM

## 2025-07-23 NOTE — CM/SW NOTE
SW met with pt to provide list of accepting [facility] facilities.  Pt is asking if referral can be sent to the [facility].  SW sent referral via [facility], they are able to accept.  SW reserved via [employer].    PLAN: [facility] p.med clearance    SW/CM to remain available for support and/or discharge planning.    MS RadhaW, LSW [id number]

## 2025-07-23 NOTE — PROGRESS NOTES
[facility] Hospitalist Progress Note     CC: Hospital Follow up    PCP: Timothy Hicks MD       Assessment/Plan:     Principal Problem:    Diverticulitis of colon with perforation  Active Problems:    Diverticulitis    Ms. Canales is a 84 year old female with PMH sig for cranial aneurysm s/p coiling, COPD, HTN, DVT on Eliquis, bipolar, hypothyroidism, aortic atherosclerosis, who presents with nausea, vomiting and abd pain, found to have acute diverticulitis with contained perforation, started on IV zosyn, w/ bowel rest, surg consulted, slow clinical improvement repeat CT with improvement, but intramural abscess, not drainable, ID consulted, picc line placed, plan for IV abx on DC, adv diet, plan for KYLE on DC for IV abx.       Acute diverticulitis w/ contained perforation  Nausea/vomiting/abd pain  - CT scan with above, no abscess  - no hs of diverticulitis  - IV zosyn  - pain control  - started LRD  - Gen surg consulted  - repeat CT without intra-abdominal abscess but intramural abscess  - ID consulted  - plan for PICC with [facility] abx at [facility]     Cystic lesion on pancreas   - needs outpatient fu with poss MRI     HTN  - resume home meds as able     Hypothyroidism   - resume home meds     Chronic back pain  - see physiatry as OP     Hs of bipolar DO  - resume home meds     Hs of DVT  - continue eliquis (hold for now)     Hs of cranial aneurysm   - s/p coiling     Moderate malnutrition  - diet advanced      FN:  - IVF: stopped  - Diet: LRD     DVT Prophy:scd, eliquis resumed  Lines: PIV     Dispo: pending clinical course    Questions/concerns were discussed with patient and/or family by bedside.    Thank You,  Sameer Dang MD    Hospitalist with [facility]     Subjective:     Pain much better,  some nausea, no vomiting, no abd pain,     OBJECTIVE:    Blood pressure 143/61, pulse 77, temperature 97.2 °F (36.2 °C), temperature source Temporal, resp. rate 18, weight 154 lb 5.2 oz (70 kg), SpO2 90%, not  currently breastfeeding.    Temp:  [97.2 °F (36.2 °C)] 97.2 °F (36.2 °C)  Pulse:  [75-84] 77  Resp:  [18] 18  BP: (143-186)/(61-79) 143/61  SpO2:  [90 %-92 %] 90 %      Intake/Output:    Intake/Output Summary (Last 24 hours) at 7/23/2025 1211  Last data filed at 7/23/2025 1043  Gross per 24 hour   Intake 200 ml   Output 1850 ml   Net -1650 ml       Last 3 Weights   07/18/25 1419 154 lb 5.2 oz (70 kg)   07/18/25 1110 154 lb 5.2 oz (70 kg)   06/13/25 1415 180 lb (81.6 kg)   06/13/25 1054 180 lb (81.6 kg)   05/08/25 1117 192 lb (87.1 kg)       Exam    Gen: No acute distress,    Heent: NC AT,    Pulm: Lungs clear, normal respiratory effort  CV: Heart with regular rate and rhythm   Abd: Abdomen soft, no  pain LLQ, no rebound or guarding   MSK: no clubbing, no cyanosis  Skin: no rashes or lesions         Data Review:       Labs:     Recent Labs   Lab 07/21/25  0706 07/22/25  0529 07/23/25  0546   RBC 3.39* 3.63* 3.73*   HGB 10.8* 11.4* 12.0   HCT 34.4* 36.5 36.7   .5* 100.6* 98.4   MCH 31.9 31.4 32.2   MCHC 31.4 31.2 32.7   RDW 13.3 13.3 13.2   NEPRELIM 3.71 3.32 3.56   WBC 5.8 5.3 6.1   .0* 158.0 168.0         Recent Labs   Lab 07/20/25  0630 07/21/25  0706 07/22/25  0529   GLU 92 93 95   BUN 12 10 9   CREATSERUM 0.77 0.73 0.76   EGFRCR 75 80 76   CA 8.7 9.0 9.3    138 141   K 4.2 4.6 4.6    107 105   CO2 28.0 30.0 31.0       Recent Labs   Lab 07/18/25  1118   ALT <7*   AST 12   ALB 3.7         Imaging:  CT ABDOMEN+PELVIS(CONTRAST ONLY)(CPT=74177)  Result Date: 7/21/2025  CONCLUSION: 1. There is evidence of diverticulitis in the proximal sigmoid colon. Fat stranding and scattered foci of gas within this region demonstrated on preceding exam have significantly improved. No evidence of contained perforation or organized measurable extraluminal collection such as abscess however a 3 x 2 cm intramural abscess is present within the affected sigmoid colon. Phlegmonous changes which have improved or  otherwise present. 2. 7 cm focus of fecal material within the rectum with perirectal and presacral fat stranding, which could represent changes of impaction/stercoral colitis. 3. Post bilateral hip arthroplasties which cause beam hardening artifact limiting assessment of adjacent structures. This includes structures within the central pelvis. 4. Coronary atherosclerosis. 5. Small bilateral pleural effusions. Both effusions are larger compared to 7/18/2025 CT. Bibasilar pulmonary opacities likely atelectasis. 6. Post IVC filter. 7. Post cholecystectomy. Stable intra and extrahepatic bili ductal dilatation. Electronically Verified and Signed by Attending Radiologist: Aguila Fountain MD 7/21/2025 3:26 PM Workstation: [employer]          Meds:     Scheduled Medications[1]  Medication Infusions[2]  PRN Medications[3]                   [1]    [START ON 7/24/2025] ertapenem  1 g Intravenous Once    docusate sodium  100 mg Oral BID    miconazole   Topical BID    piperacillin-tazobactam  3.375 g Intravenous Q8H    apixaban  5 mg Oral BID    donepezil  10 mg Oral QAM    gabapentin  200 mg Oral BID    hydrALAZINE  50 mg Oral BID    lamoTRIgine  100 mg Oral BID    levothyroxine  100 mcg Oral Before breakfast    QUEtiapine  200 mg Oral Nightly   [2] [3]   polyethylene glycol (PEG 3350)    acetaminophen    melatonin    ondansetron    benzonatate    cyclobenzaprine    HYDROcodone-acetaminophen    LORazepam

## 2025-07-23 NOTE — PLAN OF CARE
A/O x 4, forgetful at times. Qagan [place]. Reports having nausea and refusing to eat, zofran given, MD notified. +flatus, +BM. Max assist with ceiling lift. Zosyn per orders. RUE PICC line placed today. Norco for pain. Plan to discharge to SNF, likely tomorrow. Bed in lowest position, call light in reach, frequent rounding, nonskid footwear, fall precautions in place.

## 2025-07-24 ENCOUNTER — APPOINTMENT (OUTPATIENT)
Dept: CV DIAGNOSTICS | Facility: HOSPITAL | Age: 85
End: 2025-07-24
Attending: INTERNAL MEDICINE

## 2025-07-24 VITALS
DIASTOLIC BLOOD PRESSURE: 59 MMHG | WEIGHT: 154.31 LBS | RESPIRATION RATE: 16 BRPM | BODY MASS INDEX: 23 KG/M2 | HEART RATE: 68 BPM | SYSTOLIC BLOOD PRESSURE: 147 MMHG | TEMPERATURE: 98 F | OXYGEN SATURATION: 96 %

## 2025-07-24 LAB
ANION GAP SERPL CALC-SCNC: 5 MMOL/L (ref 0–18)
ATRIAL RATE: 84 BPM
BASOPHILS # BLD AUTO: 0.05 X10(3) UL (ref 0–0.2)
BASOPHILS NFR BLD AUTO: 0.8 %
BUN BLD-MCNC: 10 MG/DL (ref 9–23)
BUN/CREAT SERPL: 13.3 (ref 10–20)
CALCIUM BLD-MCNC: 9.3 MG/DL (ref 8.7–10.4)
CHLORIDE SERPL-SCNC: 102 MMOL/L (ref 98–112)
CHOLEST SERPL-MCNC: 121 MG/DL (ref ?–200)
CO2 SERPL-SCNC: 32 MMOL/L (ref 21–32)
CREAT BLD-MCNC: 0.75 MG/DL (ref 0.55–1.02)
DEPRECATED RDW RBC AUTO: 48.2 FL (ref 35.1–46.3)
EGFRCR SERPLBLD CKD-EPI 2021: 77 ML/MIN/1.73M2 (ref 60–?)
EOSINOPHIL # BLD AUTO: 0.18 X10(3) UL (ref 0–0.7)
EOSINOPHIL NFR BLD AUTO: 2.8 %
ERYTHROCYTE [DISTWIDTH] IN BLOOD BY AUTOMATED COUNT: 13.2 % (ref 11–15)
GLUCOSE BLD-MCNC: 83 MG/DL (ref 70–99)
HCT VFR BLD AUTO: 37 % (ref 35–48)
HDLC SERPL-MCNC: 43 MG/DL (ref 40–59)
HGB BLD-MCNC: 12.2 G/DL (ref 12–16)
IMM GRANULOCYTES # BLD AUTO: 0.03 X10(3) UL (ref 0–1)
IMM GRANULOCYTES NFR BLD: 0.5 %
LDLC SERPL CALC-MCNC: 61 MG/DL (ref ?–100)
LYMPHOCYTES # BLD AUTO: 1.67 X10(3) UL (ref 1–4)
LYMPHOCYTES NFR BLD AUTO: 25.6 %
MAGNESIUM SERPL-MCNC: 1.8 MG/DL (ref 1.6–2.6)
MCH RBC QN AUTO: 32.6 PG (ref 26–34)
MCHC RBC AUTO-ENTMCNC: 33 G/DL (ref 31–37)
MCV RBC AUTO: 98.9 FL (ref 80–100)
MONOCYTES # BLD AUTO: 0.59 X10(3) UL (ref 0.1–1)
MONOCYTES NFR BLD AUTO: 9 %
NEUTROPHILS # BLD AUTO: 4.01 X10 (3) UL (ref 1.5–7.7)
NEUTROPHILS # BLD AUTO: 4.01 X10(3) UL (ref 1.5–7.7)
NEUTROPHILS NFR BLD AUTO: 61.3 %
NONHDLC SERPL-MCNC: 78 MG/DL (ref ?–130)
OSMOLALITY SERPL CALC.SUM OF ELEC: 286 MOSM/KG (ref 275–295)
P AXIS: 44 DEGREES
P-R INTERVAL: 216 MS
PLATELET # BLD AUTO: 165 10(3)UL (ref 150–450)
POTASSIUM SERPL-SCNC: 4.1 MMOL/L (ref 3.5–5.1)
Q-T INTERVAL: 384 MS
QRS DURATION: 84 MS
QTC CALCULATION (BEZET): 453 MS
R AXIS: -11 DEGREES
RBC # BLD AUTO: 3.74 X10(6)UL (ref 3.8–5.3)
SODIUM SERPL-SCNC: 139 MMOL/L (ref 136–145)
T AXIS: 29 DEGREES
TRIGL SERPL-MCNC: 87 MG/DL (ref 30–149)
TROPONIN I SERPL HS-MCNC: 138 NG/L (ref ?–34)
TROPONIN I SERPL HS-MCNC: 140 NG/L (ref ?–34)
VENTRICULAR RATE: 84 BPM
VLDLC SERPL CALC-MCNC: 13 MG/DL (ref 0–30)
WBC # BLD AUTO: 6.5 X10(3) UL (ref 4–11)

## 2025-07-24 PROCEDURE — 93005 ELECTROCARDIOGRAM TRACING: CPT

## 2025-07-24 PROCEDURE — 97530 THERAPEUTIC ACTIVITIES: CPT

## 2025-07-24 PROCEDURE — 80061 LIPID PANEL: CPT | Performed by: HOSPITALIST

## 2025-07-24 PROCEDURE — 93306 TTE W/DOPPLER COMPLETE: CPT | Performed by: INTERNAL MEDICINE

## 2025-07-24 PROCEDURE — 83735 ASSAY OF MAGNESIUM: CPT | Performed by: HOSPITALIST

## 2025-07-24 PROCEDURE — 93010 ELECTROCARDIOGRAM REPORT: CPT | Performed by: INTERNAL MEDICINE

## 2025-07-24 PROCEDURE — 84484 ASSAY OF TROPONIN QUANT: CPT | Performed by: HOSPITALIST

## 2025-07-24 PROCEDURE — 80048 BASIC METABOLIC PNL TOTAL CA: CPT | Performed by: HOSPITALIST

## 2025-07-24 PROCEDURE — 85025 COMPLETE CBC W/AUTO DIFF WBC: CPT | Performed by: HOSPITALIST

## 2025-07-24 RX ORDER — ONDANSETRON 4 MG/1
4 TABLET, ORALLY DISINTEGRATING ORAL EVERY 8 HOURS PRN
Status: SHIPPED | COMMUNITY
Start: 2025-07-24

## 2025-07-24 RX ORDER — HYDRALAZINE HYDROCHLORIDE 20 MG/ML
10 INJECTION INTRAMUSCULAR; INTRAVENOUS EVERY 6 HOURS PRN
Status: DISCONTINUED | OUTPATIENT
Start: 2025-07-24 | End: 2025-07-24

## 2025-07-24 RX ORDER — MORPHINE SULFATE 2 MG/ML
2 INJECTION, SOLUTION INTRAMUSCULAR; INTRAVENOUS ONCE
Refills: 0 | Status: COMPLETED | OUTPATIENT
Start: 2025-07-24 | End: 2025-07-24

## 2025-07-24 RX ORDER — MAGNESIUM OXIDE 400 MG/1
400 TABLET ORAL ONCE
Status: DISCONTINUED | OUTPATIENT
Start: 2025-07-24 | End: 2025-07-24

## 2025-07-24 RX ORDER — LORAZEPAM 0.5 MG/1
0.5 TABLET ORAL 2 TIMES DAILY PRN
Qty: 30 TABLET | Refills: 0 | Status: SHIPPED | OUTPATIENT
Start: 2025-07-24

## 2025-07-24 RX ORDER — PSEUDOEPHEDRINE HCL 30 MG
100 TABLET ORAL 2 TIMES DAILY
Status: SHIPPED | COMMUNITY
Start: 2025-07-24

## 2025-07-24 NOTE — PHYSICAL THERAPY NOTE
PHYSICAL THERAPY TREATMENT NOTE - INPATIENT     Room Number: 457/457-A       Presenting Problem: sigmoid diverticulitis       Problem List  Principal Problem:    Diverticulitis of colon with perforation  Active Problems:    Diverticulitis      PHYSICAL THERAPY ASSESSMENT   Patient demonstrates limited progress this session, goals  remain in progress.      Patient is requiring maximum assist and assist x 2 as a result of the following impairments: decreased functional strength, decreased endurance/aerobic capacity, pain, impaired standing balance, decreased muscular endurance, medical status, and increased O2 needs from baseline.     Patient continues to function below baseline with bed mobility, transfers, gait, maintaining seated position, and standing prolonged periods.  Next session anticipate patient to progress bed mobility, transfers, maintaining seated position, and standing prolonged periods.  Physical Therapy will continue to follow patient for duration of hospitalization.    Patient continues to benefit from continued skilled PT services: to promote return to prior level of function and safety with continuous assistance and gradual rehabilitative therapy .    PLAN DURING HOSPITALIZATION  Nursing Mobility Recommendation : Lift Equipment  PT Device Recommendation: Mechanical lift  PT Treatment Plan: Bed mobility, Energy conservation, Patient education, Range of motion  Frequency (Obs): 3x/week     SUBJECTIVE  Agreeable     OBJECTIVE  Precautions: Bed/chair alarm, Abdominal protective strategies    PAIN ASSESSMENT   Rating: Unable to rate  Location: abdomen and chest  Management Techniques: Activity promotion, Body mechanics, Repositioning    BALANCE  Static Sitting: Poor +  Dynamic Sitting: Poor  Static Standing: Not tested  Dynamic Standing: Not tested    ACTIVITY TOLERANCE  Pulse: 68  Heart Rate Source: Monitor     BP: 147/59  BP Location: Left arm  BP Method: Automatic  Patient Position: Lying     O2  WALK  Oxygen Therapy  SPO2% on Oxygen at Rest: 97  At rest oxygen flow (liters per minute): 2    AM-PAC '6-Clicks' INPATIENT SHORT FORM - BASIC MOBILITY  How much difficulty does the patient currently have...  Patient Difficulty: Turning over in bed (including adjusting bedclothes, sheets and blankets)?: A Lot   Patient Difficulty: Sitting down on and standing up from a chair with arms (e.g., wheelchair, bedside commode, etc.): Unable   Patient Difficulty: Moving from lying on back to sitting on the side of the bed?: A Lot   How much help from another person does the patient currently need...   Help from Another: Moving to and from a bed to a chair (including a wheelchair)?: Total   Help from Another: Need to walk in hospital room?: Total   Help from Another: Climbing 3-5 steps with a railing?: Total     AM-PAC Score:  Raw Score: 8   Approx Degree of Impairment: 86.62%   Standardized Score (AM-PAC Scale): 28.58   CMS Modifier (G-Code): CM    FUNCTIONAL ABILITY STATUS  Functional Mobility/Gait Assessment  Gait Assistance: Not tested  Supine to Sit: maximum assist x 2. Patient tolerated static sitting approx 10 minutes with BUE support and Max A initially, progressing to CGA in order to maintain static sitting balance. Patient demonstrated significant posterior lean initially, improved with re-positioning of BUE support.  Sit to Supine: maximum assist x 2    Skilled Therapy Provided: Patient in bed upon arrival. RN approved activity. Educated patient on POC and benefits of mobilization. Agreeable to participate. Patient reporting abdominal and chest pain, not quantified per the pain scale. Patient benefits from increased time, cues, and physical assistance in order to complete functional mobility tasks. Patient agreeable to sit EOB and complete light therex; patient continues to demonstrate significant BLE weakness L>R. Patient declining transfer to bedside chair via overhead lift at this time. Returned patient back  to supine position and re-positioned for comfort.     The patient's Approx Degree of Impairment: 86.62% has been calculated based on documentation in the [facility] '6 clicks' Inpatient Daily Activity Short Form.  Research supports that patients with this level of impairment may benefit from LTAC, however, patient is functioning below baseline levels and would benefit from rehab.  Final disposition will be made by interdisciplinary medical team.    THERAPEUTIC EXERCISES  Lower Extremity Ankle pumps  LAQ     Position Sitting       Patient End of Session: In bed, Needs met, Call light within reach, RN aware of session/findings, All patient questions and concerns addressed, Hospital anti-slip socks, Alarm set, SCDs in place    CURRENT GOALS   Goals to be met by: 8/1/2025  Patient Goal Patient's self-stated goal is: to go home   Goal #1 Patient is able to demonstrate supine - sit EOB @ level: moderate assistance      Goal #1   Current Status  Max A x 2   Goal #2 Patient is able to demonstrate transfers EOB to/from BSC at assistance level: moderate assistance with none   Goal #2  Current Status  NT   Goal #3 Patient to demonstrate independence with home activity/exercise instructions provided to patient in preparation for discharge.   Goal #3   Current Status  Ongoing     Therapeutic Activity: 23 minutes

## 2025-07-24 NOTE — OCCUPATIONAL THERAPY NOTE
OCCUPATIONAL THERAPY TREATMENT NOTE - INPATIENT        Room Number: 457/457-A          Problem List  Principal Problem:    Diverticulitis of colon with perforation  Active Problems:    Diverticulitis      OCCUPATIONAL THERAPY ASSESSMENT   Patient demonstrates limited progress this session, goals remain in progress.    Patient is requiring dependent as a result of the following impairments: decreased functional strength, decreased functional reach, decreased endurance, pain, medical status, and increased O2 needs from baseline.    Patient continues to function below baseline with ADLs. Next session anticipate patient to progress ADLs.  Occupational Therapy will continue to follow patient for duration of hospitalization.    Patient continues to benefit from continued skilled OT services: to promote return to prior level of function and safety with continuous assistance and gradual rehabilitative therapy.     PLAN DURING HOSPITALIZATION OT Treatment Plan: Balance activities;Energy conservation/work simplification techniques;Continued evaluation;Compensatory technique education;Fine motor coordination activities;Neuromuscluar reeducation;Equipment eval/education;Patient/Family training;Patient/Family education;Cognitive reorientation;Endurance training;UE strengthening/ROM;Functional transfer training;Visual perceptual training;IADL training;ADL training             SUBJECTIVE  \"Can you scratch my back?    OBJECTIVE  Precautions: Abdominal protective strategies        PAIN ASSESSMENT  Rating: Unable to rate  Location: abdomen         O2 SATURATIONS  Oxygen Therapy  SPO2% on Oxygen at Rest: 97  At rest oxygen flow (liters per minute): 2    ACTIVITIES OF DAILY LIVING ASSESSMENT  AM-PAC ‘6-Clicks’ Inpatient Daily Activity Short Form  How much help from another person does the patient currently need…  -   Putting on and taking off regular lower body clothing?: Total  -   Bathing (including washing, rinsing, drying)?: A  Lot  -   Toileting, which includes using toilet, bedpan or urinal? : Total  -   Putting on and taking off regular upper body clothing?: A Lot  -   Taking care of personal grooming such as brushing teeth?: A Little  -   Eating meals?: A Little    AM-PAC Score:  Score: 12  Approx Degree of Impairment: 66.57%  Standardized Score (AM-PAC Scale): 30.6  CMS Modifier (G-Code): CL          BED MOBILITY  Rolling: Maximum Assist of 2  Supine to Sit : Maximum Assist of 2  Sit to Supine (OT): Maximum Assist of 2                   Skilled Therapy Provided: Per RN, pt ok to tx. Co-treated with PT. No visitors present. Introduced self and role of OT to pt. Pt verbalized understanding and was agreeable to session. Pt received in bed with  unrated pain in abdomen. Max A of 2 for supine to sit. Overall, pt required total for ADLs and tolerated about 10 minutes of sitting in supported sitting; max A Of 2 for sit to supine and supine scooting. Pt left in bed and alarm activated. Call light and all needs in reach. Handoff given to RN.          The patient's Approx Degree of Impairment: 66.57% has been calculated based on documentation in the [facility] '6 clicks' Inpatient Daily Activity Short Form.  Research supports that patients with this level of impairment may benefit from GR.  Final disposition will be made by interdisciplinary medical team.     OT Goals  Patients self stated goal is: to have less pain     Patient will complete functional transfer with mod A  Comment: NT    Patient will complete toileting with mod A  Comment: NT    Patient will tolerate standing for 1 minutes in prep for adls with mod A   Comment:NT               Goals  on:   Frequency: 3-5x/wk    OT Session Time: 23 minutes     Therapeutic Activity: 23 minutes      Michelle Orantes OT  [facility] part of [facility]  Inpatient Rehabilitation  Occupational Therapy  [phone]

## 2025-07-24 NOTE — PLAN OF CARE
A/O x 3, forgetful. Reports nausea with morphine. Tolerating diet. Voiding freely. +BM. Max assist. Invanz given prior to discharge. Patient cleared for discharge by all consulting MDs. Discharge instructions explained to patient. All medications reviewed, including which medications to start, continue, or stop taking. All follow up appointments reviewed. All discharge eduation explained to pt. Patient verbalized understanding, all questions answered. [facility], report given to SNF RN.

## 2025-07-24 NOTE — CM/SW NOTE
07/24/25 1100   Discharge disposition   Expected discharge disposition subacute   Post Acute Care Provider   ([facility])   Discharge transportation [facility]     SW received MDO for discharge.  Pt agreeable to [facility].  FLAKITA contacted liaison for bed and time, per liaison pt can go at 3pm.  [facility] for 3[facility] transportation, PCS form completed. Number for report is [phone], RN made aware.  FLAKITA attempted to contact daughter to notify of transfer, no response unable to leave VM.   FLAKITA contacted pt's son and informed him.    FLAKITA sent updated clinical information as well as IV abx information.    PLAN: KYLE- [facility]    FLAKITA/NEHEMIAH to remain available for support and/or discharge planning.    MS RadhaW, LSW [id number]

## 2025-07-24 NOTE — PROGRESS NOTES
[facility]  part of [facility] Infectious Disease Progress Note    Gretta Canales Patient Status:  Inpatient    12/3/1940 MRN [id number]   Location [facility] 4W/SW/SE Attending Jesus Collazo, DO   Hosp Day # 6 PCP Timothy Hicks MD     Subjective:  Pt with some abd pain.     Objective:    Allergies:  Allergies[1]    Medications:  Current Hospital Medications[2]    Physical Exam:  General: Alert, orientated x3.  Cooperative.  No apparent distress.  Vital Signs:  Blood pressure (!) 118/96, pulse 83, temperature 98.3 °F (36.8 °C), temperature source Oral, resp. rate 18, weight 154 lb 5.2 oz (70 kg), SpO2 97%, not currently breastfeeding.   Temp (24hrs), Av.9 °F (36.6 °C), Min:97.4 °F (36.3 °C), Max:98.3 °F (36.8 °C)      HEENT: Exam is unremarkable.  Without scleral icterus.  Mucous membranes are moist. PERRLA.  Oropharynx is clear.  Neck: No tenderness to palpitation.  Full range of motion to flexion and extension, lateral rotation and lateral flexion of cervical spine.  No JVD. Supple.   Lungs: Clear to auscultation bilaterally.  Cardiac: Regular rate and rhythm. No murmur.  Abdomen:  Soft, non-distended, non-tender, with no rebound or guarding.   Extremities:  No lower extremity edema noted.  Without clubbing or cyanosis.    Skin: Normal texture and turgor.  Neurologic: Cranial nerves are grossly intact.  Motor strength and sensory examination is grossly normal.  No focal neurologic deficit.    Labs:  Lab Results   Component Value Date    WBC 6.5 2025    HGB 12.2 2025    HCT 37.0 2025    .0 2025    CREATSERUM 0.75 2025    BUN 10 2025     2025    K 4.1 2025     2025    CO2 32.0 2025    GLU 83 2025    CA 9.3 2025    MG 1.8 2025         Assessment/Plan:    1.  Perforated diverticulitis with abscess  -CT with contained perforation with developing intramural abscess  formation  -repeat CT with on going but improved abscess  -on IV zosyn  2.  Dispo  -PICC with IV ertapenem through 8/6/25, will need repeat CT prior to EOT  -follow up in 1 week, okay for video visit    If you have any questions or concerns please call [facility] Infectious Disease at [phone].     INDERJIT Giraldo         [1]   Allergies  Allergen Reactions    Phentermine OTHER (SEE COMMENTS)     \"Mini stroke\"    Tiagabine OTHER (SEE COMMENTS)     Stroke like symptoms    Unable to move    Valproic Acid OTHER (SEE COMMENTS)     pancreatitis    Ciprofloxacin RASH    Fluocinolone OTHER (SEE COMMENTS)     Reaction Unknown    Hydromorphone OTHER (SEE COMMENTS) and RASH     Tolerates hydrocodone    Metronidazole RASH    Sulfamethoxazole W/Trimethoprim RASH    Tramadol OTHER (SEE COMMENTS)     Extreme sleepiness   [2]   Current Facility-Administered Medications:     hydrALAzine (Apresoline) 20 mg/mL injection 10 mg, 10 mg, Intravenous, Q6H PRN    magnesium oxide (Mag-Ox) tab 400 mg, 400 mg, Oral, Once    ertapenem (INVanz) 1 g in sodium chloride 0.9% 100mL IVPB-GERARD, 1 g, Intravenous, Once    docusate sodium (Colace) cap 100 mg, 100 mg, Oral, BID    polyethylene glycol (PEG 3350) (Miralax) 17 g oral packet 17 g, 17 g, Oral, Daily PRN    acetaminophen (Tylenol Extra Strength) tab 1,000 mg, 1,000 mg, Oral, Q8H PRN    miconazole 2 % powder, , Topical, BID    melatonin tab 3 mg, 3 mg, Oral, Nightly PRN    ondansetron (Zofran) 4 MG/2ML injection 4 mg, 4 mg, Intravenous, Q6H PRN    benzonatate (Tessalon) cap 200 mg, 200 mg, Oral, TID PRN    piperacillin-tazobactam (Zosyn) 3.375g in D5W 100mL IVPB-GERARD, 3.375 g, Intravenous, Q8H    apixaban (Eliquis) tab 5 mg, 5 mg, Oral, BID    cyclobenzaprine (Flexeril) tab 5 mg, 5 mg, Oral, Q6H PRN    donepezil (Aricept) tab 10 mg, 10 mg, Oral, QAM    gabapentin (Neurontin) cap 200 mg, 200 mg, Oral, BID    hydrALAZINE (Apresoline) tab 50 mg, 50 mg, Oral, BID     HYDROcodone-acetaminophen (Norco) 5-325 MG per tab 1 tablet, 1 tablet, Oral, Q6H PRN    lamoTRIgine (LaMICtal) tab 100 mg, 100 mg, Oral, BID    levothyroxine (Synthroid) tab 100 mcg, 100 mcg, Oral, Before breakfast    LORazepam (Ativan) tab 0.5 mg, 0.5 mg, Oral, BID PRN    QUEtiapine (SEROquel) tab 200 mg, 200 mg, Oral, Nightly

## 2025-07-24 NOTE — DISCHARGE SUMMARY
Hospitalist Discharge Summary    Admission Date/Time  7/18/2025 11:03 AM  Discharge Date  07/24/25    PCP  Timothy Hicks MD     Discharging Hospitalist:  Jesus Collazo,     Disposition: [facility]    Follow Up Appointments  Narayan Talbert MD  [address]  [address]  [place] [address] [phone]    Schedule an appointment as soon as possible for a visit in 2 week(s)  2-3 weeks with Timothy Hernandez MD, MD  [address]  [address]  [place] [address] [phone]    Follow up  after discharge from rehab    Primary Hospital Problems/Hospital Course Summary      Acute diverticulitis w/ contained perforation   -started on IV zosyn, w/ bowel rest, surg consulted, slow clinical improvement repeat CT with improvement, but intramural abscess, not drainable, ID consulted, picc line placed, plan for IV abx on DC, plan for KYLE on DC for IV abx.       Medication Changes  None     Important Follow Up Items  Imaging: Will need repeat CT scan prior to end of antibiotic treatment  Medications: IV ertapenem through 8/6/25     Code Status: Full     Discharge Medications       Medication List        START taking these medications      docusate sodium 100 MG Caps  Commonly known as: COLACE     ertapenem 1 g Solr  Commonly known as: Invanz  Inject 1 g into the vein daily for 14 days. Check once weekly CBC, CMP, CRP and fax to [phone].  PICC care q week.  Flushes per [facility] protocol.            CHANGE how you take these medications      estradiol 0.1 MG/GM Crea  Commonly known as: Estrace  Apply fingertip amount of cream to the vagina (0.5-1g) every night for 1 week and then every other night indefinitely  What changed:   how much to take  how to take this  when to take this            CONTINUE taking these medications      acetaminophen 500 MG Tabs  Commonly known as: Tylenol Extra Strength     apixaban 5 MG Tabs  Commonly known as: Eliquis  Take 1 tablet (5 mg total) by mouth 2 (two) times daily.      cyclobenzaprine 5 MG Tabs  Commonly known as: Flexeril     donepezil 10 MG Tabs  Commonly known as: Aricept     ergocalciferol 1.25 MG (97329 UT) Caps  Commonly known as: ERGOCALCIFEROL     furosemide 20 MG Tabs  Commonly known as: Lasix     gabapentin 100 MG Caps  Commonly known as: Neurontin  TWO CAPSULES (200MG) BY MOUTH TWICE DAILY     hydrALAZINE 50 MG Tabs  Commonly known as: Apresoline     lamoTRIgine 100 MG Tabs  Commonly known as: LaMICtal     levothyroxine 100 MCG Tabs  Commonly known as: Synthroid  Take 1 tablet (100 mcg total) by mouth before breakfast.     LORazepam 0.5 MG Tabs  Commonly known as: Ativan  Take 1 tablet (0.5 mg total) by mouth 2 (two) times daily as needed.     nystatin [id number] UNIT/GM Powd     * ondansetron 4 MG Tbdp  Commonly known as: Zofran-ODT  Take 1 tablet (4 mg total) by mouth every 4 (four) hours as needed for Nausea.     * ondansetron 4 MG Tbdp  Commonly known as: Zofran-ODT     Polyethylene Glycol 3350 17 g Pack  Commonly known as: MIRALAX     QUEtiapine 200 MG Tabs  Commonly known as: SEROquel     Senexon-S 8.6-50 MG Tabs  Generic drug: sennosides-docusate           * This list has 2 medication(s) that are the same as other medications prescribed for you. Read the directions carefully, and ask your doctor or other care provider to review them with you.                   Where to Get Your Medications        You can get these medications from any pharmacy    Bring a paper prescription for each of these medications  ertapenem 1 g Solr  LORazepam 0.5 MG Tabs       I reconciled current and discharge medications on the day of discharge.    Imaging/Diagnostic Reports  CARD ECHO 2D DOPPLER (CPT=93306)  Result Date: 2025  Transthoracic Echocardiogram Name:Gretta Canales Date: 2025 :  1940 Ht:  (68in)  BP: 118 / 96 MRN:  [id number]     Age:  84years    Wt:  (154lb) HR: 85bpm Loc:  [facility]       Gndr: F          BSA: 1.83m^2 Sonographer: Cody INIGUEZ Ordering:     Frederic Crawford Consulting:  Noam Méndez ---------------------------------------------------------------------------- History/Indications:   Chest pain.  Murmur. Diverticulitis of colon.  Risk factors:  Hypertension. Blood tests:    Troponin elevated. ---------------------------------------------------------------------------- Procedure information:  A transthoracic complete 2D study was performed. Additional evaluation included M-mode, complete spectral Doppler, and color Doppler.  Patient status:  Inpatient.  Location:  Bedside.    This was a routine study. Transthoracic echocardiography for diagnosis and ventricular function evaluation. Image quality was adequate. ECG rhythm:   Normal sinus ---------------------------------------------------------------------------- Conclusions: 1. Left ventricle: The cavity size was normal. Wall thickness was mildly    increased. Systolic function was normal. The estimated ejection fraction    was 60-65%, by biplane method of disks. No diagnostic evidence for    regional wall motion abnormalities. Doppler parameters are consistent    with abnormal left ventricular relaxation - grade 1 diastolic    dysfunction. 2. Aortic valve: Transvalvular velocity was increased, due to stenosis. The    findings were consistent with mild to moderate stenosis. There was mild    regurgitation. The peak systolic velocity was 2.59m/sec. The mean    systolic gradient was 18mm Hg. The valve area (VTI) was 1.14cm^2. The    valve area (VTI) index was 0.62cm^2/m^2. 3. Pulmonary arteries: Systolic pressure was within the normal range,    estimated to be 22mm Hg. * ---------------------------------------------------------------------------- * Findings: Left ventricle:  The cavity size was normal. Wall thickness was mildly increased. Systolic function was normal. The estimated ejection fraction was 60-65%, by biplane method of disks. No diagnostic evidence for regional wall motion abnormalities. Doppler  parameters are consistent with abnormal left ventricular relaxation - grade 1 diastolic dysfunction. Left atrium:  The left atrial volume was normal. Right ventricle:  The cavity size was normal. Systolic function was normal. Right atrium:  The atrium was normal in size. Mitral valve:  The valve was structurally normal. Leaflet separation was normal.  Doppler:  Transvalvular velocity was within the normal range. There was no evidence for stenosis. There was no significant regurgitation. Aortic valve:   The valve was trileaflet. The leaflets were mildly thickened and mildly calcified. Cusp separation was normal.  Doppler:  Transvalvular velocity was increased, due to stenosis. The findings were consistent with mild to moderate stenosis. There was mild regurgitation.    The valve area (VTI) was 1.14cm^2. The valve area (VTI) index was 0.62cm^2/m^2.    The mean systolic gradient was 18mm Hg. The peak systolic gradient was 34mm Hg. Tricuspid valve:  The valve is structurally normal. Leaflet separation was normal.  Doppler:  Transvalvular velocity was within the normal range. There was no evidence for stenosis. There was trivial regurgitation. Pulmonic valve:   The valve is structurally normal. Cusp separation was normal.  Doppler:  Transvalvular velocity was within the normal range. There was no evidence for stenosis. There was trace regurgitation. Pericardium:   There was no pericardial effusion. Aorta: Aortic root: The aortic root was normal. Ascending aorta: The ascending aorta was normal. Pulmonary arteries: Systolic pressure was within the normal range, estimated to be 22mm Hg. Systemic veins:  Central venous respirophasic diameter changes are in the normal range (>50%). Inferior vena cava: The IVC was normal-sized. ---------------------------------------------------------------------------- Measurements  Left ventricle                  Value          Ref  IVS thickness, ED, PLAX     (H) 1.1   cm       0.6 - 0.9   LV ID, ED, PLAX                 4.3   cm       3.8 - 5.2  LV ID, ES, PLAX                 3.0   cm       2.2 - 3.5  LV PW thickness, ED, PLAX   (H) 1.1   cm       0.6 - 0.9  IVS/LV PW ratio, ED, PLAX       1.00           ---------  LV PW/LV ID ratio, ED, PLAX     0.26           ---------  LV ejection fraction            58    %        54 - 74  Stroke volume/bsa, 2D           39    ml/m^2   ---------  LV end-diastolic volume,        111   ml       48 - 140  1-p A4C  LV ejection fraction, 1-p       62    %        46 - 78  A4C  Stroke volume, 1-p A4C          69    ml       ---------  LV end-diastolic                61    ml/m^2   30 - 82  volume/bsa, 1-p A4C  Stroke volume/bsa, 1-p A4C      38    ml/m^2   ---------  LV end-diastolic volume,    (H) 107   ml       46 - 106  2-p  LV end-systolic volume, 2-p     41    ml       14 - 42  LV ejection fraction, 2-p       62    %        54 - 74  Stroke volume, 2-p              66    ml       ---------  LV end-diastolic                59    ml/m^2   29 - 61  volume/bsa, 2-p  LV end-systolic volume/bsa,     22    ml/m^2   8 - 24  2-p  Stroke volume/bsa, 2-p          36.1  ml/m^2   ---------  LV e', lateral              (L) 6.0   cm/sec   >=10.0  LV E/e', lateral                8              <=13  LV e', medial               (L) 4.4   cm/sec   >=7.0  LV E/e', medial                 12             ---------  LV e', average                  5.2   cm/sec   ---------  LV E/e', average                10             <=14  LVOT                            Value          Ref  LVOT ID                         1.9   cm       ---------  LVOT peak velocity, S           1.06  m/sec    ---------  LVOT VTI, S                     24.9  cm       ---------  LVOT peak gradient, S           4     mm Hg    ---------  LVOT mean gradient, S           2     mm Hg    ---------  Stroke volume (SV), LVOT DP     71    ml       ---------  Stroke index (SV/bsa), LVOT     39    ml/m^2   ---------  DP  Aortic  valve                    Value          Ref  Aortic valve peak velocity,     2.59  m/sec    ---------  S  Aortic valve VTI, S             51.1  cm       ---------  Aortic mean gradient, S         18    mm Hg    ---------  Aortic peak gradient, S         34    mm Hg    ---------  Aortic pressure half-time       735   ms       ---------  Aortic valve area, VTI          1.14  cm^2     ---------  Aortic valve area/bsa, VTI      0.62  cm^2/m^2 ---------  Velocity ratio, peak,           0.36           ---------  LVOT/AV  Aortic regurg velocity, ED      2.35  m/sec    ---------  Aortic regurg deceleration      93.6  cm/s^2   ---------  Aortic regurg pressure          735   ms       ---------  half-time  Aortic regurg gradient, ED      22    mm Hg    ---------  Aortic root                     Value          Ref  Aortic root ID                  3.5   cm       2.5 - 4.0  Ascending aorta                 Value          Ref  Ascending aorta ID              3.3   cm       1.9 - 3.5  Left atrium                     Value          Ref  LA ID, A-P, ES                  3.5   cm       2.7 - 3.8  LA volume, S                    31    ml       22 - 52  LA volume/bsa, S                17    ml/m^2   16 - 34  LA volume, ES, 1-p A4C      (H) 57    ml       22 - 52  LA/aortic root ratio            1              ---------  Mitral valve                    Value          Ref  Mitral E-wave peak velocity     0.5   m/sec    ---------  Mitral A-wave peak velocity     0.88  m/sec    ---------  Mitral deceleration time        206   ms       ---------  Mitral E/A ratio, peak          0.6            ---------  Pulmonary artery                Value          Ref  PA pressure, S, DP              22    mm Hg    ---------  Tricuspid valve                 Value          Ref  Tricuspid regurg peak           2.19  m/sec    <=2.8  velocity  Tricuspid peak RV-RA            19    mm Hg    ---------  gradient  Systemic veins                  Value          Ref   Estimated CVP                   3     mm Hg    ---------  Inferior vena cava              Value          Ref  ID                              1.3   cm       <=2.1  Right ventricle                 Value          Ref  TAPSE, 2D                       2.35  cm       >=1.70  TAPSE, MM                       2.35  cm       >=1.70  RV pressure, S, DP              22    mm Hg    ---------  RV s', lateral                  14.6  cm/sec   >=9.5 Legend: (L)  and  (H)  shaylee values outside specified reference range. ---------------------------------------------------------------------------- Prepared and electronically signed by Frederic Crawford 07/24/2025 10:31     CT ABDOMEN+PELVIS(CONTRAST ONLY)(CPT=74177)  Result Date: 7/21/2025  PROCEDURE: CT ABDOMEN+PELVIS(CONTRAST ONLY)(CPT=74177) INDICATIONS: Diverticulitis with abscess. COMPARISON: 07/18/2025 CT ABDOMEN+PELVIS(CONTRAST ONLY)(CPT=74177) TECHNIQUE: Multiple axial CT images of the abdomen and pelvis were obtained for evaluation from the lung bases to the symphysis pubis with non-ionic intravenous contrast material. Post contrast coronal MPR imaging was performed. Automated exposure control dose reduction techniques were used. Adjustment of the mA and/or kV was done based on the patient's size. Use of iterative reconstruction technique for dose reduction was used. Dose information is transmitted to the ACR (American College of Radiology) NRDR (National Radiology Data Registry) which includes the Dose Index Registry. FINDINGS: LIVER: Liver is normal size and contour. There is diffuse mild intrahepatic bili ductal dilatation. No significant enhancing hepatic lesion. BILIARY: Post cholecystectomy. There is intra and extrahepatic bili ductal dilatation similar to previous exam. PANCREAS: No lesion, fluid collection, ductal dilatation, or atrophy. SPLEEN: No nodule or enlargement. STOMACH: No gastric obstruction. Duodenum is unremarkable. ADRENALS: No nodule or enlargement.  KIDNEYS: No enhancing renal lesion or hydroureteronephrosis. AORTA/VASCULAR: Atherosclerosis of the abdominal aorta. No aneurysm or dissection. Post IVC filter. RETROPERITONEUM: No inflammatory changes or lymphadenopathy. BOWEL/MESENTERY: Diverticulitis is present within the proximal sigmoid colon with prominent pericolonic fat stranding and phlegmonous change in the vicinity of diverticulitis. Additional diverticulosis is present proximal and distal to the inflammatory site. There is no organized measurable fluid collection adjacent to the bowel however within the posterior margin of the affected colon there is a 21 x 31 mm diameter focus of fluid suggesting intramural abscess (series 2, image 107). No significant free  air. No pneumoperitoneum elsewhere. There is a small volume of fat stranding and free fluid in the presacral space. No bowel obstruction. 7 cm diameter focus of fecal material within the rectum with perirectal and presacral fat stranding. Oral contrast material was used for this exam and it reaches the distal transverse colon at time of imaging. ABDOMINAL WALL: No suspicious mass lesion or significant hernia. Granulomatous calcifications in the gluteal fat. Nodular soft tissue focus with internal calcifications in the medial aspect of the left gluteal fat measuring 35 mm in diameter likely representing granuloma or scar. URINARY BLADDER: Bladder contains urine. The posterior aspect of the bladder is obscured by beam hardening artifact from bilateral hip arthroplasties PELVIC NODES: No enlarged mass or adenopathy. PELVIC ORGANS: Pelvic organs are obscured by beam hardening artifact from hip arthroplasty. BONES: Bilateral hip arthroplasties are present which cause beam Rosado artifact limiting assessment of adjacent structures. Mild-moderate degenerative endplate change and disc disease throughout the spine most advanced in the thoracolumbar region. LUNG BASES: Coronary atherosclerosis. Small  bilateral pleural effusions. Patchy bibasilar pulmonary opacities. OTHER: Negative.     CONCLUSION: 1. There is evidence of diverticulitis in the proximal sigmoid colon. Fat stranding and scattered foci of gas within this region demonstrated on preceding exam have significantly improved. No evidence of contained perforation or organized measurable extraluminal collection such as abscess however a 3 x 2 cm intramural abscess is present within the affected sigmoid colon. Phlegmonous changes which have improved or otherwise present. 2. 7 cm focus of fecal material within the rectum with perirectal and presacral fat stranding, which could represent changes of impaction/stercoral colitis. 3. Post bilateral hip arthroplasties which cause beam hardening artifact limiting assessment of adjacent structures. This includes structures within the central pelvis. 4. Coronary atherosclerosis. 5. Small bilateral pleural effusions. Both effusions are larger compared to 7/18/2025 CT. Bibasilar pulmonary opacities likely atelectasis. 6. Post IVC filter. 7. Post cholecystectomy. Stable intra and extrahepatic bili ductal dilatation. Electronically Verified and Signed by Attending Radiologist: Aguila Fountain MD 7/21/2025 3:26 PM Workstation: [id number]    CT ABDOMEN+PELVIS(CONTRAST ONLY)(CPT=74177)  Result Date: 7/18/2025  PROCEDURE: CT ABDOMEN+PELVIS(CONTRAST ONLY)(CPT=74177) INDICATIONS: 84-year-old female with generalized abdominal pain, nausea, and vomiting of acute onset. COMPARISON: 02/20/2023 CT ABDOMEN+PELVIS (CPT=74176) 05/05/2022 CT ABDOMEN PELVIS IV CONTRAST NO ORAL (ER) 05/03/2020 CT ABDOMEN PELVIS IV CONTRAST NO ORAL (ER) 11/20/2019 CT ABDOMEN PELVIS IV CONTRAST NO ORAL (ER) 03/29/2019 CT ABDOMEN PELVIS IV CONTRAST NO ORAL (ER) 11/07/2018 CT ABDOMEN PELVIS IV CONTRAST NO ORAL (ER) TECHNIQUE: Multidetector CT images of the abdomen and pelvis were obtained with non-ionic intravenous contrast material. Automated exposure control  for dose reduction was used. Adjustment of the mA and/or kV was done based on the patient's size. Iterative reconstruction technique for dose reduction was employed. Dose information was transmitted to the ACR (American College of Radiology) NRDR (National Radiology Data Registry), which includes the Dose Index Registry. Oral contrast was not ingested. CONTRAST USED: IOPAMIDOL 76% IV SOLN FOR POWER INJECTOR:80 mL. FINDINGS: COMMENT: Overall examination quality is degraded by beam hardening artifact throughout the imaged volume secondary to patient body habitus and contact of the lateral body wall with the scanner gantry. LUNG BASES: The heart is borderline in size. Aortic annular calcifications are observed. Lipomatous hypertrophy of the interatrial septum is suggested. There is dependent subsegmental atelectasis bilaterally. Additional scattered reticular opacities are seen and may be atelectatic in nature. Mild pulmonary interstitial opacities are seen throughout the lungs bilaterally. LIVER: No enlargement, atrophy, abnormal density, or significant focal lesion is identified. BILIARY: The gallbladder is surgically absent with cholecystectomy clips in the gallbladder fossa. Minimal prominence of the central intrahepatic biliary radicles is evident. PANCREAS: In the pancreatic body, there is ovoid hypoattenuation measuring 0.4 x 1.1 cm (series 2, image 40). No discrete fluid collection, ductal dilatation, or atrophy is apparent. SPLEEN: No enlargement. A small splenule is present. ADRENALS: No defined mass or abnormal enlargement. KIDNEYS: Symmetric enhancement is seen without evidence of hydronephrosis or underlying solid masses. Multiple well circumscribed renal hypodensities are present and are not completely characterized, but statistically likely represent cysts. GI/MESENTERY: A small hiatal hernia is evident. Apparent gastric wall thickening is likely secondary to underdistention. There is no evidence of  bowel obstruction. The appendix is not clearly delineated, but there are no suspicious inflammatory manifestations in the right lower quadrant. A small surgical clip is seen adjacent to the ascending colon. A heavy stool burden is demonstrated. Scattered colonic diverticula are present in the sigmoid colon. There is intermediate length segment colonic wall thickening of the rectosigmoid colon and extensive pericolonic fat stranding. There appears to be intramural fluid and gas collection measuring up to 4.8 x 3.1 x 2.6 cm. URINARY BLADDER: Assessment is substantially degraded by metallic streak artifact. Incompletely distended without visible calculus. Mild circumferential bladder wall thickening may relate to underdistention. PELVIC NODES: Artifactual degradation compromises assessment. No gross intrapelvic lymphadenopathy is seen. PELVIC ORGANS: Metallic streak artifact degrades overall assessment. Postoperative changes of partial hysterectomy are noted. VASCULATURE: Heavy atherosclerotic vascular calcifications of the abdominal aorta are observed. There is atherosclerotic involvement of the celiac axis and superior mesenteric artery origins. No aneurysm is detected. A Dundee type IVC filter is present in the infrarenal IVC. RETROPERITONEUM: No mass or lymphadenopathy is apparent. BONES: Levoscoliosis of the lumbar spine is demonstrated. Advanced multilevel degenerative changes are seen throughout the spine with associated vacuum disc phenomenon. Bilateral hip arthroplasties are appreciated with resultant artifactual degradation of the pelvis. There is pubic symphyseal degeneration. Sclerosis and vacuum phenomenon of the sacroiliac joints are present. ABDOMINAL WALL: Midline ventral abdominal scarring is perceived. There is obliquely oriented scarring in the right upper quadrant. Fatty atrophy of the right rectus musculature likely related to prior operative intervention. Extensive coarse calcifications are  demonstrated in the right gluteal subcutaneous fat. There is dependent subcutaneous edema. OTHER: Potential free air/fluid are seen in the lower abdomen/pelvis.     CONCLUSION: 1.  Intermediate length segment sigmoid colonic diverticulitis with suspected contained perforation and phlegmonous intramural fluid/gas which may represent developing intramural abscess formation. Following resolution of acute symptomatology, colonoscopy should be considered to exclude potential underlying malignancy. 2.  There is a small cystic lesion of the pancreatic body. Follow-up MRCP of the abdomen with and without contrast can be considered for further assessment if clinically warranted. 3.  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 4.  Bilateral hip arthroplasties with resultant artifactual degradation of the pelvis. 5.  Postoperative changes of hysterectomy. 6.  Status post cholecystectomy with extrahepatic biliary dilatation that may represent age ectasia superimposed on the postcholecystectomy state. 7.  Lesser incidental findings as above. Electronically Verified and Signed by Attending Radiologist: Tony Campos MD 7/18/2025 1:03 PM Workstation: [id number]      Secondary Discharge Diagnoses  Cystic lesion on pancreas   HTN  Hypothyroidism   Chronic back pain  Hs of bipolar DO  Hs of DVT  Hs of cranial aneurysm    Pertinent Physical Exam At Time of Discharge  Vitals:    07/24/25 0300 07/24/25 0414 07/24/25 0451 07/24/25 0820   BP:  (!) 170/100 153/66 (!) 118/96   BP Location:  Left arm Left arm Left arm   Pulse: 82 87 84 83   Resp:   18 18   Temp:   98.3 °F (36.8 °C)    TempSrc:   Oral    SpO2:  92% 95% 97%   Weight:       General: no acute distress  Heart: RRR  Lungs: CTAB  Abd: Soft, NT, ND    Total time coordinating care > 30 mins.    Patient had opportunity to ask questions and stated understanding and agreed with therapeutic plan as outlined.     Jesus Collazo DO  7/24/2025

## 2025-07-24 NOTE — PROGRESS NOTES
[facility]  part of [facility]    General Surgery Progress Note  Gretta Canales  CSN:[id number]  HD# [id number]       Subjective:   Feels better denies abdominal pain but w nausea due to morphine, no emesis. Patient complained of chest pain overnight and had cardiac evaluation today.   Tolerated low fiber diet yesterday  Passing flatus and BM(s)     Exam:     General: awake and alert and in no acute distress  Pulmonary:lungs clear to auscultation bilaterally  Cardiac: nl S1,S2, no S3, no S4, IV/VI systolic murmur at LLSB and RUSB, and irregular   Abdomen: normal BS, nondistended, and nontender   Extremities: calves nontender, no edema, motor intact, and sensory intact    Assessment and Plan:   Diverticulitis of colon with perforation   Diverticulitis of sigmoid colon w perforation and phlegmon/developing abscess    Repeat CT a/p showed only intramural abscess and improved inflammation  - no need for IR drainage  Cont conservative treatment w Zosyn.   Low fiber diet  ID consult - 3 weeks of IV outpt abx  Bowel program w MiraLax - + BM yesteday  CP with elevated Troponin - ECHO today, cardiac evaluation    We will follow closely  OK to d/c home from surgery standpoint   Instructions given  F/u with Dr. Talbert in 2-3 weeks.     Objective:     Vitals:    07/24/25 0300 07/24/25 0414 07/24/25 0451 07/24/25 0820   BP:  (!) 170/100 153/66 (!) 118/96   Pulse: 82 87 84 83   Resp:   18 18   Temp:   98.3 °F (36.8 °C)    TempSrc:   Oral    SpO2:  92% 95% 97%   Weight:         Body mass index is 23.46 kg/m².       Intake/Output Summary (Last 24 hours) at 7/24/2025 1124  Last data filed at 7/24/2025 0151  Gross per 24 hour   Intake 420 ml   Output 200 ml   Net 220 ml       No results found.        Results:     Recent Labs   Lab 07/22/25  0529 07/23/25  0546 07/24/25  0432   RBC 3.63* 3.73* 3.74*   HGB 11.4* 12.0 12.2   HCT 36.5 36.7 37.0   .6* 98.4 98.9   MCH 31.4 32.2 32.6   MCHC 31.2 32.7 33.0   RDW 13.3  13.2 13.2   NEPRELIM 3.32 3.56 4.01   WBC 5.3 6.1 6.5   .0 168.0 165.0       Recent Labs   Lab 07/21/25  0706 07/22/25  0529 07/24/25  0432   GLU 93 95 83   BUN 10 9 10   CREATSERUM 0.73 0.76 0.75   CA 9.0 9.3 9.3    141 139   K 4.6 4.6 4.1    105 102   CO2 30.0 31.0 32.0       Lab Results   Component Value Date    WBC 6.5 07/24/2025    HGB 12.2 07/24/2025    HCT 37.0 07/24/2025    .0 07/24/2025     07/24/2025    K 4.1 07/24/2025     07/24/2025    CO2 32.0 07/24/2025    BUN 10 07/24/2025    CREATSERUM 0.75 07/24/2025    GLU 83 07/24/2025    MG 1.8 07/24/2025    CA 9.3 07/24/2025          At all points during my encounter with the patient my collaborating physician Dr. Narayan Talbert  was available to answer questions and update the plan as necessary. The plan as stated previously is in agreement with the team.       J Luis Arnold PA-C  General Surgery  [facility]  07/24/25  11:24 AM    The patient was seen and examined by myself.  The above note was modified accordingly    Narayan Talbert MD [facility]  07/24/25  12:25 PM

## 2025-07-24 NOTE — CONSULTS
[facility] CARDIOLOGY CONSULT      Gretta Canales is a 84 year old female who I assessed as follows:  Chief Complaint   Patient presents with    Abdomen/Flank Pain          REASON FOR CONSULTATION:    chest pain            ASSESSMENT/PLAN:     IMPRESSIONS:  Chest pain, consider GI vs cardiac. Troponin mildly elevated, flat curve. ECG without acute changes. Less likely PE with eliquis use  Diverticulitis of sigmoid colon with perforation and abscess  HTN  History DVT , on eliquis PTA  History bipolar disorder  Intracranial aneurysm s/p coiling  Murmur, ?AV sclerosis vs stenosis        PLAN:  ECG reviewed  Echo. If no wall motion abnormality then consider outpatient ischemic eval  BP meds reviewed  Back on eliquis            --------------------------------------------------------------------------------------------------------------------------------    HPI:         Admitted 7/18/25 with emesis and abd pain and found to have diverticulitis with perforation and abscess. This past evening developed mid chest pain, \"severe\", woke her from sleep, not pleuritic, resolved with morphine, lasted minutes. She initially described to me as like heartburn.     On meds for HTN. Was on eliquis for history of DVT.             Current Medications[1]   Past Medical History[2]  Past Surgical History[3]  Family History[4]   Social History:  Short Social Hx on File[5]       Medications:  Current Hospital Medications[6]        Allergies  Allergies[7]            REVIEW OF SYSTEMS:   GENERAL HEALTH: no fevers  SKIN: denies any unusual skin lesions or rashes   EARS: no recent changes in hearing  EYE: no recent vision changes  THROAT: denies sore throat  RESPIRATORY: denies cough or shortness of breath with exertion  CARDIOVASCULAR: denies chest pain, syncope, or palpitations  GI: denies abdominal pain, nausea and vomiting  NEURO: denies headaches and focal neurologic symptoms  PSYCH: no recent depression  ENDOCRINE: no change in  sleep pattern  HEME: no easy bruising  All other systems reviewed and negative.          EXAM:         TELE: SR          /66 (BP Location: Left arm)   Pulse 84   Temp 98.3 °F (36.8 °C) (Oral)   Resp 18   Wt 154 lb 5.2 oz (70 kg)   SpO2 95%   BMI 23.46 kg/m²   Temp (24hrs), Av.9 °F (36.6 °C), Min:97.4 °F (36.3 °C), Max:98.3 °F (36.8 °C)    Wt Readings from Last 3 Encounters:   25 154 lb 5.2 oz (70 kg)   25 180 lb (81.6 kg)   25 192 lb (87.1 kg)         Intake/Output Summary (Last 24 hours) at 2025 0802  Last data filed at 2025 0151  Gross per 24 hour   Intake 520 ml   Output 850 ml   Net -330 ml         GENERAL: well developed, well nourished, in no apparent distress  SKIN: no rashes  HEENT: atraumatic, normocephalic, throat without erythema  NECK: supple, no bruits  LUNGS: clear to auscultation  CARDIO: RRR with 2/6 systolic murmur  GI: soft, nondistended  EXTREMITIES: no cyanosis, clubbing or edema  NEUROLOGY: alert  PSYCH: cooperative          LABORATORY DATA:               ECG:        ECHO:          Labs:  Recent Labs   Lab 25  0706 25  0529 25  0432   GLU 93 95 83   BUN 10 9 10   CREATSERUM 0.73 0.76 0.75   CA 9.0 9.3 9.3    141 139   K 4.6 4.6 4.1    105 102   CO2 30.0 31.0 32.0     No results for input(s): \"TROP\" in the last 168 hours.  Recent Labs   Lab 25  0432   RBC 3.74*   HGB 12.2   HCT 37.0   MCV 98.9   MCH 32.6   MCHC 33.0   RDW 13.2   NEPRELIM 4.01   WBC 6.5   .0     Recent Labs   Lab 25  0432 25  0711   TROPHS 140* 138*       Imaging:  CT ABDOMEN+PELVIS(CONTRAST ONLY)(CPT=74177)  Result Date: 2025  CONCLUSION: 1. There is evidence of diverticulitis in the proximal sigmoid colon. Fat stranding and scattered foci of gas within this region demonstrated on preceding exam have significantly improved. No evidence of contained perforation or organized measurable extraluminal collection such as abscess  however a 3 x 2 cm intramural abscess is present within the affected sigmoid colon. Phlegmonous changes which have improved or otherwise present. 2. 7 cm focus of fecal material within the rectum with perirectal and presacral fat stranding, which could represent changes of impaction/stercoral colitis. 3. Post bilateral hip arthroplasties which cause beam hardening artifact limiting assessment of adjacent structures. This includes structures within the central pelvis. 4. Coronary atherosclerosis. 5. Small bilateral pleural effusions. Both effusions are larger compared to 7/18/2025 CT. Bibasilar pulmonary opacities likely atelectasis. 6. Post IVC filter. 7. Post cholecystectomy. Stable intra and extrahepatic bili ductal dilatation. Electronically Verified and Signed by Attending Radiologist: Aguila Fountain MD 7/21/2025 3:26 PM Workstation: [id number]           Frederic Crawford MD        [1]   Current Outpatient Medications   Medication Sig Dispense Refill    ertapenem 1 g Injection Recon Soln Inject 1 g into the vein daily for 14 days. Check once weekly CBC, CMP, CRP and fax to [phone].  PICC care q week.  Flushes per hospitals protocol. 14 each 0   [2]   Past Medical History:   Anxiety state, unspecified    Aortic atherosclerosis    CT scan 11-19    Arthritis of both knees    knee replacement     Arthritis of right hip    Back problem    Brain aneurysm (HCC)    Brain Aneurysm, Stent placed     Cervical disc disease    Cervical Discectomy     Cholecystitis    Cholecystectomy     COPD (chronic obstructive pulmonary disease) (HCC)    PFTs 2-15 with FEV1 1.11 L and FEV1/FVC ratio 63%    Deep vein thrombosis (HCC)    recurrent DVT- on lifelong AC    Dehydration    Fluids, Medication adjustment     Dementia (HCC)    Depression    Disorder of thyroid    Esophageal reflux    Fibromyalgia    Hammertoe    hammertoe 5 left, pain    Hearing impairment    Bilateral hearing aids    High blood pressure    Hip pain, left     Hypothyroidism    Incontinence    L3-4 stable slight grade 1 retrolisthesis    L4-5 mild-mod diffuse bulging disc    L4-5 slight grade 1 unstable spondylolisthesis    Leg wound, left    Low back pain    Migraines    Muscle weakness    Nausea vomiting and diarrhea    Onychomycosis    Debridement     Oropharyngeal dysphagia    Osteoarthrosis, unspecified whether generalized or localized, unspecified site    Other and unspecified hyperlipidemia    Other complicated headache syndrome    Other spondylosis, lumbar region    Pneumonia    S/P cervical spinal fusion: C3-6 and C7-T1    s/p L5-S1 right laminectomy    stent placement    cerebral    Thyroid disease    Toe pain    Onychomycosis, Debridement , Hammertoe     Tonsillitis    Tonsillitis     Visual impairment    EYE GLASSES    Wound of left leg    Left leg debridement and Split Thickness Skin Graft to left thigh   [3]   Past Surgical History:  Procedure Laterality Date    Anesth,neck vessel surgery nos      x4    Brain surgery  ~2009    Brain Aneurysm, Stent placed     Carotid endarterectomy Right     Carpal tunnel release Right ~2008    Cholecystectomy  1984    Cholecystitis    Colonoscopy  2010    Debridement of nail(s), 1-5      Toe, Onychomycosis    Egd  05/2019    Gastric polyps only    Egd  2006    Eye surgery      x2 FOR \"CROSSED EYES\"    Hip replacement surgery Bilateral     Hysterectomy  1967    Partial Hysterectomy    Jaw surgery      Knee replacement surgery Bilateral     Bilateral arthritis     Laminectomy      WITH FUSION PER PATIENT    Other surgical history  1985,1995,2003,2009    Cervical Discectomy AND FUSION    Prep site t/a/l 1st 100 sq cm/1pct Left 08/21/2019    Left leg debridement, VAC placed    Spine surgery procedure unlisted      Split grft proc trunk <100sqcm Left 10/08/2019    Left leg debridement and Split Thickness Skin Graft to left thigh    Tonsillectomy  1950    Tonsillitis     Total hip replacement      Total knee replacement     [4]    Family History  Problem Relation Age of Onset    Heart Attack Mother     Heart Disease Mother         Coronary Artery Disease, Premature     Fibromyalgia Sister     Heart Disease Sister     Heart Attack Sister     Heart Disease Brother     Heart Attack Brother     Other (Other[other]) Father     Other (Other[other]) Maternal Grandmother     Other (Other[other]) Maternal Grandfather     Other (Other[other]) Paternal Grandmother     Other (Other[other]) Paternal Grandfather     Cancer Brother 55        Liver     Neurological Disorder Sister         ALzheimer's Disease     Depression Sister     Migraines Sister     Stroke Sister         Cerebrovascular accident     Dementia Sister     Heart Disease Brother         Coronary Artery Disease    [5]   Social History  Socioeconomic History    Marital status:    Tobacco Use    Smoking status: Former     Current packs/day: 0.00     Average packs/day: 2.0 packs/day for 25.0 years (50.0 ttl pk-yrs)     Types: Cigarettes     Start date: 1952     Quit date: 1977     Years since quittin.5    Smokeless tobacco: Never    Tobacco comments:     Very heavy smoker 2 1/2 daily   Vaping Use    Vaping status: Never Used   Substance and Sexual Activity    Alcohol use: No    Drug use: No   Other Topics Concern    Caffeine Concern No     Comment: crystal light    Exercise No     Comment: No PT due to Covid    History of tanning No    Pt has a pacemaker No    Pt has a defibrillator No    Reaction to local anesthetic No    Left Handed Yes    Right Handed No    Currently spends a great deal of time in the sun No    Hx of Spending Great Deal of Time in Sun No    Bad sunburns in the past No    Tanning Salons in the Past No    Hx of Radiation Treatments No   Social History Narrative    The patient uses the following assistive device(s):  rolling walker.  scooter    The patient does not live in a home with stairs.    The patient lives in a [facility] home. [facility]      Social Drivers of Health     Food Insecurity: No Food Insecurity (7/18/2025)    NCSS - Food Insecurity     Worried About Running Out of Food in the Last Year: No     Ran Out of Food in the Last Year: No   Transportation Needs: No Transportation Needs (7/18/2025)    NCSS - Transportation     Lack of Transportation: No   Housing Stability: Not At Risk (7/18/2025)    NCSS - Housing/Utilities     Has Housing: Yes     Worried About Losing Housing: No     Unable to Get Utilities: No   [6]   Current Facility-Administered Medications   Medication Dose Route Frequency    hydrALAzine (Apresoline) 20 mg/mL injection 10 mg  10 mg Intravenous Q6H PRN    ertapenem (INVanz) 1 g in sodium chloride 0.9% 100mL IVPB-GERARD  1 g Intravenous Once    docusate sodium (Colace) cap 100 mg  100 mg Oral BID    polyethylene glycol (PEG 3350) (Miralax) 17 g oral packet 17 g  17 g Oral Daily PRN    acetaminophen (Tylenol Extra Strength) tab 1,000 mg  1,000 mg Oral Q8H PRN    miconazole 2 % powder   Topical BID    melatonin tab 3 mg  3 mg Oral Nightly PRN    ondansetron (Zofran) 4 MG/2ML injection 4 mg  4 mg Intravenous Q6H PRN    benzonatate (Tessalon) cap 200 mg  200 mg Oral TID PRN    piperacillin-tazobactam (Zosyn) 3.375g in D5W 100mL IVPB-GERARD  3.375 g Intravenous Q8H    apixaban (Eliquis) tab 5 mg  5 mg Oral BID    cyclobenzaprine (Flexeril) tab 5 mg  5 mg Oral Q6H PRN    donepezil (Aricept) tab 10 mg  10 mg Oral QAM    gabapentin (Neurontin) cap 200 mg  200 mg Oral BID    hydrALAZINE (Apresoline) tab 50 mg  50 mg Oral BID    HYDROcodone-acetaminophen (Norco) 5-325 MG per tab 1 tablet  1 tablet Oral Q6H PRN    lamoTRIgine (LaMICtal) tab 100 mg  100 mg Oral BID    levothyroxine (Synthroid) tab 100 mcg  100 mcg Oral Before breakfast    LORazepam (Ativan) tab 0.5 mg  0.5 mg Oral BID PRN    QUEtiapine (SEROquel) tab 200 mg  200 mg Oral Nightly   [7]   Allergies  Allergen Reactions    Phentermine OTHER (SEE COMMENTS)     \"Mini stroke\"     Tiagabine OTHER (SEE COMMENTS)     Stroke like symptoms    Unable to move    Valproic Acid OTHER (SEE COMMENTS)     pancreatitis    Ciprofloxacin RASH    Fluocinolone OTHER (SEE COMMENTS)     Reaction Unknown    Hydromorphone OTHER (SEE COMMENTS) and RASH     Tolerates hydrocodone    Metronidazole RASH    Sulfamethoxazole W/Trimethoprim RASH    Tramadol OTHER (SEE COMMENTS)     Extreme sleepiness

## 2025-07-24 NOTE — PLAN OF CARE
Problem: Patient Centered Care  Goal: Patient preferences are identified and integrated in the patient's plan of care  Description: Interventions:  - What would you like us to know as we care for you? I live at [facility]  - Provide timely, complete, and accurate information to patient/family  - Incorporate patient and family knowledge, values, beliefs, and cultural backgrounds into the planning and delivery of care  - Encourage patient/family to participate in care and decision-making at the level they choose  - Honor patient and family perspectives and choices  Outcome: Progressing     Problem: Patient/Family Goals  Goal: Patient/Family Long Term Goal  Description: Patient's Long Term Goal: discharge home    Interventions:  - Monitor labs and vital signs  - Pain control  - Further testing  - Follow provider recommendations  - See additional Care Plan goals for specific interventions  Outcome: Progressing  Goal: Patient/Family Short Term Goal  Description: Patient's Short Term Goal: pain control    Interventions:   - Prn pain medication  - NPO - bowel rest  - Follow provider recommendation  - See additional Care Plan goals for specific interventions  Outcome: Progressing     Problem: PAIN - ADULT  Goal: Verbalizes/displays adequate comfort level or patient's stated pain goal  Description: INTERVENTIONS:  - Encourage pt to monitor pain and request assistance  - Assess pain using appropriate pain scale  - Administer analgesics based on type and severity of pain and evaluate response  - Implement non-pharmacological measures as appropriate and evaluate response  - Consider cultural and social influences on pain and pain management  - Manage/alleviate anxiety  - Utilize distraction and/or relaxation techniques  - Monitor for opioid side effects  - Notify MD/LIP if interventions unsuccessful or patient reports new pain  - Anticipate increased pain with activity and pre-medicate as appropriate  Outcome:  Progressing     Problem: SAFETY ADULT - FALL  Goal: Free from fall injury  Description: INTERVENTIONS:  - Assess pt frequently for physical needs  - Identify cognitive and physical deficits and behaviors that affect risk of falls.  - Pollock fall precautions as indicated by assessment.  - Educate pt/family on patient safety including physical limitations  - Instruct pt to call for assistance with activity based on assessment  - Modify environment to reduce risk of injury  - Provide assistive devices as appropriate  - Consider OT/PT consult to assist with strengthening/mobility  - Encourage toileting schedule  Outcome: Progressing     Problem: DISCHARGE PLANNING  Goal: Discharge to home or other facility with appropriate resources  Description: INTERVENTIONS:  - Identify barriers to discharge w/pt and caregiver  - Include patient/family/discharge partner in discharge planning  - Arrange for needed discharge resources and transportation as appropriate  - Identify discharge learning needs (meds, wound care, etc)  - Arrange for interpreters to assist at discharge as needed  - Consider post-discharge preferences of patient/family/discharge partner  - Complete POLST form as appropriate  - Assess patient's ability to be responsible for managing their own health  - Refer to Case Management Department for coordinating discharge planning if the patient needs post-hospital services based on physician/LIP order or complex needs related to functional status, cognitive ability or social support system  Outcome: Progressing     Problem: GASTROINTESTINAL - ADULT  Goal: Minimal or absence of nausea and vomiting  Description: INTERVENTIONS:  - Maintain adequate hydration with IV or PO as ordered and tolerated  - Nasogastric tube to low intermittent suction as ordered  - Evaluate effectiveness of ordered antiemetic medications  - Provide nonpharmacologic comfort measures as appropriate  - Advance diet as tolerated, if ordered  -  Obtain nutritional consult as needed  - Evaluate fluid balance  Outcome: Progressing  Goal: Maintains or returns to baseline bowel function  Description: INTERVENTIONS:  - Assess bowel function  - Maintain adequate hydration with IV or PO as ordered and tolerated  - Evaluate effectiveness of GI medications  - Encourage mobilization and activity  - Obtain nutritional consult as needed  - Establish a toileting routine/schedule  - Consider collaborating with pharmacy to review patient's medication profile  Outcome: Progressing     Problem: METABOLIC/FLUID AND ELECTROLYTES - ADULT  Goal: Electrolytes maintained within normal limits  Description: INTERVENTIONS:  - Monitor labs and rhythm and assess patient for signs and symptoms of electrolyte imbalances  - Administer electrolyte replacement as ordered  - Monitor response to electrolyte replacements, including rhythm and repeat lab results as appropriate  - Fluid restriction as ordered  - Instruct patient on fluid and nutrition restrictions as appropriate  Outcome: Progressing

## 2025-08-14 ENCOUNTER — MED REC SCAN ONLY (OUTPATIENT)
Dept: PHYSICAL MEDICINE AND REHAB | Facility: CLINIC | Age: 85
End: 2025-08-14

## (undated) DIAGNOSIS — M54.16 LUMBAR RADICULOPATHY: Primary | ICD-10-CM

## (undated) DEVICE — LINE MNTR ADLT SET O2 INTMD

## (undated) DEVICE — SUCTION CANISTER, 3000CC,SAFELINER: Brand: DEROYAL

## (undated) DEVICE — Device: Brand: DUAL NARE NASAL CANNULAE FEMALE LUER CON 7FT O2 TUBE

## (undated) DEVICE — INTENDED USE FOR SURGICAL MARKING ON INTACT SKIN, ALSO PROVIDES A PERMANENT METHOD OF IDENTIFYING OBJECTS IN THE OPERATING ROOM: Brand: WRITESITE® PLUS MINI PREP RESISTANT MARKER

## (undated) DEVICE — MEDI-VAC NON-CONDUCTIVE SUCTION TUBING: Brand: CARDINAL HEALTH

## (undated) DEVICE — 6 ML SYRINGE LUER-LOCK TIP: Brand: MONOJECT

## (undated) DEVICE — 35 ML SYRINGE REGULAR TIP: Brand: MONOJECT

## (undated) DEVICE — 12 ML SYRINGE LUER-LOCK TIP: Brand: MONOJECT

## (undated) DEVICE — CANISTER WND DRN VAC GEL TBG

## (undated) DEVICE — SINGLE USE BIOPSY VALVE MAJ-210: Brand: SINGLE USE BIOPSY VALVE (STERILE)

## (undated) DEVICE — STERILE TETRA-FLEX CF, ELASTIC BANDAGE LATEX FREE 6IN X5.5 YD: Brand: TETRA-FLEX™CF

## (undated) DEVICE — PROXIMATE RH ROTATING HEAD SKIN STAPLERS (35 WIDE) CONTAINS 35 STAINLESS STEEL STAPLES: Brand: PROXIMATE

## (undated) DEVICE — CONMED SCOPE SAVER BITE BLOCK, 20X27 MM: Brand: SCOPE SAVER

## (undated) DEVICE — Device: Brand: CUSTOM PROCEDURE KIT

## (undated) DEVICE — NEEDLE BLNT 18GA L1.5IN FILL DISP PRECISGLDE

## (undated) DEVICE — OCCLUSIVE GAUZE STRIP OVERWRAP,3% BISMUTH TRIBROMOPHENATE IN PETROLATUM BLEND: Brand: XEROFORM

## (undated) DEVICE — 3 ML SYRINGE LUER-LOCK TIP: Brand: MONOJECT

## (undated) DEVICE — TRAY SKIN PREP PVP-1

## (undated) DEVICE — SYRINGE 10ML SLIP TIP

## (undated) DEVICE — ENDOSCOPY PACK UPPER: Brand: MEDLINE INDUSTRIES, INC.

## (undated) DEVICE — SOL  .9 1000ML BTL

## (undated) DEVICE — NEEDLE HYPO 16GA L1.5IN PUR REG BVL WRLD

## (undated) DEVICE — NEEDLE SPNL 22GA L5IN BLK HUB QNCKE BVL DISP

## (undated) DEVICE — SINGLE USE SUCTION VALVE MAJ-209: Brand: SINGLE USE SUCTION VALVE (STERILE)

## (undated) DEVICE — TRAP MCS 40ML 5IN PLS SCR CAP

## (undated) DEVICE — NEEDLE 18G 1-1/2 BLUNT FILL

## (undated) DEVICE — MASK PROC MASK SOFT WHITE

## (undated) DEVICE — BANDAGE ROLL,100% COTTON, 6 PLY, LARGE: Brand: KERLIX

## (undated) DEVICE — STERILE TETRA-FLEX CF, ELASTIC BANDAGE, 4" X 5.5YD: Brand: TETRA-FLEX™CF

## (undated) DEVICE — FORCEP RADIAL JAW 4

## (undated) DEVICE — BLADE, DERMATOME (FOR MODELS S, SB, B AND C)(6 LANGUAGES): Brand: INTEGRA® PADGETT® DERMATOMES

## (undated) DEVICE — WOUND VAC DRESSING SMALL

## (undated) DEVICE — PAIN BLOCK TRAY: Brand: CARDINAL HEALTH

## (undated) DEVICE — GAMMEX® PI HYBRID SIZE 7, STERILE POWDER-FREE SURGICAL GLOVE, POLYISOPRENE AND NEOPRENE BLEND: Brand: GAMMEX

## (undated) DEVICE — STANDARD HYPODERMIC NEEDLE,POLYPROPYLENE HUB: Brand: MONOJECT

## (undated) DEVICE — MINOR GENERAL: Brand: MEDLINE INDUSTRIES, INC.

## (undated) DEVICE — OMNIPAQUE 240ML VIAL

## (undated) DEVICE — CHLORAPREP ORANGE TINT 10.5ML

## (undated) DEVICE — SUTURE SILK 2-0 FS

## (undated) DEVICE — GAUZE,SPONGE,USP,4"X4",12PLY,STRL,10/PK: Brand: MEDLINE INDUSTRIES, INC.

## (undated) DEVICE — SUPER SPONGES,MEDIUM: Brand: KERLIX

## (undated) DEVICE — YANKAUER SUCTION INSTRUMENT NO CONTROL VENT, BULB TIP, CLEAR: Brand: YANKAUER

## (undated) DEVICE — SYRINGE MED 10ML LL TIP W/O SFTY DISP

## (undated) DEVICE — TOWEL OR BLU 16X26 STRL

## (undated) DEVICE — 1.5 TO 1 DERMACARRIER: Brand: MESHGRAFTTM  II TISSUE EXPANSION SYSTEM

## (undated) DEVICE — SET EXTN 2.7ML TBNG L20IN DIA0.100IN MACBOR

## (undated) DEVICE — MAJOR GENERAL: Brand: MEDLINE INDUSTRIES, INC.

## (undated) DEVICE — GAUZE SPONGES,12 PLY: Brand: CURITY

## (undated) DEVICE — KIT CLEAN ENDOKIT 1.1OZ GOWNX2

## (undated) DEVICE — USE ITEM #176901

## (undated) DEVICE — COVER,MAYO STAND,STERILE: Brand: MEDLINE

## (undated) DEVICE — PEN 6" SURGICAL MARKING PURPL

## (undated) DEVICE — Device: Brand: JELCO

## (undated) DEVICE — CONTAINER CLIKSEAL PP 4OZ BLU

## (undated) DEVICE — CONTAINER SPEC STR 4OZ GRY LID

## (undated) DEVICE — SUTURE VICRYL 0 CP-1

## (undated) DEVICE — NEEDLE HYPO 27GA L1.5IN REG BVL W/O SFTY

## (undated) DEVICE — MEDI-VAC NON-CONDUCTIVE SUCTION TUBING 6MM X 1.8M (6FT.) L: Brand: CARDINAL HEALTH

## (undated) DEVICE — DRAPE,EXTREMITY,89X128,STERILE: Brand: MEDLINE

## (undated) DEVICE — TOWEL,OR,DSP,ST,BLUE,DLX,2/PK,40PK/CS: Brand: MEDLINE

## (undated) DEVICE — KIT ENDO ORCAPOD 160/180/190

## (undated) DEVICE — STERILE POLYISOPRENE POWDER-FREE SURGICAL GLOVES: Brand: PROTEXIS

## (undated) DEVICE — Device

## (undated) DEVICE — APPLICATOR PREP 10.5ML ORNG CHG 2% ISO ALC

## (undated) DEVICE — 60 ML SYRINGE REGULAR TIP: Brand: MONOJECT

## (undated) DEVICE — SET XTN .1IN 2.7ML 20IN IV

## (undated) DEVICE — NEEDLE SPINAL 22X3-1/2 BLK

## (undated) NOTE — LETTER
21        To Whom It May Concern:      Ramos Win  :  12/3/1940    Patient requesting to have bilateral L5 TFESI under MAC.     []  Patient has been cleared to hold Xarelto 3 days  for Bilateral L5 transforaminal epidural steroid injections un

## (undated) NOTE — ED AVS SNAPSHOT
Ms. Carly Pizano   MRN: [id number]    Department:  [facility] Emergency Department   Date of Visit:  10/28/2019           Disclosure     Insurance plans vary and the physician(s) referred by the ER may not be covered by your plan.  Please con within the next three months to obtain basic health screening including reassessment of your blood pressure.     IF THERE IS ANY CHANGE OR WORSENING OF YOUR CONDITION, CALL YOUR PRIMARY CARE PHYSICIAN AT ONCE OR RETURN IMMEDIATELY TO THE EMERGENCY DEPARTMEN

## (undated) NOTE — LETTER
19        To Dr. Tootie Coyne MD:      Joo Vides  :  12/3/1940    This patient was seen in our office Tuesday, 19.  Per her symptoms, it was recommended she undergo a bilateral L5 transforaminal epidural steroid injection under [facility]

## (undated) NOTE — LETTER
10/16/2024      Alex Baumann MD  Physical Medicine and Rehabilitation  [address] [place] [address], [address]  [facility] [address]  Dept: [phone]  Dept Fax: [phone]        RE: Consultation for Gretta Canales        Dear Timothy Hicks MD,    Thank you very much for the opportunity to see your patient.  Attached please find a summary from your patient's recent visit.     I appreciate the chance to take care of your patient with you.  Please feel free to call me with any questions or concerns.    Sincerely,        Alex Baumann MD  Electronically Signed on 10/16/2024

## (undated) NOTE — LETTER
19        To Dr. Shannon Painting:      Mario Galarza  :  12/3/1940    Dr. Mariano Cheng is recommending bilateral L4-5 and L5-S1 zygopaphaseal joint injection under monitored anesthesia care for this patient on 2019.  Mark Austin is currentl

## (undated) NOTE — ED AVS SNAPSHOT
Ms. Karmen Sue   MRN: [id number]    Department:  [facility] Emergency Department   Date of Visit:  9/5/2018           Disclosure     Insurance plans vary and the physician(s) referred by the ER may not be covered by your plan.  Please conta within the next three months to obtain basic health screening including reassessment of your blood pressure.     IF THERE IS ANY CHANGE OR WORSENING OF YOUR CONDITION, CALL YOUR PRIMARY CARE PHYSICIAN AT ONCE OR RETURN IMMEDIATELY TO THE EMERGENCY DEPARTMEN

## (undated) NOTE — LETTER
[facility] SURGERY SCHEDULING FORM   [address] [facility]   [phone] (scheduling phone) [phone] (scheduling fax)     PATIENT INFORMATION   Last Name:      Yogesh Goetz      First Name:    Rocio Barrientos Allergies: Bactrim [Sulfamethoxazole W/Trimethoprim]; Ciprofloxacin; Depakote; Dilaudid  [Hydromorphone]; Fluocinolone; Gabitril [Tiagabine]; Metronidazole;  Phentermine; Tramadol; Norco [Hydrocodone-Acetaminophen]         Completed by:    Alisson Cedeño

## (undated) NOTE — LETTER
20        To Dr. Joana Ogden MD:      Rayshawn Ireland  :  12/3/1940    This patient has been scheduled for a bilateral L5 transforaminal epidural steroid injection and therefore requires confirmation on the following:    []  Patient has been

## (undated) NOTE — IP AVS SNAPSHOT
[facility]            (For Outpatient Use Only) Initial Admit Date: 5/2/2018   Inpt/Obs Admit Date: Inpt: 5/2/18 / Obs: 05/02/18   Discharge Date:    Miko Summers:  [de-identified]   MRN: [de-identified]   CSN: [id number]        ENCOUNTER  Patient Cl Subscriber ID:  Pt Rel to Subscriber:    Hospital Account Financial Class: Medicare    May 10, 2018

## (undated) NOTE — ED AVS SNAPSHOT
Ms. Sherman Tovar   MRN: [id number]    Department:  [facility] Emergency Department   Date of Visit:  2/3/2018           Disclosure     Insurance plans vary and the physician(s) referred by the ER may not be covered by your plan.  Please conta within the next three months to obtain basic health screening including reassessment of your blood pressure.     IF THERE IS ANY CHANGE OR WORSENING OF YOUR CONDITION, CALL YOUR PRIMARY CARE PHYSICIAN AT ONCE OR RETURN IMMEDIATELY TO THE EMERGENCY DEPARTMEN

## (undated) NOTE — ED AVS SNAPSHOT
Ms. Velazco Agus   MRN: [id number]    Department:  [facility] Emergency Department   Date of Visit:  6/23/2019           Disclosure     Insurance plans vary and the physician(s) referred by the ER may not be covered by your plan.  Please cont within the next three months to obtain basic health screening including reassessment of your blood pressure.     IF THERE IS ANY CHANGE OR WORSENING OF YOUR CONDITION, CALL YOUR PRIMARY CARE PHYSICIAN AT ONCE OR RETURN IMMEDIATELY TO THE EMERGENCY DEPARTMEN

## (undated) NOTE — IP AVS SNAPSHOT
[facility]            (For Outpatient Use Only) Initial Admit Date: 2022   Inpt/Obs Admit Date: Inpt: 22 / Obs: N/A   Discharge Date:    Hospital Acct:  [de-identified]   MRN: [de-identified]   CSN: [id number]   CEID: [id number]        ENCOUNTER  Patient Class: Inpatient Admitting Provider: Aminta Malave DO Unit: [address] [place] [address] 5SW/SE   Hospital Service: Medical Attending Provider: Wendy Acevedo MD   Bed: 572-A   Visit Type:   Referring Physician: No ref. provider found Billing Flag:    Admit Diagnosis: Aspiration pneumonia of right middle lobe, unspecified aspiration pneumonia type [facility]) [J69.0]      PATIENT  Legal Name:   Shanita Gallowya   Legal Sex: Female  Gender ID: Female             Pref Name:    PCP:  Marissa Pink MD Home: [phone]   Address:  [address] [place] [address] : 12/3/1940 (81 yrs) Mobile: [phone]         [place] [address] Marital:  Language: Nati Raymond SSN4: [id number] Yazdanism: Episcopalian - No Visit     Race: White Ethnicity: Non  Or [address]   Name Relationship Legal Guardian? Home Phone Work Phone Mobile Phone   1. Malvin Canales  2.  Clifford Contreras Son  Daughter    [phone]     GUARANTOR  Guarantor: EDISONCNYIFCOD F : 12/3/1940 Home Phone: [phone]   Address: [address] [place] [address]  Sex: Female Work Phone: [phone]   City/State/Zip: [place] [employer] [address]   Rel. to Patient: Self Guarantor ID: [id number]   GUARANTOR EMPLOYER   Employer:  Status: RETIRED     COVERAGE  PRIMARY INSURANCE   Payor: MEDICARE Plan: MEDICARE PART A&B   Group Number:  Insurance Type: INDEMNITY   Subscriber Name: Lex Bledsoe : 1940   Subscriber ID: [id number] Pt Rel to Subscriber: Self   SECONDARY INSURANCE   Payor: MEDICAID Plan: MEDICAID   Group Number: [id number] Insurance Type: [id number] Name: Lex Bledsoe : 12/3/1940 Subscriber ID: [id number] Pt Rel to Subscriber: SELF   TERTIARY INSURANCE   Payor:  Plan:    Group Number:  Insurance Type:    Subscriber Name:  Subscriber :    Subscriber ID:  Pt Rel to Subscriber:    Hospital Account Financial Class: Medicare    May 11, 2022

## (undated) NOTE — IP AVS SNAPSHOT
[facility]            (For Outpatient Use Only) Initial Admit Date: 8/14/2019   Inpt/Obs Admit Date: Inpt: 8/20/19 / Obs: 08/14/19   Discharge Date:    Link Pattee:  [de-identified]   MRN: [de-identified]   CSN: [id number]   CEID: [id number] Group Number: [id number] Insurance Type: INDEMNITY   Subscriber Name: Scotty Morataya : 1940   Subscriber ID: [id number] Pt Rel to Subscriber: [facility] Account Financial Class: Commercial    2019

## (undated) NOTE — LETTER
4/12/2024      Alex Baumann MD  Physical Medicine and Rehabilitation  [address] [place] [address], [address]  [facility] [address]  Dept: [phone]  Dept Fax: [phone]        RE: Consultation for Gretta Canales        Dear Timothy Hicks MD,    Thank you very much for the opportunity to see your patient.  Attached please find a summary from your patient's recent visit.     I appreciate the chance to take care of your patient with you.  Please feel free to call me with any questions or concerns.    Sincerely,        Alex Baumann MD  Electronically Signed on 4/12/2024

## (undated) NOTE — LETTER
19        To Whom It May Concern:      Severa Sable  :  12/3/1940    []  Patient has been cleared to hold Xarelto for 2 days prior to epidural injection.   Holding the medication(s) may put the patient at an increased risk for stroke, heart jose a

## (undated) NOTE — LETTER
[facility] SURGERY SCHEDULING FORM   [address] [place] [address] [place] [address]   [phone] (scheduling phone) [phone] (scheduling fax)     PATIENT INFORMATION   Last Name:      Dominic Ellsworth      First Name:    Ulises Rick []  No or using our own   Allergies: Bactrim [Sulfamethoxazole W/Trimethoprim], Ciprofloxacin, Depakote [Valproic Acid], Fluocinolone, Gabitril [Tiagabine], Metronidazole, Phentermine, Tramadol, and Dilaudid [Hydromorphone]         Completed by:    Sherley Pereira

## (undated) NOTE — IP AVS SNAPSHOT
Patient Demographics     Address  [address] [facility] [address] Phone  [phone] [facility])  [phone] (Mobile) *Preferred* E-mail Address  [email or web address]      Emergency Contact(s)     Name Relation Home Work Mobile    Malvin Canales Take 1.25 mg by nebulization every 4 (four) hours as needed for Wheezing.           Alum & Mag Hydroxide-Simeth 687-803-21 MG/5ML Susp  Commonly known as:  MAALOX  Next dose due:  Anytime as Needed      Take 15 mL by mouth every 6 (six) hours as needed for Next dose due:  11/24/19 in the morning      TWO TABLETS (250 MCG) BY MOUTH EVERY MORNING   Cristin Suarez MD         LORazepam 2 MG Tabs  Commonly known as:  ATIVAN  Next dose due:  Anytime as Needed      Take 2 tablets (4 mg total) by mouth nightly [id number] Rivaroxaban (XARELTO) tab 20 mg 11/23/19 1058 Given      [id number] amLODIPine Besylate (NORVASC) tab 10 mg 11/23/19 1058 Given      [id number] levothyroxine (SYNTHROID, LEVOTHROID) tab 11/23/19 0612 Given              Recent Vital Signs       Mos Shig Txn 1/2 Prod E. Coli Pcr Negative     Shig/Enteroinvasive E.  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, hypothyroidism, SHANT, LLE cellulitis s/p debridement and recent skin graft placement who presented to the ED with nausea/vomiting found to h • s/p L5-S1 right laminectomy 8/28/2014   • Shortness of breath    • Sleep apnea    • stent placement     cerebral   • Thyroid disease    • Toe pain     Onychomycosis, Debridement , Hammertoe    • Tonsillitis 1950    Tonsillitis    • Unspecified essential • SKIN GRAFT SPLIT THICKNESS Left 10/8/2019    Performed by Fiorella No MD at [facility] OR   • SPLIT GRFT [address] [facility] [address] <100SQCM Left 10/08/2019    Left leg debridement and Split Thickness Skin Graft to left thigh   • TONSILLECTOMY  1950    [facility] 993 hydrocortisone 1 % External Cream, Apply 1 Application topically 4 (four) times daily as needed (itchiness). , Disp: , Rfl:   naproxen 500 MG Oral Tab, Take 500 mg by mouth 2 (two) times daily as needed (pain). , Disp: , Rfl:   dextromethorphan-guaiFENesin 1 Pantoprazole Sodium 40 MG Oral Tab EC, Take 1 tablet (40 mg total) by mouth every morning before breakfast. (Patient taking differently: Take 40 mg by mouth 2 (two) times daily.  ), Disp: 30 tablet, Rfl: 0          Soc Hx  Social History    Tobacco Use EXT: no edema[HP.1],LLE skin graft healing well no sign of infection[HP. 3]      Diagnostic Data:    CBC/Chem    Recent Labs   Lab 11/20/19  1654 11/21/19  0615   RBC 4.56 3.88   HGB 13.3 11.3*   HCT 41.5 35.6   MCV 91.0 91.8   MCH 29.2 29.1   MCHC 32.0 31. by (CST): Henrik Persaud MD on 11/20/2019 at 18:22          ][HP.1]    Electronically signed by Nataliia Simmons MD on 11/21/2019 12:14 PM   Attribution Key    HP. 1 - Nataliia Simmons MD on 11/21/2019  8:11 AM  HP. 2 - Nataliia Simmons MD on 11/21/2019 12:14 PM  HP.3 -continue amlodipine 10 mg daily     #Anxiety/Mood Disorder  -ativan prn nightly  -seroquel 100 daily     #GERD  -cont home meds     #Hx of Hypothyroid, Iatrogenic Hyperthyroid  -free T4 2.4  -decrease to 175 mcg daily last admission , repeat TFTs in 6 wee Brain Aneurysm, Stent placed    • Cervical disc disease 1985,1995,2003,2009    Cervical Discectomy    • Cholecystitis 1984    Cholecystectomy    • COPD (chronic obstructive pulmonary disease) ([facility] [address]    • Deep vein thrombosis ([facility] [address]    • Dehydration 2012 Performed by Michelle Erickson MD at [address] [place] [address] ENDOSCOPY   • BRONCHOSCOPY N/A 1/9/2017    Performed by Michelle Erickson MD at [address] [place] [address] ENDOSCOPY   • BRONCHOSCOPY N/A 12/20/2016    Performed by Michelle Erickson MD at [address] [place] [address] ENDOSCOPY   • CAROTID ENDARTERECTOMY Right    • CARPAL T Metronidazole           RASH  Phentermine             OTHER (SEE COMMENTS)    Comment:Mini stroke  Tramadol                OTHER (SEE COMMENTS)    Comment:Extreme sleepiness  Dilaudid [H[employer]romorp*    RASH, OTHER (SEE COMMENTS)    Comment:Unknown     Home Me needed for Pain., Disp: , Rfl:   albuterol sulfate (2.5 MG/3ML) 0.083% Inhalation Nebu Soln, Take 1.25 mg by nebulization every 4 (four) hours as needed for Wheezing., Disp: , Rfl:   Alum & Mag Hydroxide-Simeth 200-200-20 MG/5ML Oral Suspension, Take 15 mL • Fibromyalgia Sister    • Heart Disease Sister    • Heart Attack Sister    • Heart Disease Brother    • Heart Attack Brother    • Other (Other[other]) Father    • Other (Other[other]) Maternal Grandmother    • Other (Other[other]) Maternal Grandfather CO2 28.0 11/21/2019    GLU 89 11/21/2019    CA 8.6 11/21/2019    ALB 3.7 11/20/2019    ALKPHO 113 11/20/2019    BILT 0.4 11/20/2019    TP 7.0 11/20/2019    AST 19 11/20/2019    ALT 22 11/20/2019    MG 1.8 11/21/2019       No results for input(s): TROP in PHYSICAL THERAPY EVALUATION - INPATIENT     Room Number: 522/522-A  Evaluation Date: 11/22/2019  Type of Evaluation: Initial   Physician Order: PT Eval and Treat    Presenting Problem: Intractable nausea and vomiting and diarrhea  Reason for Therapy: Srinivas History related to current admission:   HPI     49-year-old female with past medical history of COPD, hypertension, DVT on Xarelto, hypothyroid presenting for evaluation of one day of nausea/diarrhea. S/p abx for cellulitis; no other recent abx.  No recent • S/P cervical spinal fusion: C3-6 and C7-T1 11/2/2017   • s/p L5-S1 right laminectomy 8/28/2014   • Shortness of breath    • Sleep apnea    • stent placement     cerebral   • Thyroid disease    • Toe pain     Onychomycosis, Debridement , Hammertoe    • To • PREP SITE T/A/L 1ST 100 SQ CM/1PCT Left 08/21/2019    Left leg debridement, VAC placed   • SKIN GRAFT SPLIT THICKNESS Left 10/8/2019    Performed by Clark Emery MD at [facility] OR   • SPLIT GRFT [address] [facility] [address] <100SQCM Left 10/08/2019    Left leg d How much help from another person does the patient currently need. ..   -   Moving to and from a bed to a chair (including a wheelchair)?: A Little   -   Need to walk in hospital room?: A Little   -   Climbing 3-5 steps with a railing?: A Little     AM-PAC Physical Therapy Note signed by Kenyon Wells PT at 11/21/2019  2:09 PM  Version 1 of 1    Author:  Kenyon Wells PT Service:  — Author Type:  Physical Therapist    Filed:  11/21/2019  2:09 PM Date of Service:  11/21/2019  2:09 PM Status:  Magda Monroe unsteadiness and fatigue while seated EOB, sit to stand tf deferred at this time. Pt declined ADL EOB activities despite encouragement and education re: activity benefits.  Pt returned to bed with maxA with pt c/o of dizziness and nausea throughout evaluati Fluids, Medication adjustment    • Depression    • Disorder of thyroid    • Esophageal reflux    • Hammertoe 02/25/2011    hammertoe 5 left, pain   • Hearing impairment     Bilateral hearing aids   • High blood pressure    • Hip pain, left    • History of • CAROTID ENDARTERECTOMY Right    • CARPAL TUNNEL RELEASE Right ~2008   • CHOLECYSTECTOMY  1984    Cholecystitis   • COLONOSCOPY  2010   • DEBRIDEMENT OF NAIL(S), 1-5      Toe, Onychomycosis   • EGD  05/2019    Gastric polyps only   • EGD  2006   • [address] OCCUPATIONAL THERAPY EXAMINATION      OBJECTIVE[RR.1]  Precautions: None  Fall Risk: High fall risk[RR.2]    PAIN ASSESSMENT[RR.1]  Rating: 10  Location: (C/o of general dizziness, nausea)  Management Techniques: Activity promotion;Breathing techniques[RR. Education Provided: Pt educated on the role of OT and benefits of activity throughout hospital stay. [RR.1]     Patient End of Session: In bed;Needs met;Call light within reach;RN aware of session/findings; All patient questions and concerns addressed; Alarm

## (undated) NOTE — IP AVS SNAPSHOT
Patient Demographics     Address Phone E-mail Address    [address] [address] [phone] (Home) *Preferred*  [phone] [facility]) [email or web address]      Emergency Contact(s)     Name Relation Home Work [address] [facility] Commonly known as:  SYMBICORT   Next dose due:  1/18/17 evening          Inhale 1 puff into the lungs 2 (two) times daily.     Vicki Coelho                              Enoxaparin Sodium 30 MG/0.3ML Soln   Commonly known as:  LOVENOX   Next dose due:  1 Take 3 mL by nebulization every 6 (six) hours as needed.    Stop taking on:  2/17/2017    Zelalem GARCÍA                           Levothyroxine Sodium 125 MCG Tabs   Last time this was given:  250 mcg on 1/18/2017  7:00 AM   Commonly known as:  SYNTHROID, Next dose due:  1/18/17 evening          Take 1 tablet (300 mg total) by mouth nightly.     Nazanin Pedersen                           temazepam 30 MG Caps   Last time this was given:  30 mg on 1/14/2017  9:57 PM   Commonly known as:  RESTORIL   Next dose du 543-543-A - MAR ACTION REPORT  (last 24 hrs)    ** SITE UNKNOWN **     Order ID Medication Name Action Time Action Reason Comments    [id number] AmLODIPine Besylate (NORVASC) tab 10 mg 01/18/17 1058 Given      [id number] Cefepime HCl (MAXIPIME) 2 g in sodium [id number] ipratropium-albuterol (DUONEB) nebulizer solution 3 mL 01/18/17 0825 Given      [id number] mirtazapine (REMERON) tab 30 mg 01/17/17 2100 Given            LEFT LOWER ABDOMEN     Order ID Medication Name Action Time Action Reason Comments    [id number] Elevations of the PT and/or INR in patients not  receiving anticoagulant therapy may be seen in  factor deficiency, vitamin K deficiency, liver  disease, etc. Clinical correlation is recommended.        INR 1.5 0.9-1.2  [facility]   Comment:           O Procedure                               Abnormality         Status                     ---------                               -----------         ------                     AFB CULTURE(P)[[id number]]                                   Preliminary result Emergency MRSA Screen by PCR Once [[id number]]  (Normal) Collected:  01/04/17 1630    Order Status:  Completed Lab Status:  Final result Updated:  01/04/17 1805    Specimen Information:  Other from [facility]      MRSA Screen By PCR Negative          H&P - H&P irritable bowel syndrome. PAST SURGICAL HISTORY:  Cholecystectomy. She had a brain aneurysm that required craniotomy and repair.   Tonsillectomy, bilateral total knee arthroplasty and total hip arthroplasty, lumbar laminectomy, several posterior cervica EXTREMITIES:  +1 to 2 edema, both legs, with erythema engulfing the left leg from the ankle to the knee, mainly on the anterolateral side. NEUROLOGIC:  Motor and sensory intact. Cranial nerves II to XII are intact. ASSESSMENT:     1.       Acute left l CC: Impaired mobility and ADL dysfunction secondary to COPD, weakness, bronchitis     HPI: This is a 64year old female with a PMH of COPD, SHANT, CKD 2, IBS, OA, DM, admitted to [facility] on 1/4/17 due to b/l leg swelling.  Patient was found to have •  hydrALAzine HCl (APRESOLINE) injection 10 mg, 10 mg, Intravenous, Q6H PRN  •  morphINE sulfate (PF) 2 MG/ML injection 2 mg, 2 mg, Intravenous, Q2H PRN **OR** morphINE sulfate (PF) 4 MG/ML injection 4 mg, 4 mg, Intravenous, Q2H PRN  •  HYDROcodone-acetam Oral, Q6H PRN  •  Levothyroxine Sodium (SYNTHROID, LEVOTHROID) tab 250 mcg, 250 mcg, Oral, Before breakfast  •  Loperamide HCl (IMODIUM) cap 2 mg, 2 mg, Oral, PRN  •  LORazepam (ATIVAN) tab 1 mg, 1 mg, Oral, BID PRN  •  mirtazapine (REMERON) tab 30 mg, 30 Fluids, Medication adjustment    • Toe pain      Onychomycosis, Debridement , Hammertoe    • Onychomycosis      Debridement    • Hammertoe 02/25/2011     hammertoe 5 left, pain   • Pneumonia 2012       Past Surgical History:      Past Surgical History Patient Regularly Uses: Glasses    Stairs in Home: elevator accessible  FUNCTIONAL STATUS:  morbid functional status-  Patient used a RW and a scooter   Current functional status-       Distance (ft): 10 (x 2)  Assistive Device: Rolling walker  Pattern:  Sh RECOMMENDATIONS:    Based on patient's current functional status, pt would benefit from  Acute Inpatient  Rehabilitation, working with PT/OT, provide family training, assess home equipment and assistive device needs.     24 hr rehab nursing also beneficial Principal Problem:    DVT (deep venous thrombosis) (HCC)  Active Problems:    COPD exacerbation (HCC)    Cellulitis of left lower extremity    Respiratory failure ([facility] [address]    Pneumonia    Anxiety      Past Medical History  Past Medical History   Diagnosis [facility] HOME SITUATION  Type of Home: Assisted living facility (meals and cleaning service provided)   Home Layout: One level                Lives With: Alone  Drives: No  Patient Owned Equipment: Rolling walker; Other (Comment) (Scooter)  Patient Regularly Uses: G -   Moving from lying on back to sitting on the side of the bed?: A Little   How much help from another person does the patient currently need. ..   -   Moving to and from a bed to a chair (including a wheelchair)?: Total   -   Need to walk in hospital room patient in returning to prior level of function. Pt is at high fall risks and NOT safe to participate in any mobility wo assistance. Pt's plan is to transfer to [facility] rehab center at d/c prior to returning home.    DISCHARGE RECOMMENDATIONS  PT Discharg

## (undated) NOTE — Clinical Note
No referring provider defined for this encounter. 03/07/2017        Patient: Pastora Oates   YOB: 1940   Date of Visit: 3/7/2017       Dear  Dr. Julio César Neal      Thank you for referring Pastora Oates to my practice.   Please find m kidney infections with high fever or flank pain. No history of bladder infections. No history of tumors or cancers of the kidneys, ureters, bladder or urethra.   The patient denies urological workup in the past including intravenous pyelogram, ultrasound patient does have emphysema, 4 years duration treated medically and recent hospitalization was for COPD no patient does have history of bronchitis, pneumonia or tuberculosis.    5.   The patient denies gi complaints of stomach, duodenal ulcer, patient does Dilaudid  [Hydromor*    Rash  Gabitril [Tiagabine]    Other (See Comments)    Comment:Stroke like symptoms             Unable to move  Metronidazole           Rash  Phentermine             Other (See Comments)    Comment:Mini stroke  Ultram [Tramadol] no urethrocele with cough. No urinary leakage could be demonstrated. Rectal exam shows normal perineum, good rectal tone no rectal masses. LABORATORIES:  None.   However patient had an indwelling Husain since recent hospitalization however patient state anticholinergics if found to have neurogenic bladder and patient is only on the extremely lowest dose of Ditropan XL since hospitalization we will temporarily hold that before urodynamics but as long as she is tolerating this we probably could return to th

## (undated) NOTE — LETTER
AUTHORIZATION FOR SURGICAL OPERATION OR OTHER PROCEDURE    1.  I hereby authorize Dr. Yobany Lopez and the [facility] staff assigned to my case to perform the following operation and/or procedure at the [facility]:    Right Shoulder Steroid Injection Under Ultr Time:  ________ AJerrica Bean          12/3/1940         [id number]    Patient Name:  ______________________________________________________  (please print)       Patient signature:  ________________

## (undated) NOTE — LETTER
[facility], [address], [place]  [address] [facility] [phone] [employer] ORDER         Patient Name:   Sarah Marquez ([id number])   Sex: female  : 12/3/1940       Order Date:  2023  Authorizing Provider:   Soy Otto     Procedure:  Skyla Pock [[id number]]         Order #:   [id number]  Qty:  1     Priority:  Routine                   Class:   Ext - RFL     Standing Interval:          Standing Occurrences:          Expires on:            Expected by:    Associated DX:  Pain of right middle finger (M79.644)     Order summary:  Pain of right middle finger  (primary encounter diagnosis)  2 times per week for 4 weeks  Improve hand and finger coordination and strength and active and passive ROM. Modalities as needed. HEP, Ext - RFL, Routine, 1 visit            Comments:  Pain of right middle finger  (primary encounter diagnosis)     2 times per week for 4 weeks     Improve hand and finger coordination and strength and active and passive ROM. Modalities as needed. HEP           Scheduling Instructions:  **REFERRAL REQUEST**     Your physician has referred you to a specialist.  Your physician or the clinic staff will provide you with the phone number you should call to schedule your appointment. If you are confident that your benefit plan will not require a referral or authorization, such as [place] Medicaid, please feel free to schedule your appointment immediately. However, if you are unsure about the requirements for authorization, please wait 5-7 days and then contact your physician's office. At that time, you will be provided with any authorization numbers or be assured that none are required. You can then schedule your appointment. Failure to obtain required authorization numbers can create reimbursement difficulties for you.      Electronically Signed By: Joe Briscoe MD    Order Date: 2023 at 2:56 AM

## (undated) NOTE — LETTER
21        To Whom It May Concern:      Yecenia Norris  :  12/3/1940    []  Patient has been cleared to hold Eliquis 2 days prior to Bilateral L4 TFESIs .   Holding the medication(s) may put the patient at an increased risk for stroke, heart attack

## (undated) NOTE — LETTER
Surgery Scheduling Request Form/Physician Orders  Fax to:  [phone]            New Case   Modified   Rescheduled To:   / /   Chayo Rodriguez order supersedes any conflicting corresponding orders on the pre-op order set.   Surgery Date: 10/13/17 Start T [address] [facility],[address] Test from:    Additional Orders:    Latex Allergy:    Yes            No    Pacemaker / AICD:    Yes            No     Day of Surgery Orders: Pt on coumadin                              Date:  _      9/18/2017 __  Electronically

## (undated) NOTE — LETTER
9/2/2021      Kristi Grullon Asa, MD  Physical Medicine and Rehabilitation  [address] [address]  Dept: [phone]  Dept Fax: [phone]        RE: Consultation for Carmen Covarrubias        Dear Adrianna Gonzalez MD,    Thank

## (undated) NOTE — LETTER
[facility] SURGERY SCHEDULING FORM   [address] [place] [address] [place] [address]   [phone] (scheduling phone) [phone] (scheduling fax)     PATIENT INFORMATION   Last Name:      Yogesh Goetz      First Name:    Rocio Barrientos Allergies: Bactrim [Sulfamethoxazole W/Trimethoprim]; Ciprofloxacin; Depakote; Fluocinolone; Gabitril [Tiagabine]; Metronidazole; Phentermine; Tramadol; Dilaudid [Hydromorphone]         Completed by:     Stella ROSENTHAL      Date:    1/13/2020

## (undated) NOTE — LETTER
11/29/2022      Kevin Boudreaux MD  Physical Medicine and Rehabilitation  [address] [facility], [address] [place] [address]  Dept: [phone]  Dept Fax: [phone]        RE: Consultation for Denisse Marinelli        Dear Stefano David MD,    Thank you very much for the opportunity to see your patient. Attached please find a summary from your patient's recent visit. I appreciate the chance to take care of your patient with you. Please feel free to call me with any questions or concerns. Sincerely,        Dorean Fothergill.  MD Ibis  Electronically Signed on 11/29/2022

## (undated) NOTE — MR AVS SNAPSHOT
[address] [address] [place]  [address]  [phone]               Thank you for choosing us for your health care visit with Aleida Son MD.  We are glad to serve you and happy to provide you with this summary of Commonly known as:  NORCO           ipratropium-albuterol 0.5-2.5 (3) MG/3ML Soln   Take 3 mL by nebulization every 6 (six) hours as needed.    Commonly known as:  DUONEB           Levothyroxine Sodium 125 MCG Tabs   TAKE 2 TABLETS BY MOUTH IN THE MORNING Summaries. If you've been to the Emergency Department or your doctor's office, you can view your past visit information in [employer] by going to Visits < Visit Summaries. [employer] questions? Call [phone] for help.   [employer] is NOT to be used for urge Water is best for hydration Fast food. Eat at home when possible     Tips for increasing your physical activity – Adults who are physically active are less likely to develop some chronic diseases than adults who are inactive.      HOW TO GET STARTED: HOW

## (undated) NOTE — LETTER
[facility] SURGERY SCHEDULING FORM   [address] [place] [place] [address] [place] [address]   [phone] (scheduling phone) [phone] (scheduling fax)     PATIENT INFORMATION   Last Name:      Chelsey Berrios      First Name:    Jd Kay Allergies: Bactrim [Sulfamethoxazole W/Trimethoprim]; Ciprofloxacin; Depakote; Dilaudid  [Hydromorphone]; Fluocinolone; Gabitril [Tiagabine]; Metronidazole; Phentermine; Tramadol; Norco [Hydrocodone-Acetaminophen]         Completed by:    Stella ROSENTHAL

## (undated) NOTE — ED AVS SNAPSHOT
Ms. Pierre Lance   MRN: [id number]    Department:  [facility] Emergency Department   Date of Visit:  3/29/2019           Disclosure     Insurance plans vary and the physician(s) referred by the ER may not be covered by your plan.  Please cont within the next three months to obtain basic health screening including reassessment of your blood pressure.     IF THERE IS ANY CHANGE OR WORSENING OF YOUR CONDITION, CALL YOUR PRIMARY CARE PHYSICIAN AT ONCE OR RETURN IMMEDIATELY TO THE EMERGENCY DEPARTMEN

## (undated) NOTE — LETTER
[facility]  part of [facility]     PICC INSERTION CONSENT     I agree to have a Peripherally Inserted Central Catheter (PICC) placed in my arm.   1. The PICC insertion procedure, care, maintenance, risks, benefits, and complications have been explained to me by my physician, Demarco Narayan, DO __, and I understand them.   2. I understand that this may not be the only way I can receive my medication. I understand that my physician has determined that the PICC would be the safest and most effective means of administering my medication at this time. If there are other options of giving medication into my veins those options have been explained to me by my physician and I have chosen this one.   3. I realize a nurse who has been specially trained and certified by the hospital and ’s representative to insert a PICC will perform this procedure. My catheter will be inserted by _____________________________.   4. I have been informed by my doctor of the nature and purpose of this procedure and the risks involved and the possibility of complications. I realize that this is an invasive procedure and has certain risks such as air embolism (air entering the catheter or my vein), arterial puncture (a tearing of one of my arteries), infection, irregular heartbeat and venous thrombosis (a blood clot in a vein) nerve injury and fracture of the catheter with or without migration.   5. In order to numb the area where the line will be placed, a small amount of anesthetic medication will be injected as ordered by my physician.   6. I understand that while the catheter will be placed in my upper arm the end of the catheter will come to rest in an area near my heart.     7. The person performing this procedure has discussed the potential benefits, risks, and side effects of the PICC; the likelihood of achieving goals; and potential problems that might occur during recuperation. They also discussed  reasonable alternatives to the PICC, including risks, benefits, and side effects related to the alternatives and risks related to not receiving this procedure.    8.  I have expressed any questions about this procedure to my physician or the PICC Proceduralist and he/she has answered them.  I certify that I have read and understand this consent to the insertion of a PICC.      _________________________________________________________   Date     Time     Patient/Guardian Signature       ____________________________________   Printed name of Patient/Guardian          ________________________________________________________________    Date        Time                   Witnessing RN Signature      Patient Name: Gretta Canales     : 12/3/1940                 Printed: 2025     Medical Record #: [id number]

## (undated) NOTE — LETTER
[facility]  Administration of Anesthesia  1. I, Dany Madsen, or _________________________________ acting on her behalf, (Patient) (Dependent/Representative) request to receive anesthesia for my pending procedure/operation/treatment.   A infections, high spinal block, spinal bleeding, seizure, cardiac arrest and death. 7. AWARENESS: I understand that it is possible (but unlikely) to have explicit memory of events from the operating room while under general anesthesia.   8. ELECTROCONVULSIV unconscious pt /Relationship    My signature below affirms that prior to the time of the procedure, I have explained to the patient and/or his/her guardian, the risks and benefits of undergoing anesthesia, as well as any reasonable alternatives.     _______

## (undated) NOTE — LETTER
10/23/17      Patient: Lata Tinajero  : 12/3/1940 Visit date: 10/23/2017    Dear Karlo Mortensen,      I examined your patient in consultation today.     She presents with a 3 day history of spontaneous pain swelling and bruising of the right mi

## (undated) NOTE — LETTER
[facility]  [address], [place], [place]    Authorization for Surgical Operation and Procedure                               I hereby authorize Alex Baumann MD, my physician and his/her assistants (if applicable), which may include medical students, residents, and/or fellows, to perform the following surgical operation/ procedure and administer such anesthesia as may be determined necessary by my physician: Operation/Procedure name (s) Left L4 and L5 transforaminal epidural steroid injection on Gretta Canales   2.   I recognize that during the surgical operation/procedure, unforeseen conditions may necessitate additional or different procedures than those listed above.  I, therefore, further authorize and request that the above-named surgeon, assistants, or designees perform such procedures as are, in their judgment, necessary and desirable.    3.   My surgeon/physician has discussed prior to my surgery the potential benefits, risks and side effects of this procedure; the likelihood of achieving goals; and potential problems that might occur during recuperation.  They also discussed reasonable alternatives to the procedure, including risks, benefits, and side effects related to the alternatives and risks related to not receiving this procedure.  I have had all my questions answered and I acknowledge that no guarantee has been made as to the result that may be obtained.    4.   Should the need arise during my operation/procedure, which includes change of level of care prior to discharge, I also consent to the administration of blood and/or blood products.  Further, I understand that despite careful testing and screening of blood or blood products by collecting agencies, I may still be subject to ill effects as a result of receiving a blood transfusion and/or blood products.  The following are some, but not all, of the potential risks that can occur: fever and allergic reactions, hemolytic  reactions, transmission of diseases such as Hepatitis, AIDS and Cytomegalovirus (CMV) and fluid overload.  In the event that I wish to have an autologous transfusion of my own blood, or a directed donor transfusion, I will discuss this with my physician.  Check only if Refusing Blood or Blood Products  I understand refusal of blood or blood products as deemed necessary by my physician may have serious consequences to my condition to include possible death. I hereby assume responsibility for my refusal and release the hospital, its personnel, and my physicians from any responsibility for the consequences of my refusal.    o  Refuse   5.   I authorize the use of any specimen, organs, tissues, body parts or foreign objects that may be removed from my body during the operation/procedure for diagnosis, research or teaching purposes and their subsequent disposal by hospital authorities.  I also authorize the release of specimen test results and/or written reports to my treating physician on the hospital medical staff or other referring or consulting physicians involved in my care, at the discretion of the Pathologist or my treating physician.    6.   I consent to the photographing or videotaping of the operations or procedures to be performed, including appropriate portions of my body for medical, scientific, or educational purposes, provided my identity is not revealed by the pictures or by descriptive texts accompanying them.  If the procedure has been photographed/videotaped, the surgeon will obtain the original picture, image, videotape or CD.  The hospital will not be responsible for storage, release or maintenance of the picture, image, tape or CD.    7.   I consent to the presence of a  or observers in the operating room as deemed necessary by my physician or their designees.    8.   I recognize that in the event my procedure results in extended X-Ray/fluoroscopy time, I may develop a skin  reaction.    9. If I have a Do Not Attempt Resuscitation (DNAR) order in place, that status will be suspended while in the operating room, procedural suite, and during the recovery period unless otherwise explicitly stated by me (or a person authorized to consent on my behalf). The surgeon or my attending physician will determine when the applicable recovery period ends for purposes of reinstating the DNAR order.  10. Patients having a sterilization procedure: I understand that if the procedure is successful the results will be permanent and it will therefore be impossible for me to inseminate, conceive, or bear children.  I also understand that the procedure is intended to result in sterility, although the result has not been guaranteed.   11. I acknowledge that my physician has explained sedation/analgesia administration to me including the risk and benefits I consent to the administration of sedation/analgesia as may be necessary or desirable in the judgment of my physician.    I CERTIFY THAT I HAVE READ AND FULLY UNDERSTAND THE ABOVE CONSENT TO OPERATION and/or OTHER PROCEDURE.     ____________________________________  _________________________________        ______________________________  Signature of Patient    Signature of Responsible Person                Printed Name of Responsible Person                                      ____________________________________  _____________________________                ________________________________  Signature of Witness        Date  Time         Relationship to Patient    STATEMENT OF PHYSICIAN My signature below affirms that prior to the time of the procedure; I have explained to the patient and/or his/her legal representative, the risks and benefits involved in the proposed treatment and any reasonable alternative to the proposed treatment. I have also explained the risks and benefits involved in refusal of the proposed treatment and alternatives to the proposed  treatment and have answered the patient's questions. If I have a significant financial interest in a co-management agreement or a significant financial interest in any product or implant, or other significant relationship used in this procedure/surgery, I have disclosed this and had a discussion with my patient.     _____________________________________________________              _____________________________  (Signature of Physician)                                                                                         (Date)                                   (Time)  Patient Name: Gretta Canales      : 12/3/1940      Printed: 2024     Medical Record #: [id number]                                      Page 1 of 1

## (undated) NOTE — IP AVS SNAPSHOT
Patient Demographics     Address  [address] [facility] [address] [facility] [address] Phone  [phone] [facility])  [phone] (Mobile) *Preferred* E-mail Address  [email or web address]      Emergency Contact(s)     Name Relation Home Work Mobile    Malvin Canales Needed   acetaminophen 325 MG Tabs  Commonly known as: TYLENOL      Take 650 mg by mouth every 6 (six) hours as needed. ammonium lactate 12 % Lotn  Commonly known as: LAC-HYDRIN  Next dose due: Tonight      Apply topically 2 (two) times a day. Take 1 tablet (100 mcg total) by mouth before breakfast.   Xiang Meyer MD         lidocaine 5 % Ptch  Commonly known as: Lidoderm  Next dose due: Tomorrow morning      Place 1 patch onto the skin daily.    Xiang Meyer MD         magnesium o total) by mouth daily.    Sumanth Montilla MD               Where to Get Your Medications      These medications were sent to [address] [facility] [phone], [address] [facility] [address], [facility] [address]    Flor Filed at 03/25/2021 1538   Pulse  68 Filed at 03/25/2021 1538   Resp  18 Filed at 03/25/2021 1538   Temp  97.5 °F (36.4 °C) Filed at 03/25/2021 1538   SpO2  96 % Filed at 03/25/2021 1538      Patient's Most Recent Weight       Most Recent Value   Patient W Miriam Shafer MD  03/25/21 0458 Resulting lab: [facility])    Specimen Information    Type Source Collected On   Urine — 03/25/21 0457          Components    Component Value Reference Range Flag Lab   Amphetamine Urine Negative [facility] ([facility]) Hodan Bower. Norma Gamino M.D. [facility] [address]  [facility] [address] 03/19/20 1442 - Present            Microbiology Results (All)     Procedure Component Value Units Date/Time    Blood Culture FREQ X 2 [[id number]] C JASMING Hospitalist H&P     CC: Patient presents with:  Altered Mental Status     PCP: Greyson Quezada MD    Date of Admission: 3/20/2021  4:52 PM    ASSESSMENT / PLAN:     Ms. Lindy Bowen is an 78 year old female with PMH sig for cranial aneurysm s/p coilin Prophy:[GV.1] SCD, xarelto[GV.2]  Lines:[GV.1] PIV[GV.2]    Dispo: pending clinical course    Outpatient records or previous hospital records reviewed. Further recommendations pending patient's clinical course.   [facility] hospitalist to continue to follow pa vein thrombosis ([facility] [address]    • Dehydration 2012    Fluids, Medication adjustment    • Depression    • Disorder of thyroid    • Esophageal reflux    • Hammertoe 02/25/2011    hammertoe 5 left, pain   • Hearing impairment     Bilateral hearing aids   • High bloo • BRONCHOSCOPY N/A 1/9/2017    Performed by Markie Tarango MD at [facility] ENDOSCOPY   • BRONCHOSCOPY N/A 12/20/2016    Performed by Markie Tarango MD at [facility]   • CAROTID ENDARTERECTOMY Right    • CARPAL TUNNEL RELEASE Right ~2008   • CHOLECYSTECTOMY  198 (SEE COMMENTS)    Comment:Mini stroke  Tramadol                OTHER (SEE COMMENTS)    Comment:Extreme sleepiness  Dilaudid [[employer]*    RASH, OTHER (SEE COMMENTS)    Comment:Unknown     Home Medications:  No outpatient medications have been marked as ta depressed[GV.2]  SKIN: warm, dry[GV.1], erythema of BL shins, L > R, chronic venous stasis changes[GV.2]  EXT:[GV.1] 1-2+ pitting edema BLE[GV.2]    Diagnostic Data:    CBC/Chem    Recent Labs   Lab 03/20/21  1743 03/21/21  0505   RBC 4.63 3.93   HGB 13.8 Consult to PsychIATRY [[id number]] ordered by Jorge A Galarza MD at [address] [facility]    Report of Consultation    Brennaradha Almanzarar Patient Status:  Inpatient    12/3/1940 MRN [id number]   Location [facility] not to exact date and remembered me. Patient was thankful that she has not been having EPS since we give her the benztropine.     Otherwise the patient admitted today that she has been getting very confused and she been having difficulty taking care of hers Fluids, Medication adjustment    • Depression    • Disorder of thyroid    • Esophageal reflux    • Hammertoe 02/25/2011    hammertoe 5 left, pain   • Hearing impairment     Bilateral hearing aids   • High blood pressure    • Hip pain, left    • History o Performed by Rocael Matthews MD at [facility] ENDOSCOPY   • BRONCHOSCOPY N/A 12/20/2016    Performed by Rocael Matthews MD at [facility] ENDOSCOPY   • CAROTID ENDARTERECTOMY Right    • CARPAL TUNNEL RELEASE Right ~2008   • CHOLECYSTECTOMY  1984    Cholecystitis   • COLON Maternal Grandfather    • Other (Other[other]) Paternal Grandmother    • Other (Other[other]) Paternal Grandfather    • Cancer Brother 54        Liver    • Neurological Disorder Sister         ALzheimer's Disease    • Stroke Sister         Cerebrovascular Q12H      ARIPiprazole 1 mg Oral Tab, Take 7.5 mg by mouth daily. ammonium lactate 12 % External Lotion, Apply topically 2 (two) times a day. clotrimazole-betamethasone 1-0.05 % External Cream, Apply topically 2 (two) times daily.  Left breast  HYDROcodon total) by mouth 2 (two) times daily with meals. muscle rub 10-15 % External Cream, Apply topically 3 (three) times daily as needed.   STOOL SOFTENER 100 MG Oral Cap, ONE CAPSULE BY MOUTH TWICE DAILY  cholecalciferol 1000 UNITS Oral Cap, Take 2 capsules (2, 12/20/2019    TROP <0.045 12/19/2020    CK 28 12/19/2020    ETOH <3 10/29/2020         Imaging:  MRI BRAIN (CPT=70551)    Result Date: 3/21/2021  CONCLUSION:  1. No acute intracranial process by noncontrast CT technique.   2. Redemonstration of a coiled ane recent memory. Intellect: Average as evidenced by the patient presentation and history. Judgment and insight are questionable for patient has becoming more depressed with the frequent visit to the hospital not following full recommendation.     Syd PCR STAT      Emergency MRSA Screen by PCR Once      Meds This Visit:  Requested Prescriptions      No prescriptions requested or ordered in this encounter           Arin Alexander MD  3/23/2021[GA.1]        Electronically signed by Yuridia Lopez MD of RW. Cues for appropriate UE positioning with transitional movements. Cues for pacing upon standing to allow body time to acclimate to change in position. Cues for eccentric lowering from stand>sit position in chair.    Ambulation: Min initially, however, Static Sitting: Fair +  Dynamic Sitting: Fair           Static Standing: Fair -  Dynamic Standing: Poor +    ACTIVITY TOLERANCE  Pulse: 92  Heart Rate Source: Monitor                   O2 WALK use of RW   Goal #3 Patient is able to ambulate 50 feet with assist device: walker - rolling at assistance level: minimum assistance   Goal #3   Current Status  Min/Mod with RW 18ft   Goal #4 Patient to demonstrate independence with home activity/exercise room as IV beeping and chair alarm on, all needs met, call light within reach. The patient's Approx Degree of Impairment: 53.32% has been calculated based on documentation in the [facility] '6 clicks' Inpatient Daily Activity Short Form.   Research supports w/RW     Bedroom Mobility: n/t     BALANCE ASSESSMENT  Static Sitting: SBA  Dynamic Sitting: SBA  Static Standing: CGA  Dynamic Standing: min.  A     FUNCTIONAL ADL ASSESSMENT  Grooming: set up   Feeding: n/t   Bathing: n/t   Toileting: n/t; did not need to Description: Patient's Long Term Goal: Get physically stronger and return to baseline mentation    Interventions:  - CT head and MRI brain ordered  - Re-orientation  - Psych consulted   - Encourage to eat and drink more  - Supportive care   - See additiona

## (undated) NOTE — LETTER
[facility] SURGERY SCHEDULING FORM   [address] [facility] [place]   [phone] (scheduling phone) [phone] (scheduling fax)     PATIENT INFORMATION   Last Name:      Antonio Nicole      First Name:    Yariel Richardson Allergies: Bactrim [Sulfamethoxazole W/Trimethoprim], Ciprofloxacin, Depakote [Valproic Acid], Fluocinolone, Gabitril [Tiagabine], Metronidazole, Phentermine, Tramadol, and Dilaudid [Hydromorphone]         Completed by:    To [place]      Date:    12/15/2020

## (undated) NOTE — LETTER
[facility] SURGERY SCHEDULING FORM   [address]  [address] [facility]   [phone] (scheduling phone) [phone] (scheduling fax)     PATIENT INFORMATION   Last Name:      Lindy Bowen      First Name:    Jacy Ardon Allergies: Bactrim [Sulfamethoxazole W/Trimethoprim]; Ciprofloxacin; Depakote; Fluocinolone; Gabitril [Tiagabine]; Metronidazole;  Phentermine; Tramadol; Dilaudid [Hydromorphone]         Completed by:    Yuliana Stafford      Date:    9/9/2020

## (undated) NOTE — IP AVS SNAPSHOT
[facility]            (For Outpatient Use Only) Initial Admit Date: 1/11/2021   Inpt/Obs Admit Date: Inpt: 1/11/21 / Obs: N/A   Discharge Date:    Cresencio John Paul:  [de-identified]   MRN: [de-identified]   CSN: [id number]   CEID: [id number]        [facility] TERTIARY INSURANCE   Payor:  Plan:    Group Number:  Insurance Type:    Subscriber Name:  Subscriber :    Subscriber ID:  Pt Rel to Subscriber:    Hospital Account Financial Class: Medicare    January 15, 2021

## (undated) NOTE — IP AVS SNAPSHOT
[facility]            (For Outpatient Use Only) Initial Admit Date: 9/7/2018   Inpt/Obs Admit Date: Inpt: 9/10/18 / Obs: 09/07/18   Discharge Date:    Miko Summers:  [de-identified]   MRN: [de-identified]   CSN: [id number]        ENCOUNTER  Patient C Subscriber ID:  Pt Rel to Subscriber:    Hospital Account Financial Class: Medicare    September 13, 2018

## (undated) NOTE — LETTER
05/10/21        To Whom It May Concern:      Dian Phan  :  12/3/1940    []  Patient has been cleared to hold Apixaban 2 days prior to injection.   Holding the medication(s) may put the patient at an increased risk for stroke, heart attack, or neuro

## (undated) NOTE — LETTER
12/15/20        To Whom It May Concern:      Jazmine Arnold  :  12/3/1940    Patient requesting to have bilateral L5 TFESI under MAC. []  Patient has been cleared to hold Xarelto for procedure.   Holding the medication(s) may put the patient at an in

## (undated) NOTE — LETTER
EMILY. Notifier: [employer]   GALO. Patient Name: Radha Chapin. Identification Number: [id number]      Advance Beneficiary Notice of Noncoverage (ABN)  NOTE:  If Medicare doesn’t pay for D.  Right Shoulder Steroid Injection Under Ultrasound Yuliet with this choice  I am not responsible for payment, and I cannot appeal to see if Medicare would pay. H. Additional Information: This notice gives our opinion, not an official Medicare decision.  If you have other questions on this notice or Medicare

## (undated) NOTE — IP AVS SNAPSHOT
[facility]            (For Outpatient Use Only) Initial Admit Date: 12/19/2020   Inpt/Obs Admit Date: Inpt: 12/21/20 / Obs: 12/20/20   Discharge Date:    Andrea Young:  [de-identified]   MRN: [de-identified]   CSN: [id number]   CEID: [id number] TERTIARY INSURANCE   Payor:  Plan:    Group Number:  Insurance Type:    Subscriber Name:  Subscriber :    Subscriber ID:  Pt Rel to Subscriber:    Hospital Account Financial Class: Medicare    2020

## (undated) NOTE — LETTER
5/3/2022      Vivienne Vargas MD  Physical Medicine and Rehabilitation  [address] [facility], [address] [place] [address]  Dept: [phone]  Dept Fax: [phone]        RE: Consultation for Ranjith Alvarez        Dear Khoa Wyatt MD,    Thank you very much for the opportunity to see your patient. Attached please find a summary from your patient's recent visit. I appreciate the chance to take care of your patient with you. Please feel free to call me with any questions or concerns. Sincerely,        Brianne Mayo.  Yunier Vargas MD  Electronically Signed on 5/3/2022

## (undated) NOTE — LETTER
19        To Dr. Aym Arizmendi MD:      Amaris Cole  :  12/3/1940    This patient was seen in our office Tuesday, 19.  Per her symptoms, it was recommended she undergo a bilateral L5 transforaminal epidural steroid injection under MA

## (undated) NOTE — ED AVS SNAPSHOT
Ms. Rayshawn Ireland   MRN: [id number]    Department:  [facility] Emergency Department   Date of Visit:  2/19/2018           Disclosure     Insurance plans vary and the physician(s) referred by the ER may not be covered by your plan.  Please cont within the next three months to obtain basic health screening including reassessment of your blood pressure.     IF THERE IS ANY CHANGE OR WORSENING OF YOUR CONDITION, CALL YOUR PRIMARY CARE PHYSICIAN AT ONCE OR RETURN IMMEDIATELY TO THE EMERGENCY DEPARTMEN

## (undated) NOTE — LETTER
Amaris Alt  12/3/1940    A patient of your clinic was recently seen by the hospitalists at [facility], and will be followed by [facility] INR f/u due on 6/8/18.     The patient's last INR values were, as follows:      Lab Results  Component

## (undated) NOTE — IP AVS SNAPSHOT
Patient Demographics     Address  [address] [facility] [address] Phone  [phone] [facility])  [phone] (Mobile) *Preferred* E-mail Address  [email or web address]      Emergency Contact(s)     Name Relation Home Work Mobile    Malvin Canales Albuterol Sulfate 1.25 MG/3ML Nebu  Commonly known as:  ACCUNEB  Next dose due: If needed. Take 1 ampule by nebulization every 4 (four) hours as needed for Wheezing.           amLODIPine Besylate 10 MG Tabs  Commonly known as:  NORVASC  Next dose due: Take 100 mg by mouth daily as needed for constipation. fluticasone-salmeterol 250-50 MCG/DOSE Aepb  Commonly known as:  ADVAIR DISKUS  Next dose due:  08/23/2019 in the evening. Inhale 1 puff into the lungs every 12 (twelve) hours. Take 1 tablet (20 mg total) by mouth daily with food. Anastacia Arroyo MD         ROBAFEN DM  MG/5ML Syrp  Generic drug:  dextromethorphan-guaiFENesin  Next dose due: If needed. Take 5 mL by mouth every 4 (four) hours as needed.           sodi [id number] LORazepam (ATIVAN) tab 4 mg 08/22/19 2016 Given      [id number] Levothyroxine Sodium (SYNTHROID, LEVOTHROID) tab 250 mcg 08/23/19 0631 Given      [id number] Normal Saline Flush 0.9 % injection 10 mL 08/22/19 1655 Given      [id number] Pantoprazole Anion Gap 5 0 - 18 mmol/L — [facility]   BUN 31 7 - 18 mg/dL [facility]   Creatinine 0.95 0.55 - 1.02 mg/dL [employer]   BUN/CREA Ratio 32.6 10.0 - 20.0 [facility]   Calcium, Total 9.1 8.5 - 10.1 mg/dL [employer]   Calculated Osmolality 3 Blood Culture Result No Growth 5 Days    Blood Culture FREQ X 2 [[id number]] Collected:  08/14/19 1614    Order Status:  Completed Lab Status:  Final result Updated:  08/19/19 1800    Specimen:  Blood,peripheral      Blood Culture Result No Growth 5 Days brain aneurysm s/p coiling, 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 Pt had recently cut her L leg on a piece of metal 6 days ago went to ED and got stitches.  Sent home on abx, however pt back with inc • s/p L5-S1 right laminectomy 8/28/2014   • Shortness of breath     O2 AT NIGHT   • Sleep apnea    • stent placement     cerebral   • Thyroid disease    • Toe pain     Onychomycosis, Debridement , Hammertoe    • Tonsillitis 1950    Tonsillitis    • Unspeci Fluocinolone            OTHER (SEE COMMENTS)    Comment:Unknown  Gabitril [Tiagabine]    OTHER (SEE COMMENTS)    Comment:Stroke like symptoms             Unable to move  Metronidazole           RASH  Phentermine             OTHER (SEE COMMENTS)    Comment: Eyes:  Sclera anicteric, No conjunctival pallor, EOMs intact. Nose: Nares normal. Mucosa normal. No drainage.    Throat: Lips, mucosa, and tongue normal. Teeth and gums normal.   Neck: Supple, symmetrical, trachea midline, no cervical or supraclavicular LA.1 Marsha Guerrero DO on 8/14/2019  4:54 PM  LA. 2 - Nydia Husain DO on 8/14/2019  5:44 PM               H&P signed by Nydia Husain DO at 8/14/2019  5:43 PM  Version 1 of 2    Author:  Nydia Husain DO Service:  — Author Type:  Physician    Filed:  8/14/201 stitches. Sent home on abx, however pt back with increased swelling and pain from the region.       PMH  Past Medical History:   Diagnosis Date   • Anxiety state, unspecified    • Arthritis    • Arthritis of both knees     knee replacement    • Arthritis of • BRAIN SURGERY  ~2009    Brain Aneurysm, Stent placed    • BRONCHOSCOPY N/A 5/9/2018    Performed by Rocael Matthews MD at [facility] ENDOSCOPY   • BRONCHOSCOPY N/A 2/21/2017    Performed by Rocael Matthews MD at [facility] ENDOSCOPY   • BRONCHOSCOPY N/A 1/9/2017    Perfor encounter [facility] HOSPITAL Encounter).       Soc Hx  Social History    Tobacco Use      Smoking status: Former Smoker        Packs/day: 1.50        Years: 25.00        Pack years: 37.5        Types: Cigarettes        Quit date: 1/1/1977        Years since quitting Heart:  Regular rate and rhythm, S1, S2 normal, no murmur, rub or gallop appreciated   Abdomen:   Soft, non-tender. No masses,  No organomegaly. Non distended   Extremities: Extremities normal, atraumatic, no cyanosis or edema. Skin: Skin color, texture. ATTENDING PHYSICIAN: Judy Ortiz MD   CONSULTING PHYSICIAN: Indigo Burk MD   PATIENT ACCOUNT#:   [de-identified]    LOCATION:  [address] [place] [address] RECORD #:   [id number]       YOB: 1940  ADMISSION DATE:       08/14/2019      CO management, and expected outcome. I answered her questions. She wishes to proceed. This is a full-thickness wound in a problematic area, pretibial.  This will not heal with local care.   This has necrotic tissue which must all be excised in preparation Presenting Problem: LLE cellulitis, wound infection[ST. 2]    Problem List[ST. 1]  Principal Problem:    Cellulitis of left lower leg  Active Problems:    Wound infection    Unspecified open wound, left lower leg, initial encounter[ST.2]      PHYSICAL THERAP BALANCE[ST.1]                                                                                                                     Static Sitting: Good  Dynamic Sitting: Good           Static Standing: Fair -  Dynamic Standing: Fair -[ST. 2]    ACTIVITY SVEN Goal #3 Patient is able to ambulate 100 feet with assist device: walker - rolling at assistance level: modified independent   Goal #3   Current Status Amb 60 ft w/ RW & supervision   Goal #4 Patient to demonstrate independence with home activity/exercise i The patient's[ST.1] Approx Degree of Impairment: 46.58%[ST.2] has been calculated based on documentation in the [facility] '6 clicks' Inpatient Basic Mobility Short Form. Research supports that patients with this level of impairment may benefit from [address]     P -   Need to walk in hospital room?: A Little   -   Climbing 3-5 steps with a railing?: A Little[ST. 2]     AM-PAC Score:[ST.1]  Raw Score: 18   Approx Degree of Impairment: 46.58%   Standardized Score (AM-PAC Scale): 43.63   CMS Modifier (G-Code): CK[ST.2] :  Mariusz Velez, PT (Physical Therapist)       Pt was to be seen for PT treatment session. Consulted w/ RN prior to seeing pt. Visited pt in room. Pt ADAMANTLY refused participating with therapy despite MAXIMAL education & encouragement.      Pt

## (undated) NOTE — IP AVS SNAPSHOT
[address] [facility] [phone]                Discharge Summary   1/4/2017    Ms. Terri Berrios           Admission Information        Provider Department    1/4/2017 Jeanne Choi MD [facility] 5sw/ insulin aspart 100 UNIT/ML Sopn   Last time this was given:  6 Units on 1/18/2017 11:03 AM   Commonly known as:  Zencristiana Cazares   Next dose due:  1/18/17 evening          Inject 1-11 Units into the skin 3 (three) times daily with meals.     Thierry Hardy - additional instructions   Next dose due:  Anytime as needed          Take 1 tablet by mouth every 6 (six) hours as needed.     Ilia Shannon taking these medications        Instructions Authorizing Provider    Morning Use as needed    Nazanin Pedersen                           Pantoprazole Sodium 40 MG Tbec   Last time this was given:  40 mg on 1/18/2017  7:00 AM   Commonly known as:  PROTONIX   Next dose due:  1/18/17 evening          Take 1 tablet (40 mg total) by ilan - Amoxicillin-Pot Clavulanate 875-125 MG Tabs  - Hydrocod Polst-CPM Polst ER 10-8 MG/5ML Suer  - HYDROcodone-acetaminophen  MG Tabs  - insulin aspart 100 UNIT/ML Sopn  - insulin detemir 100 UNIT/ML Sopn  - LORazepam 1 MG Tabs  - predniSONE 20 MG Tab Recent Hematology Lab Results (cont.)  (Last 3 results in the past 90 days)    Neutrophil % Lymphocyte % Monocyte % Eosinophil % Basophil % Prelim Neut Abs Final Neut Abs Lymphocyte Abso Monocyte Absolu Eosinophil Abso Basophil Absolu    (01/15/17)  70 (01 harming yourself, contact [facility] at [phone]. - If you don’t have insurance, Nabor García has partnered with Patient [address] to help you get signed up for insurance coverage.   Patient Clute Most common side effects:  Allergic reactions, rash, nausea, diarrhea   What to report to your healthcare team: Tolerance of medications, temperature, rash, itching, shortness of breath, chills, nausea, and diarrhea           Blood Thinners (Anticoagulants) Pantoprazole Sodium 40 MG Oral Tab EC    Loperamide HCl (IMODIUM A-D) 2 MG Oral Tab       Use:  Nausea/vomiting, acid reflux, low bowel motility, stomach pain   Most common side effects:  Depends on the specific medication but generally include: diarrhea, Mood and Thought Medications     mirtazapine 30 MG Oral Tab    QUEtiapine Fumarate 300 MG Oral Tab    Venlafaxine HCl  MG Oral Capsule SR 24 Hr       Use: Treat conditions such as depression and thought disorders   Most common side effects: Dizziness

## (undated) NOTE — LETTER
25        To Whom It May Concern:      Gretta Canales  :  12/3/1940    []  Patient has been cleared to hold Eliquis for 2 days prior to Left L4 and L5 transforaminal epidural steroid injections.  Holding the medication(s) may put the patient at an increased risk for stroke, heart attack, or neurologic or cardiac events.    []  Patient has NOT been cleared to hold Eliquis for procedure.        X_________________________________________  (Provider signature)                                     (Date)      Please make a selection, sign and fax this letter back to our office    If this office may be of further assistance, please do not hesitate to contact us.      Sincerely,    Alex Baumann MD    Phone #: [phone]  Fax #: [phone]

## (undated) NOTE — IP AVS SNAPSHOT
[facility]            (For Outpatient Use Only) Initial Admit Date: 8/11/2021   Inpt/Obs Admit Date: Inpt: N/A / Obs: 08/11/21   Discharge Date:    Alma Roberson:  [de-identified]   MRN: [de-identified]   CSN: [id number]   CEID: [id number]        EN TERTIARY INSURANCE   Payor:  Plan:    Group Number:  Insurance Type:    Subscriber Name:  Subscriber :    Subscriber ID:  Pt Rel to Subscriber:    Hospital Account Financial Class: Medicare    2021

## (undated) NOTE — ED AVS SNAPSHOT
Ms. Mariam Suazo   MRN: [id number]    Department:  [facility] Emergency Department   Date of Visit:  2/27/2020           Disclosure     Insurance plans vary and the physician(s) referred by the ER may not be covered by your plan.  Please cont within the next three months to obtain basic health screening including reassessment of your blood pressure.     IF THERE IS ANY CHANGE OR WORSENING OF YOUR CONDITION, CALL YOUR PRIMARY CARE PHYSICIAN AT ONCE OR RETURN IMMEDIATELY TO THE EMERGENCY DEPARTMEN

## (undated) NOTE — LETTER
4/26/2023      Blanca Vicente MD  Physical Medicine and Rehabilitation  [address] [facility], [address] [place] [address]  Dept: [phone]  Dept Fax: [phone]        RE: Consultation for [facility]        Dear Antonieta Padilla MD,    Thank you very much for the opportunity to see your patient. Attached please find a summary from your patient's recent visit. I appreciate the chance to take care of your patient with you. Please feel free to call me with any questions or concerns. Sincerely,        Yayo Rodriguez.  Carlos Vicente MD  Electronically Signed on 4/26/2023

## (undated) NOTE — LETTER
[facility] SURGERY SCHEDULING FORM   [address] [facility]   [phone] (scheduling phone) [phone] (scheduling fax)     PATIENT INFORMATION   Last Name:      Keiry Maurer      First Name:    Raulito Mathur Allergies: Bactrim [Sulfamethoxazole W/Trimethoprim]; Ciprofloxacin; Depakote; Dilaudid  [Hydromorphone]; Fluocinolone; Gabitril [Tiagabine]; Metronidazole;  Phentermine; Tramadol; Norco [Hydrocodone-Acetaminophen]         Completed by:    Aleena Weiner

## (undated) NOTE — IP AVS SNAPSHOT
Patient Demographics     Address  [address] [place] [address] [facility] Phone  [phone] [facility])  [phone] (Mobile) *Preferred* E-mail Address  [email or web address]      Emergency Contact(s)     Name Relation Home Work Mobile    ParkerMalvin Son 915- Apply 1 Application topically 2 (two) times daily as needed. ipratropium-albuterol 0.5-2.5 (3) MG/3ML Soln  Commonly known as:  DUONEB      Take 3 mL by nebulization as needed.    Shraddha Tse MD         Levothyroxine Sodium 125 MCG Tabs  C Cefadroxil 500 MG Caps  LORazepam 1 MG Tabs  TraMADol HCl 50 MG Tabs           509-509-A - MAR ACTION REPORT  (last 24 hrs)    ** SITE UNKNOWN **     Order ID Medication Name Action Time Action Reason Comments    [id number] CefTRIAXone Sodium (ROCEPHIN) 2 g Please view results for these tests on the individual orders.     Specimen Information    Type Source Collected On   Blood — 09/13/18 0505            PROTHROMBIN TIME (PT) [[id number]] (Abnormal)  Resulted: 09/13/18 0554, Result status: Final r Calculated Osmolality 288 275 - 295 mOsm/kg — [facility]   GFR, Non-African American 58 >=60  [facility]   GFR, African-American >60 >=60 [employer]   Comment:           Estimated GFR units: mL/min/1.73 square meters   eGFR calculated by the CKD-EP Mariam Suazo Patient Status:  Emergency    12/3/1940 MRN [id number]   Location [address] Attending Suzie Summers MD   Hosp Day # 0 PCP Matthew Jacobson MD     Date:  2018  Date of Admission:  2018    Chief Comp • Migraines    • Muscle weakness    • Onychomycosis     Debridement    • Osteoarthrosis, unspecified whether generalized or localized, unspecified site    • Other and unspecified hyperlipidemia    • Pneumonia 2012   • Shortness of breath     O2 AT NIGHT ALzheimer's Disease    • Stroke Sister      Cerebrovascular accident    • Heart Disease Brother      Coronary Artery Disease       reports that she quit smoking about 41 years ago. Her smoking use included Cigarettes.  She has a 37.50 pack-year smoking hi Venlafaxine HCl  MG Oral Capsule SR 24 Hr   No No   Sig: Take 1 capsule (150 mg total) by mouth daily with breakfast.   Warfarin Sodium 5 MG Oral Tab   No No   Sig: Take 1 tablet (5 mg total) by mouth nightly.    acetaminophen 325 MG Oral Tab   Yes No Musculoskeletal: Normal range of motion. normal strength. Feet:  Normal pulses. Neurologic:  Alert, oriented, no focal deficits, cranial nerves II-XII are grossly intact. Cognition and Speech:  Oriented, speech clear and coherent.   Psychiatric:  Elnita  Discharge Summary - D/C Summary      Discharge Summary signed by Valarie Solis MD at 9/13/2018  2:22 PM  Version 1 of 1    Author:  Valarie Solis MD Service:  Hospitalist Author Type:  Physician    Filed:  9/13/2018  2:22 PM Date of Service:  9/13/20 Cellulitis of both lower extremities     Lung infiltrate     Acute renal injury (HCC)     Chronic diarrhea     Oropharyngeal dysphagia     L5-S1 left mod-severe/right mod foraminal, L1-2 mild-mod central stenosis     Other spondylosis, lumbar region Community acquired pneumonia     Community acquired pneumonia, unspecified laterality     Lethargy     Febrile illness, acute     Acute respiratory failure with hypoxia (HCC)     Syncope, near     Hematoma[FG.4]        Physical Exam:   General appearanc -plan to switch to orals on dc- duricef to complete course     COPD  -No signs of acute exacerbation continue home inhalers.     Prior DVT  -Currently on Coumadin  -initially held now will continue the same.   Resume coumadin now  -monitor inr at rehab  Hyp acetaminophen 325 MG Tabs  Commonly known as:  TYLENOL      Take 650 mg by mouth every 6 (six) hours as needed for Pain. Refills:  0     clotrimazole 1 % Crea  Commonly known as:  LOTRIMIN      Apply 1 Application topically 2 (two) times daily as needed. FG.3 - Diandra Argueta MD on 9/13/2018  2:18 PM  FG. 4 - Diandra Argueta MD on 9/13/2018  2:19 PM                        Physical Therapy Notes (last 72 hours) (Notes from 9/10/2018  2:38 PM through 9/13/2018  2:38 PM)      Physical Therapy Note signed by BALANCE                                                                                                                     Static Sitting: Good  Dynamic Sitting: Good           Static Standing: Fair  Dynamic Standing: Fair -    ACTIVITY TOLERANCE  O2 Satu assistance level: modified independent with walker - rolling      Goal #2  Current Status  CGA[TF.1]/min A[TF.2]   Goal #3 Patient is able to ambulate 20 feet with assist device: walker - rolling at assistance level: modified independent   Goal #3   Rocío times with nursing staff while in [place] [address] and educated on it's rationale. . Plan is from [place] [address] Placement to max her functional skills prior to home d/c    DISCHARGE RECOMMENDATIONS  PT Discharge Recommendations: Home with home health PT;Sub-acute rehabilitation( DF/PF  QS  Heel slides     Position Sitting       Patient End of Session: Up in chair;Needs met;Call light within reach;RN aware of session/findings; All patient questions and concerns addressed    CURRENT GOALS   Goals to be met by:9/12/18  Patient Goal Pa

## (undated) NOTE — LETTER
3/11/2024      Alex Baumann MD  Physical Medicine and Rehabilitation  [address] [place] [address], [address]  [facility] [address]  Dept: [phone]  Dept Fax: [phone]        RE: Consultation for Gretta Canales        Dear Timothy Hicks MD,    Thank you very much for the opportunity to see your patient.  Attached please find a summary from your patient's recent visit.     I appreciate the chance to take care of your patient with you.  Please feel free to call me with any questions or concerns.    Sincerely,        Alex Baumann MD  Electronically Signed on 3/11/2024

## (undated) NOTE — ED AVS SNAPSHOT
Ms. Ramos Win   MRN: [id number]    Department:  [facility] Emergency Department   Date of Visit:  12/26/2018           Disclosure     Insurance plans vary and the physician(s) referred by the ER may not be covered by your plan.  Please con within the next three months to obtain basic health screening including reassessment of your blood pressure.     IF THERE IS ANY CHANGE OR WORSENING OF YOUR CONDITION, CALL YOUR PRIMARY CARE PHYSICIAN AT ONCE OR RETURN IMMEDIATELY TO THE EMERGENCY DEPARTMEN

## (undated) NOTE — LETTER
6/19/2023      Bradly Mcqueen MD  Physical Medicine and Rehabilitation  [facility], [address] [place] [address]  Dept: [phone]  Dept Fax: [phone]        RE: Consultation for Lori Troy        Dear Nicci Garcia MD,    Thank you very much for the opportunity to see your patient. Attached please find a summary from your patient's recent visit. I appreciate the chance to take care of your patient with you. Please feel free to call me with any questions or concerns. Sincerely,        Penelope Amato.  Katerin Mcqueen MD  Electronically Signed on 6/19/2023

## (undated) NOTE — LETTER
[address]  [address] [place] [address]  [facility]: [phone]  FAX: [phone]      ANTICOAGULATION CLEARANCE REQUEST    Date: 8/3/2021    Attention:  ***          Phone:  ***  Fax:  ***    Our mutual patient

## (undated) NOTE — IP AVS SNAPSHOT
[facility]            (For Outpatient Use Only) Initial Admit Date: 2/6/2019   Inpt/Obs Admit Date: Inpt: N/A / Obs: 02/06/19   Discharge Date:    Jazmine Bynum:  [de-identified]   MRN: [de-identified]   CSN: [id number]        ENCOUNTER  Patient Class Subscriber ID:  Pt Rel to Subscriber:    Hospital Account Financial Class: Medicare    February 11, 2019

## (undated) NOTE — LETTER
May 30, 2022         Sean Bateman MD  [address] [id number]      Patient: Eugene Rosas   YOB: 1940   Date of Visit: 5/19/2022       Dear Dr. Noman Ponce MD,    I had the pleasure of seeing our mutual patient, Eugene Rosas, on 5/19/2022. He had suggested follow-up for cryo surgery to seborrheic keratosis on right cheek that was bothersome. This was performed patient tolerated this well and she will follow-up as needed for seborrheic keratoses. No active actinic keratoses were noted on her exam.    Thank you for allowing me to participate in the care of this patient.     Sincerely,                           Alex Modi MD  [address]  [address] [address]    Document electronically generated by:  Alex Modi MD on 5/30/2022    CC: No Recipients    Enclosure

## (undated) NOTE — LETTER
24      To Whom It May Concern:        Gretta Canales  :  12/3/1940     []  Patient has been cleared to hold Eliquis for 2 days prior to Left L4 and L5 transforaminal epidural steroid injection.  Holding the medication(s) may put the patient at an increased risk for stroke, heart attack, or neurologic or cardiac events.     []  Patient has NOT been cleared to hold Eliquis for procedure.     X_________________________________________  (Provider signature)                                     (Date)        Please make a selection, sign and fax this letter back to our office     If this office may be of further assistance, please do not hesitate to contact us.        Sincerely,     Alex Baumann MD    Phone #: [phone]  Fax #: [phone]

## (undated) NOTE — IP AVS SNAPSHOT
Patient Demographics     Address  [address] [place] [address] Phone  [phone] [facility])  [phone] (Mobile) *Preferred* E-mail Address  [email or web address]      Emergency Contact(s)     Name Relation Home Work [address] Next dose due:  5/11/18 morning      Take 10 mg by mouth daily.           ammonium lactate 12 % Lotn  Commonly known as:  LAC-HYDRIN  Next dose due:  As directed              Amoxicillin-Pot Clavulanate 875-125 MG Tabs  Commonly known as:  AUGMENTIN  Next d Take 1 tablet (1 mg total) by mouth 2 (two) times daily as needed for Anxiety. Selena Anna MD         Nystatin 439280 UNIT/GM Powd  Next dose due:  Anytime as needed      Apply 1 Application topically as needed.           Oxybutynin Chloride ER 5 [id number] LORazepam (ATIVAN) tab 1 mg 05/09/18 2040 Given      [id number] Levothyroxine Sodium (SYNTHROID, LEVOTHROID) tab 250 mcg 05/10/18 0653 Given      [id number] Normal Saline Flush 0.9 % injection 3 mL 05/09/18 2032 Given      [id number] Normal Saline Component Value Reference Range Flag Lab   Magnesium 1.9 1.8 - 2.5 mg/dL [employer] Performed By     Lab - Abbreviation Name Director Address Valid Date Range    [address] Lab [facility] LAB Agueda Reardon. Suellyn Epley M.D. [facility]. [address] [place] [address] FUNGUS CULTURE AND SMEAR [[id number]] Collected:  05/09/18 1021    Order Status:  Sent Lab Status: In process Updated:  05/09/18 1318    Specimen: Body fluid, unspecified from Bronchial washing     Narrative:        The following orders were created for p Author:  Bert Sellers MD Service:  Hospitalist Author Type:  Physician    Filed:  5/2/2018  9:43 PM Status:  Signed    :  Bret Sellers MD (Physician)       [facility]    PATIENT'S NAME: Pita CALDERON   ATTENDING PHYSICIAN: Norberto Beavers discectomies and fusions, several lumbar epidural injections, and hysterectomy. MEDICATIONS:  Please see medication reconciliation list.  She is on home oxygen.     ALLERGIES:  Multiple, including Cipro, Depakote, Dilaudid, metronidazole, phentermine, Ul 4.   Obstructive sleep apnea. The patient will be admitted to general medical floor. Aspiration precautions. DuoNeb. Pulmonary consult, Dr. Nuvia Levy, was notified. Noted also that her TSH is suppressed, so we will probably decrease her levothyroxine dose. • Arthritis    • Arthritis of both knees     knee replacement    • Arthritis of right hip 2009   • Back problem    • Brain aneurysm     Brain Aneurysm, Stent placed    • Cervical disc disease 1985,1995,2003,2009    Cervical Discectomy    • Cholecystitis 19 Comment: Cervical Discectomy AND FUSION  1950: TONSILLECTOMY      Comment: Tonsillitis   2006: UPPER GI ENDOSCOPY,BIOPSY    Family History  Family History   Problem Relation Age of Onset   • Heart Attack Mother    • Heart Disease Mother      Coronary A docusate sodium (COLACE) cap 100 mg 100 mg Oral Daily   HYDROcodone-acetaminophen (NORCO)  MG per tab 1 tablet 1 tablet Oral Q6H PRN   ipratropium-albuterol (DUONEB) nebulizer solution 3 mL 3 mL Nebulization Q4H PRN   Levothyroxine Sodium (SYNTHROID, times daily as needed. docusate sodium 100 MG Oral Cap Take 100 mg by mouth 2 (two) times daily. (Patient taking differently: Take 100 mg by mouth daily.  )   AmLODIPine Besylate 10 MG Oral Tab Take 10 mg by mouth daily.    Cholecalciferol (VITAMIN D) 2 Gastrointestinal: negative for constipation and diarrhea  Musculoskeletal:negative for muscle weakness and myalgias  Neurological: No focal neurological deficits  Allergic/Immunologic: negative for angioedema, hay fever and urticaria    Physical Exam:   Bl from 2016. These are favored to be reactive and can be reassessed at followup. Trace right pleural effusion. Cardiomegaly with coronary artery calcifications.   Dictated by (CST): Tari Rodrigez MD on 5/02/2018 at 13:20     Approved by (CST): Dalton Coyle Physical Therapy Notes (last 72 hours) (Notes from 5/7/2018  5:26 PM through 5/10/2018  5:26 PM)      Physical Therapy Note signed by Gavin Jacobs PTA at 5/9/2018  3:57 PM  Version 1 of 1    Author:  Gavin Jacobs PTA Service:  (none) Author Type: to home with HHPT. Patient's current functional deficits include decreased endurance, balance deficits, generalized weakness, which are below the patient's pre-admission status.  Patient requires skilled PT to address deficits and maximize patient independe AM-PAC Score:[DK.1]  Raw Score: 20   PT Approx Degree of Impairment Score: 35.83%   Standardized Score (AM-PAC Scale): 47.67   CMS Modifier (G-Code): CJ[DK.2]    FUNCTIONAL ABILITY STATUS  Gait Assessment[DK. 1]   Gait Assistance: Supervision  Distance (ft) Author:  Grecia Gillespie OT Service:  Rehab Author Type:  Occupational Therapist    Filed:  5/9/2018  3:15 PM Date of Service:  5/9/2018  3:04 PM Status:  Signed    :  Grecia Gillespie OT (Occupational Therapist)       OCCUPATIONAL THERAPY TREATM OT Treatment Plan: Balance activities; Energy conservation/work simplification techniques;ADL training;IADL training;Functional transfer training;UE strengthening/ROM; Endurance training;Patient/Family education;Patient/Family training[EW.2]    SUBJECTIVE  \ Patient End of Session: In bed;Needs met;Call light within reach;RN aware of session/findings; All patient questions and concerns addressed; Alarm set[EW.2]    OT Goals:    Patient will complete functional transfer with Mod I  Comment: Progressing-SBA    Pat

## (undated) NOTE — LETTER
10/2/2020      Ann Archer MD  Physical Medicine and Rehabilitation  [address] [address]  Dept: [phone]  Dept Fax: [phone]        RE: Consultation for Sherman Tovar        Dear Isiah Blum MD,    Than

## (undated) NOTE — MR AVS SNAPSHOT
[facility] SPECIALTY [facility] [address]  [address]  [phone]               Thank you for choosing us for your health care visit with Cali Pérez MD.  We are glad to serve you and happy to provide you with this summary of Take 10 mg by mouth daily. Commonly known as:  NORVASC           aspirin 325 MG Tabs   Take 325 mg by mouth daily. Cefuroxime Axetil 250 MG Tabs   Take 1 tablet (250 mg total) by mouth 2 (two) times daily.    Commonly known as:  CEFTIN Take 1 tablet (5 mg total) by mouth daily.    Commonly known as:  DITROPAN-XL           Pantoprazole Sodium 40 MG Tbec   Take 1 tablet (40 mg total) by mouth every morning before breakfast.   Commonly known as:  PROTONIX           Potassium Chloride ER 20 M You can access your MyChart to more actively manage your health care and view more details from this visit by going to [email or web address].   If you've recently had a stay at the Hospital you can access your discharge instructions in [address] by lm

## (undated) NOTE — IP AVS SNAPSHOT
[address] [facility] [address] [facility] ~ [phone]                Discharge Summary   4/2/2017    MsJerrica  Dian Seek           Admission Information        Provider Department    4/2/2017 Evin Deal MD Em ipratropium-albuterol 0.5-2.5 (3) MG/3ML Soln   Last time this was given:  3 mL on 4/8/2017  8:26 AM   Commonly known as:  DUONEB        Take 3 mL by nebulization as needed.     Juana Zamora                           Pantoprazole Sodium 40 MG Tbec What changed:    - medication strength  - how much to take  - how to take this  - when to take this  - additional instructions   Next dose due:   Take tomorrow morning at 9:00am.        Take 1 tablet (112 mcg total) by mouth before breakfast.    GALO Zamora Next dose due: Take tomorrow morning at 9:00am.        Take 1 tablet by mouth daily. CEPACOL SORE THROAT & COUGH 5-7.5 MG Lozg   Generic drug:  Dextromethorphan-Benzocaine   Next dose due: May use anytime today if needed. Ximena Rivera                           QUEtiapine Fumarate 300 MG Tabs   Last time this was given:  4/7/2017 10:40 PM   Commonly known as:  SEROQUEL   Next dose due:   Take tonight before bed at 9:00pm.        Take 1 tablet (300 mg total) by mouth night - Guaifenesin--10 MG/5ML Syrp  - HYDROcodone-acetaminophen  MG Tabs  - LORazepam 1 MG Tabs  - temazepam 30 MG Caps      Information about where to get these medications is not yet available     !  Ask your nurse or doctor about these medication 3.7 (04/05/17)  2.1 (04/05/17)  0.6 (04/05/17)  0.2 (04/05/17)  0.1    (04/04/17)  57 (04/04/17)  30 (04/04/17)  9 (04/04/17)  2 (04/04/17)  1  (04/04/17)  3.5 (04/04/17)  1.9 (04/04/17)  0.6 (04/04/17)  0.2 (04/04/17)  0.1      Metabolic Lab Results  [facility] discharge instructions in [employer] by going to Visits < Admission Summaries. If you've been to the Emergency Department or your doctor's office, you can view your past visit information in [employer] by going to Visits < Visit Summaries. [facility] questions? Use: Treat high blood pressure, swelling in legs, too much fluid    Most common side effects: Dizziness, loss of potassium (increased urination is expected)   What to report to your healthcare team: Dizziness, decreased urine amount           Blood Thinne ipratropium-albuterol 0.5-2.5 (3) MG/3ML Inhalation Solution    ipratropium-albuterol 0.5-2.5 (3) MG/3ML Inhalation Solution    Budesonide-Formoterol Fumarate (SYMBICORT) 160-4.5 MCG/ACT Inhalation Aerosol       Use:  Treatment of asthma, COPD/emphysema, Multiple Vitamins-Minerals (CENTRUM SILVER ULTRA WOMENS) Oral Tab    Dextromethorphan-Benzocaine (CEPACOL SORE THROAT & COUGH) 5-7.5 MG Mouth/Throat Lozenge    clotrimazole 1 % External Cream    Oxybutynin Chloride ER 5 MG Oral Tablet 24 Hr    Cholecalcif

## (undated) NOTE — IP AVS SNAPSHOT
Patient Demographics     Address  [address] [place] [address] Phone  [phone] [facility])  [phone] (Mobile) *Preferred* E-mail Address  [email or web address]      Emergency Contact(s)     Name Relation Home Work Mobile    ParkerMalvin Son 516- Take 1 tablet (5 mg total) by mouth daily. Zee Ogden MD         aspirin 325 MG Tabs  Next dose due: Tomorrow morning      Take 325 mg by mouth daily.           Budesonide-Formoterol Fumarate 160-4.5 MCG/ACT Aero  Commonly known as:  SYMBICORT  Nex Take 1 tablet (1 mg total) by mouth 2 (two) times daily as needed for Anxiety. Flori Ballard MD         Miconazole Nitrate 2 % Powd  Next dose due: Tonight at bedtime      Apply 1 Application topically 2 (two) times daily for 10 days.   Stop taking on mL MBP/ADD-vantage 02/11/19 1054 New Bag      [id number] Desvenlafaxine Succinate ER (PRISTIQ) 24 hr tab 100 mg 02/11/19 1102 Given      [id number] Fluticasone Furoate-Vilanterol (BREO ELLIPTA) 200-25 MCG/INH inhaler 1 puff 02/11/19 1039 Given      [id number] Bilirubin Urine Negative Negative — [facility]   Blood Urine Moderate Negative A [facility]   Nitrite Urine Negative Negative — [facility]   Urobilinogen Urine <2.0 <2.0 — [facility]   Leukocyte Esterase Urine Large Negative [facility]   Ascorb medical history significant for COPD, depression, DVT and DJD presents with a complaint of recurring falls at home despite being mostly wheelchair-bound. Claims most of her falls occur when attempting transfers.   She denies any associated headache dizzine • Thyroid disease    • Toe pain     Onychomycosis, Debridement , Ximena    • Tonsillitis 1950    Tonsillitis    • Unspecified essential hypertension    • Unspecified sleep apnea    • Visual impairment     EYE GLASSES     Past Surgical History:   Procedu • Cancer Brother 54        Liver    • Neurological Disorder Sister         ALzheimer's Disease    • Stroke Sister         Cerebrovascular accident    • Heart Disease Brother         Coronary Artery Disease       reports that she quit smoking about 42 years Sig: Take 1 tablet (40 mg total) by mouth every morning before breakfast.   Patient taking differently: Take 40 mg by mouth 2 (two) times daily. QUEtiapine Fumarate 400 MG Oral Tab   Yes No   Sig: Take 400 mg by mouth nightly.      Rivaroxaban ([place]sh Males) Cardiovascular:  Normal rate, regular rhythm, no murmur, no edema. Gastrointestinal:  Soft, non-tender, non-distended, normal bowel sounds, no organomegaly. Lymphatics:  No lymphadenopathy neck, axilla, groin. Musculoskeletal: Normal range of motion.   n Author:  Geronimo Centeno MD Service:  [place] Author Type:  Physician    Filed:  2/7/2019  7:11 AM Date of Service:  2/6/2019  9:37 PM Status:  Signed    :  Geronimo Centeno MD (Physician)    Related Notes:  Addendum by Geronimo Centeno MD (Physician) fi • Hip pain, left    • History of blood clots     Leg   • Hypothyroidism    • Incontinence    • L3-4 stable slight grade 1 retrolisthesis 10/31/2014   • L4-5 mild-mod diffuse bulging disc 10/31/2014   • L4-5 slight grade 1 unstable spondylolisthesis 10/31/2 WITH FUSION PER PATIENT   • OTHER SURGICAL HISTORY  1985,1995,2003,2009    Cervical Discectomy AND FUSION   • TONSILLECTOMY  1950    Tonsillitis    • TRANSFORAMINAL LUMBAR EPIDURAL STEROID INJECTION SINGLE LEVEL Bilateral 10/13/2017    Performed by Cindy Goyal, Prescriptions Last Dose Informant Patient Reported? Taking? AmLODIPine Besylate 5 MG Oral Tab   No No   Sig: Take 1 tablet (5 mg total) by mouth daily.    Dicyclomine HCl 20 MG Oral Tab   No No   Sig: Take 1 tablet (20 mg total) by mouth 4 (four) times da Musculoskeletal: Lower extremity weakness  Integumentary:  Negative. Neurologic:  Negative. Psychiatric:  Negative.   ROS reviewed as documented in chart    Physical Exam:[SA.1]  Temp:  [98 °F (36.7 °C)] 98 °F (36.7 °C)  Pulse:  [68-74] 68  Resp:  [18] 18 Continue Effexor and Seroquel. Benign hypertension  Controlled at this time, resume home medication. Prophylaxis[SA.1]  On Xarelto[SA. 3]    CODE STATUS  Full    Primary care physician[SA. Via Tevin Ross MD[SA. 2]    Disposition  Clinical course wi walker but for short distances. She has a motorized chair that she has used for many years for getting around. She has been to the ED at least 2 times due to the recent falls. She was admitted for observation on Wednesday after her most recent fall.   Deanna • Unspecified essential hypertension    • Unspecified sleep apnea    • Visual impairment     EYE GLASSES     Past Surgical History:   Procedure Laterality Date   • ANESTH,NECK VESSEL SURGERY NOS      x4   • BRAIN SURGERY  ~2009    Brain Aneurysm, Stent rowdy Cerebrovascular accident    • Heart Disease Brother         Coronary Artery Disease      Social History    Socioeconomic History      Marital status:       Spouse name: Not on file      Number of children: Not on file      Years of education: Hx of Spending [place] of Time in [place]: No        Bad sunburns in the past: No        Tanning Salons in the Past: No        Hx of Radiation Treatments: No        Regular use of sun block: Not Asked    Social History Narrative      The patient uses Light Touch Sensation: Intact in bilateral Lower Extremities   LE Muscle Strength:  All LE strength measurements 5/5 except:  Hamstring RIGHT:   4+/5  Hamstring LEFT:   4+/5   RIGHT plantar reflexes: downward response   LEFT plantar reflexes: downward respo BC.1 - Janet Benson MD on 2/8/2019  4:41 PM  BC.2 - Janet Benson MD on 2/8/2019  5:05 PM  BC.3 - Janet Benson MD on 2/8/2019  5:08 PM  BC.4 - Janet Benson MD on 2/8/2019  5:11 PM                     D/C Summary     No notes of this type exist f chair after the ambulation today. She then completed exercises in the chair: alt hip flexion x 20, alternate LAQ's x 20 each leg, seated calf raises, seated toe raises.   Patient did fairly well today after having walked only 10 feet yesterday and after ha -   Moving from lying on back to sitting on the side of the bed?: A Little   How much help from another person does the patient currently need. ..   -   Moving to and from a bed to a chair (including a wheelchair)?: A Little   -   Need to walk in hospital r No notes of this type exist for this encounter. Video Swallow Study Notes     No notes of this type exist for this encounter. SLP Notes     No notes of this type exist for this encounter.       Immunizations     Name Date      Pneumococcal (Prevn

## (undated) NOTE — Clinical Note
Pt declined scheduling apt, states she will call when ready. Qualifies for TCM until 5/24/18.  States to no longer take multiple meds listed in EMR

## (undated) NOTE — LETTER
[facility] SURGERY SCHEDULING FORM   [address] [place] [address] [place] [address]   [phone] (scheduling phone) [phone] (scheduling fax)     PATIENT INFORMATION   Last Name:      Mario Ziegler      First Name:    Phil Dumas Allergies: Bactrim [Sulfamethoxazole W/Trimethoprim]; Ciprofloxacin; Depakote; Dilaudid  [Hydromorphone]; Fluocinolone; Gabitril [Tiagabine]; Metronidazole; Phentermine; Tramadol; Norco [Hydrocodone-Acetaminophen]         Completed by:     Stella ROSENTHAL

## (undated) NOTE — LETTER
23        To Whom It May Concern:      Makenzie Ramirez  :  12/3/1940    []  Patient has been cleared to hold Apixaban for 2 days prior to Right L4-L5, L5-S1 Transforaminal epidural steroid injections. Holding the medication(s) may put the patient at an increased risk for stroke, heart attack, or neurologic or cardiac events. []  Patient has NOT been cleared to hold Apixaban prior to procedure. X_________________________________________  (Provider signature)                                     (Date)      Please make a selection, sign and fax this letter back to our office    If this office may be of further assistance, please do not hesitate to contact us. Sincerely,    Arely Santana.  Yolis Stephens MD    Phone #: [phone]  Fax #: [phone]

## (undated) NOTE — IP AVS SNAPSHOT
Patient Demographics     Address  [place] Phone  [phone] (Home) *Preferred*  [phone] [facility]) E-mail Address  [email or web address]      Emergency Contact(s)     Name Relation Home Work Mobile    ParkerMalvin Son 037- Tabs  Commonly known as: TYLENOL  Next dose due: Anytime as Needed      Take 650 mg by mouth every 6 (six) hours as needed. ammonium lactate 12 % Lotn  Commonly known as: LAC-HYDRIN  Next dose due:  Tonight 9 pm      Apply topically 2 (two) times a known as: APRESOLINE  Next dose due: Anytime as Needed      Take 25 mg by mouth every 6 (six) hours as needed.  SBP >150          HYDROcodone-acetaminophen 5-325 MG Tabs  Commonly known as: Wanna Akin  Next dose due: Anytime as Needed      Take 1 tablet by mouth daily as needed. Abe Ashby,          QUEtiapine 100 MG Tabs  Commonly known as: SEROQUEL  Next dose due: Tonight 9 pm      Take 100 mg by mouth nightly. Senna-Docusate Sodium 8.6-50 MG Tabs  Commonly known as: SENOKOT S  Next dose due:  To 1000 Given      [id number] donepezil (ARICEPT) tab 5 mg 11/11/21 2050 Given      [id number] lamoTRIgine (LAMICTAL) tab 100 mg 11/11/21 2050 Given      [id number] lamoTRIgine (LAMICTAL) tab 100 mg 11/12/21 1000 Given      [id number] levothyroxine (SYNTHROID) ta Result status: Final result   Ordering provider: Kacie Lorenzo MD  11/11/21 5756 Resulting lab: Dharmesh MURILLO [facility])    Specimen Information    Type Source Collected On   Blood — 11/12/21 5973          Components    Compon Abnormal            Final result                 Please view results for these tests on the individual orders.     Specimen Information    Type Source Collected On   Blood — 11/12/21 0521            Testing Performed By     Lab - Abbreviation Name swab from individuals suspected of respiratory viral infection consistent with COVID-19 by their healthcare provider. Signs and symptoms of respiratory viral infection due to SARS-CoV-2, influenza, and RSV can be similar.     Test performed using the Xpert prior admission due to leg swelling     GERD  - PPI      COPD  - not is exacerbation  - continue home MDI     H/o DVT  -rohini, plan for lifelong AC per PCP  - xarelto discontinue due to worsening Cr on prior admission  - continue Eliquis     Weakness/ D Cervical disc disease 1985,1995,2003,2009    Cervical Discectomy    • Cholecystitis 1984    Cholecystectomy    • COPD (chronic obstructive pulmonary disease) (HCC)     PFTs 2-15 with FEV1 1.11 L and FEV1/FVC ratio 63%   • Deep vein thrombosis (HCC)     rec EGD  05/2019    Gastric polyps only   • EGD  2006   • EYE SURGERY      x2 FOR \"CROSSED EYES\"   • HIP REPLACEMENT SURGERY Bilateral    • HYSTERECTOMY  1967    Partial Hysterectomy   • JAW SURGERY     • KNEE REPLACEMENT SURGERY Bilateral     Bilateral arth Heart Attack Sister    • Heart Disease Brother    • Heart Attack Brother    • Other (Other[other]) Father    • Other (Other[other]) Maternal Grandmother    • Other (Other[other]) Maternal Grandfather    • Other (Other[other]) Paternal Grandmother    • Othe Radiology: XR CHEST AP PORTABLE  (CPT=71045)    Result Date: 11/8/2021  CONCLUSION:  1. Unchanged chest.  No acute appearing disease. Heart size upper limits of normal and normal pulmonary vascularity.   Prominent pulmonary markings throughout the inte assist.    History:  Past Medical History:   Diagnosis Date   • Anxiety state, unspecified    • Aortic atherosclerosis ([facility] [address]     CT scan 11-19   • Arthritis of both knees     knee replacement    • Arthritis of right hip 2009   • Back problem    • Brain ane ANESTH,NECK VESSEL SURGERY NOS      x4   • BRAIN SURGERY  ~2009    Brain Aneurysm, Stent placed    • CAROTID ENDARTERECTOMY Right    • CARPAL TUNNEL RELEASE Right ~2008   • CHOLECYSTECTOMY  1984    Cholecystitis   • COLONOSCOPY  2010   • DEBRIDEMENT OF MIRTHA [Sulfametho*    RASH  Ciprofloxacin           RASH  Depakote [Valproic *    OTHER (SEE COMMENTS)    Comment:pancreatitis  Fluocinolone            OTHER (SEE COMMENTS)    Comment:Unknown  Gabitril [Tiagabine]    OTHER (SEE COMMENTS)    Comment:Stroke like s sputum, hemoptysis, chest pain, wheezing, dyspnea on exertion, or stridor. Cardiovascular: Negative for chest pain, palpitations, irregular heart beats. Gastrointestinal:  Some nausea.    Genitourinary:  No dysuria, hematuria, urine urgency or frequency neurologic deficits.     Lab Data Review:  Lab Results   Component Value Date    WBC 11.9 11/09/2021    HGB 12.6 11/09/2021    HCT 39.7 11/09/2021    .0 11/09/2021    CREATSERUM 0.91 11/09/2021    BUN 16 11/09/2021     11/09/2021    K 3.7 11/09 No notes of this type exist for this encounter.         Physical Therapy Notes (last 72 hours)      Physical Therapy Note signed by Shayla Swenson PT at 11/11/2021  2:34 PM  Version 1 of 1    Author: Shayla Swenson PT Service: Rehab Author Type: Lester BEARING RESTRICTION                PAIN ASSESSMENT   Rating:  (did not rate)  Location: back pain  Management Techniques: Activity promotion; Body mechanics;Repositioning    BALANCE  Static Sitting: Fair +  Dynamic Sitting: Fair +  Static Standing: Fair - transfers EOB to/from Chair/Wheelchair at assistance level: modified independent with wheelchair - electric   Goal #3   Current Status MOD A x 2   Goal #4 Patient is able to ambulate 25 feet with assist device: walker - rolling at assistance level: jamie chair with ModA x1 +x1 guarding from OT. Patient left up in chair with all needs in reach. Continue to recommend KYLE at d/c    PPE worn: mask, gloves, goggles. RN provided consent to proceed. Care coordinated with PT. ERA bustillo.     DISCHARGE RECOMMEND this type exist for this encounter. SLP Notes    No notes of this type exist for this encounter.      Immunizations     Name Date      [address] 05/12/21     [address] 03/20/21     INFLUENZA 10/12/20     INFLUENZA defer-11/19/19     Deferral

## (undated) NOTE — LETTER
Date & Time: 4/3/2019, 6:50 AM  Patient: Janelle Saha           Dear Janelle Saha,          After numerous attempts, we have been unable to contact you via telephone.  If you have any questions or need additional information please contact the Emerg

## (undated) NOTE — ED AVS SNAPSHOT
Ms. Janelle Saha   MRN: [id number]    Department:  [facility] Emergency Department   Date of Visit:  1/16/2019           Disclosure     Insurance plans vary and the physician(s) referred by the ER may not be covered by your plan.  Please cont within the next three months to obtain basic health screening including reassessment of your blood pressure.     IF THERE IS ANY CHANGE OR WORSENING OF YOUR CONDITION, CALL YOUR PRIMARY CARE PHYSICIAN AT ONCE OR RETURN IMMEDIATELY TO THE EMERGENCY DEPARTMEN

## (undated) NOTE — ED AVS SNAPSHOT
Ms. Tisha Workman   MRN: [id number]    Department:  [facility] Emergency Department   Date of Visit:  2/7/2020           Disclosure     Insurance plans vary and the physician(s) referred by the ER may not be covered by your plan.  Please conta within the next three months to obtain basic health screening including reassessment of your blood pressure.     IF THERE IS ANY CHANGE OR WORSENING OF YOUR CONDITION, CALL YOUR PRIMARY CARE PHYSICIAN AT ONCE OR RETURN IMMEDIATELY TO THE EMERGENCY DEPARTMEN

## (undated) NOTE — LETTER
[facility] SURGERY SCHEDULING FORM   [address] [place] [address] [place] [address]   [phone] (scheduling phone) [phone] (scheduling fax)     PATIENT INFORMATION   Patient Name:    Ramos Wni   :    12/3/1940 Allergies: Bactrim [Sulfamethoxazole W/Trimethoprim]; Ciprofloxacin; Depakote; Dilaudid  [Hydromorphone]; Gabitril [Tiagabine]; Metronidazole;  Phentermine; Ultram [Tramadol]    Patient request last appt   Completed by:    Trevor Yap      Date:    2/23/

## (undated) NOTE — MR AVS SNAPSHOT
[facility] [place] [address] [address] [address]  [phone]               Thank you for choosing us for your health care visit with Yue Macedo MD.  We are glad to serve you and happy to provide you with this summary of Chronic obstructive pulmonary disease with acute exacerbation        Localized edema        Cellulitis of lower extremity, unspecified laterality        History of DVT (deep vein thrombosis)        Gait abnormality          Instructions and Information ab Commonly known as:  LASIX           Guaifenesin--10 MG/5ML Syrp   Take 10 mL by mouth every 12 (twelve) hours as needed for cough.    Commonly known as:  ROBITUSSIN DM           HYDROcodone-acetaminophen  MG Tabs   Take 1 tablet by mouth every 6 Sulfamethoxazole-TMP -160 MG Tabs per tablet   Take 1 tablet by mouth 2 (two) times daily.  OK to give with warfarin  Will monitor INR level   Commonly known as:  BACTRIM DS           SYMBICORT 160-4.5 MCG/ACT Aero   Generic drug:  Budesonide-Formote Eva.tn

## (undated) NOTE — LETTER
Patient Name: Damir Godwin  YOB: 1940          MRN number:  [id number]  Date:  11/16/2023  Referring Physician: Mercedes Cavazos       ADULT VIDEOFLUOROSCOPIC SWALLOWING STUDY       ADULT VIDEOFLUOROSCOPIC SWALLOWING STUDY:   Referring Physician: Hamzah Yoder      Radiologist: Dr. Peggy Schafer  Diagnosis: dysphagia    Date of Service: 11/16/2023     PATIENT SUMMARY   Chief Complaint: The patient reports food sticking in her throat, choking feelings (\"I give myself the Heimlich\"), dry mouth and pain in her chest when swallowing. She has lost 60 pounds in the last 6 months. She was initially trying to lose weight, but then lost her appetite. She denies shortness of breath. Pt was seen for a VFSS while hospitalized in 5/2022 which revealed transient laryngeal penetration without aspiration and regular foods and liquids were recommended. 5/7/22 VFSS:  Pt presents with mild oropharyngeal dysphagia characterized by posterior bolus loss to pharynx with thin liquid consistency (1s delay in pharyngeal swallow with thin), reduced tongue base retraction, reduced hyolaryngeal elevation/excursion and intermittently delayed epiglottal inversion. Minimal deficits resulting in 2x occurrences of intermittent flash penetration of thin liquids before the swallow (1x cup sip, 1x straw sip), and minimal pharyngeal stasis within valleculae and pyriform across textures with trace amounts coating posterior pharyngeal wall. Laryngeal vestibule infiltrates independently cleared with the natural pressures of the swallow. No aspiration occurrence observed during instrumental procedure. Current Diet: regular foods and liquids       Problem List  Active Problems:  Active Problems:    * No active hospital problems.  *      Past Medical History  Past Medical History:   Diagnosis Date    Anxiety state, unspecified     Aortic atherosclerosis ([facility] [address]     CT scan 11-19    Arthritis of both knees     knee replacement     Arthritis of right hip 2009    Back problem     Brain aneurysm     Brain Aneurysm, Stent placed     Cervical disc disease 1985,1995,2003,2009    Cervical Discectomy     Cholecystitis 1984    Cholecystectomy     COPD (chronic obstructive pulmonary disease) ([facility])     PFTs 2-15 with FEV1 1.11 L and FEV1/FVC ratio 63%    Deep vein thrombosis (HCC)     recurrent DVT- on lifelong AC    Dehydration 2012    Fluids, Medication adjustment     Depression     Disorder of thyroid     Esophageal reflux     Hammertoe 02/25/2011    hammertoe 5 left, pain    Hearing impairment     Bilateral hearing aids    High blood pressure     Hip pain, left     Hypothyroidism     Incontinence     L3-4 stable slight grade 1 retrolisthesis 10/31/2014    L4-5 mild-mod diffuse bulging disc 10/31/2014    L4-5 slight grade 1 unstable spondylolisthesis 10/31/2014    Leg wound, left 08/21/2019    Low back pain     Migraines     Muscle weakness     Nausea vomiting and diarrhea 11/20/2019    Onychomycosis     Debridement     Oropharyngeal dysphagia 08/29/2017    Osteoarthrosis, unspecified whether generalized or localized, unspecified site     Other and unspecified hyperlipidemia     Other complicated headache syndrome 11/02/2017    Other spondylosis, lumbar region 11/02/2017    Pneumonia 2012    S/P cervical spinal fusion: C3-6 and C7-T1 11/02/2017    s/p L5-S1 right laminectomy 08/28/2014    Sleep apnea     stent placement     cerebral    Thyroid disease     Toe pain     Onychomycosis, Debridement , Hammertoe     Tonsillitis 1950    Tonsillitis     Unspecified sleep apnea     Visual impairment     EYE GLASSES    Wound of left leg 10/03/2019    Left leg debridement and Split Thickness Skin Graft to left thigh        Imaging results: 5/7/23 CXR:  Borderline cardiomegaly and bibasilar atelectasis/scarring.   2/21/23 esophagram:  Limited exam.  No esophageal obstruction.  (Due to emesis)    ASSESSMENT   DYSPHAGIA ASSESSMENT  Test completed in conjunction with Radiologist. Food/Liquid Types Presented: puree, solid, nectar thick liquid, and thin liquids. Study Position and View:  Patient was seated upright and viewed laterally. Unable to view A-P due to patient's impaired mobility and equipment limitations. Pain Assessment: The patient reports pain at a level of 0/10. Oral phase:  Oral phase was within normal limits with adequate bilabial seal and bolus containment, timely mastication and transit and minimal to no oral retention. Pharyngeal phase:  The pharyngeal response triggered at the tongue base for all consistencies. Intermittently delayed epiglottic inversion,however, no laryngeal penetration or aspiration was observed. Mildly reduced base of tongue retraction and pharyngeal stripping resulted in intermittent vallecular and occasional pyriform sinus retention with thin liquids. The retention was cleared with spontaneous second swallow or with subsequent swallow of next presentation. Trace to no retention remained with puree and solids. Esophageal phase:   Slow flow of bolus through upper esophagus, with retention remaining throughout. Penetration Aspiration Scale: 1/8. Material does not enter the airway. Overall Impression: Normal oropharyngeal swallow without laryngeal penetration or aspiration, but with intermittent mild vallecular and pyriform sinus retention with was cleared with subsequent swallow. FCM category and level: Swallowing, 7  PLAN   Diet Recommendations:  Solids: Regular  Liquids: Thin    Recommended compensatory strategies:  Sit upright  Eat slowly  Alternate liquids and solids    Medication Administration:  No restrictions    Further Follow-up:  No follow up warranted with this service. Follow up with Dr. Daniel Gaines. EDUCATION/INSTRUCTION  Reviewed results and recommendations with patient. Written instructions were provided.   Agreement/Understanding verbalized and all questions answered to their apparent satisfaction. INTERDISCIPLINARY COMMUNICATION  Reviewed results with Radiologist; agreement verbalized. Thank you for your referral. If you have any questions, please contact me at [phone].     Henrietta Orosco MA/[facility]-SLP  Speech Language Pathologist  [facility]  [phone]    Electronically signed by therapist: MARYBETH Carmichael  Physician's certification required: No

## (undated) NOTE — LETTER
11/1/2023      Laurita Rogers MD  Physical Medicine and Rehabilitation  [facility], [address] [place] [address]  Dept: [phone]  Dept Fax: [phone]        RE: Consultation for Niall Jones        Dear Aaliyah Velez MD,    Thank you very much for the opportunity to see your patient. Attached please find a summary from your patient's recent visit. I appreciate the chance to take care of your patient with you. Please feel free to call me with any questions or concerns. Sincerely,        Dilip Baumann MD  Electronically Signed on 11/1/2023

## (undated) NOTE — Clinical Note
March 8, 2017         MD MARIA [phone]      UROLOGICAL CONSULTATION  Patient: Yecenia Norris   YOB: 1940   Date of Visit: 3/7/2017       Dear Dr. Natasha Santos MD,    CASE SUMMARY:   The patient is GYNE HISTORY:   2 pregnancies and 2 deliveries. No C-sections, miscarriages or abortions.   Her uterus, tubes and ovaries are all removed secondary to hysterectomy she had a partial hysterectomy 1967 and then completed her hysterectomy in 1986 prior to her diagnosed all the way back [place] [facility] medically controlled reflux, frequent constipation or exposure to hepatitis. 6.   No complaints of flank pain or kidney disease in the form of nephritis or glomerulonephritis.   7.   There is no history of orthopedic comp SOCIAL HISTORY: Previous smoker 2 packs a day for 25 years however she did quit all the way back in 1976 she denies alcohol or drug use she is retired from a   with 2 children    FAMILY HISTORY:   Positive for heart disease in mother an hospitalization however patient states is very painful and I would think I would rather have patient continue with her urge incontinence then to keep the Husain catheter indwelling and therefore it is removed today.   We did perform [facility] test again no cy this we probably could return to this if urodynamic shows neurogenic bladder and we would probably use a better dose that she can tolerate and would give her better response.                         We discussed the benefits, risks, complications, side effe

## (undated) NOTE — LETTER
[address] [place] [address] [address] [facility]  Dept: [phone]    Noy Aparicio.  MD Ibis  Physical Medicine    Injection Instructions    You have been scheduled for a bilateral L5 transforaminal epidu I. You MAY take pain medications which include acetaminophen and/or hydrocodone found in Vicodin, Lortab, Tylenol and Ultram.  II. Most injections are done with local anesthetics. Some injections may require sedation.   Hold food and drink if having IV dylan

## (undated) NOTE — ED AVS SNAPSHOT
Ms. Juan A Goldsmith   MRN: [id number]    Department:  [facility] Emergency Department   Date of Visit:  1/13/2019           Disclosure     Insurance plans vary and the physician(s) referred by the ER may not be covered by your plan.  Please cont within the next three months to obtain basic health screening including reassessment of your blood pressure.     IF THERE IS ANY CHANGE OR WORSENING OF YOUR CONDITION, CALL YOUR PRIMARY CARE PHYSICIAN AT ONCE OR RETURN IMMEDIATELY TO THE EMERGENCY DEPARTMEN

## (undated) NOTE — LETTER
23        To Bakari Goodson MD.:      [place]  :  12/3/1940    []  Patient has been cleared to hold Apixaban 5 mg for 2-3 days for Left L2 Transforaminal Epidural Steroid Injection. Holding the medication(s) may put the patient at an increased risk for stroke, heart attack, or neurologic or cardiac events. []  Patient has not been cleared to hold Apixaban 5 mg for 2-3 days for Left L2 Transforaminal Epidural Steroid Injection. If this office may be of further assistance, please do not hesitate to contact us. Sincerely,    Gordan Hodgkins.  Tisha Dia MD    T#     [phone]  Fax# [phone]

## (undated) NOTE — LETTER
AUTHORIZATION FOR SURGICAL OPERATION OR OTHER PROCEDURE    1.  I hereby authorize Dr. Varun Howell Phys:[id number]} and the [facility] staff assigned to my case to perform the following operation and/or procedure at the [facility]:    ________________________________ Patient Name:  ______________________________________________________  (please print)       Patient signature:  ___________________________________________________             Relationship to Patient:           []  Parent    Responsible person

## (undated) NOTE — IP AVS SNAPSHOT
Patient Demographics     Address Phone E-mail Address    [address] [address] [phone] (Home) *Preferred*  [phone] (Work)  [phone] [facility]) [email or web address]      Emergency Contact(s)     Name Relation Home Wor Next dose due: Take tonight before bed at 9:00pm.        Take 1 g by mouth 2 (two) times daily.    Stop taking on:  4/15/2017    Ori ART SILVER ULTRA WOMENS Tabs   Last time this was given:  1 tablet on 4/8/ Next dose due: Take tomorrow morning at 9:00am.        Take 3 mL by nebulization 2 (two) times daily.    Stop taking on:  5/8/2017    Lino Strong                           ipratropium-albuterol 0.5-2.5 (3) MG/3ML Soln   Last time this was given:  3 Last time this was given:  40 mg on 4/8/2017  5:45 AM   Commonly known as:  PROTONIX   Next dose due:   Take tomorrow morning at 9:00am.        Take 1 tablet (40 mg total) by mouth every morning before breakfast.    Joe Cantu Last time this was given:  2,000 Units on 4/8/2017  9:10 AM   Next dose due: Take tomorrow morning at 9:00am.        Take 2,000 Units by mouth daily.                             Warfarin Sodium 6 MG Tabs   Last time this was given:  10 mg on 4/7/2017 10:40 IVPB-MBP 04/08/17 1103 Given      [id number] Cholecalciferol (VITAMIN D) 1000 units tab 2,000 Units 04/08/17 0910 Given      [id number] Fluticasone Furoate-Vilanterol (BREO ELLIPTA) 200-25 MCG/INH inhaler 1 puff 04/08/17 0910 Given      [id number] Levothyroxi Lab Results Last 24 Hours (04/08/17 - 04/08/17)      MAGNESIUM [[id number]] (Normal)  Resulted: 04/08/17 0646, Result status: Final result    Ordering provider: Isaías Moon MD  04/07/17 2300 Resulting lab: [facility]    Specimen Information    Ty Comment:           Elevations of the PT and/or INR in patients not  receiving anticoagulant therapy may be seen in  factor deficiency, vitamin K deficiency, liver  disease, etc. Clinical correlation is recommended.        INR 2.0 0.9-1.2  [facility]   C Procedure                               Abnormality         Status                     ---------                               -----------         ------                     ED/MRSA SCREEN BY PCR-CC[[id number]]     Normal              Final result extremity was red which was not typical for her. She was, therefore, sent to the emergency room for further evaluation and treatment. She is known to have a DVT in the lower extremity which was apparently diagnosed in January of this year.   Per the ultra left small paracentral HNP on previous MRI. Extensive DVT in the left lower extremity, diagnosed in January of this year. The patient is on anticoagulation.   Hyperactive bladder, longstanding depression for which the patient has undergone several ECT the phentermine, Ultram.  These are not true allergies. The patient had a rash while she was taking Cipro and Flagyl together, so it is unclear which medication might have been contributing.   She had pancreatitis from Depakote and was too sedated from taking EXTREMITIES:  Revealed significant edema of both extremities, left greater than right, with erythema from the ankle to just below the knee on the left, and erythema around the ankle on the right lower extremity.   There is tenderness to palpation of both lo the meantime, start on IV heparin for anticoagulation until her INR is therapeutic. We will increase her Coumadin dose tonight to 4 mg and recheck PT/INR in the morning. 4.   Diabetes mellitus type 2. Accu-Cheks a.c., at bedtime.   Cover with sliding sca 1. IP consult to Infectious Disease Once [[id number]] ordered by Blake Veloz MD at 04/02/17 2126                                                                        INFECTIOUS DISEASE CONSULT NOTE    Elina Perdue Patient Status:  Inpatient   D unspecified site    • Hypothyroidism    • Unspecified sleep apnea    • Cervical disc disease 1985,1995,2003,2009     Cervical Discectomy    • Cholecystitis 1984     Cholecystectomy    • Tonsillitis 1950     Tonsillitis    • Brain aneurysm      Brain Aneury • Other[other] [OTHER] Paternal Grandmother    • Other[other] [OTHER] Paternal Grandfather       reports that she quit smoking about 40 years ago. Her smoking use included Cigarettes.  She has never used smokeless tobacco. She reports that she does not drin •  LORazepam (ATIVAN) tab 1 mg, 1 mg, Oral, BID PRN  •  mirtazapine (REMERON) tab 30 mg, 30 mg, Oral, Nightly  •  multivitamin with minerals (ADULT) tab 1 tablet, 1 tablet, Oral, Daily  •  Miconazole Nitrate 2 % powder, , Topical, BID PRN  •  Oxybutynin Ch Lab  04/03/17   0948   RBC  3.62*   HGB  10.9*   HCT  33.6*   MCV  92.9   MCH  30.1   MCHC  32.4   RDW  17.2*   WBC  7.3   PLT  209     Recent Labs   Lab  04/02/17 2009 04/03/17   0948   GLU  145*  102*   BUN  22*  16   CREATSERUM  1.29  0.90   GFRAA  4 I will continue to follow with you and will make further recommendations based on his progress.     Han Nails  4/3/2017       Electronically signed by Lori Hathaway MD on 4/3/2017  1:42 PM            D/C Summary     No notes of this type exist standing balance w/o the support of the RW when she was trying to untangle lines/tubes on her R arm. As soon as this issue was resolved and pt was no longer distracted by the tangled lines, pt began demonstrated bilateral knee buckling again.  There seems t Gait Assistance: Not tested  Distance (ft): /  Assistive Device:  (/)  Pattern:  (/)  Stoop/Curb Assistance: Not tested         THERAPEUTIC EXERCISES  Lower Extremity Ankle pumps  Glut sets  Heel slides     Position Supine       Patient End of Session:  In coordinated w/ OT. Pt was received supine in bed. Pt completed bed mobility w/ SBA. Pt completed sit to stand w/ Min A x 2 around 4 times.  However, each time, pt was unable to maintain static standing balance due to bilateral knee buckling and needed to be -   Turning over in bed (including adjusting bedclothes, sheets and blankets)?: A Little   -   Sitting down on and standing up from a chair with arms (e.g., wheelchair, bedside commode, etc.): A Lot   -   Moving from lying on back to sitting on the side of Version 1 of 1    Author:  Marleen Wetzel PT Service:  (none) Author Type:  Physical Therapist    Filed:  4/7/2017 10:03 AM Note Time:  4/7/2017 10:02 AM Status:  Signed    :  Marleen Wetzel PT (Physical Therapist)           Attempted to se from the EOB. During her transfer with the therapist using the RW the pt displayed B knee buckling which caused the pt to have a LOB which the pt require min A from therapist to maintain her hip and knee extension and prevent a LOB.  The pt also display ehsan • Migraines    • Thyroid disease    • Sleep apnea    • Osteoarthrosis, unspecified whether generalized or localized, unspecified site    • Hypothyroidism    • Unspecified sleep apnea    • Cervical disc disease 1985,1995,2003,2009     Cervical Discectomy pt presents from [facility] where she states that she has been ambulation short distance with the RW and assist. The pt does have a motorized scoot for longer distances.     SUBJECTIVE  \"My legs hurt\"    PHYSICAL THERAPY EXAMINATION     OBJECTIVE  P Raw Score: 16   PT Approx Degree of Impairment Score: 54.16%   Standardized Score (AM-PAC Scale): 40.78   CMS Modifier (G-Code): CK    FUNCTIONAL ABILITY STATUS  Gait Assessment   Gait Assistance:  Moderate assistance  Distance (ft): 3  Assistive Device: Ro Author:  Devang Aguero. Fernando Arechiga Service:  (none) Author Type:  Occupational Therapist    Filed:  4/8/2017 12:31 PM Note Time:  4/8/2017 10:00 AM Status:  Signed    :  Murlean Jaffe Gabino Saint (Occupational Therapist)           OCCUPATIONAL THE • Unspecified essential hypertension    • Hip pain, left    • Anxiety state, unspecified    • Migraines    • Thyroid disease    • Sleep apnea    • Osteoarthrosis, unspecified whether generalized or localized, unspecified site    • Hypothyroidism    • Unspe session. Patient with recent hospitalization on 2/17 due to copd exacerbation, was discharged to [facility] for rehab. Admitted from [facility]. Prior to 2/17 admission patient was living at [facility].      Patient states at rehab she was a -   Taking care of personal grooming such as brushing teeth?: A Little  -   Eating meals?: A Little    AM-PAC Score:  Score: 14  Approx Degree of Impairment: 59.67%  Standardized Score (AM-PAC Scale): 33.39  CMS Modifier (G-Code): CK    FUNCTIONAL TRANSFER

## (undated) NOTE — IP AVS SNAPSHOT
Patient Demographics     Address  [address] [address] Phone  [phone] [facility])  [phone] (Mobile) *Preferred* E-mail Address  [email or web address]      Emergency Contact(s)     Name Relation Home Work Mobile    Malvin Canales Abe Ashby DO         cholecalciferol 1000 UNITS Caps  Commonly known as: VITAMIN D3  Next dose due: Tomorrow morning      Take 2 capsules (2,000 Units total) by mouth daily.    Adrianna Gonzalez MD         Clindamycin HCl 300 MG Caps  Commonly know Take 1 tablet (40 mg total) by mouth every morning before breakfast.   Bhavana Gliliam MD         PEG 3350 17 g Pack  Commonly known as: MIRALAX      Take 17 g by mouth daily as needed.    Abe Ashby DO         Rivaroxaban 20 MG Tabs  Commonly kno [id number] mirtazapine (REMERON) tab 30 mg 11/30/20 2317 Given      [id number] vancomycin IVPB premix 1.75g in 0.9% NaCl 500 mL 11/30/20 1624 New Bag              Recent Vital Signs       Most Recent Value   Vitals  152/65 Filed at 12/01/2020 0828   Pulse Ms. Saturnino Clifton is a 77 yo F with PMH of cranial aneurysm s/p coiling, COPD, HTN, DVT on xarelto, bipolar, LLE cellulitis who presented from independent living with weakness and slurred speech.     AMS/fatigue  - possibly 2/2 polypharmacy vs. Metabolic encephalopa Ms. Linda Penaloza is a 77 yo F with PMH of cranial aneurysm s/p coiling, COPD, HTN, DVT on xarelto, bipolar, LLE cellulitis who presented from independent living with weakness and slurred speech. Patient very tired asking what is wrong with her.  History obtained fr • Other complicated headache syndrome 11/2/2017   • Other spondylosis, lumbar region 11/2/2017   • Pneumonia 2012   • Pneumonia due to organism    • S/P cervical spinal fusion: C3-6 and C7-T1 11/2/2017   • s/p L5-S1 right laminectomy 8/28/2014   • Wilder • HIP REPLACEMENT SURGERY Bilateral    • HYSTERECTOMY  1967    Partial Hysterectomy   • JAW SURGERY     • KNEE REPLACEMENT SURGERY Bilateral     Bilateral arthritis    • LAMINECTOMY      WITH FUSION PER PATIENT   • OTHER SURGICAL HISTORY  [id number],[id number],04 • Heart Attack Mother    • Heart Disease Mother         Coronary Artery Disease, Premature    • Fibromyalgia Sister    • Heart Disease Sister    • Heart Attack Sister    • Heart Disease Brother    • Heart Attack Brother    • Other (Other[other]) Father CA 9.9 11/25/2020    ALB 3.9 11/25/2020    ALKPHO 127 11/25/2020    BILT 0.4 11/25/2020    TP 7.5 11/25/2020    AST 14 11/25/2020    ALT 31 11/25/2020    INR 2.38 11/25/2020    TROP <0.045 11/25/2020       Recent Labs   Lab 11/25/20  1537   [facility]    Report of Consultation    Alfa Felicitymikael Patient Status:  Inpatient    12/3/1940 MRN [id number]   Location [facility] 3W/SW Attending Orestes Gaming, [address] [facility] Day # 1 PCP Bhavana Gilliam MD     Date of Admissi Patient reported that she discharged from [facility] 2 weeks ago and she admitted that she had good with treatment with significant change in her medication.   Patient reported that although her mood has been reasonably fair her sleep and her racing though Lavonne Jesus has hx inpt \"multple times\" between 2241-9127 and again on 12/28/19 at [facility], additional to 3 admission in 2020 last 1 was to [place] in November 2020.   Lavonne Jesus has no hx php/iop  Lavonne Jesus most recently saw a psychiatrist 2 months ago b • HIP REPLACEMENT SURGERY Bilateral    • HYSTERECTOMY  1967    Partial Hysterectomy   • JAW SURGERY     • KNEE REPLACEMENT SURGERY Bilateral     Bilateral arthritis    • LAMINECTOMY      WITH FUSION PER PATIENT   • OTHER SURGICAL HISTORY  [id number],04 Current Medications:    •  LORazepam (ATIVAN) injection 2 mg, 2 mg, Intravenous, Once    •  Senna-Docusate Sodium (SENOKOT S) 8.6-50 MG tab 2 tablet, 2 tablet, Oral, Daily    •  PEG 3350 (MIRALAX) powder packet 17 g, 17 g, Oral, Daily PRN    •  bisacodyl ( •  Pantoprazole Sodium 40 MG Oral Tab EC, Take 1 tablet (40 mg total) by mouth every morning before breakfast.    •  mirtazapine 45 MG Oral Tab, Take 1 tablet (45 mg total) by mouth nightly.     •  LORazepam 2 MG Oral Tab, Take 1 tablet (2 mg total) by mout ETOH <3 10/29/2020         Imaging:  Us Venous Doppler Leg Bilat - Diag Img (cpt=93970)    Result Date: 11/26/2020  CONCLUSION:   Left peroneal veins not seen. Otherwise, no DVT in either lower extremity.      Dictated by (CST): David Ceja MD on 11/26/2 The patient reporting that her mood has not been bad recently and presented with appropriate affect. The patient today deny any auditory or visual hallucination. The patient denied any homicidal or suicidal ideation but  Cognition seem reasonably intact. Basic Metabolic Panel (8)      CBC With Differential With Platelet      Drug Abuse Panel 10 screen Once      Vancomycin Trough, Serum      Rapid SARS-CoV-2 by PCR STAT      Emergency MRSA Screen by PCR Once      Meds This Visit:  Requested Prescription Gait: Pt ambulated three bouts of 15' with RW and Quinten, chair follow behind due to pt's fear of falling. Pt ambulates with decreased marky, forward flexed posture, and complaint of low back pain limiting further ambulation.    Guided pt through seated LE -   Turning over in bed (including adjusting bedclothes, sheets and blankets)?: A Little   -   Sitting down on and standing up from a chair with arms (e.g., wheelchair, bedside commode, etc.): A Little   -   Moving from lying on back to sitting on the side Goal #5 Patient to demonstrate independence with home activity/exercise instructions provided to patient in preparation for discharge.    Goal #5   Current Status In progress   Goal #6     Goal #6  Current Status      [LD.1]                     Attribution PT Treatment Plan: Bed mobility; Endurance; Patient education;Gait training[CK. 2]    SUBJECTIVE  Pt reports being ready for PT RX    OBJECTIVE[CK.1]  Precautions: Limb alert - left[CK. 2]    WEIGHT BEARING RESTRICTION[CK.1]  Weight Bearing Restriction: None[C Patient End of Session: Up in chair;Call light within reach;RN aware of session/findings;Bracing education provided; All patient questions and concerns addressed[CK. 2]    CURRENT GOALS     Patient Goal Patient's self-stated goal is: Return home   Goal #1 Pa Patient is a 78year old female admitted 11/25/2020 for cellulitis of LLE, weakness, slurred speech. RN Elicia Taylor) approved session. OT wore PPE including surgical mask, gloves, goggles. Pt received asleep in bed, awakens and  agreeable to tx.   She reports OT Treatment Plan: Balance activities; ADL training;Functional transfer training; Endurance training;Patient/Family education; Compensatory technique education       OCCUPATIONAL THERAPY MEDICAL/SOCIAL HISTORY     Problem List  Principal Problem:    Celluliti • Other spondylosis, lumbar region 11/2/2017   • Pneumonia 2012   • Pneumonia due to organism    • S/P cervical spinal fusion: C3-6 and C7-T1 11/2/2017   • s/p L5-S1 right laminectomy 8/28/2014   • Shortness of breath    • Sleep apnea    • stent placement • HYSTERECTOMY  1967    Partial Hysterectomy   • JAW SURGERY     • KNEE REPLACEMENT SURGERY Bilateral     Bilateral arthritis    • LAMINECTOMY      WITH FUSION PER PATIENT   • OTHER SURGICAL HISTORY  1985,1995,2003,2009    Cervical Discectomy AND FUSION Following Commands:  follows one step commands without difficulty      PERCEPTION  Overall Perception Status:   WFL - within functional limits    SENSATION  Light touch:  intact    Communication: wfl    Behavioral/Emotional/Social: wfl    RANGE OF MOTION OT Goals  Patients self stated goal is: to get stronger     Patient will complete functional transfer with CGa  Comment:     Patient will complete toileting with CGA  Comment:     Patient will tolerate standing for 2 minutes in prep for adls with CGA   Com Pt seen for therapy while sitting upright to 90 degrees in bedside chair. Pt afebrile, tolerating room air with oxygen status 96% prior to the start of oral trials.  SLP reviewed aspiration precautions and safe swallowing compensatory strategies with the pa Interdisciplinary Communication: Discussed with RN          GOALS  Goal #1 The patient will tolerate general diet consistency and thin liquids without overt signs or symptoms of aspiration with 100 % accuracy over 2 session(s).     No overt clinical signs o - See additional Care Plan goals for specific interventions   Patient/Family Short Term Goal     Interdisciplinary Progressing    Description: Patient's Short Term Goal: To go home    Interventions:   - MRI/tests  - See additional Care Plan goals for speci

## (undated) NOTE — LETTER
[facility] SURGERY SCHEDULING FORM   [address] [facility]   [phone] (scheduling phone) [phone] (scheduling fax)     PATIENT INFORMATION   Patient Name:    Karmen Sue   :    12/3/1940 Depakote; Dilaudid  [Hydromorphone]; Gabitril [Tiagabine]; Metronidazole;  Phentermine; Ultram [Tramadol]       Completed by:    Barbra Benedict      Date:    9/8/2017    [facility] will follow its Pre-Admission Assessment and Screening Policy for pre-admission testi

## (undated) NOTE — IP AVS SNAPSHOT
Patient Demographics     Address  [address] [facility] [address] Phone  [phone] [facility])  [phone] (Mobile) *Preferred* E-mail Address  [email or web address]      Emergency Contact(s)     Name Relation Home Work Mobile    Malvin Canales for 8/20/21  Contact information:  Sharlene Bartlett  [phone]             Clive Hodgson MD In 1 week.     Specialty: Internal Medicine  Contact information:  [address]  [address] [place] [address] 64 88 daily.   Winifred Haley MD         HYDROcodone-acetaminophen 5-325 MG Tabs  Commonly known as: Claude Forth  Next dose due: Take as needed      Take 1 tablet by mouth every 6 (six) hours as needed.    Melvi Cervantes MD         lamoTRIgine 100 MG Tabs  Comm Softener 100 MG Caps  Generic drug: docusate sodium  Next dose due: Take tonight      ONE CAPSULE BY MOUTH TWICE DAILY   Vitor Beaulieu MD         Vitamin D3 (Cholecalciferol) 1000 UNITS Caps  Next dose due:  Take tomorrow      Take 2 capsules (2,000 U No matching results found     Microbiology Results (All)     None         H&P - H&P Note      H&P signed by Tawnya Cabrera MD at 8/12/2021  5:02 PM  Version 1 of 1    Author: Tawnya Cabrera MD Service: Hospitalist Author Type: Physician    Filed: Dispo: pending clinical course    Outpatient records or previous hospital records reviewed. Further recommendations pending patient's clinical course.   [facility] hospitalist to continue to follow patient while in house    Patient and/or patient's family giv Incontinence    • L3-4 stable slight grade 1 retrolisthesis 10/31/2014   • L4-5 mild-mod diffuse bulging disc 10/31/2014   • L4-5 slight grade 1 unstable spondylolisthesis 10/31/2014   • Leg wound, left 08/21/2019   • Low back pain    • Migraines    • Musc Thickness Skin Graft to left thigh   • TONSILLECTOMY  1950    Tonsillitis         ALL:    Bactrim [Sulfametho*    RASH  Ciprofloxacin           RASH  Depakote [Valproic *    OTHER (SEE COMMENTS)    Comment:pancreatitis  Fluocinolone            OTHER (SEE C Soc Hx  Social History    Tobacco Use      Smoking status: Former Smoker        Packs/day: 2.00        Years: 25.00        Pack years: 48        Types: Cigarettes        Start date: 1952        Quit date: 1977        Years since quittin.6 08/12/21  0504   GLU 95 160*   BUN 30* 27*   CREATSERUM 1.29* 1.10*   GFRAA 45* 54*   GFRNAA 39* 47*   CA 10.0 9.6   * 142   K 4.3 4.3    109   CO2 36.0* 33.0*     Lab Results   Component Value Date    WBC 11.6 08/12/2021    HGB 13.5 08/12/2021 HPI:[SS.1]     Nati Chamberlain is a[SS.3 78year old[SS. 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   Was recently treated for UT meals., Disp:  , Rfl: 0  cholecalciferol 1000 UNITS Oral Cap, Take 2 capsules (2,000 Units total) by mouth daily. , Disp: 9180 capsule, Rfl: 0  Pantoprazole Sodium 40 MG Oral Tab EC, Take 1 tablet (40 mg total) by mouth every morning before breakfast., Disp Anxiety state, unspecified    • Aortic atherosclerosis (HCC)     CT scan 11-19   • Arthritis of both knees     knee replacement    • Arthritis of right hip 2009   • Back problem    • Brain aneurysm     Brain Aneurysm, Stent placed    • Cervical disc diseas x4   • BRAIN SURGERY  ~2009    Brain Aneurysm, Stent placed    • CAROTID ENDARTERECTOMY Right    • CARPAL TUNNEL RELEASE Right ~2008   • CHOLECYSTECTOMY  1984    Cholecystitis   • COLONOSCOPY  2010   • DEBRIDEMENT OF NAIL(S), 1-5      Toe, Onychomycosis spends a great deal of time in the sun: No        Hx of Spending [address] of Time in [place]: No        Bad sunburns in the past: No        Tanning Salons in the Past: No        Hx of Radiation Treatments: No[SS.2]      FAMILY HISTORY[SS.1]  Family History normal.  No rashes or lesions. Head: Normocephalic, ISDZEQSEPP.[PT.9]  Neurological:  Mental Status: Alert, oriented to situation/normal fund of knowledge, Follows commands, and Speech fluent and appropriate.   Cranial Nerves: PERRL, visual fields intact (H)   Sodium      136 - 145 mmol/L 142   Potassium      3.5 - 5.1 mmol/L 4.3   Chloride      98 - 112 mmol/L 109   Carbon Dioxide, Total      21.0 - 32.0 mmol/L 33.0 (H)   ANION GAP      0 - 18 mmol/L 0   BUN      7 - 18 mg/dL 27 (H)   CREATININE      0.55 the setting of hypertension, her back pain is normal significant symptom.   Currently on anticoagulation to prevent strokes.[SS.3]  –CT brain with streak artifact related to right supraclinoid ICA aneurysm repair but no other acute process[SS.1]    Proximal 3:00 PM  Version 1 of 1    Author: Shikha Chacon PT Service: Clinical Therapist Author Type: Physical Therapist    Filed: 8/13/2021  3:00 PM Date of Service: 8/13/2021  2:55 PM Status: Signed    : Shikha Chacon PT (Physical Therapist)       PHYSICAL rehabilitation     PLAN  PT Treatment Plan: Bed mobility; Body mechanics; Endurance; Patient education; Energy conservation; Family education;Gait training;Strengthening;Balance training;Transfer training    SUBJECTIVE  Patient agreeable to therapy session    O assistance level: supervision with walker - rolling     Goal #2  Current Status Mod assist   Goal #3 Patient is able to ambulate 20 feet with assist device: walker - rolling at assistance level: supervision   Goal #3   Current Status Mod assist   Goal #4 female admitted 8/11/2021 for Presenting Problem: Fatigue. Patient presented in bed with 0/10 pain. Education provided on Physical therapy plan of care and physiological benefits of out of bed mobility. Patient with fair carryover.  Patient was slightly let Principal Problem:    Fatigue, unspecified type  Active Problems:    Fatigue    INDERJIT (acute kidney injury) [facility])      Past Medical History  Past Medical History:   Diagnosis Date   • Anxiety state, unspecified    • Aortic atherosclerosis ([facility] [address]     CT scan 1 debridement and Split Thickness Skin Graft to left thigh       Past Surgical History  Past Surgical History:   Procedure Laterality Date   • ANESTH,NECK VESSEL SURGERY NOS      x4   • BRAIN SURGERY  ~2009    Brain Aneurysm, Stent placed    • CAROTID ENDART limits  Lower extremity strength is within functional limits     BALANCE  Static Sitting: Fair +  Dynamic Sitting: Fair  Static Standing: Fair -  Dynamic Standing: Poor +    AM-PAC '6-Clicks' INPATIENT SHORT FORM - BASIC MOBILITY  How much difficulty does discharge.    Goal #5   Current Status    Goal #6    Goal #6  Current Status[MD.1]           Attribution Siegel    MD.1 - Anh Brown, PT on 8/12/2021  2:07 PM                        Occupational Therapy Notes (last 72 hours) (Notes from 8/11/2021  3:19 PM th assistance with SBA ;  STS completed from EOB to Mod A x2 ; patient tolerating functional mobility with Mod A X2  to bedside chair. Functional transfers completed with Mod  Ax.    ADL Retraining: Patient reports being L handed; she had difficulty managing a Diagnosis Date   • Anxiety state, unspecified    • Aortic atherosclerosis (HCC)     CT scan 11-19   • Arthritis of both knees     knee replacement    • Arthritis of right hip 2009   • Back problem    • Brain aneurysm     Brain Aneurysm, Stent placed    • C ANESTH,NECK VESSEL SURGERY NOS      x4   • BRAIN SURGERY  ~2009    Brain Aneurysm, Stent placed    • CAROTID ENDARTERECTOMY Right    • CARPAL TUNNEL RELEASE Right ~2008   • CHOLECYSTECTOMY  1984    Cholecystitis   • COLONOSCOPY  2010   • DEBRIDEMENT OF MIRTHA ASSESSMENT  Upper extremity strength is within functional limits     COORDINATION  Gross Motor: WFL   Fine Motor: WFL     ACTIVITIES OF DAILY LIVING ASSESSMENT  AM-PAC ‘6-Clicks’ Inpatient Daily Activity Short Form  How much help from another person does t Service: 8/13/2021  9:47 AM Status: Signed    : Mckinley Jones, SLP (Speech and Language Pathologist)       ADULT SWALLOWING EVALUATION    ASSESSMENT    ASSESSMENT/OVERALL IMPRESSION:  PPE REQUIRED.  THIS THERAPIST WORE GLOVES, DROPLET MASK, AND GOGGLES F clinically warranted. RECOMMENDATIONS   Diet Recommendations - Solids: Regular  Diet Recommendations - Liquids: Thin Liquids (no straws)      Compensatory Strategies Recommended: No straws; Slow rate; Alternate consistencies;Small bites and sips  Aspirat Onychomycosis     Debridement    • Oropharyngeal dysphagia 8/29/2017   • Osteoarthrosis, unspecified whether generalized or localized, unspecified site    • Other and unspecified hyperlipidemia    • Other complicated headache syndrome 11/2/2017   • Other s Within Functional Limits  Tone: Within Functional Limits  Range of Motion: Within Functional Limits  Rate of Motion: Within Functional Limits       Respiratory Status: Supplemental O2;Nasal cannula  Consistencies Trialed: Thin liquids; Nectar thick liquids; Immunizations     Name Date      [address] 05/12/21     Covid-19 Pfizer 03/20/21     INFLUENZA 10/12/20     INFLUENZA defer-11/19/19     Deferral: +Patient Refuses Z28.21     Pneumococcal (Prevnar 13) 12/05/17     Pneumovax 23 01/16/15     TDAP 01/

## (undated) NOTE — LETTER
2/23/2022      Radha Stephens MD  Physical Medicine and Rehabilitation  [address] [facility], [address] [place] [address]  Dept: [phone]  Dept Fax: [phone]        RE: Consultation for Makenzie Ramirez        Dear Lizbet Buchanan MD,    Thank you very much for the opportunity to see your patient. Attached please find a summary from your patient's recent visit. I appreciate the chance to take care of your patient with you. Please feel free to call me with any questions or concerns. Sincerely,        Arely Santana.  Yolis Stephens MD  Electronically Signed on 2/23/2022

## (undated) NOTE — MR AVS SNAPSHOT
[address]  [phone]               Thank you for choosing us for your health care visit with Cesar Rubi.  MD Tanner.  We are glad to serve you and happy to provide you with this summar Current Medications          This list is accurate as of: 3/7/17 12:12 PM.  Always use your most recent med list.                AmLODIPine Besylate 10 MG Tabs   Take 10 mg by mouth daily.    Commonly known as:  NORVASC           aspirin 325 MG Tabs   Take Commonly known as:  ATIVAN           * LORazepam 1 MG Tabs   Take 1 tablet (1 mg total) by mouth 2 (two) times daily as needed for Anxiety. Commonly known as:  ATIVAN           mirtazapine 30 MG Tabs   Take 1 tablet (30 mg total) by mouth nightly.    Comm You can access your MyChart to more actively manage your health care and view more details from this visit by going to [email or web address].   If you've recently had a stay at the Hospital you can access your discharge instructions in [address] by lm

## (undated) NOTE — LETTER
6/8/2022      Blanca Vicente MD  Physical Medicine and Rehabilitation  [address] [facility], [address] [place] [address]  Dept: [phone]  Dept Fax: [phone]        RE: Consultation for [facility]        Dear Antonieta Padilla MD,    Thank you very much for the opportunity to see your patient. Attached please find a summary from your patient's recent visit. I appreciate the chance to take care of your patient with you. Please feel free to call me with any questions or concerns. Sincerely,        Yayo Rodriguez.  MD Ibis  Electronically Signed on 6/8/2022

## (undated) NOTE — LETTER
18      Patient: Tejas Gomez  : 12/3/1940 Visit date: 3/5/2018    Dear Randal Menjivar,      I examined your patient in consultation today.     She fell on her outstretched hand 3 weeks ago and presents with pain and swelling over the ra

## (undated) NOTE — LETTER
1/16/2025      Alex Baumann MD  Physical Medicine and Rehabilitation  [address] [place] [address], [address]  [facility] [address]  Dept: [phone]  Dept Fax: [phone]        RE: Consultation for Gretta Canales        Dear Timothy Hicks MD,    Thank you very much for the opportunity to see your patient.  Attached please find a summary from your patient's recent visit.     I appreciate the chance to take care of your patient with you.  Please feel free to call me with any questions or concerns.    Sincerely,        Alex Baumann MD  Electronically Signed on 1/16/2025

## (undated) NOTE — LETTER
[facility] SURGERY SCHEDULING FORM   [address] [facility]   [phone] (scheduling phone) [phone] (scheduling fax)     PATIENT INFORMATION   Last Name:      Marla Rudolph      First Name:    Che Love Allergies: Bactrim [Sulfamethoxazole W/Trimethoprim]; Ciprofloxacin; Depakote; Fluocinolone; Gabitril [Tiagabine]; Metronidazole;  Phentermine; Tramadol; Dilaudid [Hydromorphone]         Completed by:    Tereso Garcia      Date:    11/20/2019

## (undated) NOTE — LETTER
19        To Dr. Luz Elena Zayas:      Ramos Win  :  12/3/1940      Dr. Jadno Espinoza is recommending lumbar epidural steroid injections for the above named patient.   Ne Roe will need medical clearance prior to proceeding with recommended lumbar injectio

## (undated) NOTE — ED AVS SNAPSHOT
Ms. Padron Conception   MRN: [id number]    Department:  [facility] Emergency Department   Date of Visit:  8/1/2019           Disclosure     Insurance plans vary and the physician(s) referred by the ER may not be covered by your plan.  Please conta within the next three months to obtain basic health screening including reassessment of your blood pressure.     IF THERE IS ANY CHANGE OR WORSENING OF YOUR CONDITION, CALL YOUR PRIMARY CARE PHYSICIAN AT ONCE OR RETURN IMMEDIATELY TO THE EMERGENCY DEPARTMEN

## (undated) NOTE — IP AVS SNAPSHOT
[facility]            (For Outpatient Use Only) Initial Admit Date: 11/8/2021   Inpt/Obs Admit Date: Inpt: 11/8/21 / Obs: N/A   Discharge Date:    Claribel Later:  [de-identified]   MRN: [de-identified]   CSN: [id number]   CEID: [id number]        DPP INSURANCE   Payor:  Plan:    Group Number:  Insurance Type:    Subscriber Name:  Subscriber :    Subscriber ID:  Pt Rel to Subscriber:    Hospital Account Financial Class: Medicare    2021

## (undated) NOTE — LETTER
Karmen Sue  12/3/1940    A patient of your clinic was recently seen by the hospitalists at [facility], and will be followed by [facility] home health RN to check PT/INR on 11/15/18    The patient's last INR values were, as follows:    Lab Res

## (undated) NOTE — LETTER
20        To Dr. Gordon Fuentes MD:      Severa Sable  :  12/3/1940    This patient is to be scheduled for a bilateral L5 transforaminal epidural steroid injection and therefore requires confirmation on the following:    []  Patient has been

## (undated) NOTE — IP AVS SNAPSHOT
Patient Demographics     Address  [address] [facility] [address] Phone  [phone] [facility])  [phone] (Mobile) *Preferred* E-mail Address  [email or web address]      Emergency Contact(s)     Name Relation Home Work Mobile    Malvin Canales [address] [facility]. [address] [facility] [facility]  Call: [phone]    Follow-up Information     Patricia Parekh MD In 1 week.     Specialty: Internal Medicine  Contact information:  [address]  [address] [address]  [phone] gabapentin 100 MG Caps  Commonly known as: NEURONTIN  Next dose due: Tomorrow 9 am      Take 100 mg by mouth 3 (three) times daily.           guaiFENesin 100 MG/5ML Syrp  Commonly known as: ROBITUSSIN  Next dose due: Anytime as Needed      Take 200 mg by mo Pantoprazole Sodium 40 MG Tbec  Commonly known as: PROTONIX  Next dose due:  Tomorrow 9 am      Take 1 tablet (40 mg total) by mouth every morning before breakfast.   Arminda Chua MD         PEG 3350 17 g Pack  Commonly known as: MIRALAX  Nex [id number] Cefepime HCl (MAXIPIME) 1 g in sodium chloride 0.9% 100 mL MBP/add-vantage 02/09/21 1717 New Bag      [id number] Cefepime HCl (MAXIPIME) 1 g in sodium chloride 0.9% 100 mL MBP/add-vantage 02/10/21 0515 New Bag  per [facility] ok to give with last dose f Resp  18 Filed at 02/10/2021 0947   Temp  97.4 °F (36.3 °C) Filed at 02/10/2021 0947   SpO2  98 % Filed at 02/10/2021 0947      Patient's Most Recent Weight       Most Recent Value   Patient Weight  113.4 kg (250 lb)      Lab Results Last 24 Hours    No ma Patient is a 78 year old female with PMH sig for cranial aneurysm s/p coiling, COPD, HTN, DVT on xarelto, bipolar, hs COVID, aortic atherosclerosis, hs of multiple admits for LLE cellulitis who presented from [facility] with leg swelling and pain.   Patient st • Other spondylosis, lumbar region 11/2/2017   • Pneumonia 2012   • Pneumonia due to organism    • S/P cervical spinal fusion: C3-6 and C7-T1 11/2/2017   • s/p L5-S1 right laminectomy 8/28/2014   • Shortness of breath    • Sleep apnea    • stent placement Partial Hysterectomy   • JAW SURGERY     • KNEE REPLACEMENT SURGERY Bilateral     Bilateral arthritis    • LAMINECTOMY      WITH FUSION PER PATIENT   • OTHER SURGICAL HISTORY  1985,1995,2003,2009    Cervical Discectomy AND FUSION   • PREP SITE T/A/L 1ST 1 Coronary Artery Disease, Premature    • Fibromyalgia Sister    • Heart Disease Sister    • Heart Attack Sister    • Heart Disease Brother    • Heart Attack Brother    • Other (Other[other]) Father    • Other (Other[other]) Maternal Grandmother    • Ot Radiology: No results found.       ASSESSMENT / PLAN:   Patient is a 78 year old female with PMH sig for cranial aneurysm s/p coiling, COPD, HTN, DVT on xarelto, bipolar, hs COVID, aortic atherosclerosis, hs of multiple admits for LLE cellulitis who present Author: Rebecca Batista MD Service: Psychiatry Author Type: Physician    Filed: 2/5/2021  2:04 PM Date of Service: 2/5/2021 11:30 AM Status: Signed    : Rebecca Batista MD (Physician)     Consult Orders    1.  Consult to PsychIATRY [[id number]] or The patient today sleepy reporting that she could not sleep last night. Patient have some circumstantial thought process.   Otherwise trying to inform me that [address] [place] [address] staff were not aware of the new medication which she forgot the name and she meant Colette Jacy Ardon has lived at [facility] [address] for 8 years. She is  with two children.  She has been retired since [address] [place] [address] and denies ETOH, tobacco, cannabis or other illicit substance use.  Relationship with children has some improvement recently otherwise never b • Leg wound, left 08/21/2019   • Low back pain    • Migraines    • Muscle weakness    • Onychomycosis     Debridement    • Oropharyngeal dysphagia 8/29/2017   • Osteoarthrosis, unspecified whether generalized or localized, unspecified site    • Other and u • EXTREMITY LOWER IRRIGATION & DEBRIDEMENT Left 10/8/2019    Performed by Samson Gay MD at [address] [place] [address] MAIN OR   • Alyssabury Left 8/21/2019    Performed by Samson Gay MD at [address] [place] [address] MAIN OR   • [facility]      x2 Types: Cigarettes        Start date: 1952        Quit date: 1977        Years since quittin.1      Smokeless tobacco: Never Used      Tobacco comment: max 2.5 pk/dy, aver 2 pk/dy    Alcohol use: No      Alcohol/week: 0.0 standard drinks •  Senna-Docusate Sodium (SENOKOT S) 8.6-50 MG tab 2 tablet, 2 tablet, Oral, Daily    •  vancomycin IVPB premix 1.25g in 0.9% NaCl 250 mL, 15 mg/kg (Adjusted), Intravenous, Q24H    •  clotrimazole-betamethasone (LOTRISONE) cream, , Topical, BID        •  f •  acetaminophen 325 MG Oral Tab, Take 650 mg by mouth every 6 (six) hours as needed. •  guaiFENesin 100 MG/5ML Oral Syrup, Take 200 mg by mouth every 4 (four) hours as needed for cough.     •  Senna-Docusate Sodium 8.6-50 MG Oral Tab, Take 2 tablets by ALKPHO 100 01/12/2021    TP 5.8 (L) 01/12/2021    AST 7 (L) 01/12/2021    ALT 14 01/12/2021    PTT 31.7 05/03/2020    INR 2.38 (H) 11/25/2020    PTP 25.6 (H) 11/25/2020    T4F 2.4 (H) 11/09/2019    TSH 3.580 10/31/2020     05/03/2020    DDIMER 0.34 Judgment and insight are questionable for patient has becoming more depressed with the frequent visit to the hospital not following full recommendation. [GA.2]      Impression:     Impression:    Bipolar disorder type I recent episode depression, severe. 2/5/2021[GA.1]        Electronically signed by Abhay Kurtz MD on 2/5/2021  2:04 PM   Attribution Key    [place]. 1 - Abhay Kurtz MD on 2/5/2021 12:30 PM  GA. 2 - Abhay Kurtz MD on 2/5/2021  1:53 PM                        Discharge Summary - D/C Disposition: SNF    Activity: activity as tolerated  Diet: cardiac diet  Wound Care: as directed  Code Status: DNAR/Selective Treatment  O2:     Home Medication Changes:     Med list     Medication List      START taking these medications    aripiprazole 1 Pantoprazole Sodium 40 MG Tbec  Commonly known as: PROTONIX  Take 1 tablet (40 mg total) by mouth every morning before breakfast.     PEG 3350 17 g Pack  Commonly known as: MIRALAX     Rivaroxaban 20 MG Tabs  Commonly known as: Xarelto  Take 1 tablet (20 m Patient had opportunity to ask questions and state understand and agree with therapeutic plan as outlined    Thank You,    Christian Milner MD    [facility] Hospitalist  Pager[IC.1]       Electronically signed by Taran White MD on 2/10/2021 11:16 AM   Attribution Siegel PT Treatment Plan: Bed mobility;Gait training;Strengthening[CK. 2]    SUBJECTIVE  Pt reports being ready for PT RX    OBJECTIVE[CK.1]  Precautions: Hard of hearing[CK. 2]    WEIGHT BEARING RESTRICTION                   PAIN ASSESSMENT[CK. 1]   Rating: Unable Patient Goal Patient's self-stated goal is: feel better   Goal #1 Patient is able to demonstrate supine - sit EOB @ level: independent     Goal #1   Current Status Min a   Goal #2 Patient is able to demonstrate transfers Sit to/from Stand at assistance lev Pt seen daily. PTA wore mask,gloves and gpggle . Pt educated on deep breathing,relaxation as well as energy conservation technique. Min a for bed mobility and transfer. EOB sitting balance activity with emphasis on core stabilization. Pt amb 10 ft with RW and Approx Degree of Impairment: 57.7%   Standardized Score (AM-PAC Scale): 39.45   CMS Modifier (G-Code): CK    FUNCTIONAL ABILITY STATUS  Gait Assessment   Gait Assistance: Minimum assistance  Distance (ft): 10  Assistive Device: Rolling walker  Pattern: Jenniffer Muniz Bilateral lower leg cellulitis  Active Problems:    Bipolar affective disorder, currently depressed, moderate ([address]    Delirium due to another medical condition    Antipsychotic-induced akathisia[CK. 2]      PHYSICAL THERAPY ASSESSMENT     Pt seen daily. P How much help from another person does the patient currently need. .. [CK.1]   -   Moving to and from a bed to a chair (including a wheelchair)?: A Little   -   Need to walk in hospital room?: A Lot   -   Climbing 3-5 steps with a railing?: Total[CK. 2]     A No notes of this type exist for this encounter. Video Swallow Study Notes    No notes of this type exist for this encounter. SLP Notes    No notes of this type exist for this encounter.      Immunizations     Name Date      INFLUENZA 10/12/20     [place]

## (undated) NOTE — LETTER
22        To Whom It May Concern:      Rosa Newell  :  12/3/1940    []  Patient has been cleared to hold Eliquis for 2 days for Bilateral L5 (L5-S1) Transforaminal epidural steroid injection. Holding the medication(s) may put the patient at an increased risk for stroke, heart attack, or neurologic or cardiac events. []  Patient has not been cleared to hold Eliquis for 2 days for procedure. If this office may be of further assistance, please do not hesitate to contact us. Sincerely,    Ruma Andersen.  [address] [place] [address], [address]

## (undated) NOTE — LETTER
[facility]  Administration of Anesthesia  1.    Daniela Lewis, or _________________________________ acting on his/her behalf, (Patient) (Dependent/Representative) request to receive anesthesia for my pending procedure/operation/treatmen pressure, difficulty urinating, slowing of the baby's heart rate and headache. Rare risks include infections, high spinal         block, spinal bleeding, seizure, cardiac arrest and death.   7.   AWARENESS: I understand that it is possible (but unlike ________________________________________________  (Date) (Time)                                                                                                  (Responsible person in case of minor/ unconscious pt) /Relationship    My signature below affir

## (undated) NOTE — ED AVS SNAPSHOT
Ms. Olsen Agent   MRN: [id number]    Department:  [facility] Emergency Department   Date of Visit:  9/29/2018           Disclosure     Insurance plans vary and the physician(s) referred by the ER may not be covered by your plan.  Please cont within the next three months to obtain basic health screening including reassessment of your blood pressure.     IF THERE IS ANY CHANGE OR WORSENING OF YOUR CONDITION, CALL YOUR PRIMARY CARE PHYSICIAN AT ONCE OR RETURN IMMEDIATELY TO THE EMERGENCY DEPARTMEN

## (undated) NOTE — IP AVS SNAPSHOT
[facility]            (For Outpatient Use Only) Initial Admit Date: 11/25/2020   Inpt/Obs Admit Date: Inpt: 11/25/20 / Obs: N/A   Discharge Date:    Tuan Rubin:  [de-identified]   MRN: [de-identified]   CSN: [id number]   CEID: [id number]        E TERTIARY INSURANCE   Payor:  Plan:    Group Number:  Insurance Type:    Subscriber Name:  Subscriber :    Subscriber ID:  Pt Rel to Subscriber:    Hospital Account Financial Class: Medicare    2020

## (undated) NOTE — LETTER
21        To Whom It May Concern:      Joo Vides  :  12/3/1940    []  Patient has been cleared to hold Eliquis 5 mg for 2 days prior to procedure: Bilateral L5 Transforaminal Epidural Steroid Injection.  Holding the medication(s) may put the

## (undated) NOTE — LETTER
5/23/2023      Samara Aguilar MD  Physical Medicine and Rehabilitation  2010 [facility], [address] [place] [address]  Dept: [phone]  Dept Fax: [phone]        RE: Consultation for Ruth Ann Medina        Dear Ashley Cohn MD,    Thank you very much for the opportunity to see your patient. Attached please find a summary from your patient's recent visit. I appreciate the chance to take care of your patient with you. Please feel free to call me with any questions or concerns. Sincerely,        Scott Hein.  Tony Aguilar MD  Electronically Signed on 5/23/2023

## (undated) NOTE — LETTER
[facility] SURGERY SCHEDULING FORM   [address] [facility]   [phone] (scheduling phone) [phone] (scheduling fax)     PATIENT INFORMATION   Last Name:      Rylan Deal      First Name:    Lavonne Jesus Allergies: Bactrim [Sulfamethoxazole W/Trimethoprim]; Ciprofloxacin; Depakote; Dilaudid  [Hydromorphone]; Fluocinolone; Gabitril [Tiagabine]; Metronidazole;  Phentermine; Tramadol; Norco [Hydrocodone-Acetaminophen]         Completed by:   Steve Sharma

## (undated) NOTE — LETTER
[facility] SURGERY SCHEDULING FORM   [address] [place] [address] [place] [address]   [phone] (scheduling phone) [phone] (scheduling fax)     PATIENT INFORMATION   Last Name:      Keiry Maurer      First Name:    Raulito Mathur Allergies: Bactrim [Sulfamethoxazole W/Trimethoprim]; Ciprofloxacin; Depakote; Dilaudid  [Hydromorphone]; Fluocinolone; Gabitril [Tiagabine]; Metronidazole;  Phentermine         Completed by:    Vivian Wilson      Date:    8/30/2018

## (undated) NOTE — LETTER
Sarah Brar  12/3/1940    A patient of your clinic was recently seen by the hospitalists at [facility], and will be followed by [facility]. Next INR ordered for 5/12/18. Please note pt also dc on PO abx.      The patient's last INR values we

## (undated) NOTE — LETTER
[facility]  Administration of Anesthesia  1. I, Igor Fisher, or _________________________________ acting on her behalf, (Patient) (Dependent/Representative) request to receive anesthesia for my pending procedure/operation/treatment.   A infections, high spinal block, spinal bleeding, seizure, cardiac arrest and death. 7. AWARENESS: I understand that it is possible (but unlikely) to have explicit memory of events from the operating room while under general anesthesia.   8. ELECTROCONVULSIV unconscious pt /Relationship    My signature below affirms that prior to the time of the procedure, I have explained to the patient and/or his/her guardian, the risks and benefits of undergoing anesthesia, as well as any reasonable alternatives.     _______

## (undated) NOTE — LETTER
AUTHORIZATION FOR SURGICAL OPERATION OR OTHER PROCEDURE    1. I hereby authorize Dr. Scotty Knowles and the [facility] staff assigned to my case to perform the following operation and/or procedure at the [facility]:     left shoulder injection under ultrasound guidance    2. My physician has explained the nature and purpose of the operation or other procedure, possible alternative methods of treatment, the risks involved, and the possibility of complication to me. I acknowledge that no guarantee has been made as to the result that may be obtained. 3.  I recognize that, during the course of this operation, or other procedure, unforseen conditions may necessitate additional or different procedure than those listed above. I, therefore, further authorize and request that the above named physician, his/her physician assistants or designees perform such procedures as are, in his/her professional opinion, necessary and desirable. 4.  Any tissue or organs removed in the operation or other procedure may be disposed of by and at the discretion of the [facility] staff and [facility]. 5.  I understand that in the event of a medical emergency, I will be transported by local paramedics to [facility] or other hospital emergency department. 6.  I certify that I have read and fully understand the above consent to operation and/or other procedure. 7.  I acknowledge that my physician has explained sedation/analgesia administration to me including the risks and benefits. I consent to the administration of sedation/analgesia as may be necessary or desirable in the judgement of my physician. Witness signature: ___________________________________________________ Date:  ______/______/_____                    Time:  ________ A. M.  P.M.        Patient Name: Taran Hussein  12/3/1940  [id number]         Patient signature:  ___________________________________________________               Statement of Physician  My signature below affirms that prior to the time of the procedure, I have explained to the patient and/or his/her guardian, the risks and benefits involved in the proposed treatment and any reasonable alternative to the proposed treatment. I have also explained the risks and benefits involved in the refusal of the proposed treatment and have answered the patient's questions.                         Date:  ______/______/_______  Provider                      Signature:  __________________________________________________________       Time:  ___________ A.M    P.M.

## (undated) NOTE — LETTER
9/28/2022      Balbir Xiong MD  Physical Medicine and Rehabilitation  2010 [facility], [address] [place] [address]  Dept: [phone]  Dept Fax: [phone]        RE: Consultation for Anitra Franklin        Dear Maday Morgan MD,    Thank you very much for the opportunity to see your patient. Attached please find a summary from your patient's recent visit. I appreciate the chance to take care of your patient with you. Please feel free to call me with any questions or concerns. Sincerely,        Korey Fermin.  Alexandru Xiong MD  Electronically Signed on 9/28/2022

## (undated) NOTE — LETTER
[facility]  [address] [address]  [phone]             REFERRAL ORDER         Patient Name:   Gretta Canales, ([id number])   Sex: female  : 12/3/1940       Order Date:  2023  Authorizing Provider:   JEROME BROTHERS     Procedure:  PHYSICAL THERAPY - INTERNAL [[id number]]         Order #:   [id number]  Qty:  1     Priority:  Routine                   Class:   EHV - RFL     Standing Interval:          Standing Occurrences:          Expires on:            Expected by:    Associated DX:  Lumbar facet arthropathy (M47.816)  Lumbar herniated disc (M51.26)  Spinal stenosis of lumbar region without neurogenic claudication (M48.061)  Lumbar degenerative disc disease (M51.36)  Severe depressed bipolar I disorder without psychotic features (HCC) (F31.4)  Bipolar affective disorder, currently depressed, moderate (HCC) (F31.32)  Lumbar post-laminectomy syndrome (M96.1)  Spondylolisthesis of lumbar region (M43.16)  Retrolisthesis of vertebrae (M43.10)  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)  Lumbar radiculopathy (M54.16)     Order summary:  EHV - RFL, Routine, Referral By - JEROME BROTHERS 1 visit  Physical Therapy Area of Concentration: Neuro  Physical Therapy Neuro: Gait/Balance/Ataxia         Comments:  left L4 and L5-S1 radiculopathy  (primary encounter diagnosis)  Lumbar facet arthropathy: L5-S1 bilateral large  L5-S1 mild-mod central HNP  L5-S1 bilateral severe foraminal & moderate central, L4-5 severe central, L2-3 mod central & left mod-severe foraminal, L1-2 mod central & right mod-severe , T12-L1 mod central stenosis  L5-S1 bilateral mod-large foraminal, L4-5 mod diffuse, L3-4 mild diffuse, L2-3 mod diffuse, L1-2 right > left mod diffuse, T12-L1 left mild-mod, T11-12 mild-mod diffuse bulging discs  Severe depressed bipolar I disorder without psychotic features (HCC)  Bipolar affective disorder, currently depressed, moderate (HCC)  s/p L5-S1 right  laminectomy  L4-5 slight grade 1 unstable spondylolisthesis  L3-4 stable slight grade 1 retrolisthesis  Neurologic gait disorder     2-3 times a week for 4-6 weeks.     Improve gait and balance and safety.  Pelvic and lumbar stabilization to aid stabilization for gait and help with her pain.  Neural mobilization as needed.  HEP           Scheduling Instructions:  Note to Managed Care Patients: This is the physician order form only and not an authorization for services.     Your physician has recommended you to have physical therapy done at [facility] however, your insurance company may require you to have these services done at another facility or to obtain an approved referral. Services ordered by your physician may not be covered unless prior authorization is obtained in accordance with your insurance company's guidelines. Unauthorized care may be your financial responsibility. If you have questions, please call your [employer] customer service number located on your ID card.     To schedule Physical Therapy at any of the [facility] facilities, please call   [phone].     [facility] in [facility]  [address] [place] [address]        [facility] in [place]  [address] [place]  [address]  [facility] in [place]  [address] [place] [address]          [facility] in [place]  [address] [place] [address]  [facility] [place]  [address] [place] [address]             Electronically Signed By: Alex Baumann MD [NPI: [id number]]  Order Date: Dec 4, 2023 at 12:20 PM

## (undated) NOTE — MR AVS SNAPSHOT
[facility] - [address]  [address] [phone]               Thank you for choosing us for your health care visit with Michael Wallace MD.  We are glad to serve you and happy to provide you with this summary of If you are confident that your benefit plan will not require a referral or authorization, such as [place] Medicaid, please feel free to schedule your appointment immediately.  However, if you are unsure about the requirements for authorization, please wait Take 10 mg by mouth daily. Commonly known as:  NORVASC           aspirin 325 MG Tabs   Take 325 mg by mouth daily. CENTRUM SILVER ULTRA WOMENS Tabs   Take 1 tablet by mouth daily.            CEPACOL SORE THROAT & COUGH 5-7.5 MG Tabby   Generic  Take 1 tablet (5 mg total) by mouth daily.    Commonly known as:  DITROPAN-XL           Pantoprazole Sodium 40 MG Tbec   Take 1 tablet (40 mg total) by mouth every morning before breakfast.   Commonly known as:  PROTONIX           Potassium Chloride ER 20 M These medications were sent to [address], [address] [place] [address], [phone], [phone]  [address] [address]    Hours:  24-hours Phone:  [phone]    - Oxybutynin Chloride ER 5

## (undated) NOTE — LETTER
23        To Whom It May Concern: Dr Chester Gaffney needs to give  Left L5 Transforaminal Epidural Steroid Injection and needs clearance to hold Eliquis 5 mg for 2 days prior to the procedure. Lisa Vasques  :  12/3/1940    []  Patient has been cleared to hold Other:  Eliquis 5 mg for 2 days prior to the   procedure. Holding the medication(s) may put the patient at an increased risk for stroke, heart attack, or neurologic or cardiac events. []  Patient has NOT been cleared to hold  Eliquis 5 mg for 2 days prior to the  procedure. X_________________________________________  (Provider signature)                                     (Date)      Please make a selection, sign and fax this letter back to our office    If this office may be of further assistance, please do not hesitate to contact us. Sincerely,    Michael Gibbs.  Chester Gaffney MD    Phone #: [phone]  Fax #: [phone]

## (undated) NOTE — IP AVS SNAPSHOT
[facility]            (For Outpatient Use Only) Initial Admit Date: 2/4/2021   Inpt/Obs Admit Date: Inpt: 2/4/21 / Obs: N/A   Discharge Date:    Dereck Binet:  [de-identified]   MRN: [de-identified]   CSN: [id number]   CEID: [id number]        NICKIE Payor:  Plan:    Group Number:  Insurance Type:    Subscriber Name:  Subscriber :    Subscriber ID:  Pt Rel to Subscriber:    Hospital Account Financial Class: Medicare    February 10, 2021

## (undated) NOTE — LETTER
24        To Whom It May Concern:      Gretta Canales  :  12/3/1940    []  Patient has been cleared to hold Eliquis for 2 days prior to bilateral L5 transforaminal epidural steroid injection.  Holding the medication(s) may put the patient at an increased risk for stroke, heart attack, or neurologic or cardiac events.    []  Patient has NOT been cleared to hold Eliquis for procedure.    X_________________________________________  (Provider signature)                                     (Date)      Please make a selection, sign and fax this letter back to our office    If this office may be of further assistance, please do not hesitate to contact us.      Sincerely,    Alex Baumann MD    Phone #: [phone]  Fax #: [phone]

## (undated) NOTE — IP AVS SNAPSHOT
Patient Demographics     Address Phone E-mail Address    [place] [address] [phone] (Home) *Preferred*  [phone] [facility]) [email or web address]      Emergency Contact(s)     Name Relation Home Work [address] [facility] Contact information:    [address] [facility]  [phone]          Follow up with Ryanne Larose MD In 2 weeks.     Specialty:  PULMONARY DISEASES    Contact information:    Migue Kline [address] [place] [address] [facility]  [phone] Homer Kennedy                           insulin aspart 100 UNIT/ML Sopn   Last time this was given:  8 Units on 2/28/2017  9:30 AM   Commonly known as:  Moira Sepulveda   Next dose due:  Prior to meal        Inject 3 Units into the skin 3 (three) times daily wi Take 1 tablet (1 mg total) by mouth 2 (two) times daily as needed for Anxiety.     Raquel GARCÍA                           LORazepam 1 MG Tabs   Last time this was given:  1 mg on 2/24/2017  1:09 PM   Commonly known as:  ATIVAN        Take 1 tablet (1 mg t temazepam 30 MG Caps   Last time this was given:  30 mg on 2/27/2017  9:58 PM   Commonly known as:  RESTORIL        Take 1 capsule (30 mg total) by mouth nightly as needed for Sleep.     Nazanin Pedersen                           temazepam 30 MG C [id number] AmLODIPine Besylate (NORVASC) tab 10 mg 02/28/17 0929 Given      [id number] Cholecalciferol (VITAMIN D) 1000 units tab 2,000 Units 02/28/17 0929 Given      [id number] Fluticasone Furoate-Vilanterol (BREO ELLIPTA) 200-25 MCG/INH inhaler 1 puff 02/2 [id number] docusate sodium (COLACE) cap 100 mg 02/27/17 2158 Given      [id number] docusate sodium (COLACE) cap 100 mg 02/28/17 0929 Given      [id number] furosemide (LASIX) tab 40 mg 02/28/17 0929 Given      [id number] ipratropium-albuterol (DUONEB) nebulize Most Recent Value    Patient Weight 115.214 kg (254 lb)         Lab Results Last 24 Hours (02/28/17 - 02/28/17)      PROTHROMBIN TIME (PT) [[id number]] (Abnormal)  Resulted: 02/28/17 0727, Result status: Final result    Ordering provider: Paco Purvis Bronchial culture [[id number]] Collected:  02/21/17 1040    Order Status:  Completed Lab Status:  Final result Updated:  02/24/17 1316    Specimen Information:  Body fluid, unspecified from Bronchial washing      Bronchial Culture Result 1+ Normal Respirat AFB SMEAR[[id number]]                                        Final result                 Please view results for these tests on the individual orders.     AFB Smear Once [[id number]] Collected:  02/21/17 1550    Order Status:  Completed Lab Status:  Final re started on Solu-Medrol, vancomycin, nebulizer treatments, and she will be admitted to the hospital for further management and evaluation.     PAST MEDICAL HISTORY:  Chronic obstructive pulmonary disease with recent hospitalization with COPD exacerbation in HEENT:  Atraumatic. Oropharynx clear with moist mucous membranes. Normal hard and soft palate. NECK:  Trachea midline. Full range of motion. Supple. No thyromegaly or lymphadenopathy.   LUNGS:  Bilateral expiratory wheezes with moderate to severe air PATIENT ACCOUNT#:   [de-identified]    LOCATION:  [address] [place], [place] [address]alexandrea  #:   [id number]       YOB: 1940  ADMISSION DATE:       02/14/2017      CONSULT DATE:      REPORT OF CONSULTATION    REASON FOR CONSULTATION:  Chronic obstructive is swelling and erythema on left lower extremity. The patient had a low temperature at home. PHYSICAL EXAMINATION:    GENERAL:  Well-developed, well-nourished lady in mild distress.   VITAL SIGNS:  Blood pressure 129/66, respiratory rate 20, heart rat Date of Admission: 2/14/2017    Date of Discharge: 02/28/2017    Discharge Diagnosis:   Patient Active Problem List:     Left leg cellulitis     Bilateral low back pain     bilateral L2 and S1 radiculopathies     s/p L5-S1 right laminectomy     L5-S1 left Take 10 mL (200 mg total) by mouth every 4 (four) hours as needed. Qty: 840 mL Refills: 0    furosemide 40 MG Oral Tab  Take 1 tablet (40 mg total) by mouth daily.   Qty: 30 tablet Refills: 0    docusate sodium 100 MG Oral Cap  Take 100 mg by mouth 2 (two) Pantoprazole Sodium 40 MG Oral Tab EC  Take 1 tablet (40 mg total) by mouth 2 (two) times daily before meals. Qty: 180 tablet Refills: 1    QUEtiapine Fumarate 300 MG Oral Tab  Take 1 tablet (300 mg total) by mouth nightly.   Qty: 90 tablet Refills: 0    C Acute on chronic respiratory failure with chronic obstructive pulmonary disease exacerbation  RESOLVING  Pulm eval demarco  pulmicort  breo  Lasix PO  nebs  solu-medrol, likely switch to PO with taper  Pt underwent bronch, please refer to op note for more deta Timespent> 30 mins       Electronically signed by Radha Méndez MD on 2/28/2017 10:48 AM               Physical Therapy Notes (last 72 hours) (Notes from 2/25/2017 12:29 PM through 2/28/2017 12:29 PM)      Physical Therapy Note by Jose Cancino PT PT Discharge Recommendations: Home;Home with home health PT;Cont skilled therapy in a supervised setting (pending progress)     PLAN  PT Treatment Plan: Bed mobility; Body mechanics; Endurance; Patient education;Gait training;Transfer training;Balance trainin bearings and attempt to take steps but knees begin to buckle, patient not able to ambulate at this time without chair follow and assistance    Bed Mobility:SBA    Transfers:min A x 1 and mod A x 1     Patient End of Session: Up in chair;Needs met;Call [facility]

## (undated) NOTE — LETTER
12/4/2023      Xiang Licona MD  Physical Medicine and Rehabilitation  2010 [facility], [address] [place] [address]  Dept: [phone]  Dept Fax: [phone]        RE: Consultation for Rosalia Sadler        Dear Nadia Arteaga MD,    Thank you very much for the opportunity to see your patient. Attached please find a summary from your patient's recent visit. I appreciate the chance to take care of your patient with you. Please feel free to call me with any questions or concerns. Sincerely,        Marquis Lopez.  Mikala Licona MD  Electronically Signed on 12/4/2023

## (undated) NOTE — ED AVS SNAPSHOT
Ms. Rayshawn Ireland   MRN: [id number]    Department:  [facility] Emergency Department   Date of Visit:  10/21/2017           Disclosure     Insurance plans vary and the physician(s) referred by the ER may not be covered by your plan.  Please con CARE PHYSICIAN AT ONCE OR RETURN IMMEDIATELY TO THE EMERGENCY DEPARTMENT. If you have been prescribed any medication(s), please fill your prescription right away and begin taking the medication(s) as directed.   If you believe that any of the medications

## (undated) NOTE — LETTER
6/6/2024      Alex Baumann MD  Physical Medicine and Rehabilitation  [address] [place] [address], [address]  [facility] [address]  Dept: [phone]  Dept Fax: [phone]        RE: Consultation for Gretta Canales        Dear Timothy Hicks MD,    Thank you very much for the opportunity to see your patient.  Attached please find a summary from your patient's recent visit.     I appreciate the chance to take care of your patient with you.  Please feel free to call me with any questions or concerns.    Sincerely,        Alex Baumann MD  Electronically Signed on 6/6/2024

## (undated) NOTE — IP AVS SNAPSHOT
[address] [facility] [address] [facility] [phone]                Discharge Summary   2/14/2017    Ms. Sherman Tovar           Admission Information        Provider Department    2/14/2017 Jarek Chun MD [facility] furosemide 40 MG Tabs   Last time this was given:  40 mg on 2/28/2017  9:29 AM   Commonly known as:  LASIX   Next dose due:  2/29/17 9 am        Take 1 tablet (40 mg total) by mouth daily.     Urszula Wallace CHANGE how you take these medications        Instructions Authorizing Provider    Morning Afternoon Evening As Needed    ipratropium-albuterol 0.5-2.5 (3) MG/3ML Soln   Last time this was given:  3 mL on 2/28/2017 11:55 AM   Commonly known as:  MATHEUS Hubbard temazepam 30 MG Caps   Last time this was given:  30 mg on 2/27/2017  9:58 PM   Commonly known as:  RESTORIL   What changed: You were already taking a medication with the same name, and this prescription was added.  Make sure you understand how and when t Commonly known as:  ZOFRAN-ODT        Take 4 mg by mouth every 8 (eight) hours as needed for Nausea.                             Pantoprazole Sodium 40 MG Tbec   Last time this was given:  40 mg on 2/28/2017  6:17 AM   Commonly known as:  Antonio Eller   Next do - temazepam 30 MG Caps              Patient Instructions       Follow-up with pcp after discharge from rehab  Follow-up with Dr. Son Barrett in 2 weeks  Follow-up with with Dr. Kevin Skinner in ~4-6 weeks  At rehab, monitor inr daily   Wean prednisone and insulin   Deca (02/23/17)  12.6 (H) (02/23/17)  3.84 (02/23/17)  11.5 (L) (02/23/17)  35.7 (02/23/17)  93.1    (02/23/17)  246 (02/23/17)  9.1    (02/22/17)  12.0 (H) (02/22/17)  3.79 (02/22/17)  11.1 (L) (02/22/17)  35.4 (02/22/17)  93.5    (02/22/17)  260 (02/22/17) Medications Returned:           Additional Information       We are concerned for your overall well being:    - If you are a smoker or have smoked in the last 12 months, we encourage you to explore options for quitting.     - If you have concerns related t experience these side effects or respond to medications the same. Please call your provider or healthcare team if you have any questions regarding your medications while at home.          Ant-Infective Medications     cephALEXin 500 MG Oral Cap         Use: insulin detemir 100 UNIT/ML Subcutaneous Solution Pen-injector    insulin aspart 100 UNIT/ML Subcutaneous Solution Pen-injector         Use:  Treat high blood sugar   Most common side effects: Low blood sugar:nausea, jitters, sweating, rapid heartbeat Use: To treat inflammation, to prevent or treat allergic reactions, chemotherapy related nausea, used as part of some chemo protocols   Most common side effects:  Increased blood sugar, high blood pressure, fluid retention, mood changes, stomach upset, head

## (undated) NOTE — LETTER
7/22/2019      Han Foster MD  Physical Medicine and Rehabilitation  [address] [address]  Dept: [phone]  Dept Fax: [phone]        RE: Consultation for Anastasiia Conrad        Dear Radha Worrell.  Mitzy Benedict MD,    Jason Cleayr

## (undated) NOTE — LETTER
21        To Whom It May Concern:      Mamadou Arreguin  :  12/3/1940    []  Patient has been cleared to hold Xarelto 20mg  for 48hrs prior  Bilateral L5 TFESI .   Holding the medication(s) may put the patient at an increased risk for stroke, heart

## (undated) NOTE — LETTER
09/09/20        To Dr. Brent Aldanas:    Dian Montes has been scheduled for lumbar epidural steroid injections on 9/18/2020 with Dr. Katie Cook. Rocio Barrientos will need authorization from Dr. Brent Perez to hold Xarelto 3-5 days prior to procedure.     Please call or fax

## (undated) NOTE — LETTER
2/8/2023      Robert Rhodes MD  Physical Medicine and Rehabilitation  [address] [facility], [address] [place] [address]  Dept: [phone]  Dept Fax: [phone]        RE: Consultation for Nicolette Cole        Dear Jeaneth Beebe MD,    Thank you very much for the opportunity to see your patient. Attached please find a summary from your patient's recent visit. I appreciate the chance to take care of your patient with you. Please feel free to call me with any questions or concerns. Sincerely,        Severo Altman.  Herber Rhodes MD  Electronically Signed on 2/8/2023

## (undated) NOTE — IP AVS SNAPSHOT
[facility]            (For Outpatient Use Only) Initial Admit Date: 2/9/2020   Inpt/Obs Admit Date: Inpt: 2/9/20 / Obs: N/A   Discharge Date:    Skip Jelly:  [de-identified]   MRN: [de-identified]   CSN: [id number]   CEID: [id number]        YQUTU Subscriber Name:  Margaret Granados :    Subscriber ID:  Pt Rel to Subscriber:    Hospital Account Financial Class: Medicare    2020

## (undated) NOTE — IP AVS SNAPSHOT
[facility]            (For Outpatient Use Only) Initial Admit Date: 11/20/2019   Inpt/Obs Admit Date: Inpt: N/A / Obs: 11/20/19   Discharge Date:    Marily Bynum:  [de-identified]   MRN: [de-identified]   CSN: [id number]   CEID: [id number]        E Subscriber Name:  Reyes Josh :    Subscriber ID:  Pt Rel to Subscriber:    Hospital Account Financial Class: Medicare    2019